# Patient Record
Sex: FEMALE | Race: WHITE | NOT HISPANIC OR LATINO | Employment: OTHER | URBAN - METROPOLITAN AREA
[De-identification: names, ages, dates, MRNs, and addresses within clinical notes are randomized per-mention and may not be internally consistent; named-entity substitution may affect disease eponyms.]

---

## 2017-01-23 ENCOUNTER — HOSPITAL ENCOUNTER (OUTPATIENT)
Dept: RADIOLOGY | Facility: HOSPITAL | Age: 82
Discharge: HOME | End: 2017-01-23
Attending: FAMILY MEDICINE
Payer: MEDICARE

## 2017-01-23 DIAGNOSIS — R05.9 COUGH: ICD-10-CM

## 2017-01-23 PROCEDURE — 71020 XR CHEST PA AND LATERAL: CPT | Mod: TC

## 2017-01-23 PROCEDURE — 71020 XR CHEST PA AND LATERAL: CPT | Mod: 26,,, | Performed by: RADIOLOGY

## 2017-08-09 ENCOUNTER — OFFICE VISIT (OUTPATIENT)
Dept: OBSTETRICS AND GYNECOLOGY | Facility: CLINIC | Age: 82
End: 2017-08-09
Payer: MEDICARE

## 2017-08-09 VITALS
SYSTOLIC BLOOD PRESSURE: 166 MMHG | TEMPERATURE: 99 F | HEIGHT: 60 IN | WEIGHT: 104.75 LBS | DIASTOLIC BLOOD PRESSURE: 72 MMHG | BODY MASS INDEX: 20.56 KG/M2

## 2017-08-09 DIAGNOSIS — Z12.31 VISIT FOR SCREENING MAMMOGRAM: ICD-10-CM

## 2017-08-09 DIAGNOSIS — Z01.419 WELL WOMAN EXAM WITH ROUTINE GYNECOLOGICAL EXAM: Primary | ICD-10-CM

## 2017-08-09 DIAGNOSIS — Z87.39 HISTORY OF OSTEOPOROSIS: ICD-10-CM

## 2017-08-09 DIAGNOSIS — N95.1 MENOPAUSAL SYNDROME: ICD-10-CM

## 2017-08-09 PROCEDURE — G0101 CA SCREEN;PELVIC/BREAST EXAM: HCPCS | Mod: S$PBB,,, | Performed by: OBSTETRICS & GYNECOLOGY

## 2017-08-09 PROCEDURE — 99999 PR PBB SHADOW E&M-EST. PATIENT-LVL III: CPT | Mod: PBBFAC,,, | Performed by: OBSTETRICS & GYNECOLOGY

## 2017-08-09 PROCEDURE — 99213 OFFICE O/P EST LOW 20 MIN: CPT | Mod: PBBFAC | Performed by: OBSTETRICS & GYNECOLOGY

## 2017-08-09 RX ORDER — HYDROCHLOROTHIAZIDE 25 MG/1
25 TABLET ORAL DAILY
Status: ON HOLD | COMMUNITY
End: 2018-02-06 | Stop reason: HOSPADM

## 2017-08-09 NOTE — PROGRESS NOTES
Subjective:       Patient ID: Jeannie Hutchins is a 82 y.o. female.    Chief Complaint:  Gynecologic Exam      History of Present Illness  HPI  Annual Exam-Postmenopausal  Patient presents for annual exam. The patient has no complaints today. The patient is not currently sexually active. GYN screening history: no prior history of gyn screening tests. The patient is not taking hormone replacement therapy. Patient denies post-menopausal vaginal bleeding. The patient wears seatbelts: yes. The patient participates in regular exercise: yes. Has the patient ever been transfused or tattooed?: no. The patient reports that there is not domestic violence in her life.    She is status post hysterectomy  On Fosamax  Daughter with breast cancer at 50  Sister with ovarian cancer at 80      GYN & OB HistoryNo LMP recorded. Patient has had a hysterectomy.   Date of Last Pap: No result found    OB History    Para Term  AB Living   6 5 5   1     SAB TAB Ectopic Multiple Live Births   1              # Outcome Date GA Lbr Laz/2nd Weight Sex Delivery Anes PTL Lv   6 SAB            5 Term            4 Term            3 Term            2 Term            1 Term               Obstetric Comments   SAb at 6 weeks between the last two babies     Past Medical History:   Diagnosis Date    Arthritis     Blood transfusion     Carotid stenosis     Hypercholesterolemia     Hypertension     Thyroid disease        Past Surgical History:   Procedure Laterality Date    CARPAL TUNNEL RELEASE      right hand    HYSTERECTOMY      left carotid endartectomy  2006    TONSILLECTOMY         Family History   Problem Relation Age of Onset    Heart disease Father     Cancer Brother 65     bladder    Cancer Sister 81     Ovarian    Ovarian cancer Sister 81    Breast cancer Daughter 50     Status post mastectomy    Cancer Sister      Lung.  Smoker    Cancer Sister      Lung.  Smoker       Social History     Social History     Marital status:      Spouse name: N/A    Number of children: N/A    Years of education: N/A     Social History Main Topics    Smoking status: Never Smoker    Smokeless tobacco: Never Used    Alcohol use No    Drug use: No    Sexual activity: No     Other Topics Concern    None     Social History Narrative     since 1984    He is retired.  He is 82.  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      alendronate (FOSAMAX) 70 MG tablet Take 70 mg by mouth every 7 days.      amlodipine-atorvastatin (CADUET) 10-10 mg per tablet Take 1 tablet by mouth every evening.      aspirin 325 MG tablet Take 325 mg by mouth nightly.      hydrochlorothiazide (HYDRODIURIL) 25 MG tablet Take 25 mg by mouth once daily.      levothyroxine (SYNTHROID) 75 MCG tablet Take 75 mcg by mouth once daily.      ranitidine (ZANTAC) 150 MG tablet Take 150 mg by mouth with lunch.      VIT A,C & E/LUTEIN/MINERALS (VISION FORMULA, WITH LUTEIN, ORAL) Take 1 tablet by mouth once daily.       No current facility-administered medications for this visit.        Review of patient's allergies indicates:   Allergen Reactions    Strawberry Swelling     Tongue swells up and a generalized rash.       Review of Systems  Review of Systems   Constitutional: Negative for activity change, appetite change, chills, fatigue, fever and unexpected weight change.   HENT: Negative for mouth sores.    Respiratory: Negative for cough, shortness of breath and wheezing.    Cardiovascular: Negative for chest pain and palpitations.   Gastrointestinal: Negative for abdominal pain, bloating, blood in stool, constipation, nausea and vomiting.   Endocrine: Negative for diabetes and hot flashes.   Genitourinary: Negative for dyspareunia, dysuria,  frequency, hematuria, menorrhagia, menstrual problem, pelvic pain, urgency, vaginal bleeding, vaginal discharge, vaginal pain, dysmenorrhea, urinary incontinence, postcoital bleeding and vaginal odor.   Musculoskeletal: Negative for back pain and myalgias.   Skin:  Negative for rash.   Neurological: Negative for seizures and headaches.   Psychiatric/Behavioral: Negative for depression and sleep disturbance. The patient is not nervous/anxious.    Breast: Negative for breast mass, breast pain and nipple discharge          Objective:    Physical Exam:   Constitutional: She appears well-developed and well-nourished. No distress.    HENT:   Head: Normocephalic and atraumatic.    Eyes: EOM are normal.    Neck: Normal range of motion.     Pulmonary/Chest: Effort normal. No respiratory distress.   Breasts: Non-tender, no engorgement, no masses, no retraction, no discharge. Negative for lymphadenopathy.         Abdominal: Soft. She exhibits no distension. There is no tenderness. There is no rebound and no guarding.     Genitourinary: Vagina normal. No vaginal discharge found.   Genitourinary Comments: Severe atrophy.  Vulva without any obvious lesions.  Vaginal vault with good support with grade 2 cystocele.  Minimal discharge noted.  No obvious lesion.  Cervix and Uterus are surgically absent.  Adnexa is without any masses or tenderness.    External hemorrhoids seen.           Musculoskeletal: Normal range of motion.       Neurological: She is alert.    Skin: Skin is warm and dry.    Psychiatric: She has a normal mood and affect.          Assessment:        1. Well woman exam with routine gynecological exam    2. Menopausal syndrome    3. History of osteoporosis    4. Visit for screening mammogram              Plan:          I have discussed with the patient her condition.  Monthly breast examination was instructed, discussed, and encouraged.  Patient was encouraged to consume a low-calorie, low fat diet, and to increase of  physical activity.  Healthy habits encouraged.  A Pap smear was NOT performed.  Mammogram was ordered along with DXA scan for bone mineral densitiy because of the combination of her age and risk factors.  Gonorrhea and Chlamydia testing not performed.  HIV test not ordered.  Patient is to continue her medications as prescribed by her primary care physician.  She will come back to see me in one year for her annual visit.  She can come back to see me sooner as necessary.  All of her questions were answered appropriately to her satisfaction.    She recently had blood work done with her PCP.  Would like for us to see results.  Her family history with signs of breast and ovarian cancer.  Discussed risks  Cystocele and hemorrhoids NOT bothering her.  No pain.  No urinary symptoms.    She does not want any surgery/procedure done at this time.

## 2017-09-06 ENCOUNTER — TELEPHONE (OUTPATIENT)
Dept: OBSTETRICS AND GYNECOLOGY | Facility: CLINIC | Age: 82
End: 2017-09-06

## 2017-10-04 ENCOUNTER — HOSPITAL ENCOUNTER (OUTPATIENT)
Dept: RADIOLOGY | Facility: HOSPITAL | Age: 82
Discharge: HOME | End: 2017-10-04
Attending: OBSTETRICS & GYNECOLOGY
Payer: MEDICARE

## 2017-10-04 ENCOUNTER — HOSPITAL ENCOUNTER (OUTPATIENT)
Dept: RADIOLOGY | Facility: HOSPITAL | Age: 82
Discharge: HOME | End: 2017-10-04
Attending: SURGERY
Payer: MEDICARE

## 2017-10-04 DIAGNOSIS — I77.89 OTHER SPECIFIED DISORDERS OF ARTERIES AND ARTERIOLES: ICD-10-CM

## 2017-10-04 DIAGNOSIS — Z87.39 HISTORY OF OSTEOPOROSIS: ICD-10-CM

## 2017-10-04 DIAGNOSIS — Z12.31 VISIT FOR SCREENING MAMMOGRAM: ICD-10-CM

## 2017-10-04 DIAGNOSIS — M81.0 OSTEOPOROSIS: ICD-10-CM

## 2017-10-04 DIAGNOSIS — I65.29 STENOSIS OF CAROTID ARTERY, UNSPECIFIED LATERALITY: ICD-10-CM

## 2017-10-04 PROCEDURE — 77067 SCR MAMMO BI INCL CAD: CPT | Mod: TC

## 2017-10-04 PROCEDURE — 93880 EXTRACRANIAL BILAT STUDY: CPT | Mod: 26,,, | Performed by: RADIOLOGY

## 2017-10-04 PROCEDURE — 77067 SCR MAMMO BI INCL CAD: CPT | Mod: 26,,, | Performed by: RADIOLOGY

## 2017-10-04 PROCEDURE — 77080 DXA BONE DENSITY AXIAL: CPT | Mod: TC

## 2017-10-04 PROCEDURE — 77080 DXA BONE DENSITY AXIAL: CPT | Mod: 26,,, | Performed by: RADIOLOGY

## 2017-10-04 PROCEDURE — 77063 BREAST TOMOSYNTHESIS BI: CPT | Mod: 26,,, | Performed by: RADIOLOGY

## 2017-10-04 PROCEDURE — 93880 EXTRACRANIAL BILAT STUDY: CPT | Mod: TC

## 2018-01-22 ENCOUNTER — HOSPITAL ENCOUNTER (INPATIENT)
Facility: HOSPITAL | Age: 83
LOS: 15 days | Discharge: NURSING, REHAB OR LONG-TERM CARE | DRG: 643 | End: 2018-02-06
Attending: EMERGENCY MEDICINE | Admitting: EMERGENCY MEDICINE
Payer: MEDICARE

## 2018-01-22 DIAGNOSIS — R05.3 COUGH, PERSISTENT: ICD-10-CM

## 2018-01-22 DIAGNOSIS — E87.1 HYPONATREMIA WITH DECREASED SERUM OSMOLALITY: ICD-10-CM

## 2018-01-22 DIAGNOSIS — E87.1 ACUTE HYPONATREMIA: ICD-10-CM

## 2018-01-22 DIAGNOSIS — E83.39 HYPOPHOSPHATEMIA: ICD-10-CM

## 2018-01-22 DIAGNOSIS — R11.2 NON-INTRACTABLE VOMITING WITH NAUSEA, UNSPECIFIED VOMITING TYPE: Primary | ICD-10-CM

## 2018-01-22 DIAGNOSIS — R05.9 COUGH: ICD-10-CM

## 2018-01-22 DIAGNOSIS — I48.91 A-FIB: ICD-10-CM

## 2018-01-22 DIAGNOSIS — J15.9 BACTERIAL PNEUMONIA: ICD-10-CM

## 2018-01-22 LAB
ALBUMIN SERPL BCP-MCNC: 3.8 G/DL
ALP SERPL-CCNC: 70 U/L
ALT SERPL W/O P-5'-P-CCNC: 32 U/L
ANION GAP SERPL CALC-SCNC: 9 MMOL/L
AST SERPL-CCNC: 40 U/L
BACTERIA #/AREA URNS HPF: ABNORMAL /HPF
BASOPHILS # BLD AUTO: 0.01 K/UL
BASOPHILS NFR BLD: 0.1 %
BILIRUB SERPL-MCNC: 0.9 MG/DL
BILIRUB UR QL STRIP: NEGATIVE
BNP SERPL-MCNC: 485 PG/ML
BUN SERPL-MCNC: 5 MG/DL
BUN SERPL-MCNC: 7 MG/DL
BUN SERPL-MCNC: 8 MG/DL
CALCIUM SERPL-MCNC: 8.3 MG/DL
CALCIUM SERPL-MCNC: 8.5 MG/DL
CALCIUM SERPL-MCNC: 8.8 MG/DL
CHLORIDE SERPL-SCNC: 75 MMOL/L
CHLORIDE SERPL-SCNC: 76 MMOL/L
CHLORIDE SERPL-SCNC: 81 MMOL/L
CLARITY UR: CLEAR
CO2 SERPL-SCNC: 28 MMOL/L
CO2 SERPL-SCNC: 28 MMOL/L
CO2 SERPL-SCNC: 29 MMOL/L
COLOR UR: YELLOW
CREAT SERPL-MCNC: 0.6 MG/DL
DIFFERENTIAL METHOD: ABNORMAL
EOSINOPHIL # BLD AUTO: 0 K/UL
EOSINOPHIL NFR BLD: 0 %
ERYTHROCYTE [DISTWIDTH] IN BLOOD BY AUTOMATED COUNT: 12.2 %
EST. GFR  (AFRICAN AMERICAN): >60 ML/MIN/1.73 M^2
EST. GFR  (NON AFRICAN AMERICAN): >60 ML/MIN/1.73 M^2
GLUCOSE SERPL-MCNC: 101 MG/DL
GLUCOSE SERPL-MCNC: 120 MG/DL
GLUCOSE SERPL-MCNC: 132 MG/DL
GLUCOSE UR QL STRIP: ABNORMAL
HCT VFR BLD AUTO: 34 %
HGB BLD-MCNC: 12.8 G/DL
HGB UR QL STRIP: ABNORMAL
HYALINE CASTS #/AREA URNS LPF: 0 /LPF
KETONES UR QL STRIP: ABNORMAL
LEUKOCYTE ESTERASE UR QL STRIP: ABNORMAL
LYMPHOCYTES # BLD AUTO: 0.6 K/UL
LYMPHOCYTES NFR BLD: 7.7 %
MCH RBC QN AUTO: 30.7 PG
MCHC RBC AUTO-ENTMCNC: 37.6 G/DL
MCV RBC AUTO: 82 FL
MICROSCOPIC COMMENT: ABNORMAL
MONOCYTES # BLD AUTO: 0.7 K/UL
MONOCYTES NFR BLD: 9.1 %
NEUTROPHILS # BLD AUTO: 5.9 K/UL
NEUTROPHILS NFR BLD: 83.1 %
NITRITE UR QL STRIP: NEGATIVE
NON-SQ EPI CELLS #/AREA URNS HPF: 2 /HPF
OSMOLALITY SERPL: 240 MOSM/KG
OTHER ELEMENTS URNS MICRO: ABNORMAL
PH UR STRIP: 7 [PH] (ref 5–8)
PLATELET # BLD AUTO: 264 K/UL
PMV BLD AUTO: 10.1 FL
POTASSIUM SERPL-SCNC: 2.8 MMOL/L
POTASSIUM SERPL-SCNC: 2.8 MMOL/L
POTASSIUM SERPL-SCNC: 3.1 MMOL/L
PROT SERPL-MCNC: 6.7 G/DL
PROT UR QL STRIP: ABNORMAL
RBC # BLD AUTO: 4.17 M/UL
RBC #/AREA URNS HPF: 1 /HPF (ref 0–4)
SODIUM SERPL-SCNC: 113 MMOL/L
SODIUM SERPL-SCNC: 113 MMOL/L
SODIUM SERPL-SCNC: 118 MMOL/L
SP GR UR STRIP: 1.01 (ref 1–1.03)
SQUAMOUS #/AREA URNS HPF: 1 /HPF
TROPONIN I SERPL DL<=0.01 NG/ML-MCNC: 0.01 NG/ML
TSH SERPL DL<=0.005 MIU/L-ACNC: 1.19 UIU/ML
URN SPEC COLLECT METH UR: ABNORMAL
UROBILINOGEN UR STRIP-ACNC: NEGATIVE EU/DL
WBC # BLD AUTO: 7.16 K/UL
WBC #/AREA URNS HPF: 4 /HPF (ref 0–5)

## 2018-01-22 PROCEDURE — 96374 THER/PROPH/DIAG INJ IV PUSH: CPT

## 2018-01-22 PROCEDURE — 81000 URINALYSIS NONAUTO W/SCOPE: CPT

## 2018-01-22 PROCEDURE — 63600175 PHARM REV CODE 636 W HCPCS: Performed by: EMERGENCY MEDICINE

## 2018-01-22 PROCEDURE — 93010 ELECTROCARDIOGRAM REPORT: CPT | Mod: ,,, | Performed by: INTERNAL MEDICINE

## 2018-01-22 PROCEDURE — 83880 ASSAY OF NATRIURETIC PEPTIDE: CPT

## 2018-01-22 PROCEDURE — 87449 NOS EACH ORGANISM AG IA: CPT

## 2018-01-22 PROCEDURE — 82570 ASSAY OF URINE CREATININE: CPT

## 2018-01-22 PROCEDURE — 84300 ASSAY OF URINE SODIUM: CPT

## 2018-01-22 PROCEDURE — 36415 COLL VENOUS BLD VENIPUNCTURE: CPT

## 2018-01-22 PROCEDURE — 99285 EMERGENCY DEPT VISIT HI MDM: CPT | Mod: 25

## 2018-01-22 PROCEDURE — 63600175 PHARM REV CODE 636 W HCPCS: Performed by: INTERNAL MEDICINE

## 2018-01-22 PROCEDURE — 83930 ASSAY OF BLOOD OSMOLALITY: CPT

## 2018-01-22 PROCEDURE — 25000003 PHARM REV CODE 250: Performed by: EMERGENCY MEDICINE

## 2018-01-22 PROCEDURE — 85025 COMPLETE CBC W/AUTO DIFF WBC: CPT

## 2018-01-22 PROCEDURE — 87086 URINE CULTURE/COLONY COUNT: CPT | Mod: 59

## 2018-01-22 PROCEDURE — 83935 ASSAY OF URINE OSMOLALITY: CPT

## 2018-01-22 PROCEDURE — 87086 URINE CULTURE/COLONY COUNT: CPT

## 2018-01-22 PROCEDURE — S0028 INJECTION, FAMOTIDINE, 20 MG: HCPCS | Performed by: EMERGENCY MEDICINE

## 2018-01-22 PROCEDURE — 84443 ASSAY THYROID STIM HORMONE: CPT

## 2018-01-22 PROCEDURE — 80048 BASIC METABOLIC PNL TOTAL CA: CPT | Mod: 91

## 2018-01-22 PROCEDURE — 20000000 HC ICU ROOM

## 2018-01-22 PROCEDURE — 80053 COMPREHEN METABOLIC PANEL: CPT

## 2018-01-22 PROCEDURE — 82043 UR ALBUMIN QUANTITATIVE: CPT

## 2018-01-22 PROCEDURE — 84484 ASSAY OF TROPONIN QUANT: CPT

## 2018-01-22 PROCEDURE — 96376 TX/PRO/DX INJ SAME DRUG ADON: CPT

## 2018-01-22 PROCEDURE — 96361 HYDRATE IV INFUSION ADD-ON: CPT

## 2018-01-22 RX ORDER — AMLODIPINE BESYLATE 5 MG/1
10 TABLET ORAL NIGHTLY
Status: DISCONTINUED | OUTPATIENT
Start: 2018-01-22 | End: 2018-01-29

## 2018-01-22 RX ORDER — FAMOTIDINE 10 MG/ML
20 INJECTION INTRAVENOUS EVERY 12 HOURS
Status: DISCONTINUED | OUTPATIENT
Start: 2018-01-22 | End: 2018-02-06 | Stop reason: HOSPADM

## 2018-01-22 RX ORDER — 3% SODIUM CHLORIDE 3 G/100ML
100 INJECTION, SOLUTION INTRAVENOUS CONTINUOUS
Status: DISPENSED | OUTPATIENT
Start: 2018-01-22 | End: 2018-01-23

## 2018-01-22 RX ORDER — ATORVASTATIN CALCIUM 10 MG/1
10 TABLET, FILM COATED ORAL NIGHTLY
Status: DISCONTINUED | OUTPATIENT
Start: 2018-01-22 | End: 2018-02-06 | Stop reason: HOSPADM

## 2018-01-22 RX ORDER — ONDANSETRON 2 MG/ML
4 INJECTION INTRAMUSCULAR; INTRAVENOUS EVERY 8 HOURS PRN
Status: DISCONTINUED | OUTPATIENT
Start: 2018-01-22 | End: 2018-02-06 | Stop reason: HOSPADM

## 2018-01-22 RX ORDER — ONDANSETRON 2 MG/ML
4 INJECTION INTRAMUSCULAR; INTRAVENOUS
Status: COMPLETED | OUTPATIENT
Start: 2018-01-22 | End: 2018-01-22

## 2018-01-22 RX ORDER — AMLODIPINE BESYLATE AND ATORVASTATIN CALCIUM 10; 10 MG/1; MG/1
1 TABLET, FILM COATED ORAL NIGHTLY
Status: DISCONTINUED | OUTPATIENT
Start: 2018-01-22 | End: 2018-01-22

## 2018-01-22 RX ORDER — 3% SODIUM CHLORIDE 3 G/100ML
100 INJECTION, SOLUTION INTRAVENOUS CONTINUOUS
Status: DISCONTINUED | OUTPATIENT
Start: 2018-01-22 | End: 2018-01-22

## 2018-01-22 RX ORDER — ACETAMINOPHEN 325 MG/1
650 TABLET ORAL EVERY 4 HOURS PRN
Status: DISCONTINUED | OUTPATIENT
Start: 2018-01-22 | End: 2018-02-06 | Stop reason: HOSPADM

## 2018-01-22 RX ORDER — LEVOTHYROXINE SODIUM 75 UG/1
75 TABLET ORAL
Status: DISCONTINUED | OUTPATIENT
Start: 2018-01-23 | End: 2018-02-06 | Stop reason: HOSPADM

## 2018-01-22 RX ORDER — MAGNESIUM SULFATE HEPTAHYDRATE 40 MG/ML
2 INJECTION, SOLUTION INTRAVENOUS
Status: DISCONTINUED | OUTPATIENT
Start: 2018-01-22 | End: 2018-02-06 | Stop reason: HOSPADM

## 2018-01-22 RX ORDER — RAMELTEON 8 MG/1
8 TABLET ORAL NIGHTLY PRN
Status: DISCONTINUED | OUTPATIENT
Start: 2018-01-22 | End: 2018-02-06 | Stop reason: HOSPADM

## 2018-01-22 RX ORDER — OXYCODONE HYDROCHLORIDE 5 MG/1
5 TABLET ORAL EVERY 4 HOURS PRN
Status: DISCONTINUED | OUTPATIENT
Start: 2018-01-22 | End: 2018-02-06 | Stop reason: HOSPADM

## 2018-01-22 RX ORDER — POTASSIUM CHLORIDE 7.45 MG/ML
10 INJECTION INTRAVENOUS
Status: DISCONTINUED | OUTPATIENT
Start: 2018-01-22 | End: 2018-02-05

## 2018-01-22 RX ORDER — POTASSIUM CHLORIDE 20 MEQ/15ML
40 SOLUTION ORAL
Status: DISCONTINUED | OUTPATIENT
Start: 2018-01-22 | End: 2018-02-05

## 2018-01-22 RX ORDER — 3% SODIUM CHLORIDE 3 G/100ML
300 INJECTION, SOLUTION INTRAVENOUS CONTINUOUS
Status: DISCONTINUED | OUTPATIENT
Start: 2018-01-22 | End: 2018-01-22

## 2018-01-22 RX ORDER — 3% SODIUM CHLORIDE 3 G/100ML
100 INJECTION, SOLUTION INTRAVENOUS
Status: COMPLETED | OUTPATIENT
Start: 2018-01-22 | End: 2018-01-22

## 2018-01-22 RX ORDER — SODIUM CHLORIDE 0.9 % (FLUSH) 0.9 %
3 SYRINGE (ML) INJECTION
Status: DISCONTINUED | OUTPATIENT
Start: 2018-01-22 | End: 2018-02-06 | Stop reason: HOSPADM

## 2018-01-22 RX ADMIN — SODIUM CHLORIDE 100 ML: 3 INJECTION, SOLUTION INTRAVENOUS at 07:01

## 2018-01-22 RX ADMIN — SODIUM CHLORIDE 100 ML: 3 INJECTION, SOLUTION INTRAVENOUS at 05:01

## 2018-01-22 RX ADMIN — ONDANSETRON 4 MG: 2 INJECTION INTRAMUSCULAR; INTRAVENOUS at 06:01

## 2018-01-22 RX ADMIN — AMLODIPINE BESYLATE 10 MG: 5 TABLET ORAL at 08:01

## 2018-01-22 RX ADMIN — FAMOTIDINE 20 MG: 10 INJECTION, SOLUTION INTRAVENOUS at 09:01

## 2018-01-22 RX ADMIN — OXYCODONE HYDROCHLORIDE 5 MG: 5 TABLET ORAL at 08:01

## 2018-01-22 RX ADMIN — ONDANSETRON 4 MG: 2 INJECTION INTRAMUSCULAR; INTRAVENOUS at 08:01

## 2018-01-22 RX ADMIN — ONDANSETRON 4 MG: 2 INJECTION INTRAMUSCULAR; INTRAVENOUS at 05:01

## 2018-01-22 RX ADMIN — ATORVASTATIN CALCIUM 10 MG: 10 TABLET, FILM COATED ORAL at 08:01

## 2018-01-22 RX ADMIN — SODIUM CHLORIDE 100 ML: 3 INJECTION, SOLUTION INTRAVENOUS at 10:01

## 2018-01-22 NOTE — ED TRIAGE NOTES
Pt arrived in ed via personal transport with c/o left sided abdominal pain as well as nausea and vomiting and loss of appetite for the past 4 days; pt denies cp and sob at this time

## 2018-01-22 NOTE — ED NOTES
offerred pt assistance with urine; pt stated she does not need to go at this time; pt understands need for urine sample

## 2018-01-22 NOTE — ED PROVIDER NOTES
"Encounter Date: 1/22/2018    SCRIBE #1 NOTE: I, Brianna Cedeno, am scribing for, and in the presence of,  Jr Sabillon MD. I have scribed the following portions of the note - Other sections scribed: HPI, ROS, and Physical Exam.       History     Chief Complaint   Patient presents with    Abdominal Pain     decreased appetite nausea,  weakness and cough x 4 days. O2 sat 89 % on room air placed on 2 L NC with repeat O2 sat 93%, pt alert warm and dry     CC: Abdominal Pain    HPI: The pt is an 82 y.o. F with a PMHx of thyroid disease, HTN, hypercholesterolemia, carotid stenosis, osteoporosis, and arthritis and a past surgical hx of L carotid endartectomy, carpal tunnel release, hysterectomy, and tonsillectomy who presents to the ED c/o abdominal "presure" w/ n/v (2x today), decreased appetite, and dizziness as well as cough for past 4 days. Pt denies any pain and describes sensation in abdomen as a "heavy" and "bloated" feeling. Pt says that she visited her PCP 4 days ago who told her that her potassium was low and that she most likely had a stomach virus. No attempted treatments reported. Pt otherwise denies recent contact w/ sick people; being smoker, drinker, or drug user; and head trauma, headache, visual disturbances, dysuria, and other associated symptoms.       The history is provided by the patient. No  was used.     Review of patient's allergies indicates:   Allergen Reactions    Strawberry Swelling     Tongue swells up and a generalized rash.     Past Medical History:   Diagnosis Date    Arthritis     Blood transfusion     Carotid stenosis     Hypercholesterolemia     Hypertension     Psychiatric problem     Thyroid disease      Past Surgical History:   Procedure Laterality Date    CARPAL TUNNEL RELEASE  2012    right hand    HYSTERECTOMY      left carotid endartectomy  5/2006    TONSILLECTOMY       Family History   Problem Relation Age of Onset    Heart disease Father     " "Cancer Brother 65     bladder    Cancer Sister 81     Ovarian    Ovarian cancer Sister 81    Breast cancer Daughter 50     Status post mastectomy    Cancer Sister      Lung.  Smoker    Cancer Sister      Lung.  Smoker    Schizophrenia Son      Social History   Substance Use Topics    Smoking status: Never Smoker    Smokeless tobacco: Never Used    Alcohol use No     Review of Systems   Constitutional: Positive for appetite change. Negative for chills, diaphoresis and fever.   HENT: Negative for ear pain and sore throat.         (-) head trauma   Eyes: Negative for redness and visual disturbance.   Respiratory: Positive for cough. Negative for shortness of breath.    Cardiovascular: Negative for chest pain.   Gastrointestinal: Positive for nausea and vomiting (2x today). Negative for abdominal pain and diarrhea.        (+) abdominal "heaviness/bloating"   Genitourinary: Negative for dysuria.   Musculoskeletal: Negative for back pain.   Skin: Negative for rash.   Neurological: Positive for dizziness. Negative for headaches.   Psychiatric/Behavioral: Negative for confusion.       Physical Exam     Initial Vitals [01/22/18 1346]   BP Pulse Resp Temp SpO2   (!) 153/70 85 20 98.2 °F (36.8 °C) (!) 89 %      MAP       97.67         Physical Exam    Nursing note and vitals reviewed.  Constitutional: She appears well-developed and well-nourished. No distress.   HENT:   Head: Normocephalic and atraumatic.   Nose: Nose normal.   Eyes: EOM are normal. Pupils are equal, round, and reactive to light.   Neck: Normal range of motion. Neck supple.   Cardiovascular: Normal rate, regular rhythm, normal heart sounds and intact distal pulses. Exam reveals no gallop and no friction rub.    Pulmonary/Chest: Breath sounds normal. No respiratory distress. She has no rhonchi. She has no rales. She exhibits no tenderness.   Abdominal: Soft. Normal appearance and bowel sounds are normal. She exhibits no distension. There is no " tenderness. There is no rebound and no guarding.   Musculoskeletal: Normal range of motion. She exhibits no edema.   Neurological: She is alert and oriented to person, place, and time. No cranial nerve deficit.   Skin: Skin is warm and dry.   Psychiatric: She has a normal mood and affect. Her behavior is normal.         ED Course   Critical Care  Date/Time: 1/29/2018 12:59 PM  Performed by: MARYBETH SIDDIQUI  Authorized by: BRIGHT FOX   Direct patient critical care time: 40 minutes  Total critical care time (exclusive of procedural time) : 40 minutes  Critical care was necessary to treat or prevent imminent or life-threatening deterioration of the following conditions: metabolic crisis.  Critical care was time spent personally by me on the following activities: discussions with consultants, interpretation of cardiac output measurements, evaluation of patient's response to treatment, examination of patient, obtaining history from patient or surrogate, ordering and performing treatments and interventions, ordering and review of laboratory studies, ordering and review of radiographic studies and re-evaluation of patient's condition.        Labs Reviewed   CBC W/ AUTO DIFFERENTIAL - Abnormal; Notable for the following:        Result Value    Hematocrit 34.0 (*)     MCHC 37.6 (*)     Lymph # 0.6 (*)     Gran% 83.1 (*)     Lymph% 7.7 (*)     All other components within normal limits   COMPREHENSIVE METABOLIC PANEL - Abnormal; Notable for the following:     Sodium 113 (*)     Potassium 2.8 (*)     Chloride 75 (*)     Glucose 132 (*)     All other components within normal limits    Narrative:       Sodium critical result(s) called and verbal readback obtained from   Chrissy Vu Rn , 01/22/2018 15:54   URINALYSIS - Abnormal; Notable for the following:     Protein, UA 2+ (*)     Glucose, UA 1+ (*)     Ketones, UA Trace (*)     Occult Blood UA 1+ (*)     Leukocytes, UA 1+ (*)     All other components within normal  limits   B-TYPE NATRIURETIC PEPTIDE - Abnormal; Notable for the following:      (*)     All other components within normal limits   BASIC METABOLIC PANEL - Abnormal; Notable for the following:     Sodium 113 (*)     Potassium 3.1 (*)     Chloride 76 (*)     Glucose 120 (*)     BUN, Bld 7 (*)     Calcium 8.5 (*)     All other components within normal limits    Narrative:       Sodium critical result(s) called and verbal readback obtained from   DALE Bains Rn, 01/22/2018 17:36   URINALYSIS MICROSCOPIC - Abnormal; Notable for the following:     Non-Squam Epith 2 (*)     Other (U/A) Rare (*)     All other components within normal limits   TSH   TROPONIN I             Medical Decision Making:   Initial Assessment:   81 yo presenting with abomdinal bloating, nausea, vomiting. Exam reassuring with no significant abdominal tenderness, rebound, guarding, or peritoneal signs. Labs reveal severe hyponatremia, confirmed with repeat BMP. Unclear cause of hyponatremia. CT head obtained without significant mass or bleed. Symptoms could be related to severe hyponatremia. 200ml of 3% hypertonic saline given severe hyponatremia and symptomatic. Patient thin and small, will hold off on further fast correction to avoid overcorrection. Goal ~6meQ correction in first 24 hours. Neuro exam without significant deficits. Will admit to the ICU for further eval and management. Nephrology consult requested with urine lytes ordered per hospitalist's request. Patient currently well appearing. Low concern for acute infection at the moment, though legionella antigen sent.             Scribe Attestation:   Scribe #1: I performed the above scribed service and the documentation accurately describes the services I performed. I attest to the accuracy of the note.    Attending Attestation:           Physician Attestation for Scribe:  Physician Attestation Statement for Scribe #1: I, Jr Sabillon MD, reviewed documentation, as scribed by Brianna  Le in my presence, and it is both accurate and complete.                 ED Course      Clinical Impression:   The primary encounter diagnosis was Non-intractable vomiting with nausea, unspecified vomiting type. Diagnoses of Cough, Acute hyponatremia, and A-fib were also pertinent to this visit.    Disposition:   Disposition: Admitted  Condition: Critical                        Jr Sabillon MD  01/29/18 1300

## 2018-01-23 PROBLEM — F41.1 GENERALIZED ANXIETY DISORDER: Status: ACTIVE | Noted: 2018-01-23

## 2018-01-23 PROBLEM — F43.23 ADJUSTMENT DISORDER WITH MIXED ANXIETY AND DEPRESSED MOOD: Status: ACTIVE | Noted: 2018-01-23

## 2018-01-23 PROBLEM — E87.1 HYPONATREMIA WITH DECREASED SERUM OSMOLALITY: Status: ACTIVE | Noted: 2018-01-23

## 2018-01-23 PROBLEM — I10 BENIGN HYPERTENSION: Status: ACTIVE | Noted: 2018-01-23

## 2018-01-23 PROBLEM — E87.0 HYPERNATREMIA: Status: ACTIVE | Noted: 2018-01-23

## 2018-01-23 PROBLEM — E87.6 HYPOKALEMIA: Status: ACTIVE | Noted: 2018-01-23

## 2018-01-23 PROBLEM — F32.1 MODERATE MAJOR DEPRESSION, SINGLE EPISODE: Status: ACTIVE | Noted: 2018-01-23

## 2018-01-23 LAB
ANION GAP SERPL CALC-SCNC: 5 MMOL/L
ANION GAP SERPL CALC-SCNC: 7 MMOL/L
ANION GAP SERPL CALC-SCNC: 8 MMOL/L
BASOPHILS # BLD AUTO: 0.01 K/UL
BASOPHILS NFR BLD: 0.1 %
BUN SERPL-MCNC: 5 MG/DL
CALCIUM SERPL-MCNC: 8.1 MG/DL
CALCIUM SERPL-MCNC: 8.1 MG/DL
CALCIUM SERPL-MCNC: 8.5 MG/DL
CHLORIDE SERPL-SCNC: 83 MMOL/L
CHLORIDE SERPL-SCNC: 90 MMOL/L
CHLORIDE SERPL-SCNC: 97 MMOL/L
CO2 SERPL-SCNC: 27 MMOL/L
CO2 SERPL-SCNC: 29 MMOL/L
CO2 SERPL-SCNC: 30 MMOL/L
CREAT SERPL-MCNC: 0.5 MG/DL
CREAT SERPL-MCNC: 0.5 MG/DL
CREAT SERPL-MCNC: 0.6 MG/DL
CREAT UR-MCNC: 11.8 MG/DL
DIFFERENTIAL METHOD: ABNORMAL
EOSINOPHIL # BLD AUTO: 0 K/UL
EOSINOPHIL NFR BLD: 0.3 %
ERYTHROCYTE [DISTWIDTH] IN BLOOD BY AUTOMATED COUNT: 12.8 %
EST. GFR  (AFRICAN AMERICAN): >60 ML/MIN/1.73 M^2
EST. GFR  (NON AFRICAN AMERICAN): >60 ML/MIN/1.73 M^2
GLUCOSE SERPL-MCNC: 100 MG/DL
GLUCOSE SERPL-MCNC: 86 MG/DL
GLUCOSE SERPL-MCNC: 98 MG/DL
HCT VFR BLD AUTO: 33.9 %
HGB BLD-MCNC: 12.5 G/DL
LYMPHOCYTES # BLD AUTO: 1 K/UL
LYMPHOCYTES NFR BLD: 14.4 %
MCH RBC QN AUTO: 31 PG
MCHC RBC AUTO-ENTMCNC: 36.9 G/DL
MCV RBC AUTO: 84 FL
MICROALBUMIN UR DL<=1MG/L-MCNC: 181 UG/ML
MICROALBUMIN/CREATININE RATIO: 1533.9 UG/MG
MONOCYTES # BLD AUTO: 1 K/UL
MONOCYTES NFR BLD: 15.4 %
NEUTROPHILS # BLD AUTO: 4.7 K/UL
NEUTROPHILS NFR BLD: 69.9 %
OSMOLALITY UR: 184 MOSM/KG
PLATELET # BLD AUTO: 217 K/UL
PMV BLD AUTO: 10 FL
POTASSIUM SERPL-SCNC: 2.7 MMOL/L
POTASSIUM SERPL-SCNC: 3.2 MMOL/L
POTASSIUM SERPL-SCNC: 3.8 MMOL/L
PROT UR-MCNC: 26 MG/DL
PROT/CREAT RATIO, UR: 2.2
RBC # BLD AUTO: 4.03 M/UL
SODIUM SERPL-SCNC: 120 MMOL/L
SODIUM SERPL-SCNC: 125 MMOL/L
SODIUM SERPL-SCNC: 131 MMOL/L
SODIUM UR-SCNC: 53 MMOL/L
WBC # BLD AUTO: 6.67 K/UL

## 2018-01-23 PROCEDURE — 90792 PSYCH DIAG EVAL W/MED SRVCS: CPT | Mod: ,,, | Performed by: PSYCHIATRY & NEUROLOGY

## 2018-01-23 PROCEDURE — 20000000 HC ICU ROOM

## 2018-01-23 PROCEDURE — 25000003 PHARM REV CODE 250: Performed by: INTERNAL MEDICINE

## 2018-01-23 PROCEDURE — 25000003 PHARM REV CODE 250: Performed by: EMERGENCY MEDICINE

## 2018-01-23 PROCEDURE — 27000221 HC OXYGEN, UP TO 24 HOURS

## 2018-01-23 PROCEDURE — 85025 COMPLETE CBC W/AUTO DIFF WBC: CPT

## 2018-01-23 PROCEDURE — 80048 BASIC METABOLIC PNL TOTAL CA: CPT | Mod: 91

## 2018-01-23 PROCEDURE — S0028 INJECTION, FAMOTIDINE, 20 MG: HCPCS | Performed by: EMERGENCY MEDICINE

## 2018-01-23 PROCEDURE — 36415 COLL VENOUS BLD VENIPUNCTURE: CPT

## 2018-01-23 PROCEDURE — 80048 BASIC METABOLIC PNL TOTAL CA: CPT

## 2018-01-23 PROCEDURE — 94761 N-INVAS EAR/PLS OXIMETRY MLT: CPT

## 2018-01-23 RX ORDER — GUAIFENESIN 600 MG/1
1200 TABLET, EXTENDED RELEASE ORAL 2 TIMES DAILY
Status: DISCONTINUED | OUTPATIENT
Start: 2018-01-23 | End: 2018-02-06 | Stop reason: HOSPADM

## 2018-01-23 RX ORDER — POTASSIUM CHLORIDE 20 MEQ/15ML
40 SOLUTION ORAL ONCE
Status: COMPLETED | OUTPATIENT
Start: 2018-01-23 | End: 2018-01-23

## 2018-01-23 RX ORDER — SODIUM CHLORIDE 9 MG/ML
INJECTION, SOLUTION INTRAVENOUS CONTINUOUS
Status: ACTIVE | OUTPATIENT
Start: 2018-01-23 | End: 2018-01-24

## 2018-01-23 RX ADMIN — GUAIFENESIN 1200 MG: 600 TABLET, EXTENDED RELEASE ORAL at 12:01

## 2018-01-23 RX ADMIN — RAMELTEON 8 MG: 8 TABLET, FILM COATED ORAL at 12:01

## 2018-01-23 RX ADMIN — FAMOTIDINE 20 MG: 10 INJECTION, SOLUTION INTRAVENOUS at 10:01

## 2018-01-23 RX ADMIN — FAMOTIDINE 20 MG: 10 INJECTION, SOLUTION INTRAVENOUS at 09:01

## 2018-01-23 RX ADMIN — LEVOTHYROXINE SODIUM 75 MCG: 75 TABLET ORAL at 06:01

## 2018-01-23 RX ADMIN — SODIUM CHLORIDE: 0.9 INJECTION, SOLUTION INTRAVENOUS at 03:01

## 2018-01-23 RX ADMIN — SODIUM CHLORIDE 500 ML: 0.9 INJECTION, SOLUTION INTRAVENOUS at 03:01

## 2018-01-23 RX ADMIN — POTASSIUM CHLORIDE 40 MEQ: 20 SOLUTION ORAL at 12:01

## 2018-01-23 RX ADMIN — SODIUM CHLORIDE: 0.9 INJECTION, SOLUTION INTRAVENOUS at 11:01

## 2018-01-23 RX ADMIN — ATORVASTATIN CALCIUM 10 MG: 10 TABLET, FILM COATED ORAL at 09:01

## 2018-01-23 RX ADMIN — GUAIFENESIN 1200 MG: 600 TABLET, EXTENDED RELEASE ORAL at 09:01

## 2018-01-23 NOTE — ASSESSMENT & PLAN NOTE
Patient does not meet criteria for major depressive disorder.  Her symptoms are contingent upon her situations, which is an adjustment disorder.  This is best treated with intense individual therapy.  She is correct that she would do best with a psychologist.  I am not familiar with any here on the [facility] but she should check with her insurance to see if there are any providers working in the community.  Medications will not help an adjustment disorder.

## 2018-01-23 NOTE — PLAN OF CARE
Problem: Patient Care Overview  Goal: Plan of Care Review  Outcome: Ongoing (interventions implemented as appropriate)  Patient AAO x 4. Follows commands. On 5 L NC. SB to NSR on the monitor; occasional PVC's. Afebrile. BP labile. Pt c/o nausea and abdominal pain; relieved with prn medication. Ta CDI; UO WNL. No BM's this shift. Pt on 800 mL fluid restriction for 24 hours. No skin breakdown, falls, or injuries this shift.

## 2018-01-23 NOTE — CONSULTS
Renal ICU Consult    Date of Admission:  1/22/2018  4:10 PM    82 y.o. WF with a PMHx. Significant for:  thyroid disease, HTN, hypercholesterolemia, carotid stenosis, osteoporosis, and arthritis who presented  to the ED c/o abdominal bloating w/ n/v, loose bowels,  decreased appetite, dizziness as well as cough for past 4 days. Pte. visited her PCP 4 days ago who told her that her potassium was low and that she most likely had a stomach virus. No attempted treatments reported.     Past Medical History:   Diagnosis Date    Arthritis     Blood transfusion     Carotid stenosis     Hypercholesterolemia     Hypertension     Thyroid disease      Past Surgical History:   Procedure Laterality Date    CARPAL TUNNEL RELEASE  2012    right hand    HYSTERECTOMY      left carotid endartectomy  5/2006    TONSILLECTOMY       Review of patient's allergies indicates:   Allergen Reactions    Strawberry Swelling     Tongue swells up and a generalized rash.     No current facility-administered medications on file prior to encounter.      Current Outpatient Prescriptions on File Prior to Encounter   Medication Sig Dispense Refill    acetaminophen (TYLENOL) 325 MG tablet Take 325 mg by mouth 2 (two) times daily.      alendronate (FOSAMAX) 70 MG tablet Take 70 mg by mouth every 7 days.      amlodipine-atorvastatin (CADUET) 10-10 mg per tablet Take 1 tablet by mouth every evening.      aspirin 325 MG tablet Take 325 mg by mouth nightly.      hydrochlorothiazide (HYDRODIURIL) 25 MG tablet Take 25 mg by mouth once daily.      levothyroxine (SYNTHROID) 75 MCG tablet Take 75 mcg by mouth once daily.      ranitidine (ZANTAC) 150 MG tablet Take 150 mg by mouth with lunch.      VIT A,C & E/LUTEIN/MINERALS (VISION FORMULA, WITH LUTEIN, ORAL) Take 1 tablet by mouth once daily.       Family History   Problem Relation Age of Onset    Heart disease Father     Cancer Brother 65     bladder     Cancer Sister 81     Ovarian    Ovarian cancer Sister 81    Breast cancer Daughter 50     Status post mastectomy    Cancer Sister      Lung.  Smoker    Cancer Sister      Lung.  Smoker     Social History     Social History    Marital status:      Spouse name: N/A    Number of children: N/A    Years of education: N/A     Occupational History    Not on file.     Social History Main Topics    Smoking status: Never Smoker    Smokeless tobacco: Never Used    Alcohol use No    Drug use: No    Sexual activity: No     Other Topics Concern    Not on file     Social History Narrative     since 1984    He is retired.  He is 82.  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 Negative for chills, diaphoresis and fever.   HENT: Negative   Respiratory: Positive for cough. Negative for shortness of breath.    Cardiovascular: Negative for chest pain.   Gastrointestinal: Positive for nausea and vomiting also abdominal bloating and one episode of loose bowels.   Genitourinary: Negative for dysuria.   Musculoskeletal: Negative for back pain but + for LE cramps  Neurological: Positive for dizzines and weakness     Physical Exam:    Vitals:    01/23/18 1233   BP: (!) 117/54   Pulse:    Resp:    Temp:      I/O last 3 completed shifts:  In: 964.3 [P.O.:280; I.V.:184.3; IV Piggyback:500]  Out: 1850 [Urine:1850]  I/O this shift:  In: 240 [P.O.:240]  Out: 375 [Urine:375]      Constitutional: She appears well-developed and well-nourished. No distress.   HENT: n/a  Neck: supple.   Cardiovascular: Normal rate, regular rhythm, normal heart sounds and intact distal pulses.  Pulmonary: Breath sounds normal.   Abdominal: Soft. Normal bowel sounds no tenderness. Musculoskeletal: no edema.   Neurological: She is oriented to person, place, and time.   Skin: Skin is warm and dry.   Psychiatric: She has a  normal mood and affect.    Laboratories:      Recent Labs  Lab 01/23/18  0658   WBC 6.67   RBC 4.03   HGB 12.5   HCT 33.9*      MCV 84   MCH 31.0   MCHC 36.9*       Recent Labs  Lab 01/22/18  1447  01/23/18  0659   CALCIUM 8.8  < > 8.5*   PROT 6.7  --   --    *  < > 125*   K 2.8*  < > 3.8   CO2 29  < > 30*   CL 75*  < > 90*   BUN 8  < > 5*   CREATININE 0.6  < > 0.6   ALKPHOS 70  --   --    ALT 32  --   --    AST 40  --   --    BILITOT 0.9  --   --    < > = values in this interval not displayed.    Microalbum.,U,Random  181.0   Microalbum.,U,Random     Prot/Creat Ratio, Ur   2.20  Prot/Creat Ratio, Ur    Sodium Urine Random   53  Sodium Urine Random    Protein, Urine Random   26  Protein     Microalb Creat Ratio             1533.9   Microalb      Osmolality, Ur             184   Osmolality     Microbiology Results (last 7 days)     Procedure Component Value Units Date/Time    Urine culture [[id number]] Collected:  01/22/18 2239    Order Status:  Completed Specimen:  Urine from Urine, Catheterized Updated:  01/23/18 1025     Urine Culture, Routine No growth to date    Urine culture **CANNOT BE ORDERED STAT** [[id number]] Collected:  01/22/18 1833    Order Status:  Completed Specimen:  Urine from Urine Updated:  01/23/18 1015     Urine Culture, Routine No significant isolate to date              Diagnostic Tests:  X-Ray chest AP portable [[id number]] Resulted: 01/23/18 0415   Order Status: Completed Updated: 01/23/18 0416   Narrative:     Comparison:1/22/2018    Technique: Single AP portable chest radiograph.    Findings:   Cardiac monitoring leads overlie the chest. The cardiomediastinal silhouette appears stable from prior examination. Coarse interstitial lung markings are noted bilaterally. There is mild bibasilar subsegmental atelectasis. No evidence of new focal air space consolidation or large pleural effusion. There is no pneumothorax.   Impression:       1.  No significant detrimental interval change  compared to 1/22/2018.      Electronically signed by: GIANNA DAWSON  Date: 01/23/18  Time: 04:15        Assessment:    83 y/o female admitted with:    - Hypovolemic hyponatremia due to decrease intake, N/V and Natriuretic effect of chronic HCTZ intake. Uosm reveals a diluted urine indicative of vasopressin suppression.  - Hypokalemia likely due to chronic HCTZ intake and aggravated by current illness  - Viral syndrome? V.S. Symptomatic electrolyte imbalance  - Hx. HTN  - Proteinuria      Plan:    - IVFs NS at 75cc/h  - Daily BMP  - Stop HCTZ  - May remove Ta cath.  - Advance diet as tolerated  - Check serum Mg++  - NO need for water restriction  - o.k. To transfer to floor

## 2018-01-23 NOTE — PLAN OF CARE
Problem: Patient Care Overview  Goal: Plan of Care Review  Outcome: Ongoing (interventions implemented as appropriate)  Patient sodium and potassium levels showed improvement this shift. She was seen by Nephrology and started on 0.9% at 75 ml/ hr. She has been able to sit up and stand on side of the bed without difficulty. Her urine output has been good. She was started on a clear liquid diet and is tolerating well without nausea or vomiting. Her family has been at the bedside throughout the shift and updated on her condition and planning. She sustained no injury, fall or trauma this shift.

## 2018-01-23 NOTE — ASSESSMENT & PLAN NOTE
She does appear to have a JERRI because of the persistent worries that she has.  These worries predominate her day.  She would probably do well with prevention therapy such as SSRI/SNRI to help control the physical symptoms of the anxiety.  Problem at this point in time is her electrolyte abnormalities.  There is a slight risk of hyponatremia with these medications and given her current state, is not recommended to start these.   After a few weeks of stabilized labs, would recommend to start low dose SSRI treatment.  She may do well with paroxetine.  Start at 10mg daily and increase slowly over time to 20-40mg, whatever dose meets efficacy.  Again, therapy will help her with her anxiety.  Agree with patient that she should seek individual counseling with a psychologist.

## 2018-01-23 NOTE — SUBJECTIVE & OBJECTIVE
Patient History           Medical as of 1/23/2018     Past Medical History     Diagnosis Date Comments Source    Arthritis -- -- Provider    Blood transfusion -- -- Provider    Carotid stenosis -- -- Provider    Hypercholesterolemia -- -- Provider    Hypertension -- -- Provider    Psychiatric problem -- -- Provider    Thyroid disease -- -- Provider          Pertinent Negatives     Diagnosis Date Noted Comments Source    History of psychiatric hospitalization 1/23/2018 -- Provider    Hx of psychiatric care 1/23/2018 -- Provider    Suicide attempt 1/23/2018 -- Provider    Therapy 1/23/2018 -- Provider    Transfusion reaction 6/25/2013 -- Provider                  Surgical as of 1/23/2018     Past Surgical History     Procedure Laterality Date Comments Source    left carotid endartectomy [Other] -- 5/2006 -- Provider    CARPAL TUNNEL RELEASE -- 2012 right hand Provider    HYSTERECTOMY -- -- -- Provider    TONSILLECTOMY -- -- -- Provider                  Family as of 1/23/2018     Problem Relation Name Age of Onset Comments Source    Heart disease Father -- -- -- Provider    Cancer Brother -- 65 bladder Provider    Cancer Sister -- 81 Ovarian Provider    Ovarian cancer Sister -- 81 -- Provider    Breast cancer Daughter -- 50 Status post mastectomy Provider    Cancer Sister -- -- Lung.  Smoker Provider    Cancer Sister -- -- Lung.  Smoker Provider    Schizophrenia Son -- -- -- Provider            Tobacco Use as of 1/23/2018     Smoking Status Smoking Start Date Smoking Quit Date Packs/day Years Used    Never Smoker -- -- -- --    Types Comments Smokeless Tobacco Status Smokeless Tobacco Quit Date Source    -- -- Never Used -- Provider            Alcohol Use as of 1/23/2018     Alcohol Use Drinks/Week Alcohol/Week Comments Source    No -- -- -- Provider            Drug Use as of 1/23/2018     Drug Use Types Frequency Comments Source    No -- -- -- Provider            Sexual Activity as of 1/23/2018     Sexually  Active Birth Control Partners Comments Source    No -- Male -- Provider            Activities of Daily Living as of 1/23/2018     Activities of Daily Living Question Response Comments Source    Patient feels they ought to cut down on drinking/drug use Not Asked -- Provider    Patient annoyed by others criticizing their drinking/drug use Not Asked -- Provider    Patient has felt bad or guilty about drinking/drug use Not Asked -- Provider    Patient has had a drink/used drugs as an eye opener in the AM Not Asked -- Provider            Social Documentation as of 1/23/2018     since 1984  He is retired.  He is 82.  Worked for [employer]  She is retired.  Worked for [employer].  Source: Provider           Occupational as of 1/23/2018    **None**           Socioeconomic as of 1/23/2018     Marital Status Spouse Name Number of Children Years Education Preferred Language Ethnicity Race Source     -- -- -- English /White White --         Pertinent History Q A Comments    as of 1/23/2018 Lives with spouse     Place in Birth Order      Lives in home     Number of Siblings      Raised by  yefri  in [place]    Legal Involvement none     Childhood Trauma      Criminal History of none     Financial Status unemployed     Highest Level of Education high school graduation     Does patient have access to a firearm?       Service No     Primary Leisure Activity time with family     Spirituality       Past Medical History:   Diagnosis Date    Arthritis     Blood transfusion     Carotid stenosis     Hypercholesterolemia     Hypertension     Psychiatric problem     Thyroid disease      Past Surgical History:   Procedure Laterality Date    CARPAL TUNNEL RELEASE  2012    right hand    HYSTERECTOMY      left carotid endartectomy  5/2006    TONSILLECTOMY       Family History     Problem Relation (Age of Onset)    Breast cancer Daughter (50)    Cancer Brother (65), Sister  (81), Sister, Sister    Heart disease Father    Ovarian cancer Sister (81)    Schizophrenia Son        Social History Main Topics    Smoking status: Never Smoker    Smokeless tobacco: Never Used    Alcohol use No    Drug use: No    Sexual activity: No     Review of patient's allergies indicates:   Allergen Reactions    Strawberry Swelling     Tongue swells up and a generalized rash.       No current facility-administered medications on file prior to encounter.      Current Outpatient Prescriptions on File Prior to Encounter   Medication Sig    acetaminophen (TYLENOL) 325 MG tablet Take 325 mg by mouth 2 (two) times daily.    alendronate (FOSAMAX) 70 MG tablet Take 70 mg by mouth every 7 days.    amlodipine-atorvastatin (CADUET) 10-10 mg per tablet Take 1 tablet by mouth every evening.    aspirin 325 MG tablet Take 325 mg by mouth nightly.    hydrochlorothiazide (HYDRODIURIL) 25 MG tablet Take 25 mg by mouth once daily.    levothyroxine (SYNTHROID) 75 MCG tablet Take 75 mcg by mouth once daily.    ranitidine (ZANTAC) 150 MG tablet Take 150 mg by mouth with lunch.    VIT A,C & E/LUTEIN/MINERALS (VISION FORMULA, WITH LUTEIN, ORAL) Take 1 tablet by mouth once daily.     Psychotherapeutics     Start     Stop Route Frequency Ordered    01/22/18 1958  ramelteon tablet 8 mg      -- Oral Nightly PRN 01/22/18 1959        Review of Systems   Constitutional: Positive for appetite change and fatigue. Negative for activity change.   Respiratory: Negative for shortness of breath.    Cardiovascular: Negative for chest pain.   Gastrointestinal: Positive for nausea.   Musculoskeletal: Positive for arthralgias and myalgias.   Psychiatric/Behavioral: Positive for dysphoric mood and sleep disturbance. Negative for decreased concentration, hallucinations and suicidal ideas. The patient is nervous/anxious.      Strengths and Liabilities: Liability: Patient is dependent., Liability: Patient lacks coping skills.    Objective:  "    Vital Signs (Most Recent):  Temp: 98.4 °F (36.9 °C) (01/23/18 1200)  Pulse: 76 (01/23/18 1400)  Resp: (!) 60 (01/23/18 1400)  BP: 115/84 (01/23/18 1400)  SpO2: 97 % (01/23/18 1400) Vital Signs (24h Range):  Temp:  [97.8 °F (36.6 °C)-99.3 °F (37.4 °C)] 98.4 °F (36.9 °C)  Pulse:  [] 76  Resp:  [11-71] 60  SpO2:  [90 %-100 %] 97 %  BP: ()/(40-90) 115/84     Height: 4' 8" (142.2 cm)  Weight: 46.6 kg (102 lb 11.8 oz)  Body mass index is 23.03 kg/m².      Intake/Output Summary (Last 24 hours) at 01/23/18 1500  Last data filed at 01/23/18 1000   Gross per 24 hour   Intake          1204.33 ml   Output             2225 ml   Net         -1020.67 ml       Physical Exam   Psychiatric:   EXAMINATION    CONSTITUTIONAL  General Appearance: hospital attire in ICU    MUSCULOSKELETAL  Muscle Strength and Tone: normal  Abnormal Involuntary Movements: none noted  Gait and Station: not observed    PSYCHIATRIC MENTAL STATUS EXAM   Level of Consciousness: awake and alert  Orientation: name, place, date, situation  Grooming: fair  Psychomotor Behavior: normal but somewhat fidgety in bed  Speech: normal rate/tone/volume  Language: no abnormalities  Mood: "worried"  Affect: anxious  Thought Process: linear  Associations: perseverative on stressors in life  Thought Content: denies suicidal/homicidal/psychosis  Memory: intact to recent and remote  Attention: distracted  Fund of Knowledge: intact for conversation  Insight: fair towards stressors  Judgment: limited towards coping skills         Significant Labs:   Last 24 Hours:   Recent Lab Results       01/23/18  0659 01/23/18  0658 01/22/18  2334 01/22/18  2239 01/22/18  2236      Anion Gap 5(L)  8       Appearance, UA          Bacteria, UA          Baso #  0.01        Basophil%  0.1        Bilirubin (UA)          BUN, Bld 5(L)  5(L)       Calcium 8.5(L)  8.1(L)       Chloride 90(L)  83(L)       CO2 30(H)  29       Color, UA          Creatinine 0.6  0.5       Creatinine, " Random Ur     11.8  Comment:  The random urine reference ranges provided were established   for 24 hour urine collections.  No reference ranges exist for  random urine specimens.  Correlate clinically.  (L)          11.8  Comment:  The random urine reference ranges provided were established   for 24 hour urine collections.  No reference ranges exist for  random urine specimens.  Correlate clinically.  (L)          11.8  Comment:  The random urine reference ranges provided were established   for 24 hour urine collections.  No reference ranges exist for  random urine specimens.  Correlate clinically.  (L)     Differential Method  Automated        eGFR if  >60  >60       eGFR if non  >60  Comment:  Calculation used to obtain the estimated glomerular filtration  rate (eGFR) is the CKD-EPI equation.     >60  Comment:  Calculation used to obtain the estimated glomerular filtration  rate (eGFR) is the CKD-EPI equation.          Eos #  0.0        Eosinophil%  0.3        Glucose 100  98       Glucose, UA          Gran #  4.7        Gran%  69.9        Hematocrit  33.9(L)        Hemoglobin  12.5        Hyaline Casts, UA          Ketones, UA          Leukocytes, UA          Lymph #  1.0        Lymph%  14.4(L)        MCH  31.0        MCHC  36.9(H)        MCV  84        Microalb Creat Ratio     1533.9(H)     Microalbum.,U,Random     181.0     Microscopic Comment          Mono #  1.0        Mono%  15.4(H)        MPV  10.0        Nitrite, UA          Non-Squam Epith          Occult Blood UA          Osmolality          Osmolality, Ur     184  Comment:  The random urine reference ranges provided were established   for 24 hour urine collections.  No reference ranges exist for  random urine specimens.  Correlate clinically.       Other (U/A)          pH, UA          Platelets  217        Potassium 3.8  2.7  Comment:  POTASSIUM  critical result(s) called and verbal readback obtained   from KAYLA  SAVANNAH. , 01/23/2018 00:06  (LL)       Prot/Creat Ratio, Ur     2.20(H)     Protein, UA          Protein, Urine Random     26  Comment:  The random urine reference ranges provided were established   for 24 hour urine collections.  No reference ranges exist for  random urine specimens.  Correlate clinically.       RBC  4.03        RBC, UA          RDW  12.8        Sodium 125(L)  120(L)       Sodium Urine Random     53  Comment:  The random urine reference ranges provided were established   for 24 hour urine collections.  No reference ranges exist for  random urine specimens.  Correlate clinically.       Specific [place], UA          Specimen UA          Squam Epithel, UA          Urine Culture, Routine    No growth to date[P]      Urobilinogen, UA          WBC, UA          WBC  6.67                    01/22/18  2048 01/22/18  1833 01/22/18  1642      Anion Gap 9  9     Appearance, UA  Clear      Bacteria, UA  Rare      Baso #        Basophil%        Bilirubin (UA)  Negative      BUN, Bld 5(L)  7(L)     Calcium 8.3(L)  8.5(L)     Chloride 81(L)  76(L)     CO2 28  28     Color, UA  Yellow      Creatinine 0.6  0.6     Creatinine, Random Ur        Differential Method        eGFR if  >60  >60     eGFR if non  >60  Comment:  Calculation used to obtain the estimated glomerular filtration  rate (eGFR) is the CKD-EPI equation.     >60  Comment:  Calculation used to obtain the estimated glomerular filtration  rate (eGFR) is the CKD-EPI equation.        Eos #        Eosinophil%        Glucose 101  120(H)     Glucose, UA  1+(A)      Gran #        Gran%        Hematocrit        Hemoglobin        Hyaline Casts, UA  0      Ketones, UA  Trace(A)      Leukocytes, UA  1+(A)      Lymph #        Lymph%        MCH        MCHC        MCV        Microalb Creat Ratio        Microalbum.,U,Random        Microscopic Comment  SEE COMMENT  Comment:  Other formed elements not mentioned in the report are not    present in the microscopic examination.         Mono #        Mono%        MPV        Nitrite, UA  Negative      Non-Squam Epith  2(A)      Occult Blood UA  1+(A)      Osmolality 240  Comment:  SERUM OSMOLARITY critical result(s) called and verbal readback   obtained from ELIAS NUÑEZ. , 01/22/2018 22:06  (LL)       Osmolality, Ur        Other (U/A)  Rare(A)      pH, UA  7.0      Platelets        Potassium 2.8(L)  3.1  Comment:  Specimen moderately hemolyzed(L)     Prot/Creat Ratio, Ur        Protein, UA  2+  Comment:  Recommend a 24 hour urine protein or a urine   protein/creatinine ratio if globulin induced proteinuria is  clinically suspected.  (A)      Protein, Urine Random        RBC        RBC, UA  1      RDW        Sodium 118  Comment:  SODIUM critical result(s) called and verbal readback obtained from   ZARI POOLE. , 01/22/2018 21:58  (LL)  113  Comment:  Sodium critical result(s) called and verbal readback obtained from   DALE Bains Rn, 01/22/2018 17:36  (LL)     Sodium Urine Random        Specific Gravity, UA  1.010      Specimen UA  Urine, Clean Catch      Squam Epithel, UA  1      Urine Culture, Routine  No significant isolate to date[P]      Urobilinogen, UA  Negative      WBC, UA  4      WBC            All pertinent labs within the past 24 hours have been reviewed.    Significant Imaging: I have reviewed all pertinent imaging results/findings within the past 24 hours.

## 2018-01-23 NOTE — H&P
[facility] Hospital Medicine  History & Physical    Patient Name: Jeannie Hutchins  MRN: [id number]  Admission Date: 1/22/2018  Attending Physician: Paige Mcleod MD   Primary Care Provider: Paige Mcleod MD         Patient information was obtained from patient and ER records.     Subjective:     Principal Problem:Hyponatremia with decreased serum osmolality    Chief Complaint:   Chief Complaint   Patient presents with    Abdominal Pain     decreased appetite nausea,  weakness and cough x 4 days. O2 sat 89 % on room air placed on 2 L NC with repeat O2 sat 93%, pt alert warm and dry        HPI: PT  Is an 83yo female who was seen in my office with complaints of diarrhea on 1/19.  At that time she was felt to have gastroenteritis.  She was sent home but states she could not eat anything for 4 days.  The diarrhea was starting to improve but she did not feel better so she presented to the ED.  She was found to have a sodium of 113 and was admitted with a diagnosis of hypernatremia    Past Medical History:   Diagnosis Date    Arthritis     Blood transfusion     Carotid stenosis     Hypercholesterolemia     Hypertension     Thyroid disease        Past Surgical History:   Procedure Laterality Date    CARPAL TUNNEL RELEASE  2012    right hand    HYSTERECTOMY      left carotid endartectomy  5/2006    TONSILLECTOMY         Review of patient's allergies indicates:   Allergen Reactions    Strawberry Swelling     Tongue swells up and a generalized rash.       No current facility-administered medications on file prior to encounter.      Current Outpatient Prescriptions on File Prior to Encounter   Medication Sig    acetaminophen (TYLENOL) 325 MG tablet Take 325 mg by mouth 2 (two) times daily.    alendronate (FOSAMAX) 70 MG tablet Take 70 mg by mouth every 7 days.    amlodipine-atorvastatin (CADUET) 10-10 mg per tablet Take 1 tablet by mouth every evening.    aspirin 325 MG tablet Take 325  mg by mouth nightly.    hydrochlorothiazide (HYDRODIURIL) 25 MG tablet Take 25 mg by mouth once daily.    levothyroxine (SYNTHROID) 75 MCG tablet Take 75 mcg by mouth once daily.    ranitidine (ZANTAC) 150 MG tablet Take 150 mg by mouth with lunch.    VIT A,C & E/LUTEIN/MINERALS (VISION FORMULA, WITH LUTEIN, ORAL) Take 1 tablet by mouth once daily.     Family History     Problem Relation (Age of Onset)    Breast cancer Daughter (50)    Cancer Brother (65), Sister (81), Sister, Sister    Heart disease Father    Ovarian cancer Sister (81)        Social History Main Topics    Smoking status: Never Smoker    Smokeless tobacco: Never Used    Alcohol use No    Drug use: No    Sexual activity: No     Review of Systems   Psychiatric/Behavioral: Positive for dysphoric mood. The patient is nervous/anxious.    All other systems reviewed and are negative.    Objective:     Vital Signs (Most Recent):  Temp: 98.2 °F (36.8 °C) (01/23/18 0730)  Pulse: 70 (01/23/18 0730)  Resp: 20 (01/23/18 0730)  BP: (!) 112/55 (01/23/18 0730)  SpO2: 100 % (01/23/18 0730) Vital Signs (24h Range):  Temp:  [97.8 °F (36.6 °C)-99.3 °F (37.4 °C)] 98.2 °F (36.8 °C)  Pulse:  [] 70  Resp:  [11-50] 20  SpO2:  [89 %-100 %] 100 %  BP: ()/(40-90) 112/55     Weight: 46.6 kg (102 lb 11.8 oz)  Body mass index is 23.03 kg/m².    Physical Exam   Constitutional: She is oriented to person, place, and time. She appears well-developed and well-nourished.   HENT:   Head: Normocephalic and atraumatic.   Eyes: EOM are normal. Pupils are equal, round, and reactive to light.   Neck: Normal range of motion.   Cardiovascular: Normal rate, regular rhythm and normal heart sounds.  Exam reveals no gallop and no friction rub.    No murmur heard.  Pulmonary/Chest: Effort normal and breath sounds normal.   Abdominal: Soft. Bowel sounds are normal.   Musculoskeletal: Normal range of motion.   Neurological: She is alert and oriented to person, place, and time.    Skin: Skin is warm and dry.   Psychiatric: She exhibits a depressed mood.         CRANIAL NERVES     CN III, IV, VI   Pupils are equal, round, and reactive to light.  Extraocular motions are normal.        Significant Labs:   BMP:   Recent Labs  Lab 01/23/18  0659      *   K 3.8   CL 90*   CO2 30*   BUN 5*   CREATININE 0.6   CALCIUM 8.5*     CBC:   Recent Labs  Lab 01/22/18  1447 01/23/18  0658   WBC 7.16 6.67   HGB 12.8 12.5   HCT 34.0* 33.9*    217     CMP:   Recent Labs  Lab 01/22/18  1447  01/22/18  2048 01/22/18  2334 01/23/18  0659   *  < > 118* 120* 125*   K 2.8*  < > 2.8* 2.7* 3.8   CL 75*  < > 81* 83* 90*   CO2 29  < > 28 29 30*   *  < > 101 98 100   BUN 8  < > 5* 5* 5*   CREATININE 0.6  < > 0.6 0.5 0.6   CALCIUM 8.8  < > 8.3* 8.1* 8.5*   PROT 6.7  --   --   --   --    ALBUMIN 3.8  --   --   --   --    BILITOT 0.9  --   --   --   --    ALKPHOS 70  --   --   --   --    AST 40  --   --   --   --    ALT 32  --   --   --   --    ANIONGAP 9  < > 9 8 5*   EGFRNONAA >60  < > >60 >60 >60   < > = values in this interval not displayed.  Cardiac Markers:   Recent Labs  Lab 01/22/18  1447   *     Troponin:   Recent Labs  Lab 01/22/18  1447   TROPONINI 0.012     TSH:   Recent Labs  Lab 01/22/18  1447   TSH 1.191     Urine Studies:   Recent Labs  Lab 01/22/18  1833   COLORU Yellow   APPEARANCEUA Clear   PHUR 7.0   SPECGRAV 1.010   PROTEINUA 2+*   GLUCUA 1+*   KETONESU Trace*   BILIRUBINUA Negative   OCCULTUA 1+*   NITRITE Negative   UROBILINOGEN Negative   LEUKOCYTESUR 1+*   RBCUA 1   WBCUA 4   BACTERIA Rare   SQUAMEPITHEL 1   HYALINECASTS 0     Urine osm 118  Serum osm 240  Urine sodium 53    Significant Imaging: CT: I have reviewed all pertinent results/findings within the past 24 hours and my personal findings are:  Possible chronis ischemic disease     CXR negative    Assessment/Plan:     * Hyponatremia with decreased serum osmolality      Will ask renal to see to help  correct levels.  COntinue ICU monitoring for now        Hypokalemia    Better after replacement.  Will monitor          Benign hypertension    Continue home meds except HCTZ          Moderate major depression, single episode    Pt is saying that she thinks it is time she see a psychiatrist because she is having issues coping.  Will consult Psych          Non-intractable vomiting with nausea    Will start clear liquids            VTE Risk Mitigation         Ordered     Medium Risk of VTE  Once      01/22/18 1959     Place BRYANT hose  Until discontinued      01/22/18 1959     Place sequential compression device  Until discontinued      01/22/18 1959        Critical care time spent on the evaluation and treatment of severe organ dysfunction, review of pertinent labs and imaging studies, discussions with consulting providers and discussions with patient/family: >30 minutes.     Paige Mcleod MD  Department of Hospital Medicine   [facility]

## 2018-01-23 NOTE — NURSING
Pt received from ED on 2 L NC and 3% NS gtt. Pt c/o nausea and abdominal pain. VSS. Pt oriented to unit and room.

## 2018-01-23 NOTE — HPI
PT  Is an 81yo WF followed by Dr. Bethany Mcleod who was seen in the office with complaints of diarrhea on 1/19.  At that time she was felt to have gastroenteritis.  She was sent home but states she could not eat anything for 4 days.  The diarrhea was starting to improve but she did not feel better so she presented to the ED.  She was found to have a sodium of 113 and was admitted with a diagnosis of hyponatremia

## 2018-01-23 NOTE — SUBJECTIVE & OBJECTIVE
Past Medical History:   Diagnosis Date    Arthritis     Blood transfusion     Carotid stenosis     Hypercholesterolemia     Hypertension     Thyroid disease        Past Surgical History:   Procedure Laterality Date    CARPAL TUNNEL RELEASE  2012    right hand    HYSTERECTOMY      left carotid endartectomy  5/2006    TONSILLECTOMY         Review of patient's allergies indicates:   Allergen Reactions    Strawberry Swelling     Tongue swells up and a generalized rash.       No current facility-administered medications on file prior to encounter.      Current Outpatient Prescriptions on File Prior to Encounter   Medication Sig    acetaminophen (TYLENOL) 325 MG tablet Take 325 mg by mouth 2 (two) times daily.    alendronate (FOSAMAX) 70 MG tablet Take 70 mg by mouth every 7 days.    amlodipine-atorvastatin (CADUET) 10-10 mg per tablet Take 1 tablet by mouth every evening.    aspirin 325 MG tablet Take 325 mg by mouth nightly.    hydrochlorothiazide (HYDRODIURIL) 25 MG tablet Take 25 mg by mouth once daily.    levothyroxine (SYNTHROID) 75 MCG tablet Take 75 mcg by mouth once daily.    ranitidine (ZANTAC) 150 MG tablet Take 150 mg by mouth with lunch.    VIT A,C & E/LUTEIN/MINERALS (VISION FORMULA, WITH LUTEIN, ORAL) Take 1 tablet by mouth once daily.     Family History     Problem Relation (Age of Onset)    Breast cancer Daughter (50)    Cancer Brother (65), Sister (81), Sister, Sister    Heart disease Father    Ovarian cancer Sister (81)        Social History Main Topics    Smoking status: Never Smoker    Smokeless tobacco: Never Used    Alcohol use No    Drug use: No    Sexual activity: No     Review of Systems   Psychiatric/Behavioral: Positive for dysphoric mood. The patient is nervous/anxious.    All other systems reviewed and are negative.    Objective:     Vital Signs (Most Recent):  Temp: 98.2 °F (36.8 °C) (01/23/18 0730)  Pulse: 70 (01/23/18 0730)  Resp: 20 (01/23/18 0730)  BP: (!) 112/55  (01/23/18 0730)  SpO2: 100 % (01/23/18 0730) Vital Signs (24h Range):  Temp:  [97.8 °F (36.6 °C)-99.3 °F (37.4 °C)] 98.2 °F (36.8 °C)  Pulse:  [] 70  Resp:  [11-50] 20  SpO2:  [89 %-100 %] 100 %  BP: ()/(40-90) 112/55     Weight: 46.6 kg (102 lb 11.8 oz)  Body mass index is 23.03 kg/m².    Physical Exam   Constitutional: She is oriented to person, place, and time. She appears well-developed and well-nourished.   HENT:   Head: Normocephalic and atraumatic.   Eyes: EOM are normal. Pupils are equal, round, and reactive to light.   Neck: Normal range of motion.   Cardiovascular: Normal rate, regular rhythm and normal heart sounds.  Exam reveals no gallop and no friction rub.    No murmur heard.  Pulmonary/Chest: Effort normal and breath sounds normal.   Abdominal: Soft. Bowel sounds are normal.   Musculoskeletal: Normal range of motion.   Neurological: She is alert and oriented to person, place, and time.   Skin: Skin is warm and dry.   Psychiatric: She exhibits a depressed mood.         CRANIAL NERVES     CN III, IV, VI   Pupils are equal, round, and reactive to light.  Extraocular motions are normal.        Significant Labs:   BMP:   Recent Labs  Lab 01/23/18  0659      *   K 3.8   CL 90*   CO2 30*   BUN 5*   CREATININE 0.6   CALCIUM 8.5*     CBC:   Recent Labs  Lab 01/22/18  1447 01/23/18  0658   WBC 7.16 6.67   HGB 12.8 12.5   HCT 34.0* 33.9*    217     CMP:   Recent Labs  Lab 01/22/18  1447  01/22/18  2048 01/22/18  2334 01/23/18  0659   *  < > 118* 120* 125*   K 2.8*  < > 2.8* 2.7* 3.8   CL 75*  < > 81* 83* 90*   CO2 29  < > 28 29 30*   *  < > 101 98 100   BUN 8  < > 5* 5* 5*   CREATININE 0.6  < > 0.6 0.5 0.6   CALCIUM 8.8  < > 8.3* 8.1* 8.5*   PROT 6.7  --   --   --   --    ALBUMIN 3.8  --   --   --   --    BILITOT 0.9  --   --   --   --    ALKPHOS 70  --   --   --   --    AST 40  --   --   --   --    ALT 32  --   --   --   --    ANIONGAP 9  < > 9 8 5*   EGFRNONAA >60   < > >60 >60 >60   < > = values in this interval not displayed.  Cardiac Markers:   Recent Labs  Lab 01/22/18  1447   *     Troponin:   Recent Labs  Lab 01/22/18  1447   TROPONINI 0.012     TSH:   Recent Labs  Lab 01/22/18  1447   TSH 1.191     Urine Studies:   Recent Labs  Lab 01/22/18  1833   COLORU Yellow   APPEARANCEUA Clear   PHUR 7.0   SPECGRAV 1.010   PROTEINUA 2+*   GLUCUA 1+*   KETONESU Trace*   BILIRUBINUA Negative   OCCULTUA 1+*   NITRITE Negative   UROBILINOGEN Negative   LEUKOCYTESUR 1+*   RBCUA 1   WBCUA 4   BACTERIA Rare   SQUAMEPITHEL 1   HYALINECASTS 0     Urine osm 118  Serum osm 240  Urine sodium 53    Significant Imaging: CT: I have reviewed all pertinent results/findings within the past 24 hours and my personal findings are:  Possible chronis ischemic disease     CXR negative

## 2018-01-23 NOTE — ASSESSMENT & PLAN NOTE
Pt is saying that she thinks it is time she see a psychiatrist because she is having issues coping.  Will consult Psych

## 2018-01-23 NOTE — NURSING
0230 - Pt BP 69/40. MD Moe aware. 500 mL NS bolus ordered.    0435-Pt BP 84/47 and HR 49. MD Moe aware. No new orders received. Will continue to monitor.

## 2018-01-23 NOTE — HPI
"Patient Jeannie Hutchins was admitted to the hospital with nausea, weakness, dizziness, and found to have electrolyte abnormalities, namely hyponatremia.  She requested a psychiatry consult for depression.  She is easily engaged in conversation and states that she is looking for "someone to talk to".  Says that she is not interested in medications.  Says that she met with a psychologist years ago after her first  left her and liked the interaction.  She states that she has many stressors in life that makes her worry.  Her  of 38 years told her a couple of weeks ago that he wanted a divorce because he feels that she is enabling her children.  She says that she has 2 sons with mental issues, one of which is schizophrenic and homeless.  She says that he doesn't go to psychiatric care or follow up but continues to care for him and give him things.   does not like this.   has told him that he is not allowed back at the house.  She is also stressed with a daughter who is recovering from breast cancer.  Patient admits to being depressed because of things happening in life.  Not able to sleep well because of having to get up for the restroom.  No loss of interest in things.  No guilt.  Energy OK.  Admits to being an anxious person.  Feels that she can easily worry about things.  Denies manic or psychotic history.  Denies thoughts of self harm.  States that she has never been on psychiatric medications and not sure if she would like to be.  "

## 2018-01-23 NOTE — CONSULTS
"[facility]  Psychiatry  Consult Note    Patient Name: Jeannie Hutchins  MRN: [id number]   Code Status: Full Code  Admission Date: 1/22/2018  Hospital Length of Stay: 1 days  Attending Physician: Paige Mcleod MD  Primary Care Provider: Paige Mcleod MD    Current Legal Status: N/A    Patient information was obtained from patient and ER records.   Inpatient consult to Psychiatry  Consult performed by: MARSHALL GOMES  Consult ordered by: PAIGE FOX        Subjective:     Principal Problem:Hyponatremia with decreased serum osmolality    Chief Complaint:  Depression and anxiety     HPI: Patient Jeannie Hutchins was admitted to the hospital with nausea, weakness, dizziness, and found to have electrolyte abnormalities, namely hyponatremia.  She requested a psychiatry consult for depression.  She is easily engaged in conversation and states that she is looking for "someone to talk to".  Says that she is not interested in medications.  Says that she met with a psychologist years ago after her first  left her and liked the interaction.  She states that she has many stressors in life that makes her worry.  Her  of 38 years told her a couple of weeks ago that he wanted a divorce because he feels that she is enabling her children.  She says that she has 2 sons with mental issues, one of which is schizophrenic and homeless.  She says that he doesn't go to psychiatric care or follow up but continues to care for him and give him things.   does not like this.   has told him that he is not allowed back at the house.  She is also stressed with a daughter who is recovering from breast cancer.  Patient admits to being depressed because of things happening in life.  Not able to sleep well because of having to get up for the restroom.  No loss of interest in things.  No guilt.  Energy OK.  Admits to being an anxious person.  Feels that she can easily worry about things.  " Denies manic or psychotic history.  Denies thoughts of self harm.  States that she has never been on psychiatric medications and not sure if she would like to be.    Hospital Course: No notes on file         Patient History           Medical as of 1/23/2018     Past Medical History     Diagnosis Date Comments Source    Arthritis -- -- Provider    Blood transfusion -- -- Provider    Carotid stenosis -- -- Provider    Hypercholesterolemia -- -- Provider    Hypertension -- -- Provider    Psychiatric problem -- -- Provider    Thyroid disease -- -- Provider          Pertinent Negatives     Diagnosis Date Noted Comments Source    History of psychiatric hospitalization 1/23/2018 -- Provider    Hx of psychiatric care 1/23/2018 -- Provider    Suicide attempt 1/23/2018 -- Provider    Therapy 1/23/2018 -- Provider    Transfusion reaction 6/25/2013 -- Provider                  Surgical as of 1/23/2018     Past Surgical History     Procedure Laterality Date Comments Source    left carotid endartectomy [Other] -- 5/2006 -- Provider    CARPAL TUNNEL RELEASE -- 2012 right hand Provider    HYSTERECTOMY -- -- -- Provider    TONSILLECTOMY -- -- -- Provider                  Family as of 1/23/2018     Problem Relation Name Age of Onset Comments Source    Heart disease Father -- -- -- Provider    Cancer Brother -- 65 bladder Provider    Cancer Sister -- 81 Ovarian Provider    Ovarian cancer Sister -- 81 -- Provider    Breast cancer Daughter -- 50 Status post mastectomy Provider    Cancer Sister -- -- Lung.  Smoker Provider    Cancer Sister -- -- Lung.  Smoker Provider    Schizophrenia Son -- -- -- Provider            Tobacco Use as of 1/23/2018     Smoking Status Smoking Start Date Smoking Quit Date Packs/day Years Used    Never Smoker -- -- -- --    Types Comments Smokeless Tobacco Status Smokeless Tobacco Quit Date Source    -- -- Never Used -- Provider            Alcohol Use as of 1/23/2018     Alcohol Use Drinks/Week Alcohol/Week  Comments Source    No -- -- -- Provider            Drug Use as of 1/23/2018     Drug Use Types Frequency Comments Source    No -- -- -- Provider            Sexual Activity as of 1/23/2018     Sexually Active Birth Control Partners Comments Source    No -- Male -- Provider            Activities of Daily Living as of 1/23/2018     Activities of Daily Living Question Response Comments Source    Patient feels they ought to cut down on drinking/drug use Not Asked -- Provider    Patient annoyed by others criticizing their drinking/drug use Not Asked -- Provider    Patient has felt bad or guilty about drinking/drug use Not Asked -- Provider    Patient has had a drink/used drugs as an eye opener in the AM Not Asked -- Provider            Social Documentation as of 1/23/2018     since 1984  He is retired.  He is 82.  Worked for [employer]  She is retired.  Worked for [employer].  Source: Provider           Occupational as of 1/23/2018    **None**           Socioeconomic as of 1/23/2018     Marital Status Spouse Name Number of Children Years Education Preferred Language Ethnicity Race Source     -- -- -- English /White White --         Pertinent History Q A Comments    as of 1/23/2018 Lives with spouse     Place in Birth Order      Lives in home     Number of Siblings      Raised by  [facility] in [place]    Legal Involvement none     Childhood Trauma      Criminal History of none     Financial Status unemployed     Highest Level of Education high school graduation     Does patient have access to a firearm?       Service No     Primary Leisure Activity time with family     Spirituality       Past Medical History:   Diagnosis Date    Arthritis     Blood transfusion     Carotid stenosis     Hypercholesterolemia     Hypertension     Psychiatric problem     Thyroid disease      Past Surgical History:   Procedure Laterality Date    CARPAL TUNNEL RELEASE  2012    right  hand    HYSTERECTOMY      left carotid endartectomy  5/2006    TONSILLECTOMY       Family History     Problem Relation (Age of Onset)    Breast cancer Daughter (50)    Cancer Brother (65), Sister (81), Sister, Sister    Heart disease Father    Ovarian cancer Sister (81)    Schizophrenia Son        Social History Main Topics    Smoking status: Never Smoker    Smokeless tobacco: Never Used    Alcohol use No    Drug use: No    Sexual activity: No     Review of patient's allergies indicates:   Allergen Reactions    Strawberry Swelling     Tongue swells up and a generalized rash.       No current facility-administered medications on file prior to encounter.      Current Outpatient Prescriptions on File Prior to Encounter   Medication Sig    acetaminophen (TYLENOL) 325 MG tablet Take 325 mg by mouth 2 (two) times daily.    alendronate (FOSAMAX) 70 MG tablet Take 70 mg by mouth every 7 days.    amlodipine-atorvastatin (CADUET) 10-10 mg per tablet Take 1 tablet by mouth every evening.    aspirin 325 MG tablet Take 325 mg by mouth nightly.    hydrochlorothiazide (HYDRODIURIL) 25 MG tablet Take 25 mg by mouth once daily.    levothyroxine (SYNTHROID) 75 MCG tablet Take 75 mcg by mouth once daily.    ranitidine (ZANTAC) 150 MG tablet Take 150 mg by mouth with lunch.    VIT A,C & E/LUTEIN/MINERALS (VISION FORMULA, WITH LUTEIN, ORAL) Take 1 tablet by mouth once daily.     Psychotherapeutics     Start     Stop Route Frequency Ordered    01/22/18 1958  ramelteon tablet 8 mg      -- Oral Nightly PRN 01/22/18 1959        Review of Systems   Constitutional: Positive for appetite change and fatigue. Negative for activity change.   Respiratory: Negative for shortness of breath.    Cardiovascular: Negative for chest pain.   Gastrointestinal: Positive for nausea.   Musculoskeletal: Positive for arthralgias and myalgias.   Psychiatric/Behavioral: Positive for dysphoric mood and sleep disturbance. Negative for decreased  "concentration, hallucinations and suicidal ideas. The patient is nervous/anxious.      Strengths and Liabilities: Liability: Patient is dependent., Liability: Patient lacks coping skills.    Objective:     Vital Signs (Most Recent):  Temp: 98.4 °F (36.9 °C) (01/23/18 1200)  Pulse: 76 (01/23/18 1400)  Resp: (!) 60 (01/23/18 1400)  BP: 115/84 (01/23/18 1400)  SpO2: 97 % (01/23/18 1400) Vital Signs (24h Range):  Temp:  [97.8 °F (36.6 °C)-99.3 °F (37.4 °C)] 98.4 °F (36.9 °C)  Pulse:  [] 76  Resp:  [11-71] 60  SpO2:  [90 %-100 %] 97 %  BP: ()/(40-90) 115/84     Height: 4' 8" (142.2 cm)  Weight: 46.6 kg (102 lb 11.8 oz)  Body mass index is 23.03 kg/m².      Intake/Output Summary (Last 24 hours) at 01/23/18 1500  Last data filed at 01/23/18 1000   Gross per 24 hour   Intake          1204.33 ml   Output             2225 ml   Net         -1020.67 ml       Physical Exam   Psychiatric:   EXAMINATION    CONSTITUTIONAL  General Appearance: hospital attire in ICU    MUSCULOSKELETAL  Muscle Strength and Tone: normal  Abnormal Involuntary Movements: none noted  Gait and Station: not observed    PSYCHIATRIC MENTAL STATUS EXAM   Level of Consciousness: awake and alert  Orientation: name, place, date, situation  Grooming: fair  Psychomotor Behavior: normal but somewhat fidgety in bed  Speech: normal rate/tone/volume  Language: no abnormalities  Mood: "worried"  Affect: anxious  Thought Process: linear  Associations: perseverative on stressors in life  Thought Content: denies suicidal/homicidal/psychosis  Memory: intact to recent and remote  Attention: distracted  Fund of Knowledge: intact for conversation  Insight: fair towards stressors  Judgment: limited towards coping skills         Significant Labs:   Last 24 Hours:   Recent Lab Results       01/23/18  0659 01/23/18  0658 01/22/18  2334 01/22/18  2239 01/22/18  2236      Anion Gap 5(L)  8       Appearance, UA          Bacteria, UA          Baso #  0.01        Basophil% "  0.1        Bilirubin (UA)          BUN, Bld 5(L)  5(L)       Calcium 8.5(L)  8.1(L)       Chloride 90(L)  83(L)       CO2 30(H)  29       Color, UA          Creatinine 0.6  0.5       Creatinine, Random Ur     11.8  Comment:  The random urine reference ranges provided were established   for 24 hour urine collections.  No reference ranges exist for  random urine specimens.  Correlate clinically.  (L)          11.8  Comment:  The random urine reference ranges provided were established   for 24 hour urine collections.  No reference ranges exist for  random urine specimens.  Correlate clinically.  (L)          11.8  Comment:  The random urine reference ranges provided were established   for 24 hour urine collections.  No reference ranges exist for  random urine specimens.  Correlate clinically.  (L)     Differential Method  Automated        eGFR if  >60  >60       eGFR if non  >60  Comment:  Calculation used to obtain the estimated glomerular filtration  rate (eGFR) is the CKD-EPI equation.     >60  Comment:  Calculation used to obtain the estimated glomerular filtration  rate (eGFR) is the CKD-EPI equation.          Eos #  0.0        Eosinophil%  0.3        Glucose 100  98       Glucose, UA          Gran #  4.7        Gran%  69.9        Hematocrit  33.9(L)        Hemoglobin  12.5        Hyaline Casts, UA          Ketones, UA          Leukocytes, UA          Lymph #  1.0        Lymph%  14.4(L)        MCH  31.0        MCHC  36.9(H)        MCV  84        Microalb Creat Ratio     1533.9(H)     Microalbum.,U,Random     181.0     Microscopic Comment          Mono #  1.0        Mono%  15.4(H)        MPV  10.0        Nitrite, UA          Non-Squam Epith          Occult Blood UA          Osmolality          Osmolality, Ur     184  Comment:  The random urine reference ranges provided were established   for 24 hour urine collections.  No reference ranges exist for  random urine specimens.   Correlate clinically.       Other (U/A)          pH, UA          Platelets  217        Potassium 3.8  2.7  Comment:  POTASSIUM  critical result(s) called and verbal readback obtained   from KAYLA NUÑEZ. , 01/23/2018 00:06  (LL)       Prot/Creat Ratio, Ur     2.20(H)     Protein, UA          Protein, Urine Random     26  Comment:  The random urine reference ranges provided were established   for 24 hour urine collections.  No reference ranges exist for  random urine specimens.  Correlate clinically.       RBC  4.03        RBC, UA          RDW  12.8        Sodium 125(L)  120(L)       Sodium Urine Random     53  Comment:  The random urine reference ranges provided were established   for 24 hour urine collections.  No reference ranges exist for  random urine specimens.  Correlate clinically.       Specific [place], UA          Specimen UA          Squam Epithel, UA          Urine Culture, Routine    No growth to date[P]      Urobilinogen, UA          WBC, UA          WBC  6.67                    01/22/18  2048 01/22/18  1833 01/22/18  1642      Anion Gap 9  9     Appearance, UA  Clear      Bacteria, UA  Rare      Baso #        Basophil%        Bilirubin (UA)  Negative      BUN, Bld 5(L)  7(L)     Calcium 8.3(L)  8.5(L)     Chloride 81(L)  76(L)     CO2 28  28     Color, UA  Yellow      Creatinine 0.6  0.6     Creatinine, Random Ur        Differential Method        eGFR if  >60  >60     eGFR if non  >60  Comment:  Calculation used to obtain the estimated glomerular filtration  rate (eGFR) is the CKD-EPI equation.     >60  Comment:  Calculation used to obtain the estimated glomerular filtration  rate (eGFR) is the CKD-EPI equation.        Eos #        Eosinophil%        Glucose 101  120(H)     Glucose, UA  1+(A)      Gran #        Gran%        Hematocrit        Hemoglobin        Hyaline Casts, UA  0      Ketones, UA  Trace(A)      Leukocytes, UA  1+(A)      Lymph #        Lymph%         MCH        MCHC        MCV        Microalb Creat Ratio        Microalbum.,U,Random        Microscopic Comment  SEE COMMENT  Comment:  Other formed elements not mentioned in the report are not   present in the microscopic examination.         Mono #        Mono%        MPV        Nitrite, UA  Negative      Non-Squam Epith  2(A)      Occult Blood UA  1+(A)      Osmolality 240  Comment:  SERUM OSMOLARITY critical result(s) called and verbal readback   obtained from ELIAS NUÑEZ. , 01/22/2018 22:06  (LL)       Osmolality, Ur        Other (U/A)  Rare(A)      pH, UA  7.0      Platelets        Potassium 2.8(L)  3.1  Comment:  Specimen moderately hemolyzed(L)     Prot/Creat Ratio, Ur        Protein, UA  2+  Comment:  Recommend a 24 hour urine protein or a urine   protein/creatinine ratio if globulin induced proteinuria is  clinically suspected.  (A)      Protein, Urine Random        RBC        RBC, UA  1      RDW        Sodium 118  Comment:  SODIUM critical result(s) called and verbal readback obtained from   ZARI POOLE. , 01/22/2018 21:58  (LL)  113  Comment:  Sodium critical result(s) called and verbal readback obtained from   DALE Bains Rn, 01/22/2018 17:36  (LL)     Sodium Urine Random        Specific Gravity, UA  1.010      Specimen UA  Urine, Clean Catch      Squam Epithel, UA  1      Urine Culture, Routine  No significant isolate to date[P]      Urobilinogen, UA  Negative      WBC, UA  4      WBC            All pertinent labs within the past 24 hours have been reviewed.    Significant Imaging: I have reviewed all pertinent imaging results/findings within the past 24 hours.    Assessment/Plan:     Generalized anxiety disorder    She does appear to have a JERRI because of the persistent worries that she has.  These worries predominate her day.  She would probably do well with prevention therapy such as SSRI/SNRI to help control the physical symptoms of the anxiety.  Problem at this point in time is her electrolyte  abnormalities.  There is a slight risk of hyponatremia with these medications and given her current state, is not recommended to start these.   After a few weeks of stabilized labs, would recommend to start low dose SSRI treatment.  She may do well with paroxetine.  Start at 10mg daily and increase slowly over time to 20-40mg, whatever dose meets efficacy.  Again, therapy will help her with her anxiety.  Agree with patient that she should seek individual counseling with a psychologist.        Adjustment disorder with mixed anxiety and depressed mood    Patient does not meet criteria for major depressive disorder.  Her symptoms are contingent upon her situations, which is an adjustment disorder.  This is best treated with intense individual therapy.  She is correct that she would do best with a psychologist.  I am not familiar with any here on the [facility] but she should check with her insurance to see if there are any providers working in the community.  Medications will not help an adjustment disorder.               Total Time:  60 minutes      Dallin Mckeon MD   Psychiatry  [facility]

## 2018-01-24 LAB
ANION GAP SERPL CALC-SCNC: 7 MMOL/L
BACTERIA UR CULT: NO GROWTH
BACTERIA UR CULT: NORMAL
BASOPHILS # BLD AUTO: 0.01 K/UL
BASOPHILS NFR BLD: 0.1 %
BUN SERPL-MCNC: 5 MG/DL
CALCIUM SERPL-MCNC: 8.4 MG/DL
CHLORIDE SERPL-SCNC: 100 MMOL/L
CO2 SERPL-SCNC: 25 MMOL/L
CORTIS SERPL-MCNC: 26.6 UG/DL
CREAT SERPL-MCNC: 0.6 MG/DL
DIFFERENTIAL METHOD: ABNORMAL
EOSINOPHIL # BLD AUTO: 0 K/UL
EOSINOPHIL NFR BLD: 0.6 %
ERYTHROCYTE [DISTWIDTH] IN BLOOD BY AUTOMATED COUNT: 12.6 %
EST. GFR  (AFRICAN AMERICAN): >60 ML/MIN/1.73 M^2
EST. GFR  (NON AFRICAN AMERICAN): >60 ML/MIN/1.73 M^2
GLUCOSE SERPL-MCNC: 128 MG/DL
HCT VFR BLD AUTO: 31.2 %
HGB BLD-MCNC: 11 G/DL
L PNEUMO AG UR QL IA: NOT DETECTED
LYMPHOCYTES # BLD AUTO: 0.8 K/UL
LYMPHOCYTES NFR BLD: 11.9 %
MAGNESIUM SERPL-MCNC: 1.6 MG/DL
MCH RBC QN AUTO: 30.7 PG
MCHC RBC AUTO-ENTMCNC: 35.3 G/DL
MCV RBC AUTO: 87 FL
MONOCYTES # BLD AUTO: 1.2 K/UL
MONOCYTES NFR BLD: 17.4 %
NEUTROPHILS # BLD AUTO: 4.8 K/UL
NEUTROPHILS NFR BLD: 69.9 %
PLATELET # BLD AUTO: 259 K/UL
PMV BLD AUTO: 10.3 FL
POTASSIUM SERPL-SCNC: 4 MMOL/L
POTASSIUM SERPL-SCNC: 4 MMOL/L
RBC # BLD AUTO: 3.58 M/UL
SODIUM SERPL-SCNC: 132 MMOL/L
WBC # BLD AUTO: 6.91 K/UL

## 2018-01-24 PROCEDURE — 25000003 PHARM REV CODE 250: Performed by: INTERNAL MEDICINE

## 2018-01-24 PROCEDURE — 83735 ASSAY OF MAGNESIUM: CPT

## 2018-01-24 PROCEDURE — 25000003 PHARM REV CODE 250: Performed by: EMERGENCY MEDICINE

## 2018-01-24 PROCEDURE — 84132 ASSAY OF SERUM POTASSIUM: CPT

## 2018-01-24 PROCEDURE — S0028 INJECTION, FAMOTIDINE, 20 MG: HCPCS | Performed by: EMERGENCY MEDICINE

## 2018-01-24 PROCEDURE — 12000002 HC ACUTE/MED SURGE SEMI-PRIVATE ROOM

## 2018-01-24 PROCEDURE — 80048 BASIC METABOLIC PNL TOTAL CA: CPT

## 2018-01-24 PROCEDURE — 36415 COLL VENOUS BLD VENIPUNCTURE: CPT

## 2018-01-24 PROCEDURE — 85025 COMPLETE CBC W/AUTO DIFF WBC: CPT

## 2018-01-24 PROCEDURE — 63600175 PHARM REV CODE 636 W HCPCS: Performed by: EMERGENCY MEDICINE

## 2018-01-24 PROCEDURE — 82533 TOTAL CORTISOL: CPT

## 2018-01-24 RX ORDER — SODIUM CHLORIDE 9 MG/ML
INJECTION, SOLUTION INTRAVENOUS CONTINUOUS
Status: DISCONTINUED | OUTPATIENT
Start: 2018-01-24 | End: 2018-01-25

## 2018-01-24 RX ORDER — POTASSIUM CHLORIDE 20 MEQ/15ML
20 SOLUTION ORAL EVERY 4 HOURS
Status: COMPLETED | OUTPATIENT
Start: 2018-01-24 | End: 2018-01-24

## 2018-01-24 RX ADMIN — POTASSIUM CHLORIDE 20 MEQ: 20 SOLUTION ORAL at 03:01

## 2018-01-24 RX ADMIN — OXYCODONE HYDROCHLORIDE 5 MG: 5 TABLET ORAL at 08:01

## 2018-01-24 RX ADMIN — POTASSIUM CHLORIDE 40 MEQ: 20 SOLUTION ORAL at 06:01

## 2018-01-24 RX ADMIN — GUAIFENESIN 1200 MG: 600 TABLET, EXTENDED RELEASE ORAL at 09:01

## 2018-01-24 RX ADMIN — FAMOTIDINE 20 MG: 10 INJECTION, SOLUTION INTRAVENOUS at 09:01

## 2018-01-24 RX ADMIN — OXYCODONE HYDROCHLORIDE 5 MG: 5 TABLET ORAL at 05:01

## 2018-01-24 RX ADMIN — SODIUM CHLORIDE: 0.9 INJECTION, SOLUTION INTRAVENOUS at 08:01

## 2018-01-24 RX ADMIN — FAMOTIDINE 20 MG: 10 INJECTION, SOLUTION INTRAVENOUS at 10:01

## 2018-01-24 RX ADMIN — POTASSIUM CHLORIDE 20 MEQ: 20 SOLUTION ORAL at 11:01

## 2018-01-24 RX ADMIN — GUAIFENESIN 1200 MG: 600 TABLET, EXTENDED RELEASE ORAL at 08:01

## 2018-01-24 RX ADMIN — LEVOTHYROXINE SODIUM 75 MCG: 75 TABLET ORAL at 06:01

## 2018-01-24 RX ADMIN — ATORVASTATIN CALCIUM 10 MG: 10 TABLET, FILM COATED ORAL at 09:01

## 2018-01-24 RX ADMIN — ONDANSETRON 4 MG: 2 INJECTION INTRAMUSCULAR; INTRAVENOUS at 03:01

## 2018-01-24 RX ADMIN — POTASSIUM CHLORIDE 20 MEQ: 20 SOLUTION ORAL at 09:01

## 2018-01-24 RX ADMIN — ACETAMINOPHEN 650 MG: 325 TABLET ORAL at 03:01

## 2018-01-24 NOTE — SUBJECTIVE & OBJECTIVE
Interval History: PT without diarrhea of vomiting overnight.  Tolerating clear liquids    Review of Systems   All other systems reviewed and are negative.    Objective:     Vital Signs (Most Recent):  Temp: 97.9 °F (36.6 °C) (01/24/18 0800)  Pulse: (!) 59 (01/24/18 0800)  Resp: (!) 23 (01/24/18 0800)  BP: (!) 116/56 (01/24/18 0730)  SpO2: (!) 94 % (01/24/18 0800) Vital Signs (24h Range):  Temp:  [97.9 °F (36.6 °C)-98.6 °F (37 °C)] 97.9 °F (36.6 °C)  Pulse:  [49-96] 59  Resp:  [13-71] 23  SpO2:  [85 %-100 %] 94 %  BP: ()/(44-98) 116/56     Weight: 46.6 kg (102 lb 11.8 oz)  Body mass index is 23.03 kg/m².    Intake/Output Summary (Last 24 hours) at 01/24/18 0855  Last data filed at 01/24/18 0600   Gross per 24 hour   Intake             1845 ml   Output             2430 ml   Net             -585 ml      Physical Exam   Constitutional: She is oriented to person, place, and time. She appears well-developed and well-nourished.   HENT:   Head: Normocephalic and atraumatic.   Eyes: EOM are normal. Pupils are equal, round, and reactive to light.   Neck: Normal range of motion.   Cardiovascular: Normal rate, regular rhythm and normal heart sounds.  Exam reveals no gallop and no friction rub.    No murmur heard.  Pulmonary/Chest: Effort normal and breath sounds normal.   Abdominal: Soft. Bowel sounds are normal.   Musculoskeletal: Normal range of motion.   Neurological: She is alert and oriented to person, place, and time.   Skin: Skin is warm and dry.   Psychiatric: She exhibits a depressed mood.       Significant Labs:   CBC:   Recent Labs  Lab 01/22/18  1447 01/23/18  0658 01/24/18  0310   WBC 7.16 6.67 6.91   HGB 12.8 12.5 11.0*   HCT 34.0* 33.9* 31.2*    217 259     CMP:   Recent Labs  Lab 01/22/18  1447  01/22/18  2334 01/23/18  0659 01/23/18 2006   *  < > 120* 125* 131*   K 2.8*  < > 2.7* 3.8 3.2*   CL 75*  < > 83* 90* 97   CO2 29  < > 29 30* 27   *  < > 98 100 86   BUN 8  < > 5* 5* 5*    CREATININE 0.6  < > 0.5 0.6 0.5   CALCIUM 8.8  < > 8.1* 8.5* 8.1*   PROT 6.7  --   --   --   --    ALBUMIN 3.8  --   --   --   --    BILITOT 0.9  --   --   --   --    ALKPHOS 70  --   --   --   --    AST 40  --   --   --   --    ALT 32  --   --   --   --    ANIONGAP 9  < > 8 5* 7*   EGFRNONAA >60  < > >60 >60 >60   < > = values in this interval not displayed.    Significant Imaging: no new imaging

## 2018-01-24 NOTE — NURSING TRANSFER
Nursing Transfer Note      1/24/2018     Transfer {TRANSFER TO/FROM:79176}: 441B    Transfer via {TRANSFER VIA:38082}WC    Transfer with {TRANSFER WITH:43753}N/A    Transported by ***transortatiom    Medicines sent: ***No    Chart send with patient: {YES (DEF)/NO:06383}Yes    Notified: {NOTIFIED:28847}YINKA Messina    Patient reassessed at: *** (date, time) 1130    Upon arrival to floor: {IP NSG TRANSFER ARRIVAL OHS:13519}N/A

## 2018-01-24 NOTE — CONSULTS
Renal ICU Progress Note    Date of Admission:  1/22/2018  4:10 PM      ROS: no new problems reported      Physical Exam:    Vitals:    01/24/18 1000   BP:    Pulse: (!) 53   Resp: (!) 33   Temp:      I/O last 3 completed shifts:  In: 2709.3 [P.O.:1000; I.V.:1209.3; IV Piggyback:500]  Out: 4505 [Urine:4505]  I/O this shift:  In: 640 [P.O.:340; I.V.:300]  Out: 275 [Urine:275]      Laboratories:      Recent Labs  Lab 01/24/18  0310   WBC 6.91   RBC 3.58*   HGB 11.0*   HCT 31.2*      MCV 87   MCH 30.7   MCHC 35.3       Recent Labs  Lab 01/24/18  0930   CALCIUM 8.4*   *   K 4.0  4.0   CO2 25      BUN 5*   CREATININE 0.6       Microalbum.,U,Random  181.0   Microalbum.,U,Random     Prot/Creat Ratio, Ur   2.20  Prot/Creat Ratio, Ur    Sodium Urine Random   53  Sodium Urine Random    Protein, Urine Random   26  Protein     Microalb Creat Ratio             1533.9   Microalb      Osmolality, Ur             184   Osmolality     Microbiology Results (last 7 days)     Procedure Component Value Units Date/Time    Urine culture [[id number]] Collected:  01/22/18 2239    Order Status:  Completed Specimen:  Urine from Urine, Catheterized Updated:  01/24/18 0923     Urine Culture, Routine No growth    Urine culture **CANNOT BE ORDERED STAT** [[id number]] Collected:  01/22/18 1833    Order Status:  Completed Specimen:  Urine from Urine Updated:  01/24/18 0912     Urine Culture, Routine No significant growth              Diagnostic Tests:  X-Ray chest AP portable [[id number]] Resulted: 01/23/18 0415   Order Status: Completed Updated: 01/23/18 0416   Narrative:     Comparison:1/22/2018    Technique: Single AP portable chest radiograph.    Findings:   Cardiac monitoring leads overlie the chest. The cardiomediastinal silhouette appears stable from prior examination. Coarse interstitial lung markings are noted bilaterally. There is mild bibasilar subsegmental atelectasis. No evidence of new  focal air space consolidation or large pleural effusion. There is no pneumothorax.   Impression:       1.  No significant detrimental interval change compared to 1/22/2018.      Electronically signed by: GIANNA DAWSON  Date: 01/23/18  Time: 04:15        Assessment:    81 y/o female admitted with:    - Improving Hypovolemic hyponatremia multifactorial.  -  Resolved Hypokalemia   - Viral syndrome?   - Hx. HTN  - Proteinuria      Plan:    - IVFs NS at 75cc/h until tomorrow  - Daily BMP  - May remove Ta cath.  - Advance diet as tolerated  - o.k. To transfer to floor    Will f/u PRN only

## 2018-01-24 NOTE — PROGRESS NOTES
[facility] Hospital Medicine  Progress Note    Patient Name: Jeannie Hutchins  MRN: [id number]  Patient Class: IP- Inpatient   Admission Date: 1/22/2018  Length of Stay: 2 days  Attending Physician: Paige Mcleod MD  Primary Care Provider: Paige Mcleod MD        Subjective:     Principal Problem:Hyponatremia with decreased serum osmolality    HPI:  PT  Is an 81yo female who was seen in my office with complaints of diarrhea on 1/19.  At that time she was felt to have gastroenteritis.  She was sent home but states she could not eat anything for 4 days.  The diarrhea was starting to improve but she did not feel better so she presented to the ED.  She was found to have a sodium of 113 and was admitted with a diagnosis of hypernatremia    Hospital Course:  No notes on file    Interval History: PT without diarrhea of vomiting overnight.  Tolerating clear liquids    Review of Systems   All other systems reviewed and are negative.    Objective:     Vital Signs (Most Recent):  Temp: 97.9 °F (36.6 °C) (01/24/18 0800)  Pulse: (!) 59 (01/24/18 0800)  Resp: (!) 23 (01/24/18 0800)  BP: (!) 116/56 (01/24/18 0730)  SpO2: (!) 94 % (01/24/18 0800) Vital Signs (24h Range):  Temp:  [97.9 °F (36.6 °C)-98.6 °F (37 °C)] 97.9 °F (36.6 °C)  Pulse:  [49-96] 59  Resp:  [13-71] 23  SpO2:  [85 %-100 %] 94 %  BP: ()/(44-98) 116/56     Weight: 46.6 kg (102 lb 11.8 oz)  Body mass index is 23.03 kg/m².    Intake/Output Summary (Last 24 hours) at 01/24/18 0855  Last data filed at 01/24/18 0600   Gross per 24 hour   Intake             1845 ml   Output             2430 ml   Net             -585 ml      Physical Exam   Constitutional: She is oriented to person, place, and time. She appears well-developed and well-nourished.   HENT:   Head: Normocephalic and atraumatic.   Eyes: EOM are normal. Pupils are equal, round, and reactive to light.   Neck: Normal range of motion.   Cardiovascular: Normal rate, regular rhythm and  normal heart sounds.  Exam reveals no gallop and no friction rub.    No murmur heard.  Pulmonary/Chest: Effort normal and breath sounds normal.   Abdominal: Soft. Bowel sounds are normal.   Musculoskeletal: Normal range of motion.   Neurological: She is alert and oriented to person, place, and time.   Skin: Skin is warm and dry.   Psychiatric: She exhibits a depressed mood.       Significant Labs:   CBC:   Recent Labs  Lab 01/22/18  1447 01/23/18  0658 01/24/18  0310   WBC 7.16 6.67 6.91   HGB 12.8 12.5 11.0*   HCT 34.0* 33.9* 31.2*    217 259     CMP:   Recent Labs  Lab 01/22/18  1447  01/22/18  2334 01/23/18  0659 01/23/18 2006   *  < > 120* 125* 131*   K 2.8*  < > 2.7* 3.8 3.2*   CL 75*  < > 83* 90* 97   CO2 29  < > 29 30* 27   *  < > 98 100 86   BUN 8  < > 5* 5* 5*   CREATININE 0.6  < > 0.5 0.6 0.5   CALCIUM 8.8  < > 8.1* 8.5* 8.1*   PROT 6.7  --   --   --   --    ALBUMIN 3.8  --   --   --   --    BILITOT 0.9  --   --   --   --    ALKPHOS 70  --   --   --   --    AST 40  --   --   --   --    ALT 32  --   --   --   --    ANIONGAP 9  < > 8 5* 7*   EGFRNONAA >60  < > >60 >60 >60   < > = values in this interval not displayed.    Significant Imaging: no new imaging    Assessment/Plan:      * Hyponatremia with decreased serum osmolality      Sodium improving.  Will advance diet and transfer to the floor today        Generalized anxiety disorder      See other psych note        Hypokalemia    Low again will replace today        Benign hypertension    Continue home meds except HCTZ          Adjustment disorder with mixed anxiety and depressed mood    Psych recommending outpatient therapy          Non-intractable vomiting with nausea    Advance diet            VTE Risk Mitigation         Ordered     Medium Risk of VTE  Once      01/22/18 1959     Place BRYANT hose  Until discontinued      01/22/18 1959     Place sequential compression device  Until discontinued      01/22/18 1959          Critical care  time spent on the evaluation and treatment of severe organ dysfunction, review of pertinent labs and imaging studies, discussions with consulting providers and discussions with patient/family: >30 minutes.    Pagie Mcleod MD  Department of Hospital Medicine   [facility]

## 2018-01-24 NOTE — PLAN OF CARE
"   01/24/18 1525   Discharge Assessment   Assessment Type Discharge Planning Assessment   Confirmed/corrected address and phone number on facesheet? Yes   Assessment information obtained from? Medical Record;Patient   Expected Length of Stay (days) 2   Communicated expected length of stay with patient/caregiver yes   Prior to hospitilization cognitive status: Alert/Oriented   Prior to hospitalization functional status: Independent   Current cognitive status: Alert/Oriented   Current Functional Status: Needs Assistance   Lives With spouse   Able to Return to Prior Arrangements unable to determine at this time (comments)   Is patient able to care for self after discharge? Yes   Who are your caregiver(s) and their phone number(s)? none--   Patient's perception of discharge disposition home or selfcare   Readmission Within The Last 30 Days no previous admission in last 30 days   Patient currently being followed by outpatient case management? No   Patient currently receives any other outside agency services? No   Equipment Currently Used at Home none   Do you have any problems affording any of your prescribed medications? TBD   Is the patient taking medications as prescribed? yes   Does the patient have transportation home? Yes   Transportation Available family or friend will provide   Discharge Plan A Home with family   Discharge Plan B Home with family;Home Health   Patient/Family In Agreement With Plan yes   SW met with patient following her transfer from ICU to med/surg floor. Explained role of SW/CM with treatment team, provided contact information with the "discharge planning begins on admission" checklist handout.   Confirmed information in demographics: no changes.   SW reviewed the discharge planning folder, explained to leave in room with patient so that team/patient can place all written discharge information throughout hospital stay. Provided education regarding the importance of using written discharge " information to help manage health care at home.   SW provided education regarding the importance of obtaining, taking all medications at discharge. [place]   [facility] [place] [address]  [address] [facility] [address]  Phone: [phone] Fax: [phone]  .  SW provided education regarding importance of going to all follow up appointments to help manage health care at home. Patient prefers medical appointment daylight.   SW will follow in ICU and assist as needed.    5:50 pm follow up: SW inquired regarding help at home. 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 Patient reports has 3 steps into home and has not been walking since admissions. Reports dizzy from medication, was able to sit up 3-4 hours this afternoon.   Patient requests assistance with information regarding assisted living in [place] for patient and her .  She desires help obtaining follow up appointment for counseling, prefers psychologist.

## 2018-01-25 LAB
ANION GAP SERPL CALC-SCNC: 6 MMOL/L
BASOPHILS # BLD AUTO: 0.01 K/UL
BASOPHILS NFR BLD: 0.1 %
BUN SERPL-MCNC: 4 MG/DL
CALCIUM SERPL-MCNC: 8.1 MG/DL
CHLORIDE SERPL-SCNC: 103 MMOL/L
CO2 SERPL-SCNC: 23 MMOL/L
CREAT SERPL-MCNC: 0.5 MG/DL
DIFFERENTIAL METHOD: ABNORMAL
EOSINOPHIL # BLD AUTO: 0.1 K/UL
EOSINOPHIL NFR BLD: 1.6 %
ERYTHROCYTE [DISTWIDTH] IN BLOOD BY AUTOMATED COUNT: 13.1 %
EST. GFR  (AFRICAN AMERICAN): >60 ML/MIN/1.73 M^2
EST. GFR  (NON AFRICAN AMERICAN): >60 ML/MIN/1.73 M^2
GLUCOSE SERPL-MCNC: 85 MG/DL
HCT VFR BLD AUTO: 30.4 %
HGB BLD-MCNC: 10.4 G/DL
LYMPHOCYTES # BLD AUTO: 1.1 K/UL
LYMPHOCYTES NFR BLD: 12.9 %
MCH RBC QN AUTO: 30.5 PG
MCHC RBC AUTO-ENTMCNC: 34.2 G/DL
MCV RBC AUTO: 89 FL
MONOCYTES # BLD AUTO: 1.2 K/UL
MONOCYTES NFR BLD: 14.7 %
NEUTROPHILS # BLD AUTO: 5.8 K/UL
NEUTROPHILS NFR BLD: 70.5 %
PLATELET # BLD AUTO: 253 K/UL
PMV BLD AUTO: 10.3 FL
POTASSIUM SERPL-SCNC: 4.4 MMOL/L
RBC # BLD AUTO: 3.41 M/UL
SODIUM SERPL-SCNC: 132 MMOL/L
WBC # BLD AUTO: 8.17 K/UL

## 2018-01-25 PROCEDURE — 25000003 PHARM REV CODE 250: Performed by: EMERGENCY MEDICINE

## 2018-01-25 PROCEDURE — 80048 BASIC METABOLIC PNL TOTAL CA: CPT

## 2018-01-25 PROCEDURE — 36415 COLL VENOUS BLD VENIPUNCTURE: CPT

## 2018-01-25 PROCEDURE — 85025 COMPLETE CBC W/AUTO DIFF WBC: CPT

## 2018-01-25 PROCEDURE — 63600175 PHARM REV CODE 636 W HCPCS: Performed by: EMERGENCY MEDICINE

## 2018-01-25 PROCEDURE — S0028 INJECTION, FAMOTIDINE, 20 MG: HCPCS | Performed by: EMERGENCY MEDICINE

## 2018-01-25 PROCEDURE — 12000002 HC ACUTE/MED SURGE SEMI-PRIVATE ROOM

## 2018-01-25 PROCEDURE — 25000003 PHARM REV CODE 250: Performed by: INTERNAL MEDICINE

## 2018-01-25 RX ORDER — PAROXETINE 10 MG/5ML
10 SUSPENSION ORAL DAILY
Status: DISCONTINUED | OUTPATIENT
Start: 2018-01-25 | End: 2018-01-28

## 2018-01-25 RX ORDER — PAROXETINE 10 MG/1
10 TABLET, FILM COATED ORAL DAILY
Status: DISCONTINUED | OUTPATIENT
Start: 2018-01-25 | End: 2018-01-25 | Stop reason: SDUPTHER

## 2018-01-25 RX ADMIN — ACETAMINOPHEN 650 MG: 325 TABLET ORAL at 05:01

## 2018-01-25 RX ADMIN — SODIUM CHLORIDE: 0.9 INJECTION, SOLUTION INTRAVENOUS at 05:01

## 2018-01-25 RX ADMIN — PAROXETINE HYDROCHLORIDE 10 MG: 10 SUSPENSION ORAL at 09:01

## 2018-01-25 RX ADMIN — ATORVASTATIN CALCIUM 10 MG: 10 TABLET, FILM COATED ORAL at 08:01

## 2018-01-25 RX ADMIN — ACETAMINOPHEN 650 MG: 325 TABLET ORAL at 01:01

## 2018-01-25 RX ADMIN — GUAIFENESIN 1200 MG: 600 TABLET, EXTENDED RELEASE ORAL at 08:01

## 2018-01-25 RX ADMIN — ONDANSETRON 4 MG: 2 INJECTION INTRAMUSCULAR; INTRAVENOUS at 12:01

## 2018-01-25 RX ADMIN — FAMOTIDINE 20 MG: 10 INJECTION, SOLUTION INTRAVENOUS at 08:01

## 2018-01-25 RX ADMIN — ACETAMINOPHEN 650 MG: 325 TABLET ORAL at 08:01

## 2018-01-25 RX ADMIN — LEVOTHYROXINE SODIUM 75 MCG: 75 TABLET ORAL at 05:01

## 2018-01-25 RX ADMIN — AMLODIPINE BESYLATE 10 MG: 5 TABLET ORAL at 08:01

## 2018-01-25 RX ADMIN — ONDANSETRON 4 MG: 2 INJECTION INTRAMUSCULAR; INTRAVENOUS at 05:01

## 2018-01-25 NOTE — SUBJECTIVE & OBJECTIVE
"Interval History: Pt feeling weak this morning.  Says she wants to start a "nerve pill"    Review of Systems   All other systems reviewed and are negative.    Objective:     Vital Signs (Most Recent):  Temp: 97.5 °F (36.4 °C) (01/25/18 0818)  Pulse: 68 (01/25/18 0818)  Resp: 18 (01/25/18 0818)  BP: (!) 157/67 (01/25/18 0819)  SpO2: (!) 79 % (01/25/18 0818) Vital Signs (24h Range):  Temp:  [96.7 °F (35.9 °C)-98.3 °F (36.8 °C)] 97.5 °F (36.4 °C)  Pulse:  [53-71] 68  Resp:  [18-33] 18  SpO2:  [79 %-93 %] 79 %  BP: (107-184)/(55-72) 157/67     Weight: 47.1 kg (103 lb 14.1 oz)  Body mass index is 23.29 kg/m².    Intake/Output Summary (Last 24 hours) at 01/25/18 0837  Last data filed at 01/25/18 0600   Gross per 24 hour   Intake             1600 ml   Output             1950 ml   Net             -350 ml      Physical Exam   Constitutional: She is oriented to person, place, and time. She appears well-developed and well-nourished.   HENT:   Head: Normocephalic and atraumatic.   Eyes: EOM are normal. Pupils are equal, round, and reactive to light.   Neck: Normal range of motion.   Cardiovascular: Normal rate, regular rhythm and normal heart sounds.  Exam reveals no gallop and no friction rub.    No murmur heard.  Pulmonary/Chest: Effort normal and breath sounds normal.   Abdominal: Soft. Bowel sounds are normal.   Musculoskeletal: Normal range of motion.   Neurological: She is alert and oriented to person, place, and time.   Skin: Skin is warm and dry.   Psychiatric: She exhibits a depressed mood.       Significant Labs:   CBC:     Recent Labs  Lab 01/24/18 0310 01/25/18  0431   WBC 6.91 8.17   HGB 11.0* 10.4*   HCT 31.2* 30.4*    253     CMP:     Recent Labs  Lab 01/23/18 2006 01/24/18  0930 01/25/18  0431   * 132* 132*   K 3.2* 4.0  4.0 4.4   CL 97 100 103   CO2 27 25 23   GLU 86 128* 85   BUN 5* 5* 4*   CREATININE 0.5 0.6 0.5   CALCIUM 8.1* 8.4* 8.1*   ANIONGAP 7* 7* 6*   EGFRNONAA >60 >60 >60 "       Significant Imaging: no new imaging

## 2018-01-25 NOTE — ASSESSMENT & PLAN NOTE
Will d/c IVF today and check labs in the AM to make sure she stays stable.  Consult PT for ashley

## 2018-01-25 NOTE — PLAN OF CARE
01/24/18 1755   Medicare Message   Important Message from Medicare regarding Discharge Appeal Rights Given to patient/caregiver;Explained to patient/caregiver;Signed/date by patient/caregiver   by patient

## 2018-01-25 NOTE — PLAN OF CARE
Problem: Patient Care Overview  Goal: Plan of Care Review  Outcome: Ongoing (interventions implemented as appropriate)  No falls, trauma or injury this shift. Nausea managed with zofran IV once this shift. Pain managed with tylenol. No pressure injuries observed. Continue with plan of care and continue  To monitor patient.

## 2018-01-25 NOTE — PLAN OF CARE
Problem: Patient Care Overview  Goal: Plan of Care Review  Outcome: Ongoing (interventions implemented as appropriate)  AOx4, no distress noted. Shoulder pain controlled with tylenol. Hand hygiene promoted. Up in chair with meals. Up with standby assist. Repositions self in bed

## 2018-01-25 NOTE — PLAN OF CARE
01/25/18 1307   Discharge Reassessment   Assessment Type Discharge Planning Reassessment   Do you have any problems affording any of your prescribed medications? No   Discharge Plan A Home with family;Home Health   Discharge Plan B Home with family  (Pt transferred to Floor)   Can the patient answer the patient profile reliably? Yes, cognitively intact   How does the patient rate their overall health at the present time? Good   Describe the patient's ability to walk at the present time. No restrictions   How often would a person be available to care for the patient? Whenever needed   Number of comorbid conditions (as recorded on the chart) Five or more

## 2018-01-26 LAB
ANION GAP SERPL CALC-SCNC: 6 MMOL/L
BASOPHILS # BLD AUTO: 0.02 K/UL
BASOPHILS NFR BLD: 0.2 %
BUN SERPL-MCNC: 4 MG/DL
CALCIUM SERPL-MCNC: 8.7 MG/DL
CHLORIDE SERPL-SCNC: 95 MMOL/L
CO2 SERPL-SCNC: 28 MMOL/L
CREAT SERPL-MCNC: 0.5 MG/DL
DIFFERENTIAL METHOD: ABNORMAL
EOSINOPHIL # BLD AUTO: 0.1 K/UL
EOSINOPHIL NFR BLD: 1 %
ERYTHROCYTE [DISTWIDTH] IN BLOOD BY AUTOMATED COUNT: 12.7 %
EST. GFR  (AFRICAN AMERICAN): >60 ML/MIN/1.73 M^2
EST. GFR  (NON AFRICAN AMERICAN): >60 ML/MIN/1.73 M^2
GLUCOSE SERPL-MCNC: 99 MG/DL
HCT VFR BLD AUTO: 33.5 %
HGB BLD-MCNC: 11.9 G/DL
LYMPHOCYTES # BLD AUTO: 0.9 K/UL
LYMPHOCYTES NFR BLD: 9.6 %
MCH RBC QN AUTO: 31.1 PG
MCHC RBC AUTO-ENTMCNC: 35.5 G/DL
MCV RBC AUTO: 88 FL
MONOCYTES # BLD AUTO: 1.1 K/UL
MONOCYTES NFR BLD: 11 %
NEUTROPHILS # BLD AUTO: 7.4 K/UL
NEUTROPHILS NFR BLD: 77.3 %
PLATELET # BLD AUTO: 326 K/UL
PMV BLD AUTO: 10 FL
POTASSIUM SERPL-SCNC: 3.7 MMOL/L
RBC # BLD AUTO: 3.83 M/UL
SODIUM SERPL-SCNC: 129 MMOL/L
WBC # BLD AUTO: 9.54 K/UL

## 2018-01-26 PROCEDURE — 80048 BASIC METABOLIC PNL TOTAL CA: CPT

## 2018-01-26 PROCEDURE — 36415 COLL VENOUS BLD VENIPUNCTURE: CPT

## 2018-01-26 PROCEDURE — 25000003 PHARM REV CODE 250: Performed by: INTERNAL MEDICINE

## 2018-01-26 PROCEDURE — 94761 N-INVAS EAR/PLS OXIMETRY MLT: CPT

## 2018-01-26 PROCEDURE — 63600175 PHARM REV CODE 636 W HCPCS: Performed by: EMERGENCY MEDICINE

## 2018-01-26 PROCEDURE — 25000003 PHARM REV CODE 250: Performed by: EMERGENCY MEDICINE

## 2018-01-26 PROCEDURE — 97161 PT EVAL LOW COMPLEX 20 MIN: CPT

## 2018-01-26 PROCEDURE — 12000002 HC ACUTE/MED SURGE SEMI-PRIVATE ROOM

## 2018-01-26 PROCEDURE — 85025 COMPLETE CBC W/AUTO DIFF WBC: CPT

## 2018-01-26 PROCEDURE — S0028 INJECTION, FAMOTIDINE, 20 MG: HCPCS | Performed by: EMERGENCY MEDICINE

## 2018-01-26 RX ADMIN — ONDANSETRON 4 MG: 2 INJECTION INTRAMUSCULAR; INTRAVENOUS at 09:01

## 2018-01-26 RX ADMIN — LEVOTHYROXINE SODIUM 75 MCG: 75 TABLET ORAL at 05:01

## 2018-01-26 RX ADMIN — ACETAMINOPHEN 650 MG: 325 TABLET ORAL at 12:01

## 2018-01-26 RX ADMIN — ONDANSETRON 4 MG: 2 INJECTION INTRAMUSCULAR; INTRAVENOUS at 05:01

## 2018-01-26 RX ADMIN — FAMOTIDINE 20 MG: 10 INJECTION, SOLUTION INTRAVENOUS at 09:01

## 2018-01-26 RX ADMIN — FAMOTIDINE 20 MG: 10 INJECTION, SOLUTION INTRAVENOUS at 08:01

## 2018-01-26 RX ADMIN — GUAIFENESIN 1200 MG: 600 TABLET, EXTENDED RELEASE ORAL at 09:01

## 2018-01-26 RX ADMIN — PAROXETINE HYDROCHLORIDE 10 MG: 10 SUSPENSION ORAL at 08:01

## 2018-01-26 RX ADMIN — AMLODIPINE BESYLATE 10 MG: 5 TABLET ORAL at 09:01

## 2018-01-26 RX ADMIN — ACETAMINOPHEN 650 MG: 325 TABLET ORAL at 01:01

## 2018-01-26 RX ADMIN — GUAIFENESIN 1200 MG: 600 TABLET, EXTENDED RELEASE ORAL at 08:01

## 2018-01-26 RX ADMIN — ATORVASTATIN CALCIUM 10 MG: 10 TABLET, FILM COATED ORAL at 09:01

## 2018-01-26 NOTE — SUBJECTIVE & OBJECTIVE
Interval History: No issues overnight    Review of Systems   All other systems reviewed and are negative.    Objective:     Vital Signs (Most Recent):  Temp: 98 °F (36.7 °C) (01/26/18 0819)  Pulse: 75 (01/26/18 0820)  Resp: 18 (01/26/18 0819)  BP: (!) 180/78 (01/26/18 0820)  SpO2: (!) 93 % (01/26/18 0820) Vital Signs (24h Range):  Temp:  [97.8 °F (36.6 °C)-98 °F (36.7 °C)] 98 °F (36.7 °C)  Pulse:  [68-80] 75  Resp:  [17-18] 18  SpO2:  [90 %-94 %] 93 %  BP: (139-192)/(64-82) 180/78     Weight: 48 kg (105 lb 13.1 oz)  Body mass index is 23.72 kg/m².    Intake/Output Summary (Last 24 hours) at 01/26/18 0829  Last data filed at 01/25/18 1800   Gross per 24 hour   Intake              600 ml   Output             1500 ml   Net             -900 ml      Physical Exam   Constitutional: She is oriented to person, place, and time. She appears well-developed and well-nourished.   HENT:   Head: Normocephalic and atraumatic.   Eyes: EOM are normal. Pupils are equal, round, and reactive to light.   Neck: Normal range of motion.   Cardiovascular: Normal rate, regular rhythm and normal heart sounds.  Exam reveals no gallop and no friction rub.    No murmur heard.  Pulmonary/Chest: Effort normal and breath sounds normal.   Abdominal: Soft. Bowel sounds are normal.   Musculoskeletal: Normal range of motion.   Neurological: She is alert and oriented to person, place, and time.   Skin: Skin is warm and dry.   Psychiatric: She exhibits a depressed mood.       Significant Labs:   CBC:     Recent Labs  Lab 01/25/18  0431 01/26/18  0456   WBC 8.17 9.54   HGB 10.4* 11.9*   HCT 30.4* 33.5*    326     CMP:     Recent Labs  Lab 01/24/18  0930 01/25/18  0431 01/26/18  0456   * 132* 129*   K 4.0  4.0 4.4 3.7    103 95   CO2 25 23 28   * 85 99   BUN 5* 4* 4*   CREATININE 0.6 0.5 0.5   CALCIUM 8.4* 8.1* 8.7   ANIONGAP 7* 6* 6*   EGFRNONAA >60 >60 >60       Significant Imaging: no new imaging

## 2018-01-26 NOTE — PLAN OF CARE
Problem: Patient Care Overview  Goal: Plan of Care Review  Outcome: Ongoing (interventions implemented as appropriate)  Pt progressing. Oriented x 4. No s/s of distress noted. Able to make needs known. Independent with ADLS. Safety measures maintained. Call bell within reach. Safety measures maintained. Frequent rounds made. Will continue to monitor

## 2018-01-26 NOTE — ASSESSMENT & PLAN NOTE
Psych recommending outpatient therapy.  On Paroxitine but it can drop sodium levels.  Will monitor once increasing salt in the diet and hopefully she can continue on this med

## 2018-01-26 NOTE — PROGRESS NOTES
"[facility] Hospital Medicine  Progress Note    Patient Name: Jeannie Hutchins  MRN: [id number]  Patient Class: IP- Inpatient   Admission Date: 1/22/2018  Length of Stay: 4 days  Attending Physician: Paige Mcleod MD  Primary Care Provider: Paige Mcleod MD        Subjective:     Principal Problem:Hyponatremia with decreased serum osmolality    HPI:  PT  Is an 83yo female who was seen in my office with complaints of diarrhea on 1/19.  At that time she was felt to have gastroenteritis.  She was sent home but states she could not eat anything for 4 days.  The diarrhea was starting to improve but she did not feel better so she presented to the ED.  She was found to have a sodium of 113 and was admitted with a diagnosis of hypernatremia    Hospital Course:  No notes on file    Interval History: Pt feeling weak this morning.  Says she wants to start a "nerve pill"    Review of Systems   All other systems reviewed and are negative.    Objective:     Vital Signs (Most Recent):  Temp: 97.5 °F (36.4 °C) (01/25/18 0818)  Pulse: 68 (01/25/18 0818)  Resp: 18 (01/25/18 0818)  BP: (!) 157/67 (01/25/18 0819)  SpO2: (!) 79 % (01/25/18 0818) Vital Signs (24h Range):  Temp:  [96.7 °F (35.9 °C)-98.3 °F (36.8 °C)] 97.5 °F (36.4 °C)  Pulse:  [53-71] 68  Resp:  [18-33] 18  SpO2:  [79 %-93 %] 79 %  BP: (107-184)/(55-72) 157/67     Weight: 47.1 kg (103 lb 14.1 oz)  Body mass index is 23.29 kg/m².    Intake/Output Summary (Last 24 hours) at 01/25/18 0837  Last data filed at 01/25/18 0600   Gross per 24 hour   Intake             1600 ml   Output             1950 ml   Net             -350 ml      Physical Exam   Constitutional: She is oriented to person, place, and time. She appears well-developed and well-nourished.   HENT:   Head: Normocephalic and atraumatic.   Eyes: EOM are normal. Pupils are equal, round, and reactive to light.   Neck: Normal range of motion.   Cardiovascular: Normal rate, regular rhythm and normal " heart sounds.  Exam reveals no gallop and no friction rub.    No murmur heard.  Pulmonary/Chest: Effort normal and breath sounds normal.   Abdominal: Soft. Bowel sounds are normal.   Musculoskeletal: Normal range of motion.   Neurological: She is alert and oriented to person, place, and time.   Skin: Skin is warm and dry.   Psychiatric: She exhibits a depressed mood.       Significant Labs:   CBC:     Recent Labs  Lab 01/24/18  0310 01/25/18  0431   WBC 6.91 8.17   HGB 11.0* 10.4*   HCT 31.2* 30.4*    253     CMP:     Recent Labs  Lab 01/23/18 2006 01/24/18  0930 01/25/18  0431   * 132* 132*   K 3.2* 4.0  4.0 4.4   CL 97 100 103   CO2 27 25 23   GLU 86 128* 85   BUN 5* 5* 4*   CREATININE 0.5 0.6 0.5   CALCIUM 8.1* 8.4* 8.1*   ANIONGAP 7* 7* 6*   EGFRNONAA >60 >60 >60       Significant Imaging: no new imaging    Assessment/Plan:      * Hyponatremia with decreased serum osmolality      Will d/c IVF today and check labs in the AM to make sure she stays stable.  Consult PT for eval        Generalized anxiety disorder      See other psych note        Hypokalemia    Resolved        Benign hypertension    Continue home meds except HCTZ          Adjustment disorder with mixed anxiety and depressed mood    Psych recommending outpatient therapy.  Will start paroxitine 10mg today          Non-intractable vomiting with nausea    Tolerating regular diet            VTE Risk Mitigation         Ordered     Medium Risk of VTE  Once      01/22/18 1959     Place BRYANT hose  Until discontinued      01/22/18 1959     Place sequential compression device  Until discontinued      01/22/18 1959              Paige Mcleod MD  Department of Hospital Medicine   [facility]

## 2018-01-26 NOTE — PT/OT/SLP EVAL
Physical Therapy Evaluation and Discharge Note    Patient Name:  Jeannie Hutchins   MRN:  [id number]    Recommendations:     Discharge Recommendations:  home   Discharge Equipment Recommendations: none   Barriers to discharge: None    Assessment:     Jeannie Hutchins is a 82 y.o. female admitted with a medical diagnosis of Hyponatremia with decreased serum osmolality. At this time, patient is functioning at their prior level of function and does not require further acute PT services.     Recent Surgery: * No surgery found *      Plan:     During this hospitalization, patient does not require further acute PT services.  Please re-consult if situation changes.     Plan of Care Reviewed with: patient    Subjective     Communicated with nurse Monica prior to session.  Patient found supine in bed upon PT entry to room, agreeable to evaluation.      Chief Complaint: Wants to go home because she is more active at home.  Patient comments/goals: To go home.   Pain/Comfort:  · Pain Rating 1: 0/10    Living Environment:  Patient lives at home with her spouse. She has 3 steps to get into the house with bilateral handrails. Prior to admission, patients level of function was independent. Patient has the following equipment: none.  Upon discharge, patient will have assistance from spouse.    Objective:     Patient found with: peripheral IV     General Precautions: Standard, fall   Orthopedic Precautions:N/A   Braces: N/A     Exams:  · Cognitive Exam:  Patient is oriented to Person, Place and Situation and follows 100% of simple commands   · Gross Motor Coordination:  WFL  · Skin integrity: visible skin intact  · RUE ROM: WFL  · RUE Strength: WFL  · LUE ROM: WFL  · LUE Strength: WFL  · RLE ROM: WFL  · RLE Strength: WFL  · LLE ROM: WFL  · LLE Strength: WFL    Functional Mobility:  · Bed Mobility:     · Supine to Sit: supervision  · Sit to Supine: supervision  · Transfers:     · Sit to Stand:  supervision with no AD  · Gait: ~200ft with no  loss of balance. At end of walk patient asked if she could hold on PT's arm due to feeling weak.     AM-PAC 6 CLICK MOBILITY  Total Score:23     Patient left supine with all lines intact, call button in reach and nurse notified.    GOALS:    Physical Therapy Goals     Not on file          Multidisciplinary Problems (Resolved)        Problem: Physical Therapy Goal    Goal Priority Disciplines Outcome Goal Variances Interventions   Physical Therapy Goal   (Resolved)     PT/OT, PT Outcome(s) achieved                     History:     Past Medical History:   Diagnosis Date    Arthritis     Blood transfusion     Carotid stenosis     Hypercholesterolemia     Hypertension     Psychiatric problem     Thyroid disease        Past Surgical History:   Procedure Laterality Date    CARPAL TUNNEL RELEASE  2012    right hand    HYSTERECTOMY      left carotid endartectomy  5/2006    TONSILLECTOMY       Time Tracking:     PT Received On: 01/26/18  PT Start Time: 1536     PT Stop Time: 1544  PT Total Time (min): 8 min     Billable Minutes: Evaluation  8    Ann-Marie Acosta, PT  01/26/2018

## 2018-01-26 NOTE — PLAN OF CARE
Problem: Patient Care Overview  Goal: Plan of Care Review  Outcome: Ongoing (interventions implemented as appropriate)  No falls, trauma or injury this shift. Patient ambulated to and from bathroom to chair gait steady. Skin remains intact. Pain managed with PO Tylenol and nausea with IV Zofran. Continue with plan of care and continue to monitor patient.

## 2018-01-26 NOTE — PROGRESS NOTES
[facility] Hospital Medicine  Progress Note    Patient Name: Jeannie Hutchins  MRN: [id number]  Patient Class: IP- Inpatient   Admission Date: 1/22/2018  Length of Stay: 4 days  Attending Physician: Paige Mcleod MD  Primary Care Provider: Paige Mcleod MD        Subjective:     Principal Problem:Hyponatremia with decreased serum osmolality    HPI:  PT  Is an 81yo female who was seen in my office with complaints of diarrhea on 1/19.  At that time she was felt to have gastroenteritis.  She was sent home but states she could not eat anything for 4 days.  The diarrhea was starting to improve but she did not feel better so she presented to the ED.  She was found to have a sodium of 113 and was admitted with a diagnosis of hypernatremia    Hospital Course:  No notes on file    Interval History: No issues overnight    Review of Systems   All other systems reviewed and are negative.    Objective:     Vital Signs (Most Recent):  Temp: 98 °F (36.7 °C) (01/26/18 0819)  Pulse: 75 (01/26/18 0820)  Resp: 18 (01/26/18 0819)  BP: (!) 180/78 (01/26/18 0820)  SpO2: (!) 93 % (01/26/18 0820) Vital Signs (24h Range):  Temp:  [97.8 °F (36.6 °C)-98 °F (36.7 °C)] 98 °F (36.7 °C)  Pulse:  [68-80] 75  Resp:  [17-18] 18  SpO2:  [90 %-94 %] 93 %  BP: (139-192)/(64-82) 180/78     Weight: 48 kg (105 lb 13.1 oz)  Body mass index is 23.72 kg/m².    Intake/Output Summary (Last 24 hours) at 01/26/18 0829  Last data filed at 01/25/18 1800   Gross per 24 hour   Intake              600 ml   Output             1500 ml   Net             -900 ml      Physical Exam   Constitutional: She is oriented to person, place, and time. She appears well-developed and well-nourished.   HENT:   Head: Normocephalic and atraumatic.   Eyes: EOM are normal. Pupils are equal, round, and reactive to light.   Neck: Normal range of motion.   Cardiovascular: Normal rate, regular rhythm and normal heart sounds.  Exam reveals no gallop and no friction  rub.    No murmur heard.  Pulmonary/Chest: Effort normal and breath sounds normal.   Abdominal: Soft. Bowel sounds are normal.   Musculoskeletal: Normal range of motion.   Neurological: She is alert and oriented to person, place, and time.   Skin: Skin is warm and dry.   Psychiatric: She exhibits a depressed mood.       Significant Labs:   CBC:     Recent Labs  Lab 01/25/18  0431 01/26/18  0456   WBC 8.17 9.54   HGB 10.4* 11.9*   HCT 30.4* 33.5*    326     CMP:     Recent Labs  Lab 01/24/18  0930 01/25/18  0431 01/26/18  0456   * 132* 129*   K 4.0  4.0 4.4 3.7    103 95   CO2 25 23 28   * 85 99   BUN 5* 4* 4*   CREATININE 0.6 0.5 0.5   CALCIUM 8.4* 8.1* 8.7   ANIONGAP 7* 6* 6*   EGFRNONAA >60 >60 >60       Significant Imaging: no new imaging    Assessment/Plan:      * Hyponatremia with decreased serum osmolality      Na dropped off fluids.  Will liberalize salt in the diet and recheck in the am        Generalized anxiety disorder      See other psych note        Hypokalemia    Resolved        Benign hypertension    Continue home meds except HCTZ          Adjustment disorder with mixed anxiety and depressed mood    Psych recommending outpatient therapy.  On Paroxitine but it can drop sodium levels.  Will monitor once increasing salt in the diet and hopefully she can continue on this med          Non-intractable vomiting with nausea    Tolerating regular diet            VTE Risk Mitigation         Ordered     Medium Risk of VTE  Once      01/22/18 1959     Place BRYANT hose  Until discontinued      01/22/18 1959     Place sequential compression device  Until discontinued      01/22/18 1959              Paige Mcleod MD  Department of Hospital Medicine   [facility]

## 2018-01-26 NOTE — PLAN OF CARE
Problem: Physical Therapy Goal  Goal: Physical Therapy Goal  Outcome: Outcome(s) achieved Date Met: 01/26/18    Patient okay to ambulate in hallway with nursing staff or family supervision. She has no PT needs at discharge.

## 2018-01-27 LAB
ANION GAP SERPL CALC-SCNC: 13 MMOL/L
ANION GAP SERPL CALC-SCNC: 9 MMOL/L
BASOPHILS # BLD AUTO: 0.01 K/UL
BASOPHILS NFR BLD: 0.1 %
BUN SERPL-MCNC: 5 MG/DL
BUN SERPL-MCNC: 6 MG/DL
CALCIUM SERPL-MCNC: 8.5 MG/DL
CALCIUM SERPL-MCNC: 8.6 MG/DL
CHLORIDE SERPL-SCNC: 89 MMOL/L
CHLORIDE SERPL-SCNC: 89 MMOL/L
CO2 SERPL-SCNC: 19 MMOL/L
CO2 SERPL-SCNC: 25 MMOL/L
CREAT SERPL-MCNC: 0.5 MG/DL
CREAT SERPL-MCNC: 0.6 MG/DL
DIFFERENTIAL METHOD: ABNORMAL
EOSINOPHIL # BLD AUTO: 0 K/UL
EOSINOPHIL NFR BLD: 0.2 %
ERYTHROCYTE [DISTWIDTH] IN BLOOD BY AUTOMATED COUNT: 12.9 %
EST. GFR  (AFRICAN AMERICAN): >60 ML/MIN/1.73 M^2
EST. GFR  (AFRICAN AMERICAN): >60 ML/MIN/1.73 M^2
EST. GFR  (NON AFRICAN AMERICAN): >60 ML/MIN/1.73 M^2
EST. GFR  (NON AFRICAN AMERICAN): >60 ML/MIN/1.73 M^2
GLUCOSE SERPL-MCNC: 114 MG/DL
GLUCOSE SERPL-MCNC: 118 MG/DL
HCT VFR BLD AUTO: 32.4 %
HGB BLD-MCNC: 11.5 G/DL
LYMPHOCYTES # BLD AUTO: 1 K/UL
LYMPHOCYTES NFR BLD: 6.6 %
MAGNESIUM SERPL-MCNC: 1.5 MG/DL
MCH RBC QN AUTO: 30.4 PG
MCHC RBC AUTO-ENTMCNC: 35.5 G/DL
MCV RBC AUTO: 86 FL
MONOCYTES # BLD AUTO: 1.3 K/UL
MONOCYTES NFR BLD: 8.6 %
NEUTROPHILS # BLD AUTO: 12.8 K/UL
NEUTROPHILS NFR BLD: 85.6 %
PLATELET # BLD AUTO: 350 K/UL
PLATELET BLD QL SMEAR: ABNORMAL
PMV BLD AUTO: 9.1 FL
POTASSIUM SERPL-SCNC: 3.4 MMOL/L
POTASSIUM SERPL-SCNC: 3.9 MMOL/L
RBC # BLD AUTO: 3.78 M/UL
SODIUM SERPL-SCNC: 121 MMOL/L
SODIUM SERPL-SCNC: 123 MMOL/L
WBC # BLD AUTO: 15.08 K/UL

## 2018-01-27 PROCEDURE — 85025 COMPLETE CBC W/AUTO DIFF WBC: CPT

## 2018-01-27 PROCEDURE — 25000003 PHARM REV CODE 250: Performed by: INTERNAL MEDICINE

## 2018-01-27 PROCEDURE — 80048 BASIC METABOLIC PNL TOTAL CA: CPT | Mod: 91

## 2018-01-27 PROCEDURE — 83735 ASSAY OF MAGNESIUM: CPT

## 2018-01-27 PROCEDURE — 80048 BASIC METABOLIC PNL TOTAL CA: CPT

## 2018-01-27 PROCEDURE — 36415 COLL VENOUS BLD VENIPUNCTURE: CPT

## 2018-01-27 PROCEDURE — 25000003 PHARM REV CODE 250

## 2018-01-27 PROCEDURE — 25000003 PHARM REV CODE 250: Performed by: EMERGENCY MEDICINE

## 2018-01-27 PROCEDURE — S0028 INJECTION, FAMOTIDINE, 20 MG: HCPCS | Performed by: EMERGENCY MEDICINE

## 2018-01-27 PROCEDURE — 12000002 HC ACUTE/MED SURGE SEMI-PRIVATE ROOM

## 2018-01-27 PROCEDURE — 63600175 PHARM REV CODE 636 W HCPCS: Performed by: EMERGENCY MEDICINE

## 2018-01-27 RX ORDER — MAGNESIUM SULFATE HEPTAHYDRATE 40 MG/ML
2 INJECTION, SOLUTION INTRAVENOUS ONCE
Status: COMPLETED | OUTPATIENT
Start: 2018-01-28 | End: 2018-01-28

## 2018-01-27 RX ORDER — POTASSIUM CHLORIDE 20 MEQ/1
20 TABLET, EXTENDED RELEASE ORAL DAILY
Status: DISCONTINUED | OUTPATIENT
Start: 2018-01-27 | End: 2018-01-30

## 2018-01-27 RX ADMIN — OXYCODONE HYDROCHLORIDE 5 MG: 5 TABLET ORAL at 06:01

## 2018-01-27 RX ADMIN — GUAIFENESIN 1200 MG: 600 TABLET, EXTENDED RELEASE ORAL at 08:01

## 2018-01-27 RX ADMIN — RAMELTEON 8 MG: 8 TABLET, FILM COATED ORAL at 08:01

## 2018-01-27 RX ADMIN — SODIUM CHLORIDE TAB 1 GM 1 G: 1 TAB at 10:01

## 2018-01-27 RX ADMIN — LEVOTHYROXINE SODIUM 75 MCG: 75 TABLET ORAL at 06:01

## 2018-01-27 RX ADMIN — ATORVASTATIN CALCIUM 10 MG: 10 TABLET, FILM COATED ORAL at 08:01

## 2018-01-27 RX ADMIN — AMLODIPINE BESYLATE 10 MG: 5 TABLET ORAL at 08:01

## 2018-01-27 RX ADMIN — FAMOTIDINE 20 MG: 10 INJECTION, SOLUTION INTRAVENOUS at 08:01

## 2018-01-27 RX ADMIN — POTASSIUM CHLORIDE 20 MEQ: 1500 TABLET, EXTENDED RELEASE ORAL at 09:01

## 2018-01-27 RX ADMIN — ONDANSETRON 4 MG: 2 INJECTION INTRAMUSCULAR; INTRAVENOUS at 06:01

## 2018-01-27 RX ADMIN — SODIUM CHLORIDE TAB 1 GM 1 G: 1 TAB at 08:01

## 2018-01-27 RX ADMIN — PAROXETINE HYDROCHLORIDE 10 MG: 10 SUSPENSION ORAL at 08:01

## 2018-01-27 NOTE — PROGRESS NOTES
[facility] Hospital Medicine  Progress Note    Patient Name: Jeannie Hutchins  MRN: [id number]  Patient Class: IP- Inpatient   Admission Date: 1/22/2018  Length of Stay: 5 days  Attending Physician: Paige Mcleod MD  Primary Care Provider: Paige Mcleod MD        Subjective:     Principal Problem:Hyponatremia with decreased serum osmolality    HPI:  PT  Is an 81yo WF followed by Dr. Bethany Mcleod who was seen in the office with complaints of diarrhea on 1/19.  At that time she was felt to have gastroenteritis.  She was sent home but states she could not eat anything for 4 days.  The diarrhea was starting to improve but she did not feel better so she presented to the ED.  She was found to have a sodium of 113 and was admitted with a diagnosis of hyponatremia    Hospital Course:  Sodium improving with fluids - 123 as of 1/27  She has issues with anxiety.  She was started on paroxetine; psych is recommending OP therapy    Interval History:  Slowly improving Na    Review of Systems   She is anxious about   Doesn't eat much - doesn't care for hospital food  Objective:     Vital Signs (Most Recent):  Temp: 99 °F (37.2 °C) (01/27/18 1211)  Pulse: 96 (01/27/18 1222)  Resp: 17 (01/27/18 1211)  BP: (!) 157/76 (01/27/18 1222)  SpO2: (!) 91 % (01/27/18 1211) Vital Signs (24h Range):  Temp:  [97.6 °F (36.4 °C)-99 °F (37.2 °C)] 99 °F (37.2 °C)  Pulse:  [] 96  Resp:  [17-18] 17  SpO2:  [90 %-93 %] 91 %  BP: (132-190)/(69-88) 157/76     Weight: 48 kg (105 lb 12.8 oz)  Body mass index is 23.72 kg/m².    Intake/Output Summary (Last 24 hours) at 01/27/18 1313  Last data filed at 01/27/18 0602   Gross per 24 hour   Intake              600 ml   Output                0 ml   Net              600 ml      Physical Exam  Anxious appearing; holding covers up to neck  Lungs sound ok  Heart is irregular  (EKG shows sinus with PACs)  abd soft, BS+  She has trace leg edema        Assessment/Plan:      *  Hyponatremia with decreased serum osmolality    Had been getting IVF - no longer  Liberalizing salt in diet  Na today 123  If not improved tomorrow will resume IVF          Generalized anxiety disorder    Has been started on paroxetine   She is more anxious because of recent diagnosis of flu in 83 yo  - he was in ER today.  Now sent home, but he is alone.  (Her daughter would normally help care for him, but she is undergoing cancer treatment and should not be exposed to flu)  .          Hypokalemia    Again slightly low at 3.4  Will give 20meq po daily        Benign hypertension    Continue home meds except HCTZ  158/81 on norvasc 10  May need further adjustment        Adjustment disorder with mixed anxiety and depressed mood    Psych recommending outpatient therapy.  On Paroxitine but it can drop sodium levels.     Increasing salt in the diet and following levels.          Non-intractable vomiting with nausea    Has diminished; Tolerating regular diet            VTE Risk Mitigation         Ordered     Medium Risk of VTE  Once      01/22/18 1959     Place BRYANT hose  Until discontinued      01/22/18 1959     Place sequential compression device  Until discontinued      01/22/18 1959              Jake Trujillo MD  Department of Hospital Medicine   [facility]

## 2018-01-27 NOTE — ASSESSMENT & PLAN NOTE
Psych recommending outpatient therapy.  On Paroxitine but it can drop sodium levels.     Increasing salt in the diet and following levels.

## 2018-01-27 NOTE — SUBJECTIVE & OBJECTIVE
Interval History:  Slowly improving Na    Review of Systems   She is anxious about   Doesn't eat much - doesn't care for hospital food  Objective:     Vital Signs (Most Recent):  Temp: 99 °F (37.2 °C) (01/27/18 1211)  Pulse: 96 (01/27/18 1222)  Resp: 17 (01/27/18 1211)  BP: (!) 157/76 (01/27/18 1222)  SpO2: (!) 91 % (01/27/18 1211) Vital Signs (24h Range):  Temp:  [97.6 °F (36.4 °C)-99 °F (37.2 °C)] 99 °F (37.2 °C)  Pulse:  [] 96  Resp:  [17-18] 17  SpO2:  [90 %-93 %] 91 %  BP: (132-190)/(69-88) 157/76     Weight: 48 kg (105 lb 12.8 oz)  Body mass index is 23.72 kg/m².    Intake/Output Summary (Last 24 hours) at 01/27/18 1313  Last data filed at 01/27/18 0602   Gross per 24 hour   Intake              600 ml   Output                0 ml   Net              600 ml      Physical Exam  Anxious appearing; holding covers up to neck  Lungs sound ok  Heart is irregular  (EKG shows sinus with PACs)  abd soft, BS+  She has trace leg edema

## 2018-01-27 NOTE — ASSESSMENT & PLAN NOTE
Had been getting IVF - no longer  Liberalizing salt in diet  Na today 123  If not improved tomorrow will resume IVF

## 2018-01-27 NOTE — PLAN OF CARE
Problem: Patient Care Overview  Goal: Plan of Care Review  Outcome: Ongoing (interventions implemented as appropriate)  Pt progressing. Oriented x 4 . C/O anxiety. MD aware. Had one episode of diarrhea. Able to ambulate independently. Call bell within reach. Safety measures maintained. Will continue to monitor

## 2018-01-27 NOTE — ASSESSMENT & PLAN NOTE
Has been started on paroxetine   She is more anxious because of recent diagnosis of flu in 83 yo  - he was in ER today.  Now sent home, but he is alone.  (Her daughter would normally help care for him, but she is undergoing cancer treatment and should not be exposed to flu)  .

## 2018-01-27 NOTE — HOSPITAL COURSE
Sodium improving with fluids - 123 as of 1/27  She has issues with anxiety.  She was started on paroxetine; psych is recommending OP therapy  On 1/28, she is asking for something for her anxiety - while waiting for paroxetine to take effect, will give 1 dose of lorazepam 0.5  Sodium dropped again, to 117.  Resumed NS IV  This did not help.  Na continued dropping.  Renal on case - changed to 0.3%NS - moved to ICU for drip.  While there, developed resp difficulty.  She eventually required NRB with 14lpm O2 - sat 95%.  CXR on 1/28 showed worsening aeroation - due either to pneumonia or fluid.  Lasix given - good result with urination; felt a bit better.   Started on empiric rocephin.  Furthermore, she developed tachycardia and was found to be in afib.  Started on empiric Eliquis for clot prevention; cardiology consulted.  Dr. Geovany johnson change from amlodipine to metoprolol.  She is CHADS4 - remain on Eliquis.  Discussed with Dr. Mckeon- ALL SSRI/SNRI will have hyponatremia risk.  For now, will have to use rare lorazepam for severe episodes.  She is amenable to therapy as OP - but needs insurance coverage.  2/2: moved to tele - continues improvement  It was determined that patient would be best going to a SNF facility for further treatment with therapy prior to discharge home.  Pt was accepted to SNF

## 2018-01-27 NOTE — PLAN OF CARE
Problem: Patient Care Overview  Goal: Plan of Care Review  Outcome: Ongoing (interventions implemented as appropriate)  No falls, trauma or injury this shift. Com plained of nausea once. No complaints of pain reported. Sodium 129. No pressure injuries observed. Continue with plan of care and continue to monitor patient.

## 2018-01-28 PROBLEM — Z09 FOLLOW UP: Status: ACTIVE | Noted: 2018-01-28

## 2018-01-28 PROBLEM — E83.39 HYPOPHOSPHATEMIA: Status: ACTIVE | Noted: 2018-01-28

## 2018-01-28 LAB
ANION GAP SERPL CALC-SCNC: 6 MMOL/L
ANION GAP SERPL CALC-SCNC: 6 MMOL/L
ANION GAP SERPL CALC-SCNC: 9 MMOL/L
BASOPHILS # BLD AUTO: 0.01 K/UL
BASOPHILS NFR BLD: 0.1 %
BUN SERPL-MCNC: 5 MG/DL
BUN SERPL-MCNC: 5 MG/DL
BUN SERPL-MCNC: 6 MG/DL
CALCIUM SERPL-MCNC: 8.2 MG/DL
CALCIUM SERPL-MCNC: 8.2 MG/DL
CALCIUM SERPL-MCNC: 8.7 MG/DL
CHLORIDE SERPL-SCNC: 83 MMOL/L
CHLORIDE SERPL-SCNC: 85 MMOL/L
CHLORIDE SERPL-SCNC: 85 MMOL/L
CO2 SERPL-SCNC: 24 MMOL/L
CO2 SERPL-SCNC: 26 MMOL/L
CO2 SERPL-SCNC: 26 MMOL/L
CORTIS SERPL-MCNC: 42.7 UG/DL
CREAT SERPL-MCNC: 0.6 MG/DL
DIFFERENTIAL METHOD: ABNORMAL
EOSINOPHIL # BLD AUTO: 0 K/UL
EOSINOPHIL NFR BLD: 0.1 %
ERYTHROCYTE [DISTWIDTH] IN BLOOD BY AUTOMATED COUNT: 12.2 %
EST. GFR  (AFRICAN AMERICAN): >60 ML/MIN/1.73 M^2
EST. GFR  (NON AFRICAN AMERICAN): >60 ML/MIN/1.73 M^2
GLUCOSE SERPL-MCNC: 128 MG/DL
GLUCOSE SERPL-MCNC: 128 MG/DL
GLUCOSE SERPL-MCNC: 138 MG/DL
HCT VFR BLD AUTO: 30 %
HGB BLD-MCNC: 10.9 G/DL
LYMPHOCYTES # BLD AUTO: 0.9 K/UL
LYMPHOCYTES NFR BLD: 5.7 %
MAGNESIUM SERPL-MCNC: 1.9 MG/DL
MCH RBC QN AUTO: 31.1 PG
MCHC RBC AUTO-ENTMCNC: 36.3 G/DL
MCV RBC AUTO: 86 FL
MONOCYTES # BLD AUTO: 1.5 K/UL
MONOCYTES NFR BLD: 9.3 %
NEUTROPHILS # BLD AUTO: 13.8 K/UL
NEUTROPHILS NFR BLD: 84.8 %
OSMOLALITY SERPL: 241 MOSM/KG
OSMOLALITY UR: 441 MOSM/KG
PHOSPHATE SERPL-MCNC: 1.3 MG/DL
PLATELET # BLD AUTO: 403 K/UL
PMV BLD AUTO: 9.7 FL
POTASSIUM SERPL-SCNC: 3.5 MMOL/L
POTASSIUM SERPL-SCNC: 3.5 MMOL/L
POTASSIUM SERPL-SCNC: 3.9 MMOL/L
POTASSIUM UR-SCNC: 43 MMOL/L
RBC # BLD AUTO: 3.51 M/UL
SODIUM SERPL-SCNC: 116 MMOL/L
SODIUM SERPL-SCNC: 117 MMOL/L
SODIUM SERPL-SCNC: 117 MMOL/L
SODIUM UR-SCNC: 55 MMOL/L
WBC # BLD AUTO: 16.39 K/UL

## 2018-01-28 PROCEDURE — 93010 ELECTROCARDIOGRAM REPORT: CPT | Mod: ,,, | Performed by: INTERNAL MEDICINE

## 2018-01-28 PROCEDURE — 25000003 PHARM REV CODE 250: Performed by: INTERNAL MEDICINE

## 2018-01-28 PROCEDURE — 25000003 PHARM REV CODE 250: Performed by: EMERGENCY MEDICINE

## 2018-01-28 PROCEDURE — 84100 ASSAY OF PHOSPHORUS: CPT

## 2018-01-28 PROCEDURE — 84133 ASSAY OF URINE POTASSIUM: CPT

## 2018-01-28 PROCEDURE — 63600175 PHARM REV CODE 636 W HCPCS: Performed by: EMERGENCY MEDICINE

## 2018-01-28 PROCEDURE — 80048 BASIC METABOLIC PNL TOTAL CA: CPT | Mod: 91

## 2018-01-28 PROCEDURE — S0028 INJECTION, FAMOTIDINE, 20 MG: HCPCS | Performed by: EMERGENCY MEDICINE

## 2018-01-28 PROCEDURE — 63600175 PHARM REV CODE 636 W HCPCS: Performed by: INTERNAL MEDICINE

## 2018-01-28 PROCEDURE — 85025 COMPLETE CBC W/AUTO DIFF WBC: CPT

## 2018-01-28 PROCEDURE — 25000003 PHARM REV CODE 250

## 2018-01-28 PROCEDURE — 83735 ASSAY OF MAGNESIUM: CPT

## 2018-01-28 PROCEDURE — 83930 ASSAY OF BLOOD OSMOLALITY: CPT

## 2018-01-28 PROCEDURE — 80048 BASIC METABOLIC PNL TOTAL CA: CPT

## 2018-01-28 PROCEDURE — 36415 COLL VENOUS BLD VENIPUNCTURE: CPT

## 2018-01-28 PROCEDURE — 20000000 HC ICU ROOM

## 2018-01-28 PROCEDURE — 83935 ASSAY OF URINE OSMOLALITY: CPT

## 2018-01-28 PROCEDURE — 84300 ASSAY OF URINE SODIUM: CPT

## 2018-01-28 PROCEDURE — 82533 TOTAL CORTISOL: CPT

## 2018-01-28 RX ORDER — LORAZEPAM 0.5 MG/1
0.5 TABLET ORAL ONCE
Status: COMPLETED | OUTPATIENT
Start: 2018-01-28 | End: 2018-01-28

## 2018-01-28 RX ORDER — SODIUM CHLORIDE 9 MG/ML
INJECTION, SOLUTION INTRAVENOUS CONTINUOUS
Status: DISCONTINUED | OUTPATIENT
Start: 2018-01-28 | End: 2018-01-28

## 2018-01-28 RX ORDER — ENALAPRIL MALEATE 2.5 MG/1
2.5 TABLET ORAL DAILY
Status: DISCONTINUED | OUTPATIENT
Start: 2018-01-28 | End: 2018-01-30

## 2018-01-28 RX ORDER — 3% SODIUM CHLORIDE 3 G/100ML
20 INJECTION, SOLUTION INTRAVENOUS CONTINUOUS
Status: DISCONTINUED | OUTPATIENT
Start: 2018-01-28 | End: 2018-01-29

## 2018-01-28 RX ADMIN — AMLODIPINE BESYLATE 10 MG: 5 TABLET ORAL at 08:01

## 2018-01-28 RX ADMIN — GUAIFENESIN 1200 MG: 600 TABLET, EXTENDED RELEASE ORAL at 08:01

## 2018-01-28 RX ADMIN — ONDANSETRON 4 MG: 2 INJECTION INTRAMUSCULAR; INTRAVENOUS at 08:01

## 2018-01-28 RX ADMIN — ENALAPRIL MALEATE 2.5 MG: 2.5 TABLET ORAL at 09:01

## 2018-01-28 RX ADMIN — OXYCODONE HYDROCHLORIDE 5 MG: 5 TABLET ORAL at 01:01

## 2018-01-28 RX ADMIN — SODIUM CHLORIDE TAB 1 GM 1 G: 1 TAB at 08:01

## 2018-01-28 RX ADMIN — POTASSIUM PHOSPHATE, MONOBASIC 500 MG: 500 TABLET, SOLUBLE ORAL at 09:01

## 2018-01-28 RX ADMIN — MAGNESIUM SULFATE IN WATER 2 G: 40 INJECTION, SOLUTION INTRAVENOUS at 01:01

## 2018-01-28 RX ADMIN — PAROXETINE HYDROCHLORIDE 10 MG: 10 SUSPENSION ORAL at 08:01

## 2018-01-28 RX ADMIN — POTASSIUM CHLORIDE 20 MEQ: 1500 TABLET, EXTENDED RELEASE ORAL at 08:01

## 2018-01-28 RX ADMIN — SODIUM CHLORIDE 20 ML/HR: 3 INJECTION, SOLUTION INTRAVENOUS at 08:01

## 2018-01-28 RX ADMIN — ATORVASTATIN CALCIUM 10 MG: 10 TABLET, FILM COATED ORAL at 08:01

## 2018-01-28 RX ADMIN — POTASSIUM PHOSPHATE, MONOBASIC AND POTASSIUM PHOSPHATE, DIBASIC 20 MMOL: 224; 236 INJECTION, SOLUTION INTRAVENOUS at 12:01

## 2018-01-28 RX ADMIN — FAMOTIDINE 20 MG: 10 INJECTION, SOLUTION INTRAVENOUS at 08:01

## 2018-01-28 RX ADMIN — LORAZEPAM 0.5 MG: 0.5 TABLET ORAL at 09:01

## 2018-01-28 RX ADMIN — LORAZEPAM 0.5 MG: 0.5 TABLET ORAL at 08:01

## 2018-01-28 RX ADMIN — LEVOTHYROXINE SODIUM 75 MCG: 75 TABLET ORAL at 05:01

## 2018-01-28 RX ADMIN — SODIUM CHLORIDE TAB 1 GM 1 G: 1 TAB at 09:01

## 2018-01-28 RX ADMIN — APIXABAN 2.5 MG: 2.5 TABLET, FILM COATED ORAL at 09:01

## 2018-01-28 RX ADMIN — SODIUM CHLORIDE: 0.9 INJECTION, SOLUTION INTRAVENOUS at 08:01

## 2018-01-28 RX ADMIN — ACETAMINOPHEN 650 MG: 325 TABLET ORAL at 05:01

## 2018-01-28 NOTE — ASSESSMENT & PLAN NOTE
Psych recommending outpatient therapy.  On Paroxitine but it can drop sodium levels.     Increasing salt in the diet and following levels.  Dr. Venegas now feels that we have to STOP the Paroxetine  Will see what Dr. Mckeon recommends as substitute

## 2018-01-28 NOTE — PLAN OF CARE
Problem: Patient Care Overview  Goal: Plan of Care Review  Outcome: Ongoing (interventions implemented as appropriate)  Pt remains free of falls and injuries. Ambulates with assistance, but steady gait. Skin intact. With some anxiety overnight, managed with sleeping pill. O2 sats decreased to 80s during the night, 3L n/c applied. Pt in no resp distress, resp even and unlabored. Magnesium IVPB x1 tonight d/t low Mg. Cont to monitor Na levels. VSSAF. TEDs to bilat legs. No other complaints at this time.

## 2018-01-28 NOTE — PLAN OF CARE
"Problem: Patient Care Overview  Goal: Plan of Care Review  Outcome: Ongoing (interventions implemented as appropriate)  Pt free of accidental injury during shift. C/o being anxious. Ativan 0.5 mg  given once. Sats in the 80's. Pt instructed to keep NC on. Pt stated "I don't like to wear it". It's choking me." IVF in progress. Current sodium level 116. Poor appetite. Able to ambulate with standby assist. Safety measures maintained. Call bell within reach. Will continue to monitor      "

## 2018-01-28 NOTE — NURSING
Critical sodium of 117 reported to Dr. Trujillo.  Pt stated she's very anxious and can't relax. O2 sats 86 % on RA. 2L NC placed on pt. New orders received for NS @100 and one time dose of ativan 0.5 po

## 2018-01-28 NOTE — PROGRESS NOTES
[facility] Hospital Medicine  Progress Note    Patient Name: Jeannie Hutchins  MRN: [id number]  Patient Class: IP- Inpatient   Admission Date: 1/22/2018  Length of Stay: 6 days  Attending Physician: Paige Mcleod MD  Primary Care Provider: Paige Mcleod MD        Subjective:     Principal Problem:Hyponatremia with decreased serum osmolality    HPI:  PT  Is an 81yo WF followed by Dr. Bethany Mcleod who was seen in the office with complaints of diarrhea on 1/19.  At that time she was felt to have gastroenteritis.  She was sent home but states she could not eat anything for 4 days.  The diarrhea was starting to improve but she did not feel better so she presented to the ED.  She was found to have a sodium of 113 and was admitted with a diagnosis of hyponatremia    Hospital Course:  Sodium improving with fluids - 123 as of 1/27  She has issues with anxiety.  She was started on paroxetine; psych is recommending OP therapy  On 1/28, she is asking for something for her anxiety - while waiting for paroxetine to take effect, will give 1 dose of lorazepam 0.5  Sodium dropped again, to 117.  Resume NS IV    Interval History: looks a bit better today    Review of Systems   Happy with effect of lorazepam    Objective:     Vital Signs (Most Recent):  Temp: 96.9 °F (36.1 °C) (01/28/18 1211)  Pulse: 97 (01/28/18 1211)  Resp: 18 (01/28/18 1211)  BP: (!) 169/78 (01/28/18 1211)  SpO2: (!) 88 % (01/28/18 1211) Vital Signs (24h Range):  Temp:  [96.9 °F (36.1 °C)-98.4 °F (36.9 °C)] 96.9 °F (36.1 °C)  Pulse:  [81-97] 97  Resp:  [17-20] 18  SpO2:  [86 %-93 %] 88 %  BP: (137-198)/(60-81) 169/78     Weight: 48 kg (105 lb 12.8 oz)  Body mass index is 23.72 kg/m².    Intake/Output Summary (Last 24 hours) at 01/28/18 1216  Last data filed at 01/28/18 0200   Gross per 24 hour   Intake                0 ml   Output               50 ml   Net              -50 ml      Physical Exam  Sitting up on side of bed attempting to eat  lunch  She appears less anxious  Son in room with her  Lungs clear  Heart tachy, regular  abd soft, BS+    Assessment/Plan:      * Hyponatremia with decreased serum osmolality    Differential:  CHF - bnp is 400+;  cxr shows normal heart size, no pulm fluid - will check echo  Cirrhosis - AST at upper normal, otherwise LFTs in normal range; check liver US  Adrenal insuff - would expect hyperkalemia and hypotension - not present;  Check am cortisol  SIADH - ?  Source?  HCTZ - not taking  Renal failure - no  Polydipsia - no      Had been getting IVF - then stopped  Liberalizing salt in diet - she is on salt tablets  Na on 1/27 123  On paroxetine - might lower levels  Repeat 1/28 is 117  Resuming IV saline  (2 x Na) + (glu/18) + (BUN/2.8) = serum osm = 242  (range 275-295)            Follow up    Spoke with her son today;  He is concerned about her well-being after discharge  Now that her  has pneumonia, caregiving will be more difficult  He is considering placing both of them in assisted living  He is worried that she is not taking her medications appropriately  In interim, he  Is asking about treatment alternatives - discussed SNF vs HH with PT.  Will have  talk with him to discuss options and formulate plan          Hypophosphatemia    Start K phos supplement (1.3 today)          Generalized anxiety disorder    Had been started on paroxetine   She is more anxious because of recent diagnosis of flu in 81 yo  - he was in ER today.  Now sent home, but he is alone.  (Her daughter would normally help care for him, but she is undergoing cancer treatment and should not be exposed to flu)  She became more anxious over night  Gave her ONE dose of lorazepam to tide her over.  However, she cannot rely on long-term benzos.  Now that Paxil has to be stopped, need to see what Dr. Mckeon will recommend as replacement.  Dr. Venegas feels that hyponatremia is a class effect of SSRI  .          Hypokalemia     On 1/27 slightly low at 3.4  Will give 20meq po daily        Benign hypertension    Continue home meds except HCTZ  169/78 on norvasc 10  added vasotec 2.5        Adjustment disorder with mixed anxiety and depressed mood    Psych recommending outpatient therapy.  On Paroxitine but it can drop sodium levels.     Increasing salt in the diet and following levels.  Dr. Venegas now feels that we have to STOP the Paroxetine  Will see what Dr. Mckeon recommends as substitute        Non-intractable vomiting with nausea    Has diminished; Tolerating regular diet            VTE Risk Mitigation         Ordered     Medium Risk of VTE  Once      01/22/18 1959     Place BRYANT hose  Until discontinued      01/22/18 1959     Place sequential compression device  Until discontinued      01/22/18 1959              Jake Trujillo MD  Department of Hospital Medicine   [facility]

## 2018-01-28 NOTE — ASSESSMENT & PLAN NOTE
Had been getting IVF - then stopped  Liberalizing salt in diet - she is on salt tablets  Na on 1/27 123  On paroxetine - might lower levels  Repeat 1/28 is 117  Resuming IV saline  (2 x Na) + (glu/18) + (BUN/2.8) = serum osm = 242  (range 275-295)

## 2018-01-28 NOTE — ASSESSMENT & PLAN NOTE
Spoke with her son today;  He is concerned about her well-being after discharge  Now that her  has pneumonia, caregiving will be more difficult  He is considering placing both of them in assisted living  He is worried that she is not taking her medications appropriately  In interim, he  Is asking about treatment alternatives - discussed SNF vs HH with PT.  Will have  talk with him to discuss options and formulate plan

## 2018-01-28 NOTE — SUBJECTIVE & OBJECTIVE
Interval History: looks a bit better today    Review of Systems   Happy with effect of lorazepam    Objective:     Vital Signs (Most Recent):  Temp: 96.9 °F (36.1 °C) (01/28/18 1211)  Pulse: 97 (01/28/18 1211)  Resp: 18 (01/28/18 1211)  BP: (!) 169/78 (01/28/18 1211)  SpO2: (!) 88 % (01/28/18 1211) Vital Signs (24h Range):  Temp:  [96.9 °F (36.1 °C)-98.4 °F (36.9 °C)] 96.9 °F (36.1 °C)  Pulse:  [81-97] 97  Resp:  [17-20] 18  SpO2:  [86 %-93 %] 88 %  BP: (137-198)/(60-81) 169/78     Weight: 48 kg (105 lb 12.8 oz)  Body mass index is 23.72 kg/m².    Intake/Output Summary (Last 24 hours) at 01/28/18 1216  Last data filed at 01/28/18 0200   Gross per 24 hour   Intake                0 ml   Output               50 ml   Net              -50 ml      Physical Exam  Sitting up on side of bed attempting to eat lunch  She appears less anxious  Son in room with her  Lungs clear  Heart tachy, regular  abd soft, BS+

## 2018-01-28 NOTE — ASSESSMENT & PLAN NOTE
Had been started on paroxetine   She is more anxious because of recent diagnosis of flu in 81 yo  - he was in ER today.  Now sent home, but he is alone.  (Her daughter would normally help care for him, but she is undergoing cancer treatment and should not be exposed to flu)  She became more anxious over night  Gave her ONE dose of lorazepam to tide her over.  However, she cannot rely on long-term benzos.  Now that Paxil has to be stopped, need to see what Dr. Mckeon will recommend as replacement.  Dr. Venegas feels that hyponatremia is a class effect of SSRI  .

## 2018-01-28 NOTE — PROGRESS NOTES
Jeannie Hutchins is a 82 y.o. female patient.    Active Hospital Problems    Diagnosis  POA    *Hyponatremia with decreased serum osmolality [E87.1]  Yes     Differential:  CHF - bnp is 400+;  cxr shows normal heart size, no pulm fluid - will check echo  Cirrhosis - AST at upper normal, otherwise LFTs in normal range; check liver US  Adrenal insuff - would expect hyperkalemia and hypotension - not present;  Check am cortisol  SIADH - ?  Source?  HCTZ - not taking  Renal failure - no  Polydipsia - no      Hypophosphatemia [E83.39]  No    Follow up [Z09]  Not Applicable    Adjustment disorder with mixed anxiety and depressed mood [F43.23]  Yes     Dr. Mckeon's eval 1/23/18:  Patient does not meet criteria for major depressive disorder.  Her symptoms are contingent upon her situations, which is an adjustment disorder.  This is best treated with intense individual therapy.  She is correct that she would do best with a psychologist.  I am not familiar with any here on the [facility] but she should check with her insurance to see if there are any providers working in the community.  Medications will not help an adjustment disorder.        Benign hypertension [I10]  Yes    Hypokalemia [E87.6]  Yes    Generalized anxiety disorder [F41.1]  Yes     Dr. Mckeon's eval 1/23/18:  She does appear to have a JERRI because of the persistent worries that she has.  These worries predominate her day.  She would probably do well with prevention therapy such as SSRI/SNRI to help control the physical symptoms of the anxiety.  Problem at this point in time is her electrolyte abnormalities.  There is a slight risk of hyponatremia with these medications and given her current state, is not recommended to start these.   After a few weeks of stabilized labs, would recommend to start low dose SSRI treatment.  She may do well with paroxetine.  Start at 10mg daily and increase slowly over time to 20-40mg, whatever dose meets efficacy.   "Again, therapy will help her with her anxiety.  Agree with patient that she should seek individual counseling with a psychologist.      Non-intractable vomiting with nausea [R11.2]  Yes      Resolved Hospital Problems    Diagnosis Date Resolved POA   No resolved problems to display.     Temp: 96.9 °F (36.1 °C) (01/28/18 1211)  Pulse: 97 (01/28/18 1211)  Resp: 18 (01/28/18 1211)  BP: (!) 169/78 (01/28/18 1211)  SpO2: (!) 88 % (01/28/18 1211)  Weight: 48 kg (105 lb 12.8 oz) (01/26/18 2044)  Height: 4' 8" (142.2 cm) (01/26/18 2044)    Subjective:  Symptoms:  Stable.  (Eating some of lunch  Son at bedside).      Objective:  General Appearance:  Comfortable, ill-appearing, in no acute distress and not in pain (agitated).    Vital signs: (most recent): Blood pressure (!) 169/78, pulse 97, temperature 96.9 °F (36.1 °C), temperature source Oral, resp. rate 18, height 4' 8" (1.422 m), weight 48 kg (105 lb 12.8 oz), SpO2 (!) 88 %, not currently breastfeeding.    HEENT: Normal HEENT exam.    Lungs:  Breath sounds clear to auscultation.    Heart: S1 normal and S2 normal.    Abdomen: Abdomen is soft.  Bowel sounds are normal.   There is no abdominal tenderness.     Extremities: There is dependent edema.    Skin:  Warm and dry.        Intake/Output - Last 3 Shifts       01/26 0700 - 01/27 0659 01/27 0700 - 01/28 0659 01/28 0700 - 01/29 0659    P.O. 840      I.V. (mL/kg)   343.3 (7.2)    Total Intake(mL/kg) 840 (17.5)  343.3 (7.2)    Urine (mL/kg/hr)  50 (0)     Stool  0 (0)     Total Output   50      Net +840 -50 +343.3           Urine Occurrence 5 x 9 x 1 x    Stool Occurrence 0 x 3 x 0 x        Lab Results   Component Value Date    WBC 16.39 (H) 01/28/2018    HGB 10.9 (L) 01/28/2018    HCT 30.0 (L) 01/28/2018    MCV 86 01/28/2018     (H) 01/28/2018     BMP  Lab Results   Component Value Date     (LL) 01/28/2018     (LL) 01/28/2018    K 3.5 01/28/2018    K 3.5 01/28/2018    CL 85 (L) 01/28/2018    CL 85 (L) " 01/28/2018    CO2 26 01/28/2018    CO2 26 01/28/2018    BUN 5 (L) 01/28/2018    BUN 5 (L) 01/28/2018    CREATININE 0.6 01/28/2018    CREATININE 0.6 01/28/2018    CALCIUM 8.2 (L) 01/28/2018    CALCIUM 8.2 (L) 01/28/2018    ANIONGAP 6 (L) 01/28/2018    ANIONGAP 6 (L) 01/28/2018    ESTGFRAFRICA >60 01/28/2018    ESTGFRAFRICA >60 01/28/2018    EGFRNONAA >60 01/28/2018    EGFRNONAA >60 01/28/2018     Current Facility-Administered Medications   Medication    0.9%  NaCl infusion    acetaminophen tablet 650 mg    amLODIPine tablet 10 mg    And    atorvastatin tablet 10 mg    enalapril tablet 2.5 mg    famotidine (PF) injection 20 mg    guaiFENesin 12 hr tablet 1,200 mg    levothyroxine tablet 75 mcg    magnesium sulfate 2g in water 50mL IVPB (premix)    ondansetron injection 4 mg    ondansetron injection 4 mg    oxyCODONE immediate release tablet 5 mg    pneumoc 13-vanessa conj-dip cr(PF) 0.5 mL    potassium chloride 10 mEq in 100 mL IVPB    potassium chloride 10% solution 40 mEq    potassium chloride SA CR tablet 20 mEq    potassium phosphate (monobasic) tablet 500 mg    potassium phosphate 20 mmol in dextrose 5 % 500 mL infusion    ramelteon tablet 8 mg    sodium chloride 0.9% flush 3 mL    sodium chloride tablet 1 g       Assessment & Plan  1.Hyponatremia. Siadh with poor intake.. Got better with ivfs. Now dropping. Pt not eating much and paroxetine.  Recommend drug change. PCP put on ns.  Magdiel sodium. If not better, add 3% soln. Started tele.  2.Hypothyroidism. Cont tx.  3.HTN. No hctz or lasix. Tx per primary.  4.Tachy. Better. Pt very nervous.  5.hypokalemia. Ck mag.   6.hypophospatemia. Repleting.        Jennifer Venegas MD  1/28/2018

## 2018-01-29 PROBLEM — I27.20 PULMONARY HYPERTENSION: Status: ACTIVE | Noted: 2018-01-29

## 2018-01-29 PROBLEM — J96.02 ACUTE RESPIRATORY FAILURE WITH HYPOXIA AND HYPERCARBIA: Status: ACTIVE | Noted: 2018-01-29

## 2018-01-29 PROBLEM — J96.01 ACUTE RESPIRATORY FAILURE WITH HYPOXIA AND HYPERCARBIA: Status: ACTIVE | Noted: 2018-01-29

## 2018-01-29 PROBLEM — I48.0 PAROXYSMAL ATRIAL FIBRILLATION: Status: ACTIVE | Noted: 2018-01-29

## 2018-01-29 LAB
ANION GAP SERPL CALC-SCNC: 5 MMOL/L
ANION GAP SERPL CALC-SCNC: 7 MMOL/L
ANION GAP SERPL CALC-SCNC: 8 MMOL/L
ANION GAP SERPL CALC-SCNC: 8 MMOL/L
AORTIC VALVE REGURGITATION: ABNORMAL
BASOPHILS # BLD AUTO: 0 K/UL
BASOPHILS NFR BLD: 0 %
BUN SERPL-MCNC: 5 MG/DL
BUN SERPL-MCNC: 6 MG/DL
BUN SERPL-MCNC: 6 MG/DL
BUN SERPL-MCNC: 7 MG/DL
CALCIUM SERPL-MCNC: 8 MG/DL
CALCIUM SERPL-MCNC: 8.2 MG/DL
CHLORIDE SERPL-SCNC: 81 MMOL/L
CHLORIDE SERPL-SCNC: 82 MMOL/L
CHLORIDE SERPL-SCNC: 82 MMOL/L
CHLORIDE SERPL-SCNC: 85 MMOL/L
CO2 SERPL-SCNC: 25 MMOL/L
CO2 SERPL-SCNC: 27 MMOL/L
CO2 SERPL-SCNC: 28 MMOL/L
CO2 SERPL-SCNC: 30 MMOL/L
CREAT SERPL-MCNC: 0.6 MG/DL
DIFFERENTIAL METHOD: ABNORMAL
EOSINOPHIL # BLD AUTO: 0 K/UL
EOSINOPHIL NFR BLD: 0.1 %
ERYTHROCYTE [DISTWIDTH] IN BLOOD BY AUTOMATED COUNT: 12.8 %
EST. GFR  (AFRICAN AMERICAN): >60 ML/MIN/1.73 M^2
EST. GFR  (NON AFRICAN AMERICAN): >60 ML/MIN/1.73 M^2
ESTIMATED PA SYSTOLIC PRESSURE: 54.24
GLOBAL PERICARDIAL EFFUSION: ABNORMAL
GLUCOSE SERPL-MCNC: 112 MG/DL
GLUCOSE SERPL-MCNC: 114 MG/DL
GLUCOSE SERPL-MCNC: 116 MG/DL
GLUCOSE SERPL-MCNC: 128 MG/DL
HCT VFR BLD AUTO: 30.6 %
HGB BLD-MCNC: 11.3 G/DL
LYMPHOCYTES # BLD AUTO: 1.4 K/UL
LYMPHOCYTES NFR BLD: 7.7 %
MCH RBC QN AUTO: 30.8 PG
MCHC RBC AUTO-ENTMCNC: 36.9 G/DL
MCV RBC AUTO: 83 FL
MITRAL VALVE MOBILITY: NORMAL
MONOCYTES # BLD AUTO: 1.8 K/UL
MONOCYTES NFR BLD: 9.4 %
NEUTROPHILS # BLD AUTO: 15.5 K/UL
NEUTROPHILS NFR BLD: 83.7 %
PLATELET # BLD AUTO: 412 K/UL
PLATELET BLD QL SMEAR: ABNORMAL
PMV BLD AUTO: 8.6 FL
POTASSIUM SERPL-SCNC: 3.4 MMOL/L
POTASSIUM SERPL-SCNC: 3.6 MMOL/L
POTASSIUM SERPL-SCNC: 3.7 MMOL/L
POTASSIUM SERPL-SCNC: 4.3 MMOL/L
RBC # BLD AUTO: 3.67 M/UL
RETIRED EF AND QEF - SEE NOTES: 60 (ref 55–65)
SODIUM SERPL-SCNC: 115 MMOL/L
SODIUM SERPL-SCNC: 116 MMOL/L
SODIUM SERPL-SCNC: 118 MMOL/L
SODIUM SERPL-SCNC: 119 MMOL/L
TRICUSPID VALVE REGURGITATION: ABNORMAL
TSH SERPL DL<=0.005 MIU/L-ACNC: 1.56 UIU/ML
WBC # BLD AUTO: 18.65 K/UL

## 2018-01-29 PROCEDURE — 25000003 PHARM REV CODE 250

## 2018-01-29 PROCEDURE — 25000003 PHARM REV CODE 250: Performed by: INTERNAL MEDICINE

## 2018-01-29 PROCEDURE — 80048 BASIC METABOLIC PNL TOTAL CA: CPT | Mod: 91

## 2018-01-29 PROCEDURE — 93306 TTE W/DOPPLER COMPLETE: CPT | Mod: 26,,, | Performed by: INTERNAL MEDICINE

## 2018-01-29 PROCEDURE — 36415 COLL VENOUS BLD VENIPUNCTURE: CPT

## 2018-01-29 PROCEDURE — 63600175 PHARM REV CODE 636 W HCPCS: Performed by: EMERGENCY MEDICINE

## 2018-01-29 PROCEDURE — 20000000 HC ICU ROOM

## 2018-01-29 PROCEDURE — 99223 1ST HOSP IP/OBS HIGH 75: CPT | Mod: ,,, | Performed by: INTERNAL MEDICINE

## 2018-01-29 PROCEDURE — 84165 PROTEIN E-PHORESIS SERUM: CPT

## 2018-01-29 PROCEDURE — 63600175 PHARM REV CODE 636 W HCPCS: Performed by: INTERNAL MEDICINE

## 2018-01-29 PROCEDURE — 84443 ASSAY THYROID STIM HORMONE: CPT

## 2018-01-29 PROCEDURE — 86038 ANTINUCLEAR ANTIBODIES: CPT

## 2018-01-29 PROCEDURE — 63600175 PHARM REV CODE 636 W HCPCS

## 2018-01-29 PROCEDURE — 93306 TTE W/DOPPLER COMPLETE: CPT

## 2018-01-29 PROCEDURE — 85025 COMPLETE CBC W/AUTO DIFF WBC: CPT

## 2018-01-29 PROCEDURE — S0028 INJECTION, FAMOTIDINE, 20 MG: HCPCS | Performed by: EMERGENCY MEDICINE

## 2018-01-29 PROCEDURE — 93005 ELECTROCARDIOGRAM TRACING: CPT

## 2018-01-29 PROCEDURE — 25000003 PHARM REV CODE 250: Performed by: EMERGENCY MEDICINE

## 2018-01-29 PROCEDURE — 84165 PROTEIN E-PHORESIS SERUM: CPT | Mod: 26,,, | Performed by: PATHOLOGY

## 2018-01-29 RX ORDER — CEFTRIAXONE 1 G/1
1 INJECTION, POWDER, FOR SOLUTION INTRAMUSCULAR; INTRAVENOUS
Status: DISCONTINUED | OUTPATIENT
Start: 2018-01-29 | End: 2018-02-02

## 2018-01-29 RX ORDER — FUROSEMIDE 10 MG/ML
10 INJECTION INTRAMUSCULAR; INTRAVENOUS ONCE
Status: COMPLETED | OUTPATIENT
Start: 2018-01-29 | End: 2018-01-29

## 2018-01-29 RX ORDER — METOPROLOL TARTRATE 25 MG/1
25 TABLET, FILM COATED ORAL 2 TIMES DAILY
Status: DISCONTINUED | OUTPATIENT
Start: 2018-01-29 | End: 2018-01-30

## 2018-01-29 RX ORDER — FUROSEMIDE 10 MG/ML
40 INJECTION INTRAMUSCULAR; INTRAVENOUS ONCE
Status: COMPLETED | OUTPATIENT
Start: 2018-01-29 | End: 2018-01-29

## 2018-01-29 RX ADMIN — APIXABAN 2.5 MG: 2.5 TABLET, FILM COATED ORAL at 08:01

## 2018-01-29 RX ADMIN — LEVOTHYROXINE SODIUM 75 MCG: 75 TABLET ORAL at 05:01

## 2018-01-29 RX ADMIN — CEFTRIAXONE SODIUM 1 G: 1 INJECTION, POWDER, FOR SOLUTION INTRAMUSCULAR; INTRAVENOUS at 05:01

## 2018-01-29 RX ADMIN — OXYCODONE HYDROCHLORIDE 5 MG: 5 TABLET ORAL at 06:01

## 2018-01-29 RX ADMIN — OXYCODONE HYDROCHLORIDE 5 MG: 5 TABLET ORAL at 11:01

## 2018-01-29 RX ADMIN — METOPROLOL TARTRATE 25 MG: 25 TABLET ORAL at 08:01

## 2018-01-29 RX ADMIN — SODIUM CHLORIDE TAB 1 GM 1 G: 1 TAB at 09:01

## 2018-01-29 RX ADMIN — FAMOTIDINE 20 MG: 10 INJECTION, SOLUTION INTRAVENOUS at 09:01

## 2018-01-29 RX ADMIN — ACETAMINOPHEN 650 MG: 325 TABLET ORAL at 09:01

## 2018-01-29 RX ADMIN — POTASSIUM CHLORIDE 20 MEQ: 1500 TABLET, EXTENDED RELEASE ORAL at 09:01

## 2018-01-29 RX ADMIN — ONDANSETRON 4 MG: 2 INJECTION INTRAMUSCULAR; INTRAVENOUS at 06:01

## 2018-01-29 RX ADMIN — SODIUM CHLORIDE TAB 1 GM 1 G: 1 TAB at 08:01

## 2018-01-29 RX ADMIN — GUAIFENESIN 1200 MG: 600 TABLET, EXTENDED RELEASE ORAL at 08:01

## 2018-01-29 RX ADMIN — ACETAMINOPHEN 650 MG: 325 TABLET ORAL at 05:01

## 2018-01-29 RX ADMIN — GUAIFENESIN 1200 MG: 600 TABLET, EXTENDED RELEASE ORAL at 09:01

## 2018-01-29 RX ADMIN — POTASSIUM PHOSPHATE, MONOBASIC 500 MG: 500 TABLET, SOLUBLE ORAL at 09:01

## 2018-01-29 RX ADMIN — FAMOTIDINE 20 MG: 10 INJECTION, SOLUTION INTRAVENOUS at 08:01

## 2018-01-29 RX ADMIN — ONDANSETRON 4 MG: 2 INJECTION INTRAMUSCULAR; INTRAVENOUS at 09:01

## 2018-01-29 RX ADMIN — ATORVASTATIN CALCIUM 10 MG: 10 TABLET, FILM COATED ORAL at 08:01

## 2018-01-29 RX ADMIN — OXYCODONE HYDROCHLORIDE 5 MG: 5 TABLET ORAL at 01:01

## 2018-01-29 RX ADMIN — FUROSEMIDE 10 MG: 10 INJECTION, SOLUTION INTRAMUSCULAR; INTRAVENOUS at 05:01

## 2018-01-29 RX ADMIN — ENALAPRIL MALEATE 2.5 MG: 2.5 TABLET ORAL at 09:01

## 2018-01-29 RX ADMIN — METOPROLOL TARTRATE 25 MG: 25 TABLET ORAL at 11:01

## 2018-01-29 RX ADMIN — FUROSEMIDE 40 MG: 10 INJECTION, SOLUTION INTRAMUSCULAR; INTRAVENOUS at 11:01

## 2018-01-29 RX ADMIN — APIXABAN 2.5 MG: 2.5 TABLET, FILM COATED ORAL at 09:01

## 2018-01-29 NOTE — PROGRESS NOTES
Paged Dr. Trujillo to notify of pt increased need for Non-Rebreather, anxiety, frequent urination, c/o nausea without emesis.  -170s/80s-90s, HR 80s-120 with multifocal PVCs frequently.  Denies pain/discomfort. Restless, anxious. RR WNL. Awaiting call back.  Will continue to monitor.

## 2018-01-29 NOTE — CONSULTS
Discharge planning: SW dicussed levels of care with patient son maine [phone]. The most likely plan at discharge is SNF due to patient decline and hospital stay. The long range plan will depend upon patient recovery and needs with her spouse (patient has been his primary care provider). SW met with patient as well. SW provided son and patient SW contact number. SW will follow in ICU and assist as needed.

## 2018-01-29 NOTE — ASSESSMENT & PLAN NOTE
Since moving to ICU, she began having more increased oxygen needs to keep sats adequate.  CXR:  One view of the chest was obtained.  Cardiac wires overlie the upper abdomen and lower chest.  Cardiac silhouette is mildly enlarged but appears stable in size. Atherosclerotic calcifications overlie the aortic arch. There is worsening central vascular congestion and perihilar edema with new bibasilar airspace opacities. Suspect bilateral pleural effusions. Bones appear stable.  She sounds wet - asking renal about best option for diuresis  Started on empiric rocephin

## 2018-01-29 NOTE — PLAN OF CARE
Problem: Anxiety (Adult)  Goal: Identify Related Risk Factors and Signs and Symptoms  Related risk factors and signs and symptoms are identified upon initiation of Human Response Clinical Practice Guideline (CPG)  Outcome: Ongoing (interventions implemented as appropriate)  Requiring high levels of O2 to maintain SPO2 WNL.  Often takes oxygen off. Frequent reminding required. Other VSS. Placed on HiFlow NC on when eating.

## 2018-01-29 NOTE — SUBJECTIVE & OBJECTIVE
Interval History: moved to ICU    Review of Systems   Still with intermittent nausea  Impedes good po intake  Wearing NRB mask to maintain sats  Objective:     Vital Signs (Most Recent):  Temp: 96.9 °F (36.1 °C) (01/29/18 1200)  Pulse: 80 (01/29/18 1600)  Resp: (!) 31 (01/29/18 1600)  BP: (!) 157/64 (01/29/18 1500)  SpO2: (!) 93 % (01/29/18 1600) Vital Signs (24h Range):  Temp:  [96.9 °F (36.1 °C)-97.9 °F (36.6 °C)] 96.9 °F (36.1 °C)  Pulse:  [] 80  Resp:  [0-45] 31  SpO2:  [74 %-100 %] 93 %  BP: (104-177)/(51-88) 157/64     Weight: 48 kg (105 lb 12.8 oz)  Body mass index is 23.72 kg/m².    Intake/Output Summary (Last 24 hours) at 01/29/18 1646  Last data filed at 01/29/18 1600   Gross per 24 hour   Intake             1589 ml   Output             2575 ml   Net             -986 ml      Physical Exam  Seen in ICU bed  Daughter in attendance  Wearing NRB mask at highest O2 setting  Speaking  Appears mildly anxious  Lungs coarse bilaterally  Heart tachy - 80  abd soft, BS+

## 2018-01-29 NOTE — HPI
83yo WF followed by Dr. Bethany Mcleod who was seen in the office with complaints of diarrhea on 1/19.  At that time she was felt to have gastroenteritis.  She was sent home but states she could not eat anything for 4 days.  The diarrhea was starting to improve but she did not feel better so she presented to the ED.  She was found to have a sodium of 113 and was admitted with a diagnosis of hyponatremia  Hospital Course:  Sodium improving with fluids - 123 as of 1/27  She has issues with anxiety.  She was started on paroxetine; psych is recommending OP therapy  On 1/28, she is asking for something for her anxiety - while waiting for paroxetine to take effect, will give 1 dose of lorazepam 0.5  Sodium dropped again, to 117.  Resume NS IV    Cardiology consulted for AF.  She was unaware of the arrhythmia.  Now back in SR.  Here for hyponatremia.  Seems to be somewhat volume overloaded.  Case d/w Dr. Cedeno (Neph) in ICU, he will give lasix.  Pt started on apix 2.5mg bid (dose appropriate).

## 2018-01-29 NOTE — PROGRESS NOTES
[facility]  Progress Note    Patient Name: Jeannie Hutchins  MRN: [id number]  Patient Class: IP- Inpatient   Admission Date: 1/22/2018  Length of Stay: 7 days  Attending Physician: Paige Mcleod MD  Primary Care Provider: Paige Mcleod MD        Subjective:     Principal Problem:Hyponatremia with decreased serum osmolality    HPI:  PT  Is an 83yo WF followed by Dr. Bethany Mcleod who was seen in the office with complaints of diarrhea on 1/19.  At that time she was felt to have gastroenteritis.  She was sent home but states she could not eat anything for 4 days.  The diarrhea was starting to improve but she did not feel better so she presented to the ED.  She was found to have a sodium of 113 and was admitted with a diagnosis of hyponatremia    Hospital Course:  Sodium improving with fluids - 123 as of 1/27  She has issues with anxiety.  She was started on paroxetine; psych is recommending OP therapy  On 1/28, she is asking for something for her anxiety - while waiting for paroxetine to take effect, will give 1 dose of lorazepam 0.5  Sodium dropped again, to 117.  Resumed NS IV  This did not help.  Na continued dropping.  Renal on case - changed to 0.3%NS - moved to ICU for drip.  While there, developed resp difficulty.  She eventually required NRB with 14lpm O2 - sat 95%.  CXR on 1/28 showed worsening aeroation - due either to pneumonia or fluid.  Lasix given - good result with urination; felt a bit better.   Started on empiric rocephin.  Furthermore, she developed tachycardia and was found to be in afib.  Started on empiric Eliquis for clot prevention; cardiology consulted.  Dr. Geovany johnson change from amlodipine to metoprolol.  She is CHADS4 - remain on Eliquis.    Interval History: moved to ICU    Review of Systems   Still with intermittent nausea  Impedes good po intake  Wearing NRB mask to maintain sats  Objective:     Vital Signs (Most Recent):  Temp: 96.9 °F (36.1 °C)  (01/29/18 1200)  Pulse: 80 (01/29/18 1600)  Resp: (!) 31 (01/29/18 1600)  BP: (!) 157/64 (01/29/18 1500)  SpO2: (!) 93 % (01/29/18 1600) Vital Signs (24h Range):  Temp:  [96.9 °F (36.1 °C)-97.9 °F (36.6 °C)] 96.9 °F (36.1 °C)  Pulse:  [] 80  Resp:  [0-45] 31  SpO2:  [74 %-100 %] 93 %  BP: (104-177)/(51-88) 157/64     Weight: 48 kg (105 lb 12.8 oz)  Body mass index is 23.72 kg/m².    Intake/Output Summary (Last 24 hours) at 01/29/18 1646  Last data filed at 01/29/18 1600   Gross per 24 hour   Intake             1589 ml   Output             2575 ml   Net             -986 ml      Physical Exam  Seen in ICU bed  Daughter in attendance  Wearing NRB mask at highest O2 setting  Speaking  Appears mildly anxious  Lungs coarse bilaterally  Heart tachy - 80  abd soft, BS+      Assessment/Plan:      * Hyponatremia with decreased serum osmolality    Na on 1/27 123  She  Had been placed on paroxetine - might lower levels  Repeat 1/28 is 117, then 115  Resumed IV saline  (2 x Na) + (glu/18) + (BUN/2.8) = serum osm = 242  (range 275-295)  When levels continued dropping, pt transferred to ICU for 0.3%NaCl administration  Renal follows  Frequent monitoring:  Most recent level up to 118 - avoid rapid correction            Pulmonary hypertension    PAP 54          Paroxysmal atrial fibrillation    Developed tachycardia and dyspnea on 1/28.  Found to be in afib.  Started on empiric Eliquis 2.5 bid for CVA prophylaxis and cards consulted.  Dr. Armenta saw pt and rec'd change from amlodipine to metoprolol.  Has converted back to sinus.  CHADS4 - will need anticoag long-term.            Acute respiratory failure with hypoxia and hypercarbia    Since moving to ICU, she began having more increased oxygen needs to keep sats adequate.  CXR:  One view of the chest was obtained.  Cardiac wires overlie the upper abdomen and lower chest.  Cardiac silhouette is mildly enlarged but appears stable in size. Atherosclerotic calcifications overlie  the aortic arch. There is worsening central vascular congestion and perihilar edema with new bibasilar airspace opacities. Suspect bilateral pleural effusions. Bones appear stable.  She sounds wet - asking renal about best option for diuresis  Started on empiric rocephin        Follow up    Spoke with her son today;  He is concerned about her well-being after discharge  Now that her  has pneumonia, caregiving will be more difficult  He is considering placing both of them in assisted living  He is worried that she is not taking her medications appropriately  In interim, he  Is asking about treatment alternatives - discussed SNF vs HH with PT.  Will have  talk with him to discuss options and formulate plan          Hypophosphatemia    Start K phos supplement (1.3 on 1/28)          Generalized anxiety disorder    Had been started on paroxetine   She is more anxious because of recent diagnosis of flu in 83 yo  - he was in ER today.  Now sent home, but he is alone.  (Her daughter would normally help care for him, but she is undergoing cancer treatment and should not be exposed to flu)  She became more anxious over night  Gave her ONE dose of lorazepam to tide her over.  However, she cannot rely on long-term benzos.  Now that Paxil has to be stopped, need to see what Dr. Mckeon will recommend as replacement.  Dr. Venegas feels that hyponatremia is a class effect of SSRI  Had to give another dose of lorazepam last night  .          Hypokalemia    On 1/27 slightly low at 3.4  Started on 20meq daily  (also on K phos)  Now 4.3        Benign hypertension    Continue home meds except HCTZ  169/78 on norvasc 10  added vasotec 2.5  Amlodipine stopped due to afib; started metoprolol  Now 157/84        Adjustment disorder with mixed anxiety and depressed mood    Psych recommending outpatient therapy.  On Paroxitine but it can drop sodium levels.     Increasing salt in the diet and following levels.    Rubi now feels that we have to STOP the Paroxetine  Will see what Dr. Mckeon recommends as substitute        Non-intractable vomiting with nausea    Has diminished; Tolerating regular diet for last several days  Now having recurrent bouts          S/P carotid endarterectomy                VTE Risk Mitigation         Ordered     apixaban tablet 2.5 mg  2 times daily     Route:  Oral        01/28/18 2128     Medium Risk of VTE  Once      01/22/18 1959     Place BRYANT hose  Until discontinued      01/22/18 1959     Place sequential compression device  Until discontinued      01/22/18 1959            Jake Trujillo MD  Department of Hospital Medicine   [facility]

## 2018-01-29 NOTE — ASSESSMENT & PLAN NOTE
Continue home meds except HCTZ  169/78 on norvasc 10  added vasotec 2.5  Amlodipine stopped due to afib; started metoprolol  Now 157/84

## 2018-01-29 NOTE — PROGRESS NOTES
Jeannie Hutchins is a 82 y.o. female patient.    Follow for hyponatremia    Patient reportedly feeling SOB  On NRB      Scheduled Meds:   amLODIPine  10 mg Oral QHS    And    atorvastatin  10 mg Oral QHS    apixaban  2.5 mg Oral BID    cefTRIAXone (ROCEPHIN) IVPB  1 g Intravenous Q24H    enalapril  2.5 mg Oral Daily    famotidine (PF)  20 mg Intravenous Q12H    guaiFENesin  1,200 mg Oral BID    levothyroxine  75 mcg Oral Before breakfast    potassium chloride  20 mEq Oral Daily    potassium phosphate (monobasic)  500 mg Oral Daily    sodium chloride  1 g Oral BID       Review of patient's allergies indicates:   Allergen Reactions    Strawberry Swelling     Tongue swells up and a generalized rash.         Vital Signs Range (Last 24H):  Temp:  [96.9 °F (36.1 °C)-97.9 °F (36.6 °C)]   Pulse:  []   Resp:  [0-40]   BP: (104-177)/(51-88)   SpO2:  [74 %-99 %]     I & O (Last 24H):  Intake/Output Summary (Last 24 hours) at 01/29/18 0938  Last data filed at 01/29/18 0800   Gross per 24 hour   Intake          2047.33 ml   Output             1000 ml   Net          1047.33 ml           Physical Exam:  General appearance: well developed, cachectic, mild distress  Lungs:  diminished breath sounds bibasilar  Heart: regular rate and rhythm  Abdomen: soft, non-tender non-distented; bowel sounds normal; no masses,  no organomegaly  Extremities: no cyanosis or edema, or clubbing    Laboratory:  CBC:   Recent Labs  Lab 01/29/18  0233   WBC 18.65*   RBC 3.67*   HGB 11.3*   HCT 30.6*   *   MCV 83   MCH 30.8   MCHC 36.9*     CMP:   Recent Labs  Lab 01/22/18  1447  01/29/18  0747   *  < > 112*   CALCIUM 8.8  < > 8.2*   ALBUMIN 3.8  --   --    PROT 6.7  --   --    *  < > 116*   K 2.8*  < > 3.6   CO2 29  < > 27   CL 75*  < > 81*   BUN 8  < > 6*   CREATININE 0.6  < > 0.6   ALKPHOS 70  --   --    ALT 32  --   --    AST 40  --   --    BILITOT 0.9  --   --    < > = values in this interval not  displayed.    Imp/Plan    Hyponatremia - eitology unclear, SIADH, poor intake, hypothyroidism, CHF  Hypothyroisim  HTN  Pumpmonary edema  Nephrotic range proteinuria    Check serology  Continue fluid restriction, salt tablet  Supportive care      Aliyah Cedeno  1/29/2018

## 2018-01-29 NOTE — SUBJECTIVE & OBJECTIVE
Past Medical History:   Diagnosis Date    Arthritis     Blood transfusion     Carotid stenosis     Hypercholesterolemia     Hypertension     Psychiatric problem     Thyroid disease        Past Surgical History:   Procedure Laterality Date    CARPAL TUNNEL RELEASE  2012    right hand    HYSTERECTOMY      left carotid endartectomy  5/2006    TONSILLECTOMY         Review of patient's allergies indicates:   Allergen Reactions    Strawberry Swelling     Tongue swells up and a generalized rash.       No current facility-administered medications on file prior to encounter.      Current Outpatient Prescriptions on File Prior to Encounter   Medication Sig    acetaminophen (TYLENOL) 325 MG tablet Take 325 mg by mouth 2 (two) times daily.    alendronate (FOSAMAX) 70 MG tablet Take 70 mg by mouth every 7 days.    amlodipine-atorvastatin (CADUET) 10-10 mg per tablet Take 1 tablet by mouth every evening.    aspirin 325 MG tablet Take 325 mg by mouth nightly.    hydrochlorothiazide (HYDRODIURIL) 25 MG tablet Take 25 mg by mouth once daily.    levothyroxine (SYNTHROID) 75 MCG tablet Take 75 mcg by mouth once daily.    ranitidine (ZANTAC) 150 MG tablet Take 150 mg by mouth with lunch.    VIT A,C & E/LUTEIN/MINERALS (VISION FORMULA, WITH LUTEIN, ORAL) Take 1 tablet by mouth once daily.     Family History     Problem Relation (Age of Onset)    Breast cancer Daughter (50)    Cancer Brother (65), Sister (81), Sister, Sister    Heart disease Father    Ovarian cancer Sister (81)    Schizophrenia Son        Social History Main Topics    Smoking status: Never Smoker    Smokeless tobacco: Never Used    Alcohol use No    Drug use: No    Sexual activity: No     Review of Systems   Constitution: Negative for chills, diaphoresis, fever, weakness and malaise/fatigue.   HENT: Negative for nosebleeds.    Eyes: Negative for blurred vision and double vision.   Cardiovascular: Negative for chest pain, claudication, cyanosis,  dyspnea on exertion, leg swelling, orthopnea, palpitations, paroxysmal nocturnal dyspnea and syncope.   Respiratory: Negative for cough, shortness of breath and wheezing.    Skin: Negative for dry skin and poor wound healing.   Musculoskeletal: Negative for back pain, joint swelling and myalgias.   Gastrointestinal: Negative for abdominal pain, nausea and vomiting.   Genitourinary: Negative for hematuria.   Neurological: Negative for dizziness, headaches, numbness and seizures.   Psychiatric/Behavioral: Negative for altered mental status and depression. The patient is nervous/anxious.      Objective:     Vital Signs (Most Recent):  Temp: 97.9 °F (36.6 °C) (01/29/18 0745)  Pulse: 90 (01/29/18 1000)  Resp: 17 (01/29/18 1000)  BP: (!) 159/68 (01/29/18 1000)  SpO2: 97 % (01/29/18 1000) Vital Signs (24h Range):  Temp:  [96.9 °F (36.1 °C)-97.9 °F (36.6 °C)] 97.9 °F (36.6 °C)  Pulse:  [] 90  Resp:  [0-40] 17  SpO2:  [74 %-99 %] 97 %  BP: (104-177)/(51-88) 159/68     Weight: 48 kg (105 lb 12.8 oz)  Body mass index is 23.72 kg/m².    SpO2: 97 %  O2 Device (Oxygen Therapy): Non Rebreather Mask      Intake/Output Summary (Last 24 hours) at 01/29/18 1010  Last data filed at 01/29/18 0900   Gross per 24 hour   Intake          2192.33 ml   Output             1175 ml   Net          1017.33 ml       Lines/Drains/Airways     Peripheral Intravenous Line                 Peripheral IV - Single Lumen 01/27/18 2119 Left Forearm 1 day         Peripheral IV - Single Lumen 01/28/18 2228 Left Forearm less than 1 day                Physical Exam   Constitutional: She is oriented to person, place, and time. She appears well-developed and well-nourished. No distress.   HENT:   Head: Normocephalic and atraumatic.   Mouth/Throat: No oropharyngeal exudate.   Eyes: Conjunctivae and EOM are normal. Pupils are equal, round, and reactive to light. No scleral icterus.   Neck: Normal range of motion. Neck supple. No JVD present. No tracheal  deviation present.   Cardiovascular: Normal rate, regular rhythm, S1 normal and S2 normal.  Exam reveals no gallop and no friction rub.    No murmur heard.  Pulmonary/Chest: Effort normal. No respiratory distress.   Decreased BS at bases   Abdominal: Soft. Bowel sounds are normal. There is no tenderness.   Musculoskeletal: Normal range of motion. She exhibits no edema.   Neurological: She is alert and oriented to person, place, and time. No cranial nerve deficit.   Skin: Skin is warm and dry. She is not diaphoretic.   Psychiatric: She has a normal mood and affect. Her behavior is normal. Judgment normal.       Current Medications:   amLODIPine  10 mg Oral QHS    And    atorvastatin  10 mg Oral QHS    apixaban  2.5 mg Oral BID    cefTRIAXone (ROCEPHIN) IVPB  1 g Intravenous Q24H    enalapril  2.5 mg Oral Daily    famotidine (PF)  20 mg Intravenous Q12H    guaiFENesin  1,200 mg Oral BID    levothyroxine  75 mcg Oral Before breakfast    potassium chloride  20 mEq Oral Daily    potassium phosphate (monobasic)  500 mg Oral Daily    sodium chloride  1 g Oral BID       acetaminophen, magnesium sulfate IVPB, ondansetron, ondansetron, oxyCODONE, pneumoc 13-vanessa conj-dip cr(PF), potassium chloride, potassium chloride 10%, ramelteon, sodium chloride 0.9%    Laboratory:  CBC:    Recent Labs  Lab 01/27/18  0508 01/28/18  0501 01/29/18  0233   WHITE BLOOD CELL COUNT 15.08 H 16.39 H 18.65 H   HEMOGLOBIN 11.5 L 10.9 L 11.3 L   HEMATOCRIT 32.4 L 30.0 L 30.6 L   PLATELETS 350 403 H 412 H       CHEMISTRIES:    Recent Labs  Lab 01/24/18  0310  01/27/18  1113 01/28/18  0501 01/28/18  1151 01/29/18  0233 01/29/18  0747   GLUCOSE  --   < > 118 H 128 H  128 H 138 H 114 H 112 H   SODIUM  --   < > 121 L 117 LL  117    LL   POTASSIUM  --   < > 3.9 3.5  3.5 3.9 4.3 3.6   BUN BLD  --   < > 6 L 5 L  5 L 6 L 6 L 6 L   CREATININE  --   < > 0.6 0.6  0.6 0.6 0.6 0.6   EGFR IF   --   < > >60 >60  >60  >60 >60 >60   EGFR IF NON-  --   < > >60 >60  >60 >60 >60 >60   CALCIUM  --   < > 8.5 L 8.2 L  8.2 L 8.7 8.2 L 8.2 L   MAGNESIUM 1.6  --  1.5 L 1.9  --   --   --    < > = values in this interval not displayed.    CARDIAC BIOMARKERS:    Recent Labs  Lab 01/22/18  1447   TROPONIN I 0.012       COAGS:        LIPIDS/LFTS:    Recent Labs  Lab 01/22/18  1447   AST 40   ALT 32     Lab Results   Component Value Date    TSH 1.191 01/22/2018           Diagnostic Results:  ECG (personally reviewed tracings):   1/22/18 1431 SR 80, PACs, PVC, NSSTTW changes  1/28/18 2055 AF 97  1/29/18 1012 SR 85    Chest X-Ray 1/29/18  Worsening bilateral lung aeration, possibly pulmonary edema or pneumonia.    Echo: ordered    Carotid US 10/4/17  -Elevated right proximal ICA velocities, suggesting 60-79% stenosis. Velocities are similar to the 6/11/13 exam.  -Left proximal ICA velocities within normal limits.

## 2018-01-29 NOTE — ASSESSMENT & PLAN NOTE
Developed tachycardia and dyspnea on 1/28.  Found to be in afib.  Started on empiric Eliquis 2.5 bid for CVA prophylaxis and cards consulted.  Dr. Armenta saw pt and rec'd change from amlodipine to metoprolol.  Has converted back to sinus.  CHADS4 - will need anticoag long-term.

## 2018-01-29 NOTE — ASSESSMENT & PLAN NOTE
Na on 1/27 123  She  Had been placed on paroxetine - might lower levels  Repeat 1/28 is 117, then 115  Resumed IV saline  (2 x Na) + (glu/18) + (BUN/2.8) = serum osm = 242  (range 275-295)  When levels continued dropping, pt transferred to ICU for 0.3%NaCl administration  Renal follows  Frequent monitoring:  Most recent level up to 118 - avoid rapid correction

## 2018-01-29 NOTE — ASSESSMENT & PLAN NOTE
Now back in SR  CHADS VASC 4  Now on apixaban 2.5mg bid (dose appropriate for age/weight)  Stop amlod and start metoprolol  Check echo

## 2018-01-29 NOTE — PROGRESS NOTES
2128: Pt received from floor, pt placed on cont monitoring. Pt noted to be in AF, HR 80-120s. BP stable. No hx of AF per notes and pt. Pt very anxious, wanting medication for anxiety. 02 sats in mid 80s, placed pt on highest setting on Venti mask, 02 sats now mid 90s. Dr. Trujillo notified of AF, anxiety, and oxygen sats. Cardiac consult placed, eliquis and xanax ordered. No further orders, will try to wean oxygen. POC reviewed with pt, no questions at this time.    2227: pt noted to be in SR.    0344: pt 02 sats 87% on 14 L of 02 55% VM. Pt changed to NRB, 02 sats now 97%. Dr. Trujillo notified, CXR ordered.    0500: Dr. Trujillo notified of xray results, indicating worsening aeration and possible PNA. Rocephin ordered and given. Dr. Venegas notified of possible pulmonary edema and bilateral crackles auscultated in the lungs. 10 mg of lasix given and IVF stopped.

## 2018-01-29 NOTE — PROGRESS NOTES
Paged Dr. Bethany Mcleod to notify of Critical Na+ of 116, persistant need for non-rebreather, anxiey, etc.  Spoke with Dr. Trujillo, on call, notified. No orders received.  Will continue to monitor.

## 2018-01-29 NOTE — PLAN OF CARE
Problem: Patient Care Overview  Goal: Plan of Care Review  Outcome: Ongoing (interventions implemented as appropriate)  Pt AAO. Na of 115, on NS 3% drip at 20 cc/hr. BMP done q6h. Pt on NRB, 02 sats high 90s. No SOB or respiratory distress noted. Pt AF at beginning of shift, now SR, cardiology consult placed. Pt able to call for bedpan, had adequate UO throughout night. No injuries, no new skin breakdown, pt turns self.

## 2018-01-29 NOTE — ASSESSMENT & PLAN NOTE
Per IM/neph, ?r/t psych meds  Seems a little vol overloaded on exam, consider additional lasix (case d/w Dr. Cedeno of Neph).

## 2018-01-29 NOTE — NURSING TRANSFER
Spoke with Dr. Mcleod regarding transfer. Pt to transfer to ICU Rm 267 for 3% NaCl solution administration. Pt in NAD. Report given to YINKA Boyd. Another peripheral IV placed to LFA. Zofran given for nausea. O2 down to 80s, placed on 6L n/c to transfer. Chart sent with pt. Transported with YINKA Billy.

## 2018-01-29 NOTE — ASSESSMENT & PLAN NOTE
Had been started on paroxetine   She is more anxious because of recent diagnosis of flu in 83 yo  - he was in ER today.  Now sent home, but he is alone.  (Her daughter would normally help care for him, but she is undergoing cancer treatment and should not be exposed to flu)  She became more anxious over night  Gave her ONE dose of lorazepam to tide her over.  However, she cannot rely on long-term benzos.  Now that Paxil has to be stopped, need to see what Dr. Mckeon will recommend as replacement.  Dr. Venegas feels that hyponatremia is a class effect of SSRI  Had to give another dose of lorazepam last night  .

## 2018-01-29 NOTE — CONSULTS
[facility]  Cardiology  Consult Note    Patient Name: Jeannie Hutchins  MRN: [id number]  Admission Date: 1/22/2018  Hospital Length of Stay: 7 days  Code Status: Full Code   Attending Provider: Paige Mcleod MD   Consulting Provider: Wenceslao Beaver MD  Primary Care Physician: Paige Mcleod MD  Principal Problem:Hyponatremia with decreased serum osmolality    Patient information was obtained from patient and ER records.     Inpatient consult to Cardiology  Consult performed by: WENCESLAO BEAVER  Consult ordered by: PAIGE FOX  Reason for consult: PAF        Subjective:     Chief Complaint:  hyponatremia     HPI:   81yo WF followed by Dr. Bethany Mcleod who was seen in the office with complaints of diarrhea on 1/19.  At that time she was felt to have gastroenteritis.  She was sent home but states she could not eat anything for 4 days.  The diarrhea was starting to improve but she did not feel better so she presented to the ED.  She was found to have a sodium of 113 and was admitted with a diagnosis of hyponatremia  Hospital Course:  Sodium improving with fluids - 123 as of 1/27  She has issues with anxiety.  She was started on paroxetine; psych is recommending OP therapy  On 1/28, she is asking for something for her anxiety - while waiting for paroxetine to take effect, will give 1 dose of lorazepam 0.5  Sodium dropped again, to 117.  Resume NS IV    Cardiology consulted for AF.  She was unaware of the arrhythmia.  Now back in SR.  Here for hyponatremia.  Seems to be somewhat volume overloaded.  Case d/w Dr. Cedeno (Neph) in ICU, he will give lasix.  Pt started on apix 2.5mg bid (dose appropriate).    Past Medical History:   Diagnosis Date    Arthritis     Blood transfusion     Carotid stenosis     Hypercholesterolemia     Hypertension     Psychiatric problem     Thyroid disease        Past Surgical History:   Procedure Laterality Date    CARPAL TUNNEL RELEASE  2012    right hand     HYSTERECTOMY      left carotid endartectomy  5/2006    TONSILLECTOMY         Review of patient's allergies indicates:   Allergen Reactions    Strawberry Swelling     Tongue swells up and a generalized rash.       No current facility-administered medications on file prior to encounter.      Current Outpatient Prescriptions on File Prior to Encounter   Medication Sig    acetaminophen (TYLENOL) 325 MG tablet Take 325 mg by mouth 2 (two) times daily.    alendronate (FOSAMAX) 70 MG tablet Take 70 mg by mouth every 7 days.    amlodipine-atorvastatin (CADUET) 10-10 mg per tablet Take 1 tablet by mouth every evening.    aspirin 325 MG tablet Take 325 mg by mouth nightly.    hydrochlorothiazide (HYDRODIURIL) 25 MG tablet Take 25 mg by mouth once daily.    levothyroxine (SYNTHROID) 75 MCG tablet Take 75 mcg by mouth once daily.    ranitidine (ZANTAC) 150 MG tablet Take 150 mg by mouth with lunch.    VIT A,C & E/LUTEIN/MINERALS (VISION FORMULA, WITH LUTEIN, ORAL) Take 1 tablet by mouth once daily.     Family History     Problem Relation (Age of Onset)    Breast cancer Daughter (50)    Cancer Brother (65), Sister (81), Sister, Sister    Heart disease Father    Ovarian cancer Sister (81)    Schizophrenia Son        Social History Main Topics    Smoking status: Never Smoker    Smokeless tobacco: Never Used    Alcohol use No    Drug use: No    Sexual activity: No     Review of Systems   Constitution: Negative for chills, diaphoresis, fever, weakness and malaise/fatigue.   HENT: Negative for nosebleeds.    Eyes: Negative for blurred vision and double vision.   Cardiovascular: Negative for chest pain, claudication, cyanosis, dyspnea on exertion, leg swelling, orthopnea, palpitations, paroxysmal nocturnal dyspnea and syncope.   Respiratory: Negative for cough, shortness of breath and wheezing.    Skin: Negative for dry skin and poor wound healing.   Musculoskeletal: Negative for back pain, joint swelling and  myalgias.   Gastrointestinal: Negative for abdominal pain, nausea and vomiting.   Genitourinary: Negative for hematuria.   Neurological: Negative for dizziness, headaches, numbness and seizures.   Psychiatric/Behavioral: Negative for altered mental status and depression. The patient is nervous/anxious.      Objective:     Vital Signs (Most Recent):  Temp: 97.9 °F (36.6 °C) (01/29/18 0745)  Pulse: 90 (01/29/18 1000)  Resp: 17 (01/29/18 1000)  BP: (!) 159/68 (01/29/18 1000)  SpO2: 97 % (01/29/18 1000) Vital Signs (24h Range):  Temp:  [96.9 °F (36.1 °C)-97.9 °F (36.6 °C)] 97.9 °F (36.6 °C)  Pulse:  [] 90  Resp:  [0-40] 17  SpO2:  [74 %-99 %] 97 %  BP: (104-177)/(51-88) 159/68     Weight: 48 kg (105 lb 12.8 oz)  Body mass index is 23.72 kg/m².    SpO2: 97 %  O2 Device (Oxygen Therapy): Non Rebreather Mask      Intake/Output Summary (Last 24 hours) at 01/29/18 1010  Last data filed at 01/29/18 0900   Gross per 24 hour   Intake          2192.33 ml   Output             1175 ml   Net          1017.33 ml       Lines/Drains/Airways     Peripheral Intravenous Line                 Peripheral IV - Single Lumen 01/27/18 2119 Left Forearm 1 day         Peripheral IV - Single Lumen 01/28/18 2228 Left Forearm less than 1 day                Physical Exam   Constitutional: She is oriented to person, place, and time. She appears well-developed and well-nourished. No distress.   HENT:   Head: Normocephalic and atraumatic.   Mouth/Throat: No oropharyngeal exudate.   Eyes: Conjunctivae and EOM are normal. Pupils are equal, round, and reactive to light. No scleral icterus.   Neck: Normal range of motion. Neck supple. No JVD present. No tracheal deviation present.   Cardiovascular: Normal rate, regular rhythm, S1 normal and S2 normal.  Exam reveals no gallop and no friction rub.    No murmur heard.  Pulmonary/Chest: Effort normal. No respiratory distress.   Decreased BS at bases   Abdominal: Soft. Bowel sounds are normal. There is no  tenderness.   Musculoskeletal: Normal range of motion. She exhibits no edema.   Neurological: She is alert and oriented to person, place, and time. No cranial nerve deficit.   Skin: Skin is warm and dry. She is not diaphoretic.   Psychiatric: She has a normal mood and affect. Her behavior is normal. Judgment normal.       Current Medications:   amLODIPine  10 mg Oral QHS    And    atorvastatin  10 mg Oral QHS    apixaban  2.5 mg Oral BID    cefTRIAXone (ROCEPHIN) IVPB  1 g Intravenous Q24H    enalapril  2.5 mg Oral Daily    famotidine (PF)  20 mg Intravenous Q12H    guaiFENesin  1,200 mg Oral BID    levothyroxine  75 mcg Oral Before breakfast    potassium chloride  20 mEq Oral Daily    potassium phosphate (monobasic)  500 mg Oral Daily    sodium chloride  1 g Oral BID       acetaminophen, magnesium sulfate IVPB, ondansetron, ondansetron, oxyCODONE, pneumoc 13-vanessa conj-dip cr(PF), potassium chloride, potassium chloride 10%, ramelteon, sodium chloride 0.9%    Laboratory:  CBC:    Recent Labs  Lab 01/27/18  0508 01/28/18  0501 01/29/18  0233   WHITE BLOOD CELL COUNT 15.08 H 16.39 H 18.65 H   HEMOGLOBIN 11.5 L 10.9 L 11.3 L   HEMATOCRIT 32.4 L 30.0 L 30.6 L   PLATELETS 350 403 H 412 H       CHEMISTRIES:    Recent Labs  Lab 01/24/18  0310  01/27/18  1113 01/28/18  0501 01/28/18  1151 01/29/18  0233 01/29/18  0747   GLUCOSE  --   < > 118 H 128 H  128 H 138 H 114 H 112 H   SODIUM  --   < > 121 L 117 LL  117    LL   POTASSIUM  --   < > 3.9 3.5  3.5 3.9 4.3 3.6   BUN BLD  --   < > 6 L 5 L  5 L 6 L 6 L 6 L   CREATININE  --   < > 0.6 0.6  0.6 0.6 0.6 0.6   EGFR IF   --   < > >60 >60  >60 >60 >60 >60   EGFR IF NON-  --   < > >60 >60  >60 >60 >60 >60   CALCIUM  --   < > 8.5 L 8.2 L  8.2 L 8.7 8.2 L 8.2 L   MAGNESIUM 1.6  --  1.5 L 1.9  --   --   --    < > = values in this interval not displayed.    CARDIAC BIOMARKERS:    Recent Labs  Lab 01/22/18  4775    TROPONIN I 0.012       COAGS:        LIPIDS/LFTS:    Recent Labs  Lab 01/22/18  1447   AST 40   ALT 32     Lab Results   Component Value Date    TSH 1.191 01/22/2018           Diagnostic Results:  ECG (personally reviewed tracings):   1/22/18 1431 SR 80, PACs, PVC, NSSTTW changes  1/28/18 2055 AF 97  1/29/18 1012 SR 85    Chest X-Ray 1/29/18  Worsening bilateral lung aeration, possibly pulmonary edema or pneumonia.    Echo: ordered    Carotid US 10/4/17  -Elevated right proximal ICA velocities, suggesting 60-79% stenosis. Velocities are similar to the 6/11/13 exam.  -Left proximal ICA velocities within normal limits.        Assessment and Plan:     * Hyponatremia with decreased serum osmolality    Per IM/neph, ?r/t psych meds  Seems a little vol overloaded on exam, consider additional lasix (case d/w Dr. Cedeno of Neph).        Paroxysmal atrial fibrillation    Now back in SR  CHADS VASC 4  Now on apixaban 2.5mg bid (dose appropriate for age/weight)  Stop amlod and start metoprolol  Check echo        Benign hypertension    As above  Stop amlod and start metoprolol        S/P carotid endarterectomy    Recent CUS with stable findings  Cont ASA/statin            VTE Risk Mitigation         Ordered     apixaban tablet 2.5 mg  2 times daily     Route:  Oral        01/28/18 2128     Medium Risk of VTE  Once      01/22/18 1959     Place BRYANT hose  Until discontinued      01/22/18 1959     Place sequential compression device  Until discontinued      01/22/18 1959          Thank you for your consult. I will follow-up with patient. Please contact us if you have any additional questions.    Wenceslao Armenta MD  Cardiology   [facility]

## 2018-01-30 LAB
ALBUMIN SERPL BCP-MCNC: 3 G/DL
ALP SERPL-CCNC: 79 U/L
ALT SERPL W/O P-5'-P-CCNC: 27 U/L
ANA SER QL IF: NORMAL
ANION GAP SERPL CALC-SCNC: 6 MMOL/L
ANION GAP SERPL CALC-SCNC: 7 MMOL/L
AST SERPL-CCNC: 15 U/L
BASOPHILS # BLD AUTO: 0 K/UL
BASOPHILS NFR BLD: 0 %
BILIRUB SERPL-MCNC: 1.1 MG/DL
BUN SERPL-MCNC: 6 MG/DL
BUN SERPL-MCNC: 7 MG/DL
CALCIUM SERPL-MCNC: 8.3 MG/DL
CALCIUM SERPL-MCNC: 8.7 MG/DL
CHLORIDE SERPL-SCNC: 81 MMOL/L
CHLORIDE SERPL-SCNC: 82 MMOL/L
CO2 SERPL-SCNC: 30 MMOL/L
CO2 SERPL-SCNC: 31 MMOL/L
CREAT SERPL-MCNC: 0.5 MG/DL
CREAT SERPL-MCNC: 0.6 MG/DL
DIFFERENTIAL METHOD: ABNORMAL
EOSINOPHIL # BLD AUTO: 0 K/UL
EOSINOPHIL NFR BLD: 0 %
ERYTHROCYTE [DISTWIDTH] IN BLOOD BY AUTOMATED COUNT: 12.7 %
EST. GFR  (AFRICAN AMERICAN): >60 ML/MIN/1.73 M^2
EST. GFR  (AFRICAN AMERICAN): >60 ML/MIN/1.73 M^2
EST. GFR  (NON AFRICAN AMERICAN): >60 ML/MIN/1.73 M^2
EST. GFR  (NON AFRICAN AMERICAN): >60 ML/MIN/1.73 M^2
GLUCOSE SERPL-MCNC: 108 MG/DL
GLUCOSE SERPL-MCNC: 114 MG/DL
HCT VFR BLD AUTO: 32.8 %
HGB BLD-MCNC: 11.9 G/DL
LYMPHOCYTES # BLD AUTO: 0.6 K/UL
LYMPHOCYTES NFR BLD: 2.9 %
MCH RBC QN AUTO: 30.7 PG
MCHC RBC AUTO-ENTMCNC: 36.3 G/DL
MCV RBC AUTO: 85 FL
MONOCYTES # BLD AUTO: 1.9 K/UL
MONOCYTES NFR BLD: 9.3 %
NEUTROPHILS # BLD AUTO: 17.7 K/UL
NEUTROPHILS NFR BLD: 87.8 %
PHOSPHATE SERPL-MCNC: 1.7 MG/DL
PLATELET # BLD AUTO: 472 K/UL
PMV BLD AUTO: 9.4 FL
POTASSIUM SERPL-SCNC: 3.4 MMOL/L
POTASSIUM SERPL-SCNC: 3.6 MMOL/L
PROT SERPL-MCNC: 5.8 G/DL
RBC # BLD AUTO: 3.87 M/UL
SODIUM SERPL-SCNC: 118 MMOL/L
SODIUM SERPL-SCNC: 119 MMOL/L
T4 SERPL-MCNC: 9.7 UG/DL
WBC # BLD AUTO: 20.15 K/UL

## 2018-01-30 PROCEDURE — 25000003 PHARM REV CODE 250: Performed by: EMERGENCY MEDICINE

## 2018-01-30 PROCEDURE — 25000003 PHARM REV CODE 250: Performed by: INTERNAL MEDICINE

## 2018-01-30 PROCEDURE — 84100 ASSAY OF PHOSPHORUS: CPT

## 2018-01-30 PROCEDURE — 20000000 HC ICU ROOM

## 2018-01-30 PROCEDURE — 25000003 PHARM REV CODE 250

## 2018-01-30 PROCEDURE — 84436 ASSAY OF TOTAL THYROXINE: CPT

## 2018-01-30 PROCEDURE — 63600175 PHARM REV CODE 636 W HCPCS

## 2018-01-30 PROCEDURE — 63600175 PHARM REV CODE 636 W HCPCS: Performed by: INTERNAL MEDICINE

## 2018-01-30 PROCEDURE — 80048 BASIC METABOLIC PNL TOTAL CA: CPT

## 2018-01-30 PROCEDURE — 80053 COMPREHEN METABOLIC PANEL: CPT

## 2018-01-30 PROCEDURE — S0028 INJECTION, FAMOTIDINE, 20 MG: HCPCS | Performed by: EMERGENCY MEDICINE

## 2018-01-30 PROCEDURE — 99233 SBSQ HOSP IP/OBS HIGH 50: CPT | Mod: ,,, | Performed by: INTERNAL MEDICINE

## 2018-01-30 PROCEDURE — 94761 N-INVAS EAR/PLS OXIMETRY MLT: CPT

## 2018-01-30 PROCEDURE — 85025 COMPLETE CBC W/AUTO DIFF WBC: CPT

## 2018-01-30 PROCEDURE — 36415 COLL VENOUS BLD VENIPUNCTURE: CPT

## 2018-01-30 RX ORDER — LORAZEPAM 0.5 MG/1
0.5 TABLET ORAL ONCE
Status: COMPLETED | OUTPATIENT
Start: 2018-01-30 | End: 2018-01-30

## 2018-01-30 RX ORDER — ENALAPRIL MALEATE 2.5 MG/1
5 TABLET ORAL DAILY
Status: DISCONTINUED | OUTPATIENT
Start: 2018-01-30 | End: 2018-02-01

## 2018-01-30 RX ORDER — FUROSEMIDE 10 MG/ML
40 INJECTION INTRAMUSCULAR; INTRAVENOUS DAILY
Status: DISCONTINUED | OUTPATIENT
Start: 2018-01-30 | End: 2018-02-01

## 2018-01-30 RX ORDER — POTASSIUM CHLORIDE 20 MEQ/1
20 TABLET, EXTENDED RELEASE ORAL 2 TIMES DAILY
Status: DISCONTINUED | OUTPATIENT
Start: 2018-01-30 | End: 2018-02-03

## 2018-01-30 RX ORDER — METOPROLOL TARTRATE 50 MG/1
50 TABLET ORAL 2 TIMES DAILY
Status: DISCONTINUED | OUTPATIENT
Start: 2018-01-30 | End: 2018-02-06 | Stop reason: HOSPADM

## 2018-01-30 RX ADMIN — POTASSIUM CHLORIDE 20 MEQ: 1500 TABLET, EXTENDED RELEASE ORAL at 09:01

## 2018-01-30 RX ADMIN — POTASSIUM CHLORIDE 40 MEQ: 20 SOLUTION ORAL at 02:01

## 2018-01-30 RX ADMIN — LORAZEPAM 0.5 MG: 0.5 TABLET ORAL at 02:01

## 2018-01-30 RX ADMIN — FUROSEMIDE 40 MG: 10 INJECTION, SOLUTION INTRAMUSCULAR; INTRAVENOUS at 12:01

## 2018-01-30 RX ADMIN — GUAIFENESIN 1200 MG: 600 TABLET, EXTENDED RELEASE ORAL at 09:01

## 2018-01-30 RX ADMIN — POTASSIUM PHOSPHATE, MONOBASIC 500 MG: 500 TABLET, SOLUBLE ORAL at 09:01

## 2018-01-30 RX ADMIN — OXYCODONE HYDROCHLORIDE 5 MG: 5 TABLET ORAL at 10:01

## 2018-01-30 RX ADMIN — SODIUM CHLORIDE TAB 1 GM 1 G: 1 TAB at 09:01

## 2018-01-30 RX ADMIN — METOPROLOL TARTRATE 50 MG: 50 TABLET ORAL at 09:01

## 2018-01-30 RX ADMIN — FAMOTIDINE 20 MG: 10 INJECTION, SOLUTION INTRAVENOUS at 09:01

## 2018-01-30 RX ADMIN — CEFTRIAXONE SODIUM 1 G: 1 INJECTION, POWDER, FOR SOLUTION INTRAMUSCULAR; INTRAVENOUS at 06:01

## 2018-01-30 RX ADMIN — APIXABAN 2.5 MG: 2.5 TABLET, FILM COATED ORAL at 09:01

## 2018-01-30 RX ADMIN — METOPROLOL TARTRATE 25 MG: 25 TABLET ORAL at 09:01

## 2018-01-30 RX ADMIN — ATORVASTATIN CALCIUM 10 MG: 10 TABLET, FILM COATED ORAL at 09:01

## 2018-01-30 RX ADMIN — OXYCODONE HYDROCHLORIDE 5 MG: 5 TABLET ORAL at 02:01

## 2018-01-30 RX ADMIN — OXYCODONE HYDROCHLORIDE 5 MG: 5 TABLET ORAL at 06:01

## 2018-01-30 RX ADMIN — LEVOTHYROXINE SODIUM 75 MCG: 75 TABLET ORAL at 06:01

## 2018-01-30 RX ADMIN — ENALAPRIL MALEATE 5 MG: 2.5 TABLET ORAL at 09:01

## 2018-01-30 NOTE — PLAN OF CARE
01/30/18 1029   Discharge Reassessment   Assessment Type Discharge Planning Reassessment   Provided patient/caregiver education on the expected discharge date and the discharge plan Yes  (with son 1/29 by phone)   Do you have any problems affording any of your prescribed medications? No   Discharge Plan A Skilled Nursing Facility   Discharge Plan B Home with family;Home Health;Other  (paid care providers?)   Patient choice form signed by patient/caregiver No   Can the patient answer the patient profile reliably? Yes, cognitively intact   How does the patient rate their overall health at the present time? Good   Describe the patient's ability to walk at the present time. Does not walk or unable to take any steps at all   How often would a person be available to care for the patient? Infrequently  (patient reports the she does not believe her  can assist her much)   Number of comorbid conditions (as recorded on the chart) Five or more   During the past month, has the patient often been bothered by feeling down, depressed or hopeless? No  (record)   During the past month, has the patient often been bothered by little interest or pleasure in doing things? No  (record)   patient remains in ICU. SW reviewed record, spoke with patient and son on 1/29. Apparently patient did not provide family with the written information from BOBBY provided on 1/24 regarding assisted living searches. After discussion with patient and son on 1/29 and record review, possible best 1st step post acute will be SNF. SW provided explanation of SNF and HH to son per request of patient. BOBBY today called [facility] ([facility]) and will fax PASRR to acquire 142. BOBBY will provide written list of area SNF for patient in duplicate so that she can provide family to assist with her preferences. BOBBY will follow in ICU and assist as needed.

## 2018-01-30 NOTE — ASSESSMENT & PLAN NOTE
Na on 1/27 123  She  Had been placed on paroxetine - might lower levels  Repeat 1/28 is 117, then 115  Resumed IV saline  (2 x Na) + (glu/18) + (BUN/2.8) = serum osm = 242  (range 275-295)  When levels continued dropping, pt transferred to ICU for 3%NaCl administration - but this has now been held  Renal follows  Frequent monitoring:  Most recent level up to 119 - avoid rapid correction  Multiple etiologies as above - awaiting cortisol; normal systolic fxn (indeterminate diastolic), no cirrhosis

## 2018-01-30 NOTE — ASSESSMENT & PLAN NOTE
Now back in SR  CHADS VASC 4  Now on apixaban 2.5mg bid (dose appropriate for age/weight)  Titrate metoprolol to 50mg bid

## 2018-01-30 NOTE — PLAN OF CARE
Problem: Patient Care Overview  Goal: Plan of Care Review  Outcome: Ongoing (interventions implemented as appropriate)  Pt resting well in bed, states relief in pain. Tolerating meals well. Son at bedside, plan of care explained. Case management at bedside discussing discharge planning. Pt free of injury. Bed in low position with alarm on.

## 2018-01-30 NOTE — ASSESSMENT & PLAN NOTE
On 1/27 slightly low at 3.4  Started on 20meq daily  (also on K phos)  Now  3.4 again  Getting more lasix; bump k to 20 bid

## 2018-01-30 NOTE — ASSESSMENT & PLAN NOTE
Continue home meds except HCTZ  169/78 on norvasc 10  added vasotec 2.5  Amlodipine stopped due to afib; started metoprolol  Now 164/69  Bump vasotec to 5

## 2018-01-30 NOTE — PROGRESS NOTES
[facility]  Progress Note    Patient Name: Jeannie Hutchins  MRN: [id number]  Patient Class: IP- Inpatient   Admission Date: 1/22/2018  Length of Stay: 8 days  Attending Physician: Paige Mcleod MD  Primary Care Provider: Paige Mcleod MD        Subjective:     Principal Problem:Hyponatremia with decreased serum osmolality    HPI:  PT  Is an 81yo WF followed by Dr. Bethany Mcleod who was seen in the office with complaints of diarrhea on 1/19.  At that time she was felt to have gastroenteritis.  She was sent home but states she could not eat anything for 4 days.  The diarrhea was starting to improve but she did not feel better so she presented to the ED.  She was found to have a sodium of 113 and was admitted with a diagnosis of hyponatremia    Hospital Course:  Sodium improving with fluids - 123 as of 1/27  She has issues with anxiety.  She was started on paroxetine; psych is recommending OP therapy  On 1/28, she is asking for something for her anxiety - while waiting for paroxetine to take effect, will give 1 dose of lorazepam 0.5  Sodium dropped again, to 117.  Resumed NS IV  This did not help.  Na continued dropping.  Renal on case - changed to 0.3%NS - moved to ICU for drip.  While there, developed resp difficulty.  She eventually required NRB with 14lpm O2 - sat 95%.  CXR on 1/28 showed worsening aeroation - due either to pneumonia or fluid.  Lasix given - good result with urination; felt a bit better.   Started on empiric rocephin.  Furthermore, she developed tachycardia and was found to be in afib.  Started on empiric Eliquis for clot prevention; cardiology consulted.  Dr. Geovany johnson change from amlodipine to metoprolol.  She is CHADS4 - remain on Eliquis.  Discussed with Dr. Mckeon- ALL SSRI/SNRI will have hyponatremia risk.  For now, will have to use rare lorazepam for severe episodes.  She is amenable to therapy as OP - but needs insurance coverage.    Interval  History: stable    Review of Systems   Still quite anxious  Objective:     Vital Signs (Most Recent):  Temp: 98.6 °F (37 °C) (01/30/18 1100)  Pulse: 77 (01/30/18 1100)  Resp: (!) 29 (01/30/18 1100)  BP: (!) 149/70 (01/30/18 1100)  SpO2: (!) 93 % (01/30/18 1100) Vital Signs (24h Range):  Temp:  [97.2 °F (36.2 °C)-98.6 °F (37 °C)] 98.6 °F (37 °C)  Pulse:  [] 77  Resp:  [10-45] 29  SpO2:  [80 %-100 %] 93 %  BP: ()/() 149/70     Weight: 48 kg (105 lb 12.8 oz)  Body mass index is 23.72 kg/m².    Intake/Output Summary (Last 24 hours) at 01/30/18 1335  Last data filed at 01/30/18 0600   Gross per 24 hour   Intake              360 ml   Output             1400 ml   Net            -1040 ml      Physical Exam  Resting in icu bed  Still wearing oxygen mask  Speaks clearly  Attempting to eat  Lungs coarse  Heart is irregular  abd benign    Assessment/Plan:      * Hyponatremia with decreased serum osmolality    Na on 1/27 123  She  Had been placed on paroxetine - might lower levels  Repeat 1/28 is 117, then 115  Resumed IV saline  (2 x Na) + (glu/18) + (BUN/2.8) = serum osm = 242  (range 275-295)  When levels continued dropping, pt transferred to ICU for 3%NaCl administration - but this has now been held  Renal follows  Frequent monitoring:  Most recent level up to 119 - avoid rapid correction  Multiple etiologies as above - awaiting cortisol; normal systolic fxn (indeterminate diastolic), no cirrhosis          Pulmonary hypertension    PAP 54          Paroxysmal atrial fibrillation    Developed tachycardia and dyspnea on 1/28.  Found to be in afib.  Started on empiric Eliquis 2.5 bid for CVA prophylaxis and cards consulted.  Dr. Armenta saw pt and rec'd change from amlodipine to metoprolol.  Rhythm is paroxysmal.  Her current rhythm is irregular, but she has P waves on monitor  CHADS4 - will need anticoag long-term.            Acute respiratory failure with hypoxia and hypercarbia    Since moving to ICU, she  began having more increased oxygen needs to keep sats adequate.  CXR:  One view of the chest was obtained.  Cardiac wires overlie the upper abdomen and lower chest.  Cardiac silhouette is mildly enlarged but appears stable in size. Atherosclerotic calcifications overlie the aortic arch. There is worsening central vascular congestion and perihilar edema with new bibasilar airspace opacities. Suspect bilateral pleural effusions. Bones appear stable.  She sounds wet - asking renal about best option for diuresis  Started on empiric rocephin        Follow up    Spoke with her son today;  He is concerned about her well-being after discharge  Now that her  has pneumonia, caregiving will be more difficult  He is considering placing both of them in assisted living  He is worried that she is not taking her medications appropriately  In interim, he  Is asking about treatment alternatives - discussed SNF vs HH with PT.  Will have  talk with him to discuss options and formulate plan          Hypophosphatemia    Start K phos supplement (1.3 on 1/28)  Recheck today 1.7  Continue supp        Generalized anxiety disorder    Had been started on paroxetine   She is more anxious because of recent diagnosis of flu in 81 yo  - he was in ER today.  Now sent home, but he is alone.  (Her daughter would normally help care for him, but she is undergoing cancer treatment and should not be exposed to flu)  She became more anxious over night  Gave her ONE dose of lorazepam to tide her over.  However, she cannot rely on long-term benzos.  Now that Paxil has to be stopped, need to see what Dr. Mckeon will recommend as replacement.  Dr. Venegas feels that hyponatremia is a class effect of SSRI  Had to give another dose of lorazepam earlier  .          Hypokalemia    On 1/27 slightly low at 3.4  Started on 20meq daily  (also on K phos)  Now  3.4 again  Getting more lasix; bump k to 20 bid        Benign hypertension     Continue home meds except HCTZ  169/78 on norvasc 10  added vasotec 2.5  Amlodipine stopped due to afib; started metoprolol  Now 164/69  Bump vasotec to 5        Adjustment disorder with mixed anxiety and depressed mood    Psych recommending outpatient therapy.  On Paroxitine but it can drop sodium levels.     Increasing salt in the diet and following levels.  Dr. Venegas now feels that we have to STOP the Paroxetine  Will see what Dr. Mckeon recommends as substitute        Non-intractable vomiting with nausea    Has diminished; Tolerating regular diet for last several days  Now having recurrent bouts          S/P carotid endarterectomy                VTE Risk Mitigation         Ordered     apixaban tablet 2.5 mg  2 times daily     Route:  Oral        01/28/18 2128     Medium Risk of VTE  Once      01/22/18 1959     Place BRYANT hose  Until discontinued      01/22/18 1959     Place sequential compression device  Until discontinued      01/22/18 1959            Jake Trujillo MD  Department of Hospital Medicine   [facility]

## 2018-01-30 NOTE — ASSESSMENT & PLAN NOTE
Had been started on paroxetine   She is more anxious because of recent diagnosis of flu in 83 yo  - he was in ER today.  Now sent home, but he is alone.  (Her daughter would normally help care for him, but she is undergoing cancer treatment and should not be exposed to flu)  She became more anxious over night  Gave her ONE dose of lorazepam to tide her over.  However, she cannot rely on long-term benzos.  Now that Paxil has to be stopped, need to see what Dr. Mckeon will recommend as replacement.  Dr. Venegas feels that hyponatremia is a class effect of SSRI  Had to give another dose of lorazepam earlier  .

## 2018-01-30 NOTE — PLAN OF CARE
Problem: Patient Care Overview  Goal: Plan of Care Review  Outcome: Ongoing (interventions implemented as appropriate)  Patient is AAO x 4. Follows commands. NSR on the monitor. Afebrile. On 15 L Non-rebreather; desat's quickly with activity. C/o back pain; managed with prn medication. Turns self. Voids per bedpan; UO WNL. No BM's this shift. No skin breakdown, falls, or injuries this shift.

## 2018-01-30 NOTE — PROGRESS NOTES
[facility]  Cardiology  Progress Note    Patient Name: Jeannie Hutchins  MRN: [id number]  Admission Date: 1/22/2018  Hospital Length of Stay: 8 days  Code Status: Full Code   Attending Physician: Paige Mcleod MD   Primary Care Physician: Paige Mcleod MD  Expected Discharge Date:   Principal Problem:Hyponatremia with decreased serum osmolality    Subjective:     Interval Hx: pt seen in ICU, no complaints.  No cp/sob.    Tele: SR, PSVT (pers rev)      Past Medical History:   Diagnosis Date    Arthritis     Blood transfusion     Carotid stenosis     Hypercholesterolemia     Hypertension     Psychiatric problem     Thyroid disease        Past Surgical History:   Procedure Laterality Date    CARPAL TUNNEL RELEASE  2012    right hand    HYSTERECTOMY      left carotid endartectomy  5/2006    TONSILLECTOMY         Review of patient's allergies indicates:   Allergen Reactions    Strawberry Swelling     Tongue swells up and a generalized rash.       No current facility-administered medications on file prior to encounter.      Current Outpatient Prescriptions on File Prior to Encounter   Medication Sig    acetaminophen (TYLENOL) 325 MG tablet Take 325 mg by mouth 2 (two) times daily.    alendronate (FOSAMAX) 70 MG tablet Take 70 mg by mouth every 7 days.    amlodipine-atorvastatin (CADUET) 10-10 mg per tablet Take 1 tablet by mouth every evening.    aspirin 325 MG tablet Take 325 mg by mouth nightly.    hydrochlorothiazide (HYDRODIURIL) 25 MG tablet Take 25 mg by mouth once daily.    levothyroxine (SYNTHROID) 75 MCG tablet Take 75 mcg by mouth once daily.    ranitidine (ZANTAC) 150 MG tablet Take 150 mg by mouth with lunch.    VIT A,C & E/LUTEIN/MINERALS (VISION FORMULA, WITH LUTEIN, ORAL) Take 1 tablet by mouth once daily.     Family History     Problem Relation (Age of Onset)    Breast cancer Daughter (50)    Cancer Brother (65), Sister (81), Sister, Sister    Heart disease Father     Ovarian cancer Sister (81)    Schizophrenia Son        Social History Main Topics    Smoking status: Never Smoker    Smokeless tobacco: Never Used    Alcohol use No    Drug use: No    Sexual activity: No     Review of Systems   Gastrointestinal: Negative for melena.   Genitourinary: Negative for hematuria.     Objective:     Vital Signs (Most Recent):  Temp: 98.6 °F (37 °C) (01/30/18 1100)  Pulse: 77 (01/30/18 1100)  Resp: (!) 29 (01/30/18 1100)  BP: (!) 149/70 (01/30/18 1100)  SpO2: (!) 93 % (01/30/18 1100) Vital Signs (24h Range):  Temp:  [96.9 °F (36.1 °C)-98.6 °F (37 °C)] 98.6 °F (37 °C)  Pulse:  [] 77  Resp:  [10-45] 29  SpO2:  [80 %-100 %] 93 %  BP: ()/() 149/70     Weight: 48 kg (105 lb 12.8 oz)  Body mass index is 23.72 kg/m².    SpO2: (!) 93 %  O2 Device (Oxygen Therapy): Non Rebreather Mask      Intake/Output Summary (Last 24 hours) at 01/30/18 1139  Last data filed at 01/30/18 0600   Gross per 24 hour   Intake              360 ml   Output             2325 ml   Net            -1965 ml       Lines/Drains/Airways     Peripheral Intravenous Line                 Peripheral IV - Single Lumen 01/27/18 2119 Left Forearm 2 days         Peripheral IV - Single Lumen 01/28/18 2228 Left Forearm 1 day                Physical Exam   Constitutional: She is oriented to person, place, and time. She appears well-developed and well-nourished. No distress.   HENT:   Head: Normocephalic and atraumatic.   Mouth/Throat: No oropharyngeal exudate.   Eyes: Conjunctivae and EOM are normal. Pupils are equal, round, and reactive to light. No scleral icterus.   Neck: Normal range of motion. Neck supple. No JVD present. No tracheal deviation present.   Cardiovascular: Normal rate, regular rhythm, S1 normal and S2 normal.  Exam reveals no gallop and no friction rub.    No murmur heard.  Pulmonary/Chest: Effort normal. No respiratory distress.   Decreased BS at bases   Abdominal: Soft. Bowel sounds are normal.  There is no tenderness.   Musculoskeletal: Normal range of motion. She exhibits no edema.   Neurological: She is alert and oriented to person, place, and time. No cranial nerve deficit.   Skin: Skin is warm and dry. She is not diaphoretic.   Psychiatric: She has a normal mood and affect. Her behavior is normal. Judgment normal.       Current Medications:   apixaban  2.5 mg Oral BID    atorvastatin  10 mg Oral QHS    cefTRIAXone (ROCEPHIN) IVPB  1 g Intravenous Q24H    enalapril  5 mg Oral Daily    famotidine (PF)  20 mg Intravenous Q12H    furosemide  40 mg Intravenous Daily    guaiFENesin  1,200 mg Oral BID    levothyroxine  75 mcg Oral Before breakfast    metoprolol tartrate  25 mg Oral BID    potassium chloride  20 mEq Oral Daily    potassium phosphate (monobasic)  500 mg Oral Daily    sodium chloride  1 g Oral BID       acetaminophen, magnesium sulfate IVPB, ondansetron, ondansetron, oxyCODONE, pneumoc 13-vanessa conj-dip cr(PF), potassium chloride, potassium chloride 10%, ramelteon, sodium chloride 0.9%    Laboratory:  CBC:    Recent Labs  Lab 01/28/18  0501 01/29/18  0233 01/30/18  0640   WHITE BLOOD CELL COUNT 16.39 H 18.65 H 20.15 H   HEMOGLOBIN 10.9 L 11.3 L 11.9 L   HEMATOCRIT 30.0 L 30.6 L 32.8 L   PLATELETS 403 H 412 H 472 H       CHEMISTRIES:    Recent Labs  Lab 01/24/18  0310  01/27/18  1113 01/28/18  0501  01/29/18  1836 01/30/18  0003 01/30/18  0640   GLUCOSE  --   < > 118 H 128 H  128 H  < > 128 H 108 114 H   SODIUM  --   < > 121 L 117 LL  117 LL  < > 119   LL   POTASSIUM  --   < > 3.9 3.5  3.5  < > 3.4 L 3.6 3.4 L   BUN BLD  --   < > 6 L 5 L  5 L  < > 7 L 7 L 6 L   CREATININE  --   < > 0.6 0.6  0.6  < > 0.6 0.6 0.5   EGFR IF   --   < > >60 >60  >60  < > >60 >60 >60   EGFR IF NON-  --   < > >60 >60  >60  < > >60 >60 >60   CALCIUM  --   < > 8.5 L 8.2 L  8.2 L  < > 8.0 L 8.3 L 8.7   MAGNESIUM 1.6  --  1.5 L 1.9  --   --   --   --    < > =  values in this interval not displayed.    CARDIAC BIOMARKERS:    Recent Labs  Lab 01/22/18  1447   TROPONIN I 0.012       COAGS:        LIPIDS/LFTS:    Recent Labs  Lab 01/22/18  1447 01/30/18  0640   AST 40 15   ALT 32 27     Lab Results   Component Value Date    TSH 1.559 01/29/2018           Diagnostic Results:  ECG (personally reviewed tracings):   1/22/18 1431 SR 80, PACs, PVC, NSSTTW changes  1/28/18 2055 AF 97  1/29/18 1012 SR 85    Chest X-Ray 1/29/18  Worsening bilateral lung aeration, possibly pulmonary edema or pneumonia.    Echo: 1/29/18 (images pers rev)    1 - Normal left ventricular systolic function (EF 60-65%).     2 - No wall motion abnormalities.     3 - Concentric remodeling.     4 - Trivial aortic regurgitation.     5 - Mild tricuspid regurgitation.     6 - Pulmonary hypertension. The estimated PA systolic pressure is 54 mmHg.     7 - Small pericardial anterior effusion without hemodynamic compromise.     Carotid US 10/4/17  -Elevated right proximal ICA velocities, suggesting 60-79% stenosis. Velocities are similar to the 6/11/13 exam.  -Left proximal ICA velocities within normal limits.        Assessment and Plan:     * Hyponatremia with decreased serum osmolality    Per IM/neph, ?r/t psych meds  Diuresis as per IM/neph        Paroxysmal atrial fibrillation    Now back in SR  CHADS VASC 4  Now on apixaban 2.5mg bid (dose appropriate for age/weight)  Titrate metoprolol to 50mg bid        Benign hypertension    As above  Stop amlod and start metoprolol        S/P carotid endarterectomy    Recent CUS with stable findings  Cont ASA/statin            VTE Risk Mitigation         Ordered     apixaban tablet 2.5 mg  2 times daily     Route:  Oral        01/28/18 2128     Medium Risk of VTE  Once      01/22/18 1959     Place BRYANT hose  Until discontinued      01/22/18 1959     Place sequential compression device  Until discontinued      01/22/18 1959        Cardiology will sign off, pls call with  questions.  Pt to follow up with me after discharge.    Wenceslao Armenta MD  Cardiology  [facility]

## 2018-01-30 NOTE — PROGRESS NOTES
Jeannie Hutchins is a 82 y.o. female patient.    Follow for hyponatremia    No new c/o, more comfortable today    Scheduled Meds:   apixaban  2.5 mg Oral BID    atorvastatin  10 mg Oral QHS    cefTRIAXone (ROCEPHIN) IVPB  1 g Intravenous Q24H    enalapril  5 mg Oral Daily    famotidine (PF)  20 mg Intravenous Q12H    furosemide  40 mg Intravenous Daily    guaiFENesin  1,200 mg Oral BID    levothyroxine  75 mcg Oral Before breakfast    metoprolol tartrate  25 mg Oral BID    potassium chloride  20 mEq Oral Daily    potassium phosphate (monobasic)  500 mg Oral Daily    sodium chloride  1 g Oral BID       Review of patient's allergies indicates:   Allergen Reactions    Strawberry Swelling     Tongue swells up and a generalized rash.         Vital Signs Range (Last 24H):  Temp:  [96.9 °F (36.1 °C)-98.4 °F (36.9 °C)]   Pulse:  []   Resp:  [10-45]   BP: ()/()   SpO2:  [80 %-100 %]     I & O (Last 24H):  Intake/Output Summary (Last 24 hours) at 01/30/18 1059  Last data filed at 01/30/18 0600   Gross per 24 hour   Intake              360 ml   Output             2325 ml   Net            -1965 ml           Physical Exam:  General appearance: well developed, cachectic, no distress  Lungs:  diminished breath sounds bilaterally  Heart: regular rate and rhythm  Abdomen: soft, non-tender non-distented; bowel sounds normal; no masses,  no organomegaly  Extremities: edema trace    Laboratory:  CBC:   Recent Labs  Lab 01/30/18  0640   WBC 20.15*   RBC 3.87*   HGB 11.9*   HCT 32.8*   *   MCV 85   MCH 30.7   MCHC 36.3*     CMP:   Recent Labs  Lab 01/30/18  0640   *   CALCIUM 8.7   ALBUMIN 3.0*   PROT 5.8*   *   K 3.4*   CO2 30*   CL 81*   BUN 6*   CREATININE 0.5   ALKPHOS 79   ALT 27   AST 15   BILITOT 1.1*       Imp/Plan    Hyponatremia - stable  Nephrotic range proteinuria  HTN  Pulmonary edema  Hypothyroidism    Continue Lasix  Watch renal function closely  CMP in am      Trac T  Le  1/30/2018

## 2018-01-30 NOTE — SUBJECTIVE & OBJECTIVE
Interval History: stable    Review of Systems   Still quite anxious  Objective:     Vital Signs (Most Recent):  Temp: 98.6 °F (37 °C) (01/30/18 1100)  Pulse: 77 (01/30/18 1100)  Resp: (!) 29 (01/30/18 1100)  BP: (!) 149/70 (01/30/18 1100)  SpO2: (!) 93 % (01/30/18 1100) Vital Signs (24h Range):  Temp:  [97.2 °F (36.2 °C)-98.6 °F (37 °C)] 98.6 °F (37 °C)  Pulse:  [] 77  Resp:  [10-45] 29  SpO2:  [80 %-100 %] 93 %  BP: ()/() 149/70     Weight: 48 kg (105 lb 12.8 oz)  Body mass index is 23.72 kg/m².    Intake/Output Summary (Last 24 hours) at 01/30/18 1335  Last data filed at 01/30/18 0600   Gross per 24 hour   Intake              360 ml   Output             1400 ml   Net            -1040 ml      Physical Exam  Resting in icu bed  Still wearing oxygen mask  Speaks clearly  Attempting to eat  Lungs coarse  Heart is irregular  abd benign

## 2018-01-30 NOTE — SUBJECTIVE & OBJECTIVE
Past Medical History:   Diagnosis Date    Arthritis     Blood transfusion     Carotid stenosis     Hypercholesterolemia     Hypertension     Psychiatric problem     Thyroid disease        Past Surgical History:   Procedure Laterality Date    CARPAL TUNNEL RELEASE  2012    right hand    HYSTERECTOMY      left carotid endartectomy  5/2006    TONSILLECTOMY         Review of patient's allergies indicates:   Allergen Reactions    Strawberry Swelling     Tongue swells up and a generalized rash.       No current facility-administered medications on file prior to encounter.      Current Outpatient Prescriptions on File Prior to Encounter   Medication Sig    acetaminophen (TYLENOL) 325 MG tablet Take 325 mg by mouth 2 (two) times daily.    alendronate (FOSAMAX) 70 MG tablet Take 70 mg by mouth every 7 days.    amlodipine-atorvastatin (CADUET) 10-10 mg per tablet Take 1 tablet by mouth every evening.    aspirin 325 MG tablet Take 325 mg by mouth nightly.    hydrochlorothiazide (HYDRODIURIL) 25 MG tablet Take 25 mg by mouth once daily.    levothyroxine (SYNTHROID) 75 MCG tablet Take 75 mcg by mouth once daily.    ranitidine (ZANTAC) 150 MG tablet Take 150 mg by mouth with lunch.    VIT A,C & E/LUTEIN/MINERALS (VISION FORMULA, WITH LUTEIN, ORAL) Take 1 tablet by mouth once daily.     Family History     Problem Relation (Age of Onset)    Breast cancer Daughter (50)    Cancer Brother (65), Sister (81), Sister, Sister    Heart disease Father    Ovarian cancer Sister (81)    Schizophrenia Son        Social History Main Topics    Smoking status: Never Smoker    Smokeless tobacco: Never Used    Alcohol use No    Drug use: No    Sexual activity: No     Review of Systems   Gastrointestinal: Negative for melena.   Genitourinary: Negative for hematuria.     Objective:     Vital Signs (Most Recent):  Temp: 98.6 °F (37 °C) (01/30/18 1100)  Pulse: 77 (01/30/18 1100)  Resp: (!) 29 (01/30/18 1100)  BP: (!) 149/70  (01/30/18 1100)  SpO2: (!) 93 % (01/30/18 1100) Vital Signs (24h Range):  Temp:  [96.9 °F (36.1 °C)-98.6 °F (37 °C)] 98.6 °F (37 °C)  Pulse:  [] 77  Resp:  [10-45] 29  SpO2:  [80 %-100 %] 93 %  BP: ()/() 149/70     Weight: 48 kg (105 lb 12.8 oz)  Body mass index is 23.72 kg/m².    SpO2: (!) 93 %  O2 Device (Oxygen Therapy): Non Rebreather Mask      Intake/Output Summary (Last 24 hours) at 01/30/18 1139  Last data filed at 01/30/18 0600   Gross per 24 hour   Intake              360 ml   Output             2325 ml   Net            -1965 ml       Lines/Drains/Airways     Peripheral Intravenous Line                 Peripheral IV - Single Lumen 01/27/18 2119 Left Forearm 2 days         Peripheral IV - Single Lumen 01/28/18 2228 Left Forearm 1 day                Physical Exam   Constitutional: She is oriented to person, place, and time. She appears well-developed and well-nourished. No distress.   HENT:   Head: Normocephalic and atraumatic.   Mouth/Throat: No oropharyngeal exudate.   Eyes: Conjunctivae and EOM are normal. Pupils are equal, round, and reactive to light. No scleral icterus.   Neck: Normal range of motion. Neck supple. No JVD present. No tracheal deviation present.   Cardiovascular: Normal rate, regular rhythm, S1 normal and S2 normal.  Exam reveals no gallop and no friction rub.    No murmur heard.  Pulmonary/Chest: Effort normal. No respiratory distress.   Decreased BS at bases   Abdominal: Soft. Bowel sounds are normal. There is no tenderness.   Musculoskeletal: Normal range of motion. She exhibits no edema.   Neurological: She is alert and oriented to person, place, and time. No cranial nerve deficit.   Skin: Skin is warm and dry. She is not diaphoretic.   Psychiatric: She has a normal mood and affect. Her behavior is normal. Judgment normal.       Current Medications:   apixaban  2.5 mg Oral BID    atorvastatin  10 mg Oral QHS    cefTRIAXone (ROCEPHIN) IVPB  1 g Intravenous Q24H     enalapril  5 mg Oral Daily    famotidine (PF)  20 mg Intravenous Q12H    furosemide  40 mg Intravenous Daily    guaiFENesin  1,200 mg Oral BID    levothyroxine  75 mcg Oral Before breakfast    metoprolol tartrate  25 mg Oral BID    potassium chloride  20 mEq Oral Daily    potassium phosphate (monobasic)  500 mg Oral Daily    sodium chloride  1 g Oral BID       acetaminophen, magnesium sulfate IVPB, ondansetron, ondansetron, oxyCODONE, pneumoc 13-vanessa conj-dip cr(PF), potassium chloride, potassium chloride 10%, ramelteon, sodium chloride 0.9%    Laboratory:  CBC:    Recent Labs  Lab 01/28/18  0501 01/29/18  0233 01/30/18  0640   WHITE BLOOD CELL COUNT 16.39 H 18.65 H 20.15 H   HEMOGLOBIN 10.9 L 11.3 L 11.9 L   HEMATOCRIT 30.0 L 30.6 L 32.8 L   PLATELETS 403 H 412 H 472 H       CHEMISTRIES:    Recent Labs  Lab 01/24/18  0310  01/27/18  1113 01/28/18  0501  01/29/18  1836 01/30/18  0003 01/30/18  0640   GLUCOSE  --   < > 118 H 128 H  128 H  < > 128 H 108 114 H   SODIUM  --   < > 121 L 117 LL  117 LL  < > 119   LL   POTASSIUM  --   < > 3.9 3.5  3.5  < > 3.4 L 3.6 3.4 L   BUN BLD  --   < > 6 L 5 L  5 L  < > 7 L 7 L 6 L   CREATININE  --   < > 0.6 0.6  0.6  < > 0.6 0.6 0.5   EGFR IF   --   < > >60 >60  >60  < > >60 >60 >60   EGFR IF NON-  --   < > >60 >60  >60  < > >60 >60 >60   CALCIUM  --   < > 8.5 L 8.2 L  8.2 L  < > 8.0 L 8.3 L 8.7   MAGNESIUM 1.6  --  1.5 L 1.9  --   --   --   --    < > = values in this interval not displayed.    CARDIAC BIOMARKERS:    Recent Labs  Lab 01/22/18  1447   TROPONIN I 0.012       COAGS:        LIPIDS/LFTS:    Recent Labs  Lab 01/22/18  1447 01/30/18  0640   AST 40 15   ALT 32 27     Lab Results   Component Value Date    TSH 1.559 01/29/2018           Diagnostic Results:  ECG (personally reviewed tracings):   1/22/18 1431 SR 80, PACs, PVC, NSSTTW changes  1/28/18 2055 AF 97  1/29/18 1012 SR 85    Chest X-Ray 1/29/18  Worsening  bilateral lung aeration, possibly pulmonary edema or pneumonia.    Echo: 1/29/18 (images pers rev)    1 - Normal left ventricular systolic function (EF 60-65%).     2 - No wall motion abnormalities.     3 - Concentric remodeling.     4 - Trivial aortic regurgitation.     5 - Mild tricuspid regurgitation.     6 - Pulmonary hypertension. The estimated PA systolic pressure is 54 mmHg.     7 - Small pericardial anterior effusion without hemodynamic compromise.     Carotid US 10/4/17  -Elevated right proximal ICA velocities, suggesting 60-79% stenosis. Velocities are similar to the 6/11/13 exam.  -Left proximal ICA velocities within normal limits.

## 2018-01-30 NOTE — ASSESSMENT & PLAN NOTE
Developed tachycardia and dyspnea on 1/28.  Found to be in afib.  Started on empiric Eliquis 2.5 bid for CVA prophylaxis and cards consulted.  Dr. Armenta saw pt and rec'd change from amlodipine to metoprolol.  Rhythm is paroxysmal.  Her current rhythm is irregular, but she has P waves on monitor  CHADS4 - will need anticoag long-term.

## 2018-01-31 LAB
ALBUMIN SERPL BCP-MCNC: 2.9 G/DL
ALBUMIN SERPL ELPH-MCNC: 3.27 G/DL
ALP SERPL-CCNC: 74 U/L
ALPHA1 GLOB SERPL ELPH-MCNC: 0.46 G/DL
ALPHA2 GLOB SERPL ELPH-MCNC: 0.91 G/DL
ALT SERPL W/O P-5'-P-CCNC: 19 U/L
ANION GAP SERPL CALC-SCNC: 8 MMOL/L
AST SERPL-CCNC: 12 U/L
B-GLOBULIN SERPL ELPH-MCNC: 0.63 G/DL
BASOPHILS # BLD AUTO: 0.01 K/UL
BASOPHILS NFR BLD: 0 %
BILIRUB SERPL-MCNC: 1.3 MG/DL
BUN SERPL-MCNC: 7 MG/DL
CALCIUM SERPL-MCNC: 8.8 MG/DL
CHLORIDE SERPL-SCNC: 85 MMOL/L
CO2 SERPL-SCNC: 29 MMOL/L
CREAT SERPL-MCNC: 0.6 MG/DL
DIFFERENTIAL METHOD: ABNORMAL
EOSINOPHIL # BLD AUTO: 0 K/UL
EOSINOPHIL NFR BLD: 0 %
ERYTHROCYTE [DISTWIDTH] IN BLOOD BY AUTOMATED COUNT: 13 %
EST. GFR  (AFRICAN AMERICAN): >60 ML/MIN/1.73 M^2
EST. GFR  (NON AFRICAN AMERICAN): >60 ML/MIN/1.73 M^2
GAMMA GLOB SERPL ELPH-MCNC: 0.63 G/DL
GLUCOSE SERPL-MCNC: 113 MG/DL
HCT VFR BLD AUTO: 32.8 %
HGB BLD-MCNC: 11.9 G/DL
LYMPHOCYTES # BLD AUTO: 0.6 K/UL
LYMPHOCYTES NFR BLD: 2.4 %
MCH RBC QN AUTO: 31.1 PG
MCHC RBC AUTO-ENTMCNC: 36.3 G/DL
MCV RBC AUTO: 86 FL
MONOCYTES # BLD AUTO: 2.5 K/UL
MONOCYTES NFR BLD: 10 %
NEUTROPHILS # BLD AUTO: 21.6 K/UL
NEUTROPHILS NFR BLD: 88 %
PATHOLOGIST INTERPRETATION SPE: NORMAL
PLATELET # BLD AUTO: 447 K/UL
PMV BLD AUTO: 8.9 FL
POTASSIUM SERPL-SCNC: 4.6 MMOL/L
PROT SERPL-MCNC: 5.9 G/DL
PROT SERPL-MCNC: 5.9 G/DL
RBC # BLD AUTO: 3.83 M/UL
SODIUM SERPL-SCNC: 122 MMOL/L
WBC # BLD AUTO: 24.61 K/UL

## 2018-01-31 PROCEDURE — 86580 TB INTRADERMAL TEST: CPT

## 2018-01-31 PROCEDURE — 85025 COMPLETE CBC W/AUTO DIFF WBC: CPT

## 2018-01-31 PROCEDURE — 80053 COMPREHEN METABOLIC PANEL: CPT

## 2018-01-31 PROCEDURE — 20000000 HC ICU ROOM

## 2018-01-31 PROCEDURE — S0028 INJECTION, FAMOTIDINE, 20 MG: HCPCS | Performed by: EMERGENCY MEDICINE

## 2018-01-31 PROCEDURE — 25000003 PHARM REV CODE 250: Performed by: INTERNAL MEDICINE

## 2018-01-31 PROCEDURE — 25000003 PHARM REV CODE 250: Performed by: EMERGENCY MEDICINE

## 2018-01-31 PROCEDURE — 25000003 PHARM REV CODE 250

## 2018-01-31 PROCEDURE — 63600175 PHARM REV CODE 636 W HCPCS

## 2018-01-31 PROCEDURE — 36415 COLL VENOUS BLD VENIPUNCTURE: CPT

## 2018-01-31 PROCEDURE — 63600175 PHARM REV CODE 636 W HCPCS: Performed by: INTERNAL MEDICINE

## 2018-01-31 RX ADMIN — FAMOTIDINE 20 MG: 10 INJECTION, SOLUTION INTRAVENOUS at 09:01

## 2018-01-31 RX ADMIN — SODIUM CHLORIDE TAB 1 GM 1 G: 1 TAB at 09:01

## 2018-01-31 RX ADMIN — LEVOTHYROXINE SODIUM 75 MCG: 75 TABLET ORAL at 06:01

## 2018-01-31 RX ADMIN — OXYCODONE HYDROCHLORIDE 5 MG: 5 TABLET ORAL at 10:01

## 2018-01-31 RX ADMIN — POTASSIUM CHLORIDE 20 MEQ: 1500 TABLET, EXTENDED RELEASE ORAL at 09:01

## 2018-01-31 RX ADMIN — GUAIFENESIN 1200 MG: 600 TABLET, EXTENDED RELEASE ORAL at 09:01

## 2018-01-31 RX ADMIN — ATORVASTATIN CALCIUM 10 MG: 10 TABLET, FILM COATED ORAL at 09:01

## 2018-01-31 RX ADMIN — ACETAMINOPHEN 650 MG: 325 TABLET ORAL at 03:01

## 2018-01-31 RX ADMIN — Medication 5 UNITS: at 06:01

## 2018-01-31 RX ADMIN — CEFTRIAXONE SODIUM 1 G: 1 INJECTION, POWDER, FOR SOLUTION INTRAMUSCULAR; INTRAVENOUS at 06:01

## 2018-01-31 RX ADMIN — ACETAMINOPHEN 650 MG: 325 TABLET ORAL at 01:01

## 2018-01-31 RX ADMIN — OXYCODONE HYDROCHLORIDE 5 MG: 5 TABLET ORAL at 04:01

## 2018-01-31 RX ADMIN — FUROSEMIDE 40 MG: 10 INJECTION, SOLUTION INTRAMUSCULAR; INTRAVENOUS at 09:01

## 2018-01-31 RX ADMIN — METOPROLOL TARTRATE 50 MG: 50 TABLET ORAL at 09:01

## 2018-01-31 RX ADMIN — APIXABAN 2.5 MG: 2.5 TABLET, FILM COATED ORAL at 09:01

## 2018-01-31 RX ADMIN — ENALAPRIL MALEATE 5 MG: 2.5 TABLET ORAL at 09:01

## 2018-01-31 RX ADMIN — POTASSIUM PHOSPHATE, MONOBASIC 500 MG: 500 TABLET, SOLUBLE ORAL at 09:01

## 2018-01-31 NOTE — ASSESSMENT & PLAN NOTE
On 1/27 slightly low at 3.4  Started on 20meq daily  (also on K phos)  Getting more lasix; bump k to 20 bid  Today 4.6

## 2018-01-31 NOTE — ASSESSMENT & PLAN NOTE
Na on 1/27 123  She  Had been placed on paroxetine - might lower levels  Repeat 1/28 is 117, then 115  Resumed IV saline  (2 x Na) + (glu/18) + (BUN/2.8) = serum osm = 242  (range 275-295)  When levels continued dropping, pt transferred to ICU for 3%NaCl administration - but this has now been held  Renal follows  Frequent monitoring:   avoid rapid correction  Multiple etiologies as above - awaiting cortisol; normal systolic fxn (indeterminate diastolic), no cirrhosis  Dr. Cedeno gave more lasix yesterday - now up to 122

## 2018-01-31 NOTE — ASSESSMENT & PLAN NOTE
Has diminished; Tolerating regular diet for last several days  Now having recurrent bouts  zofran as needed

## 2018-01-31 NOTE — ASSESSMENT & PLAN NOTE
Continue home meds except HCTZ  169/78 on norvasc 10  added vasotec 2.5  Amlodipine stopped due to afib; started metoprolol  Now 160/70  Bump vasotec to 5  Metop bumped to 50 bid

## 2018-01-31 NOTE — PROGRESS NOTES
Jeannie Hutchins is a 82 y.o. female patient.    No new c/o  Feeling better    Scheduled Meds:   apixaban  2.5 mg Oral BID    atorvastatin  10 mg Oral QHS    cefTRIAXone (ROCEPHIN) IVPB  1 g Intravenous Q24H    enalapril  5 mg Oral Daily    famotidine (PF)  20 mg Intravenous Q12H    furosemide  40 mg Intravenous Daily    guaiFENesin  1,200 mg Oral BID    levothyroxine  75 mcg Oral Before breakfast    metoprolol tartrate  50 mg Oral BID    potassium chloride  20 mEq Oral BID    potassium phosphate (monobasic)  500 mg Oral Daily    sodium chloride  1 g Oral BID       Review of patient's allergies indicates:   Allergen Reactions    Strawberry Swelling     Tongue swells up and a generalized rash.         Vital Signs Range (Last 24H):  Temp:  [98.2 °F (36.8 °C)-99 °F (37.2 °C)]   Pulse:  []   Resp:  [12-44]   BP: ()/()   SpO2:  [84 %-100 %]     I & O (Last 24H):  Intake/Output Summary (Last 24 hours) at 01/31/18 0959  Last data filed at 01/31/18 0700   Gross per 24 hour   Intake              720 ml   Output             1750 ml   Net            -1030 ml           Physical Exam:  General appearance: well developed, no distress  Lungs:  diminished breath sounds bilaterally  Heart: regular rate and rhythm  Abdomen: soft, non-tender non-distented; bowel sounds normal; no masses,  no organomegaly  Extremities: edema trace    Laboratory:  CBC:   Recent Labs  Lab 01/31/18  0216   WBC 24.61*   RBC 3.83*   HGB 11.9*   HCT 32.8*   *   MCV 86   MCH 31.1*   MCHC 36.3*     CMP:   Recent Labs  Lab 01/31/18 0216   *   CALCIUM 8.8   ALBUMIN 2.9*   PROT 5.9*   *   K 4.6   CO2 29   CL 85*   BUN 7*   CREATININE 0.6   ALKPHOS 74   ALT 19   AST 12   BILITOT 1.3*       Imp/Plan    Hyponatremia - improving  Proteinuria  HTN  Pulmonary edema  Hypothyroidism    Continue present Rx  CMP in am          Trac T Le  1/31/2018

## 2018-01-31 NOTE — PROGRESS NOTES
Discharge plan--patient oldest son called. He, patient and patient daughter have chosen 4 facilities to start: [facility], [facility],  [facility] and [facility] in [place]. BOBBY sent referral via [employer] to each. Also added [facility], post acute provider.  Awaiting responses. BOBBY left sticky note for primary to consider order for PPD and therapy. BOBBY spoke with Terra at Dr Mcleod office to cancell Ms Hutchins 2/2 follow up medical appointment. BOBBY will continue to follow in ICU and assist as needed.

## 2018-01-31 NOTE — SUBJECTIVE & OBJECTIVE
Interval History: slow improvement    Review of Systems   Less nausea  Now on NC O2  Objective:     Vital Signs (Most Recent):  Temp: 98.1 °F (36.7 °C) (01/31/18 1100)  Pulse: 74 (01/31/18 1430)  Resp: (!) 33 (01/31/18 1430)  BP: (!) 196/79 (01/31/18 1400)  SpO2: (!) 92 % (01/31/18 1430) Vital Signs (24h Range):  Temp:  [97.9 °F (36.6 °C)-98.6 °F (37 °C)] 98.1 °F (36.7 °C)  Pulse:  [48-93] 74  Resp:  [12-47] 33  SpO2:  [84 %-100 %] 92 %  BP: ()/() 196/79     Weight: 48 kg (105 lb 12.8 oz)  Body mass index is 23.72 kg/m².    Intake/Output Summary (Last 24 hours) at 01/31/18 1533  Last data filed at 01/31/18 1400   Gross per 24 hour   Intake             1490 ml   Output             2550 ml   Net            -1060 ml      Physical Exam  Better to see her today with NC O2  More awake  Lungs coarse bilaterally  Mild occasional cough  Rhythm with some pvcs on monitor  abd benign  Tr leg edema

## 2018-01-31 NOTE — ASSESSMENT & PLAN NOTE
Since moving to ICU, she began having more increased oxygen needs to keep sats adequate.  CXR:  One view of the chest was obtained.  Cardiac wires overlie the upper abdomen and lower chest.  Cardiac silhouette is mildly enlarged but appears stable in size. Atherosclerotic calcifications overlie the aortic arch. There is worsening central vascular congestion and perihilar edema with new bibasilar airspace opacities. Suspect bilateral pleural effusions. Bones appear stable.  She sounds wet - asking renal about best option for diuresis  Started on empiric rocephin  sats in high 90s

## 2018-01-31 NOTE — PROGRESS NOTES
Discharge planning: BOBBY met with patient and son, provided written SNF information from medicare.gov for 25 mile radius from hospital, copy of post acute decision tree, copy of glossary of terms from medicare.gov and copy of Sr Resource Guide ads for each. Patient anxious over long range plans, unable to focus on short term, post acute plans. BOBBY received her permission to meet with son at  desk in ICU. Patient quite willing to let son assist with decisions as long as family is helping her  obtain the help he needs as well.   BOBBY aware there are several levels of issues with which son Dane Rojas [phone] will be helping patient and her .   He (with his sister in [place]) is helping with choices for patient post acute care.  He (with his sister) is helping the stepfather (Waldemar) with obtaining care while patient remains hospitalized and is in post acute care.   He (with his sister) is helping couple find more appropriate living situation, like [facility] or residential facility.  BOBBY provided information to son to assist patient  (Waldemar) including home care, residential and assisted living information that is local. He expects that the real plan will be in [place] and will have his sister help with the Internet search to obtain information to help quickly. As related to Waldemar, BOBBY provided Dane phone number for Dr Trujillo office so that Waldemar can get a physical needed for any out of home level of care.  BOBBY provided information for patient regarding SNF and answered questions regarding long term care in nursing home and levels noted for  in event patient does not recover for other levels. Dane asked if patient could go out of area. BOBBY explained that this is sometimes possible, that would possibly incur extra costs due to patient transportation is patient not well enough to travel in private vehicle. BOBBY requested voice mail no later than tomorrow morning of at least 3 local and if  desired any out of area SNF so SW can contact with medical information as patient appears to be progressing toward discharge.   Son indicates he will do so.   SW also suggested the patient (and her ) should complete health care POA documents and that family review with the patient and her  financial POA documents so that the couple can get the help needed to continue with their changing needs regarding living arrangements. HCPOA forms from [facility] provided to son in event patient would like this. SW suggested son consult  regarding what patient and spouse will need.   SW will continue to follow and assist as needed.

## 2018-01-31 NOTE — PROGRESS NOTES
[facility]  Progress Note    Patient Name: Jeannie Hutchins  MRN: [id number]  Patient Class: IP- Inpatient   Admission Date: 1/22/2018  Length of Stay: 9 days  Attending Physician: Paige Mcleod MD  Primary Care Provider: Paige Mcleod MD        Subjective:     Principal Problem:Hyponatremia with decreased serum osmolality    HPI:  PT  Is an 83yo WF followed by Dr. Bethany Mcleod who was seen in the office with complaints of diarrhea on 1/19.  At that time she was felt to have gastroenteritis.  She was sent home but states she could not eat anything for 4 days.  The diarrhea was starting to improve but she did not feel better so she presented to the ED.  She was found to have a sodium of 113 and was admitted with a diagnosis of hyponatremia    Hospital Course:  Sodium improving with fluids - 123 as of 1/27  She has issues with anxiety.  She was started on paroxetine; psych is recommending OP therapy  On 1/28, she is asking for something for her anxiety - while waiting for paroxetine to take effect, will give 1 dose of lorazepam 0.5  Sodium dropped again, to 117.  Resumed NS IV  This did not help.  Na continued dropping.  Renal on case - changed to 0.3%NS - moved to ICU for drip.  While there, developed resp difficulty.  She eventually required NRB with 14lpm O2 - sat 95%.  CXR on 1/28 showed worsening aeroation - due either to pneumonia or fluid.  Lasix given - good result with urination; felt a bit better.   Started on empiric rocephin.  Furthermore, she developed tachycardia and was found to be in afib.  Started on empiric Eliquis for clot prevention; cardiology consulted.  Dr. Geovany johnson change from amlodipine to metoprolol.  She is CHADS4 - remain on Eliquis.  Discussed with Dr. Mckeon- ALL SSRI/SNRI will have hyponatremia risk.  For now, will have to use rare lorazepam for severe episodes.  She is amenable to therapy as OP - but needs insurance coverage.    Interval  History: slow improvement    Review of Systems   Less nausea  Now on NC O2  Objective:     Vital Signs (Most Recent):  Temp: 98.1 °F (36.7 °C) (01/31/18 1100)  Pulse: 74 (01/31/18 1430)  Resp: (!) 33 (01/31/18 1430)  BP: (!) 196/79 (01/31/18 1400)  SpO2: (!) 92 % (01/31/18 1430) Vital Signs (24h Range):  Temp:  [97.9 °F (36.6 °C)-98.6 °F (37 °C)] 98.1 °F (36.7 °C)  Pulse:  [48-93] 74  Resp:  [12-47] 33  SpO2:  [84 %-100 %] 92 %  BP: ()/() 196/79     Weight: 48 kg (105 lb 12.8 oz)  Body mass index is 23.72 kg/m².    Intake/Output Summary (Last 24 hours) at 01/31/18 1533  Last data filed at 01/31/18 1400   Gross per 24 hour   Intake             1490 ml   Output             2550 ml   Net            -1060 ml      Physical Exam  Better to see her today with NC O2  More awake  Lungs coarse bilaterally  Mild occasional cough  Rhythm with some pvcs on monitor  abd benign  Tr leg edema    Assessment/Plan:      * Hyponatremia with decreased serum osmolality    Na on 1/27 123  She  Had been placed on paroxetine - might lower levels  Repeat 1/28 is 117, then 115  Resumed IV saline  (2 x Na) + (glu/18) + (BUN/2.8) = serum osm = 242  (range 275-295)  When levels continued dropping, pt transferred to ICU for 3%NaCl administration - but this has now been held  Renal follows  Frequent monitoring:   avoid rapid correction  Multiple etiologies as above - awaiting cortisol; normal systolic fxn (indeterminate diastolic), no cirrhosis  Dr. Cedeno gave more lasix yesterday - now up to 122          Pulmonary hypertension    PAP 54          Paroxysmal atrial fibrillation    Developed tachycardia and dyspnea on 1/28.  Found to be in afib.  Started on empiric Eliquis 2.5 bid for CVA prophylaxis and cards consulted.  Dr. Armenta saw pt and rec'd change from amlodipine to metoprolol.  Rhythm is paroxysmal.  Her current rhythm is irregular, but she has P waves on monitor  CHADS4 - will need anticoag long-term.            Acute  respiratory failure with hypoxia and hypercarbia    Since moving to ICU, she began having more increased oxygen needs to keep sats adequate.  CXR:  One view of the chest was obtained.  Cardiac wires overlie the upper abdomen and lower chest.  Cardiac silhouette is mildly enlarged but appears stable in size. Atherosclerotic calcifications overlie the aortic arch. There is worsening central vascular congestion and perihilar edema with new bibasilar airspace opacities. Suspect bilateral pleural effusions. Bones appear stable.  She sounds wet - asking renal about best option for diuresis  Started on empiric rocephin  sats in high 90s        Follow up    Spoke with her son today;  He is concerned about her well-being after discharge  Now that her  has pneumonia, caregiving will be more difficult  He is considering placing both of them in assisted living  He is worried that she is not taking her medications appropriately  In interim, he  Is asking about treatment alternatives - discussed SNF vs HH with PT.  Will have  talk with him to discuss options and formulate plan          Hypophosphatemia    Start K phos supplement (1.3 on 1/28)  Recheck today 1.7  Continue supp        Generalized anxiety disorder    Had been started on paroxetine   She is more anxious because of recent diagnosis of flu in 83 yo  - he was in ER today.  Now sent home, but he is alone.  (Her daughter would normally help care for him, but she is undergoing cancer treatment and should not be exposed to flu)  She became more anxious over night  Gave her ONE dose of lorazepam to tide her over.  However, she cannot rely on long-term benzos.  Now that Paxil has to be stopped, need to see what Dr. Mckeon will recommend as replacement.  Dr. Venegas feels that hyponatremia is a class effect of SSRI  Had to give another dose of lorazepam earlier  .          Hypokalemia    On 1/27 slightly low at 3.4  Started on 20meq daily  (also on K  phos)  Getting more lasix; bump k to 20 bid  Today 4.6        Benign hypertension    Continue home meds except HCTZ  169/78 on norvasc 10  added vasotec 2.5  Amlodipine stopped due to afib; started metoprolol  Now 160/70  Bump vasotec to 5  Metop bumped to 50 bid        Adjustment disorder with mixed anxiety and depressed mood    Psych recommending outpatient therapy.  On Paroxitine but it can drop sodium levels.     Increasing salt in the diet and following levels.  Dr. Venegas now feels that we have to STOP the Paroxetine  Will see what Dr. Mckeon recommends as substitute        Non-intractable vomiting with nausea    Has diminished; Tolerating regular diet for last several days  Now having recurrent bouts  zofran as needed          S/P carotid endarterectomy                VTE Risk Mitigation         Ordered     apixaban tablet 2.5 mg  2 times daily     Route:  Oral        01/28/18 2128     Medium Risk of VTE  Once      01/22/18 1959     Place BRYANT hose  Until discontinued      01/22/18 1959     Place sequential compression device  Until discontinued      01/22/18 1959          Jake Trujillo MD  Department of Hospital Medicine   [facility]

## 2018-01-31 NOTE — PLAN OF CARE
Problem: Patient Care Overview  Goal: Plan of Care Review  Outcome: Ongoing (interventions implemented as appropriate)  Patient is AAO x 4. Follows commands. NSR on the monitor. Afebrile. On 15 L Non-rebreather. UO WNL. No BM's this shift. Pt c/o back pain; reported relief with prn medication. Pt updated on plan of care. No skin breakdown, falls, or injuries this shift.

## 2018-02-01 LAB
ALBUMIN SERPL BCP-MCNC: 2.5 G/DL
ALP SERPL-CCNC: 72 U/L
ALT SERPL W/O P-5'-P-CCNC: 16 U/L
ANION GAP SERPL CALC-SCNC: 7 MMOL/L
AST SERPL-CCNC: 10 U/L
BASOPHILS # BLD AUTO: 0.01 K/UL
BASOPHILS NFR BLD: 0 %
BILIRUB SERPL-MCNC: 0.9 MG/DL
BUN SERPL-MCNC: 7 MG/DL
CALCIUM SERPL-MCNC: 8.4 MG/DL
CHLORIDE SERPL-SCNC: 86 MMOL/L
CO2 SERPL-SCNC: 30 MMOL/L
CREAT SERPL-MCNC: 0.6 MG/DL
CREAT UR-MCNC: 17 MG/DL
DIFFERENTIAL METHOD: ABNORMAL
EOSINOPHIL # BLD AUTO: 0 K/UL
EOSINOPHIL NFR BLD: 0.1 %
ERYTHROCYTE [DISTWIDTH] IN BLOOD BY AUTOMATED COUNT: 13.1 %
EST. GFR  (AFRICAN AMERICAN): >60 ML/MIN/1.73 M^2
EST. GFR  (NON AFRICAN AMERICAN): >60 ML/MIN/1.73 M^2
GLUCOSE SERPL-MCNC: 114 MG/DL
HCT VFR BLD AUTO: 31 %
HGB BLD-MCNC: 11.3 G/DL
LYMPHOCYTES # BLD AUTO: 0.6 K/UL
LYMPHOCYTES NFR BLD: 2.3 %
MCH RBC QN AUTO: 31.3 PG
MCHC RBC AUTO-ENTMCNC: 36.5 G/DL
MCV RBC AUTO: 86 FL
MONOCYTES # BLD AUTO: 2.2 K/UL
MONOCYTES NFR BLD: 9.1 %
NEUTROPHILS # BLD AUTO: 21.1 K/UL
NEUTROPHILS NFR BLD: 88.9 %
PLATELET # BLD AUTO: 428 K/UL
PMV BLD AUTO: 9 FL
POTASSIUM SERPL-SCNC: 4.1 MMOL/L
PROT SERPL-MCNC: 5.3 G/DL
PROT UR-MCNC: 18 MG/DL
PROT/CREAT RATIO, UR: 1.06
RBC # BLD AUTO: 3.61 M/UL
SODIUM SERPL-SCNC: 123 MMOL/L
WBC # BLD AUTO: 23.89 K/UL

## 2018-02-01 PROCEDURE — 84156 ASSAY OF PROTEIN URINE: CPT

## 2018-02-01 PROCEDURE — 85025 COMPLETE CBC W/AUTO DIFF WBC: CPT

## 2018-02-01 PROCEDURE — 25000003 PHARM REV CODE 250: Performed by: EMERGENCY MEDICINE

## 2018-02-01 PROCEDURE — 94761 N-INVAS EAR/PLS OXIMETRY MLT: CPT

## 2018-02-01 PROCEDURE — 36415 COLL VENOUS BLD VENIPUNCTURE: CPT

## 2018-02-01 PROCEDURE — 80053 COMPREHEN METABOLIC PANEL: CPT

## 2018-02-01 PROCEDURE — 63600175 PHARM REV CODE 636 W HCPCS

## 2018-02-01 PROCEDURE — 27000221 HC OXYGEN, UP TO 24 HOURS

## 2018-02-01 PROCEDURE — 25000003 PHARM REV CODE 250: Performed by: INTERNAL MEDICINE

## 2018-02-01 PROCEDURE — 20000000 HC ICU ROOM

## 2018-02-01 PROCEDURE — 25000003 PHARM REV CODE 250

## 2018-02-01 PROCEDURE — 63600175 PHARM REV CODE 636 W HCPCS: Performed by: INTERNAL MEDICINE

## 2018-02-01 PROCEDURE — S0028 INJECTION, FAMOTIDINE, 20 MG: HCPCS | Performed by: EMERGENCY MEDICINE

## 2018-02-01 RX ORDER — LORAZEPAM 2 MG/ML
0.5 INJECTION INTRAMUSCULAR ONCE
Status: DISCONTINUED | OUTPATIENT
Start: 2018-02-01 | End: 2018-02-02

## 2018-02-01 RX ORDER — FUROSEMIDE 20 MG/1
20 TABLET ORAL DAILY
Status: DISCONTINUED | OUTPATIENT
Start: 2018-02-02 | End: 2018-02-06 | Stop reason: HOSPADM

## 2018-02-01 RX ORDER — ENALAPRIL MALEATE 5 MG/1
10 TABLET ORAL DAILY
Status: DISCONTINUED | OUTPATIENT
Start: 2018-02-01 | End: 2018-02-06 | Stop reason: HOSPADM

## 2018-02-01 RX ADMIN — GUAIFENESIN 1200 MG: 600 TABLET, EXTENDED RELEASE ORAL at 09:02

## 2018-02-01 RX ADMIN — SODIUM CHLORIDE TAB 1 GM 1 G: 1 TAB at 08:02

## 2018-02-01 RX ADMIN — OXYCODONE HYDROCHLORIDE 5 MG: 5 TABLET ORAL at 10:02

## 2018-02-01 RX ADMIN — POTASSIUM CHLORIDE 20 MEQ: 1500 TABLET, EXTENDED RELEASE ORAL at 08:02

## 2018-02-01 RX ADMIN — SODIUM CHLORIDE TAB 1 GM 1 G: 1 TAB at 09:02

## 2018-02-01 RX ADMIN — POTASSIUM PHOSPHATE, MONOBASIC 500 MG: 500 TABLET, SOLUBLE ORAL at 08:02

## 2018-02-01 RX ADMIN — FUROSEMIDE 40 MG: 10 INJECTION, SOLUTION INTRAMUSCULAR; INTRAVENOUS at 08:02

## 2018-02-01 RX ADMIN — FAMOTIDINE 20 MG: 10 INJECTION, SOLUTION INTRAVENOUS at 08:02

## 2018-02-01 RX ADMIN — CEFTRIAXONE SODIUM 1 G: 1 INJECTION, POWDER, FOR SOLUTION INTRAMUSCULAR; INTRAVENOUS at 06:02

## 2018-02-01 RX ADMIN — FAMOTIDINE 20 MG: 10 INJECTION, SOLUTION INTRAVENOUS at 09:02

## 2018-02-01 RX ADMIN — ATORVASTATIN CALCIUM 10 MG: 10 TABLET, FILM COATED ORAL at 09:02

## 2018-02-01 RX ADMIN — APIXABAN 2.5 MG: 2.5 TABLET, FILM COATED ORAL at 08:02

## 2018-02-01 RX ADMIN — GUAIFENESIN 1200 MG: 600 TABLET, EXTENDED RELEASE ORAL at 08:02

## 2018-02-01 RX ADMIN — METOPROLOL TARTRATE 50 MG: 50 TABLET ORAL at 08:02

## 2018-02-01 RX ADMIN — LEVOTHYROXINE SODIUM 75 MCG: 75 TABLET ORAL at 06:02

## 2018-02-01 RX ADMIN — APIXABAN 2.5 MG: 2.5 TABLET, FILM COATED ORAL at 09:02

## 2018-02-01 RX ADMIN — ENALAPRIL MALEATE 10 MG: 5 TABLET ORAL at 08:02

## 2018-02-01 RX ADMIN — POTASSIUM CHLORIDE 20 MEQ: 1500 TABLET, EXTENDED RELEASE ORAL at 09:02

## 2018-02-01 NOTE — PROGRESS NOTES
Discharge plan:  Patient progressing--awaiting therapy orders/assessments; 142 arrived, printed into [employer] prior to sending referrals  Response to referrals:  [facility] are under review per [employer] responses and notes  Call from Maria Guadalupe galan [facility] stating only received cover sheet, needed name,  and SSN to look up in EPIC--left voice mail this am on her direct line [phone].  Received call from Arabella galan [facility] in [place] ([phone]) this am who reports same problem. She also has EPIC access--provided information. Also notes that can take patients who need up to 10 l nc O2.  Will update patient and family.    3:40 pm Attempted to call [facility] reprentative in from number listed in [employer] response--voice mail full. Spoke with son Dane [phone] with update.

## 2018-02-01 NOTE — PHYSICIAN QUERY
"PT Name: Jeannie Hutchins  MR #: [id number]    Physician Query Form - Heart  Condition Clarification     Marcia Swain RN, CCDS  Contact Info: [phone] [email or web address]      This form is a permanent document in the medical record.     Query Date: February 1, 2018    By submitting this query, we are merely seeking further clarification of documentation. Please utilize your independent clinical judgment when addressing the question(s) below.    The medical record contains the following   Indicators     Supporting Clinical Findings Location in Medical Record   x BNP     x EF EF 60-65% 1/30 [place] PN  Echo: 1/29/18     Radiology findings     x Echo Results 1 - Normal left ventricular systolic function (EF 60-65%).     2 - No wall motion abnormalities.     3 - Concentric remodeling.     4 - Trivial aortic regurgitation.     5 - Mild tricuspid regurgitation.     6 - Pulmonary hypertension. The estimated PA systolic pressure is 54 mmHg.     7 - Small pericardial anterior effusion without hemodynamic compromise.  1/30 [place] PN  Echo: 1/29/18     "Ascites" documented      "SOB" or "ACEVES" documented     x "Hypoxia" documented Acute respiratory failure with hypoxia and hypercarbia [place] Pn 1/29   x Heart Failure documented CHF [place] Pn 1/23; 28  Renal 1/28 - 29   x "Edema" documented Pumpmonary edema   Trac Le PN 1/29   x Diuretics/Meds Furosemide IV 40 mg Once on 1/29  Furosemide IV 40 mg Daily start on 1/30  Furosemide IV 10 mg Once on 1/29   MAR   x Treatment: Continue fluid restriction  Continue Lasix  Watch renal function closely  Cardiology consulted    Tra Le PN 1/29   x Other:  Differential:  CHF - bnp is 400+;  cxr shows normal heart size, no pulm fluid - will check echo    HTN  Hypothyroidism  Hypkalemia  Hypophosphatemia  HypoNatremia    Dr. Armenta recs change from amlodipine to metoprolol    moving to ICU, she began having more increased oxygen needs to keep sats adequate.     Yuratic Pn " "1/28                    [facility] Pn 1/29     x  Seems to be somewhat volume overloaded  Paroxysmal Atrial Fib    Pulmonary hypertension    PAP [address] [place] CN 1/29      [facility] Pn 1/29       Provider, please specify diagnosis or diagnoses associated with above clinical findings.           Please clarify "CHF".         [  ] Acute Diastolic Heart Failure ( EF > 40)*    [  ] Acute on Chronic Diastolic Heart Failure( EF > 40)*    [  ] Chronic Diastolic Heart Failure (EF > 40)*    [  ] Other Type of Heart Failure (please specify type): _________________________    [  ] Heart Failure Ruled Out    [  ] Other (please specify): ___________________________________    [ x ] Clinically Undetermined            *American Heart Association                                                                                                          Please document in your progress notes daily for the duration of treatment until resolved and include in your discharge summary.    "

## 2018-02-01 NOTE — PHYSICIAN QUERY
"PT Name: Jeannie Hutchins                          (See Payal 2/4 PN for response.  [facility])  MR #: [id number]     Physician Query Form - Documentation Clarification      Marcia Swain RN, CCDS  Contact Info: [phone] [email or web address]      This form is a permanent document in the medical record.     Query Date: February 1, 2018    By submitting this query, we are merely seeking further clarification of documentation. Please utilize your independent clinical judgment when addressing the question(s) below.    The Medical record reflects the following:    Supporting Clinical Findings Location in Medical Record   Chest X-Ray 1/29/18  Worsening bilateral lung aeration, possibly pulmonary edema or pneumonia.     moved to ICU for drip. While there, developed resp difficulty.      She eventually required NRB with 14lpm O2 - sat 95%.      CXR on 1/28 showed worsening aeroation - due either to pneumonia or fluid.      Lasix given - good result with urination; felt a bit better.       Started on empiric rocephin.   [place] CN 1/29, 30  [place] PN 1/29, 30, 31            [place] Pn 1/29   Guaifenesin PO 2 times daily start 1/23  Ceftriaxone IVPB Every 24 hours start 1/29   MAR                                                                            Doctor, Please specify diagnosis or diagnoses associated with above clinical findings.     Please clarify "pneumonia".     Provider Use Only      (  ) Pneumonia ruled out    (  ) No Pneumonia    (  ) Pneumonia ruled in - specify type & organism:                            Type: (  ) Bacterial;  (  ) Viral;  (  ) Other - specify: ___________                           Organism: _____________________    (  ) Bacterial Pneumonia ruled in    (  ) Other - specify: _____________________________                                                                                                             [  ] Clinically undetermined            "

## 2018-02-01 NOTE — PLAN OF CARE
Recommendations     Recommendation/Intervention:   1. Continue with diet as tolerated.   -add GI soft restriction if pt c/o abd discomfort.  2. RD to monitor     Goals: Meet 85% of EEN  Nutrition Goal Status: new  Discussed: on rounds     Continuum of Care Plan     D/C planning: Cardiac diet when low sodium resolved.

## 2018-02-01 NOTE — PLAN OF CARE
Problem: Patient Care Overview  Goal: Plan of Care Review  Outcome: Ongoing (interventions implemented as appropriate)    VSS; NSR-SB. Diuresing per lasix. Breath sounds improving. Pt AAOx4 and appropriate at this time. Respirations even and unlabored. No acute distress noted. Pt denies pain, N/V.   Bed is locked and in lowest position with side rails up x2. Pt on continuous cardiac monitoring, pulse ox, and BP cuff. Weight shift assistance provided q2. No new injury or falls. Family updated as to POC, will continue to monitor.  Bed is locked and in lowest position with side rails up x2. Pt on continuous cardiac monitoring, pulse ox, and BP cuff. Weight shift assistance provided q2. No new injury or falls. Family updated as to POC, will continue to monitor.

## 2018-02-01 NOTE — CONSULTS
"  [facility]  Adult Nutrition  Consult Note    SUMMARY     Recommendations    Recommendation/Intervention:   1. Continue with diet as tolerated.   -add GI soft restriction if pt c/o abd discomfort.  2. RD to monitor    Goals: Meet 85% of EEN  Nutrition Goal Status: new  Discussed: on rounds    Continuum of Care Plan    D/C planning: Cardiac diet when low sodium resolved.        Reason for Assessment    Reason for Assessment: length of stay  Diagnosis:  (Hyponatremia)  Relevent Medical History: Thyroid dz, HTN, hypercholestremia   Interdisciplinary Rounds: attended     General Information Comments: Pt reports that her appetite is fair. She did not report any problems with N/V/D/C.     Nutrition Prescription Ordered  Current Diet Order: Regular    Evaluation of Received Nutrients/Fluid Intake  Kcal requirements: meeting needs  Protein equipments: meeting needs  Fluid requirements: meeting needs     I/O: 2,2220/2,700     % Intake of Estimated Energy Needs: 100   % Meal Intake:     Nutrition Risk Screen  Nutrition Risk Screen: no indicators present    Nutrition/Diet History  Food Preferences: No cultural/religous food restrictions    Labs/Tests/Procedures/Meds   Pertinent Labs Reviewed: reviewed  Pertinent Labs Comments: Na- 123, Cl- 86, CO2- 30, Glu- 114, BUN- 7, Ca- 8.4, Alb- 2.5  Pertinent Medications Reviewed: reviewed  Pertinent Medications Comments: Atorvastatin, KCl    Physical Findings  Nourished  Skin:Intact John: 16  Teeth missing    Anthropometrics  Temp: 98.2 °F (36.8 °C)     Height: 4' 8" (142.2 cm)  Weight Method: Bed Scale  Weight: 48 kg (105 lb 13.1 oz)     Ideal Body Weight (IBW), Female: 80 lb     % Ideal Body Weight, Female (lb): 132.28 lb  BMI (Calculated): 23.8  BMI Grade: 18.5-24.9 - normal    Estimated/Assessed Needs  Weight Used For Calorie Calculations: 48 kg (105 lb 13.1 oz)      Energy Need Method: Kcal/kg  Indirect Calorimetry: 1200- 1500 kcal (25-30 kcal/kg)    RMR " ([place]-St. Jeor Equation): 803     Weight Used For Protein Calculations: 48 kg (105 lb 13.1 oz)  Protein Requirements: 48-57g (1-1.2g/kg)     Fluid Requirements (mL): 1000-1,200mL (1mL/kg)  Fluid Need Method: RDA Method    Assessment and Plan    Nutrition Problem  Altered nutrition related lab values    Related to (etiology):   Diarrhea    Signs and Symptoms (as evidenced by):   Low serum sodium    Interventions/Recommendations (treatment strategy):  See recs    Nutrition Diagnosis Status:   New    Monitor and Evaluation  Food and Nutrient Intake: energy intake, food and beverage intake  Food and Nutrient Adminstration: diet order  Knowledge/Beliefs/Attitudes: food and nutrition knowledge/skill, beliefs and attitudes  Physical Activity and Function: nutrition-related ADLs and IADLs  Anthropometric Measurements: weight  Biochemical Data, Medical Tests and Procedures: electrolyte and renal panel  Nutrition-Focused Physical Findings: overall appearance    Nutrition Risk  1 x week     Nutrition Follow-Up  RD Follow-up?: Yes    I certify that I directed the dietetic intern in service delivery and guided them using my skilled judgment. As the cosigning dietitian, I have reviewed the dietetic interns documentation and am responsible for the treatment, assessment, and plan.    Mylene Hu, Dietetic Intern

## 2018-02-01 NOTE — PROGRESS NOTES
Jeannie Hutchins is a 82 y.o. female patient.    Follow for hyponatremia    No new c/o, feeling better  Comfortable    Scheduled Meds:   apixaban  2.5 mg Oral BID    atorvastatin  10 mg Oral QHS    cefTRIAXone (ROCEPHIN) IVPB  1 g Intravenous Q24H    enalapril  10 mg Oral Daily    famotidine (PF)  20 mg Intravenous Q12H    furosemide  40 mg Intravenous Daily    guaiFENesin  1,200 mg Oral BID    levothyroxine  75 mcg Oral Before breakfast    lorazepam  0.5 mg Intravenous Once    metoprolol tartrate  50 mg Oral BID    potassium chloride  20 mEq Oral BID    potassium phosphate (monobasic)  500 mg Oral Daily    sodium chloride  1 g Oral BID       Review of patient's allergies indicates:   Allergen Reactions    Strawberry Swelling     Tongue swells up and a generalized rash.         Vital Signs Range (Last 24H):  Temp:  [98.1 °F (36.7 °C)-98.4 °F (36.9 °C)]   Pulse:  [52-90]   Resp:  [14-55]   BP: ()/()   SpO2:  [86 %-100 %]     I & O (Last 24H):  Intake/Output Summary (Last 24 hours) at 02/01/18 1236  Last data filed at 02/01/18 1100   Gross per 24 hour   Intake             1460 ml   Output             2425 ml   Net             -965 ml           Physical Exam:  General appearance: well developed, cachectic, no distress  Lungs:  clear to auscultation bilaterally and normal respiratory effort  Heart: regular rate and rhythm  Abdomen: soft, non-tender non-distented; bowel sounds normal; no masses,  no organomegaly  Extremities: no cyanosis or edema, or clubbing    Laboratory:  CBC:   Recent Labs  Lab 02/01/18  0240   WBC 23.89*   RBC 3.61*   HGB 11.3*   HCT 31.0*   *   MCV 86   MCH 31.3*   MCHC 36.5*     CMP:   Recent Labs  Lab 02/01/18  0240   *   CALCIUM 8.4*   ALBUMIN 2.5*   PROT 5.3*   *   K 4.1   CO2 30*   CL 86*   BUN 7*   CREATININE 0.6   ALKPHOS 72   ALT 16   AST 10   BILITOT 0.9       Imp/Plan    Hyponatremia - stable  Hypothyroidism  HTN  Nephrotic range  proteinuria    Reduced Lasix to 20 mg po daily  CMP in am    Trac T Le  2/1/2018

## 2018-02-01 NOTE — PLAN OF CARE
Problem: Patient Care Overview  Goal: Plan of Care Review  Outcome: Ongoing (interventions implemented as appropriate)  Pt AAOx4.  VSS.  O2 weaned down to 8L High flow nasal cannual without distress.  Denies SOB.  Pt complained of generalized back pain relieved with prn oxycodone.  Voiding per bedpan.  No BM overnight.  Moves independently in bed.  No skin breakdown or injury observed; safety precautions maintained.

## 2018-02-01 NOTE — ASSESSMENT & PLAN NOTE
On 1/27 slightly low at 3.4  Started on 20meq daily  (also on K phos)  Getting more lasix; bump k to 20 bid  Today 4.1

## 2018-02-01 NOTE — PLAN OF CARE
Problem: Patient Care Overview  Goal: Plan of Care Review  Outcome: Ongoing (interventions implemented as appropriate)  Patient remained alert and oriented, NRB changed to O2 10 L high flow set-up, humidified. SR/SB on monitor throughout day. 22g IVSL to left forearm intact x 2. Patient on bedpan numerous times throughout day, 1300 ml output thus far. NA level today 122. Patient remains free from injury today.

## 2018-02-01 NOTE — PHYSICIAN QUERY
PT Name: Jeannie Hutchins  MR #: [id number]     Physician Query Form - Diagnosis Clarification      Marcia Swain RN, CCDS  Contact Info: [phone] [email or web address]      This form is a permanent document in the medical record.     Query Date: February 1, 2018    By submitting this query, we are merely seeking further clarification of documentation.  Please utilize your independent clinical judgment when addressing the question(s) below.     The medical record contains the following:      Findings Supporting Clinical Information Location in Medical Record   SIADH    Per Renal Pn 1/27 1.Hyponatremia. Siadh vs poor po intake. Got better with ivfs. Now dropping. Pt not eating much and paroxetine.  Recommend drug change. Added salt tablets. Magdiel sodium.  2. Hypothyroidism. Cont tx   Renal Pn 1/27    Differential:   SIADH - ? Source ? [place] Pn 1/28    1.Hyponatremia. Siadh with poor intake.. Got better with ivfs. Now dropping. Pt not eating much and paroxetine.  Recommend drug change. PCP put on ns.  Magdiel sodium. If not better, add 3% soln. Started tele.  2.Hypothyroidism. Cont tx.  3.HTN. No hctz or lasix. Tx per primary.   Renal PN 1/28    Hyponatremia - eitology unclear, SIADH, poor intake, hypothyroidism, CHF Renal Pn 1/29    Sodium improving with fluids - 123 as of 1/27    Sodium dropped again, to 117.      Resumed NS IV. This did not help.  Na continued dropping.      Renal on case - changed to 0.3%NS - moved to ICU for drip.    Hyponatremia with decreased serum osmolality    She  Had been placed on paroxetine - might lower levels    Repeat 1/28 is 117, then 115; Resumed IV saline    (2 x Na) + (glu/18) + (BUN/2.8) = serum osm = 242  (range 275-295)    When levels continued dropping, pt transferred to ICU for 0.3%NaCl administration    Renal follows    Frequent monitoring:  Most recent level up to 118 - avoid rapid correction   [facility] Pn 1/29    On 1/22 = 113, 118, 120  On 1/23 = 125, 131  On 1/24 & 25 =  "132  On 1/26 = 129  On 1/27 = 123, 121  On 1/28 = 117, 116  On 1/29 = 115, 116, 118, 119  On 1/30 = 119, 118  On 1/31 = 122  On 2/01 = 123   Sodium Levels   Per Lab    Urine Osmolality = 184 on 1/22 & 441 on 1/28 1/22/2018 18:33   Specific Gravity, UA 1.010   Protein, UA 2+ (A)   Urobilinogen, UA Negative   Leukocytes, UA 1+ (A)   Color, UA Yellow   Nitrite, UA Negative      Lab    IVF:  NS 3% IV continuous @ 20 ml/hr on 1/28 - 1/29  NS 0.9% IV continuous @ 100 ml/hr on start & stop 1/28  NS 0.9% IV continuous @ 75 ml/hr on 1/23 - 1/25  NS 0.9%  ml Bolus on 1/23 x 2 doses  NS 3% IV continuous @ 20 ml/hr on 1/22 - 1/23  NS 3%  ml in ED x1    Sodium Chloride Tablet 1R 2 times daily start 1/27   MAR     Please clarify if the "SIADH" diagnosis has been:    [ x ] Ruled In  [  ] Ruled In, Now Resolved  [  ] Ruled Out  [  ] Clinically insignificant  [  ] Clinically undetermined  [  ] Other/Clarification of findings (please specify)_______________________________    Please document in your progress notes daily for the duration of treatment, until resolved, and include in your discharge summary.                                                                                "

## 2018-02-01 NOTE — ASSESSMENT & PLAN NOTE
Na on 1/27 123  She  Had been placed on paroxetine - might lower levels  Repeat 1/28 is 117, then 115  Resumed IV saline  (2 x Na) + (glu/18) + (BUN/2.8) = serum osm = 242  (range 275-295)  When levels continued dropping, pt transferred to ICU for 3%NaCl administration - but this has now been held  Renal follows  Frequent monitoring:   avoid rapid correction  Multiple etiologies as above - awaiting cortisol; normal systolic fxn (indeterminate diastolic), no cirrhosis  Dr. Cedeno gave more lasix  - now up to 123

## 2018-02-01 NOTE — ASSESSMENT & PLAN NOTE
Since moving to ICU, she began having more increased oxygen needs to keep sats adequate.  She is still on 8lpm  She sounds wet - asking renal about best option for diuresis  Started on empiric rocephin  sats in high 90s; weaned off mask  She has been getting lasix for the congestion and rocephin for the opacities;  Repeat cxr today:  Cardiac wires overlie the upper abdomen and lower chest. Cardiac silhouette is mildly enlarged but stable in size. Atherosclerotic calcifications overlie the aortic arch. There is stable central vascular congestion and patchy bilateral airspace disease. Small bilateral pleural effusions, left side greater than right. No detrimental change in lung aeration.

## 2018-02-01 NOTE — ASSESSMENT & PLAN NOTE
Spoke with her son today;  He is concerned about her well-being after discharge  Now that her  has pneumonia, caregiving will be more difficult  He is considering placing both of them in assisted living  He is worried that she is not taking her medications appropriately  In interim, he  Is asking about treatment alternatives - discussed SNF vs HH with PT.  Will have  talk with him to discuss options and formulate plan;  At this point,  likely to go to assisted;  Pt to go to SNF until she is well enough to join him

## 2018-02-01 NOTE — SUBJECTIVE & OBJECTIVE
Interval History:  stable    Review of Systems   Better   Objective:     Vital Signs (Most Recent):  Temp: 98.2 °F (36.8 °C) (02/01/18 1115)  Pulse: 60 (02/01/18 1115)  Resp: 20 (02/01/18 1115)  BP: (!) 154/88 (02/01/18 1115)  SpO2: 98 % (02/01/18 1115) Vital Signs (24h Range):  Temp:  [98.1 °F (36.7 °C)-98.4 °F (36.9 °C)] 98.2 °F (36.8 °C)  Pulse:  [52-80] 60  Resp:  [14-55] 20  SpO2:  [86 %-100 %] 98 %  BP: ()/() 154/88     Weight: 48 kg (105 lb 13.1 oz)  Body mass index is 23.72 kg/m².    Intake/Output Summary (Last 24 hours) at 02/01/18 1444  Last data filed at 02/01/18 1100   Gross per 24 hour   Intake             1210 ml   Output             2275 ml   Net            -1065 ml      Physical Exam  Resting quietly  Lungs coarse  Heart rrr  abd benign

## 2018-02-01 NOTE — ASSESSMENT & PLAN NOTE
Continue home meds except HCTZ   norvasc 10  added vasotec 2.5  Amlodipine stopped due to afib; started metoprolol  Bumped vasotec to 5  Metop bumped to 50 bid  174/72 - bump vasotec to 10

## 2018-02-01 NOTE — ASSESSMENT & PLAN NOTE
Had been started on paroxetine   She is more anxious because of recent diagnosis of flu in 82 yo  - he was in ER today.  Now sent home, but he is alone.  (Her daughter would normally help care for him, but she is undergoing cancer treatment and should not be exposed to flu)  She became more anxious over night  Gave her ONE dose of lorazepam to tide her over.  However, she cannot rely on long-term benzos.  Now that Paxil has to be stopped, need to see what Dr. Mckeon will recommend as replacement.  Dr. Venegas feels that hyponatremia is a class effect of SSRI  Had to give another dose of lorazepam earlier  .

## 2018-02-02 LAB
ALBUMIN SERPL BCP-MCNC: 2.8 G/DL
ALP SERPL-CCNC: 97 U/L
ALT SERPL W/O P-5'-P-CCNC: 38 U/L
ANION GAP SERPL CALC-SCNC: 10 MMOL/L
AST SERPL-CCNC: 29 U/L
BASOPHILS # BLD AUTO: 0.01 K/UL
BASOPHILS NFR BLD: 0.1 %
BILIRUB SERPL-MCNC: 1 MG/DL
BUN SERPL-MCNC: 10 MG/DL
CALCIUM SERPL-MCNC: 9.5 MG/DL
CHLORIDE SERPL-SCNC: 88 MMOL/L
CO2 SERPL-SCNC: 30 MMOL/L
CREAT SERPL-MCNC: 0.6 MG/DL
DIFFERENTIAL METHOD: ABNORMAL
EOSINOPHIL # BLD AUTO: 0.1 K/UL
EOSINOPHIL NFR BLD: 0.8 %
ERYTHROCYTE [DISTWIDTH] IN BLOOD BY AUTOMATED COUNT: 13.2 %
EST. GFR  (AFRICAN AMERICAN): >60 ML/MIN/1.73 M^2
EST. GFR  (NON AFRICAN AMERICAN): >60 ML/MIN/1.73 M^2
GLUCOSE SERPL-MCNC: 101 MG/DL
HCT VFR BLD AUTO: 36.4 %
HGB BLD-MCNC: 13 G/DL
LYMPHOCYTES # BLD AUTO: 0.9 K/UL
LYMPHOCYTES NFR BLD: 5.1 %
MAGNESIUM SERPL-MCNC: 1.6 MG/DL
MCH RBC QN AUTO: 31.3 PG
MCHC RBC AUTO-ENTMCNC: 35.7 G/DL
MCV RBC AUTO: 88 FL
MONOCYTES # BLD AUTO: 2.3 K/UL
MONOCYTES NFR BLD: 13.5 %
NEUTROPHILS # BLD AUTO: 13.9 K/UL
NEUTROPHILS NFR BLD: 80.8 %
PHOSPHATE SERPL-MCNC: 2.3 MG/DL
PLATELET # BLD AUTO: 456 K/UL
PMV BLD AUTO: 8.7 FL
POTASSIUM SERPL-SCNC: 4.1 MMOL/L
PROT SERPL-MCNC: 6.5 G/DL
RBC # BLD AUTO: 4.16 M/UL
SODIUM SERPL-SCNC: 128 MMOL/L
WBC # BLD AUTO: 17.23 K/UL

## 2018-02-02 PROCEDURE — 25000003 PHARM REV CODE 250: Performed by: INTERNAL MEDICINE

## 2018-02-02 PROCEDURE — 25000003 PHARM REV CODE 250: Performed by: EMERGENCY MEDICINE

## 2018-02-02 PROCEDURE — 27100171 HC OXYGEN HIGH FLOW UP TO 24 HOURS

## 2018-02-02 PROCEDURE — 25000003 PHARM REV CODE 250

## 2018-02-02 PROCEDURE — 83735 ASSAY OF MAGNESIUM: CPT

## 2018-02-02 PROCEDURE — 21400001 HC TELEMETRY ROOM

## 2018-02-02 PROCEDURE — S0028 INJECTION, FAMOTIDINE, 20 MG: HCPCS | Performed by: EMERGENCY MEDICINE

## 2018-02-02 PROCEDURE — 84100 ASSAY OF PHOSPHORUS: CPT

## 2018-02-02 PROCEDURE — 63600175 PHARM REV CODE 636 W HCPCS

## 2018-02-02 PROCEDURE — 36415 COLL VENOUS BLD VENIPUNCTURE: CPT

## 2018-02-02 PROCEDURE — 85025 COMPLETE CBC W/AUTO DIFF WBC: CPT

## 2018-02-02 PROCEDURE — 80053 COMPREHEN METABOLIC PANEL: CPT

## 2018-02-02 RX ORDER — MAG HYDROX/ALUMINUM HYD/SIMETH 200-200-20
30 SUSPENSION, ORAL (FINAL DOSE FORM) ORAL EVERY 4 HOURS PRN
Status: DISCONTINUED | OUTPATIENT
Start: 2018-02-02 | End: 2018-02-06 | Stop reason: HOSPADM

## 2018-02-02 RX ADMIN — OXYCODONE HYDROCHLORIDE 5 MG: 5 TABLET ORAL at 11:02

## 2018-02-02 RX ADMIN — APIXABAN 2.5 MG: 2.5 TABLET, FILM COATED ORAL at 08:02

## 2018-02-02 RX ADMIN — FUROSEMIDE 20 MG: 20 TABLET ORAL at 08:02

## 2018-02-02 RX ADMIN — ENALAPRIL MALEATE 10 MG: 5 TABLET ORAL at 08:02

## 2018-02-02 RX ADMIN — OXYCODONE HYDROCHLORIDE 5 MG: 5 TABLET ORAL at 10:02

## 2018-02-02 RX ADMIN — FAMOTIDINE 20 MG: 10 INJECTION, SOLUTION INTRAVENOUS at 09:02

## 2018-02-02 RX ADMIN — FAMOTIDINE 20 MG: 10 INJECTION, SOLUTION INTRAVENOUS at 08:02

## 2018-02-02 RX ADMIN — SODIUM CHLORIDE TAB 1 GM 1 G: 1 TAB at 08:02

## 2018-02-02 RX ADMIN — POTASSIUM CHLORIDE 20 MEQ: 1500 TABLET, EXTENDED RELEASE ORAL at 08:02

## 2018-02-02 RX ADMIN — LEVOTHYROXINE SODIUM 75 MCG: 75 TABLET ORAL at 05:02

## 2018-02-02 RX ADMIN — ALUMINUM HYDROXIDE, MAGNESIUM HYDROXIDE, AND SIMETHICONE 30 ML: 200; 200; 20 SUSPENSION ORAL at 04:02

## 2018-02-02 RX ADMIN — METOPROLOL TARTRATE 50 MG: 50 TABLET ORAL at 09:02

## 2018-02-02 RX ADMIN — POTASSIUM PHOSPHATE, MONOBASIC 500 MG: 500 TABLET, SOLUBLE ORAL at 08:02

## 2018-02-02 RX ADMIN — CEFTRIAXONE SODIUM 1 G: 1 INJECTION, POWDER, FOR SOLUTION INTRAMUSCULAR; INTRAVENOUS at 05:02

## 2018-02-02 RX ADMIN — GUAIFENESIN 1200 MG: 600 TABLET, EXTENDED RELEASE ORAL at 08:02

## 2018-02-02 RX ADMIN — METOPROLOL TARTRATE 50 MG: 50 TABLET ORAL at 08:02

## 2018-02-02 RX ADMIN — ATORVASTATIN CALCIUM 10 MG: 10 TABLET, FILM COATED ORAL at 08:02

## 2018-02-02 NOTE — PROGRESS NOTES
Discharge plan: SNF   Inocente Vela ([phone]) left voice mail for BOBBY this am to add [facility] to preference list of SNF providers. BOBBY sent records via [facility] and added to EPIC post Acute Provider. Patient now with orders to move out of ICU. RN reports that PPD documentation is continuing (form in chart) and RN reports had patient up this am. SW provided update to son. SW/CM will follow and assist as needed.

## 2018-02-02 NOTE — ASSESSMENT & PLAN NOTE
Spoke with her son today;  He is concerned about her well-being after discharge  Now that her  has pneumonia, caregiving will be more difficult  He is considering placing both of them in assisted living  He is worried that she is not taking her medications appropriately  In interim, he  Is asking about treatment alternatives - discussed SNF vs HH with PT.  Will have  talk with him to discuss options and formulate plan;  At this point,  likely to go to assisted;  Pt to go to SNF until she is well enough to join him  When I explained this to pt today (2/2) she said that while she is ok with temporary SNF, she wishes to otherwise remain at home with  - not go to assisted living

## 2018-02-02 NOTE — PLAN OF CARE
02/02/18 1016   Discharge Reassessment   Assessment Type Discharge Planning Reassessment   Provided patient/caregiver education on the expected discharge date and the discharge plan Yes   Do you have any problems affording any of your prescribed medications? No   Discharge Plan A Skilled Nursing Facility   Discharge Plan B Home with family;Home Health;Other  (paid care providers)   Patient choice form signed by patient/caregiver No  (verbal--patient requested son provide)   Can the patient answer the patient profile reliably? Yes, cognitively intact   How does the patient rate their overall health at the present time? Good   Describe the patient's ability to walk at the present time. Does not walk or unable to take any steps at all  (has been in ICU, SOB )   How often would a person be available to care for the patient? Infrequently   Number of comorbid conditions (as recorded on the chart) Five or more   During the past month, has the patient often been bothered by feeling down, depressed or hopeless? No  (record)   During the past month, has the patient often been bothered by little interest or pleasure in doing things? No  (record)   patient with orders to transfer out of ICU today. Has PASRR/142; PPD in progress--facilities contacted [facility], [facility] ([facility]), [facility] in [place] and [facility] in [facility]. Information updated on 2/2. BOBBY/WILL will continue to follow.      11:05 am --message from Rubi with [facility] requesting more information--provided voice mail for anticipated discharge date (estimated Monday depending upon progress) and fax of 142/PASRR, PPD and chest xray--will provide.

## 2018-02-02 NOTE — ASSESSMENT & PLAN NOTE
Na on 1/27 123  She  Had been placed on paroxetine - might lower levels  Repeat 1/28 is 117, then 115  Resumed IV saline  (2 x Na) + (glu/18) + (BUN/2.8) = serum osm = 242  (range 275-295)  When levels continued dropping, pt transferred to ICU for 3%NaCl administration - but this has now been held  Renal follows  Frequent monitoring:   avoid rapid correction  Multiple etiologies as above - awaiting cortisol; normal systolic fxn (indeterminate diastolic), no cirrhosis  Dr. Cedeno gave more lasix  - now up to 128  She is no longer on drip - moved back to tele today

## 2018-02-02 NOTE — NURSING TRANSFER
Nursing Transfer Note      2/2/2018     Transfer From: ICU to Tele    Transfer via wheelchair    Transfer with  O2, cardiac monitoring    Transported by YINKA Ashley    Medicines sent: none to send    Chart send with patient: Yes    Notified: spouse    Patient reassessed at: 2/2/2018 , 1315      Upon arrival to floor: cardiac monitor applied, patient oriented to room, call bell in reach and bed in lowest position, pt up in recliner, wheels locked.

## 2018-02-02 NOTE — PROGRESS NOTES
Jeannie Hutchins is a 82 y.o. female patient.    Follow for hyponatremia    No new c/o  Comfortable    Scheduled Meds:   apixaban  2.5 mg Oral BID    atorvastatin  10 mg Oral QHS    cefTRIAXone (ROCEPHIN) IVPB  1 g Intravenous Q24H    enalapril  10 mg Oral Daily    famotidine (PF)  20 mg Intravenous Q12H    furosemide  20 mg Oral Daily    guaiFENesin  1,200 mg Oral BID    levothyroxine  75 mcg Oral Before breakfast    lorazepam  0.5 mg Intravenous Once    metoprolol tartrate  50 mg Oral BID    potassium chloride  20 mEq Oral BID    potassium phosphate (monobasic)  500 mg Oral Daily    sodium chloride  1 g Oral BID       Review of patient's allergies indicates:   Allergen Reactions    Strawberry Swelling     Tongue swells up and a generalized rash.         Vital Signs Range (Last 24H):  Temp:  [97.5 °F (36.4 °C)-98.6 °F (37 °C)]   Pulse:  [52-79]   Resp:  [13-52]   BP: ()/()   SpO2:  [94 %-100 %]     I & O (Last 24H):  Intake/Output Summary (Last 24 hours) at 02/02/18 1019  Last data filed at 02/02/18 0520   Gross per 24 hour   Intake              360 ml   Output             1900 ml   Net            -1540 ml           Physical Exam:  General appearance: well developed, cachectic, no distress  Lungs:  diminished breath sounds bibasilar  Heart: regular rate and rhythm  Abdomen: soft, non-tender non-distented; bowel sounds normal; no masses,  no organomegaly  Extremities: no cyanosis or edema, or clubbing    Laboratory:  CBC:   Recent Labs  Lab 02/02/18  0248   WBC 17.23*   RBC 4.16   HGB 13.0   HCT 36.4*   *   MCV 88   MCH 31.3*   MCHC 35.7     CMP:   Recent Labs  Lab 02/02/18  0248      CALCIUM 9.5   ALBUMIN 2.8*   PROT 6.5   *   K 4.1   CO2 30*   CL 88*   BUN 10   CREATININE 0.6   ALKPHOS 97   ALT 38   AST 29   BILITOT 1.0       Imp/Plan    Hyponatremia - improving  Hypothyroidism  HTN  Proteinuria    Continue presnt Rx  BMP in am      Trac RUBA Cedeno  2/2/2018

## 2018-02-02 NOTE — ASSESSMENT & PLAN NOTE
Continue home meds except HCTZ   norvasc 10  added vasotec 2.5  Amlodipine stopped due to afib; started metoprolol  Bumped vasotec to 5  Metop bumped to 50 bid  174/72 - bump vasotec to 10; now 140/63

## 2018-02-02 NOTE — PLAN OF CARE
Problem: Patient Care Overview  Goal: Plan of Care Review  Outcome: Ongoing (interventions implemented as appropriate)  Pt AAOx4.  VSS.  Denies SOB.  O2 weaned down to 5L high flow nasal cannula.  Sodium 128 this AM.  Voiding per bedpan without difficulty.  No BM overnight.  Generalized back pain relieved with prn oxycodone.  Pt able to move independently in bed.  No skin breakdown or injury observed; safety precautions maintained.

## 2018-02-03 LAB
ANION GAP SERPL CALC-SCNC: 6 MMOL/L
BUN SERPL-MCNC: 9 MG/DL
CALCIUM SERPL-MCNC: 9 MG/DL
CHLORIDE SERPL-SCNC: 95 MMOL/L
CO2 SERPL-SCNC: 28 MMOL/L
CREAT SERPL-MCNC: 0.6 MG/DL
EST. GFR  (AFRICAN AMERICAN): >60 ML/MIN/1.73 M^2
EST. GFR  (NON AFRICAN AMERICAN): >60 ML/MIN/1.73 M^2
GLUCOSE SERPL-MCNC: 95 MG/DL
PHOSPHATE SERPL-MCNC: 2.9 MG/DL
POCT GLUCOSE: 116 MG/DL (ref 70–110)
POTASSIUM SERPL-SCNC: 4.7 MMOL/L
SODIUM SERPL-SCNC: 129 MMOL/L

## 2018-02-03 PROCEDURE — 25000003 PHARM REV CODE 250: Performed by: EMERGENCY MEDICINE

## 2018-02-03 PROCEDURE — 80048 BASIC METABOLIC PNL TOTAL CA: CPT

## 2018-02-03 PROCEDURE — 25000003 PHARM REV CODE 250

## 2018-02-03 PROCEDURE — 25000003 PHARM REV CODE 250: Performed by: INTERNAL MEDICINE

## 2018-02-03 PROCEDURE — 84100 ASSAY OF PHOSPHORUS: CPT

## 2018-02-03 PROCEDURE — S0028 INJECTION, FAMOTIDINE, 20 MG: HCPCS | Performed by: EMERGENCY MEDICINE

## 2018-02-03 PROCEDURE — 36415 COLL VENOUS BLD VENIPUNCTURE: CPT

## 2018-02-03 PROCEDURE — 63600175 PHARM REV CODE 636 W HCPCS

## 2018-02-03 PROCEDURE — 21400001 HC TELEMETRY ROOM

## 2018-02-03 RX ORDER — MOXIFLOXACIN HYDROCHLORIDE 400 MG/1
400 TABLET ORAL DAILY
Status: DISCONTINUED | OUTPATIENT
Start: 2018-02-04 | End: 2018-02-06 | Stop reason: HOSPADM

## 2018-02-03 RX ORDER — AMLODIPINE BESYLATE 5 MG/1
10 TABLET ORAL DAILY
Status: DISCONTINUED | OUTPATIENT
Start: 2018-02-03 | End: 2018-02-06 | Stop reason: HOSPADM

## 2018-02-03 RX ORDER — AMLODIPINE BESYLATE 5 MG/1
5 TABLET ORAL DAILY
Status: DISCONTINUED | OUTPATIENT
Start: 2018-02-03 | End: 2018-02-03

## 2018-02-03 RX ADMIN — GUAIFENESIN 1200 MG: 600 TABLET, EXTENDED RELEASE ORAL at 08:02

## 2018-02-03 RX ADMIN — CEFTRIAXONE 1 G: 1 INJECTION, SOLUTION INTRAVENOUS at 05:02

## 2018-02-03 RX ADMIN — FAMOTIDINE 20 MG: 10 INJECTION, SOLUTION INTRAVENOUS at 08:02

## 2018-02-03 RX ADMIN — APIXABAN 2.5 MG: 2.5 TABLET, FILM COATED ORAL at 08:02

## 2018-02-03 RX ADMIN — SODIUM CHLORIDE TAB 1 GM 1 G: 1 TAB at 08:02

## 2018-02-03 RX ADMIN — OXYCODONE HYDROCHLORIDE 5 MG: 5 TABLET ORAL at 10:02

## 2018-02-03 RX ADMIN — METOPROLOL TARTRATE 50 MG: 50 TABLET ORAL at 08:02

## 2018-02-03 RX ADMIN — OXYCODONE HYDROCHLORIDE 5 MG: 5 TABLET ORAL at 11:02

## 2018-02-03 RX ADMIN — FUROSEMIDE 20 MG: 20 TABLET ORAL at 08:02

## 2018-02-03 RX ADMIN — AMLODIPINE BESYLATE 10 MG: 5 TABLET ORAL at 09:02

## 2018-02-03 RX ADMIN — OXYCODONE HYDROCHLORIDE 5 MG: 5 TABLET ORAL at 03:02

## 2018-02-03 RX ADMIN — LEVOTHYROXINE SODIUM 75 MCG: 75 TABLET ORAL at 05:02

## 2018-02-03 RX ADMIN — ATORVASTATIN CALCIUM 10 MG: 10 TABLET, FILM COATED ORAL at 08:02

## 2018-02-03 RX ADMIN — ENALAPRIL MALEATE 10 MG: 5 TABLET ORAL at 08:02

## 2018-02-03 NOTE — PLAN OF CARE
Problem: Pain, Acute (Adult)  Intervention: Monitor/Manage Analgesia   02/03/18 0607   Safety Interventions   Medication Review/Management medications reviewed     Intervention: Mutually Develop/Implement Acute Pain Management Plan   02/03/18 0607   Pain/Comfort/Sleep Interventions   Pain Management Interventions relaxation promoted;medication   Cognitive Interventions   Sensory Stimulation Regulation quiet environment promoted;lighting decreased;care clustered     Intervention: Support/Optimize Psychosocial Response to Acute Pain   02/03/18 0607   Coping/Psychosocial Interventions   Supportive Measures active listening utilized;relaxation techniques promoted;verbalization of feelings encouraged   Psychosocial Support   Trust Relationship/Rapport care explained;choices provided;questions encouraged;questions answered       Goal: Identify Related Risk Factors and Signs and Symptoms  Related risk factors and signs and symptoms are identified upon initiation of Human Response Clinical Practice Guideline (CPG)    02/03/18 0607   Pain, Acute   Related Risk Factors (Acute Pain) disease process;persistent pain   Signs and Symptoms (Acute Pain) verbalization of pain descriptors     Goal: Acceptable Pain Control/Comfort Level  Patient will demonstrate the desired outcomes by discharge/transition of care.    02/03/18 0607   Pain, Acute (Adult)   Acceptable Pain Control/Comfort Level making progress toward outcome       Comments: Pt states that she has issues with chronic back pain; oxycodone given 1 time this shift; patient states that she received full relief after med was given; highest pain level reported 10/10; will continue to assess pain level on patient rounds and PRN

## 2018-02-03 NOTE — PROGRESS NOTES
[facility]  Progress Note    Patient Name: Jeannie Hutchins  MRN: [id number]  Patient Class: IP- Inpatient   Admission Date: 1/22/2018  Length of Stay: 11 days  Attending Physician: Jake Trujillo MD  Primary Care Provider: Paige Mcleod MD        Subjective:     Principal Problem:Hyponatremia with decreased serum osmolality    HPI:  PT  Is an 83yo WF followed by Dr. Bethany Mcleod who was seen in the office with complaints of diarrhea on 1/19.  At that time she was felt to have gastroenteritis.  She was sent home but states she could not eat anything for 4 days.  The diarrhea was starting to improve but she did not feel better so she presented to the ED.  She was found to have a sodium of 113 and was admitted with a diagnosis of hyponatremia    Hospital Course:  Sodium improving with fluids - 123 as of 1/27  She has issues with anxiety.  She was started on paroxetine; psych is recommending OP therapy  On 1/28, she is asking for something for her anxiety - while waiting for paroxetine to take effect, will give 1 dose of lorazepam 0.5  Sodium dropped again, to 117.  Resumed NS IV  This did not help.  Na continued dropping.  Renal on case - changed to 0.3%NS - moved to ICU for drip.  While there, developed resp difficulty.  She eventually required NRB with 14lpm O2 - sat 95%.  CXR on 1/28 showed worsening aeroation - due either to pneumonia or fluid.  Lasix given - good result with urination; felt a bit better.   Started on empiric rocephin.  Furthermore, she developed tachycardia and was found to be in afib.  Started on empiric Eliquis for clot prevention; cardiology consulted.  Dr. Geovany johnson change from amlodipine to metoprolol.  She is CHADS4 - remain on Eliquis.  Discussed with Dr. Mckeon- ALL SSRI/SNRI will have hyponatremia risk.  For now, will have to use rare lorazepam for severe episodes.  She is amenable to therapy as OP - but needs insurance coverage.  2/2: moved  to tele - continues improvement    Interval History moved back to tele    Review of Systems   Feeling better    Objective:     Vital Signs (Most Recent):  Temp: 98.7 °F (37.1 °C) (02/02/18 1505)  Pulse: 62 (02/02/18 1505)  Resp: 20 (02/02/18 1505)  BP: (!) 147/66 (02/02/18 1505)  SpO2: 97 % (02/02/18 1505) Vital Signs (24h Range):  Temp:  [97.5 °F (36.4 °C)-98.7 °F (37.1 °C)] 98.7 °F (37.1 °C)  Pulse:  [52-77] 62  Resp:  [13-50] 20  SpO2:  [94 %-100 %] 97 %  BP: ()/(49-97) 147/66     Weight: 48 kg (105 lb 13.1 oz)  Body mass index is 23.72 kg/m².    Intake/Output Summary (Last 24 hours) at 02/02/18 1815  Last data filed at 02/02/18 1330   Gross per 24 hour   Intake              960 ml   Output             1385 ml   Net             -425 ml      Physical Exam  She is back on tele  Sitting up in bedside chair  Lungs remain a bit coarse, but moving air well  NC O2 now down from 8l yesterday to 3l today  Heart slightly irregular  abd benign    Assessment/Plan:      * Hyponatremia with decreased serum osmolality    Na on 1/27 123  She  Had been placed on paroxetine - might lower levels  Repeat 1/28 is 117, then 115  Resumed IV saline  (2 x Na) + (glu/18) + (BUN/2.8) = serum osm = 242  (range 275-295)  When levels continued dropping, pt transferred to ICU for 3%NaCl administration - but this has now been held  Renal follows  Frequent monitoring:   avoid rapid correction  Multiple etiologies as above - awaiting cortisol; normal systolic fxn (indeterminate diastolic), no cirrhosis  Dr. Cedeno gave more lasix  - now up to 128  She is no longer on drip - moved back to tele today        Pulmonary hypertension    PAP 54          Paroxysmal atrial fibrillation    Developed tachycardia and dyspnea on 1/28.  Found to be in afib.  Started on empiric Eliquis 2.5 bid for CVA prophylaxis and cards consulted.  Dr. Armenta saw pt and rec'd change from amlodipine to metoprolol.  Rhythm is paroxysmal.  Her current rhythm is irregular,  but she has P waves on monitor  CHADS4 - will need anticoag long-term.            Acute respiratory failure with hypoxia and hypercarbia    Since moving to ICU, she began having more increased oxygen needs to keep sats adequate.  She is still on 8lpm  She sounds wet - asking renal about best option for diuresis  Started on empiric rocephin  sats in high 90s; weaned off mask  She has been getting lasix for the congestion and rocephin for the opacities;  Repeat cxr today:  Cardiac wires overlie the upper abdomen and lower chest. Cardiac silhouette is mildly enlarged but stable in size. Atherosclerotic calcifications overlie the aortic arch. There is stable central vascular congestion and patchy bilateral airspace disease. Small bilateral pleural effusions, left side greater than right. No detrimental change in lung aeration.        Follow up    Spoke with her son today;  He is concerned about her well-being after discharge  Now that her  has pneumonia, caregiving will be more difficult  He is considering placing both of them in assisted living  He is worried that she is not taking her medications appropriately  In interim, he  Is asking about treatment alternatives - discussed SNF vs HH with PT.  Will have  talk with him to discuss options and formulate plan;  At this point,  likely to go to assisted;  Pt to go to SNF until she is well enough to join him  When I explained this to pt today (2/2) she said that while she is ok with temporary SNF, she wishes to otherwise remain at home with  - not go to assisted living          Hypophosphatemia    Start K phos supplement (1.3 on 1/28)  Continue supp - recheck today is up from 1.7 to 2.3        Generalized anxiety disorder    Had been started on paroxetine   She is more anxious because of recent diagnosis of flu in 83 yo  - he was in ER today.  Now sent home, but he is alone.  (Her daughter would normally help care for him, but she  is undergoing cancer treatment and should not be exposed to flu)  She became more anxious over night  Gave her ONE dose of lorazepam to tide her over.  However, she cannot rely on long-term benzos.  Now that Paxil has to be stopped, need to see what Dr. Mckeon will recommend as replacement.  Dr. Venegas feels that hyponatremia is a class effect of SSRI  Had to give another dose of lorazepam earlier  .          Hypokalemia    On 1/27 slightly low at 3.4  Started on 20meq daily  (also on K phos)  Getting more lasix; bump k to 20 bid  Today 4.1        Benign hypertension    Continue home meds except HCTZ   norvasc 10  added vasotec 2.5  Amlodipine stopped due to afib; started metoprolol  Bumped vasotec to 5  Metop bumped to 50 bid  174/72 - bump vasotec to 10; now 140/63        Adjustment disorder with mixed anxiety and depressed mood    Psych recommending outpatient therapy.    There is no medication for adjustment disorder        Non-intractable vomiting with nausea    Has diminished; Tolerating regular diet for last several days  Now having recurrent bouts  zofran as needed          S/P carotid endarterectomy                VTE Risk Mitigation         Ordered     apixaban tablet 2.5 mg  2 times daily     Route:  Oral        01/28/18 2128     Medium Risk of VTE  Once      01/22/18 1959     Place BRYANT hose  Until discontinued      01/22/18 1959     Place sequential compression device  Until discontinued      01/22/18 1959              Jake Trujillo MD  Department of Hospital Medicine   [facility]

## 2018-02-03 NOTE — NURSING
Report received; pt awake and alert resp even and unlabored no acute distress noted; oxygen in use; pt denies pain; assisted to bedside commode chair; daughter at bedside; no other needs voiced

## 2018-02-03 NOTE — PROGRESS NOTES
Jeannie Hutchins is a 82 y.o. female patient.    Active Hospital Problems    Diagnosis  POA    *Hyponatremia with decreased serum osmolality [E87.1]  Yes     Differential:  CHF - bnp is 400+;  cxr shows normal heart size, no pulm fluid - will check echo  Cirrhosis - AST at upper normal, otherwise LFTs in normal range; check liver US  Adrenal insuff - would expect hyperkalemia and hypotension - not present;  Check am cortisol  SIADH - ?  Source?  HCTZ - not taking  Renal failure - no  Polydipsia - no      Acute respiratory failure with hypoxia and hypercarbia [J96.01, J96.02]  No    Paroxysmal atrial fibrillation [I48.0]  No    Pulmonary hypertension [I27.20]  No    Hypophosphatemia [E83.39]  No    Follow up [Z09]  Not Applicable    Adjustment disorder with mixed anxiety and depressed mood [F43.23]  Yes     Dr. Mckeon's eval 1/23/18:  Patient does not meet criteria for major depressive disorder.  Her symptoms are contingent upon her situations, which is an adjustment disorder.  This is best treated with intense individual therapy.  She is correct that she would do best with a psychologist.  I am not familiar with any here on the [facility] but she should check with her insurance to see if there are any providers working in the community.  Medications will not help an adjustment disorder.        Benign hypertension [I10]  Yes    Hypokalemia [E87.6]  Yes    Generalized anxiety disorder [F41.1]  Yes     Dr. Mckeon's eval 1/23/18:  She does appear to have a JERRI because of the persistent worries that she has.  These worries predominate her day.  She would probably do well with prevention therapy such as SSRI/SNRI to help control the physical symptoms of the anxiety.  Problem at this point in time is her electrolyte abnormalities.  There is a slight risk of hyponatremia with these medications and given her current state, is not recommended to start these.   After a few weeks of stabilized labs, would recommend  "to start low dose SSRI treatment.  She may do well with paroxetine.  Start at 10mg daily and increase slowly over time to 20-40mg, whatever dose meets efficacy.  Again, therapy will help her with her anxiety.  Agree with patient that she should seek individual counseling with a psychologist.      Non-intractable vomiting with nausea [R11.2]  Yes    S/P carotid endarterectomy [Z98.890]  Not Applicable      Resolved Hospital Problems    Diagnosis Date Resolved POA   No resolved problems to display.     Temp: 97.7 °F (36.5 °C) (02/03/18 0726)  Pulse: 61 (02/03/18 0726)  Resp: 18 (02/03/18 0726)  BP: (!) 152/66 (02/03/18 0726)  SpO2: (!) 93 % (02/03/18 0726)  Weight: 48 kg (105 lb 13.1 oz) (02/01/18 1000)  Height: 4' 8" (142.2 cm) (02/01/18 1000)    Subjective:  Symptoms:  Stable.  (Notes eating better).      Objective:  General Appearance:  Comfortable, ill-appearing, in no acute distress and not in pain (agitated).    Vital signs: (most recent): Blood pressure (!) 152/66, pulse 61, temperature 97.7 °F (36.5 °C), temperature source Oral, resp. rate 18, height 4' 8" (1.422 m), weight 48 kg (105 lb 13.1 oz), SpO2 (!) 93 %, not currently breastfeeding.    HEENT: Normal HEENT exam.    Lungs:  There are wheezes.    Heart: S1 normal and S2 normal.    Abdomen: Abdomen is soft.  Bowel sounds are normal.   There is no abdominal tenderness.     Extremities: There is no dependent edema.    Neurological: Patient is alert.    Skin:  Warm and dry.        Intake/Output - Last 3 Shifts       02/01 0700 - 02/02 0659 02/02 0700 - 02/03 0659 02/03 0700 - 02/04 0659    P.O. 360 1080     IV Piggyback  50     Total Intake(mL/kg) 360 (7.5) 1130 (23.5)     Urine (mL/kg/hr) 2125 (1.8) 685 (0.6)     Stool 0 (0) 0 (0)     Total Output 2125 685      Net -1765 +445             Urine Occurrence  3 x     Stool Occurrence 1 x 3 x         Lab Results   Component Value Date    WBC 17.23 (H) 02/02/2018    HGB 13.0 02/02/2018    HCT 36.4 (L) 02/02/2018 "    MCV 88 02/02/2018     (H) 02/02/2018     BMP  Lab Results   Component Value Date     (L) 02/03/2018    K 4.7 02/03/2018    CL 95 02/03/2018    CO2 28 02/03/2018    BUN 9 02/03/2018    CREATININE 0.6 02/03/2018    CALCIUM 9.0 02/03/2018    ANIONGAP 6 (L) 02/03/2018    ESTGFRAFRICA >60 02/03/2018    EGFRNONAA >60 02/03/2018     Current Facility-Administered Medications   Medication    acetaminophen tablet 650 mg    aluminum-magnesium hydroxide-simethicone 200-200-20 mg/5 mL suspension 30 mL    apixaban tablet 2.5 mg    atorvastatin tablet 10 mg    cefTRIAXone (ROCEPHIN) 1 g in dextrose 5 % 50 mL IVPB    enalapril tablet 10 mg    famotidine (PF) injection 20 mg    furosemide tablet 20 mg    guaiFENesin 12 hr tablet 1,200 mg    levothyroxine tablet 75 mcg    magnesium sulfate 2g in water 50mL IVPB (premix)    metoprolol tartrate (LOPRESSOR) tablet 50 mg    ondansetron injection 4 mg    ondansetron injection 4 mg    oxyCODONE immediate release tablet 5 mg    pneumoc 13-vanessa conj-dip cr(PF) 0.5 mL    potassium chloride 10 mEq in 100 mL IVPB    potassium chloride 10% solution 40 mEq    ramelteon tablet 8 mg    sodium chloride 0.9% flush 3 mL    sodium chloride tablet 1 g       Assessment & Plan  1.Hyponatremia. Siadh with poor intake.. Cont sodium tablets. Eating better. May need demeclocycline.    2.Hypothyroidism. Cont tx.  3.HTN. Add ccb.  4.Tachy. Better with beta blocker.  5.hypokalemia. Better. Dc pills.  6.hypophospatemia. Better.  7.Leukocytosis?  PNA?  WBC still up. Urine cx ngtd.Consider changing to levaquin.  8.Proteinuria. Nonnephrotic. SPEP negative. Cont ace.      Jennifer Venegas MD  2/3/2018

## 2018-02-03 NOTE — PLAN OF CARE
Problem: Fall Risk (Adult)  Goal: Identify Related Risk Factors and Signs and Symptoms  Related risk factors and signs and symptoms are identified upon initiation of Human Response Clinical Practice Guideline (CPG)   Outcome: Ongoing (interventions implemented as appropriate)   01/31/18 0456   Fall Risk   Related Risk Factors (Fall Risk) age-related changes;depression/anxiety;polypharmacy;environment unfamiliar   Signs and Symptoms (Fall Risk) presence of risk factors     Goal: Absence of Falls  Patient will demonstrate the desired outcomes by discharge/transition of care.   Outcome: Ongoing (interventions implemented as appropriate)   02/02/18 1815   Fall Risk (Adult)   Absence of Falls making progress toward outcome       Problem: Pressure Ulcer Risk (John Scale) (Adult,Obstetrics,Pediatric)  Goal: Identify Related Risk Factors and Signs and Symptoms  Related risk factors and signs and symptoms are identified upon initiation of Human Response Clinical Practice Guideline (CPG)   Outcome: Ongoing (interventions implemented as appropriate)   01/31/18 0456   Pressure Ulcer Risk (John Scale)   Related Risk Factors (Pressure Ulcer Risk (John Scale)) age extremes;critical care admission;hospitalization prolonged     Goal: Skin Integrity  Patient will demonstrate the desired outcomes by discharge/transition of care.   Outcome: Ongoing (interventions implemented as appropriate)   02/02/18 1815   Pressure Ulcer Risk (John Scale) (Adult,Obstetrics,Pediatric)   Skin Integrity making progress toward outcome       Problem: Anxiety (Adult)  Goal: Identify Related Risk Factors and Signs and Symptoms  Related risk factors and signs and symptoms are identified upon initiation of Human Response Clinical Practice Guideline (CPG)   Outcome: Ongoing (interventions implemented as appropriate)   02/02/18 1815   Anxiety   Related Risk Factors (Anxiety) coping ineffective;environment change;health status change;loss of control    Signs and Symptoms (Anxiety) apprehension/being worried;confusion/forgetfulness;dependence increased;preoccupation;uncertainty/wariness/doubt     Goal: Reduction/Resolution  Patient will demonstrate the desired outcomes by discharge/transition of care.   Outcome: Ongoing (interventions implemented as appropriate)   02/02/18 9843   Anxiety (Adult)   Reduction/Resolution making progress toward outcome

## 2018-02-03 NOTE — PLAN OF CARE
Problem: Fall Risk (Adult)  Intervention: Reduce Risk/Promote Restraint Free Environment   18 172   Safety Interventions   Environmental Safety Modification assistive device/personal items within reach;clutter free environment maintained;lighting adjusted;mobility aid in reach;room organization consistent;room near unit station   Prevent [place] Drop/Fall   Safety/Security Measures bed alarm set     Intervention: Review Medications/Identify Contributors to Fall Risk   18 172   Safety Interventions   Medication Review/Management medications reviewed     Intervention: Patient Rounds   18 171   Safety Interventions   Patient Rounds bed in low position;bed wheels locked;call light in reach;clutter free environment maintained;ID band on;placement of personal items at bedside;toileting offered;visualized patient     Intervention: Safety Promotion/Fall Prevention   18   Safety Interventions   Safety Promotion/Fall Prevention assistive device/personal item within reach;bed alarm set;commode/urinal/bedpan at bedside;Fall Risk reviewed with patient/family;instructed to call staff for mobility;side rails raised x 2;room near unit station;nonskid shoes/socks when out of bed       Goal: Identify Related Risk Factors and Signs and Symptoms  Related risk factors and signs and symptoms are identified upon initiation of Human Response Clinical Practice Guideline (CPG)   Outcome: Ongoing (interventions implemented as appropriate)   18 172   Fall Risk   Related Risk Factors (Fall Risk) age-related changes;depression/anxiety   Signs and Symptoms (Fall Risk) presence of risk factors     Goal: Absence of Falls  Patient will demonstrate the desired outcomes by discharge/transition of care.   Outcome: Ongoing (interventions implemented as appropriate)   18 172   Fall Risk (Adult)   Absence of Falls making progress toward outcome

## 2018-02-03 NOTE — SUBJECTIVE & OBJECTIVE
Interval History moved back to tele    Review of Systems   Feeling better    Objective:     Vital Signs (Most Recent):  Temp: 98.7 °F (37.1 °C) (02/02/18 1505)  Pulse: 62 (02/02/18 1505)  Resp: 20 (02/02/18 1505)  BP: (!) 147/66 (02/02/18 1505)  SpO2: 97 % (02/02/18 1505) Vital Signs (24h Range):  Temp:  [97.5 °F (36.4 °C)-98.7 °F (37.1 °C)] 98.7 °F (37.1 °C)  Pulse:  [52-77] 62  Resp:  [13-50] 20  SpO2:  [94 %-100 %] 97 %  BP: ()/(49-97) 147/66     Weight: 48 kg (105 lb 13.1 oz)  Body mass index is 23.72 kg/m².    Intake/Output Summary (Last 24 hours) at 02/02/18 1815  Last data filed at 02/02/18 1330   Gross per 24 hour   Intake              960 ml   Output             1385 ml   Net             -425 ml      Physical Exam  She is back on tele  Sitting up in bedside chair  Lungs remain a bit coarse, but moving air well  NC O2 now down from 8l yesterday to 3l today  Heart slightly irregular  abd benign

## 2018-02-03 NOTE — PROGRESS NOTES
Progress Note    Admit Date: 1/22/2018   LOS: 12 days     SUBJECTIVE:     Follow-up For:  Hyponatremia       Scheduled Meds:   amLODIPine  10 mg Oral Daily    apixaban  2.5 mg Oral BID    atorvastatin  10 mg Oral QHS    cefTRIAXone (ROCEPHIN) IVPB  1 g Intravenous Q24H    enalapril  10 mg Oral Daily    famotidine (PF)  20 mg Intravenous Q12H    furosemide  20 mg Oral Daily    guaiFENesin  1,200 mg Oral BID    levothyroxine  75 mcg Oral Before breakfast    metoprolol tartrate  50 mg Oral BID    sodium chloride  1 g Oral BID     Continuous Infusions:  PRN Meds:acetaminophen, aluminum-magnesium hydroxide-simethicone, magnesium sulfate IVPB, ondansetron, ondansetron, oxyCODONE, pneumoc 13-vanessa conj-dip cr(PF), potassium chloride, potassium chloride 10%, ramelteon, sodium chloride 0.9%    Review of patient's allergies indicates:   Allergen Reactions    Strawberry Swelling     Tongue swells up and a generalized rash.       Review of Systems  Denies HA, blurred vision, fever or chill  Moderate generalized joint pain  Diarrhea better  Lip lesions improving     OBJECTIVE:     Vital Signs (Most Recent)  Temp: 97.7 °F (36.5 °C) (02/03/18 0726)  Pulse: 61 (02/03/18 0726)  Resp: 18 (02/03/18 0726)  BP: (!) 152/66 (02/03/18 0726)  SpO2: (!) 93 % (02/03/18 0726)    Vital Signs Range (Last 24H):  Temp:  [97.7 °F (36.5 °C)-99 °F (37.2 °C)]   Pulse:  [53-68]   Resp:  [16-50]   BP: (131-161)/(63-86)   SpO2:  [93 %-100 %]     I & O (Last 24H):  Intake/Output Summary (Last 24 hours) at 02/03/18 0918  Last data filed at 02/03/18 0517   Gross per 24 hour   Intake              890 ml   Output              410 ml   Net              480 ml     Physical Exam:    General: NAD, sitting up in chair. Daughter at the bedside    Head: normocephalic, atraumatic   Eyes: conjunctivae pink, anicteric sclera.    Mouth: upper lip crusted lesions   Throat: No erythema or post nasal discharge   Neck: supple, no LAD   Lungs: crackles bilaterally    Heart: S1, S2, RRR   Abdomen: + BS x 4 quadrants, soft, non tender.    Extremities: no cyanosis or edema.   Skin: turgor normal,  MS: joint tenderness    Psy: pleasant, affect is congruent to mood       Laboratory:  CBC:   Recent Labs  Lab 02/02/18  0248   WBC 17.23*   RBC 4.16   HGB 13.0   HCT 36.4*   *   MCV 88   MCH 31.3*   MCHC 35.7     CMP:   Recent Labs  Lab 02/02/18  0248 02/03/18  0452    95   CALCIUM 9.5 9.0   ALBUMIN 2.8*  --    PROT 6.5  --    * 129*   K 4.1 4.7   CO2 30* 28   CL 88* 95   BUN 10 9   CREATININE 0.6 0.6   ALKPHOS 97  --    ALT 38  --    AST 29  --    BILITOT 1.0  --      Coagulation: No results for input(s): LABPROT, INR, APTT in the last 168 hours.  Cardiac markers: No results for input(s): CKMB, CPKMB, TROPONINT, TROPONINI, MYOGLOBIN in the last 168 hours.  ABGs: No results for input(s): PH, PCO2, PO2, HCO3, POCSATURATED, BE in the last 168 hours.  Microbiology Results (last 7 days)     ** No results found for the last 168 hours. **        Specimen (12h ago through future)    None        No results for input(s): COLORU, CLARITYU, SPECGRAV, PHUR, PROTEINUA, GLUCOSEU, BILIRUBINCON, BLOODU, WBCU, RBCU, BACTERIA, MUCUS, NITRITE, LEUKOCYTESUR, UROBILINOGEN, HYALINECASTS in the last 168 hours.    Diagnostic Results:      ASSESSMENT/PLAN:     * Hyponatremia with decreased serum osmolality     Na on 1/27 123  - currently asymptomatic with level high 120's   - fluid restriction   - in view of abnormal CXR and hx of second hand exposure- pt high risk for neuroendocrine tumors including small cell lung   - CT chest       Pulmonary hypertension     PAP 54          Paroxysmal atrial fibrillation     Rate controlled and anticoagulate              Acute respiratory failure with hypoxia and hypercarbia     Symptoms much better            Hypophosphatemia     Start K phos supplement (1.3 on 1/28)  Continue supp - recheck today is up from 1.7 to 2.3       Generalized anxiety disorder      Had been started on paroxetine - dc due to hyponatremia   - low dose of lorazepam earlier  .           Covering for Dr. Cassandra Moe M.D  Internal Medicine & Geriatric Medicine  Hematology & Oncology  Palliative Medicine    [address], [address]  [place] [id number] [phone] (Office)  [phone] (Fax)

## 2018-02-04 LAB
ALBUMIN SERPL BCP-MCNC: 3.1 G/DL
ALP SERPL-CCNC: 116 U/L
ALT SERPL W/O P-5'-P-CCNC: 80 U/L
ANION GAP SERPL CALC-SCNC: 10 MMOL/L
AST SERPL-CCNC: 38 U/L
BASOPHILS # BLD AUTO: 0.03 K/UL
BASOPHILS NFR BLD: 0.2 %
BILIRUB SERPL-MCNC: 1 MG/DL
BUN SERPL-MCNC: 10 MG/DL
CALCIUM SERPL-MCNC: 9.4 MG/DL
CHLORIDE SERPL-SCNC: 92 MMOL/L
CO2 SERPL-SCNC: 30 MMOL/L
CREAT SERPL-MCNC: 0.7 MG/DL
DIFFERENTIAL METHOD: ABNORMAL
EOSINOPHIL # BLD AUTO: 0.2 K/UL
EOSINOPHIL NFR BLD: 1 %
ERYTHROCYTE [DISTWIDTH] IN BLOOD BY AUTOMATED COUNT: 13 %
EST. GFR  (AFRICAN AMERICAN): >60 ML/MIN/1.73 M^2
EST. GFR  (NON AFRICAN AMERICAN): >60 ML/MIN/1.73 M^2
GLUCOSE SERPL-MCNC: 113 MG/DL
HCT VFR BLD AUTO: 35.3 %
HGB BLD-MCNC: 12.3 G/DL
LYMPHOCYTES # BLD AUTO: 0.7 K/UL
LYMPHOCYTES NFR BLD: 4.2 %
MCH RBC QN AUTO: 31.1 PG
MCHC RBC AUTO-ENTMCNC: 34.8 G/DL
MCV RBC AUTO: 89 FL
MONOCYTES # BLD AUTO: 1.3 K/UL
MONOCYTES NFR BLD: 7.6 %
NEUTROPHILS # BLD AUTO: 14.2 K/UL
NEUTROPHILS NFR BLD: 86.7 %
PLATELET # BLD AUTO: 636 K/UL
PMV BLD AUTO: 8.8 FL
POTASSIUM SERPL-SCNC: 3.4 MMOL/L
PROT SERPL-MCNC: 7 G/DL
RBC # BLD AUTO: 3.96 M/UL
SODIUM SERPL-SCNC: 132 MMOL/L
WBC # BLD AUTO: 16.35 K/UL

## 2018-02-04 PROCEDURE — 25000003 PHARM REV CODE 250: Performed by: INTERNAL MEDICINE

## 2018-02-04 PROCEDURE — 80053 COMPREHEN METABOLIC PANEL: CPT

## 2018-02-04 PROCEDURE — 25000003 PHARM REV CODE 250: Performed by: EMERGENCY MEDICINE

## 2018-02-04 PROCEDURE — S0028 INJECTION, FAMOTIDINE, 20 MG: HCPCS | Performed by: EMERGENCY MEDICINE

## 2018-02-04 PROCEDURE — 36415 COLL VENOUS BLD VENIPUNCTURE: CPT

## 2018-02-04 PROCEDURE — 21400001 HC TELEMETRY ROOM

## 2018-02-04 PROCEDURE — 85025 COMPLETE CBC W/AUTO DIFF WBC: CPT

## 2018-02-04 PROCEDURE — 25000003 PHARM REV CODE 250

## 2018-02-04 PROCEDURE — 25500020 PHARM REV CODE 255

## 2018-02-04 RX ORDER — POTASSIUM CHLORIDE 20 MEQ/15ML
40 SOLUTION ORAL ONCE
Status: COMPLETED | OUTPATIENT
Start: 2018-02-04 | End: 2018-02-04

## 2018-02-04 RX ADMIN — MOXIFLOXACIN HYDROCHLORIDE 400 MG: 400 TABLET, FILM COATED ORAL at 09:02

## 2018-02-04 RX ADMIN — SODIUM CHLORIDE TAB 1 GM 1 G: 1 TAB at 09:02

## 2018-02-04 RX ADMIN — OXYCODONE HYDROCHLORIDE 5 MG: 5 TABLET ORAL at 02:02

## 2018-02-04 RX ADMIN — ENALAPRIL MALEATE 10 MG: 5 TABLET ORAL at 09:02

## 2018-02-04 RX ADMIN — FAMOTIDINE 20 MG: 10 INJECTION, SOLUTION INTRAVENOUS at 09:02

## 2018-02-04 RX ADMIN — METOPROLOL TARTRATE 50 MG: 50 TABLET ORAL at 09:02

## 2018-02-04 RX ADMIN — FUROSEMIDE 20 MG: 20 TABLET ORAL at 09:02

## 2018-02-04 RX ADMIN — POTASSIUM CHLORIDE 40 MEQ: 20 SOLUTION ORAL at 05:02

## 2018-02-04 RX ADMIN — GUAIFENESIN 1200 MG: 600 TABLET, EXTENDED RELEASE ORAL at 09:02

## 2018-02-04 RX ADMIN — AMLODIPINE BESYLATE 10 MG: 5 TABLET ORAL at 09:02

## 2018-02-04 RX ADMIN — FAMOTIDINE 20 MG: 10 INJECTION, SOLUTION INTRAVENOUS at 08:02

## 2018-02-04 RX ADMIN — APIXABAN 2.5 MG: 2.5 TABLET, FILM COATED ORAL at 08:02

## 2018-02-04 RX ADMIN — LEVOTHYROXINE SODIUM 75 MCG: 75 TABLET ORAL at 05:02

## 2018-02-04 RX ADMIN — ATORVASTATIN CALCIUM 10 MG: 10 TABLET, FILM COATED ORAL at 08:02

## 2018-02-04 RX ADMIN — GUAIFENESIN 1200 MG: 600 TABLET, EXTENDED RELEASE ORAL at 08:02

## 2018-02-04 RX ADMIN — APIXABAN 2.5 MG: 2.5 TABLET, FILM COATED ORAL at 09:02

## 2018-02-04 RX ADMIN — IOHEXOL 60 ML: 350 INJECTION, SOLUTION INTRAVENOUS at 10:02

## 2018-02-04 RX ADMIN — METOPROLOL TARTRATE 50 MG: 50 TABLET ORAL at 08:02

## 2018-02-04 RX ADMIN — SODIUM CHLORIDE TAB 1 GM 1 G: 1 TAB at 08:02

## 2018-02-04 NOTE — PLAN OF CARE
Problem: Fall Risk (Adult)  Intervention: Monitor/Assist with Self Care   18 0806 18   Functional Level Current   Ambulation 2 - assistive person --    Transferring 2 - assistive person --    Toileting 1 - assistive equipment --    Bathing 2 - assistive person --    Dressing 2 - assistive person --    Eating 0 - independent --    Communication 0 - understands/communicates without difficulty --    Swallowing 0 - swallows foods/liquids without difficulty --    Daily Care Interventions   Self-Care Promotion independence encouraged;BADL personal objects within reach --    Activity   Activity Assistance Provided --  assistance, 1 person     Intervention: Reduce Risk/Promote Restraint Free Environment   18 172   Safety Interventions   Environmental Safety Modification --  assistive device/personal items within reach;clutter free environment maintained;lighting adjusted;mobility aid in reach;room organization consistent;room near unit station   Prevent Bruner Drop/Fall   Safety/Security Measures --  bed alarm set   Safety Interventions   Safety Precautions emergency equipment at bedside --      Intervention: Review Medications/Identify Contributors to Fall Risk   18   Safety Interventions   Medication Review/Management medications reviewed     Intervention: Patient Rounds   18 133   Safety Interventions   Patient Rounds bed in low position;bed wheels locked;call light in reach;clutter free environment maintained;ID band on;placement of personal items at bedside;toileting offered;visualized patient     Intervention: Safety Promotion/Fall Prevention   18 133   Safety Interventions   Safety Promotion/Fall Prevention bed alarm set;assistive device/personal item within reach;commode/urinal/bedpan at bedside;instructed to call staff for mobility;side rails raised x 3;nonskid shoes/socks when out of bed;medications reviewed;Fall Risk signage in place      Intervention: Safety Precautions   02/02/18 1729   Safety Interventions   Safety Precautions emergency equipment at bedside       Goal: Identify Related Risk Factors and Signs and Symptoms  Related risk factors and signs and symptoms are identified upon initiation of Human Response Clinical Practice Guideline (CPG)   Outcome: Ongoing (interventions implemented as appropriate)   02/03/18 1720   Fall Risk   Related Risk Factors (Fall Risk) age-related changes;depression/anxiety   Signs and Symptoms (Fall Risk) presence of risk factors     Goal: Absence of Falls  Patient will demonstrate the desired outcomes by discharge/transition of care.    02/03/18 1720   Fall Risk (Adult)   Absence of Falls making progress toward outcome

## 2018-02-04 NOTE — PROGRESS NOTES
Jeannie Hutchins is a 82 y.o. female patient.    Active Hospital Problems    Diagnosis  POA    *Hyponatremia with decreased serum osmolality [E87.1]  Yes     Differential:  CHF - bnp is 400+;  cxr shows normal heart size, no pulm fluid - will check echo  Cirrhosis - AST at upper normal, otherwise LFTs in normal range; check liver US  Adrenal insuff - would expect hyperkalemia and hypotension - not present;  Check am cortisol  SIADH - ?  Source?  HCTZ - not taking  Renal failure - no  Polydipsia - no      Acute respiratory failure with hypoxia and hypercarbia [J96.01, J96.02]  No    Paroxysmal atrial fibrillation [I48.0]  No    Pulmonary hypertension [I27.20]  No    Hypophosphatemia [E83.39]  No    Follow up [Z09]  Not Applicable    Adjustment disorder with mixed anxiety and depressed mood [F43.23]  Yes     Dr. Mckeon's eval 1/23/18:  Patient does not meet criteria for major depressive disorder.  Her symptoms are contingent upon her situations, which is an adjustment disorder.  This is best treated with intense individual therapy.  She is correct that she would do best with a psychologist.  I am not familiar with any here on the [facility] but she should check with her insurance to see if there are any providers working in the community.  Medications will not help an adjustment disorder.        Benign hypertension [I10]  Yes    Hypokalemia [E87.6]  Yes    Generalized anxiety disorder [F41.1]  Yes     Dr. Mckeon's eval 1/23/18:  She does appear to have a JERRI because of the persistent worries that she has.  These worries predominate her day.  She would probably do well with prevention therapy such as SSRI/SNRI to help control the physical symptoms of the anxiety.  Problem at this point in time is her electrolyte abnormalities.  There is a slight risk of hyponatremia with these medications and given her current state, is not recommended to start these.   After a few weeks of stabilized labs, would recommend  "to start low dose SSRI treatment.  She may do well with paroxetine.  Start at 10mg daily and increase slowly over time to 20-40mg, whatever dose meets efficacy.  Again, therapy will help her with her anxiety.  Agree with patient that she should seek individual counseling with a psychologist.      Non-intractable vomiting with nausea [R11.2]  Yes    S/P carotid endarterectomy [Z98.890]  Not Applicable      Resolved Hospital Problems    Diagnosis Date Resolved POA   No resolved problems to display.     Temp: 98.2 °F (36.8 °C) (02/04/18 1610)  Pulse: 65 (02/04/18 1610)  Resp: 18 (02/04/18 1610)  BP: (!) 144/68 (02/04/18 1610)  SpO2: 95 % (02/04/18 1610)  Weight: 42.7 kg (94 lb 2.2 oz) (02/04/18 0523)  Height: 4' 8" (142.2 cm) (02/01/18 1000)    Subjective:  Symptoms:  Improved.  (Pt notes feeling better).      Objective:  General Appearance:  Comfortable, ill-appearing, in no acute distress and not in pain (agitated).    Vital signs: (most recent): Blood pressure (!) 144/68, pulse 65, temperature 98.2 °F (36.8 °C), temperature source Oral, resp. rate 18, height 4' 8" (1.422 m), weight 42.7 kg (94 lb 2.2 oz), SpO2 95 %, not currently breastfeeding.    HEENT: Normal HEENT exam.    Lungs:  No rales or wheezes.    Heart: S1 normal and S2 normal.    Abdomen: Abdomen is soft.  Bowel sounds are normal.   There is no abdominal tenderness.     Extremities: There is no dependent edema.    Neurological: Patient is alert.    Skin:  Warm and dry.        Intake/Output - Last 3 Shifts       02/02 0700 - 02/03 0659 02/03 0700 - 02/04 0659 02/04 0700 - 02/05 0659    P.O. 1080 318     IV Piggyback 50      Total Intake(mL/kg) 1130 (23.5) 318 (7.4)     Urine (mL/kg/hr) 685 (0.6) 230 (0.2)     Stool 0 (0) 0 (0)     Total Output 685 230      Net +445 +88             Urine Occurrence 3 x 6 x     Stool Occurrence 3 x 1 x 0 x        Lab Results   Component Value Date    WBC 16.35 (H) 02/04/2018    HGB 12.3 02/04/2018    HCT 35.3 (L) " 02/04/2018    MCV 89 02/04/2018     (H) 02/04/2018     BMP  Lab Results   Component Value Date     (L) 02/04/2018    K 3.4 (L) 02/04/2018    CL 92 (L) 02/04/2018    CO2 30 (H) 02/04/2018    BUN 10 02/04/2018    CREATININE 0.7 02/04/2018    CALCIUM 9.4 02/04/2018    ANIONGAP 10 02/04/2018    ESTGFRAFRICA >60 02/04/2018    EGFRNONAA >60 02/04/2018     Current Facility-Administered Medications   Medication    acetaminophen tablet 650 mg    aluminum-magnesium hydroxide-simethicone 200-200-20 mg/5 mL suspension 30 mL    amLODIPine tablet 10 mg    apixaban tablet 2.5 mg    atorvastatin tablet 10 mg    enalapril tablet 10 mg    famotidine (PF) injection 20 mg    furosemide tablet 20 mg    guaiFENesin 12 hr tablet 1,200 mg    levothyroxine tablet 75 mcg    magnesium sulfate 2g in water 50mL IVPB (premix)    metoprolol tartrate (LOPRESSOR) tablet 50 mg    moxifloxacin tablet 400 mg    ondansetron injection 4 mg    ondansetron injection 4 mg    oxyCODONE immediate release tablet 5 mg    pneumoc 13-vanessa conj-dip cr(PF) 0.5 mL    potassium chloride 10 mEq in 100 mL IVPB    potassium chloride 10% solution 40 mEq    potassium chloride 10% solution 40 mEq    ramelteon tablet 8 mg    sodium chloride 0.9% flush 3 mL    sodium chloride tablet 1 g       Assessment & Plan  1.Hyponatremia. Siadh with poor intake.  Doing better. Pt feeling better.  2.Hypothyroidism. Cont tx.  3.HTN. Add ccb.  4.Tachy. Doing better.  5.hypokalemia. Replete more.  6.hypophospatemia. Better.  7.Leukocytosis?  PNA?  WBC  Better.  8.Proteinuria. Nonnephrotic. SPEP negative. Cont ace.  Pt notes feeling better. Pt is getting more depressed here. She wants to go home. Pt appears stronger. Reconsider sending pt home to her  with home health.    Jennifer Venegas MD  2/4/2018

## 2018-02-04 NOTE — NURSING
Bedside report by YINKA Barone. Patient resting comfortably in bed. 02 wean off. Will assess 02 sat. Only complaint is her gums feel sore. No acute distress noted. Denies any other pain or discomfort. Bed locked in lowest position with call bell in reach. Alarm on.

## 2018-02-04 NOTE — PROGRESS NOTES
Progress Note    Admit Date: 1/22/2018   LOS: 13 days     SUBJECTIVE:     Follow-up For:  Hyponatremia     2/4/18 - pt complains of headache, temporal region pressure and overall not feeling well.       Scheduled Meds:   amLODIPine  10 mg Oral Daily    apixaban  2.5 mg Oral BID    atorvastatin  10 mg Oral QHS    enalapril  10 mg Oral Daily    famotidine (PF)  20 mg Intravenous Q12H    furosemide  20 mg Oral Daily    guaiFENesin  1,200 mg Oral BID    levothyroxine  75 mcg Oral Before breakfast    metoprolol tartrate  50 mg Oral BID    moxifloxacin  400 mg Oral Daily    sodium chloride  1 g Oral BID     Continuous Infusions:  PRN Meds:acetaminophen, aluminum-magnesium hydroxide-simethicone, magnesium sulfate IVPB, ondansetron, ondansetron, oxyCODONE, pneumoc 13-vanessa conj-dip cr(PF), potassium chloride, potassium chloride 10%, ramelteon, sodium chloride 0.9%    Review of patient's allergies indicates:   Allergen Reactions    Strawberry Swelling     Tongue swells up and a generalized rash.       Review of Systems  Denies blurred vision, fever or chill  Complains of headache and cough  Moderate generalized joint pain  Diarrhea better  Lip lesions improving     OBJECTIVE:     Vital Signs (Most Recent)  Temp: 98.7 °F (37.1 °C) (02/04/18 1104)  Pulse: 90 (02/04/18 1104)  Resp: 18 (02/04/18 1104)  BP: 126/81 (02/04/18 1104)  SpO2: 97 % (02/04/18 1104)    Vital Signs Range (Last 24H):  Temp:  [97.7 °F (36.5 °C)-98.7 °F (37.1 °C)]   Pulse:  [57-90]   Resp:  [18-19]   BP: (126-173)/(66-85)   SpO2:  [90 %-97 %]     I & O (Last 24H):    Intake/Output Summary (Last 24 hours) at 02/04/18 1251  Last data filed at 02/03/18 1510   Gross per 24 hour   Intake              118 ml   Output              230 ml   Net             -112 ml     Physical Exam:    General: NAD, resting in bed.   Head: normocephalic, atraumatic   Eyes: conjunctivae pink, anicteric sclera.    Mouth: upper lip crusted lesions   Throat: No erythema or post  nasal discharge   Neck: supple, no LAD   Lungs: crackles bilaterally   Heart: S1, S2, RRR   Abdomen: + BS x 4 quadrants, soft, non tender.    Extremities: no cyanosis or edema.   Skin: turgor normal,  MS: joint tenderness    Psy: pleasant, affect is congruent to mood       Laboratory:  CBC:     Recent Labs  Lab 02/02/18  0248   WBC 17.23*   RBC 4.16   HGB 13.0   HCT 36.4*   *   MCV 88   MCH 31.3*   MCHC 35.7     CMP:     Recent Labs  Lab 02/02/18  0248 02/03/18  0452    95   CALCIUM 9.5 9.0   ALBUMIN 2.8*  --    PROT 6.5  --    * 129*   K 4.1 4.7   CO2 30* 28   CL 88* 95   BUN 10 9   CREATININE 0.6 0.6   ALKPHOS 97  --    ALT 38  --    AST 29  --    BILITOT 1.0  --      Coagulation: No results for input(s): LABPROT, INR, APTT in the last 168 hours.  Cardiac markers: No results for input(s): CKMB, CPKMB, TROPONINT, TROPONINI, MYOGLOBIN in the last 168 hours.  ABGs: No results for input(s): PH, PCO2, PO2, HCO3, POCSATURATED, BE in the last 168 hours.  Microbiology Results (last 7 days)     ** No results found for the last 168 hours. **        Specimen (12h ago through future)    None        No results for input(s): COLORU, CLARITYU, SPECGRAV, PHUR, PROTEINUA, GLUCOSEU, BILIRUBINCON, BLOODU, WBCU, RBCU, BACTERIA, MUCUS, NITRITE, LEUKOCYTESUR, UROBILINOGEN, HYALINECASTS in the last 168 hours.    Diagnostic Results:    CT Chest on 2/4/18   1.  Patchy groundglass opacities throughout the bilateral lungs with minimal to no associated septal thickening.  Findings raise concern for possible focal atypical infectious/inflammatory process.  Interstitial pulmonary edema favor less likely but not entirely excluded.    2.  Trace bilateral pleural effusions.        ASSESSMENT/PLAN:         * Hyponatremia with decreased serum osmolality     Na on 1/27 123  - currently asymptomatic with level high 120's   - fluid restriction   - in view of abnormal CXR and hx of second hand exposure- pt high risk for  neuroendocrine tumors including small cell lung   - CT chest done- see above : consult pulmonary  - Daily CBC, CMP           Pulmonary hypertension     PAP 54          Paroxysmal atrial fibrillation     Rate controlled and anticoagulate              Acute respiratory failure with hypoxia and hypercarbia     Symptoms much better            Hypophosphatemia     Resolved.        Generalized anxiety disorder     Had been started on paroxetine - dc due to hyponatremia   - low dose of lorazepam earlier  .        Headache         - may need MRI    Possible Bacterial pneumonia        - broadened coverage with [employer]val[employer].      Covering for Dr. Cassandra Meo M.D  Internal Medicine & Geriatric Medicine  Hematology & Oncology  Palliative Medicine    [address], [address]  [place], [place] [id number] [phone] (Office)  [phone] (Fax)

## 2018-02-04 NOTE — PLAN OF CARE
02/04/18 1255   Medicare Message   Important Message from Medicare regarding Discharge Appeal Rights Given to patient/caregiver;Explained to patient/caregiver;Signed/date by patient/caregiver;Other (comments)     Patient and son expressed understanding of IMM Notice; initialed; dated

## 2018-02-04 NOTE — NURSING
Bedside report given to YINKA Barone. No acute distress noted. Asleep peacefully. Bed locked in lowest position with call bell in reach. Alarm on. Chart check completed.

## 2018-02-05 LAB
ALBUMIN SERPL BCP-MCNC: 2.8 G/DL
ALP SERPL-CCNC: 84 U/L
ALT SERPL W/O P-5'-P-CCNC: 60 U/L
ANION GAP SERPL CALC-SCNC: 9 MMOL/L
AST SERPL-CCNC: 23 U/L
BASOPHILS # BLD AUTO: 0.02 K/UL
BASOPHILS NFR BLD: 0.2 %
BILIRUB SERPL-MCNC: 0.9 MG/DL
BUN SERPL-MCNC: 12 MG/DL
CALCIUM SERPL-MCNC: 8.9 MG/DL
CHLORIDE SERPL-SCNC: 98 MMOL/L
CO2 SERPL-SCNC: 29 MMOL/L
CREAT SERPL-MCNC: 0.7 MG/DL
DIFFERENTIAL METHOD: ABNORMAL
EOSINOPHIL # BLD AUTO: 0.1 K/UL
EOSINOPHIL NFR BLD: 1.2 %
ERYTHROCYTE [DISTWIDTH] IN BLOOD BY AUTOMATED COUNT: 13.3 %
EST. GFR  (AFRICAN AMERICAN): >60 ML/MIN/1.73 M^2
EST. GFR  (NON AFRICAN AMERICAN): >60 ML/MIN/1.73 M^2
GLUCOSE SERPL-MCNC: 95 MG/DL
HCT VFR BLD AUTO: 31.7 %
HGB BLD-MCNC: 11.1 G/DL
LYMPHOCYTES # BLD AUTO: 1 K/UL
LYMPHOCYTES NFR BLD: 8.6 %
MCH RBC QN AUTO: 31.2 PG
MCHC RBC AUTO-ENTMCNC: 35 G/DL
MCV RBC AUTO: 89 FL
MONOCYTES # BLD AUTO: 1.7 K/UL
MONOCYTES NFR BLD: 15.2 %
NEUTROPHILS # BLD AUTO: 8.5 K/UL
NEUTROPHILS NFR BLD: 74.8 %
PLATELET # BLD AUTO: 540 K/UL
PMV BLD AUTO: 8.9 FL
POTASSIUM SERPL-SCNC: 4 MMOL/L
PROT SERPL-MCNC: 6.1 G/DL
RBC # BLD AUTO: 3.56 M/UL
SODIUM SERPL-SCNC: 136 MMOL/L
WBC # BLD AUTO: 11.41 K/UL

## 2018-02-05 PROCEDURE — 25000003 PHARM REV CODE 250: Performed by: INTERNAL MEDICINE

## 2018-02-05 PROCEDURE — 80053 COMPREHEN METABOLIC PANEL: CPT

## 2018-02-05 PROCEDURE — 25000003 PHARM REV CODE 250: Performed by: EMERGENCY MEDICINE

## 2018-02-05 PROCEDURE — 99223 1ST HOSP IP/OBS HIGH 75: CPT | Mod: ,,, | Performed by: INTERNAL MEDICINE

## 2018-02-05 PROCEDURE — 21400001 HC TELEMETRY ROOM

## 2018-02-05 PROCEDURE — 85025 COMPLETE CBC W/AUTO DIFF WBC: CPT

## 2018-02-05 PROCEDURE — 25000003 PHARM REV CODE 250

## 2018-02-05 PROCEDURE — S0028 INJECTION, FAMOTIDINE, 20 MG: HCPCS | Performed by: EMERGENCY MEDICINE

## 2018-02-05 PROCEDURE — 36415 COLL VENOUS BLD VENIPUNCTURE: CPT

## 2018-02-05 RX ORDER — POTASSIUM CHLORIDE 20 MEQ/15ML
40 SOLUTION ORAL DAILY
Status: DISCONTINUED | OUTPATIENT
Start: 2018-02-05 | End: 2018-02-06 | Stop reason: HOSPADM

## 2018-02-05 RX ADMIN — SODIUM CHLORIDE TAB 1 GM 1 G: 1 TAB at 11:02

## 2018-02-05 RX ADMIN — APIXABAN 2.5 MG: 2.5 TABLET, FILM COATED ORAL at 08:02

## 2018-02-05 RX ADMIN — FUROSEMIDE 20 MG: 20 TABLET ORAL at 09:02

## 2018-02-05 RX ADMIN — METOPROLOL TARTRATE 50 MG: 50 TABLET ORAL at 09:02

## 2018-02-05 RX ADMIN — OXYCODONE HYDROCHLORIDE 5 MG: 5 TABLET ORAL at 07:02

## 2018-02-05 RX ADMIN — ENALAPRIL MALEATE 10 MG: 5 TABLET ORAL at 09:02

## 2018-02-05 RX ADMIN — SODIUM CHLORIDE TAB 1 GM 1 G: 1 TAB at 08:02

## 2018-02-05 RX ADMIN — METOPROLOL TARTRATE 50 MG: 50 TABLET ORAL at 08:02

## 2018-02-05 RX ADMIN — APIXABAN 2.5 MG: 2.5 TABLET, FILM COATED ORAL at 09:02

## 2018-02-05 RX ADMIN — POTASSIUM CHLORIDE 40 MEQ: 20 SOLUTION ORAL at 11:02

## 2018-02-05 RX ADMIN — FAMOTIDINE 20 MG: 10 INJECTION, SOLUTION INTRAVENOUS at 08:02

## 2018-02-05 RX ADMIN — LEVOTHYROXINE SODIUM 75 MCG: 75 TABLET ORAL at 05:02

## 2018-02-05 RX ADMIN — ATORVASTATIN CALCIUM 10 MG: 10 TABLET, FILM COATED ORAL at 08:02

## 2018-02-05 RX ADMIN — GUAIFENESIN 1200 MG: 600 TABLET, EXTENDED RELEASE ORAL at 08:02

## 2018-02-05 RX ADMIN — GUAIFENESIN 1200 MG: 600 TABLET, EXTENDED RELEASE ORAL at 09:02

## 2018-02-05 RX ADMIN — OXYCODONE HYDROCHLORIDE 5 MG: 5 TABLET ORAL at 05:02

## 2018-02-05 RX ADMIN — FAMOTIDINE 20 MG: 10 INJECTION, SOLUTION INTRAVENOUS at 09:02

## 2018-02-05 RX ADMIN — MOXIFLOXACIN HYDROCHLORIDE 400 MG: 400 TABLET, FILM COATED ORAL at 09:02

## 2018-02-05 RX ADMIN — AMLODIPINE BESYLATE 10 MG: 5 TABLET ORAL at 09:02

## 2018-02-05 NOTE — NURSING
Report received; pt awake and alert, up in chair, assisted back to bed x 1 person assist; resp even and unlabored no acute distress noted; on room air at this time; requested pain medication with night meds; no other needs voiced

## 2018-02-05 NOTE — PROGRESS NOTES
Awake alert oriented NAD    Denies CNS ENT CP GI  RHEUM OR DERM SX  Past Medical History:   Diagnosis Date    Arthritis     Blood transfusion     Carotid stenosis     Hypercholesterolemia     Hypertension     Psychiatric problem     Thyroid disease      Review of patient's allergies indicates:   Allergen Reactions    Strawberry Swelling     Tongue swells up and a generalized rash.       Current Facility-Administered Medications   Medication    acetaminophen tablet 650 mg    aluminum-magnesium hydroxide-simethicone 200-200-20 mg/5 mL suspension 30 mL    amLODIPine tablet 10 mg    apixaban tablet 2.5 mg    atorvastatin tablet 10 mg    enalapril tablet 10 mg    famotidine (PF) injection 20 mg    furosemide tablet 20 mg    guaiFENesin 12 hr tablet 1,200 mg    levothyroxine tablet 75 mcg    magnesium sulfate 2g in water 50mL IVPB (premix)    metoprolol tartrate (LOPRESSOR) tablet 50 mg    moxifloxacin tablet 400 mg    ondansetron injection 4 mg    ondansetron injection 4 mg    oxyCODONE immediate release tablet 5 mg    pneumoc 13-vanessa conj-dip cr(PF) 0.5 mL    potassium chloride 10% solution 40 mEq    ramelteon tablet 8 mg    sodium chloride 0.9% flush 3 mL    sodium chloride tablet 1 g       LABS    Recent Results (from the past 24 hour(s))   CBC auto differential    Collection Time: 02/04/18  1:12 PM   Result Value Ref Range    WBC 16.35 (H) 3.90 - 12.70 K/uL    RBC 3.96 (L) 4.00 - 5.40 M/uL    Hemoglobin 12.3 12.0 - 16.0 g/dL    Hematocrit 35.3 (L) 37.0 - 48.5 %    MCV 89 82 - 98 fL    MCH 31.1 (H) 27.0 - 31.0 pg    MCHC 34.8 32.0 - 36.0 g/dL    RDW 13.0 11.5 - 14.5 %    Platelets 636 (H) 150 - 350 K/uL    MPV 8.8 (L) 9.2 - 12.9 fL    Gran # (ANC) 14.2 (H) 1.8 - 7.7 K/uL    Lymph # 0.7 (L) 1.0 - 4.8 K/uL    Mono # 1.3 (H) 0.3 - 1.0 K/uL    Eos # 0.2 0.0 - 0.5 K/uL    Baso # 0.03 0.00 - 0.20 K/uL    Gran% 86.7 (H) 38.0 - 73.0 %    Lymph% 4.2 (L) 18.0 - 48.0 %    Mono% 7.6 4.0 - 15.0 %     Eosinophil% 1.0 0.0 - 8.0 %    Basophil% 0.2 0.0 - 1.9 %    Differential Method Automated    Comprehensive metabolic panel    Collection Time: 02/04/18  1:12 PM   Result Value Ref Range    Sodium 132 (L) 136 - 145 mmol/L    Potassium 3.4 (L) 3.5 - 5.1 mmol/L    Chloride 92 (L) 95 - 110 mmol/L    CO2 30 (H) 23 - 29 mmol/L    Glucose 113 (H) 70 - 110 mg/dL    BUN, Bld 10 8 - 23 mg/dL    Creatinine 0.7 0.5 - 1.4 mg/dL    Calcium 9.4 8.7 - 10.5 mg/dL    Total Protein 7.0 6.0 - 8.4 g/dL    Albumin 3.1 (L) 3.5 - 5.2 g/dL    Total Bilirubin 1.0 0.1 - 1.0 mg/dL    Alkaline Phosphatase 116 55 - 135 U/L    AST 38 10 - 40 U/L    ALT 80 (H) 10 - 44 U/L    Anion Gap 10 8 - 16 mmol/L    eGFR if African American >60 >60 mL/min/1.73 m^2    eGFR if non African American >60 >60 mL/min/1.73 m^2   CBC auto differential    Collection Time: 02/05/18  4:47 AM   Result Value Ref Range    WBC 11.41 3.90 - 12.70 K/uL    RBC 3.56 (L) 4.00 - 5.40 M/uL    Hemoglobin 11.1 (L) 12.0 - 16.0 g/dL    Hematocrit 31.7 (L) 37.0 - 48.5 %    MCV 89 82 - 98 fL    MCH 31.2 (H) 27.0 - 31.0 pg    MCHC 35.0 32.0 - 36.0 g/dL    RDW 13.3 11.5 - 14.5 %    Platelets 540 (H) 150 - 350 K/uL    MPV 8.9 (L) 9.2 - 12.9 fL    Gran # (ANC) 8.5 (H) 1.8 - 7.7 K/uL    Lymph # 1.0 1.0 - 4.8 K/uL    Mono # 1.7 (H) 0.3 - 1.0 K/uL    Eos # 0.1 0.0 - 0.5 K/uL    Baso # 0.02 0.00 - 0.20 K/uL    Gran% 74.8 (H) 38.0 - 73.0 %    Lymph% 8.6 (L) 18.0 - 48.0 %    Mono% 15.2 (H) 4.0 - 15.0 %    Eosinophil% 1.2 0.0 - 8.0 %    Basophil% 0.2 0.0 - 1.9 %    Differential Method Automated    Comprehensive metabolic panel    Collection Time: 02/05/18  4:47 AM   Result Value Ref Range    Sodium 136 136 - 145 mmol/L    Potassium 4.0 3.5 - 5.1 mmol/L    Chloride 98 95 - 110 mmol/L    CO2 29 23 - 29 mmol/L    Glucose 95 70 - 110 mg/dL    BUN, Bld 12 8 - 23 mg/dL    Creatinine 0.7 0.5 - 1.4 mg/dL    Calcium 8.9 8.7 - 10.5 mg/dL    Total Protein 6.1 6.0 - 8.4 g/dL    Albumin 2.8 (L) 3.5 - 5.2 g/dL     Total Bilirubin 0.9 0.1 - 1.0 mg/dL    Alkaline Phosphatase 84 55 - 135 U/L    AST 23 10 - 40 U/L    ALT 60 (H) 10 - 44 U/L    Anion Gap 9 8 - 16 mmol/L    eGFR if African American >60 >60 mL/min/1.73 m^2    eGFR if non African American >60 >60 mL/min/1.73 m^2   ]    I/O last 3 completed shifts:  In: 780 [P.O.:780]  Out: 200 [Urine:200]    Vitals:    02/04/18 2000 02/04/18 2344 02/05/18 0428 02/05/18 0741   BP: (!) 148/63 (!) 147/66 (!) 145/66 131/62   Pulse: 78 70 65 63   Resp: 18 18 18 18   Temp: 98.7 °F (37.1 °C) 99 °F (37.2 °C) 99.4 °F (37.4 °C) 98.5 °F (36.9 °C)   TempSrc: Oral Oral Oral Oral   SpO2: 95% 95% (!) 94% (!) 94%   Weight:   43.3 kg (95 lb 7.4 oz)    Height:           No Jvd, Thyromegaly or Lymphadenopathy  Lungs: Fairly clear anteriorly and laterally  Cor: RRR no G or rubs  Abd: Soft benign good bowel sounds non tender  Ext: No E C C    A)  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 Renal wise I do not have much more to add  HTN  On rx  Hypothyroid on tx  K corrected  Non nephrotic proteinuria on enalapril   Hypopo4 by hx

## 2018-02-05 NOTE — PROGRESS NOTES
[facility]  Adult Nutrition  Consult Note    SUMMARY     Recommendations    Recommendation/Intervention:   1. Boost plus with meals   2. Honor preferences as able   3. Provided tips to aid with home diet   4. RD to monitor    Goals: Meet 85% of EEN  Nutrition Goal Status: new  Communication of RD Recs: reviewed with RN    Continuum of Care Plan    Referral to Outpatient Services:  (D/C planning: Reg diet with supplements as needed)    Reason for Assessment    Reason for Assessment: RD follow-up  Diagnosis:  (Hyponatremia)  Relevent Medical History: Thyroid dz, HTN, hypercholestremia   Interdisciplinary Rounds: did not attend     General Information Comments: Patient denies issues with n/v/chewing or swallowing issues. States she doesn't drink supplements at home because of the expense. Is concerned about her intake and for her  because they are both losing weight. Current intake ~ 50% of meals. States UBW of recent is 95-98# but has lost 20# over past two years. Provided handouts to aid with home diet. Patient would like to have boost while here.    Nutrition Prescription Ordered    Current Diet Order: Regular      Evaluation of Received Nutrients/Fluid Intake     Energy Calories Required: not meeting needs   Protein Required: not meeting needs      I/O: 780/200       Fluid Required: not meeting needs     Tolerance: tolerating  % Intake of Estimated Energy Needs: 50-75%  % Meal Intake: 50%    Nutrition Risk Screen     Nutrition Risk Screen: no indicators present    Nutrition/Diet History     Food Preferences: Denies cultural, Bahai, ethnic food preferences.     Labs/Tests/Procedures/Meds     Pertinent Labs Reviewed: reviewed  Pertinent Labs Comments: alb 2.8  Pertinent Medications Reviewed: reviewed  Pertinent Medications Comments: Atorvastatin, KCl    Physical Findings     Overall: thin but WDL, age appropriate LBM loss   Teeth: T/T missing   Skin: Intact     Anthropometrics    Temp:  "98 °F (36.7 °C)     Height: 4' 8" (142.2 cm)  Weight Method: Bed Scale  Weight: 43.3 kg (95 lb 7.4 oz)     Ideal Body Weight (IBW), Female: 80 lb     % Ideal Body Weight, Female (lb): 132.28 lb  BMI (Calculated): 23.8  BMI Grade: 18.5-24.9 - normal    Weight loss: unintentional  28% over 2 years    Estimated/Assessed Needs    Weight Used For Calorie Calculations: 48 kg (105 lb 13.1 oz)      Energy Calorie Requirements (kcal): 1500 kcal  Energy Need Method: Kcal/kg     RMR ([place]-St. Jeor Equation): 803      Weight Used For Protein Calculations: 48 kg (105 lb 13.1 oz)  Protein Requirements: 48-57g (1-1.2g/kg)     Fluid Requirements (mL): 1.5 L (1mL/kg)  Fluid Need Method: RDA Method      RDA Method (mL): 1500          Assessment and Plan    Nutrition Dx: Inadequate Energy Intake r/t decreased appetite as evidenced by weight loss over past two years  Nutrition Dx Status: New      Monitor and Evaluation    Food and Nutrient Intake: energy intake, food and beverage intake  Food and Nutrient Adminstration: diet order  Knowledge/Beliefs/Attitudes: food and nutrition knowledge/skill, beliefs and attitudes  Physical Activity and Function: nutrition-related ADLs and IADLs  Anthropometric Measurements: weight  Biochemical Data, Medical Tests and Procedures: electrolyte and renal panel  Nutrition-Focused Physical Findings: overall appearance    Nutrition Risk    Level of Risk:  (2 x week)    Nutrition Follow-Up    RD Follow-up?: Yes          "

## 2018-02-05 NOTE — PLAN OF CARE
Problem: Fall Risk (Adult)  Goal: Identify Related Risk Factors and Signs and Symptoms  Related risk factors and signs and symptoms are identified upon initiation of Human Response Clinical Practice Guideline (CPG)    02/05/18 1642   Fall Risk   Related Risk Factors (Fall Risk) depression/anxiety;gait/mobility problems;sensory deficits;polypharmacy     Goal: Absence of Falls  Patient will demonstrate the desired outcomes by discharge/transition of care.   Outcome: Ongoing (interventions implemented as appropriate)   02/05/18 1642   Fall Risk (Adult)   Absence of Falls making progress toward outcome       Problem: Patient Care Overview  Goal: Plan of Care Review   02/05/18 1642   Coping/Psychosocial   Plan Of Care Reviewed With patient     Goal: Interdisciplinary Rounds/Family Conf   02/05/18 1642   Interdisciplinary Rounds/Family Conf   Participants nursing;patient       Problem: Infection, Risk/Actual (Adult)  Goal: Identify Related Risk Factors and Signs and Symptoms  Related risk factors and signs and symptoms are identified upon initiation of Human Response Clinical Practice Guideline (CPG)   Outcome: Ongoing (interventions implemented as appropriate)   02/05/18 1642   Infection, Risk/Actual   Related Risk Factors (Infection, Risk/Actual) age extremes;chronic illness/condition;prolonged hospitalization   Signs and Symptoms (Infection, Risk/Actual) lab value changes;feeding/eating intolerance;weakness     Goal: Infection Prevention/Resolution  Patient will demonstrate the desired outcomes by discharge/transition of care.   Outcome: Ongoing (interventions implemented as appropriate)   02/05/18 1642   Infection, Risk/Actual (Adult)   Infection Prevention/Resolution making progress toward outcome       Problem: Pressure Ulcer Risk (John Scale) (Adult,Obstetrics,Pediatric)  Goal: Identify Related Risk Factors and Signs and Symptoms  Related risk factors and signs and symptoms are identified upon initiation of Human  Response Clinical Practice Guideline (CPG)    02/05/18 1642   Pressure Ulcer Risk (John Scale)   Related Risk Factors (Pressure Ulcer Risk (John Scale)) age extremes;body weight extremes;hospitalization prolonged;mobility impaired;skeletal deformities     Goal: Skin Integrity  Patient will demonstrate the desired outcomes by discharge/transition of care.   Outcome: Ongoing (interventions implemented as appropriate)   02/05/18 1642   Pressure Ulcer Risk (John Scale) (Adult,Obstetrics,Pediatric)   Skin Integrity making progress toward outcome       Problem: Pain, Acute (Adult)  Goal: Identify Related Risk Factors and Signs and Symptoms  Related risk factors and signs and symptoms are identified upon initiation of Human Response Clinical Practice Guideline (CPG)   Outcome: Ongoing (interventions implemented as appropriate)   02/05/18 1642   Pain, Acute   Related Risk Factors (Acute Pain) disease process   Signs and Symptoms (Acute Pain) alteration in muscle tone;BADLs/IADLs reluctance/inability to perform       Problem: Anxiety (Adult)  Goal: Identify Related Risk Factors and Signs and Symptoms  Related risk factors and signs and symptoms are identified upon initiation of Human Response Clinical Practice Guideline (CPG)   Outcome: Ongoing (interventions implemented as appropriate)   02/05/18 1642   Anxiety   Related Risk Factors (Anxiety) coping ineffective;loss of control   Signs and Symptoms (Anxiety) apprehension/being worried     Goal: Reduction/Resolution  Patient will demonstrate the desired outcomes by discharge/transition of care.   Outcome: Ongoing (interventions implemented as appropriate)   02/05/18 1642   Anxiety (Adult)   Reduction/Resolution making progress toward outcome

## 2018-02-05 NOTE — PROGRESS NOTES
[facility] [place] [facility]  Progress Note    Patient Name: Jeannie Hutchins  MRN: [id number]  Patient Class: IP- Inpatient   Admission Date: 1/22/2018  Length of Stay: 14 days  Attending Physician: Jake Trujillo MD  Primary Care Provider: Paige Mcleod MD        Subjective:     Principal Problem:Hyponatremia with decreased serum osmolality    HPI:  PT  Is an 83yo WF followed by Dr. Bethany Mcleod who was seen in the office with complaints of diarrhea on 1/19.  At that time she was felt to have gastroenteritis.  She was sent home but states she could not eat anything for 4 days.  The diarrhea was starting to improve but she did not feel better so she presented to the ED.  She was found to have a sodium of 113 and was admitted with a diagnosis of hyponatremia    Hospital Course:  Sodium improving with fluids - 123 as of 1/27  She has issues with anxiety.  She was started on paroxetine; psych is recommending OP therapy  On 1/28, she is asking for something for her anxiety - while waiting for paroxetine to take effect, will give 1 dose of lorazepam 0.5  Sodium dropped again, to 117.  Resumed NS IV  This did not help.  Na continued dropping.  Renal on case - changed to 0.3%NS - moved to ICU for drip.  While there, developed resp difficulty.  She eventually required NRB with 14lpm O2 - sat 95%.  CXR on 1/28 showed worsening aeroation - due either to pneumonia or fluid.  Lasix given - good result with urination; felt a bit better.   Started on empiric rocephin.  Furthermore, she developed tachycardia and was found to be in afib.  Started on empiric Eliquis for clot prevention; cardiology consulted.  Dr. Geovany johnson change from amlodipine to metoprolol.  She is CHADS4 - remain on Eliquis.  Discussed with Dr. Mckeon- ALL SSRI/SNRI will have hyponatremia risk.  For now, will have to use rare lorazepam for severe episodes.  She is amenable to therapy as OP - but needs insurance coverage.  2/2: moved  to tele - continues improvement    Interval History: Pt without complaints this morning.  Was asking appropriate questions about her fluid restriction.  Apparently Son is working on assisted living but she says that her  does not want to go    Review of Systems   All other systems reviewed and are negative.    Scheduled Meds:   amLODIPine  10 mg Oral Daily    apixaban  2.5 mg Oral BID    atorvastatin  10 mg Oral QHS    enalapril  10 mg Oral Daily    famotidine (PF)  20 mg Intravenous Q12H    furosemide  20 mg Oral Daily    guaiFENesin  1,200 mg Oral BID    levothyroxine  75 mcg Oral Before breakfast    metoprolol tartrate  50 mg Oral BID    moxifloxacin  400 mg Oral Daily    potassium chloride 10%  40 mEq Oral Daily    sodium chloride  1 g Oral BID     Continuous Infusions:  PRN Meds:.acetaminophen, aluminum-magnesium hydroxide-simethicone, magnesium sulfate IVPB, ondansetron, ondansetron, oxyCODONE, pneumoc 13-vanessa conj-dip cr(PF), ramelteon, sodium chloride 0.9%  z  Objective:     Vital Signs (Most Recent):  Temp: 98.5 °F (36.9 °C) (02/05/18 0741)  Pulse: 63 (02/05/18 0741)  Resp: 18 (02/05/18 0741)  BP: 131/62 (02/05/18 0741)  SpO2: (!) 94 % (02/05/18 0741) Vital Signs (24h Range):  Temp:  [98.2 °F (36.8 °C)-99.4 °F (37.4 °C)] 98.5 °F (36.9 °C)  Pulse:  [63-90] 63  Resp:  [18] 18  SpO2:  [94 %-97 %] 94 %  BP: (126-148)/(62-81) 131/62     Weight: 43.3 kg (95 lb 7.4 oz)  Body mass index is 21.4 kg/m².    Intake/Output Summary (Last 24 hours) at 02/05/18 0809  Last data filed at 02/05/18 0549   Gross per 24 hour   Intake              780 ml   Output              200 ml   Net              580 ml      Physical Exam   Constitutional: She is oriented to person, place, and time. She appears well-developed and well-nourished.   Eyes: EOM are normal. Pupils are equal, round, and reactive to light.   Cardiovascular: Normal rate, regular rhythm and normal heart sounds.  Exam reveals no gallop and no  friction rub.    No murmur heard.  Pulmonary/Chest: Effort normal and breath sounds normal.   Abdominal: Soft. Bowel sounds are normal.   Neurological: She is alert and oriented to person, place, and time.       Significant Labs:   CBC:   Recent Labs  Lab 02/04/18  1312 02/05/18  0447   WBC 16.35* 11.41   HGB 12.3 11.1*   HCT 35.3* 31.7*   * 540*     CMP:   Recent Labs  Lab 02/04/18  1312   *   K 3.4*   CL 92*   CO2 30*   *   BUN 10   CREATININE 0.7   CALCIUM 9.4   PROT 7.0   ALBUMIN 3.1*   BILITOT 1.0   ALKPHOS 116   AST 38   ALT 80*   ANIONGAP 10   EGFRNONAA >60       Significant Imaging: CT: I have reviewed all pertinent results/findings within the past 24 hours and my personal findings are:    1.  Patchy groundglass opacities throughout the bilateral lungs with minimal to no associated septal thickening.  Findings raise concern for possible focal atypical infectious/inflammatory process.  Interstitial pulmonary edema favor less likely but not entirely excluded.    2.  Trace bilateral pleural effusions.    Assessment/Plan:      * Hyponatremia with decreased serum osmolality    Sodium steady on fluid restriction.  Pulmonary consulted for abnormal CT as a possible cause          Pulmonary hypertension    PAP 54          Paroxysmal atrial fibrillation    Rate controlled.  COntinue eliquis            Acute respiratory failure with hypoxia and hypercarbia    Pulmonary consulted for evaluation and now on Avelox for symptoms        Follow up    Hopefully case management can assist with d/c planning today.            Hypophosphatemia    Resolved        Generalized anxiety disorder      .  COntinue to monitor off meds.  Will need outpatient therapy        Hypokalemia    Start daily potassium        Benign hypertension    BP will controlled        Adjustment disorder with mixed anxiety and depressed mood    Will refer to outpatient therapy        Non-intractable vomiting with nausea    Continue PRN  Zofran          S/P carotid endarterectomy                VTE Risk Mitigation         Ordered     apixaban tablet 2.5 mg  2 times daily     Route:  Oral        01/28/18 2128     Medium Risk of VTE  Once      01/22/18 1959     Place BRYANT hose  Until discontinued      01/22/18 1959     Place sequential compression device  Until discontinued      01/22/18 1959              Paige Mcleod MD  Department of Hospital Medicine   [facility]

## 2018-02-05 NOTE — SUBJECTIVE & OBJECTIVE
Interval History: Pt without complaints this morning.  Was asking appropriate questions about her fluid restriction.  Apparently Son is working on assisted living but she says that her  does not want to go    Review of Systems   All other systems reviewed and are negative.    Scheduled Meds:   amLODIPine  10 mg Oral Daily    apixaban  2.5 mg Oral BID    atorvastatin  10 mg Oral QHS    enalapril  10 mg Oral Daily    famotidine (PF)  20 mg Intravenous Q12H    furosemide  20 mg Oral Daily    guaiFENesin  1,200 mg Oral BID    levothyroxine  75 mcg Oral Before breakfast    metoprolol tartrate  50 mg Oral BID    moxifloxacin  400 mg Oral Daily    potassium chloride 10%  40 mEq Oral Daily    sodium chloride  1 g Oral BID     Continuous Infusions:  PRN Meds:.acetaminophen, aluminum-magnesium hydroxide-simethicone, magnesium sulfate IVPB, ondansetron, ondansetron, oxyCODONE, pneumoc 13-vanessa conj-dip cr(PF), ramelteon, sodium chloride 0.9%  z  Objective:     Vital Signs (Most Recent):  Temp: 98.5 °F (36.9 °C) (02/05/18 0741)  Pulse: 63 (02/05/18 0741)  Resp: 18 (02/05/18 0741)  BP: 131/62 (02/05/18 0741)  SpO2: (!) 94 % (02/05/18 0741) Vital Signs (24h Range):  Temp:  [98.2 °F (36.8 °C)-99.4 °F (37.4 °C)] 98.5 °F (36.9 °C)  Pulse:  [63-90] 63  Resp:  [18] 18  SpO2:  [94 %-97 %] 94 %  BP: (126-148)/(62-81) 131/62     Weight: 43.3 kg (95 lb 7.4 oz)  Body mass index is 21.4 kg/m².    Intake/Output Summary (Last 24 hours) at 02/05/18 0809  Last data filed at 02/05/18 0549   Gross per 24 hour   Intake              780 ml   Output              200 ml   Net              580 ml      Physical Exam   Constitutional: She is oriented to person, place, and time. She appears well-developed and well-nourished.   Eyes: EOM are normal. Pupils are equal, round, and reactive to light.   Cardiovascular: Normal rate, regular rhythm and normal heart sounds.  Exam reveals no gallop and no friction rub.    No murmur  heard.  Pulmonary/Chest: Effort normal and breath sounds normal.   Abdominal: Soft. Bowel sounds are normal.   Neurological: She is alert and oriented to person, place, and time.       Significant Labs:   CBC:   Recent Labs  Lab 02/04/18  1312 02/05/18  0447   WBC 16.35* 11.41   HGB 12.3 11.1*   HCT 35.3* 31.7*   * 540*     CMP:   Recent Labs  Lab 02/04/18  1312   *   K 3.4*   CL 92*   CO2 30*   *   BUN 10   CREATININE 0.7   CALCIUM 9.4   PROT 7.0   ALBUMIN 3.1*   BILITOT 1.0   ALKPHOS 116   AST 38   ALT 80*   ANIONGAP 10   EGFRNONAA >60       Significant Imaging: CT: I have reviewed all pertinent results/findings within the past 24 hours and my personal findings are:    1.  Patchy groundglass opacities throughout the bilateral lungs with minimal to no associated septal thickening.  Findings raise concern for possible focal atypical infectious/inflammatory process.  Interstitial pulmonary edema favor less likely but not entirely excluded.    2.  Trace bilateral pleural effusions.

## 2018-02-05 NOTE — PLAN OF CARE
02/05/18 1016   Discharge Reassessment   Assessment Type Discharge Planning Reassessment   Do you have any problems affording any of your prescribed medications? No   Discharge Plan A Skilled Nursing Facility   Discharge Plan B (SW spoke witdamon pt's son Dane, [phone]. Son voiced that he would like pt to stay in the [facility] since that is where she resides. Pt is currently on Room Air and stronger. Choices are [facility], [facility].)   Patient choice form signed by patient/caregiver Yes   Can the patient answer the patient profile reliably? Yes, cognitively intact   How does the patient rate their overall health at the present time? Good   Describe the patient's ability to walk at the present time. Major restrictions/daily assistance from another person   How often would a person be available to care for the patient? Infrequently   Number of comorbid conditions (as recorded on the chart) Five or more

## 2018-02-06 VITALS
WEIGHT: 95.88 LBS | DIASTOLIC BLOOD PRESSURE: 62 MMHG | OXYGEN SATURATION: 94 % | BODY MASS INDEX: 21.57 KG/M2 | TEMPERATURE: 98 F | RESPIRATION RATE: 19 BRPM | HEIGHT: 56 IN | SYSTOLIC BLOOD PRESSURE: 121 MMHG | HEART RATE: 64 BPM

## 2018-02-06 LAB
ALBUMIN SERPL BCP-MCNC: 2.6 G/DL
ALP SERPL-CCNC: 81 U/L
ALT SERPL W/O P-5'-P-CCNC: 58 U/L
ANION GAP SERPL CALC-SCNC: 6 MMOL/L
AST SERPL-CCNC: 21 U/L
BASOPHILS # BLD AUTO: 0.02 K/UL
BASOPHILS NFR BLD: 0.2 %
BILIRUB SERPL-MCNC: 0.9 MG/DL
BUN SERPL-MCNC: 15 MG/DL
CALCIUM SERPL-MCNC: 8.8 MG/DL
CHLORIDE SERPL-SCNC: 103 MMOL/L
CO2 SERPL-SCNC: 26 MMOL/L
CREAT SERPL-MCNC: 0.6 MG/DL
DIFFERENTIAL METHOD: ABNORMAL
EOSINOPHIL # BLD AUTO: 0.1 K/UL
EOSINOPHIL NFR BLD: 1.6 %
ERYTHROCYTE [DISTWIDTH] IN BLOOD BY AUTOMATED COUNT: 13.8 %
EST. GFR  (AFRICAN AMERICAN): >60 ML/MIN/1.73 M^2
EST. GFR  (NON AFRICAN AMERICAN): >60 ML/MIN/1.73 M^2
GLUCOSE SERPL-MCNC: 85 MG/DL
HCT VFR BLD AUTO: 30.2 %
HGB BLD-MCNC: 10.2 G/DL
LYMPHOCYTES # BLD AUTO: 1.4 K/UL
LYMPHOCYTES NFR BLD: 17 %
MCH RBC QN AUTO: 30.5 PG
MCHC RBC AUTO-ENTMCNC: 33.8 G/DL
MCV RBC AUTO: 90 FL
MONOCYTES # BLD AUTO: 1.2 K/UL
MONOCYTES NFR BLD: 14.1 %
NEUTROPHILS # BLD AUTO: 5.5 K/UL
NEUTROPHILS NFR BLD: 67.1 %
PLATELET # BLD AUTO: 505 K/UL
PMV BLD AUTO: 8.6 FL
POTASSIUM SERPL-SCNC: 4.4 MMOL/L
PROT SERPL-MCNC: 5.7 G/DL
RBC # BLD AUTO: 3.34 M/UL
SODIUM SERPL-SCNC: 135 MMOL/L
WBC # BLD AUTO: 8.13 K/UL

## 2018-02-06 PROCEDURE — 25000003 PHARM REV CODE 250: Performed by: EMERGENCY MEDICINE

## 2018-02-06 PROCEDURE — 80053 COMPREHEN METABOLIC PANEL: CPT

## 2018-02-06 PROCEDURE — 25000003 PHARM REV CODE 250: Performed by: INTERNAL MEDICINE

## 2018-02-06 PROCEDURE — 63600175 PHARM REV CODE 636 W HCPCS: Performed by: HOSPITALIST

## 2018-02-06 PROCEDURE — 3E0234Z INTRODUCTION OF SERUM, TOXOID AND VACCINE INTO MUSCLE, PERCUTANEOUS APPROACH: ICD-10-PCS | Performed by: INTERNAL MEDICINE

## 2018-02-06 PROCEDURE — 36415 COLL VENOUS BLD VENIPUNCTURE: CPT

## 2018-02-06 PROCEDURE — 90670 PCV13 VACCINE IM: CPT | Performed by: HOSPITALIST

## 2018-02-06 PROCEDURE — 90471 IMMUNIZATION ADMIN: CPT | Performed by: HOSPITALIST

## 2018-02-06 PROCEDURE — S0028 INJECTION, FAMOTIDINE, 20 MG: HCPCS | Performed by: EMERGENCY MEDICINE

## 2018-02-06 PROCEDURE — 25000003 PHARM REV CODE 250

## 2018-02-06 PROCEDURE — G0009 ADMIN PNEUMOCOCCAL VACCINE: HCPCS | Performed by: HOSPITALIST

## 2018-02-06 PROCEDURE — 85025 COMPLETE CBC W/AUTO DIFF WBC: CPT

## 2018-02-06 RX ORDER — MOXIFLOXACIN HYDROCHLORIDE 400 MG/1
400 TABLET ORAL DAILY
Qty: 7 TABLET | Refills: 0 | Status: SHIPPED | OUTPATIENT
Start: 2018-02-07 | End: 2018-02-14

## 2018-02-06 RX ORDER — MAG HYDROX/ALUMINUM HYD/SIMETH 200-200-20
30 SUSPENSION, ORAL (FINAL DOSE FORM) ORAL EVERY 4 HOURS PRN
COMMUNITY
Start: 2018-02-06 | End: 2019-09-17 | Stop reason: ALTCHOICE

## 2018-02-06 RX ORDER — ENALAPRIL MALEATE 10 MG/1
10 TABLET ORAL DAILY
Qty: 90 TABLET | Refills: 3 | Status: SHIPPED | OUTPATIENT
Start: 2018-02-07 | End: 2018-07-02 | Stop reason: SDUPTHER

## 2018-02-06 RX ORDER — FUROSEMIDE 20 MG/1
20 TABLET ORAL DAILY
Qty: 30 TABLET | Refills: 11 | Status: SHIPPED | OUTPATIENT
Start: 2018-02-07 | End: 2018-03-28

## 2018-02-06 RX ORDER — AMLODIPINE BESYLATE 10 MG/1
10 TABLET ORAL DAILY
Qty: 30 TABLET | Refills: 11 | Status: SHIPPED | OUTPATIENT
Start: 2018-02-07 | End: 2018-03-28

## 2018-02-06 RX ORDER — METOPROLOL TARTRATE 50 MG/1
50 TABLET ORAL 2 TIMES DAILY
Qty: 60 TABLET | Refills: 11 | Status: ON HOLD | OUTPATIENT
Start: 2018-02-06 | End: 2021-04-28

## 2018-02-06 RX ORDER — ATORVASTATIN CALCIUM 10 MG/1
10 TABLET, FILM COATED ORAL NIGHTLY
Qty: 90 TABLET | Refills: 3 | Status: SHIPPED | OUTPATIENT
Start: 2018-02-06 | End: 2018-03-28

## 2018-02-06 RX ADMIN — PNEUMOCOCCAL 13-VALENT CONJUGATE VACCINE 0.5 ML: 2.2; 2.2; 2.2; 2.2; 2.2; 4.4; 2.2; 2.2; 2.2; 2.2; 2.2; 2.2; 2.2 INJECTION, SUSPENSION INTRAMUSCULAR at 04:02

## 2018-02-06 RX ADMIN — POTASSIUM CHLORIDE 40 MEQ: 20 SOLUTION ORAL at 08:02

## 2018-02-06 RX ADMIN — SODIUM CHLORIDE TAB 1 GM 1 G: 1 TAB at 08:02

## 2018-02-06 RX ADMIN — OXYCODONE HYDROCHLORIDE 5 MG: 5 TABLET ORAL at 08:02

## 2018-02-06 RX ADMIN — APIXABAN 2.5 MG: 2.5 TABLET, FILM COATED ORAL at 08:02

## 2018-02-06 RX ADMIN — METOPROLOL TARTRATE 50 MG: 50 TABLET ORAL at 08:02

## 2018-02-06 RX ADMIN — FAMOTIDINE 20 MG: 10 INJECTION, SOLUTION INTRAVENOUS at 08:02

## 2018-02-06 RX ADMIN — GUAIFENESIN 1200 MG: 600 TABLET, EXTENDED RELEASE ORAL at 08:02

## 2018-02-06 RX ADMIN — MOXIFLOXACIN HYDROCHLORIDE 400 MG: 400 TABLET, FILM COATED ORAL at 08:02

## 2018-02-06 RX ADMIN — ENALAPRIL MALEATE 10 MG: 5 TABLET ORAL at 08:02

## 2018-02-06 RX ADMIN — AMLODIPINE BESYLATE 10 MG: 5 TABLET ORAL at 08:02

## 2018-02-06 RX ADMIN — FUROSEMIDE 20 MG: 20 TABLET ORAL at 08:02

## 2018-02-06 RX ADMIN — LEVOTHYROXINE SODIUM 75 MCG: 75 TABLET ORAL at 05:02

## 2018-02-06 RX ADMIN — OXYCODONE HYDROCHLORIDE 5 MG: 5 TABLET ORAL at 04:02

## 2018-02-06 NOTE — PLAN OF CARE
Problem: Fall Risk (Adult)  Intervention: Reduce Risk/Promote Restraint Free Environment   18   Safety Interventions   Environmental Safety Modification assistive device/personal items within reach;clutter free environment maintained;lighting adjusted   Prevent Stedman Drop/Fall   Safety/Security Measures bed alarm set     Intervention: Review Medications/Identify Contributors to Fall Risk   18   Safety Interventions   Medication Review/Management medications reviewed     Intervention: Patient Rounds   18   Safety Interventions   Patient Rounds bed in low position;bed wheels locked;call light in reach;clutter free environment maintained;ID band on;placement of personal items at bedside;visualized patient;toileting offered     Intervention: Safety Promotion/Fall Prevention   18   Safety Interventions   Safety Promotion/Fall Prevention bed alarm set;assistive device/personal item within reach;toileting scheduled       Goal: Identify Related Risk Factors and Signs and Symptoms  Related risk factors and signs and symptoms are identified upon initiation of Human Response Clinical Practice Guideline (CPG)    18   Fall Risk   Related Risk Factors (Fall Risk) fatigue/slow reaction;inadequate lighting;objects hard to reach;environment unfamiliar;polypharmacy   Signs and Symptoms (Fall Risk) presence of risk factors     Goal: Absence of Falls  Patient will demonstrate the desired outcomes by discharge/transition of care.    18 110   Fall Risk (Adult)   Absence of Falls making progress toward outcome

## 2018-02-06 NOTE — PROGRESS NOTES
Awake alert oriented NAD    Denies CNS ENT CP GI  RHEUM OR DERM SX  Past Medical History:   Diagnosis Date    Arthritis     Blood transfusion     Carotid stenosis     Hypercholesterolemia     Hypertension     Psychiatric problem     Thyroid disease      Review of patient's allergies indicates:   Allergen Reactions    Strawberry Swelling     Tongue swells up and a generalized rash.       Current Facility-Administered Medications   Medication    acetaminophen tablet 650 mg    aluminum-magnesium hydroxide-simethicone 200-200-20 mg/5 mL suspension 30 mL    amLODIPine tablet 10 mg    apixaban tablet 2.5 mg    atorvastatin tablet 10 mg    enalapril tablet 10 mg    famotidine (PF) injection 20 mg    furosemide tablet 20 mg    guaiFENesin 12 hr tablet 1,200 mg    levothyroxine tablet 75 mcg    magnesium sulfate 2g in water 50mL IVPB (premix)    metoprolol tartrate (LOPRESSOR) tablet 50 mg    moxifloxacin tablet 400 mg    ondansetron injection 4 mg    ondansetron injection 4 mg    oxyCODONE immediate release tablet 5 mg    pneumoc 13-vanessa conj-dip cr(PF) 0.5 mL    potassium chloride 10% solution 40 mEq    ramelteon tablet 8 mg    sodium chloride 0.9% flush 3 mL    sodium chloride tablet 1 g       LABS    Recent Results (from the past 24 hour(s))   CBC auto differential    Collection Time: 02/06/18  4:51 AM   Result Value Ref Range    WBC 8.13 3.90 - 12.70 K/uL    RBC 3.34 (L) 4.00 - 5.40 M/uL    Hemoglobin 10.2 (L) 12.0 - 16.0 g/dL    Hematocrit 30.2 (L) 37.0 - 48.5 %    MCV 90 82 - 98 fL    MCH 30.5 27.0 - 31.0 pg    MCHC 33.8 32.0 - 36.0 g/dL    RDW 13.8 11.5 - 14.5 %    Platelets 505 (H) 150 - 350 K/uL    MPV 8.6 (L) 9.2 - 12.9 fL    Gran # (ANC) 5.5 1.8 - 7.7 K/uL    Lymph # 1.4 1.0 - 4.8 K/uL    Mono # 1.2 (H) 0.3 - 1.0 K/uL    Eos # 0.1 0.0 - 0.5 K/uL    Baso # 0.02 0.00 - 0.20 K/uL    Gran% 67.1 38.0 - 73.0 %    Lymph% 17.0 (L) 18.0 - 48.0 %    Mono% 14.1 4.0 - 15.0 %    Eosinophil% 1.6  0.0 - 8.0 %    Basophil% 0.2 0.0 - 1.9 %    Differential Method Automated    Comprehensive metabolic panel    Collection Time: 02/06/18  4:51 AM   Result Value Ref Range    Sodium 135 (L) 136 - 145 mmol/L    Potassium 4.4 3.5 - 5.1 mmol/L    Chloride 103 95 - 110 mmol/L    CO2 26 23 - 29 mmol/L    Glucose 85 70 - 110 mg/dL    BUN, Bld 15 8 - 23 mg/dL    Creatinine 0.6 0.5 - 1.4 mg/dL    Calcium 8.8 8.7 - 10.5 mg/dL    Total Protein 5.7 (L) 6.0 - 8.4 g/dL    Albumin 2.6 (L) 3.5 - 5.2 g/dL    Total Bilirubin 0.9 0.1 - 1.0 mg/dL    Alkaline Phosphatase 81 55 - 135 U/L    AST 21 10 - 40 U/L    ALT 58 (H) 10 - 44 U/L    Anion Gap 6 (L) 8 - 16 mmol/L    eGFR if African American >60 >60 mL/min/1.73 m^2    eGFR if non African American >60 >60 mL/min/1.73 m^2   ]    I/O last 3 completed shifts:  In: 1110 [P.O.:1110]  Out: 800 [Urine:800]    Vitals:    02/05/18 2016 02/05/18 2358 02/06/18 0436 02/06/18 0730   BP: (!) 128/59 134/61 (!) 117/57 138/65   Pulse: 73 61 (!) 58 60   Resp: 18 18 18 18   Temp: 98 °F (36.7 °C) 98.2 °F (36.8 °C) 98 °F (36.7 °C) 98 °F (36.7 °C)   TempSrc: Oral Oral Oral    SpO2: (!) 94% 95% 97% (!) 91%   Weight:   43.5 kg (95 lb 14.4 oz)    Height:           No Jvd, Thyromegaly or Lymphadenopathy  Lungs: Fairly clear anteriorly and laterally  Cor: RRR no G or rubs  Abd: Soft benign good bowel sounds non tender  Ext: No E C C    A)  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

## 2018-02-06 NOTE — PROGRESS NOTES
BOBBY spoke with POL[facility] Venegas. Pt's daughter signed paperwork and they are ready for her. BOBBY paged MD, if pt is ready for discharge.    BOBBY spoke with MD. Stated that pt can discharge today and she will complete discharge. BOBBY voiced understanding.     BOBBY spoke with pt's daughter Christine stating that pt will discharge today. Christine voiced understanding.     Awaiting call Report.

## 2018-02-06 NOTE — PLAN OF CARE
02/06/18 1634   Final Note   Assessment Type Final Discharge Note   Discharge Disposition Home   What phone number can be called within the next 1-3 days to see how you are doing after discharge? [phone]   Hospital Follow Up  Appt(s) scheduled? Yes   Discharge plans and expectations educations in teach back method with documentation complete? Yes   Right Care Referral Info   Post Acute Recommendation SNF / Sub-Acute Rehab   Referral Type SNF   Facility Name [facility]

## 2018-02-06 NOTE — NURSING
Report received; pt awake and alert resp even and unlabored, verbalized complaint of back pain, no other needs voiced at this time

## 2018-02-06 NOTE — PROGRESS NOTES
TN called [employer] transportation  @ [phone]. Patient to travel via wheelchair with oxygen.  Transportation will arrive at 6:00pm.  Nurse Bridgette notified.

## 2018-02-06 NOTE — ASSESSMENT & PLAN NOTE
Patient with improvement in respiratory status. CT chest shows bilateral ground glass. This likely represents acute infection or pulmonary edema. No findings suggestive of lung cancer or to explain hyponatremia.   -Complete 7 day course of Abx.  -Repeat Imaging(CT chest or CXR)  In 4-6 weeks with pulmonary follow up.

## 2018-02-06 NOTE — CONSULTS
[facility]  Pulmonology  Consult Note    Patient Name: Jeannie Hutchins  MRN: [id number]  Admission Date: 1/22/2018  Hospital Length of Stay: 14 days  Code Status: Full Code  Attending Physician: Jake Trujillo MD  Primary Care Provider: Paige Mcleod MD   Principal Problem: Hyponatremia with decreased serum osmolality    Inpatient consult to Pulmonology  Consult performed by: BEN TRUJILLO  Consult ordered by: ENRIQUE LOYD        Subjective:     HPI:  82 year old female with HTN, HLD, Hypothyroidism initially presented to [facility] with diarrhea and hyponatremia. Patient with respiratory complaints during hospitalization. Moved to the ICU due to increasing oxygen requirements. CT scan ordered with concern for hyponatremia secondary to lung cancer. Pulmonary consulted after CT scan.     Past Medical History:   Diagnosis Date    Arthritis     Blood transfusion     Carotid stenosis     Hypercholesterolemia     Hypertension     Psychiatric problem     Thyroid disease        Past Surgical History:   Procedure Laterality Date    CARPAL TUNNEL RELEASE  2012    right hand    HYSTERECTOMY      left carotid endartectomy  5/2006    TONSILLECTOMY         Review of patient's allergies indicates:   Allergen Reactions    Strawberry Swelling     Tongue swells up and a generalized rash.       Family History     Problem Relation (Age of Onset)    Breast cancer Daughter (50)    Cancer Brother (65), Sister (81), Sister, Sister    Heart disease Father    Ovarian cancer Sister (81)    Schizophrenia Son        Social History Main Topics    Smoking status: Never Smoker    Smokeless tobacco: Never Used    Alcohol use No    Drug use: No    Sexual activity: No         Review of Systems   Constitutional: Negative for activity change and appetite change.   HENT: Negative for congestion and postnasal drip.    Respiratory: Positive for shortness of breath. Negative for chest tightness and  wheezing.    Cardiovascular: Negative for chest pain and leg swelling.   Gastrointestinal: Negative for abdominal distention and abdominal pain.   Genitourinary: Negative for difficulty urinating and dysuria.   Musculoskeletal: Negative for arthralgias.   Neurological: Negative for dizziness and headaches.   Hematological: Negative for adenopathy.   Psychiatric/Behavioral: Negative for agitation and behavioral problems.     Objective:     Vital Signs (Most Recent):  Temp: 98.3 °F (36.8 °C) (02/05/18 1528)  Pulse: 64 (02/05/18 1528)  Resp: 18 (02/05/18 1528)  BP: (!) 128/58 (02/05/18 1528)  SpO2: 95 % (02/05/18 1528) Vital Signs (24h Range):  Temp:  [98 °F (36.7 °C)-99.4 °F (37.4 °C)] 98.3 °F (36.8 °C)  Pulse:  [58-78] 64  Resp:  [18] 18  SpO2:  [94 %-95 %] 95 %  BP: (105-148)/(50-66) 128/58     Weight: 43.3 kg (95 lb 7.4 oz)  Body mass index is 21.4 kg/m².      Intake/Output Summary (Last 24 hours) at 02/05/18 1857  Last data filed at 02/05/18 1149   Gross per 24 hour   Intake              990 ml   Output              800 ml   Net              190 ml       Physical Exam    Vents:  Oxygen Concentration (%): 3 (02/02/18 1505)    Lines/Drains/Airways     Peripheral Intravenous Line                 Peripheral IV - Single Lumen 02/01/18 0600 Right Hand 4 days         Peripheral IV - Single Lumen 02/02/18 0230 Right Antecubital 3 days                Significant Labs:    CBC/Anemia Profile:    Recent Labs  Lab 02/04/18  1312 02/05/18  0447   WBC 16.35* 11.41   HGB 12.3 11.1*   HCT 35.3* 31.7*   * 540*   MCV 89 89   RDW 13.0 13.3        Chemistries:    Recent Labs  Lab 02/04/18  1312 02/05/18  0447   * 136   K 3.4* 4.0   CL 92* 98   CO2 30* 29   BUN 10 12   CREATININE 0.7 0.7   CALCIUM 9.4 8.9   ALBUMIN 3.1* 2.8*   PROT 7.0 6.1   BILITOT 1.0 0.9   ALKPHOS 116 84   ALT 80* 60*   AST 38 23       All pertinent labs within the past 24 hours have been reviewed.    Significant Imaging:   I have reviewed all pertinent  imaging results/findings within the past 24 hours.    Assessment/Plan:     Acute respiratory failure with hypoxia and hypercarbia    Patient with improvement in respiratory status. CT chest shows bilateral ground glass. This likely represents acute infection or pulmonary edema. No findings suggestive of lung cancer or to explain hyponatremia.   -Complete 7 day course of Abx.  -Repeat Imaging(CT chest or CXR)  In 4-6 weeks with pulmonary follow up.               Thank you for your consult. I will sign off. Please contact us if you have any additional questions.     Saritha Chinchilla MD  Pulmonology  [facility]

## 2018-02-06 NOTE — PHYSICIAN QUERY
"PT Name: Jeannie Hutchins  MR #: [id number]    Physician Query Form - Nutrition Clarification     Marcia Swain RN, CCDS  Contact Info: [phone] [email or web address]      This form is a permanent document in the medical record.     Query Date: February 6, 2018    By submitting this query, we are merely seeking further clarification of documentation.. Please utilize your independent clinical judgment when addressing the question(s) below.    The Medical record contains the following:   Indicators  Supporting Clinical Findings Location in Medical Record   x % of Estimated Energy Intake over a time frame from p.o., TF, or TPN Current intake ~ 50% of meals.     Energy Calories Required: not meeting needs   Protein Required: not meeting needs      % Intake of Estimated Energy Needs: 50-75%  % Meal Intake: 50%    RD PN 2/5   x Weight Status over a time frame States UBW of recent is 95-98# but has lost 20# over past two years.     Overall: thin but WDL, age appropriate LBM loss    Weight loss: unintentional  28% over 2 years RD PN 2/5    Subcutaneous Fat and/or Muscle Loss      Fluid Accumulation or Edema      Reduced  Strength     x Wt / BMI / Usual Body Weight Height: 4' 8" (142.2 cm)    Weight Method: Bed Scale  Weight: 43.3 kg (95 lb 7.4 oz)     Ideal Body Weight (IBW), Female: 80 lb     % Ideal Body Weight, Female (lb): 132.28 lb  BMI (Calculated): 23.8  BMI Grade: 18.5-24.9 - normal RD PN 2/5    Delayed Wound Healing / Failure to Thrive     x Acute or Chronic Illness HTN  Hypothyroidism  Hypkalemia  Hypophosphatemia  HypoNatremia    SIADH  Paroxysmal Atrial Fib  Pulmonary hypertension  Poss Bacterial PNA [place] Pn 1/28            Renal PN 2/5  Korbel CN 1/29   Yuratich Pn 1/29  Payal 2/4 PN     Medication     x Treatment Recommendation/Intervention:   1. Boost plus with meals   2. Honor preferences as able   3. Provided tips to aid with home diet   4. RD to monitor    Nutrition Risk   Level of Risk:  (2 x " week)   Nutrition Follow-Up  RD Follow-up?: Yes    Diet: Regular Adult  Boost all meals, Chocolate Supplement   RD PN 2/5                    Orders   x Other States she doesn't drink supplements at home because of the expense. Is concerned about her intake and for her  because they are both losing weight    Pertinent Labs Comments: alb 2.8 RD PN 2/5     AND / ASPEN Clinical Characteristics (October 2011)  A minimum of two characteristics is recommended for diagnosing either moderate or severe malnutrition   Mild Malnutrition Moderate Malnutrition Severe Malnutrition   Energy Intake from p.o., TF or TPN. < 75% intake of estimated energy needs for less than 7 days < 75% intake of estimated energy needs for greater than 7 days < 50% intake of estimated energy needs for > 5 days   Weight Loss 1-2% in 1 month  5% in 3 months  7.5% in 6 months  10% in 1 year 1-2 % in 1 week  5% in 1 month  7.5% in 3 months  10% in 6 months  20% in 1 year > 2% in 1 week  > 5% in 1 month  > 7.5% in 3 months  > 10% in 6 months  > 20% in 1 year   Physical Findings     None *Mild subcutaneous fat and/or muscle loss  *Mild fluid accumulation  *Stage II decubitus  *Surgical wound or non-healing wound *Mod/severe subcutaneous fat and/or muscle loss  *Mod/severe fluid accumulation  *Stage III or IV decubitus  *Non-healing surgical wound     Provider, please specify diagnosis or diagnoses associated with above clinical findings.    [x ] Mild Protein-Calorie Malnutrition  [ ] Moderate Protein-Calorie Malnutrition  [ ] Underweight  [ ] Other Nutritional Diagnosis (please specify): ____________________________________  [ ] Other: ________________________________  [ ] Clinically Undetermined    Please document in your progress notes daily for the duration of treatment until resolved and include in your discharge summary.

## 2018-02-06 NOTE — PLAN OF CARE
[facility]    FACILITY TRANSFER ORDERS      Patient Name: Jeannie Hutchins  YOB: 1935    PCP: Paige Mcleod MD   PCP Address: [address] [place] [id number]  PCP Phone Number: [phone]  PCP Fax: [phone]    Encounter Date: 02/06/2018    Admit to: SNF/ [facility]     Vital Signs:  Per Facility Protocol     Diagnoses:   Active Hospital Problems    Diagnosis  POA    *Hyponatremia with decreased serum osmolality [E87.1]  Yes     Differential:  CHF - bnp is 400+;  cxr shows normal heart size, no pulm fluid - will check echo  Cirrhosis - AST at upper normal, otherwise LFTs in normal range; check liver US  Adrenal insuff - would expect hyperkalemia and hypotension - not present;  Check am cortisol  SIADH - ?  Source?  HCTZ - not taking  Renal failure - no  Polydipsia - no      Acute respiratory failure with hypoxia and hypercarbia [J96.01, J96.02]  No    Paroxysmal atrial fibrillation [I48.0]  No    Pulmonary hypertension [I27.20]  No    Hypophosphatemia [E83.39]  No    Follow up [Z09]  Not Applicable    Adjustment disorder with mixed anxiety and depressed mood [F43.23]  Yes     Dr. Mckeon's eval 1/23/18:  Patient does not meet criteria for major depressive disorder.  Her symptoms are contingent upon her situations, which is an adjustment disorder.  This is best treated with intense individual therapy.  She is correct that she would do best with a psychologist.  I am not familiar with any here on the [facility] but she should check with her insurance to see if there are any providers working in the community.  Medications will not help an adjustment disorder.        Benign hypertension [I10]  Yes    Hypokalemia [E87.6]  Yes    Generalized anxiety disorder [F41.1]  Yes     Dr. Mckeon's eval 1/23/18:  She does appear to have a JERRI because of the persistent worries that she has.  These worries predominate her day.  She would probably do well  with prevention therapy such as SSRI/SNRI to help control the physical symptoms of the anxiety.  Problem at this point in time is her electrolyte abnormalities.  There is a slight risk of hyponatremia with these medications and given her current state, is not recommended to start these.   After a few weeks of stabilized labs, would recommend to start low dose SSRI treatment.  She may do well with paroxetine.  Start at 10mg daily and increase slowly over time to 20-40mg, whatever dose meets efficacy.  Again, therapy will help her with her anxiety.  Agree with patient that she should seek individual counseling with a psychologist.      Non-intractable vomiting with nausea [R11.2]  Yes    S/P carotid endarterectomy [Z98.890]  Not Applicable      Resolved Hospital Problems    Diagnosis Date Resolved POA   No resolved problems to display.       Allergies:  Review of patient's allergies indicates:   Allergen Reactions    Strawberry Swelling     Tongue swells up and a generalized rash.       Diet: Regular with Boost     Activities: Up with assistance    Labs: Per facility Protocol     Medications: Review discharge medications with patient and family and provide education.      Current Discharge Medication List      START taking these medications    Details   aluminum-magnesium hydroxide-simethicone (MAALOX) 200-200-20 mg/5 mL Susp Take 30 mLs by mouth every 4 (four) hours as needed.      amLODIPine (NORVASC) 10 MG tablet Take 1 tablet (10 mg total) by mouth once daily.  Qty: 30 tablet, Refills: 11      apixaban 2.5 mg Tab Take 1 tablet (2.5 mg total) by mouth 2 (two) times daily.      atorvastatin (LIPITOR) 10 MG tablet Take 1 tablet (10 mg total) by mouth every evening.  Qty: 90 tablet, Refills: 3      enalapril (VASOTEC) 10 MG tablet Take 1 tablet (10 mg total) by mouth once daily.  Qty: 90 tablet, Refills: 3      furosemide (LASIX) 20 MG tablet Take 1 tablet (20 mg total) by mouth once daily.  Qty: 30 tablet,  Refills: 11      metoprolol tartrate (LOPRESSOR) 50 MG tablet Take 1 tablet (50 mg total) by mouth 2 (two) times daily.  Qty: 60 tablet, Refills: 11      moxifloxacin (AVELOX) 400 mg tablet Take 1 tablet (400 mg total) by mouth once daily.  Qty: 7 tablet, Refills: 0    Associated Diagnoses: Bacterial pneumonia         CONTINUE these medications which have NOT CHANGED    Details   acetaminophen (TYLENOL) 325 MG tablet Take 325 mg by mouth 2 (two) times daily.      alendronate (FOSAMAX) 70 MG tablet Take 70 mg by mouth every 7 days.      levothyroxine (SYNTHROID) 75 MCG tablet Take 75 mcg by mouth once daily.      ranitidine (ZANTAC) 150 MG tablet Take 150 mg by mouth with lunch.      VIT A,C & E/LUTEIN/MINERALS (VISION FORMULA, WITH LUTEIN, ORAL) Take 1 tablet by mouth once daily.         STOP taking these medications       amlodipine-atorvastatin (CADUET) 10-10 mg per tablet Comments:   Reason for Stopping:         aspirin 325 MG tablet Comments:   Reason for Stopping:         hydrochlorothiazide (HYDRODIURIL) 25 MG tablet Comments:   Reason for Stopping:                    _________________________________  Jake Trujillo MD  02/06/2018

## 2018-02-06 NOTE — SUBJECTIVE & OBJECTIVE
Interval History: Pt sitting up at the bedside.  Looking better this morning    Review of Systems   All other systems reviewed and are negative.    Scheduled Meds:   amLODIPine  10 mg Oral Daily    apixaban  2.5 mg Oral BID    atorvastatin  10 mg Oral QHS    enalapril  10 mg Oral Daily    famotidine (PF)  20 mg Intravenous Q12H    furosemide  20 mg Oral Daily    guaiFENesin  1,200 mg Oral BID    levothyroxine  75 mcg Oral Before breakfast    metoprolol tartrate  50 mg Oral BID    moxifloxacin  400 mg Oral Daily    potassium chloride 10%  40 mEq Oral Daily    sodium chloride  1 g Oral BID     Continuous Infusions:  PRN Meds:.acetaminophen, aluminum-magnesium hydroxide-simethicone, magnesium sulfate IVPB, ondansetron, ondansetron, oxyCODONE, pneumoc 13-vanessa conj-dip cr(PF), ramelteon, sodium chloride 0.9%  z  Objective:     Vital Signs (Most Recent):  Temp: 98 °F (36.7 °C) (02/06/18 0730)  Pulse: 60 (02/06/18 0730)  Resp: 18 (02/06/18 0730)  BP: 138/65 (02/06/18 0730)  SpO2: (!) 91 % (02/06/18 0730) Vital Signs (24h Range):  Temp:  [98 °F (36.7 °C)-98.3 °F (36.8 °C)] 98 °F (36.7 °C)  Pulse:  [58-73] 60  Resp:  [18] 18  SpO2:  [91 %-97 %] 91 %  BP: (105-138)/(50-65) 138/65     Weight: 43.5 kg (95 lb 14.4 oz)  Body mass index is 21.5 kg/m².    Intake/Output Summary (Last 24 hours) at 02/06/18 0854  Last data filed at 02/06/18 0505   Gross per 24 hour   Intake              870 ml   Output              800 ml   Net               70 ml      Physical Exam   Constitutional: She is oriented to person, place, and time. She appears well-developed and well-nourished.   Eyes: EOM are normal. Pupils are equal, round, and reactive to light.   Cardiovascular: Normal rate, regular rhythm and normal heart sounds.  Exam reveals no gallop and no friction rub.    No murmur heard.  Pulmonary/Chest: Effort normal and breath sounds normal.   Abdominal: Soft. Bowel sounds are normal.   Neurological: She is alert and oriented to  person, place, and time.       Significant Labs:   CBC:     Recent Labs  Lab 02/04/18  1312 02/05/18  0447 02/06/18  0451   WBC 16.35* 11.41 8.13   HGB 12.3 11.1* 10.2*   HCT 35.3* 31.7* 30.2*   * 540* 505*     CMP:     Recent Labs  Lab 02/04/18  1312 02/05/18  0447 02/06/18  0451   * 136 135*   K 3.4* 4.0 4.4   CL 92* 98 103   CO2 30* 29 26   * 95 85   BUN 10 12 15   CREATININE 0.7 0.7 0.6   CALCIUM 9.4 8.9 8.8   PROT 7.0 6.1 5.7*   ALBUMIN 3.1* 2.8* 2.6*   BILITOT 1.0 0.9 0.9   ALKPHOS 116 84 81   AST 38 23 21   ALT 80* 60* 58*   ANIONGAP 10 9 6*   EGFRNONAA >60 >60 >60       Significant Imaging: CT: I have reviewed all pertinent results/findings within the past 24 hours and my personal findings are:    1.  Patchy groundglass opacities throughout the bilateral lungs with minimal to no associated septal thickening.  Findings raise concern for possible focal atypical infectious/inflammatory process.  Interstitial pulmonary edema favor less likely but not entirely excluded.    2.  Trace bilateral pleural effusions.

## 2018-02-06 NOTE — SUBJECTIVE & OBJECTIVE
Past Medical History:   Diagnosis Date    Arthritis     Blood transfusion     Carotid stenosis     Hypercholesterolemia     Hypertension     Psychiatric problem     Thyroid disease        Past Surgical History:   Procedure Laterality Date    CARPAL TUNNEL RELEASE  2012    right hand    HYSTERECTOMY      left carotid endartectomy  5/2006    TONSILLECTOMY         Review of patient's allergies indicates:   Allergen Reactions    Strawberry Swelling     Tongue swells up and a generalized rash.       Family History     Problem Relation (Age of Onset)    Breast cancer Daughter (50)    Cancer Brother (65), Sister (81), Sister, Sister    Heart disease Father    Ovarian cancer Sister (81)    Schizophrenia Son        Social History Main Topics    Smoking status: Never Smoker    Smokeless tobacco: Never Used    Alcohol use No    Drug use: No    Sexual activity: No         Review of Systems   Constitutional: Negative for activity change and appetite change.   HENT: Negative for congestion and postnasal drip.    Respiratory: Positive for shortness of breath. Negative for chest tightness and wheezing.    Cardiovascular: Negative for chest pain and leg swelling.   Gastrointestinal: Negative for abdominal distention and abdominal pain.   Genitourinary: Negative for difficulty urinating and dysuria.   Musculoskeletal: Negative for arthralgias.   Neurological: Negative for dizziness and headaches.   Hematological: Negative for adenopathy.   Psychiatric/Behavioral: Negative for agitation and behavioral problems.     Objective:     Vital Signs (Most Recent):  Temp: 98.3 °F (36.8 °C) (02/05/18 1528)  Pulse: 64 (02/05/18 1528)  Resp: 18 (02/05/18 1528)  BP: (!) 128/58 (02/05/18 1528)  SpO2: 95 % (02/05/18 1528) Vital Signs (24h Range):  Temp:  [98 °F (36.7 °C)-99.4 °F (37.4 °C)] 98.3 °F (36.8 °C)  Pulse:  [58-78] 64  Resp:  [18] 18  SpO2:  [94 %-95 %] 95 %  BP: (105-148)/(50-66) 128/58     Weight: 43.3 kg (95 lb 7.4  oz)  Body mass index is 21.4 kg/m².      Intake/Output Summary (Last 24 hours) at 02/05/18 1857  Last data filed at 02/05/18 1149   Gross per 24 hour   Intake              990 ml   Output              800 ml   Net              190 ml       Physical Exam    Vents:  Oxygen Concentration (%): 3 (02/02/18 1505)    Lines/Drains/Airways     Peripheral Intravenous Line                 Peripheral IV - Single Lumen 02/01/18 0600 Right Hand 4 days         Peripheral IV - Single Lumen 02/02/18 0230 Right Antecubital 3 days                Significant Labs:    CBC/Anemia Profile:    Recent Labs  Lab 02/04/18  1312 02/05/18  0447   WBC 16.35* 11.41   HGB 12.3 11.1*   HCT 35.3* 31.7*   * 540*   MCV 89 89   RDW 13.0 13.3        Chemistries:    Recent Labs  Lab 02/04/18  1312 02/05/18  0447   * 136   K 3.4* 4.0   CL 92* 98   CO2 30* 29   BUN 10 12   CREATININE 0.7 0.7   CALCIUM 9.4 8.9   ALBUMIN 3.1* 2.8*   PROT 7.0 6.1   BILITOT 1.0 0.9   ALKPHOS 116 84   ALT 80* 60*   AST 38 23       All pertinent labs within the past 24 hours have been reviewed.    Significant Imaging:   I have reviewed all pertinent imaging results/findings within the past 24 hours.

## 2018-02-06 NOTE — HPI
82 year old female with HTN, HLD, Hypothyroidism initially presented to [facility] with diarrhea and hyponatremia. Patient with respiratory complaints during hospitalization. Moved to the ICU due to increasing oxygen requirements. CT scan ordered with concern for hyponatremia secondary to lung cancer. Pulmonary consulted after CT scan.

## 2018-02-06 NOTE — PROGRESS NOTES
[facility] [place]  Progress Note    Patient Name: Jeannie Hutchins  MRN: [id number]  Patient Class: IP- Inpatient   Admission Date: 1/22/2018  Length of Stay: 15 days  Attending Physician: Jake Trujillo MD  Primary Care Provider: Paige Mcleod MD        Subjective:     Principal Problem:Hyponatremia with decreased serum osmolality    HPI:  PT  Is an 83yo WF followed by Dr. Bethany Mcleod who was seen in the office with complaints of diarrhea on 1/19.  At that time she was felt to have gastroenteritis.  She was sent home but states she could not eat anything for 4 days.  The diarrhea was starting to improve but she did not feel better so she presented to the ED.  She was found to have a sodium of 113 and was admitted with a diagnosis of hyponatremia    Hospital Course:  Sodium improving with fluids - 123 as of 1/27  She has issues with anxiety.  She was started on paroxetine; psych is recommending OP therapy  On 1/28, she is asking for something for her anxiety - while waiting for paroxetine to take effect, will give 1 dose of lorazepam 0.5  Sodium dropped again, to 117.  Resumed NS IV  This did not help.  Na continued dropping.  Renal on case - changed to 0.3%NS - moved to ICU for drip.  While there, developed resp difficulty.  She eventually required NRB with 14lpm O2 - sat 95%.  CXR on 1/28 showed worsening aeroation - due either to pneumonia or fluid.  Lasix given - good result with urination; felt a bit better.   Started on empiric rocephin.  Furthermore, she developed tachycardia and was found to be in afib.  Started on empiric Eliquis for clot prevention; cardiology consulted.  Dr. Geovany johnson change from amlodipine to metoprolol.  She is CHADS4 - remain on Eliquis.  Discussed with Dr. Mckeon- ALL SSRI/SNRI will have hyponatremia risk.  For now, will have to use rare lorazepam for severe episodes.  She is amenable to therapy as OP - but needs insurance coverage.  2/2: moved  to tele - continues improvement    Interval History: Pt sitting up at the bedside.  Looking better this morning    Review of Systems   All other systems reviewed and are negative.    Scheduled Meds:   amLODIPine  10 mg Oral Daily    apixaban  2.5 mg Oral BID    atorvastatin  10 mg Oral QHS    enalapril  10 mg Oral Daily    famotidine (PF)  20 mg Intravenous Q12H    furosemide  20 mg Oral Daily    guaiFENesin  1,200 mg Oral BID    levothyroxine  75 mcg Oral Before breakfast    metoprolol tartrate  50 mg Oral BID    moxifloxacin  400 mg Oral Daily    potassium chloride 10%  40 mEq Oral Daily    sodium chloride  1 g Oral BID     Continuous Infusions:  PRN Meds:.acetaminophen, aluminum-magnesium hydroxide-simethicone, magnesium sulfate IVPB, ondansetron, ondansetron, oxyCODONE, pneumoc 13-vanessa conj-dip cr(PF), ramelteon, sodium chloride 0.9%  z  Objective:     Vital Signs (Most Recent):  Temp: 98 °F (36.7 °C) (02/06/18 0730)  Pulse: 60 (02/06/18 0730)  Resp: 18 (02/06/18 0730)  BP: 138/65 (02/06/18 0730)  SpO2: (!) 91 % (02/06/18 0730) Vital Signs (24h Range):  Temp:  [98 °F (36.7 °C)-98.3 °F (36.8 °C)] 98 °F (36.7 °C)  Pulse:  [58-73] 60  Resp:  [18] 18  SpO2:  [91 %-97 %] 91 %  BP: (105-138)/(50-65) 138/65     Weight: 43.5 kg (95 lb 14.4 oz)  Body mass index is 21.5 kg/m².    Intake/Output Summary (Last 24 hours) at 02/06/18 0854  Last data filed at 02/06/18 0505   Gross per 24 hour   Intake              870 ml   Output              800 ml   Net               70 ml      Physical Exam   Constitutional: She is oriented to person, place, and time. She appears well-developed and well-nourished.   Eyes: EOM are normal. Pupils are equal, round, and reactive to light.   Cardiovascular: Normal rate, regular rhythm and normal heart sounds.  Exam reveals no gallop and no friction rub.    No murmur heard.  Pulmonary/Chest: Effort normal and breath sounds normal.   Abdominal: Soft. Bowel sounds are normal.    Neurological: She is alert and oriented to person, place, and time.       Significant Labs:   CBC:     Recent Labs  Lab 02/04/18  1312 02/05/18  0447 02/06/18  0451   WBC 16.35* 11.41 8.13   HGB 12.3 11.1* 10.2*   HCT 35.3* 31.7* 30.2*   * 540* 505*     CMP:     Recent Labs  Lab 02/04/18  1312 02/05/18  0447 02/06/18  0451   * 136 135*   K 3.4* 4.0 4.4   CL 92* 98 103   CO2 30* 29 26   * 95 85   BUN 10 12 15   CREATININE 0.7 0.7 0.6   CALCIUM 9.4 8.9 8.8   PROT 7.0 6.1 5.7*   ALBUMIN 3.1* 2.8* 2.6*   BILITOT 1.0 0.9 0.9   ALKPHOS 116 84 81   AST 38 23 21   ALT 80* 60* 58*   ANIONGAP 10 9 6*   EGFRNONAA >60 >60 >60       Significant Imaging: CT: I have reviewed all pertinent results/findings within the past 24 hours and my personal findings are:    1.  Patchy groundglass opacities throughout the bilateral lungs with minimal to no associated septal thickening.  Findings raise concern for possible focal atypical infectious/inflammatory process.  Interstitial pulmonary edema favor less likely but not entirely excluded.    2.  Trace bilateral pleural effusions.    Assessment/Plan:      * Hyponatremia with decreased serum osmolality    No pulmonary cause noted.  Sodium holding with proper intake          Pulmonary hypertension    PAP 54          Paroxysmal atrial fibrillation    Rate controlled.  COntinue eliquis            Acute respiratory failure with hypoxia and hypercarbia    Continue Avalox for 10 day total course        Follow up    Awaiting placement          Hypophosphatemia    Resolved        Generalized anxiety disorder      .  COntinue to monitor off meds.  Will need outpatient therapy        Hypokalemia    Potassium much better        Benign hypertension    BP will controlled        Adjustment disorder with mixed anxiety and depressed mood    Will refer to outpatient therapy        Non-intractable vomiting with nausea    Continue PRN Zofran          S/P carotid endarterectomy                 VTE Risk Mitigation         Ordered     apixaban tablet 2.5 mg  2 times daily     Route:  Oral        01/28/18 2128     Medium Risk of VTE  Once      01/22/18 1959     Place BRYANT hose  Until discontinued      01/22/18 1959     Place sequential compression device  Until discontinued      01/22/18 1959              Paige Mcleod MD  Department of Hospital Medicine   [facility]

## 2018-02-07 ENCOUNTER — PATIENT OUTREACH (OUTPATIENT)
Dept: ADMINISTRATIVE | Facility: CLINIC | Age: 83
End: 2018-02-07

## 2018-02-07 NOTE — NURSING
Pt discharged at this time via [facility] transport; transported off floor via wheelchair; pt awake and alert at this time no acute distress; large red bag with personal belongings sent with patient

## 2018-02-07 NOTE — NURSING
Report received; pt awake and alert up in chair, no acute distress noted; discharge pending, spoke with [facility] transportation,  is on the way to  the patient; no needs voiced by patient at this time

## 2018-02-24 NOTE — DISCHARGE SUMMARY
[facility]  Discharge Summary      Patient Name: Jeannie Hutchins  MRN: [id number]  Admission Date: 1/22/2018  Hospital Length of Stay: 15 days  Discharge Date and Time: 2/6/2018  8:31 PM  Attending Physician: No att. providers found   Discharging Provider: Paige Mcleod MD  Primary Care Provider: Paige Mcleod MD      HPI:   PT  Is an 81yo WF followed by Dr. Bethany Mcleod who was seen in the office with complaints of diarrhea on 1/19.  At that time she was felt to have gastroenteritis.  She was sent home but states she could not eat anything for 4 days.  The diarrhea was starting to improve but she did not feel better so she presented to the ED.  She was found to have a sodium of 113 and was admitted with a diagnosis of hyponatremia    * No surgery found *      Hospital Course:   Sodium improving with fluids - 123 as of 1/27  She has issues with anxiety.  She was started on paroxetine; psych is recommending OP therapy  On 1/28, she is asking for something for her anxiety - while waiting for paroxetine to take effect, will give 1 dose of lorazepam 0.5  Sodium dropped again, to 117.  Resumed NS IV  This did not help.  Na continued dropping.  Renal on case - changed to 0.3%NS - moved to ICU for drip.  While there, developed resp difficulty.  She eventually required NRB with 14lpm O2 - sat 95%.  CXR on 1/28 showed worsening aeroation - due either to pneumonia or fluid.  Lasix given - good result with urination; felt a bit better.   Started on empiric rocephin.  Furthermore, she developed tachycardia and was found to be in afib.  Started on empiric Eliquis for clot prevention; cardiology consulted.  Dr. Geovany johnson change from amlodipine to metoprolol.  She is CHADS4 - remain on Eliquis.  Discussed with Dr. Mckeon- ALL SSRI/SNRI will have hyponatremia risk.  For now, will have to use rare lorazepam for severe episodes.  She is amenable to therapy as OP - but needs insurance  coverage.  2/2: moved to [facility] - continues improvement  It was determined that patient would be best going to a SNF facility for further treatment with therapy prior to discharge home.  Pt was accepted to SNF     Consults:   Consults         Status Ordering Provider     Inpatient consult to Cardiology  Once     Provider:  Wenceslao Armenta MD    Completed KAVEH PEDERSEN     Inpatient consult to Psychiatry  Once     Provider:  Dallin Mckeon MD    Completed BRIGHT FOX     Inpatient consult to Pulmonology  Once     Provider:  Jona Cook MD    Completed ENRIQUE LOYD     Inpatient consult to Social Work  Once     Provider:  (Not yet assigned)    Completed KAVEH PEDERSEN          No new Assessment & Plan notes have been filed under this hospital service since the last note was generated.  Service: Hospital Medicine    Final Active Diagnoses:    Diagnosis Date Noted POA    PRINCIPAL PROBLEM:  Hyponatremia with decreased serum osmolality [E87.1] 01/23/2018 Yes    Acute respiratory failure with hypoxia and hypercarbia [J96.01, J96.02] 01/29/2018 No    Paroxysmal atrial fibrillation [I48.0] 01/29/2018 No    Pulmonary hypertension [I27.20] 01/29/2018 No    Hypophosphatemia [E83.39] 01/28/2018 No    Follow up [Z09] 01/28/2018 Not Applicable    Adjustment disorder with mixed anxiety and depressed mood [F43.23] 01/23/2018 Yes    Benign hypertension [I10] 01/23/2018 Yes    Hypokalemia [E87.6] 01/23/2018 Yes    Generalized anxiety disorder [F41.1] 01/23/2018 Yes    Non-intractable vomiting with nausea [R11.2] 01/22/2018 Yes    S/P carotid endarterectomy [Z98.890] 11/12/2013 Not Applicable      Problems Resolved During this Admission:    Diagnosis Date Noted Date Resolved POA       Discharged Condition: fair    Disposition: Skilled Nursing Facility    Follow Up:  Follow-up Information     Bright Mcleod MD.    Specialty:  Internal Medicine  Contact information:  [address]  [address] [address]  [phone]                 Patient Instructions:   No discharge procedures on file.    Significant Diagnostic Studies: Labs: BMP: No results for input(s): GLU, NA, K, CL, CO2, BUN, CREATININE, CALCIUM, MG in the last 48 hours. and CBC No results for input(s): WBC, HGB, HCT, PLT in the last 48 hours.    Pending Diagnostic Studies:     None         Medications:  Reconciled Home Medications:   Discharge Medication List as of 2/6/2018  8:31 PM      START taking these medications    Details   aluminum-magnesium hydroxide-simethicone (MAALOX) 200-200-20 mg/5 mL Susp Take 30 mLs by mouth every 4 (four) hours as needed., Starting Tue 2/6/2018, Until Wed 2/6/2019, OTC      amLODIPine (NORVASC) 10 MG tablet Take 1 tablet (10 mg total) by mouth once daily., Starting Wed 2/7/2018, Until Thu 2/7/2019, Normal      apixaban 2.5 mg Tab Take 1 tablet (2.5 mg total) by mouth 2 (two) times daily., Starting Tue 2/6/2018, No Print      atorvastatin (LIPITOR) 10 MG tablet Take 1 tablet (10 mg total) by mouth every evening., Starting Tue 2/6/2018, Until Wed 2/6/2019, Normal      enalapril (VASOTEC) 10 MG tablet Take 1 tablet (10 mg total) by mouth once daily., Starting Wed 2/7/2018, Until Thu 2/7/2019, Normal      furosemide (LASIX) 20 MG tablet Take 1 tablet (20 mg total) by mouth once daily., Starting Wed 2/7/2018, Until Thu 2/7/2019, Normal      metoprolol tartrate (LOPRESSOR) 50 MG tablet Take 1 tablet (50 mg total) by mouth 2 (two) times daily., Starting Tue 2/6/2018, Until Wed 2/6/2019, Normal      moxifloxacin (AVELOX) 400 mg tablet Take 1 tablet (400 mg total) by mouth once daily., Starting Wed 2/7/2018, Until Wed 2/14/2018, Normal         CONTINUE these medications which have NOT CHANGED    Details   acetaminophen (TYLENOL) 325 MG tablet Take 325 mg by mouth 2 (two) times daily., Historical Med      alendronate (FOSAMAX) 70 MG tablet Take 70 mg by mouth every 7 days., Historical Med       levothyroxine (SYNTHROID) 75 MCG tablet Take 75 mcg by mouth once daily., Historical Med      ranitidine (ZANTAC) 150 MG tablet Take 150 mg by mouth with lunch., Historical Med      VIT A,C & E/LUTEIN/MINERALS (VISION FORMULA, WITH LUTEIN, ORAL) Take 1 tablet by mouth once daily., Historical Med         STOP taking these medications       amlodipine-atorvastatin (CADUET) 10-10 mg per tablet Comments:   Reason for Stopping:         aspirin 325 MG tablet Comments:   Reason for Stopping:         hydrochlorothiazide (HYDRODIURIL) 25 MG tablet Comments:   Reason for Stopping:               Indwelling Lines/Drains at time of discharge:   Lines/Drains/Airways          No matching active lines, drains, or airways          Time spent on the discharge of patient: >30 minutes  Patient was seen and examined on the date of discharge and determined to be suitable for discharge.         Paige Mcleod MD  Department of Hospital Medicine  [facility]

## 2018-03-28 ENCOUNTER — OFFICE VISIT (OUTPATIENT)
Dept: CARDIOLOGY | Facility: CLINIC | Age: 83
End: 2018-03-28
Payer: MEDICARE

## 2018-03-28 VITALS
WEIGHT: 97 LBS | HEART RATE: 60 BPM | OXYGEN SATURATION: 92 % | DIASTOLIC BLOOD PRESSURE: 53 MMHG | HEIGHT: 56 IN | SYSTOLIC BLOOD PRESSURE: 115 MMHG | RESPIRATION RATE: 15 BRPM | BODY MASS INDEX: 21.82 KG/M2

## 2018-03-28 DIAGNOSIS — I48.0 PAROXYSMAL ATRIAL FIBRILLATION: Primary | ICD-10-CM

## 2018-03-28 DIAGNOSIS — E78.5 HYPERLIPIDEMIA, UNSPECIFIED HYPERLIPIDEMIA TYPE: ICD-10-CM

## 2018-03-28 DIAGNOSIS — Z98.890 S/P CAROTID ENDARTERECTOMY: ICD-10-CM

## 2018-03-28 DIAGNOSIS — I63.233 CEREBRAL INFARCTION DUE TO UNSPECIFIED OCCLUSION OR STENOSIS OF BILATERAL CAROTID ARTERIES: ICD-10-CM

## 2018-03-28 DIAGNOSIS — I10 BENIGN HYPERTENSION: ICD-10-CM

## 2018-03-28 PROCEDURE — 99213 OFFICE O/P EST LOW 20 MIN: CPT | Mod: PBBFAC | Performed by: INTERNAL MEDICINE

## 2018-03-28 PROCEDURE — 93010 ELECTROCARDIOGRAM REPORT: CPT | Mod: ,,, | Performed by: INTERNAL MEDICINE

## 2018-03-28 PROCEDURE — 99999 PR PBB SHADOW E&M-EST. PATIENT-LVL III: CPT | Mod: PBBFAC,,, | Performed by: INTERNAL MEDICINE

## 2018-03-28 PROCEDURE — 99214 OFFICE O/P EST MOD 30 MIN: CPT | Mod: S$PBB,,, | Performed by: INTERNAL MEDICINE

## 2018-03-28 RX ORDER — AMLODIPINE BESYLATE AND ATORVASTATIN CALCIUM 10; 40 MG/1; MG/1
1 TABLET, FILM COATED ORAL DAILY
Qty: 30 TABLET | Refills: 11 | Status: SHIPPED | OUTPATIENT
Start: 2018-03-28 | End: 2019-03-14 | Stop reason: SDUPTHER

## 2018-03-28 RX ORDER — HYDROCHLOROTHIAZIDE 25 MG/1
25 TABLET ORAL DAILY
Qty: 90 TABLET | Refills: 3 | Status: ON HOLD
Start: 2018-03-28 | End: 2018-04-16 | Stop reason: HOSPADM

## 2018-03-28 NOTE — PROGRESS NOTES
CARDIOVASCULAR PROGRESS NOTE    REASON FOR CONSULT:   Jeannie Hutchins is a 83 y.o. female who presents for follow up of PAF.    PCP: Yani  HISTORY OF PRESENT ILLNESS:   The patient comes in today accompanied by her , who is also a patient of mine.  I last saw her in January 2018 for paroxysmal atrial fibrillation.  The patient returns today without specific complaints.  She denies angina or dyspnea.  She's had no palpitations, lightheadedness, dizziness, syncope, or symptoms of TIA.  She denies PND, orthopnea, or lower extremity edema.  She denies melena, hematuria, or claudicant symptoms.    The patient's medication list was reviewed and revised.  Lasix was removed from our list and HCTZ 25 mg daily was added.  The patient apparently is only been taking apixaban once daily and I instructed her that she must take it twice a day to be effective.  She is concerned about cost, and I told her that we can release change her back to warfarin if it becomes too costly.  Additionally, the patient is taking CAD with 10/10 mg daily.  I plan to increase the atorvastatin portion of this to 40 mg daily.    CARDIOVASCULAR HISTORY:   PAF, CHADS VASC 4, on apixaban 2.5mg bid (dose appropriate for age/weight)  Hx bilat CEA with R ICA stenosis on CUS 10/2017    PAST MEDICAL HISTORY:     Past Medical History:   Diagnosis Date    Arthritis     Blood transfusion     Carotid stenosis     Hypercholesterolemia     Hypertension     Psychiatric problem     Thyroid disease        PAST SURGICAL HISTORY:     Past Surgical History:   Procedure Laterality Date    CARPAL TUNNEL RELEASE  2012    right hand    HYSTERECTOMY      left carotid endartectomy  5/2006    TONSILLECTOMY         ALLERGIES AND MEDICATION:     Review of patient's allergies indicates:   Allergen Reactions    Strawberry Swelling     Tongue swells up and a generalized rash.     Previous Medications    ACETAMINOPHEN (TYLENOL) 325 MG TABLET    Take 325 mg by  mouth 2 (two) times daily.    ALENDRONATE (FOSAMAX) 70 MG TABLET    Take 70 mg by mouth every 7 days.    ALUMINUM-MAGNESIUM HYDROXIDE-SIMETHICONE (MAALOX) 200-200-20 MG/5 ML SUSP    Take 30 mLs by mouth every 4 (four) hours as needed.    AMLODIPINE (NORVASC) 10 MG TABLET    Take 1 tablet (10 mg total) by mouth once daily.    APIXABAN 2.5 MG TAB    Take 1 tablet (2.5 mg total) by mouth 2 (two) times daily.    ATORVASTATIN (LIPITOR) 10 MG TABLET    Take 1 tablet (10 mg total) by mouth every evening.    ENALAPRIL (VASOTEC) 10 MG TABLET    Take 1 tablet (10 mg total) by mouth once daily.    FUROSEMIDE (LASIX) 20 MG TABLET    Take 1 tablet (20 mg total) by mouth once daily.    LEVOTHYROXINE (SYNTHROID) 75 MCG TABLET    Take 75 mcg by mouth once daily.    METOPROLOL TARTRATE (LOPRESSOR) 50 MG TABLET    Take 1 tablet (50 mg total) by mouth 2 (two) times daily.    RANITIDINE (ZANTAC) 150 MG TABLET    Take 150 mg by mouth with lunch.    VIT A,C & E/LUTEIN/MINERALS (VISION FORMULA, WITH LUTEIN, ORAL)    Take 1 tablet by mouth once daily.       SOCIAL HISTORY:     Social History     Social History    Marital status:      Spouse name: N/A    Number of children: N/A    Years of education: N/A     Occupational History    Not on file.     Social History Main Topics    Smoking status: Never Smoker    Smokeless tobacco: Never Used    Alcohol use No    Drug use: No    Sexual activity: No     Other Topics Concern    Not on file     Social History Narrative     since 1984    He is retired.  He is 82.  Worked for [employer]    She is retired.  Worked for [employer].       FAMILY HISTORY:     Family History   Problem Relation Age of Onset    Heart disease Father     Cancer Brother 65     bladder    Cancer Sister 81     Ovarian    Ovarian cancer Sister 81    Breast cancer Daughter 50     Status post mastectomy    Cancer Sister      Lung.  Smoker    Cancer Sister      Lung.  Smoker     "Schizophrenia Son        REVIEW OF SYSTEMS:   Review of Systems   Constitutional: Negative for chills, diaphoresis and fever.   HENT: Negative for nosebleeds.    Eyes: Negative for blurred vision, double vision and photophobia.   Respiratory: Negative for hemoptysis, shortness of breath and wheezing.    Cardiovascular: Negative for chest pain, palpitations, orthopnea, claudication, leg swelling and PND.   Gastrointestinal: Negative for abdominal pain, blood in stool, heartburn, melena, nausea and vomiting.   Genitourinary: Negative for flank pain and hematuria.   Musculoskeletal: Negative for falls, myalgias and neck pain.   Skin: Negative for rash.   Neurological: Negative for dizziness, seizures, loss of consciousness, weakness and headaches.   Endo/Heme/Allergies: Negative for polydipsia. Does not bruise/bleed easily.   Psychiatric/Behavioral: Negative for depression and memory loss. The patient is not nervous/anxious.        PHYSICAL EXAM:     Vitals:    03/28/18 1315   BP: (!) 115/53   Pulse: 60   Resp: 15    Body mass index is 21.75 kg/m².  Weight: 44 kg (97 lb)   Height: 4' 8" (142.2 cm)     Physical Exam   Constitutional: She is oriented to person, place, and time. She appears well-developed and well-nourished. She is cooperative.  Non-toxic appearance. No distress.   HENT:   Head: Normocephalic and atraumatic.   Eyes: Conjunctivae and EOM are normal. Pupils are equal, round, and reactive to light. No scleral icterus.   Neck: Trachea normal and normal range of motion. Neck supple. Normal carotid pulses and no JVD present. Carotid bruit is not present. No neck rigidity. No edema present. No thyromegaly present.   Cardiovascular: Normal rate, regular rhythm, S1 normal and S2 normal.  PMI is not displaced.  Exam reveals no gallop and no friction rub.    No murmur heard.  Pulses:       Carotid pulses are 2+ on the right side, and 2+ on the left side.  Bilat CEA scars well healed   Pulmonary/Chest: Effort normal " and breath sounds normal. No respiratory distress. She has no wheezes. She has no rales. She exhibits no tenderness.   Abdominal: Soft. Bowel sounds are normal. She exhibits no distension. There is no hepatosplenomegaly.   Musculoskeletal: She exhibits no edema or tenderness.   Feet:   Right Foot:   Skin Integrity: Negative for ulcer.   Left Foot:   Skin Integrity: Negative for ulcer.   Neurological: She is alert and oriented to person, place, and time. No cranial nerve deficit.   Skin: Skin is warm and dry. No rash noted. No erythema.   Psychiatric: She has a normal mood and affect. Her speech is normal and behavior is normal.   Vitals reviewed.      DATA:   EKG: (personally reviewed tracing)  3/28/18 SR 59, PAC      Laboratory:  CBC:    Recent Labs  Lab 02/04/18  1312 02/05/18  0447 02/06/18  0451   WHITE BLOOD CELL COUNT 16.35 H 11.41 8.13   HEMOGLOBIN 12.3 11.1 L 10.2 L   HEMATOCRIT 35.3 L 31.7 L 30.2 L   PLATELETS 636 H 540 H 505 H       CHEMISTRIES:    Recent Labs  Lab 01/27/18  1113 01/28/18  0501  02/02/18  0948  02/04/18  1312 02/05/18  0447 02/06/18  0451   GLUCOSE 118 H 128 H  128 H  < >  --   < > 113 H 95 85   SODIUM 121 L 117 LL  117 LL  < >  --   < > 132 L 136 135 L   POTASSIUM 3.9 3.5  3.5  < >  --   < > 3.4 L 4.0 4.4   BUN BLD 6 L 5 L  5 L  < >  --   < > 10 12 15   CREATININE 0.6 0.6  0.6  < >  --   < > 0.7 0.7 0.6   EGFR IF AFRICAN AMERICAN >60 >60  >60  < >  --   < > >60 >60 >60   EGFR IF NON- >60 >60  >60  < >  --   < > >60 >60 >60   CALCIUM 8.5 L 8.2 L  8.2 L  < >  --   < > 9.4 8.9 8.8   MAGNESIUM 1.5 L 1.9  --  1.6  --   --   --   --    < > = values in this interval not displayed.    CARDIAC BIOMARKERS:    Recent Labs  Lab 01/22/18  1447   TROPONIN I 0.012       COAGS:        LIPIDS/LFTS:    Recent Labs  Lab 02/04/18  1312 02/05/18  0447 02/06/18  0451   AST 38 23 21   ALT 80 H 60 H 58 H     Lab Results   Component Value Date    TSH 1.559 01/29/2018         Cardiovascular  Testing:  Echo: 1/29/18    1 - Normal left ventricular systolic function (EF 60-65%).     2 - No wall motion abnormalities.     3 - Concentric remodeling.     4 - Trivial aortic regurgitation.     5 - Mild tricuspid regurgitation.     6 - Pulmonary hypertension. The estimated PA systolic pressure is 54 mmHg.     7 - Small pericardial anterior effusion without hemodynamic compromise.      Carotid US 10/4/17  -Elevated right proximal ICA velocities, suggesting 60-79% stenosis. Velocities are similar to the 6/11/13 exam.  -Left proximal ICA velocities within normal limits.    ASSESSMENT:   # PAF, CHADS VASC 4, on apixaban 2.5mg bid (dose appropriate for age/weight)  # Hx bilat CEA with R ICA stenosis on CUS 10/2017  # HTN, controlled  # HLP, on atorvastatin 10mg    PLAN:   Cont med rx  Change atorva to 40mg qd (Caduet 10/40mg)  Pt instructed to take eliquis 2.5mg bid (she's only been taking it qd)  Check carotid US  Check lipids/LFT 3 months (late June 2018)  RTC 3 months    Wenceslao Armenta MD, FACC

## 2018-04-12 ENCOUNTER — HOSPITAL ENCOUNTER (INPATIENT)
Facility: HOSPITAL | Age: 83
LOS: 4 days | Discharge: HOME | DRG: 291 | End: 2018-04-16
Attending: EMERGENCY MEDICINE | Admitting: INTERNAL MEDICINE
Payer: MEDICARE

## 2018-04-12 DIAGNOSIS — E87.1 HYPONATREMIA WITH DECREASED SERUM OSMOLALITY: ICD-10-CM

## 2018-04-12 DIAGNOSIS — E87.1 HYPONATREMIA: ICD-10-CM

## 2018-04-12 DIAGNOSIS — R09.02 HYPOXIA: ICD-10-CM

## 2018-04-12 DIAGNOSIS — I95.9 HYPOTENSION, UNSPECIFIED HYPOTENSION TYPE: ICD-10-CM

## 2018-04-12 DIAGNOSIS — R06.03 RESPIRATORY DISTRESS: ICD-10-CM

## 2018-04-12 DIAGNOSIS — J81.0 ACUTE PULMONARY EDEMA: Primary | ICD-10-CM

## 2018-04-12 PROBLEM — I50.33 DIASTOLIC CHF, ACUTE ON CHRONIC: Status: ACTIVE | Noted: 2018-04-12

## 2018-04-12 LAB
ALBUMIN SERPL BCP-MCNC: 3.6 G/DL
ALP SERPL-CCNC: 92 U/L
ALT SERPL W/O P-5'-P-CCNC: 49 U/L
ANION GAP SERPL CALC-SCNC: 10 MMOL/L
APTT BLDCRRT: 31.4 SEC
AST SERPL-CCNC: 22 U/L
BASOPHILS # BLD AUTO: 0.01 K/UL
BASOPHILS NFR BLD: 0.1 %
BILIRUB SERPL-MCNC: 1 MG/DL
BNP SERPL-MCNC: 823 PG/ML
BUN SERPL-MCNC: 16 MG/DL
CALCIUM SERPL-MCNC: 9.3 MG/DL
CHLORIDE SERPL-SCNC: 83 MMOL/L
CO2 SERPL-SCNC: 20 MMOL/L
CREAT SERPL-MCNC: 0.8 MG/DL
DIFFERENTIAL METHOD: ABNORMAL
EOSINOPHIL # BLD AUTO: 0.1 K/UL
EOSINOPHIL NFR BLD: 1.2 %
ERYTHROCYTE [DISTWIDTH] IN BLOOD BY AUTOMATED COUNT: 12.5 %
EST. GFR  (AFRICAN AMERICAN): >60 ML/MIN/1.73 M^2
EST. GFR  (NON AFRICAN AMERICAN): >60 ML/MIN/1.73 M^2
GLUCOSE SERPL-MCNC: 141 MG/DL
HCT VFR BLD AUTO: 30.8 %
HGB BLD-MCNC: 11.1 G/DL
INR PPP: 1.1
LYMPHOCYTES # BLD AUTO: 0.6 K/UL
LYMPHOCYTES NFR BLD: 6.4 %
MAGNESIUM SERPL-MCNC: 1.9 MG/DL
MCH RBC QN AUTO: 30.9 PG
MCHC RBC AUTO-ENTMCNC: 36 G/DL
MCV RBC AUTO: 86 FL
MONOCYTES # BLD AUTO: 0.7 K/UL
MONOCYTES NFR BLD: 7.3 %
NEUTROPHILS # BLD AUTO: 7.9 K/UL
NEUTROPHILS NFR BLD: 84.7 %
OSMOLALITY SERPL: 250 MOSM/KG
PLATELET # BLD AUTO: 414 K/UL
PMV BLD AUTO: 9.8 FL
POTASSIUM SERPL-SCNC: 4 MMOL/L
PROT SERPL-MCNC: 6.2 G/DL
PROTHROMBIN TIME: 11.5 SEC
RBC # BLD AUTO: 3.59 M/UL
SODIUM SERPL-SCNC: 113 MMOL/L
T4 FREE SERPL-MCNC: 1.29 NG/DL
TROPONIN I SERPL DL<=0.01 NG/ML-MCNC: 0.01 NG/ML
TROPONIN I SERPL DL<=0.01 NG/ML-MCNC: 0.01 NG/ML
TSH SERPL DL<=0.005 MIU/L-ACNC: 4.03 UIU/ML
WBC # BLD AUTO: 9.33 K/UL

## 2018-04-12 PROCEDURE — 83930 ASSAY OF BLOOD OSMOLALITY: CPT

## 2018-04-12 PROCEDURE — 85025 COMPLETE CBC W/AUTO DIFF WBC: CPT

## 2018-04-12 PROCEDURE — 83735 ASSAY OF MAGNESIUM: CPT

## 2018-04-12 PROCEDURE — 36415 COLL VENOUS BLD VENIPUNCTURE: CPT

## 2018-04-12 PROCEDURE — 83880 ASSAY OF NATRIURETIC PEPTIDE: CPT

## 2018-04-12 PROCEDURE — 99285 EMERGENCY DEPT VISIT HI MDM: CPT

## 2018-04-12 PROCEDURE — 99223 1ST HOSP IP/OBS HIGH 75: CPT | Mod: ,,, | Performed by: INTERNAL MEDICINE

## 2018-04-12 PROCEDURE — 25000003 PHARM REV CODE 250: Performed by: EMERGENCY MEDICINE

## 2018-04-12 PROCEDURE — 63600175 PHARM REV CODE 636 W HCPCS

## 2018-04-12 PROCEDURE — 93010 ELECTROCARDIOGRAM REPORT: CPT | Mod: ,,, | Performed by: INTERNAL MEDICINE

## 2018-04-12 PROCEDURE — 84484 ASSAY OF TROPONIN QUANT: CPT

## 2018-04-12 PROCEDURE — 85730 THROMBOPLASTIN TIME PARTIAL: CPT

## 2018-04-12 PROCEDURE — 80053 COMPREHEN METABOLIC PANEL: CPT

## 2018-04-12 PROCEDURE — 84484 ASSAY OF TROPONIN QUANT: CPT | Mod: 91

## 2018-04-12 PROCEDURE — 84443 ASSAY THYROID STIM HORMONE: CPT

## 2018-04-12 PROCEDURE — 21400001 HC TELEMETRY ROOM

## 2018-04-12 PROCEDURE — 85610 PROTHROMBIN TIME: CPT

## 2018-04-12 PROCEDURE — 84439 ASSAY OF FREE THYROXINE: CPT

## 2018-04-12 PROCEDURE — 93005 ELECTROCARDIOGRAM TRACING: CPT

## 2018-04-12 RX ORDER — FAMOTIDINE 20 MG/1
20 TABLET, FILM COATED ORAL DAILY
Status: DISCONTINUED | OUTPATIENT
Start: 2018-04-12 | End: 2018-04-16 | Stop reason: HOSPADM

## 2018-04-12 RX ORDER — ACETAMINOPHEN 325 MG/1
325 TABLET ORAL 2 TIMES DAILY
Status: DISCONTINUED | OUTPATIENT
Start: 2018-04-12 | End: 2018-04-16 | Stop reason: HOSPADM

## 2018-04-12 RX ORDER — FUROSEMIDE 10 MG/ML
20 INJECTION INTRAMUSCULAR; INTRAVENOUS ONCE
Status: COMPLETED | OUTPATIENT
Start: 2018-04-12 | End: 2018-04-12

## 2018-04-12 RX ORDER — LEVOTHYROXINE SODIUM 75 UG/1
75 TABLET ORAL DAILY
Status: DISCONTINUED | OUTPATIENT
Start: 2018-04-12 | End: 2018-04-16 | Stop reason: HOSPADM

## 2018-04-12 RX ORDER — MAG HYDROX/ALUMINUM HYD/SIMETH 200-200-20
30 SUSPENSION, ORAL (FINAL DOSE FORM) ORAL EVERY 4 HOURS PRN
Status: DISCONTINUED | OUTPATIENT
Start: 2018-04-12 | End: 2018-04-16 | Stop reason: HOSPADM

## 2018-04-12 RX ADMIN — FUROSEMIDE 20 MG: 10 INJECTION, SOLUTION INTRAMUSCULAR; INTRAVENOUS at 04:04

## 2018-04-12 RX ADMIN — ACETAMINOPHEN 325 MG: 325 TABLET ORAL at 10:04

## 2018-04-12 RX ADMIN — APIXABAN 2.5 MG: 2.5 TABLET, FILM COATED ORAL at 10:04

## 2018-04-12 RX ADMIN — FAMOTIDINE 20 MG: 20 TABLET, FILM COATED ORAL at 04:04

## 2018-04-12 RX ADMIN — LEVOTHYROXINE SODIUM 75 MCG: 75 TABLET ORAL at 04:04

## 2018-04-12 NOTE — ED PROVIDER NOTES
"Encounter Date: 4/12/2018    SCRIBE #1 NOTE: I, Shalini Estevez, am scribing for, and in the presence of,  Jaylan Barraza MD. I have scribed the following portions of the note - Other sections scribed: HPI and ROS.       History     Chief Complaint   Patient presents with    Leg Swelling     bilateral ankle and foot swelling that began on Sunday; saw primary care MD and was started on diuretic    Shortness of Breath     SOB that began over the weekend     CC: Leg Swelling    HPI: This 83 y.o. Female, with a medical history of thyroid disease, hypertension, hypercholesterolemia, carotid stenosis and atrial fibrillation presents to the ED c/o acute, constant bilateral leg swelling since Friday. Pt reports that she is experiencing the symptoms to the top of the bilateral feet as well as to the bilateral ankles, noting that the swelling is radiating up into the bilateral legs. She additionally notes that the swelling in the left leg has improved since onset. Pt denies a previous episode of similar symptoms. She reports that she has also been experiencing persistent shortness of breath as well as a cough, adding that her stool was "soft" today. She states that she has a history of heart failure, noting her cardiologist as Dr. Armenta. Pt adds that she was recently prescribed a new fluid medication which she notes taking this morning. Pt denies fever, chills, headache, ear pain, eye pain, sore throat, chest pain, abdominal pain, emesis and dysuria. No other associated symptoms. No alleviating factors.           The history is provided by the patient. No  was used.     Review of patient's allergies indicates:   Allergen Reactions    Strawberry Swelling     Tongue swells up and a generalized rash.     Past Medical History:   Diagnosis Date    Arthritis     Atrial fibrillation     Blood transfusion     Carotid stenosis     Hypercholesterolemia     Hypertension     Psychiatric problem     " "Thyroid disease      Past Surgical History:   Procedure Laterality Date    CARPAL TUNNEL RELEASE  2012    right hand    HYSTERECTOMY      left carotid endartectomy  5/2006    TONSILLECTOMY       Family History   Problem Relation Age of Onset    Heart disease Father     Cancer Brother 65     bladder    Cancer Sister 81     Ovarian    Ovarian cancer Sister 81    Breast cancer Daughter 50     Status post mastectomy    Cancer Sister      Lung.  Smoker    Cancer Sister      Lung.  Smoker    Schizophrenia Son      Social History   Substance Use Topics    Smoking status: Never Smoker    Smokeless tobacco: Never Used    Alcohol use No     Review of Systems   Constitutional: Negative for chills and fever.   HENT: Negative for congestion, ear pain, rhinorrhea and sore throat.    Eyes: Negative for pain and visual disturbance.   Respiratory: Positive for cough and shortness of breath.    Cardiovascular: Positive for leg swelling (bilateral). Negative for chest pain.   Gastrointestinal: Negative for abdominal pain, nausea and vomiting.        (+) "soft" stool (today)   Genitourinary: Negative for dysuria.   Musculoskeletal: Negative for back pain and neck pain.   Skin: Negative for rash.   Neurological: Negative for headaches.       Physical Exam     Initial Vitals [04/12/18 0906]   BP Pulse Resp Temp SpO2   (!) 91/54 (!) 48 (!) 24 97.7 °F (36.5 °C) (!) 80 %      MAP       66.33         Physical Exam  The patient was examined specifically for the following:   General:No significant distress, Good color, Warm and dry. Head and neck:Scalp atraumatic, Neck supple. Neurological:Appropriate conversation, Gross motor deficits. Eyes:Conjugate gaze, Clear corneas. ENT: No epistaxis. Cardiac: Regular rate and rhythm, Grossly normal heart tones. Pulmonary: Wheezing, Rales. Gastrointestinal: Abdominal tenderness, Abdominal distention. Musculoskeletal: Extremity deformity, Apparent pain with range of motion of the joints. " Skin: Rash.   The findings on examination were normal except for the following: The patient has bilateral pedal edema.  Her blood pressure was 91/54 arrival.  Her heart rate is 48.  Oxygen saturations were 80% on arrival.  The patient is afebrile. The patient has minimal rales in the bases bilaterally.  Heart tones are normal.  The patient has a regular bradycardia.    ED Course   Critical Care  Date/Time: 4/12/2018 12:23 PM  Performed by: PIYUSH ROWE  Authorized by: PIYUSH ROWE   Direct patient critical care time: 22 minutes  Additional history critical care time: 12 minutes  Ordering / reviewing critical care time: 11 minutes  Documentation critical care time: 17 minutes  Consulting other physicians critical care time: 4 minutes  Consult with family critical care time: 3 minutes  Total critical care time (exclusive of procedural time) : 69 minutes  Critical care time was exclusive of separately billable procedures and treating other patients.  Critical care was necessary to treat or prevent imminent or life-threatening deterioration of the following conditions: circulatory failure, cardiac failure, respiratory failure and metabolic crisis.  Critical care was time spent personally by me on the following activities: evaluation of patient's response to treatment, obtaining history from patient or surrogate, ordering and review of laboratory studies, pulse oximetry, review of old charts, development of treatment plan with patient or surrogate, discussions with primary provider, examination of patient, ordering and performing treatments and interventions, ordering and review of radiographic studies and re-evaluation of patient's condition.        Labs Reviewed   COMPREHENSIVE METABOLIC PANEL - Abnormal; Notable for the following:        Result Value    Sodium 113 (*)     Chloride 83 (*)     CO2 20 (*)     Glucose 141 (*)     ALT 49 (*)     All other components within normal limits    Narrative:      Sodium   critical result(s) called and verbal readback obtained from   Cass Muñoz., 04/12/2018 10:43   CBC W/ AUTO DIFFERENTIAL - Abnormal; Notable for the following:     RBC 3.59 (*)     Hemoglobin 11.1 (*)     Hematocrit 30.8 (*)     Platelets 414 (*)     Gran # (ANC) 7.9 (*)     Lymph # 0.6 (*)     Gran% 84.7 (*)     Lymph% 6.4 (*)     All other components within normal limits   TSH - Abnormal; Notable for the following:     TSH 4.032 (*)     All other components within normal limits   B-TYPE NATRIURETIC PEPTIDE - Abnormal; Notable for the following:      (*)     All other components within normal limits   MAGNESIUM   TROPONIN I   PROTIME-INR   APTT   T4, FREE     EKG Readings: (Independently Interpreted)   This patient is in a sinus bradycardia with a heart rate of 45.  The IL interval is normal.  There are nonspecific ST segment and T-wave changes.  There is no definite evidence of acute myocardial infarction or malignant arrhythmia.       X-Rays:   Independently Interpreted Readings:   Other Readings:  Chest x-ray reveals pulmonary edema.    Medical decision making: Given the above this patient presents emergency with a borderline hypotension.  She is on multiple medicines including beta blockers.  She is bradycardic.  Her sodium is low.  She is on diuretics.  I will hold her antihypertensive medications and diuretics.  I will fluid restrict her.  I will consult cardiology.  I will write orders on behalf of internal medicine.              Scribe Attestation:   Scribe #1: I performed the above scribed service and the documentation accurately describes the services I performed. I attest to the accuracy of the note.    Attending Attestation:           Physician Attestation for Scribe:  Physician Attestation Statement for Scribe #1: I, Shalini Estevez, reviewed documentation, as scribed by Jaylan Barraza MD in my presence, and it is both accurate and complete.                    Clinical Impression:   The primary  encounter diagnosis was Acute pulmonary edema. Diagnoses of Respiratory distress, Hyponatremia, Hypotension, unspecified hypotension type, and Hypoxia were also pertinent to this visit.                           Jaylan Barraza MD  04/12/18 2456

## 2018-04-12 NOTE — ASSESSMENT & PLAN NOTE
She has had this in past admits (differential above is mine)  113 today  Fluid restriction  Will give lasix 20 x 1 now

## 2018-04-12 NOTE — CONSULTS
"[facility]  Cardiology  Consult Note    Patient Name: Jeannie Hutchins  MRN: [id number]  Admission Date: 4/12/2018  Hospital Length of Stay: 0 days  Code Status: Full Code   Attending Provider: Jake Trujillo MD;[facility]*   Consulting Provider: Montez Rutherford MD  Primary Care Physician: Paige Mcleod MD  Principal Problem:Acute pulmonary edema    Patient information was obtained from patient, past medical records and ER records.     Inpatient consult to Cardiology  Consult performed by: MONTEZ RUTHERFORD  Consult ordered by: JAKE TRUJILLO  Reason for consult: CHF        Subjective:     Chief Complaint:  Shortness of breath     HPI:   83 y.o. Female, with a medical history of thyroid disease, hypertension, hypercholesterolemia, carotid stenosis and atrial fibrillation presents to the ED c/o acute, constant bilateral leg swelling since Friday. Pt reports that she is experiencing the symptoms to the top of the bilateral feet as well as to the bilateral ankles, noting that the swelling is radiating up into the bilateral legs. She additionally notes that the swelling in the left leg has improved since onset. Pt denies a previous episode of similar symptoms. She reports that she has also been experiencing persistent shortness of breath as well as a cough, adding that her stool was "soft" today.  Pt adds that she was recently prescribed a new fluid medication which she notes taking this morning. Pt denies fever, chills, headache, ear pain, eye pain, sore throat, chest pain, abdominal pain, emesis and dysuria. No other associated symptoms. No alleviating factors.      Follows with Dr. Armenta in clinic.  She was last seen 3-20 8-18.  She somewhat of a poor historian.  She's currently a little confused about some of the medicines she was taking and her recollection of her condition.  She says she was recently at our [facility] [employer] and was discharged home but her  still remains there.  She says " she was doing very well and doing the grocery shopping etc. she says that all of a sudden last week her shortness of breath increased abruptly.  She had no other associated symptoms.  She's expands no PND, orthopnea but had worsening lower extremity edema now resolved according to her.  She denies any dizziness, presyncope or syncope.  She feels better after diuresis.    Past Medical History:   Diagnosis Date    Anticoagulant long-term use     Eliquis    Arthritis     Atrial fibrillation     Blood transfusion     Carotid stenosis     Carotid stenosis     CHF (congestive heart failure)     Edema     [place] (hard of hearing)     Hypercholesterolemia     Hypertension     Psychiatric problem     Thyroid disease        Past Surgical History:   Procedure Laterality Date    CARPAL TUNNEL RELEASE  2012    right hand    HYSTERECTOMY      left carotid endartectomy  5/2006    TONSILLECTOMY         Review of patient's allergies indicates:   Allergen Reactions    Strawberry Swelling     Tongue swells up and a generalized rash.       No current facility-administered medications on file prior to encounter.      Current Outpatient Prescriptions on File Prior to Encounter   Medication Sig    acetaminophen (TYLENOL) 325 MG tablet Take 325 mg by mouth 2 (two) times daily.    alendronate (FOSAMAX) 70 MG tablet Take 70 mg by mouth every 7 days.    aluminum-magnesium hydroxide-simethicone (MAALOX) 200-200-20 mg/5 mL Susp Take 30 mLs by mouth every 4 (four) hours as needed.    amlodipine-atorvastatin (CADUET) 10-40 mg per tablet Take 1 tablet by mouth once daily.    apixaban 2.5 mg Tab Take 1 tablet (2.5 mg total) by mouth 2 (two) times daily.    enalapril (VASOTEC) 10 MG tablet Take 1 tablet (10 mg total) by mouth once daily.    hydroCHLOROthiazide (HYDRODIURIL) 25 MG tablet Take 1 tablet (25 mg total) by mouth once daily.    levothyroxine (SYNTHROID) 75 MCG tablet Take 75 mcg by mouth once daily.    metoprolol  tartrate (LOPRESSOR) 50 MG tablet Take 1 tablet (50 mg total) by mouth 2 (two) times daily.    ranitidine (ZANTAC) 150 MG tablet Take 150 mg by mouth with lunch.    VIT A,C & E/LUTEIN/MINERALS (VISION FORMULA, WITH LUTEIN, ORAL) Take 1 tablet by mouth once daily.     Family History     Problem Relation (Age of Onset)    Breast cancer Daughter (50)    Cancer Brother (65), Sister (81), Sister, Sister    Heart disease Father    Ovarian cancer Sister (81)    Schizophrenia Son        Social History Main Topics    Smoking status: Never Smoker    Smokeless tobacco: Never Used    Alcohol use No    Drug use: No    Sexual activity: No     Review of Systems   Constitution: Negative.   HENT: Negative.    Eyes: Negative.    Cardiovascular: Positive for dyspnea on exertion and leg swelling. Negative for chest pain, irregular heartbeat, near-syncope, orthopnea, palpitations, paroxysmal nocturnal dyspnea and syncope.   Respiratory: Positive for cough and shortness of breath.    Skin: Negative.    Musculoskeletal: Negative.    Gastrointestinal: Negative for abdominal pain, constipation and diarrhea.   Genitourinary: Negative for dysuria.   Neurological: Negative.    Psychiatric/Behavioral: Negative.      Objective:     Vital Signs (Most Recent):  Temp: 97.8 °F (36.6 °C) (04/12/18 1552)  Pulse: 107 (04/12/18 1552)  Resp: 18 (04/12/18 1433)  BP: 99/70 (04/12/18 1552)  SpO2: (!) 85 % (04/12/18 1552) Vital Signs (24h Range):  Temp:  [97.7 °F (36.5 °C)-98 °F (36.7 °C)] 97.8 °F (36.6 °C)  Pulse:  [] 107  Resp:  [16-24] 18  SpO2:  [80 %-100 %] 85 %  BP: ()/(54-90) 99/70     Weight: 46.3 kg (102 lb 1.2 oz)  Body mass index is 22.9 kg/m².    SpO2: (!) 85 %  O2 Device (Oxygen Therapy): nasal cannula    No intake or output data in the 24 hours ending 04/12/18 1626    Lines/Drains/Airways     Peripheral Intravenous Line                 Peripheral IV - Single Lumen 04/12/18 0930 Right Antecubital less than 1 day                 Physical Exam   Constitutional: She is oriented to person, place, and time. She appears well-developed and well-nourished.   HENT:   Head: Normocephalic and atraumatic.   Eyes: Conjunctivae and EOM are normal. Pupils are equal, round, and reactive to light.   Neck: Normal range of motion. Neck supple. No thyromegaly present.   Cardiovascular: Normal rate and regular rhythm.    No murmur heard.  Pulmonary/Chest: Effort normal and breath sounds normal. No respiratory distress.   Abdominal: Soft. Bowel sounds are normal.   Musculoskeletal: She exhibits no edema.   Neurological: She is alert and oriented to person, place, and time.   Skin: Skin is warm and dry.   Psychiatric: She has a normal mood and affect. Her behavior is normal.       Significant Labs:   CMP   Recent Labs  Lab 04/12/18  0930   *   K 4.0   CL 83*   CO2 20*   *   BUN 16   CREATININE 0.8   CALCIUM 9.3   PROT 6.2   ALBUMIN 3.6   BILITOT 1.0   ALKPHOS 92   AST 22   ALT 49*   ANIONGAP 10   ESTGFRAFRICA >60   EGFRNONAA >60   , CBC   Recent Labs  Lab 04/12/18  0930   WBC 9.33   HGB 11.1*   HCT 30.8*   *   , INR   Recent Labs  Lab 04/12/18  0930   INR 1.1   , Lipid Panel No results for input(s): CHOL, HDL, LDLCALC, TRIG, CHOLHDL in the last 48 hours. and Troponin   Recent Labs  Lab 04/12/18  0930   TROPONINI 0.006       Significant Imaging: Echocardiogram:   2D echo with color flow doppler:   Results for orders placed or performed during the hospital encounter of 01/22/18   2D echo with color flow doppler   Result Value Ref Range    EF 60 55 - 65    Aortic Valve Regurgitation TRIVIAL     Est. PA Systolic Pressure 54.24 (A)     Pericardial Effusion SMALL (A)     Mitral Valve Mobility NORMAL     Tricuspid Valve Regurgitation MILD      Assessment and Plan:     Acute respiratory failure with hypoxia and hypercarbia             Diastolic CHF, acute on chronic    Diuresis as tolerated        Paroxysmal atrial fibrillation    Currently  normal sinus rhythm  On Eliquis for OAC        Hyponatremia with decreased serum osmolality    Likely multifactorial, further workup per primary        Benign hypertension    Stable  Continue mainly afterload reduction, recent was switched HCTZ        Hyperlipidemia    On statin            VTE Risk Mitigation         Ordered     apixaban tablet 2.5 mg  2 times daily     Route:  Oral        04/12/18 8874          Thank you for your consult. I will follow-up with patient. Please contact us if you have any additional questions.    Montez Mccray MD  Cardiology   [facility]

## 2018-04-12 NOTE — ASSESSMENT & PLAN NOTE
Cxr:   There is pulmonary vascular congestion present with bilateral parahilar and basilar consolidation.  Findings are most suggestive of pulmonary edema although bilateral pneumonia is also a consideration.  There is no evidence for pneumothorax or large pleural effusions.  Small pleural effusions cannot be excluded.  Cards consulted  Ordering one dose of lasix 20 IV

## 2018-04-12 NOTE — PROGRESS NOTES
Note that patient transferred to room 305;     Adjusting well with transfer;     Will continue to f/u;

## 2018-04-12 NOTE — ED NOTES
Patient placed on telemetry box [id number]. Monitor tech notified and confirmed that patient is on monitor.

## 2018-04-12 NOTE — PROGRESS NOTES
Note that patient awake, alert and fully oriented yet anxious;   Meds given;  Bedside commode at placed since pt given lasix;     SPO2@91% on 4 L non-re breather mask;     Room will be changed to [facility], closer to the Nurses' Station for closer observation and safety;   Will continue to f/u;

## 2018-04-12 NOTE — PLAN OF CARE
Problem: Fall Risk (Adult)  Goal: Identify Related Risk Factors and Signs and Symptoms  Related risk factors and signs and symptoms are identified upon initiation of Human Response Clinical Practice Guideline (CPG)    04/12/18 1556   Fall Risk   Related Risk Factors (Fall Risk) fear of falling;gait/mobility problems;environment unfamiliar     Goal: Absence of Falls  Patient will demonstrate the desired outcomes by discharge/transition of care.    04/12/18 1556   Fall Risk (Adult)   Absence of Falls making progress toward outcome

## 2018-04-12 NOTE — SUBJECTIVE & OBJECTIVE
Past Medical History:   Diagnosis Date    Anticoagulant long-term use     Eliquis    Arthritis     Atrial fibrillation     Blood transfusion     Carotid stenosis     Carotid stenosis     CHF (congestive heart failure)     Edema     [place] (hard of hearing)     Hypercholesterolemia     Hypertension     Psychiatric problem     Thyroid disease        Past Surgical History:   Procedure Laterality Date    CARPAL TUNNEL RELEASE  2012    right hand    HYSTERECTOMY      left carotid endartectomy  5/2006    TONSILLECTOMY         Review of patient's allergies indicates:   Allergen Reactions    Strawberry Swelling     Tongue swells up and a generalized rash.       No current facility-administered medications on file prior to encounter.      Current Outpatient Prescriptions on File Prior to Encounter   Medication Sig    acetaminophen (TYLENOL) 325 MG tablet Take 325 mg by mouth 2 (two) times daily.    alendronate (FOSAMAX) 70 MG tablet Take 70 mg by mouth every 7 days.    aluminum-magnesium hydroxide-simethicone (MAALOX) 200-200-20 mg/5 mL Susp Take 30 mLs by mouth every 4 (four) hours as needed.    amlodipine-atorvastatin (CADUET) 10-40 mg per tablet Take 1 tablet by mouth once daily.    apixaban 2.5 mg Tab Take 1 tablet (2.5 mg total) by mouth 2 (two) times daily.    enalapril (VASOTEC) 10 MG tablet Take 1 tablet (10 mg total) by mouth once daily.    hydroCHLOROthiazide (HYDRODIURIL) 25 MG tablet Take 1 tablet (25 mg total) by mouth once daily.    levothyroxine (SYNTHROID) 75 MCG tablet Take 75 mcg by mouth once daily.    metoprolol tartrate (LOPRESSOR) 50 MG tablet Take 1 tablet (50 mg total) by mouth 2 (two) times daily.    ranitidine (ZANTAC) 150 MG tablet Take 150 mg by mouth with lunch.    VIT A,C & E/LUTEIN/MINERALS (VISION FORMULA, WITH LUTEIN, ORAL) Take 1 tablet by mouth once daily.     Family History     Problem Relation (Age of Onset)    Breast cancer Daughter (50)    Cancer Brother (65),  Sister (81), Sister, Sister    Heart disease Father    Ovarian cancer Sister (81)    Schizophrenia Son        Social History Main Topics    Smoking status: Never Smoker    Smokeless tobacco: Never Used    Alcohol use No    Drug use: No    Sexual activity: No     Review of Systems   Constitution: Negative.   HENT: Negative.    Eyes: Negative.    Cardiovascular: Positive for dyspnea on exertion and leg swelling. Negative for chest pain, irregular heartbeat, near-syncope, orthopnea, palpitations, paroxysmal nocturnal dyspnea and syncope.   Respiratory: Positive for cough and shortness of breath.    Skin: Negative.    Musculoskeletal: Negative.    Gastrointestinal: Negative for abdominal pain, constipation and diarrhea.   Genitourinary: Negative for dysuria.   Neurological: Negative.    Psychiatric/Behavioral: Negative.      Objective:     Vital Signs (Most Recent):  Temp: 97.8 °F (36.6 °C) (04/12/18 1552)  Pulse: 107 (04/12/18 1552)  Resp: 18 (04/12/18 1433)  BP: 99/70 (04/12/18 1552)  SpO2: (!) 85 % (04/12/18 1552) Vital Signs (24h Range):  Temp:  [97.7 °F (36.5 °C)-98 °F (36.7 °C)] 97.8 °F (36.6 °C)  Pulse:  [] 107  Resp:  [16-24] 18  SpO2:  [80 %-100 %] 85 %  BP: ()/(54-90) 99/70     Weight: 46.3 kg (102 lb 1.2 oz)  Body mass index is 22.9 kg/m².    SpO2: (!) 85 %  O2 Device (Oxygen Therapy): nasal cannula    No intake or output data in the 24 hours ending 04/12/18 1626    Lines/Drains/Airways     Peripheral Intravenous Line                 Peripheral IV - Single Lumen 04/12/18 0930 Right Antecubital less than 1 day                Physical Exam   Constitutional: She is oriented to person, place, and time. She appears well-developed and well-nourished.   HENT:   Head: Normocephalic and atraumatic.   Eyes: Conjunctivae and EOM are normal. Pupils are equal, round, and reactive to light.   Neck: Normal range of motion. Neck supple. No thyromegaly present.   Cardiovascular: Normal rate and regular  rhythm.    No murmur heard.  Pulmonary/Chest: Effort normal and breath sounds normal. No respiratory distress.   Abdominal: Soft. Bowel sounds are normal.   Musculoskeletal: She exhibits no edema.   Neurological: She is alert and oriented to person, place, and time.   Skin: Skin is warm and dry.   Psychiatric: She has a normal mood and affect. Her behavior is normal.       Significant Labs:   CMP   Recent Labs  Lab 04/12/18  0930   *   K 4.0   CL 83*   CO2 20*   *   BUN 16   CREATININE 0.8   CALCIUM 9.3   PROT 6.2   ALBUMIN 3.6   BILITOT 1.0   ALKPHOS 92   AST 22   ALT 49*   ANIONGAP 10   ESTGFRAFRICA >60   EGFRNONAA >60   , CBC   Recent Labs  Lab 04/12/18  0930   WBC 9.33   HGB 11.1*   HCT 30.8*   *   , INR   Recent Labs  Lab 04/12/18  0930   INR 1.1   , Lipid Panel No results for input(s): CHOL, HDL, LDLCALC, TRIG, CHOLHDL in the last 48 hours. and Troponin   Recent Labs  Lab 04/12/18  0930   TROPONINI 0.006       Significant Imaging: Echocardiogram:   2D echo with color flow doppler:   Results for orders placed or performed during the hospital encounter of 01/22/18   2D echo with color flow doppler   Result Value Ref Range    EF 60 55 - 65    Aortic Valve Regurgitation TRIVIAL     Est. PA Systolic Pressure 54.24 (A)     Pericardial Effusion SMALL (A)     Mitral Valve Mobility NORMAL     Tricuspid Valve Regurgitation MILD

## 2018-04-12 NOTE — SUBJECTIVE & OBJECTIVE
Past Medical History:   Diagnosis Date    Anticoagulant long-term use     Eliquis    Arthritis     Atrial fibrillation     Blood transfusion     Carotid stenosis     Carotid stenosis     CHF (congestive heart failure)     Edema     [place] (hard of hearing)     Hypercholesterolemia     Hypertension     Psychiatric problem     Thyroid disease        Past Surgical History:   Procedure Laterality Date    CARPAL TUNNEL RELEASE  2012    right hand    HYSTERECTOMY      left carotid endartectomy  5/2006    TONSILLECTOMY         Review of patient's allergies indicates:   Allergen Reactions    Strawberry Swelling     Tongue swells up and a generalized rash.       No current facility-administered medications on file prior to encounter.      Current Outpatient Prescriptions on File Prior to Encounter   Medication Sig    acetaminophen (TYLENOL) 325 MG tablet Take 325 mg by mouth 2 (two) times daily.    alendronate (FOSAMAX) 70 MG tablet Take 70 mg by mouth every 7 days.    aluminum-magnesium hydroxide-simethicone (MAALOX) 200-200-20 mg/5 mL Susp Take 30 mLs by mouth every 4 (four) hours as needed.    amlodipine-atorvastatin (CADUET) 10-40 mg per tablet Take 1 tablet by mouth once daily.    apixaban 2.5 mg Tab Take 1 tablet (2.5 mg total) by mouth 2 (two) times daily.    enalapril (VASOTEC) 10 MG tablet Take 1 tablet (10 mg total) by mouth once daily.    hydroCHLOROthiazide (HYDRODIURIL) 25 MG tablet Take 1 tablet (25 mg total) by mouth once daily.    levothyroxine (SYNTHROID) 75 MCG tablet Take 75 mcg by mouth once daily.    metoprolol tartrate (LOPRESSOR) 50 MG tablet Take 1 tablet (50 mg total) by mouth 2 (two) times daily.    ranitidine (ZANTAC) 150 MG tablet Take 150 mg by mouth with lunch.    VIT A,C & E/LUTEIN/MINERALS (VISION FORMULA, WITH LUTEIN, ORAL) Take 1 tablet by mouth once daily.     Family History     Problem Relation (Age of Onset)    Breast cancer Daughter (50)    Cancer Brother (65),  Sister (81), Sister, Sister    Heart disease Father    Ovarian cancer Sister (81)    Schizophrenia Son        Social History Main Topics    Smoking status: Never Smoker    Smokeless tobacco: Never Used    Alcohol use No    Drug use: No    Sexual activity: No     Review of Systems   Mild dyspnea  Cough  Anxiety    Objective:     Vital Signs (Most Recent):  Temp: 98 °F (36.7 °C) (04/12/18 1433)  Pulse: 61 (04/12/18 1433)  Resp: 18 (04/12/18 1433)  BP: (!) 122/90 (04/12/18 1433)  SpO2: 96 % (04/12/18 1500) Vital Signs (24h Range):  Temp:  [97.7 °F (36.5 °C)-98 °F (36.7 °C)] 98 °F (36.7 °C)  Pulse:  [44-61] 61  Resp:  [16-24] 18  SpO2:  [80 %-100 %] 96 %  BP: ()/(54-90) 122/90     Weight: 46.3 kg (102 lb 1.2 oz)  Body mass index is 22.9 kg/m².    Physical Exam   resting in bed  Slightly anxious; clutching Rosary  Lungs sound ok - moving air well  She was initially appearing comfortable in flat bed,  But asked to raise HOB  Heart is slightly irregular  abd soft, BS+  Legs with 1+ edema     Significant Labs:   WBC 9.3  hct 30  gfr >60  Trop 0.006  bnp 823  tsh 4.03    Cxr:  The heart and mediastinal structures are unchanged.  Atherosclerotic calcification is present within the aortic arch.  There is pulmonary vascular congestion present with bilateral parahilar and basilar consolidation.  Findings are most suggestive of pulmonary edema although bilateral pneumonia is also a consideration.  There is no evidence for pneumothorax or large pleural effusions.  Small pleural effusions cannot be excluded.  The bones are intact.  There are degenerative changes of both shoulders.  There is prominent S shaped scoliosis of the thoracolumbar spine.  Surgical clips are identified within the left side of the neck.

## 2018-04-12 NOTE — HPI
"83 y.o. Female, with a medical history of thyroid disease, hypertension, hypercholesterolemia, carotid stenosis and atrial fibrillation presents to the ED c/o acute, constant bilateral leg swelling since Friday. Pt reports that she is experiencing the symptoms to the top of the bilateral feet as well as to the bilateral ankles, noting that the swelling is radiating up into the bilateral legs. She additionally notes that the swelling in the left leg has improved since onset. Pt denies a previous episode of similar symptoms. She reports that she has also been experiencing persistent shortness of breath as well as a cough, adding that her stool was "soft" today.  Pt adds that she was recently prescribed a new fluid medication which she notes taking this morning. Pt denies fever, chills, headache, ear pain, eye pain, sore throat, chest pain, abdominal pain, emesis and dysuria. No other associated symptoms. No alleviating factors.      Follows with Dr. Armenta in clinic.  She was last seen 3-20 8-18.  She somewhat of a poor historian.  She's currently a little confused about some of the medicines she was taking and her recollection of her condition.  She says she was recently at our [facility] and was discharged home but her  still remains there.  She says she was doing very well and doing the grocery shopping etc. she says that all of a sudden last week her shortness of breath increased abruptly.  She had no other associated symptoms.  She's expands no PND, orthopnea but had worsening lower extremity edema now resolved according to her.  She denies any dizziness, presyncope or syncope.  She feels better after diuresis.  "

## 2018-04-12 NOTE — ED TRIAGE NOTES
Pt. Reports history of CHF and SOB that started on Saturday, Pt. Also reports bilateral leg swelling. Pt. Denies any pain but reports that the increase in leg swelling and SOB worried her so she came in, pt. Denies any chest pain, AAOx3,  at bedside.

## 2018-04-12 NOTE — H&P
"[facility] Hospital Medicine  History & Physical    Patient Name: Jeannie Hutchins  MRN: [id number]  Admission Date: 4/12/2018  Attending Physician: Jake Trujillo MD  Primary Care Provider: Paige Mcleod MD             Subjective:     Principal Problem:Acute pulmonary edema    Chief Complaint:   Chief Complaint   Patient presents with    Leg Swelling     bilateral ankle and foot swelling that began on Sunday; saw primary care MD and was started on diuretic    Shortness of Breath     SOB that began over the weekend        HPI: This 83 y.o. WF, followed by Dr. Bethany Mcleod, with a medical history of thyroid disease, hypertension, hypercholesterolemia, carotid stenosis and atrial fibrillation presents to the ED c/o acute, constant bilateral leg swelling since Friday. Swelling involves dorsal feet bilaterally, ankles, and now extending up to lower legs. She says the swelling in the left leg has improved since onset. Pt denies a previous episode of similar symptoms.  She was here at [facility] 1/22 - 2/23 for nausea/vomiting and hyponatremia, plus anxiety.  Afterward, she went to [facility] for about 3 weeks for further rehab.  She then went home, where she was for 2 weeks - says she was performing ADLs, including driving.    She reports that she has also been experiencing persistent shortness of breath as well as a cough - this developed recently.  She saw Dr. Mcleod 2 days ago in office and was started on HCT for fluid.   She notes that her stool was "soft" today.   She states that she has a history of heart failure, noting her cardiologist as Dr. Armenta. Pt adds that he gave her a new diuretic recently (indapamide?)  Pt denies fever, chills, headache, ear pain, eye pain, sore throat, chest pain, abdominal pain, emesis and dysuria.   BNP is elevated at 823.  TSH normal at 4  GFR is >60  She is mildly anemic with hct 30.  She has marked hyponatremia - 113 - this will be treated with fluid restriction.  ECho in " Jan 2018:    1 - Normal left ventricular systolic function (EF 60-65%).     2 - No wall motion abnormalities.     3 - Concentric remodeling.     4 - Trivial aortic regurgitation.     5 - Mild tricuspid regurgitation.     6 - Pulmonary hypertension. The estimated PA systolic pressure is 54 mmHg.     7 - Small pericardial anterior effusion without hemodynamic compromise.   Diastolic function is indeterminate    Past Medical History:   Diagnosis Date    Anticoagulant long-term use     Eliquis    Arthritis     Atrial fibrillation     Blood transfusion     Carotid stenosis     Carotid stenosis     CHF (congestive heart failure)     Edema     [place] (hard of hearing)     Hypercholesterolemia     Hypertension     Psychiatric problem     Thyroid disease        Past Surgical History:   Procedure Laterality Date    CARPAL TUNNEL RELEASE  2012    right hand    HYSTERECTOMY      left carotid endartectomy  5/2006    TONSILLECTOMY         Review of patient's allergies indicates:   Allergen Reactions    Strawberry Swelling     Tongue swells up and a generalized rash.       No current facility-administered medications on file prior to encounter.      Current Outpatient Prescriptions on File Prior to Encounter   Medication Sig    acetaminophen (TYLENOL) 325 MG tablet Take 325 mg by mouth 2 (two) times daily.    alendronate (FOSAMAX) 70 MG tablet Take 70 mg by mouth every 7 days.    aluminum-magnesium hydroxide-simethicone (MAALOX) 200-200-20 mg/5 mL Susp Take 30 mLs by mouth every 4 (four) hours as needed.    amlodipine-atorvastatin (CADUET) 10-40 mg per tablet Take 1 tablet by mouth once daily.    apixaban 2.5 mg Tab Take 1 tablet (2.5 mg total) by mouth 2 (two) times daily.    enalapril (VASOTEC) 10 MG tablet Take 1 tablet (10 mg total) by mouth once daily.    hydroCHLOROthiazide (HYDRODIURIL) 25 MG tablet Take 1 tablet (25 mg total) by mouth once daily.    levothyroxine (SYNTHROID) 75 MCG tablet Take 75 mcg  by mouth once daily.    metoprolol tartrate (LOPRESSOR) 50 MG tablet Take 1 tablet (50 mg total) by mouth 2 (two) times daily.    ranitidine (ZANTAC) 150 MG tablet Take 150 mg by mouth with lunch.    VIT A,C & E/LUTEIN/MINERALS (VISION FORMULA, WITH LUTEIN, ORAL) Take 1 tablet by mouth once daily.     Family History     Problem Relation (Age of Onset)    Breast cancer Daughter (50)    Cancer Brother (65), Sister (81), Sister, Sister    Heart disease Father    Ovarian cancer Sister (81)    Schizophrenia Son        Social History Main Topics    Smoking status: Never Smoker    Smokeless tobacco: Never Used    Alcohol use No    Drug use: No    Sexual activity: No     Review of Systems   Mild dyspnea  Cough  Anxiety    Objective:     Vital Signs (Most Recent):  Temp: 98 °F (36.7 °C) (04/12/18 1433)  Pulse: 61 (04/12/18 1433)  Resp: 18 (04/12/18 1433)  BP: (!) 122/90 (04/12/18 1433)  SpO2: 96 % (04/12/18 1500) Vital Signs (24h Range):  Temp:  [97.7 °F (36.5 °C)-98 °F (36.7 °C)] 98 °F (36.7 °C)  Pulse:  [44-61] 61  Resp:  [16-24] 18  SpO2:  [80 %-100 %] 96 %  BP: ()/(54-90) 122/90     Weight: 46.3 kg (102 lb 1.2 oz)  Body mass index is 22.9 kg/m².    Physical Exam   resting in bed  Slightly anxious; clutching Rosary  Lungs sound ok - moving air well  She was initially appearing comfortable in flat bed,  But asked to raise HOB  Heart is slightly irregular  abd soft, BS+  Legs with 1+ edema     Significant Labs:   WBC 9.3  hct 30  gfr >60  Trop 0.006  bnp 823  tsh 4.03    Cxr:  The heart and mediastinal structures are unchanged.  Atherosclerotic calcification is present within the aortic arch.  There is pulmonary vascular congestion present with bilateral parahilar and basilar consolidation.  Findings are most suggestive of pulmonary edema although bilateral pneumonia is also a consideration.  There is no evidence for pneumothorax or large pleural effusions.  Small pleural effusions cannot be excluded.  The  bones are intact.  There are degenerative changes of both shoulders.  There is prominent S shaped scoliosis of the thoracolumbar spine.  Surgical clips are identified within the left side of the neck.      Assessment/Plan:     * Acute pulmonary edema    Cxr:   There is pulmonary vascular congestion present with bilateral parahilar and basilar consolidation.  Findings are most suggestive of pulmonary edema although bilateral pneumonia is also a consideration.  There is no evidence for pneumothorax or large pleural effusions.  Small pleural effusions cannot be excluded.  Cards consulted  Ordering one dose of lasix 20 IV        Pulmonary hypertension    PAP 54          Paroxysmal atrial fibrillation    On Eliquis 2.5 bid          Generalized anxiety disorder    Because of prior hyponatremia, she has been off SSRI.  No current meds          Benign hypertension    124/60          Hyponatremia with decreased serum osmolality    She has had this in past admits (differential above is mine)  113 today  Fluid restriction  Will give lasix 20 x 1 now            VTE Risk Mitigation         Ordered     apixaban tablet 2.5 mg  2 times daily     Route:  Oral        04/12/18 2411             Jake Trujillo MD  Department of Hospital Medicine   [facility]

## 2018-04-12 NOTE — HPI
"This 83 y.o. WF, followed by Dr. Bethany Mcleod, with a medical history of thyroid disease, hypertension, hypercholesterolemia, carotid stenosis and atrial fibrillation presents to the ED c/o acute, constant bilateral leg swelling since Friday. Swelling involves dorsal feet bilaterally, ankles, and now extending up to lower legs. She says the swelling in the left leg has improved since onset. Pt denies a previous episode of similar symptoms.  She was here at [facility] 1/22 - 2/23 for nausea/vomiting and hyponatremia, plus anxiety.  Afterward, she went to [facility] for about 3 weeks for further rehab.  She then went home, where she was for 2 weeks - says she was performing ADLs, including driving.    She reports that she has also been experiencing persistent shortness of breath as well as a cough - this developed recently.  She saw Dr. Mcleod 2 days ago in office and was started on HCT for fluid.   She notes that her stool was "soft" today.   She states that she has a history of heart failure, noting her cardiologist as Dr. Armenta. Pt adds that he gave her a new diuretic recently (indapamide?)  Pt denies fever, chills, headache, ear pain, eye pain, sore throat, chest pain, abdominal pain, emesis and dysuria.   BNP is elevated at 823.  TSH normal at 4  GFR is >60  She is mildly anemic with hct 30.  She has marked hyponatremia - 113 - this will be treated with fluid restriction.  ECho in Jan 2018:    1 - Normal left ventricular systolic function (EF 60-65%).     2 - No wall motion abnormalities.     3 - Concentric remodeling.     4 - Trivial aortic regurgitation.     5 - Mild tricuspid regurgitation.     6 - Pulmonary hypertension. The estimated PA systolic pressure is 54 mmHg.     7 - Small pericardial anterior effusion without hemodynamic compromise.   Diastolic function is indeterminate  "

## 2018-04-13 LAB
ANION GAP SERPL CALC-SCNC: 6 MMOL/L
ANION GAP SERPL CALC-SCNC: 7 MMOL/L
BASOPHILS # BLD AUTO: 0.01 K/UL
BASOPHILS NFR BLD: 0.1 %
BUN SERPL-MCNC: 7 MG/DL
BUN SERPL-MCNC: 9 MG/DL
CALCIUM SERPL-MCNC: 9 MG/DL
CALCIUM SERPL-MCNC: 9.1 MG/DL
CHLORIDE SERPL-SCNC: 85 MMOL/L
CHLORIDE SERPL-SCNC: 86 MMOL/L
CO2 SERPL-SCNC: 27 MMOL/L
CO2 SERPL-SCNC: 31 MMOL/L
CREAT SERPL-MCNC: 0.6 MG/DL
CREAT SERPL-MCNC: 0.6 MG/DL
DIFFERENTIAL METHOD: ABNORMAL
EOSINOPHIL # BLD AUTO: 0.2 K/UL
EOSINOPHIL NFR BLD: 1.7 %
ERYTHROCYTE [DISTWIDTH] IN BLOOD BY AUTOMATED COUNT: 12.4 %
EST. GFR  (AFRICAN AMERICAN): >60 ML/MIN/1.73 M^2
EST. GFR  (AFRICAN AMERICAN): >60 ML/MIN/1.73 M^2
EST. GFR  (NON AFRICAN AMERICAN): >60 ML/MIN/1.73 M^2
EST. GFR  (NON AFRICAN AMERICAN): >60 ML/MIN/1.73 M^2
GLUCOSE SERPL-MCNC: 93 MG/DL
GLUCOSE SERPL-MCNC: 96 MG/DL
HCT VFR BLD AUTO: 29.9 %
HGB BLD-MCNC: 10.8 G/DL
LYMPHOCYTES # BLD AUTO: 1.2 K/UL
LYMPHOCYTES NFR BLD: 10.8 %
MCH RBC QN AUTO: 30.9 PG
MCHC RBC AUTO-ENTMCNC: 36.1 G/DL
MCV RBC AUTO: 85 FL
MONOCYTES # BLD AUTO: 1.3 K/UL
MONOCYTES NFR BLD: 11.9 %
NEUTROPHILS # BLD AUTO: 8 K/UL
NEUTROPHILS NFR BLD: 75.3 %
PLATELET # BLD AUTO: 369 K/UL
PMV BLD AUTO: 10 FL
POTASSIUM SERPL-SCNC: 3.2 MMOL/L
POTASSIUM SERPL-SCNC: 4 MMOL/L
RBC # BLD AUTO: 3.5 M/UL
SODIUM SERPL-SCNC: 119 MMOL/L
SODIUM SERPL-SCNC: 123 MMOL/L
WBC # BLD AUTO: 10.6 K/UL

## 2018-04-13 PROCEDURE — 63600175 PHARM REV CODE 636 W HCPCS: Performed by: INTERNAL MEDICINE

## 2018-04-13 PROCEDURE — 99232 SBSQ HOSP IP/OBS MODERATE 35: CPT | Mod: ,,, | Performed by: INTERNAL MEDICINE

## 2018-04-13 PROCEDURE — 80048 BASIC METABOLIC PNL TOTAL CA: CPT

## 2018-04-13 PROCEDURE — 25000003 PHARM REV CODE 250: Performed by: INTERNAL MEDICINE

## 2018-04-13 PROCEDURE — 36415 COLL VENOUS BLD VENIPUNCTURE: CPT

## 2018-04-13 PROCEDURE — 25000003 PHARM REV CODE 250: Performed by: EMERGENCY MEDICINE

## 2018-04-13 PROCEDURE — 27000221 HC OXYGEN, UP TO 24 HOURS

## 2018-04-13 PROCEDURE — 21400001 HC TELEMETRY ROOM

## 2018-04-13 PROCEDURE — 85025 COMPLETE CBC W/AUTO DIFF WBC: CPT

## 2018-04-13 PROCEDURE — 25000003 PHARM REV CODE 250

## 2018-04-13 PROCEDURE — 80048 BASIC METABOLIC PNL TOTAL CA: CPT | Mod: 91

## 2018-04-13 RX ORDER — FUROSEMIDE 10 MG/ML
20 INJECTION INTRAMUSCULAR; INTRAVENOUS DAILY
Status: DISCONTINUED | OUTPATIENT
Start: 2018-04-14 | End: 2018-04-14

## 2018-04-13 RX ORDER — POTASSIUM CHLORIDE 20 MEQ/15ML
40 SOLUTION ORAL ONCE
Status: COMPLETED | OUTPATIENT
Start: 2018-04-13 | End: 2018-04-13

## 2018-04-13 RX ORDER — FUROSEMIDE 10 MG/ML
20 INJECTION INTRAMUSCULAR; INTRAVENOUS 2 TIMES DAILY
Status: DISCONTINUED | OUTPATIENT
Start: 2018-04-13 | End: 2018-04-13

## 2018-04-13 RX ORDER — HYDROCORTISONE 25 MG/G
CREAM TOPICAL 3 TIMES DAILY
Status: DISCONTINUED | OUTPATIENT
Start: 2018-04-13 | End: 2018-04-16 | Stop reason: HOSPADM

## 2018-04-13 RX ORDER — POTASSIUM CHLORIDE 20 MEQ/1
40 TABLET, EXTENDED RELEASE ORAL ONCE
Status: COMPLETED | OUTPATIENT
Start: 2018-04-13 | End: 2018-04-13

## 2018-04-13 RX ADMIN — POTASSIUM CHLORIDE 40 MEQ: 20 SOLUTION ORAL at 08:04

## 2018-04-13 RX ADMIN — FUROSEMIDE 20 MG: 10 INJECTION, SOLUTION INTRAMUSCULAR; INTRAVENOUS at 05:04

## 2018-04-13 RX ADMIN — HYDROCORTISONE: 25 CREAM TOPICAL at 03:04

## 2018-04-13 RX ADMIN — ALUMINUM HYDROXIDE, MAGNESIUM HYDROXIDE, AND SIMETHICONE 30 ML: 200; 200; 20 SUSPENSION ORAL at 06:04

## 2018-04-13 RX ADMIN — HYDROCORTISONE: 25 CREAM TOPICAL at 09:04

## 2018-04-13 RX ADMIN — APIXABAN 2.5 MG: 2.5 TABLET, FILM COATED ORAL at 08:04

## 2018-04-13 RX ADMIN — FUROSEMIDE 20 MG: 10 INJECTION, SOLUTION INTRAMUSCULAR; INTRAVENOUS at 08:04

## 2018-04-13 RX ADMIN — ACETAMINOPHEN 325 MG: 325 TABLET ORAL at 08:04

## 2018-04-13 RX ADMIN — POTASSIUM CHLORIDE 40 MEQ: 1500 TABLET, EXTENDED RELEASE ORAL at 08:04

## 2018-04-13 RX ADMIN — LEVOTHYROXINE SODIUM 75 MCG: 75 TABLET ORAL at 06:04

## 2018-04-13 RX ADMIN — FAMOTIDINE 20 MG: 20 TABLET, FILM COATED ORAL at 08:04

## 2018-04-13 NOTE — PLAN OF CARE
Problem: Fall Risk (Adult)  Goal: Absence of Falls  Patient will demonstrate the desired outcomes by discharge/transition of care.    04/13/18 1100   Fall Risk (Adult)   Absence of Falls making progress toward outcome

## 2018-04-13 NOTE — PROGRESS NOTES
[facility]  Cardiology  Progress Note    Patient Name: Jeannie Hutchins  MRN: [id number]  Admission Date: 4/12/2018  Hospital Length of Stay: 1 days  Code Status: Full Code   Attending Physician: Jake Trujillo MD;Pricila*   Primary Care Physician: Paige Mcleod MD  Expected Discharge Date:   Principal Problem:Acute pulmonary edema    Subjective:     Hospital Course:   Less SOB - still not back to baseline  Denies CP    Interval History:     Review of Systems   Constitution: Negative for decreased appetite.   HENT: Negative for ear discharge.    Eyes: Negative for blurred vision.   Respiratory: Negative for hemoptysis.    Endocrine: Negative for polyphagia.   Hematologic/Lymphatic: Negative for adenopathy.   Skin: Negative for color change.   Musculoskeletal: Negative for joint swelling.   Neurological: Negative for brief paralysis.   Psychiatric/Behavioral: Negative for hallucinations.     Objective:     Vital Signs (Most Recent):  Temp: 97.4 °F (36.3 °C) (04/13/18 0806)  Pulse: 78 (04/13/18 0806)  Resp: 18 (04/13/18 0806)  BP: 135/62 (04/13/18 0806)  SpO2: 100 % (04/13/18 0806) Vital Signs (24h Range):  Temp:  [97.3 °F (36.3 °C)-98.6 °F (37 °C)] 97.4 °F (36.3 °C)  Pulse:  [] 78  Resp:  [16-24] 18  SpO2:  [80 %-100 %] 100 %  BP: ()/(54-90) 135/62     Weight: 46.3 kg (102 lb 1.2 oz)  Body mass index is 22.9 kg/m².     SpO2: 100 %  O2 Device (Oxygen Therapy): nasal cannula      Intake/Output Summary (Last 24 hours) at 04/13/18 0844  Last data filed at 04/13/18 0700   Gross per 24 hour   Intake              270 ml   Output             3250 ml   Net            -2980 ml       Lines/Drains/Airways     Peripheral Intravenous Line                 Peripheral IV - Single Lumen 04/12/18 0930 Right Antecubital less than 1 day                Physical Exam   Constitutional: She is oriented to person, place, and time. She appears well-developed and well-nourished.   HENT:   Head: Normocephalic and  atraumatic.   Eyes: Conjunctivae and EOM are normal. Pupils are equal, round, and reactive to light.   Neck: Normal range of motion. Neck supple. No thyromegaly present.   Cardiovascular: Normal rate and regular rhythm.    No murmur heard.  Pulmonary/Chest: Effort normal and breath sounds normal. No respiratory distress.   Abdominal: Soft. Bowel sounds are normal.   Musculoskeletal: She exhibits no edema.   Neurological: She is alert and oriented to person, place, and time.   Skin: Skin is warm and dry.   Psychiatric: She has a normal mood and affect. Her behavior is normal.       Significant Labs: All pertinent lab results from the last 24 hours have been reviewed.    Significant Imaging: Echocardiogram:   2D echo with color flow doppler:   Results for orders placed or performed during the hospital encounter of 01/22/18   2D echo with color flow doppler   Result Value Ref Range    EF 60 55 - 65    Aortic Valve Regurgitation TRIVIAL     Est. PA Systolic Pressure 54.24 (A)     Pericardial Effusion SMALL (A)     Mitral Valve Mobility NORMAL     Tricuspid Valve Regurgitation MILD      Assessment and Plan:     Brief HPI:     Diastolic CHF, acute on chronic    Diuresis as tolerated. Would likely benefit from 1 more day of diuresis        Hyperlipidemia    On statin        Paroxysmal atrial fibrillation    Currently normal sinus rhythm  On Eliquis for OAC        Acute respiratory failure with hypoxia and hypercarbia             Benign hypertension    Stable  Continue mainly afterload reduction, recent was switched HCTZ        Hyponatremia with decreased serum osmolality    Likely multifactorial, further workup per primary            VTE Risk Mitigation         Ordered     apixaban tablet 2.5 mg  2 times daily     Route:  Oral        04/12/18 1405        Will f/u prn    Jarrett Neal MD  Cardiology  [facility]

## 2018-04-13 NOTE — PROGRESS NOTES
BOBBY contacted Dr. Mcleod office @ [phone] to schedule PCP follow-up, BOBBY spoke to Lara, appointment scheduled for 4/24/18 @ 11:00am.

## 2018-04-13 NOTE — HOSPITAL COURSE
Pt was admitted and evaluated by cardiology.  THey recommended Diuresis with Lasix.  Renal was consulted and recommended no furthur HCTZ as that had dropped her sodium in the past.  Pt's NA recovered with diauresis as well.  Pt is now back to baseline.  She will be discharged home.  She will follow up with me in one week.  HCTZ has been added to her list of allergies.  She will be sent home on Lasix and will continue the rest of her home meds.

## 2018-04-13 NOTE — PLAN OF CARE
Problem: Fall Risk (Adult)  Goal: Identify Related Risk Factors and Signs and Symptoms  Related risk factors and signs and symptoms are identified upon initiation of Human Response Clinical Practice Guideline (CPG)    04/13/18 1100   Fall Risk   Related Risk Factors (Fall Risk) depression/anxiety;fatigue/slow reaction;gait/mobility problems;environment unfamiliar     Goal: Absence of Falls  Patient will demonstrate the desired outcomes by discharge/transition of care.    04/13/18 1100   Fall Risk (Adult)   Absence of Falls making progress toward outcome

## 2018-04-13 NOTE — CONSULTS
DATE OF CONSULTATION:  04/13/2018.    REQUESTING CONSULT:  Dr. Mccormick.    REASON FOR CONSULTATION:  Hyponatremia.    HISTORY OF PRESENT ILLNESS:  This is an 83-year-old female that we have seen in   the past for hyponatremia.  She presented again with pulmonary edema and also   hyponatremia.  She initially presented with concerns of having sodium of 113 and   also a BNP of 823.  She was found on her x-ray to have concerns of vascular   congestion and admitted in the hospital.  She was started on diuretics and   subsequently responded to Lasix with increased sodium to 119 on the day of   consult.  She has a history of SIADH and also reaction to SSRIs and also   hydrochlorothiazide.  When she takes hydrochlorothiazide, she was noted to have   hyponatremia.  When she was last discharged from here, she was noted to have   improvement of the SIADH and hyponatremia with Lasix.  She was also noted to   have concerns of proteinuria.    PAST MEDICAL HISTORY:  Significant for atrial fibrillation, history of blood   transfusions, carotid stenosis, congestive heart failure, edema, history of   proteinuria, psychiatric disorders, thyroid disorder, hyponatremia, history of   SIADH.    PAST SURGICAL HISTORY:  Significant for carpal tunnel surgery, hysterectomy,   tonsillectomy, left carotid endarterectomy.    MEDICATIONS:  Acetaminophen, Apidexin, Lasix, hydrocortisone, Synthroid.    FAMILY HISTORY:  Noncontributory.    REVIEW OF SYSTEMS:  A 10-ponit review of systems is pertinent for lower   extremity swelling, shortness of breath.    PHYSICAL EXAMINATION:  VITAL SIGNS:  Temperature 98.4, heart rate 80, respirations 18, blood pressure   126/59.  GENERAL:  Well-developed female, in no acute distress.  HEENT:  Normocephalic.  Extraocular movements intact.  Moist mucous membranes.  NECK:  Supple.  CARDIOVASCULAR:  S1, S2, regular.  PULMONARY:  Diminished bases.  ABDOMEN:  Positive bowel sounds, soft, nondistended.  EXTREMITIES:   Shows 1+ to 2+ edema.    LABORATORY DATA:  White count 10.6, H and H 10.8 and 29.9, platelet count 316.    INR 1.1.  Sodium 119, potassium 4, BUN and creatinine 9 and 0.6, calcium is 9.    Last BNP was 823.  Chest x-ray shows pulmonary edema.    ASSESSMENT AND PLAN:  1.  Hyponatremia.  The patient most likely has a reaction to her   hydrochlorothiazide.  Please avoid hydrochlorothiazide in this patient.  We will   reassess with serial sodiums.  The patient does much better on Lasix.  2.  Pulmonary edema.  Continue Lasix for now.  3.  Hypertension.  Not following clinically, the patient does poorly on her   hydrochlorothiazide, please do not resume.  4.  Hypothyroidism.  TSH is up some.  Last month it was noted to be relatively   good by her primary care doctor.  I would not address Synthroid at this time as   it would be pretty high dose for her weight.  5.  Proteinuria.  Following clinically.  6.  Anemia.  H and H slightly low.  We will follow, may need transfusion.      VALORIE/IN  dd: 04/13/2018 15:38:52 (CDT)  td: 04/13/2018 17:56:08 (CDT)  Doc ID   #[id number]  Job ID #[id number]    CC:

## 2018-04-13 NOTE — ASSESSMENT & PLAN NOTE
Cxr:   There is pulmonary vascular congestion present with bilateral parahilar and basilar consolidation.  Findings are most suggestive of pulmonary edema although bilateral pneumonia is also a consideration.  There is no evidence for pneumothorax or large pleural effusions.  Small pleural effusions cannot be excluded.  Cards consulted  Ordered one dose of lasix 20 IV

## 2018-04-13 NOTE — SUBJECTIVE & OBJECTIVE
Interval History:     Review of Systems   Constitution: Negative for decreased appetite.   HENT: Negative for ear discharge.    Eyes: Negative for blurred vision.   Respiratory: Negative for hemoptysis.    Endocrine: Negative for polyphagia.   Hematologic/Lymphatic: Negative for adenopathy.   Skin: Negative for color change.   Musculoskeletal: Negative for joint swelling.   Neurological: Negative for brief paralysis.   Psychiatric/Behavioral: Negative for hallucinations.     Objective:     Vital Signs (Most Recent):  Temp: 97.4 °F (36.3 °C) (04/13/18 0806)  Pulse: 78 (04/13/18 0806)  Resp: 18 (04/13/18 0806)  BP: 135/62 (04/13/18 0806)  SpO2: 100 % (04/13/18 0806) Vital Signs (24h Range):  Temp:  [97.3 °F (36.3 °C)-98.6 °F (37 °C)] 97.4 °F (36.3 °C)  Pulse:  [] 78  Resp:  [16-24] 18  SpO2:  [80 %-100 %] 100 %  BP: ()/(54-90) 135/62     Weight: 46.3 kg (102 lb 1.2 oz)  Body mass index is 22.9 kg/m².     SpO2: 100 %  O2 Device (Oxygen Therapy): nasal cannula      Intake/Output Summary (Last 24 hours) at 04/13/18 0844  Last data filed at 04/13/18 0700   Gross per 24 hour   Intake              270 ml   Output             3250 ml   Net            -2980 ml       Lines/Drains/Airways     Peripheral Intravenous Line                 Peripheral IV - Single Lumen 04/12/18 0930 Right Antecubital less than 1 day                Physical Exam   Constitutional: She is oriented to person, place, and time. She appears well-developed and well-nourished.   HENT:   Head: Normocephalic and atraumatic.   Eyes: Conjunctivae and EOM are normal. Pupils are equal, round, and reactive to light.   Neck: Normal range of motion. Neck supple. No thyromegaly present.   Cardiovascular: Normal rate and regular rhythm.    No murmur heard.  Pulmonary/Chest: Effort normal and breath sounds normal. No respiratory distress.   Abdominal: Soft. Bowel sounds are normal.   Musculoskeletal: She exhibits no edema.   Neurological: She is alert and  oriented to person, place, and time.   Skin: Skin is warm and dry.   Psychiatric: She has a normal mood and affect. Her behavior is normal.       Significant Labs: All pertinent lab results from the last 24 hours have been reviewed.    Significant Imaging: Echocardiogram:   2D echo with color flow doppler:   Results for orders placed or performed during the hospital encounter of 01/22/18   2D echo with color flow doppler   Result Value Ref Range    EF 60 55 - 65    Aortic Valve Regurgitation TRIVIAL     Est. PA Systolic Pressure 54.24 (A)     Pericardial Effusion SMALL (A)     Mitral Valve Mobility NORMAL     Tricuspid Valve Regurgitation MILD

## 2018-04-13 NOTE — CONSULTS
Dictation #1  MRN:[id number]  [facility]:[id number]  [id number], htn, pulm edema, hypothyroidism, proteinuria, anemia    PT well known to me- history of siadh with ssri. Pt goes hyponatremic with hctz. Can take bumex or lasix.

## 2018-04-13 NOTE — PROGRESS NOTES
Note that patient up to the bedside commode x 2 this am producing approximately 500 cc of yellow urine;     Patient c/o painful hemorrhoids for which she normally takes, hydrocortisone;    Will continue to f/u;

## 2018-04-13 NOTE — SUBJECTIVE & OBJECTIVE
Interval History: requiring more O2    Review of Systems   Dyspnea while speaking  Less leg edema  Objective:     Vital Signs (Most Recent):  Temp: 98.4 °F (36.9 °C) (04/13/18 1152)  Pulse: 80 (04/13/18 1152)  Resp: 18 (04/13/18 0806)  BP: (!) 126/59 (04/13/18 1152)  SpO2: 98 % (04/13/18 1152) Vital Signs (24h Range):  Temp:  [97.3 °F (36.3 °C)-98.6 °F (37 °C)] 98.4 °F (36.9 °C)  Pulse:  [] 80  Resp:  [18] 18  SpO2:  [85 %-100 %] 98 %  BP: ()/(58-90) 126/59     Weight: 46.3 kg (102 lb 1.2 oz)  Body mass index is 22.9 kg/m².    Intake/Output Summary (Last 24 hours) at 04/13/18 1319  Last data filed at 04/13/18 1132   Gross per 24 hour   Intake              470 ml   Output             4200 ml   Net            -3730 ml      Physical Exam  Resting in room  Daughter visiting  She is now wearing non-rebreather mask at high flow  Speaks, but with some dyspnea  Lungs sound ok  Heart RRR  Less leg edema

## 2018-04-13 NOTE — PLAN OF CARE
04/13/18 1224   Discharge Assessment   Assessment Type Discharge Planning Assessment   Assessment information obtained from? Medical Record   Prior to hospitilization cognitive status: Alert/Oriented   Prior to hospitalization functional status: Assistive Equipment;Needs Assistance   Current cognitive status: Alert/Oriented   Current Functional Status: Assistive Equipment;Needs Assistance   Facility Arrived From: home   Lives With spouse   Able to Return to Prior Arrangements unable to determine at this time (comments)   Is patient able to care for self after discharge? Unable to determine at this time (comments)   Who are your caregiver(s) and their phone number(s)? spouse- Jovon [phone]   Patient's perception of discharge disposition other (comments)  (TBD)   Readmission Within The Last 30 Days no previous admission in last 30 days   Patient currently being followed by outpatient case management? No   Patient currently receives any other outside agency services? No   Equipment Currently Used at Home none   Do you have any problems affording any of your prescribed medications? No   Is the patient taking medications as prescribed? yes   Does the patient have transportation home? Yes   Transportation Available family or friend will provide   Does the patient receive services at the [facility]? No   Discharge Plan A Home with family   Discharge Plan B Home with family;Home Health  (with follow up appointment)   Patient/Family In Agreement With Plan yes   Does the patient have transportation to healthcare appointments? Yes     [facility] [place] [place], [address] [address]  Phone: [phone] Fax: [phone]

## 2018-04-13 NOTE — ASSESSMENT & PLAN NOTE
She has had this in past admits (differential above is mine)  113 on admit, now 119  Fluid restriction  Gave lasix 20 x 1   Osm is 250 (normal low is 275)  Renal consulted for any further recs

## 2018-04-13 NOTE — PROGRESS NOTES
"[facility]  Progress Note    Patient Name: Jeannie Hutchins  MRN: [id number]  Patient Class: IP- Inpatient   Admission Date: 4/12/2018  Length of Stay: 1 days  Attending Physician: Jake Trujillo MD;[facility]*  Primary Care Provider: Paige Mcleod MD        Subjective:     Principal Problem:Acute pulmonary edema    HPI:  This 83 y.o. WF, followed by Dr. Bethany Mcleod, with a medical history of thyroid disease, hypertension, hypercholesterolemia, carotid stenosis and atrial fibrillation presents to the ED c/o acute, constant bilateral leg swelling since Friday. Swelling involves dorsal feet bilaterally, ankles, and now extending up to lower legs. She says the swelling in the left leg has improved since onset. Pt denies a previous episode of similar symptoms.  She was here at [facility] 1/22 - 2/23 for nausea/vomiting and hyponatremia, plus anxiety.  Afterward, she went to [facility] for about 3 weeks for further rehab.  She then went home, where she was for 2 weeks - says she was performing ADLs, including driving.    She reports that she has also been experiencing persistent shortness of breath as well as a cough - this developed recently.  She saw Dr. Mcleod 2 days ago in office and was started on HCT for fluid.   She notes that her stool was "soft" today.   She states that she has a history of heart failure, noting her cardiologist as Dr. Armenta. Pt adds that he gave her a new diuretic recently (indapamide?)  Pt denies fever, chills, headache, ear pain, eye pain, sore throat, chest pain, abdominal pain, emesis and dysuria.   BNP is elevated at 823.  TSH normal at 4  GFR is >60  She is mildly anemic with hct 30.  She has marked hyponatremia - 113 - this will be treated with fluid restriction.  ECho in Jan 2018:    1 - Normal left ventricular systolic function (EF 60-65%).     2 - No wall motion abnormalities.     3 - Concentric remodeling.     4 - Trivial aortic regurgitation.     5 - Mild " tricuspid regurgitation.     6 - Pulmonary hypertension. The estimated PA systolic pressure is 54 mmHg.     7 - Small pericardial anterior effusion without hemodynamic compromise.   Diastolic function is indeterminate    Hospital Course:  She has been seen by cardiology.  No new plans at present.  Renal is consulted for her continued hyponatremia.  Osmolality is 250 (low)  4/13:  She is now wearing non-rebreather mask.  Says she gets dyspnea when talking.  She does NOT have home O2, so she is not at baseline and not ready for dc.    Interval History: requiring more O2    Review of Systems   Dyspnea while speaking  Less leg edema  Objective:     Vital Signs (Most Recent):  Temp: 98.4 °F (36.9 °C) (04/13/18 1152)  Pulse: 80 (04/13/18 1152)  Resp: 18 (04/13/18 0806)  BP: (!) 126/59 (04/13/18 1152)  SpO2: 98 % (04/13/18 1152) Vital Signs (24h Range):  Temp:  [97.3 °F (36.3 °C)-98.6 °F (37 °C)] 98.4 °F (36.9 °C)  Pulse:  [] 80  Resp:  [18] 18  SpO2:  [85 %-100 %] 98 %  BP: ()/(58-90) 126/59     Weight: 46.3 kg (102 lb 1.2 oz)  Body mass index is 22.9 kg/m².    Intake/Output Summary (Last 24 hours) at 04/13/18 1319  Last data filed at 04/13/18 1132   Gross per 24 hour   Intake              470 ml   Output             4200 ml   Net            -3730 ml      Physical Exam  Resting in room  Daughter visiting  She is now wearing non-rebreather mask at high flow  Speaks, but with some dyspnea  Lungs sound ok  Heart RRR  Less leg edema    Assessment/Plan:      * Acute pulmonary edema    Cxr:   There is pulmonary vascular congestion present with bilateral parahilar and basilar consolidation.  Findings are most suggestive of pulmonary edema although bilateral pneumonia is also a consideration.  There is no evidence for pneumothorax or large pleural effusions.  Small pleural effusions cannot be excluded.  Cards consulted  Ordered one dose of lasix 20 IV        Diastolic CHF, acute on chronic    Assumed to be diastolic;  normal systolic fxn  Diastolic function was indeterminate on echo          Hyperlipidemia    Not on statin  No lipid results here          Pulmonary hypertension    PAP 54          Paroxysmal atrial fibrillation    On Eliquis 2.5 bid          Acute respiratory failure with hypoxia and hypercarbia    She was not dyspneic yesterday, but today is requiring high-flow O2          Generalized anxiety disorder    Because of prior hyponatremia, she has been off SSRI.  No current meds          Benign hypertension    135/62          Hyponatremia with decreased serum osmolality    She has had this in past admits (differential above is mine)  113 on admit, now 119  Fluid restriction  Gave lasix 20 x 1   Osm is 250 (normal low is 275)  Renal consulted for any further recs          VTE Risk Mitigation         Ordered     apixaban tablet 2.5 mg  2 times daily     Route:  Oral        04/12/18 1401              Jake Trujillo MD  Department of Hospital Medicine   [facility]

## 2018-04-14 LAB
ANION GAP SERPL CALC-SCNC: 8 MMOL/L
ANION GAP SERPL CALC-SCNC: 9 MMOL/L
BASOPHILS # BLD AUTO: 0.02 K/UL
BASOPHILS NFR BLD: 0.2 %
BUN SERPL-MCNC: 4 MG/DL
BUN SERPL-MCNC: 4 MG/DL
CALCIUM SERPL-MCNC: 9.5 MG/DL
CALCIUM SERPL-MCNC: 9.8 MG/DL
CHLORIDE SERPL-SCNC: 93 MMOL/L
CHLORIDE SERPL-SCNC: 93 MMOL/L
CO2 SERPL-SCNC: 27 MMOL/L
CO2 SERPL-SCNC: 28 MMOL/L
CREAT SERPL-MCNC: 0.6 MG/DL
CREAT SERPL-MCNC: 0.6 MG/DL
DIFFERENTIAL METHOD: ABNORMAL
EOSINOPHIL # BLD AUTO: 0.3 K/UL
EOSINOPHIL NFR BLD: 3 %
ERYTHROCYTE [DISTWIDTH] IN BLOOD BY AUTOMATED COUNT: 13 %
EST. GFR  (AFRICAN AMERICAN): >60 ML/MIN/1.73 M^2
EST. GFR  (AFRICAN AMERICAN): >60 ML/MIN/1.73 M^2
EST. GFR  (NON AFRICAN AMERICAN): >60 ML/MIN/1.73 M^2
EST. GFR  (NON AFRICAN AMERICAN): >60 ML/MIN/1.73 M^2
GLUCOSE SERPL-MCNC: 90 MG/DL
GLUCOSE SERPL-MCNC: 92 MG/DL
HCT VFR BLD AUTO: 35.8 %
HGB BLD-MCNC: 12.5 G/DL
LYMPHOCYTES # BLD AUTO: 0.8 K/UL
LYMPHOCYTES NFR BLD: 7.5 %
MCH RBC QN AUTO: 30.6 PG
MCHC RBC AUTO-ENTMCNC: 34.9 G/DL
MCV RBC AUTO: 88 FL
MONOCYTES # BLD AUTO: 1.2 K/UL
MONOCYTES NFR BLD: 12.1 %
NEUTROPHILS # BLD AUTO: 7.7 K/UL
NEUTROPHILS NFR BLD: 76.9 %
PLATELET # BLD AUTO: 425 K/UL
PMV BLD AUTO: 9.5 FL
POTASSIUM SERPL-SCNC: 4.4 MMOL/L
POTASSIUM SERPL-SCNC: 4.4 MMOL/L
RBC # BLD AUTO: 4.08 M/UL
SODIUM SERPL-SCNC: 129 MMOL/L
SODIUM SERPL-SCNC: 129 MMOL/L
WBC # BLD AUTO: 10.02 K/UL

## 2018-04-14 PROCEDURE — 25000003 PHARM REV CODE 250: Performed by: INTERNAL MEDICINE

## 2018-04-14 PROCEDURE — 25000003 PHARM REV CODE 250: Performed by: EMERGENCY MEDICINE

## 2018-04-14 PROCEDURE — 80048 BASIC METABOLIC PNL TOTAL CA: CPT

## 2018-04-14 PROCEDURE — 21400001 HC TELEMETRY ROOM

## 2018-04-14 PROCEDURE — 80048 BASIC METABOLIC PNL TOTAL CA: CPT | Mod: 91

## 2018-04-14 PROCEDURE — 85025 COMPLETE CBC W/AUTO DIFF WBC: CPT

## 2018-04-14 PROCEDURE — 36415 COLL VENOUS BLD VENIPUNCTURE: CPT

## 2018-04-14 RX ORDER — LISINOPRIL 5 MG/1
10 TABLET ORAL DAILY
Status: DISCONTINUED | OUTPATIENT
Start: 2018-04-14 | End: 2018-04-16 | Stop reason: HOSPADM

## 2018-04-14 RX ORDER — FUROSEMIDE 20 MG/1
20 TABLET ORAL DAILY
Status: DISCONTINUED | OUTPATIENT
Start: 2018-04-14 | End: 2018-04-16 | Stop reason: HOSPADM

## 2018-04-14 RX ADMIN — HYDROCORTISONE: 25 CREAM TOPICAL at 08:04

## 2018-04-14 RX ADMIN — APIXABAN 2.5 MG: 2.5 TABLET, FILM COATED ORAL at 09:04

## 2018-04-14 RX ADMIN — LEVOTHYROXINE SODIUM 75 MCG: 75 TABLET ORAL at 05:04

## 2018-04-14 RX ADMIN — FUROSEMIDE 20 MG: 20 TABLET ORAL at 09:04

## 2018-04-14 RX ADMIN — HYDROCORTISONE: 25 CREAM TOPICAL at 09:04

## 2018-04-14 RX ADMIN — FAMOTIDINE 20 MG: 20 TABLET, FILM COATED ORAL at 09:04

## 2018-04-14 RX ADMIN — HYDROCORTISONE: 25 CREAM TOPICAL at 04:04

## 2018-04-14 RX ADMIN — ACETAMINOPHEN 325 MG: 325 TABLET ORAL at 09:04

## 2018-04-14 RX ADMIN — ACETAMINOPHEN 325 MG: 325 TABLET ORAL at 08:04

## 2018-04-14 RX ADMIN — APIXABAN 2.5 MG: 2.5 TABLET, FILM COATED ORAL at 08:04

## 2018-04-14 RX ADMIN — LISINOPRIL 10 MG: 5 TABLET ORAL at 09:04

## 2018-04-14 NOTE — PROGRESS NOTES
Per order, I called Dr. Venegas with BMP results;   Telephone orders entered;    Report given to Nurse Garcia as shift is changing;

## 2018-04-14 NOTE — PROGRESS NOTES
"Progress Note    Admit Date: 4/12/2018   LOS: 2 days     SUBJECTIVE:      Follow-up For:          HPI:  This 83 y.o. WF, followed by Dr. Bethany Mcleod, with a medical history of thyroid disease, hypertension, hypercholesterolemia, carotid stenosis and atrial fibrillation presents to the ED c/o acute, constant bilateral leg swelling since Friday. Swelling involves dorsal feet bilaterally, ankles, and now extending up to lower legs. She says the swelling in the left leg has improved since onset. Pt denies a previous episode of similar symptoms.  She was here at [facility] 1/22 - 2/23 for nausea/vomiting and hyponatremia, plus anxiety.  Afterward, she went to [facility] for about 3 weeks for further rehab.  She then went home, where she was for 2 weeks - says she was performing ADLs, including driving.    She reports that she has also been experiencing persistent shortness of breath as well as a cough - this developed recently.  She saw Dr. Mcleod 2 days ago in office and was started on HCT for fluid.   She notes that her stool was "soft" today.   She states that she has a history of heart failure, noting her cardiologist as Dr. Armenta. Pt adds that he gave her a new diuretic recently (indapamide?)  Pt denies fever, chills, headache, ear pain, eye pain, sore throat, chest pain, abdominal pain, emesis and dysuria.   BNP is elevated at 823.  TSH normal at 4  GFR is >60  She is mildly anemic with hct 30.  She has marked hyponatremia - 113 - this will be treated with fluid restriction.  ECho in Jan 2018:    1 - Normal left ventricular systolic function (EF 60-65%).     2 - No wall motion abnormalities.     3 - Concentric remodeling.     4 - Trivial aortic regurgitation.     5 - Mild tricuspid regurgitation.     6 - Pulmonary hypertension. The estimated PA systolic pressure is 54 mmHg.     7 - Small pericardial anterior effusion without hemodynamic compromise.   Diastolic function is indeterminate     Hospital Course:    Per Dr." Cassandra  She has been seen by cardiology.  No new plans at present.  Renal is consulted for her continued hyponatremia.  Osmolality is 250 (low)  4/13:  She is now wearing non-rebreather mask.  Says she gets dyspnea when talking.  She does NOT have home O2, so she is not at baseline and not ready for dc.     Interval History:    04/14/2018: states feeling little better. SOB improving. Able to eat without using oxygen.        Scheduled Meds:   acetaminophen  325 mg Oral BID    apixaban  2.5 mg Oral BID    famotidine  20 mg Oral Daily    furosemide  20 mg Oral Daily    hydrocortisone   Rectal TID    levothyroxine  75 mcg Oral Daily    lisinopril  10 mg Oral Daily     Continuous Infusions:  PRN Meds:aluminum-magnesium hydroxide-simethicone    Review of patient's allergies indicates:   Allergen Reactions    Strawberry Swelling     Tongue swells up and a generalized rash.       Review of Systems     Constitutional: + fatigue   Eyes: no visual changes   ENT: no nasal congestion. Denies sore throat with odynophagia   Respiratory: +sob   Cardiovascular: no chest pain or palpitations   Gastrointestinal: no nausea, vomiting or abdominal pain. Normal bowl habits   Hematologic/Lymphatic: No lymphadenopathy, no significant fatigue, fever or night sweat. No mucocutaneous bleeding   Musculoskeletal: trouble with ambulation  Neurological: no seizures or tremors, gait or balance prblems  Skin: No rashes or lesions  Psych: anxiety       OBJECTIVE:     Vital Signs (Most Recent)  Temp: 97.9 °F (36.6 °C) (04/14/18 0731)  Pulse: 60 (04/14/18 0731)  Resp: 18 (04/14/18 0731)  BP: 130/60 (04/14/18 0731)  SpO2: 96 % (04/14/18 0731)    Vital Signs Range (Last 24H):  Temp:  [96.8 °F (36 °C)-98.6 °F (37 °C)]   Pulse:  [60-89]   Resp:  [18-20]   BP: (112-148)/(59-89)   SpO2:  [91 %-100 %]     I & O (Last 24H):  Intake/Output Summary (Last 24 hours) at 04/14/18 0922  Last data filed at 04/14/18 0600   Gross per 24 hour   Intake               600 ml   Output             4200 ml   Net            -3600 ml     Physical Exam:    General: NAD, sitting up in bed and  Having breakfast  Head: normocephalic, atraumatic   Eyes: conjunctivae pink, anicteric sclera. PERRL.    Throat: No erythema or post nasal discharge   Neck: supple, no LAD   Lungs: crackle at the bases  Heart: S1, S2, RRR   Abdomen: + BS x 4 quadrants, soft, non tender. No hepatosplenomegaly.   Extremities: trace pedal edema  Neuro: A&Ox 2  Psych: calm    Laboratory:  Results for LEONELA ESCOBAR (MRN [id number]) as of 4/14/2018 09:32   Ref. Range 4/13/2018 04:33 4/13/2018 16:35 4/14/2018 05:42   WBC Latest Ref Range: 3.90 - 12.70 K/uL 10.60  10.02   RBC Latest Ref Range: 4.00 - 5.40 M/uL 3.50 (L)  4.08   Hemoglobin Latest Ref Range: 12.0 - 16.0 g/dL 10.8 (L)  12.5   Hematocrit Latest Ref Range: 37.0 - 48.5 % 29.9 (L)  35.8 (L)   MCV Latest Ref Range: 82 - 98 fL 85  88   MCH Latest Ref Range: 27.0 - 31.0 pg 30.9  30.6   MCHC Latest Ref Range: 32.0 - 36.0 g/dL 36.1 (H)  34.9   RDW Latest Ref Range: 11.5 - 14.5 % 12.4  13.0   Platelets Latest Ref Range: 150 - 350 K/uL 369 (H)  425 (H)   MPV Latest Ref Range: 9.2 - 12.9 fL 10.0  9.5   Gran% Latest Ref Range: 38.0 - 73.0 % 75.3 (H)  76.9 (H)   Gran # (ANC) Latest Ref Range: 1.8 - 7.7 K/uL 8.0 (H)  7.7   Lymph% Latest Ref Range: 18.0 - 48.0 % 10.8 (L)  7.5 (L)   Lymph # Latest Ref Range: 1.0 - 4.8 K/uL 1.2  0.8 (L)   Mono% Latest Ref Range: 4.0 - 15.0 % 11.9  12.1   Mono # Latest Ref Range: 0.3 - 1.0 K/uL 1.3 (H)  1.2 (H)   Eosinophil% Latest Ref Range: 0.0 - 8.0 % 1.7  3.0   Eos # Latest Ref Range: 0.0 - 0.5 K/uL 0.2  0.3   Basophil% Latest Ref Range: 0.0 - 1.9 % 0.1  0.2   Baso # Latest Ref Range: 0.00 - 0.20 K/uL 0.01  0.02   Sodium Latest Ref Range: 136 - 145 mmol/L 119 (LL) 123 (L) 129 (L)   Potassium Latest Ref Range: 3.5 - 5.1 mmol/L 4.0 3.2 (L) 4.4   Chloride Latest Ref Range: 95 - 110 mmol/L 85 (L) 86 (L) 93 (L)   CO2 Latest Ref Range: 23 - 29  mmol/L 27 31 (H) 27   Anion Gap Latest Ref Range: 8 - 16 mmol/L 7 (L) 6 (L) 9   BUN, Bld Latest Ref Range: 8 - 23 mg/dL 9 7 (L) 4 (L)   Creatinine Latest Ref Range: 0.5 - 1.4 mg/dL 0.6 0.6 0.6   eGFR if non African American Latest Ref Range: >60 mL/min/1.73 m^2 >60 >60 >60   eGFR if  Latest Ref Range: >60 mL/min/1.73 m^2 >60 >60 >60   Glucose Latest Ref Range: 70 - 110 mg/dL 93 96 90   Calcium Latest Ref Range: 8.7 - 10.5 mg/dL 9.0 9.1 9.5   Differential Method Unknown Automated  Automated       Diagnostic Results:  Steady improvement     ASSESSMENT/PLAN:       * Acute pulmonary edema     Cxr:   There is pulmonary vascular congestion present with bilateral parahilar and basilar consolidation.  Findings are most suggestive of pulmonary edema although bilateral pneumonia is also a consideration.  There is no evidence for pneumothorax or large pleural effusions.  Small pleural effusions cannot be excluded.  Cards consulted  Ordered one dose of lasix 20 IV  - symptomatically better            Diastolic CHF, acute on chronic     Assumed to be diastolic; normal systolic fxn  Diastolic function was indeterminate on echo  Symptoms better             Hyperlipidemia     Not on statin  No lipid results here             Pulmonary hypertension     PAP 54             Paroxysmal atrial fibrillation     On Eliquis 2.5 bid             Acute respiratory failure with hypoxia and hypercarbia     Doing ok on O2             Generalized anxiety disorder     Because of prior hyponatremia, she has been off SSRI.  No current meds             Benign hypertension     135/62             Hyponatremia with decreased serum osmolality     She has had this in past admits (differential above is mine)  113 on admit, now 119  Fluid restriction  Gave lasix 20 x 1   Osm is 250 (normal low is 275)  Renal consulted and appreciate [facility]  - Na improving                    VTE Risk Mitigation          Ordered       apixaban tablet 2.5 mg  2  times daily     Route:  Oral        04/12/18 4201          Covering for Dr. Bethany Moe M.D  Internal Medicine & Geriatric Medicine  Hematology & Oncology  Palliative Medicine    [address], [address]  [place] [id number] [phone] (Office)  [phone] (Fax)

## 2018-04-14 NOTE — PROGRESS NOTES
Jeannie Hutchins is a 83 y.o. female patient.    Active Hospital Problems    Diagnosis  POA    *Acute pulmonary edema [J81.0]  Yes    Diastolic CHF, acute on chronic [I50.33]  Unknown    Hyperlipidemia [E78.5]  Yes    Paroxysmal atrial fibrillation [I48.0]  Yes    Pulmonary hypertension [I27.20]  Yes    Acute respiratory failure with hypoxia and hypercarbia [J96.01, J96.02]  Yes    Benign hypertension [I10]  Yes    Generalized anxiety disorder [F41.1]  Yes     Dr. Mckeon's eval 1/23/18:  She does appear to have a JERRI because of the persistent worries that she has.  These worries predominate her day.  She would probably do well with prevention therapy such as SSRI/SNRI to help control the physical symptoms of the anxiety.  Problem at this point in time is her electrolyte abnormalities.  There is a slight risk of hyponatremia with these medications and given her current state, is not recommended to start these.   After a few weeks of stabilized labs, would recommend to start low dose SSRI treatment.  She may do well with paroxetine.  Start at 10mg daily and increase slowly over time to 20-40mg, whatever dose meets efficacy.  Again, therapy will help her with her anxiety.  Agree with patient that she should seek individual counseling with a psychologist.      Hyponatremia with decreased serum osmolality [E87.1]  Yes     Differential:  CHF - bnp is 400+;  cxr shows normal heart size, no pulm fluid - will check echo  Cirrhosis - AST at upper normal, otherwise LFTs in normal range; check liver US  Adrenal insuff - would expect hyperkalemia and hypotension - not present;  Check am cortisol  SIADH - ?  Source?  HCTZ - not taking  Renal failure - no  Polydipsia - no        Resolved Hospital Problems    Diagnosis Date Resolved POA   No resolved problems to display.     Temp: 97.1 °F (36.2 °C) (04/14/18 0018)  Pulse: 82 (04/14/18 0018)  Resp: 18 (04/14/18 0018)  BP: (!) 148/67 (04/14/18 0018)  SpO2: (!) 93 %  "(04/14/18 0018)  Weight: 46.3 kg (102 lb 1.2 oz) (04/13/18 0806)  Height: 4' 7.98" (142.2 cm) (04/13/18 0806)    Subjective:  Symptoms:  Stable.  (Notes breathing better).      Objective:  General Appearance:  Comfortable, ill-appearing, in no acute distress and not in pain.    Vital signs: (most recent): Blood pressure (!) 148/67, pulse 82, temperature 97.1 °F (36.2 °C), temperature source Oral, resp. rate 18, height 4' 7.98" (1.422 m), weight 46.3 kg (102 lb 1.2 oz), SpO2 (!) 93 %, not currently breastfeeding.    HEENT: Normal HEENT exam.    Lungs:  Breath sounds clear to auscultation.    Heart: S1 normal and S2 normal.    Abdomen: Abdomen is soft.  Bowel sounds are normal.   There is no abdominal tenderness.     Extremities: There is no dependent edema.    Neurological: Patient is alert.    Skin:  Warm and dry.      Intake/Output - Last 3 Shifts       04/12 0700 - 04/13 0659 04/13 0700 - 04/14 0659    P.O. 270 680    Total Intake(mL/kg) 270 (5.8) 680 (14.7)    Urine (mL/kg/hr) 2850 3650 (3.3)    Stool 0 0 (0)    Total Output 2850 3650    Net -2580 -2970          Stool Occurrence 0 x 0 x        Lab Results   Component Value Date    WBC 10.60 04/13/2018    HGB 10.8 (L) 04/13/2018    HCT 29.9 (L) 04/13/2018    MCV 85 04/13/2018     (H) 04/13/2018     BMP  Lab Results   Component Value Date     (L) 04/13/2018    K 3.2 (L) 04/13/2018    CL 86 (L) 04/13/2018    CO2 31 (H) 04/13/2018    BUN 7 (L) 04/13/2018    CREATININE 0.6 04/13/2018    CALCIUM 9.1 04/13/2018    ANIONGAP 6 (L) 04/13/2018    ESTGFRAFRICA >60 04/13/2018    EGFRNONAA >60 04/13/2018     Current Facility-Administered Medications   Medication    acetaminophen tablet 325 mg    aluminum-magnesium hydroxide-simethicone 200-200-20 mg/5 mL suspension 30 mL    apixaban tablet 2.5 mg    famotidine tablet 20 mg    furosemide tablet 20 mg    hydrocortisone 2.5 % rectal cream    levothyroxine tablet 75 mcg       Assessment & Plan  1.Hyponatremia. " Better. History of siadh with hctz.  Alfredo level. Better with lasix.  2.Proteinuria. Following.  Add ace.  3.Hypokalemia. Repleting.  4.HTN. No hctz for pt.  5.hypothyroidism. Cont tx.  Jennifer Venegas MD  4/14/2018

## 2018-04-14 NOTE — PLAN OF CARE
Problem: Patient Care Overview  Goal: Plan of Care Review   04/14/18 1729   Coping/Psychosocial   Plan Of Care Reviewed With patient       Problem: Cardiac: Heart Failure (Adult)  Goal: Signs and Symptoms of Listed Potential Problems Will be Absent, Minimized or Managed (Cardiac: Heart Failure)  Signs and symptoms of listed potential problems will be absent, minimized or managed by discharge/transition of care (reference Cardiac: Heart Failure (Adult) CPG).   Outcome: Ongoing (interventions implemented as appropriate)   04/14/18 9506   Cardiac: Heart Failure   Problems Assessed (Heart Failure) fluid/electrolyte imbalance   Problems Present (Heart Failure) cardiac pump dysfunction

## 2018-04-14 NOTE — PLAN OF CARE
Problem: Cardiac: Heart Failure (Adult)  Intervention: Provide Oxygenation/Ventilation/Perfusion Support   04/13/18 2100 04/14/18 0000   Activity   Activity Type --  activity clustered for rest period;bedrest with commode   Positioning   Head of Bed (HOB) HOB at 30-45 degrees --      Intervention: Prevent/Manage DVT/VTE Risk   04/13/18 2100   Minimize Embolism Risk   VTE Prevention/Management dorsiflexion/plantar flexion performed;ROM (active) performed   OTHER   VTE Required Core Measure Pharmacological prophylaxis initiated/maintained       Goal: Signs and Symptoms of Listed Potential Problems Will be Absent, Minimized or Managed (Cardiac: Heart Failure)  Signs and symptoms of listed potential problems will be absent, minimized or managed by discharge/transition of care (reference Cardiac: Heart Failure (Adult) CPG).   04/14/18 0322   Cardiac: Heart Failure   Problems Present (Heart Failure) fluid/electrolyte imbalance       Comments: Sufficient output r/t Lasix. Consumed all ordered Potassium PO medications. Switched from Non-re-breather mask to Venti mask at 40%. Tolerating well. Explained to pt that the plan is to wean down on the oxygen. Also explained lab values and how the labs were improving. Verbalized understand and asked more questions. Skin remains intact. Scheduled Tylenol providing relieve. Resting peacefully during night. Call light at side and socks on. Bed alarm active.

## 2018-04-15 LAB
ANION GAP SERPL CALC-SCNC: 6 MMOL/L
BASOPHILS # BLD AUTO: 0.02 K/UL
BASOPHILS NFR BLD: 0.3 %
BUN SERPL-MCNC: 6 MG/DL
CALCIUM SERPL-MCNC: 9.3 MG/DL
CHLORIDE SERPL-SCNC: 97 MMOL/L
CO2 SERPL-SCNC: 29 MMOL/L
CREAT SERPL-MCNC: 0.6 MG/DL
DIFFERENTIAL METHOD: ABNORMAL
EOSINOPHIL # BLD AUTO: 0.3 K/UL
EOSINOPHIL NFR BLD: 4.3 %
ERYTHROCYTE [DISTWIDTH] IN BLOOD BY AUTOMATED COUNT: 13.4 %
EST. GFR  (AFRICAN AMERICAN): >60 ML/MIN/1.73 M^2
EST. GFR  (NON AFRICAN AMERICAN): >60 ML/MIN/1.73 M^2
GLUCOSE SERPL-MCNC: 89 MG/DL
HCT VFR BLD AUTO: 35.2 %
HGB BLD-MCNC: 12 G/DL
LYMPHOCYTES # BLD AUTO: 0.9 K/UL
LYMPHOCYTES NFR BLD: 12.6 %
MAGNESIUM SERPL-MCNC: 1.9 MG/DL
MCH RBC QN AUTO: 30.8 PG
MCHC RBC AUTO-ENTMCNC: 34.1 G/DL
MCV RBC AUTO: 90 FL
MONOCYTES # BLD AUTO: 1.2 K/UL
MONOCYTES NFR BLD: 16.1 %
NEUTROPHILS # BLD AUTO: 4.8 K/UL
NEUTROPHILS NFR BLD: 66.6 %
PHOSPHATE SERPL-MCNC: 2.9 MG/DL
PLATELET # BLD AUTO: 365 K/UL
PMV BLD AUTO: 9.4 FL
POTASSIUM SERPL-SCNC: 4.6 MMOL/L
RBC # BLD AUTO: 3.9 M/UL
SODIUM SERPL-SCNC: 132 MMOL/L
WBC # BLD AUTO: 7.2 K/UL

## 2018-04-15 PROCEDURE — 80048 BASIC METABOLIC PNL TOTAL CA: CPT

## 2018-04-15 PROCEDURE — 25000003 PHARM REV CODE 250: Performed by: INTERNAL MEDICINE

## 2018-04-15 PROCEDURE — 36415 COLL VENOUS BLD VENIPUNCTURE: CPT

## 2018-04-15 PROCEDURE — 84100 ASSAY OF PHOSPHORUS: CPT

## 2018-04-15 PROCEDURE — 21400001 HC TELEMETRY ROOM

## 2018-04-15 PROCEDURE — 25000003 PHARM REV CODE 250: Performed by: EMERGENCY MEDICINE

## 2018-04-15 PROCEDURE — 85025 COMPLETE CBC W/AUTO DIFF WBC: CPT

## 2018-04-15 PROCEDURE — 83735 ASSAY OF MAGNESIUM: CPT

## 2018-04-15 RX ADMIN — APIXABAN 2.5 MG: 2.5 TABLET, FILM COATED ORAL at 10:04

## 2018-04-15 RX ADMIN — ACETAMINOPHEN 325 MG: 325 TABLET ORAL at 08:04

## 2018-04-15 RX ADMIN — HYDROCORTISONE: 25 CREAM TOPICAL at 08:04

## 2018-04-15 RX ADMIN — ACETAMINOPHEN 325 MG: 325 TABLET ORAL at 10:04

## 2018-04-15 RX ADMIN — HYDROCORTISONE: 25 CREAM TOPICAL at 06:04

## 2018-04-15 RX ADMIN — FUROSEMIDE 20 MG: 20 TABLET ORAL at 10:04

## 2018-04-15 RX ADMIN — FAMOTIDINE 20 MG: 20 TABLET, FILM COATED ORAL at 10:04

## 2018-04-15 RX ADMIN — HYDROCORTISONE: 25 CREAM TOPICAL at 09:04

## 2018-04-15 RX ADMIN — LISINOPRIL 10 MG: 5 TABLET ORAL at 10:04

## 2018-04-15 RX ADMIN — LEVOTHYROXINE SODIUM 75 MCG: 75 TABLET ORAL at 06:04

## 2018-04-15 RX ADMIN — APIXABAN 2.5 MG: 2.5 TABLET, FILM COATED ORAL at 08:04

## 2018-04-15 NOTE — PLAN OF CARE
Problem: Patient Care Overview  Goal: Plan of Care Review   04/15/18 1531   Coping/Psychosocial   Plan Of Care Reviewed With patient

## 2018-04-15 NOTE — PLAN OF CARE
Problem: Cardiac: Heart Failure (Adult)  Goal: Signs and Symptoms of Listed Potential Problems Will be Absent, Minimized or Managed (Cardiac: Heart Failure)  Signs and symptoms of listed potential problems will be absent, minimized or managed by discharge/transition of care (reference Cardiac: Heart Failure (Adult) CPG).   Outcome: Ongoing (interventions implemented as appropriate)   04/15/18 1535   Cardiac: Heart Failure   Problems Assessed (Heart Failure) fluid/electrolyte imbalance;respiratory compromise

## 2018-04-15 NOTE — PLAN OF CARE
Problem: Fall Risk (Adult)  Goal: Identify Related Risk Factors and Signs and Symptoms  Related risk factors and signs and symptoms are identified upon initiation of Human Response Clinical Practice Guideline (CPG)    04/15/18 1534   Fall Risk   Related Risk Factors (Fall Risk) age-related changes;bladder function altered;gait/mobility problems

## 2018-04-15 NOTE — PROGRESS NOTES
Jeannie Hutchins is a 83 y.o. female patient.    Active Hospital Problems    Diagnosis  POA    *Acute pulmonary edema [J81.0]  Yes    Diastolic CHF, acute on chronic [I50.33]  Unknown    Hyperlipidemia [E78.5]  Yes    Paroxysmal atrial fibrillation [I48.0]  Yes    Pulmonary hypertension [I27.20]  Yes    Acute respiratory failure with hypoxia and hypercarbia [J96.01, J96.02]  Yes    Benign hypertension [I10]  Yes    Generalized anxiety disorder [F41.1]  Yes     Dr. Mckeon's eval 1/23/18:  She does appear to have a JERRI because of the persistent worries that she has.  These worries predominate her day.  She would probably do well with prevention therapy such as SSRI/SNRI to help control the physical symptoms of the anxiety.  Problem at this point in time is her electrolyte abnormalities.  There is a slight risk of hyponatremia with these medications and given her current state, is not recommended to start these.   After a few weeks of stabilized labs, would recommend to start low dose SSRI treatment.  She may do well with paroxetine.  Start at 10mg daily and increase slowly over time to 20-40mg, whatever dose meets efficacy.  Again, therapy will help her with her anxiety.  Agree with patient that she should seek individual counseling with a psychologist.      Hyponatremia with decreased serum osmolality [E87.1]  Yes     Differential:  CHF - bnp is 400+;  cxr shows normal heart size, no pulm fluid - will check echo  Cirrhosis - AST at upper normal, otherwise LFTs in normal range; check liver US  Adrenal insuff - would expect hyperkalemia and hypotension - not present;  Check am cortisol  SIADH - ?  Source?  HCTZ - not taking  Renal failure - no  Polydipsia - no        Resolved Hospital Problems    Diagnosis Date Resolved POA   No resolved problems to display.     Temp: 98.5 °F (36.9 °C) (04/15/18 1143)  Pulse: 90 (04/15/18 1143)  Resp: 18 (04/15/18 1143)  BP: (!) 141/63 (04/15/18 1143)  SpO2: (!) 90 %  "(04/15/18 1143)  Weight: 46 kg (101 lb 6.6 oz) (04/15/18 0505)  Height: 4' 7.98" (142.2 cm) (04/13/18 0806)    Subjective:  Symptoms:  Stable.  (Notes breathing better).      Objective:  General Appearance:  Comfortable, ill-appearing, in no acute distress and not in pain.    Vital signs: (most recent): Blood pressure (!) 141/63, pulse 90, temperature 98.5 °F (36.9 °C), temperature source Oral, resp. rate 18, height 4' 7.98" (1.422 m), weight 46 kg (101 lb 6.6 oz), SpO2 (!) 90 %, not currently breastfeeding.    HEENT: Normal HEENT exam.    Lungs:  Breath sounds clear to auscultation.  No rales.    Heart: S1 normal and S2 normal.    Abdomen: Abdomen is soft.  Bowel sounds are normal.   There is no abdominal tenderness.     Extremities: There is no dependent edema.    Neurological: Patient is alert.    Skin:  Warm and dry.      Intake/Output - Last 3 Shifts       04/13 0700 - 04/14 0659 04/14 0700 - 04/15 0659 04/15 0700 - 04/16 0659    P.O. 800 555 120    Total Intake(mL/kg) 800 (17.5) 555 (12.1) 120 (2.6)    Urine (mL/kg/hr) 4900 (4.5) 1150 (1) 400 (1.8)    Stool 0 (0) 0 (0) 0 (0)    Total Output 4900 1150 400    Net -4100 -595 -280           Urine Occurrence  250 x     Stool Occurrence 1 x 2 x 2 x        Lab Results   Component Value Date    WBC 7.20 04/15/2018    HGB 12.0 04/15/2018    HCT 35.2 (L) 04/15/2018    MCV 90 04/15/2018     (H) 04/15/2018     BMP  Lab Results   Component Value Date     (L) 04/15/2018    K 4.6 04/15/2018    CL 97 04/15/2018    CO2 29 04/15/2018    BUN 6 (L) 04/15/2018    CREATININE 0.6 04/15/2018    CALCIUM 9.3 04/15/2018    ANIONGAP 6 (L) 04/15/2018    ESTGFRAFRICA >60 04/15/2018    EGFRNONAA >60 04/15/2018     Current Facility-Administered Medications   Medication    acetaminophen tablet 325 mg    aluminum-magnesium hydroxide-simethicone 200-200-20 mg/5 mL suspension 30 mL    apixaban tablet 2.5 mg    famotidine tablet 20 mg    furosemide tablet 20 mg    " hydrocortisone 2.5 % rectal cream    levothyroxine tablet 75 mcg    lisinopril tablet 10 mg       Assessment & Plan  1.Hyponatremia.Cont lasix po. Better  2.Proteinuria. Cont ace for now.  3.Hypokalemia. better.  4.HTN. No hctz for pt.  5.hypothyroidism. Cont tx.    Renal chavez better. Will sign off. Please reconsult as needed. Thanks.  Jennifer Venegas MD  4/15/2018

## 2018-04-15 NOTE — PLAN OF CARE
Problem: Cardiac: Heart Failure (Adult)  Intervention: Provide Oxygenation/Ventilation/Perfusion Support   04/15/18 0400   Activity   Activity Type activity clustered for rest period   Positioning   Head of Bed (HOB) HOB at 20-30 degrees   Utilized bedside commode.   Intervention: Gradually Correct Positive Fluid Balance   04/15/18 0400   Positioning   Body Position positioned/repositioned independently;side-lying, left         Comments: Transitioned off of venti mask to NC at 4 lpm and tolerating well. Resting well during shift. Understands rational to fluid restrictions. Able to verbalize purpose of meds and why she was hospitalized. Skin remains intact, able to reposition self. SR on tele monitor. Non-skid socks in place. Understands that she should still call before getting OOB, although she is feeling stronger and has no signs of SOB.

## 2018-04-15 NOTE — PROGRESS NOTES
"Progress Note    Admit Date: 4/12/2018   LOS: 3 days     SUBJECTIVE:      Follow-up For: sob, hyponatremia      HPI:  This 83 y.o. WF, followed by Dr. Bethany Mcleod, with a medical history of thyroid disease, hypertension, hypercholesterolemia, carotid stenosis and atrial fibrillation presents to the ED c/o acute, constant bilateral leg swelling since Friday. Swelling involves dorsal feet bilaterally, ankles, and now extending up to lower legs. She says the swelling in the left leg has improved since onset. Pt denies a previous episode of similar symptoms.  She was here at [facility] 1/22 - 2/23 for nausea/vomiting and hyponatremia, plus anxiety.  Afterward, she went to [facility] for about 3 weeks for further rehab.  She then went home, where she was for 2 weeks - says she was performing ADLs, including driving.    She reports that she has also been experiencing persistent shortness of breath as well as a cough - this developed recently.  She saw Dr. Mcleod 2 days ago in office and was started on HCT for fluid.   She notes that her stool was "soft" today.   She states that she has a history of heart failure, noting her cardiologist as Dr. Armenta. Pt adds that he gave her a new diuretic recently (indapamide?)  Pt denies fever, chills, headache, ear pain, eye pain, sore throat, chest pain, abdominal pain, emesis and dysuria.   BNP is elevated at 823.  TSH normal at 4  GFR is >60  She is mildly anemic with hct 30.  She has marked hyponatremia - 113 - this will be treated with fluid restriction.  [facility]ho in Jan 2018:    1 - Normal left ventricular systolic function (EF 60-65%).     2 - No wall motion abnormalities.     3 - Concentric remodeling.     4 - Trivial aortic regurgitation.     5 - Mild tricuspid regurgitation.     6 - Pulmonary hypertension. The estimated PA systolic pressure is 54 mmHg.     7 - Small pericardial anterior effusion without hemodynamic compromise.   Diastolic function is indeterminate     Hospital " Course:    Per Dr. Trujillo  She has been seen by cardiology.  No new plans at present.  Renal is consulted for her continued hyponatremia.  Osmolality is 250 (low)  4/13:  She is now wearing non-rebreather mask.  Says she gets dyspnea when talking.  She does NOT have home O2, so she is not at baseline and not ready for dc.     Interval History:    04/15/2018: No fever or chills. Tolerating NC oxygen. SOB better     04/14/2018: states feeling little better. SOB improving. Able to eat without using oxygen.        Scheduled Meds:   acetaminophen  325 mg Oral BID    apixaban  2.5 mg Oral BID    famotidine  20 mg Oral Daily    furosemide  20 mg Oral Daily    hydrocortisone   Rectal TID    levothyroxine  75 mcg Oral Daily    lisinopril  10 mg Oral Daily     Continuous Infusions:  PRN Meds:aluminum-magnesium hydroxide-simethicone    Review of patient's allergies indicates:   Allergen Reactions    Strawberry Swelling     Tongue swells up and a generalized rash.       Review of Systems     Constitutional: + fatigue   Eyes: no visual changes   ENT: no nasal congestion. Denies sore throat with odynophagia   Respiratory: +sob   Cardiovascular: no chest pain or palpitations   Gastrointestinal: no nausea, vomiting or abdominal pain. Normal bowl habits   Hematologic/Lymphatic: No lymphadenopathy, no significant fatigue, fever or night sweat. No mucocutaneous bleeding   Musculoskeletal: trouble with ambulation  Neurological: no seizures or tremors, gait or balance prblems  Skin: No rashes or lesions  Psych: anxiety       OBJECTIVE:     Vital Signs (Most Recent)  Temp: 98.4 °F (36.9 °C) (04/15/18 1528)  Pulse: 81 (04/15/18 1528)  Resp: 18 (04/15/18 1528)  BP: (!) 149/65 (04/15/18 1528)  SpO2: 97 % (04/15/18 1528)    Vital Signs Range (Last 24H):  Temp:  [97.6 °F (36.4 °C)-98.7 °F (37.1 °C)]   Pulse:  [62-90]   Resp:  [16-18]   BP: (121-149)/(53-65)   SpO2:  [90 %-100 %]     I & O (Last 24H):    Intake/Output Summary (Last  24 hours) at 04/15/18 1610  Last data filed at 04/15/18 0900   Gross per 24 hour   Intake              315 ml   Output             1100 ml   Net             -785 ml     Physical Exam:    General: NAD, sitting up in chair   Head: normocephalic, atraumatic   Eyes: conjunctivae pink, anicteric sclera. PERRL.    Throat: No erythema or post nasal discharge   Neck: supple, no LAD   Lungs: crackle at the bases  Heart: S1, S2, RRR   Abdomen: + BS x 4 quadrants, soft, non tender. No hepatosplenomegaly.   Extremities: trace pedal edema  Neuro: A&Ox 2  Psych: calm    Laboratory:    Results for LEONELA ESCOBAR (MRN [id number]) as of 4/15/2018 16:10   Ref. Range 4/14/2018 05:42 4/14/2018 05:42 4/15/2018 05:29   WBC Latest Ref Range: 3.90 - 12.70 K/uL 10.02  7.20   RBC Latest Ref Range: 4.00 - 5.40 M/uL 4.08  3.90 (L)   Hemoglobin Latest Ref Range: 12.0 - 16.0 g/dL 12.5  12.0   Hematocrit Latest Ref Range: 37.0 - 48.5 % 35.8 (L)  35.2 (L)   MCV Latest Ref Range: 82 - 98 fL 88  90   MCH Latest Ref Range: 27.0 - 31.0 pg 30.6  30.8   MCHC Latest Ref Range: 32.0 - 36.0 g/dL 34.9  34.1   RDW Latest Ref Range: 11.5 - 14.5 % 13.0  13.4   Platelets Latest Ref Range: 150 - 350 K/uL 425 (H)  365 (H)   MPV Latest Ref Range: 9.2 - 12.9 fL 9.5  9.4   Gran% Latest Ref Range: 38.0 - 73.0 % 76.9 (H)  66.6   Gran # (ANC) Latest Ref Range: 1.8 - 7.7 K/uL 7.7  4.8   Lymph% Latest Ref Range: 18.0 - 48.0 % 7.5 (L)  12.6 (L)   Lymph # Latest Ref Range: 1.0 - 4.8 K/uL 0.8 (L)  0.9 (L)   Mono% Latest Ref Range: 4.0 - 15.0 % 12.1  16.1 (H)   Mono # Latest Ref Range: 0.3 - 1.0 K/uL 1.2 (H)  1.2 (H)   Eosinophil% Latest Ref Range: 0.0 - 8.0 % 3.0  4.3   Eos # Latest Ref Range: 0.0 - 0.5 K/uL 0.3  0.3   Basophil% Latest Ref Range: 0.0 - 1.9 % 0.2  0.3   Baso # Latest Ref Range: 0.00 - 0.20 K/uL 0.02  0.02   Sodium Latest Ref Range: 136 - 145 mmol/L 129 (L) 129 (L) 132 (L)   Potassium Latest Ref Range: 3.5 - 5.1 mmol/L 4.4 4.4 4.6   Chloride Latest Ref Range: 95  - 110 mmol/L 93 (L) 93 (L) 97   CO2 Latest Ref Range: 23 - 29 mmol/L 27 28 29   Anion Gap Latest Ref Range: 8 - 16 mmol/L 9 8 6 (L)   BUN, Bld Latest Ref Range: 8 - 23 mg/dL 4 (L) 4 (L) 6 (L)   Creatinine Latest Ref Range: 0.5 - 1.4 mg/dL 0.6 0.6 0.6   eGFR if non African American Latest Ref Range: >60 mL/min/1.73 m^2 >60 >60 >60   eGFR if  Latest Ref Range: >60 mL/min/1.73 m^2 >60 >60 >60   Glucose Latest Ref Range: 70 - 110 mg/dL 90 92 89   Calcium Latest Ref Range: 8.7 - 10.5 mg/dL 9.5 9.8 9.3   Phosphorus Latest Ref Range: 2.7 - 4.5 mg/dL   2.9   Magnesium Latest Ref Range: 1.6 - 2.6 mg/dL   1.9   Differential Method Unknown Automated  Automated       Diagnostic Results:  Steady improvement     Current Facility-Administered Medications   Medication Dose Route Frequency Provider Last Rate Last Dose    acetaminophen tablet 325 mg  325 mg Oral BID Jaylan Barraza MD   325 mg at 04/15/18 1044    aluminum-magnesium hydroxide-simethicone 200-200-20 mg/5 mL suspension 30 mL  30 mL Oral Q4H PRN Jaylan Barraza MD   30 mL at 04/13/18 1848    apixaban tablet 2.5 mg  2.5 mg Oral BID Jaylan Barraza MD   2.5 mg at 04/15/18 1045    famotidine tablet 20 mg  20 mg Oral Daily Jaylan Barraza MD   20 mg at 04/15/18 1044    furosemide tablet 20 mg  20 mg Oral Daily Jennifer Venegas MD   20 mg at 04/15/18 1044    hydrocortisone 2.5 % rectal cream   Rectal TID Jake Trujillo MD        levothyroxine tablet 75 mcg  75 mcg Oral Daily Jaylan Barraza MD   75 mcg at 04/15/18 0623    lisinopril tablet 10 mg  10 mg Oral Daily Jennifer Venegas MD   10 mg at 04/15/18 1044         ASSESSMENT/PLAN:       * Acute pulmonary edema     Cxr:   There is pulmonary vascular congestion present with bilateral parahilar and basilar consolidation.  Findings are most suggestive of pulmonary edema although bilateral pneumonia is also a consideration.  There is no evidence for pneumothorax or large pleural effusions.  Small pleural  effusions cannot be excluded.  Cards consulted  Ordered one dose of lasix 20 IV  - symptomatically better  - doing well on NC oxygen             Diastolic CHF, acute on chronic     Assumed to be diastolic; normal systolic fxn  Diastolic function was indeterminate on echo  Symptoms better             Hyperlipidemia     Not on statin           Pulmonary hypertension     PAP 54             Paroxysmal atrial fibrillation     On Eliquis 2.5 bid   - no on any jojo blocker           Acute respiratory failure with hypoxia and hypercarbia     Doing ok on O2             Generalized anxiety disorder     Because of prior hyponatremia, she has been off SSRI.  No current meds             Benign hypertension     135/62             Hyponatremia with decreased serum osmolality     She has had this in past admits (differential above is mine)  113 on admit, now 119  Fluid restriction  Gave lasix 20 x 1   Osm is 250 (normal low is 275)  Renal consulted and appreciate reccs  - Na 132 today                  VTE Risk Mitigation          Ordered       apixaban tablet 2.5 mg  2 times daily     Route:  Oral        04/12/18 1405          Covering for ADILENE JudgeD  Internal Medicine & Geriatric Medicine  Hematology & Oncology  Palliative Medicine    [address], [address]  [place] [address] [phone] (Office)  [phone] (Fax)

## 2018-04-16 VITALS
HEIGHT: 56 IN | DIASTOLIC BLOOD PRESSURE: 70 MMHG | RESPIRATION RATE: 16 BRPM | OXYGEN SATURATION: 95 % | SYSTOLIC BLOOD PRESSURE: 137 MMHG | WEIGHT: 101.19 LBS | HEART RATE: 74 BPM | TEMPERATURE: 98 F | BODY MASS INDEX: 22.76 KG/M2

## 2018-04-16 LAB
ANION GAP SERPL CALC-SCNC: 9 MMOL/L
BASOPHILS # BLD AUTO: 0.02 K/UL
BASOPHILS NFR BLD: 0.2 %
BUN SERPL-MCNC: 9 MG/DL
CALCIUM SERPL-MCNC: 10.3 MG/DL
CHLORIDE SERPL-SCNC: 96 MMOL/L
CO2 SERPL-SCNC: 29 MMOL/L
CREAT SERPL-MCNC: 0.6 MG/DL
DIFFERENTIAL METHOD: ABNORMAL
EOSINOPHIL # BLD AUTO: 0.4 K/UL
EOSINOPHIL NFR BLD: 4.1 %
ERYTHROCYTE [DISTWIDTH] IN BLOOD BY AUTOMATED COUNT: 13.5 %
EST. GFR  (AFRICAN AMERICAN): >60 ML/MIN/1.73 M^2
EST. GFR  (NON AFRICAN AMERICAN): >60 ML/MIN/1.73 M^2
GLUCOSE SERPL-MCNC: 90 MG/DL
HCT VFR BLD AUTO: 38.5 %
HGB BLD-MCNC: 13 G/DL
LYMPHOCYTES # BLD AUTO: 1.1 K/UL
LYMPHOCYTES NFR BLD: 12.5 %
MCH RBC QN AUTO: 30.4 PG
MCHC RBC AUTO-ENTMCNC: 33.8 G/DL
MCV RBC AUTO: 90 FL
MONOCYTES # BLD AUTO: 1.1 K/UL
MONOCYTES NFR BLD: 12.4 %
NEUTROPHILS # BLD AUTO: 6.3 K/UL
NEUTROPHILS NFR BLD: 70.7 %
PLATELET # BLD AUTO: 383 K/UL
PMV BLD AUTO: 9.2 FL
POTASSIUM SERPL-SCNC: 5.2 MMOL/L
RBC # BLD AUTO: 4.27 M/UL
SODIUM SERPL-SCNC: 134 MMOL/L
WBC # BLD AUTO: 8.85 K/UL

## 2018-04-16 PROCEDURE — 36415 COLL VENOUS BLD VENIPUNCTURE: CPT

## 2018-04-16 PROCEDURE — 25000003 PHARM REV CODE 250: Performed by: INTERNAL MEDICINE

## 2018-04-16 PROCEDURE — 80048 BASIC METABOLIC PNL TOTAL CA: CPT

## 2018-04-16 PROCEDURE — 25000003 PHARM REV CODE 250: Performed by: EMERGENCY MEDICINE

## 2018-04-16 PROCEDURE — 85025 COMPLETE CBC W/AUTO DIFF WBC: CPT

## 2018-04-16 RX ORDER — FUROSEMIDE 20 MG/1
20 TABLET ORAL DAILY
Qty: 30 TABLET | Refills: 11 | Status: SHIPPED | OUTPATIENT
Start: 2018-04-17 | End: 2019-09-17

## 2018-04-16 RX ADMIN — LISINOPRIL 10 MG: 5 TABLET ORAL at 08:04

## 2018-04-16 RX ADMIN — FAMOTIDINE 20 MG: 20 TABLET, FILM COATED ORAL at 08:04

## 2018-04-16 RX ADMIN — FUROSEMIDE 20 MG: 20 TABLET ORAL at 08:04

## 2018-04-16 RX ADMIN — LEVOTHYROXINE SODIUM 75 MCG: 75 TABLET ORAL at 06:04

## 2018-04-16 RX ADMIN — ACETAMINOPHEN 325 MG: 325 TABLET ORAL at 08:04

## 2018-04-16 RX ADMIN — HYDROCORTISONE: 25 CREAM TOPICAL at 08:04

## 2018-04-16 RX ADMIN — APIXABAN 2.5 MG: 2.5 TABLET, FILM COATED ORAL at 08:04

## 2018-04-16 NOTE — NURSING
Pt is being DC. Heart monitor and  IV access were removed. Family at the bed side. Transport was called.

## 2018-04-16 NOTE — PLAN OF CARE
Problem: Fall Risk (Adult)  Goal: Identify Related Risk Factors and Signs and Symptoms  Related risk factors and signs and symptoms are identified upon initiation of Human Response Clinical Practice Guideline (CPG)   Outcome: Ongoing (interventions implemented as appropriate)   04/16/18 1056   Fall Risk   Related Risk Factors (Fall Risk) age-related changes;culprit medication(s);polypharmacy;environment unfamiliar   Signs and Symptoms (Fall Risk) presence of risk factors

## 2018-04-16 NOTE — PLAN OF CARE
"   04/16/18 1100   Final Note   Assessment Type Final Discharge Note   Discharge Disposition Home   Hospital Follow Up  Appt(s) scheduled? Yes   Discharge plans and expectations educations in teach back method with documentation complete? Yes   Right Care Referral Info   Post Acute Recommendation No Care     Follow-up Information     Paige Mcleod MD. Go on 4/24/2018.    Specialty:  Internal Medicine  Why:  Outpatient Services, PCP Follow-up Appointment, Please arrive to clinic for 11:00AM  Contact information:  [address]  [facility] [id number] [phone]             Wenceslao Armenta MD On 4/18/2018.    Specialties:  Cardiology, INTERVENTIONAL CARDIOLOGY  Why:  at 12:30pm  Contact information:  [address]  [address]  [facility] [id number] [phone]                 EDUCATION:  Pt/family provided with educational information on CHF/Pulmonary Edema.  Information reviewed and placed in :My Healthcare Packet" to be brought home for pt/family to use as resource after discharge.  Information included:  signs and symptoms to look for and call the doctor if experiencing, and symptoms that may indicate a medical emergency: CALL 911.      All questions answered.  Teach back method used.  both pt and family member verbalized understanding of all information by stating, "  That she has received this education numerous times by several different nurses and doctors.  All paperwork and instructions provided have been placed in blue folder.         1130-Upon final discussion pt inquired about new medication that she will be on and if had been sent over to [email or web address] already. TN called pharmacy to inquire on status of medication because her spouse has to  his own meds and she does not want to go there twice if she does not have to.  Spoke to Babs at [facility] who informed TN the Rx for Lasix was sent over by Dr Mcleod this morning but that this is not a new med she was on it before and " recently picked up on 4/7/18 and has 11 refills on that medication.  TN met back at bedside with pt, spouse and son to inform them of the information from the pharmacy.  Pt then inquired on another medication that she believes Dr Venegas (renal mD) put her on which is Linsinopril.  TN sent a message to Dr Mcleod to inquire on medication if she wants to start the pt on this at this time.  TN to follow up with pt when additional information available.       Guru- Md responded back stating that the pt is on enalpril and she resumed that and did not change it.TN to advise pt of this information and inform nurse that all CM needs have been addressed and that she may proceed with nursing d/c instructions. Pts nurse Krystle notified.

## 2018-04-16 NOTE — DISCHARGE SUMMARY
"[facility]  Discharge Summary      Patient Name: Jeannie Hutchins  MRN: [id number]  Admission Date: 4/12/2018  Hospital Length of Stay: 4 days  Discharge Date and Time:  04/16/2018 8:23 AM  Attending Physician: Jake Trujillo MD;Pricila*   Discharging Provider: Paige Mcleod MD  Primary Care Provider: Paige Mcleod MD      HPI:   This 83 y.o. WF, followed by Dr. Bethany Mcleod, with a medical history of thyroid disease, hypertension, hypercholesterolemia, carotid stenosis and atrial fibrillation presents to the ED c/o acute, constant bilateral leg swelling since Friday. Swelling involves dorsal feet bilaterally, ankles, and now extending up to lower legs. She says the swelling in the left leg has improved since onset. Pt denies a previous episode of similar symptoms.  She was here at [facility] 1/22 - 2/23 for nausea/vomiting and hyponatremia, plus anxiety.  Afterward, she went to [facility] for about 3 weeks for further rehab.  She then went home, where she was for 2 weeks - says she was performing ADLs, including driving.    She reports that she has also been experiencing persistent shortness of breath as well as a cough - this developed recently.  She saw Dr. Mcleod 2 days ago in office and was started on HCT for fluid.   She notes that her stool was "soft" today.   She states that she has a history of heart failure, noting her cardiologist as Dr. Armenta. Pt adds that he gave her a new diuretic recently (indapamide?)  Pt denies fever, chills, headache, ear pain, eye pain, sore throat, chest pain, abdominal pain, emesis and dysuria.   BNP is elevated at 823.  TSH normal at 4  GFR is >60  She is mildly anemic with hct 30.  She has marked hyponatremia - 113 - this will be treated with fluid restriction.  ECho in Jan 2018:    1 - Normal left ventricular systolic function (EF 60-65%).     2 - No wall motion abnormalities.     3 - Concentric remodeling.     4 - Trivial aortic regurgitation. "     5 - Mild tricuspid regurgitation.     6 - Pulmonary hypertension. The estimated PA systolic pressure is 54 mmHg.     7 - Small pericardial anterior effusion without hemodynamic compromise.   Diastolic function is indeterminate    * No surgery found *      Hospital Course:   Pt was admitted and evaluated by cardiology.  THey recommended Diuresis with Lasix.  Renal was consulted and recommended no furthur HCTZ as that had dropped her sodium in the past.  Pt's NA recovered with diauresis as well.  Pt is now back to baseline.  She will be discharged home.  She will follow up with me in one week.  HCTZ has been added to her list of allergies.  She will be sent home on Lasix and will continue the rest of her home meds.         Consults:   Consults         Status Ordering Provider     Inpatient consult to Cardiology  Once     Provider:  Wenceslao Armenta MD    Completed PIYUSH ROWE     Inpatient consult to Nephrology  Once     Provider:  Jennifer Venegas MD    Completed KAVEH PEDERSEN          No new Assessment & Plan notes have been filed under this hospital service since the last note was generated.  Service: Hospital Medicine    Final Active Diagnoses:    Diagnosis Date Noted POA    PRINCIPAL PROBLEM:  Acute pulmonary edema [J81.0] 04/12/2018 Yes    Diastolic CHF, acute on chronic [I50.33] 04/12/2018 Yes    Hyperlipidemia [E78.5] 03/28/2018 Yes    Paroxysmal atrial fibrillation [I48.0] 01/29/2018 Yes    Pulmonary hypertension [I27.20] 01/29/2018 Yes    Acute respiratory failure with hypoxia and hypercarbia [J96.01, J96.02] 01/29/2018 Yes    Benign hypertension [I10] 01/23/2018 Yes    Generalized anxiety disorder [F41.1] 01/23/2018 Yes    Hyponatremia with decreased serum osmolality [E87.1] 01/23/2018 Yes      Problems Resolved During this Admission:    Diagnosis Date Noted Date Resolved POA       Discharged Condition: good    Disposition: Home or Self Care    Follow Up:  Follow-up Information      Paige Mcleod MD. Go on 4/24/2018.    Specialty:  Internal Medicine  Why:  Outpatient Services, PCP Follow-up Appointment, Please arrive to clinic for 11:00AM  Contact information:  [address]  [facility] [address]  [phone]             Wenceslao Armenta MD On 4/18/2018.    Specialties:  Cardiology, INTERVENTIONAL CARDIOLOGY  Why:  at 12:30pm  Contact information:  [address]  [address]  [facility] [address]  [phone]                 Patient Instructions:     Diet Adult Regular     Activity as tolerated         Significant Diagnostic Studies: Labs:   CMP   Recent Labs  Lab 04/15/18  0529 04/16/18  0442   * 134*   K 4.6 5.2*   CL 97 96   CO2 29 29   GLU 89 90   BUN 6* 9   CREATININE 0.6 0.6   CALCIUM 9.3 10.3   ANIONGAP 6* 9   ESTGFRAFRICA >60 >60   EGFRNONAA >60 >60       Pending Diagnostic Studies:     None         Medications:  Reconciled Home Medications:      Medication List      START taking these medications    furosemide 20 MG tablet  Commonly known as:  LASIX  Take 1 tablet (20 mg total) by mouth once daily.  Start taking on:  4/17/2018        CONTINUE taking these medications    acetaminophen 325 MG tablet  Commonly known as:  TYLENOL  Take 325 mg by mouth 2 (two) times daily.     alendronate 70 MG tablet  Commonly known as:  FOSAMAX  Take 70 mg by mouth every 7 days.     aluminum-magnesium hydroxide-simethicone 200-200-20 mg/5 mL Susp  Commonly known as:  MAALOX  Take 30 mLs by mouth every 4 (four) hours as needed.     amlodipine-atorvastatin 10-40 mg per tablet  Commonly known as:  CADUET  Take 1 tablet by mouth once daily.     apixaban 2.5 mg Tab  Take 1 tablet (2.5 mg total) by mouth 2 (two) times daily.     enalapril 10 MG tablet  Commonly known as:  VASOTEC  Take 1 tablet (10 mg total) by mouth once daily.     levothyroxine 75 MCG tablet  Commonly known as:  SYNTHROID  Take 75 mcg by mouth once daily.     metoprolol tartrate 50 MG tablet  Commonly known as:   LOPRESSOR  Take 1 tablet (50 mg total) by mouth 2 (two) times daily.     ranitidine 150 MG tablet  Commonly known as:  ZANTAC  Take 150 mg by mouth with lunch.     VISION FORMULA (WITH LUTEIN) ORAL  Take 1 tablet by mouth once daily.        STOP taking these medications    hydroCHLOROthiazide 25 MG tablet  Commonly known as:  HYDRODIURIL            Indwelling Lines/Drains at time of discharge:   Lines/Drains/Airways          No matching active lines, drains, or airways          Time spent on the discharge of patient: >30 minutes  Patient was seen and examined on the date of discharge and determined to be suitable for discharge.         Paige Mcleod MD  Department of Hospital Medicine  [facility]

## 2018-04-16 NOTE — PLAN OF CARE
Problem: Patient Care Overview  Goal: Plan of Care Review  Outcome: Ongoing (interventions implemented as appropriate)   04/16/18 6611   Coping/Psychosocial   Plan Of Care Reviewed With patient

## 2018-04-16 NOTE — PROGRESS NOTES
Follow-up Information     Paige Mcleod MD. Go on 4/24/2018.    Specialty:  Internal Medicine  Why:  Outpatient Services, PCP Follow-up Appointment, Please arrive to clinic for 11:00AM  Contact information:  [address]  [facility] [address]  [phone]             Wenceslao Armenta MD On 4/18/2018.    Specialties:  Cardiology, INTERVENTIONAL CARDIOLOGY  Why:  at 12:30pm  Contact information:  [address]  [address]  [facility] [address]  [phone]               WRITTEN HEALTHCARE DISCHARGE INFORMATION      Things that YOU are responsible for to Manage Your Care At Home:  1. Getting your prescriptions filled.  2. Taking you medications as directed. DO NOT MISS ANY DOSES!  3. Going to your follow-up doctor appointments. This is important because it allows the doctor to monitor your progress and to determine if any changes need to be made to your treatment plan.     If you are unable to make your follow up appointments, please call the number listed and reschedule this appointment.      After discharge, if you need assistance, you can call [facility] for 24/7 assistance at [phone]     If you are experience any signs or symptoms, Call your doctor or Call [phone] and come to your nearest Emergency Room.     Thank you for choosing [facility] and allowing us to care for you.        You should receive a call from [facility] within 48-72 hours to help manage your care after discharge. Please try to make sure that you answer your phone for this important phone call.

## 2018-04-16 NOTE — PLAN OF CARE
Problem: Fall Risk (Adult)  Goal: Absence of Falls  Patient will demonstrate the desired outcomes by discharge/transition of care.   Outcome: Ongoing (interventions implemented as appropriate)  Patient remains free of falls, call bell within reach, bed alarm on.

## 2018-04-16 NOTE — PROGRESS NOTES
WRITTEN HEALTHCARE DISCHARGE INFORMATION      Things that YOU are responsible for to Manage Your Care At Home:  1. Getting your prescriptions filled.  2. Taking you medications as directed. DO NOT MISS ANY DOSES!  3. Going to your follow-up doctor appointments. This is important because it allows the doctor to monitor your progress and to determine if any changes need to be made to your treatment plan.     If you are unable to make your follow up appointments, please call the number listed and reschedule this appointment.      After discharge, if you need assistance, you can call [facility] for 24/7 assistance at [phone]     If you are experience any signs or symptoms, Call your doctor or Call 911 and come to your nearest Emergency Room.     Thank you for choosing [facility] and allowing us to care for you.        You should receive a call from [facility] within 48-72 hours to help manage your care after discharge. Please try to make sure that you answer your phone for this important phone call.     Follow-up Information     Paige Mcleod MD. Go on 4/24/2018.    Specialty:  Internal Medicine  Why:  Outpatient Services, PCP Follow-up Appointment, Please arrive to clinic for 11:00AM  Contact information:  [address]  [facility] [address]  [phone]             Wenceslao Armenta MD On 4/18/2018.    Specialties:  Cardiology, INTERVENTIONAL CARDIOLOGY  Why:  at 12:30pm  Contact information:  [address]  [address]  [facility] [address]  [phone]

## 2018-04-18 ENCOUNTER — PATIENT OUTREACH (OUTPATIENT)
Dept: ADMINISTRATIVE | Facility: CLINIC | Age: 83
End: 2018-04-18

## 2018-04-18 ENCOUNTER — HOSPITAL ENCOUNTER (OUTPATIENT)
Dept: CARDIOLOGY | Facility: HOSPITAL | Age: 83
Discharge: HOME | End: 2018-04-18
Attending: INTERNAL MEDICINE
Payer: MEDICARE

## 2018-04-18 DIAGNOSIS — I63.233 CEREBRAL INFARCTION DUE TO UNSPECIFIED OCCLUSION OR STENOSIS OF BILATERAL CAROTID ARTERIES: ICD-10-CM

## 2018-04-18 DIAGNOSIS — Z98.890 S/P CAROTID ENDARTERECTOMY: ICD-10-CM

## 2018-04-18 LAB — INTERNAL CAROTID STENOSIS: ABNORMAL

## 2018-04-18 PROCEDURE — 93880 EXTRACRANIAL BILAT STUDY: CPT

## 2018-04-18 PROCEDURE — 93880 EXTRACRANIAL BILAT STUDY: CPT | Mod: 26,,, | Performed by: INTERNAL MEDICINE

## 2018-04-18 NOTE — PROGRESS NOTES
C3 nurse attempted to contact patient. No answer. The following message was left for the patient to return the call:  Good afternoon.  I am a nurse calling on behalf of [facility] from the Care Coordination Center.  This is a Transitional Care Call for Jeannie Hutchins. When you have a moment please contact us at [phone] or [phone] Monday through Friday, between the hours of 8 am to 4 pm. We look forward to speaking with you. On behalf of [facility] have a nice day.    The patient has a scheduled HOSFU appointment with Dr. Mcleod on 4/24/18 @ 1100. Non-[facility] provider.

## 2018-04-18 NOTE — PATIENT INSTRUCTIONS
Pulmonary Edema  Your healthcare provider has told you that you have pulmonary edema. Read on to learn more about pulmonary edema and how it can be treated.  What is pulmonary edema?     Pulmonary edema is fluid in the air sacs (alveoli) in the lungs.   Pulmonary edema occurs when the air sacs (alveoli) in your lungs fill with fluid. The fluid buildup makes it hard for the lungs to do their job, including getting oxygen from the air you breathe. This can make it hard to breathe. The most common cause of pulmonary edema is heart failure. When the heart doesnt work properly, it can cause pressure to rise in the veins (blood vessels) of the lungs. As pressure builds, fluid leaks out of the congested veins. It fills the alveoli. The extra fluid prevents oxygen from moving through the lungs as it should. But heart failure isnt the only cause of pulmonary edema. Damage to the lungs or kidney failure can also cause fluid to fill the lungs. And in some cases, living or exercising at high altitudes can lead to fluid buildup in the lungs.  How is pulmonary edema diagnosed?  Your healthcare provider does an exam and asks about your health history. You may also have one or more of the following:  · Blood tests to take samples of blood.  · Imaging tests to take detailed pictures of inside the body. These may include a chest X-ray and ultrasound.  · Electrocardiography (ECG)  and echocardiogram to test how well the heart is functioning.  How is pulmonary edema treated?  Treatment usually depends on whats causing the edema. For instance, if its because of heart failure, treating the heart condition will treat the edema. Treatment can also ease symptoms. Therapy often includes the following:  · Oxygen. This may be given through a mask that goes over the nose. It may be given through a small tube that sits under the nose. Sometimes, pressurized air will be needed through a tight fitting mask, using a machine called CPAP or  BiPAP. Or it may be given through a tube placed into the windpipe (trachea). If a tube is required, a ventilator, often called a breathing machine or respirator will be used.  · Medicines. These may include water pills (diuretics) to help your body get rid of extra fluid. The fluid passes out of your body as urine. You may also need medicines to treat the heart. These can help your heart work better. This helps reduce fluid buildup in the lungs.  What are the long-term concerns?  If treated right away, pulmonary edema can be improved. It may even be cured. But in some cases, ongoing treatment is needed to help control the problem. This may require having procedures or taking medicines for months or years. In some cases, you may need to use oxygen or breathing equipment for a long time. This can lead to complications such as damage to lung tissue. Your healthcare provider can tell you more if needed.  Call 911  Call 911 if any of these occur:  · Chest pain  · Severe trouble breathing  · Coughing up blood  · Skin turns blue      When to call the healthcare provider  Call your the healthcare provider right away if you have any of the following:  · Unusual or irregular heartbeat  · Unable to speak full sentences before running out of breath  · Sweating more than usual   Date Last Reviewed: 2/1/2017  © 3096-4445 [employer]. [address] [place] [address], [place] [address]. All rights reserved. This information is not intended as a substitute for professional medical care. Always follow your healthcare professional's instructions.

## 2018-04-30 PROBLEM — Z09 FOLLOW UP: Status: RESOLVED | Noted: 2018-01-28 | Resolved: 2018-04-30

## 2018-06-12 ENCOUNTER — TELEPHONE (OUTPATIENT)
Dept: OBSTETRICS AND GYNECOLOGY | Facility: CLINIC | Age: 83
End: 2018-06-12

## 2018-06-12 NOTE — TELEPHONE ENCOUNTER
----- Message from Angel Valentin sent at 6/12/2018  2:09 PM CDT -----  Contact: LEONELA ESCOBAR [[id number]]    Name of Who is Calling: LEONELA ESCOBAR [[id number]]      What is the request in detail: Patient would like to have her Mammogram orders put in and bone density orders      Can the clinic reply by [facility]: no      What Number to Call Back if not in [facility]: [phone]

## 2018-06-20 ENCOUNTER — LAB VISIT (OUTPATIENT)
Dept: LAB | Facility: HOSPITAL | Age: 83
End: 2018-06-20
Attending: INTERNAL MEDICINE
Payer: MEDICARE

## 2018-06-20 DIAGNOSIS — E78.5 HYPERLIPIDEMIA, UNSPECIFIED HYPERLIPIDEMIA TYPE: ICD-10-CM

## 2018-06-20 LAB
ALBUMIN SERPL BCP-MCNC: 4.1 G/DL
ALP SERPL-CCNC: 82 U/L
ALT SERPL W/O P-5'-P-CCNC: 13 U/L
AST SERPL-CCNC: 15 U/L
BILIRUB DIRECT SERPL-MCNC: 0.4 MG/DL
BILIRUB SERPL-MCNC: 1.1 MG/DL
CHOLEST SERPL-MCNC: 153 MG/DL
CHOLEST/HDLC SERPL: 2.8 {RATIO}
HDLC SERPL-MCNC: 55 MG/DL
HDLC SERPL: 35.9 %
LDLC SERPL CALC-MCNC: 84.6 MG/DL
NONHDLC SERPL-MCNC: 98 MG/DL
PROT SERPL-MCNC: 7.3 G/DL
TRIGL SERPL-MCNC: 67 MG/DL

## 2018-06-20 PROCEDURE — 36415 COLL VENOUS BLD VENIPUNCTURE: CPT

## 2018-06-20 PROCEDURE — 80061 LIPID PANEL: CPT

## 2018-06-20 PROCEDURE — 80076 HEPATIC FUNCTION PANEL: CPT

## 2018-07-02 ENCOUNTER — OFFICE VISIT (OUTPATIENT)
Dept: CARDIOLOGY | Facility: CLINIC | Age: 83
End: 2018-07-02
Payer: MEDICARE

## 2018-07-02 VITALS
SYSTOLIC BLOOD PRESSURE: 154 MMHG | WEIGHT: 96.81 LBS | HEIGHT: 56 IN | HEART RATE: 64 BPM | DIASTOLIC BLOOD PRESSURE: 70 MMHG | BODY MASS INDEX: 21.78 KG/M2 | OXYGEN SATURATION: 94 % | RESPIRATION RATE: 15 BRPM

## 2018-07-02 DIAGNOSIS — I48.0 PAROXYSMAL ATRIAL FIBRILLATION: ICD-10-CM

## 2018-07-02 DIAGNOSIS — Z98.890 S/P CAROTID ENDARTERECTOMY: ICD-10-CM

## 2018-07-02 DIAGNOSIS — I10 ESSENTIAL HYPERTENSION: Primary | ICD-10-CM

## 2018-07-02 DIAGNOSIS — I50.33 DIASTOLIC CHF, ACUTE ON CHRONIC: ICD-10-CM

## 2018-07-02 DIAGNOSIS — E78.5 HYPERLIPIDEMIA, UNSPECIFIED HYPERLIPIDEMIA TYPE: ICD-10-CM

## 2018-07-02 PROCEDURE — 99213 OFFICE O/P EST LOW 20 MIN: CPT | Mod: PBBFAC | Performed by: INTERNAL MEDICINE

## 2018-07-02 PROCEDURE — 99214 OFFICE O/P EST MOD 30 MIN: CPT | Mod: S$PBB,,, | Performed by: INTERNAL MEDICINE

## 2018-07-02 PROCEDURE — 99999 PR PBB SHADOW E&M-EST. PATIENT-LVL III: CPT | Mod: PBBFAC,,, | Performed by: INTERNAL MEDICINE

## 2018-07-02 PROCEDURE — 93010 ELECTROCARDIOGRAM REPORT: CPT | Mod: ,,, | Performed by: INTERNAL MEDICINE

## 2018-07-02 RX ORDER — LISINOPRIL 10 MG/1
TABLET ORAL
COMMUNITY
Start: 2018-06-05 | End: 2018-07-02

## 2018-07-02 RX ORDER — ENALAPRIL MALEATE 20 MG/1
20 TABLET ORAL DAILY
Qty: 90 TABLET | Refills: 3 | Status: SHIPPED | OUTPATIENT
Start: 2018-07-02 | End: 2018-07-09 | Stop reason: SDUPTHER

## 2018-07-02 NOTE — PROGRESS NOTES
CARDIOVASCULAR PROGRESS NOTE    REASON FOR CONSULT:   Jeannie Hutchins is a 83 y.o. female who presents for follow up of PAF, HFpEF.    PCP: Yani  HISTORY OF PRESENT ILLNESS:   The patient comes in for follow-up.  In the interim since her last visit, she was hospitalized for heart failure and hyponatremia.  This has since resolved after discontinuation of hydrochlorothiazide and institution of Lasix.  She denies intercurrent angina or dyspnea.  There has been no palpitations, lightheadedness, dizziness, or syncope.  She denies PND, orthopnea, or lower extremity edema.  There has been no melena, hematuria, or claudicant symptoms.  On review of her record, it appears that she is taking both enalapril and lisinopril.  She also tells me she is now taking her Eliquis twice daily.    CARDIOVASCULAR HISTORY:   PAF, CHADS VASC 4, on apixaban 2.5mg bid (dose appropriate for age/weight)  Hx bilat CEA with R ICA stenosis on CUS 10/2017    PAST MEDICAL HISTORY:     Past Medical History:   Diagnosis Date    Anticoagulant long-term use     Eliquis    Arthritis     Atrial fibrillation     Blood transfusion     Carotid stenosis     Carotid stenosis     CHF (congestive heart failure)     Edema     Koi (hard of hearing)     Hypercholesterolemia     Hypertension     Psychiatric problem     Thyroid disease        PAST SURGICAL HISTORY:     Past Surgical History:   Procedure Laterality Date    CARPAL TUNNEL RELEASE  2012    right hand    HYSTERECTOMY      left carotid endartectomy  5/2006    TONSILLECTOMY         ALLERGIES AND MEDICATION:     Review of patient's allergies indicates:   Allergen Reactions    Strawberry Swelling     Tongue swells up and a generalized rash.     Previous Medications    ACETAMINOPHEN (TYLENOL) 325 MG TABLET    Take 325 mg by mouth 2 (two) times daily.    ALENDRONATE (FOSAMAX) 70 MG TABLET    Take 70 mg by mouth every 7 days.    ALUMINUM-MAGNESIUM HYDROXIDE-SIMETHICONE (MAALOX) 200-200-20  MG/5 ML SUSP    Take 30 mLs by mouth every 4 (four) hours as needed.    AMLODIPINE-ATORVASTATIN (CADUET) 10-40 MG PER TABLET    Take 1 tablet by mouth once daily.    APIXABAN 2.5 MG TAB    Take 1 tablet (2.5 mg total) by mouth 2 (two) times daily.    ENALAPRIL (VASOTEC) 10 MG TABLET    Take 1 tablet (10 mg total) by mouth once daily.    FUROSEMIDE (LASIX) 20 MG TABLET    Take 1 tablet (20 mg total) by mouth once daily.    LEVOTHYROXINE (SYNTHROID) 75 MCG TABLET    Take 75 mcg by mouth once daily.    LISINOPRIL 10 MG TABLET        METOPROLOL TARTRATE (LOPRESSOR) 50 MG TABLET    Take 1 tablet (50 mg total) by mouth 2 (two) times daily.    RANITIDINE (ZANTAC) 150 MG TABLET    Take 150 mg by mouth with lunch.    VIT A,C & E/LUTEIN/MINERALS (VISION FORMULA, WITH LUTEIN, ORAL)    Take 1 tablet by mouth once daily.       SOCIAL HISTORY:     Social History     Social History    Marital status:      Spouse name: N/A    Number of children: N/A    Years of education: N/A     Occupational History    Not on file.     Social History Main Topics    Smoking status: Never Smoker    Smokeless tobacco: Never Used    Alcohol use No    Drug use: No    Sexual activity: No     Other Topics Concern    Not on file     Social History Narrative     since 1984    He is retired.  He is 82.  Worked for [employer]    She is retired.  Worked for [employer].       FAMILY HISTORY:     Family History   Problem Relation Age of Onset    Heart disease Father     Cancer Brother 65        bladder    Cancer Sister 81        Ovarian    Ovarian cancer Sister 81    Breast cancer Daughter 50        Status post mastectomy    Cancer Sister         Lung.  Smoker    Cancer Sister         Lung.  Smoker    Schizophrenia Son        REVIEW OF SYSTEMS:   Review of Systems   Constitutional: Negative for chills, diaphoresis and fever.   HENT: Negative for nosebleeds.    Eyes: Negative for blurred vision, double  "vision and photophobia.   Respiratory: Negative for hemoptysis, shortness of breath and wheezing.    Cardiovascular: Negative for chest pain, palpitations, orthopnea, claudication, leg swelling and PND.   Gastrointestinal: Negative for abdominal pain, blood in stool, heartburn, melena, nausea and vomiting.   Genitourinary: Negative for flank pain and hematuria.   Musculoskeletal: Negative for falls, myalgias and neck pain.   Skin: Negative for rash.   Neurological: Negative for dizziness, seizures, loss of consciousness, weakness and headaches.   Endo/Heme/Allergies: Negative for polydipsia. Does not bruise/bleed easily.   Psychiatric/Behavioral: Negative for depression and memory loss. The patient is not nervous/anxious.        PHYSICAL EXAM:     Vitals:    07/02/18 1400   BP: (!) 154/70   Pulse: 64   Resp: 15    Body mass index is 21.39 kg/m².  Weight: 43.9 kg (96 lb 12.5 oz)   Height: 4' 8.4" (143.3 cm)     Physical Exam   Constitutional: She is oriented to person, place, and time. She appears well-developed and well-nourished. She is cooperative.  Non-toxic appearance. No distress.   HENT:   Head: Normocephalic and atraumatic.   Eyes: Conjunctivae and EOM are normal. Pupils are equal, round, and reactive to light. No scleral icterus.   Neck: Trachea normal and normal range of motion. Neck supple. Normal carotid pulses and no JVD present. Carotid bruit is not present. No neck rigidity. No edema present. No thyromegaly present.   Cardiovascular: Normal rate, regular rhythm, S1 normal and S2 normal.  PMI is not displaced.  Exam reveals no gallop and no friction rub.    No murmur heard.  Pulses:       Carotid pulses are 2+ on the right side, and 2+ on the left side.  Bilat CEA scars well healed   Pulmonary/Chest: Effort normal and breath sounds normal. No respiratory distress. She has no wheezes. She has no rales. She exhibits no tenderness.   Abdominal: Soft. Bowel sounds are normal. She exhibits no distension. " There is no hepatosplenomegaly.   Musculoskeletal: She exhibits no edema or tenderness.   Feet:   Right Foot:   Skin Integrity: Negative for ulcer.   Left Foot:   Skin Integrity: Negative for ulcer.   Neurological: She is alert and oriented to person, place, and time. No cranial nerve deficit.   Skin: Skin is warm and dry. No rash noted. No erythema.   Psychiatric: She has a normal mood and affect. Her speech is normal and behavior is normal.   Vitals reviewed.      DATA:   EKG: (personally reviewed tracing)  7/2/18 SR 58, PVC      Laboratory:  CBC:    Recent Labs  Lab 04/14/18  0542 04/15/18  0529 04/16/18  0441   WHITE BLOOD CELL COUNT 10.02 7.20 8.85   HEMOGLOBIN 12.5 12.0 13.0   HEMATOCRIT 35.8 L 35.2 L 38.5   PLATELETS 425 H 365 H 383 H       CHEMISTRIES:    Recent Labs  Lab 02/02/18  0948  04/12/18  0930  04/14/18  0542 04/15/18  0529 04/16/18  0442   GLUCOSE  --   < > 141 H  < > 92  90 89 90   SODIUM  --   < > 113 LL  < > 129 L  129 L 132 L 134 L   POTASSIUM  --   < > 4.0  < > 4.4  4.4 4.6 5.2 H   BUN BLD  --   < > 16  < > 4 L  4 L 6 L 9   CREATININE  --   < > 0.8  < > 0.6  0.6 0.6 0.6   EGFR IF   --   < > >60  < > >60  >60 >60 >60   EGFR IF NON-  --   < > >60  < > >60  >60 >60 >60   CALCIUM  --   < > 9.3  < > 9.8  9.5 9.3 10.3   MAGNESIUM 1.6  --  1.9  --   --  1.9  --    < > = values in this interval not displayed.    CARDIAC BIOMARKERS:    Recent Labs  Lab 01/22/18  1447 04/12/18  0930 04/12/18  1744   TROPONIN I 0.012 0.006 0.009       COAGS:    Recent Labs  Lab 04/12/18  0930   INR 1.1       LIPIDS/LFTS:    Recent Labs  Lab 02/06/18  0451 04/12/18  0930 06/20/18  0957   CHOLESTEROL  --   --  153   TRIGLYCERIDES  --   --  67   HDL  --   --  55   LDL CHOLESTEROL  --   --  84.6   NON-HDL CHOLESTEROL  --   --  98   AST 21 22 15   ALT 58 H 49 H 13     Lab Results   Component Value Date    TSH 4.032 (H) 04/12/2018     Free T4 1.29 4/12/18    Cardiovascular  Testing:  Carotid US 4/18/18 (R ICA velacity decreased vs 10/2017 exam)  There is 60 - 69% right Internal Carotid stenosis.  There is 20 - 39% left Internal Carotid stenosis.    Echo: 1/29/18    1 - Normal left ventricular systolic function (EF 60-65%).     2 - No wall motion abnormalities.     3 - Concentric remodeling.     4 - Trivial aortic regurgitation.     5 - Mild tricuspid regurgitation.     6 - Pulmonary hypertension. The estimated PA systolic pressure is 54 mmHg.     7 - Small pericardial anterior effusion without hemodynamic compromise.     ASSESSMENT:   # PAF, CHADS VASC 4, on apixaban 2.5mg bid (dose appropriate for age/weight)  # Hx bilat CEA with R ICA stenosis on CUS 10/2017, stable by CUS 4/2018)  # HTN, uncontrolled  # HLP, on atorvastatin 10mg  # hyponatremia resolved off HCTZ    PLAN:   Cont med rx  Stop lisinopril  Inc enalapril to 20mg qd  Check BMP 2 weeks  RTC 1 month    Wenceslao Armenta MD, FACC

## 2018-07-03 NOTE — ASSESSMENT & PLAN NOTE
On Eliquis 2.5 bid     Subjective   Barry Cam is a 67 y.o. male. Patient is here today for   Chief Complaint   Patient presents with   • Follow-up     er visit dvt          Vitals:    07/03/18 0937   BP: 128/68   Pulse: 92   Resp: 16   SpO2: 95%     The following portions of the patient's history were reviewed and updated as appropriate: allergies, current medications, past family history, past medical history, past social history, past surgical history and problem list.    Past Medical History:   Diagnosis Date   • Allergic    • Depression    • Diabetes mellitus (CMS/HCC)    • GERD (gastroesophageal reflux disease)    • History of hiatal hernia    • History of kidney stones    • Hyperglycemia    • Hyperlipidemia    • Sleep apnea       Allergies   Allergen Reactions   • Other Cough     Hay fever      Social History     Social History   • Marital status:      Spouse name: N/A   • Number of children: N/A   • Years of education: N/A     Occupational History   • Not on file.     Social History Main Topics   • Smoking status: Never Smoker   • Smokeless tobacco: Not on file   • Alcohol use No   • Drug use: Unknown   • Sexual activity: Not on file     Other Topics Concern   • Not on file     Social History Narrative   • No narrative on file        Current Outpatient Prescriptions:   •  albuterol (PROVENTIL HFA;VENTOLIN HFA) 108 (90 Base) MCG/ACT inhaler, Inhale 2 puffs Every 4 (Four) Hours As Needed for Wheezing., Disp: 3 inhaler, Rfl: 3  •  apixaban (ELIQUIS) 5 MG tablet tablet, Take 10 mg by mouth., Disp: , Rfl:   •  betamethasone valerate (VALISONE) 0.1 % cream, APPLY CREAM TOPICALLY TO AFFECTED AREA(S) TO SKIN TWICE DAILY, Disp: 15 g, Rfl: 1  •  CLARITIN-D 12 HOUR 5-120 MG per 12 hr tablet, TAKE ONE TABLET BY MOUTH TWICE DAILY, Disp: 60 tablet, Rfl: 11  •  gabapentin (NEURONTIN) 300 MG capsule, Take 300 mg by mouth 2 (Two) Times a Day., Disp: , Rfl:   •  glimepiride (AMARYL) 2 MG tablet, TAKE 1 TABLET BY MOUTH EVERY MORNING  BEFORE BREAKFAST, Disp: 30 tablet, Rfl: 5  •  Glucosamine-Chondroit-Vit C-Mn (GLUCOSAMINE CHONDR 500 COMPLEX) capsule, Take 2 capsules by mouth daily., Disp: , Rfl:   •  glucose blood (ONE TOUCH ULTRA TEST) test strip, USE TO TEST BLOOD SUGAR 3 TIMES DAILY, Disp: 100 each, Rfl: 5  •  Lancets (ONETOUCH ULTRASOFT) lancets, USE TO TEST BLOOD SUGAR 3 TIMES DAILY, Disp: 100 each, Rfl: 5  •  metFORMIN (GLUCOPHAGE) 1000 MG tablet, TAKE ONE TABLET BY MOUTH TWICE DAILY, Disp: 60 tablet, Rfl: 11  •  modafinil (PROVIGIL) 200 MG tablet, Take 1 tablet by mouth 2 (Two) Times a Day., Disp: 180 tablet, Rfl: 0  •  MULTIPLE VITAMINS ESSENTIAL PO, Take  by mouth daily., Disp: , Rfl:   •  naproxen sodium (ALEVE) 220 MG tablet, Take 2 tablets by mouth., Disp: , Rfl:   •  omeprazole (priLOSEC) 20 MG capsule, TAKE ONE CAPSULE BY MOUTH ONCE DAILY, Disp: 30 capsule, Rfl: 11  •  pioglitazone (ACTOS) 30 MG tablet, Take 1 tablet by mouth Daily., Disp: 90 tablet, Rfl: 1  •  pravastatin (PRAVACHOL) 40 MG tablet, TAKE ONE TABLET BY MOUTH ONCE DAILY, Disp: 30 tablet, Rfl: 11  •  SITagliptin (JANUVIA) 100 MG tablet, Take 1 tablet by mouth Daily., Disp: 90 tablet, Rfl: 1     Objective     History of Present Illness   The patient is here today for follow-up on a recent admission to the hospital for  pulmonary emboli and DVT of right lower extremity    Review of Systems   Constitutional:        Mild fatigue   HENT: Negative.    Respiratory: Negative for cough and wheezing.         Mild shortness of air.  No pleuritic pain   Cardiovascular: Negative for chest pain.        Right leg is swollen   Gastrointestinal: Negative.    Genitourinary: Negative.    Musculoskeletal:        Moderate osteoarthritic aches and pains   Neurological: Negative.    Hematological: Negative.    Psychiatric/Behavioral: Negative.        Physical Exam   Constitutional: He is oriented to person, place, and time. He appears well-developed and well-nourished.   Overweight    Cardiovascular: Normal rate, regular rhythm and normal heart sounds.    Pulmonary/Chest: Effort normal and breath sounds normal. No respiratory distress. He has no wheezes. He has no rales.   Abdominal: Soft. Bowel sounds are normal.   Musculoskeletal:   Moderate swelling of the right lower leg.  No specific calf muscle tenderness   Neurological: He is alert and oriented to person, place, and time.   Skin: Skin is warm and dry.   Psychiatric: He has a normal mood and affect.   Nursing note and vitals reviewed.      ASSESSMENT  #1 multiple pulmonary emboli                 #2 acute deep vein thrombosis of right lower leg    DISCUSSION/SUMMARY   Vital signs are normal.  The patient was admitted to [facility] on 6/12/18 with a complaint of increasing shortness of air and swelling of his right lower leg.  Venous Doppler study of his right lower leg showed acute occlusive deep vein thrombosis in the popliteal vein, gastrocnemius, posterior tibial and peroneal veins.  CT angiogram of the chest showed filling defects within the distal main right and left pulmonary arteries with extension into the segmental artery to the bilateral upper lobes as well as additional filling defects within days segmental pulmonary arteries to the right middle lobe and bilateral lower lobes.  The patient was initially treated with a heparin drip and then switched to oral Eliquis 10 mg twice a day.  Echocardiogram was unremarkable.  The patient had cut his grass on a riding lawnmower and had been sitting most of the day on the next day at some type of conference.  The patient has no prior history of blood clots.  There is no family history of blood clots that the patient is aware of.  The patient was discharged from the hospital on 6/13/18 and has been doing well.  He is still slightly short of air and still has swelling in his right lower leg which is improved when he elevates his leg.  He has less discomfort in his right lower  leg than on admission to the hospital.  The patient is currently on Eliquis 5 mg twice a day having completed his one week's worth of 10 mg twice a day upon discharge from the hospital.  The patient was again admonished to avoid nonsteroidal anti-inflammatory drugs including naproxen and aspirin.  I emphasized  the importance of taking his Eliquis on a very regular basis.  The patient has a follow-up appointment with me in 3-4 months at which time I will add a CBC and a hypercoagulable workup to his usual labs.  The patient understands that he will need to be on anticoagulant therapy for 6 months and then we will reevaluate his clinical status.  Also the patient will receive a Prevnar 13 vaccine today and will receive a Pneumovax vaccine in approximately one year.    PLAN  The patient will call us if he has any significant bleeding or if his shortness of air does not gradually improve.  No Follow-up on file.

## 2018-07-09 RX ORDER — ENALAPRIL MALEATE 20 MG/1
20 TABLET ORAL DAILY
Qty: 90 TABLET | Refills: 3 | Status: SHIPPED | OUTPATIENT
Start: 2018-07-09 | End: 2018-08-01

## 2018-07-16 PROBLEM — J81.0 ACUTE PULMONARY EDEMA: Status: RESOLVED | Noted: 2018-04-12 | Resolved: 2018-07-16

## 2018-07-17 ENCOUNTER — LAB VISIT (OUTPATIENT)
Dept: LAB | Facility: HOSPITAL | Age: 83
End: 2018-07-17
Attending: INTERNAL MEDICINE
Payer: MEDICARE

## 2018-07-17 DIAGNOSIS — I10 ESSENTIAL HYPERTENSION: ICD-10-CM

## 2018-07-17 LAB
ANION GAP SERPL CALC-SCNC: 7 MMOL/L
BUN SERPL-MCNC: 19 MG/DL
CALCIUM SERPL-MCNC: 9.5 MG/DL
CHLORIDE SERPL-SCNC: 101 MMOL/L
CO2 SERPL-SCNC: 26 MMOL/L
CREAT SERPL-MCNC: 0.8 MG/DL
EST. GFR  (AFRICAN AMERICAN): >60 ML/MIN/1.73 M^2
EST. GFR  (NON AFRICAN AMERICAN): >60 ML/MIN/1.73 M^2
GLUCOSE SERPL-MCNC: 99 MG/DL
POTASSIUM SERPL-SCNC: 4.2 MMOL/L
SODIUM SERPL-SCNC: 134 MMOL/L

## 2018-07-17 PROCEDURE — 36415 COLL VENOUS BLD VENIPUNCTURE: CPT

## 2018-07-17 PROCEDURE — 80048 BASIC METABOLIC PNL TOTAL CA: CPT

## 2018-08-01 ENCOUNTER — OFFICE VISIT (OUTPATIENT)
Dept: CARDIOLOGY | Facility: CLINIC | Age: 83
End: 2018-08-01
Payer: MEDICARE

## 2018-08-01 VITALS
RESPIRATION RATE: 15 BRPM | WEIGHT: 98.56 LBS | OXYGEN SATURATION: 95 % | HEIGHT: 56 IN | HEART RATE: 62 BPM | BODY MASS INDEX: 22.17 KG/M2 | SYSTOLIC BLOOD PRESSURE: 112 MMHG | DIASTOLIC BLOOD PRESSURE: 60 MMHG

## 2018-08-01 DIAGNOSIS — I10 ESSENTIAL HYPERTENSION: Primary | ICD-10-CM

## 2018-08-01 DIAGNOSIS — Z98.890 S/P CAROTID ENDARTERECTOMY: ICD-10-CM

## 2018-08-01 DIAGNOSIS — I48.0 PAROXYSMAL ATRIAL FIBRILLATION: ICD-10-CM

## 2018-08-01 DIAGNOSIS — E78.5 HYPERLIPIDEMIA, UNSPECIFIED HYPERLIPIDEMIA TYPE: ICD-10-CM

## 2018-08-01 PROCEDURE — 99214 OFFICE O/P EST MOD 30 MIN: CPT | Mod: S$PBB,,, | Performed by: INTERNAL MEDICINE

## 2018-08-01 PROCEDURE — 99999 PR PBB SHADOW E&M-EST. PATIENT-LVL III: CPT | Mod: PBBFAC,,, | Performed by: INTERNAL MEDICINE

## 2018-08-01 PROCEDURE — 99213 OFFICE O/P EST LOW 20 MIN: CPT | Mod: PBBFAC | Performed by: INTERNAL MEDICINE

## 2018-08-01 RX ORDER — LOSARTAN POTASSIUM 50 MG/1
50 TABLET ORAL DAILY
Qty: 90 TABLET | Refills: 3 | Status: SHIPPED | OUTPATIENT
Start: 2018-08-01 | End: 2018-09-20 | Stop reason: SDUPTHER

## 2018-08-01 NOTE — PROGRESS NOTES
CARDIOVASCULAR PROGRESS NOTE    REASON FOR CONSULT:   Jeannie Hutchins is a 83 y.o. female who presents for follow up of PAF, HFpEF.    PCP: Yani  HISTORY OF PRESENT ILLNESS:   The patient comes in for follow-up.  Her blood pressure now appears to be controlled, but she is now complaining of cough and possible he fever symptoms versus ACE-inhibitor side effect.  She has had no chest pain, shortness of breath, palpitations, lightheadedness, dizziness, or syncope.  There has been no PND, orthopnea, or lower extremity edema.  She denies any melena, hematuria, or claudicant symptoms.    CARDIOVASCULAR HISTORY:   PAF, CHADS VASC 4, on apixaban 2.5mg bid (dose appropriate for age/weight)  Hx bilat CEA with R ICA stenosis on CUS 4/2018    PAST MEDICAL HISTORY:     Past Medical History:   Diagnosis Date    Anticoagulant long-term use     Eliquis    Arthritis     Atrial fibrillation     Blood transfusion     Carotid stenosis     Carotid stenosis     CHF (congestive heart failure)     Edema     [place] (hard of hearing)     Hypercholesterolemia     Hypertension     Psychiatric problem     Thyroid disease        PAST SURGICAL HISTORY:     Past Surgical History:   Procedure Laterality Date    CARPAL TUNNEL RELEASE  2012    right hand    HYSTERECTOMY      left carotid endartectomy  5/2006    TONSILLECTOMY         ALLERGIES AND MEDICATION:     Review of patient's allergies indicates:   Allergen Reactions    Strawberry Swelling     Tongue swells up and a generalized rash.     Previous Medications    ACETAMINOPHEN (TYLENOL) 325 MG TABLET    Take 325 mg by mouth 2 (two) times daily.    ALENDRONATE (FOSAMAX) 70 MG TABLET    Take 70 mg by mouth every 7 days.    ALUMINUM-MAGNESIUM HYDROXIDE-SIMETHICONE (MAALOX) 200-200-20 MG/5 ML SUSP    Take 30 mLs by mouth every 4 (four) hours as needed.    AMLODIPINE-ATORVASTATIN (CADUET) 10-40 MG PER TABLET    Take 1 tablet by mouth once daily.    APIXABAN 2.5 MG TAB    Take 1  tablet (2.5 mg total) by mouth 2 (two) times daily.    ENALAPRIL (VASOTEC) 20 MG TABLET    Take 1 tablet (20 mg total) by mouth once daily.    FUROSEMIDE (LASIX) 20 MG TABLET    Take 1 tablet (20 mg total) by mouth once daily.    LEVOTHYROXINE (SYNTHROID) 75 MCG TABLET    Take 75 mcg by mouth once daily.    METOPROLOL TARTRATE (LOPRESSOR) 50 MG TABLET    Take 1 tablet (50 mg total) by mouth 2 (two) times daily.    RANITIDINE (ZANTAC) 150 MG TABLET    Take 150 mg by mouth with lunch.    VIT A,C & E/LUTEIN/MINERALS (VISION FORMULA, WITH LUTEIN, ORAL)    Take 1 tablet by mouth once daily.       SOCIAL HISTORY:     Social History     Social History    Marital status:      Spouse name: N/A    Number of children: N/A    Years of education: N/A     Occupational History    Not on file.     Social History Main Topics    Smoking status: Never Smoker    Smokeless tobacco: Never Used    Alcohol use No    Drug use: No    Sexual activity: No     Other Topics Concern    Not on file     Social History Narrative     since 1984    He is retired.  He is 83.  Worked for [employer]    She is retired.  Worked for [employer].       FAMILY HISTORY:     Family History   Problem Relation Age of Onset    Heart disease Father     Cancer Brother 65        bladder    Cancer Sister 81        Ovarian    Ovarian cancer Sister 81    Breast cancer Daughter 50        Status post mastectomy    Cancer Sister         Lung.  Smoker    Cancer Sister         Lung.  Smoker    Schizophrenia Son        REVIEW OF SYSTEMS:   Review of Systems   Constitutional: Negative for chills, diaphoresis and fever.   HENT: Negative for nosebleeds.    Eyes: Negative for blurred vision, double vision and photophobia.   Respiratory: Positive for cough. Negative for hemoptysis, shortness of breath and wheezing.    Cardiovascular: Negative for chest pain, palpitations, orthopnea, claudication, leg swelling and PND.  "  Gastrointestinal: Negative for abdominal pain, blood in stool, heartburn, melena, nausea and vomiting.   Genitourinary: Negative for flank pain and hematuria.   Musculoskeletal: Negative for falls, myalgias and neck pain.   Skin: Negative for rash.   Neurological: Negative for dizziness, seizures, loss of consciousness, weakness and headaches.   Endo/Heme/Allergies: Negative for polydipsia. Does not bruise/bleed easily.   Psychiatric/Behavioral: Negative for depression and memory loss. The patient is not nervous/anxious.        PHYSICAL EXAM:     Vitals:    08/01/18 1317   BP: 112/60   Pulse: 62   Resp: 15    Body mass index is 21.78 kg/m².  Weight: 44.7 kg (98 lb 8.7 oz)   Height: 4' 8.4" (143.3 cm)     Physical Exam   Constitutional: She is oriented to person, place, and time. She appears well-developed and well-nourished. She is cooperative.  Non-toxic appearance. No distress.   HENT:   Head: Normocephalic and atraumatic.   Eyes: Conjunctivae and EOM are normal. Pupils are equal, round, and reactive to light. No scleral icterus.   Neck: Trachea normal and normal range of motion. Neck supple. Normal carotid pulses and no JVD present. Carotid bruit is not present. No neck rigidity. No edema present. No thyromegaly present.   Cardiovascular: Normal rate, regular rhythm, S1 normal and S2 normal.  PMI is not displaced.  Exam reveals no gallop and no friction rub.    No murmur heard.  Pulses:       Carotid pulses are 2+ on the right side, and 2+ on the left side.  Bilat CEA scars well healed   Pulmonary/Chest: Effort normal and breath sounds normal. No respiratory distress. She has no wheezes. She has no rales. She exhibits no tenderness.   Abdominal: Soft. Bowel sounds are normal. She exhibits no distension. There is no hepatosplenomegaly.   Musculoskeletal: She exhibits no edema or tenderness.   Feet:   Right Foot:   Skin Integrity: Negative for ulcer.   Left Foot:   Skin Integrity: Negative for ulcer. "   Neurological: She is alert and oriented to person, place, and time. No cranial nerve deficit.   Skin: Skin is warm and dry. No rash noted. No erythema.   Psychiatric: She has a normal mood and affect. Her speech is normal and behavior is normal.   Vitals reviewed.      DATA:   EKG: (personally reviewed tracing)  7/2/18 SR 58, PVC      Laboratory:  CBC:    Recent Labs  Lab 04/14/18  0542 04/15/18  0529 04/16/18  0441   WHITE BLOOD CELL COUNT 10.02 7.20 8.85   HEMOGLOBIN 12.5 12.0 13.0   HEMATOCRIT 35.8 L 35.2 L 38.5   PLATELETS 425 H 365 H 383 H       CHEMISTRIES:    Recent Labs  Lab 02/02/18  0948  04/12/18  0930  04/15/18  0529 04/16/18  0442 07/17/18  1421   GLUCOSE  --   < > 141 H  < > 89 90 99   SODIUM  --   < > 113 LL  < > 132 L 134 L 134 L   POTASSIUM  --   < > 4.0  < > 4.6 5.2 H 4.2   BUN BLD  --   < > 16  < > 6 L 9 19   CREATININE  --   < > 0.8  < > 0.6 0.6 0.8   EGFR IF   --   < > >60  < > >60 >60 >60   EGFR IF NON-  --   < > >60  < > >60 >60 >60   CALCIUM  --   < > 9.3  < > 9.3 10.3 9.5   MAGNESIUM 1.6  --  1.9  --  1.9  --   --    < > = values in this interval not displayed.    CARDIAC BIOMARKERS:    Recent Labs  Lab 01/22/18  1447 04/12/18  0930 04/12/18  1744   TROPONIN I 0.012 0.006 0.009       COAGS:    Recent Labs  Lab 04/12/18  0930   INR 1.1       LIPIDS/LFTS:    Recent Labs  Lab 02/06/18  0451 04/12/18  0930 06/20/18  0957   CHOLESTEROL  --   --  153   TRIGLYCERIDES  --   --  67   HDL  --   --  55   LDL CHOLESTEROL  --   --  84.6   NON-HDL CHOLESTEROL  --   --  98   AST 21 22 15   ALT 58 H 49 H 13     Lab Results   Component Value Date    TSH 4.032 (H) 04/12/2018     Free T4 1.29 4/12/18    Cardiovascular Testing:  Carotid US 4/18/18 (R ICA velacity decreased vs 10/2017 exam)  There is 60 - 69% right Internal Carotid stenosis.  There is 20 - 39% left Internal Carotid stenosis.    Echo: 1/29/18    1 - Normal left ventricular systolic function (EF 60-65%).     2 -  No wall motion abnormalities.     3 - Concentric remodeling.     4 - Trivial aortic regurgitation.     5 - Mild tricuspid regurgitation.     6 - Pulmonary hypertension. The estimated PA systolic pressure is 54 mmHg.     7 - Small pericardial anterior effusion without hemodynamic compromise.     ASSESSMENT:   # PAF, CHADS VASC 4, on apixaban 2.5mg bid (dose appropriate for age/weight)  # Hx bilat CEA with R ICA stenosis on CUS 10/2017, stable by CUS 4/2018)  # HTN, controlled, but now discloses cough ?ACEi related vs hayfever  # HLP, on atorvastatin 10mg  # hyponatremia resolved off HCTZ    PLAN:   Cont med rx  Stop ACEi  Start losartan 50mg qd  Check BMP 2 weeks  RTC 1 month  Repeat CUS 4/2019    Wenceslao Armenta MD, FACC

## 2018-08-09 ENCOUNTER — HOSPITAL ENCOUNTER (OUTPATIENT)
Dept: RADIOLOGY | Facility: HOSPITAL | Age: 83
Discharge: HOME | End: 2018-08-09
Attending: OBSTETRICS & GYNECOLOGY
Payer: MEDICARE

## 2018-08-09 ENCOUNTER — OFFICE VISIT (OUTPATIENT)
Dept: OBSTETRICS AND GYNECOLOGY | Facility: CLINIC | Age: 83
End: 2018-08-09
Payer: MEDICARE

## 2018-08-09 VITALS
SYSTOLIC BLOOD PRESSURE: 166 MMHG | BODY MASS INDEX: 22.31 KG/M2 | TEMPERATURE: 99 F | HEIGHT: 56 IN | DIASTOLIC BLOOD PRESSURE: 70 MMHG | WEIGHT: 99.19 LBS

## 2018-08-09 DIAGNOSIS — R10.2 PELVIC PAIN IN FEMALE: ICD-10-CM

## 2018-08-09 DIAGNOSIS — Z87.39 HISTORY OF OSTEOPOROSIS: ICD-10-CM

## 2018-08-09 DIAGNOSIS — Z01.419 WELL WOMAN EXAM WITH ROUTINE GYNECOLOGICAL EXAM: Primary | ICD-10-CM

## 2018-08-09 DIAGNOSIS — Z12.31 VISIT FOR SCREENING MAMMOGRAM: ICD-10-CM

## 2018-08-09 PROCEDURE — 99999 PR PBB SHADOW E&M-EST. PATIENT-LVL III: CPT | Mod: PBBFAC,,, | Performed by: OBSTETRICS & GYNECOLOGY

## 2018-08-09 PROCEDURE — 76856 US EXAM PELVIC COMPLETE: CPT | Mod: 26,,, | Performed by: RADIOLOGY

## 2018-08-09 PROCEDURE — G0101 CA SCREEN;PELVIC/BREAST EXAM: HCPCS | Mod: S$PBB,,, | Performed by: OBSTETRICS & GYNECOLOGY

## 2018-08-09 PROCEDURE — 76856 US EXAM PELVIC COMPLETE: CPT | Mod: TC

## 2018-08-09 PROCEDURE — 99213 OFFICE O/P EST LOW 20 MIN: CPT | Mod: PBBFAC | Performed by: OBSTETRICS & GYNECOLOGY

## 2018-08-09 NOTE — PROGRESS NOTES
Subjective:       Patient ID: Jeannie Hutchins is a 83 y.o. female.    Chief Complaint:  Gynecologic Exam ([facility])      History of Present Illness  HPI  Annual Exam-Postmenopausal  Patient presents for annual exam. The patient has some complaints today. The patient is not currently sexually active. GYN screening history: last mammogram: approximate date 10/4/2017 and was normal. The patient is not taking hormone replacement therapy. Patient denies post-menopausal vaginal bleeding. The patient wears seatbelts: yes. The patient participates in regular exercise: yes. Has the patient ever been transfused or tattooed?: no. The patient reports that there is not domestic violence in her life.    Has had left-sided pelvic pain for the past several weeks.  Stabbing, pulling, sharp on the left side. It comes and goes.  Worsens over the past three days.  Denies fever or chills.  No GI symptoms         GYN & OB History  No LMP recorded. Patient is postmenopausal.   Date of Last Pap: No result found    OB History    Para Term  AB Living   6 5 5   1 5   SAB TAB Ectopic Multiple Live Births   1              # Outcome Date GA Lbr Laz/2nd Weight Sex Delivery Anes PTL Lv   6 SAB            5 Term            4 Term            3 Term            2 Term            1 Term               Obstetric Comments   SAb at 6 weeks between the last two babies     Past Medical History:   Diagnosis Date    Anticoagulant long-term use     Eliquis    Arthritis     Atrial fibrillation     Blood transfusion     Carotid stenosis     Carotid stenosis     CHF (congestive heart failure)     Edema     [place]hard of hearing)     Hypercholesterolemia     Hypertension     Psychiatric problem     Thyroid disease        Past Surgical History:   Procedure Laterality Date    CARPAL TUNNEL RELEASE      right hand    HYSTERECTOMY      left carotid endartectomy  2006    TONSILLECTOMY         Family History   Problem Relation Age of Onset     Heart disease Father     Cancer Brother 65        bladder    Cancer Sister 81        Ovarian    Ovarian cancer Sister 81    Breast cancer Daughter 50        Status post mastectomy    Cancer Sister         Lung.  Smoker    Cancer Sister         Lung.  Smoker    Schizophrenia Son        Social History     Social History    Marital status:      Spouse name: N/A    Number of children: N/A    Years of education: N/A     Social History Main Topics    Smoking status: Never Smoker    Smokeless tobacco: Never Used    Alcohol use No    Drug use: No    Sexual activity: No     Other Topics Concern    None     Social History Narrative     since 1984    He is retired.  He is 83.  Worked for [employer]    She is retired.  Worked for [employer].       Current Outpatient Prescriptions   Medication Sig Dispense Refill    acetaminophen (TYLENOL) 325 MG tablet Take 325 mg by mouth 2 (two) times daily.      alendronate (FOSAMAX) 70 MG tablet Take 70 mg by mouth every 7 days.      aluminum-magnesium hydroxide-simethicone (MAALOX) 200-200-20 mg/5 mL Susp Take 30 mLs by mouth every 4 (four) hours as needed.      amlodipine-atorvastatin (CADUET) 10-40 mg per tablet Take 1 tablet by mouth once daily. 30 tablet 11    apixaban 2.5 mg Tab Take 1 tablet (2.5 mg total) by mouth 2 (two) times daily. 60 tablet 11    furosemide (LASIX) 20 MG tablet Take 1 tablet (20 mg total) by mouth once daily. 30 tablet 11    levothyroxine (SYNTHROID) 75 MCG tablet Take 75 mcg by mouth once daily.      losartan (COZAAR) 50 MG tablet Take 1 tablet (50 mg total) by mouth once daily. 90 tablet 3    metoprolol tartrate (LOPRESSOR) 50 MG tablet Take 1 tablet (50 mg total) by mouth 2 (two) times daily. 60 tablet 11    ranitidine (ZANTAC) 150 MG tablet Take 150 mg by mouth with lunch.      VIT A,C & E/LUTEIN/MINERALS (VISION FORMULA, WITH LUTEIN, ORAL) Take 1 tablet by mouth once daily.       No current  facility-administered medications for this visit.        Review of patient's allergies indicates:   Allergen Reactions    Hydrochlorothiazide Other (See Comments)     hyponatremia    Strawberry Swelling     Tongue swells up and a generalized rash.       Review of Systems  Review of Systems   Constitutional: Negative for activity change, appetite change, chills, fatigue, fever and unexpected weight change.   HENT: Negative for mouth sores.    Respiratory: Negative for cough, shortness of breath and wheezing.    Cardiovascular: Negative for chest pain and palpitations.   Gastrointestinal: Negative for abdominal pain, bloating, blood in stool, constipation, nausea and vomiting.   Endocrine: Negative for diabetes and hot flashes.   Genitourinary: Positive for pelvic pain. Negative for dyspareunia, dysuria, frequency, hematuria, menorrhagia, menstrual problem, urgency, vaginal bleeding, vaginal discharge, vaginal pain, dysmenorrhea, urinary incontinence, postcoital bleeding and vaginal odor.   Musculoskeletal: Negative for back pain and myalgias.   Skin:  Negative for rash.   Neurological: Negative for seizures and headaches.   Psychiatric/Behavioral: Negative for depression and sleep disturbance. The patient is not nervous/anxious.    Breast: Negative for breast mass, breast pain and nipple discharge          Objective:    Physical Exam:   Constitutional: She appears well-developed and well-nourished. No distress.    HENT:   Head: Normocephalic and atraumatic.    Eyes: EOM are normal.    Neck: Normal range of motion.     Pulmonary/Chest: Effort normal. No respiratory distress.   Breasts: Non-tender, no engorgement, no masses, no retraction, no discharge. Negative for lymphadenopathy.         Abdominal: Soft. She exhibits no distension. There is no tenderness. There is no rebound and no guarding.     Genitourinary: Vagina normal and uterus normal. No vaginal discharge found.   Genitourinary Comments: Atrophy.  Vulva  without any obvious lesions.  Vaginal vault with good support.  Minimal white discharge noted.  No obvious lesion.  Cervix and Uterus are surgically absent.     Left sided adnexa tenderness without masses           Musculoskeletal: Normal range of motion.       Neurological: She is alert.    Skin: Skin is warm and dry.    Psychiatric: She has a normal mood and affect.          Assessment:        1. Well woman exam with routine gynecological exam    2. History of osteoporosis    3. Visit for screening mammogram    4. Pelvic pain in female              Plan:          I have discussed with the patient her condition.  Monthly breast examination was instructed, discussed, and encouraged.  Patient was encouraged to consume a low-calorie, low fat diet, and to increase of physical activity.  Healthy habits encouraged.  A Pap smear was NOT performed.  Mammogram was ordered because of the combination of her age and risk factors.  Gonorrhea and Chlamydia testing not performed.  HIV test not ordered.  Patient is to continue her medications as prescribed.   She will come back to see me in one year for her annual visit.  She can come back to see me sooner as necessary.  All of her questions were answered appropriately to her satisfaction.    Pelvic ultrasound.  Back after ultrasound

## 2018-08-16 ENCOUNTER — LAB VISIT (OUTPATIENT)
Dept: LAB | Facility: HOSPITAL | Age: 83
End: 2018-08-16
Attending: INTERNAL MEDICINE
Payer: MEDICARE

## 2018-08-16 DIAGNOSIS — I10 ESSENTIAL HYPERTENSION: ICD-10-CM

## 2018-08-16 LAB
ANION GAP SERPL CALC-SCNC: 9 MMOL/L
BUN SERPL-MCNC: 23 MG/DL
CALCIUM SERPL-MCNC: 9.8 MG/DL
CHLORIDE SERPL-SCNC: 100 MMOL/L
CO2 SERPL-SCNC: 27 MMOL/L
CREAT SERPL-MCNC: 0.8 MG/DL
EST. GFR  (AFRICAN AMERICAN): >60 ML/MIN/1.73 M^2
EST. GFR  (NON AFRICAN AMERICAN): >60 ML/MIN/1.73 M^2
GLUCOSE SERPL-MCNC: 101 MG/DL
POTASSIUM SERPL-SCNC: 4.1 MMOL/L
SODIUM SERPL-SCNC: 136 MMOL/L

## 2018-08-16 PROCEDURE — 36415 COLL VENOUS BLD VENIPUNCTURE: CPT

## 2018-08-16 PROCEDURE — 80048 BASIC METABOLIC PNL TOTAL CA: CPT

## 2018-08-27 ENCOUNTER — HOSPITAL ENCOUNTER (INPATIENT)
Facility: HOSPITAL | Age: 83
LOS: 1 days | Discharge: HOME | DRG: 694 | End: 2018-08-29
Attending: EMERGENCY MEDICINE | Admitting: INTERNAL MEDICINE
Payer: MEDICARE

## 2018-08-27 DIAGNOSIS — D72.829 LEUKOCYTOSIS, UNSPECIFIED TYPE: Primary | ICD-10-CM

## 2018-08-27 DIAGNOSIS — I10 ESSENTIAL HYPERTENSION: ICD-10-CM

## 2018-08-27 DIAGNOSIS — R10.12 LUQ ABDOMINAL PAIN: ICD-10-CM

## 2018-08-27 DIAGNOSIS — R07.9 CHEST PAIN: ICD-10-CM

## 2018-08-27 DIAGNOSIS — R07.89 CHEST WALL PAIN: ICD-10-CM

## 2018-08-27 DIAGNOSIS — R06.02 SHORTNESS OF BREATH: ICD-10-CM

## 2018-08-27 LAB
ALBUMIN SERPL BCP-MCNC: 4.4 G/DL
ALP SERPL-CCNC: 90 U/L
ALT SERPL W/O P-5'-P-CCNC: 16 U/L
ANION GAP SERPL CALC-SCNC: 14 MMOL/L
APTT BLDCRRT: 27.5 SEC
AST SERPL-CCNC: 23 U/L
BASOPHILS # BLD AUTO: 0.03 K/UL
BASOPHILS NFR BLD: 0.2 %
BILIRUB SERPL-MCNC: 1.1 MG/DL
BNP SERPL-MCNC: 157 PG/ML
BUN SERPL-MCNC: 20 MG/DL
CALCIUM SERPL-MCNC: 10 MG/DL
CHLORIDE SERPL-SCNC: 96 MMOL/L
CO2 SERPL-SCNC: 22 MMOL/L
CREAT SERPL-MCNC: 0.8 MG/DL
D DIMER PPP IA.FEU-MCNC: 5.43 MG/L FEU
DIFFERENTIAL METHOD: ABNORMAL
EOSINOPHIL # BLD AUTO: 0 K/UL
EOSINOPHIL NFR BLD: 0.2 %
ERYTHROCYTE [DISTWIDTH] IN BLOOD BY AUTOMATED COUNT: 12.7 %
EST. GFR  (AFRICAN AMERICAN): >60 ML/MIN/1.73 M^2
EST. GFR  (NON AFRICAN AMERICAN): >60 ML/MIN/1.73 M^2
GLUCOSE SERPL-MCNC: 145 MG/DL
HCT VFR BLD AUTO: 39.9 %
HGB BLD-MCNC: 13.4 G/DL
INR PPP: 1
LIPASE SERPL-CCNC: 15 U/L
LYMPHOCYTES # BLD AUTO: 1.2 K/UL
LYMPHOCYTES NFR BLD: 5.9 %
MCH RBC QN AUTO: 30.6 PG
MCHC RBC AUTO-ENTMCNC: 33.6 G/DL
MCV RBC AUTO: 91 FL
MONOCYTES # BLD AUTO: 0.9 K/UL
MONOCYTES NFR BLD: 4.3 %
NEUTROPHILS # BLD AUTO: 17.7 K/UL
NEUTROPHILS NFR BLD: 89 %
PLATELET # BLD AUTO: 363 K/UL
PMV BLD AUTO: 9.3 FL
POTASSIUM SERPL-SCNC: 3.5 MMOL/L
PROT SERPL-MCNC: 7.9 G/DL
PROTHROMBIN TIME: 10.5 SEC
RBC # BLD AUTO: 4.38 M/UL
SODIUM SERPL-SCNC: 132 MMOL/L
TROPONIN I SERPL DL<=0.01 NG/ML-MCNC: <0.006 NG/ML
TROPONIN I SERPL DL<=0.01 NG/ML-MCNC: <0.006 NG/ML
WBC # BLD AUTO: 19.88 K/UL

## 2018-08-27 PROCEDURE — 99285 EMERGENCY DEPT VISIT HI MDM: CPT

## 2018-08-27 PROCEDURE — 96374 THER/PROPH/DIAG INJ IV PUSH: CPT

## 2018-08-27 PROCEDURE — 93005 ELECTROCARDIOGRAM TRACING: CPT

## 2018-08-27 PROCEDURE — 80053 COMPREHEN METABOLIC PANEL: CPT

## 2018-08-27 PROCEDURE — 85730 THROMBOPLASTIN TIME PARTIAL: CPT

## 2018-08-27 PROCEDURE — 85379 FIBRIN DEGRADATION QUANT: CPT

## 2018-08-27 PROCEDURE — 83690 ASSAY OF LIPASE: CPT

## 2018-08-27 PROCEDURE — 63600175 PHARM REV CODE 636 W HCPCS: Performed by: EMERGENCY MEDICINE

## 2018-08-27 PROCEDURE — 25500020 PHARM REV CODE 255: Performed by: EMERGENCY MEDICINE

## 2018-08-27 PROCEDURE — 81000 URINALYSIS NONAUTO W/SCOPE: CPT

## 2018-08-27 PROCEDURE — 93010 ELECTROCARDIOGRAM REPORT: CPT | Mod: 76,,, | Performed by: INTERNAL MEDICINE

## 2018-08-27 PROCEDURE — 83880 ASSAY OF NATRIURETIC PEPTIDE: CPT

## 2018-08-27 PROCEDURE — 84484 ASSAY OF TROPONIN QUANT: CPT

## 2018-08-27 PROCEDURE — 85025 COMPLETE CBC W/AUTO DIFF WBC: CPT

## 2018-08-27 PROCEDURE — 85610 PROTHROMBIN TIME: CPT

## 2018-08-27 PROCEDURE — 96376 TX/PRO/DX INJ SAME DRUG ADON: CPT

## 2018-08-27 RX ORDER — MORPHINE SULFATE 4 MG/ML
2 INJECTION, SOLUTION INTRAMUSCULAR; INTRAVENOUS
Status: COMPLETED | OUTPATIENT
Start: 2018-08-27 | End: 2018-08-27

## 2018-08-27 RX ADMIN — IOHEXOL 75 ML: 350 INJECTION, SOLUTION INTRAVENOUS at 07:08

## 2018-08-27 RX ADMIN — MORPHINE SULFATE 2 MG: 4 INJECTION INTRAVENOUS at 08:08

## 2018-08-27 RX ADMIN — MORPHINE SULFATE 2 MG: 4 INJECTION INTRAVENOUS at 06:08
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
## 2018-08-28 PROBLEM — R91.8 ABNORMAL CT LUNG SCREENING: Status: ACTIVE | Noted: 2018-08-28

## 2018-08-28 PROBLEM — K76.0 FATTY LIVER: Status: ACTIVE | Noted: 2018-08-28

## 2018-08-28 PROBLEM — J84.9 ILD (INTERSTITIAL LUNG DISEASE): Status: ACTIVE | Noted: 2018-08-28

## 2018-08-28 PROBLEM — R10.12 LUQ ABDOMINAL PAIN: Status: ACTIVE | Noted: 2018-08-28

## 2018-08-28 PROBLEM — R07.89 CHEST WALL PAIN: Status: ACTIVE | Noted: 2018-08-28

## 2018-08-28 PROBLEM — D72.829 LEUKOCYTOSIS: Status: ACTIVE | Noted: 2018-08-28

## 2018-08-28 PROBLEM — N13.30 HYDRONEPHROSIS OF RIGHT KIDNEY: Status: ACTIVE | Noted: 2018-08-28

## 2018-08-28 LAB
BASOPHILS # BLD AUTO: 0.01 K/UL
BASOPHILS NFR BLD: 0.1 %
BILIRUB UR QL STRIP: NEGATIVE
CLARITY UR: CLEAR
COLOR UR: ABNORMAL
DIFFERENTIAL METHOD: ABNORMAL
EOSINOPHIL # BLD AUTO: 0 K/UL
EOSINOPHIL NFR BLD: 0.1 %
ERYTHROCYTE [DISTWIDTH] IN BLOOD BY AUTOMATED COUNT: 12.7 %
GLUCOSE UR QL STRIP: ABNORMAL
HCT VFR BLD AUTO: 37.6 %
HGB BLD-MCNC: 12.7 G/DL
HGB UR QL STRIP: ABNORMAL
KETONES UR QL STRIP: ABNORMAL
LEUKOCYTE ESTERASE UR QL STRIP: NEGATIVE
LYMPHOCYTES # BLD AUTO: 0.6 K/UL
LYMPHOCYTES NFR BLD: 5 %
MCH RBC QN AUTO: 30.8 PG
MCHC RBC AUTO-ENTMCNC: 33.8 G/DL
MCV RBC AUTO: 91 FL
MICROSCOPIC COMMENT: ABNORMAL
MONOCYTES # BLD AUTO: 1 K/UL
MONOCYTES NFR BLD: 8.2 %
NEUTROPHILS # BLD AUTO: 11 K/UL
NEUTROPHILS NFR BLD: 86.4 %
NITRITE UR QL STRIP: NEGATIVE
PH UR STRIP: 6 [PH] (ref 5–8)
PLATELET # BLD AUTO: 343 K/UL
PMV BLD AUTO: 9.9 FL
PROT UR QL STRIP: NEGATIVE
RBC # BLD AUTO: 4.13 M/UL
RBC #/AREA URNS HPF: 5 /HPF (ref 0–4)
SP GR UR STRIP: >1.03 (ref 1–1.03)
URN SPEC COLLECT METH UR: ABNORMAL
UROBILINOGEN UR STRIP-ACNC: NEGATIVE EU/DL
WBC # BLD AUTO: 12.74 K/UL
WBC #/AREA URNS HPF: 1 /HPF (ref 0–5)

## 2018-08-28 PROCEDURE — 27000221 HC OXYGEN, UP TO 24 HOURS

## 2018-08-28 PROCEDURE — 94761 N-INVAS EAR/PLS OXIMETRY MLT: CPT

## 2018-08-28 PROCEDURE — 21400001 HC TELEMETRY ROOM

## 2018-08-28 PROCEDURE — 87086 URINE CULTURE/COLONY COUNT: CPT

## 2018-08-28 PROCEDURE — 25000003 PHARM REV CODE 250: Performed by: EMERGENCY MEDICINE

## 2018-08-28 PROCEDURE — 36415 COLL VENOUS BLD VENIPUNCTURE: CPT

## 2018-08-28 PROCEDURE — 99223 1ST HOSP IP/OBS HIGH 75: CPT | Mod: ,,, | Performed by: UROLOGY

## 2018-08-28 PROCEDURE — 99214 OFFICE O/P EST MOD 30 MIN: CPT | Mod: ,,, | Performed by: INTERNAL MEDICINE

## 2018-08-28 PROCEDURE — 85025 COMPLETE CBC W/AUTO DIFF WBC: CPT

## 2018-08-28 RX ORDER — AMLODIPINE BESYLATE 5 MG/1
10 TABLET ORAL DAILY
Status: DISCONTINUED | OUTPATIENT
Start: 2018-08-28 | End: 2018-08-29 | Stop reason: HOSPADM

## 2018-08-28 RX ORDER — SODIUM CHLORIDE 0.9 % (FLUSH) 0.9 %
3 SYRINGE (ML) INJECTION
Status: DISCONTINUED | OUTPATIENT
Start: 2018-08-28 | End: 2018-08-29 | Stop reason: HOSPADM

## 2018-08-28 RX ORDER — METOPROLOL TARTRATE 50 MG/1
50 TABLET ORAL 2 TIMES DAILY
Status: DISCONTINUED | OUTPATIENT
Start: 2018-08-28 | End: 2018-08-29 | Stop reason: HOSPADM

## 2018-08-28 RX ORDER — LOSARTAN POTASSIUM 25 MG/1
50 TABLET ORAL DAILY
Status: DISCONTINUED | OUTPATIENT
Start: 2018-08-28 | End: 2018-08-29 | Stop reason: HOSPADM

## 2018-08-28 RX ORDER — MORPHINE SULFATE 4 MG/ML
2 INJECTION, SOLUTION INTRAMUSCULAR; INTRAVENOUS EVERY 4 HOURS PRN
Status: DISCONTINUED | OUTPATIENT
Start: 2018-08-28 | End: 2018-08-29 | Stop reason: HOSPADM

## 2018-08-28 RX ORDER — ATORVASTATIN CALCIUM 40 MG/1
40 TABLET, FILM COATED ORAL DAILY
Status: DISCONTINUED | OUTPATIENT
Start: 2018-08-28 | End: 2018-08-29 | Stop reason: HOSPADM

## 2018-08-28 RX ORDER — HYDROCODONE BITARTRATE AND ACETAMINOPHEN 5; 325 MG/1; MG/1
1 TABLET ORAL EVERY 4 HOURS PRN
Status: DISCONTINUED | OUTPATIENT
Start: 2018-08-28 | End: 2018-08-29 | Stop reason: HOSPADM

## 2018-08-28 RX ORDER — FAMOTIDINE 20 MG/1
20 TABLET, FILM COATED ORAL 2 TIMES DAILY
Status: DISCONTINUED | OUTPATIENT
Start: 2018-08-28 | End: 2018-08-29 | Stop reason: HOSPADM

## 2018-08-28 RX ORDER — ACETAMINOPHEN 325 MG/1
650 TABLET ORAL EVERY 6 HOURS PRN
Status: DISCONTINUED | OUTPATIENT
Start: 2018-08-28 | End: 2018-08-29 | Stop reason: HOSPADM

## 2018-08-28 RX ORDER — FUROSEMIDE 20 MG/1
20 TABLET ORAL DAILY
Status: DISCONTINUED | OUTPATIENT
Start: 2018-08-28 | End: 2018-08-29 | Stop reason: HOSPADM

## 2018-08-28 RX ORDER — LEVOTHYROXINE SODIUM 75 UG/1
75 TABLET ORAL
Status: DISCONTINUED | OUTPATIENT
Start: 2018-08-28 | End: 2018-08-29 | Stop reason: HOSPADM

## 2018-08-28 RX ADMIN — HYDROCODONE BITARTRATE AND ACETAMINOPHEN 1 TABLET: 5; 325 TABLET ORAL at 05:08

## 2018-08-28 RX ADMIN — HYDROCODONE BITARTRATE AND ACETAMINOPHEN 1 TABLET: 5; 325 TABLET ORAL at 08:08

## 2018-08-28 RX ADMIN — ATORVASTATIN CALCIUM 40 MG: 40 TABLET, FILM COATED ORAL at 08:08

## 2018-08-28 RX ADMIN — LOSARTAN POTASSIUM 50 MG: 25 TABLET, FILM COATED ORAL at 08:08

## 2018-08-28 RX ADMIN — HYDROCODONE BITARTRATE AND ACETAMINOPHEN 1 TABLET: 5; 325 TABLET ORAL at 12:08

## 2018-08-28 RX ADMIN — AMLODIPINE BESYLATE 10 MG: 5 TABLET ORAL at 08:08

## 2018-08-28 RX ADMIN — FAMOTIDINE 20 MG: 20 TABLET ORAL at 08:08

## 2018-08-28 RX ADMIN — FUROSEMIDE 20 MG: 20 TABLET ORAL at 08:08

## 2018-08-28 RX ADMIN — METOPROLOL TARTRATE 50 MG: 50 TABLET ORAL at 08:08

## 2018-08-28 RX ADMIN — APIXABAN 2.5 MG: 2.5 TABLET, FILM COATED ORAL at 08:08

## 2018-08-28 RX ADMIN — LEVOTHYROXINE SODIUM 75 MCG: 75 TABLET ORAL at 05:08

## 2018-08-28 RX ADMIN — HYDROCODONE BITARTRATE AND ACETAMINOPHEN 1 TABLET: 5; 325 TABLET ORAL at 03:08

## 2018-08-28 NOTE — PLAN OF CARE
"TN met with patient at bedside to complete discharge needs assessment. TN explained duties of case management to patient.  TN reviewed  "[facility]", "Discharge Planning Begins on Admission"  and discussed "Help at Home". Patient lives with  Jovon whom will assist patient at home. Patient plans to return home when ready for discharge. TN also discussed her responsibilities to manage her health at home. Patient was informed to leave folder at bedside during hospital stay. Contact information added to white board.    Patient Preferred Pharmacy:   [facility] [address] [address]  Phone: [phone] Fax: [phone]      Appointment Time Preference: mornings       08/28/18 1445   Discharge Assessment   Assessment Type Discharge Planning Assessment   Confirmed/corrected address and phone number on facesheet? Yes   Assessment information obtained from? Patient   Prior to hospitilization cognitive status: Alert/Oriented   Prior to hospitalization functional status: Independent   Current cognitive status: Alert/Oriented   Current Functional Status: Independent   Lives With spouse   Is patient able to care for self after discharge? Yes   Who are your caregiver(s) and their phone number(s)? Jovon- spouse@ [phone]   Patient's perception of discharge disposition home or selfcare   Readmission Within The Last 30 Days no previous admission in last 30 days   Patient currently being followed by outpatient case management? No   Patient currently receives any other outside agency services? No   Equipment Currently Used at Home none   Do you have any problems affording any of your prescribed medications? No   Is the patient taking medications as prescribed? yes   Does the patient have transportation home? Yes   Transportation Available car;family or friend will provide   Does the patient receive services at the [facility]? No  (Eliquis ) "   Discharge Plan A Home;Home with family   Discharge Plan B Home;Home with family   Patient/Family In Agreement With Plan yes   Does the patient have transportation to healthcare appointments? Yes

## 2018-08-28 NOTE — PLAN OF CARE
Problem: Infection, Risk/Actual (Adult)  Intervention: Manage Suspected/Actual Infection   08/28/18 1210   Prevent/Manage Colorectal Surgical Infection   Fever Reduction/Comfort Measures medication administered   Safety Interventions   Isolation Precautions environmental surveillance   Infection Management aseptic technique maintained       Goal: Identify Related Risk Factors and Signs and Symptoms  Related risk factors and signs and symptoms are identified upon initiation of Human Response Clinical Practice Guideline (CPG)  Outcome: Ongoing (interventions implemented as appropriate)   08/28/18 1210   Infection, Risk/Actual   Related Risk Factors (Infection, Risk/Actual) age extremes   Signs and Symptoms (Infection, Risk/Actual) body temperature changes;skin cool/clammy;lab value changes     Goal: Infection Prevention/Resolution  Patient will demonstrate the desired outcomes by discharge/transition of care.  Outcome: Ongoing (interventions implemented as appropriate)   08/28/18 1210   Infection, Risk/Actual (Adult)   Infection Prevention/Resolution making progress toward outcome

## 2018-08-28 NOTE — ASSESSMENT & PLAN NOTE
CT reviewed with radiologist, Dr. Lopez.  Filling defect can only be appreciated on series 2, image 22.  Unable to appreciate filling defect on sagital, coronal cuts or other level on sagital view.   These findings suggest that filling defect is artifactual.  Additionally, patient is on chronic eloquis which is also effective in prevent thromboembolic event.  From pulmonary perspective, patient is doing well on room air.  Would not recommend additional testing.  Continue with eloquis for atrial fibrillation.
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
## 2018-08-28 NOTE — SUBJECTIVE & OBJECTIVE
Past Medical History:   Diagnosis Date    Anticoagulant long-term use     Eliquis    Arthritis     Atrial fibrillation     Blood transfusion     Carotid stenosis     Carotid stenosis     CHF (congestive heart failure)     Edema     [place] (hard of hearing)     Hypercholesterolemia     Hypertension     Psychiatric problem     Thyroid disease        Past Surgical History:   Procedure Laterality Date    CARPAL TUNNEL RELEASE  2012    right hand    HYSTERECTOMY      left carotid endartectomy  5/2006    TONSILLECTOMY         Review of patient's allergies indicates:   Allergen Reactions    Hydrochlorothiazide Other (See Comments)     hyponatremia    Strawberry Swelling     Tongue swells up and a generalized rash.       Family History     Problem Relation (Age of Onset)    Breast cancer Daughter (50)    Cancer Brother (65), Sister (81), Sister, Sister    Heart disease Father    Ovarian cancer Sister (81)    Schizophrenia Son          Tobacco Use    Smoking status: Never Smoker    Smokeless tobacco: Never Used   Substance and Sexual Activity    Alcohol use: No    Drug use: No    Sexual activity: No     Partners: Male       Review of Systems   Constitutional: Negative.  Negative for fever.   HENT: Negative.    Eyes: Negative.    Respiratory: Negative for cough, chest tightness and shortness of breath.    Cardiovascular: Negative for chest pain.   Gastrointestinal: Negative.  Negative for constipation, diarrhea and nausea.   Genitourinary: Positive for frequency and nocturia. Negative for difficulty urinating and flank pain.   Musculoskeletal: Negative.    Neurological: Negative.    Psychiatric/Behavioral: Negative.        Objective:     Temp:  [96.4 °F (35.8 °C)-98.5 °F (36.9 °C)] 96.4 °F (35.8 °C)  Pulse:  [] 94  Resp:  [16-20] 16  SpO2:  [82 %-97 %] 91 %  BP: (133-215)/(60-90) 133/60     Body mass index is 21.95 kg/m².            Drains          None          Physical Exam   Nursing note and  vitals reviewed.  Constitutional: She is oriented to person, place, and time. She appears well-developed.   HENT:   Head: Normocephalic.   Eyes: Conjunctivae are normal.   Neck: Normal range of motion. No tracheal deviation present. No thyromegaly present.   Cardiovascular: Normal rate, normal heart sounds and normal pulses.    Pulmonary/Chest: Effort normal and breath sounds normal. No respiratory distress. She has no wheezes.   Abdominal: Soft. She exhibits no distension and no mass. There is no hepatosplenomegaly. There is no tenderness. There is no rebound, no guarding and no CVA tenderness. No hernia.   Musculoskeletal: Normal range of motion. She exhibits no edema or tenderness.   Lymphadenopathy:     She has no cervical adenopathy.   Neurological: She is alert and oriented to person, place, and time.   Skin: Skin is warm and dry. No rash noted. No erythema.     Psychiatric: She has a normal mood and affect. Her behavior is normal. Judgment and thought content normal.       Significant Labs:    BMP:  Recent Labs   Lab  08/27/18   1816   NA  132*   K  3.5   CL  96   CO2  22*   BUN  20   CREATININE  0.8   CALCIUM  10.0       CBC:  Recent Labs   Lab  08/27/18   1816   WBC  19.88*   HGB  13.4   HCT  39.9   PLT  363*       Blood Culture: No results for input(s): LABBLOO in the last 168 hours.  Urine Culture: No results for input(s): LABURIN in the last 168 hours.    Significant Imaging:  CT: I have reviewed all results within the past 24 hours and my personal findings are:  mild right hydronephrosis and hydroureter down to bladder.  No obvious stones.  Bladder on CT was distended with somewhat thicken bladder wall.
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
## 2018-08-28 NOTE — PLAN OF CARE
Problem: Fall Risk (Adult)  Goal: Absence of Falls  Patient will demonstrate the desired outcomes by discharge/transition of care.  Outcome: Ongoing (interventions implemented as appropriate)   08/28/18 0427   Fall Risk (Adult)   Absence of Falls making progress toward outcome       Problem: Patient Care Overview  Goal: Plan of Care Review  Outcome: Ongoing (interventions implemented as appropriate)   08/28/18 0427   Coping/Psychosocial   Plan Of Care Reviewed With patient       Problem: Pain, Acute (Adult)  Goal: Acceptable Pain Control/Comfort Level  Patient will demonstrate the desired outcomes by discharge/transition of care.  Outcome: Ongoing (interventions implemented as appropriate)   08/28/18 0427   Pain, Acute (Adult)   Acceptable Pain Control/Comfort Level making progress toward outcome

## 2018-08-28 NOTE — ASSESSMENT & PLAN NOTE
Mosaic attenuation bilaterally.  May related to vascular congestion a/w pulmonary htn.  Clinically asymptomatic.  Would recommend pft and 6 min walk as an outpatient.
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
## 2018-08-28 NOTE — ASSESSMENT & PLAN NOTE
Place Ta  Check DONALD in AM to see if hydro persists.    She is relatively asymptomatic, no flank pain or azotemia.  Will hold on any  procedures for now  Urine culture with Ta placement.
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
## 2018-08-28 NOTE — SUBJECTIVE & OBJECTIVE
Past Medical History:   Diagnosis Date    Anticoagulant long-term use     Eliquis    Arthritis     Atrial fibrillation     Blood transfusion     Carotid stenosis     Carotid stenosis     CHF (congestive heart failure)     Edema     [place] (hard of hearing)     Hypercholesterolemia     Hypertension     Psychiatric problem     Thyroid disease        Past Surgical History:   Procedure Laterality Date    CARPAL TUNNEL RELEASE  2012    right hand    HYSTERECTOMY      left carotid endartectomy  5/2006    TONSILLECTOMY         Review of patient's allergies indicates:   Allergen Reactions    Hydrochlorothiazide Other (See Comments)     hyponatremia    Strawberry Swelling     Tongue swells up and a generalized rash.       No current facility-administered medications on file prior to encounter.      Current Outpatient Medications on File Prior to Encounter   Medication Sig    acetaminophen (TYLENOL) 325 MG tablet Take 325 mg by mouth 2 (two) times daily.    alendronate (FOSAMAX) 70 MG tablet Take 70 mg by mouth every 7 days.    amlodipine-atorvastatin (CADUET) 10-40 mg per tablet Take 1 tablet by mouth once daily.    apixaban 2.5 mg Tab Take 1 tablet (2.5 mg total) by mouth 2 (two) times daily.    furosemide (LASIX) 20 MG tablet Take 1 tablet (20 mg total) by mouth once daily.    levothyroxine (SYNTHROID) 75 MCG tablet Take 75 mcg by mouth once daily.    losartan (COZAAR) 50 MG tablet Take 1 tablet (50 mg total) by mouth once daily.    metoprolol tartrate (LOPRESSOR) 50 MG tablet Take 1 tablet (50 mg total) by mouth 2 (two) times daily.    ranitidine (ZANTAC) 150 MG tablet Take 150 mg by mouth with lunch.    aluminum-magnesium hydroxide-simethicone (MAALOX) 200-200-20 mg/5 mL Susp Take 30 mLs by mouth every 4 (four) hours as needed.    VIT A,C & E/LUTEIN/MINERALS (VISION FORMULA, WITH LUTEIN, ORAL) Take 1 tablet by mouth once daily.     Family History     Problem Relation (Age of Onset)    Breast  cancer Daughter (50)    Cancer Brother (65), Sister (81), Sister, Sister    Heart disease Father    Ovarian cancer Sister (81)    Schizophrenia Son        Tobacco Use    Smoking status: Never Smoker    Smokeless tobacco: Never Used   Substance and Sexual Activity    Alcohol use: No    Drug use: No    Sexual activity: No     Partners: Male     Review of Systems   Improved since admit - chest pain much lessened  No acute dyspnea  She is appearing much as her regular self    Objective:     Vital Signs (Most Recent):  Temp: 97 °F (36.1 °C) (08/28/18 1110)  Pulse: 74 (08/28/18 1110)  Resp: 16 (08/28/18 1110)  BP: (!) 152/63 (08/28/18 1110)  SpO2: (!) 91 % (08/28/18 1110) Vital Signs (24h Range):  Temp:  [96.4 °F (35.8 °C)-98.5 °F (36.9 °C)] 97 °F (36.1 °C)  Pulse:  [] 74  Resp:  [16-20] 16  SpO2:  [82 %-97 %] 91 %  BP: (133-215)/(60-90) 152/63     Weight: 44.4 kg (97 lb 14.2 oz)  Body mass index is 21.95 kg/m².    Physical Exam   awake, alert, conversant  No acute distress  No carotid bruits  She does not have the level of chest wall tenderness that she had at presentation  Lungs sound ok, but not taking deep breaths  Heart rrr  abd soft, BS+.  Nontender  Legs without edema     Significant Labs:   WBC 12  hct 37  D dimer 5.4  GFR >60  Trop <0.006      Significant Imaging:   Ct head:  There is generalized cerebral volume loss.  There is hypoattenuation in a periventricular fashion, likely sequela of chronic microvascular ischemic change. There is no evidence of acute major vascular territory infarct, hemorrhage, or mass.  There is no hydrocephalus.  There are no abnormal extra-axial fluid collections.  The paranasal sinuses and mastoid air cells are clear, and there is no evidence of calvarial fracture.  The visualized soft tissues are unremarkable.    Ct chest:  No significant mediastinal lymphadenopathy.  The heart is prominent.  There is calcification in the distribution of the coronary arteries.  No  significant pericardial effusion.  There is a small hiatal hernia.  Limited evaluation of the visualized portions of the left kidney, pancreas, and gallbladder are grossly unremarkable.  The liver is hypoattenuating, could reflect steatosis, correlation with LFTs recommended.    The airways are grossly patent allowing for motion.  The pulmonary parenchyma is suboptimally evaluated secondary to motion artifact.  There is scattered patchy ground-glass attenuation, primarily in a perihilar distribution.  This appears less extensive than on the previous examination and remains nonspecific.  There is interlobular septal thickening.  No large focal consolidation.  There is bilateral basilar dependent atelectasis.  No pneumothorax.  No significant volume of pleural fluid.  There is left basilar atelectasis or scarring.    Although the examination is not optimized for the evaluation of the pulmonary arteries, no large central filling defect within the left or right main pulmonary arteries.  There is questionable filling defect within a lobar artery to the left upper lobe versus artifact, clinical correlation advised.    Degenerative changes are noted of the thoracic spine without focal destructive process.  Degenerative changes are noted of the left shoulder, with superimposed remote posttraumatic change.  No significant axillary lymphadenopathy.    Ct abd:  Images of the lower thorax are of limited diagnostic quality secondary to extensive respiratory motion.  There is patchy ground-glass attenuation within the bilateral lower lobes, may reflect sequela of motion and/or atelectasis, superimposed edema is not excluded.  The heart is prominent without pericardial effusion.    Evaluation of the abdomen and pelvis is limited secondary to patient motion.    The liver, spleen, pancreas and right adrenal gland are grossly unremarkable.  There may be thickening of the left adrenal gland.  There is cholelithiasis.  The pancreatic  duct is not dilated, the common duct is not dilated.  The stomach is decompressed.  No significant abdominal lymphadenopathy.    There is minimal prominence of the left renal collecting system, no left hydronephrosis or left nephrolithiasis.  The left ureter is unable to be followed to the urinary bladder.  The right kidney is enlarged as compared to the left.  There is mild right hydronephrosis with mild prominence of the right ureter.  The right ureter cannot be followed in its entirety to the urinary bladder.  The urinary bladder is mildly distended without wall thickening.  The uterus is absent the adnexa is unremarkable.    There are several scattered colonic diverticula without colonic wall thickening or pericolonic inflammation.  There is moderate stool in the colon.  The terminal ileum is grossly unremarkable.  The appendix is not confidently identified, no pericecal inflammation.  The small bowel is grossly unremarkable noting slow flow through several distal loops without ori bowel dilation.    Degenerative changes are noted of the hips, sacroiliac joints, and lumbar spine.  There is osteopenia.  There is dextroscoliotic curvature of the spine.  No significant inguinal lymphadenopathy.  There is atherosclerotic calcification of the aorta and its branches.    Cxr:  The cardiomediastinal silhouette is prominent, similar to the previous exam, noting calcification of the aorta..  There is slight obscuration of the left costophrenic angle, likely related to overlying soft tissue attenuation rather than fluid..  The trachea is midline.  The lungs are symmetrically expanded bilaterally with coarse interstitial attenuation, may reflect interstitial edema or chronic change.  There is bilateral basilar subsegmental atelectasis..  No large focal consolidation seen.  There is no pneumothorax.  The osseous structures are remarkable for osteopenia and degenerative changes..

## 2018-08-28 NOTE — ASSESSMENT & PLAN NOTE
19K  Possible developing pulm infiltrate  She is afebrile - will hold off on abx until clear indication (or rec by consultants)

## 2018-08-28 NOTE — PROGRESS NOTES
Report received from off going nurse, YINKA Moeller. Patient AAO. No signs of distress noted. Call light in reach. Bed low and locked. Will continue to monitor.
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
## 2018-08-28 NOTE — ASSESSMENT & PLAN NOTE
1.  Abnormal filling of L lobar artery   Already on Eliquis  pulm input appreciated; felt to be artifactual  She has elevated D dimer    2.  Ground glass throughout  Chronic  pulm rec PfT and 6-min-walk test as OP

## 2018-08-28 NOTE — UM SECONDARY REVIEW
Physician Advisor External    Level of Care Issue     Pt will have renal US tomorrow. Will go past 2nd midnight. Sent to EHR     Approved Inpatient     IP determination by Dr. Twyla Hutchins at EHR

## 2018-08-28 NOTE — SUBJECTIVE & OBJECTIVE
Past Medical History:   Diagnosis Date    Anticoagulant long-term use     Eliquis    Arthritis     Atrial fibrillation     Blood transfusion     Carotid stenosis     Carotid stenosis     CHF (congestive heart failure)     Edema     [place] (hard of hearing[place]     Hypercholesterolemia     Hypertension     Psychiatric problem     Thyroid disease        Past Surgical History:   Procedure Laterality Date    CARPAL TUNNEL RELEASE  2012    right hand    HYSTERECTOMY      left carotid endartectomy  5/2006    TONSILLECTOMY         Review of patient's allergies indicates:   Allergen Reactions    Hydrochlorothiazide Other (See Comments)     hyponatremia    Strawberry Swelling     Tongue swells up and a generalized rash.       Family History     Problem Relation (Age of Onset)    Breast cancer Daughter (50)    Cancer Brother (65), Sister (81), Sister, Sister    Heart disease Father    Ovarian cancer Sister (81)    Schizophrenia Son        Tobacco Use    Smoking status: Never Smoker    Smokeless tobacco: Never Used   Substance and Sexual Activity    Alcohol use: No    Drug use: No    Sexual activity: No     Partners: Male         Review of Systems   Constitutional: Negative.    HENT: Positive for hearing loss.    Eyes: Negative.    Respiratory:        Per hpi   Cardiovascular:        Per hpi   Gastrointestinal: Negative.    Endocrine: Negative.    Genitourinary:        Ta in place   Musculoskeletal: Positive for arthralgias, joint swelling and neck stiffness.   Skin: Negative.    Allergic/Immunologic: Negative.    Neurological: Negative.    Psychiatric/Behavioral: Negative.      Objective:     Vital Signs (Most Recent):  Temp: 97 °F (36.1 °C) (08/28/18 1110)  Pulse: 74 (08/28/18 1110)  Resp: 16 (08/28/18 1110)  BP: (!) 152/63 (08/28/18 1110)  SpO2: (!) 91 % (08/28/18 1110) Vital Signs (24h Range):  Temp:  [96.4 °F (35.8 °C)-98.5 °F (36.9 °C)] 97 °F (36.1 °C)  Pulse:  [] 74  Resp:  [16-20] 16  SpO2:   [82 %-97 %] 91 %  BP: (133-215)/(60-90) 152/63     Weight: 44.4 kg (97 lb 14.2 oz)  Body mass index is 21.95 kg/m².      Intake/Output Summary (Last 24 hours) at 8/28/2018 1227  Last data filed at 8/28/2018 0600  Gross per 24 hour   Intake 240 ml   Output --   Net 240 ml       Physical Exam   Constitutional: She is oriented to person, place, and time. She appears well-developed.   HENT:   Head: Normocephalic and atraumatic.   Nose: Nose normal.   Mouth/Throat: Oropharynx is clear and moist.   Eyes: EOM are normal. Pupils are equal, round, and reactive to light.   Neck: Normal range of motion.   Cardiovascular: Normal rate, regular rhythm and normal heart sounds.   Pulmonary/Chest: Effort normal and breath sounds normal. No respiratory distress. She has no wheezes. She has no rales. She exhibits no tenderness.   Abdominal: Soft. Bowel sounds are normal. She exhibits no mass. There is no rebound and no guarding.   Genitourinary:   Genitourinary Comments: Ta in place   Musculoskeletal: She exhibits no edema or tenderness.   Neurological: She is alert and oriented to person, place, and time. No cranial nerve deficit. Coordination normal.   Skin: No rash noted. No erythema. No pallor.   Psychiatric: She has a normal mood and affect. Her behavior is normal.       Vents:       Lines/Drains/Airways     Drain                 Urethral Catheter 08/28/18 0948 less than 1 day          Peripheral Intravenous Line                 Peripheral IV - Single Lumen 08/27/18 1614 Left Wrist less than 1 day                Significant Labs:    CBC/Anemia Profile:  Recent Labs   Lab  08/27/18   1816  08/28/18   0751   WBC  19.88*  12.74*   HGB  13.4  12.7   HCT  39.9  37.6   PLT  363*  343   MCV  91  91   RDW  12.7  12.7        Chemistries:  Recent Labs   Lab  08/27/18   1816   NA  132*   K  3.5   CL  96   CO2  22*   BUN  20   CREATININE  0.8   CALCIUM  10.0   ALBUMIN  4.4   PROT  7.9   BILITOT  1.1*   ALKPHOS  90   ALT  16   AST  23        None    Significant Imaging:   CT: I have reviewed all pertinent results/findings within the past 24 hours and my personal findings are:  filling defect pulmonary vein branching to dashawn.  no effusion.  Patchy ggo bilaterally     Ct abd mild right hydronephrosis.    D-dimer 5.43

## 2018-08-28 NOTE — PLAN OF CARE
08/28/18 1509   Medicare Message   Important Message from Medicare regarding Discharge Appeal Rights Given to patient/caregiver;Explained to patient/caregiver;Signed/date by patient/caregiver   Date IMM was signed 08/28/18  (n)   Time IMM was signed 1507   Copy Provided to patient.

## 2018-08-29 ENCOUNTER — ANESTHESIA EVENT (OUTPATIENT)
Dept: ENDOSCOPY | Facility: HOSPITAL | Age: 83
DRG: 694 | End: 2018-08-29
Payer: MEDICARE

## 2018-08-29 ENCOUNTER — ANESTHESIA (OUTPATIENT)
Dept: ENDOSCOPY | Facility: HOSPITAL | Age: 83
DRG: 694 | End: 2018-08-29
Payer: MEDICARE

## 2018-08-29 VITALS
SYSTOLIC BLOOD PRESSURE: 156 MMHG | TEMPERATURE: 98 F | HEART RATE: 96 BPM | RESPIRATION RATE: 17 BRPM | WEIGHT: 97.88 LBS | DIASTOLIC BLOOD PRESSURE: 69 MMHG | OXYGEN SATURATION: 96 % | BODY MASS INDEX: 22.02 KG/M2 | HEIGHT: 56 IN

## 2018-08-29 LAB
BASOPHILS # BLD AUTO: 0.02 K/UL
BASOPHILS NFR BLD: 0.2 %
DIFFERENTIAL METHOD: ABNORMAL
EOSINOPHIL # BLD AUTO: 0.2 K/UL
EOSINOPHIL NFR BLD: 1.3 %
ERYTHROCYTE [DISTWIDTH] IN BLOOD BY AUTOMATED COUNT: 12.8 %
HCT VFR BLD AUTO: 37.5 %
HGB BLD-MCNC: 13.2 G/DL
INR PPP: 1
LYMPHOCYTES # BLD AUTO: 1.2 K/UL
LYMPHOCYTES NFR BLD: 10.8 %
MCH RBC QN AUTO: 31.4 PG
MCHC RBC AUTO-ENTMCNC: 35.2 G/DL
MCV RBC AUTO: 89 FL
MONOCYTES # BLD AUTO: 1.2 K/UL
MONOCYTES NFR BLD: 11.1 %
NEUTROPHILS # BLD AUTO: 8.5 K/UL
NEUTROPHILS NFR BLD: 76.6 %
PLATELET # BLD AUTO: 308 K/UL
PMV BLD AUTO: 9.7 FL
PROTHROMBIN TIME: 10.4 SEC
RBC # BLD AUTO: 4.2 M/UL
WBC # BLD AUTO: 11.15 K/UL

## 2018-08-29 PROCEDURE — 0DB98ZX EXCISION OF DUODENUM, VIA NATURAL OR ARTIFICIAL OPENING ENDOSCOPIC, DIAGNOSTIC: ICD-10-PCS | Performed by: INTERNAL MEDICINE

## 2018-08-29 PROCEDURE — 25000003 PHARM REV CODE 250: Performed by: NURSE ANESTHETIST, CERTIFIED REGISTERED

## 2018-08-29 PROCEDURE — 63600175 PHARM REV CODE 636 W HCPCS: Performed by: NURSE ANESTHETIST, CERTIFIED REGISTERED

## 2018-08-29 PROCEDURE — 85610 PROTHROMBIN TIME: CPT

## 2018-08-29 PROCEDURE — D9220A PRA ANESTHESIA: Mod: CRNA,,, | Performed by: NURSE ANESTHETIST, CERTIFIED REGISTERED

## 2018-08-29 PROCEDURE — 88305 TISSUE EXAM BY PATHOLOGIST: CPT | Mod: 26,,, | Performed by: PATHOLOGY

## 2018-08-29 PROCEDURE — 36415 COLL VENOUS BLD VENIPUNCTURE: CPT

## 2018-08-29 PROCEDURE — 27201012 HC FORCEPS, HOT/COLD, DISP: Performed by: INTERNAL MEDICINE

## 2018-08-29 PROCEDURE — D9220A PRA ANESTHESIA: Mod: ANES,,, | Performed by: ANESTHESIOLOGY

## 2018-08-29 PROCEDURE — 85025 COMPLETE CBC W/AUTO DIFF WBC: CPT

## 2018-08-29 PROCEDURE — 43239 EGD BIOPSY SINGLE/MULTIPLE: CPT | Performed by: INTERNAL MEDICINE

## 2018-08-29 PROCEDURE — 94761 N-INVAS EAR/PLS OXIMETRY MLT: CPT

## 2018-08-29 PROCEDURE — 37000008 HC ANESTHESIA 1ST 15 MINUTES: Performed by: INTERNAL MEDICINE

## 2018-08-29 PROCEDURE — 25000003 PHARM REV CODE 250: Performed by: EMERGENCY MEDICINE

## 2018-08-29 PROCEDURE — 99232 SBSQ HOSP IP/OBS MODERATE 35: CPT | Mod: ,,, | Performed by: UROLOGY

## 2018-08-29 PROCEDURE — 37000009 HC ANESTHESIA EA ADD 15 MINS: Performed by: INTERNAL MEDICINE

## 2018-08-29 PROCEDURE — 88305 TISSUE EXAM BY PATHOLOGIST: CPT | Performed by: PATHOLOGY

## 2018-08-29 PROCEDURE — 27000221 HC OXYGEN, UP TO 24 HOURS

## 2018-08-29 RX ORDER — ETOMIDATE 2 MG/ML
INJECTION INTRAVENOUS
Status: COMPLETED
Start: 2018-08-29 | End: 2018-08-29

## 2018-08-29 RX ORDER — LIDOCAINE HCL/PF 100 MG/5ML
SYRINGE (ML) INTRAVENOUS
Status: DISCONTINUED | OUTPATIENT
Start: 2018-08-29 | End: 2018-08-29

## 2018-08-29 RX ORDER — PROPOFOL 10 MG/ML
VIAL (ML) INTRAVENOUS
Status: DISCONTINUED
Start: 2018-08-29 | End: 2018-08-29 | Stop reason: HOSPADM

## 2018-08-29 RX ORDER — LIDOCAINE HCL/PF 100 MG/5ML
SYRINGE (ML) INTRAVENOUS
Status: COMPLETED
Start: 2018-08-29 | End: 2018-08-29

## 2018-08-29 RX ORDER — ETOMIDATE 2 MG/ML
INJECTION INTRAVENOUS
Status: DISCONTINUED | OUTPATIENT
Start: 2018-08-29 | End: 2018-08-29

## 2018-08-29 RX ORDER — SODIUM CHLORIDE 9 MG/ML
INJECTION, SOLUTION INTRAVENOUS CONTINUOUS PRN
Status: DISCONTINUED | OUTPATIENT
Start: 2018-08-29 | End: 2018-08-29

## 2018-08-29 RX ORDER — PROPOFOL 10 MG/ML
VIAL (ML) INTRAVENOUS
Status: DISCONTINUED | OUTPATIENT
Start: 2018-08-29 | End: 2018-08-29

## 2018-08-29 RX ADMIN — HYDROCODONE BITARTRATE AND ACETAMINOPHEN 1 TABLET: 5; 325 TABLET ORAL at 12:08

## 2018-08-29 RX ADMIN — PROPOFOL 40 MG: 10 INJECTION, EMULSION INTRAVENOUS at 01:08

## 2018-08-29 RX ADMIN — PROPOFOL 20 MG: 10 INJECTION, EMULSION INTRAVENOUS at 01:08

## 2018-08-29 RX ADMIN — LEVOTHYROXINE SODIUM 75 MCG: 75 TABLET ORAL at 06:08

## 2018-08-29 RX ADMIN — HYDROCODONE BITARTRATE AND ACETAMINOPHEN 1 TABLET: 5; 325 TABLET ORAL at 04:08

## 2018-08-29 RX ADMIN — SODIUM CHLORIDE 1000 ML: 0.9 INJECTION, SOLUTION INTRAVENOUS at 01:08

## 2018-08-29 RX ADMIN — LIDOCAINE HYDROCHLORIDE 60 MG: 20 INJECTION, SOLUTION INTRAVENOUS at 01:08

## 2018-08-29 RX ADMIN — ETOMIDATE 4 MG: 2 INJECTION, SOLUTION INTRAVENOUS at 01:08

## 2018-08-29 RX ADMIN — ETOMIDATE 2 MG: 2 INJECTION, SOLUTION INTRAVENOUS at 01:08

## 2018-08-29 NOTE — [employer] PATIENT INSTRUCTIONS
Discharge Summary/Instructions after an Endoscopic Procedure  Patient Name: Jeannie Hutchins  Patient MRN: [id number]  Patient YOB: 1935  Wednesday, August 29, 2018  Oniel Dorsey MD  RESTRICTIONS:  During your procedure today, you received medications for sedation.  These   medications may affect your judgment, balance and coordination.  Therefore,   for 24 hours, you have the following restrictions:   - DO NOT drive a car, operate machinery, make legal/financial decisions,   sign important papers or drink alcohol.    ACTIVITY:  Today: no heavy lifting, straining or running due to procedural   sedation/anesthesia.  The following day: return to full activity including work.  DIET:  Eat and drink normally unless instructed otherwise.     TREATMENT FOR COMMON SIDE EFFECTS:  - Mild abdominal pain, nausea, belching, bloating or excessive gas:  rest,   eat lightly and use a heating pad.  - Sore Throat: treat with throat lozenges and/or gargle with warm salt   water.  - Because air was used during the procedure, expelling large amounts of air   from your rectum or belching is normal.  - If a bowel prep was taken, you may not have a bowel movement for 1-3 days.    This is normal.  SYMPTOMS TO WATCH FOR AND REPORT TO YOUR PHYSICIAN:  1. Abdominal pain or bloating, other than gas cramps.  2. Chest pain.  3. Back pain.  4. Signs of infection such as: chills or fever occurring within 24 hours   after the procedure.  5. Rectal bleeding, which would show as bright red, maroon, or black stools.   (A tablespoon of blood from the rectum is not serious, especially if   hemorrhoids are present.)  6. Vomiting.  7. Weakness or dizziness.  GO DIRECTLY TO THE NEAREST EMERGENCY ROOM IF YOU HAVE ANY OF THE FOLLOWING:      Difficulty breathing              Chills and/or fever over 101 F   Persistent vomiting and/or vomiting blood   Severe abdominal pain   Severe chest pain   Black, tarry stools   Bleeding- more than one  tablespoon   Any other symptom or condition that you feel may need urgent attention  Your doctor recommends these additional instructions:  If any biopsies were taken, your doctors clinic will contact you in 1 to 2   weeks with any results.  - Await pathology results.   - No ibuprofen, naproxen, or other non-steroidal anti-inflammatory drugs.   - Use Protonix (pantoprazole) 40 mg PO daily for 1 month.   - Her pain may be musculoskeletal.  She is being discharged.    Gastroenterology has nothing additional to add.  - Return patient to hospital ferrer for ongoing care.  For questions, problems or results please call your physician - Oniel Dorsey MD at Work:  [phone].  [facility] can be reached at [phone]     IF A COMPLICATION OR EMERGENCY SITUATION ARISES AND YOU ARE UNABLE TO REACH   YOUR PHYSICIAN - GO DIRECTLY TO THE EMERGENCY ROOM.  Oniel Dorsey MD  8/29/2018 1:54:17 PM  This report has been verified and signed electronically.  [employer]

## 2018-08-29 NOTE — ASSESSMENT & PLAN NOTE
19K  Possible developing pulm infiltrate  She is afebrile - will hold off on abx until clear indication (or rec by consultants)  Resolved - back to 11K

## 2018-08-29 NOTE — ASSESSMENT & PLAN NOTE
Along with bladder distention   consulted - david placed to drain;  Now resolved - remove david for dc home

## 2018-08-29 NOTE — HOSPITAL COURSE
1.   - david placed to ease distention and hopefully decrease hydronephrosis  2.  GI - fatty liver, but normal LFTs.  With LUQ pain and 20 lb weight loss over time, she is having EGD done.  3.  Pulm - not felt to have PE; CT findings felt to be artifact.  She is already on Eliquis.  Other findings not pneumonia (afebrile) but does have evidence of chronic interstitial disease.  WBC has returned to normal - 11K

## 2018-08-29 NOTE — TRANSFER OF CARE
"Anesthesia Transfer of Care Note    Patient: Jeannie Hutchins    Procedure(s) Performed: Procedure(s) (LRB):  EGD (ESOPHAGOGASTRODUODENOSCOPY) (Left)    Patient location: GI    Anesthesia Type: general    Transport from OR: Transported from OR on 100% O2 by closed face mask with adequate spontaneous ventilation    Post pain: adequate analgesia    Post assessment: no apparent anesthetic complications and tolerated procedure well    Post vital signs: stable    Level of consciousness: sedated and responds to stimulation    Nausea/Vomiting: no nausea/vomiting    Complications: none    Transfer of care protocol was followed      Last vitals:   Visit Vitals  BP (!) 148/65 (BP Location: Right arm)   Pulse 83   Temp 36.9 °C (98.4 °F) (Oral)   Resp (!) 22   Ht 4' 8" (1.422 m)   Wt 44.4 kg (97 lb 14.2 oz)   SpO2 100%   Breastfeeding? No   BMI 21.95 kg/m²     "

## 2018-08-29 NOTE — PROGRESS NOTES
Report received from off going nurse, YINKA Blake. Patient AAO. No signs of distress noted. Call light in reach. Bed low and locked. Will continue to monitor.    1. The severity of signs/symptoms.(See ED/admit documents)

## 2018-08-29 NOTE — ASSESSMENT & PLAN NOTE
Happens daily at same time - perhaps due to tylenol in AM  GI consulted;  EGD being done today; see procedure section

## 2018-08-29 NOTE — DISCHARGE SUMMARY
[facility]  Discharge Summary      Admit Date: 8/27/2018    Discharge Date and Time:  08/29/2018 1:46 PM    Attending Physician: Paige Mcleod MD     Reason for Admission: Abdominal Pain    Procedures Performed: Procedure(s) (LRB):  EGD (ESOPHAGOGASTRODUODENOSCOPY) (Left)    Hospital Course (synopsis of major diagnoses, care, treatment, and services provided during the course of the hospital stay): Ongoing     Consults: GI    Significant Diagnostic Studies: EGD    Final Diagnoses:    Principal Problem: Chest wall pain   Secondary Diagnoses: EGD    Discharged Condition: good    Disposition: Admitted as an Inpatient    Follow Up/Patient Instructions: Follow-up with referring physician             Resume previous diet and activity.    Medications:  Transfer Medications (for Discharge Readmit only):   Current Facility-Administered Medications   Medication Dose Route Frequency Provider Last Rate Last Dose    acetaminophen tablet 650 mg  650 mg Oral Q6H PRN Abundio Dixon III, MD        amLODIPine tablet 10 mg  10 mg Oral Daily Abundio Dixon III, MD   10 mg at 08/28/18 0816    apixaban tablet 2.5 mg  2.5 mg Oral BID Abundio Dixon III, MD   2.5 mg at 08/28/18 2034    atorvastatin tablet 40 mg  40 mg Oral Daily Abundio Dixon III, MD   40 mg at 08/28/18 0815    famotidine tablet 20 mg  20 mg Oral BID Abundio Dixon III, MD   20 mg at 08/28/18 2034    furosemide tablet 20 mg  20 mg Oral Daily Abundio Dixon III, MD   20 mg at 08/28/18 0816    HYDROcodone-acetaminophen 5-325 mg per tablet 1 tablet  1 tablet Oral Q4H PRN Abundio Dixon III, MD   1 tablet at 08/29/18 0039    levothyroxine tablet 75 mcg  75 mcg Oral Before breakfast Abundio Dixon III, MD   75 mcg at 08/29/18 0631    losartan tablet 50 mg  50 mg Oral Daily Abundio Dixon III, MD   50 mg at 08/28/18 0816    metoprolol tartrate (LOPRESSOR) tablet 50 mg  50 mg Oral BID Abundio Dixon III, MD   50 mg at 08/28/18 2034    morphine  injection 2 mg  2 mg Intravenous Q4H PRN Abundio Dixon III, MD        sodium chloride 0.9% flush 3 mL  3 mL Intravenous PRN Oniel Dorsey MD         Facility-Administered Medications Ordered in Other Encounters   Medication Dose Route Frequency Provider Last Rate Last Dose    0.9%  NaCl infusion    Continuous PRN Kirby Rueda, CRNA   1,000 mL at 08/29/18 1326    etomidate injection   Intravenous PRN Kirby SAHU Mohit, CRNA   4 mg at 08/29/18 1341    lidocaine (cardiac) injection    PRN Kirby SAHU Mohit, CRNA   60 mg at 08/29/18 1341    propofol (DIPRIVAN) 10 mg/mL infusion    PRN Kirby SAHU Mohit, CRNA   40 mg at 08/29/18 1341     No discharge procedures on file.  Follow-up Information     Paige Mcleod MD On 9/6/2018.    Specialty:  Internal Medicine  Why:  On Thursday @ 11:15am , outpatient services  Contact information:  [address]  [facility] [address]  [phone]             Yolanda Veras NP On 9/19/2018.    Specialty:  Urology  Why:  On Wednesday @ 9:30am , outpatient services  Contact information:  [address]  [address]  [facility] [address]  [phone]
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
## 2018-08-29 NOTE — PROGRESS NOTES
PCP and Urology appointments arranged.    Follow-up Information     Paige Mcleod MD On 9/6/2018.    Specialty:  Internal Medicine  Why:  On Thursday @ 11:15am , outpatient services  Contact information:  [address] [facility] [address]  [facility] [address]  [phone]             Yolanda Veras NP On 9/19/2018.    Specialty:  Urology  Why:  On Wednesday @ 9:30am , outpatient services  Contact information:  [address]  [address]  [facility] [address]  [phone]
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
## 2018-08-30 ENCOUNTER — TELEPHONE (OUTPATIENT)
Dept: UROLOGY | Facility: CLINIC | Age: 83
End: 2018-08-30

## 2018-08-30 LAB — BACTERIA UR CULT: NO GROWTH

## 2018-08-30 NOTE — ANESTHESIA POSTPROCEDURE EVALUATION
"Anesthesia Post Evaluation    Patient: Jeannie Hutchins    Procedure(s) Performed: Procedure(s) (LRB):  EGD (ESOPHAGOGASTRODUODENOSCOPY) (Left)    Final Anesthesia Type: general  Patient location during evaluation: PACU  Patient participation: Yes- Able to Participate  Level of consciousness: awake and alert and oriented  Post-procedure vital signs: reviewed and stable  Pain management: adequate  Airway patency: patent  PONV status at discharge: No PONV  Anesthetic complications: no      Cardiovascular status: blood pressure returned to baseline, hemodynamically stable and hypertensive  Respiratory status: unassisted, spontaneous ventilation and room air  Hydration status: euvolemic  Follow-up not needed.        Visit Vitals  BP (!) 156/69 (BP Location: Left arm, Patient Position: Lying)   Pulse 96   Temp 36.5 °C (97.7 °F) (Oral)   Resp 17   Ht 4' 8" (1.422 m)   Wt 44.4 kg (97 lb 14.2 oz)   SpO2 96%   Breastfeeding? No   BMI 21.95 kg/m²       Pain/Zaire Score: Pain Assessment Performed: Yes (8/29/2018  6:06 PM)  Presence of Pain: denies (8/29/2018  6:06 PM)  Pain Rating Prior to Med Admin: 10 (8/29/2018  4:28 PM)  Zaire Score: 8 (8/29/2018  2:22 PM)        "

## 2018-08-30 NOTE — NURSING
1030 PM : Called pt at home to follow up since they left before I got to talk to them. Pt states she has drank 2 glasses of water and feels like she may urinate soon. Stated I would call back and follow up.

## 2018-08-30 NOTE — NURSING
"1055 PM: Called and spoke with pt and she stated that she had urinated without any problems and it felt like she "urinated both cups of water".   "

## 2018-08-30 NOTE — NURSING
Spoke with family regarding pt had not urinated since catheter had been removed at 1700. Daughter stated that she was going to be at home with her and would monitor her. Explained to daughter she needed to urinate within 6 hrs and or she would need to return to the ER. Spoke with charge nurse who stated I needed to clarify it with urology. Spoke to patient and daughter and explained to them I needed to scan bladder and report it to her urologist.  Pt bladder was scanned and indicated 12 ml in bladder. Called and spoke with Dr Tyler who stated that she only has 12 cc urine in her bladder so she would not need to urinate, if the family feels comfortable taking her home they could monitor her and bring her back to ER if shes not urinating. Returned to room and pt had already left with transport. Transport did not notify nurse they were here to take patient. Pt had all of her discharge instructions had been given and explained to her.

## 2018-08-30 NOTE — ANESTHESIA PREPROCEDURE EVALUATION
08/30/2018  Jeannie Hutchins is a 83 y.o., female   Pre-operative evaluation for Procedure(s) (LRB):  EGD (ESOPHAGOGASTRODUODENOSCOPY) (Left)    Jeannie Hutchins is a 83 y.o. female     Patient Active Problem List   Diagnosis    S/P carotid endarterectomy    Non-intractable vomiting with nausea    Hyponatremia with decreased serum osmolality    Adjustment disorder with mixed anxiety and depressed mood    Essential hypertension    Hypokalemia    Generalized anxiety disorder    Hypophosphatemia    Acute respiratory failure with hypoxia and hypercarbia    Paroxysmal atrial fibrillation    Pulmonary hypertension    Hyperlipidemia    Diastolic CHF, acute on chronic    Leukocytosis    LUQ abdominal pain    Hydronephrosis of right kidney    Abnormal CT lung screening    Chest wall pain    Fatty liver    ILD (interstitial lung disease)       Review of patient's allergies indicates:   Allergen Reactions    Hydrochlorothiazide Other (See Comments)     hyponatremia    Strawberry Swelling     Tongue swells up and a generalized rash.       No current facility-administered medications on file prior to encounter.      Current Outpatient Medications on File Prior to Encounter   Medication Sig Dispense Refill    acetaminophen (TYLENOL) 325 MG tablet Take 325 mg by mouth 2 (two) times daily.      alendronate (FOSAMAX) 70 MG tablet Take 70 mg by mouth every 7 days.      amlodipine-atorvastatin (CADUET) 10-40 mg per tablet Take 1 tablet by mouth once daily. 30 tablet 11    apixaban 2.5 mg Tab Take 1 tablet (2.5 mg total) by mouth 2 (two) times daily. 60 tablet 11    furosemide (LASIX) 20 MG tablet Take 1 tablet (20 mg total) by mouth once daily. 30 tablet 11    levothyroxine (SYNTHROID) 75 MCG tablet Take 75 mcg by mouth once daily.      losartan (COZAAR) 50 MG tablet Take 1 tablet (50 mg total) by mouth  once daily. 90 tablet 3    metoprolol tartrate (LOPRESSOR) 50 MG tablet Take 1 tablet (50 mg total) by mouth 2 (two) times daily. 60 tablet 11    ranitidine (ZANTAC) 150 MG tablet Take 150 mg by mouth with lunch.      aluminum-magnesium hydroxide-simethicone (MAALOX) 200-200-20 mg/5 mL Susp Take 30 mLs by mouth every 4 (four) hours as needed.      VIT A,C & E/LUTEIN/MINERALS (VISION FORMULA, WITH LUTEIN, ORAL) Take 1 tablet by mouth once daily.         Past Surgical History:   Procedure Laterality Date    CARPAL TUNNEL RELEASE  2012    right hand    HYSTERECTOMY      left carotid endartectomy  5/2006    TONSILLECTOMY         Social History     Socioeconomic History    Marital status:      Spouse name: Not on file    Number of children: Not on file    Years of education: Not on file    Highest education level: Not on file   Social Needs    Financial resource strain: Not on file    Food insecurity - worry: Not on file    Food insecurity - inability: Not on file    Transportation needs - medical: Not on file    Transportation needs - non-medical: Not on file   Occupational History    Not on file   Tobacco Use    Smoking status: Never Smoker    Smokeless tobacco: Never Used   Substance and Sexual Activity    Alcohol use: No    Drug use: No    Sexual activity: No     Partners: Male   Other Topics Concern    Patient feels they ought to cut down on drinking/drug use Not Asked    Patient annoyed by others criticizing their drinking/drug use Not Asked    Patient has felt bad or guilty about drinking/drug use Not Asked    Patient has had a drink/used drugs as an eye opener in the AM Not Asked   Social History Narrative     since 1984    He is retired.  He is 83.  Worked for [employer]    She is retired.  Worked for [employer].         CBC:   Recent Labs      08/28/18   0751  08/29/18   0442   WBC  12.74*  11.15   RBC  4.13  4.20   HGB  12.7  13.2   HCT  37.6   37.5   PLT  343  308   MCV  91  89   MCH  30.8  31.4*   MCHC  33.8  35.2       CMP:   Recent Labs      08/27/18   1816   NA  132*   K  3.5   CL  96   CO2  22*   BUN  20   CREATININE  0.8   GLU  145*   CALCIUM  10.0   ALBUMIN  4.4   PROT  7.9   ALKPHOS  90   ALT  16   AST  23   BILITOT  1.1*       INR  Recent Labs      08/27/18   1816  08/29/18   0442   INR  1.0  1.0   APTT  27.5   --      2D Echo:  Results for orders placed or performed during the hospital encounter of 01/22/18   2D echo with color flow doppler   Result Value Ref Range    EF 60 55 - 65    Aortic Valve Regurgitation TRIVIAL     Est. PA Systolic Pressure 54.24 (A)     Pericardial Effusion SMALL (A)     Mitral Valve Mobility NORMAL     Tricuspid Valve Regurgitation MILD          Anesthesia Evaluation    I have reviewed the Patient Summary Reports.     I have reviewed the Medications.     Review of Systems  Anesthesia Hx:  No problems with previous Anesthesia Denies Hx of Anesthetic complications  History of prior surgery of interest to airway management or planning: Denies Family Hx of Anesthesia complications.   Denies Personal Hx of Anesthesia complications.   Social:  No Alcohol Use, Non-Smoker    Hematology/Oncology:  Hematology Normal   Oncology Normal     EENT/Dental:EENT/Dental Normal   Cardiovascular:   Exercise tolerance: good Hypertension CHF    Pulmonary:  Pulmonary Normal    Renal/:   Chronic Renal Disease R hydronephrosis   Hepatic/GI:   Liver Disease,    Neurological:  Neurology Normal    Endocrine:  Endocrine Normal    Psych:   Psychiatric History anxiety depression          Physical Exam  General:  Well nourished    Airway/Jaw/Neck:  Airway Findings: Mouth Opening: Normal Tongue: Normal  General Airway Assessment: Adult, Average  Mallampati: II  TM Distance: Normal, at least 6 cm  Jaw/Neck Findings:  Neck ROM: Normal ROM      Dental:  Dental Findings: In tact   Chest/Lungs:  Chest/Lungs Findings: Normal Respiratory Rate, Clear to  auscultation     Heart/Vascular:  Heart Findings: Rate: Normal  Rhythm: Regular Rhythm        Mental Status:  Mental Status Findings:  Alert and Oriented, Cooperative         Anesthesia Plan  Type of Anesthesia, risks & benefits discussed:  Anesthesia Type:  general  Patient's Preference:   Intra-op Monitoring Plan: standard ASA monitors  Intra-op Monitoring Plan Comments:   Post Op Pain Control Plan: multimodal analgesia and IV/PO Opioids PRN  Post Op Pain Control Plan Comments:   Induction:   IV  Beta Blocker:  Patient is on a Beta-Blocker and has received one dose within the past 24 hours (No further documentation required).       Informed Consent: Patient understands risks and agrees with Anesthesia plan.  Questions answered. Anesthesia consent signed with patient.  ASA Score: 3     Day of Surgery Review of History & Physical:    H&P update referred to the surgeon.         Ready For Surgery From Anesthesia Perspective.

## 2018-08-30 NOTE — PLAN OF CARE
Problem: Patient Care Overview  Goal: Plan of Care Review  Outcome: Ongoing (interventions implemented as appropriate)   08/29/18 2045   Coping/Psychosocial   Plan Of Care Reviewed With patient       Problem: Pain, Acute (Adult)  Goal: Acceptable Pain Control/Comfort Level  Patient will demonstrate the desired outcomes by discharge/transition of care.  Outcome: Ongoing (interventions implemented as appropriate)   08/29/18 2045   Pain, Acute (Adult)   Acceptable Pain Control/Comfort Level making progress toward outcome       Problem: Infection, Risk/Actual (Adult)  Goal: Infection Prevention/Resolution  Patient will demonstrate the desired outcomes by discharge/transition of care.  Outcome: Ongoing (interventions implemented as appropriate)   08/29/18 2045   Infection, Risk/Actual (Adult)   Infection Prevention/Resolution making progress toward outcome

## 2018-08-30 NOTE — TELEPHONE ENCOUNTER
Lt message to contact regarding negative urine culture an no antibiotics is needed at this time.- royce

## 2018-08-31 ENCOUNTER — PATIENT OUTREACH (OUTPATIENT)
Dept: ADMINISTRATIVE | Facility: CLINIC | Age: 83
End: 2018-08-31

## 2018-08-31 NOTE — PROGRESS NOTES
Cata Llanes, RN attempted to contact patient. No answer / line staying busy.  C3 nurse attempted to contact Jeannie Hutchins for a TCC post hospital discharge follow up call. No answer at phone number listed and no voicemail available. The patient has a HOSFU appointment with Dr. Lomeli on 9/6/18 @ [id number]. Unable to route message to PCP staff / PCP is a non [facility] provider.

## 2018-08-31 NOTE — PATIENT INSTRUCTIONS
Fall Prevention   Falls often occur due to slipping, tripping or losing your balance. Here are ways to reduce your risk of falling again.   Was there anything that caused your fall that can be fixed, removed or replaced?   Make your home safe by keeping walkways clear of objects you may trip over.   Use non-slip pads under rugs.   Do not walk in poorly lit areas.   Do not stand on chairs or wobbly ladders.   Use caution when reaching overhead or looking upward. This position can cause a loss of balance.   Be sure your shoes fit properly, have non-slip bottoms and are in good condition.   Be cautious when going up and down stairs, curbs, and when walking on uneven sidewalks.   If your balance is poor, consider using a cane or walker.   If your fall was related to alcohol use, stop or limit alcohol intake.   If your fall was related to use of sleeping medicines, talk to your doctor about this. You may need to reduce your dosage at bedtime if you awaken during the night to go to the bathroom.   To reduce the need for nighttime bathroom trips:   Avoid drinking fluids for several hours before going to bed   Empty your bladder before going to bed   Men can keep a urinal at the bedside  Stay as active as you can. Balance, flexibility, strength, and endurance all come from exercise. They all play a role in preventing falls. Ask your heathcare provider which types of activity are right for you.  © 4852-1722 [employer]. [address] [place] [address], [place] [address]. All rights reserved. This information is not intended as a substitute for professional medical care. Always follow your healthcare professional's instructions.

## 2018-09-19 ENCOUNTER — OFFICE VISIT (OUTPATIENT)
Dept: UROLOGY | Facility: CLINIC | Age: 83
End: 2018-09-19
Payer: MEDICARE

## 2018-09-19 VITALS — HEIGHT: 56 IN | RESPIRATION RATE: 16 BRPM | WEIGHT: 97 LBS | BODY MASS INDEX: 21.82 KG/M2

## 2018-09-19 DIAGNOSIS — N13.30 HYDRONEPHROSIS OF RIGHT KIDNEY: Primary | ICD-10-CM

## 2018-09-19 DIAGNOSIS — R35.1 NOCTURIA: ICD-10-CM

## 2018-09-19 PROCEDURE — 99999 PR PBB SHADOW E&M-EST. PATIENT-LVL III: CPT | Mod: PBBFAC,,, | Performed by: NURSE PRACTITIONER

## 2018-09-19 PROCEDURE — 51798 US URINE CAPACITY MEASURE: CPT | Mod: PBBFAC | Performed by: NURSE PRACTITIONER

## 2018-09-19 PROCEDURE — 99213 OFFICE O/P EST LOW 20 MIN: CPT | Mod: S$PBB,,, | Performed by: NURSE PRACTITIONER

## 2018-09-19 PROCEDURE — 81001 URINALYSIS AUTO W/SCOPE: CPT | Mod: PBBFAC | Performed by: NURSE PRACTITIONER

## 2018-09-19 PROCEDURE — 99213 OFFICE O/P EST LOW 20 MIN: CPT | Mod: PBBFAC | Performed by: NURSE PRACTITIONER
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
## 2018-09-20 ENCOUNTER — OFFICE VISIT (OUTPATIENT)
Dept: CARDIOLOGY | Facility: CLINIC | Age: 83
End: 2018-09-20
Payer: MEDICARE

## 2018-09-20 VITALS
RESPIRATION RATE: 15 BRPM | DIASTOLIC BLOOD PRESSURE: 56 MMHG | HEIGHT: 56 IN | WEIGHT: 97 LBS | OXYGEN SATURATION: 93 % | HEART RATE: 70 BPM | SYSTOLIC BLOOD PRESSURE: 144 MMHG | BODY MASS INDEX: 21.82 KG/M2

## 2018-09-20 DIAGNOSIS — Z98.890 S/P CAROTID ENDARTERECTOMY: ICD-10-CM

## 2018-09-20 DIAGNOSIS — E78.2 MIXED HYPERLIPIDEMIA: ICD-10-CM

## 2018-09-20 DIAGNOSIS — I10 ESSENTIAL HYPERTENSION: Primary | ICD-10-CM

## 2018-09-20 DIAGNOSIS — I48.0 PAROXYSMAL ATRIAL FIBRILLATION: ICD-10-CM

## 2018-09-20 LAB
BILIRUB SERPL-MCNC: NORMAL MG/DL
BLOOD URINE, POC: NORMAL
COLOR, POC UA: NORMAL
GLUCOSE UR QL STRIP: NORMAL
KETONES UR QL STRIP: NORMAL
LEUKOCYTE ESTERASE URINE, POC: NORMAL
NITRITE, POC UA: NORMAL
PH, POC UA: 5
POC RESIDUAL URINE VOLUME: 0 ML (ref 0–100)
PROTEIN, POC: NORMAL
SPECIFIC GRAVITY, POC UA: 1000
UROBILINOGEN, POC UA: NORMAL

## 2018-09-20 PROCEDURE — 99999 PR PBB SHADOW E&M-EST. PATIENT-LVL III: CPT | Mod: PBBFAC,,, | Performed by: INTERNAL MEDICINE

## 2018-09-20 PROCEDURE — 99213 OFFICE O/P EST LOW 20 MIN: CPT | Mod: PBBFAC | Performed by: INTERNAL MEDICINE

## 2018-09-20 PROCEDURE — 99214 OFFICE O/P EST MOD 30 MIN: CPT | Mod: S$PBB,,, | Performed by: INTERNAL MEDICINE

## 2018-09-20 RX ORDER — LOSARTAN POTASSIUM 100 MG/1
100 TABLET ORAL DAILY
Qty: 90 TABLET | Refills: 3 | Status: SHIPPED | OUTPATIENT
Start: 2018-09-20 | End: 2019-08-28 | Stop reason: SDUPTHER
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
## 2018-10-04 ENCOUNTER — LAB VISIT (OUTPATIENT)
Dept: LAB | Facility: HOSPITAL | Age: 83
End: 2018-10-04
Attending: INTERNAL MEDICINE
Payer: MEDICARE

## 2018-10-04 DIAGNOSIS — I10 ESSENTIAL HYPERTENSION: ICD-10-CM

## 2018-10-04 LAB
ANION GAP SERPL CALC-SCNC: 9 MMOL/L
BUN SERPL-MCNC: 15 MG/DL
CALCIUM SERPL-MCNC: 10.1 MG/DL
CHLORIDE SERPL-SCNC: 102 MMOL/L
CO2 SERPL-SCNC: 27 MMOL/L
CREAT SERPL-MCNC: 0.8 MG/DL
EST. GFR  (AFRICAN AMERICAN): >60 ML/MIN/1.73 M^2
EST. GFR  (NON AFRICAN AMERICAN): >60 ML/MIN/1.73 M^2
GLUCOSE SERPL-MCNC: 94 MG/DL
POTASSIUM SERPL-SCNC: 3.8 MMOL/L
SODIUM SERPL-SCNC: 138 MMOL/L

## 2018-10-04 PROCEDURE — 36415 COLL VENOUS BLD VENIPUNCTURE: CPT

## 2018-10-04 PROCEDURE — 80048 BASIC METABOLIC PNL TOTAL CA: CPT

## 2018-10-09 ENCOUNTER — HOSPITAL ENCOUNTER (OUTPATIENT)
Dept: RADIOLOGY | Facility: HOSPITAL | Age: 83
Discharge: HOME | End: 2018-10-09
Attending: OBSTETRICS & GYNECOLOGY
Payer: MEDICARE

## 2018-10-09 DIAGNOSIS — Z12.31 VISIT FOR SCREENING MAMMOGRAM: ICD-10-CM

## 2018-10-09 PROCEDURE — 77067 SCR MAMMO BI INCL CAD: CPT | Mod: 26,,, | Performed by: RADIOLOGY

## 2018-10-09 PROCEDURE — 77063 BREAST TOMOSYNTHESIS BI: CPT | Mod: TC

## 2018-10-09 PROCEDURE — 77063 BREAST TOMOSYNTHESIS BI: CPT | Mod: 26,,, | Performed by: RADIOLOGY

## 2018-10-12 ENCOUNTER — OFFICE VISIT (OUTPATIENT)
Dept: SLEEP MEDICINE | Facility: CLINIC | Age: 83
End: 2018-10-12
Payer: MEDICARE

## 2018-10-12 VITALS
OXYGEN SATURATION: 90 % | BODY MASS INDEX: 22.37 KG/M2 | SYSTOLIC BLOOD PRESSURE: 130 MMHG | HEART RATE: 59 BPM | DIASTOLIC BLOOD PRESSURE: 62 MMHG | HEIGHT: 56 IN | WEIGHT: 99.44 LBS

## 2018-10-12 DIAGNOSIS — J84.9 ILD (INTERSTITIAL LUNG DISEASE): Primary | ICD-10-CM

## 2018-10-12 PROCEDURE — 99213 OFFICE O/P EST LOW 20 MIN: CPT | Mod: PBBFAC,PO | Performed by: INTERNAL MEDICINE

## 2018-10-12 PROCEDURE — 99999 PR PBB SHADOW E&M-EST. PATIENT-LVL III: CPT | Mod: PBBFAC,,, | Performed by: INTERNAL MEDICINE

## 2018-10-12 PROCEDURE — 99214 OFFICE O/P EST MOD 30 MIN: CPT | Mod: S$PBB,,, | Performed by: INTERNAL MEDICINE
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
## 2018-10-17 ENCOUNTER — OFFICE VISIT (OUTPATIENT)
Dept: CARDIOLOGY | Facility: CLINIC | Age: 83
End: 2018-10-17
Payer: MEDICARE

## 2018-10-17 VITALS
DIASTOLIC BLOOD PRESSURE: 54 MMHG | BODY MASS INDEX: 22.27 KG/M2 | RESPIRATION RATE: 15 BRPM | WEIGHT: 99 LBS | HEIGHT: 56 IN | HEART RATE: 64 BPM | SYSTOLIC BLOOD PRESSURE: 138 MMHG | OXYGEN SATURATION: 92 %

## 2018-10-17 DIAGNOSIS — Z98.890 S/P CAROTID ENDARTERECTOMY: ICD-10-CM

## 2018-10-17 DIAGNOSIS — I48.0 PAROXYSMAL ATRIAL FIBRILLATION: ICD-10-CM

## 2018-10-17 DIAGNOSIS — I10 ESSENTIAL HYPERTENSION: Primary | ICD-10-CM

## 2018-10-17 DIAGNOSIS — E78.2 MIXED HYPERLIPIDEMIA: ICD-10-CM

## 2018-10-17 DIAGNOSIS — I50.33 DIASTOLIC CHF, ACUTE ON CHRONIC: ICD-10-CM

## 2018-10-17 DIAGNOSIS — I65.21 CAROTID STENOSIS, ASYMPTOMATIC, RIGHT: ICD-10-CM

## 2018-10-17 PROCEDURE — 99214 OFFICE O/P EST MOD 30 MIN: CPT | Mod: S$PBB,,, | Performed by: INTERNAL MEDICINE

## 2018-10-17 PROCEDURE — 99213 OFFICE O/P EST LOW 20 MIN: CPT | Mod: PBBFAC | Performed by: INTERNAL MEDICINE

## 2018-10-17 PROCEDURE — 99999 PR PBB SHADOW E&M-EST. PATIENT-LVL III: CPT | Mod: PBBFAC,,, | Performed by: INTERNAL MEDICINE

## 2018-10-17 NOTE — PROGRESS NOTES
CARDIOVASCULAR PROGRESS NOTE    REASON FOR CONSULT:   Jeannie Hutchins is a 83 y.o. female who presents for follow up of PAF, HFpEF.    PCP: Yani  HISTORY OF PRESENT ILLNESS:   The patient comes in for follow-up.  She reports generally stable status without angina or dyspnea.  Her main complaint today appears to be related to right knee pain and she is asking me if she can have Tylenol or a nonsteroidal for this.  I suggested Tylenol, but if a nonsteroidal is absolutely necessary, I suggested Naprosyn.  She otherwise denies palpitations, lightheadedness, dizziness, or syncope.  There has been no PND, orthopnea, or lower extremity edema.  She denies melena, hematuria, claudicant symptoms.  She appears to be tolerating her anticoagulation with apixaban without complication.    CARDIOVASCULAR HISTORY:   PAF, CHADS VASC 4, on apixaban 2.5mg bid (dose appropriate for age/weight)  Hx bilat CEA with R ICA stenosis on CUS 4/2018    PAST MEDICAL HISTORY:     Past Medical History:   Diagnosis Date    Anticoagulant long-term use     Eliquis    Arthritis     Atrial fibrillation     Blood transfusion     Carotid stenosis     CHF (congestive heart failure)     Edema     Hearing loss     Jena (hard of hearing)     Hypercholesterolemia     Hypertension     Psychiatric problem     Thyroid disease        PAST SURGICAL HISTORY:     Past Surgical History:   Procedure Laterality Date    ARTERIOGRAM, CAROTID N/A 6/27/2013    Performed by [facility] at [facility] OR    CARPAL TUNNEL RELEASE  2012    right hand    EGD (ESOPHAGOGASTRODUODENOSCOPY) Left 8/29/2018    Performed by Oniel Dorsey MD at [facility] ENDO    ENDARTERECTOMY, CAROTID Right 10/14/2013    [facility]    ESOPHAGOGASTRODUODENOSCOPY Left 8/29/2018    Procedure: EGD (ESOPHAGOGASTRODUODENOSCOPY);  Surgeon: Oniel Dorsey MD;  Location: [facility] ENDO;  Service: Endoscopy;  Laterality: Left;    HYSTERECTOMY       left carotid endartectomy  5/2006    TONSILLECTOMY         ALLERGIES AND MEDICATION:     Review of patient's allergies indicates:   Allergen Reactions    Strawberry Swelling     Tongue swells up and a generalized rash.        Medication List           Accurate as of 10/17/18 10:37 AM. If you have any questions, ask your nurse or doctor.               CONTINUE taking these medications    acetaminophen 325 MG tablet  Commonly known as:  TYLENOL     alendronate 70 MG tablet  Commonly known as:  FOSAMAX     aluminum-magnesium hydroxide-simethicone 200-200-20 mg/5 mL Susp  Commonly known as:  MAALOX  Take 30 mLs by mouth every 4 (four) hours as needed.     amlodipine-atorvastatin 10-40 mg per tablet  Commonly known as:  CADUET  Take 1 tablet by mouth once daily.     apixaban 2.5 mg Tab  Commonly known as:  ELIQUIS  Take 1 tablet (2.5 mg total) by mouth 2 (two) times daily.     furosemide 20 MG tablet  Commonly known as:  LASIX  Take 1 tablet (20 mg total) by mouth once daily.     levothyroxine 75 MCG tablet  Commonly known as:  SYNTHROID     losartan 100 MG tablet  Commonly known as:  COZAAR  Take 1 tablet (100 mg total) by mouth once daily.     metoprolol tartrate 50 MG tablet  Commonly known as:  LOPRESSOR  Take 1 tablet (50 mg total) by mouth 2 (two) times daily.     ranitidine 150 MG tablet  Commonly known as:  ZANTAC     VISION FORMULA (WITH LUTEIN) ORAL              SOCIAL HISTORY:     Social History     Socioeconomic History    Marital status:      Spouse name: Not on file    Number of children: Not on file    Years of education: Not on file    Highest education level: Not on file   Social Needs    Financial resource strain: Not on file    Food insecurity - worry: Not on file    Food insecurity - inability: Not on file    Transportation needs - medical: Not on file    Transportation needs - non-medical: Not on file   Occupational History    Not on file   Tobacco Use    Smoking status: Never  Smoker    Smokeless tobacco: Never Used   Substance and Sexual Activity    Alcohol use: No    Drug use: No    Sexual activity: No     Partners: Male   Other Topics Concern    Patient feels they ought to cut down on drinking/drug use Not Asked    Patient annoyed by others criticizing their drinking/drug use Not Asked    Patient has felt bad or guilty about drinking/drug use Not Asked    Patient has had a drink/used drugs as an eye opener in the AM Not Asked   Social History Narrative     since 1984    He is retired.  He is 83.  Worked for [employer]    She is retired.  Worked for [employer].       FAMILY HISTORY:     Family History   Problem Relation Age of Onset    Heart disease Father     Cancer Brother 65        bladder    Cancer Sister 81        Ovarian    Ovarian cancer Sister 81    Breast cancer Daughter 50        Status post mastectomy    Cancer Sister         Lung.  Smoker    Cancer Sister         Lung.  Smoker    Schizophrenia Son        REVIEW OF SYSTEMS:   Review of Systems   Constitutional: Negative for chills, diaphoresis and fever.   HENT: Negative for nosebleeds.    Eyes: Negative for blurred vision, double vision and photophobia.   Respiratory: Negative for cough, hemoptysis, shortness of breath and wheezing.    Cardiovascular: Negative for chest pain, palpitations, orthopnea, claudication, leg swelling and PND.   Gastrointestinal: Negative for abdominal pain, blood in stool, heartburn, melena, nausea and vomiting.   Genitourinary: Negative for flank pain and hematuria.   Musculoskeletal: Negative for falls, myalgias and neck pain.   Skin: Negative for rash.   Neurological: Negative for dizziness, seizures, loss of consciousness, weakness and headaches.   Endo/Heme/Allergies: Negative for polydipsia. Does not bruise/bleed easily.   Psychiatric/Behavioral: Negative for depression and memory loss. The patient is not nervous/anxious.        PHYSICAL EXAM:  "    Vitals:    10/17/18 1006   BP: (!) 138/54   Pulse: 64   Resp: 15    Body mass index is 22.2 kg/m².  Weight: 44.9 kg (99 lb)   Height: 4' 8" (142.2 cm)     Physical Exam   Constitutional: She is oriented to person, place, and time. She appears well-developed and well-nourished. She is cooperative.  Non-toxic appearance. No distress.   HENT:   Head: Normocephalic and atraumatic.   Eyes: Conjunctivae and EOM are normal. Pupils are equal, round, and reactive to light. No scleral icterus.   Neck: Trachea normal and normal range of motion. Neck supple. Normal carotid pulses and no JVD present. Carotid bruit is not present. No neck rigidity. No tracheal deviation and no edema present. No thyromegaly present.   Cardiovascular: Normal rate, regular rhythm, S1 normal and S2 normal. PMI is not displaced. Exam reveals no gallop and no friction rub.   No murmur heard.  Pulses:       Carotid pulses are 2+ on the right side, and 2+ on the left side.  Bilat CEA scars well healed   Pulmonary/Chest: Effort normal and breath sounds normal. No stridor. No respiratory distress. She has no wheezes. She has no rales. She exhibits no tenderness.   Abdominal: Soft. She exhibits no distension. There is no hepatosplenomegaly.   Musculoskeletal: She exhibits no edema or tenderness.   Feet:   Right Foot:   Skin Integrity: Negative for ulcer.   Left Foot:   Skin Integrity: Negative for ulcer.   Neurological: She is alert and oriented to person, place, and time. No cranial nerve deficit.   Skin: Skin is warm and dry. No rash noted. No erythema.   Psychiatric: She has a normal mood and affect. Her speech is normal and behavior is normal.   Vitals reviewed.      DATA:   EKG: (personally reviewed tracing)  7/2/18 SR 58, PVC      Laboratory:  CBC:  Recent Labs   Lab  08/27/18   1816  08/28/18   0751  08/29/18   0442   WHITE BLOOD CELL COUNT  19.88 H  12.74 H  11.15   HEMOGLOBIN  13.4  12.7  13.2   HEMATOCRIT  39.9  37.6  37.5   PLATELETS  363 H  " 343  308       CHEMISTRIES:  Recent Labs   Lab  02/02/18   0948   04/12/18   0930   04/15/18   0529   08/16/18   1027  08/27/18   1816  10/04/18   0940   GLUCOSE   --    < >  141 H   < >  89   < >  101  145 H  94   SODIUM   --    < >  113 LL   < >  132 L   < >  136  132 L  138   POTASSIUM   --    < >  4.0   < >  4.6   < >  4.1  3.5  3.8   BUN BLD   --    < >  16   < >  6 L   < >  23  20  15   CREATININE   --    < >  0.8   < >  0.6   < >  0.8  0.8  0.8   EGFR IF    --    < >  >60   < >  >60   < >  >60  >60  >60   EGFR IF NON-   --    < >  >60   < >  >60   < >  >60  >60  >60   CALCIUM   --    < >  9.3   < >  9.3   < >  9.8  10.0  10.1   MAGNESIUM  1.6   --   1.9   --   1.9   --    --    --    --     < > = values in this interval not displayed.       CARDIAC BIOMARKERS:  Recent Labs   Lab  04/12/18   1744  08/27/18   1816  08/27/18   2249   TROPONIN I  0.009  <0.006  <0.006       COAGS:  Recent Labs   Lab  04/12/18   0930  08/27/18   1816  08/29/18   0442   INR  1.1  1.0  1.0       LIPIDS/LFTS:  Recent Labs   Lab  04/12/18   0930  06/20/18   0957  08/27/18   1816   CHOLESTEROL   --   153   --    TRIGLYCERIDES   --   67   --    HDL   --   55   --    LDL CHOLESTEROL   --   84.6   --    NON-HDL CHOLESTEROL   --   98   --    AST  22  15  23   ALT  49 H  13  16     Lab Results   Component Value Date    TSH 4.032 (H) 04/12/2018     Free T4 1.29 4/12/18    Cardiovascular Testing:  Carotid US 4/18/18 (R ICA velacity decreased vs 10/2017 exam)  There is 60 - 69% right Internal Carotid stenosis.  There is 20 - 39% left Internal Carotid stenosis.    Echo: 1/29/18    1 - Normal left ventricular systolic function (EF 60-65%).     2 - No wall motion abnormalities.     3 - Concentric remodeling.     4 - Trivial aortic regurgitation.     5 - Mild tricuspid regurgitation.     6 - Pulmonary hypertension. The estimated PA systolic pressure is 54 mmHg.     7 - Small pericardial anterior effusion without  hemodynamic compromise.     ASSESSMENT:   # PAF, CHADS VASC 4, on apixaban 2.5mg bid (dose appropriate for age/weight)  # Hx bilat CEA with R ICA stenosis on CUS 10/2017, stable by CUS 4/2018  # HTN, controlled, (hx of ACEi related cough, now on ARB)  # HLP, on atorvastatin 10mg  # hyponatremia resolved off HCTZ    PLAN:   Cont med rx  RTC 6 months with Carotid US (4/2019)    Wenceslao Armenta MD, FACC

## 2018-12-21 NOTE — PLAN OF CARE
CRAFT NOTE  Group Time:1300  The patient attended all of group. Engagement:   Engages easily in task. Task Organization:     The patient has occasional  trouble with organization of activity that is within skill level. Productivity:    The patient is able to accomplish all task work in standard time frames. Attention Span:  No difficulty concentrating during session. Self-control: Follows all group expectations. Handles tasks without becoming overly frustrated. Delay of Gratification:   Rushes steps on occasion. Interaction:  Interacts occasionally with others. Problem: Patient Care Overview  Goal: Plan of Care Review  Outcome: Ongoing (interventions implemented as appropriate)   01/24/18 0550   Coping/Psychosocial   Plan Of Care Reviewed With patient   Patient is awake alert oriented able to move independently on bed. Vital signs stable and within limits no fall or injury no skin breakdown noted. Sodium level this morning is 131. Remain on NS IV at 75 ml/hr

## 2019-01-21 ENCOUNTER — OFFICE VISIT (OUTPATIENT)
Dept: VASCULAR SURGERY | Facility: CLINIC | Age: 84
End: 2019-01-21
Payer: MEDICARE

## 2019-01-21 VITALS
DIASTOLIC BLOOD PRESSURE: 60 MMHG | WEIGHT: 99.88 LBS | HEIGHT: 56 IN | SYSTOLIC BLOOD PRESSURE: 110 MMHG | BODY MASS INDEX: 22.47 KG/M2

## 2019-01-21 DIAGNOSIS — Z98.890 S/P BILATERAL CAROTID ENDARTERECTOMY: ICD-10-CM

## 2019-01-21 DIAGNOSIS — I65.23 CAROTID STENOSIS, ASYMPTOMATIC, BILATERAL: Primary | ICD-10-CM

## 2019-01-21 PROCEDURE — 99204 OFFICE O/P NEW MOD 45 MIN: CPT | Mod: S$PBB,,, | Performed by: SURGERY

## 2019-01-21 PROCEDURE — 99204 PR OFFICE/OUTPT VISIT, NEW, LEVL IV, 45-59 MIN: ICD-10-PCS | Mod: S$PBB,,, | Performed by: SURGERY

## 2019-01-21 PROCEDURE — 99999 PR PBB SHADOW E&M-EST. PATIENT-LVL III: ICD-10-PCS | Mod: PBBFAC,,, | Performed by: SURGERY

## 2019-01-21 PROCEDURE — 99999 PR PBB SHADOW E&M-EST. PATIENT-LVL III: CPT | Mod: PBBFAC,,, | Performed by: SURGERY

## 2019-01-21 PROCEDURE — 99213 OFFICE O/P EST LOW 20 MIN: CPT | Mod: PBBFAC | Performed by: SURGERY

## 2019-01-21 NOTE — PATIENT INSTRUCTIONS
Carotid Artery Problems: Blockage     A healthy carotid artery lets blood flow easily to the brain.   The blood carries oxygen and nutrients throughout the body. The carotid arteries are large blood vessels that carry blood to the brain. Certain health problems can make the inside of the carotid arteries narrow and rough. Over time, this damage increases the chances of having a stroke. A stroke is a sudden loss of brain function.   Open carotid arteries  In a healthy carotid artery, the inside of the artery is open. The lining of the artery wall is also smooth. This lets blood flow freely from the heart to the brain. The brain gets all the oxygen and nutrients it needs to function well.  Narrowed carotid arteries  Health factors, such as high blood pressure, high cholesterol, smoking, and diabetes, can damage artery walls and make them rough. This allows cholesterol and other particles in the blood to stick to the artery walls and form plaque or fatty deposits. As the plaque builds up, it can narrow the artery. Blood may also collect on the plaque and form blood clots.  How a stroke happens     Emboli can enter the bloodstream and travel to the brain. Brain tissue is damaged when emboli block arteries in the brain.   A stroke can happen when plaque in the carotid artery ruptures. This can allow small pieces of plaque to break off into the bloodstream. At the same time, rupture can make more blood clots. The pieces of plaque and tiny blood clots or emboli can flow in the blood until they get stuck in a small blood vessel in the brain. This blocks blood flow to part of the brain and causes a stroke.  Date Last Reviewed: 6/8/2015  © 1877-1480 [employer]. [address] [place] [address], [place] [address]. All rights reserved. This information is not intended as a substitute for professional medical care. Always follow your healthcare professional's instructions.

## 2019-01-21 NOTE — PROGRESS NOTES
CHIEF COMPLAINT: Right carotid stenosis.     HISTORY OF PRESENT ILLNESS: A 78-year-old female status post left carotid   endarterectomy in 2006, now presents with a 75+% right carotid stenosis   documented by recent angiography at [facility]. She has no history of   stroke, TIA or amaurosis.     Her original surgery date was canceled and she now is back to reschedule.   No CVA/TIA/aumarosis    Interval history: Did not make f/u appt.  Presents with her  today who I performed a L CEA on 2/2018.  No TIA or CVA since last eval.    PAST MEDICAL HISTORY:   1. Hypothyroidism.   2. Hyperlipidemia.   3. Hypertension.   The patient has no history of coronary artery disease, chest pain or congestive   heart failure. She has an excellent exercise tolerance.     PAST SURGICAL HISTORY:   1. Left carotid endarterectomy in 2006.   2. Carpal tunnel release.   3. Right carotid endarterectomy,10/14/2013.     FAMILY HISTORY: Negative for cardiovascular disease. She has multiple family   members, who have lived into their 90s.   SOCIAL HISTORY: Nonsmoker.   MEDICATIONS: Include aspirin and statin. See EPIC for full list.     ALLERGIES: Strawberries.     REVIEW OF SYSTEMS:   CARDIOVASCULAR: Denies claudication, postprandial pain or DVT. All other   systems include eyes, ENT, respiratory, , musculoskeletal, skin, breast,   neurologic, psychiatric, lymph, allergy and immune are negative.     PHYSICAL EXAMINATION:   VITAL SIGNS: See nursing notes.   GENERAL: She is in no acute distress and appears younger than her stated age.   RESPIRATORY: Normal effort. Clear to auscultation.   CARDIOVASCULAR: Regular rate and rhythm, nondisplaced PMI, no murmur.   VASCULAR: 2+ radial, brachial and posterior tibial pulses bilaterally.   EXTREMITIES: No edema, varicosities, ulcerations, gangrene or digital   petechiae.   ABDOMEN: No masses or tenderness. No hepatosplenomegaly. Aorta cannot be   palpated.   EYE: Normal conjunctivae and  lids.   ENT: Fair dentition.   NECK: No JVD or thyromegaly.   MUSCULOSKELETAL: No kyphosis or scoliosis.   EXTREMITIES: Without clubbing or cyanosis.   SKIN: Warm and dry.   NEUROLOGIC: Alert and oriented x3. Normal mood and affect. Midline tongue. Decreased sensation to touch R neck and lateral face.  No speech difficulty or hoarseness, 5/5 motor strength in all extremities.     IMAGIN:  Outside arteriography was reviewed on cut films demonstratin. Type 2 arch.   2. Right 75+% carotid stenosis over 2 cm with most cranial aspect of this at   the bottom of C2.     Duplex shows ~75% R ICA stenosis with , EDV 83    2017: Carotid US  Elevated right proximal ICA velocities, suggesting 60-79% stenosis. Velocities are similar to the 13 exam.  Left proximal ICA velocities within normal limits.    2018 Carotid US:  There is 60 - 69% right Internal Carotid stenosis.  There is 20 - 39% left Internal Carotid stenosis.    ASSESSMENT:   84 yo WF with h/o bilateral carotid occlusive disease s/p R CEA , L CEA     PLAN:  1. Rec continued routine surveillance of carotid occlusive disease s/p bilateral CEA  2. Rec daily ASA  3. BP control  4. Heart healthy lifestyle  5. ENT referral for R ear discomfort and hearing changes  6. RTC with carotid US

## 2019-01-21 NOTE — LETTER
January 21, 2019        Paige Mcleod MD  [address]  [facility] [address]             [facility]  [address]  [facility] [address]  Phone: [phone]  Fax: [phone]   Patient: Jeannie Hutchins   MR Number: [id number]   YOB: 1935   Date of Visit: 1/21/2019       Dear Dr. Bethany Mcleod:    Thank you for referring Jeannie Hutchins to me for evaluation. Below are the relevant portions of my assessment and plan of care.            If you have questions, please do not hesitate to call me. I look forward to following Jeannie DAMIAN along with you.    Sincerely,      Moncho Smith MD           CC  No Recipients

## 2019-03-06 RX ORDER — APIXABAN 2.5 MG/1
TABLET, FILM COATED ORAL
Qty: 180 TABLET | Refills: 3 | Status: SHIPPED | OUTPATIENT
Start: 2019-03-06 | End: 2020-03-23

## 2019-03-14 RX ORDER — AMLODIPINE BESYLATE AND ATORVASTATIN CALCIUM 10; 40 MG/1; MG/1
TABLET, FILM COATED ORAL
Qty: 90 TABLET | Refills: 3 | Status: SHIPPED | OUTPATIENT
Start: 2019-03-14 | End: 2020-03-23

## 2019-03-20 ENCOUNTER — HOSPITAL ENCOUNTER (OUTPATIENT)
Dept: CARDIOLOGY | Facility: HOSPITAL | Age: 84
Discharge: HOME | End: 2019-03-20
Attending: SURGERY
Payer: MEDICARE

## 2019-03-20 DIAGNOSIS — I65.23 CAROTID STENOSIS, ASYMPTOMATIC, BILATERAL: ICD-10-CM

## 2019-03-20 DIAGNOSIS — Z98.890 S/P BILATERAL CAROTID ENDARTERECTOMY: ICD-10-CM

## 2019-03-20 PROCEDURE — 93880 EXTRACRANIAL BILAT STUDY: CPT

## 2019-03-20 PROCEDURE — 93880 EXTRACRANIAL BILAT STUDY: CPT | Mod: 26,,, | Performed by: SURGERY

## 2019-03-20 PROCEDURE — 93880 CV US DOPPLER CAROTID (CUPID ONLY): ICD-10-PCS | Mod: 26,,, | Performed by: SURGERY

## 2019-03-21 ENCOUNTER — OFFICE VISIT (OUTPATIENT)
Dept: VASCULAR SURGERY | Facility: CLINIC | Age: 84
End: 2019-03-21
Payer: MEDICARE

## 2019-03-21 VITALS — HEIGHT: 56 IN | WEIGHT: 101.88 LBS | BODY MASS INDEX: 22.92 KG/M2 | HEART RATE: 88 BPM

## 2019-03-21 DIAGNOSIS — I65.23 CAROTID STENOSIS, ASYMPTOMATIC, BILATERAL: Primary | ICD-10-CM

## 2019-03-21 LAB
LEFT CBA DIAS: 10 CM/S
LEFT CBA SYS: 127 CM/S
LEFT CCA DIST DIAS: 9 CM/S
LEFT CCA DIST SYS: 81 CM/S
LEFT CCA MID DIAS: 10 CM/S
LEFT CCA MID SYS: 81 CM/S
LEFT CCA PROX DIAS: 12 CM/S
LEFT CCA PROX SYS: 97 CM/S
LEFT ECA DIAS: 0 CM/S
LEFT ECA SYS: 78 CM/S
LEFT ICA DIST DIAS: 13 CM/S
LEFT ICA DIST SYS: 68 CM/S
LEFT ICA MID DIAS: 18 CM/S
LEFT ICA MID SYS: 75 CM/S
LEFT ICA PROX DIAS: 12 CM/S
LEFT ICA PROX SYS: 71 CM/S
LEFT VERTEBRAL DIAS: 21 CM/S
LEFT VERTEBRAL SYS: 77 CM/S
OHS CV CAROTID RIGHT ICA EDV HIGHEST: 28
OHS CV CAROTID ULTRASOUND LEFT ICA/CCA RATIO: 0.77
OHS CV CAROTID ULTRASOUND RIGHT ICA/CCA RATIO: 3.1
OHS CV PV CAROTID LEFT HIGHEST CCA: 97
OHS CV PV CAROTID LEFT HIGHEST ICA: 75
OHS CV PV CAROTID RIGHT HIGHEST CCA: 89
OHS CV PV CAROTID RIGHT HIGHEST ICA: 276
OHS CV US CAROTID LEFT HIGHEST EDV: 18
RIGHT ARM DIASTOLIC BLOOD PRESSURE: 60 MMHG
RIGHT ARM SYSTOLIC BLOOD PRESSURE: 110 MMHG
RIGHT CBA DIAS: 9 CM/S
RIGHT CBA SYS: 110 CM/S
RIGHT CCA DIST DIAS: 10 CM/S
RIGHT CCA DIST SYS: 89 CM/S
RIGHT CCA MID DIAS: 8 CM/S
RIGHT CCA MID SYS: 72 CM/S
RIGHT CCA PROX DIAS: 0 CM/S
RIGHT CCA PROX SYS: 63 CM/S
RIGHT ECA DIAS: 0 CM/S
RIGHT ECA SYS: 181 CM/S
RIGHT ICA DIST DIAS: 15 CM/S
RIGHT ICA DIST SYS: 105 CM/S
RIGHT ICA MID DIAS: 11 CM/S
RIGHT ICA MID SYS: 81 CM/S
RIGHT ICA PROX DIAS: 28 CM/S
RIGHT ICA PROX SYS: 276 CM/S
RIGHT VERTEBRAL DIAS: 8 CM/S
RIGHT VERTEBRAL SYS: 29 CM/S

## 2019-03-21 PROCEDURE — 99999 PR PBB SHADOW E&M-EST. PATIENT-LVL III: CPT | Mod: PBBFAC,,, | Performed by: SURGERY

## 2019-03-21 PROCEDURE — 99999 PR PBB SHADOW E&M-EST. PATIENT-LVL III: ICD-10-PCS | Mod: PBBFAC,,, | Performed by: SURGERY

## 2019-03-21 PROCEDURE — 99213 OFFICE O/P EST LOW 20 MIN: CPT | Mod: PBBFAC | Performed by: SURGERY

## 2019-03-21 PROCEDURE — 99214 OFFICE O/P EST MOD 30 MIN: CPT | Mod: S$PBB,,, | Performed by: SURGERY

## 2019-03-21 PROCEDURE — 99214 PR OFFICE/OUTPT VISIT, EST, LEVL IV, 30-39 MIN: ICD-10-PCS | Mod: S$PBB,,, | Performed by: SURGERY

## 2019-03-21 NOTE — PATIENT INSTRUCTIONS
Carotid Artery Problems: Blockage     A healthy carotid artery lets blood flow easily to the brain.   The blood carries oxygen and nutrients throughout the body. The carotid arteries are large blood vessels that carry blood to the brain. Certain health problems can make the inside of the carotid arteries narrow and rough. Over time, this damage increases the chances of having a stroke. A stroke is a sudden loss of brain function.   Open carotid arteries  In a healthy carotid artery, the inside of the artery is open. The lining of the artery wall is also smooth. This lets blood flow freely from the heart to the brain. The brain gets all the oxygen and nutrients it needs to function well.  Narrowed carotid arteries  Health factors, such as high blood pressure, high cholesterol, smoking, and diabetes, can damage artery walls and make them rough. This allows cholesterol and other particles in the blood to stick to the artery walls and form plaque or fatty deposits. As the plaque builds up, it can narrow the artery. Blood may also collect on the plaque and form blood clots.  How a stroke happens     Emboli can enter the bloodstream and travel to the brain. Brain tissue is damaged when emboli block arteries in the brain.   A stroke can happen when plaque in the carotid artery ruptures. This can allow small pieces of plaque to break off into the bloodstream. At the same time, rupture can make more blood clots. The pieces of plaque and tiny blood clots or emboli can flow in the blood until they get stuck in a small blood vessel in the brain. This blocks blood flow to part of the brain and causes a stroke.  Date Last Reviewed: 6/8/2015  © 9883-4345 [employer]. [address] [place] [address], [place] [address]. All rights reserved. This information is not intended as a substitute for professional medical care. Always follow your healthcare professional's instructions.

## 2019-03-21 NOTE — LETTER
March 21, 2019        Paige Mcleod MD  [address]  [facility] [address]             [facility]  [address]  [facility] [address]  Phone: [phone]  Fax: [phone]   Patient: Jeannie Hutchins   MR Number: [id number]   YOB: 1935   Date of Visit: 3/21/2019       Dear Dr. Bethany Mcleod:    Thank you for referring Jeannie Hutchins to me for evaluation. Below are the relevant portions of my assessment and plan of care.            If you have questions, please do not hesitate to call me. I look forward to following Jeannie DAMIAN along with you.    Sincerely,      Moncho Smith MD           CC  No Recipients

## 2019-03-21 NOTE — PROGRESS NOTES
CHIEF COMPLAINT: Right carotid stenosis.     HISTORY OF PRESENT ILLNESS: A 78-year-old female status post left carotid   endarterectomy in 2006, now presents with a 75+% right carotid stenosis   documented by recent angiography at [facility]. She has no history of   stroke, TIA or amaurosis.     Her original surgery date was canceled and she now is back to reschedule.   No CVA/TIA/aumarosis    1/2019: Did not make f/u appt.  Presents with her  today who I performed a L CEA on 2/2018.  No TIA or CVA since last eval.    Interval history:  No new neuro symptoms.  No TIA or CVA.    PAST MEDICAL HISTORY:   1. Hypothyroidism.   2. Hyperlipidemia.   3. Hypertension.   The patient has no history of coronary artery disease, chest pain or congestive   heart failure. She has an excellent exercise tolerance.     PAST SURGICAL HISTORY:   1. Left carotid endarterectomy in 2006.   2. Carpal tunnel release.   3. Right carotid endarterectomy,10/14/2013.     FAMILY HISTORY: Negative for cardiovascular disease. She has multiple family   members, who have lived into their 90s.   SOCIAL HISTORY: Nonsmoker.   MEDICATIONS: Include aspirin and statin. See EPIC for full list.     ALLERGIES: Strawberries.     REVIEW OF SYSTEMS:   CARDIOVASCULAR: Denies claudication, postprandial pain or DVT. All other   systems include eyes, ENT, respiratory, , musculoskeletal, skin, breast,   neurologic, psychiatric, lymph, allergy and immune are negative.     PHYSICAL EXAMINATION:   VITAL SIGNS: See nursing notes.   GENERAL: She is in no acute distress and appears younger than her stated age.   RESPIRATORY: Normal effort. Clear to auscultation.   CARDIOVASCULAR: Regular rate and rhythm, nondisplaced PMI, no murmur.   VASCULAR: 2+ radial, brachial and posterior tibial pulses bilaterally.   EXTREMITIES: No edema, varicosities, ulcerations, gangrene or digital   petechiae.   ABDOMEN: No masses or tenderness. No hepatosplenomegaly. Aorta cannot  be   palpated.   EYE: Normal conjunctivae and lids.   ENT: Fair dentition.   NECK: No JVD or thyromegaly.   MUSCULOSKELETAL: No kyphosis or scoliosis.   EXTREMITIES: Without clubbing or cyanosis.   SKIN: Warm and dry.   NEUROLOGIC: Alert and oriented x3. Normal mood and affect. Midline tongue. Decreased sensation to touch R neck and lateral face.  No speech difficulty or hoarseness, 5/5 motor strength in all extremities.     IMAGIN:  Outside arteriography was reviewed on cut films demonstratin. Type 2 arch.   2. Right 75+% carotid stenosis over 2 cm with most cranial aspect of this at   the bottom of C2.     Duplex shows ~75% R ICA stenosis with , EDV 83    2017: Carotid US  Elevated right proximal ICA velocities, suggesting 60-79% stenosis. Velocities are similar to the 13 exam.  Left proximal ICA velocities within normal limits.    2018 Carotid US:  There is 60 - 69% right Internal Carotid stenosis.  There is 20 - 39% left Internal Carotid stenosis.    3/2019 Carotid US:  There is 60 - 69% right Internal Carotid stenosis.  Patent carotid patch.  There is 0-19% left Internal Carotid stenosis.  Patent carotid patch.    ASSESSMENT:   82 yo WF with h/o bilateral carotid occlusive disease s/p R CEA , L CEA     PLAN:  1. Rec continued routine surveillance of carotid occlusive disease s/p bilateral CEA  2. Rec daily ASA  3. BP control  4. Heart healthy lifestyle  5. RTC with carotid US in 6 mo

## 2019-04-23 NOTE — NURSING
Pt IV removed with cath intact. Pt up to bathroom with daughter and getting dressed. Pt has received discharge instructions   RN called to see how the patient was doing.  Per mom, he has had diarrhea for the past 48 hours.  RN informed mom to decrease from 1 capful to 1/2 capful per day and to let us know how he is doing after 72 hours.  LM for mom to call back with further questions.

## 2019-06-26 ENCOUNTER — OFFICE VISIT (OUTPATIENT)
Dept: CARDIOLOGY | Facility: CLINIC | Age: 84
End: 2019-06-26
Payer: MEDICARE

## 2019-06-26 VITALS
WEIGHT: 101 LBS | SYSTOLIC BLOOD PRESSURE: 116 MMHG | RESPIRATION RATE: 15 BRPM | DIASTOLIC BLOOD PRESSURE: 60 MMHG | HEIGHT: 56 IN | OXYGEN SATURATION: 95 % | HEART RATE: 75 BPM | BODY MASS INDEX: 22.72 KG/M2

## 2019-06-26 DIAGNOSIS — I48.0 PAROXYSMAL ATRIAL FIBRILLATION: ICD-10-CM

## 2019-06-26 DIAGNOSIS — I10 ESSENTIAL HYPERTENSION: ICD-10-CM

## 2019-06-26 DIAGNOSIS — I73.9 CLAUDICATION OF BOTH LOWER EXTREMITIES: ICD-10-CM

## 2019-06-26 DIAGNOSIS — E78.2 MIXED HYPERLIPIDEMIA: ICD-10-CM

## 2019-06-26 DIAGNOSIS — R07.9 CHEST PAIN, UNSPECIFIED TYPE: Primary | ICD-10-CM

## 2019-06-26 DIAGNOSIS — Z98.890 S/P CAROTID ENDARTERECTOMY: ICD-10-CM

## 2019-06-26 PROCEDURE — 99214 PR OFFICE/OUTPT VISIT, EST, LEVL IV, 30-39 MIN: ICD-10-PCS | Mod: S$PBB,,, | Performed by: INTERNAL MEDICINE

## 2019-06-26 PROCEDURE — 99213 OFFICE O/P EST LOW 20 MIN: CPT | Mod: PBBFAC,25 | Performed by: INTERNAL MEDICINE

## 2019-06-26 PROCEDURE — 93005 ELECTROCARDIOGRAM TRACING: CPT | Mod: PBBFAC | Performed by: INTERNAL MEDICINE

## 2019-06-26 PROCEDURE — 93010 ELECTROCARDIOGRAM REPORT: CPT | Mod: S$PBB,,, | Performed by: INTERNAL MEDICINE

## 2019-06-26 PROCEDURE — 93010 EKG 12-LEAD: ICD-10-PCS | Mod: S$PBB,,, | Performed by: INTERNAL MEDICINE

## 2019-06-26 PROCEDURE — 99999 PR PBB SHADOW E&M-EST. PATIENT-LVL III: ICD-10-PCS | Mod: PBBFAC,,, | Performed by: INTERNAL MEDICINE

## 2019-06-26 PROCEDURE — 99214 OFFICE O/P EST MOD 30 MIN: CPT | Mod: S$PBB,,, | Performed by: INTERNAL MEDICINE

## 2019-06-26 PROCEDURE — 99999 PR PBB SHADOW E&M-EST. PATIENT-LVL III: CPT | Mod: PBBFAC,,, | Performed by: INTERNAL MEDICINE

## 2019-06-26 NOTE — PROGRESS NOTES
CARDIOVASCULAR PROGRESS NOTE    REASON FOR CONSULT:   Jeannie Hutchins is a 84 y.o. female who presents for follow up of PAF, HFpEF, CP.    PCP: Yani Martinez: Luis  HISTORY OF PRESENT ILLNESS:   The patient comes in for follow-up.  In the interim since her last office visit, she has been seen by Dr. Smith who now be following her for her carotid disease.  She has been describing episodic very short-lived nonexertional chest pain.  She has had no shortness of breath.  There has been no palpitations, lightheadedness, dizziness, or syncope.  There has been no PND, orthopnea, or lower extremity edema.  She denies any melena, or hematuria appears to be tolerating her anticoagulation with reduced dose apixaban.  She does describe some calf discomfort ambulation suggestive of claudication.    CARDIOVASCULAR HISTORY:   PAF, CHADS VASC 4, on apixaban 2.5mg bid (dose appropriate for age/weight)  Hx bilat CEA with R ICA stenosis on CUS 4/2018    PAST MEDICAL HISTORY:     Past Medical History:   Diagnosis Date    Anticoagulant long-term use     Eliquis    Arthritis     Atrial fibrillation     Blood transfusion     Carotid stenosis     CHF (congestive heart failure)     Edema     Hearing loss     [place] (hard of hearing)     Hypercholesterolemia     Hypertension     Psychiatric problem     Thyroid disease        PAST SURGICAL HISTORY:     Past Surgical History:   Procedure Laterality Date    ARTERIOGRAM, CAROTID N/A 6/27/2013    Performed by [facility] OR    CARPAL TUNNEL RELEASE  2012    right hand    EGD (ESOPHAGOGASTRODUODENOSCOPY) Left 8/29/2018    Performed by Oniel Dorsey MD at [facility] ENDO    ENDARTERECTOMY, CAROTID Right 10/14/2013    Performed by ELA Sarabia III, MD at [facility] OR [address] [place] [address]    HYSTERECTOMY      left carotid endartectomy  5/2006    TONSILLECTOMY         ALLERGIES AND MEDICATION:     Review of patient's allergies indicates:   Allergen Reactions    Strawberry  Swelling     Tongue swells up and a generalized rash.        Medication List           Accurate as of 6/26/19  3:11 PM. If you have any questions, ask your nurse or doctor.               CONTINUE taking these medications    acetaminophen 325 MG tablet  Commonly known as:  TYLENOL     alendronate 70 MG tablet  Commonly known as:  FOSAMAX     aluminum-magnesium hydroxide-simethicone 200-200-20 mg/5 mL Susp  Commonly known as:  MAALOX  Take 30 mLs by mouth every 4 (four) hours as needed.     amlodipine-atorvastatin 10-40 mg per tablet  Commonly known as:  CADUET  TAKE ONE Tablet BY MOUTH DAILY     ELIQUIS 2.5 mg Tab  Generic drug:  apixaban  TAKE ONE Tablet BY MOUTH TWICE DAILY     furosemide 20 MG tablet  Commonly known as:  LASIX  Take 1 tablet (20 mg total) by mouth once daily.     levothyroxine 75 MCG tablet  Commonly known as:  SYNTHROID     losartan 100 MG tablet  Commonly known as:  COZAAR  Take 1 tablet (100 mg total) by mouth once daily.     metoprolol tartrate 50 MG tablet  Commonly known as:  LOPRESSOR  Take 1 tablet (50 mg total) by mouth 2 (two) times daily.     ranitidine 150 MG tablet  Commonly known as:  ZANTAC     VISION FORMULA (WITH LUTEIN) ORAL              SOCIAL HISTORY:     Social History     Socioeconomic History    Marital status:      Spouse name: Not on file    Number of children: Not on file    Years of education: Not on file    Highest education level: Not on file   Occupational History    Not on file   Social Needs    Financial resource strain: Not on file    Food insecurity:     Worry: Not on file     Inability: Not on file    Transportation needs:     Medical: Not on file     Non-medical: Not on file   Tobacco Use    Smoking status: Never Smoker    Smokeless tobacco: Never Used   Substance and Sexual Activity    Alcohol use: No    Drug use: No    Sexual activity: Never     Partners: Male   Lifestyle    Physical activity:     Days per week: Not on file     Minutes per  session: Not on file    Stress: Not on file   Relationships    Social connections:     Talks on phone: Not on file     Gets together: Not on file     Attends Anabaptist service: Not on file     Active member of club or organization: Not on file     Attends meetings of clubs or organizations: Not on file     Relationship status: Not on file   Other Topics Concern    Patient feels they ought to cut down on drinking/drug use Not Asked    Patient annoyed by others criticizing their drinking/drug use Not Asked    Patient has felt bad or guilty about drinking/drug use Not Asked    Patient has had a drink/used drugs as an eye opener in the AM Not Asked   Social History Narrative     since 1984    He is retired.  He is 83.  Worked for [employer]    She is retired.  Worked for [employer].       FAMILY HISTORY:     Family History   Problem Relation Age of Onset    Heart disease Father     Cancer Brother 65        bladder    Cancer Sister 81        Ovarian    Ovarian cancer Sister 81    Breast cancer Daughter 50        Status post mastectomy    Cancer Sister         Lung.  Smoker    Cancer Sister         Lung.  Smoker    Schizophrenia Son        REVIEW OF SYSTEMS:   Review of Systems   Constitutional: Negative for chills, diaphoresis and fever.   HENT: Negative for nosebleeds.    Eyes: Negative for blurred vision, double vision and photophobia.   Respiratory: Negative for cough, hemoptysis, shortness of breath and wheezing.    Cardiovascular: Positive for chest pain and claudication. Negative for palpitations, orthopnea, leg swelling and PND.   Gastrointestinal: Negative for abdominal pain, blood in stool, heartburn, melena, nausea and vomiting.   Genitourinary: Negative for flank pain and hematuria.   Musculoskeletal: Negative for falls, myalgias and neck pain.   Skin: Negative for rash.   Neurological: Negative for dizziness, seizures, loss of consciousness, weakness and headaches.  "  Endo/Heme/Allergies: Negative for polydipsia. Does not bruise/bleed easily.   Psychiatric/Behavioral: Negative for depression and memory loss. The patient is not nervous/anxious.        PHYSICAL EXAM:     Vitals:    06/26/19 1456   BP: 116/60   Pulse: 75   Resp: 15    Body mass index is 22.64 kg/m².  Weight: 45.8 kg (101 lb)   Height: 4' 8" (142.2 cm)     Physical Exam   Constitutional: She is oriented to person, place, and time. She appears well-developed and well-nourished. She is cooperative.  Non-toxic appearance. No distress.   HENT:   Head: Normocephalic and atraumatic.   Eyes: Pupils are equal, round, and reactive to light. Conjunctivae and EOM are normal. No scleral icterus.   Neck: Trachea normal and normal range of motion. Neck supple. Normal carotid pulses and no JVD present. Carotid bruit is not present. No neck rigidity. No tracheal deviation and no edema present. No thyromegaly present.   Cardiovascular: Normal rate, regular rhythm, S1 normal and S2 normal. PMI is not displaced. Exam reveals no gallop and no friction rub.   No murmur heard.  Pulses:       Carotid pulses are 2+ on the right side, and 2+ on the left side.  Bilat CEA scars well healed   Pulmonary/Chest: Effort normal and breath sounds normal. No stridor. No respiratory distress. She has no wheezes. She has no rales. She exhibits no tenderness.   Abdominal: Soft. She exhibits no distension. There is no hepatosplenomegaly.   Musculoskeletal: Normal range of motion. She exhibits no edema or tenderness.   Feet:   Right Foot:   Skin Integrity: Negative for ulcer.   Left Foot:   Skin Integrity: Negative for ulcer.   Neurological: She is alert and oriented to person, place, and time. No cranial nerve deficit.   Skin: Skin is warm and dry. No rash noted. No erythema.   Psychiatric: She has a normal mood and affect. Her speech is normal and behavior is normal.   Vitals reviewed.      DATA:   EKG: (personally reviewed tracing)  6/26/19 SR 75, " NSSTTW changes    Laboratory:  CBC:  Recent Labs   Lab 08/27/18  1816 08/28/18  0751 08/29/18  0442   WBC 19.88 H 12.74 H 11.15   Hemoglobin 13.4 12.7 13.2   Hematocrit 39.9 37.6 37.5   Platelets 363 H 343 308       CHEMISTRIES:  Recent Labs   Lab 02/02/18  0948  04/12/18  0930  04/15/18  0529  08/16/18  1027 08/27/18  1816 10/04/18  0940   Glucose  --    < > 141 H   < > 89   < > 101 145 H 94   Sodium  --    < > 113 LL   < > 132 L   < > 136 132 L 138   Potassium  --    < > 4.0   < > 4.6   < > 4.1 3.5 3.8   BUN, Bld  --    < > 16   < > 6 L   < > 23 20 15   Creatinine  --    < > 0.8   < > 0.6   < > 0.8 0.8 0.8   eGFR if   --    < > >60   < > >60   < > >60 >60 >60   eGFR if non   --    < > >60   < > >60   < > >60 >60 >60   Calcium  --    < > 9.3   < > 9.3   < > 9.8 10.0 10.1   Magnesium 1.6  --  1.9  --  1.9  --   --   --   --     < > = values in this interval not displayed.       CARDIAC BIOMARKERS:  Recent Labs   Lab 04/12/18  1744 08/27/18  1816 08/27/18  2249   Troponin I 0.009 <0.006 <0.006       COAGS:  Recent Labs   Lab 04/12/18  0930 08/27/18  1816 08/29/18  0442   INR 1.1 1.0 1.0       LIPIDS/LFTS:  Recent Labs   Lab 04/12/18  0930 06/20/18  0957 08/27/18  1816   Cholesterol  --  153  --    Triglycerides  --  67  --    HDL  --  55  --    LDL Cholesterol  --  84.6  --    Non-HDL Cholesterol  --  98  --    AST 22 15 23   ALT 49 H 13 16     Lab Results   Component Value Date    TSH 4.032 (H) 04/12/2018     Free T4 1.29 4/12/18    Cardiovascular Testing:  Carotid US 3/20/19 (similar to report 4/2018)  1. Right carotid ultrasound shows 60-69% internal carotid artery stenosis.  Antegrade vertebral artery flow.  2. Left carotid ultrasound shows  0-19% internal carotid artery stenosis.  Antegrade vertebral artery flow.    Echo: 1/29/18    1 - Normal left ventricular systolic function (EF 60-65%).     2 - No wall motion abnormalities.     3 - Concentric remodeling.     4 - Trivial aortic  regurgitation.     5 - Mild tricuspid regurgitation.     6 - Pulmonary hypertension. The estimated PA systolic pressure is 54 mmHg.     7 - Small pericardial anterior effusion without hemodynamic compromise.     ASSESSMENT:   # CP, atypical  # ?claudication  # PAF, CHADS VASC 4, on apixaban 2.5mg bid (dose appropriate for age/weight)  # Hx bilat CEA with R ICA stenosis, stable by CUS 3/2019.  Following with Dr. Smith.  # HTN, controlled, (hx of ACEi related cough, now on ARB)  # HLP, on atorvastatin 40mg  # hx hyponatremia resolved off HCTZ    PLAN:   Cont med rx  Cont apixaban 2.5mg bid  Ex MPI  Echo  LE art US/STANLEY  RTC 1 month    Wenceslao Armenta MD, FACC

## 2019-07-10 ENCOUNTER — HOSPITAL ENCOUNTER (OUTPATIENT)
Dept: RADIOLOGY | Facility: HOSPITAL | Age: 84
Discharge: HOME | End: 2019-07-10
Attending: INTERNAL MEDICINE
Payer: MEDICARE

## 2019-07-10 ENCOUNTER — HOSPITAL ENCOUNTER (OUTPATIENT)
Dept: CARDIOLOGY | Facility: HOSPITAL | Age: 84
Discharge: HOME | End: 2019-07-10
Attending: INTERNAL MEDICINE
Payer: MEDICARE

## 2019-07-10 DIAGNOSIS — R07.9 CHEST PAIN, UNSPECIFIED TYPE: ICD-10-CM

## 2019-07-10 DIAGNOSIS — I73.9 CLAUDICATION OF BOTH LOWER EXTREMITIES: ICD-10-CM

## 2019-07-10 LAB
AORTIC ROOT ANNULUS: 2.02 CM
AORTIC VALVE CUSP SEPERATION: 1.85 CM
ASCENDING AORTA: 2.71 CM
AV INDEX (PROSTH): 0.74
AV MEAN GRADIENT: 5 MMHG
AV PEAK GRADIENT: 10 MMHG
AV VALVE AREA: 1.97 CM2
AV VELOCITY RATIO: 0.73
CV ECHO LV RWT: 0.96 CM
CV STRESS BASE HR: 63 BPM
DIASTOLIC BLOOD PRESSURE: 51 MMHG
DOP CALC AO PEAK VEL: 1.59 M/S
DOP CALC AO VTI: 35.74 CM
DOP CALC LVOT AREA: 2.7 CM2
DOP CALC LVOT DIAMETER: 1.84 CM
DOP CALC LVOT PEAK VEL: 1.16 M/S
DOP CALC LVOT STROKE VOLUME: 70.3 CM3
DOP CALCLVOT PEAK VEL VTI: 26.45 CM
E WAVE DECELERATION TIME: 213.67 MSEC
E/A RATIO: 1.01
E/E' RATIO: 15.47 M/S
ECHO LV POSTERIOR WALL: 1.6 CM (ref 0.6–1.1)
FRACTIONAL SHORTENING: 35 % (ref 28–44)
INTERVENTRICULAR SEPTUM: 1.39 CM (ref 0.6–1.1)
IVRT: 0.09 MSEC
LA MAJOR: 5.14 CM
LA MINOR: 5.27 CM
LA WIDTH: 3.21 CM
LEFT ANT TIBIAL SYS PSV: 70 CM/S
LEFT ATRIUM SIZE: 4.39 CM
LEFT ATRIUM VOLUME: 62.34 CM3
LEFT CFA PSV: 78 CM/S
LEFT EXTERNAL ILIAC PSV: 110 CM/S
LEFT INTERNAL DIMENSION IN SYSTOLE: 2.19 CM (ref 2.1–4)
LEFT PERONEAL SYS PSV: 78 CM/S
LEFT POPLITEAL PSV: 86 CM/S
LEFT POST TIBIAL SYS PSV: 89 CM/S
LEFT PROFUNDA SYS PSV: 78 CM/S
LEFT SUPER FEMORAL DIST SYS PSV: 249 CM/S
LEFT SUPER FEMORAL MID SYS PSV: 161 CM/S
LEFT SUPER FEMORAL OSTIAL SYS PSV: 99 CM/S
LEFT SUPER FEMORAL PROX SYS PSV: 95 CM/S
LEFT TIB/PER TRUNK SYS PSV: 81 CM/S
LEFT VENTRICLE DIASTOLIC VOLUME: 45.8 ML
LEFT VENTRICLE SYSTOLIC VOLUME: 16.06 ML
LEFT VENTRICULAR INTERNAL DIMENSION IN DIASTOLE: 3.35 CM (ref 3.5–6)
LEFT VENTRICULAR MASS: 181.35 G
LV LATERAL E/E' RATIO: 14.5 M/S
LV SEPTAL E/E' RATIO: 16.57 M/S
MV PEAK A VEL: 1.15 M/S
MV PEAK E VEL: 1.16 M/S
NUC REST EJECTION FRACTION: 81
OHS CV CPX 85 PERCENT MAX PREDICTED HEART RATE MALE: 113
OHS CV CPX MAX PREDICTED HEART RATE: 133
OHS CV CPX PATIENT IS FEMALE: 1
OHS CV CPX PATIENT IS MALE: 0
OHS CV CPX PEAK DIASTOLIC BLOOD PRESSURE: 44 MMHG
OHS CV CPX PEAK HEAR RATE: 88 BPM
OHS CV CPX PEAK RATE PRESSURE PRODUCT: NORMAL
OHS CV CPX PEAK SYSTOLIC BLOOD PRESSURE: 141 MMHG
OHS CV CPX PERCENT MAX PREDICTED HEART RATE ACHIEVED: 66
OHS CV CPX RATE PRESSURE PRODUCT PRESENTING: 9702
OHS CV LEFT LOWER EXTREMITY ABI (NO CALC): 0.86
OHS CV RIGHT ABI LOWER EXTREMITY (NO CALC): 0.86
PISA TR MAX VEL: 3.32 M/S
PULM VEIN S/D RATIO: 1.46
PV PEAK D VEL: 0.46 M/S
PV PEAK S VEL: 0.67 M/S
PV PEAK VELOCITY: 1.11 CM/S
RA MAJOR: 4.54 CM
RA PRESSURE: 8 MMHG
RA WIDTH: 3.08 CM
RIGHT ANT TIBIAL SYS PSV: 98 CM/S
RIGHT CFA PSV: 125 CM/S
RIGHT EXTERNAL ILLIAC PSV: 122 CM/S
RIGHT PERONEAL SYS PSV: 77 CM/S
RIGHT POPLITEAL PSV: 76 CM/S
RIGHT POST TIBIAL SYS PSV: 83 CM/S
RIGHT PROFUNDA SYS PSV: 97 CM/S
RIGHT SUPER FEMORAL DIST SYS PSV: 86 CM/S
RIGHT SUPER FEMORAL MID SYS PSV: 154 CM/S
RIGHT SUPER FEMORAL OSTIAL SYS PSV: 129 CM/S
RIGHT SUPER FEMORAL PROX SYS PSV: 111 CM/S
RIGHT TIB/PER TRUNK SYS PSV: 78 CM/S
RIGHT VENTRICULAR END-DIASTOLIC DIMENSION: 2.74 CM
RV TISSUE DOPPLER FREE WALL SYSTOLIC VELOCITY 1 (APICAL 4 CHAMBER VIEW): 17.95 CM/S
SINUS: 2.33 CM
STJ: 1.98 CM
STRESS ECHO TARGET HR: 116.45 BPM
SYSTOLIC BLOOD PRESSURE: 154 MMHG
TDI LATERAL: 0.08 M/S
TDI SEPTAL: 0.07 M/S
TDI: 0.08 M/S
TR MAX PG: 44 MMHG
TRICUSPID ANNULAR PLANE SYSTOLIC EXCURSION: 2.7 CM
TV REST PULMONARY ARTERY PRESSURE: 52 MMHG

## 2019-07-10 PROCEDURE — 93018 CV STRESS TEST I&R ONLY: CPT | Mod: ,,, | Performed by: INTERNAL MEDICINE

## 2019-07-10 PROCEDURE — 93306 TTE W/DOPPLER COMPLETE: CPT | Mod: 26,,, | Performed by: INTERNAL MEDICINE

## 2019-07-10 PROCEDURE — 93018 STRESS TEST WITH MYOCARDIAL PERFUSION (CUPID ONLY): ICD-10-PCS | Mod: ,,, | Performed by: INTERNAL MEDICINE

## 2019-07-10 PROCEDURE — A9502 TC99M TETROFOSMIN: HCPCS

## 2019-07-10 PROCEDURE — 78452 HT MUSCLE IMAGE SPECT MULT: CPT | Mod: 26,,, | Performed by: INTERNAL MEDICINE

## 2019-07-10 PROCEDURE — 63600175 PHARM REV CODE 636 W HCPCS: Performed by: INTERNAL MEDICINE

## 2019-07-10 PROCEDURE — 93016 STRESS TEST WITH MYOCARDIAL PERFUSION (CUPID ONLY): ICD-10-PCS | Mod: ,,, | Performed by: INTERNAL MEDICINE

## 2019-07-10 PROCEDURE — 93925 LOWER EXTREMITY STUDY: CPT | Mod: 26,,, | Performed by: INTERNAL MEDICINE

## 2019-07-10 PROCEDURE — 93925 LOWER EXTREMITY STUDY: CPT

## 2019-07-10 PROCEDURE — 93306 TTE W/DOPPLER COMPLETE: CPT

## 2019-07-10 PROCEDURE — 93306 TRANSTHORACIC ECHO (TTE) COMPLETE (CUPID ONLY): ICD-10-PCS | Mod: 26,,, | Performed by: INTERNAL MEDICINE

## 2019-07-10 PROCEDURE — 78452 STRESS TEST WITH MYOCARDIAL PERFUSION (CUPID ONLY): ICD-10-PCS | Mod: 26,,, | Performed by: INTERNAL MEDICINE

## 2019-07-10 PROCEDURE — 93016 CV STRESS TEST SUPVJ ONLY: CPT | Mod: ,,, | Performed by: INTERNAL MEDICINE

## 2019-07-10 PROCEDURE — 93925 CV US DOPPLER ARTERIAL LEGS BILATERAL (CUPID ONLY): ICD-10-PCS | Mod: 26,,, | Performed by: INTERNAL MEDICINE

## 2019-07-10 PROCEDURE — 93017 CV STRESS TEST TRACING ONLY: CPT

## 2019-07-10 RX ORDER — REGADENOSON 0.08 MG/ML
0.4 INJECTION, SOLUTION INTRAVENOUS ONCE
Status: COMPLETED | OUTPATIENT
Start: 2019-07-10 | End: 2019-07-10

## 2019-07-10 RX ADMIN — REGADENOSON 0.4 MG: 0.08 INJECTION, SOLUTION INTRAVENOUS at 10:07

## 2019-07-31 ENCOUNTER — OFFICE VISIT (OUTPATIENT)
Dept: CARDIOLOGY | Facility: CLINIC | Age: 84
End: 2019-07-31
Payer: MEDICARE

## 2019-07-31 VITALS
WEIGHT: 103 LBS | HEIGHT: 56 IN | RESPIRATION RATE: 15 BRPM | DIASTOLIC BLOOD PRESSURE: 74 MMHG | OXYGEN SATURATION: 97 % | HEART RATE: 67 BPM | BODY MASS INDEX: 23.17 KG/M2 | SYSTOLIC BLOOD PRESSURE: 122 MMHG

## 2019-07-31 DIAGNOSIS — I10 ESSENTIAL HYPERTENSION: Primary | ICD-10-CM

## 2019-07-31 DIAGNOSIS — I48.0 PAROXYSMAL ATRIAL FIBRILLATION: ICD-10-CM

## 2019-07-31 DIAGNOSIS — I27.20 PULMONARY HYPERTENSION: ICD-10-CM

## 2019-07-31 DIAGNOSIS — Z98.890 S/P CAROTID ENDARTERECTOMY: ICD-10-CM

## 2019-07-31 DIAGNOSIS — I65.21 CAROTID STENOSIS, ASYMPTOMATIC, RIGHT: ICD-10-CM

## 2019-07-31 DIAGNOSIS — E78.2 MIXED HYPERLIPIDEMIA: ICD-10-CM

## 2019-07-31 PROCEDURE — 99999 PR PBB SHADOW E&M-EST. PATIENT-LVL III: CPT | Mod: PBBFAC,,, | Performed by: INTERNAL MEDICINE

## 2019-07-31 PROCEDURE — 99213 OFFICE O/P EST LOW 20 MIN: CPT | Mod: PBBFAC | Performed by: INTERNAL MEDICINE

## 2019-07-31 PROCEDURE — 99214 OFFICE O/P EST MOD 30 MIN: CPT | Mod: S$PBB,,, | Performed by: INTERNAL MEDICINE

## 2019-07-31 PROCEDURE — 99214 PR OFFICE/OUTPT VISIT, EST, LEVL IV, 30-39 MIN: ICD-10-PCS | Mod: S$PBB,,, | Performed by: INTERNAL MEDICINE

## 2019-07-31 PROCEDURE — 99999 PR PBB SHADOW E&M-EST. PATIENT-LVL III: ICD-10-PCS | Mod: PBBFAC,,, | Performed by: INTERNAL MEDICINE

## 2019-07-31 NOTE — PROGRESS NOTES
CARDIOVASCULAR PROGRESS NOTE    REASON FOR CONSULT:   Jeannie Hutchins is a 84 y.o. female who presents for follow up of PAF, HFpEF, CP.    PCP: Yani Martniez: Luis  HISTORY OF PRESENT ILLNESS:   The patient comes in for follow-up.  She tells me her previous complaints of chest discomfort her likely related to bronchitis.  She is no longer having any chest pain, nor any shortness of breath.  She is not having any claudicant symptoms, does describe knee pain bilaterally.  She otherwise had no palpitations, lightheadedness, dizziness, syncope.  There has been no PND, orthopnea, or lower extremity edema.  She denies melena, or hematuria.    I reviewed the results of her nuclear stress test and echo which were both normal.  Her lower extremity arterial ultrasound revealed mildly decreased STANLEY bilaterally with plaque in the mid SFA bilaterally without significant obstruction.    CARDIOVASCULAR HISTORY:   PAF, CHADS VASC 4, on apixaban 2.5mg bid (dose appropriate for age/weight)  Hx bilat CEA with R ICA stenosis on CUS 4/2018    PAST MEDICAL HISTORY:     Past Medical History:   Diagnosis Date    Anticoagulant long-term use     Eliquis    Arthritis     Atrial fibrillation     Blood transfusion     Carotid stenosis     CHF (congestive heart failure)     Edema     Hearing loss     [place] (hard of hearing)     Hypercholesterolemia     Hypertension     Psychiatric problem     Thyroid disease        PAST SURGICAL HISTORY:     Past Surgical History:   Procedure Laterality Date    ARTERIOGRAM, CAROTID N/A 6/27/2013    Performed by [facility] at [facility] OR    CARPAL TUNNEL RELEASE  2012    right hand    EGD (ESOPHAGOGASTRODUODENOSCOPY) Left 8/29/2018    Performed by Oniel Dorsey [facility] at [facility] ENDO    ENDARTERECTOMY, CAROTID Right 10/14/2013    [facility]    HYSTERECTOMY      left carotid endartectomy  5/2006    TONSILLECTOMY         ALLERGIES AND  MEDICATION:     Review of patient's allergies indicates:   Allergen Reactions    Strawberry Swelling     Tongue swells up and a generalized rash.        Medication List           Accurate as of 7/31/19  2:00 PM. If you have any questions, ask your nurse or doctor.               CONTINUE taking these medications    acetaminophen 325 MG tablet  Commonly known as:  TYLENOL     alendronate 70 MG tablet  Commonly known as:  FOSAMAX     aluminum-magnesium hydroxide-simethicone 200-200-20 mg/5 mL Susp  Commonly known as:  MAALOX  Take 30 mLs by mouth every 4 (four) hours as needed.     amlodipine-atorvastatin 10-40 mg per tablet  Commonly known as:  CADUET  TAKE ONE Tablet BY MOUTH DAILY     ELIQUIS 2.5 mg Tab  Generic drug:  apixaban  TAKE ONE Tablet BY MOUTH TWICE DAILY     furosemide 20 MG tablet  Commonly known as:  LASIX  Take 1 tablet (20 mg total) by mouth once daily.     levothyroxine 75 MCG tablet  Commonly known as:  SYNTHROID     losartan 100 MG tablet  Commonly known as:  COZAAR  Take 1 tablet (100 mg total) by mouth once daily.     metoprolol tartrate 50 MG tablet  Commonly known as:  LOPRESSOR  Take 1 tablet (50 mg total) by mouth 2 (two) times daily.     ranitidine 150 MG tablet  Commonly known as:  ZANTAC     VISION FORMULA (WITH LUTEIN) ORAL              SOCIAL HISTORY:     Social History     Socioeconomic History    Marital status:      Spouse name: Not on file    Number of children: Not on file    Years of education: Not on file    Highest education level: Not on file   Occupational History    Not on file   Social Needs    Financial resource strain: Not on file    Food insecurity:     Worry: Not on file     Inability: Not on file    Transportation needs:     Medical: Not on file     Non-medical: Not on file   Tobacco Use    Smoking status: Never Smoker    Smokeless tobacco: Never Used   Substance and Sexual Activity    Alcohol use: No    Drug use: No    Sexual activity: Never      Partners: Male   Lifestyle    Physical activity:     Days per week: Not on file     Minutes per session: Not on file    Stress: Not on file   Relationships    Social connections:     Talks on phone: Not on file     Gets together: Not on file     Attends Catholic service: Not on file     Active member of club or organization: Not on file     Attends meetings of clubs or organizations: Not on file     Relationship status: Not on file   Other Topics Concern    Patient feels they ought to cut down on drinking/drug use Not Asked    Patient annoyed by others criticizing their drinking/drug use Not Asked    Patient has felt bad or guilty about drinking/drug use Not Asked    Patient has had a drink/used drugs as an eye opener in the AM Not Asked   Social History Narrative     since 1984    He is retired.  He is 82.  Worked for [employer]    She is retired.  Worked for [employer].       FAMILY HISTORY:     Family History   Problem Relation Age of Onset    Heart disease Father     Cancer Brother 65        bladder    Cancer Sister 81        Ovarian    Ovarian cancer Sister 81    Breast cancer Daughter 50        Status post mastectomy    Cancer Sister         Lung.  Smoker    Cancer Sister         Lung.  Smoker    Schizophrenia Son        REVIEW OF SYSTEMS:   Review of Systems   Constitutional: Negative for chills, diaphoresis and fever.   HENT: Negative for nosebleeds.    Eyes: Negative for blurred vision, double vision and photophobia.   Respiratory: Negative for cough, hemoptysis, shortness of breath and wheezing.    Cardiovascular: Negative for chest pain, palpitations, orthopnea, claudication, leg swelling and PND.   Gastrointestinal: Negative for abdominal pain, blood in stool, heartburn, melena, nausea and vomiting.   Genitourinary: Negative for flank pain and hematuria.   Musculoskeletal: Negative for falls, myalgias and neck pain.   Skin: Negative for rash.   Neurological:  "Negative for dizziness, seizures, loss of consciousness, weakness and headaches.   Endo/Heme/Allergies: Negative for polydipsia. Does not bruise/bleed easily.   Psychiatric/Behavioral: Negative for depression and memory loss. The patient is not nervous/anxious.        PHYSICAL EXAM:     Vitals:    07/31/19 1354   BP: 122/74   Pulse: 67   Resp: 15    Body mass index is 23.09 kg/m².  Weight: 46.7 kg (103 lb)   Height: 4' 8" (142.2 cm)     Physical Exam   Constitutional: She is oriented to person, place, and time. She appears well-developed and well-nourished. She is cooperative.  Non-toxic appearance. No distress.   HENT:   Head: Normocephalic and atraumatic.   Eyes: Pupils are equal, round, and reactive to light. Conjunctivae and EOM are normal. No scleral icterus.   Neck: Trachea normal and normal range of motion. Neck supple. Normal carotid pulses and no JVD present. Carotid bruit is not present. No neck rigidity. No tracheal deviation and no edema present. No thyromegaly present.   Cardiovascular: Normal rate, regular rhythm, S1 normal and S2 normal. PMI is not displaced. Exam reveals no gallop and no friction rub.   Murmur heard.   Systolic murmur is present with a grade of 2/6.  Pulses:       Carotid pulses are 2+ on the right side, and 2+ on the left side.  Bilat CEA scars well healed   Pulmonary/Chest: Effort normal and breath sounds normal. No stridor. No respiratory distress. She has no wheezes. She has no rales. She exhibits no tenderness.   Abdominal: Soft. She exhibits no distension. There is no hepatosplenomegaly.   Musculoskeletal: Normal range of motion. She exhibits no edema or tenderness.   Feet:   Right Foot:   Skin Integrity: Negative for ulcer.   Left Foot:   Skin Integrity: Negative for ulcer.   Neurological: She is alert and oriented to person, place, and time. No cranial nerve deficit.   Skin: Skin is warm and dry. No rash noted. No erythema.   Psychiatric: She has a normal mood and affect. Her " speech is normal and behavior is normal.   Vitals reviewed.      DATA:   EKG: (personally reviewed tracing)  6/26/19 SR 75, NSSTTW changes    Laboratory:  CBC:  Recent Labs   Lab 08/27/18  1816 08/28/18  0751 08/29/18  0442   WBC 19.88 H 12.74 H 11.15   Hemoglobin 13.4 12.7 13.2   Hematocrit 39.9 37.6 37.5   Platelets 363 H 343 308       CHEMISTRIES:  Recent Labs   Lab 02/02/18  0948  04/12/18  0930  04/15/18  0529  08/16/18  1027 08/27/18  1816 10/04/18  0940   Glucose  --    < > 141 H   < > 89   < > 101 145 H 94   Sodium  --    < > 113 LL   < > 132 L   < > 136 132 L 138   Potassium  --    < > 4.0   < > 4.6   < > 4.1 3.5 3.8   BUN, Bld  --    < > 16   < > 6 L   < > 23 20 15   Creatinine  --    < > 0.8   < > 0.6   < > 0.8 0.8 0.8   eGFR if   --    < > >60   < > >60   < > >60 >60 >60   eGFR if non   --    < > >60   < > >60   < > >60 >60 >60   Calcium  --    < > 9.3   < > 9.3   < > 9.8 10.0 10.1   Magnesium 1.6  --  1.9  --  1.9  --   --   --   --     < > = values in this interval not displayed.       CARDIAC BIOMARKERS:  Recent Labs   Lab 04/12/18  1744 08/27/18 1816 08/27/18  2249   Troponin I 0.009 <0.006 <0.006       COAGS:  Recent Labs   Lab 04/12/18  0930 08/27/18  1816 08/29/18  0442   INR 1.1 1.0 1.0       LIPIDS/LFTS:  Recent Labs   Lab 04/12/18  0930 06/20/18  0957 08/27/18  1816   Cholesterol  --  153  --    Triglycerides  --  67  --    HDL  --  55  --    LDL Cholesterol  --  84.6  --    Non-HDL Cholesterol  --  98  --    AST 22 15 23   ALT 49 H 13 16     Lab Results   Component Value Date    TSH 4.032 (H) 04/12/2018     Free T4 1.29 4/12/18    Cardiovascular Testing:  L MPI 7/10/19    The perfusion scan is free of evidence from myocardial ischemia or injury.    Resting wall motion is physiologic.Post stress wall motion is physiologic.    Gated perfusion images showed an ejection fraction of 81.0 %    There is  normal wall motion at rest normal wall motion post  stress.    The EKG portion of this study is negative for ischemia.    There were no arrhythmias during stress.    The patient reported no chest pain during the stress test.    Echo 7/10/19  · Mild concentric left ventricular hypertrophy.  · Normal left ventricular systolic function. The estimated ejection fraction is 65%  · Grade II (moderate) left ventricular diastolic dysfunction consistent with pseudonormalization.  · Elevated left atrial pressure.  · Normal right ventricular systolic function.  · Moderate left atrial enlargement.  · Mild aortic regurgitation.  · Mild to moderate tricuspid regurgitation.  · Intermediate central venous pressure (8 mm Hg).  · The estimated PA systolic pressure is 52 mm Hg  · Pulmonary hypertension present.    LE art UIS/STANLEY 7/10/19  · Right STANLEY 0.86. Doppler shows mild < 50% mid SFA disease  · Left STANLEY 0.86. Doppler shows mild < 50% mid SFA disease    Carotid US 3/20/19 (similar to report 4/2018)  1. Right carotid ultrasound shows 60-69% internal carotid artery stenosis.  Antegrade vertebral artery flow.  2. Left carotid ultrasound shows  0-19% internal carotid artery stenosis.  Antegrade vertebral artery flow.    ASSESSMENT:   # CP, resolved, ?r/t bronchitis.  Echo/MPI 7/2019 normal  # ?claudication, resolved, LE art US/STANLEY 7/2019 without significant obstruction (mild PAD noted)  # PAF, CHADS VASC 4, on apixaban 2.5mg bid (dose appropriate for age/weight)  # Hx bilat CEA with R ICA stenosis, stable by CUS 3/2019.  Following with Dr. Smith.  # HTN, controlled, (hx of ACEi related cough, now on ARB)  # HLP, on atorvastatin 40mg  # hx hyponatremia resolved off HCTZ    PLAN:   Cont med rx  Cont apixaban 2.5mg bid  RTC 6 months    Wenceslao Armenta MD, FACC

## 2019-08-28 RX ORDER — LOSARTAN POTASSIUM 100 MG/1
TABLET ORAL
Qty: 90 TABLET | Refills: 3 | Status: SHIPPED | OUTPATIENT
Start: 2019-08-28 | End: 2020-08-27 | Stop reason: SDUPTHER

## 2019-09-17 ENCOUNTER — OFFICE VISIT (OUTPATIENT)
Dept: OBSTETRICS AND GYNECOLOGY | Facility: CLINIC | Age: 84
End: 2019-09-17
Payer: MEDICARE

## 2019-09-17 VITALS
HEIGHT: 56 IN | DIASTOLIC BLOOD PRESSURE: 68 MMHG | SYSTOLIC BLOOD PRESSURE: 156 MMHG | BODY MASS INDEX: 23.56 KG/M2 | WEIGHT: 104.75 LBS

## 2019-09-17 DIAGNOSIS — Z01.419 WELL WOMAN EXAM WITH ROUTINE GYNECOLOGICAL EXAM: Primary | ICD-10-CM

## 2019-09-17 DIAGNOSIS — Z12.31 VISIT FOR SCREENING MAMMOGRAM: ICD-10-CM

## 2019-09-17 PROCEDURE — G0101 CA SCREEN;PELVIC/BREAST EXAM: HCPCS | Mod: PBBFAC | Performed by: OBSTETRICS & GYNECOLOGY

## 2019-09-17 PROCEDURE — G0101 CA SCREEN;PELVIC/BREAST EXAM: HCPCS | Mod: S$PBB,,, | Performed by: OBSTETRICS & GYNECOLOGY

## 2019-09-17 PROCEDURE — G0101 PR CA SCREEN;PELVIC/BREAST EXAM: ICD-10-PCS | Mod: S$PBB,,, | Performed by: OBSTETRICS & GYNECOLOGY

## 2019-09-17 PROCEDURE — 99999 PR PBB SHADOW E&M-EST. PATIENT-LVL III: CPT | Mod: PBBFAC,,, | Performed by: OBSTETRICS & GYNECOLOGY

## 2019-09-17 PROCEDURE — 99999 PR PBB SHADOW E&M-EST. PATIENT-LVL III: ICD-10-PCS | Mod: PBBFAC,,, | Performed by: OBSTETRICS & GYNECOLOGY

## 2019-09-17 PROCEDURE — 99213 OFFICE O/P EST LOW 20 MIN: CPT | Mod: PBBFAC | Performed by: OBSTETRICS & GYNECOLOGY

## 2019-09-17 RX ORDER — FUROSEMIDE 20 MG/1
TABLET ORAL
Status: ON HOLD | COMMUNITY
Start: 2019-08-28 | End: 2021-01-24 | Stop reason: HOSPADM

## 2019-09-17 NOTE — PROGRESS NOTES
"  Subjective:       Patient ID: Jeannie Hutchins is a 84 y.o. female.    Chief Complaint:  Gynecologic Exam (Pt was last seen on 18.  Last normal mmg was 10/09/18)      History of Present Illness  HPI  Annual Exam-Postmenopausal  Patient presents for annual exam. The patient has no complaints today. The patient is not currently sexually active. GYN screening history: last pap: was normal and last mammogram: approximate date 10/9/2018 and was normal. The patient is not taking hormone replacement therapy. Patient denies post-menopausal vaginal bleeding. The patient wears seatbelts: yes. The patient participates in regular exercise: yes. Has the patient ever been transfused or tattooed?: no. The patient reports that there is not domestic violence in her life.    Status post total abdominal hysterectomy "a long time ago".       GYN & OB History  No LMP recorded. Patient is postmenopausal.   Date of Last Pap: No result found    OB History    Para Term  AB Living   6 5 5   1 5   SAB TAB Ectopic Multiple Live Births   1              # Outcome Date GA Lbr Laz/2nd Weight Sex Delivery Anes PTL Lv   6 SAB            5 Term            4 Term            3 Term            2 Term            1 Term               Obstetric Comments   SAb at 6 weeks between the last two babies     Past Medical History:   Diagnosis Date    Anticoagulant long-term use     Eliquis    Arthritis     Atrial fibrillation     Blood transfusion     Carotid stenosis     CHF (congestive heart failure)     Edema     Hearing loss     [place] (hard of hearing)     Hypercholesterolemia     Hypertension     Psychiatric problem     Thyroid disease        Past Surgical History:   Procedure Laterality Date    ARTERIOGRAM, CAROTID N/A 2013    Performed by [facility]    CARPAL TUNNEL RELEASE      right hand    EGD (ESOPHAGOGASTRODUODENOSCOPY) Left 2018    Performed by Oniel Dorsey MD at [facility] ENDO    " ENDARTERECTOMY, CAROTID Right 10/14/2013    [facility] FLR    HYSTERECTOMY      left carotid endartectomy  5/2006    TONSILLECTOMY         Family History   Problem Relation Age of Onset    Heart disease Father     Cancer Brother 65        bladder    Cancer Sister 81        Ovarian    Ovarian cancer Sister 81    Breast cancer Daughter 50        Status post mastectomy    Cancer Sister         Lung.  Smoker    Cancer Sister         Lung.  Smoker    Schizophrenia Son        Social History     Socioeconomic History    Marital status:      Spouse name: Not on file    Number of children: Not on file    Years of education: Not on file    Highest education level: Not on file   Occupational History    Not on file   Social Needs    Financial resource strain: Not on file    Food insecurity:     Worry: Not on file     Inability: Not on file    Transportation needs:     Medical: Not on file     Non-medical: Not on file   Tobacco Use    Smoking status: Never Smoker    Smokeless tobacco: Never Used   Substance and Sexual Activity    Alcohol use: No    Drug use: No    Sexual activity: Never     Partners: Male   Lifestyle    Physical activity:     Days per week: Not on file     Minutes per session: Not on file    Stress: Not on file   Relationships    Social connections:     Talks on phone: Not on file     Gets together: Not on file     Attends Zoroastrian service: Not on file     Active member of club or organization: Not on file     Attends meetings of clubs or organizations: Not on file     Relationship status: Not on file   Other Topics Concern    Patient feels they ought to cut down on drinking/drug use Not Asked    Patient annoyed by others criticizing their drinking/drug use Not Asked    Patient has felt bad or guilty about drinking/drug use Not Asked    Patient has had a drink/used drugs as an eye opener in the AM Not Asked   Social History Narrative      since 1984    He is retired.  He is one year older.  Worked for [employer]    She is retired.  Worked for [employer].       Current Outpatient Medications   Medication Sig Dispense Refill    acetaminophen (TYLENOL) 325 MG tablet Take 325 mg by mouth 2 (two) times daily.      alendronate (FOSAMAX) 70 MG tablet Take 70 mg by mouth every 7 days.      amlodipine-atorvastatin (CADUET) 10-40 mg per tablet TAKE ONE Tablet BY MOUTH DAILY 90 tablet 3    ELIQUIS 2.5 mg Tab TAKE ONE Tablet BY MOUTH TWICE DAILY 180 tablet 3    furosemide (LASIX) 20 MG tablet       levothyroxine (SYNTHROID) 75 MCG tablet Take 75 mcg by mouth once daily.      losartan (COZAAR) 100 MG tablet TAKE ONE TABLET BY MOUTH EVERY DAY 90 tablet 3    metoprolol tartrate (LOPRESSOR) 50 MG tablet Take 1 tablet (50 mg total) by mouth 2 (two) times daily. 60 tablet 11    ranitidine (ZANTAC) 150 MG tablet Take 150 mg by mouth with lunch.      VIT A,C & E/LUTEIN/MINERALS (VISION FORMULA, WITH LUTEIN, ORAL) Take 1 tablet by mouth once daily.       No current facility-administered medications for this visit.        Review of patient's allergies indicates:   Allergen Reactions    Hydrochlorothiazide Other (See Comments)     hyponatremia    Strawberry Swelling     Tongue swells up and a generalized rash.    Lisinopril Other (See Comments)     Cough       Review of Systems  Review of Systems   Constitutional: Negative for activity change, appetite change, chills, fatigue, fever and unexpected weight change.   HENT: Negative for mouth sores.    Respiratory: Negative for cough, shortness of breath and wheezing.    Cardiovascular: Negative for chest pain and palpitations.   Gastrointestinal: Negative for abdominal pain, bloating, blood in stool, constipation, nausea and vomiting.   Endocrine: Negative for diabetes and hot flashes.   Genitourinary: Negative for dysmenorrhea, dyspareunia, dysuria, frequency, hematuria,  menorrhagia, menstrual problem, pelvic pain, urgency, vaginal bleeding, vaginal discharge, vaginal pain, urinary incontinence, postcoital bleeding and vaginal odor.   Musculoskeletal: Negative for back pain and myalgias.   Integumentary:  Negative for rash, breast mass and nipple discharge.   Neurological: Negative for seizures and headaches.   Psychiatric/Behavioral: Negative for depression and sleep disturbance. The patient is not nervous/anxious.    Breast: Negative for mass, mastodynia and nipple discharge          Objective:    Physical Exam:   Constitutional: She appears well-developed and well-nourished. No distress.    HENT:   Head: Normocephalic and atraumatic.    Eyes: EOM are normal.    Neck: Normal range of motion.     Pulmonary/Chest: Effort normal. No respiratory distress.   Breasts: Non-tender, no engorgement, no masses, no retraction, no discharge. Negative for lymphadenopathy.         Abdominal: Soft. She exhibits no distension. There is no tenderness. There is no rebound and no guarding.   Pfannenstiel scar      Genitourinary: Vagina normal. No vaginal discharge found.   Genitourinary Comments: Significant atrophy.  Vulva without any obvious lesions.  Urethral meatus normal size and location without any lesion.  Urethra is non-tender without stricture or discharge.  Bladder is non-tender.  Vaginal vault with good support.  Minimal white discharge noted.  No obvious lesion.  Normal rugation.  Cervix and Uterus are surgically absent.  Adnexa is without any masses or tenderness.  Perineum without obvious lesion.             Musculoskeletal: Normal range of motion.       Neurological: She is alert.    Skin: Skin is warm and dry.    Psychiatric: She has a normal mood and affect.          Assessment:        1. Well woman exam with routine gynecological exam    2. Visit for screening mammogram    3.  Status post hysterectomy         Plan:          I have discussed with the patient her condition.  Monthly  breast examination was instructed, discussed, and encouraged.  Patient was encouraged to consume a low-calorie, low fat diet, and to increase of physical activity.  Healthy habits encouraged.  A Pap smear was NOT performed according to the USPSTF recommendations; she no longer needs Pap.  Mammogram was ordered because of the combination of her age and risk factors, according to ACOG guidelines.  Gonorrhea and Chlamydia testing not performed;  HIV test not ordered, again according to guidelines.  Colonoscopy discussed according to ACS guideline.  We also discussed her obstetric history and CV risks.   Patient is to continue her medications as prescribed.  She will come back to see me in one year for her annual visit.  She can come back to see me sooner as necessary.  All of her questions were answered appropriately to her satisfaction.

## 2019-09-26 RX ORDER — LOSARTAN POTASSIUM 100 MG/1
TABLET ORAL
Qty: 90 TABLET | Refills: 3 | Status: SHIPPED | OUTPATIENT
Start: 2019-09-26 | End: 2022-06-17

## 2019-10-07 ENCOUNTER — HOSPITAL ENCOUNTER (OUTPATIENT)
Dept: CARDIOLOGY | Facility: HOSPITAL | Age: 84
Discharge: HOME | End: 2019-10-07
Attending: SURGERY
Payer: MEDICARE

## 2019-10-07 DIAGNOSIS — I65.23 CAROTID STENOSIS, ASYMPTOMATIC, BILATERAL: ICD-10-CM

## 2019-10-07 LAB
LEFT CBA DIAS: 12 CM/S
LEFT CBA SYS: 68 CM/S
LEFT CCA DIST DIAS: 9 CM/S
LEFT CCA DIST SYS: 150 CM/S
LEFT CCA MID DIAS: 11 CM/S
LEFT CCA MID SYS: 90 CM/S
LEFT CCA PROX DIAS: 9 CM/S
LEFT CCA PROX SYS: 79 CM/S
LEFT ECA DIAS: 0 CM/S
LEFT ECA SYS: 82 CM/S
LEFT ICA DIST DIAS: 15 CM/S
LEFT ICA DIST SYS: 83 CM/S
LEFT ICA MID DIAS: 14 CM/S
LEFT ICA MID SYS: 64 CM/S
LEFT ICA PROX DIAS: 19 CM/S
LEFT ICA PROX SYS: 89 CM/S
LEFT VERTEBRAL DIAS: 13 CM/S
LEFT VERTEBRAL SYS: 68 CM/S
OHS CV CAROTID RIGHT ICA EDV HIGHEST: 32
OHS CV CAROTID ULTRASOUND LEFT ICA/CCA RATIO: 0.59
OHS CV CAROTID ULTRASOUND RIGHT ICA/CCA RATIO: 2.57
OHS CV PV CAROTID LEFT HIGHEST CCA: 150
OHS CV PV CAROTID LEFT HIGHEST ICA: 89
OHS CV PV CAROTID RIGHT HIGHEST CCA: 91
OHS CV PV CAROTID RIGHT HIGHEST ICA: 234
OHS CV US CAROTID LEFT HIGHEST EDV: 19
RIGHT CBA DIAS: 11 CM/S
RIGHT CBA SYS: 54 CM/S
RIGHT CCA DIST DIAS: 9 CM/S
RIGHT CCA DIST SYS: 88 CM/S
RIGHT CCA MID DIAS: 6 CM/S
RIGHT CCA MID SYS: 74 CM/S
RIGHT CCA PROX DIAS: 8 CM/S
RIGHT CCA PROX SYS: 91 CM/S
RIGHT ECA DIAS: 3 CM/S
RIGHT ECA SYS: 163 CM/S
RIGHT ICA DIST DIAS: 19 CM/S
RIGHT ICA DIST SYS: 106 CM/S
RIGHT ICA MID DIAS: 17 CM/S
RIGHT ICA MID SYS: 77 CM/S
RIGHT ICA PROX DIAS: 32 CM/S
RIGHT ICA PROX SYS: 234 CM/S
RIGHT VERTEBRAL DIAS: 7 CM/S
RIGHT VERTEBRAL SYS: 38 CM/S

## 2019-10-07 PROCEDURE — 93880 EXTRACRANIAL BILAT STUDY: CPT | Mod: 50

## 2019-10-07 PROCEDURE — 93880 EXTRACRANIAL BILAT STUDY: CPT | Mod: 26,,, | Performed by: INTERNAL MEDICINE

## 2019-10-07 PROCEDURE — 93880 CV US DOPPLER CAROTID (CUPID ONLY): ICD-10-PCS | Mod: 26,,, | Performed by: INTERNAL MEDICINE

## 2019-10-14 ENCOUNTER — HOSPITAL ENCOUNTER (OUTPATIENT)
Dept: RADIOLOGY | Facility: HOSPITAL | Age: 84
Discharge: HOME | End: 2019-10-14
Attending: OBSTETRICS & GYNECOLOGY
Payer: MEDICARE

## 2019-10-14 VITALS — BODY MASS INDEX: 23.39 KG/M2 | WEIGHT: 104 LBS | HEIGHT: 56 IN

## 2019-10-14 DIAGNOSIS — Z12.31 VISIT FOR SCREENING MAMMOGRAM: ICD-10-CM

## 2019-10-14 PROCEDURE — 77067 SCR MAMMO BI INCL CAD: CPT | Mod: TC

## 2019-10-14 PROCEDURE — 77063 BREAST TOMOSYNTHESIS BI: CPT | Mod: 26,,, | Performed by: RADIOLOGY

## 2019-10-14 PROCEDURE — 77063 MAMMO DIGITAL SCREENING BILAT WITH TOMOSYNTHESIS_CAD: ICD-10-PCS | Mod: 26,,, | Performed by: RADIOLOGY

## 2019-10-14 PROCEDURE — 77067 SCR MAMMO BI INCL CAD: CPT | Mod: 26,,, | Performed by: RADIOLOGY

## 2019-10-14 PROCEDURE — 77067 MAMMO DIGITAL SCREENING BILAT WITH TOMOSYNTHESIS_CAD: ICD-10-PCS | Mod: 26,,, | Performed by: RADIOLOGY

## 2019-11-11 ENCOUNTER — OFFICE VISIT (OUTPATIENT)
Dept: VASCULAR SURGERY | Facility: CLINIC | Age: 84
End: 2019-11-11
Payer: MEDICARE

## 2019-11-11 VITALS
HEART RATE: 63 BPM | HEIGHT: 56 IN | WEIGHT: 108.13 LBS | DIASTOLIC BLOOD PRESSURE: 50 MMHG | BODY MASS INDEX: 24.33 KG/M2 | SYSTOLIC BLOOD PRESSURE: 118 MMHG

## 2019-11-11 DIAGNOSIS — I65.23 CAROTID STENOSIS, ASYMPTOMATIC, BILATERAL: Primary | ICD-10-CM

## 2019-11-11 PROCEDURE — 99214 PR OFFICE/OUTPT VISIT, EST, LEVL IV, 30-39 MIN: ICD-10-PCS | Mod: S$PBB,,, | Performed by: SURGERY

## 2019-11-11 PROCEDURE — 99999 PR PBB SHADOW E&M-EST. PATIENT-LVL III: CPT | Mod: PBBFAC,,, | Performed by: SURGERY

## 2019-11-11 PROCEDURE — 99214 OFFICE O/P EST MOD 30 MIN: CPT | Mod: S$PBB,,, | Performed by: SURGERY

## 2019-11-11 PROCEDURE — 99999 PR PBB SHADOW E&M-EST. PATIENT-LVL III: ICD-10-PCS | Mod: PBBFAC,,, | Performed by: SURGERY

## 2019-11-11 PROCEDURE — 99213 OFFICE O/P EST LOW 20 MIN: CPT | Mod: PBBFAC | Performed by: SURGERY

## 2019-11-11 NOTE — PATIENT INSTRUCTIONS
Carotid Artery Problems: Blockage     A healthy carotid artery lets blood flow easily to the brain.   The blood carries oxygen and nutrients throughout the body. The carotid arteries are large blood vessels that carry blood to the brain. Certain health problems can make the inside of the carotid arteries narrow and rough. Over time, this damage increases the chances of having a stroke. A stroke is a sudden loss of brain function.   Open carotid arteries  In a healthy carotid artery, the inside of the artery is open. The lining of the artery wall is also smooth. This lets blood flow freely from the heart to the brain. The brain gets all the oxygen and nutrients it needs to function well.  Narrowed carotid arteries  Health factors, such as high blood pressure, high cholesterol, smoking, and diabetes, can damage artery walls and make them rough. This allows cholesterol and other particles in the blood to stick to the artery walls and form plaque or fatty deposits. As the plaque builds up, it can narrow the artery. Blood may also collect on the plaque and form blood clots.  How a stroke happens     Emboli can enter the bloodstream and travel to the brain. Brain tissue is damaged when emboli block arteries in the brain.   A stroke can happen when plaque in the carotid artery ruptures. This can allow small pieces of plaque to break off into the bloodstream. At the same time, rupture can make more blood clots. The pieces of plaque and tiny blood clots or emboli can flow in the blood until they get stuck in a small blood vessel in the brain. This blocks blood flow to part of the brain and causes a stroke.  Date Last Reviewed: 6/8/2015  © 6642-0313 [employer]. [address] [place] [address], [place] [address]. All rights reserved. This information is not intended as a substitute for professional medical care. Always follow your healthcare professional's instructions.

## 2019-11-11 NOTE — LETTER
November 11, 2019        Paige Mcleod MD  [address]  [facility] [address]             [facility]  [address]  [facility] [address]  Phone: [phone]  Fax: [phone]   Patient: Jeannie Hutchins   MR Number: [id number]   YOB: 1935   Date of Visit: 11/11/2019       Dear Dr. Bethany Mcleod:    Thank you for referring Jeannie Hutchins to me for evaluation. Below are the relevant portions of my assessment and plan of care.            If you have questions, please do not hesitate to call me. I look forward to following Jeannie DAMIAN along with you.    Sincerely,      Moncho Smith MD           CC  No Recipients

## 2019-11-11 NOTE — PROGRESS NOTES
CHIEF COMPLAINT: Right carotid stenosis.     HISTORY OF PRESENT ILLNESS: A 78-year-old female status post left carotid   endarterectomy in 2006, now presents with a 75+% right carotid stenosis   documented by recent angiography at [facility]. She has no history of   stroke, TIA or amaurosis.     Her original surgery date was canceled and she now is back to reschedule.   No CVA/TIA/aumarosis    1/2019: Did not make f/u appt.  Presents with her  today who I performed a L CEA on 2/2018.  No TIA or CVA since last eval.    3/2019:  No new neuro symptoms.  No TIA or CVA.    Interval history: No complaints today.  No TIA or CVA.    PAST MEDICAL HISTORY:   1. Hypothyroidism.   2. Hyperlipidemia.   3. Hypertension.   The patient has no history of coronary artery disease, chest pain or congestive   heart failure. She has an excellent exercise tolerance.     PAST SURGICAL HISTORY:   1. Left carotid endarterectomy in 2006.   2. Carpal tunnel release.   3. Right carotid endarterectomy,10/14/2013.     FAMILY HISTORY: Negative for cardiovascular disease. She has multiple family   members, who have lived into their 90s.   SOCIAL HISTORY: Nonsmoker.   MEDICATIONS: Include aspirin and statin. See EPIC for full list.     ALLERGIES: Strawberries.     REVIEW OF SYSTEMS:   CARDIOVASCULAR: Denies claudication, postprandial pain or DVT. All other   systems include eyes, ENT, respiratory, , musculoskeletal, skin, breast,   neurologic, psychiatric, lymph, allergy and immune are negative.     PHYSICAL EXAMINATION:   VITAL SIGNS: See nursing notes.   GENERAL: She is in no acute distress and appears younger than her stated age.   RESPIRATORY: Normal effort. Clear to auscultation.   CARDIOVASCULAR: Regular rate and rhythm, no carotid bruit bilaterally  VASCULAR: 2+ radial, brachial and posterior tibial pulses bilaterally.   EXTREMITIES: No edema, varicosities, ulcerations, gangrene or digital   petechiae.   ABDOMEN: No masses or  tenderness. No hepatosplenomegaly. Aorta cannot be   palpated.   EYE: Normal conjunctivae and lids.   ENT: Fair dentition.   NECK: No JVD or thyromegaly.   MUSCULOSKELETAL: No kyphosis or scoliosis.   EXTREMITIES: Without clubbing or cyanosis.   SKIN: Warm and dry.   NEUROLOGIC: Alert and oriented x3. Normal mood and affect. Midline tongue. No speech difficulty or hoarseness, 5/5 motor strength in all extremities.     IMAGIN:  Outside arteriography was reviewed on cut films demonstratin. Type 2 arch.   2. Right 75+% carotid stenosis over 2 cm with most cranial aspect of this at   the bottom of C2.     Duplex shows ~75% R ICA stenosis with , EDV 83    2017: Carotid US  Elevated right proximal ICA velocities, suggesting 60-79% stenosis. Velocities are similar to the 13 exam.  Left proximal ICA velocities within normal limits.    2018 Carotid US:  There is 60 - 69% right Internal Carotid stenosis.  There is 20 - 39% left Internal Carotid stenosis.    3/2019 Carotid US:  There is 60 - 69% right Internal Carotid stenosis.  Patent carotid patch.  There is 0-19% left Internal Carotid stenosis.  Patent carotid patch.    10/2019: Carotid US  There is 50 - 59% right Internal Carotid stenosis.  Patent carotid patch.  There is 0-19% left Internal Carotid stenosis.  Patent carotid patch.    ASSESSMENT:   83 yo WF with h/o bilateral carotid occlusive disease s/p R CEA , L CEA     PLAN:  1. Rec continued routine surveillance of carotid occlusive disease s/p bilateral CEA  2. Cont daily ASA and statin  3. Cont BP control  4. Heart healthy lifestyle  5. RTC with carotid US in 1 year

## 2020-03-23 RX ORDER — AMLODIPINE BESYLATE AND ATORVASTATIN CALCIUM 10; 40 MG/1; MG/1
TABLET, FILM COATED ORAL
Qty: 30 TABLET | Refills: 11 | Status: SHIPPED | OUTPATIENT
Start: 2020-03-23 | End: 2022-06-17

## 2020-08-28 RX ORDER — LOSARTAN POTASSIUM 100 MG/1
100 TABLET ORAL DAILY
Qty: 90 TABLET | Refills: 0 | Status: ON HOLD | OUTPATIENT
Start: 2020-08-28 | End: 2020-10-31 | Stop reason: HOSPADM

## 2020-10-18 ENCOUNTER — HOSPITAL ENCOUNTER (INPATIENT)
Facility: HOSPITAL | Age: 85
LOS: 13 days | Discharge: HOME HEALTH | DRG: 291 | End: 2020-10-31
Attending: EMERGENCY MEDICINE | Admitting: HOSPITALIST
Payer: MEDICARE

## 2020-10-18 DIAGNOSIS — I48.0 PAROXYSMAL ATRIAL FIBRILLATION: ICD-10-CM

## 2020-10-18 DIAGNOSIS — Z51.5 PALLIATIVE CARE ENCOUNTER: ICD-10-CM

## 2020-10-18 DIAGNOSIS — R06.03 RESPIRATORY DISTRESS: ICD-10-CM

## 2020-10-18 DIAGNOSIS — I48.91 A-FIB: ICD-10-CM

## 2020-10-18 DIAGNOSIS — I50.33 ACUTE ON CHRONIC DIASTOLIC HEART FAILURE: ICD-10-CM

## 2020-10-18 DIAGNOSIS — I50.9 CONGESTIVE HEART FAILURE, UNSPECIFIED HF CHRONICITY, UNSPECIFIED HEART FAILURE TYPE: Primary | ICD-10-CM

## 2020-10-18 DIAGNOSIS — R06.02 SHORTNESS OF BREATH: ICD-10-CM

## 2020-10-18 PROBLEM — D72.829 LEUKOCYTOSIS: Status: RESOLVED | Noted: 2018-08-28 | Resolved: 2020-10-18

## 2020-10-18 PROBLEM — R07.89 CHEST WALL PAIN: Status: RESOLVED | Noted: 2018-08-28 | Resolved: 2020-10-18

## 2020-10-18 PROBLEM — E87.6 HYPOKALEMIA: Status: RESOLVED | Noted: 2018-01-23 | Resolved: 2020-10-18

## 2020-10-18 PROBLEM — R10.12 LUQ ABDOMINAL PAIN: Status: RESOLVED | Noted: 2018-08-28 | Resolved: 2020-10-18

## 2020-10-18 LAB
ALBUMIN SERPL BCP-MCNC: 4 G/DL (ref 3.5–5.2)
ALLENS TEST: ABNORMAL
ALP SERPL-CCNC: 79 U/L (ref 55–135)
ALT SERPL W/O P-5'-P-CCNC: 55 U/L (ref 10–44)
ANION GAP SERPL CALC-SCNC: 10 MMOL/L (ref 8–16)
APTT BLDCRRT: 28.7 SEC (ref 21–32)
AST SERPL-CCNC: 21 U/L (ref 10–40)
BASOPHILS # BLD AUTO: 0.02 K/UL (ref 0–0.2)
BASOPHILS NFR BLD: 0.2 % (ref 0–1.9)
BILIRUB SERPL-MCNC: 1.2 MG/DL (ref 0.1–1)
BNP SERPL-MCNC: 530 PG/ML (ref 0–99)
BUN SERPL-MCNC: 20 MG/DL (ref 8–23)
CALCIUM SERPL-MCNC: 8.7 MG/DL (ref 8.7–10.5)
CHLORIDE SERPL-SCNC: 97 MMOL/L (ref 95–110)
CO2 SERPL-SCNC: 22 MMOL/L (ref 23–29)
CREAT SERPL-MCNC: 0.9 MG/DL (ref 0.5–1.4)
CTP QC/QA: YES
DELSYS: ABNORMAL
DIFFERENTIAL METHOD: ABNORMAL
EOSINOPHIL # BLD AUTO: 0.1 K/UL (ref 0–0.5)
EOSINOPHIL NFR BLD: 1.3 % (ref 0–8)
ERYTHROCYTE [DISTWIDTH] IN BLOOD BY AUTOMATED COUNT: 13.5 % (ref 11.5–14.5)
EST. GFR  (AFRICAN AMERICAN): >60 ML/MIN/1.73 M^2
EST. GFR  (NON AFRICAN AMERICAN): 59 ML/MIN/1.73 M^2
FIO2: 100
GLUCOSE SERPL-MCNC: 115 MG/DL (ref 70–110)
HCO3 UR-SCNC: 19.3 MMOL/L (ref 24–28)
HCT VFR BLD AUTO: 33.3 % (ref 37–48.5)
HGB BLD-MCNC: 11.5 G/DL (ref 12–16)
IMM GRANULOCYTES # BLD AUTO: 0.03 K/UL (ref 0–0.04)
IMM GRANULOCYTES NFR BLD AUTO: 0.3 % (ref 0–0.5)
INR PPP: 1.1 (ref 0.8–1.2)
LYMPHOCYTES # BLD AUTO: 1 K/UL (ref 1–4.8)
LYMPHOCYTES NFR BLD: 10.5 % (ref 18–48)
MAGNESIUM SERPL-MCNC: 1.8 MG/DL (ref 1.6–2.6)
MCH RBC QN AUTO: 31.6 PG (ref 27–31)
MCHC RBC AUTO-ENTMCNC: 34.5 G/DL (ref 32–36)
MCV RBC AUTO: 92 FL (ref 82–98)
MODE: ABNORMAL
MONOCYTES # BLD AUTO: 1 K/UL (ref 0.3–1)
MONOCYTES NFR BLD: 10.6 % (ref 4–15)
NEUTROPHILS # BLD AUTO: 7.4 K/UL (ref 1.8–7.7)
NEUTROPHILS NFR BLD: 77.1 % (ref 38–73)
NRBC BLD-RTO: 0 /100 WBC
PCO2 BLDA: 33.1 MMHG (ref 35–45)
PH SMN: 7.37 [PH] (ref 7.35–7.45)
PLATELET # BLD AUTO: 290 K/UL (ref 150–350)
PMV BLD AUTO: 9.8 FL (ref 9.2–12.9)
PO2 BLDA: 58 MMHG (ref 80–100)
POC BE: -5 MMOL/L
POC SATURATED O2: 89 % (ref 95–100)
POC TCO2: 20 MMOL/L (ref 23–27)
POTASSIUM SERPL-SCNC: 4 MMOL/L (ref 3.5–5.1)
PROT SERPL-MCNC: 6.3 G/DL (ref 6–8.4)
PROTHROMBIN TIME: 12.1 SEC (ref 9–12.5)
RBC # BLD AUTO: 3.64 M/UL (ref 4–5.4)
SAMPLE: ABNORMAL
SARS-COV-2 RDRP RESP QL NAA+PROBE: NEGATIVE
SITE: ABNORMAL
SODIUM SERPL-SCNC: 129 MMOL/L (ref 136–145)
TROPONIN I SERPL DL<=0.01 NG/ML-MCNC: <0.006 NG/ML (ref 0–0.03)
WBC # BLD AUTO: 9.63 K/UL (ref 3.9–12.7)

## 2020-10-18 PROCEDURE — 83880 ASSAY OF NATRIURETIC PEPTIDE: CPT

## 2020-10-18 PROCEDURE — 93010 ELECTROCARDIOGRAM REPORT: CPT | Mod: ,,, | Performed by: INTERNAL MEDICINE

## 2020-10-18 PROCEDURE — 93010 EKG 12-LEAD: ICD-10-PCS | Mod: ,,, | Performed by: INTERNAL MEDICINE

## 2020-10-18 PROCEDURE — 83036 HEMOGLOBIN GLYCOSYLATED A1C: CPT

## 2020-10-18 PROCEDURE — 94640 AIRWAY INHALATION TREATMENT: CPT

## 2020-10-18 PROCEDURE — 36415 COLL VENOUS BLD VENIPUNCTURE: CPT

## 2020-10-18 PROCEDURE — 80053 COMPREHEN METABOLIC PANEL: CPT

## 2020-10-18 PROCEDURE — U0002 COVID-19 LAB TEST NON-CDC: HCPCS | Performed by: EMERGENCY MEDICINE

## 2020-10-18 PROCEDURE — 85610 PROTHROMBIN TIME: CPT

## 2020-10-18 PROCEDURE — 20000000 HC ICU ROOM

## 2020-10-18 PROCEDURE — 85025 COMPLETE CBC W/AUTO DIFF WBC: CPT

## 2020-10-18 PROCEDURE — 99900035 HC TECH TIME PER 15 MIN (STAT)

## 2020-10-18 PROCEDURE — 84484 ASSAY OF TROPONIN QUANT: CPT | Mod: 91

## 2020-10-18 PROCEDURE — 63600175 PHARM REV CODE 636 W HCPCS: Performed by: EMERGENCY MEDICINE

## 2020-10-18 PROCEDURE — 25000003 PHARM REV CODE 250: Performed by: HOSPITALIST

## 2020-10-18 PROCEDURE — 83735 ASSAY OF MAGNESIUM: CPT

## 2020-10-18 PROCEDURE — 84484 ASSAY OF TROPONIN QUANT: CPT

## 2020-10-18 PROCEDURE — 25000242 PHARM REV CODE 250 ALT 637 W/ HCPCS: Performed by: HOSPITALIST

## 2020-10-18 PROCEDURE — 82803 BLOOD GASES ANY COMBINATION: CPT

## 2020-10-18 PROCEDURE — 94660 CPAP INITIATION&MGMT: CPT

## 2020-10-18 PROCEDURE — 27000190 HC CPAP FULL FACE MASK W/VALVE

## 2020-10-18 PROCEDURE — 99291 CRITICAL CARE FIRST HOUR: CPT | Mod: 25

## 2020-10-18 PROCEDURE — 36600 WITHDRAWAL OF ARTERIAL BLOOD: CPT

## 2020-10-18 PROCEDURE — 96374 THER/PROPH/DIAG INJ IV PUSH: CPT

## 2020-10-18 PROCEDURE — 94761 N-INVAS EAR/PLS OXIMETRY MLT: CPT

## 2020-10-18 PROCEDURE — 93005 ELECTROCARDIOGRAM TRACING: CPT

## 2020-10-18 PROCEDURE — 85730 THROMBOPLASTIN TIME PARTIAL: CPT

## 2020-10-18 RX ORDER — FUROSEMIDE 10 MG/ML
80 INJECTION INTRAMUSCULAR; INTRAVENOUS
Status: COMPLETED | OUTPATIENT
Start: 2020-10-18 | End: 2020-10-18

## 2020-10-18 RX ORDER — DICLOFENAC SODIUM 30 MG/G
GEL TOPICAL 2 TIMES DAILY
Status: ON HOLD | COMMUNITY
End: 2021-04-28 | Stop reason: HOSPADM

## 2020-10-18 RX ORDER — ONDANSETRON 2 MG/ML
8 INJECTION INTRAMUSCULAR; INTRAVENOUS EVERY 6 HOURS PRN
Status: DISCONTINUED | OUTPATIENT
Start: 2020-10-18 | End: 2020-10-31 | Stop reason: HOSPADM

## 2020-10-18 RX ORDER — LEVOTHYROXINE SODIUM 75 UG/1
75 TABLET ORAL DAILY
Status: DISCONTINUED | OUTPATIENT
Start: 2020-10-18 | End: 2020-10-31 | Stop reason: HOSPADM

## 2020-10-18 RX ORDER — SODIUM CHLORIDE 0.9 % (FLUSH) 0.9 %
10 SYRINGE (ML) INJECTION
Status: DISCONTINUED | OUTPATIENT
Start: 2020-10-18 | End: 2020-10-31 | Stop reason: HOSPADM

## 2020-10-18 RX ORDER — ATORVASTATIN CALCIUM 40 MG/1
40 TABLET, FILM COATED ORAL NIGHTLY
Status: DISCONTINUED | OUTPATIENT
Start: 2020-10-18 | End: 2020-10-31 | Stop reason: HOSPADM

## 2020-10-18 RX ORDER — FUROSEMIDE 10 MG/ML
40 INJECTION INTRAMUSCULAR; INTRAVENOUS
Status: DISCONTINUED | OUTPATIENT
Start: 2020-10-18 | End: 2020-10-20

## 2020-10-18 RX ORDER — LOSARTAN POTASSIUM 25 MG/1
100 TABLET ORAL DAILY
Status: DISCONTINUED | OUTPATIENT
Start: 2020-10-18 | End: 2020-10-31 | Stop reason: HOSPADM

## 2020-10-18 RX ORDER — ACETAMINOPHEN 325 MG/1
650 TABLET ORAL DAILY
Status: DISCONTINUED | OUTPATIENT
Start: 2020-10-19 | End: 2020-10-19

## 2020-10-18 RX ORDER — IPRATROPIUM BROMIDE 0.5 MG/2.5ML
0.5 SOLUTION RESPIRATORY (INHALATION) EVERY 4 HOURS PRN
Status: DISCONTINUED | OUTPATIENT
Start: 2020-10-18 | End: 2020-10-31 | Stop reason: HOSPADM

## 2020-10-18 RX ORDER — FUROSEMIDE 10 MG/ML
80 INJECTION INTRAMUSCULAR; INTRAVENOUS
Status: DISCONTINUED | OUTPATIENT
Start: 2020-10-18 | End: 2020-10-18

## 2020-10-18 RX ORDER — METOPROLOL TARTRATE 50 MG/1
50 TABLET ORAL 2 TIMES DAILY
Status: DISCONTINUED | OUTPATIENT
Start: 2020-10-18 | End: 2020-10-31 | Stop reason: HOSPADM

## 2020-10-18 RX ORDER — AMLODIPINE BESYLATE 5 MG/1
10 TABLET ORAL DAILY
Status: DISCONTINUED | OUTPATIENT
Start: 2020-10-18 | End: 2020-10-31 | Stop reason: HOSPADM

## 2020-10-18 RX ADMIN — LOSARTAN POTASSIUM 100 MG: 25 TABLET, FILM COATED ORAL at 03:10

## 2020-10-18 RX ADMIN — FUROSEMIDE 80 MG: 10 INJECTION, SOLUTION INTRAVENOUS at 08:10

## 2020-10-18 RX ADMIN — AMIODARONE HYDROCHLORIDE 1 MG/MIN: 1.8 INJECTION, SOLUTION INTRAVENOUS at 11:10

## 2020-10-18 RX ADMIN — ACETAMINOPHEN 650 MG: 325 TABLET ORAL at 05:10

## 2020-10-18 RX ADMIN — METOPROLOL TARTRATE 50 MG: 50 TABLET, FILM COATED ORAL at 09:10

## 2020-10-18 RX ADMIN — AMIODARONE HYDROCHLORIDE 0.5 MG/MIN: 1.8 INJECTION, SOLUTION INTRAVENOUS at 05:10

## 2020-10-18 RX ADMIN — FUROSEMIDE 40 MG: 10 INJECTION, SOLUTION INTRAVENOUS at 09:10

## 2020-10-18 RX ADMIN — IPRATROPIUM BROMIDE 0.5 MG: 0.5 SOLUTION RESPIRATORY (INHALATION) at 08:10

## 2020-10-18 RX ADMIN — AMIODARONE HYDROCHLORIDE 150 MG: 1.5 INJECTION, SOLUTION INTRAVENOUS at 11:10

## 2020-10-18 RX ADMIN — ATORVASTATIN CALCIUM 40 MG: 40 TABLET, FILM COATED ORAL at 09:10

## 2020-10-18 RX ADMIN — AMLODIPINE BESYLATE 10 MG: 5 TABLET ORAL at 03:10

## 2020-10-18 RX ADMIN — LEVOTHYROXINE SODIUM 75 MCG: 75 TABLET ORAL at 03:10

## 2020-10-18 RX ADMIN — APIXABAN 2.5 MG: 2.5 TABLET, FILM COATED ORAL at 09:10

## 2020-10-18 NOTE — PLAN OF CARE
Pt newly admitted to the ICU from home. Pt's plan of care discussed with patient and daughter. Educated daughter on visitation hours.      Pt on NRB and oxygen saturation goes from low 90s to mid 90s. Her oxygen saturation decreases with speaking and transferring. She does report ACEVES.  She states that her bilateral lower extremity edema is much better after the 80 mg of lasix she received in the ER.      Will continue to educate and update patient and family on plan of care.     Deny YOUNGERN, RN

## 2020-10-18 NOTE — ED PROVIDER NOTES
Encounter Date: 10/18/2020    SCRIBE #1 NOTE: I, Reva Jaeger, am scribing for, and in the presence of,  Jaylan Barraza MD. I have scribed the following portions of the note - Other sections scribed: HPI, GIULIANO.       History     Chief Complaint   Patient presents with    Shortness of Breath     The patient presents via NO ems. The patient reports sob x 2 days, and a 10 pounds weight gain in the last 2 weeks. Patient also reports post nasal drip and a cough. Reports hx of CHF. Per ems, patient was given 1 SL nitro tab and nitro paste to right chest wall.    Bradley Hutchins is a 85 y.o. female, with a PMHx of arthritis, A-fib, carotid stenosis, CHF, edema, hearing loss, hypercholesterolemia, hypertension, rheumatoid arthritis, osteoporosis, thyroid disease, who presents to the ED via EMS transport with chronic shortness of breath that worsened this morning. EMS reports patient's SpO2 was 88% on room air, improved to 96% after given 1 SL nitro tab and nitro paste en route. Patient also reports cough, 10 lb weight gain in the last 2 weeks, edema on bilateral lower extremities that started 3 days ago, and postnasal drip. She notes eliquis and furosemide medication compliance, and took 3x 20 mg tablets 1 day ago.  Denies cigarette use or Hx of COPD. Denies chest tightness, chest pain, fever, chills, abdominal pain, nausea, vomiting, diarrhea, dysuria, headaches, congestion, sore throat, arm or leg trouble, eye pain, ear pain, or other associated symptoms.     The history is provided by the patient.     Review of patient's allergies indicates:   Allergen Reactions    Hydrochlorothiazide Other (See Comments)     hyponatremia    Strawberry Swelling     Tongue swells up and a generalized rash.    Lisinopril Other (See Comments)     Cough     Past Medical History:   Diagnosis Date    Anticoagulant long-term use     Eliquis    Arthritis     Atrial fibrillation     Blood transfusion     Carotid stenosis     CHF  (congestive heart failure)     Edema     Hearing loss     [place] (hard of hearing)     Hypercholesterolemia     Hypertension     Psychiatric problem     Thyroid disease      Past Surgical History:   Procedure Laterality Date    CARPAL TUNNEL RELEASE  2012    right hand    ESOPHAGOGASTRODUODENOSCOPY Left 8/29/2018    Procedure: EGD (ESOPHAGOGASTRODUODENOSCOPY);  Surgeon: Oniel Dorsey MD;  [facility];  Service: Endoscopy;  Laterality: Left;    HYSTERECTOMY      left carotid endartectomy  5/2006    TONSILLECTOMY       Family History   Problem Relation Age of Onset    Heart disease Father     Cancer Brother 65        bladder    Cancer Sister 81        Ovarian    Ovarian cancer Sister 81    Breast cancer Daughter 50        Status post mastectomy    Cancer Sister         Lung.  Smoker    Cancer Sister         Lung.  Smoker    Schizophrenia Son      Social History     Tobacco Use    Smoking status: Never Smoker    Smokeless tobacco: Never Used   Substance Use Topics    Alcohol use: No    Drug use: No     Review of Systems   Constitutional: Positive for unexpected weight change. Negative for chills and fever.   HENT: Positive for postnasal drip. Negative for congestion, ear pain and sore throat.    Eyes: Negative for pain and visual disturbance.   Respiratory: Positive for cough and shortness of breath. Negative for chest tightness.    Cardiovascular: Positive for leg swelling. Negative for chest pain.   Gastrointestinal: Negative for abdominal pain, nausea and vomiting.   Genitourinary: Negative for dysuria and vaginal discharge.   Musculoskeletal: Negative for back pain.   Skin: Negative for rash.   Neurological: Negative for headaches.   Psychiatric/Behavioral: Negative for confusion.       Physical Exam     Initial Vitals [10/18/20 0831]   BP Pulse Resp Temp SpO2   127/62 90 (!) 24 97.7 °F (36.5 °C) 96 %      MAP       --         Physical Exam  The patient was examined specifically for  the following:   General:No significant distress, Good color, Warm and dry. Head and neck:Scalp atraumatic, Neck supple. Neurological:Appropriate conversation, Gross motor deficits. Eyes:Conjugate gaze, Clear corneas. ENT: No epistaxis. Cardiac: Regular rate and rhythm, Grossly normal heart tones. Pulmonary: Wheezing, Rales. Gastrointestinal: Abdominal tenderness, Abdominal distention. Musculoskeletal: Extremity deformity, Apparent pain with range of motion of the joints. Skin: Rash.   The findings on examination were normal except for the following:  Patient is tachypneic.  The lungs are clear and free of wheezing rales rubs or rhonchi.  The patient does have mild pedal edema.  The abdomen is nontender.  The heart tones are normal.  The patient has regular rate and rhythm.  ED Course   Critical Care    Date/Time: 10/18/2020 11:27 AM  Performed by: Jaylan Barraza MD  Authorized by: Jaylan Barraza MD   Direct patient critical care time: 22 minutes  Additional history critical care time: 11 minutes  Ordering / reviewing critical care time: 11 minutes  Documentation critical care time: 11 minutes  Consulting other physicians critical care time: 11 minutes  Consult with family critical care time: 4 minutes  Total critical care time (exclusive of procedural time) : 70 minutes  Critical care time was exclusive of separately billable procedures and treating other patients and teaching time.  Critical care was necessary to treat or prevent imminent or life-threatening deterioration of the following conditions: respiratory failure and cardiac failure.  Critical care was time spent personally by me on the following activities: development of treatment plan with patient or surrogate, discussions with consultants, discussions with primary provider, evaluation of patient's response to treatment, examination of patient, obtaining history from patient or surrogate, ordering and performing treatments and interventions, ordering  and review of laboratory studies, ordering and review of radiographic studies, re-evaluation of patient's condition and review of old charts.        Labs Reviewed   COMPREHENSIVE METABOLIC PANEL - Abnormal; Notable for the following components:       Result Value    Sodium 129 (*)     CO2 22 (*)     Glucose 115 (*)     Total Bilirubin 1.2 (*)     ALT 55 (*)     eGFR if non  59 (*)     All other components within normal limits    Narrative:     Recoll. [id number] by KAM at 10/18/2020 09:21, reason: Specimen   hemolyzed 10/18/2020  09:21   CBC W/ AUTO DIFFERENTIAL - Abnormal; Notable for the following components:    RBC 3.64 (*)     Hemoglobin 11.5 (*)     Hematocrit 33.3 (*)     Mean Corpuscular Hemoglobin 31.6 (*)     Gran% 77.1 (*)     Lymph% 10.5 (*)     All other components within normal limits   B-TYPE NATRIURETIC PEPTIDE - Abnormal; Notable for the following components:     (*)     All other components within normal limits   TROPONIN I    Narrative:     Recoll. [id number] by KAM at 10/18/2020 09:21, reason: Specimen   hemolyzed 10/18/2020  09:21   APTT   PROTIME-INR   SARS-COV-2 RDRP GENE     EKG Readings: (Independently Interpreted)   This patient is in atrial fibrillation with a heart rate of 86.  There are nonspecific T-wave changes.  There are frequent PVCs.  There is no evidence of acute myocardial infarction or malignant arrhythmia.       Imaging Results          X-Ray Chest AP Portable (Final result)  Result time 10/18/20 09:33:40    Final result by Jean Garcia Jr., MD (10/18/20 09:33:40)                 Impression:      Findings most consistent with congestive heart failure.  Bilateral pneumonitis not excluded.      Electronically signed by: Jean Garcia MD  Date:    10/18/2020  Time:    09:33             Narrative:    EXAMINATION:  XR CHEST AP PORTABLE    CLINICAL HISTORY:  chest pain;    TECHNIQUE:  Single frontal view of the chest was performed.    COMPARISON:  August  2018.    FINDINGS:  Heart is enlarged.  There is increase in the pulmonary vascular interstitial and alveolar markings.  Some patchy parenchymal opacities.  No pneumothorax.  Advanced degenerative change left shoulder with chronic rotator cuff tear.  Dextroconvex curvature of the thoracic spine.                              Medical decision making:  Given the above, this patient presents to the emergency room with paroxysmal atrial fibrillation, now in atrial fibrillation.  The patient has congestive heart failure.  Her ventricular rate has ranged from the 80s to the low 120s.  The patient was treated with IV Lasix in the emergency room.  Consultation was obtained with Dr. Elias, cardiology who recommends treatment with amiodarone and admission to the ICU.  Dr. Connolly was consulted.  He accepts the patient onto his service.                Scribe Attestation:   Scribe #1: I performed the above scribed service and the documentation accurately describes the services I performed. I attest to the accuracy of the note.                      Clinical Impression:     ICD-10-CM ICD-9-CM   1. Congestive heart failure, unspecified HF chronicity, unspecified heart failure type  I50.9 428.0   2. Shortness of breath  R06.02 786.05   3. Paroxysmal atrial fibrillation  I48.0 427.31   4. Respiratory distress  R06.03 786.09                          ED Disposition Condition    Admit                    I personally performed the services described in this documentation.  All medical record  entries made by the scribe are at my direction and in my presence.  Signed, Dr. Madi Barraza MD  10/18/20 7588       Jaylan Barraza MD  10/18/20 7144

## 2020-10-18 NOTE — H&P
[facility] Hospital Medicine  History & Physical    Patient Name: Jeannie Hutchins  MRN: [id number]  Admission Date: 10/18/2020  Attending Physician: eMle Connolly MD   Primary Care Provider: Paige Mcleod MD         Patient information was obtained from patient and ER records.     Subjective:     Principal Problem:Acute on chronic diastolic heart failure    Chief Complaint:   Chief Complaint   Patient presents with    Shortness of Breath     The patient presents via NO ems. The patient reports sob x 2 days, and a 10 pounds weight gain in the last 2 weeks. Patient also reports post nasal drip and a cough. Reports hx of CHF. Per ems, patient was given 1 SL nitro tab and nitro paste to right chest wall.    Cough        HPI: 86 y/o female presents to the ER via EMS transport with shortness of breath.  Patient states she is chronically short of breath, but symptoms worsened this morning.  EMS reported patient's SpO2 was 88% on room air, improved to 96% after given 1 SL nitro tab and nitro paste en route.  Patient also reports non productive cough and 10 lb weight gain in the last 2 weeks.  Also complains of edema on bilateral lower extremities that started 3 days ago.  Patient also reports post nasal drip.  States compliance with medications. Hx of PAF and noted to be in rapid AFib on presentation.  Placed on Amiodarone gtt and admitted to ICU.  Patient was also given Lasix in ER with improvement of symptoms.  No other complaints.    Past Medical History:   Diagnosis Date    Anticoagulant long-term use     Eliquis    Arthritis     Atrial fibrillation     Blood transfusion     Carotid stenosis     CHF (congestive heart failure)     Edema     Hearing loss     [place] (hard of hearing)     Hypercholesterolemia     Hypertension     Psychiatric problem     Thyroid disease        Past Surgical History:   Procedure Laterality Date    CARPAL TUNNEL RELEASE  2012    right hand     ESOPHAGOGASTRODUODENOSCOPY Left 8/29/2018    Procedure: EGD (ESOPHAGOGASTRODUODENOSCOPY);  Surgeon: Oniel Dorsey MD;  Location: [facility];  Service: Endoscopy;  Laterality: Left;    HYSTERECTOMY      left carotid endartectomy  5/2006    TONSILLECTOMY         Review of patient's allergies indicates:   Allergen Reactions    Hydrochlorothiazide Other (See Comments)     hyponatremia    Strawberry Swelling     Tongue swells up and a generalized rash.    Lisinopril Other (See Comments)     Cough       No current facility-administered medications on file prior to encounter.      Current Outpatient Medications on File Prior to Encounter   Medication Sig    acetaminophen (TYLENOL) 325 MG tablet Take 325 mg by mouth 2 (two) times daily.    alendronate (FOSAMAX) 70 MG tablet Take 70 mg by mouth every 7 days.    amlodipine-atorvastatin (CADUET) 10-40 mg per tablet TAKE ONE TABLET BY MOUTH EVERY DAY    apixaban (ELIQUIS) 2.5 mg Tab Take 1 tablet (2.5 mg total) by mouth 2 (two) times daily.    diclofenac sodium (SOLARAZE) 3 % gel Apply topically 2 (two) times daily. 1%    furosemide (LASIX) 20 MG tablet     levothyroxine (SYNTHROID) 75 MCG tablet Take 75 mcg by mouth once daily.    losartan (COZAAR) 100 MG tablet TAKE ONE TABLET BY MOUTH EVERY DAY    losartan (COZAAR) 100 MG tablet Take 1 tablet (100 mg total) by mouth once daily.    metoprolol tartrate (LOPRESSOR) 50 MG tablet Take 1 tablet (50 mg total) by mouth 2 (two) times daily.    ranitidine (ZANTAC) 150 MG tablet Take 150 mg by mouth with lunch.    VIT A,C & E/LUTEIN/MINERALS (VISION FORMULA, WITH LUTEIN, ORAL) Take 1 tablet by mouth once daily.     Family History     Problem Relation (Age of Onset)    Breast cancer Daughter (50)    Cancer Brother (65), Sister (81), Sister, Sister    Heart disease Father    Ovarian cancer Sister (81)    Schizophrenia Son        Tobacco Use    Smoking status: Never Smoker    Smokeless tobacco: Never Used   Substance  and Sexual Activity    Alcohol use: No    Drug use: No    Sexual activity: Never     Partners: Male     Review of Systems   Constitutional: Negative for chills and fever.   HENT: Negative for ear discharge and ear pain.    Eyes: Negative for discharge and itching.   Respiratory: Positive for cough and shortness of breath.    Cardiovascular: Positive for leg swelling. Negative for chest pain.   Gastrointestinal: Negative for abdominal distention and abdominal pain.   Endocrine: Negative for cold intolerance and heat intolerance.   Genitourinary: Negative for difficulty urinating and dysuria.   Musculoskeletal: Negative for neck pain and neck stiffness.   Skin: Negative for rash and wound.   Neurological: Negative for seizures and syncope.   Psychiatric/Behavioral: Negative for agitation and hallucinations.     Objective:     Vital Signs (Most Recent):  Temp: 97.9 °F (36.6 °C) (10/18/20 1125)  Pulse: 92 (10/18/20 1354)  Resp: (!) 22 (10/18/20 1317)  BP: (!) 110/57 (10/18/20 1354)  SpO2: 97 % (10/18/20 1317) Vital Signs (24h Range):  Temp:  [97.7 °F (36.5 °C)-97.9 °F (36.6 °C)] 97.9 °F (36.6 °C)  Pulse:  [73-96] 92  Resp:  [22-42] 22  SpO2:  [89 %-97 %] 97 %  BP: (107-127)/(55-63) 110/57     Weight: 49 kg (108 lb)  Body mass index is 24.21 kg/m².    Physical Exam  Constitutional:       Appearance: She is ill-appearing. She is not diaphoretic.   HENT:      Head: Normocephalic and atraumatic.      Mouth/Throat:      Pharynx: Oropharynx is clear. No oropharyngeal exudate or posterior oropharyngeal erythema.   Eyes:      General:         Right eye: No discharge.         Left eye: No discharge.      Conjunctiva/sclera: Conjunctivae normal.   Neck:      Musculoskeletal: Neck supple. No neck rigidity.   Cardiovascular:      Rate and Rhythm: Tachycardia present. Rhythm irregular.   Pulmonary:      Comments: Slight tachypnea  Coarse BS bilaterally  Abdominal:      General: Bowel sounds are normal.      Palpations: Abdomen is  soft.   Musculoskeletal:      Right lower leg: Edema present.      Left lower leg: Edema present.   Skin:     General: Skin is warm and dry.   Neurological:      Mental Status: She is oriented to person, place, and time.      Cranial Nerves: No cranial nerve deficit.             Significant Labs:   BMP:   Recent Labs   Lab 10/18/20  0935   *   *   K 4.0   CL 97   CO2 22*   BUN 20   CREATININE 0.9   CALCIUM 8.7     CBC:   Recent Labs   Lab 10/18/20  0855   WBC 9.63   HGB 11.5*   HCT 33.3*          Significant Imaging: I have reviewed all pertinent imaging results/findings within the past 24 hours.    Assessment/Plan:     * Acute on chronic diastolic heart failure  Patient presents with Acute Hypoxemic Respiratory Failure secondary to CHF exacerbation.  Exacerbation probably triggered by poor HR control.  Hx of PAF.  Presents in Afib with RVR.  Cardiology consulted and patient admitted to ICU on Amiodarone drip.  Some improvement of symptoms with diuresis.  Continue IV Lasix.  Rate control.  Fluid restriction.      Mixed hyperlipidemia  Continue Statin.      Paroxysmal atrial fibrillation  Afib with RVR as above.      Acute respiratory failure with hypoxia and hypercarbia  Secondary to CHF as above.      Generalized anxiety disorder        Essential hypertension  Continue home medications.      Hyponatremia with decreased serum osmolality          VTE Risk Mitigation (From admission, onward)         Ordered     apixaban tablet 2.5 mg  2 times daily      10/18/20 1342     IP VTE HIGH RISK PATIENT  Once      10/18/20 1340     Place sequential compression device  Until discontinued      10/18/20 1340              Mele Connolly MD  Department of Hospital Medicine   [facility]

## 2020-10-18 NOTE — ASSESSMENT & PLAN NOTE
Patient presents with Acute Hypoxemic Respiratory Failure secondary to CHF exacerbation.  Exacerbation probably triggered by poor HR control.  Hx of PAF.  Presents in Afib with RVR.  Cardiology consulted and patient admitted to ICU on Amiodarone drip.  Some improvement of symptoms with diuresis.  Continue IV Lasix.  Rate control.  Fluid restriction.

## 2020-10-18 NOTE — SUBJECTIVE & OBJECTIVE
Past Medical History:   Diagnosis Date    Anticoagulant long-term use     Eliquis    Arthritis     Atrial fibrillation     Blood transfusion     Carotid stenosis     CHF (congestive heart failure)     Edema     Hearing loss     [place] (hard of hearing)     Hypercholesterolemia     Hypertension     Psychiatric problem     Thyroid disease        Past Surgical History:   Procedure Laterality Date    CARPAL TUNNEL RELEASE  2012    right hand    ESOPHAGOGASTRODUODENOSCOPY Left 8/29/2018    Procedure: EGD (ESOPHAGOGASTRODUODENOSCOPY);  Surgeon: Oniel Dorsey MD;  Location: [facility];  Service: Endoscopy;  Laterality: Left;    HYSTERECTOMY      left carotid endartectomy  5/2006    TONSILLECTOMY         Review of patient's allergies indicates:   Allergen Reactions    Hydrochlorothiazide Other (See Comments)     hyponatremia    Strawberry Swelling     Tongue swells up and a generalized rash.    Lisinopril Other (See Comments)     Cough       No current facility-administered medications on file prior to encounter.      Current Outpatient Medications on File Prior to Encounter   Medication Sig    acetaminophen (TYLENOL) 325 MG tablet Take 325 mg by mouth 2 (two) times daily.    alendronate (FOSAMAX) 70 MG tablet Take 70 mg by mouth every 7 days.    amlodipine-atorvastatin (CADUET) 10-40 mg per tablet TAKE ONE TABLET BY MOUTH EVERY DAY    apixaban (ELIQUIS) 2.5 mg Tab Take 1 tablet (2.5 mg total) by mouth 2 (two) times daily.    diclofenac sodium (SOLARAZE) 3 % gel Apply topically 2 (two) times daily. 1%    furosemide (LASIX) 20 MG tablet     levothyroxine (SYNTHROID) 75 MCG tablet Take 75 mcg by mouth once daily.    losartan (COZAAR) 100 MG tablet TAKE ONE TABLET BY MOUTH EVERY DAY    losartan (COZAAR) 100 MG tablet Take 1 tablet (100 mg total) by mouth once daily.    metoprolol tartrate (LOPRESSOR) 50 MG tablet Take 1 tablet (50 mg total) by mouth 2 (two) times daily.    ranitidine (ZANTAC) 150  MG tablet Take 150 mg by mouth with lunch.    VIT A,C & E/LUTEIN/MINERALS (VISION FORMULA, WITH LUTEIN, ORAL) Take 1 tablet by mouth once daily.     Family History     Problem Relation (Age of Onset)    Breast cancer Daughter (50)    Cancer Brother (65), Sister (81), Sister, Sister    Heart disease Father    Ovarian cancer Sister (81)    Schizophrenia Son        Tobacco Use    Smoking status: Never Smoker    Smokeless tobacco: Never Used   Substance and Sexual Activity    Alcohol use: No    Drug use: No    Sexual activity: Never     Partners: Male     Review of Systems   Constitutional: Negative for chills and fever.   HENT: Negative for ear discharge and ear pain.    Eyes: Negative for discharge and itching.   Respiratory: Positive for cough and shortness of breath.    Cardiovascular: Positive for leg swelling. Negative for chest pain.   Gastrointestinal: Negative for abdominal distention and abdominal pain.   Endocrine: Negative for cold intolerance and heat intolerance.   Genitourinary: Negative for difficulty urinating and dysuria.   Musculoskeletal: Negative for neck pain and neck stiffness.   Skin: Negative for rash and wound.   Neurological: Negative for seizures and syncope.   Psychiatric/Behavioral: Negative for agitation and hallucinations.     Objective:     Vital Signs (Most Recent):  Temp: 97.9 °F (36.6 °C) (10/18/20 1125)  Pulse: 92 (10/18/20 1354)  Resp: (!) 22 (10/18/20 1317)  BP: (!) 110/57 (10/18/20 1354)  SpO2: 97 % (10/18/20 1317) Vital Signs (24h Range):  Temp:  [97.7 °F (36.5 °C)-97.9 °F (36.6 °C)] 97.9 °F (36.6 °C)  Pulse:  [73-96] 92  Resp:  [22-42] 22  SpO2:  [89 %-97 %] 97 %  BP: (107-127)/(55-63) 110/57     Weight: 49 kg (108 lb)  Body mass index is 24.21 kg/m².    Physical Exam  Constitutional:       Appearance: She is ill-appearing. She is not diaphoretic.   HENT:      Head: Normocephalic and atraumatic.      Mouth/Throat:      Pharynx: Oropharynx is clear. No oropharyngeal exudate  or posterior oropharyngeal erythema.   Eyes:      General:         Right eye: No discharge.         Left eye: No discharge.      Conjunctiva/sclera: Conjunctivae normal.   Neck:      Musculoskeletal: Neck supple. No neck rigidity.   Cardiovascular:      Rate and Rhythm: Tachycardia present. Rhythm irregular.   Pulmonary:      Comments: Slight tachypnea  Coarse BS bilaterally  Abdominal:      General: Bowel sounds are normal.      Palpations: Abdomen is soft.   Musculoskeletal:      Right lower leg: Edema present.      Left lower leg: Edema present.   Skin:     General: Skin is warm and dry.   Neurological:      Mental Status: She is oriented to person, place, and time.      Cranial Nerves: No cranial nerve deficit.             Significant Labs:   BMP:   Recent Labs   Lab 10/18/20  0935   *   *   K 4.0   CL 97   CO2 22*   BUN 20   CREATININE 0.9   CALCIUM 8.7     CBC:   Recent Labs   Lab 10/18/20  0855   WBC 9.63   HGB 11.5*   HCT 33.3*          Significant Imaging: I have reviewed all pertinent imaging results/findings within the past 24 hours.

## 2020-10-18 NOTE — Clinical Note
Chart reviewed by cm, assessment completed, pt was made aware that she is here as an obs, pt was d/c on 7/7/2020  Pt has outpt cm [facility], pt is independent except for driving and shopping  her daughter helps with the driving and shopping, pt has a rx plan at [facility], pt does wear glasses , pt states she is able to read her medication labels with  A magnifying glass, pt has no hx of hhc, DME: cpap, walker, cane, shower chair, pt denies smoking and alcohol on rare occasions,pt lives alone in a 1st floor apartment, no steps outside or inside, pt denies any d/c needs, pt declined the need for hhc, cm will continue to follow and assess for any additional d/c needs, pt's family will transport the pt home when stable for d/c Patient/caregiver received discharge checklist   Content reviewed  Patient/caregiver encouraged to participate in discharge plan of care prior to discharge home  CM reviewed d/c planning process including the following: identifying help at home, patient preference for d/c planning needs, availability of treatment team to discuss questions or concerns patient and/or family may have regarding understanding medications and recognizing signs and symptoms once discharged  CM also encouraged patient to follow up with all recommended appointments after discharge  Patient advised of importance for patient and family to participate in managing patients medical well being  ID band present and verified. Family is not present.

## 2020-10-18 NOTE — HPI
84 y/o female presents to the ER via EMS transport with shortness of breath.  Patient states she is chronically short of breath, but symptoms worsened this morning.  EMS reported patient's SpO2 was 88% on room air, improved to 96% after given 1 SL nitro tab and nitro paste en route.  Patient also reports non productive cough and 10 lb weight gain in the last 2 weeks.  Also complains of edema on bilateral lower extremities that started 3 days ago.  Patient also reports post nasal drip.  States compliance with medications. Hx of PAF and noted to be in rapid AFib on presentation.  Placed on Amiodarone gtt and admitted to ICU.  Patient was also given Lasix in ER with improvement of symptoms.  No other complaints.

## 2020-10-18 NOTE — ED NOTES
Bed assignment receive, Rm [id number]. Attempted to call report to ICU and was informed that I would be called back, when the assigned bed is confirmed.

## 2020-10-19 ENCOUNTER — ANESTHESIA (OUTPATIENT)
Dept: CARDIOLOGY | Facility: HOSPITAL | Age: 85
DRG: 291 | End: 2020-10-19
Payer: MEDICARE

## 2020-10-19 ENCOUNTER — ANESTHESIA EVENT (OUTPATIENT)
Dept: CARDIOLOGY | Facility: HOSPITAL | Age: 85
DRG: 291 | End: 2020-10-19
Payer: MEDICARE

## 2020-10-19 LAB
ANION GAP SERPL CALC-SCNC: 13 MMOL/L (ref 8–16)
ASCENDING AORTA: 2.34 CM
AV INDEX (PROSTH): 0.6
AV MEAN GRADIENT: 9 MMHG
AV PEAK GRADIENT: 17 MMHG
AV VALVE AREA: 1.85 CM2
AV VELOCITY RATIO: 0.61
BSA FOR ECHO PROCEDURE: 1.4 M2
BUN SERPL-MCNC: 11 MG/DL (ref 8–23)
CALCIUM SERPL-MCNC: 8.2 MG/DL (ref 8.7–10.5)
CHLORIDE SERPL-SCNC: 95 MMOL/L (ref 95–110)
CO2 SERPL-SCNC: 23 MMOL/L (ref 23–29)
CREAT SERPL-MCNC: 0.7 MG/DL (ref 0.5–1.4)
CV ECHO LV RWT: 0.52 CM
DOP CALC AO PEAK VEL: 2.09 M/S
DOP CALC AO VTI: 39.82 CM
DOP CALC LVOT AREA: 3.1 CM2
DOP CALC LVOT DIAMETER: 1.98 CM
DOP CALC LVOT PEAK VEL: 1.27 M/S
DOP CALC LVOT STROKE VOLUME: 73.52 CM3
DOP CALCLVOT PEAK VEL VTI: 23.89 CM
ECHO LV POSTERIOR WALL: 0.97 CM (ref 0.6–1.1)
EST. GFR  (AFRICAN AMERICAN): >60 ML/MIN/1.73 M^2
EST. GFR  (NON AFRICAN AMERICAN): >60 ML/MIN/1.73 M^2
ESTIMATED AVG GLUCOSE: 114 MG/DL (ref 68–131)
FRACTIONAL SHORTENING: 56 % (ref 28–44)
GLUCOSE SERPL-MCNC: 118 MG/DL (ref 70–110)
HBA1C MFR BLD HPLC: 5.6 % (ref 4–5.6)
INTERVENTRICULAR SEPTUM: 0.98 CM (ref 0.6–1.1)
IVRT: 87.66 MSEC
LA MAJOR: 5.42 CM
LA MINOR: 5.04 CM
LA WIDTH: 4.09 CM
LEFT ATRIUM SIZE: 4.41 CM
LEFT ATRIUM VOLUME INDEX: 58.1 ML/M2
LEFT ATRIUM VOLUME: 80.08 CM3
LEFT INTERNAL DIMENSION IN SYSTOLE: 1.64 CM (ref 2.1–4)
LEFT VENTRICLE DIASTOLIC VOLUME INDEX: 43.54 ML/M2
LEFT VENTRICLE DIASTOLIC VOLUME: 59.97 ML
LEFT VENTRICLE MASS INDEX: 80 G/M2
LEFT VENTRICLE SYSTOLIC VOLUME INDEX: 5.6 ML/M2
LEFT VENTRICLE SYSTOLIC VOLUME: 7.7 ML
LEFT VENTRICULAR INTERNAL DIMENSION IN DIASTOLE: 3.75 CM (ref 3.5–6)
LEFT VENTRICULAR MASS: 110.81 G
PISA TR MAX VEL: 3.3 M/S
POTASSIUM SERPL-SCNC: 3.5 MMOL/L (ref 3.5–5.1)
PV PEAK VELOCITY: 1 CM/S
RA MAJOR: 5.88 CM
RA PRESSURE: 15 MMHG
RA WIDTH: 3.89 CM
RIGHT VENTRICULAR END-DIASTOLIC DIMENSION: 3.88 CM
RV TISSUE DOPPLER FREE WALL SYSTOLIC VELOCITY 1 (APICAL 4 CHAMBER VIEW): 13.62 CM/S
SINUS: 2.42 CM
SODIUM SERPL-SCNC: 131 MMOL/L (ref 136–145)
STJ: 2.24 CM
TR MAX PG: 44 MMHG
TRICUSPID ANNULAR PLANE SYSTOLIC EXCURSION: 1.82 CM
TV REST PULMONARY ARTERY PRESSURE: 59 MMHG

## 2020-10-19 PROCEDURE — 20000000 HC ICU ROOM

## 2020-10-19 PROCEDURE — 25000003 PHARM REV CODE 250: Performed by: HOSPITALIST

## 2020-10-19 PROCEDURE — 27100171 HC OXYGEN HIGH FLOW UP TO 24 HOURS

## 2020-10-19 PROCEDURE — 37000008 HC ANESTHESIA 1ST 15 MINUTES: Performed by: INTERNAL MEDICINE

## 2020-10-19 PROCEDURE — D9220A PRA ANESTHESIA: ICD-10-PCS | Mod: ANES,,, | Performed by: ANESTHESIOLOGY

## 2020-10-19 PROCEDURE — 36415 COLL VENOUS BLD VENIPUNCTURE: CPT

## 2020-10-19 PROCEDURE — 99291 CRITICAL CARE FIRST HOUR: CPT | Mod: 25,,, | Performed by: INTERNAL MEDICINE

## 2020-10-19 PROCEDURE — 36600 WITHDRAWAL OF ARTERIAL BLOOD: CPT

## 2020-10-19 PROCEDURE — 97802 MEDICAL NUTRITION INDIV IN: CPT

## 2020-10-19 PROCEDURE — 25000003 PHARM REV CODE 250: Performed by: NURSE ANESTHETIST, CERTIFIED REGISTERED

## 2020-10-19 PROCEDURE — 99900035 HC TECH TIME PER 15 MIN (STAT)

## 2020-10-19 PROCEDURE — 25000003 PHARM REV CODE 250: Performed by: INTERNAL MEDICINE

## 2020-10-19 PROCEDURE — 99291 PR CRITICAL CARE, E/M 30-74 MINUTES: ICD-10-PCS | Mod: 25,,, | Performed by: INTERNAL MEDICINE

## 2020-10-19 PROCEDURE — 92960 PR CARDIOVERSION, ELECTIVE;EXTERN: ICD-10-PCS | Mod: ,,, | Performed by: INTERNAL MEDICINE

## 2020-10-19 PROCEDURE — 92960 CARDIOVERSION ELECTRIC EXT: CPT | Mod: ,,, | Performed by: INTERNAL MEDICINE

## 2020-10-19 PROCEDURE — D9220A PRA ANESTHESIA: Mod: CRNA,,, | Performed by: NURSE ANESTHETIST, CERTIFIED REGISTERED

## 2020-10-19 PROCEDURE — 27000221 HC OXYGEN, UP TO 24 HOURS

## 2020-10-19 PROCEDURE — 80048 BASIC METABOLIC PNL TOTAL CA: CPT

## 2020-10-19 PROCEDURE — 94761 N-INVAS EAR/PLS OXIMETRY MLT: CPT

## 2020-10-19 PROCEDURE — D9220A PRA ANESTHESIA: ICD-10-PCS | Mod: CRNA,,, | Performed by: NURSE ANESTHETIST, CERTIFIED REGISTERED

## 2020-10-19 PROCEDURE — 63600175 PHARM REV CODE 636 W HCPCS: Performed by: EMERGENCY MEDICINE

## 2020-10-19 PROCEDURE — 92960 CARDIOVERSION ELECTRIC EXT: CPT | Performed by: INTERNAL MEDICINE

## 2020-10-19 PROCEDURE — 94660 CPAP INITIATION&MGMT: CPT

## 2020-10-19 PROCEDURE — D9220A PRA ANESTHESIA: Mod: ANES,,, | Performed by: ANESTHESIOLOGY

## 2020-10-19 PROCEDURE — 63600175 PHARM REV CODE 636 W HCPCS: Performed by: NURSE ANESTHETIST, CERTIFIED REGISTERED

## 2020-10-19 PROCEDURE — 37000009 HC ANESTHESIA EA ADD 15 MINS: Performed by: INTERNAL MEDICINE

## 2020-10-19 RX ORDER — PROPOFOL 10 MG/ML
VIAL (ML) INTRAVENOUS
Status: DISCONTINUED | OUTPATIENT
Start: 2020-10-19 | End: 2020-10-19

## 2020-10-19 RX ORDER — SODIUM CHLORIDE 9 MG/ML
INJECTION, SOLUTION INTRAVENOUS CONTINUOUS PRN
Status: DISCONTINUED | OUTPATIENT
Start: 2020-10-19 | End: 2020-10-19

## 2020-10-19 RX ORDER — OLANZAPINE 5 MG/1
5 TABLET, ORALLY DISINTEGRATING ORAL ONCE
Status: COMPLETED | OUTPATIENT
Start: 2020-10-19 | End: 2020-10-19

## 2020-10-19 RX ORDER — ACETAMINOPHEN 500 MG
500 TABLET ORAL EVERY 8 HOURS PRN
Status: DISCONTINUED | OUTPATIENT
Start: 2020-10-19 | End: 2020-10-31 | Stop reason: HOSPADM

## 2020-10-19 RX ORDER — LIDOCAINE HYDROCHLORIDE 20 MG/ML
INJECTION INTRAVENOUS
Status: DISCONTINUED | OUTPATIENT
Start: 2020-10-19 | End: 2020-10-19

## 2020-10-19 RX ORDER — ETOMIDATE 2 MG/ML
INJECTION INTRAVENOUS
Status: DISCONTINUED | OUTPATIENT
Start: 2020-10-19 | End: 2020-10-19

## 2020-10-19 RX ADMIN — APIXABAN 2.5 MG: 2.5 TABLET, FILM COATED ORAL at 09:10

## 2020-10-19 RX ADMIN — LOSARTAN POTASSIUM 100 MG: 25 TABLET, FILM COATED ORAL at 09:10

## 2020-10-19 RX ADMIN — ACETAMINOPHEN 500 MG: 500 TABLET ORAL at 05:10

## 2020-10-19 RX ADMIN — ATORVASTATIN CALCIUM 40 MG: 40 TABLET, FILM COATED ORAL at 09:10

## 2020-10-19 RX ADMIN — LEVOTHYROXINE SODIUM 75 MCG: 75 TABLET ORAL at 09:10

## 2020-10-19 RX ADMIN — METOPROLOL TARTRATE 50 MG: 50 TABLET, FILM COATED ORAL at 09:10

## 2020-10-19 RX ADMIN — SODIUM CHLORIDE: 0.9 INJECTION, SOLUTION INTRAVENOUS at 02:10

## 2020-10-19 RX ADMIN — ACETAMINOPHEN 500 MG: 500 TABLET ORAL at 01:10

## 2020-10-19 RX ADMIN — LIDOCAINE HYDROCHLORIDE 50 MG: 20 INJECTION, SOLUTION INTRAVENOUS at 02:10

## 2020-10-19 RX ADMIN — FUROSEMIDE 40 MG: 10 INJECTION, SOLUTION INTRAVENOUS at 09:10

## 2020-10-19 RX ADMIN — ETOMIDATE 4 MG: 2 INJECTION, SOLUTION INTRAVENOUS at 02:10

## 2020-10-19 RX ADMIN — AMLODIPINE BESYLATE 10 MG: 5 TABLET ORAL at 09:10

## 2020-10-19 RX ADMIN — PROPOFOL 30 MG: 10 INJECTION, EMULSION INTRAVENOUS at 02:10

## 2020-10-19 RX ADMIN — OLANZAPINE 5 MG: 5 TABLET, ORALLY DISINTEGRATING ORAL at 10:10

## 2020-10-19 RX ADMIN — AMIODARONE HYDROCHLORIDE 0.5 MG/MIN: 1.8 INJECTION, SOLUTION INTRAVENOUS at 01:10

## 2020-10-19 RX ADMIN — AMIODARONE HYDROCHLORIDE 0.5 MG/MIN: 1.8 INJECTION, SOLUTION INTRAVENOUS at 09:10

## 2020-10-19 NOTE — TRANSFER OF CARE
"Anesthesia Transfer of Care Note    Patient: Jeannie Hutchins    Procedure(s) Performed: Procedure(s) (LRB):  Transesophageal echo (BLADIMIR) intra-procedure log documentation (N/A)  Cardioversion    Patient location: PACU    Anesthesia Type: MAC    Transport from OR: Transported from OR on 6-10 L/min O2 by face mask with adequate spontaneous ventilation    Post pain: adequate analgesia    Post assessment: no apparent anesthetic complications    Post vital signs: stable    Level of consciousness: sedated    Nausea/Vomiting: no nausea/vomiting    Complications: none    Transfer of care protocol was followed      Last vitals:   Visit Vitals  BP (!) 108/52 (BP Location: Right arm, Patient Position: Lying)   Pulse 61   Temp 36.5 °C (97.7 °F) (Skin)   Resp 14   Ht 4' 8" (1.422 m)   Wt 49.9 kg (110 lb 0.2 oz)   SpO2 97%   Breastfeeding No   BMI 24.66 kg/m²     "

## 2020-10-19 NOTE — NURSING
Notified Dr Elias of pt back in afib, mostly rate controlled. Order to continue amio gtt and npo after midnight   Bill For Surgical Tray: no Expected Date Of Service: 03/21/2019 Performing Laboratory: -[phone] Billing Type: Third-Party Bill

## 2020-10-19 NOTE — NURSING
[facility]  ICU Shift Summary  Date: 10/19/2020      COVID Test: (--)  Isolation: No active isolations     Prehospitalization: Home  Admit Date / LOS : 10/18/2020/ 1 days    Diagnosis: Acute on chronic diastolic heart failure    Consults:        Active: Cardio       Needed: N/A     Code Status: Full Code   Advanced Directive: <no information>    LDA: BIPAP, PIV and Purewick       Central Lines/Site/Justification:Patient Does Not Have Central Line       Urinary Cath/Order/Justification:Patient Does Not Have Urinary Catheter    Vasopressors/Infusions:    amiodarone in dextrose 5% 0.5 mg/min (10/19/20 1600)          GOALS: Volume/ Hemodynamic: N/A                     RASS: N/A    Pain Management: PO       Pain Controlled: yes     Rhythm: A-Fib    Respiratory Device: Bipap    Oxygen Concentration (%):  [] 100             Most Recent SBT/ SAT: N/A       MOVE Screen: PASS    VTE Prophylaxis: Pharm and Mechanical  Mobility: Bedrest  Stress Ulcer Prophylaxis: No    Dietary: PO  Tolerance: yes  /  Advancement: @ goal    I & O (24h):    Intake/Output Summary (Last 24 hours) at 10/19/2020 1713  Last data filed at 10/19/2020 1600  Gross per 24 hour   Intake 541.17 ml   Output 2720 ml   Net -2178.83 ml        Restraints: No    Significant Dates:  Post Op Date: N/A  Rescue Date: N/A  Imaging/ Diagnostics: N/A    Noteworthy Labs:  See below    CBC/Anemia Labs: Coags:    Recent Labs   Lab 10/18/20  0855   WBC 9.63   HGB 11.5*   HCT 33.3*      MCV 92   RDW 13.5    Recent Labs   Lab 10/18/20  0855   INR 1.1   APTT 28.7        Chemistries:   Recent Labs   Lab 10/18/20  0935 10/18/20  1454 10/19/20  0410   *  --  131*   K 4.0  --  3.5   CL 97  --  95   CO2 22*  --  23   BUN 20  --  11   CREATININE 0.9  --  0.7   CALCIUM 8.7  --  8.2*   PROT 6.3  --   --    BILITOT 1.2*  --   --    ALKPHOS 79  --   --    ALT 55*  --   --    AST 21  --   --    MG  --  1.8  --         Cardiac Enzymes: Ejection  Fractions:    Recent Labs     10/18/20  1501 10/18/20  2056   TROPONINI <0.006 <0.006    Nuc Rest EF   Date Value Ref Range Status   07/10/2019 81.0  Final        POCT Glucose: HbA1c:    No results for input(s): POCTGLUCOSE in the last 168 hours. Hemoglobin A1C   Date Value Ref Range Status   10/18/2020 5.6 4.0 - 5.6 % Final     Comment:     ADA Screening Guidelines:  5.7-6.4%  Consistent with prediabetes  >or=6.5%  Consistent with diabetes  High levels of fetal hemoglobin interfere with the HbA1C  assay. Heterozygous hemoglobin variants (HbS, HgC, etc)do  not significantly interfere with this assay.   However, presence of multiple variants may affect accuracy.             ICU LOS 1d 2h  Level of Care: Critical Care    Shift Summary/Plan for the shift: Patient Cardioverted to NSR and rhythm converted back to A fib. Amio drip continued per Dr. Elias. No falls or safety events this shift.

## 2020-10-19 NOTE — TRANSFER OF CARE
"Anesthesia Transfer of Care Note    Patient: Jeannie Hutchins    Procedure(s) Performed: Procedure(s) (LRB):  Transesophageal echo (BLADIMIR) intra-procedure log documentation (N/A)  Cardioversion    Transport from OR: Continuous SpO2 monitoring in transport. Continuous ECG monitoring in transport          Last vitals:   Visit Vitals  BP (!) 108/52 (BP Location: Right arm, Patient Position: Lying)   Pulse 61   Temp 36.5 °C (97.7 °F) (Skin)   Resp 14   Ht 4' 8" (1.422 m)   Wt 49.9 kg (110 lb 0.2 oz)   SpO2 97%   Breastfeeding No   BMI 24.66 kg/m²     "

## 2020-10-19 NOTE — ANESTHESIA POSTPROCEDURE EVALUATION
Anesthesia Post Evaluation    Patient: Jeannie Hutchins    Procedure(s) Performed: Procedure(s) (LRB):  Transesophageal echo (BLADIMIR) intra-procedure log documentation (N/A)  Cardioversion    Final Anesthesia Type: general    Patient location during evaluation: ICU  Patient participation: Yes- Able to Participate  Level of consciousness: awake and alert  Post-procedure vital signs: reviewed and stable  Pain management: adequate  Airway patency: patent    PONV status at discharge: No PONV  Anesthetic complications: no      Cardiovascular status: blood pressure returned to baseline  Respiratory status: nasal cannula  Hydration status: euvolemic  Follow-up not needed.    10 L O2 NC      Vitals Value Taken Time   /63 10/19/20 1538   Temp 36.5 °C (97.7 °F) 10/19/20 1443   Pulse 88 10/19/20 1542   Resp 32 10/19/20 1542   SpO2 94 % 10/19/20 1542   Vitals shown include unvalidated device data.      No case tracking events are documented in the log.      Pain/Zaire Score: Pain Rating Prior to Med Admin: 3 (10/19/2020  1:25 AM)  Pain Rating Post Med Admin: 0 (10/19/2020  2:00 AM)  Zaire Score: 8 (10/19/2020  2:24 PM)

## 2020-10-19 NOTE — PROGRESS NOTES
SOB, CHF   COVID19 negative   S/p Lasix & had good UOP  On NRB, sats 87 - 88 %   - get ABG   - try AVAP with , EPAP 8

## 2020-10-19 NOTE — NURSING
Nurse Libra notified that cardioversion will take place after 1300 per anesthesia's availability. Nurse instructed to keep pt NPO till after procedure.

## 2020-10-19 NOTE — SUBJECTIVE & OBJECTIVE
Past Medical History:   Diagnosis Date    Anticoagulant long-term use     Eliquis    Arthritis     Atrial fibrillation     Blood transfusion     Carotid stenosis     CHF (congestive heart failure)     Edema     Hearing loss     [place] (hard of hearing)     Hypercholesterolemia     Hypertension     Psychiatric problem     Thyroid disease        Past Surgical History:   Procedure Laterality Date    CARPAL TUNNEL RELEASE  2012    right hand    ESOPHAGOGASTRODUODENOSCOPY Left 8/29/2018    Procedure: EGD (ESOPHAGOGASTRODUODENOSCOPY);  Surgeon: Oniel Dorsey MD;  [facility];  Service: Endoscopy;  Laterality: Left;    HYSTERECTOMY      left carotid endartectomy  5/2006    TONSILLECTOMY         Review of patient's allergies indicates:   Allergen Reactions    Hydrochlorothiazide Other (See Comments)     hyponatremia    Strawberry Swelling     Tongue swells up and a generalized rash.    Lisinopril Other (See Comments)     Cough       No current facility-administered medications on file prior to encounter.      Current Outpatient Medications on File Prior to Encounter   Medication Sig    acetaminophen (TYLENOL) 325 MG tablet Take 325 mg by mouth 2 (two) times daily.    alendronate (FOSAMAX) 70 MG tablet Take 70 mg by mouth every 7 days.    amlodipine-atorvastatin (CADUET) 10-40 mg per tablet TAKE ONE TABLET BY MOUTH EVERY DAY    apixaban (ELIQUIS) 2.5 mg Tab Take 1 tablet (2.5 mg total) by mouth 2 (two) times daily.    diclofenac sodium (SOLARAZE) 3 % gel Apply topically 2 (two) times daily. 1%    furosemide (LASIX) 20 MG tablet     levothyroxine (SYNTHROID) 75 MCG tablet Take 75 mcg by mouth once daily.    losartan (COZAAR) 100 MG tablet TAKE ONE TABLET BY MOUTH EVERY DAY    losartan (COZAAR) 100 MG tablet Take 1 tablet (100 mg total) by mouth once daily.    metoprolol tartrate (LOPRESSOR) 50 MG tablet Take 1 tablet (50 mg total) by mouth 2 (two) times daily.    ranitidine (ZANTAC) 150  MG tablet Take 150 mg by mouth with lunch.    VIT A,C & E/LUTEIN/MINERALS (VISION FORMULA, WITH LUTEIN, ORAL) Take 1 tablet by mouth once daily.     Family History     Problem Relation (Age of Onset)    Breast cancer Daughter (50)    Cancer Brother (65), Sister (81), Sister, Sister    Heart disease Father    Ovarian cancer Sister (81)    Schizophrenia Son        Tobacco Use    Smoking status: Never Smoker    Smokeless tobacco: Never Used   Substance and Sexual Activity    Alcohol use: No    Drug use: No    Sexual activity: Never     Partners: Male     Review of Systems   Constitution: Negative.   HENT: Negative.    Eyes: Negative.    Cardiovascular: Positive for dyspnea on exertion and leg swelling. Negative for chest pain.   Respiratory: Positive for shortness of breath.    Endocrine: Negative.    Hematologic/Lymphatic: Negative.    Skin: Negative.    Musculoskeletal: Negative.    Gastrointestinal: Negative.    Genitourinary: Negative.    Neurological: Negative.    Psychiatric/Behavioral: Negative.    Allergic/Immunologic: Negative.      Objective:     Vital Signs (Most Recent):  Temp: 97.9 °F (36.6 °C) (10/19/20 0301)  Pulse: 81 (10/19/20 0730)  Resp: (!) 29 (10/19/20 0730)  BP: (!) 151/64 (10/19/20 0730)  SpO2: 100 % (10/19/20 0730) Vital Signs (24h Range):  Temp:  [97.5 °F (36.4 °C)-97.9 °F (36.6 °C)] 97.9 °F (36.6 °C)  Pulse:  [] 81  Resp:  [18-42] 29  SpO2:  [79 %-100 %] 100 %  BP: (104-154)/(50-98) 151/64     Weight: 49.9 kg (110 lb 0.2 oz)  Body mass index is 24.66 kg/m².    SpO2: 100 %  O2 Device (Oxygen Therapy): BiPAP      Intake/Output Summary (Last 24 hours) at 10/19/2020 0803  Last data filed at 10/19/2020 0600  Gross per 24 hour   Intake 511.27 ml   Output 2040 ml   Net -1528.73 ml       Lines/Drains/Airways     Drain            Female External Urinary Catheter 10/18/20 1901 less than 1 day          Peripheral Intravenous Line                 Peripheral IV - Single Lumen 10/18/20 0833 20 G  Left Hand less than 1 day         Peripheral IV - Single Lumen 10/18/20 1533 20 G Anterior;Right Forearm less than 1 day                Physical Exam   Constitutional: She is oriented to person, place, and time. She appears well-developed and well-nourished.   HENT:   Head: Normocephalic.   Eyes: Pupils are equal, round, and reactive to light.   Neck: Normal range of motion. Neck supple.   Cardiovascular: Normal rate. An irregularly irregular rhythm present.   Pulmonary/Chest: Effort normal and breath sounds normal.   Abdominal: Soft. Normal appearance and bowel sounds are normal. There is no abdominal tenderness.   Musculoskeletal: Normal range of motion.   Neurological: She is alert and oriented to person, place, and time.   Skin: Skin is warm.   Psychiatric: She has a normal mood and affect.       Significant Labs:   BMP:   Recent Labs   Lab 10/18/20  0935 10/18/20  1454 10/19/20  0410   *  --  118*   *  --  131*   K 4.0  --  3.5   CL 97  --  95   CO2 22*  --  23   BUN 20  --  11   CREATININE 0.9  --  0.7   CALCIUM 8.7  --  8.2*   MG  --  1.8  --    , CMP   Recent Labs   Lab 10/18/20  0935 10/19/20  0410   * 131*   K 4.0 3.5   CL 97 95   CO2 22* 23   * 118*   BUN 20 11   CREATININE 0.9 0.7   CALCIUM 8.7 8.2*   PROT 6.3  --    ALBUMIN 4.0  --    BILITOT 1.2*  --    ALKPHOS 79  --    AST 21  --    ALT 55*  --    ANIONGAP 10 13   ESTGFRAFRICA >60 >60   EGFRNONAA 59* >60   , CBC   Recent Labs   Lab 10/18/20  0855   WBC 9.63   HGB 11.5*   HCT 33.3*      , INR   Recent Labs   Lab 10/18/20  0855   INR 1.1   , Lipid Panel No results for input(s): CHOL, HDL, LDLCALC, TRIG, CHOLHDL in the last 48 hours., Troponin   Recent Labs   Lab 10/18/20  0935 10/18/20  1501 10/18/20  2056   TROPONINI <0.006 <0.006 <0.006    and All pertinent lab results from the last 24 hours have been reviewed.    Significant Imaging: Echocardiogram:   Transthoracic echo (TTE) complete (Cupid Only):   Results for  orders placed or performed during the hospital encounter of 07/10/19   Transthoracic echo (TTE) 2D with Color Flow   Result Value Ref Range    Ascending aorta 2.71 cm    STJ 1.98 cm    AV mean gradient 5 mmHg    Ao peak singh 1.59 m/s    Ao VTI 35.74 cm    IVRT 0.09 msec    IVS 1.39 (A) 0.6 - 1.1 cm    LA size 4.39 cm    Left Atrium Major Axis 5.14 cm    Left Atrium Minor Axis 5.27 cm    LVIDd 3.35 (A) 3.5 - 6.0 cm    LVIDs 2.19 2.1 - 4.0 cm    LVOT diameter 1.84 cm    LVOT peak VTI 26.45 cm    Posterior Wall 1.60 (A) 0.6 - 1.1 cm    MV Peak A Singh 1.15 m/s    E wave decelartion time 213.67 msec    MV Peak E Singh 1.16 m/s    PV Peak D Singh 0.46 m/s    PV Peak S Singh 0.67 m/s    RA Major Axis 4.54 cm    RA Width 3.08 cm    RVDD 2.74 cm    Sinus 2.33 cm    TAPSE 2.70 cm    TR Max Singh 3.32 m/s    TDI LATERAL 0.08 m/s    TDI SEPTAL 0.07 m/s    LA WIDTH 3.21 cm    Ao root annulus 2.02 cm    AORTIC VALVE CUSP SEPERATION 1.85 cm    PV PEAK VELOCITY 1.11 cm/s    LV Diastolic Volume 45.80 mL    LV Systolic Volume 16.06 mL    RV S' 17.95 cm/s    LVOT peak singh 1.16 m/s    LV LATERAL E/E' RATIO 14.50 m/s    LV SEPTAL E/E' RATIO 16.57 m/s    FS 35 %    LA volume 62.34 cm3    LV mass 181.35 g    Left Ventricle Relative Wall Thickness 0.96 cm    AV valve area 1.97 cm2    AV Velocity Ratio 0.73     AV index (prosthetic) 0.74     E/A ratio 1.01     Mean e' 0.08 m/s    Pulm vein S/D ratio 1.46     LVOT area 2.7 cm2    LVOT stroke volume 70.30 cm3    AV peak gradient 10 mmHg    E/E' ratio 15.47 m/s    Triscuspid Valve Regurgitation Peak Gradient 44 mmHg    Right Atrial Pressure (from IVC) 8 mmHg    TV rest pulmonary artery pressure 52 mmHg    Narrative    · Mild concentric left ventricular hypertrophy.  · Normal left ventricular systolic function. The estimated ejection   fraction is 65%  · Grade II (moderate) left ventricular diastolic dysfunction consistent   with pseudonormalization.  · Elevated left atrial pressure.  · Normal right  ventricular systolic function.  · Moderate left atrial enlargement.  · Mild aortic regurgitation.  · Mild to moderate tricuspid regurgitation.  · Intermediate central venous pressure (8 mm Hg).  · The estimated PA systolic pressure is 52 mm Hg  · Pulmonary hypertension present.

## 2020-10-19 NOTE — CONSULTS
Food & Nutrition  Education    Diet Education: Fluid and Salt restricted diet education  Time Spent: 15 minutes  Learners: Patient          Comments: Pt who presented to ED due to SOB x2 days and a 10 pound weight gain in the last 2 weeks. Pt has a hx of CHF. Pt on a 2g sodium 1500mL fluid restricted diet, Pt awake and alert on a venti mask. Pt lives with two adult sons where each of them take turns cooking. Her Typical food intake goes as follows    Breakfast: 3/4 cups of raisin brand   Brunch: Fried egg with toast  Lunch:skips  Dinner: pork OR beef OR chicken with a start and cooked vegetables from frozen bag   Snack: carmelina crackers or cookies  Drinks: tea    Nutrition Education provided with handouts: Encourage pt work with family to shop for low to no sodium items only. Encourage pt to not use the salt shaker and use fresh or dry herbs to season items.  Encouage more fresh fruits and vegetables as tolerated such as soft chopped up watermelon and strawberries (based on pt preferences)Gave pt a handout on Heart Failure MNT and explained handout. Gave pt recommended/not recommended food list. Pt verbalized understanding.       All questions and concerns answered. Dietitian's contact information provided.       Follow-Up: Yes!    Please Re-consult as needed        Thanks!

## 2020-10-19 NOTE — ASSESSMENT & PLAN NOTE
Patient presents with Acute Hypoxemic Respiratory Failure secondary to CHF exacerbation.  Exacerbation probably triggered by poor HR control.  Hx of PAF.  Presents in Afib with RVR.  Cardiology consulted and patient admitted to ICU on Amiodarone drip.  Some improvement of symptoms with diuresis.  Continue IV Lasix.  Rate control.  Fluid restriction.  Probably cardioversion today.

## 2020-10-19 NOTE — ASSESSMENT & PLAN NOTE
Currently in AFib.  On amiodarone drip.  Continues to be in AFib.  Discussed various options with the patient.  Considering the fact that the acute decompensated heart failure likely precipitated by AFib, will cardiovert her.  She has been on Eliquis without any missed doses.    Risks, benefits and alternatives of the Cardioversion procedure were discussed with the patient including throat irritation, aspiration, anesthetic complications, esophageal irritation/perforation, skin irritation, arrhythmia, etc.  Patient understands and agrees to proceed.  Consent was placed on the chart.

## 2020-10-19 NOTE — PLAN OF CARE
Problem: Adult Inpatient Plan of Care  Goal: Plan of Care Review  Outcome: Ongoing, Progressing  Goal: Patient-Specific Goal (Individualization)  Outcome: Ongoing, Progressing  Goal: Absence of Hospital-Acquired Illness or Injury  Outcome: Ongoing, Progressing  Goal: Optimal Comfort and Wellbeing  Outcome: Ongoing, Progressing  Goal: Readiness for Transition of Care  Outcome: Ongoing, Progressing  Goal: Rounds/Family Conference  Outcome: Ongoing, Progressing     Problem: Fall Injury Risk  Goal: Absence of Fall and Fall-Related Injury  Outcome: Ongoing, Progressing     Problem: Skin Injury Risk Increased  Goal: Skin Health and Integrity  Outcome: Ongoing, Progressing     Problem: Adjustment to Illness (Heart Failure)  Goal: Optimal Coping  Outcome: Ongoing, Progressing     Problem: Arrhythmia/Dysrhythmia (Heart Failure)  Goal: Stable Heart Rate and Rhythm  Outcome: Ongoing, Progressing     Problem: Cardiac Output Decreased (Heart Failure)  Goal: Optimal Cardiac Output  Outcome: Ongoing, Progressing     Problem: Fluid Imbalance (Heart Failure)  Goal: Fluid Balance  Outcome: Ongoing, Progressing     Problem: Functional Ability Impaired (Heart Failure)  Goal: Optimal Functional Ability  Outcome: Ongoing, Progressing     Problem: Oral Intake Inadequate (Heart Failure)  Goal: Optimal Nutrition Intake  Outcome: Ongoing, Progressing     Problem: Respiratory Compromise (Heart Failure)  Goal: Effective Oxygenation and Ventilation  Outcome: Ongoing, Progressing     Problem: Sleep Disordered Breathing (Heart Failure)  Goal: Effective Breathing Pattern During Sleep  Outcome: Ongoing, Progressing     Problem: Arrhythmia/Dysrhythmia  Goal: Normalized Cardiac Rhythm  Outcome: Ongoing, Progressing

## 2020-10-19 NOTE — ANESTHESIA PREPROCEDURE EVALUATION
10/19/2020  Jeannie Hutchins is a 85 y.o., female.  Pre-operative evaluation for Procedure(s) (LRB):  Transesophageal echo (BLADIMIR) intra-procedure log documentation (N/A)    NPO >8h  METS 1-3    Amiodarone gtt at 0.5 mg/min for Afib.  Admitted 10/18 for SOB and CHF exacerbation and Afib w/ rvr. Eliquis continued.    Vitals:    10/19/20 0830 10/19/20 0845 10/19/20 0900 10/19/20 0915   BP: (!) 125/57  (!) 160/68    BP Location:       Patient Position:       Pulse: 84 86 86 86   Resp: (!) 39 (!) 34 (!) 38 20   Temp:   36.5 °C (97.7 °F)    TempSrc:   Oral    SpO2:  100% 100% 100%   Weight:       Height:           Patient Active Problem List   Diagnosis    S/P carotid endarterectomy    Non-intractable vomiting with nausea    Hyponatremia with decreased serum osmolality    Adjustment disorder with mixed anxiety and depressed mood    Essential hypertension    Generalized anxiety disorder    Hypophosphatemia    Acute respiratory failure with hypoxia and hypercarbia    Paroxysmal atrial fibrillation    Pulmonary hypertension    Mixed hyperlipidemia    Acute on chronic diastolic heart failure    Hydronephrosis of right kidney    Abnormal CT lung screening    Fatty liver    ILD (interstitial lung disease)    Carotid stenosis, asymptomatic, right       Review of patient's allergies indicates:   Allergen Reactions    Hydrochlorothiazide Other (See Comments)     hyponatremia    Strawberry Swelling     Tongue swells up and a generalized rash.    Lisinopril Other (See Comments)     Cough       No current facility-administered medications on file prior to encounter.      Current Outpatient Medications on File Prior to Encounter   Medication Sig Dispense Refill    acetaminophen (TYLENOL) 325 MG tablet Take 325 mg by mouth 2 (two) times daily.      alendronate (FOSAMAX) 70 MG tablet Take 70 mg by mouth every 7  days.      amlodipine-atorvastatin (CADUET) 10-40 mg per tablet TAKE ONE TABLET BY MOUTH EVERY DAY 30 tablet 11    apixaban (ELIQUIS) 2.5 mg Tab Take 1 tablet (2.5 mg total) by mouth 2 (two) times daily. 180 tablet 3    diclofenac sodium (SOLARAZE) 3 % gel Apply topically 2 (two) times daily. 1%      furosemide (LASIX) 20 MG tablet       levothyroxine (SYNTHROID) 75 MCG tablet Take 75 mcg by mouth once daily.      losartan (COZAAR) 100 MG tablet TAKE ONE TABLET BY MOUTH EVERY DAY 90 tablet 3    losartan (COZAAR) 100 MG tablet Take 1 tablet (100 mg total) by mouth once daily. 90 tablet 0    metoprolol tartrate (LOPRESSOR) 50 MG tablet Take 1 tablet (50 mg total) by mouth 2 (two) times daily. 60 tablet 11    ranitidine (ZANTAC) 150 MG tablet Take 150 mg by mouth with lunch.      VIT A,C & E/LUTEIN/MINERALS (VISION FORMULA, WITH LUTEIN, ORAL) Take 1 tablet by mouth once daily.         Past Surgical History:   Procedure Laterality Date    CARPAL TUNNEL RELEASE  2012    right hand    ESOPHAGOGASTRODUODENOSCOPY Left 8/29/2018    Procedure: EGD (ESOPHAGOGASTRODUODENOSCOPY);  Surgeon: Oniel Dorsey MD;  [facility];  Service: Endoscopy;  Laterality: Left;    HYSTERECTOMY      left carotid endartectomy  5/2006    TONSILLECTOMY         Social History     Socioeconomic History    Marital status:      Spouse name: Not on file    Number of children: Not on file    Years of education: Not on file    Highest education level: Not on file   Occupational History    Not on file   Social Needs    Financial resource strain: Not on file    Food insecurity     Worry: Not on file     Inability: Not on file    Transportation needs     Medical: Not on file     Non-medical: Not on file   Tobacco Use    Smoking status: Never Smoker    Smokeless tobacco: Never Used   Substance and Sexual Activity    Alcohol use: No    Drug use: No    Sexual activity: Never     Partners: Male   Lifestyle    Physical  activity     Days per week: Not on file     Minutes per session: Not on file    Stress: Not on file   Relationships    Social connections     Talks on phone: Not on file     Gets together: Not on file     Attends Restoration service: Not on file     Active member of club or organization: Not on file     Attends meetings of clubs or organizations: Not on file     Relationship status: Not on file   Other Topics Concern    Patient feels they ought to cut down on drinking/drug use Not Asked    Patient annoyed by others criticizing their drinking/drug use Not Asked    Patient has felt bad or guilty about drinking/drug use Not Asked    Patient has had a drink/used drugs as an eye opener in the AM Not Asked   Social History Narrative     since     He is retired.  He is one year older.  Worked for [employer]    She is retired.  Worked for [employer].         CBC:   Recent Labs     10/18/20  0855   WBC 9.63   RBC 3.64*   HGB 11.5*   HCT 33.3*      MCV 92   MCH 31.6*   MCHC 34.5       CMP:   Recent Labs     10/18/20  0935 10/18/20  1454 10/19/20  0410   *  --  131*   K 4.0  --  3.5   CL 97  --  95   CO2 22*  --  23   BUN 20  --  11   CREATININE 0.9  --  0.7   *  --  118*   MG  --  1.8  --    CALCIUM 8.7  --  8.2*   ALBUMIN 4.0  --   --    PROT 6.3  --   --    ALKPHOS 79  --   --    ALT 55*  --   --    AST 21  --   --    BILITOT 1.2*  --   --        INR  Recent Labs     10/18/20  0855   INR 1.1   APTT 28.7           Diagnostic Studies:      EKD Echo:  Results for orders placed or performed during the hospital encounter of 18   2D echo with color flow doppler   Result Value Ref Range    QEF 60 55 - 65    Aortic Valve Regurgitation TRIVIAL     Est. PA Systolic Pressure 54.24 (A)     Pericardial Effusion SMALL (A)     Mitral Valve Mobility NORMAL     Tricuspid Valve Regurgitation MILD          Anesthesia Evaluation    I have reviewed the Patient Summary  Reports.   I have reviewed the NPO Status.   I have reviewed the Medications.     Review of Systems  Anesthesia Hx:  No problems with previous Anesthesia  History of prior surgery of interest to airway management or planning:  Denies Personal Hx of Anesthesia complications.   Social:  Non-Smoker    Hematology/Oncology:  Hematology Normal   Oncology Normal     EENT/Dental:EENT/Dental Normal   Cardiovascular:   Exercise tolerance: poor Hypertension Dysrhythmias (on ELiquis) atrial fibrillation CHF hyperlipidemia    Pulmonary:  Pulmonary Normal Interstitial lung disease   Renal/:   Chronic Renal Disease    Hepatic/GI:   Liver Disease,    Musculoskeletal:  Musculoskeletal Normal    Neurological:  Neurology Normal    Endocrine:   Hypothyroidism    Dermatological:  Skin Normal    Psych:   Psychiatric History          Physical Exam  General:  Well nourished    Airway/Jaw/Neck:  Airway Findings: Mallampati: II TM Distance: < 4 cm     Eyes/Ears/Nose:  EYES/EARS/NOSE FINDINGS: Normal         Mental Status:  Mental Status Findings: Normal        Anesthesia Plan  Type of Anesthesia, risks & benefits discussed:  Anesthesia Type:  general  Patient's Preference:   Intra-op Monitoring Plan: standard ASA monitors  Intra-op Monitoring Plan Comments:   Post Op Pain Control Plan:   Post Op Pain Control Plan Comments:   Induction:   IV  Beta Blocker:  Patient is on a Beta-Blocker and has received one dose within the past 24 hours (No further documentation required).       Informed Consent: Patient understands risks and agrees with Anesthesia plan.  Questions answered. Anesthesia consent signed with patient.  ASA Score: 3     Day of Surgery Review of History & Physical:  There are no significant changes.          Ready For Surgery From Anesthesia Perspective.

## 2020-10-19 NOTE — PLAN OF CARE
10/19/20 1226   Discharge Assessment   Assessment Type Discharge Planning Assessment   Confirmed/corrected address and phone number on facesheet? Yes   Assessment information obtained from? Caregiver   Prior to hospitilization cognitive status: Alert/Oriented   Prior to hospitalization functional status: Needs Assistance   Current cognitive status: Alert/Oriented   Current Functional Status: Needs Assistance   Facility Arrived From: home   Lives With child(gabriela), adult   Able to Return to Prior Arrangements yes   Is patient able to care for self after discharge? Unable to determine at this time (comments)   Who are your caregiver(s) and their phone number(s)? Dane Bradyjimi [phone]   Patient's perception of discharge disposition home or selfcare   Readmission Within the Last 30 Days no previous admission in last 30 days   Patient currently being followed by outpatient case management? No   Patient currently receives any other outside agency services? No   Equipment Currently Used at Home wheelchair   Do you have any problems affording any of your prescribed medications? No   Is the patient taking medications as prescribed? yes   Does the patient have transportation home? Yes   Transportation Anticipated car, drives self;family or friend will provide   Dialysis Name and Scheduled days N/A   Does the patient receive services at the [facility]? No   Discharge Plan A Home with family;Home Health   Discharge Plan B Home with family   DME Needed Upon Discharge  none   Patient/Family in Agreement with Plan yes       SW spoke with pt son Dane, he stated she live at home with Tj.     [id number] [place] - [address]  [address] [address]  Phone: [phone] Fax: [phone]

## 2020-10-19 NOTE — CONSULTS
[facility]  Cardiology  Consult Note    Patient Name: Jeannie Hutchins  MRN: [id number]  Admission Date: 10/18/2020  Hospital Length of Stay: 1 days  Code Status: Full Code   Attending Provider: Mele Connolly MD   Consulting Provider: Brandon Elias MD  Primary Care Physician: Paige Mcleod MD  Principal Problem:Acute on chronic diastolic heart failure    Patient information was obtained from patient and ER records.     Inpatient consult to Cardiology  Consult performed by: Brandon Elias MD  Consult ordered by: Mele Connolly MD        Subjective:     Chief Complaint:  afib     HPI:   Patient is a pleasant 85-year-old lady.  Yesterday got short of breath and swelling of feet.  Diagnosed with decompensated heart failure.  Precipitated by AFib with RVR.  Put on amiodarone drip.  Has continued to be in AFib.  Tele monitoring and EKG personally reviewed which demonstrated AFib.  AFib on tele monitoring rate controlled around 90 beats per minute.  Patient still feeling short of breath and requiring a face mask.  Desaturates without BiPAP.  Normally follows with Dr. Armenta.  States has been compliant with her Eliquis.  Does not miss even a single dose.    hpi  86 y/o female presents to the ER via EMS transport with shortness of breath.  Patient states she is chronically short of breath, but symptoms worsened this morning.  EMS reported patient's SpO2 was 88% on room air, improved to 96% after given 1 SL nitro tab and nitro paste en route.  Patient also reports non productive cough and 10 lb weight gain in the last 2 weeks.  Also complains of edema on bilateral lower extremities that started 3 days ago.  Patient also reports post nasal drip.  States compliance with medications. Hx of PAF and noted to be in rapid AFib on presentation.  Placed on Amiodarone gtt and admitted to ICU.  Patient was also given Lasix in ER with improvement of symptoms.  No other complaints.    L MPI 7/10/19    The  perfusion scan is free of evidence from myocardial ischemia or injury.    Resting wall motion is physiologic.Post stress wall motion is physiologic.    Gated perfusion images showed an ejection fraction of 81.0 %    There is  normal wall motion at rest normal wall motion post stress.    The EKG portion of this study is negative for ischemia.    There were no arrhythmias during stress.    The patient reported no chest pain during the stress test.     Echo 7/10/19  · Mild concentric left ventricular hypertrophy.  · Normal left ventricular systolic function. The estimated ejection fraction is 65%  · Grade II (moderate) left ventricular diastolic dysfunction consistent with pseudonormalization.  · Elevated left atrial pressure.  · Normal right ventricular systolic function.  · Moderate left atrial enlargement.  · Mild aortic regurgitation.  · Mild to moderate tricuspid regurgitation.  · Intermediate central venous pressure (8 mm Hg).  · The estimated PA systolic pressure is 52 mm Hg  · Pulmonary hypertension present.     LE art UIS/STANLEY 7/10/19  · Right STANLEY 0.86. Doppler shows mild < 50% mid SFA disease  · Left STANLEY 0.86. Doppler shows mild < 50% mid SFA disease     Carotid US 3/20/19 (similar to report 4/2018)  1. Right carotid ultrasound shows 60-69% internal carotid artery stenosis.  Antegrade vertebral artery flow.  2. Left carotid ultrasound shows  0-19% internal carotid artery stenosis.  Antegrade vertebral artery flow.    Past Medical History:   Diagnosis Date    Anticoagulant long-term use     Eliquis    Arthritis     Atrial fibrillation     Blood transfusion     Carotid stenosis     CHF (congestive heart failure)     Edema     Hearing loss     [place] (hard of hearing)     Hypercholesterolemia     Hypertension     Psychiatric problem     Thyroid disease        Past Surgical History:   Procedure Laterality Date    CARPAL TUNNEL RELEASE  2012    right hand    ESOPHAGOGASTRODUODENOSCOPY Left  8/29/2018    Procedure: EGD (ESOPHAGOGASTRODUODENOSCOPY);  Surgeon: Oniel Dorsey MD;  Location: [facility];  Service: Endoscopy;  Laterality: Left;    HYSTERECTOMY      left carotid endartectomy  5/2006    TONSILLECTOMY         Review of patient's allergies indicates:   Allergen Reactions    Hydrochlorothiazide Other (See Comments)     hyponatremia    Strawberry Swelling     Tongue swells up and a generalized rash.    Lisinopril Other (See Comments)     Cough       No current facility-administered medications on file prior to encounter.      Current Outpatient Medications on File Prior to Encounter   Medication Sig    acetaminophen (TYLENOL) 325 MG tablet Take 325 mg by mouth 2 (two) times daily.    alendronate (FOSAMAX) 70 MG tablet Take 70 mg by mouth every 7 days.    amlodipine-atorvastatin (CADUET) 10-40 mg per tablet TAKE ONE TABLET BY MOUTH EVERY DAY    apixaban (ELIQUIS) 2.5 mg Tab Take 1 tablet (2.5 mg total) by mouth 2 (two) times daily.    diclofenac sodium (SOLARAZE) 3 % gel Apply topically 2 (two) times daily. 1%    furosemide (LASIX) 20 MG tablet     levothyroxine (SYNTHROID) 75 MCG tablet Take 75 mcg by mouth once daily.    losartan (COZAAR) 100 MG tablet TAKE ONE TABLET BY MOUTH EVERY DAY    losartan (COZAAR) 100 MG tablet Take 1 tablet (100 mg total) by mouth once daily.    metoprolol tartrate (LOPRESSOR) 50 MG tablet Take 1 tablet (50 mg total) by mouth 2 (two) times daily.    ranitidine (ZANTAC) 150 MG tablet Take 150 mg by mouth with lunch.    VIT A,C & E/LUTEIN/MINERALS (VISION FORMULA, WITH LUTEIN, ORAL) Take 1 tablet by mouth once daily.     Family History     Problem Relation (Age of Onset)    Breast cancer Daughter (50)    Cancer Brother (65), Sister (81), Sister, Sister    Heart disease Father    Ovarian cancer Sister (81)    Schizophrenia Son        Tobacco Use    Smoking status: Never Smoker    Smokeless tobacco: Never Used   Substance and Sexual Activity    Alcohol  use: No    Drug use: No    Sexual activity: Never     Partners: Male     Review of Systems   Constitution: Negative.   HENT: Negative.    Eyes: Negative.    Cardiovascular: Positive for dyspnea on exertion and leg swelling. Negative for chest pain.   Respiratory: Positive for shortness of breath.    Endocrine: Negative.    Hematologic/Lymphatic: Negative.    Skin: Negative.    Musculoskeletal: Negative.    Gastrointestinal: Negative.    Genitourinary: Negative.    Neurological: Negative.    Psychiatric/Behavioral: Negative.    Allergic/Immunologic: Negative.      Objective:     Vital Signs (Most Recent):  Temp: 97.9 °F (36.6 °C) (10/19/20 0301)  Pulse: 81 (10/19/20 0730)  Resp: (!) 29 (10/19/20 0730)  BP: (!) 151/64 (10/19/20 0730)  SpO2: 100 % (10/19/20 0730) Vital Signs (24h Range):  Temp:  [97.5 °F (36.4 °C)-97.9 °F (36.6 °C)] 97.9 °F (36.6 °C)  Pulse:  [] 81  Resp:  [18-42] 29  SpO2:  [79 %-100 %] 100 %  BP: (104-154)/(50-98) 151/64     Weight: 49.9 kg (110 lb 0.2 oz)  Body mass index is 24.66 kg/m².    SpO2: 100 %  O2 Device (Oxygen Therapy): BiPAP      Intake/Output Summary (Last 24 hours) at 10/19/2020 0803  Last data filed at 10/19/2020 0600  Gross per 24 hour   Intake 511.27 ml   Output 2040 ml   Net -1528.73 ml       Lines/Drains/Airways     Drain            Female External Urinary Catheter 10/18/20 1901 less than 1 day          Peripheral Intravenous Line                 Peripheral IV - Single Lumen 10/18/20 0833 20 G Left Hand less than 1 day         Peripheral IV - Single Lumen 10/18/20 1533 20 G Anterior;Right Forearm less than 1 day                Physical Exam   Constitutional: She is oriented to person, place, and time. She appears well-developed and well-nourished.   HENT:   Head: Normocephalic.   Eyes: Pupils are equal, round, and reactive to light.   Neck: Normal range of motion. Neck supple.   Cardiovascular: Normal rate. An irregularly irregular rhythm present.   Pulmonary/Chest: Effort  normal and breath sounds normal.   Abdominal: Soft. Normal appearance and bowel sounds are normal. There is no abdominal tenderness.   Musculoskeletal: Normal range of motion.   Neurological: She is alert and oriented to person, place, and time.   Skin: Skin is warm.   Psychiatric: She has a normal mood and affect.       Significant Labs:   BMP:   Recent Labs   Lab 10/18/20  0935 10/18/20  1454 10/19/20  0410   *  --  118*   *  --  131*   K 4.0  --  3.5   CL 97  --  95   CO2 22*  --  23   BUN 20  --  11   CREATININE 0.9  --  0.7   CALCIUM 8.7  --  8.2*   MG  --  1.8  --    , CMP   Recent Labs   Lab 10/18/20  0935 10/19/20  0410   * 131*   K 4.0 3.5   CL 97 95   CO2 22* 23   * 118*   BUN 20 11   CREATININE 0.9 0.7   CALCIUM 8.7 8.2*   PROT 6.3  --    ALBUMIN 4.0  --    BILITOT 1.2*  --    ALKPHOS 79  --    AST 21  --    ALT 55*  --    ANIONGAP 10 13   ESTGFRAFRICA >60 >60   EGFRNONAA 59* >60   , CBC   Recent Labs   Lab 10/18/20  0855   WBC 9.63   HGB 11.5*   HCT 33.3*      , INR   Recent Labs   Lab 10/18/20  0855   INR 1.1   , Lipid Panel No results for input(s): CHOL, HDL, LDLCALC, TRIG, CHOLHDL in the last 48 hours., Troponin   Recent Labs   Lab 10/18/20  0935 10/18/20  1501 10/18/20  2056   TROPONINI <0.006 <0.006 <0.006    and All pertinent lab results from the last 24 hours have been reviewed.    Significant Imaging: Echocardiogram:   Transthoracic echo (TTE) complete (Cupid Only):   Results for orders placed or performed during the hospital encounter of 07/10/19   Transthoracic echo (TTE) 2D with Color Flow   Result Value Ref Range    Ascending aorta 2.71 cm    STJ 1.98 cm    AV mean gradient 5 mmHg    Ao peak david 1.59 m/s    Ao VTI 35.74 cm    IVRT 0.09 msec    IVS 1.39 (A) 0.6 - 1.1 cm    LA size 4.39 cm    Left Atrium Major Axis 5.14 cm    Left Atrium Minor Axis 5.27 cm    LVIDd 3.35 (A) 3.5 - 6.0 cm    LVIDs 2.19 2.1 - 4.0 cm    LVOT diameter 1.84 cm    LVOT peak VTI 26.45  cm    Posterior Wall 1.60 (A) 0.6 - 1.1 cm    MV Peak A Singh 1.15 m/s    E wave decelartion time 213.67 msec    MV Peak E Singh 1.16 m/s    PV Peak D Singh 0.46 m/s    PV Peak S Singh 0.67 m/s    RA Major Axis 4.54 cm    RA Width 3.08 cm    RVDD 2.74 cm    Sinus 2.33 cm    TAPSE 2.70 cm    TR Max Singh 3.32 m/s    TDI LATERAL 0.08 m/s    TDI SEPTAL 0.07 m/s    LA WIDTH 3.21 cm    Ao root annulus 2.02 cm    AORTIC VALVE CUSP SEPERATION 1.85 cm    PV PEAK VELOCITY 1.11 cm/s    LV Diastolic Volume 45.80 mL    LV Systolic Volume 16.06 mL    RV S' 17.95 cm/s    LVOT peak singh 1.16 m/s    LV LATERAL E/E' RATIO 14.50 m/s    LV SEPTAL E/E' RATIO 16.57 m/s    FS 35 %    LA volume 62.34 cm3    LV mass 181.35 g    Left Ventricle Relative Wall Thickness 0.96 cm    AV valve area 1.97 cm2    AV Velocity Ratio 0.73     AV index (prosthetic) 0.74     E/A ratio 1.01     Mean e' 0.08 m/s    Pulm vein S/D ratio 1.46     LVOT area 2.7 cm2    LVOT stroke volume 70.30 cm3    AV peak gradient 10 mmHg    E/E' ratio 15.47 m/s    Triscuspid Valve Regurgitation Peak Gradient 44 mmHg    Right Atrial Pressure (from IVC) 8 mmHg    TV rest pulmonary artery pressure 52 mmHg    Narrative    · Mild concentric left ventricular hypertrophy.  · Normal left ventricular systolic function. The estimated ejection   fraction is 65%  · Grade II (moderate) left ventricular diastolic dysfunction consistent   with pseudonormalization.  · Elevated left atrial pressure.  · Normal right ventricular systolic function.  · Moderate left atrial enlargement.  · Mild aortic regurgitation.  · Mild to moderate tricuspid regurgitation.  · Intermediate central venous pressure (8 mm Hg).  · The estimated PA systolic pressure is 52 mm Hg  · Pulmonary hypertension present.        Assessment and Plan:     * Acute on chronic diastolic heart failure  IV diuresis.  Likely precipitated by AFib.    Paroxysmal atrial fibrillation  Currently in AFib.  On amiodarone drip.  Continues to be in  AFib.  Discussed various options with the patient.  Considering the fact that the acute decompensated heart failure likely precipitated by AFib, will cardiovert her.  She has been on Eliquis without any missed doses.    Risks, benefits and alternatives of the Cardioversion procedure were discussed with the patient including throat irritation, aspiration, anesthetic complications, esophageal irritation/perforation, skin irritation, arrhythmia, etc.  Patient understands and agrees to proceed.  Consent was placed on the chart.      Acute respiratory failure with hypoxia and hypercarbia  Management per primary    Essential hypertension  Titrate antihypertensives as needed for appropriate blood pressure control        VTE Risk Mitigation (From admission, onward)         Ordered     apixaban tablet 2.5 mg  2 times daily      10/18/20 1342     IP VTE HIGH RISK PATIENT  Once      10/18/20 1340     Place sequential compression device  Until discontinued      10/18/20 1340                Thank you for your consult. I will follow-up with patient. Please contact us if you have any additional questions.    Brandon Elias MD  Cardiology   [facility]    Critical Care Time: 45 minutes     Critical care was time spent personally by me on the following activities: development of treatment plan with patient or surrogate and bedside caregivers, discussions with consultants, evaluation of patient's response to treatment, examination of patient, ordering and performing treatments and interventions, ordering and review of laboratory studies, ordering and review of radiographic studies, pulse oximetry, re-evaluation of patient's condition. This critical care time did not overlap with that of any other provider or involve time for any procedures.
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
## 2020-10-19 NOTE — HPI
Patient is a pleasant 85-year-old lady.  Yesterday got short of breath and swelling of feet.  Diagnosed with decompensated heart failure.  Precipitated by AFib with RVR.  Put on amiodarone drip.  Has continued to be in AFib.  Tele monitoring and EKG personally reviewed which demonstrated AFib.  AFib on tele monitoring rate controlled around 90 beats per minute.  Patient still feeling short of breath and requiring a face mask.  Desaturates without BiPAP.  Normally follows with Dr. Armenta.  States has been compliant with her Eliquis.  Does not miss even a single dose.    hpi  86 y/o female presents to the ER via EMS transport with shortness of breath.  Patient states she is chronically short of breath, but symptoms worsened this morning.  EMS reported patient's SpO2 was 88% on room air, improved to 96% after given 1 SL nitro tab and nitro paste en route.  Patient also reports non productive cough and 10 lb weight gain in the last 2 weeks.  Also complains of edema on bilateral lower extremities that started 3 days ago.  Patient also reports post nasal drip.  States compliance with medications. Hx of PAF and noted to be in rapid AFib on presentation.  Placed on Amiodarone gtt and admitted to ICU.  Patient was also given Lasix in ER with improvement of symptoms.  No other complaints.    L MPI 7/10/19    The perfusion scan is free of evidence from myocardial ischemia or injury.    Resting wall motion is physiologic.Post stress wall motion is physiologic.    Gated perfusion images showed an ejection fraction of 81.0 %    There is  normal wall motion at rest normal wall motion post stress.    The EKG portion of this study is negative for ischemia.    There were no arrhythmias during stress.    The patient reported no chest pain during the stress test.     Echo 7/10/19  · Mild concentric left ventricular hypertrophy.  · Normal left ventricular systolic function. The estimated ejection fraction is 65%  · Grade II  (moderate) left ventricular diastolic dysfunction consistent with pseudonormalization.  · Elevated left atrial pressure.  · Normal right ventricular systolic function.  · Moderate left atrial enlargement.  · Mild aortic regurgitation.  · Mild to moderate tricuspid regurgitation.  · Intermediate central venous pressure (8 mm Hg).  · The estimated PA systolic pressure is 52 mm Hg  · Pulmonary hypertension present.     LE art UIS/STANLEY 7/10/19  · Right STANLEY 0.86. Doppler shows mild < 50% mid SFA disease  · Left STANLEY 0.86. Doppler shows mild < 50% mid SFA disease     Carotid US 3/20/19 (similar to report 4/2018)  1. Right carotid ultrasound shows 60-69% internal carotid artery stenosis.  Antegrade vertebral artery flow.  2. Left carotid ultrasound shows  0-19% internal carotid artery stenosis.  Antegrade vertebral artery flow.

## 2020-10-19 NOTE — SUBJECTIVE & OBJECTIVE
Interval History: Feeling a little better, but still with some shortness of breath.    Review of Systems   HENT: Negative for ear discharge and ear pain.    Eyes: Negative for discharge and itching.   Endocrine: Negative for cold intolerance and heat intolerance.   Neurological: Negative for seizures and syncope.     Objective:     Vital Signs (Most Recent):  Temp: 98.1 °F (36.7 °C) (10/19/20 1200)  Pulse: 74 (10/19/20 1215)  Resp: (!) 23 (10/19/20 1215)  BP: 128/63 (10/19/20 1200)  SpO2: 96 % (10/19/20 1215) Vital Signs (24h Range):  Temp:  [97.5 °F (36.4 °C)-98.1 °F (36.7 °C)] 98.1 °F (36.7 °C)  Pulse:  [] 74  Resp:  [18-49] 23  SpO2:  [79 %-100 %] 96 %  BP: (104-160)/() 128/63     Weight: 49.9 kg (110 lb 0.2 oz)  Body mass index is 24.66 kg/m².    Intake/Output Summary (Last 24 hours) at 10/19/2020 1358  Last data filed at 10/19/2020 1200  Gross per 24 hour   Intake 411.27 ml   Output 2740 ml   Net -2328.73 ml      Physical Exam  Constitutional:       Appearance: She is ill-appearing. She is not diaphoretic.   HENT:      Head: Normocephalic and atraumatic.      Mouth/Throat:      Pharynx: Oropharynx is clear. No oropharyngeal exudate or posterior oropharyngeal erythema.   Eyes:      General:         Right eye: No discharge.         Left eye: No discharge.      Conjunctiva/sclera: Conjunctivae normal.   Neck:      Musculoskeletal: Neck supple. No neck rigidity.   Cardiovascular:      Rate and Rhythm: Tachycardia present. Rhythm irregular.   Pulmonary:      Comments: Slight tachypnea  Coarse BS bilaterally  Abdominal:      General: Bowel sounds are normal.      Palpations: Abdomen is soft.   Musculoskeletal:      Right lower leg: Edema present.      Left lower leg: Edema present.   Skin:     General: Skin is warm and dry.   Neurological:      Mental Status: She is oriented to person, place, and time.      Cranial Nerves: No cranial nerve deficit.         Significant Labs:   BMP:   Recent Labs   Lab  10/18/20  1454 10/19/20  0410   GLU  --  118*   NA  --  131*   K  --  3.5   CL  --  95   CO2  --  23   BUN  --  11   CREATININE  --  0.7   CALCIUM  --  8.2*   MG 1.8  --      CBC:   Recent Labs   Lab 10/18/20  0855   WBC 9.63   HGB 11.5*   HCT 33.3*          Significant Imaging: I have reviewed all pertinent imaging results/findings within the past 24 hours.

## 2020-10-19 NOTE — PROGRESS NOTES
[facility] Hospital Medicine  Progress Note    Patient Name: Jeannie Hutchins  MRN: [id number]  Patient Class: IP- Inpatient   Admission Date: 10/18/2020  Length of Stay: 1 days  Attending Physician: Mele Connolly MD  Primary Care Provider: Paige Mcleod MD        Subjective:     Principal Problem:Acute on chronic diastolic heart failure        HPI:  86 y/o female presents to the ER via EMS transport with shortness of breath.  Patient states she is chronically short of breath, but symptoms worsened this morning.  EMS reported patient's SpO2 was 88% on room air, improved to 96% after given 1 SL nitro tab and nitro paste en route.  Patient also reports non productive cough and 10 lb weight gain in the last 2 weeks.  Also complains of edema on bilateral lower extremities that started 3 days ago.  Patient also reports post nasal drip.  States compliance with medications. Hx of PAF and noted to be in rapid AFib on presentation.  Placed on Amiodarone gtt and admitted to ICU.  Patient was also given Lasix in ER with improvement of symptoms.  No other complaints.    Overview/Hospital Course:  86 y/o female presents with acute on chronic diastolic heart failure probably precipitated by AFib with RVR.  Started on IV Lasix.  Admitted to ICU on Amiodarone gtt.  Cardiology consulted.    Interval History: Feeling a little better, but still with some shortness of breath.    Review of Systems   HENT: Negative for ear discharge and ear pain.    Eyes: Negative for discharge and itching.   Endocrine: Negative for cold intolerance and heat intolerance.   Neurological: Negative for seizures and syncope.     Objective:     Vital Signs (Most Recent):  Temp: 98.1 °F (36.7 °C) (10/19/20 1200)  Pulse: 74 (10/19/20 1215)  Resp: (!) 23 (10/19/20 1215)  BP: 128/63 (10/19/20 1200)  SpO2: 96 % (10/19/20 1215) Vital Signs (24h Range):  Temp:  [97.5 °F (36.4 °C)-98.1 °F (36.7 °C)] 98.1 °F (36.7 °C)  Pulse:  [] 74  Resp:   [18-49] 23  SpO2:  [79 %-100 %] 96 %  BP: (104-160)/() 128/63     Weight: 49.9 kg (110 lb 0.2 oz)  Body mass index is 24.66 kg/m².    Intake/Output Summary (Last 24 hours) at 10/19/2020 1358  Last data filed at 10/19/2020 1200  Gross per 24 hour   Intake 411.27 ml   Output 2740 ml   Net -2328.73 ml      Physical Exam  Constitutional:       Appearance: She is ill-appearing. She is not diaphoretic.   HENT:      Head: Normocephalic and atraumatic.      Mouth/Throat:      Pharynx: Oropharynx is clear. No oropharyngeal exudate or posterior oropharyngeal erythema.   Eyes:      General:         Right eye: No discharge.         Left eye: No discharge.      Conjunctiva/sclera: Conjunctivae normal.   Neck:      Musculoskeletal: Neck supple. No neck rigidity.   Cardiovascular:      Rate and Rhythm: Tachycardia present. Rhythm irregular.   Pulmonary:      Comments: Slight tachypnea  Coarse BS bilaterally  Abdominal:      General: Bowel sounds are normal.      Palpations: Abdomen is soft.   Musculoskeletal:      Right lower leg: Edema present.      Left lower leg: Edema present.   Skin:     General: Skin is warm and dry.   Neurological:      Mental Status: She is oriented to person, place, and time.      Cranial Nerves: No cranial nerve deficit.         Significant Labs:   BMP:   Recent Labs   Lab 10/18/20  1454 10/19/20  0410   GLU  --  118*   NA  --  131*   K  --  3.5   CL  --  95   CO2  --  23   BUN  --  11   CREATININE  --  0.7   CALCIUM  --  8.2*   MG 1.8  --      CBC:   Recent Labs   Lab 10/18/20  0855   WBC 9.63   HGB 11.5*   HCT 33.3*          Significant Imaging: I have reviewed all pertinent imaging results/findings within the past 24 hours.      Assessment/Plan:      * Acute on chronic diastolic heart failure  Patient presents with Acute Hypoxemic Respiratory Failure secondary to CHF exacerbation.  Exacerbation probably triggered by poor HR control.  Hx of PAF.  Presents in Afib with RVR.  Cardiology  consulted and patient admitted to ICU on Amiodarone drip.  Some improvement of symptoms with diuresis.  Continue IV Lasix.  Rate control.  Fluid restriction.  Probably cardioversion today.      Mixed hyperlipidemia  Continue Statin.      Paroxysmal atrial fibrillation  Afib with RVR as above.      Acute respiratory failure with hypoxia and hypercarbia  Secondary to CHF as above.      Generalized anxiety disorder        Essential hypertension  Continue home medications.      Hyponatremia with decreased serum osmolality          VTE Risk Mitigation (From admission, onward)         Ordered     apixaban tablet 2.5 mg  2 times daily      10/18/20 1342     IP VTE HIGH RISK PATIENT  Once      10/18/20 1340     Place sequential compression device  Until discontinued      10/18/20 1340                Discharge Planning   JULIET:      Code Status: Full Code   Is the patient medically ready for discharge?:     Reason for patient still in hospital (select all that apply): Patient trending condition  Discharge Plan A: Home with family, Home Health                Mele Connolly MD  Department of Hospital Medicine   [facility]

## 2020-10-19 NOTE — PLAN OF CARE
Patient remains in ICU overnight. AAOx4, did report Afib on cardiac monitor, rate in 70s-80s. Amio gtt at 0.5 mg/min. On bipap, with avaps setting overnight, FiO2 100%. No SOB. Afebrile. 1.5 L UO, Purewick in use. Tolerating well. No BM. Repositions independently. No falls, injury, or skin breakdown this shift. Plan of care reviewed.

## 2020-10-19 NOTE — CARE UPDATE
[facility]  ICU Shift Summary  Date: 10/19/2020      COVID Test: (--)  Isolation: No active isolations     Prehospitalization: Home  Admit Date / LOS : 10/18/2020/ 1 days    Diagnosis: Acute on chronic diastolic heart failure    Consults:        Active: Cardio       Needed: N/A     Code Status: Full Code   Advanced Directive: <no information>    LDA: BIPAP, PIV and Purewick       Central Lines/Site/Justification:Patient Does Not Have Central Line       Urinary Cath/Order/Justification:Patient Does Not Have Urinary Catheter    Vasopressors/Infusions:    amiodarone in dextrose 5% 0.5 mg/min (10/19/20 0600)          GOALS: Volume/ Hemodynamic: N/A                     RASS: 0  alert and calm    Pain Management: PO       Pain Controlled: yes     Rhythm: A-Fib    Respiratory Device: Bipap    Oxygen Concentration (%):  [100] 100             Most Recent SBT/ SAT: N/A       MOVE Screen: pass    VTE Prophylaxis: Pharm and Mechanical  Mobility: Bedrest, S: Self and A: Assist  Stress Ulcer Prophylaxis: No    Dietary: NPO  Tolerance: not applicable  /  Advancement: no    I & O (24h):    Intake/Output Summary (Last 24 hours) at 10/19/2020 0803  Last data filed at 10/19/2020 0600  Gross per 24 hour   Intake 511.27 ml   Output 2040 ml   Net -1528.73 ml        Restraints: No    Significant Dates:  Post Op Date: N/A  Rescue Date: N/A  Imaging/ Diagnostics: cxr 10/18    Noteworthy Labs:  none    CBC/Anemia Labs: Coags:    Recent Labs   Lab 10/18/20  0855   WBC 9.63   HGB 11.5*   HCT 33.3*      MCV 92   RDW 13.5    Recent Labs   Lab 10/18/20  0855   INR 1.1   APTT 28.7        Chemistries:   Recent Labs   Lab 10/18/20  0935 10/18/20  1454 10/19/20  0410   *  --  131*   K 4.0  --  3.5   CL 97  --  95   CO2 22*  --  23   BUN 20  --  11   CREATININE 0.9  --  0.7   CALCIUM 8.7  --  8.2*   PROT 6.3  --   --    BILITOT 1.2*  --   --    ALKPHOS 79  --   --    ALT 55*  --   --    AST 21  --   --    MG  --  1.8   --         Cardiac Enzymes: Ejection Fractions:    Recent Labs     10/18/20  1501 10/18/20  2056   TROPONINI <0.006 <0.006    Nuc Rest EF   Date Value Ref Range Status   07/10/2019 81.0  Final        POCT Glucose: HbA1c:    No results for input(s): POCTGLUCOSE in the last 168 hours. No results found for: HGBA1C        ICU LOS 17h  Level of Care: Critical Care    Shift Summary/Plan for the shift: see plan of care

## 2020-10-20 LAB
ALLENS TEST: ABNORMAL
ANION GAP SERPL CALC-SCNC: 11 MMOL/L (ref 8–16)
BNP SERPL-MCNC: 853 PG/ML (ref 0–99)
BUN SERPL-MCNC: 6 MG/DL (ref 8–23)
CALCIUM SERPL-MCNC: 8.5 MG/DL (ref 8.7–10.5)
CHLORIDE SERPL-SCNC: 93 MMOL/L (ref 95–110)
CO2 SERPL-SCNC: 30 MMOL/L (ref 23–29)
CREAT SERPL-MCNC: 0.7 MG/DL (ref 0.5–1.4)
DELSYS: ABNORMAL
EST. GFR  (AFRICAN AMERICAN): >60 ML/MIN/1.73 M^2
EST. GFR  (NON AFRICAN AMERICAN): >60 ML/MIN/1.73 M^2
FIO2: 100
FLOW: 40
GLUCOSE SERPL-MCNC: 113 MG/DL (ref 70–110)
HCO3 UR-SCNC: 29 MMOL/L (ref 24–28)
MAGNESIUM SERPL-MCNC: 1.8 MG/DL (ref 1.6–2.6)
MODE: ABNORMAL
PCO2 BLDA: 40.1 MMHG (ref 35–45)
PH SMN: 7.47 [PH] (ref 7.35–7.45)
PO2 BLDA: 77 MMHG (ref 80–100)
POC BE: 5 MMOL/L
POC SATURATED O2: 96 % (ref 95–100)
POC TCO2: 30 MMOL/L (ref 23–27)
POTASSIUM SERPL-SCNC: 2.9 MMOL/L (ref 3.5–5.1)
SAMPLE: ABNORMAL
SITE: ABNORMAL
SODIUM SERPL-SCNC: 134 MMOL/L (ref 136–145)
SP02: 96

## 2020-10-20 PROCEDURE — 99900035 HC TECH TIME PER 15 MIN (STAT)

## 2020-10-20 PROCEDURE — 25000003 PHARM REV CODE 250: Performed by: INTERNAL MEDICINE

## 2020-10-20 PROCEDURE — 99497 PR ADVNCD CARE PLAN 30 MIN: ICD-10-PCS | Mod: 25,,, | Performed by: NURSE PRACTITIONER

## 2020-10-20 PROCEDURE — 63600175 PHARM REV CODE 636 W HCPCS: Performed by: INTERNAL MEDICINE

## 2020-10-20 PROCEDURE — 25000003 PHARM REV CODE 250: Performed by: HOSPITALIST

## 2020-10-20 PROCEDURE — 99497 ADVNCD CARE PLAN 30 MIN: CPT | Mod: 25,,, | Performed by: NURSE PRACTITIONER

## 2020-10-20 PROCEDURE — 83735 ASSAY OF MAGNESIUM: CPT

## 2020-10-20 PROCEDURE — 94761 N-INVAS EAR/PLS OXIMETRY MLT: CPT

## 2020-10-20 PROCEDURE — 80048 BASIC METABOLIC PNL TOTAL CA: CPT

## 2020-10-20 PROCEDURE — 99291 PR CRITICAL CARE, E/M 30-74 MINUTES: ICD-10-PCS | Mod: ,,, | Performed by: INTERNAL MEDICINE

## 2020-10-20 PROCEDURE — 36415 COLL VENOUS BLD VENIPUNCTURE: CPT

## 2020-10-20 PROCEDURE — 27100171 HC OXYGEN HIGH FLOW UP TO 24 HOURS

## 2020-10-20 PROCEDURE — 99223 PR INITIAL HOSPITAL CARE,LEVL III: ICD-10-PCS | Mod: ,,, | Performed by: NURSE PRACTITIONER

## 2020-10-20 PROCEDURE — 63600175 PHARM REV CODE 636 W HCPCS: Performed by: EMERGENCY MEDICINE

## 2020-10-20 PROCEDURE — 20000000 HC ICU ROOM

## 2020-10-20 PROCEDURE — 99291 CRITICAL CARE FIRST HOUR: CPT | Mod: ,,, | Performed by: INTERNAL MEDICINE

## 2020-10-20 PROCEDURE — 83880 ASSAY OF NATRIURETIC PEPTIDE: CPT

## 2020-10-20 PROCEDURE — 99223 1ST HOSP IP/OBS HIGH 75: CPT | Mod: ,,, | Performed by: NURSE PRACTITIONER

## 2020-10-20 RX ORDER — FUROSEMIDE 10 MG/ML
40 INJECTION INTRAMUSCULAR; INTRAVENOUS ONCE
Status: COMPLETED | OUTPATIENT
Start: 2020-10-20 | End: 2020-10-20

## 2020-10-20 RX ORDER — AMIODARONE HYDROCHLORIDE 200 MG/1
200 TABLET ORAL DAILY
Status: DISCONTINUED | OUTPATIENT
Start: 2020-10-26 | End: 2020-10-31 | Stop reason: HOSPADM

## 2020-10-20 RX ORDER — AMIODARONE HYDROCHLORIDE 200 MG/1
400 TABLET ORAL 2 TIMES DAILY
Status: COMPLETED | OUTPATIENT
Start: 2020-10-20 | End: 2020-10-25

## 2020-10-20 RX ORDER — OXYCODONE AND ACETAMINOPHEN 5; 325 MG/1; MG/1
1 TABLET ORAL EVERY 4 HOURS PRN
Status: DISCONTINUED | OUTPATIENT
Start: 2020-10-20 | End: 2020-10-31 | Stop reason: HOSPADM

## 2020-10-20 RX ORDER — QUETIAPINE FUMARATE 25 MG/1
25 TABLET, FILM COATED ORAL NIGHTLY PRN
Status: DISCONTINUED | OUTPATIENT
Start: 2020-10-20 | End: 2020-10-31 | Stop reason: HOSPADM

## 2020-10-20 RX ORDER — POTASSIUM CHLORIDE 750 MG/1
30 TABLET, EXTENDED RELEASE ORAL ONCE
Status: COMPLETED | OUTPATIENT
Start: 2020-10-20 | End: 2020-10-20

## 2020-10-20 RX ORDER — POTASSIUM CHLORIDE 7.45 MG/ML
10 INJECTION INTRAVENOUS ONCE
Status: COMPLETED | OUTPATIENT
Start: 2020-10-20 | End: 2020-10-20

## 2020-10-20 RX ORDER — FUROSEMIDE 10 MG/ML
80 INJECTION INTRAMUSCULAR; INTRAVENOUS EVERY 12 HOURS
Status: DISCONTINUED | OUTPATIENT
Start: 2020-10-20 | End: 2020-10-21

## 2020-10-20 RX ORDER — POTASSIUM CHLORIDE 7.45 MG/ML
10 INJECTION INTRAVENOUS
Status: COMPLETED | OUTPATIENT
Start: 2020-10-20 | End: 2020-10-20

## 2020-10-20 RX ADMIN — METOPROLOL TARTRATE 50 MG: 50 TABLET, FILM COATED ORAL at 09:10

## 2020-10-20 RX ADMIN — LOSARTAN POTASSIUM 100 MG: 25 TABLET, FILM COATED ORAL at 09:10

## 2020-10-20 RX ADMIN — AMIODARONE HYDROCHLORIDE 400 MG: 200 TABLET ORAL at 10:10

## 2020-10-20 RX ADMIN — LEVOTHYROXINE SODIUM 75 MCG: 75 TABLET ORAL at 09:10

## 2020-10-20 RX ADMIN — POTASSIUM CHLORIDE 30 MEQ: 750 TABLET, EXTENDED RELEASE ORAL at 05:10

## 2020-10-20 RX ADMIN — AMIODARONE HYDROCHLORIDE 400 MG: 200 TABLET ORAL at 09:10

## 2020-10-20 RX ADMIN — FUROSEMIDE 40 MG: 10 INJECTION, SOLUTION INTRAVENOUS at 09:10

## 2020-10-20 RX ADMIN — ACETAMINOPHEN 500 MG: 500 TABLET ORAL at 11:10

## 2020-10-20 RX ADMIN — ATORVASTATIN CALCIUM 40 MG: 40 TABLET, FILM COATED ORAL at 09:10

## 2020-10-20 RX ADMIN — FUROSEMIDE 40 MG: 10 INJECTION, SOLUTION INTRAVENOUS at 10:10

## 2020-10-20 RX ADMIN — FUROSEMIDE 80 MG: 10 INJECTION, SOLUTION INTRAVENOUS at 10:10

## 2020-10-20 RX ADMIN — OXYCODONE HYDROCHLORIDE AND ACETAMINOPHEN 1 TABLET: 5; 325 TABLET ORAL at 09:10

## 2020-10-20 RX ADMIN — OXYCODONE HYDROCHLORIDE AND ACETAMINOPHEN 1 TABLET: 5; 325 TABLET ORAL at 05:10

## 2020-10-20 RX ADMIN — POTASSIUM CHLORIDE 10 MEQ: 7.46 INJECTION, SOLUTION INTRAVENOUS at 03:10

## 2020-10-20 RX ADMIN — POTASSIUM CHLORIDE 10 MEQ: 7.46 INJECTION, SOLUTION INTRAVENOUS at 11:10

## 2020-10-20 RX ADMIN — POTASSIUM CHLORIDE 10 MEQ: 7.46 INJECTION, SOLUTION INTRAVENOUS at 05:10

## 2020-10-20 RX ADMIN — AMLODIPINE BESYLATE 10 MG: 5 TABLET ORAL at 09:10

## 2020-10-20 RX ADMIN — APIXABAN 2.5 MG: 2.5 TABLET, FILM COATED ORAL at 09:10

## 2020-10-20 RX ADMIN — POTASSIUM CHLORIDE 10 MEQ: 7.46 INJECTION, SOLUTION INTRAVENOUS at 10:10

## 2020-10-20 RX ADMIN — POTASSIUM CHLORIDE 10 MEQ: 7.46 INJECTION, SOLUTION INTRAVENOUS at 02:10

## 2020-10-20 RX ADMIN — APIXABAN 2.5 MG: 2.5 TABLET, FILM COATED ORAL at 10:10

## 2020-10-20 RX ADMIN — POTASSIUM CHLORIDE 10 MEQ: 7.46 INJECTION, SOLUTION INTRAVENOUS at 12:10

## 2020-10-20 RX ADMIN — AMIODARONE HYDROCHLORIDE 0.5 MG/MIN: 1.8 INJECTION, SOLUTION INTRAVENOUS at 05:10

## 2020-10-20 NOTE — PLAN OF CARE
10/20/20 1135   Medicare Message   Important Message from Medicare regarding Discharge Appeal Rights Given to patient/caregiver;Explained to patient/caregiver;Signed/date by patient/caregiver

## 2020-10-20 NOTE — SUBJECTIVE & OBJECTIVE
Interval History: No new issues. Patient comfortable.       Review of Systems   Constitutional: Positive for activity change. Negative for chills, diaphoresis, fatigue and fever.   HENT: Negative for congestion and dental problem.    Respiratory: Negative for apnea, choking, chest tightness and shortness of breath.    Cardiovascular: Negative for chest pain.   Gastrointestinal: Negative for abdominal distention, abdominal pain, anal bleeding, blood in stool, diarrhea, nausea and vomiting.   Endocrine: Negative for cold intolerance.   Genitourinary: Negative for difficulty urinating.   Musculoskeletal: Negative for arthralgias.   Skin: Negative for color change.   Neurological: Negative for dizziness.   Psychiatric/Behavioral: Negative for agitation and behavioral problems.     Objective:     Vital Signs (Most Recent):  Temp: 99.4 °F (37.4 °C) (10/20/20 0900)  Pulse: 68 (10/20/20 0945)  Resp: (!) 31 (10/20/20 0945)  BP: (!) 154/67 (10/20/20 0930)  SpO2: 97 % (10/20/20 0945) Vital Signs (24h Range):  Temp:  [97.7 °F (36.5 °C)-99.4 °F (37.4 °C)] 99.4 °F (37.4 °C)  Pulse:  [52-99] 68  Resp:  [12-52] 31  SpO2:  [84 %-100 %] 97 %  BP: (100-186)/() 154/67     Weight: 49.9 kg (110 lb 0.2 oz)  Body mass index is 24.66 kg/m².    Intake/Output Summary (Last 24 hours) at 10/20/2020 1035  Last data filed at 10/20/2020 1000  Gross per 24 hour   Intake 525.75 ml   Output 2200 ml   Net -1674.25 ml      Physical Exam  Vitals signs and nursing note reviewed.   Constitutional:       General: She is not in acute distress.     Appearance: Normal appearance. She is normal weight. She is not ill-appearing, toxic-appearing or diaphoretic.   HENT:      Head: Normocephalic and atraumatic.   Cardiovascular:      Rate and Rhythm: Normal rate and regular rhythm.   Pulmonary:      Effort: Pulmonary effort is normal. No respiratory distress.      Breath sounds: No stridor. No wheezing or rales.   Abdominal:      General: Bowel sounds are  normal. There is no distension.      Tenderness: There is no abdominal tenderness.      Hernia: No hernia is present.   Neurological:      Mental Status: She is alert and oriented to person, place, and time.   Psychiatric:         Mood and Affect: Mood normal.         Behavior: Behavior normal.         Significant Labs:   BMP:   Recent Labs   Lab 10/20/20  0419   *   *   K 2.9*   CL 93*   CO2 30*   BUN 6*   CREATININE 0.7   CALCIUM 8.5*   MG 1.8     CBC: No results for input(s): WBC, HGB, HCT, PLT in the last 48 hours.    Significant Imaging:

## 2020-10-20 NOTE — RESPIRATORY THERAPY
Results for LEONELA ESCOBAR (MRN [id number]) as of 10/20/2020 00:08   Ref. Range 10/19/2020 23:54   POC PH Latest Ref Range: 7.35 - 7.45  7.467 (H)   POC PCO2 Latest Ref Range: 35 - 45 mmHg 40.1   POC PO2 Latest Ref Range: 80 - 100 mmHg 77 (L)   POC BE Latest Ref Range: -2 to 2 mmol/L 5   POC HCO3 Latest Ref Range: 24 - 28 mmol/L 29.0 (H)   POC SATURATED O2 Latest Ref Range: 95 - 100 % 96   POC TCO2 Latest Ref Range: 23 - 27 mmol/L 30 (H)   FiO2 Unknown 100   Flow Unknown 40   Sample Unknown ARTERIAL   DelSys Unknown HFDD   Allens Test Unknown Pass   Site Unknown RR   Mode Unknown SPONT

## 2020-10-20 NOTE — ASSESSMENT & PLAN NOTE
Currently in AFib.  On amiodarone drip.  Continues to be in AFib.  Discussed various options with the patient.  Considering the fact that the acute decompensated heart failure likely precipitated by AFib, will cardiovert her.  She has been on Eliquis without any missed doses.    Risks, benefits and alternatives of the Cardioversion procedure were discussed with the patient including throat irritation, aspiration, anesthetic complications, esophageal irritation/perforation, skin irritation, arrhythmia, etc.  Patient understands and agrees to proceed.  Consent was placed on the chart.      10/20:  In normal sinus rhythm now.  Underwent cardioversion yesterday.  Will switch to oral amiodarone.

## 2020-10-20 NOTE — CARE UPDATE
[facility]  ICU Shift Summary  Date: 10/20/2020      COVID Test: (--)  Isolation: No active isolations    Prehospitalization: Home  Admit Date / LOS : 10/18/2020/ 2 days    Diagnosis: Acute on chronic diastolic heart failure    Consults:        Active: Cardio       Needed: N/A     Code Status: Full Code   Advanced Directive: <no information>    LDA: BIPAP, PIV and Purewick, Vapotherm       Central Lines/Site/Justification:Patient Does Not Have Central Line       Urinary Cath/Order/Justification:Patient Does Not Have Urinary Catheter    Vasopressors/Infusions:    amiodarone in dextrose 5% 0.5 mg/min (10/20/20 0600)          GOALS: Volume/ Hemodynamic: N/A                     RASS: 0  alert and calm    Pain Management: PO       Pain Controlled: yes     Rhythm: NSR    Respiratory Device: Vapotherm and Bipap    Oxygen Concentration (%):  [] 100             Most Recent SBT/ SAT: N/A       MOVE Screen: PT Consult    VTE Prophylaxis: Pharm and Mechanical  Mobility: Bedrest, S: Self and A: Assist  Stress Ulcer Prophylaxis: Yes    Dietary: PO  Tolerance: yes  /  Advancement: yes    I & O (24h):    Intake/Output Summary (Last 24 hours) at 10/20/2020 0750  Last data filed at 10/20/2020 0600  Gross per 24 hour   Intake 525.75 ml   Output 2150 ml   Net -1624.25 ml        Restraints: No    Significant Dates:  Post Op Date: cardioversion 10/19  Rescue Date: n/a  Imaging/ Diagnostics: cxr 10/19    Noteworthy Labs:  K 2.9 (replaced)    CBC/Anemia Labs: Coags:    Recent Labs   Lab 10/18/20  0855   WBC 9.63   HGB 11.5*   HCT 33.3*      MCV 92   RDW 13.5    Recent Labs   Lab 10/18/20  0855   INR 1.1   APTT 28.7        Chemistries:   Recent Labs   Lab 10/18/20  0935 10/18/20  1454 10/19/20  0410 10/20/20  0419   *  --  131* 134*   K 4.0  --  3.5 2.9*   CL 97  --  95 93*   CO2 22*  --  23 30*   BUN 20  --  11 6*   CREATININE 0.9  --  0.7 0.7   CALCIUM 8.7  --  8.2* 8.5*   PROT 6.3  --   --   --     BILITOT 1.2*  --   --   --    ALKPHOS 79  --   --   --    ALT 55*  --   --   --    AST 21  --   --   --    MG  --  1.8  --  1.8        Cardiac Enzymes: Ejection Fractions:    Recent Labs     10/18/20  1501 10/18/20  2056   TROPONINI <0.006 <0.006    Nuc Rest EF   Date Value Ref Range Status   07/10/2019 81.0  Final        POCT Glucose: HbA1c:    No results for input(s): POCTGLUCOSE in the last 168 hours. Hemoglobin A1C   Date Value Ref Range Status   10/18/2020 5.6 4.0 - 5.6 % Final     Comment:     ADA Screening Guidelines:  5.7-6.4%  Consistent with prediabetes  >or=6.5%  Consistent with diabetes  High levels of fetal hemoglobin interfere with the HbA1C  assay. Heterozygous hemoglobin variants (HbS, HgC, etc)do  not significantly interfere with this assay.   However, presence of multiple variants may affect accuracy.             ICU LOS 1d 17h  Level of Care: Critical Care    Shift Summary/Plan for the shift: see plan of care

## 2020-10-20 NOTE — SUBJECTIVE & OBJECTIVE
Interval History:  Patient in normal sinus rhythm now.  Had recurrent AFib a few hours post cardioversion, but converted back into normal sinus rhythm.  On amiodarone drip.  Feeling much better.  Denies any chest pains at rest on exertion, orthopnea, PND.  Review of Systems   Constitution: Positive for malaise/fatigue.   HENT: Negative.    Eyes: Negative.    Cardiovascular: Negative.    Respiratory: Negative.    Endocrine: Negative.    Hematologic/Lymphatic: Negative.    Skin: Negative.    Musculoskeletal: Negative.    Gastrointestinal: Negative.    Genitourinary: Negative.    Neurological: Negative.    Psychiatric/Behavioral: Negative.    Allergic/Immunologic: Negative.      Objective:     Vital Signs (Most Recent):  Temp: 99.4 °F (37.4 °C) (10/20/20 0900)  Pulse: 68 (10/20/20 0945)  Resp: (!) 31 (10/20/20 0945)  BP: (!) 154/67 (10/20/20 0930)  SpO2: 97 % (10/20/20 0945) Vital Signs (24h Range):  Temp:  [97.7 °F (36.5 °C)-99.4 °F (37.4 °C)] 99.4 °F (37.4 °C)  Pulse:  [52-99] 68  Resp:  [12-52] 31  SpO2:  [84 %-100 %] 97 %  BP: (100-186)/() 154/67     Weight: 49.9 kg (110 lb 0.2 oz)  Body mass index is 24.66 kg/m².     SpO2: 97 %  O2 Device (Oxygen Therapy): Vapotherm      Intake/Output Summary (Last 24 hours) at 10/20/2020 1022  Last data filed at 10/20/2020 1000  Gross per 24 hour   Intake 525.75 ml   Output 2200 ml   Net -1674.25 ml       Lines/Drains/Airways     Drain            Female External Urinary Catheter 10/18/20 1901 1 day          Peripheral Intravenous Line                 Peripheral IV - Single Lumen 10/18/20 0833 20 G Left Hand 2 days         Peripheral IV - Single Lumen 10/18/20 1533 20 G Anterior;Right Forearm 1 day                Physical Exam   Constitutional: She is oriented to person, place, and time. She appears well-developed and well-nourished.   HENT:   Head: Normocephalic.   Eyes: Pupils are equal, round, and reactive to light.   Neck: Normal range of motion. Neck supple.    Cardiovascular: Normal rate and regular rhythm.   Pulmonary/Chest: Effort normal and breath sounds normal.   Abdominal: Soft. Normal appearance and bowel sounds are normal. There is no abdominal tenderness.   Musculoskeletal: Normal range of motion.   Neurological: She is alert and oriented to person, place, and time.   Skin: Skin is warm.   Psychiatric: She has a normal mood and affect.       Significant Labs:   BMP:   Recent Labs   Lab 10/18/20  1454 10/19/20  0410 10/20/20  0419   GLU  --  118* 113*   NA  --  131* 134*   K  --  3.5 2.9*   CL  --  95 93*   CO2  --  23 30*   BUN  --  11 6*   CREATININE  --  0.7 0.7   CALCIUM  --  8.2* 8.5*   MG 1.8  --  1.8   , CMP   Recent Labs   Lab 10/19/20  0410 10/20/20  0419   * 134*   K 3.5 2.9*   CL 95 93*   CO2 23 30*   * 113*   BUN 11 6*   CREATININE 0.7 0.7   CALCIUM 8.2* 8.5*   ANIONGAP 13 11   ESTGFRAFRICA >60 >60   EGFRNONAA >60 >60   , CBC No results for input(s): WBC, HGB, HCT, PLT in the last 48 hours., INR No results for input(s): INR, PROTIME in the last 48 hours., Lipid Panel No results for input(s): CHOL, HDL, LDLCALC, TRIG, CHOLHDL in the last 48 hours., Troponin   Recent Labs   Lab 10/18/20  1501 10/18/20  2056   TROPONINI <0.006 <0.006    and All pertinent lab results from the last 24 hours have been reviewed.    Significant Imaging: Echocardiogram:   Transthoracic echo (TTE) complete (Cupid Only):   Results for orders placed or performed during the hospital encounter of 10/18/20   Echo Color Flow Doppler? Yes   Result Value Ref Range    Ascending aorta 2.34 cm    STJ 2.24 cm    AV mean gradient 9 mmHg    Ao peak david 2.09 m/s    Ao VTI 39.82 cm    IVRT 87.66 msec    IVS 0.98 0.6 - 1.1 cm    LA size 4.41 cm    Left Atrium Major Axis 5.42 cm    Left Atrium Minor Axis 5.04 cm    LVIDd 3.75 3.5 - 6.0 cm    LVIDs 1.64 (A) 2.1 - 4.0 cm    LVOT diameter 1.98 cm    LVOT peak VTI 23.89 cm    Posterior Wall 0.97 0.6 - 1.1 cm    RA Major Axis 5.88 cm     RA Width 3.89 cm    RVDD 3.88 cm    Sinus 2.42 cm    TAPSE 1.82 cm    TR Max Singh 3.30 m/s    LA WIDTH 4.09 cm    PV PEAK VELOCITY 1.00 cm/s    LV Diastolic Volume 59.97 mL    LV Systolic Volume 7.70 mL    RV S' 13.62 cm/s    LVOT peak singh 1.27 m/s    FS 56 %    LA volume 80.08 cm3    LV mass 110.81 g    Left Ventricle Relative Wall Thickness 0.52 cm    AV valve area 1.85 cm2    AV Velocity Ratio 0.61     AV index (prosthetic) 0.60     LVOT area 3.1 cm2    LVOT stroke volume 73.52 cm3    AV peak gradient 17 mmHg    LV Systolic Volume Index 5.6 mL/m2    LV Diastolic Volume Index 43.54 mL/m2    LA Volume Index 58.1 mL/m2    LV Mass Index 80 g/m2    Triscuspid Valve Regurgitation Peak Gradient 44 mmHg    BSA 1.4 m2    Right Atrial Pressure (from IVC) 15 mmHg    TV rest pulmonary artery pressure 59 mmHg    Narrative    · With normal systolic function. The estimated ejection fraction is 65%.  · Indeterminate diastolic function.  · Moderate tricuspid regurgitation.  · Severe left atrial enlargement.  · Normal right ventricular systolic function.  · Severe right atrial enlargement.  · Mild mitral regurgitation.  · Elevated central venous pressure (15 mmHg).  · The estimated PA systolic pressure is 59 mmHg.  · There is pulmonary hypertension.

## 2020-10-20 NOTE — CONSULTS
[facility]  Palliative Medicine  Consult Note    Patient Name: Jeannie Hutchins  MRN: [id number]  Admission Date: 10/18/2020  Hospital Length of Stay: 2 days  Code Status: Full Code   Attending Provider: Marc Wilson MD  Consulting Provider: Merary Lopez NP  Primary Care Physician: Paige Mcleod MD  Principal Problem:Acute on chronic diastolic heart failure    Patient information was obtained from patient, relative(s), past medical records and ER records.      Inpatient consult to Palliative Care  Consult performed by: Merary Lopez NP  Consult ordered by: Marc Wilson MD  Reason for consult: advance care planning        Assessment/Plan:     Plan:   -Continue current treatment  -Pt designated son, Dane Mera, as HCPOA ([phone]).  No documentation available at this time.   -Goals of care to continue tomorrow per family request.   -Pending discharge disposition, patient would benefit from in-home palliative care to prevent re-admission    Thank you for your consult. I will follow-up with patient. Please contact us if you have any additional questions.    Subjective:     HPI:      HPI:  86 y/o female presents to the ER via EMS transport with shortness of breath.  Patient states she is chronically short of breath, but symptoms worsened this morning.  EMS reported patient's SpO2 was 88% on room air, improved to 96% after given 1 SL nitro tab and nitro paste en route.  Patient also reports non productive cough and 10 lb weight gain in the last 2 weeks.  Also complains of edema on bilateral lower extremities that started 3 days ago.  Patient also reports post nasal drip.  States compliance with medications. Hx of PAF and noted to be in rapid AFib on presentation.  Placed on Amiodarone gtt and admitted to ICU.  Patient was also given Lasix in ER with improvement of symptoms.  No other complaints.     Overview/Hospital Course:  86 y/o female presents with acute on chronic  "diastolic heart failure probably precipitated by AFib with RVR.  Started on IV Lasix.  Admitted to ICU on Amiodarone gtt.  Cardiology consulted.  Patient had cardioversion on 10/19 to NSR. Continued on high dose lasix in ICU.        Hospital Course:  No notes on file    Palliative medicine consult received for advance care planning. Discussed with Dr. Wilson during ICU rounds.  Patient resting comfortably, awakens easily.  Palliative services were introduced and Lyubov was receptive to visit but asked that patient be allowed to rest.     Per Lyubov, patient is  and has 5 children (4 sons and one daughter).  Pt lives with two sons (who have intellectual disabilities per Lyubov).  One son is able to assist with general home maintenance and another requires assistance.  Family has been aware of CHF diagnosis for "years" and pt has been independent in all ADLs.      Per Lyubov, patient has designated son, Dane Mera, as HCPOA - no documentation is available.  Plan to discuss with patient later, when awake.  No living will documented.     Lyubov states that her stepfather (patient's ) passed away last year. He had several CVAs and was chronically ill.  Prior to his death, he clearly stated he wanted DNR status and family experienced much emotional difficulty but honored his wishes. Lyubov states that patient has never discussed what she would want for herself in the event of cardiopulmonary arrest.  Plan to discuss later when patient is fully awake.      Patient has previously been in SNF to gain strength (approx 2 years ago). Lyubov states she is aware that patient wants to go home as soon as possible but also wants to gain strength.  Depending on discharge disposition, due to chronic CHF, patient would benefit from in-home palliative care services to align with goal of preventing hospitalizations.     Past Medical History:   Diagnosis Date    Anticoagulant long-term use     Eliquis    Arthritis     Atrial " fibrillation     Blood transfusion     Carotid stenosis     CHF (congestive heart failure)     Edema     Hearing loss     Circle (hard of hearing)     Hypercholesterolemia     Hypertension     Psychiatric problem     Thyroid disease        Past Surgical History:   Procedure Laterality Date    CARDIOVERSION  10/19/2020    Procedure: Cardioversion;  Surgeon: Brandon Elias MD;  Location: [facility] CATH LAB;  Service: Cardiology;;    CARPAL TUNNEL RELEASE  2012    right hand    ESOPHAGOGASTRODUODENOSCOPY Left 8/29/2018    Procedure: EGD (ESOPHAGOGASTRODUODENOSCOPY);  Surgeon: Oniel Dorsey MD;  Location: [facility] ENDO;  Service: Endoscopy;  Laterality: Left;    HYSTERECTOMY      left carotid endartectomy  5/2006    TONSILLECTOMY      TREATMENT OF CARDIAC ARRHYTHMIA N/A 10/19/2020    Procedure: Transesophageal echo (BLADIMIR) intra-procedure log documentation;  Surgeon: Brandon Elias MD;  Location: [facility] CATH LAB;  Service: Cardiology;  Laterality: N/A;       Review of patient's allergies indicates:   Allergen Reactions    Hydrochlorothiazide Other (See Comments)     hyponatremia    Strawberry Swelling     Tongue swells up and a generalized rash.    Lisinopril Other (See Comments)     Cough       Medications:  Continuous Infusions:  Scheduled Meds:   amiodarone  400 mg Oral BID    Followed by    [START ON 10/26/2020] amiodarone  200 mg Oral Daily    amLODIPine  10 mg Oral Daily    apixaban  2.5 mg Oral BID    atorvastatin  40 mg Oral QHS    furosemide (LASIX) IV  80 mg Intravenous Q12H    levothyroxine  75 mcg Oral Daily    losartan  100 mg Oral Daily    metoprolol tartrate  50 mg Oral BID     PRN Meds:acetaminophen, ipratropium, ondansetron, QUEtiapine, sodium chloride 0.9%    Family History     Problem Relation (Age of Onset)    Breast cancer Daughter (50)    Cancer Brother (65), Sister (81), Sister, Sister    Heart disease Father    Ovarian cancer Sister (81)    Schizophrenia Son        Tobacco Use     Smoking status: Never Smoker    Smokeless tobacco: Never Used   Substance and Sexual Activity    Alcohol use: No    Drug use: No    Sexual activity: Never     Partners: Male       Review of Systems   Constitutional: Positive for activity change, fatigue and unexpected weight change.   HENT: Negative for congestion.    Eyes: Negative for discharge.   Respiratory: Negative for chest tightness.    Cardiovascular: Negative for chest pain.   Gastrointestinal: Negative for abdominal distention and abdominal pain.   Endocrine: Negative for cold intolerance and heat intolerance.   Skin: Negative for rash.   Psychiatric/Behavioral: Negative for agitation.     Objective:     Vital Signs (Most Recent):  Temp: 99.2 °F (37.3 °C) (10/20/20 1245)  Pulse: 68 (10/20/20 1530)  Resp: 16 (10/20/20 1530)  BP: 123/60 (10/20/20 1530)  SpO2: (!) 93 % (10/20/20 1530) Vital Signs (24h Range):  Temp:  [97.9 °F (36.6 °C)-99.4 °F (37.4 °C)] 99.2 °F (37.3 °C)  Pulse:  [52-99] 68  Resp:  [12-67] 16  SpO2:  [77 %-100 %] 93 %  BP: (110-186)/() 123/60     Weight: 49.9 kg (110 lb 0.2 oz)  Body mass index is 24.66 kg/m².    Physical Exam  Vitals signs and nursing note reviewed.   Constitutional:       General: She is not in acute distress.     Appearance: She is not ill-appearing.   HENT:      Head: Normocephalic and atraumatic.      Nose: Nose normal.   Eyes:      General:         Right eye: No discharge.         Left eye: No discharge.   Neck:      Musculoskeletal: Normal range of motion.   Cardiovascular:      Rate and Rhythm: Normal rate.   Pulmonary:      Effort: Pulmonary effort is normal.   Abdominal:      General: Abdomen is flat.   Skin:     General: Skin is warm and dry.   Neurological:      General: No focal deficit present.         Review of Symptoms    Symptom Assessment (ESAS 0-10 Scale)  Pain:  0  Dyspnea:  0  Anxiety:  0  Nausea:  0  Depression:  0  Anorexia:  0  Fatigue:  4  Insomnia:  0  Restlessness:  0  Agitation:  0      CAM / Delirium:  Negative  Constipation:  Negative  Diarrhea:  Negative          Living Arrangements:  Lives with family      Advance Care Planning   Advance Directives:   Living Will: No    LaPOST: No    Medical Power of : Yes    Agent's Name:  Dane Mera    Decision Making:  Family answered questions         Significant Labs: All pertinent labs within the past 24 hours have been reviewed.  CBC:   Recent Labs   Lab 10/18/20  0855   WBC 9.63   HGB 11.5*   HCT 33.3*   MCV 92        BMP:  Recent Labs   Lab 10/20/20  0419   *   *   K 2.9*   CL 93*   CO2 30*   BUN 6*   CREATININE 0.7   CALCIUM 8.5*   MG 1.8     LFT:  Lab Results   Component Value Date    AST 21 10/18/2020    ALKPHOS 79 10/18/2020    BILITOT 1.2 (H) 10/18/2020     Albumin:   Albumin   Date Value Ref Range Status   10/18/2020 4.0 3.5 - 5.2 g/dL Final     Protein:   Total Protein   Date Value Ref Range Status   10/18/2020 6.3 6.0 - 8.4 g/dL Final     Lactic acid:   No results found for: LACTATE    Significant Imaging: I have reviewed all pertinent imaging results/findings within the past 24 hours.    18 min spent in [facility]    Merary Lopez NP  Palliative Medicine  [facility]

## 2020-10-20 NOTE — PROGRESS NOTES
[facility] Hospital Medicine  Progress Note    Patient Name: Jeannie Hutchins  MRN: [id number]  Patient Class: IP- Inpatient   Admission Date: 10/18/2020  Length of Stay: 2 days  Attending Physician: Marc Wilson MD  Primary Care Provider: Paige Mcleod MD        Subjective:     Principal Problem:Acute on chronic diastolic heart failure        HPI:  86 y/o female presents to the ER via EMS transport with shortness of breath.  Patient states she is chronically short of breath, but symptoms worsened this morning.  EMS reported patient's SpO2 was 88% on room air, improved to 96% after given 1 SL nitro tab and nitro paste en route.  Patient also reports non productive cough and 10 lb weight gain in the last 2 weeks.  Also complains of edema on bilateral lower extremities that started 3 days ago.  Patient also reports post nasal drip.  States compliance with medications. Hx of PAF and noted to be in rapid AFib on presentation.  Placed on Amiodarone gtt and admitted to ICU.  Patient was also given Lasix in ER with improvement of symptoms.  No other complaints.    Overview/Hospital Course:  86 y/o female presents with acute on chronic diastolic heart failure probably precipitated by AFib with RVR.  Started on IV Lasix.  Admitted to ICU on Amiodarone gtt.  Cardiology consulted.  Patient had cardioversion on 10/19 to NSR. Continued on high dose lasix in ICU.     Interval History: No new issues. Patient comfortable.       Review of Systems   Constitutional: Positive for activity change. Negative for chills, diaphoresis, fatigue and fever.   HENT: Negative for congestion and dental problem.    Respiratory: Negative for apnea, choking, chest tightness and shortness of breath.    Cardiovascular: Negative for chest pain.   Gastrointestinal: Negative for abdominal distention, abdominal pain, anal bleeding, blood in stool, diarrhea, nausea and vomiting.   Endocrine: Negative for cold intolerance.    Genitourinary: Negative for difficulty urinating.   Musculoskeletal: Negative for arthralgias.   Skin: Negative for color change.   Neurological: Negative for dizziness.   Psychiatric/Behavioral: Negative for agitation and behavioral problems.     Objective:     Vital Signs (Most Recent):  Temp: 99.4 °F (37.4 °C) (10/20/20 0900)  Pulse: 68 (10/20/20 0945)  Resp: (!) 31 (10/20/20 0945)  BP: (!) 154/67 (10/20/20 0930)  SpO2: 97 % (10/20/20 0945) Vital Signs (24h Range):  Temp:  [97.7 °F (36.5 °C)-99.4 °F (37.4 °C)] 99.4 °F (37.4 °C)  Pulse:  [52-99] 68  Resp:  [12-52] 31  SpO2:  [84 %-100 %] 97 %  BP: (100-186)/() 154/67     Weight: 49.9 kg (110 lb 0.2 oz)  Body mass index is 24.66 kg/m².    Intake/Output Summary (Last 24 hours) at 10/20/2020 1035  Last data filed at 10/20/2020 1000  Gross per 24 hour   Intake 525.75 ml   Output 2200 ml   Net -1674.25 ml      Physical Exam  Vitals signs and nursing note reviewed.   Constitutional:       General: She is not in acute distress.     Appearance: Normal appearance. She is normal weight. She is not ill-appearing, toxic-appearing or diaphoretic.   HENT:      Head: Normocephalic and atraumatic.   Cardiovascular:      Rate and Rhythm: Normal rate and regular rhythm.   Pulmonary:      Effort: Pulmonary effort is normal. No respiratory distress.      Breath sounds: No stridor. No wheezing or rales.   Abdominal:      General: Bowel sounds are normal. There is no distension.      Tenderness: There is no abdominal tenderness.      Hernia: No hernia is present.   Neurological:      Mental Status: She is alert and oriented to person, place, and time.   Psychiatric:         Mood and Affect: Mood normal.         Behavior: Behavior normal.         Significant Labs:   BMP:   Recent Labs   Lab 10/20/20  0414   *   *   K 2.9*   CL 93*   CO2 30*   BUN 6*   CREATININE 0.7   CALCIUM 8.5*   MG 1.8     CBC: No results for input(s): WBC, HGB, HCT, PLT in the last 48  hours.    Significant Imaging:      Assessment/Plan:      * Acute on chronic diastolic heart failure  Patient presents with Acute Hypoxemic Respiratory Failure secondary to CHF exacerbation.  Exacerbation probably triggered by poor HR control.  Hx of PAF.  Presents in Afib with RVR.  Cardiology consulted and patient admitted to ICU on Amiodarone drip.  Some improvement of symptoms with diuresis.  Continue IV Lasix.  Rate control.  Fluid restriction.  Probably cardioversion today.    Increasing lasix today to 80 bid. BNP     Mixed hyperlipidemia  Continue Statin.      Paroxysmal atrial fibrillation  Afib with RVR as above.  S/p cardioversion on 10/19. Cards following  Now on Amio       Acute respiratory failure with hypoxia and hypercarbia  Secondary to CHF as above.  Improving. Wean 02. Continue lasix       Generalized anxiety disorder        Essential hypertension  Continue home medications.      Hyponatremia with decreased serum osmolality        Anemia of chronic disease- stable.     Hypokalemia- will replace.  K ok    Debility- PT/OT today. Lives with family     VTE Risk Mitigation (From admission, onward)         Ordered     apixaban tablet 2.5 mg  2 times daily      10/18/20 1342     IP VTE HIGH RISK PATIENT  Once      10/18/20 1340     Place sequential compression device  Until discontinued      10/18/20 1340                Discharge Planning   JULIET:      Code Status: Full Code   Is the patient medically ready for discharge?:     Reason for patient still in hospital (select all that apply): Patient unstable  Discharge Plan A: Home with family, Home Health            Critical care time spent on the evaluation and treatment of severe organ dysfunction, review of pertinent labs and imaging studies, discussions with consulting providers and discussions with patient/family: 40 minutes.    Increase lasix. PT/OT. Possibly to floor later today. Resume diet.         Marc Godfrey MD  Department of Hospital  Medicine   [facility]

## 2020-10-20 NOTE — PLAN OF CARE
Patient remains in ICU overnight. NSR with PVCs on cardiac monitor. AAOx4 but having visual hallucinations when eyes are closed. MD notified of this and pt requesting not to wear Bipap. MD ordered vapo therm and SL Zyprexa. Maintained on Vapotherm 40L and 100% FiO2 throughout shift. No SOB noted. Pt with purewick in place. 1.1L UO. No falls, injury, or skin breakdown this shift. Plan of care reviewed.

## 2020-10-20 NOTE — CONSULTS
TN to patient's room this am to discuss Advanced Directives.  Patient had company and stated that she was not interested at this time.

## 2020-10-20 NOTE — PROGRESS NOTES
[facility]  Cardiology  Progress Note    Patient Name: Jeannie Hutchins  MRN: [id number]  Admission Date: 10/18/2020  Hospital Length of Stay: 2 days  Code Status: Full Code   Attending Physician: Marc Wilson MD   Primary Care Physician: Paige Mcleod MD  Expected Discharge Date:   Principal Problem:Acute on chronic diastolic heart failure    Subjective:       Interval History:  Patient in normal sinus rhythm now.  Had recurrent AFib a few hours post cardioversion, but converted back into normal sinus rhythm.  On amiodarone drip.  Feeling much better.  Denies any chest pains at rest on exertion, orthopnea, PND.  Review of Systems   Constitution: Positive for malaise/fatigue.   HENT: Negative.    Eyes: Negative.    Cardiovascular: Negative.    Respiratory: Negative.    Endocrine: Negative.    Hematologic/Lymphatic: Negative.    Skin: Negative.    Musculoskeletal: Negative.    Gastrointestinal: Negative.    Genitourinary: Negative.    Neurological: Negative.    Psychiatric/Behavioral: Negative.    Allergic/Immunologic: Negative.      Objective:     Vital Signs (Most Recent):  Temp: 99.4 °F (37.4 °C) (10/20/20 0900)  Pulse: 68 (10/20/20 0945)  Resp: (!) 31 (10/20/20 0945)  BP: (!) 154/67 (10/20/20 0930)  SpO2: 97 % (10/20/20 0945) Vital Signs (24h Range):  Temp:  [97.7 °F (36.5 °C)-99.4 °F (37.4 °C)] 99.4 °F (37.4 °C)  Pulse:  [52-99] 68  Resp:  [12-52] 31  SpO2:  [84 %-100 %] 97 %  BP: (100-186)/() 154/67     Weight: 49.9 kg (110 lb 0.2 oz)  Body mass index is 24.66 kg/m².     SpO2: 97 %  O2 Device (Oxygen Therapy): Vapotherm      Intake/Output Summary (Last 24 hours) at 10/20/2020 1022  Last data filed at 10/20/2020 1000  Gross per 24 hour   Intake 525.75 ml   Output 2200 ml   Net -1674.25 ml       Lines/Drains/Airways     Drain            Female External Urinary Catheter 10/18/20 1901 1 day          Peripheral Intravenous Line                 Peripheral IV - Single Lumen 10/18/20  0833 20 G Left Hand 2 days         Peripheral IV - Single Lumen 10/18/20 1533 20 G Anterior;Right Forearm 1 day                Physical Exam   Constitutional: She is oriented to person, place, and time. She appears well-developed and well-nourished.   HENT:   Head: Normocephalic.   Eyes: Pupils are equal, round, and reactive to light.   Neck: Normal range of motion. Neck supple.   Cardiovascular: Normal rate and regular rhythm.   Pulmonary/Chest: Effort normal and breath sounds normal.   Abdominal: Soft. Normal appearance and bowel sounds are normal. There is no abdominal tenderness.   Musculoskeletal: Normal range of motion.   Neurological: She is alert and oriented to person, place, and time.   Skin: Skin is warm.   Psychiatric: She has a normal mood and affect.       Significant Labs:   BMP:   Recent Labs   Lab 10/18/20  1454 10/19/20  0410 10/20/20  0419   GLU  --  118* 113*   NA  --  131* 134*   K  --  3.5 2.9*   CL  --  95 93*   CO2  --  23 30*   BUN  --  11 6*   CREATININE  --  0.7 0.7   CALCIUM  --  8.2* 8.5*   MG 1.8  --  1.8   , CMP   Recent Labs   Lab 10/19/20  0410 10/20/20  0419   * 134*   K 3.5 2.9*   CL 95 93*   CO2 23 30*   * 113*   BUN 11 6*   CREATININE 0.7 0.7   CALCIUM 8.2* 8.5*   ANIONGAP 13 11   ESTGFRAFRICA >60 >60   EGFRNONAA >60 >60   , CBC No results for input(s): WBC, HGB, HCT, PLT in the last 48 hours., INR No results for input(s): INR, PROTIME in the last 48 hours., Lipid Panel No results for input(s): CHOL, HDL, LDLCALC, TRIG, CHOLHDL in the last 48 hours., Troponin   Recent Labs   Lab 10/18/20  1501 10/18/20  2056   TROPONINI <0.006 <0.006    and All pertinent lab results from the last 24 hours have been reviewed.    Significant Imaging: Echocardiogram:   Transthoracic echo (TTE) complete (Cupid Only):   Results for orders placed or performed during the hospital encounter of 10/18/20   Echo Color Flow Doppler? Yes   Result Value Ref Range    Ascending aorta 2.34 cm    STJ  2.24 cm    AV mean gradient 9 mmHg    Ao peak singh 2.09 m/s    Ao VTI 39.82 cm    IVRT 87.66 msec    IVS 0.98 0.6 - 1.1 cm    LA size 4.41 cm    Left Atrium Major Axis 5.42 cm    Left Atrium Minor Axis 5.04 cm    LVIDd 3.75 3.5 - 6.0 cm    LVIDs 1.64 (A) 2.1 - 4.0 cm    LVOT diameter 1.98 cm    LVOT peak VTI 23.89 cm    Posterior Wall 0.97 0.6 - 1.1 cm    RA Major Axis 5.88 cm    RA Width 3.89 cm    RVDD 3.88 cm    Sinus 2.42 cm    TAPSE 1.82 cm    TR Max Singh 3.30 m/s    LA WIDTH 4.09 cm    PV PEAK VELOCITY 1.00 cm/s    LV Diastolic Volume 59.97 mL    LV Systolic Volume 7.70 mL    RV S' 13.62 cm/s    LVOT peak singh 1.27 m/s    FS 56 %    LA volume 80.08 cm3    LV mass 110.81 g    Left Ventricle Relative Wall Thickness 0.52 cm    AV valve area 1.85 cm2    AV Velocity Ratio 0.61     AV index (prosthetic) 0.60     LVOT area 3.1 cm2    LVOT stroke volume 73.52 cm3    AV peak gradient 17 mmHg    LV Systolic Volume Index 5.6 mL/m2    LV Diastolic Volume Index 43.54 mL/m2    LA Volume Index 58.1 mL/m2    LV Mass Index 80 g/m2    Triscuspid Valve Regurgitation Peak Gradient 44 mmHg    BSA 1.4 m2    Right Atrial Pressure (from IVC) 15 mmHg    TV rest pulmonary artery pressure 59 mmHg    Narrative    · With normal systolic function. The estimated ejection fraction is 65%.  · Indeterminate diastolic function.  · Moderate tricuspid regurgitation.  · Severe left atrial enlargement.  · Normal right ventricular systolic function.  · Severe right atrial enlargement.  · Mild mitral regurgitation.  · Elevated central venous pressure (15 mmHg).  · The estimated PA systolic pressure is 59 mmHg.  · There is pulmonary hypertension.        Assessment and Plan:     Brief HPI:     * Acute on chronic diastolic heart failure  IV diuresis.  Likely precipitated by AFib.    10/20:  Now euvolemic.    Paroxysmal atrial fibrillation  Currently in AFib.  On amiodarone drip.  Continues to be in AFib.  Discussed various options with the patient.   Considering the fact that the acute decompensated heart failure likely precipitated by AFib, will cardiovert her.  She has been on Eliquis without any missed doses.    Risks, benefits and alternatives of the Cardioversion procedure were discussed with the patient including throat irritation, aspiration, anesthetic complications, esophageal irritation/perforation, skin irritation, arrhythmia, etc.  Patient understands and agrees to proceed.  Consent was placed on the chart.      10/20:  In normal sinus rhythm now.  Underwent cardioversion yesterday.  Will switch to oral amiodarone.    Acute respiratory failure with hypoxia and hypercarbia  Management per primary    Essential hypertension  Titrate antihypertensives as needed for appropriate blood pressure control        VTE Risk Mitigation (From admission, onward)         Ordered     apixaban tablet 2.5 mg  2 times daily      10/18/20 1342     IP VTE HIGH RISK PATIENT  Once      10/18/20 1340     Place sequential compression device  Until discontinued      10/18/20 1340                Brandon Elias MD  Cardiology  [facility]    Critical Care Time: 35 minutes     Critical care was time spent personally by me on the following activities: development of treatment plan with patient or surrogate and bedside caregivers, discussions with consultants, evaluation of patient's response to treatment, examination of patient, ordering and performing treatments and interventions, ordering and review of laboratory studies, ordering and review of radiographic studies, pulse oximetry, re-evaluation of patient's condition. This critical care time did not overlap with that of any other provider or involve time for any procedures.

## 2020-10-20 NOTE — ASSESSMENT & PLAN NOTE
Patient presents with Acute Hypoxemic Respiratory Failure secondary to CHF exacerbation.  Exacerbation probably triggered by poor HR control.  Hx of PAF.  Presents in Afib with RVR.  Cardiology consulted and patient admitted to ICU on Amiodarone drip.  Some improvement of symptoms with diuresis.  Continue IV Lasix.  Rate control.  Fluid restriction.  Probably cardioversion today.    Increasing lasix today to 80 bid. BNP

## 2020-10-20 NOTE — SUBJECTIVE & OBJECTIVE
"Palliative medicine consult received for advance care planning. Discussed with Dr. Wilson during ICU rounds.  Patient resting comfortably, awakens easily.  Palliative services were introduced and Lyubov was receptive to visit but asked that patient be allowed to rest.     Per Lyubov, patient is  and has 5 children (4 sons and one daughter).  Pt lives with two sons (who have intellectual disabilities per Lyubov).  One son is able to assist with general home maintenance and another requires assistance.  Family has been aware of CHF diagnosis for "years" and pt has been independent in all ADLs.      Per Lyubov, patient has designated son, Dane Mera, as HCPOA - no documentation is available.  Plan to discuss with patient later, when awake.  No living will documented.     Lyubov states that her stepfather (patient's ) passed away last year. He had several CVAs and was chronically ill.  Prior to his death, he clearly stated he wanted DNR status and family experienced much emotional difficulty but honored his wishes. Lyubov states that patient has never discussed what she would want for herself in the event of cardiopulmonary arrest.  Plan to discuss later when patient is fully awake.      Patient has previously been in SNF to gain strength (approx 2 years ago). Lyubov states she is aware that patient wants to go home as soon as possible but also wants to gain strength.  Depending on discharge disposition, due to chronic CHF, patient would benefit from in-home palliative care services to align with goal of preventing hospitalizations.     Past Medical History:   Diagnosis Date    Anticoagulant long-term use     Eliquis    Arthritis     Atrial fibrillation     Blood transfusion     Carotid stenosis     CHF (congestive heart failure)     Edema     Hearing loss     [place] (hard of hearing)     Hypercholesterolemia     Hypertension     Psychiatric problem     Thyroid disease        Past Surgical History:   Procedure " Laterality Date    CARDIOVERSION  10/19/2020    Procedure: Cardioversion;  Surgeon: Brandon Elias MD;  Location: [facility] CATH LAB;  Service: Cardiology;;    CARPAL TUNNEL RELEASE  2012    right hand    ESOPHAGOGASTRODUODENOSCOPY Left 8/29/2018    Procedure: EGD (ESOPHAGOGASTRODUODENOSCOPY);  Surgeon: Oniel Dorsey MD;  Location: [facility] ENDO;  Service: Endoscopy;  Laterality: Left;    HYSTERECTOMY      left carotid endartectomy  5/2006    TONSILLECTOMY      TREATMENT OF CARDIAC ARRHYTHMIA N/A 10/19/2020    Procedure: Transesophageal echo (BLADIMIR) intra-procedure log documentation;  Surgeon: Brandon Elias MD;  Location: [facility] CATH LAB;  Service: Cardiology;  Laterality: N/A;       Review of patient's allergies indicates:   Allergen Reactions    Hydrochlorothiazide Other (See Comments)     hyponatremia    Strawberry Swelling     Tongue swells up and a generalized rash.    Lisinopril Other (See Comments)     Cough       Medications:  Continuous Infusions:  Scheduled Meds:   amiodarone  400 mg Oral BID    Followed by    [START ON 10/26/2020] amiodarone  200 mg Oral Daily    amLODIPine  10 mg Oral Daily    apixaban  2.5 mg Oral BID    atorvastatin  40 mg Oral QHS    furosemide (LASIX) IV  80 mg Intravenous Q12H    levothyroxine  75 mcg Oral Daily    losartan  100 mg Oral Daily    metoprolol tartrate  50 mg Oral BID     PRN Meds:acetaminophen, ipratropium, ondansetron, QUEtiapine, sodium chloride 0.9%    Family History     Problem Relation (Age of Onset)    Breast cancer Daughter (50)    Cancer Brother (65), Sister (81), Sister, Sister    Heart disease Father    Ovarian cancer Sister (81)    Schizophrenia Son        Tobacco Use    Smoking status: Never Smoker    Smokeless tobacco: Never Used   Substance and Sexual Activity    Alcohol use: No    Drug use: No    Sexual activity: Never     Partners: Male       Review of Systems   Constitutional: Positive for activity change, fatigue and unexpected weight  change.   HENT: Negative for congestion.    Eyes: Negative for discharge.   Respiratory: Negative for chest tightness.    Cardiovascular: Negative for chest pain.   Gastrointestinal: Negative for abdominal distention and abdominal pain.   Endocrine: Negative for cold intolerance and heat intolerance.   Skin: Negative for rash.   Psychiatric/Behavioral: Negative for agitation.     Objective:     Vital Signs (Most Recent):  Temp: 99.2 °F (37.3 °C) (10/20/20 1245)  Pulse: 68 (10/20/20 1530)  Resp: 16 (10/20/20 1530)  BP: 123/60 (10/20/20 1530)  SpO2: (!) 93 % (10/20/20 1530) Vital Signs (24h Range):  Temp:  [97.9 °F (36.6 °C)-99.4 °F (37.4 °C)] 99.2 °F (37.3 °C)  Pulse:  [52-99] 68  Resp:  [12-67] 16  SpO2:  [77 %-100 %] 93 %  BP: (110-186)/() 123/60     Weight: 49.9 kg (110 lb 0.2 oz)  Body mass index is 24.66 kg/m².    Physical Exam  Vitals signs and nursing note reviewed.   Constitutional:       General: She is not in acute distress.     Appearance: She is not ill-appearing.   HENT:      Head: Normocephalic and atraumatic.      Nose: Nose normal.   Eyes:      General:         Right eye: No discharge.         Left eye: No discharge.   Neck:      Musculoskeletal: Normal range of motion.   Cardiovascular:      Rate and Rhythm: Normal rate.   Pulmonary:      Effort: Pulmonary effort is normal.   Abdominal:      General: Abdomen is flat.   Skin:     General: Skin is warm and dry.   Neurological:      General: No focal deficit present.         Review of Symptoms    Symptom Assessment (ESAS 0-10 Scale)  Pain:  0  Dyspnea:  0  Anxiety:  0  Nausea:  0  Depression:  0  Anorexia:  0  Fatigue:  4  Insomnia:  0  Restlessness:  0  Agitation:  0     CAM / Delirium:  Negative  Constipation:  Negative  Diarrhea:  Negative          Living Arrangements:  Lives with family      Advance Care Planning   Advance Directives:   Living Will: No    LaPOST: No    Medical Power of : Yes    Agent's Name:  Dane  Ede    Decision Making:  Family answered questions         Significant Labs: All pertinent labs within the past 24 hours have been reviewed.  CBC:   Recent Labs   Lab 10/18/20  0855   WBC 9.63   HGB 11.5*   HCT 33.3*   MCV 92        BMP:  Recent Labs   Lab 10/20/20  0419   *   *   K 2.9*   CL 93*   CO2 30*   BUN 6*   CREATININE 0.7   CALCIUM 8.5*   MG 1.8     LFT:  Lab Results   Component Value Date    AST 21 10/18/2020    ALKPHOS 79 10/18/2020    BILITOT 1.2 (H) 10/18/2020     Albumin:   Albumin   Date Value Ref Range Status   10/18/2020 4.0 3.5 - 5.2 g/dL Final     Protein:   Total Protein   Date Value Ref Range Status   10/18/2020 6.3 6.0 - 8.4 g/dL Final     Lactic acid:   No results found for: LACTATE    Significant Imaging: I have reviewed all pertinent imaging results/findings within the past 24 hours.

## 2020-10-20 NOTE — HPI
HPI:  84 y/o female presents to the ER via EMS transport with shortness of breath.  Patient states she is chronically short of breath, but symptoms worsened this morning.  EMS reported patient's SpO2 was 88% on room air, improved to 96% after given 1 SL nitro tab and nitro paste en route.  Patient also reports non productive cough and 10 lb weight gain in the last 2 weeks.  Also complains of edema on bilateral lower extremities that started 3 days ago.  Patient also reports post nasal drip.  States compliance with medications. Hx of PAF and noted to be in rapid AFib on presentation.  Placed on Amiodarone gtt and admitted to ICU.  Patient was also given Lasix in ER with improvement of symptoms.  No other complaints.     Overview/Hospital Course:  84 y/o female presents with acute on chronic diastolic heart failure probably precipitated by AFib with RVR.  Started on IV Lasix.  Admitted to ICU on Amiodarone gtt.  Cardiology consulted.  Patient had cardioversion on 10/19 to NSR. Continued on high dose lasix in ICU.

## 2020-10-20 NOTE — NURSING
Pt crying in bed, states her neck and knees hurt. Tylenol 500mg given around noon, re positioning, with no relief. Contacted Dr Wilson and new orders received

## 2020-10-20 NOTE — NURSING
[facility]  ICU Shift Summary  Date: 10/20/2020      COVID Test: (--)  Isolation: No active isolations     Prehospitalization: Home  Admit Date / LOS : 10/18/2020/ 2 days    Diagnosis: Acute on chronic diastolic heart failure    Consults:        Active: Cardio       Needed: N/A     Code Status: Full Code   Advanced Directive: <no information>    LDA: PIV       Central Lines/Site/Justification:Patient Does Not Have Central Line       Urinary Cath/Order/Justification:Patient Does Not Have Urinary Catheter    Vasopressors/Infusions:        GOALS: Volume/ Hemodynamic: N/A                     RASS: N/A    Pain Management: PO       Pain Controlled: yes     Rhythm: NSR    Respiratory Device: Vapotherm    Oxygen Concentration (%):  [] 75             Most Recent SBT/ SAT: N/A       MOVE Screen: PASS    VTE Prophylaxis: Pharm and Mechanical  Mobility: Bedrest  Stress Ulcer Prophylaxis: No    Dietary: PO  Tolerance: yes  /  Advancement: @ goal    I & O (24h):    Intake/Output Summary (Last 24 hours) at 10/20/2020 1753  Last data filed at 10/20/2020 1600  Gross per 24 hour   Intake 900.6 ml   Output 2850 ml   Net -1949.4 ml        Restraints: No    Significant Dates:  Post Op Date: N/A  Rescue Date: N/A  Imaging/ Diagnostics: N/A    Noteworthy Labs:  See Below    CBC/Anemia Labs: Coags:    Recent Labs   Lab 10/18/20  0855   WBC 9.63   HGB 11.5*   HCT 33.3*      MCV 92   RDW 13.5    Recent Labs   Lab 10/18/20  0855   INR 1.1   APTT 28.7        Chemistries:   Recent Labs   Lab 10/18/20  0935 10/18/20  1454 10/19/20  0410 10/20/20  0419   *  --  131* 134*   K 4.0  --  3.5 2.9*   CL 97  --  95 93*   CO2 22*  --  23 30*   BUN 20  --  11 6*   CREATININE 0.9  --  0.7 0.7   CALCIUM 8.7  --  8.2* 8.5*   PROT 6.3  --   --   --    BILITOT 1.2*  --   --   --    ALKPHOS 79  --   --   --    ALT 55*  --   --   --    AST 21  --   --   --    MG  --  1.8  --  1.8        Cardiac Enzymes: Ejection Fractions:     Recent Labs     10/18/20  1501 10/18/20  2056   TROPONINI <0.006 <0.006    Nuc Rest EF   Date Value Ref Range Status   07/10/2019 81.0  Final        POCT Glucose: HbA1c:    No results for input(s): POCTGLUCOSE in the last 168 hours. Hemoglobin A1C   Date Value Ref Range Status   10/18/2020 5.6 4.0 - 5.6 % Final     Comment:     ADA Screening Guidelines:  5.7-6.4%  Consistent with prediabetes  >or=6.5%  Consistent with diabetes  High levels of fetal hemoglobin interfere with the HbA1C  assay. Heterozygous hemoglobin variants (HbS, HgC, etc)do  not significantly interfere with this assay.   However, presence of multiple variants may affect accuracy.             ICU LOS 2d 3h  Level of Care: Critical Care    Shift Summary/Plan for the shift: Patients vapotherm was weaned down to 75% O2 and 20L. Patient has added PRN pain medication for pain. No safety events or falls this shift.

## 2020-10-21 LAB
ANION GAP SERPL CALC-SCNC: 13 MMOL/L (ref 8–16)
BNP SERPL-MCNC: 465 PG/ML (ref 0–99)
BUN SERPL-MCNC: 12 MG/DL (ref 8–23)
CALCIUM SERPL-MCNC: 8.6 MG/DL (ref 8.7–10.5)
CHLORIDE SERPL-SCNC: 91 MMOL/L (ref 95–110)
CO2 SERPL-SCNC: 27 MMOL/L (ref 23–29)
CREAT SERPL-MCNC: 0.7 MG/DL (ref 0.5–1.4)
EST. GFR  (AFRICAN AMERICAN): >60 ML/MIN/1.73 M^2
EST. GFR  (NON AFRICAN AMERICAN): >60 ML/MIN/1.73 M^2
GLUCOSE SERPL-MCNC: 111 MG/DL (ref 70–110)
POTASSIUM SERPL-SCNC: 4.5 MMOL/L (ref 3.5–5.1)
SODIUM SERPL-SCNC: 131 MMOL/L (ref 136–145)

## 2020-10-21 PROCEDURE — 63600175 PHARM REV CODE 636 W HCPCS: Performed by: INTERNAL MEDICINE

## 2020-10-21 PROCEDURE — 25000003 PHARM REV CODE 250: Performed by: HOSPITALIST

## 2020-10-21 PROCEDURE — 27100171 HC OXYGEN HIGH FLOW UP TO 24 HOURS

## 2020-10-21 PROCEDURE — 27000221 HC OXYGEN, UP TO 24 HOURS

## 2020-10-21 PROCEDURE — 83880 ASSAY OF NATRIURETIC PEPTIDE: CPT

## 2020-10-21 PROCEDURE — 94761 N-INVAS EAR/PLS OXIMETRY MLT: CPT

## 2020-10-21 PROCEDURE — 80048 BASIC METABOLIC PNL TOTAL CA: CPT

## 2020-10-21 PROCEDURE — 99291 PR CRITICAL CARE, E/M 30-74 MINUTES: ICD-10-PCS | Mod: ,,, | Performed by: INTERNAL MEDICINE

## 2020-10-21 PROCEDURE — 97530 THERAPEUTIC ACTIVITIES: CPT

## 2020-10-21 PROCEDURE — 20000000 HC ICU ROOM

## 2020-10-21 PROCEDURE — 97165 OT EVAL LOW COMPLEX 30 MIN: CPT

## 2020-10-21 PROCEDURE — 99291 CRITICAL CARE FIRST HOUR: CPT | Mod: ,,, | Performed by: INTERNAL MEDICINE

## 2020-10-21 PROCEDURE — 97161 PT EVAL LOW COMPLEX 20 MIN: CPT

## 2020-10-21 PROCEDURE — 25000003 PHARM REV CODE 250: Performed by: INTERNAL MEDICINE

## 2020-10-21 PROCEDURE — 36415 COLL VENOUS BLD VENIPUNCTURE: CPT

## 2020-10-21 RX ORDER — FUROSEMIDE 40 MG/1
40 TABLET ORAL DAILY
Status: DISCONTINUED | OUTPATIENT
Start: 2020-10-22 | End: 2020-10-22

## 2020-10-21 RX ADMIN — APIXABAN 2.5 MG: 2.5 TABLET, FILM COATED ORAL at 08:10

## 2020-10-21 RX ADMIN — LEVOTHYROXINE SODIUM 75 MCG: 75 TABLET ORAL at 08:10

## 2020-10-21 RX ADMIN — OXYCODONE HYDROCHLORIDE AND ACETAMINOPHEN 1 TABLET: 5; 325 TABLET ORAL at 08:10

## 2020-10-21 RX ADMIN — METOPROLOL TARTRATE 50 MG: 50 TABLET, FILM COATED ORAL at 08:10

## 2020-10-21 RX ADMIN — AMIODARONE HYDROCHLORIDE 400 MG: 200 TABLET ORAL at 08:10

## 2020-10-21 RX ADMIN — OXYCODONE HYDROCHLORIDE AND ACETAMINOPHEN 1 TABLET: 5; 325 TABLET ORAL at 06:10

## 2020-10-21 RX ADMIN — ATORVASTATIN CALCIUM 40 MG: 40 TABLET, FILM COATED ORAL at 08:10

## 2020-10-21 RX ADMIN — FUROSEMIDE 80 MG: 10 INJECTION, SOLUTION INTRAVENOUS at 09:10

## 2020-10-21 NOTE — PLAN OF CARE
Problem: Occupational Therapy Goal  Goal: Occupational Therapy Goal  Description: Goals to be met by: 11/4/20    Patient will increase functional independence with ADLs by performing:    UE Dressing with Modified [place] and Set-up Assistance.  LE Dressing with Modified [place] and Supervision.  Grooming while standing at sink with Modified [place] and Supervision.  Toileting from toilet with Modified [place] and Set-up Assistance for hygiene and clothing management.   Rolling to Bilateral with Modified [place].   Supine to sit with Modified [place] and Supervision.  Step transfer with Modified [place] and Supervision  Toilet transfer to toilet with Modified [place] and Supervision.  Upper extremity exercise program x15 reps per handout, with independence.  Educate the patient re: Home Safety     Outcome: Ongoing, Progressing   The patient will benefit from OT to address functional deficits.

## 2020-10-21 NOTE — PROGRESS NOTES
[facility]  Cardiology  Progress Note    Patient Name: Jeannie Hutchins  MRN: [id number]  Admission Date: 10/18/2020  Hospital Length of Stay: 3 days  Code Status: Full Code   Attending Physician: Marc Wilson MD   Primary Care Physician: Paige Mcleod MD  Expected Discharge Date:   Principal Problem:Acute on chronic diastolic heart failure    Subjective:       Interval History:  Is in normal sinus rhythm.  Denies any chest pains at rest on exertion, orthopnea, PND.  Desaturates off her oxygen.  No signs of fluid overload.      Review of Systems   Constitution: Positive for malaise/fatigue.   HENT: Negative.    Eyes: Negative.    Cardiovascular: Negative.    Respiratory: Negative.    Endocrine: Negative.    Hematologic/Lymphatic: Negative.    Skin: Negative.    Musculoskeletal: Negative.    Gastrointestinal: Negative.    Genitourinary: Negative.    Neurological: Negative.    Psychiatric/Behavioral: Negative.    Allergic/Immunologic: Negative.      Objective:     Vital Signs (Most Recent):  Temp: 98.6 °F (37 °C) (10/21/20 1145)  Pulse: 73 (10/21/20 1315)  Resp: 19 (10/21/20 1315)  BP: (!) 121/53 (10/21/20 1300)  SpO2: 95 % (10/21/20 1315) Vital Signs (24h Range):  Temp:  [97.9 °F (36.6 °C)-98.6 °F (37 °C)] 98.6 °F (37 °C)  Pulse:  [55-96] 73  Resp:  [10-32] 19  SpO2:  [89 %-100 %] 95 %  BP: (100-157)/(51-95) 121/53     Weight: 49.9 kg (110 lb 0.2 oz)  Body mass index is 24.66 kg/m².     SpO2: 95 %  O2 Device (Oxygen Therapy): Vapotherm      Intake/Output Summary (Last 24 hours) at 10/21/2020 1431  Last data filed at 10/21/2020 1300  Gross per 24 hour   Intake 600 ml   Output 1050 ml   Net -450 ml       Lines/Drains/Airways     Drain            Female External Urinary Catheter 10/18/20 1901 2 days          Peripheral Intravenous Line                 Peripheral IV - Single Lumen 10/18/20 1533 20 G Anterior;Right Forearm 2 days         Peripheral IV - Single Lumen 10/21/20 0500 20 G  Anterior;Right Upper Arm less than 1 day                Physical Exam   Constitutional: She is oriented to person, place, and time. She appears well-developed and well-nourished.   HENT:   Head: Normocephalic.   Eyes: Pupils are equal, round, and reactive to light.   Neck: Normal range of motion. Neck supple.   Cardiovascular: Normal rate and regular rhythm.   Pulmonary/Chest: Effort normal and breath sounds normal.   Abdominal: Soft. Normal appearance and bowel sounds are normal. There is no abdominal tenderness.   Musculoskeletal: Normal range of motion.   Neurological: She is alert and oriented to person, place, and time.   Skin: Skin is warm.   Psychiatric: She has a normal mood and affect.       Significant Labs:   BMP:   Recent Labs   Lab 10/20/20  0419 10/21/20  0511   * 111*   * 131*   K 2.9* 4.5   CL 93* 91*   CO2 30* 27   BUN 6* 12   CREATININE 0.7 0.7   CALCIUM 8.5* 8.6*   MG 1.8  --    , CMP   Recent Labs   Lab 10/20/20  0419 10/21/20  0511   * 131*   K 2.9* 4.5   CL 93* 91*   CO2 30* 27   * 111*   BUN 6* 12   CREATININE 0.7 0.7   CALCIUM 8.5* 8.6*   ANIONGAP 11 13   ESTGFRAFRICA >60 >60   EGFRNONAA >60 >60   , CBC No results for input(s): WBC, HGB, HCT, PLT in the last 48 hours., INR No results for input(s): INR, PROTIME in the last 48 hours., Lipid Panel No results for input(s): CHOL, HDL, LDLCALC, TRIG, CHOLHDL in the last 48 hours., Troponin   No results for input(s): TROPONINI in the last 48 hours. and All pertinent lab results from the last 24 hours have been reviewed.    Significant Imaging: Echocardiogram:   Transthoracic echo (TTE) complete (Cupid Only):   Results for orders placed or performed during the hospital encounter of 10/18/20   Echo Color Flow Doppler? Yes   Result Value Ref Range    Ascending aorta 2.34 cm    STJ 2.24 cm    AV mean gradient 9 mmHg    Ao peak david 2.09 m/s    Ao VTI 39.82 cm    IVRT 87.66 msec    IVS 0.98 0.6 - 1.1 cm    LA size 4.41 cm    Left  Atrium Major Axis 5.42 cm    Left Atrium Minor Axis 5.04 cm    LVIDd 3.75 3.5 - 6.0 cm    LVIDs 1.64 (A) 2.1 - 4.0 cm    LVOT diameter 1.98 cm    LVOT peak VTI 23.89 cm    Posterior Wall 0.97 0.6 - 1.1 cm    RA Major Axis 5.88 cm    RA Width 3.89 cm    RVDD 3.88 cm    Sinus 2.42 cm    TAPSE 1.82 cm    TR Max Singh 3.30 m/s    LA WIDTH 4.09 cm    PV PEAK VELOCITY 1.00 cm/s    LV Diastolic Volume 59.97 mL    LV Systolic Volume 7.70 mL    RV S' 13.62 cm/s    LVOT peak singh 1.27 m/s    FS 56 %    LA volume 80.08 cm3    LV mass 110.81 g    Left Ventricle Relative Wall Thickness 0.52 cm    AV valve area 1.85 cm2    AV Velocity Ratio 0.61     AV index (prosthetic) 0.60     LVOT area 3.1 cm2    LVOT stroke volume 73.52 cm3    AV peak gradient 17 mmHg    LV Systolic Volume Index 5.6 mL/m2    LV Diastolic Volume Index 43.54 mL/m2    LA Volume Index 58.1 mL/m2    LV Mass Index 80 g/m2    Triscuspid Valve Regurgitation Peak Gradient 44 mmHg    BSA 1.4 m2    Right Atrial Pressure (from IVC) 15 mmHg    TV rest pulmonary artery pressure 59 mmHg    Narrative    · With normal systolic function. The estimated ejection fraction is 65%.  · Indeterminate diastolic function.  · Moderate tricuspid regurgitation.  · Severe left atrial enlargement.  · Normal right ventricular systolic function.  · Severe right atrial enlargement.  · Mild mitral regurgitation.  · Elevated central venous pressure (15 mmHg).  · The estimated PA systolic pressure is 59 mmHg.  · There is pulmonary hypertension.            Assessment and Plan:     Brief HPI:     * Acute on chronic diastolic heart failure  IV diuresis.  Likely precipitated by AFib.    10/20:  Now euvolemic.    Paroxysmal atrial fibrillation  Currently in AFib.  On amiodarone drip.  Continues to be in AFib.  Discussed various options with the patient.  Considering the fact that the acute decompensated heart failure likely precipitated by AFib, will cardiovert her.  She has been on Eliquis without any  missed doses.    Risks, benefits and alternatives of the Cardioversion procedure were discussed with the patient including throat irritation, aspiration, anesthetic complications, esophageal irritation/perforation, skin irritation, arrhythmia, etc.  Patient understands and agrees to proceed.  Consent was placed on the chart.      10/20:  In normal sinus rhythm now.  Underwent cardioversion yesterday.  Will switch to oral amiodarone.    10/21:  Continue oral amiodarone.  Continues to be in normal sinus rhythm.    Acute respiratory failure with hypoxia and hypercarbia  Management per primary    Essential hypertension  Titrate antihypertensives as needed for appropriate blood pressure control        VTE Risk Mitigation (From admission, onward)         Ordered     apixaban tablet 2.5 mg  2 times daily      10/18/20 1342     IP VTE HIGH RISK PATIENT  Once      10/18/20 1340     Place sequential compression device  Until discontinued      10/18/20 1340                Brandon Elias MD  Cardiology  [facility]    Critical Care Time:  35 minutes     Critical care was time spent personally by me on the following activities: development of treatment plan with patient or surrogate and bedside caregivers, discussions with consultants, evaluation of patient's response to treatment, examination of patient, ordering and performing treatments and interventions, ordering and review of laboratory studies, ordering and review of radiographic studies, pulse oximetry, re-evaluation of patient's condition. This critical care time did not overlap with that of any other provider or involve time for any procedures.

## 2020-10-21 NOTE — PROGRESS NOTES
[facility] Hospital Medicine  Progress Note    Patient Name: Jeannie Hutchins  MRN: [id number]  Patient Class: IP- Inpatient   Admission Date: 10/18/2020  Length of Stay: 3 days  Attending Physician: Marc Wilson MD  Primary Care Provider: Paige Mcleod MD        Subjective:     Principal Problem:Acute on chronic diastolic heart failure        HPI:  84 y/o female presents to the ER via EMS transport with shortness of breath.  Patient states she is chronically short of breath, but symptoms worsened this morning.  EMS reported patient's SpO2 was 88% on room air, improved to 96% after given 1 SL nitro tab and nitro paste en route.  Patient also reports non productive cough and 10 lb weight gain in the last 2 weeks.  Also complains of edema on bilateral lower extremities that started 3 days ago.  Patient also reports post nasal drip.  States compliance with medications. Hx of PAF and noted to be in rapid AFib on presentation.  Placed on Amiodarone gtt and admitted to ICU.  Patient was also given Lasix in ER with improvement of symptoms.  No other complaints.    Overview/Hospital Course:  84 y/o female presents with acute on chronic diastolic heart failure probably precipitated by AFib with RVR.  Started on IV Lasix.  Admitted to ICU on Amiodarone gtt.  Cardiology consulted.  Patient had cardioversion on 10/19 to NSR. Continued on high dose lasix in ICU. Pulmonary/critical care was consulted on 10/21 due to high 02 requirements.     Interval History: doing well. She has no complaints     Review of Systems   Constitutional: Positive for activity change. Negative for chills, diaphoresis, fatigue and fever.   HENT: Negative for congestion and dental problem.    Respiratory: Negative for apnea, choking, chest tightness and shortness of breath.    Cardiovascular: Negative for chest pain.   Gastrointestinal: Negative for abdominal distention, abdominal pain, anal bleeding, blood in stool, diarrhea,  nausea and vomiting.   Endocrine: Negative for cold intolerance.   Genitourinary: Negative for difficulty urinating.   Musculoskeletal: Negative for arthralgias.   Skin: Negative for color change.   Neurological: Negative for dizziness.   Psychiatric/Behavioral: Negative for agitation and behavioral problems.     Objective:     Vital Signs (Most Recent):  Temp: 98.5 °F (36.9 °C) (10/21/20 0815)  Pulse: 92 (10/21/20 0900)  Resp: 20 (10/21/20 0900)  BP: (!) 151/69 (10/21/20 0900)  SpO2: (!) 94 % (10/21/20 0900) Vital Signs (24h Range):  Temp:  [97.9 °F (36.6 °C)-99.2 °F (37.3 °C)] 98.5 °F (36.9 °C)  Pulse:  [55-96] 92  Resp:  [10-67] 20  SpO2:  [77 %-100 %] 94 %  BP: (100-159)/(51-95) 151/69     Weight: 49.9 kg (110 lb 0.2 oz)  Body mass index is 24.66 kg/m².    Intake/Output Summary (Last 24 hours) at 10/21/2020 0958  Last data filed at 10/21/2020 0820  Gross per 24 hour   Intake 1016.7 ml   Output 1600 ml   Net -583.3 ml      Physical Exam  Vitals signs and nursing note reviewed.   Constitutional:       General: She is not in acute distress.     Appearance: Normal appearance. She is normal weight. She is not ill-appearing, toxic-appearing or diaphoretic.   HENT:      Head: Normocephalic and atraumatic.   Cardiovascular:      Rate and Rhythm: Normal rate and regular rhythm.   Pulmonary:      Effort: Pulmonary effort is normal. No respiratory distress.      Breath sounds: No stridor. No wheezing or rales.   Abdominal:      General: Bowel sounds are normal. There is no distension.      Tenderness: There is no abdominal tenderness.      Hernia: No hernia is present.   Neurological:      Mental Status: She is alert and oriented to person, place, and time.   Psychiatric:         Mood and Affect: Mood normal.         Behavior: Behavior normal.         Significant Labs:   BMP:   Recent Labs   Lab 10/20/20  0419 10/21/20  0511   * 111*   * 131*   K 2.9* 4.5   CL 93* 91*   CO2 30* 27   BUN 6* 12   CREATININE 0.7  0.7   CALCIUM 8.5* 8.6*   MG 1.8  --      CBC: No results for input(s): WBC, HGB, HCT, PLT in the last 48 hours.    Significant Imaging      Assessment/Plan:      * Acute on chronic diastolic heart failure  Patient presents with Acute Hypoxemic Respiratory Failure secondary to CHF exacerbation.  Exacerbation probably triggered by poor HR control.  Hx of PAF.  Presents in Afib with RVR.  Cardiology consulted and patient admitted to ICU on Amiodarone drip.  Some improvement of symptoms with diuresis.  Continue IV Lasix.  Rate control.  Fluid restriction.  Probably cardioversion today.    Continue high dose lasix. BNP down. Still requiring a good bit of 02.       Increasing lasix today to 80 bid. BNP     ILD (interstitial lung disease)  Wonder how much this is contributing to hypoxia. Pulmonary was consulted.         Mixed hyperlipidemia  Continue Statin.      Paroxysmal atrial fibrillation  Afib with RVR as above.  S/p cardioversion on 10/19. Cards following  Now on Amio       Acute respiratory failure with hypoxia and hypercarbia  Secondary to CHF as above.  Improving. Wean 02. Continue lasix     Resolved       Generalized anxiety disorder        Essential hypertension  Continue home medications.      Hyponatremia with decreased serum osmolality        Debility- PT/OT consulted.     VTE Risk Mitigation (From admission, onward)         Ordered     apixaban tablet 2.5 mg  2 times daily      10/18/20 1342     IP VTE HIGH RISK PATIENT  Once      10/18/20 1340     Place sequential compression device  Until discontinued      10/18/20 1340                Discharge Planning   JULIET:      Code Status: Full Code   Is the patient medically ready for discharge?:     Reason for patient still in hospital (select all that apply): Patient unstable  Discharge Plan A: Home with family, Home Health            Critical care time spent on the evaluation and treatment of severe organ dysfunction, review of pertinent labs and imaging  studies, discussions with consulting providers and discussions with patient/family: 30  minutes.      PT/OT. Continue Lasix. Pulmonary consult for continued Hypoxia.      Marc Godfrey MD  Department of Hospital Medicine   [facility]

## 2020-10-21 NOTE — PT/OT/SLP EVAL
Physical Therapy Evaluation    Patient Name:  Jeannie Hutchins   MRN:  [id number]    Recommendations:     Discharge Recommendations:  home health PT   Discharge Equipment Recommendations: (Ongoing assessment pending pt progress)   Barriers to discharge: Increased O2 demand in ICU    Assessment:     Jeannie Hutchins is a 85 y.o. female admitted with a medical diagnosis of Acute on chronic diastolic heart failure.  She presents with the following impairments/functional limitations:  weakness, gait instability, decreased upper extremity function, decreased ROM, impaired cardiopulmonary response to activity, impaired endurance, impaired balance, impaired coordination, decreased safety awareness, impaired self care skills, pain, impaired functional mobilty, decreased coordination .    Rehab Prognosis: Good; patient would benefit from acute skilled PT services to address these deficits and reach maximum level of function.    Recent Surgery: Procedure(s) (LRB):  Transesophageal echo (BLADIMIR) intra-procedure log documentation (N/A)  Cardioversion 2 Days Post-Op    Plan:     During this hospitalization, patient to be seen (5-6x/wk) to address the identified rehab impairments via gait training, therapeutic activities, therapeutic exercises and progress toward the following goals:    · Plan of Care Expires:  11/04/20    Subjective     Chief Complaint: pain  Patient/Family Comments/goals: to go home  Pain/Comfort:  · Pain Rating 1: 8/10  · Location 1: neck  · Pain Addressed 1: Reposition, Distraction, Cessation of Activity, Nurse notified  · Pain Rating Post-Intervention 1: 0/10    Patients cultural, spiritual, Jainism conflicts given the current situation: no    Living Environment:  Pt lives with two of her sons that both have decreased intellect.  One requires assistance himself and one is able to assist patient.  Prior to admission, patients level of function was Independent.  Equipment used at home: none.  DME owned (not currently  used): wheelchair.  Upon discharge, patient will have assistance from Family.    Objective:     Communicated with nsg prior to session.  Patient found HOB elevated with oxygen(Vapotherm 75%, 20L)  upon PT entry to room.    General Precautions: Standard, fall, respiratory   Orthopedic Precautions:    Braces: N/A     Exams:  · Cognitive Exam:  Patient is oriented to Person, Place, Time and Situation  · Gross Motor Coordination:  mildly impaired 2/2 poor posture, deconditioning, and gen weakness  · Postural Exam:  Patient presented with the following abnormalities:    · -       Rounded shoulders  · -       Forward head  · -       Kyphosis  · Sensation:    · -       Intact  To light/touch B LE's  · Skin Integrity/Edema:      · -       Skin integrity: Visible skin intact  · RLE ROM: WFL  · RLE Strength: WFL  · LLE ROM: WFL  · LLE Strength: WFL    Functional Mobility:  · Bed Mobility:     · Scooting: total assistance and to HOB in supine  · Bridging: contact guard assistance  · Supine to Sit: minimum assistance and with Vc/Tc for hand placement and body mechanics with HOB elevated  · Sit to Supine: moderate assistance and with VC for body mechanics  · Transfers:     · Sit to Stand:  moderate assistance and of 2 persons with no AD and B UE support to step-stool  · Gait: Pt ambulated 2 steps in place on step stool at EOB with mod A x 2 people  · Balance: Fair+ sit, Fair- stand    Therapeutic Activities and Exercises:   Dangle protocol, pt sat at EOB approx 15m with Min<>Supervision.    Pre: HR 71, /56, SPO2 91%, RR 18  Post:  HR 73, /75, SPO2 92%, RR 32  Educated pt on pursed lip breathing throughout treatment session.  Pt able to return demonstration with VC to perform correctly and to maintain on task.  Educated pt to perform B  AP's, TKE's, GS throughout the day while in bed.  Pt verbalized/demonstrated understanding       AM-PAC 6 CLICK MOBILITY  Total Score:13     Patient left with bed in chair position  with all lines intact, call button in reach, nsg notified and son present.    GOALS:   Multidisciplinary Problems     Physical Therapy Goals        Problem: Physical Therapy Goal    Goal Priority Disciplines Outcome Goal Variances Interventions   Physical Therapy Goal     PT, PT/OT Ongoing, Progressing     Description: Goals to be met by: 20     Patient will increase functional independence with mobility by performin. Supine to sit with Stand-by Assistance  2. Sit to stand transfer with Supervision  3. Gait  x 50 feet with Supervision with or without AD   4. Ascend/descend 3 stair with right Handrails Contact Guard Assistance    5. Lower extremity exercise program x10 reps per handout, with independence                     History:     Past Medical History:   Diagnosis Date    Anticoagulant long-term use     Eliquis    Arthritis     Atrial fibrillation     Blood transfusion     Carotid stenosis     CHF (congestive heart failure)     Edema     Hearing loss     [place] (hard of hearing)     Hypercholesterolemia     Hypertension     Psychiatric problem     Thyroid disease        Past Surgical History:   Procedure Laterality Date    CARDIOVERSION  10/19/2020    Procedure: Cardioversion;  Surgeon: Brandon Elias MD;  Location: [facility] [place] CATH LAB;  Service: Cardiology;;    CARPAL TUNNEL RELEASE      right hand    ESOPHAGOGASTRODUODENOSCOPY Left 2018    Procedure: EGD (ESOPHAGOGASTRODUODENOSCOPY);  Surgeon: Oniel Dorsey MD;  Location: [facility] [place] ENDO;  Service: Endoscopy;  Laterality: Left;    HYSTERECTOMY      left carotid endartectomy  2006    TONSILLECTOMY      TREATMENT OF CARDIAC ARRHYTHMIA N/A 10/19/2020    Procedure: Transesophageal echo (BLADIMIR) intra-procedure log documentation;  Surgeon: Brandon Elias MD;  Location: [facility] [place] CATH LAB;  Service: Cardiology;  Laterality: N/A;       Time Tracking:     PT Received On: 10/21/20  PT Start Time: 1209     PT Stop Time: 1238  PT Total Time  (min): 29 min     Billable Minutes: Evaluation 15 and Therapeutic Activity 14      Vale Holt, PT  10/21/2020

## 2020-10-21 NOTE — PROGRESS NOTES
"PALLIATIVE CARE PROGRESS NOTE:    Brief bedside visit.  Patient resting quietly now on 20L/60% Vapotherm, with sats 94% without complaint of shortness of breath and says "I feel much better now."  She is able to state basic reason "fluid all in my body and lungs and heart"  for admission and is oriented. She attritubes this problem to "eating too much salt."   She does c/o intermittent right sided neck pain when turning her head, rated about 8/10 new onset "from the way I was laying"; relieved with Percocet.  It is 0/10 when she keeps her head in good alignment.  She reports "seeing things when I close my eyes - I can see people in the room and they are black and white and have on black and white clothes."  She reports this doesn't happen with her eyes open, but it is interrupting her ability to sleep.  She says this started as soon as she was admitted to her current room. She said she had had similar experience in the past with hospitalization.  Denies this happens at home. She would like something for anxiety and to help her sleep.       Patient reaffirms her eldest son Dane Mera ([phone])is her HCPOA and comments this is in the medical record, but from my view it is not in [facility].  Patient states her daughter Lyubov from [place] has been coming to see her.    Patient also reports that she lives with two of her sons, Jaylan and Jerry and she describes significant mental health challenges for them including schizophrenia and one had recent "nervous breakdown", but they are doing "better" now. She states the rest of her family DOES NOT KNOW of these mental health issues because she has not told them.  They are able to assist her and they take care of themselves.  She is able to cook meals about 3x week, and they also have some frozen foods.     I will see patient again in followup, hopefully when daughter Lyubov is here to visit tomorrow.      Plan/recommendations:   Continue current treatment/supportive care.   She " may benefit from admin of the prn order for Quetiapine 25 mg po nightly prn hallucinations.    Will obtain copy of HCPOA from son.   Will continue goals of care and code status discussion during admission.   Would benefit from Living Will or LaPOST (depending on choice for code status).     Would benefit from home Palliative Medicine visits.     Palliative Care will continue to follow.    Esther Nuno, CARISAN, RN, CCRN, CHPN   Palliative Care Nurse Coordinator   [facility]  [phone]

## 2020-10-21 NOTE — SUBJECTIVE & OBJECTIVE
Interval History: doing well. She has no complaints     Review of Systems   Constitutional: Positive for activity change. Negative for chills, diaphoresis, fatigue and fever.   HENT: Negative for congestion and dental problem.    Respiratory: Negative for apnea, choking, chest tightness and shortness of breath.    Cardiovascular: Negative for chest pain.   Gastrointestinal: Negative for abdominal distention, abdominal pain, anal bleeding, blood in stool, diarrhea, nausea and vomiting.   Endocrine: Negative for cold intolerance.   Genitourinary: Negative for difficulty urinating.   Musculoskeletal: Negative for arthralgias.   Skin: Negative for color change.   Neurological: Negative for dizziness.   Psychiatric/Behavioral: Negative for agitation and behavioral problems.     Objective:     Vital Signs (Most Recent):  Temp: 98.5 °F (36.9 °C) (10/21/20 0815)  Pulse: 92 (10/21/20 0900)  Resp: 20 (10/21/20 0900)  BP: (!) 151/69 (10/21/20 0900)  SpO2: (!) 94 % (10/21/20 0900) Vital Signs (24h Range):  Temp:  [97.9 °F (36.6 °C)-99.2 °F (37.3 °C)] 98.5 °F (36.9 °C)  Pulse:  [55-96] 92  Resp:  [10-67] 20  SpO2:  [77 %-100 %] 94 %  BP: (100-159)/(51-95) 151/69     Weight: 49.9 kg (110 lb 0.2 oz)  Body mass index is 24.66 kg/m².    Intake/Output Summary (Last 24 hours) at 10/21/2020 0958  Last data filed at 10/21/2020 0820  Gross per 24 hour   Intake 1016.7 ml   Output 1600 ml   Net -583.3 ml      Physical Exam  Vitals signs and nursing note reviewed.   Constitutional:       General: She is not in acute distress.     Appearance: Normal appearance. She is normal weight. She is not ill-appearing, toxic-appearing or diaphoretic.   HENT:      Head: Normocephalic and atraumatic.   Cardiovascular:      Rate and Rhythm: Normal rate and regular rhythm.   Pulmonary:      Effort: Pulmonary effort is normal. No respiratory distress.      Breath sounds: No stridor. No wheezing or rales.   Abdominal:      General: Bowel sounds are normal.  There is no distension.      Tenderness: There is no abdominal tenderness.      Hernia: No hernia is present.   Neurological:      Mental Status: She is alert and oriented to person, place, and time.   Psychiatric:         Mood and Affect: Mood normal.         Behavior: Behavior normal.         Significant Labs:   BMP:   Recent Labs   Lab 10/20/20  0419 10/21/20  0511   * 111*   * 131*   K 2.9* 4.5   CL 93* 91*   CO2 30* 27   BUN 6* 12   CREATININE 0.7 0.7   CALCIUM 8.5* 8.6*   MG 1.8  --      CBC: No results for input(s): WBC, HGB, HCT, PLT in the last 48 hours.    Significant Imaging

## 2020-10-21 NOTE — PLAN OF CARE
Patient remains in ICU overnight. AAOx4, denies hallucinations this shift, VSS. NSR on cardiac monitor. Maintained on vapotherm 20L, 75% FiO2. No SOB. UO 400cc. No BM. No falls, injury or skin breakdown. Plan of care reviewed.

## 2020-10-21 NOTE — PLAN OF CARE
Problem: Physical Therapy Goal  Goal: Physical Therapy Goal  Description: Goals to be met by: 20     Patient will increase functional independence with mobility by performin. Supine to sit with Stand-by Assistance  2. Sit to stand transfer with Supervision  3. Gait  x 50 feet with Supervision with or without AD   4. Ascend/descend 3 stair with right Handrails Contact Guard Assistance    5. Lower extremity exercise program x10 reps per handout, with independence    Outcome: Ongoing, Progressing   Initial PT evaluation completed.  Pt could benefit from skilled PT services 5-6x/wk in order to maximize function prior to D/C.  HHPT recommended, ongoing assessment pending pt progress for DME.  May need Home O2.

## 2020-10-21 NOTE — ASSESSMENT & PLAN NOTE
Patient presents with Acute Hypoxemic Respiratory Failure secondary to CHF exacerbation.  Exacerbation probably triggered by poor HR control.  Hx of PAF.  Presents in Afib with RVR.  Cardiology consulted and patient admitted to ICU on Amiodarone drip.  Some improvement of symptoms with diuresis.  Continue IV Lasix.  Rate control.  Fluid restriction.  Probably cardioversion today.    Continue high dose lasix. BNP down. Still requiring a good bit of 02.       Increasing lasix today to 80 bid. BNP

## 2020-10-21 NOTE — ASSESSMENT & PLAN NOTE
Currently in AFib.  On amiodarone drip.  Continues to be in AFib.  Discussed various options with the patient.  Considering the fact that the acute decompensated heart failure likely precipitated by AFib, will cardiovert her.  She has been on Eliquis without any missed doses.    Risks, benefits and alternatives of the Cardioversion procedure were discussed with the patient including throat irritation, aspiration, anesthetic complications, esophageal irritation/perforation, skin irritation, arrhythmia, etc.  Patient understands and agrees to proceed.  Consent was placed on the chart.      10/20:  In normal sinus rhythm now.  Underwent cardioversion yesterday.  Will switch to oral amiodarone.    10/21:  Continue oral amiodarone.  Continues to be in normal sinus rhythm.

## 2020-10-21 NOTE — CONSULTS
Consult received. Full note in am.    Current respiratory status still likely 2/2 volume. I can't find any e/o ILD on previous imaging. Would also increase pulmonary toilet with nebs and IS.    Simone Zapata MD  Pulmonary/Critical Care

## 2020-10-21 NOTE — CARE UPDATE
[facility]  ICU Shift Summary  Date: 10/21/2020      COVID Test: (--)  Isolation: No active isolations     Prehospitalization: Home  Admit Date / LOS : 10/18/2020/ 3 days    Diagnosis: Acute on chronic diastolic heart failure    Consults:        Active: Cardio, OT, Palliative and PT       Needed: N/A     Code Status: Full Code   Advanced Directive: <no information>    LDA: PIV and Purewick       Central Lines/Site/Justification:Patient Does Not Have Central Line       Urinary Cath/Order/Justification:Patient Does Not Have Urinary Catheter    Vasopressors/Infusions:        GOALS: Volume/ Hemodynamic: N/A                     RASS: 0  alert and calm    Pain Management: PO       Pain Controlled: yes     Rhythm: NSR    Respiratory Device: Vapotherm    Oxygen Concentration (%):  [75-95] 75             Most Recent SBT/ SAT: N/A       MOVE Screen: PT Consult    VTE Prophylaxis: Pharm and Mechanical  Mobility: Bedrest, S: Self and A: Assist  Stress Ulcer Prophylaxis: Yes    Dietary: PO  Tolerance: yes  /  Advancement: @ goal    I & O (24h):    Intake/Output Summary (Last 24 hours) at 10/21/2020 0744  Last data filed at 10/21/2020 0600  Gross per 24 hour   Intake 850.1 ml   Output 2100 ml   Net -1249.9 ml        Restraints: No    Significant Dates:  Post Op Date: N/A  Rescue Date: N/A  Imaging/ Diagnostics: N/A    Noteworthy Labs:  BNP trending down   CBC/Anemia Labs: Coags:    Recent Labs   Lab 10/18/20  0855   WBC 9.63   HGB 11.5*   HCT 33.3*      MCV 92   RDW 13.5    Recent Labs   Lab 10/18/20  0855   INR 1.1   APTT 28.7        Chemistries:   Recent Labs   Lab 10/18/20  0935 10/18/20  1454  10/20/20  0419 10/21/20  0511   *  --    < > 134* 131*   K 4.0  --    < > 2.9* 4.5   CL 97  --    < > 93* 91*   CO2 22*  --    < > 30* 27   BUN 20  --    < > 6* 12   CREATININE 0.9  --    < > 0.7 0.7   CALCIUM 8.7  --    < > 8.5* 8.6*   PROT 6.3  --   --   --   --    BILITOT 1.2*  --   --   --   --     ALKPHOS 79  --   --   --   --    ALT 55*  --   --   --   --    AST 21  --   --   --   --    MG  --  1.8  --  1.8  --     < > = values in this interval not displayed.        Cardiac Enzymes: Ejection Fractions:    Recent Labs     10/18/20  1501 10/18/20  2056   TROPONINI <0.006 <0.006    Nuc Rest EF   Date Value Ref Range Status   07/10/2019 81.0  Final        POCT Glucose: HbA1c:    No results for input(s): POCTGLUCOSE in the last 168 hours. Hemoglobin A1C   Date Value Ref Range Status   10/18/2020 5.6 4.0 - 5.6 % Final     Comment:     ADA Screening Guidelines:  5.7-6.4%  Consistent with prediabetes  >or=6.5%  Consistent with diabetes  High levels of fetal hemoglobin interfere with the HbA1C  assay. Heterozygous hemoglobin variants (HbS, HgC, etc)do  not significantly interfere with this assay.   However, presence of multiple variants may affect accuracy.             ICU LOS 2d 17h  Level of Care: Critical Care    Shift Summary/Plan for the shift: see plan of care

## 2020-10-21 NOTE — SUBJECTIVE & OBJECTIVE
Interval History:  Is in normal sinus rhythm.  Denies any chest pains at rest on exertion, orthopnea, PND.  Desaturates off her oxygen.  No signs of fluid overload.      Review of Systems   Constitution: Positive for malaise/fatigue.   HENT: Negative.    Eyes: Negative.    Cardiovascular: Negative.    Respiratory: Negative.    Endocrine: Negative.    Hematologic/Lymphatic: Negative.    Skin: Negative.    Musculoskeletal: Negative.    Gastrointestinal: Negative.    Genitourinary: Negative.    Neurological: Negative.    Psychiatric/Behavioral: Negative.    Allergic/Immunologic: Negative.      Objective:     Vital Signs (Most Recent):  Temp: 98.6 °F (37 °C) (10/21/20 1145)  Pulse: 73 (10/21/20 1315)  Resp: 19 (10/21/20 1315)  BP: (!) 121/53 (10/21/20 1300)  SpO2: 95 % (10/21/20 1315) Vital Signs (24h Range):  Temp:  [97.9 °F (36.6 °C)-98.6 °F (37 °C)] 98.6 °F (37 °C)  Pulse:  [55-96] 73  Resp:  [10-32] 19  SpO2:  [89 %-100 %] 95 %  BP: (100-157)/(51-95) 121/53     Weight: 49.9 kg (110 lb 0.2 oz)  Body mass index is 24.66 kg/m².     SpO2: 95 %  O2 Device (Oxygen Therapy): Vapotherm      Intake/Output Summary (Last 24 hours) at 10/21/2020 1431  Last data filed at 10/21/2020 1300  Gross per 24 hour   Intake 600 ml   Output 1050 ml   Net -450 ml       Lines/Drains/Airways     Drain            Female External Urinary Catheter 10/18/20 1901 2 days          Peripheral Intravenous Line                 Peripheral IV - Single Lumen 10/18/20 1533 20 G Anterior;Right Forearm 2 days         Peripheral IV - Single Lumen 10/21/20 0500 20 G Anterior;Right Upper Arm less than 1 day                Physical Exam   Constitutional: She is oriented to person, place, and time. She appears well-developed and well-nourished.   HENT:   Head: Normocephalic.   Eyes: Pupils are equal, round, and reactive to light.   Neck: Normal range of motion. Neck supple.   Cardiovascular: Normal rate and regular rhythm.   Pulmonary/Chest: Effort normal and  breath sounds normal.   Abdominal: Soft. Normal appearance and bowel sounds are normal. There is no abdominal tenderness.   Musculoskeletal: Normal range of motion.   Neurological: She is alert and oriented to person, place, and time.   Skin: Skin is warm.   Psychiatric: She has a normal mood and affect.       Significant Labs:   BMP:   Recent Labs   Lab 10/20/20  0419 10/21/20  0511   * 111*   * 131*   K 2.9* 4.5   CL 93* 91*   CO2 30* 27   BUN 6* 12   CREATININE 0.7 0.7   CALCIUM 8.5* 8.6*   MG 1.8  --    , CMP   Recent Labs   Lab 10/20/20  0419 10/21/20  0511   * 131*   K 2.9* 4.5   CL 93* 91*   CO2 30* 27   * 111*   BUN 6* 12   CREATININE 0.7 0.7   CALCIUM 8.5* 8.6*   ANIONGAP 11 13   ESTGFRAFRICA >60 >60   EGFRNONAA >60 >60   , CBC No results for input(s): WBC, HGB, HCT, PLT in the last 48 hours., INR No results for input(s): INR, PROTIME in the last 48 hours., Lipid Panel No results for input(s): CHOL, HDL, LDLCALC, TRIG, CHOLHDL in the last 48 hours., Troponin   No results for input(s): TROPONINI in the last 48 hours. and All pertinent lab results from the last 24 hours have been reviewed.    Significant Imaging: Echocardiogram:   Transthoracic echo (TTE) complete (Cupid Only):   Results for orders placed or performed during the hospital encounter of 10/18/20   Echo Color Flow Doppler? Yes   Result Value Ref Range    Ascending aorta 2.34 cm    STJ 2.24 cm    AV mean gradient 9 mmHg    Ao peak singh 2.09 m/s    Ao VTI 39.82 cm    IVRT 87.66 msec    IVS 0.98 0.6 - 1.1 cm    LA size 4.41 cm    Left Atrium Major Axis 5.42 cm    Left Atrium Minor Axis 5.04 cm    LVIDd 3.75 3.5 - 6.0 cm    LVIDs 1.64 (A) 2.1 - 4.0 cm    LVOT diameter 1.98 cm    LVOT peak VTI 23.89 cm    Posterior Wall 0.97 0.6 - 1.1 cm    RA Major Axis 5.88 cm    RA Width 3.89 cm    RVDD 3.88 cm    Sinus 2.42 cm    TAPSE 1.82 cm    TR Max Singh 3.30 m/s    LA WIDTH 4.09 cm    PV PEAK VELOCITY 1.00 cm/s    LV Diastolic Volume  59.97 mL    LV Systolic Volume 7.70 mL    RV S' 13.62 cm/s    LVOT peak david 1.27 m/s    FS 56 %    LA volume 80.08 cm3    LV mass 110.81 g    Left Ventricle Relative Wall Thickness 0.52 cm    AV valve area 1.85 cm2    AV Velocity Ratio 0.61     AV index (prosthetic) 0.60     LVOT area 3.1 cm2    LVOT stroke volume 73.52 cm3    AV peak gradient 17 mmHg    LV Systolic Volume Index 5.6 mL/m2    LV Diastolic Volume Index 43.54 mL/m2    LA Volume Index 58.1 mL/m2    LV Mass Index 80 g/m2    Triscuspid Valve Regurgitation Peak Gradient 44 mmHg    BSA 1.4 m2    Right Atrial Pressure (from IVC) 15 mmHg    TV rest pulmonary artery pressure 59 mmHg    Narrative    · With normal systolic function. The estimated ejection fraction is 65%.  · Indeterminate diastolic function.  · Moderate tricuspid regurgitation.  · Severe left atrial enlargement.  · Normal right ventricular systolic function.  · Severe right atrial enlargement.  · Mild mitral regurgitation.  · Elevated central venous pressure (15 mmHg).  · The estimated PA systolic pressure is 59 mmHg.  · There is pulmonary hypertension.

## 2020-10-22 PROBLEM — I50.9 CONGESTIVE HEART FAILURE: Status: ACTIVE | Noted: 2020-10-22

## 2020-10-22 LAB
ANION GAP SERPL CALC-SCNC: 9 MMOL/L (ref 8–16)
BNP SERPL-MCNC: 402 PG/ML (ref 0–99)
BUN SERPL-MCNC: 13 MG/DL (ref 8–23)
CALCIUM SERPL-MCNC: 9 MG/DL (ref 8.7–10.5)
CHLORIDE SERPL-SCNC: 91 MMOL/L (ref 95–110)
CO2 SERPL-SCNC: 30 MMOL/L (ref 23–29)
CREAT SERPL-MCNC: 0.8 MG/DL (ref 0.5–1.4)
EST. GFR  (AFRICAN AMERICAN): >60 ML/MIN/1.73 M^2
EST. GFR  (NON AFRICAN AMERICAN): >60 ML/MIN/1.73 M^2
GLUCOSE SERPL-MCNC: 106 MG/DL (ref 70–110)
POTASSIUM SERPL-SCNC: 3.8 MMOL/L (ref 3.5–5.1)
SODIUM SERPL-SCNC: 130 MMOL/L (ref 136–145)

## 2020-10-22 PROCEDURE — 97110 THERAPEUTIC EXERCISES: CPT

## 2020-10-22 PROCEDURE — 99291 PR CRITICAL CARE, E/M 30-74 MINUTES: ICD-10-PCS | Mod: ,,, | Performed by: INTERNAL MEDICINE

## 2020-10-22 PROCEDURE — 94761 N-INVAS EAR/PLS OXIMETRY MLT: CPT

## 2020-10-22 PROCEDURE — 97535 SELF CARE MNGMENT TRAINING: CPT

## 2020-10-22 PROCEDURE — 25000003 PHARM REV CODE 250: Performed by: INTERNAL MEDICINE

## 2020-10-22 PROCEDURE — 99223 PR INITIAL HOSPITAL CARE,LEVL III: ICD-10-PCS | Mod: ,,, | Performed by: INTERNAL MEDICINE

## 2020-10-22 PROCEDURE — 21400001 HC TELEMETRY ROOM

## 2020-10-22 PROCEDURE — 27100171 HC OXYGEN HIGH FLOW UP TO 24 HOURS

## 2020-10-22 PROCEDURE — 80048 BASIC METABOLIC PNL TOTAL CA: CPT

## 2020-10-22 PROCEDURE — 36415 COLL VENOUS BLD VENIPUNCTURE: CPT

## 2020-10-22 PROCEDURE — 94640 AIRWAY INHALATION TREATMENT: CPT

## 2020-10-22 PROCEDURE — 25000003 PHARM REV CODE 250: Performed by: HOSPITALIST

## 2020-10-22 PROCEDURE — 63600175 PHARM REV CODE 636 W HCPCS: Performed by: INTERNAL MEDICINE

## 2020-10-22 PROCEDURE — 99900035 HC TECH TIME PER 15 MIN (STAT)

## 2020-10-22 PROCEDURE — 25000242 PHARM REV CODE 250 ALT 637 W/ HCPCS: Performed by: HOSPITALIST

## 2020-10-22 PROCEDURE — 83880 ASSAY OF NATRIURETIC PEPTIDE: CPT

## 2020-10-22 PROCEDURE — 99223 1ST HOSP IP/OBS HIGH 75: CPT | Mod: ,,, | Performed by: INTERNAL MEDICINE

## 2020-10-22 PROCEDURE — 99291 CRITICAL CARE FIRST HOUR: CPT | Mod: ,,, | Performed by: INTERNAL MEDICINE

## 2020-10-22 PROCEDURE — 25000242 PHARM REV CODE 250 ALT 637 W/ HCPCS: Performed by: INTERNAL MEDICINE

## 2020-10-22 RX ORDER — MAG HYDROX/ALUMINUM HYD/SIMETH 200-200-20
30 SUSPENSION, ORAL (FINAL DOSE FORM) ORAL EVERY 6 HOURS PRN
Status: DISCONTINUED | OUTPATIENT
Start: 2020-10-22 | End: 2020-10-31 | Stop reason: HOSPADM

## 2020-10-22 RX ORDER — ALBUTEROL SULFATE 2.5 MG/.5ML
2.5 SOLUTION RESPIRATORY (INHALATION) EVERY 4 HOURS PRN
Status: DISCONTINUED | OUTPATIENT
Start: 2020-10-22 | End: 2020-10-31 | Stop reason: HOSPADM

## 2020-10-22 RX ORDER — FUROSEMIDE 10 MG/ML
60 INJECTION INTRAMUSCULAR; INTRAVENOUS
Status: DISCONTINUED | OUTPATIENT
Start: 2020-10-22 | End: 2020-10-23

## 2020-10-22 RX ADMIN — FUROSEMIDE 60 MG: 10 INJECTION, SOLUTION INTRAMUSCULAR; INTRAVENOUS at 09:10

## 2020-10-22 RX ADMIN — METOPROLOL TARTRATE 50 MG: 50 TABLET, FILM COATED ORAL at 09:10

## 2020-10-22 RX ADMIN — ATORVASTATIN CALCIUM 40 MG: 40 TABLET, FILM COATED ORAL at 09:10

## 2020-10-22 RX ADMIN — ALBUTEROL SULFATE 2.5 MG: 2.5 SOLUTION RESPIRATORY (INHALATION) at 08:10

## 2020-10-22 RX ADMIN — ALUMINUM HYDROXIDE, MAGNESIUM HYDROXIDE, AND SIMETHICONE 30 ML: 200; 200; 20 SUSPENSION ORAL at 04:10

## 2020-10-22 RX ADMIN — APIXABAN 2.5 MG: 2.5 TABLET, FILM COATED ORAL at 09:10

## 2020-10-22 RX ADMIN — LEVOTHYROXINE SODIUM 75 MCG: 75 TABLET ORAL at 09:10

## 2020-10-22 RX ADMIN — AMIODARONE HYDROCHLORIDE 400 MG: 200 TABLET ORAL at 09:10

## 2020-10-22 RX ADMIN — OXYCODONE HYDROCHLORIDE AND ACETAMINOPHEN 1 TABLET: 5; 325 TABLET ORAL at 09:10

## 2020-10-22 RX ADMIN — IPRATROPIUM BROMIDE 0.5 MG: 0.5 SOLUTION RESPIRATORY (INHALATION) at 08:10

## 2020-10-22 RX ADMIN — OXYCODONE HYDROCHLORIDE AND ACETAMINOPHEN 1 TABLET: 5; 325 TABLET ORAL at 04:10

## 2020-10-22 RX ADMIN — FUROSEMIDE 60 MG: 10 INJECTION, SOLUTION INTRAMUSCULAR; INTRAVENOUS at 10:10

## 2020-10-22 NOTE — CONSULTS
[facility]  Pulmonology  Consult Note    Patient Name: Jeannie Hutchins  MRN: [id number]  Admission Date: 10/18/2020  Hospital Length of Stay: 4 days  Code Status: Full Code  Attending Physician: Marc Wilson MD  Primary Care Provider: Paige Mcleod MD   Principal Problem: Acute on chronic diastolic heart failure    Consults  Subjective:     HPI:  84 y/o w/ h/o afib, HFpEF who presented on 10/18 with decompensated CHF. Transferred to the ICU for high oxygen requirements. Started on aggressive diuresis with steady improvement in oxygen but still requiring HFNC. Started on amiodarone on arrival and has converted to NSR.    Currently, patient reports a dry cough. Denies f/c/ns, chest pain, sputum production, hemoptysis. She is a never smoker. Carries a dx of ILD in the chart, but she does not recall hearing that diagnosis. Denies seeing a pulmonologist. Denies inhaler use. She does cont to have mild orthopnea         Past Medical History:   Diagnosis Date    Anticoagulant long-term use     Eliquis    Arthritis     Atrial fibrillation     Blood transfusion     Carotid stenosis     CHF (congestive heart failure)     Edema     Hearing loss     [place] (hard of hearing)     Hypercholesterolemia     Hypertension     Psychiatric problem     Thyroid disease        Past Surgical History:   Procedure Laterality Date    CARDIOVERSION  10/19/2020    Procedure: Cardioversion;  Surgeon: Brandon Elias MD;  Location: [facility] CATH LAB;  Service: Cardiology;;    CARPAL TUNNEL RELEASE  2012    right hand    ESOPHAGOGASTRODUODENOSCOPY Left 8/29/2018    Procedure: EGD (ESOPHAGOGASTRODUODENOSCOPY);  Surgeon: Oniel Dorsey MD;  Location: [facility] ENDO;  Service: Endoscopy;  Laterality: Left;    HYSTERECTOMY      left carotid endartectomy  5/2006    TONSILLECTOMY      TREATMENT OF CARDIAC ARRHYTHMIA N/A 10/19/2020    Procedure: Transesophageal echo (BLADIMIR) intra-procedure log documentation;  Surgeon:  Brandon Elias MD;  Location: [facility] CATH LAB;  Service: Cardiology;  Laterality: N/A;       Review of patient's allergies indicates:   Allergen Reactions    Hydrochlorothiazide Other (See Comments)     hyponatremia    Strawberry Swelling     Tongue swells up and a generalized rash.    Lisinopril Other (See Comments)     Cough       Family History     Problem Relation (Age of Onset)    Breast cancer Daughter (50)    Cancer Brother (65), Sister (81), Sister, Sister    Heart disease Father    Ovarian cancer Sister (81)    Schizophrenia Son        Tobacco Use    Smoking status: Never Smoker    Smokeless tobacco: Never Used   Substance and Sexual Activity    Alcohol use: No    Drug use: No    Sexual activity: Never     Partners: Male         Review of Systems   Constitutional: Positive for activity change. Negative for chills, diaphoresis, fatigue and fever.   HENT: Negative for congestion, postnasal drip, sinus pressure, sinus pain, sneezing, sore throat, trouble swallowing and voice change.    Respiratory: Positive for cough and shortness of breath. Negative for chest tightness, wheezing and stridor.    Cardiovascular: Negative for chest pain, palpitations and leg swelling.   Gastrointestinal: Negative for diarrhea, nausea and vomiting.   Musculoskeletal: Negative for arthralgias and myalgias.   Skin: Negative for color change and rash.   Neurological: Negative for dizziness, light-headedness and headaches.   Hematological: Negative for adenopathy.   Psychiatric/Behavioral: Negative for agitation.     Objective:     Vital Signs (Most Recent):  Temp: 98.9 °F (37.2 °C) (10/22/20 1200)  Pulse: 60 (10/22/20 1254)  Resp: 16 (10/22/20 1254)  BP: 109/62 (10/22/20 1200)  SpO2: (!) 89 % (10/22/20 1254) Vital Signs (24h Range):  Temp:  [97.8 °F (36.6 °C)-99 °F (37.2 °C)] 98.9 °F (37.2 °C)  Pulse:  [60-97] 60  Resp:  [14-51] 16  SpO2:  [89 %-94 %] 89 %  BP: ()/(53-98) 109/62     Weight: 49.9 kg (110 lb 0.2 oz)  Body  mass index is 24.66 kg/m².      Intake/Output Summary (Last 24 hours) at 10/22/2020 1423  Last data filed at 10/22/2020 0800  Gross per 24 hour   Intake 800 ml   Output 1050 ml   Net -250 ml       Physical Exam  Vitals signs and nursing note reviewed.   Constitutional:       Appearance: Normal appearance.   HENT:      Mouth/Throat:      Pharynx: No oropharyngeal exudate or posterior oropharyngeal erythema.   Eyes:      General:         Right eye: No discharge.         Left eye: No discharge.      Extraocular Movements: Extraocular movements intact.      Conjunctiva/sclera: Conjunctivae normal.   Cardiovascular:      Rate and Rhythm: Normal rate and regular rhythm.      Heart sounds: No murmur. No friction rub. No gallop.    Pulmonary:      Effort: Pulmonary effort is normal. No respiratory distress.      Breath sounds: No stridor. Rales present. No wheezing or rhonchi.   Abdominal:      General: Bowel sounds are normal.      Palpations: Abdomen is soft.   Musculoskeletal:      Right lower leg: No edema.      Left lower leg: No edema.   Skin:     General: Skin is warm and dry.      Capillary Refill: Capillary refill takes less than 2 seconds.   Neurological:      General: No focal deficit present.      Mental Status: She is alert and oriented to person, place, and time.   Psychiatric:         Mood and Affect: Mood normal.         Behavior: Behavior normal.         Vents:  Oxygen Concentration (%): 40(weaned as tolerated ) (10/22/20 1254)    Lines/Drains/Airways     Drain            Female External Urinary Catheter 10/18/20 1901 3 days          Peripheral Intravenous Line                 Peripheral IV - Single Lumen 10/18/20 1533 20 G Anterior;Right Forearm 3 days         Peripheral IV - Single Lumen 10/21/20 0500 20 G Anterior;Right Upper Arm 1 day                Significant Labs:    CBC/Anemia Profile:  No results for input(s): WBC, HGB, HCT, PLT, MCV, RDW, IRON, FERRITIN, RETIC, FOLATE, [id number], OCCULTBLOOD in  the last 48 hours.     Chemistries:  Recent Labs   Lab 10/21/20  0511 10/22/20  0437   * 130*   K 4.5 3.8   CL 91* 91*   CO2 27 30*   BUN 12 13   CREATININE 0.7 0.8   CALCIUM 8.6* 9.0       All pertinent labs within the past 24 hours have been reviewed.    Significant Imaging:   I have reviewed and interpreted all pertinent imaging results/findings within the past 24 hours.    Assessment/Plan:     Acute respiratory failure with hypoxia and hypercarbia  Suspect this is 2/2 decompensated heart failure. CXR c/w volume overload and is improving. BNP elevated but downtrending. I see no e/o fibrosis in 2018. There are GGOs that are c/w volume.     - Cont diuresis until BUN elevates  - Follow up in pulmonary with PFTs  - Good airway clearance with nebs, OOB as tolerated and IS  - Cont to wean HFNC          Thank you for your consult. I will sign off. Please contact us if you have any additional questions.     Marcos Zapata MD  Pulmonology  [facility]

## 2020-10-22 NOTE — CARE UPDATE
Transfer Summary:     84 y/o female presents with acute on chronic diastolic heart failure probably precipitated by AFib with RVR.  Started on IV Lasix.  Admitted to ICU on Amiodarone gtt.  Cardiology consulted.  Patient had cardioversion on 10/19 to NSR. Continued on high dose lasix in ICU. Pulmonary/critical care was consulted on 10/21 due to high 02 requirements. PT/OT evaluated the patient and rec: home with H/H    Plan: 02 requirements improving. Patient very sharp for 84.  Pulmonary recommends continuing lasix until dry. Home in 2 days.

## 2020-10-22 NOTE — SUBJECTIVE & OBJECTIVE
Interval History: No new issues. Very comfortable.     Review of Systems   Constitutional: Positive for activity change. Negative for chills, diaphoresis, fatigue and fever.   HENT: Negative for congestion and dental problem.    Respiratory: Negative for apnea, choking, chest tightness and shortness of breath.    Cardiovascular: Negative for chest pain.   Gastrointestinal: Negative for abdominal distention, abdominal pain, anal bleeding, blood in stool, diarrhea, nausea and vomiting.   Endocrine: Negative for cold intolerance.   Genitourinary: Negative for difficulty urinating.   Musculoskeletal: Negative for arthralgias.   Skin: Negative for color change.   Neurological: Negative for dizziness.   Psychiatric/Behavioral: Negative for agitation and behavioral problems.     Objective:     Vital Signs (Most Recent):  Temp: 98 °F (36.7 °C) (10/22/20 0305)  Pulse: 77 (10/22/20 0605)  Resp: 17 (10/22/20 0605)  BP: (!) 161/67 (10/22/20 0605)  SpO2: (!) 92 % (10/22/20 0605) Vital Signs (24h Range):  Temp:  [97.8 °F (36.6 °C)-98.6 °F (37 °C)] 98 °F (36.7 °C)  Pulse:  [59-97] 77  Resp:  [11-51] 17  SpO2:  [89 %-95 %] 92 %  BP: ()/(53-98) 161/67     Weight: 49.9 kg (110 lb 0.2 oz)  Body mass index is 24.66 kg/m².    Intake/Output Summary (Last 24 hours) at 10/22/2020 0823  Last data filed at 10/22/2020 0605  Gross per 24 hour   Intake 750 ml   Output 1550 ml   Net -800 ml      Physical Exam  Vitals signs and nursing note reviewed.   Constitutional:       General: She is not in acute distress.     Appearance: Normal appearance. She is normal weight. She is not ill-appearing, toxic-appearing or diaphoretic.   HENT:      Head: Normocephalic and atraumatic.   Cardiovascular:      Rate and Rhythm: Normal rate and regular rhythm.   Pulmonary:      Effort: Pulmonary effort is normal. No respiratory distress.      Breath sounds: No stridor. No wheezing or rales.   Abdominal:      General: Bowel sounds are normal. There is no  distension.      Tenderness: There is no abdominal tenderness.      Hernia: No hernia is present.   Neurological:      Mental Status: She is alert and oriented to person, place, and time.   Psychiatric:         Mood and Affect: Mood normal.         Behavior: Behavior normal.         Significant Labs:   CBC: No results for input(s): WBC, HGB, HCT, PLT in the last 48 hours.  CMP:   Recent Labs   Lab 10/21/20  0511 10/22/20  0437   * 130*   K 4.5 3.8   CL 91* 91*   CO2 27 30*   * 106   BUN 12 13   CREATININE 0.7 0.8   CALCIUM 8.6* 9.0   ANIONGAP 13 9   EGFRNONAA >60 >60       Significant Imaging

## 2020-10-22 NOTE — ASSESSMENT & PLAN NOTE
Wonder how much this is contributing to hypoxia. Pulmonary was consulted.     No evidence of this per pulmonary

## 2020-10-22 NOTE — ASSESSMENT & PLAN NOTE
Patient presents with Acute Hypoxemic Respiratory Failure secondary to CHF exacerbation.  Exacerbation probably triggered by poor HR control.  Hx of PAF.  Presents in Afib with RVR.  Cardiology consulted and patient admitted to ICU on Amiodarone drip.  Some improvement of symptoms with diuresis.  Continue IV Lasix.  Rate control.  Fluid restriction.  Probably cardioversion today.    Continue high dose lasix. BNP down. Still requiring a good bit of 02.       Increasing lasix today to 80 bid. BNP     Discussed with Pulm. I agree that this is likely still fluid.  Will increase lasix

## 2020-10-22 NOTE — PLAN OF CARE
No falls/injuries this shift. Skin intact. Tolerating po diet. Able to wean on vapotherm. Purewick in place. Monitoring intake and output. Able to participate in therapy today.

## 2020-10-22 NOTE — PT/OT/SLP PROGRESS
Occupational Therapy   Treatment    Name: Jeannie Hutchins  MRN: [id number]  Admitting Diagnosis:  Acute on chronic diastolic heart failure  3 Days Post-Op    Recommendations:     Discharge Recommendations: home health OT(with family assist)  Discharge Equipment Recommendations:  (TBD pending progress)  Barriers to discharge:  None    Assessment:     Jeannie Hutchins is a 85 y.o. female with a medical diagnosis of Acute on chronic diastolic heart failure.   Performance deficits affecting function are weakness, impaired endurance, impaired self care skills, impaired functional mobilty, gait instability, impaired balance, decreased coordination, decreased upper extremity function, decreased lower extremity function, decreased safety awareness, pain, impaired cardiopulmonary response to activity.   The patient tolerated sitting on the EOB ~30 min with SBA/(S). The patient's SpO2 dropped to 84% with activity with slow recovery to 90% with verbal cues to perform pursed lipped breathing. OT will continue and progress as the patient tolerates.    Rehab Prognosis:  Good and Fair; patient would benefit from acute skilled OT services to address these deficits and reach maximum level of function.       Plan:     Patient to be seen 3 x/week, 5 x/week to address the above listed problems via self-care/home management, therapeutic activities, therapeutic exercises  · Plan of Care Expires: 11/04/20  · Plan of Care Reviewed with: patient    Subjective     Pain/Comfort:  Pain Rating 1: (minimal c/o pain)  Location 1: neck  Pain Addressed 1: Reposition, Distraction  Pain Rating Post-Intervention 1: 0/10    Objective:     Communicated with: nurseAra prior to session.  Patient found HOB elevated with telemetry, pulse ox (continuous), oxygen, peripheral IV, PureWick, blood pressure cuff, SCD(HF NC 15L 50%) upon OT entry to room.    General Precautions: Standard, respiratory, fall   Orthopedic Precautions:N/A   Braces: N/A     Occupational  Performance:     Bed Mobility:    · Patient completed Rolling/Turning to Left with  modified independence  · Patient completed Rolling/Turning to Right with modified independence  · Patient completed Scooting/Bridging with minimum assistance  · Patient completed Supine to Sit with contact guard assistance  · Patient completed Sit to Supine with stand by assistance     Functional Mobility/Transfers:  · Functional Mobility: The patient tolerated sitting EOB ~35 min with SBA/(S). The patient required verbal cues to perform pursed lipped breathing while seated EOB to bring P2 sats from 84% to 90.    Activities of Daily Living:  · Grooming: The patient was able to comb the front of her hair but was dependent to comb the back 2* decreased strength and ROM in B shoulders.     · Bathing: The patient stated she needed help to wipe front/back virgil area, neck and back. The patient was offered a wipe but refused to attempt.    · Upper Body Dressing: maximal assistance to don/doff gown while seated on the EOB  · Lower Body Dressing: dependence    · Toileting: dependence The patient has a PureWick for urination. The patient states she is unable to hold her urine long enough to request a bedpan.      [facility] 6 Click ADL: 20    Treatment & Education:  The patient participated in self care and functional mobility as noted above. The patient's vital signs were monitored during the OT treatment session with patient requiring VC to perform pursed lipped breathing to increase SpO2 to 90%. The patient was able to perform AAROM to B shoulder x5 reps,AROM  B shoulder elevation and b elbow flex/ext x10 reps while seated on the EOB. The patient required rest between tasks with VC for pursed lipped breathing.    Patient left HOB elevated with all lines intact, call button in reach, nurse, Ara notified and friend presentEducation:      GOALS:   Multidisciplinary Problems     Occupational Therapy Goals        Problem: Occupational Therapy Goal     Goal Priority Disciplines Outcome Interventions   Occupational Therapy Goal     OT, PT/OT Ongoing, Progressing    Description: Goals to be met by: 11/4/20    Patient will increase functional independence with ADLs by performing:    UE Dressing with Modified [place] and Set-up Assistance.  LE Dressing with Modified [place] and Supervision.  Grooming while standing at sink with Modified [place] and Supervision.  Toileting from toilet with Modified [place] and Set-up Assistance for hygiene and clothing management.   Rolling to Bilateral with Modified [place].   Supine to sit with Modified [place] and Supervision.  Step transfer with Modified [place] and Supervision  Toilet transfer to toilet with Modified [place] and Supervision.  Upper extremity exercise program x15 reps per handout, with independence.  Educate the patient re: Home Safety                      Time Tracking:     OT Date of Treatment: 10/22/20  OT Start Time: 1053  OT Stop Time: 1136  OT Total Time (min): 43 min    Billable Minutes:Self Care/Home Management 33  Therapeutic Exercise 10  Total Time 43    Myra Feliciano OT  10/22/2020

## 2020-10-22 NOTE — NURSING
Patient has arrived on the unit, on 6L N[place], oriented to the room, turned and pulled up in bed, New purewick applied, Call light given, wheels locked bed in lowest position, bed alarm set, will continue to monitor

## 2020-10-22 NOTE — PROGRESS NOTES
[facility]  Cardiology  Progress Note    Patient Name: Jeannie Hutchins  MRN: [id number]  Admission Date: 10/18/2020  Hospital Length of Stay: 4 days  Code Status: Full Code   Attending Physician: Marc Wilson MD   Primary Care Physician: Paige Mcleod MD  Expected Discharge Date:   Principal Problem:Acute on chronic diastolic heart failure    Subjective:       Interval History:  Patient continues to be chest pain free.  Continues to be normal sinus rhythm.  Appears to be euvolemic.          Review of Systems   Constitution: Positive for malaise/fatigue.   HENT: Negative.    Eyes: Negative.    Cardiovascular: Negative.    Respiratory: Negative.    Endocrine: Negative.    Hematologic/Lymphatic: Negative.    Skin: Negative.    Musculoskeletal: Negative.    Gastrointestinal: Negative.    Genitourinary: Negative.    Neurological: Negative.    Psychiatric/Behavioral: Negative.    Allergic/Immunologic: Negative.      Objective:     Vital Signs (Most Recent):  Temp: 98 °F (36.7 °C) (10/22/20 0305)  Pulse: 62 (10/22/20 1017)  Resp: 18 (10/22/20 1017)  BP: (!) 116/56 (10/22/20 1017)  SpO2: (!) 91 % (10/22/20 1017) Vital Signs (24h Range):  Temp:  [97.8 °F (36.6 °C)-98.6 °F (37 °C)] 98 °F (36.7 °C)  Pulse:  [59-97] 62  Resp:  [14-51] 18  SpO2:  [89 %-95 %] 91 %  BP: ()/(53-98) 116/56     Weight: 49.9 kg (110 lb 0.2 oz)  Body mass index is 24.66 kg/m².     SpO2: (!) 91 %  O2 Device (Oxygen Therapy): Vapotherm      Intake/Output Summary (Last 24 hours) at 10/22/2020 1058  Last data filed at 10/22/2020 0605  Gross per 24 hour   Intake 750 ml   Output 1500 ml   Net -750 ml       Lines/Drains/Airways     Drain            Female External Urinary Catheter 10/18/20 1901 3 days          Peripheral Intravenous Line                 Peripheral IV - Single Lumen 10/18/20 1533 20 G Anterior;Right Forearm 3 days         Peripheral IV - Single Lumen 10/21/20 0500 20 G Anterior;Right Upper Arm 1 day                 Physical Exam   Constitutional: She is oriented to person, place, and time. She appears well-developed and well-nourished.   HENT:   Head: Normocephalic.   Eyes: Pupils are equal, round, and reactive to light.   Neck: Normal range of motion. Neck supple.   Cardiovascular: Normal rate and regular rhythm.   Pulmonary/Chest: Effort normal and breath sounds normal.   Abdominal: Soft. Normal appearance and bowel sounds are normal. There is no abdominal tenderness.   Musculoskeletal: Normal range of motion.   Neurological: She is alert and oriented to person, place, and time.   Skin: Skin is warm.   Psychiatric: She has a normal mood and affect.       Significant Labs:   BMP:   Recent Labs   Lab 10/21/20  0511 10/22/20  0437   * 106   * 130*   K 4.5 3.8   CL 91* 91*   CO2 27 30*   BUN 12 13   CREATININE 0.7 0.8   CALCIUM 8.6* 9.0   , CMP   Recent Labs   Lab 10/21/20  0511 10/22/20  0437   * 130*   K 4.5 3.8   CL 91* 91*   CO2 27 30*   * 106   BUN 12 13   CREATININE 0.7 0.8   CALCIUM 8.6* 9.0   ANIONGAP 13 9   ESTGFRAFRICA >60 >60   EGFRNONAA >60 >60   , CBC No results for input(s): WBC, HGB, HCT, PLT in the last 48 hours., INR No results for input(s): INR, PROTIME in the last 48 hours., Lipid Panel No results for input(s): CHOL, HDL, LDLCALC, TRIG, CHOLHDL in the last 48 hours., Troponin   No results for input(s): TROPONINI in the last 48 hours. and All pertinent lab results from the last 24 hours have been reviewed.    Significant Imaging: Echocardiogram:   Transthoracic echo (TTE) complete (Cupid Only):   Results for orders placed or performed during the hospital encounter of 10/18/20   Echo Color Flow Doppler? Yes   Result Value Ref Range    Ascending aorta 2.34 cm    STJ 2.24 cm    AV mean gradient 9 mmHg    Ao peak david 2.09 m/s    Ao VTI 39.82 cm    IVRT 87.66 msec    IVS 0.98 0.6 - 1.1 cm    LA size 4.41 cm    Left Atrium Major Axis 5.42 cm    Left Atrium Minor Axis 5.04 cm    LVIDd  3.75 3.5 - 6.0 cm    LVIDs 1.64 (A) 2.1 - 4.0 cm    LVOT diameter 1.98 cm    LVOT peak VTI 23.89 cm    Posterior Wall 0.97 0.6 - 1.1 cm    RA Major Axis 5.88 cm    RA Width 3.89 cm    RVDD 3.88 cm    Sinus 2.42 cm    TAPSE 1.82 cm    TR Max Singh 3.30 m/s    LA WIDTH 4.09 cm    PV PEAK VELOCITY 1.00 cm/s    LV Diastolic Volume 59.97 mL    LV Systolic Volume 7.70 mL    RV S' 13.62 cm/s    LVOT peak singh 1.27 m/s    FS 56 %    LA volume 80.08 cm3    LV mass 110.81 g    Left Ventricle Relative Wall Thickness 0.52 cm    AV valve area 1.85 cm2    AV Velocity Ratio 0.61     AV index (prosthetic) 0.60     LVOT area 3.1 cm2    LVOT stroke volume 73.52 cm3    AV peak gradient 17 mmHg    LV Systolic Volume Index 5.6 mL/m2    LV Diastolic Volume Index 43.54 mL/m2    LA Volume Index 58.1 mL/m2    LV Mass Index 80 g/m2    Triscuspid Valve Regurgitation Peak Gradient 44 mmHg    BSA 1.4 m2    Right Atrial Pressure (from IVC) 15 mmHg    TV rest pulmonary artery pressure 59 mmHg    Narrative    · With normal systolic function. The estimated ejection fraction is 65%.  · Indeterminate diastolic function.  · Moderate tricuspid regurgitation.  · Severe left atrial enlargement.  · Normal right ventricular systolic function.  · Severe right atrial enlargement.  · Mild mitral regurgitation.  · Elevated central venous pressure (15 mmHg).  · The estimated PA systolic pressure is 59 mmHg.  · There is pulmonary hypertension.            Assessment and Plan:     Brief HPI:     * Acute on chronic diastolic heart failure  IV diuresis.  Likely precipitated by AFib.    10/20:  Now euvolemic.    Paroxysmal atrial fibrillation  Currently in AFib.  On amiodarone drip.  Continues to be in AFib.  Discussed various options with the patient.  Considering the fact that the acute decompensated heart failure likely precipitated by AFib, will cardiovert her.  She has been on Eliquis without any missed doses.    Risks, benefits and alternatives of the  Cardioversion procedure were discussed with the patient including throat irritation, aspiration, anesthetic complications, esophageal irritation/perforation, skin irritation, arrhythmia, etc.  Patient understands and agrees to proceed.  Consent was placed on the chart.      10/20:  In normal sinus rhythm now.  Underwent cardioversion yesterday.  Will switch to oral amiodarone.    10/21:  Continue oral amiodarone.  Continues to be in normal sinus rhythm.    10/22:  Continues to be in normal sinus rhythm.  Will sign off.  Follow-up in Cardiology Clinic     Acute respiratory failure with hypoxia and hypercarbia  Management per primary    Essential hypertension  Titrate antihypertensives as needed for appropriate blood pressure control        VTE Risk Mitigation (From admission, onward)         Ordered     apixaban tablet 2.5 mg  2 times daily      10/18/20 1342     IP VTE HIGH RISK PATIENT  Once      10/18/20 1340     Place sequential compression device  Until discontinued      10/18/20 1340                Brandon Elias MD  Cardiology  [facility]    Critical Care Time:  35 minutes     Critical care was time spent personally by me on the following activities: development of treatment plan with patient or surrogate and bedside caregivers, discussions with consultants, evaluation of patient's response to treatment, examination of patient, ordering and performing treatments and interventions, ordering and review of laboratory studies, ordering and review of radiographic studies, pulse oximetry, re-evaluation of patient's condition. This critical care time did not overlap with that of any other provider or involve time for any procedures.

## 2020-10-22 NOTE — ASSESSMENT & PLAN NOTE
Suspect this is 2/2 decompensated heart failure. CXR c/w volume overload and is improving. BNP elevated but downtrending. I see no e/o fibrosis in 2018. There are GGOs that are c/w volume.     - Cont diuresis until BUN elevates  - Follow up in pulmonary with PFTs  - Good airway clearance with nebs, OOB as tolerated and IS  - Cont to wean HFNC

## 2020-10-22 NOTE — PROGRESS NOTES
"PALLIATIVE CARE PROGRESS NOTE:    Brief bedside visit.  Patient resting comfortably and awakens to noise in room.  Her significant other is sitting with her.  She reports that she "feel much better" and has no SOB and no pain at present.  She also let me know that the hallucinations stopped and she didn't have any last night.      Will continue to followup.  Will reach out to her son Dane Mera regarding HCPOA documents and to discuss advance care planning.    Esther Nuno, CARISAN, RN, CCRN, PN   Palliative Care Nurse Coordinator   [facility]  [phone]    "

## 2020-10-22 NOTE — NURSING
[facility]  ICU Shift Summary  Date: 10/22/2020      COVID Test: (--)  Isolation: No active isolations     Prehospitalization: Home  Admit Date / LOS : 10/18/2020/ 4 days    Diagnosis: Acute on chronic diastolic heart failure    Consults:        Active: Cardio, OT, Palliative, PT and Pulm CC       Needed: N/A     Code Status: Full Code   Advanced Directive: <no information>    LDA: PIV and Purewick       Central Lines/Site/Justification:Patient Does Not Have Central Line       Urinary Cath/Order/Justification:Patient Does Not Have Urinary Catheter    Vasopressors/Infusions:        GOALS: Volume/ Hemodynamic: N/A                     RASS: N/A    Pain Management: PO       Pain Controlled: yes     Rhythm: NSR    Respiratory Device: Nasal Cannula    Oxygen Concentration (%):  [40-60] 40             Most Recent SBT/ SAT: N/A       MOVE Screen: PT Consult    VTE Prophylaxis: Pharm  Mobility: Bedrest  Stress Ulcer Prophylaxis: Yes    Dietary: PO  Tolerance: yes  /  Advancement: yes    I & O (24h):    Intake/Output Summary (Last 24 hours) at 10/22/2020 1645  Last data filed at 10/22/2020 1600  Gross per 24 hour   Intake 1000 ml   Output 1400 ml   Net -400 ml        Restraints: No    Significant Dates:  Post Op Date: N/A  Rescue Date: N/A  Imaging/ Diagnostics: N/A    Noteworthy Labs:  none    CBC/Anemia Labs: Coags:    Recent Labs   Lab 10/18/20  0855   WBC 9.63   HGB 11.5*   HCT 33.3*      MCV 92   RDW 13.5    Recent Labs   Lab 10/18/20  0855   INR 1.1   APTT 28.7        Chemistries:   Recent Labs   Lab 10/18/20  0935 10/18/20  1454  10/20/20  0419 10/21/20  0511 10/22/20  0437   *  --    < > 134* 131* 130*   K 4.0  --    < > 2.9* 4.5 3.8   CL 97  --    < > 93* 91* 91*   CO2 22*  --    < > 30* 27 30*   BUN 20  --    < > 6* 12 13   CREATININE 0.9  --    < > 0.7 0.7 0.8   CALCIUM 8.7  --    < > 8.5* 8.6* 9.0   PROT 6.3  --   --   --   --   --    BILITOT 1.2*  --   --   --   --   --    ALKPHOS  79  --   --   --   --   --    ALT 55*  --   --   --   --   --    AST 21  --   --   --   --   --    MG  --  1.8  --  1.8  --   --     < > = values in this interval not displayed.        Cardiac Enzymes: Ejection Fractions:    No results for input(s): CPK, CPKMB, MB, TROPONINI in the last 72 hours. Nuc Rest EF   Date Value Ref Range Status   07/10/2019 81.0  Final        POCT Glucose: HbA1c:    No results for input(s): POCTGLUCOSE in the last 168 hours. Hemoglobin A1C   Date Value Ref Range Status   10/18/2020 5.6 4.0 - 5.6 % Final     Comment:     ADA Screening Guidelines:  5.7-6.4%  Consistent with prediabetes  >or=6.5%  Consistent with diabetes  High levels of fetal hemoglobin interfere with the HbA1C  assay. Heterozygous hemoglobin variants (HbS, HgC, etc)do  not significantly interfere with this assay.   However, presence of multiple variants may affect accuracy.             ICU LOS 4d 2h  Level of Care: OK to Transfer    Shift Summary/Plan for the shift: Pt being transferred to [facility] [address]. [address], [place]. VSS. Remains in NSR on monitor. Tolerating diet. 60 mg of lasix given today. Oxygen weaned to 6L NC. Appears to be tolerating well. No falls, injuries or new skin breakdown, precautions maintained for all.

## 2020-10-22 NOTE — PLAN OF CARE
Problem: Occupational Therapy Goal  Goal: Occupational Therapy Goal  Description: Goals to be met by: 11/4/20    Patient will increase functional independence with ADLs by performing:    UE Dressing with Modified [place] and Set-up Assistance.  LE Dressing with Modified [place] and Supervision.  Grooming while standing at sink with Modified [place] and Supervision.  Toileting from toilet with Modified [place] and Set-up Assistance for hygiene and clothing management.   Rolling to Bilateral with Modified [place].   Supine to sit with Modified [place] and Supervision.  Step transfer with Modified [place] and Supervision  Toilet transfer to toilet with Modified [place] and Supervision.  Upper extremity exercise program x15 reps per handout, with independence.  Educate the patient re: Home Safety     Outcome: Ongoing, Progressing   The patient is progressing in OT.

## 2020-10-22 NOTE — HPI
84 y/o w/ h/o afib, HFpEF who presented on 10/18 with decompensated CHF. Transferred to the ICU for high oxygen requirements. Started on aggressive diuresis with steady improvement in oxygen but still requiring HFNC. Started on amiodarone on arrival and has converted to NSR.    Currently, patient reports a dry cough. Denies f/c/ns, chest pain, sputum production, hemoptysis. She is a never smoker. Carries a dx of ILD in the chart, but she does not recall hearing that diagnosis. Denies seeing a pulmonologist. Denies inhaler use. She does cont to have mild orthopnea

## 2020-10-22 NOTE — SUBJECTIVE & OBJECTIVE
Interval History:  Patient continues to be chest pain free.  Continues to be normal sinus rhythm.  Appears to be euvolemic.          Review of Systems   Constitution: Positive for malaise/fatigue.   HENT: Negative.    Eyes: Negative.    Cardiovascular: Negative.    Respiratory: Negative.    Endocrine: Negative.    Hematologic/Lymphatic: Negative.    Skin: Negative.    Musculoskeletal: Negative.    Gastrointestinal: Negative.    Genitourinary: Negative.    Neurological: Negative.    Psychiatric/Behavioral: Negative.    Allergic/Immunologic: Negative.      Objective:     Vital Signs (Most Recent):  Temp: 98 °F (36.7 °C) (10/22/20 0305)  Pulse: 62 (10/22/20 1017)  Resp: 18 (10/22/20 1017)  BP: (!) 116/56 (10/22/20 1017)  SpO2: (!) 91 % (10/22/20 1017) Vital Signs (24h Range):  Temp:  [97.8 °F (36.6 °C)-98.6 °F (37 °C)] 98 °F (36.7 °C)  Pulse:  [59-97] 62  Resp:  [14-51] 18  SpO2:  [89 %-95 %] 91 %  BP: ()/(53-98) 116/56     Weight: 49.9 kg (110 lb 0.2 oz)  Body mass index is 24.66 kg/m².     SpO2: (!) 91 %  O2 Device (Oxygen Therapy): Vapotherm      Intake/Output Summary (Last 24 hours) at 10/22/2020 1058  Last data filed at 10/22/2020 0605  Gross per 24 hour   Intake 750 ml   Output 1500 ml   Net -750 ml       Lines/Drains/Airways     Drain            Female External Urinary Catheter 10/18/20 1901 3 days          Peripheral Intravenous Line                 Peripheral IV - Single Lumen 10/18/20 1533 20 G Anterior;Right Forearm 3 days         Peripheral IV - Single Lumen 10/21/20 0500 20 G Anterior;Right Upper Arm 1 day                Physical Exam   Constitutional: She is oriented to person, place, and time. She appears well-developed and well-nourished.   HENT:   Head: Normocephalic.   Eyes: Pupils are equal, round, and reactive to light.   Neck: Normal range of motion. Neck supple.   Cardiovascular: Normal rate and regular rhythm.   Pulmonary/Chest: Effort normal and breath sounds normal.   Abdominal: Soft.  Normal appearance and bowel sounds are normal. There is no abdominal tenderness.   Musculoskeletal: Normal range of motion.   Neurological: She is alert and oriented to person, place, and time.   Skin: Skin is warm.   Psychiatric: She has a normal mood and affect.       Significant Labs:   BMP:   Recent Labs   Lab 10/21/20  0511 10/22/20  0437   * 106   * 130*   K 4.5 3.8   CL 91* 91*   CO2 27 30*   BUN 12 13   CREATININE 0.7 0.8   CALCIUM 8.6* 9.0   , CMP   Recent Labs   Lab 10/21/20  0511 10/22/20  0437   * 130*   K 4.5 3.8   CL 91* 91*   CO2 27 30*   * 106   BUN 12 13   CREATININE 0.7 0.8   CALCIUM 8.6* 9.0   ANIONGAP 13 9   ESTGFRAFRICA >60 >60   EGFRNONAA >60 >60   , CBC No results for input(s): WBC, HGB, HCT, PLT in the last 48 hours., INR No results for input(s): INR, PROTIME in the last 48 hours., Lipid Panel No results for input(s): CHOL, HDL, LDLCALC, TRIG, CHOLHDL in the last 48 hours., Troponin   No results for input(s): TROPONINI in the last 48 hours. and All pertinent lab results from the last 24 hours have been reviewed.    Significant Imaging: Echocardiogram:   Transthoracic echo (TTE) complete (Cupid Only):   Results for orders placed or performed during the hospital encounter of 10/18/20   Echo Color Flow Doppler? Yes   Result Value Ref Range    Ascending aorta 2.34 cm    STJ 2.24 cm    AV mean gradient 9 mmHg    Ao peak singh 2.09 m/s    Ao VTI 39.82 cm    IVRT 87.66 msec    IVS 0.98 0.6 - 1.1 cm    LA size 4.41 cm    Left Atrium Major Axis 5.42 cm    Left Atrium Minor Axis 5.04 cm    LVIDd 3.75 3.5 - 6.0 cm    LVIDs 1.64 (A) 2.1 - 4.0 cm    LVOT diameter 1.98 cm    LVOT peak VTI 23.89 cm    Posterior Wall 0.97 0.6 - 1.1 cm    RA Major Axis 5.88 cm    RA Width 3.89 cm    RVDD 3.88 cm    Sinus 2.42 cm    TAPSE 1.82 cm    TR Max Singh 3.30 m/s    LA WIDTH 4.09 cm    PV PEAK VELOCITY 1.00 cm/s    LV Diastolic Volume 59.97 mL    LV Systolic Volume 7.70 mL    RV S' 13.62 cm/s     LVOT peak david 1.27 m/s    FS 56 %    LA volume 80.08 cm3    LV mass 110.81 g    Left Ventricle Relative Wall Thickness 0.52 cm    AV valve area 1.85 cm2    AV Velocity Ratio 0.61     AV index (prosthetic) 0.60     LVOT area 3.1 cm2    LVOT stroke volume 73.52 cm3    AV peak gradient 17 mmHg    LV Systolic Volume Index 5.6 mL/m2    LV Diastolic Volume Index 43.54 mL/m2    LA Volume Index 58.1 mL/m2    LV Mass Index 80 g/m2    Triscuspid Valve Regurgitation Peak Gradient 44 mmHg    BSA 1.4 m2    Right Atrial Pressure (from IVC) 15 mmHg    TV rest pulmonary artery pressure 59 mmHg    Narrative    · With normal systolic function. The estimated ejection fraction is 65%.  · Indeterminate diastolic function.  · Moderate tricuspid regurgitation.  · Severe left atrial enlargement.  · Normal right ventricular systolic function.  · Severe right atrial enlargement.  · Mild mitral regurgitation.  · Elevated central venous pressure (15 mmHg).  · The estimated PA systolic pressure is 59 mmHg.  · There is pulmonary hypertension.

## 2020-10-22 NOTE — PROGRESS NOTES
[facility] Hospital Medicine  Progress Note    Patient Name: Jeannie Hutchins  MRN: [id number]  Patient Class: IP- Inpatient   Admission Date: 10/18/2020  Length of Stay: 4 days  Attending Physician: Marc Wilson MD  Primary Care Provider: Paige Mcleod MD        Subjective:     Principal Problem:Acute on chronic diastolic heart failure        HPI:  86 y/o female presents to the ER via EMS transport with shortness of breath.  Patient states she is chronically short of breath, but symptoms worsened this morning.  EMS reported patient's SpO2 was 88% on room air, improved to 96% after given 1 SL nitro tab and nitro paste en route.  Patient also reports non productive cough and 10 lb weight gain in the last 2 weeks.  Also complains of edema on bilateral lower extremities that started 3 days ago.  Patient also reports post nasal drip.  States compliance with medications. Hx of PAF and noted to be in rapid AFib on presentation.  Placed on Amiodarone gtt and admitted to ICU.  Patient was also given Lasix in ER with improvement of symptoms.  No other complaints.    Overview/Hospital Course:  86 y/o female presents with acute on chronic diastolic heart failure probably precipitated by AFib with RVR.  Started on IV Lasix.  Admitted to ICU on Amiodarone gtt.  Cardiology consulted.  Patient had cardioversion on 10/19 to NSR. Continued on high dose lasix in ICU. Pulmonary/critical care was consulted on 10/21 due to high 02 requirements. PT/OT evaluated the patient and rec: home with H/H.     Interval History: No new issues. Very comfortable.     Review of Systems   Constitutional: Positive for activity change. Negative for chills, diaphoresis, fatigue and fever.   HENT: Negative for congestion and dental problem.    Respiratory: Negative for apnea, choking, chest tightness and shortness of breath.    Cardiovascular: Negative for chest pain.   Gastrointestinal: Negative for abdominal distention,  abdominal pain, anal bleeding, blood in stool, diarrhea, nausea and vomiting.   Endocrine: Negative for cold intolerance.   Genitourinary: Negative for difficulty urinating.   Musculoskeletal: Negative for arthralgias.   Skin: Negative for color change.   Neurological: Negative for dizziness.   Psychiatric/Behavioral: Negative for agitation and behavioral problems.     Objective:     Vital Signs (Most Recent):  Temp: 98 °F (36.7 °C) (10/22/20 0305)  Pulse: 77 (10/22/20 0605)  Resp: 17 (10/22/20 0605)  BP: (!) 161/67 (10/22/20 0605)  SpO2: (!) 92 % (10/22/20 0605) Vital Signs (24h Range):  Temp:  [97.8 °F (36.6 °C)-98.6 °F (37 °C)] 98 °F (36.7 °C)  Pulse:  [59-97] 77  Resp:  [11-51] 17  SpO2:  [89 %-95 %] 92 %  BP: ()/(53-98) 161/67     Weight: 49.9 kg (110 lb 0.2 oz)  Body mass index is 24.66 kg/m².    Intake/Output Summary (Last 24 hours) at 10/22/2020 0823  Last data filed at 10/22/2020 0605  Gross per 24 hour   Intake 750 ml   Output 1550 ml   Net -800 ml      Physical Exam  Vitals signs and nursing note reviewed.   Constitutional:       General: She is not in acute distress.     Appearance: Normal appearance. She is normal weight. She is not ill-appearing, toxic-appearing or diaphoretic.   HENT:      Head: Normocephalic and atraumatic.   Cardiovascular:      Rate and Rhythm: Normal rate and regular rhythm.   Pulmonary:      Effort: Pulmonary effort is normal. No respiratory distress.      Breath sounds: No stridor. No wheezing or rales.   Abdominal:      General: Bowel sounds are normal. There is no distension.      Tenderness: There is no abdominal tenderness.      Hernia: No hernia is present.   Neurological:      Mental Status: She is alert and oriented to person, place, and time.   Psychiatric:         Mood and Affect: Mood normal.         Behavior: Behavior normal.         Significant Labs:   CBC: No results for input(s): WBC, HGB, HCT, PLT in the last 48 hours.  CMP:   Recent Labs   Lab  10/21/20  0511 10/22/20  0437   * 130*   K 4.5 3.8   CL 91* 91*   CO2 27 30*   * 106   BUN 12 13   CREATININE 0.7 0.8   CALCIUM 8.6* 9.0   ANIONGAP 13 9   EGFRNONAA >60 >60       Significant Imaging      Assessment/Plan:      * Acute on chronic diastolic heart failure  Patient presents with Acute Hypoxemic Respiratory Failure secondary to CHF exacerbation.  Exacerbation probably triggered by poor HR control.  Hx of PAF.  Presents in Afib with RVR.  Cardiology consulted and patient admitted to ICU on Amiodarone drip.  Some improvement of symptoms with diuresis.  Continue IV Lasix.  Rate control.  Fluid restriction.  Probably cardioversion today.    Continue high dose lasix. BNP down. Still requiring a good bit of 02.       Increasing lasix today to 80 bid. BNP     Discussed with Pulm. I agree that this is likely still fluid.  Will increase lasix     ILD (interstitial lung disease)  Wonder how much this is contributing to hypoxia. Pulmonary was consulted.     No evidence of this per pulmonary         Mixed hyperlipidemia  Continue Statin.      Paroxysmal atrial fibrillation  Afib with RVR as above.  S/p cardioversion on 10/19. Cards following  Now on Amio       Acute respiratory failure with hypoxia and hypercarbia  Secondary to CHF as above.  Improving. Wean 02. Continue lasix     Resolved       Generalized anxiety disorder        Essential hypertension  Continue home medications.      Hyponatremia with decreased serum osmolality          VTE Risk Mitigation (From admission, onward)         Ordered     apixaban tablet 2.5 mg  2 times daily      10/18/20 1342     IP VTE HIGH RISK PATIENT  Once      10/18/20 1340     Place sequential compression device  Until discontinued      10/18/20 1340                Discharge Planning   JULIET:      Code Status: Full Code   Is the patient medically ready for discharge?:     Reason for patient still in hospital (select all that apply): Patient unstable  Discharge Plan A:  Home with family, Home Health            Critical care time spent on the evaluation and treatment of severe organ dysfunction, review of pertinent labs and imaging studies, discussions with consulting providers and discussions with patient/family: 40  minutes.      Marc Godfrey MD  Department of Hospital Medicine   [facility]

## 2020-10-22 NOTE — PLAN OF CARE
10/22/20 1150   Discharge Reassessment   Assessment Type Discharge Planning Reassessment   Provided patient/caregiver education on the expected discharge date and the discharge plan No   Do you have any problems affording any of your prescribed medications? TBD   Discharge Plan A Home with family;Home Health   Discharge Plan B Home with family   DME Needed Upon Discharge  none   Patient choice form signed by patient/caregiver N/A   Anticipated Discharge Disposition Home-Health   Can the patient/caregiver answer the patient profile reliably? Yes, cognitively intact   How does the patient rate their overall health at the present time? Fair   Describe the patient's ability to walk at the present time. Major restrictions/daily assistance from another person   How often would a person be available to care for the patient? Whenever needed   Number of comorbid conditions (as recorded on the chart) Five or more   Post-Acute Status   Post-Acute Authorization Home Health   Home Health Status Awaiting Internal Medical Clearance

## 2020-10-22 NOTE — SUBJECTIVE & OBJECTIVE
Past Medical History:   Diagnosis Date    Anticoagulant long-term use     Eliquis    Arthritis     Atrial fibrillation     Blood transfusion     Carotid stenosis     CHF (congestive heart failure)     Edema     Hearing loss     Crow (hard of hearing)     Hypercholesterolemia     Hypertension     Psychiatric problem     Thyroid disease        Past Surgical History:   Procedure Laterality Date    CARDIOVERSION  10/19/2020    Procedure: Cardioversion;  Surgeon: Brandon Elias MD;  Location: [facility] CATH LAB;  Service: Cardiology;;    CARPAL TUNNEL RELEASE  2012    right hand    ESOPHAGOGASTRODUODENOSCOPY Left 8/29/2018    Procedure: EGD (ESOPHAGOGASTRODUODENOSCOPY);  Surgeon: Oniel Dorsey MD;  Location: [facility] ENDO;  Service: Endoscopy;  Laterality: Left;    HYSTERECTOMY      left carotid endartectomy  5/2006    TONSILLECTOMY      TREATMENT OF CARDIAC ARRHYTHMIA N/A 10/19/2020    Procedure: Transesophageal echo (BLADIMIR) intra-procedure log documentation;  Surgeon: Brandon Elias MD;  Location: [facility] CATH LAB;  Service: Cardiology;  Laterality: N/A;       Review of patient's allergies indicates:   Allergen Reactions    Hydrochlorothiazide Other (See Comments)     hyponatremia    Strawberry Swelling     Tongue swells up and a generalized rash.    Lisinopril Other (See Comments)     Cough       Family History     Problem Relation (Age of Onset)    Breast cancer Daughter (50)    Cancer Brother (65), Sister (81), Sister, Sister    Heart disease Father    Ovarian cancer Sister (81)    Schizophrenia Son        Tobacco Use    Smoking status: Never Smoker    Smokeless tobacco: Never Used   Substance and Sexual Activity    Alcohol use: No    Drug use: No    Sexual activity: Never     Partners: Male         Review of Systems   Constitutional: Positive for activity change. Negative for chills, diaphoresis, fatigue and fever.   HENT: Negative for congestion, postnasal drip, sinus pressure, sinus pain,  sneezing, sore throat, trouble swallowing and voice change.    Respiratory: Positive for cough and shortness of breath. Negative for chest tightness, wheezing and stridor.    Cardiovascular: Negative for chest pain, palpitations and leg swelling.   Gastrointestinal: Negative for diarrhea, nausea and vomiting.   Musculoskeletal: Negative for arthralgias and myalgias.   Skin: Negative for color change and rash.   Neurological: Negative for dizziness, light-headedness and headaches.   Hematological: Negative for adenopathy.   Psychiatric/Behavioral: Negative for agitation.     Objective:     Vital Signs (Most Recent):  Temp: 98.9 °F (37.2 °C) (10/22/20 1200)  Pulse: 60 (10/22/20 1254)  Resp: 16 (10/22/20 1254)  BP: 109/62 (10/22/20 1200)  SpO2: (!) 89 % (10/22/20 1254) Vital Signs (24h Range):  Temp:  [97.8 °F (36.6 °C)-99 °F (37.2 °C)] 98.9 °F (37.2 °C)  Pulse:  [60-97] 60  Resp:  [14-51] 16  SpO2:  [89 %-94 %] 89 %  BP: ()/(53-98) 109/62     Weight: 49.9 kg (110 lb 0.2 oz)  Body mass index is 24.66 kg/m².      Intake/Output Summary (Last 24 hours) at 10/22/2020 1423  Last data filed at 10/22/2020 0800  Gross per 24 hour   Intake 800 ml   Output 1050 ml   Net -250 ml       Physical Exam  Vitals signs and nursing note reviewed.   Constitutional:       Appearance: Normal appearance.   HENT:      Mouth/Throat:      Pharynx: No oropharyngeal exudate or posterior oropharyngeal erythema.   Eyes:      General:         Right eye: No discharge.         Left eye: No discharge.      Extraocular Movements: Extraocular movements intact.      Conjunctiva/sclera: Conjunctivae normal.   Cardiovascular:      Rate and Rhythm: Normal rate and regular rhythm.      Heart sounds: No murmur. No friction rub. No gallop.    Pulmonary:      Effort: Pulmonary effort is normal. No respiratory distress.      Breath sounds: No stridor. Rales present. No wheezing or rhonchi.   Abdominal:      General: Bowel sounds are normal.      Palpations:  Abdomen is soft.   Musculoskeletal:      Right lower leg: No edema.      Left lower leg: No edema.   Skin:     General: Skin is warm and dry.      Capillary Refill: Capillary refill takes less than 2 seconds.   Neurological:      General: No focal deficit present.      Mental Status: She is alert and oriented to person, place, and time.   Psychiatric:         Mood and Affect: Mood normal.         Behavior: Behavior normal.         Vents:  Oxygen Concentration (%): 40(weaned as tolerated ) (10/22/20 1254)    Lines/Drains/Airways     Drain            Female External Urinary Catheter 10/18/20 1901 3 days          Peripheral Intravenous Line                 Peripheral IV - Single Lumen 10/18/20 1533 20 G Anterior;Right Forearm 3 days         Peripheral IV - Single Lumen 10/21/20 0500 20 G Anterior;Right Upper Arm 1 day                Significant Labs:    CBC/Anemia Profile:  No results for input(s): WBC, HGB, HCT, PLT, MCV, RDW, IRON, FERRITIN, RETIC, FOLATE, [id number], OCCULTBLOOD in the last 48 hours.     Chemistries:  Recent Labs   Lab 10/21/20  0511 10/22/20  0437   * 130*   K 4.5 3.8   CL 91* 91*   CO2 27 30*   BUN 12 13   CREATININE 0.7 0.8   CALCIUM 8.6* 9.0       All pertinent labs within the past 24 hours have been reviewed.    Significant Imaging:   I have reviewed and interpreted all pertinent imaging results/findings within the past 24 hours.

## 2020-10-22 NOTE — ASSESSMENT & PLAN NOTE
Currently in AFib.  On amiodarone drip.  Continues to be in AFib.  Discussed various options with the patient.  Considering the fact that the acute decompensated heart failure likely precipitated by AFib, will cardiovert her.  She has been on Eliquis without any missed doses.    Risks, benefits and alternatives of the Cardioversion procedure were discussed with the patient including throat irritation, aspiration, anesthetic complications, esophageal irritation/perforation, skin irritation, arrhythmia, etc.  Patient understands and agrees to proceed.  Consent was placed on the chart.      10/20:  In normal sinus rhythm now.  Underwent cardioversion yesterday.  Will switch to oral amiodarone.    10/21:  Continue oral amiodarone.  Continues to be in normal sinus rhythm.    10/22:  Continues to be in normal sinus rhythm.  Will sign off.  Follow-up in Cardiology Clinic

## 2020-10-22 NOTE — NURSING TRANSFER
Nursing Transfer Note      10/22/2020     Transfer To: 305    Transfer via bed    Transfer with  to O2, cardiac monitoring    Transported by RN and transport    Medicines sent: yes    Chart send with patient: Yes    Notified: per pt     Patient reassessed at: 10/22/20 1721     Upon arrival to floor: cardiac monitor applied, patient oriented to room, call bell in reach and bed in lowest position

## 2020-10-22 NOTE — PLAN OF CARE
Pt being transferred to [facility] [address]. [address], [place]. VSS. Remains in NSR on monitor. Tolerating diet. 60 mg of lasix given today. Oxygen weaned to 6L NC. Appears to be tolerating well. No falls, injuries or new skin breakdown, precautions maintained for all.

## 2020-10-23 PROBLEM — J84.9 ILD (INTERSTITIAL LUNG DISEASE): Status: RESOLVED | Noted: 2018-08-28 | Resolved: 2020-10-23

## 2020-10-23 PROBLEM — F41.1 GENERALIZED ANXIETY DISORDER: Status: RESOLVED | Noted: 2018-01-23 | Resolved: 2020-10-23

## 2020-10-23 LAB
ANION GAP SERPL CALC-SCNC: 8 MMOL/L (ref 8–16)
BNP SERPL-MCNC: 216 PG/ML (ref 0–99)
BUN SERPL-MCNC: 12 MG/DL (ref 8–23)
CALCIUM SERPL-MCNC: 8.8 MG/DL (ref 8.7–10.5)
CHLORIDE SERPL-SCNC: 90 MMOL/L (ref 95–110)
CO2 SERPL-SCNC: 33 MMOL/L (ref 23–29)
CREAT SERPL-MCNC: 0.7 MG/DL (ref 0.5–1.4)
EST. GFR  (AFRICAN AMERICAN): >60 ML/MIN/1.73 M^2
EST. GFR  (NON AFRICAN AMERICAN): >60 ML/MIN/1.73 M^2
GLUCOSE SERPL-MCNC: 105 MG/DL (ref 70–110)
POCT GLUCOSE: 131 MG/DL (ref 70–110)
POTASSIUM SERPL-SCNC: 4.1 MMOL/L (ref 3.5–5.1)
SODIUM SERPL-SCNC: 131 MMOL/L (ref 136–145)

## 2020-10-23 PROCEDURE — 21400001 HC TELEMETRY ROOM

## 2020-10-23 PROCEDURE — 25000003 PHARM REV CODE 250: Performed by: INTERNAL MEDICINE

## 2020-10-23 PROCEDURE — 97535 SELF CARE MNGMENT TRAINING: CPT | Mod: CO

## 2020-10-23 PROCEDURE — 63600175 PHARM REV CODE 636 W HCPCS: Performed by: STUDENT IN AN ORGANIZED HEALTH CARE EDUCATION/TRAINING PROGRAM

## 2020-10-23 PROCEDURE — 27000221 HC OXYGEN, UP TO 24 HOURS

## 2020-10-23 PROCEDURE — 94761 N-INVAS EAR/PLS OXIMETRY MLT: CPT

## 2020-10-23 PROCEDURE — 83880 ASSAY OF NATRIURETIC PEPTIDE: CPT

## 2020-10-23 PROCEDURE — 36415 COLL VENOUS BLD VENIPUNCTURE: CPT

## 2020-10-23 PROCEDURE — 97530 THERAPEUTIC ACTIVITIES: CPT | Mod: CO

## 2020-10-23 PROCEDURE — 80048 BASIC METABOLIC PNL TOTAL CA: CPT

## 2020-10-23 PROCEDURE — 63600175 PHARM REV CODE 636 W HCPCS: Performed by: INTERNAL MEDICINE

## 2020-10-23 RX ORDER — FUROSEMIDE 10 MG/ML
80 INJECTION INTRAMUSCULAR; INTRAVENOUS
Status: DISCONTINUED | OUTPATIENT
Start: 2020-10-23 | End: 2020-10-24

## 2020-10-23 RX ADMIN — FUROSEMIDE 80 MG: 10 INJECTION, SOLUTION INTRAVENOUS at 08:10

## 2020-10-23 RX ADMIN — AMIODARONE HYDROCHLORIDE 400 MG: 200 TABLET ORAL at 10:10

## 2020-10-23 RX ADMIN — LEVOTHYROXINE SODIUM 75 MCG: 75 TABLET ORAL at 10:10

## 2020-10-23 RX ADMIN — ATORVASTATIN CALCIUM 40 MG: 40 TABLET, FILM COATED ORAL at 08:10

## 2020-10-23 RX ADMIN — FUROSEMIDE 60 MG: 10 INJECTION, SOLUTION INTRAMUSCULAR; INTRAVENOUS at 09:10

## 2020-10-23 RX ADMIN — AMLODIPINE BESYLATE 10 MG: 5 TABLET ORAL at 10:10

## 2020-10-23 RX ADMIN — LOSARTAN POTASSIUM 100 MG: 25 TABLET, FILM COATED ORAL at 10:10

## 2020-10-23 RX ADMIN — OXYCODONE HYDROCHLORIDE AND ACETAMINOPHEN 1 TABLET: 5; 325 TABLET ORAL at 11:10

## 2020-10-23 RX ADMIN — APIXABAN 2.5 MG: 2.5 TABLET, FILM COATED ORAL at 08:10

## 2020-10-23 RX ADMIN — METOPROLOL TARTRATE 50 MG: 50 TABLET, FILM COATED ORAL at 10:10

## 2020-10-23 RX ADMIN — AMIODARONE HYDROCHLORIDE 400 MG: 200 TABLET ORAL at 08:10

## 2020-10-23 RX ADMIN — APIXABAN 2.5 MG: 2.5 TABLET, FILM COATED ORAL at 10:10

## 2020-10-23 RX ADMIN — METOPROLOL TARTRATE 50 MG: 50 TABLET, FILM COATED ORAL at 08:10

## 2020-10-23 NOTE — PLAN OF CARE
Problem: Arrhythmia/Dysrhythmia (Heart Failure)  Goal: Stable Heart Rate and Rhythm  Outcome: Ongoing, Progressing     Problem: Functional Ability Impaired (Heart Failure)  Goal: Optimal Functional Ability  Outcome: Ongoing, Progressing  Remains SR on telemetry monitor. No skin issues and no injury during shift. Educated on new medication and verbalized understanding. Bed alarm activated and audible. Call light at side.
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
## 2020-10-23 NOTE — SUBJECTIVE & OBJECTIVE
Interval History: Patient doing well today, feels like her breathing is better. Urinating well, no complaints.    Review of Systems   Constitutional: Positive for activity change. Negative for chills, diaphoresis, fatigue and fever.   HENT: Negative for congestion and dental problem.    Respiratory: Negative for apnea, choking, chest tightness and shortness of breath.    Cardiovascular: Negative for chest pain.   Gastrointestinal: Negative for abdominal pain, blood in stool, diarrhea, nausea and vomiting.   Endocrine: Negative for cold intolerance.   Genitourinary: Negative for difficulty urinating and dysuria.   Musculoskeletal: Negative for arthralgias.   Skin: Negative for color change.   Neurological: Negative for dizziness.   Psychiatric/Behavioral: Negative for agitation and behavioral problems.     Objective:     Vital Signs (Most Recent):  Temp: 97.6 °F (36.4 °C) (10/23/20 0725)  Pulse: 78 (10/23/20 0725)  Resp: 17 (10/23/20 0725)  BP: (!) 161/74 (10/23/20 0725)  SpO2: (!) 94 % (10/23/20 0725) Vital Signs (24h Range):  Temp:  [97.6 °F (36.4 °C)-98.9 °F (37.2 °C)] 97.6 °F (36.4 °C)  Pulse:  [60-81] 78  Resp:  [15-31] 17  SpO2:  [88 %-99 %] 94 %  BP: ()/(58-89) 161/74     Weight: 53.7 kg (118 lb 6.2 oz)  Body mass index is 26.54 kg/m².    Intake/Output Summary (Last 24 hours) at 10/23/2020 1026  Last data filed at 10/23/2020 0800  Gross per 24 hour   Intake 540 ml   Output 1150 ml   Net -610 ml      Physical Exam  Vitals signs and nursing note reviewed.   Constitutional:       General: She is not in acute distress.     Appearance: Normal appearance. She is normal weight. She is not ill-appearing, toxic-appearing or diaphoretic.   HENT:      Head: Normocephalic and atraumatic.   Eyes:      General: No scleral icterus.     Conjunctiva/sclera: Conjunctivae normal.   Cardiovascular:      Rate and Rhythm: Normal rate and regular rhythm.   Pulmonary:      Effort: Pulmonary effort is normal. No respiratory  distress.      Breath sounds: No stridor. Rales present. No wheezing.      Comments: Mild basilar rales bilaterally  Abdominal:      General: Bowel sounds are normal. There is no distension.      Tenderness: There is no abdominal tenderness.      Hernia: No hernia is present.   Skin:     General: Skin is warm and dry.   Neurological:      Mental Status: She is alert and oriented to person, place, and time. Mental status is at baseline.   Psychiatric:         Mood and Affect: Mood normal.         Behavior: Behavior normal.         Significant Labs: All pertinent labs within the past 24 hours have been reviewed.    Significant Imaging: I have reviewed all pertinent imaging results/findings within the past 24 hours.

## 2020-10-23 NOTE — ASSESSMENT & PLAN NOTE
· Patient presents with Acute Hypoxemic Respiratory Failure secondary to CHF exacerbation.  · Exacerbation probably triggered by poor HR control.  · Hx of PAF.  Presents in Afib with RVR.  · Cardiology consulted and patient admitted to ICU on Amiodarone drip.  · Continue with aggressive diuresis - lasix 80 IV bid  · Rate control.  · Still requiring significant amount of O2 - continue to diurese and titrate down

## 2020-10-23 NOTE — NURSING
Report given to oncoming nurse Radha HONEYCUTT, Patient is awake and alert. Bed is in lowest position, wheels locked, call light is in reach. 12 hour chart check completed

## 2020-10-23 NOTE — PROGRESS NOTES
[facility] Hospital Medicine  Progress Note    Patient Name: Jeannie Hutchins  MRN: [id number]  Patient Class: IP- Inpatient   Admission Date: 10/18/2020  Length of Stay: 5 days  Attending Physician: Dallin Hutchins MD  Primary Care Provider: Paige Mcleod MD        Subjective:     Principal Problem:Acute on chronic diastolic heart failure        HPI:  86 y/o female presents to the ER via EMS transport with shortness of breath.  Patient states she is chronically short of breath, but symptoms worsened this morning.  EMS reported patient's SpO2 was 88% on room air, improved to 96% after given 1 SL nitro tab and nitro paste en route.  Patient also reports non productive cough and 10 lb weight gain in the last 2 weeks.  Also complains of edema on bilateral lower extremities that started 3 days ago.  Patient also reports post nasal drip.  States compliance with medications. Hx of PAF and noted to be in rapid AFib on presentation.  Placed on Amiodarone gtt and admitted to ICU.  Patient was also given Lasix in ER with improvement of symptoms.  No other complaints.    Overview/Hospital Course:  86 y/o female presents with acute on chronic diastolic heart failure probably precipitated by AFib with RVR.  Started on IV Lasix.  Admitted to ICU on Amiodarone gtt.  Cardiology consulted.  Patient had cardioversion on 10/19 to NSR. Continued on high dose lasix in ICU. Pulmonary/critical care was consulted on 10/21 due to high 02 requirements. PT/OT evaluated the patient and rec: home with H/H. Transferred to floor 10/22. Continuing with aggressive diuresis and weaning O2.    Interval History: Patient doing well today, feels like her breathing is better. Urinating well, no complaints.    Review of Systems   Constitutional: Positive for activity change. Negative for chills, diaphoresis, fatigue and fever.   HENT: Negative for congestion and dental problem.    Respiratory: Negative for apnea, choking, chest  tightness and shortness of breath.    Cardiovascular: Negative for chest pain.   Gastrointestinal: Negative for abdominal pain, blood in stool, diarrhea, nausea and vomiting.   Endocrine: Negative for cold intolerance.   Genitourinary: Negative for difficulty urinating and dysuria.   Musculoskeletal: Negative for arthralgias.   Skin: Negative for color change.   Neurological: Negative for dizziness.   Psychiatric/Behavioral: Negative for agitation and behavioral problems.     Objective:     Vital Signs (Most Recent):  Temp: 97.6 °F (36.4 °C) (10/23/20 0725)  Pulse: 78 (10/23/20 0725)  Resp: 17 (10/23/20 0725)  BP: (!) 161/74 (10/23/20 0725)  SpO2: (!) 94 % (10/23/20 0725) Vital Signs (24h Range):  Temp:  [97.6 °F (36.4 °C)-98.9 °F (37.2 °C)] 97.6 °F (36.4 °C)  Pulse:  [60-81] 78  Resp:  [15-31] 17  SpO2:  [88 %-99 %] 94 %  BP: ()/(58-89) 161/74     Weight: 53.7 kg (118 lb 6.2 oz)  Body mass index is 26.54 kg/m².    Intake/Output Summary (Last 24 hours) at 10/23/2020 1026  Last data filed at 10/23/2020 0800  Gross per 24 hour   Intake 540 ml   Output 1150 ml   Net -610 ml      Physical Exam  Vitals signs and nursing note reviewed.   Constitutional:       General: She is not in acute distress.     Appearance: Normal appearance. She is normal weight. She is not ill-appearing, toxic-appearing or diaphoretic.   HENT:      Head: Normocephalic and atraumatic.   Eyes:      General: No scleral icterus.     Conjunctiva/sclera: Conjunctivae normal.   Cardiovascular:      Rate and Rhythm: Normal rate and regular rhythm.   Pulmonary:      Effort: Pulmonary effort is normal. No respiratory distress.      Breath sounds: No stridor. Rales present. No wheezing.      Comments: Mild basilar rales bilaterally  Abdominal:      General: Bowel sounds are normal. There is no distension.      Tenderness: There is no abdominal tenderness.      Hernia: No hernia is present.   Skin:     General: Skin is warm and dry.   Neurological:       Mental Status: She is alert and oriented to person, place, and time. Mental status is at baseline.   Psychiatric:         Mood and Affect: Mood normal.         Behavior: Behavior normal.         Significant Labs: All pertinent labs within the past 24 hours have been reviewed.    Significant Imaging: I have reviewed all pertinent imaging results/findings within the past 24 hours.      Assessment/Plan:      * Acute on chronic diastolic heart failure  · Patient presents with Acute Hypoxemic Respiratory Failure secondary to CHF exacerbation.  · Exacerbation probably triggered by poor HR control.  · Hx of PAF.  Presents in Afib with RVR.  · Cardiology consulted and patient admitted to ICU on Amiodarone drip.  · Continue with aggressive diuresis - lasix 80 IV bid  · Rate control.  · Still requiring significant amount of O2 - continue to diurese and titrate down    Congestive heart failure  See above      Mixed hyperlipidemia  · Continue Statin.      Paroxysmal atrial fibrillation  · Afib with RVR as above.  · S/p cardioversion on 10/19.   · Cards following   · CHADSVASC 5  · Continue with Amio and low dose Eliquis      Acute respiratory failure with hypoxia and hypercarbia  Secondary to CHF as above.  Improving. Wean 02. Continue lasix     Resolved       Essential hypertension  Continue home medications.      Hyponatremia with decreased serum osmolality  · Likely combination of fluid overload   · Stable, continue to diurese and monitor        VTE Risk Mitigation (From admission, onward)         Ordered     apixaban tablet 2.5 mg  2 times daily      10/18/20 1342     IP VTE HIGH RISK PATIENT  Once      10/18/20 1340     Place sequential compression device  Until discontinued      10/18/20 1340                Discharge Planning   JULIET:      Code Status: Full Code   Is the patient medically ready for discharge?:     Reason for patient still in hospital (select all that apply): Treatment  Discharge Plan A: Home Health    Discharge Delays: None known at this time              Dallin Hutchins MD  Department of Hospital Medicine   [facility]

## 2020-10-23 NOTE — PT/OT/SLP PROGRESS
Occupational Therapy   Treatment    Name: Jeannie Hutchins  MRN: [id number]  Admitting Diagnosis:  Acute on chronic diastolic heart failure  4 Days Post-Op    Recommendations:     Discharge Recommendations: home health OT(with family assist)  Discharge Equipment Recommendations:  (TBD pending progress)  Barriers to discharge: none      Assessment:     Jeannie Hutchins is a 85 y.o. female with a medical diagnosis of Acute on chronic diastolic heart failure.  She presents with the following performance deficits affecting function: weakness, impaired endurance, impaired self care skills, impaired functional mobilty, impaired balance, decreased upper extremity function, decreased lower extremity function, decreased ROM, impaired cardiopulmonary response to activity, decreased safety awareness, gait instability, pain, impaired fine motor, decreased coordination.     Pt tolerated sitting EOB ~ 35-40 mins with SBA to maintain sitting balance and OOB>chair with Min A, HHA. SPO2 fluctuating and remained inconsistent throughout session despite 5L O2 NC. Pt desat as low as 80% with minimal exertion. Focused on deep breathing exercises and techniques throughout session for SPO2 recovery. Pt SPO2 fluctuating 80-94% today, able to increase above 88% with increased time and PLB technique. Pt denied feeling SOB, only minimal complaints with OOB transfer- Appears SOB with talking at times.   Pt will benefit from continue OT services to address functional deficits and return to PLOF. Pt was completely independent PTA- driving and ambulating without AD. Continue OT POC as indicated.    Rehab Prognosis:  Good; patient would benefit from acute skilled OT services to address these deficits and reach maximum level of function.       Plan:     Patient to be seen 3 x/week, 5 x/week to address the above listed problems via self-care/home management, therapeutic activities, therapeutic exercises  · Plan of Care Expires: 11/04/20  · Plan of Care  Reviewed with: patient    Subjective   Pt agreeable to therapy.  Pain/Comfort:  · Pain Rating 1: (did  not rate)  · Location 1: neck  · Pain Addressed 1: Nurse notified    Objective:     Communicated with: Nurse Do prior to session.  Patient found HOB elevated with telemetry, 5L oxygen, PureWick, SCD, bed alarm upon OT entry to room.    General Precautions: Standard, respiratory, fall   Orthopedic Precautions:N/A   Braces: N/A     Occupational Performance:     Bed Mobility:    · Patient completed Rolling/Turning to Left with contact guard assistance  · Patient completed Rolling/Turning to Right with stand by assistance  · Patient completed Scooting to EOB with minimum assistance and Bridging with SBA  · Patient completed Supine to Sit with minimum assistance    Functional Mobility/Transfers:  · Patient completed Sit <> Stand Transfer with minimum assistance  with  hand-held assist from EOB  · Patient completed Bed > Chair Transfer using Step Transfer (~3 steps) technique with minimum assistance with hand-held assist  ·     Activities of Daily Living:  · Feeding:  set-up assistance to open small packages    · Upper Body Dressing: moderate assistance  · Toileting: total assistance bed level. Unfortunately, unable to transfer pt to BSC 2* pts urgency to void and no BSC present in room at this time. Increased time to perform toileting.      [facility] 6 Click ADL: 20    Treatment & Education:   Pt  Re- educated on OT role/POC   Importance of OOB activity with staff assistance to maximize recovery, OOB>chair throughout day as tolerated and for all meals   Importance of upright positioning throughout day for optimal lung function 2* increased O2 needs.PLB technique with all exertion; demo and verbal cues for implementation.    Safety and proper techniques during functional t/f   Pt tolerated sitting EOB ~ 35-40 mins with SBA to maintain sitting balance. SPO2 fluctuating and remained inconsistent throughout session  despite 5L O2 NC. Pt desat as low as 80% with minimal exertion. Focused on deep breathing exercises and techniques throughout session for SPO2 recovery. Pt SPO2 fluctuating 80-94% today, able to increase above 88% with increased time and PLB technique. Pt denied feeling SOB, only minimal complaints with OOB transfer. Appears SOB with talking at times.    White board updated    Multiple self-care tasks/functional mobility completed- assistance level noted above    Pt verbalizes understanding of all education provided this date. All questions/concerns answered within OT scope of practice      Patient left up in chair with all lines intact, call button in reach and nurse Ernestina notifiedEducation:      GOALS:   Multidisciplinary Problems     Occupational Therapy Goals        Problem: Occupational Therapy Goal    Goal Priority Disciplines Outcome Interventions   Occupational Therapy Goal     OT, PT/OT Ongoing, Progressing    Description: Goals to be met by: 11/4/20    Patient will increase functional independence with ADLs by performing:    UE Dressing with Modified [place] and Set-up Assistance.  LE Dressing with Modified [place] and Supervision.  Grooming while standing at sink with Modified [place] and Supervision.  Toileting from toilet with Modified [place] and Set-up Assistance for hygiene and clothing management.   Rolling to Bilateral with Modified [place].   Supine to sit with Modified [place] and Supervision.  Step transfer with Modified [place] and Supervision  Toilet transfer to toilet with Modified [place] and Supervision.  Upper extremity exercise program x15 reps per handout, with independence.  Educate the patient re: Home Safety                      Time Tracking:     OT Date of Treatment: 10/23/20  OT Start Time: 1251  OT Stop Time: 1345  OT Total Time (min): 54 min    Billable Minutes:Self Care/Home Management 23  Therapeutic Activity 31    Sarah Soto,  VICKI  10/23/2020

## 2020-10-23 NOTE — ASSESSMENT & PLAN NOTE
· Afib with RVR as above.  · S/p cardioversion on 10/19.   · Cards following   · CHADSVASC 5  · Continue with Amio and low dose Eliquis

## 2020-10-23 NOTE — PLAN OF CARE
10/23/20 0903   Post-Acute Status   Post-Acute Authorization Home Health   Home Health Status Referrals Sent   Discharge Delays None known at this time   Discharge Plan   Discharge Plan A Home Health

## 2020-10-23 NOTE — PLAN OF CARE
Important Message from Medicare and discharge appeal process reviewed with patient; patient;  was given opportunity to ask questions; after which, verbalized understanding of rights; copy was placed in medical record chart and one copy given to patient for her review and records       10/23/20 1251   Medicare Message   Important Message from Medicare regarding Discharge Appeal Rights Given to patient/caregiver;Explained to patient/caregiver;Signed/date by patient/caregiver   Date IMM was signed 10/23/20   Time IMM was signed 1255

## 2020-10-23 NOTE — PLAN OF CARE
Problem: Occupational Therapy Goal  Goal: Occupational Therapy Goal  Description: Goals to be met by: 11/4/20    Patient will increase functional independence with ADLs by performing:    UE Dressing with Modified [place] and Set-up Assistance.  LE Dressing with Modified [place] and Supervision.  Grooming while standing at sink with Modified [place] and Supervision.  Toileting from toilet with Modified [place] and Set-up Assistance for hygiene and clothing management.   Rolling to Bilateral with Modified [place].   Supine to sit with Modified [place] and Supervision.  Step transfer with Modified [place] and Supervision  Toilet transfer to toilet with Modified [place] and Supervision.  Upper extremity exercise program x15 reps per handout, with independence.  Educate the patient re: Home Safety     Outcome: Ongoing, Progressing  Pt performed bed mobility with Min A and tolerated sitting long duration EOB with SBA. Pt OOB>chair with MIN A, no AD. Pt SPO2 very inconsistent and fluctuating throughout treatment session at rest and with exertion. Pt desat as low as 80% on 5L O2 NC, increased time for breathing exercises. Pt denied SOB. Continue OT POC as indicated.

## 2020-10-24 LAB
ANION GAP SERPL CALC-SCNC: 14 MMOL/L (ref 8–16)
BUN SERPL-MCNC: 23 MG/DL (ref 8–23)
CALCIUM SERPL-MCNC: 9.2 MG/DL (ref 8.7–10.5)
CHLORIDE SERPL-SCNC: 86 MMOL/L (ref 95–110)
CO2 SERPL-SCNC: 30 MMOL/L (ref 23–29)
CREAT SERPL-MCNC: 0.8 MG/DL (ref 0.5–1.4)
EST. GFR  (AFRICAN AMERICAN): >60 ML/MIN/1.73 M^2
EST. GFR  (NON AFRICAN AMERICAN): >60 ML/MIN/1.73 M^2
GLUCOSE SERPL-MCNC: 90 MG/DL (ref 70–110)
POTASSIUM SERPL-SCNC: 3.7 MMOL/L (ref 3.5–5.1)
SODIUM SERPL-SCNC: 130 MMOL/L (ref 136–145)

## 2020-10-24 PROCEDURE — 25000003 PHARM REV CODE 250: Performed by: INTERNAL MEDICINE

## 2020-10-24 PROCEDURE — 27000221 HC OXYGEN, UP TO 24 HOURS

## 2020-10-24 PROCEDURE — 36415 COLL VENOUS BLD VENIPUNCTURE: CPT

## 2020-10-24 PROCEDURE — 94761 N-INVAS EAR/PLS OXIMETRY MLT: CPT

## 2020-10-24 PROCEDURE — 63600175 PHARM REV CODE 636 W HCPCS: Performed by: STUDENT IN AN ORGANIZED HEALTH CARE EDUCATION/TRAINING PROGRAM

## 2020-10-24 PROCEDURE — 21400001 HC TELEMETRY ROOM

## 2020-10-24 PROCEDURE — 80048 BASIC METABOLIC PNL TOTAL CA: CPT

## 2020-10-24 RX ORDER — FUROSEMIDE 10 MG/ML
40 INJECTION INTRAMUSCULAR; INTRAVENOUS
Status: DISCONTINUED | OUTPATIENT
Start: 2020-10-24 | End: 2020-10-24

## 2020-10-24 RX ORDER — FUROSEMIDE 10 MG/ML
40 INJECTION INTRAMUSCULAR; INTRAVENOUS DAILY
Status: DISCONTINUED | OUTPATIENT
Start: 2020-10-25 | End: 2020-10-25

## 2020-10-24 RX ADMIN — AMLODIPINE BESYLATE 10 MG: 5 TABLET ORAL at 09:10

## 2020-10-24 RX ADMIN — METOPROLOL TARTRATE 50 MG: 50 TABLET, FILM COATED ORAL at 09:10

## 2020-10-24 RX ADMIN — FUROSEMIDE 80 MG: 10 INJECTION, SOLUTION INTRAVENOUS at 09:10

## 2020-10-24 RX ADMIN — AMIODARONE HYDROCHLORIDE 400 MG: 200 TABLET ORAL at 09:10

## 2020-10-24 RX ADMIN — ATORVASTATIN CALCIUM 40 MG: 40 TABLET, FILM COATED ORAL at 09:10

## 2020-10-24 RX ADMIN — APIXABAN 2.5 MG: 2.5 TABLET, FILM COATED ORAL at 09:10

## 2020-10-24 RX ADMIN — OXYCODONE HYDROCHLORIDE AND ACETAMINOPHEN 1 TABLET: 5; 325 TABLET ORAL at 10:10

## 2020-10-24 RX ADMIN — ACETAMINOPHEN 500 MG: 500 TABLET ORAL at 04:10

## 2020-10-24 RX ADMIN — LOSARTAN POTASSIUM 100 MG: 25 TABLET, FILM COATED ORAL at 09:10

## 2020-10-24 RX ADMIN — LEVOTHYROXINE SODIUM 75 MCG: 75 TABLET ORAL at 09:10

## 2020-10-24 NOTE — ASSESSMENT & PLAN NOTE
· Hyponatremia and hypochloremia with rising Bicarb - likely from aggressive diuresis with Lasix, will slow down and change to PO today to see if improves  · Continue to monitor

## 2020-10-24 NOTE — ASSESSMENT & PLAN NOTE
· Afib with RVR as above.  · S/p cardioversion on 10/19.   · Cards following   · CHADSVASC 5  · Continue with Amio and low dose Eliquis  · Apparently had episode of aflutter now back in sinus

## 2020-10-24 NOTE — PROGRESS NOTES
[facility] Hospital Medicine  Progress Note    Patient Name: Jeannie Hutchins  MRN: [id number]  Patient Class: IP- Inpatient   Admission Date: 10/18/2020  Length of Stay: 6 days  Attending Physician: Dallin Hutchins MD  Primary Care Provider: Paige Mcleod MD        Subjective:     Principal Problem:Acute on chronic diastolic heart failure        HPI:  86 y/o female presents to the ER via EMS transport with shortness of breath.  Patient states she is chronically short of breath, but symptoms worsened this morning.  EMS reported patient's SpO2 was 88% on room air, improved to 96% after given 1 SL nitro tab and nitro paste en route.  Patient also reports non productive cough and 10 lb weight gain in the last 2 weeks.  Also complains of edema on bilateral lower extremities that started 3 days ago.  Patient also reports post nasal drip.  States compliance with medications. Hx of PAF and noted to be in rapid AFib on presentation.  Placed on Amiodarone gtt and admitted to ICU.  Patient was also given Lasix in ER with improvement of symptoms.  No other complaints.    Overview/Hospital Course:  86 y/o female presents with acute on chronic diastolic heart failure probably precipitated by AFib with RVR.  Started on IV Lasix.  Admitted to ICU on Amiodarone gtt.  Cardiology consulted.  Patient had cardioversion on 10/19 to NSR. Continued on high dose lasix in ICU. Pulmonary/critical care was consulted on 10/21 due to high 02 requirements. PT/OT evaluated the patient and rec: home with H/H. Transferred to floor 10/22. Continuing with aggressive diuresis and weaning O2.    Interval History: No acute events overnight, patient doing well this morning. Feeling like her shortness of breath has improved.    Review of Systems   Constitutional: Positive for activity change. Negative for chills, diaphoresis, fatigue and fever.   HENT: Negative for congestion and dental problem.    Respiratory: Negative for apnea,  choking, chest tightness and shortness of breath.    Cardiovascular: Negative for chest pain.   Gastrointestinal: Negative for abdominal pain, blood in stool, diarrhea, nausea and vomiting.   Endocrine: Negative for cold intolerance.   Genitourinary: Negative for difficulty urinating and dysuria.   Musculoskeletal: Negative for arthralgias.   Skin: Negative for color change.   Neurological: Negative for dizziness.   Psychiatric/Behavioral: Negative for agitation and behavioral problems.     Objective:     Vital Signs (Most Recent):  Temp: 98.6 °F (37 °C) (10/24/20 0754)  Pulse: 69 (10/24/20 0754)  Resp: 18 (10/24/20 0754)  BP: (!) 159/68 (10/24/20 0754)  SpO2: (!) 94 % (10/24/20 0754) Vital Signs (24h Range):  Temp:  [97.7 °F (36.5 °C)-98.9 °F (37.2 °C)] 98.6 °F (37 °C)  Pulse:  [62-82] 69  Resp:  [14-18] 18  SpO2:  [90 %-94 %] 94 %  BP: (125-159)/(58-68) 159/68     Weight: 50 kg (110 lb 3.7 oz)  Body mass index is 24.71 kg/m².    Intake/Output Summary (Last 24 hours) at 10/24/2020 1043  Last data filed at 10/24/2020 0621  Gross per 24 hour   Intake 480 ml   Output 550 ml   Net -70 ml      Physical Exam  Vitals signs and nursing note reviewed.   Constitutional:       General: She is not in acute distress.     Appearance: Normal appearance. She is normal weight. She is not ill-appearing, toxic-appearing or diaphoretic.   HENT:      Head: Normocephalic and atraumatic.      Mouth/Throat:      Pharynx: Oropharynx is clear.   Eyes:      General: No scleral icterus.     Conjunctiva/sclera: Conjunctivae normal.   Neck:      Musculoskeletal: Neck supple.   Cardiovascular:      Rate and Rhythm: Normal rate and regular rhythm.      Pulses: Normal pulses.      Heart sounds: No murmur.   Pulmonary:      Effort: Pulmonary effort is normal. No respiratory distress.      Breath sounds: No stridor. No wheezing or rales.      Comments: Do not appreciate rales this morning on exam  Abdominal:      General: Bowel sounds are normal.  There is no distension.      Tenderness: There is no abdominal tenderness.      Hernia: No hernia is present.   Musculoskeletal: Normal range of motion.         General: No swelling.   Skin:     General: Skin is warm and dry.   Neurological:      Mental Status: She is alert and oriented to person, place, and time. Mental status is at baseline.   Psychiatric:         Mood and Affect: Mood normal.         Behavior: Behavior normal.         Significant Labs: All pertinent labs within the past 24 hours have been reviewed.    Significant Imaging: I have reviewed all pertinent imaging results/findings within the past 24 hours.      Assessment/Plan:      * Acute on chronic diastolic heart failure  · Patient presents with Acute Hypoxemic Respiratory Failure secondary to CHF exacerbation.  · Exacerbation probably triggered by poor HR control.  · Hx of PAF.  Presents in Afib with RVR.  · Cardiology consulted and patient admitted to ICU on Amiodarone drip.  · Wean down on lasix - decreased to lasix 40 daily  · Rate control.  · Still requiring significant amount of O2 - continue to diurese and titrate down    Congestive heart failure  See above      Mixed hyperlipidemia  · Continue Statin.      Paroxysmal atrial fibrillation  · Afib with RVR as above.  · S/p cardioversion on 10/19.   · Cards following   · CHADSVASC 5  · Continue with Amio and low dose Eliquis  · Apparently had episode of aflutter now back in sinus      Acute respiratory failure with hypoxia and hypercarbia  Secondary to CHF as above.  Improving. Wean 02.  Slow down lasix due met alkalosis          Essential hypertension  Continue home medications.      Hyponatremia with decreased serum osmolality  · Hyponatremia and hypochloremia with rising Bicarb - likely from aggressive diuresis with Lasix, will slow down and change to PO today to see if improves  · Continue to monitor        VTE Risk Mitigation (From admission, onward)         Ordered     apixaban tablet  2.5 mg  2 times daily      10/18/20 1342     IP VTE HIGH RISK PATIENT  Once      10/18/20 1340     Place sequential compression device  Until discontinued      10/18/20 1340                Discharge Planning   JULIET:      Code Status: Full Code   Is the patient medically ready for discharge?:     Reason for patient still in hospital (select all that apply): Treatment  Discharge Plan A: Home Health   Discharge Delays: None known at this time              Dallin Hutchins MD  Department of Hospital Medicine   [facility]

## 2020-10-24 NOTE — ASSESSMENT & PLAN NOTE
· Patient presents with Acute Hypoxemic Respiratory Failure secondary to CHF exacerbation.  · Exacerbation probably triggered by poor HR control.  · Hx of PAF.  Presents in Afib with RVR.  · Cardiology consulted and patient admitted to ICU on Amiodarone drip.  · Wean down on lasix - decreased to lasix 40 daily  · Rate control.  · Still requiring significant amount of O2 - continue to diurese and titrate down

## 2020-10-24 NOTE — PLAN OF CARE
Problem: Arrhythmia/Dysrhythmia (Heart Failure)  Goal: Stable Heart Rate and Rhythm  Outcome: Ongoing, Progressing  PRN pain med effective for back pain. Converted from A flutter to SR on telemetry monitor. No skin issues and no injury during shift. External cath changed this morning. Educated on new medication and verbalized understanding. Bed alarm activated and audible. Call light at side.

## 2020-10-24 NOTE — SUBJECTIVE & OBJECTIVE
Interval History: No acute events overnight, patient doing well this morning. Feeling like her shortness of breath has improved.    Review of Systems   Constitutional: Positive for activity change. Negative for chills, diaphoresis, fatigue and fever.   HENT: Negative for congestion and dental problem.    Respiratory: Negative for apnea, choking, chest tightness and shortness of breath.    Cardiovascular: Negative for chest pain.   Gastrointestinal: Negative for abdominal pain, blood in stool, diarrhea, nausea and vomiting.   Endocrine: Negative for cold intolerance.   Genitourinary: Negative for difficulty urinating and dysuria.   Musculoskeletal: Negative for arthralgias.   Skin: Negative for color change.   Neurological: Negative for dizziness.   Psychiatric/Behavioral: Negative for agitation and behavioral problems.     Objective:     Vital Signs (Most Recent):  Temp: 98.6 °F (37 °C) (10/24/20 0754)  Pulse: 69 (10/24/20 0754)  Resp: 18 (10/24/20 0754)  BP: (!) 159/68 (10/24/20 0754)  SpO2: (!) 94 % (10/24/20 0754) Vital Signs (24h Range):  Temp:  [97.7 °F (36.5 °C)-98.9 °F (37.2 °C)] 98.6 °F (37 °C)  Pulse:  [62-82] 69  Resp:  [14-18] 18  SpO2:  [90 %-94 %] 94 %  BP: (125-159)/(58-68) 159/68     Weight: 50 kg (110 lb 3.7 oz)  Body mass index is 24.71 kg/m².    Intake/Output Summary (Last 24 hours) at 10/24/2020 1043  Last data filed at 10/24/2020 0621  Gross per 24 hour   Intake 480 ml   Output 550 ml   Net -70 ml      Physical Exam  Vitals signs and nursing note reviewed.   Constitutional:       General: She is not in acute distress.     Appearance: Normal appearance. She is normal weight. She is not ill-appearing, toxic-appearing or diaphoretic.   HENT:      Head: Normocephalic and atraumatic.      Mouth/Throat:      Pharynx: Oropharynx is clear.   Eyes:      General: No scleral icterus.     Conjunctiva/sclera: Conjunctivae normal.   Neck:      Musculoskeletal: Neck supple.   Cardiovascular:      Rate and  Rhythm: Normal rate and regular rhythm.      Pulses: Normal pulses.      Heart sounds: No murmur.   Pulmonary:      Effort: Pulmonary effort is normal. No respiratory distress.      Breath sounds: No stridor. No wheezing or rales.      Comments: Do not appreciate rales this morning on exam  Abdominal:      General: Bowel sounds are normal. There is no distension.      Tenderness: There is no abdominal tenderness.      Hernia: No hernia is present.   Musculoskeletal: Normal range of motion.         General: No swelling.   Skin:     General: Skin is warm and dry.   Neurological:      Mental Status: She is alert and oriented to person, place, and time. Mental status is at baseline.   Psychiatric:         Mood and Affect: Mood normal.         Behavior: Behavior normal.         Significant Labs: All pertinent labs within the past 24 hours have been reviewed.    Significant Imaging: I have reviewed all pertinent imaging results/findings within the past 24 hours.

## 2020-10-25 LAB
ANION GAP SERPL CALC-SCNC: 13 MMOL/L (ref 8–16)
BUN SERPL-MCNC: 22 MG/DL (ref 8–23)
CALCIUM SERPL-MCNC: 9.4 MG/DL (ref 8.7–10.5)
CHLORIDE SERPL-SCNC: 86 MMOL/L (ref 95–110)
CO2 SERPL-SCNC: 30 MMOL/L (ref 23–29)
CREAT SERPL-MCNC: 0.8 MG/DL (ref 0.5–1.4)
EST. GFR  (AFRICAN AMERICAN): >60 ML/MIN/1.73 M^2
EST. GFR  (NON AFRICAN AMERICAN): >60 ML/MIN/1.73 M^2
GLUCOSE SERPL-MCNC: 99 MG/DL (ref 70–110)
OSMOLALITY SERPL: 271 MOSM/KG (ref 275–295)
POTASSIUM SERPL-SCNC: 4 MMOL/L (ref 3.5–5.1)
SODIUM SERPL-SCNC: 129 MMOL/L (ref 136–145)
SODIUM UR-SCNC: 35 MMOL/L (ref 20–250)

## 2020-10-25 PROCEDURE — 97116 GAIT TRAINING THERAPY: CPT | Mod: CQ

## 2020-10-25 PROCEDURE — 80048 BASIC METABOLIC PNL TOTAL CA: CPT

## 2020-10-25 PROCEDURE — 99900035 HC TECH TIME PER 15 MIN (STAT)

## 2020-10-25 PROCEDURE — 97110 THERAPEUTIC EXERCISES: CPT | Mod: CQ

## 2020-10-25 PROCEDURE — 21400001 HC TELEMETRY ROOM

## 2020-10-25 PROCEDURE — 83935 ASSAY OF URINE OSMOLALITY: CPT

## 2020-10-25 PROCEDURE — 84300 ASSAY OF URINE SODIUM: CPT

## 2020-10-25 PROCEDURE — 97530 THERAPEUTIC ACTIVITIES: CPT | Mod: CQ

## 2020-10-25 PROCEDURE — 25000003 PHARM REV CODE 250: Performed by: INTERNAL MEDICINE

## 2020-10-25 PROCEDURE — 36415 COLL VENOUS BLD VENIPUNCTURE: CPT

## 2020-10-25 PROCEDURE — 27000221 HC OXYGEN, UP TO 24 HOURS

## 2020-10-25 PROCEDURE — 94761 N-INVAS EAR/PLS OXIMETRY MLT: CPT

## 2020-10-25 PROCEDURE — 83930 ASSAY OF BLOOD OSMOLALITY: CPT

## 2020-10-25 PROCEDURE — 25000003 PHARM REV CODE 250: Performed by: HOSPITALIST

## 2020-10-25 RX ORDER — DIPHENHYDRAMINE HCL 25 MG
25 CAPSULE ORAL EVERY 6 HOURS PRN
Status: DISCONTINUED | OUTPATIENT
Start: 2020-10-25 | End: 2020-10-31 | Stop reason: HOSPADM

## 2020-10-25 RX ADMIN — AMIODARONE HYDROCHLORIDE 400 MG: 200 TABLET ORAL at 09:10

## 2020-10-25 RX ADMIN — ATORVASTATIN CALCIUM 40 MG: 40 TABLET, FILM COATED ORAL at 09:10

## 2020-10-25 RX ADMIN — METOPROLOL TARTRATE 50 MG: 50 TABLET, FILM COATED ORAL at 09:10

## 2020-10-25 RX ADMIN — OXYCODONE HYDROCHLORIDE AND ACETAMINOPHEN 1 TABLET: 5; 325 TABLET ORAL at 07:10

## 2020-10-25 RX ADMIN — APIXABAN 2.5 MG: 2.5 TABLET, FILM COATED ORAL at 09:10

## 2020-10-25 RX ADMIN — AMLODIPINE BESYLATE 10 MG: 5 TABLET ORAL at 09:10

## 2020-10-25 RX ADMIN — OXYCODONE HYDROCHLORIDE AND ACETAMINOPHEN 1 TABLET: 5; 325 TABLET ORAL at 11:10

## 2020-10-25 RX ADMIN — LOSARTAN POTASSIUM 100 MG: 25 TABLET, FILM COATED ORAL at 09:10

## 2020-10-25 RX ADMIN — DIPHENHYDRAMINE HYDROCHLORIDE 25 MG: 25 CAPSULE ORAL at 09:10

## 2020-10-25 RX ADMIN — LEVOTHYROXINE SODIUM 75 MCG: 75 TABLET ORAL at 09:10

## 2020-10-25 NOTE — ASSESSMENT & PLAN NOTE
· Secondary to CHF as above.  · Plateau at 5 liters NC  · Has evidence of pulmonary hypertension on echocardiogram  · CT chest today for persistent hypoxia  · Slow down lasix due met alkalosis

## 2020-10-25 NOTE — PLAN OF CARE
Problem: Skin Injury Risk Increased  Goal: Skin Health and Integrity  Outcome: Ongoing, Progressing     Problem: Fall Injury Risk  Goal: Absence of Fall and Fall-Related Injury  Outcome: Ongoing, Progressing   PRN medications for pain effective. Remains SR on telemetry monitor. No skin issues and no injury during shift. Educated on new medication and verbalized understanding. Bed alarm activated and audible. Call light at side.

## 2020-10-25 NOTE — ASSESSMENT & PLAN NOTE
· Hyponatremia and hypochloremia with rising Bicarb - likely from aggressive diuresis with Lasix  · Changed to PO 10/24  · Still hyponatremic 10/25  · Resend serum/urine osm and urine Na  · Stop Lasix for now and monitor

## 2020-10-25 NOTE — PLAN OF CARE
10/25/20 0940   Medicare Message   Important Message from Medicare regarding Discharge Appeal Rights Given to patient/caregiver;Explained to patient/caregiver;Signed/date by patient/caregiver   Date IMM was signed 10/25/20   Time IMM was signed 0940

## 2020-10-25 NOTE — ASSESSMENT & PLAN NOTE
· Patient presents with Acute Hypoxemic Respiratory Failure secondary to CHF exacerbation.  · Exacerbation probably triggered by poor HR control.  · Hx of PAF.  Presents in Afib with RVR.  · Cardiology consulted and patient admitted to ICU on Amiodarone drip.  · Stopping lasix due to hyponatremia and met alkalosis  · Rate control.  · Still requiring significant amount of O2

## 2020-10-25 NOTE — PROGRESS NOTES
[facility] Hospital Medicine  Progress Note    Patient Name: Jeannie Hutchins  MRN: [id number]  Patient Class: IP- Inpatient   Admission Date: 10/18/2020  Length of Stay: 7 days  Attending Physician: Dallin Hutchins MD  Primary Care Provider: Paige Mcleod MD        Subjective:     Principal Problem:Acute on chronic diastolic heart failure        HPI:  86 y/o female presents to the ER via EMS transport with shortness of breath.  Patient states she is chronically short of breath, but symptoms worsened this morning.  EMS reported patient's SpO2 was 88% on room air, improved to 96% after given 1 SL nitro tab and nitro paste en route.  Patient also reports non productive cough and 10 lb weight gain in the last 2 weeks.  Also complains of edema on bilateral lower extremities that started 3 days ago.  Patient also reports post nasal drip.  States compliance with medications. Hx of PAF and noted to be in rapid AFib on presentation.  Placed on Amiodarone gtt and admitted to ICU.  Patient was also given Lasix in ER with improvement of symptoms.  No other complaints.    Overview/Hospital Course:  86 y/o female presents with acute on chronic diastolic heart failure probably precipitated by AFib with RVR.  Started on IV Lasix.  Admitted to ICU on Amiodarone gtt.  Cardiology consulted.  Patient had cardioversion on 10/19 to NSR. Continued on high dose lasix in ICU. Pulmonary/critical care was consulted on 10/21 due to high 02 requirements. PT/OT evaluated the patient and rec: home with H/H. Transferred to floor 10/22. Continuing with aggressive diuresis and weaning O2.    Interval History: Patient states she is feeling much better but still on O2. She did have complaints of left first and second toe pain. Felt better when  - toenails were digging into each other. Felt tired yesterday with sitting up and did not walk with PT.    Review of Systems   Constitutional: Positive for activity change. Negative  for chills, diaphoresis, fatigue and fever.   HENT: Negative for congestion and dental problem.    Respiratory: Negative for apnea, choking, chest tightness and shortness of breath.    Cardiovascular: Negative for chest pain.   Gastrointestinal: Negative for abdominal pain, blood in stool, diarrhea, nausea and vomiting.   Endocrine: Negative for cold intolerance.   Genitourinary: Negative for difficulty urinating and dysuria.   Musculoskeletal: Negative for arthralgias.        Toe pain   Skin: Negative for color change.   Neurological: Negative for dizziness.   Psychiatric/Behavioral: Negative for agitation and behavioral problems.     Objective:     Vital Signs (Most Recent):  Temp: 98.3 °F (36.8 °C) (10/25/20 0737)  Pulse: 70 (10/25/20 0900)  Resp: 17 (10/25/20 0900)  BP: (!) 141/60 (10/25/20 0737)  SpO2: 95 % (10/25/20 0900) Vital Signs (24h Range):  Temp:  [97.7 °F (36.5 °C)-98.6 °F (37 °C)] 98.3 °F (36.8 °C)  Pulse:  [58-70] 70  Resp:  [16-18] 17  SpO2:  [91 %-95 %] 95 %  BP: (105-146)/(51-67) 141/60     Weight: 49.3 kg (108 lb 11 oz)  Body mass index is 24.37 kg/m².    Intake/Output Summary (Last 24 hours) at 10/25/2020 0955  Last data filed at 10/25/2020 0308  Gross per 24 hour   Intake 480 ml   Output 1100 ml   Net -620 ml      Physical Exam  Vitals signs and nursing note reviewed.   Constitutional:       General: She is not in acute distress.     Appearance: Normal appearance. She is normal weight. She is not ill-appearing, toxic-appearing or diaphoretic.   HENT:      Head: Normocephalic and atraumatic.      Mouth/Throat:      Pharynx: Oropharynx is clear.   Eyes:      General: No scleral icterus.     Conjunctiva/sclera: Conjunctivae normal.   Neck:      Musculoskeletal: Neck supple.   Cardiovascular:      Rate and Rhythm: Normal rate and regular rhythm.      Pulses: Normal pulses.      Heart sounds: No murmur.   Pulmonary:      Effort: Pulmonary effort is normal. No respiratory distress.      Breath  sounds: No stridor. No wheezing or rales.      Comments: No rales appreciated but poor air movement in bilateral lower lobes  Abdominal:      General: Bowel sounds are normal. There is no distension.      Tenderness: There is no abdominal tenderness.      Hernia: No hernia is present.   Musculoskeletal: Normal range of motion.         General: No swelling.   Feet:      Comments: Left foot/toes examined. Bunion to left toe, no significant erythema or swelling. 1st/2nd toe with poor nail hygiene, digging into each other and overlapping. Pain improved when .  Skin:     General: Skin is warm and dry.   Neurological:      Mental Status: She is alert and oriented to person, place, and time. Mental status is at baseline.   Psychiatric:         Mood and Affect: Mood normal.         Behavior: Behavior normal.         Significant Labs: All pertinent labs within the past 24 hours have been reviewed.    Significant Imaging: I have reviewed all pertinent imaging results/findings within the past 24 hours.      Assessment/Plan:      * Acute on chronic diastolic heart failure  · Patient presents with Acute Hypoxemic Respiratory Failure secondary to CHF exacerbation.  · Exacerbation probably triggered by poor HR control.  · Hx of PAF.  Presents in Afib with RVR.  · Cardiology consulted and patient admitted to ICU on Amiodarone drip.  · Stopping lasix due to hyponatremia and met alkalosis  · Rate control.  · Still requiring significant amount of O2     Congestive heart failure  See above      Mixed hyperlipidemia  · Continue Statin.      Paroxysmal atrial fibrillation  · Afib with RVR as above.  · S/p cardioversion on 10/19.   · Cards following   · CHADSVASC 5  · Continue with Amio and low dose Eliquis  · Apparently had episode of aflutter now back in sinus      Acute respiratory failure with hypoxia and hypercarbia  · Secondary to CHF as above.  · Plateau at 5 liters NC  · Has evidence of pulmonary hypertension on  echocardiogram  · CT chest today for persistent hypoxia  · Slow down lasix due met alkalosis            Essential hypertension  Continue home medications.      Hyponatremia with decreased serum osmolality  · Hyponatremia and hypochloremia with rising Bicarb - likely from aggressive diuresis with Lasix  · Changed to PO 10/24  · Still hyponatremic 10/25  · Resend serum/urine osm and urine Na  · Stop Lasix for now and monitor        VTE Risk Mitigation (From admission, onward)         Ordered     apixaban tablet 2.5 mg  2 times daily      10/18/20 1342     IP VTE HIGH RISK PATIENT  Once      10/18/20 1340     Place sequential compression device  Until discontinued      10/18/20 1340                Discharge Planning   JULIET:      Code Status: Full Code   Is the patient medically ready for discharge?:     Reason for patient still in hospital (select all that apply): Treatment  Discharge Plan A: Home Health   Discharge Delays: None known at this time              Dallin Hutchins MD  Department of Hospital Medicine   [facility]

## 2020-10-25 NOTE — PLAN OF CARE
Problem: Adult Inpatient Plan of Care  Goal: Plan of Care Review  Outcome: Ongoing, Progressing  Goal: Patient-Specific Goal (Individualization)  Outcome: Ongoing, Progressing  Goal: Absence of Hospital-Acquired Illness or Injury  Outcome: Ongoing, Progressing  Goal: Optimal Comfort and Wellbeing  Outcome: Ongoing, Progressing  Goal: Readiness for Transition of Care  Outcome: Ongoing, Progressing  Goal: Rounds/Family Conference  Outcome: Ongoing, Progressing     Problem: Fall Injury Risk  Goal: Absence of Fall and Fall-Related Injury  Outcome: Ongoing, Progressing     Problem: Skin Injury Risk Increased  Goal: Skin Health and Integrity  Outcome: Ongoing, Progressing     Problem: Adjustment to Illness (Heart Failure)  Goal: Optimal Coping  Outcome: Ongoing, Progressing     Problem: Arrhythmia/Dysrhythmia (Heart Failure)  Goal: Stable Heart Rate and Rhythm  Outcome: Ongoing, Progressing     Problem: Cardiac Output Decreased (Heart Failure)  Goal: Optimal Cardiac Output  Outcome: Ongoing, Progressing     Problem: Fluid Imbalance (Heart Failure)  Goal: Fluid Balance  Outcome: Ongoing, Progressing     Problem: Functional Ability Impaired (Heart Failure)  Goal: Optimal Functional Ability  Outcome: Ongoing, Progressing     Problem: Oral Intake Inadequate (Heart Failure)  Goal: Optimal Nutrition Intake  Outcome: Ongoing, Progressing     Problem: Respiratory Compromise (Heart Failure)  Goal: Effective Oxygenation and Ventilation  Outcome: Ongoing, Progressing     Problem: Sleep Disordered Breathing (Heart Failure)  Goal: Effective Breathing Pattern During Sleep  Outcome: Ongoing, Progressing     Problem: Arrhythmia/Dysrhythmia  Goal: Normalized Cardiac Rhythm  Outcome: Ongoing, Progressing     Problem: Coping Ineffective  Goal: Effective Coping  Outcome: Ongoing, Progressing

## 2020-10-25 NOTE — PT/OT/SLP PROGRESS
"Physical Therapy Treatment    Patient Name:  Jeannie Hutchins   MRN:  [id number]    Recommendations:     Discharge Recommendations:  home health PT   Discharge Equipment Recommendations: (Ongoing assessment pending pt progress)   Barriers to discharge:  None     Assessment:     Jeannie Hutchins is a 85 y.o. female admitted with a medical diagnosis of Acute on chronic diastolic heart failure.  She presents with the following impairments/functional limitations:  weakness, impaired endurance, pain, gait instability, impaired balance, decreased ROM, impaired cardiopulmonary response to activity, decreased lower extremity function, decreased upper extremity function, decreased safety awareness .    Rehab Prognosis: Good; patient would benefit from acute skilled PT services to address these deficits and reach maximum level of function.    Recent Surgery: Procedure(s) (LRB):  Transesophageal echo (BLADIMIR) intra-procedure log documentation (N/A)  Cardioversion 6 Days Post-Op    Plan:     During this hospitalization, patient to be seen (5-6x/wk) to address the identified rehab impairments via gait training, therapeutic activities, therapeutic exercises and progress toward the following goals:    · Plan of Care Expires:  11/04/20    Subjective     Chief Complaint: R knee and L great toe pain . Nurse notified   Patient/Family Comments/goals: " I want to walk "   Pain/Comfort: c/o " a lot of pain " with weight bearing activity , no pain at rest . Pt appears comfortable despite " 10/10 " pain .   · Pain Rating 1: 0/10  · Pain Rating Post-Intervention 1: 0/10      Objective:     Communicated with nurse  prior to session.  Patient found HOB elevated with bed alarm, oxygen, telemetry, PureWick, SCD upon PT entry to room.     General Precautions: Standard, fall, respiratory   Orthopedic Precautions:N/A   Braces: N/A     Functional Mobility:  · Bed Mobility:     · Rolling Left:  supervision  · Scooting: supervision  · Supine to Sit: stand by " assistance, HOB elevated, bedside rail   · Transfers:     · Sit to Stand: 2 trials from bed and 2 trials from chair stand by assistance and contact guard assistance with no AD, hand-held assist and rolling walker .VC'Ss for safety technique and sequence.   · Bed to Chair: stand by assistance and contact guard assistance with  no AD  using  Step Transfer  · Gait:  Patient ambulated ~  4-5 steps from bed to chair and 80 ft with RW, CGA/SBA  (4L O2NC) .  Pt with demonstarting a  3-point gait with decreased mercy, increased time in double stance, decreased velocity of limb motion, decreased step length, decreased swing-to-stance ratio, decreased toe-to-floor clearance and decreased weight-shifting ability.Impairments contributing to gait deviations include impaired balance, decreased flexibility, pain, impaired postural control, decreased ROM and decreased strength. V/T cues for safety technique and walker management. SpO2 : 91%-92% at the end of gait training. VC 's for pursed lip breathing technique.   · Balance:  Good in sitting, fair in dynamic standing.       AM-PAC 6 CLICK MOBILITY  Turning over in bed (including adjusting bedclothes, sheets and blankets)?: 4  Sitting down on and standing up from a chair with arms (e.g., wheelchair, bedside commode, etc.): 3  Moving from lying on back to sitting on the side of the bed?: 4  Moving to and from a bed to a chair (including a wheelchair)?: 3  Need to walk in hospital room?: 3  Climbing 3-5 steps with a railing?: 3  Basic Mobility Total Score: 20       Therapeutic Activities and Exercises:    Lower Extremity Exercises.   Patient educated on the purpose of therapeutic exercise.     Patient verbalized acceptance/understanding of instructions, expectations, and limitations(for safety).   Patient performed: 2 sets of 10 reps (each) of B LE There Ex: AP, LAQ, HS,  Hip abd/add with pillow , Hip flexion while sitting up on EOB.        Patient  requires verbal cues/tactile  cues to ensure correct sequence, to maintain proper form, and to allow for self-correction.   Pt reported no complaints or problems with exercise activity.   BLE HEP given with instructions and demonstrations (pt verbalize understanding). Encouraged pt to perform BLE ex's per handout throughout the day.   Patient tolerated sitting balance EOB for ADLs with S and tolerated standing balance with no AD for purewick removal and diaper placement with no LOB .   SpO2 decreased to 88% (4 L O2NC) upon sitting EOB however quickly recover to 95% with pursed lip breathing technique. O2 maintain from 90% -96% on 4L throughout treatment.   Patient left up in chair on green air cushion with lunch tray table set up  all lines intact, call button in reach and nurse notified..    GOALS:   Multidisciplinary Problems     Physical Therapy Goals        Problem: Physical Therapy Goal    Goal Priority Disciplines Outcome Goal Variances Interventions   Physical Therapy Goal     PT, PT/OT Ongoing, Progressing     Description: Goals to be met by: 20     Patient will increase functional independence with mobility by performin. Supine to sit with Stand-by Assistance  2. Sit to stand transfer with Supervision  3. Gait  x 50 feet with Supervision with or without AD   4. Ascend/descend 3 stair with right Handrails Contact Guard Assistance    5. Lower extremity exercise program x10 reps per handout, with independence                     Time Tracking:     PT Received On: 10/25/20  PT Start Time: 1059     PT Stop Time: 1153  PT Total Time (min): 54 min     Billable Minutes: Gait Training 17, Therapeutic Activity 23 and Therapeutic Exercise 14    Treatment Type: Treatment  PT/PTA: PTA     PTA Visit Number: 1     Sharon Clemens PTA  10/25/2020

## 2020-10-25 NOTE — NURSING
Patient to scheduled procedure as ordered. Tele monitoring in progress. Patient awake alert and oriented. No apparent distress noted. Pt went per w/c

## 2020-10-25 NOTE — PLAN OF CARE
Problem: Fall Injury Risk  Goal: Absence of Fall and Fall-Related Injury  Outcome: Ongoing, Progressing  Intervention: Identify and Manage Contributors to Fall Injury Risk  Flowsheets (Taken 10/24/2020 2001)  Self-Care Promotion:   BADL personal objects within reach   BADL personal routines maintained  Medication Review/Management:   medications reviewed   high risk medications identified  Intervention: Promote Injury-Free Environment  Flowsheets (Taken 10/24/2020 2001)  Safety Promotion/Fall Prevention:   bed alarm set   Fall Risk reviewed with patient/family

## 2020-10-25 NOTE — NURSING
Note that patient awake and alert, listening to loud TV.  Given PRN acetaminophen for left knee pain.   Denies pain at this time.   Had visit with family this afternoon.     Shift-change report given to YINKA Garcia.

## 2020-10-25 NOTE — PLAN OF CARE
Problem: Physical Therapy Goal  Goal: Physical Therapy Goal  Description: Goals to be met by: 20     Patient will increase functional independence with mobility by performin. Supine to sit with Stand-by Assistance  2. Sit to stand transfer with Supervision  3. Gait  x 50 feet with Supervision with or without AD   4. Ascend/descend 3 stair with right Handrails Contact Guard Assistance    5. Lower extremity exercise program x10 reps per handout, with independence    Outcome: Ongoing, Progressing   Pt ambulated 80 ft with RW, CGA/SBA (4L O2NC ). SpO2 : 91%-92% at the end of gait training.

## 2020-10-25 NOTE — SUBJECTIVE & OBJECTIVE
Interval History: Patient states she is feeling much better but still on O2. She did have complaints of left first and second toe pain. Felt better when  - toenails were digging into each other. Felt tired yesterday with sitting up and did not walk with PT.    Review of Systems   Constitutional: Positive for activity change. Negative for chills, diaphoresis, fatigue and fever.   HENT: Negative for congestion and dental problem.    Respiratory: Negative for apnea, choking, chest tightness and shortness of breath.    Cardiovascular: Negative for chest pain.   Gastrointestinal: Negative for abdominal pain, blood in stool, diarrhea, nausea and vomiting.   Endocrine: Negative for cold intolerance.   Genitourinary: Negative for difficulty urinating and dysuria.   Musculoskeletal: Negative for arthralgias.        Toe pain   Skin: Negative for color change.   Neurological: Negative for dizziness.   Psychiatric/Behavioral: Negative for agitation and behavioral problems.     Objective:     Vital Signs (Most Recent):  Temp: 98.3 °F (36.8 °C) (10/25/20 0737)  Pulse: 70 (10/25/20 0900)  Resp: 17 (10/25/20 0900)  BP: (!) 141/60 (10/25/20 0737)  SpO2: 95 % (10/25/20 0900) Vital Signs (24h Range):  Temp:  [97.7 °F (36.5 °C)-98.6 °F (37 °C)] 98.3 °F (36.8 °C)  Pulse:  [58-70] 70  Resp:  [16-18] 17  SpO2:  [91 %-95 %] 95 %  BP: (105-146)/(51-67) 141/60     Weight: 49.3 kg (108 lb 11 oz)  Body mass index is 24.37 kg/m².    Intake/Output Summary (Last 24 hours) at 10/25/2020 0955  Last data filed at 10/25/2020 0308  Gross per 24 hour   Intake 480 ml   Output 1100 ml   Net -620 ml      Physical Exam  Vitals signs and nursing note reviewed.   Constitutional:       General: She is not in acute distress.     Appearance: Normal appearance. She is normal weight. She is not ill-appearing, toxic-appearing or diaphoretic.   HENT:      Head: Normocephalic and atraumatic.      Mouth/Throat:      Pharynx: Oropharynx is clear.   Eyes:       General: No scleral icterus.     Conjunctiva/sclera: Conjunctivae normal.   Neck:      Musculoskeletal: Neck supple.   Cardiovascular:      Rate and Rhythm: Normal rate and regular rhythm.      Pulses: Normal pulses.      Heart sounds: No murmur.   Pulmonary:      Effort: Pulmonary effort is normal. No respiratory distress.      Breath sounds: No stridor. No wheezing or rales.      Comments: No rales appreciated but poor air movement in bilateral lower lobes  Abdominal:      General: Bowel sounds are normal. There is no distension.      Tenderness: There is no abdominal tenderness.      Hernia: No hernia is present.   Musculoskeletal: Normal range of motion.         General: No swelling.   Feet:      Comments: Left foot/toes examined. Bunion to left toe, no significant erythema or swelling. 1st/2nd toe with poor nail hygiene, digging into each other and overlapping. Pain improved when .  Skin:     General: Skin is warm and dry.   Neurological:      Mental Status: She is alert and oriented to person, place, and time. Mental status is at baseline.   Psychiatric:         Mood and Affect: Mood normal.         Behavior: Behavior normal.         Significant Labs: All pertinent labs within the past 24 hours have been reviewed.    Significant Imaging: I have reviewed all pertinent imaging results/findings within the past 24 hours.

## 2020-10-26 PROBLEM — F43.23 ADJUSTMENT DISORDER WITH MIXED ANXIETY AND DEPRESSED MOOD: Status: RESOLVED | Noted: 2018-01-23 | Resolved: 2020-10-26

## 2020-10-26 LAB
ANION GAP SERPL CALC-SCNC: 8 MMOL/L (ref 8–16)
BUN SERPL-MCNC: 21 MG/DL (ref 8–23)
CALCIUM SERPL-MCNC: 9.2 MG/DL (ref 8.7–10.5)
CHLORIDE SERPL-SCNC: 88 MMOL/L (ref 95–110)
CO2 SERPL-SCNC: 31 MMOL/L (ref 23–29)
CREAT SERPL-MCNC: 0.8 MG/DL (ref 0.5–1.4)
EST. GFR  (AFRICAN AMERICAN): >60 ML/MIN/1.73 M^2
EST. GFR  (NON AFRICAN AMERICAN): >60 ML/MIN/1.73 M^2
GLUCOSE SERPL-MCNC: 103 MG/DL (ref 70–110)
OSMOLALITY UR: 313 MOSM/KG (ref 50–1200)
POTASSIUM SERPL-SCNC: 4.1 MMOL/L (ref 3.5–5.1)
SODIUM SERPL-SCNC: 127 MMOL/L (ref 136–145)

## 2020-10-26 PROCEDURE — 94799 UNLISTED PULMONARY SVC/PX: CPT

## 2020-10-26 PROCEDURE — 25000003 PHARM REV CODE 250: Performed by: INTERNAL MEDICINE

## 2020-10-26 PROCEDURE — 36415 COLL VENOUS BLD VENIPUNCTURE: CPT

## 2020-10-26 PROCEDURE — 97530 THERAPEUTIC ACTIVITIES: CPT | Mod: CQ

## 2020-10-26 PROCEDURE — 94761 N-INVAS EAR/PLS OXIMETRY MLT: CPT

## 2020-10-26 PROCEDURE — 99900035 HC TECH TIME PER 15 MIN (STAT)

## 2020-10-26 PROCEDURE — 97803 MED NUTRITION INDIV SUBSEQ: CPT

## 2020-10-26 PROCEDURE — 80048 BASIC METABOLIC PNL TOTAL CA: CPT

## 2020-10-26 PROCEDURE — 97535 SELF CARE MNGMENT TRAINING: CPT | Mod: CO

## 2020-10-26 PROCEDURE — 97530 THERAPEUTIC ACTIVITIES: CPT | Mod: CO

## 2020-10-26 PROCEDURE — 21400001 HC TELEMETRY ROOM

## 2020-10-26 PROCEDURE — 83930 ASSAY OF BLOOD OSMOLALITY: CPT

## 2020-10-26 RX ADMIN — APIXABAN 2.5 MG: 2.5 TABLET, FILM COATED ORAL at 08:10

## 2020-10-26 RX ADMIN — METOPROLOL TARTRATE 50 MG: 50 TABLET, FILM COATED ORAL at 08:10

## 2020-10-26 RX ADMIN — ACETAMINOPHEN 500 MG: 500 TABLET ORAL at 03:10

## 2020-10-26 RX ADMIN — APIXABAN 2.5 MG: 2.5 TABLET, FILM COATED ORAL at 09:10

## 2020-10-26 RX ADMIN — AMIODARONE HYDROCHLORIDE 200 MG: 200 TABLET ORAL at 08:10

## 2020-10-26 RX ADMIN — LOSARTAN POTASSIUM 100 MG: 25 TABLET, FILM COATED ORAL at 08:10

## 2020-10-26 RX ADMIN — OXYCODONE HYDROCHLORIDE AND ACETAMINOPHEN 1 TABLET: 5; 325 TABLET ORAL at 04:10

## 2020-10-26 RX ADMIN — LEVOTHYROXINE SODIUM 75 MCG: 75 TABLET ORAL at 08:10

## 2020-10-26 RX ADMIN — METOPROLOL TARTRATE 50 MG: 50 TABLET, FILM COATED ORAL at 09:10

## 2020-10-26 RX ADMIN — AMLODIPINE BESYLATE 10 MG: 5 TABLET ORAL at 08:10

## 2020-10-26 RX ADMIN — ATORVASTATIN CALCIUM 40 MG: 40 TABLET, FILM COATED ORAL at 09:10

## 2020-10-26 NOTE — PLAN OF CARE
Patient admitted with CHF exacerbation; from home with family; plan to return once discharged; Cards/Renal consulted; evaluated by therapy who recommends Home Health; pt in agreement; clinicals sent to [facility] via ; pending orders for discharge;    Situation: from home with 2 sons    Services: agree to ; clinicals sent to [facility] via     Equipment: has W/C    IMM: current    Appts: will need PCP/Cards    Needs:TN to f/u with [facility] with DC date and  orders    Consults: Cards, Renal    Dispo: Home with Home Health       10/26/20 1101   Discharge Reassessment   Assessment Type Discharge Planning Reassessment   Provided patient/caregiver education on the expected discharge date and the discharge plan Yes   Do you have any problems affording any of your prescribed medications? No   Discharge Plan A Home Health   Discharge Plan B Home with family   DME Needed Upon Discharge    (TBD)   Anticipated Discharge Disposition Home-Health   Can the patient/caregiver answer the patient profile reliably? Yes, cognitively intact   How does the patient rate their overall health at the present time? Good   Describe the patient's ability to walk at the present time. Walks with the help of equipment   How often would a person be available to care for the patient? Whenever needed   During the past month, has the patient often been bothered by feeling down, depressed or hopeless? No   During the past month, has the patient often been bothered by little interest or pleasure in doing things? No   Post-Acute Status   Post-Acute Authorization Home Health   Home Health Status Awaiting Internal Medical Clearance   Discharge Delays None known at this time

## 2020-10-26 NOTE — CONSULTS
Most pleasant 85 y o f admitted with dyspnea and edema of her lower ext. Renal input requested to help with the development of hyponatremia. She was found to be in Afib with RVR and cardioversion was recently done     Pt is unaware of this episode or prior episodes of hyponatremia. ( the med records does showed prior episodes of hyponatremia) Denies hx of met acidosis or hyperK.    Denies using OTC NSAID's. In the hospital she did have some loop diuretics. Noted is the hx of hypothyroid for which she was on synthroid for.     Denies CNS ENT CP GI  RHEUM OR DERM SX  Past Medical History:   Diagnosis Date    Anticoagulant long-term use     Eliquis    Arthritis     Atrial fibrillation     Blood transfusion     Carotid stenosis     CHF (congestive heart failure)     Edema     Generalized anxiety disorder 1/23/2018    Dr. Mckeon's eval 1/23/18: She does appear to have a JERRI because of the persistent worries that she has.  These worries predominate her day.  She would probably do well with prevention therapy such as SSRI/SNRI to help control the physical symptoms of the anxiety.  Problem at this point in time is her electrolyte abnormalities.  There is a slight risk of hyponatremia with these medications and    Hearing loss     [place] (hard of hearing)     Hypercholesterolemia     Hypertension     Psychiatric problem     Thyroid disease      Past Surgical History:   Procedure Laterality Date    CARDIOVERSION  10/19/2020    Procedure: Cardioversion;  Surgeon: Brandon Elias MD;  Location: [facility] CATH LAB;  Service: Cardiology;;    CARPAL TUNNEL RELEASE  2012    right hand    ESOPHAGOGASTRODUODENOSCOPY Left 8/29/2018    Procedure: EGD (ESOPHAGOGASTRODUODENOSCOPY);  Surgeon: Oniel Dorsey MD;  Location: [facility] ENDO;  Service: Endoscopy;  Laterality: Left;    HYSTERECTOMY      left carotid endartectomy  5/2006    TONSILLECTOMY      TREATMENT OF CARDIAC ARRHYTHMIA N/A 10/19/2020    Procedure:  Transesophageal echo (BLADIMIR) intra-procedure log documentation;  Surgeon: Brandon Elias MD;  Location: [facility] CATH LAB;  Service: Cardiology;  Laterality: N/A;     Review of patient's allergies indicates:   Allergen Reactions    Hydrochlorothiazide Other (See Comments)     hyponatremia    Strawberry Swelling     Tongue swells up and a generalized rash.    Lisinopril Other (See Comments)     Cough       Current Facility-Administered Medications   Medication    acetaminophen tablet 500 mg    albuterol sulfate nebulizer solution 2.5 mg    aluminum-magnesium hydroxide-simethicone 200-200-20 mg/5 mL suspension 30 mL    amiodarone tablet 200 mg    amLODIPine tablet 10 mg    apixaban tablet 2.5 mg    atorvastatin tablet 40 mg    diphenhydrAMINE capsule 25 mg    ipratropium 0.02 % nebulizer solution 0.5 mg    levothyroxine tablet 75 mcg    losartan tablet 100 mg    metoprolol tartrate (LOPRESSOR) tablet 50 mg    ondansetron injection 8 mg    oxyCODONE-acetaminophen 5-325 mg per tablet 1 tablet    QUEtiapine tablet 25 mg    sodium chloride 0.9% flush 10 mL     Social History     Socioeconomic History    Marital status:      Spouse name: Not on file    Number of children: Not on file    Years of education: Not on file    Highest education level: Not on file   Occupational History    Not on file   Social Needs    Financial resource strain: Not on file    Food insecurity     Worry: Not on file     Inability: Not on file    Transportation needs     Medical: Not on file     Non-medical: Not on file   Tobacco Use    Smoking status: Never Smoker    Smokeless tobacco: Never Used   Substance and Sexual Activity    Alcohol use: No    Drug use: No    Sexual activity: Never     Partners: Male   Lifestyle    Physical activity     Days per week: Not on file     Minutes per session: Not on file    Stress: Not on file   Relationships    Social connections     Talks on phone: Not on file     Gets  together: Not on file     Attends [employer] service: Not on file     Active member of club or organization: Not on file     Attends meetings of clubs or organizations: Not on file     Relationship status: Not on file   Other Topics Concern    Patient feels they ought to cut down on drinking/drug use Not Asked    Patient annoyed by others criticizing their drinking/drug use Not Asked    Patient has felt bad or guilty about drinking/drug use Not Asked    Patient has had a drink/used drugs as an eye opener in the AM Not Asked   Social History Narrative     since 1984    He is retired.  He is one year older.  Worked for [employer]    She is retired.  Worked for [employer].     Family History   Problem Relation Age of Onset    Heart disease Father     Cancer Brother 65        bladder    Cancer Sister 81        Ovarian    Ovarian cancer Sister 81    Breast cancer Daughter 50        Status post mastectomy    Cancer Sister         Lung.  Smoker    Cancer Sister         Lung.  Smoker    Schizophrenia Son        LABS    Recent Results (from the past 24 hour(s))   Osmolality, urine    Collection Time: 10/25/20  7:00 PM   Result Value Ref Range    Osmolality, Ur 313 50 - 1200 mOsm/kg   Sodium, urine, random    Collection Time: 10/25/20  7:00 PM   Result Value Ref Range    Sodium Urine Random 35 20 - 250 mmol/L   Basic metabolic panel    Collection Time: 10/26/20  6:36 AM   Result Value Ref Range    Sodium 127 (L) 136 - 145 mmol/L    Potassium 4.1 3.5 - 5.1 mmol/L    Chloride 88 (L) 95 - 110 mmol/L    CO2 31 (H) 23 - 29 mmol/L    Glucose 103 70 - 110 mg/dL    BUN, Bld 21 8 - 23 mg/dL    Creatinine 0.8 0.5 - 1.4 mg/dL    Calcium 9.2 8.7 - 10.5 mg/dL    Anion Gap 8 8 - 16 mmol/L    eGFR if African American >60 >60 mL/min/1.73 m^2    eGFR if non African American >60 >60 mL/min/1.73 m^2   ]    I/O last 3 completed shifts:  In: 1020 [P.O.:1020]  Out: 2000 [Urine:2000]    Vitals:    10/26/20  0411 10/26/20 0738 10/26/20 0842 10/26/20 1147   BP: (!) 143/60 (!) 157/68  133/60   Pulse: 65 69  63   Resp: 18 17  18   Temp: 98.2 °F (36.8 °C) 97.3 °F (36.3 °C)  97.7 °F (36.5 °C)   TempSrc: Oral Oral  Oral   SpO2: (!) 93% (!) 91% (!) 93% (!) 93%   Weight: 49.9 kg (110 lb 0.2 oz)      Height:           No Jvd, Thyromegaly or Lymphadenopathy  Lungs: Fairly clear anteriorly and laterally  Cor: RRR no G or rubs  Abd: Soft benign good bowel sounds non tender  Ext: No E C C    A)    Hyponatremia secondary to dietary issues along with CHF/hypothyroidism and loop diyretics  CHF (DD)  Pulm htn  Afib with RVR   Hypothyroid  Pulm htn  Enlarged L atrium  Her  was a heavy smoker and smoked 4 packs a day much of it in the house, consider SE of secondary hand smoke    P)    Mild fluid rest ( ie 1.2 liters a day)  Check tsh adjust synthroid as needed  Use diuretics judiciously

## 2020-10-26 NOTE — SUBJECTIVE & OBJECTIVE
Interval History: No acute complaints. Feels like she is regaining strength. No longer having resting dyspnea.     Review of Systems   Constitutional: Negative for chills and fever.   Respiratory: Negative for cough, chest tightness and shortness of breath.    Cardiovascular: Negative for chest pain, palpitations and leg swelling.   Gastrointestinal: Negative for abdominal pain and nausea.   Neurological: Negative for light-headedness and headaches.     Objective:     Vital Signs (Most Recent):  Temp: 97.7 °F (36.5 °C) (10/26/20 1147)  Pulse: 63 (10/26/20 1147)  Resp: 18 (10/26/20 1147)  BP: 133/60 (10/26/20 1147)  SpO2: (!) 93 % (10/26/20 1147) Vital Signs (24h Range):  Temp:  [97.3 °F (36.3 °C)-98.2 °F (36.8 °C)] 97.7 °F (36.5 °C)  Pulse:  [58-70] 63  Resp:  [17-20] 18  SpO2:  [90 %-94 %] 93 %  BP: (111-157)/(56-68) 133/60     Weight: 49.9 kg (110 lb 0.2 oz)  Body mass index is 24.66 kg/m².    Intake/Output Summary (Last 24 hours) at 10/26/2020 1315  Last data filed at 10/26/2020 0830  Gross per 24 hour   Intake 660 ml   Output 1100 ml   Net -440 ml      Physical Exam  Constitutional:       Appearance: Normal appearance. She is not ill-appearing.   HENT:      Mouth/Throat:      Mouth: Mucous membranes are moist.      Pharynx: Oropharynx is clear.   Eyes:      Extraocular Movements: Extraocular movements intact.      Conjunctiva/sclera: Conjunctivae normal.   Cardiovascular:      Rate and Rhythm: Normal rate.      Pulses: Normal pulses.      Heart sounds: Normal heart sounds.   Pulmonary:      Effort: Pulmonary effort is normal. No respiratory distress.      Comments: +Left sided basillar crackles  Abdominal:      General: Abdomen is flat.      Palpations: Abdomen is soft.   Musculoskeletal:      Right lower leg: No edema.      Left lower leg: No edema.   Skin:     General: Skin is warm.      Findings: No erythema.   Neurological:      Mental Status: She is alert.         Significant Labs: All pertinent labs within  the past 24 hours have been reviewed.    Significant Imaging: I have reviewed all pertinent imaging results/findings within the past 24 hours.

## 2020-10-26 NOTE — ASSESSMENT & PLAN NOTE
Secondary to CHF as above.  Plateau at 5 liters NC  Has evidence of pulmonary hypertension on echocardiogram  CT chest shows decreased chronic GGO, compressive atelectasis  Start incentive spirometry

## 2020-10-26 NOTE — ASSESSMENT & PLAN NOTE
Na 127 downtrending  Unclear etiology - hypovolemia was suspected due to diuresis however worsened despite holding lasix overnight  U osm 300s, U Na 35  Cont to hold lasix. Consult renal

## 2020-10-26 NOTE — ASSESSMENT & PLAN NOTE
Afib with RVR as above.  S/p cardioversion on 10/19.   Cards following   CHADSVASC 5  Continue with Amio and low dose Eliquis  Apparently had episode of aflutter now back in sinus

## 2020-10-26 NOTE — PLAN OF CARE
Problem: Physical Therapy Goal  Goal: Physical Therapy Goal  Description: Goals to be met by: 20     Patient will increase functional independence with mobility by performin. Supine to sit with Stand-by Assistance  2. Sit to stand transfer with Supervision  3. Gait  x 50 feet with Supervision with or without AD   4. Ascend/descend 3 stair with right Handrails Contact Guard Assistance    5. Lower extremity exercise program x10 reps per handout, with independence    Outcome: Ongoing, Progressing   Pt is progressing toward treatment goals.

## 2020-10-26 NOTE — PLAN OF CARE
Problem: Arrhythmia/Dysrhythmia (Heart Failure)  Goal: Stable Heart Rate and Rhythm  10/26/2020 0704 by Radha Boone RN  Outcome: Ongoing, Progressing  10/26/2020 0704 by Radha Boone RN  Outcome: Ongoing, Progressing     Problem: Cardiac Output Decreased (Heart Failure)  Goal: Optimal Cardiac Output  10/26/2020 0704 by Radha Boone RN  Outcome: Ongoing, Progressing  10/26/2020 0704 by Radha Boone RN  Outcome: Ongoing, Progressing     Problem: Sleep Disordered Breathing (Heart Failure)  Goal: Effective Breathing Pattern During Sleep  10/26/2020 0704 by Radha Boone RN  Outcome: Ongoing, Progressing  10/26/2020 0704 by Radha Booen RN  Outcome: Ongoing, Progressing  PRN medications effective. Rested well during night. Remains SR on telemetry monitor. No skin issues and no injury during shift. Educated on new medication and verbalized understanding. Bed alarm activated and audible. Call light at side.

## 2020-10-26 NOTE — PROGRESS NOTES
[facility] Hospital Medicine  Progress Note    Patient Name: Jeannie Hutchins  MRN: [id number]  Patient Class: IP- Inpatient   Admission Date: 10/18/2020  Length of Stay: 8 days  Attending Physician: Luther Klein DO  Primary Care Provider: Paige Mcleod MD        Subjective:     Principal Problem:Acute on chronic diastolic heart failure        HPI:  84 y/o female presents to the ER via EMS transport with shortness of breath.  Patient states she is chronically short of breath, but symptoms worsened this morning.  EMS reported patient's SpO2 was 88% on room air, improved to 96% after given 1 SL nitro tab and nitro paste en route.  Patient also reports non productive cough and 10 lb weight gain in the last 2 weeks.  Also complains of edema on bilateral lower extremities that started 3 days ago.  Patient also reports post nasal drip.  States compliance with medications. Hx of PAF and noted to be in rapid AFib on presentation.  Placed on Amiodarone gtt and admitted to ICU.  Patient was also given Lasix in ER with improvement of symptoms.  No other complaints.    Overview/Hospital Course:  84 y/o female presents with acute on chronic diastolic heart failure probably precipitated by AFib with RVR.  Started on IV Lasix.  Admitted to ICU on Amiodarone gtt.  Cardiology consulted.  Patient had cardioversion on 10/19 to NSR. Continued on high dose lasix in ICU. Pulmonary/critical care was consulted on 10/21 due to high 02 requirements. PT/OT evaluated the patient and rec: home with H/H. Transferred to floor 10/22. Continuing with aggressive diuresis and weaning O2.    Interval History: No acute complaints. Feels like she is regaining strength. No longer having resting dyspnea.     Review of Systems   Constitutional: Negative for chills and fever.   Respiratory: Negative for cough, chest tightness and shortness of breath.    Cardiovascular: Negative for chest pain, palpitations and leg swelling.    Gastrointestinal: Negative for abdominal pain and nausea.   Neurological: Negative for light-headedness and headaches.     Objective:     Vital Signs (Most Recent):  Temp: 97.7 °F (36.5 °C) (10/26/20 1147)  Pulse: 63 (10/26/20 1147)  Resp: 18 (10/26/20 1147)  BP: 133/60 (10/26/20 1147)  SpO2: (!) 93 % (10/26/20 1147) Vital Signs (24h Range):  Temp:  [97.3 °F (36.3 °C)-98.2 °F (36.8 °C)] 97.7 °F (36.5 °C)  Pulse:  [58-70] 63  Resp:  [17-20] 18  SpO2:  [90 %-94 %] 93 %  BP: (111-157)/(56-68) 133/60     Weight: 49.9 kg (110 lb 0.2 oz)  Body mass index is 24.66 kg/m².    Intake/Output Summary (Last 24 hours) at 10/26/2020 1315  Last data filed at 10/26/2020 0830  Gross per 24 hour   Intake 660 ml   Output 1100 ml   Net -440 ml      Physical Exam  Constitutional:       Appearance: Normal appearance. She is not ill-appearing.   HENT:      Mouth/Throat:      Mouth: Mucous membranes are moist.      Pharynx: Oropharynx is clear.   Eyes:      Extraocular Movements: Extraocular movements intact.      Conjunctiva/sclera: Conjunctivae normal.   Cardiovascular:      Rate and Rhythm: Normal rate.      Pulses: Normal pulses.      Heart sounds: Normal heart sounds.   Pulmonary:      Effort: Pulmonary effort is normal. No respiratory distress.      Comments: +Left sided basillar crackles  Abdominal:      General: Abdomen is flat.      Palpations: Abdomen is soft.   Musculoskeletal:      Right lower leg: No edema.      Left lower leg: No edema.   Skin:     General: Skin is warm.      Findings: No erythema.   Neurological:      Mental Status: She is alert.         Significant Labs: All pertinent labs within the past 24 hours have been reviewed.    Significant Imaging: I have reviewed all pertinent imaging results/findings within the past 24 hours.      Assessment/Plan:      * Acute on chronic diastolic heart failure  Patient presents with Acute Hypoxemic Respiratory Failure secondary to CHF exacerbation.  Exacerbation probably  triggered by poor HR control.  Hx of PAF.  Presents in Afib with RVR.  Cardiology consulted and patient admitted to ICU on Amiodarone drip.  Stopped lasix due to hyponatremia and met alkalosis  Rate control.  Reassess O2 requirements    Congestive heart failure  See above      Mixed hyperlipidemia  · Continue Statin.      Paroxysmal atrial fibrillation  Afib with RVR as above.  S/p cardioversion on 10/19.   Cards following   CHADSVASC 5  Continue with Amio and low dose Eliquis  Apparently had episode of aflutter now back in sinus      Acute respiratory failure with hypoxia and hypercarbia  Secondary to CHF as above.  Plateau at 5 liters NC  Has evidence of pulmonary hypertension on echocardiogram  CT chest shows decreased chronic GGO, compressive atelectasis  Start incentive spirometry    Essential hypertension  Continue home medications.      Hyponatremia with decreased serum osmolality  Na 127 downtrending  Unclear etiology - hypovolemia was suspected due to diuresis however worsened despite holding lasix overnight  U osm 300s, U Na 35  Cont to hold lasix. Consult renal      VTE Risk Mitigation (From admission, onward)         Ordered     apixaban tablet 2.5 mg  2 times daily      10/18/20 1342     IP VTE HIGH RISK PATIENT  Once      10/18/20 1340     Place sequential compression device  Until discontinued      10/18/20 1340                Discharge Planning   JULIET:      Code Status: Full Code   Is the patient medically ready for discharge?:     Reason for patient still in hospital (select all that apply): Patient new problem and Consult recommendations  Discharge Plan A: Home Health   Discharge Delays: None known at this time    Luther Klein, DO  Hospital Medicine  Cell: [phone]  Pager: [phone]  Email: [email or web address]

## 2020-10-26 NOTE — PLAN OF CARE
Recommendations    1. Continue low Na diet, add 1200 ml fluid restriction per nephrology.   2. RD to order boost plus once daily for added kcal and protein.   3. Weigh pt weekly.    Goals: 1. Consume >/=50% of meals and supplements by next RD visit.  Nutrition Goal Status: new  Communication of RD Recs: (plan of care)

## 2020-10-26 NOTE — PROGRESS NOTES
"[facility]  Adult Nutrition  Progress Note    SUMMARY       Recommendations    1. Continue low Na diet, add 1200 ml fluid restriction per nephrology.   2. RD to order boost plus once daily for added kcal and protein.   3. Weigh pt weekly.    Goals: 1. Consume >/=50% of meals and supplements by next RD visit.  Nutrition Goal Status: new  Communication of RD Recs: (plan of care)    Reason for Assessment    Reason For Assessment: RD follow-up  Diagnosis: cardiac disease(acute on chronic diastolic heart failure)  Relevant Medical History: Thyroid disease, HTN, hypercholesterolemia, arthritis, A Fib. CHF, anxiety disorder  Interdisciplinary Rounds: did not attend    General Information Comments: 85 y.o. female presented to the ED for SOB and 10 lb wt gain in 2 weeks. Pt received salt and fluid restriction diet education 10/19. Pt on PO diet, meal intake 25-50%. Pt reports once she startes to eat she gets full fast and the food has no seasoning which may be affecting her appetite. Pt agreed to oral supplement. No wt loss noted PTA. Pt denies any trouble chewing/swallowing. Last BM 10/24. NFPE not warrented at this time.    Nutrition Discharge Planning: Adequate intake on low sodium diet, 1200 ml fluid    Nutrition Risk Screen    Nutrition Risk Screen: no indicators present    Nutrition/Diet History    Typical Food/Fluid Intake: Breakfast: 3/4 cups of raisin brand Brunch: Fried egg with toast Lunch:skips Dinner: pork OR beef OR chicken with a start and cooked vegetables from frozen bag Snack: carmelina crackers or cookies Drinks: tea  Spiritual, Cultural Beliefs, [employer]ism Practices, Values that Affect Care: no  Food Allergies: (strawberry)  Factors Affecting Nutritional Intake: early satiety    Anthropometrics    Temp: 97.7 °F (36.5 °C)  Height Method: Stated  Height: 4' 8" (142.2 cm)  Height (inches): 56 in  Weight Method: Bed Scale  Weight: 49.9 kg (110 lb 0.2 oz)  Weight (lb): 110.01 lb  Ideal Body " Weight (IBW), Female: 80 lb  % Ideal Body Weight, Female (lb): 137.51 %  BMI (Calculated): 24.7  BMI Grade: 18.5-24.9 - normal  Weight Loss: (none PTA)    Lab/Procedures/Meds    Pertinent Labs Reviewed: reviewed  Pertinent Labs Comments: Na 127, Cl 88, CO2 31  Pertinent Medications Reviewed: reviewed  Pertinent Medications Comments: none at this time    Estimated/Assessed Needs    Weight Used For Calorie Calculations: 49.9 kg (110 lb 0.2 oz)  Energy Calorie Requirements (kcal): 1497 kcal (30 kcal/kg)  Energy Need Method: Kcal/kg  Protein Requirements: 60g (1.2 g/kg)  Weight Used For Protein Calculations: 49.9 kg (110 lb 0.2 oz)  Fluid Requirements (mL): 1 ml/kcal or per MD  Estimated Fluid Requirement Method: RDA Method  RDA Method (mL): 1497  CHO Requirement: 185 g daily    Nutrition Prescription Ordered    Current Diet Order: Low Na    Evaluation of Received Nutrient/Fluid Intake    I/O: 780/1500  Energy Calories Required: not meeting needs  Protein Required: not meeting needs  Fluid Required: meeting needs  Comments: Last BM 10/24  Tolerance: tolerating  % Intake of Estimated Energy Needs: 25 - 50 %  % Meal Intake: 25 - 50 %    Nutrition Risk    Level of Risk/Frequency of Follow-up: low - moderate(2x/week)     Assessment and Plan  Nutrition Problem  Inadequate oral intake    Related to (etiology):   Food choices and preferences, early satiety    Signs and Symptoms (as evidenced by):   Meal intake 25-50% x 8 days    Interventions (treatment strategy):  Decreased sodium diet  Fluid restricted diet  Commercial beverage- boost plus vanilla  Content related nutrition education  Collaboration with other providers    Nutrition Diagnosis Status:   New    Monitor and Evaluation    Food and Nutrient Intake: energy intake, food and beverage intake  Food and Nutrient Adminstration: diet order  Knowledge/Beliefs/Attitudes: food and nutrition knowledge/skill  Physical Activity and Function: nutrition-related ADLs and  IADLs  Anthropometric Measurements: weight, weight change  Biochemical Data, Medical Tests and Procedures: electrolyte and renal panel  Nutrition-Focused Physical Findings: overall appearance, skin     Malnutrition Assessment  Energy Intake: severe energy intake(10/18-10/26: meal intake 25-50%)  Skin (Micronutrient): (sammie score-17)  Teeth (Micronutrient): (missing some)       Weight Loss (Malnutrition): (10/26/20 49.9 kg)  Energy Intake (Malnutrition): less than 75% for greater than 7 days       Edema (Fluid Accumulation): 1-->trace(legs)        Nutrition Follow-Up    RD Follow-up?: Yes

## 2020-10-26 NOTE — PT/OT/SLP PROGRESS
Occupational Therapy   Treatment    Name: Jeannie Hutchins  MRN: [id number]  Admitting Diagnosis:  Acute on chronic diastolic heart failure  7 Days Post-Op    Recommendations:     Discharge Recommendations: home health OT(with family assist)  Discharge Equipment Recommendations:  (TBD pending progress)  Barriers to discharge:       Assessment:     Jeannie Hutchins is a 85 y.o. female with a medical diagnosis of Acute on chronic diastolic heart failure.  She presents with the following performance deficits affecting function: weakness, impaired endurance, gait instability, impaired functional mobilty, impaired self care skills, impaired balance, decreased upper extremity function, decreased safety awareness, impaired fine motor, impaired cardiopulmonary response to activity, decreased ROM, decreased lower extremity function.      Pt able to ambulate to bathroom with SBA-CGA/pediatric RW today. Pt SPO2 with functional mobility 85-90% on 4L O2 NC. Pt requires increased time to perform all tasks 2* need for seated SPO2 recovery and PLB techniques.  Pt progressing, continue OT POC as indicated.           Rehab Prognosis:  Good; patient would benefit from acute skilled OT services to address these deficits and reach maximum level of function.       Plan:     Patient to be seen 3 x/week, 5 x/week to address the above listed problems via self-care/home management, therapeutic activities, therapeutic exercises  · Plan of Care Expires: 11/04/20  · Plan of Care Reviewed with: patient    Subjective   Pt agreeable to therapy.   Pain/Comfort:  · Pain Rating 1: 0/10    Objective:     Communicated with: Nurse Alicea prior to session.  Patient found HOB elevated with SCD, telemetry, oxygen, PureWick upon OT entry to room.    General Precautions: Standard, respiratory, fall   Orthopedic Precautions:N/A   Braces: N/A     Occupational Performance:     Bed Mobility:    · Patient completed Rolling/Turning to Right with stand by  assistance  · Patient completed Scooting/Bridging with stand by assistance  · Patient completed Supine to Sit with stand by assistance     Functional Mobility/Transfers:  · Patient completed Sit <> Stand Transfer with stand by assistance and contact guard assistance  with  rolling walker   · Patient completed Bed > Chair Transfer using Step Transfer technique with stand by assistance and contact guard assistance with rolling walker  · Patient completed Toilet Transfer Step Transfer technique with stand by assistance and contact guard assistance with  rolling walker  · Functional Mobility: Pt able to ambulate within room household distances to bathroom and chair SBA-CGA, pediatric RW, 4L O2 NC.     Activities of Daily Living:  · Grooming: set-up assistance to perform hand hygiene and comb hair seated EOB    · Upper Body Dressing: minimum assistance with to arash front and back gowns  · Toileting: set-up assistance for pericare seated on toilet      AMPAC 6 Click ADL: 21    Treatment & Education:  · Pt re- educated on OT role/POC  · Importance of OOB activity with staff assistance to maximize recovery, OOB>chair throughout day as tolerated and for all meals  · Importance of upright positioning throughout day for optimal lung function 2* increased O2 needs. PLB technique with all exertion; demo and verbal cues for implementation.   · Safety and proper techniques during functional t/f with use of pediatric rolling walker; safety with toileting and self care. Encouraged pt OOB>BSC or bathroom with nursing staff when able.   ·  Pt SPO2 85-90% throughout session on 4L O2 NC. Focused on deep breathing exercises and rest breaks between tasks throughout session for SPO2 recovery. Pt with minimal c/o SOB post ambulation in room.   · White board updated    · Multiple self-care tasks/functional mobility completed- assistance level noted above   · Pt verbalizes understanding of all education provided this date. All  questions/concerns answered within OT scope of practice       Patient left up in chair with all lines intact, call button in reach and nurse Nixon notifiedEducation:      GOALS:   Multidisciplinary Problems     Occupational Therapy Goals        Problem: Occupational Therapy Goal    Goal Priority Disciplines Outcome Interventions   Occupational Therapy Goal     OT, PT/OT Ongoing, Progressing    Description: Goals to be met by: 11/4/20    Patient will increase functional independence with ADLs by performing:    UE Dressing with Modified [place] and Set-up Assistance.  LE Dressing with Modified [place] and Supervision.  Grooming while standing at sink with Modified [place] and Supervision.  Toileting from toilet with Modified [place] and Set-up Assistance for hygiene and clothing management.   Rolling to Bilateral with Modified [place].   Supine to sit with Modified [place] and Supervision.  Step transfer with Modified [place] and Supervision  Toilet transfer to toilet with Modified [place] and Supervision.  Upper extremity exercise program x15 reps per handout, with independence.  Educate the patient re: Home Safety                      Time Tracking:     OT Date of Treatment: 10/26/20  OT Start Time: 1055  OT Stop Time: 1134  OT Total Time (min): 39 min    Billable Minutes:Self Care/Home Management 29  Therapeutic Activity 10    VICKI Mcguire  10/26/2020

## 2020-10-26 NOTE — PLAN OF CARE
Problem: Adult Inpatient Plan of Care  Goal: Plan of Care Review  Outcome: Ongoing, Progressing     Problem: Adult Inpatient Plan of Care  Goal: Absence of Hospital-Acquired Illness or Injury  Outcome: Ongoing, Progressing     Problem: Adult Inpatient Plan of Care  Goal: Optimal Comfort and Wellbeing  Outcome: Ongoing, Progressing     Problem: Adult Inpatient Plan of Care  Goal: Readiness for Transition of Care  Outcome: Ongoing, Progressing     Problem: Fall Injury Risk  Goal: Absence of Fall and Fall-Related Injury  Outcome: Ongoing, Progressing     Problem: Skin Injury Risk Increased  Goal: Skin Health and Integrity  Outcome: Ongoing, Progressing     Problem: Adjustment to Illness (Heart Failure)  Goal: Optimal Coping  Outcome: Ongoing, Progressing     Problem: Arrhythmia/Dysrhythmia (Heart Failure)  Goal: Stable Heart Rate and Rhythm  Outcome: Ongoing, Progressing     Problem: Cardiac Output Decreased (Heart Failure)  Goal: Optimal Cardiac Output  Outcome: Ongoing, Progressing     Problem: Fluid Imbalance (Heart Failure)  Goal: Fluid Balance  Outcome: Ongoing, Progressing     Problem: Functional Ability Impaired (Heart Failure)  Goal: Optimal Functional Ability  Outcome: Ongoing, Progressing

## 2020-10-26 NOTE — PLAN OF CARE
Problem: Occupational Therapy Goal  Goal: Occupational Therapy Goal  Description: Goals to be met by: 11/4/20    Patient will increase functional independence with ADLs by performing:    UE Dressing with Modified [place] and Set-up Assistance.  LE Dressing with Modified [place] and Supervision.  Grooming while standing at sink with Modified [place] and Supervision.  Toileting from toilet with Modified [place] and Set-up Assistance for hygiene and clothing management.   Rolling to Bilateral with Modified [place].   Supine to sit with Modified [place] and Supervision.  Step transfer with Modified [place] and Supervision  Toilet transfer to toilet with Modified [place] and Supervision.  Upper extremity exercise program x15 reps per handout, with independence.  Educate the patient re: Home Safety     Outcome: Ongoing, Progressing   Pt able to ambulate to bathroom with SBA-CGA/pediatric RW today. Pt SPO2 with functional mobility 85-90% on 4L O2 NC. Pt able to recover with seated rest break and PLB technique. Pt progressing, continue OT POC as indicated.

## 2020-10-26 NOTE — PT/OT/SLP PROGRESS
"Physical Therapy Treatment    Patient Name:  Jeannie Hutchins   MRN:  [id number]    Recommendations:     Discharge Recommendations:  home health PT   Discharge Equipment Recommendations: (Ongoing assessment pending pt progress)   Barriers to discharge: None    Assessment:     Jeannie Hutchins is a 85 y.o. female admitted with a medical diagnosis of Acute on chronic diastolic heart failure.  She presents with the following impairments/functional limitations:  weakness, impaired endurance, impaired balance, gait instability, decreased lower extremity function, decreased safety awareness, impaired cardiopulmonary response to activity, pain .    Rehab Prognosis: Good; patient would benefit from acute skilled PT services to address these deficits and reach maximum level of function.    Recent Surgery: Procedure(s) (LRB):  Transesophageal echo (BLADIMIR) intra-procedure log documentation (N/A)  Cardioversion 7 Days Post-Op    Plan:     During this hospitalization, patient to be seen (5-6x/wk) to address the identified rehab impairments via gait training, therapeutic activities, therapeutic exercises and progress toward the following goals:    · Plan of Care Expires:  11/04/20    Subjective     Chief Complaint: R knee and L great toe pain ,fatigue sitting in bedside chair > 3 hours . Pt requested to return to bed   Patient/Family Comments/goals: " I can't walk today, I am too tired , I want to get back to bed"   Pain/Comfort:  · Pain Rating 1: 0/10  · Pain Rating Post-Intervention 1: 0/10      Objective:     Communicated with nurse  prior to session.  Patient found up in chair with oxygen, telemetry upon PT entry to room.     General Precautions: Standard, fall, respiratory   Orthopedic Precautions:N/A   Braces: N/A     Functional Mobility:  · Bed Mobility:     · Scooting: supervision  · Bridging: supervision  · Sit to Supine: supervision  · Transfers:     · Sit to Stand:  stand by assistance and contact guard assistance with rolling " walker  · Chair to bed : stand by assistance and contact guard assistance with  rolling walker  using  Step Pivot (pt ambulated ~ 4-5 steps from chair to bed)   · Balance: good in sitting , fair in standing.         AM-PAC 6 CLICK MOBILITY  Turning over in bed (including adjusting bedclothes, sheets and blankets)?: 4  Sitting down on and standing up from a chair with arms (e.g., wheelchair, bedside commode, etc.): 3  Moving from lying on back to sitting on the side of the bed?: 4  Moving to and from a bed to a chair (including a wheelchair)?: 3  Need to walk in hospital room?: 3  Climbing 3-5 steps with a railing?: 3  Basic Mobility Total Score: 20       Therapeutic Activities and Exercises:    Pt performed seated BLE 10 reps :   AP, Hip flexion. V/T's cues for technique and sequence.   Educated pt on safety awareness with all OOB mobility , transfer and gait training.     Patient left HOB elevated with all lines intact, call button in reach, bed alarm on, nurse notified and daughter present..    GOALS:   Multidisciplinary Problems     Physical Therapy Goals        Problem: Physical Therapy Goal    Goal Priority Disciplines Outcome Goal Variances Interventions   Physical Therapy Goal     PT, PT/OT Ongoing, Progressing     Description: Goals to be met by: 20     Patient will increase functional independence with mobility by performin. Supine to sit with Stand-by Assistance  2. Sit to stand transfer with Supervision  3. Gait  x 50 feet with Supervision with or without AD   4. Ascend/descend 3 stair with right Handrails Contact Guard Assistance    5. Lower extremity exercise program x10 reps per handout, with independence                     Time Tracking:     PT Received On: 10/26/20  PT Start Time: 1450     PT Stop Time: 1506  PT Total Time (min): 16 min     Billable Minutes: Therapeutic Activity 16    Treatment Type: Treatment  PT/PTA: PTA     PTA Visit Number: 2     Sharon Clemens, PTA  10/26/2020

## 2020-10-26 NOTE — PROGRESS NOTES
"PALLIATIVE CARE PROGRESS NOTE:    Bedside visit earlier this morning, her  Petros was visiting.  I briefly discussed importance of Advance Care Planning and completion of Advance Directives.  Patient has stated she wants her son Dane Mera to be her medical decision maker, but he was unable to locate any HCPOA documents.  She has also stated "I don't want to be hooked up to any machines or tubes".  Provided them with [facility] brochure "How to Start The Conversation" with copies of HCPOA and Living Will in it.  Also provided extra copy for Petros to review for himself at home.  They were very appreciative of this information.  I will return later this afternoon  To complete documents with patient.    (1645)  Bedside followup.  Daughter Lyubov is present now.  However, patient's dinner tray arrived and she wanted to eat.  Provided daughter with full packet on Advance Directives, including [place]POST as she states she and her mother have talked about EOL wishes, and patient now again reaffirms she will not want to be put on machines.  I will return tomorrow to complete documents with patient.  If family not present, we can place them on speaker phone.    Plan/recommendations:   Will complete HCPOA document tomorrow.    Will need [place]POST - patient stating "I don't want to be on machines" - will affirm in the morning.   Patient agreeable to home health with PT/OT.    Esther Nuno, CARISAN, RN, CCRN, CHPN   Palliative Care Nurse Coordinator   [facility]  [phone]    "

## 2020-10-26 NOTE — ASSESSMENT & PLAN NOTE
Patient presents with Acute Hypoxemic Respiratory Failure secondary to CHF exacerbation.  Exacerbation probably triggered by poor HR control.  Hx of PAF.  Presents in Afib with RVR.  Cardiology consulted and patient admitted to ICU on Amiodarone drip.  Stopped lasix due to hyponatremia and met alkalosis  Rate control.  Reassess O2 requirements

## 2020-10-27 PROBLEM — I50.9 CONGESTIVE HEART FAILURE: Status: RESOLVED | Noted: 2020-10-22 | Resolved: 2020-10-27

## 2020-10-27 LAB
ANION GAP SERPL CALC-SCNC: 5 MMOL/L (ref 8–16)
BUN SERPL-MCNC: 16 MG/DL (ref 8–23)
CALCIUM SERPL-MCNC: 8.7 MG/DL (ref 8.7–10.5)
CHLORIDE SERPL-SCNC: 91 MMOL/L (ref 95–110)
CO2 SERPL-SCNC: 33 MMOL/L (ref 23–29)
CREAT SERPL-MCNC: 0.6 MG/DL (ref 0.5–1.4)
EST. GFR  (AFRICAN AMERICAN): >60 ML/MIN/1.73 M^2
EST. GFR  (NON AFRICAN AMERICAN): >60 ML/MIN/1.73 M^2
GLUCOSE SERPL-MCNC: 100 MG/DL (ref 70–110)
OSMOLALITY SERPL: 274 MOSM/KG (ref 275–295)
POTASSIUM SERPL-SCNC: 3.9 MMOL/L (ref 3.5–5.1)
SODIUM SERPL-SCNC: 129 MMOL/L (ref 136–145)

## 2020-10-27 PROCEDURE — 97116 GAIT TRAINING THERAPY: CPT | Mod: CQ

## 2020-10-27 PROCEDURE — 97530 THERAPEUTIC ACTIVITIES: CPT | Mod: CO

## 2020-10-27 PROCEDURE — 36415 COLL VENOUS BLD VENIPUNCTURE: CPT

## 2020-10-27 PROCEDURE — 25000003 PHARM REV CODE 250: Performed by: INTERNAL MEDICINE

## 2020-10-27 PROCEDURE — 97110 THERAPEUTIC EXERCISES: CPT | Mod: CQ

## 2020-10-27 PROCEDURE — 99900035 HC TECH TIME PER 15 MIN (STAT)

## 2020-10-27 PROCEDURE — 21400001 HC TELEMETRY ROOM

## 2020-10-27 PROCEDURE — 27000221 HC OXYGEN, UP TO 24 HOURS

## 2020-10-27 PROCEDURE — 94761 N-INVAS EAR/PLS OXIMETRY MLT: CPT

## 2020-10-27 PROCEDURE — 94799 UNLISTED PULMONARY SVC/PX: CPT

## 2020-10-27 PROCEDURE — 80048 BASIC METABOLIC PNL TOTAL CA: CPT

## 2020-10-27 RX ADMIN — OXYCODONE HYDROCHLORIDE AND ACETAMINOPHEN 1 TABLET: 5; 325 TABLET ORAL at 09:10

## 2020-10-27 RX ADMIN — APIXABAN 2.5 MG: 2.5 TABLET, FILM COATED ORAL at 08:10

## 2020-10-27 RX ADMIN — OXYCODONE HYDROCHLORIDE AND ACETAMINOPHEN 1 TABLET: 5; 325 TABLET ORAL at 11:10

## 2020-10-27 RX ADMIN — AMIODARONE HYDROCHLORIDE 200 MG: 200 TABLET ORAL at 08:10

## 2020-10-27 RX ADMIN — LOSARTAN POTASSIUM 100 MG: 25 TABLET, FILM COATED ORAL at 08:10

## 2020-10-27 RX ADMIN — ATORVASTATIN CALCIUM 40 MG: 40 TABLET, FILM COATED ORAL at 09:10

## 2020-10-27 RX ADMIN — APIXABAN 2.5 MG: 2.5 TABLET, FILM COATED ORAL at 09:10

## 2020-10-27 RX ADMIN — METOPROLOL TARTRATE 50 MG: 50 TABLET, FILM COATED ORAL at 08:10

## 2020-10-27 RX ADMIN — LEVOTHYROXINE SODIUM 75 MCG: 75 TABLET ORAL at 08:10

## 2020-10-27 RX ADMIN — METOPROLOL TARTRATE 50 MG: 50 TABLET, FILM COATED ORAL at 09:10

## 2020-10-27 RX ADMIN — AMLODIPINE BESYLATE 10 MG: 5 TABLET ORAL at 08:10

## 2020-10-27 NOTE — PLAN OF CARE
Per MDT rounds, possible DC tomorrow with HH; TN informed [facility] via [facility] of possible DC date; able to admit Friday; TN in to speak with patient regarding DC plan; still in agreement to having HH ([facility]); TN to f/u with orders       10/27/20 1109   Post-Acute Status   Post-Acute Authorization Home Health   Home Health Status Awaiting Orders for HH   Patient choice form signed by patient/caregiver List from System Post-Acute Care   Discharge Delays None known at this time   Discharge Plan   Discharge Plan A Home Health

## 2020-10-27 NOTE — NURSING
Resting in bed with eyes closed.  Tele box on.  Iv site remains intact. Bed alarm on. NAD.  Sat in chair approximately 2 hours.  Ambulated to bathroom with assistance x 1.  Call light within reach.  Report given to oncoming shift.

## 2020-10-27 NOTE — NURSING
Patient awake, alert, oriented resting comfortably. No signs of distress observed. bed low and locked. Call light in reach. Report given to oncoming nurse, YINKA Mars. 12hour chart check complete.

## 2020-10-27 NOTE — PROGRESS NOTES
PALLIATIVE CARE PROGRESS NOTE:    Bedside visit. Patient awake, resting comfortably watching tv.  Denies SOB or pain at present.      Advance Care Planning     [facility]  I engaged the patient in a conversation about advance care planning and we specifically addressed what the goals of care would be moving forward, in light of the patient's change in clinical status, specifically acute on chronic diastolic heart failure, respiratory failure requiring HFNC (improving), and paroxysmal atrial fibrillation requiring cardioversion.  We did specifically address the patient's likely prognosis, which is pretty good  given her age.  She has been ambulatory and living at home with her 2 sons.  We explored the patient's values and preferences for future care. The patient endorses that what is most important right now is to focus on remaining as independent as possible and symptom/pain control.  She is agreeable to home health with PT to help regain her strength and activity level.      Accordingly, we have decided that the best plan to meet the patient's goals includes continuing with treatment.    I did not explain the role for hospice care at this stage of the patient's illness. We will not be making a hospice referral.    Code Status  In light of the patients advanced and life limiting illness,I engaged the the patient in a conversation about the patient's preferences for care  at the very end of life. The patient wishes to have a natural, peaceful death.  Along those lines, the patient does not wish to have CPR or other invasive treatments performed when her heart and/or breathing stops. I communicated to the patient that a [place] form was completed to reflect other EOL preferences of the patient such as DNR, SELECTIVE TREATMENT, NO PEG (BUT WOULD WANT TO DISCUSS IT IF M.D. RECOMMENDS IT.  SHE IS AGREEABLE TO REPEAT CARDIOVERSION FOR A-FIB IF RECOMMENDED.  Original was placed in blue folder with several copies, it was scanned  into Epic.  Original to go home with patient upon discharge and send multiple copies for her family.      Power of   I initiated the process of advance care planning today and explained the importance of this process to the patient, as we discussed yesterday with her daughter Lyubov louis.   After our discussion, the patient has decided to complete a HCPOA and has appointed her son Dane Mera as agent #1, daughter Lyubov Reed agent #2, and son Petros Mera #3 agent.  Original and several copies were given to patient to take home.  Copy was placed in the blue folder and was scanned into Epic.    Patient verbalized appreciation for care and concern given and is pleased to complete her advance directives.  Emotional support provided.       Subsequent to above, I spoke to her son/HCPOA#1 Dane Mera by phone to inform him of the completed Advance Directives.  He verbalized understanding and appreciation.            Esther Nuno, CARISAN, RN, CCRN, CHPN   Palliative Care Nurse Coordinator   [facility]  [phone]

## 2020-10-27 NOTE — PROGRESS NOTES
[facility] Hospital Medicine  Progress Note    Patient Name: Jeannie Hutchins  MRN: [id number]  Patient Class: IP- Inpatient   Admission Date: 10/18/2020  Length of Stay: 9 days  Attending Physician: Mele Connolly MD  Primary Care Provider: Paige Mcleod MD        Subjective:     Principal Problem:Acute on chronic diastolic heart failure        HPI:  86 y/o female presents to the ER via EMS transport with shortness of breath.  Patient states she is chronically short of breath, but symptoms worsened this morning.  EMS reported patient's SpO2 was 88% on room air, improved to 96% after given 1 SL nitro tab and nitro paste en route.  Patient also reports non productive cough and 10 lb weight gain in the last 2 weeks.  Also complains of edema on bilateral lower extremities that started 3 days ago.  Patient also reports post nasal drip.  States compliance with medications. Hx of PAF and noted to be in rapid AFib on presentation.  Placed on Amiodarone gtt and admitted to ICU.  Patient was also given Lasix in ER with improvement of symptoms.  No other complaints.    Overview/Hospital Course:  86 y/o female presents with acute on chronic diastolic heart failure probably precipitated by AFib with RVR.  Started on IV Lasix.  Admitted to ICU on Amiodarone gtt.  Cardiology consulted.  Patient had cardioversion on 10/19 to NSR. Continued on high dose lasix in ICU. Pulmonary/critical care was consulted on 10/21 due to high 02 requirements. PT/OT evaluated the patient and rec: home with H/H. Transferred to floor 10/22. Continuing with aggressive diuresis and weaning O2.    Interval History: Feeling better.    Review of Systems   HENT: Negative for ear discharge and ear pain.    Eyes: Negative for discharge and itching.   Endocrine: Negative for cold intolerance and heat intolerance.   Neurological: Negative for seizures and syncope.     Objective:     Vital Signs (Most Recent):  Temp: 99.2 °F (37.3 °C)  (10/27/20 0727)  Pulse: 74 (10/27/20 0727)  Resp: 17 (10/27/20 0727)  BP: (!) 152/69 (10/27/20 0727)  SpO2: (!) 90 % (10/27/20 0727) Vital Signs (24h Range):  Temp:  [97.7 °F (36.5 °C)-99.2 °F (37.3 °C)] 99.2 °F (37.3 °C)  Pulse:  [63-74] 74  Resp:  [17-20] 17  SpO2:  [90 %-94 %] 90 %  BP: (117-152)/(54-86) 152/69     Weight: 48.7 kg (107 lb 5.8 oz)  Body mass index is 24.07 kg/m².    Intake/Output Summary (Last 24 hours) at 10/27/2020 1043  Last data filed at 10/27/2020 0347  Gross per 24 hour   Intake 590 ml   Output 400 ml   Net 190 ml      Physical Exam  Constitutional:       Appearance: Normal appearance. She is not ill-appearing.   HENT:      Mouth/Throat:      Mouth: Mucous membranes are moist.      Pharynx: Oropharynx is clear.   Eyes:      Extraocular Movements: Extraocular movements intact.      Conjunctiva/sclera: Conjunctivae normal.   Cardiovascular:      Rate and Rhythm: Normal rate.      Pulses: Normal pulses.      Heart sounds: Normal heart sounds.   Pulmonary:      Effort: Pulmonary effort is normal. No respiratory distress.      Breath sounds: No wheezing.   Abdominal:      General: Abdomen is flat.      Palpations: Abdomen is soft.   Musculoskeletal:      Right lower leg: No edema.      Left lower leg: No edema.   Skin:     General: Skin is warm.      Findings: No erythema.   Neurological:      Mental Status: She is alert.         Significant Labs:   BMP:   Recent Labs   Lab 10/27/20  0558      *   K 3.9   CL 91*   CO2 33*   BUN 16   CREATININE 0.6   CALCIUM 8.7     CBC: No results for input(s): WBC, HGB, HCT, PLT in the last 48 hours.    Significant Imaging: I have reviewed all pertinent imaging results/findings within the past 24 hours.      Assessment/Plan:      * Acute on chronic diastolic heart failure  Patient presents with Acute Hypoxemic Respiratory Failure secondary to CHF exacerbation.  Exacerbation probably triggered by poor HR control.  Hx of PAF.  Presents in Afib with  RVR.  Cardiology consulted and patient admitted to ICU on Amiodarone drip.  Stopped lasix due to hyponatremia and met alkalosis  Rate control.  Doing much better.  Assess home oxygen needs.  Out of bed to chair today.  Hopefully home tomorrow.    Palliative care encounter  Palliative Care met with patient and family.  Now DNR.      Mixed hyperlipidemia  · Continue Statin.      Paroxysmal atrial fibrillation  Afib with RVR as above.  S/p cardioversion on 10/19.   Cards following   CHADSVASC 5  Continue with Amio and low dose Eliquis  Apparently had episode of aflutter now back in sinus      Acute respiratory failure with hypoxia and hypercarbia  Secondary to CHF as above.  Plateau at 5 liters NC  Has evidence of pulmonary hypertension on echocardiogram  CT chest shows decreased chronic GGO, compressive atelectasis  Started incentive spirometry    Essential hypertension  Continue home medications.      Hyponatremia with decreased serum osmolality  Na 129 today.  Appreciate Nephrology input.  Fluid restriction.      VTE Risk Mitigation (From admission, onward)         Ordered     apixaban tablet 2.5 mg  2 times daily      10/18/20 1342     IP VTE HIGH RISK PATIENT  Once      10/18/20 1340     Place sequential compression device  Until discontinued      10/18/20 1340                Discharge Planning   JULIET:      Code Status: DNR   Is the patient medically ready for discharge?:     Reason for patient still in hospital (select all that apply): Patient trending condition  Discharge Plan A: (P) Home Health   Discharge Delays: (P) None known at this time              Mele Connolly MD  Department of Hospital Medicine   [facility]

## 2020-10-27 NOTE — PLAN OF CARE
Problem: Fluid Imbalance (Heart Failure)  Goal: Fluid Balance  Intervention: Monitor and Manage Fluid Balance  Flowsheets (Taken 10/27/2020 3875)  Fluid/Electrolyte Management: fluids restricted

## 2020-10-27 NOTE — SUBJECTIVE & OBJECTIVE
Interval History: Feeling better.    Review of Systems   HENT: Negative for ear discharge and ear pain.    Eyes: Negative for discharge and itching.   Endocrine: Negative for cold intolerance and heat intolerance.   Neurological: Negative for seizures and syncope.     Objective:     Vital Signs (Most Recent):  Temp: 99.2 °F (37.3 °C) (10/27/20 0727)  Pulse: 74 (10/27/20 0727)  Resp: 17 (10/27/20 0727)  BP: (!) 152/69 (10/27/20 0727)  SpO2: (!) 90 % (10/27/20 0727) Vital Signs (24h Range):  Temp:  [97.7 °F (36.5 °C)-99.2 °F (37.3 °C)] 99.2 °F (37.3 °C)  Pulse:  [63-74] 74  Resp:  [17-20] 17  SpO2:  [90 %-94 %] 90 %  BP: (117-152)/(54-86) 152/69     Weight: 48.7 kg (107 lb 5.8 oz)  Body mass index is 24.07 kg/m².    Intake/Output Summary (Last 24 hours) at 10/27/2020 1043  Last data filed at 10/27/2020 0347  Gross per 24 hour   Intake 590 ml   Output 400 ml   Net 190 ml      Physical Exam  Constitutional:       Appearance: Normal appearance. She is not ill-appearing.   HENT:      Mouth/Throat:      Mouth: Mucous membranes are moist.      Pharynx: Oropharynx is clear.   Eyes:      Extraocular Movements: Extraocular movements intact.      Conjunctiva/sclera: Conjunctivae normal.   Cardiovascular:      Rate and Rhythm: Normal rate.      Pulses: Normal pulses.      Heart sounds: Normal heart sounds.   Pulmonary:      Effort: Pulmonary effort is normal. No respiratory distress.      Breath sounds: No wheezing.   Abdominal:      General: Abdomen is flat.      Palpations: Abdomen is soft.   Musculoskeletal:      Right lower leg: No edema.      Left lower leg: No edema.   Skin:     General: Skin is warm.      Findings: No erythema.   Neurological:      Mental Status: She is alert.         Significant Labs:   BMP:   Recent Labs   Lab 10/27/20  0558      *   K 3.9   CL 91*   CO2 33*   BUN 16   CREATININE 0.6   CALCIUM 8.7     CBC: No results for input(s): WBC, HGB, HCT, PLT in the last 48 hours.    Significant  Imaging: I have reviewed all pertinent imaging results/findings within the past 24 hours.

## 2020-10-27 NOTE — PLAN OF CARE
Problem: Physical Therapy Goal  Goal: Physical Therapy Goal  Description: Goals to be met by: 20     Patient will increase functional independence with mobility by performin. Supine to sit with Stand-by Assistance  2. Sit to stand transfer with Supervision  3. Gait  x 50 feet with Supervision with or without AD   4. Ascend/descend 3 stair with right Handrails Contact Guard Assistance    5. Lower extremity exercise program x10 reps per handout, with independence    Outcome: Ongoing, Progressing   Pt is progressing well toward treatment goals. Pt ambulated 80 ft with RW ,SBA (4L O2NC).

## 2020-10-27 NOTE — PLAN OF CARE
Important Message from Medicare and discharge appeal process reviewed with patient; patient was given opportunity to ask questions; after which, verbalized understanding of rights; copy was placed in medical record chart and one copy given to patient for her review and records       10/27/20 105   Medicare Message   Important Message from Medicare regarding Discharge Appeal Rights Given to patient/caregiver;Explained to patient/caregiver;Signed/date by patient/caregiver   Date IMM was signed 10/27/20   Time IMM was signed [id number]

## 2020-10-27 NOTE — PROGRESS NOTES
Renal Progress Note    Date of Admission:  10/18/2020  8:34 AM    Length of Stay: 9  Days    Subjective: no SOB at rest    Objective:    Current Facility-Administered Medications   Medication    acetaminophen tablet 500 mg    albuterol sulfate nebulizer solution 2.5 mg    aluminum-magnesium hydroxide-simethicone 200-200-20 mg/5 mL suspension 30 mL    amiodarone tablet 200 mg    amLODIPine tablet 10 mg    apixaban tablet 2.5 mg    atorvastatin tablet 40 mg    diphenhydrAMINE capsule 25 mg    ipratropium 0.02 % nebulizer solution 0.5 mg    levothyroxine tablet 75 mcg    losartan tablet 100 mg    metoprolol tartrate (LOPRESSOR) tablet 50 mg    ondansetron injection 8 mg    oxyCODONE-acetaminophen 5-325 mg per tablet 1 tablet    QUEtiapine tablet 25 mg    sodium chloride 0.9% flush 10 mL       Vitals:    10/27/20 0002 10/27/20 0050 10/27/20 0347 10/27/20 0727   BP: 129/86  (!) 135/58 (!) 152/69   BP Location: Right arm   Left arm   Patient Position: Lying   Lying   Pulse: 69 69 64 74   Resp: 18 20 20 17   Temp: 98.6 °F (37 °C)  98.6 °F (37 °C) 99.2 °F (37.3 °C)   TempSrc: Oral  Oral Oral   SpO2: (!) 94% (!) 94% (!) 90% (!) 90%   Weight:   48.7 kg (107 lb 5.8 oz)    Height:           I/O last 3 completed shifts:  In: 1070 [P.O.:830; Other:240]  Out: 900 [Urine:900]  No intake/output data recorded.      Physical Exam:     General: NAD  Neck: supple  Heart: RRR  Lungs: unlabored breathing  Abdomen: n/a  Limbs: n/a  Neurologic: AAO x 3      Laboratories:    No results for input(s): WBC, RBC, HGB, HCT, PLT, MCV, MCH, MCHC in the last 24 hours.    Recent Labs   Lab 10/27/20  0558   CALCIUM 8.7   *   K 3.9   CO2 33*   CL 91*   BUN 16   CREATININE 0.6       No results for input(s): COLORU, CLARITYU, SPECGRAV, PHUR, PROTEINUA, GLUCOSEU, BLOODU, WBCU, RBCU, BACTERIA, MUCUS in the last 24 hours.    Invalid input(s):  BILIRUBINCON    Microbiology Results (last 7 days)     ** No results  "found for the last 168 hours. **            Diagnostic Tests: n/a        Assessment:    86 y/o female admitted with:    - Acute on chronic diastolic heart failure  - Stable Hyponatremia with decreased serum osmolality likely multifactorial  - Paroxysmal atrial fibrillation   - RVR on admission  - Pulmonary HTN  - s/p Acute respiratory failure with hypoxia and hypercarbia  - Peripheral edema  - Hx. HTN        Plan:    - "Judicious" Diuresis watch serum CO2- for Met. alkalosis  - s/p Cardioversion and Amiodarone per Cardiology  - Free water restriction  - f/u BMP  - Other problems per admitting                      "

## 2020-10-27 NOTE — PT/OT/SLP PROGRESS
Physical Therapy Treatment    Patient Name:  Jeannie Hutchins   MRN:  [id number]    Recommendations:     Discharge Recommendations:  home health PT   Discharge Equipment Recommendations:  Patient will benefit from a pediatric rolling walker   Barriers to discharge: None    Assessment:     Jeannie Hutchins is a 85 y.o. female admitted with a medical diagnosis of Acute on chronic diastolic heart failure.  She presents with the following impairments/functional limitations:  weakness, impaired endurance, pain, impaired functional mobilty, impaired self care skills, gait instability, decreased safety awareness, decreased lower extremity function, decreased ROM, impaired cardiopulmonary response to activity .    Rehab Prognosis: Good; patient would benefit from acute skilled PT services to address these deficits and reach maximum level of function.    Recent Surgery: Procedure(s) (LRB):  Transesophageal echo (BLADIMIR) intra-procedure log documentation (N/A)  Cardioversion 8 Days Post-Op    Plan:     During this hospitalization, patient to be seen (5-6x/wk) to address the identified rehab impairments via gait training, therapeutic activities, therapeutic exercises and progress toward the following goals:    · Plan of Care Expires:  11/04/20    Subjective     Chief Complaint: L great toe and R knee pain   Patient/Family Comments/goals: pt agreeable to therapy   Pain/Comfort: R knee and L great toe  · Pain Rating 1: 9/10  · Location - Side 1: Left  · Pain Addressed 1: Reposition, Nurse notified      Objective:     Communicated with nurse  prior to session.  Patient found HOB elevated with bed alarm, oxygen, telemetry upon PT entry to room.     General Precautions: Standard, fall, respiratory   Orthopedic Precautions:N/A   Braces: N/A     Functional Mobility:  · Bed Mobility:     · Rolling Left:  supervision  · Scooting: supervision  · Supine to Sit: supervision  · Transfers:     · Sit to Stand:  supervision with rolling walker  · Gait:  ambulated 80 ft with RW, SBA  (4L O2NC) .  Pt with decreased mercy, increased time in double stance, decreased velocity of limb motion, decreased step length, decreased swing-to-stance ratio, decreased toe-to-floor clearance and decreased weight-shifting ability.Impairments contributing to gait deviations include impaired balance, decreased flexibility, pain, decreased ROM and decreased strength. V/T cues for safety technique and walker management. SpO2 decreased to 86% during gait training , required standing rest break and pursed lip breathing technique to recover to 93% , SpO2 : 95% at the end of gait training.   · Balance:  Good in sitting, fair + in standing.       AM-PAC 6 CLICK MOBILITY    Turning over in bed (including adjusting bedclothes, sheets and blankets)?: 4  Sitting down on and standing up from a chair with arms (e.g., wheelchair, bedside commode, etc.): 3  Moving from lying on back to sitting on the side of the bed?: 4  Moving to and from a bed to a chair (including a wheelchair)?: 3  Need to walk in hospital room?: 3  Climbing 3-5 steps with a railing?: 3  Basic Mobility Total Score: 20                   Therapeutic Activities and Exercises:   Pt performed seated BLE 10 reps x 2 trials:   AP, LAQ, HS,  Hip abd/add with pillow , Hip flexion. V/T's cues for technique and sequence. Pt tolerated well.   Educated pt on safety awareness with all OOB mobility , transfer and gait training.     Patient left up in chair on green air cushion with all lines intact, call button in reach, chair alarm on, nurse notified and  present..    GOALS:   Multidisciplinary Problems     Physical Therapy Goals        Problem: Physical Therapy Goal    Goal Priority Disciplines Outcome Goal Variances Interventions   Physical Therapy Goal     PT, PT/OT Ongoing, Progressing     Description: Goals to be met by: 20     Patient will increase functional independence with mobility by performin. Supine to  sit with Stand-by Assistance  2. Sit to stand transfer with Supervision  3. Gait  x 50 feet with Supervision with or without AD   4. Ascend/descend 3 stair with right Handrails Contact Guard Assistance    5. Lower extremity exercise program x10 reps per handout, with independence                     Time Tracking:     PT Received On: 10/27/20  PT Start Time: 1032     PT Stop Time: 1057  PT Total Time (min): 25 min     Billable Minutes: Gait Training 14 and Therapeutic Exercise 11    Treatment Type: Treatment  PT/PTA: PTA     PTA Visit Number: 3     Sharon Clemens, PTA  10/27/2020

## 2020-10-27 NOTE — ASSESSMENT & PLAN NOTE
Patient presents with Acute Hypoxemic Respiratory Failure secondary to CHF exacerbation.  Exacerbation probably triggered by poor HR control.  Hx of PAF.  Presents in Afib with RVR.  Cardiology consulted and patient admitted to ICU on Amiodarone drip.  Stopped lasix due to hyponatremia and met alkalosis  Rate control.  Doing much better.  Assess home oxygen needs.  Out of bed to chair today.  Hopefully home tomorrow.

## 2020-10-27 NOTE — PT/OT/SLP PROGRESS
Occupational Therapy      Patient Name:  Jeannie Hutchins   MRN:  [id number]    Patient not seen today secondary to ( Pt just finished working with PT and requests OT come back this afternoon)  . Will follow-up as able.    VICKI Mcguire  10/27/2020

## 2020-10-27 NOTE — PLAN OF CARE
Problem: Adult Inpatient Plan of Care  Goal: Plan of Care Review  10/27/2020 0626 by Lubna Padron RN  Outcome: Ongoing, Progressing  10/27/2020 0626 by Lubna Padron RN  Outcome: Ongoing, Progressing  10/27/2020 0300 by Lubna Padron RN  Outcome: Ongoing, Progressing  Goal: Patient-Specific Goal (Individualization)  10/27/2020 0626 by Lubna Padron, YINKA  Outcome: Ongoing, Progressing  10/27/2020 0626 by Lubna Padron, YINKA  Outcome: Ongoing, Progressing  10/27/2020 0300 by Lubna Padron RN  Outcome: Ongoing, Progressing  Goal: Absence of Hospital-Acquired Illness or Injury  10/27/2020 0626 by Lubna Padron, YINKA  Outcome: Ongoing, Progressing  10/27/2020 0626 by Lubna Padron, YINKA  Outcome: Ongoing, Progressing  10/27/2020 0300 by Lubna Padron, YINKA  Outcome: Ongoing, Progressing  Goal: Optimal Comfort and Wellbeing  10/27/2020 0626 by Lubna Padron RN  Outcome: Ongoing, Progressing  10/27/2020 0626 by Lubna Padron, RN  Outcome: Ongoing, Progressing  10/27/2020 0300 by Lubna Padron, RN  Outcome: Ongoing, Progressing  Goal: Readiness for Transition of Care  10/27/2020 0626 by Lubna Padron, YINKA  Outcome: Ongoing, Progressing  10/27/2020 0626 by Lubna Padron, YINKA  Outcome: Ongoing, Progressing  10/27/2020 0300 by Lubna Padron, RN  Outcome: Ongoing, Progressing  Goal: Rounds/Family Conference  10/27/2020 0626 by Lubna Padron, YINKA  Outcome: Ongoing, Progressing  10/27/2020 0626 by Lubna Padron, YINKA  Outcome: Ongoing, Progressing  10/27/2020 0300 by Lubna Padron, RN  Outcome: Ongoing, Progressing     Problem: Fall Injury Risk  Goal: Absence of Fall and Fall-Related Injury  10/27/2020 0626 by Lubna Padron, YINKA  Outcome: Ongoing, Progressing  10/27/2020 0626 by Lubna Padron, RN  Outcome: Ongoing, Progressing  10/27/2020 0300 by Lubna Padron, RN  Outcome: Ongoing, Progressing     Problem: Skin Injury Risk Increased  Goal: Skin Health  and Integrity  10/27/2020 0626 by Lubna Padron, YINKA  Outcome: Ongoing, Progressing  10/27/2020 0626 by Lubna Padron, RN  Outcome: Ongoing, Progressing  10/27/2020 0300 by Lubna Padron, YINKA  Outcome: Ongoing, Progressing     Problem: Adjustment to Illness (Heart Failure)  Goal: Optimal Coping  10/27/2020 0626 by Lubna Padron, RN  Outcome: Ongoing, Progressing  10/27/2020 0626 by Lubna Padron, RN  Outcome: Ongoing, Progressing  10/27/2020 0300 by Lubna Padron, YINKA  Outcome: Ongoing, Progressing     Problem: Arrhythmia/Dysrhythmia (Heart Failure)  Goal: Stable Heart Rate and Rhythm  10/27/2020 0626 by Lubna Padron, YINKA  Outcome: Ongoing, Progressing  10/27/2020 0626 by Lubna Padron, RN  Outcome: Ongoing, Progressing  10/27/2020 0300 by Lubna Padron, RN  Outcome: Ongoing, Progressing     Problem: Cardiac Output Decreased (Heart Failure)  Goal: Optimal Cardiac Output  10/27/2020 0626 by Lubna Padron, YINKA  Outcome: Ongoing, Progressing  10/27/2020 0626 by Lubna Padron, RN  Outcome: Ongoing, Progressing  10/27/2020 0300 by uLbna Padron, RN  Outcome: Ongoing, Progressing     Problem: Fluid Imbalance (Heart Failure)  Goal: Fluid Balance  10/27/2020 0626 by Lubna Padron, YINKA  Outcome: Ongoing, Progressing  10/27/2020 0626 by Lubna Padron, RN  Outcome: Ongoing, Progressing  10/27/2020 0300 by Lubna Padron, RN  Outcome: Ongoing, Progressing     Problem: Functional Ability Impaired (Heart Failure)  Goal: Optimal Functional Ability  10/27/2020 0626 by Lubna Padron, YINKA  Outcome: Ongoing, Progressing  10/27/2020 0626 by Lubna Padron, RN  Outcome: Ongoing, Progressing  10/27/2020 0300 by Lubna Padron, YINKA  Outcome: Ongoing, Progressing     Problem: Oral Intake Inadequate (Heart Failure)  Goal: Optimal Nutrition Intake  10/27/2020 0626 by Lubna Padron, YINKA  Outcome: Ongoing, Progressing  10/27/2020 0626 by Lubna Padron, YINKA  Outcome: Ongoing,  Progressing  10/27/2020 0300 by Lubna Padron RN  Outcome: Ongoing, Progressing     Problem: Respiratory Compromise (Heart Failure)  Goal: Effective Oxygenation and Ventilation  10/27/2020 0626 by Lubna Padron RN  Outcome: Ongoing, Progressing  10/27/2020 0626 by Lubna Padron, RN  Outcome: Ongoing, Progressing  10/27/2020 0300 by Lubna Padron, RN  Outcome: Ongoing, Progressing     Problem: Sleep Disordered Breathing (Heart Failure)  Goal: Effective Breathing Pattern During Sleep  10/27/2020 0626 by Lubna Padron, YINKA  Outcome: Ongoing, Progressing  10/27/2020 0626 by Lubna Padron RN  Outcome: Ongoing, Progressing  10/27/2020 0300 by Lubna Padron RN  Outcome: Ongoing, Progressing     Problem: Arrhythmia/Dysrhythmia  Goal: Normalized Cardiac Rhythm  10/27/2020 0626 by Lubna Padron, YINKA  Outcome: Ongoing, Progressing  10/27/2020 0626 by Lubna Padron, RN  Outcome: Ongoing, Progressing  10/27/2020 0300 by Lubna Padron, YINKA  Outcome: Ongoing, Progressing     Problem: Coping Ineffective  Goal: Effective Coping  10/27/2020 0626 by Lubna Padron RN  Outcome: Ongoing, Progressing  10/27/2020 0626 by Lubna Padron RN  Outcome: Ongoing, Progressing  10/27/2020 0300 by Lubna Padron RN  Outcome: Ongoing, Progressing

## 2020-10-27 NOTE — ASSESSMENT & PLAN NOTE
Secondary to CHF as above.  Plateau at 5 liters NC  Has evidence of pulmonary hypertension on echocardiogram  CT chest shows decreased chronic GGO, compressive atelectasis  Started incentive spirometry

## 2020-10-28 LAB
ANION GAP SERPL CALC-SCNC: 7 MMOL/L (ref 8–16)
BUN SERPL-MCNC: 15 MG/DL (ref 8–23)
CALCIUM SERPL-MCNC: 9 MG/DL (ref 8.7–10.5)
CHLORIDE SERPL-SCNC: 91 MMOL/L (ref 95–110)
CO2 SERPL-SCNC: 34 MMOL/L (ref 23–29)
CREAT SERPL-MCNC: 0.7 MG/DL (ref 0.5–1.4)
EST. GFR  (AFRICAN AMERICAN): >60 ML/MIN/1.73 M^2
EST. GFR  (NON AFRICAN AMERICAN): >60 ML/MIN/1.73 M^2
GLUCOSE SERPL-MCNC: 93 MG/DL (ref 70–110)
POTASSIUM SERPL-SCNC: 4.2 MMOL/L (ref 3.5–5.1)
SODIUM SERPL-SCNC: 132 MMOL/L (ref 136–145)

## 2020-10-28 PROCEDURE — 63600175 PHARM REV CODE 636 W HCPCS: Performed by: HOSPITALIST

## 2020-10-28 PROCEDURE — 25000003 PHARM REV CODE 250: Performed by: INTERNAL MEDICINE

## 2020-10-28 PROCEDURE — 99900035 HC TECH TIME PER 15 MIN (STAT)

## 2020-10-28 PROCEDURE — 97535 SELF CARE MNGMENT TRAINING: CPT | Mod: CO

## 2020-10-28 PROCEDURE — 94761 N-INVAS EAR/PLS OXIMETRY MLT: CPT

## 2020-10-28 PROCEDURE — 21400001 HC TELEMETRY ROOM

## 2020-10-28 PROCEDURE — 36415 COLL VENOUS BLD VENIPUNCTURE: CPT

## 2020-10-28 PROCEDURE — 80048 BASIC METABOLIC PNL TOTAL CA: CPT

## 2020-10-28 PROCEDURE — 27000221 HC OXYGEN, UP TO 24 HOURS

## 2020-10-28 PROCEDURE — 97110 THERAPEUTIC EXERCISES: CPT | Mod: CQ

## 2020-10-28 PROCEDURE — 94799 UNLISTED PULMONARY SVC/PX: CPT

## 2020-10-28 RX ORDER — FUROSEMIDE 10 MG/ML
40 INJECTION INTRAMUSCULAR; INTRAVENOUS ONCE
Status: COMPLETED | OUTPATIENT
Start: 2020-10-28 | End: 2020-10-28

## 2020-10-28 RX ADMIN — FUROSEMIDE 40 MG: 10 INJECTION, SOLUTION INTRAVENOUS at 10:10

## 2020-10-28 RX ADMIN — METOPROLOL TARTRATE 50 MG: 50 TABLET, FILM COATED ORAL at 10:10

## 2020-10-28 RX ADMIN — LOSARTAN POTASSIUM 100 MG: 25 TABLET, FILM COATED ORAL at 10:10

## 2020-10-28 RX ADMIN — AMLODIPINE BESYLATE 10 MG: 5 TABLET ORAL at 10:10

## 2020-10-28 RX ADMIN — AMIODARONE HYDROCHLORIDE 200 MG: 200 TABLET ORAL at 10:10

## 2020-10-28 RX ADMIN — OXYCODONE HYDROCHLORIDE AND ACETAMINOPHEN 1 TABLET: 5; 325 TABLET ORAL at 09:10

## 2020-10-28 RX ADMIN — APIXABAN 2.5 MG: 2.5 TABLET, FILM COATED ORAL at 10:10

## 2020-10-28 RX ADMIN — METOPROLOL TARTRATE 50 MG: 50 TABLET, FILM COATED ORAL at 09:10

## 2020-10-28 RX ADMIN — LEVOTHYROXINE SODIUM 75 MCG: 75 TABLET ORAL at 10:10

## 2020-10-28 RX ADMIN — APIXABAN 2.5 MG: 2.5 TABLET, FILM COATED ORAL at 09:10

## 2020-10-28 RX ADMIN — ATORVASTATIN CALCIUM 40 MG: 40 TABLET, FILM COATED ORAL at 09:10

## 2020-10-28 NOTE — PLAN OF CARE
Problem: Occupational Therapy Goal  Goal: Occupational Therapy Goal  Description: Goals to be met by: 11/4/20    Patient will increase functional independence with ADLs by performing:    UE Dressing with Modified [place] and Set-up Assistance.  LE Dressing with Modified [place] and Supervision.  Grooming while standing at sink with Modified [place] and Supervision.  Toileting from toilet with Modified [place] and Set-up Assistance for hygiene and clothing management.   Rolling to Bilateral with Modified [place].   Supine to sit with Modified [place] and Supervision.  Step transfer with Modified [place] and Supervision  Toilet transfer to toilet with Modified [place] and Supervision.  Upper extremity exercise program x15 reps per handout, with independence.  Educate the patient re: Home Safety     10/28/2020 1028 by VICKI Murcia  Outcome: Ongoing, Progressing

## 2020-10-28 NOTE — NURSING
Testing for Home O2    O2 Sats on RA at rest= 90%    O2 sats on RA with exercise= 86%    O2 sats on 3.5L O2 exercise = 92%    O2 sat on 3.5L Recovery=91%

## 2020-10-28 NOTE — PLAN OF CARE
Problem: Physical Therapy Goal  Goal: Physical Therapy Goal  Description: Goals to be met by: 20     Patient will increase functional independence with mobility by performin. Supine to sit with Stand-by Assistance  2. Sit to stand transfer with Supervision  3. Gait  x 50 feet with Supervision with or without AD   4. Ascend/descend 3 stair with right Handrails Contact Guard Assistance    5. Lower extremity exercise program x10 reps per handout, with independence    Outcome: Ongoing, Progressing

## 2020-10-28 NOTE — PLAN OF CARE
Problem: Occupational Therapy Goal  Goal: Occupational Therapy Goal  Description: Goals to be met by: 11/4/20    Patient will increase functional independence with ADLs by performing:    UE Dressing with Modified [place] and Set-up Assistance.  LE Dressing with Modified [place] and Supervision.  Grooming while standing at sink with Modified [place] and Supervision.  Toileting from toilet with Modified [place] and Set-up Assistance for hygiene and clothing management.   Rolling to Bilateral with Modified [place].   Supine to sit with Modified [place] and Supervision.  Step transfer with Modified [place] and Supervision  Toilet transfer to toilet with Modified [place] and Supervision.  Upper extremity exercise program x15 reps per handout, with independence.  Educate the patient re: Home Safety     Outcome: Ongoing, Progressing

## 2020-10-28 NOTE — ASSESSMENT & PLAN NOTE
Secondary to CHF as above.  Has evidence of pulmonary hypertension on echocardiogram  CT chest shows decreased chronic GGO, compressive atelectasis  Started incentive spirometry

## 2020-10-28 NOTE — PLAN OF CARE
Problem: Adult Inpatient Plan of Care  Goal: Plan of Care Review  Outcome: Ongoing, Progressing  Pt remained free of falls during current shift. Reported having pain to the back of the neck and received prn pain medications. PT/OT scheduled to visit pt later in the AM. Plan of care and fall precautions reviewed with pt and verbalized understanding. Bed locked, lowered, SR up x2 and call light placed within reach.

## 2020-10-28 NOTE — PT/OT/SLP PROGRESS
Occupational Therapy   Treatment    Name: Jeannie Hutchins  MRN: [id number]  Admitting Diagnosis:  Acute on chronic diastolic heart failure  9 Days Post-Op    Recommendations:     Discharge Recommendations: home health OT(with family assist)  Discharge Equipment Recommendations:  (TBD pending progress)  Barriers to discharge: pt not at PLOF       Assessment:     Jeannie Hutchins is a 85 y.o. female with a medical diagnosis of Acute on chronic diastolic heart failure.  She presents with the following performance deficits affecting function: weakness, impaired endurance, impaired self care skills, impaired functional mobilty, gait instability, impaired balance, decreased upper extremity function, decreased lower extremity function, decreased ROM, decreased safety awareness, impaired cardiopulmonary response to activity.     Rehab Prognosis:  Good; patient would benefit from acute skilled OT services to address these deficits and reach maximum level of function.       Plan:     Patient to be seen 3 x/week, 5 x/week to address the above listed problems via self-care/home management, therapeutic activities, therapeutic exercises  · Plan of Care Expires: 11/04/20  · Plan of Care Reviewed with: patient    Subjective     Pain/Comfort:  ·  No pain    Objective:     Communicated with: Nurse prior to session.  Patient found HOB elevated with telemetry, oxygen upon OT entry to room. Daughter present    General Precautions: Standard, fall   Orthopedic Precautions:N/A   Braces:   N/A    Occupational Performance:   Pt reports fatigue- she just returned to bed and ambulated to bathroom with assist of PCT. Pt tolerated OOB>chair majority of day. Pt requests OT to come back tomorrow. Daughter at bedside with questions regarding OT and pts progress. Family/Patient education provided this date as listed below.      [facility] 21      Patient/Family Education:  · OT role and POC. Updated daughter on pt progress.  · PLB technique with  exertion and deep breathing exercisesand importance of utilizing incentive spirometer throughout day ( encouraged every hour).  · Educated on importance of OOB>chair throughout day with staff and for all meals to promote optimal positioning for lung function. Encouraged pt to sit up in chair once more this evening for dinner.  · Provided pt with OT educational handout on energy conservation and educated on importance of self-pacing with ADL. Encouraged pt to read over handout this evening to address any questions at next session.  · Encouraged ambulation to bathroom with nursing staff as able to maximize recovery.  · Pt and daughter verbalize understanding of all education provided.       Patient left HOB elevated with all lines intact, call button in reach, bed alarm on, nurse notified and daughter presentEducation:      GOALS:   Multidisciplinary Problems     Occupational Therapy Goals        Problem: Occupational Therapy Goal    Goal Priority Disciplines Outcome Interventions   Occupational Therapy Goal     OT, PT/OT Ongoing, Progressing    Description: Goals to be met by: 11/4/20    Patient will increase functional independence with ADLs by performing:    UE Dressing with Modified [place] and Set-up Assistance.  LE Dressing with Modified [place] and Supervision.  Grooming while standing at sink with Modified [place] and Supervision.  Toileting from toilet with Modified [place] and Set-up Assistance for hygiene and clothing management.   Rolling to Bilateral with Modified [place].   Supine to sit with Modified [place] and Supervision.  Step transfer with Modified [place] and Supervision  Toilet transfer to toilet with Modified [place] and Supervision.  Upper extremity exercise program x15 reps per handout, with independence.  Educate the patient re: Home Safety                      Time Tracking:     OT Date of Treatment: 10/27/20  OT Start Time: 1600  OT Stop Time:  1609  OT Total Time (min): 9 min    Billable Minutes:Therapeutic Activity 9    VICKI Mcguire  10/28/2020

## 2020-10-28 NOTE — PT/OT/SLP PROGRESS
Occupational Therapy   Treatment    Name: Jeannie Hutchins  MRN: [id number]  Admitting Diagnosis:  Acute on chronic diastolic heart failure  9 Days Post-Op    Recommendations:     Discharge Recommendations: home health OT(with family assist)  Discharge Equipment Recommendations:  rolling walker, bedside commode, and bath bench. Pt with new respiratory issues requiring O2; high fall risk with line management and SOB with activity  Barriers to discharge: none      Assessment:     Jeannie Hutchins is a 85 y.o. female with a medical diagnosis of Acute on chronic diastolic heart failure.  She presents with . Performance deficits affecting function are weakness, impaired endurance, impaired self care skills, impaired functional mobilty, gait instability, impaired balance, decreased lower extremity function, decreased upper extremity function, decreased coordination, decreased safety awareness, decreased ROM, impaired cardiopulmonary response to activity.     Pt continues to progress toward OT goals. Pt tolerated standing grooming tasks at sink today. Pt performs functional mobility within room SBA/RW, 4L O2 NC. SPO2 87%-95% throughout session. Continue OT POC as indicated.     Rehab Prognosis:  Good; patient would benefit from acute skilled OT services to address these deficits and reach maximum level of function.       Plan:     Patient to be seen 3 x/week, 5 x/week to address the above listed problems via self-care/home management, therapeutic activities, therapeutic exercises  · Plan of Care Expires: 11/04/20  · Plan of Care Reviewed with: patient    Subjective     Pain/Comfort:  · Pain Rating 1: 0/10    Objective:     Communicated with: Nurse Do prior to session.  Patient found ambulating from bathroom with PCT assistance with telemetry, oxygen upon OT entry to room.    General Precautions: Standard, fall   Orthopedic Precautions:N/A   Braces: N/A     Occupational Performance:     Bed Mobility:    · Not performed, pt found  seated EOB with PCT.    Functional Mobility/Transfers:  · Patient completed Sit <> Stand Transfer with stand by assistance  with  Rolling walker  · Functional Mobility: Pt was able to perform in room functional mobility short distances to sink and chair with SBA/RW,assist to manage O2 line, 4L O2 NC. Prior to session pt found ambulating from bathroom with PCT. No c/o of SOB.     · Pt on 4L O2 NC, 87% once seated after ambulating to bathroom. Pt able to recover to 95% with increased time, seated rest, and PLB technique.   · SPO2 91% after standing at sink for self-care ~8 mins and functional mobility to chair. Pt denied SOB.  · 94% at rest at end of session  · Nursing notified    Activities of Daily Living: Pt with old RUE injury with ROM deficits limiting independence with bathing, UB, and LB dress  · Grooming: supervision standing at the sink to perform oral care and to comb hair  · Bathing: Pt performed sponge bath seated EOB; required assist to sponge bath BLE feet and under R arm  · Upper Body Dressing: minimum assistance to arash front and back gowns  · Lower Body Dressing: moderate assistance to arash socks seated EOB    [facility] 6 Click ADL: 21    Treatment & Education:   Importance of OOB activity with staff assistance to maximize recovery, OOB>chair and to bathroom throughout day as tolerated and for all meals   Importance of performing UE/LE therex throughout day to increase strength and endurance. Pt provided with BUE AROM HEP in all available planes of motion x 10 reps, 1-2 x/day. Pt provided with handout, verbal instruction, and demo of each exercise. Pt instructed to utilize slow pace and PLB technique with exercise.    Safety and proper techniques during functional t/f and mobility with use of RW; proper positioning with RW at sink; safety with management of O2 lines   Safety when performing standing ADL tasks   Education on home safety/fall prevention/ safe home setup  via handout   Energy  conservation techniques, PLB techniques ( handout provided). Encouraged frequent seated rest breaks, self-pacing with daily tasks, and accessibility of frequently used items to save energy and prevent quick fatigue/SOB   Breathing exercises with incentive spirometer   DME: TTB, RW, BSC. Provided with handout and resources to obtain if needed.   All OT education handouts placed in blue discharge folder.   Multiple self-care tasks/functional mobility completed- assistance level noted above    Pt verbalizes understanding of all education provided this date. All questions/concerns answered within OT scope of practice      Patient left up in chair with all lines intact, call button in reach and nurse Ernestina notifiedEducation:      GOALS:   Multidisciplinary Problems     Occupational Therapy Goals        Problem: Occupational Therapy Goal    Goal Priority Disciplines Outcome Interventions   Occupational Therapy Goal     OT, PT/OT Ongoing, Progressing    Description: Goals to be met by: 11/4/20    Patient will increase functional independence with ADLs by performing:    UE Dressing with Modified [place] and Set-up Assistance.  LE Dressing with Modified [place] and Supervision.  Grooming while standing at sink with Modified [place] and Supervision.  Toileting from toilet with Modified [place] and Set-up Assistance for hygiene and clothing management.   Rolling to Bilateral with Modified [place].   Supine to sit with Modified [place] and Supervision.  Step transfer with Modified [place] and Supervision  Toilet transfer to toilet with Modified [place] and Supervision.  Upper extremity exercise program x15 reps per handout, with independence.  Educate the patient re: Home Safety                      Time Tracking:     OT Date of Treatment: 10/28/20  OT Start Time: 0906  OT Stop Time: 0959  OT Total Time (min): 53 min    Billable Minutes:Self Care/Home Management 53    Sarah DAMIAN  VICKI Soto  10/28/2020

## 2020-10-28 NOTE — NURSING
PER handoff received from Homero DAMIAN LPN. Responds spontaneous and is AAO x4, but hard of hearing. Pt reports having pain to the back of her neck, otherwise pt is in no distress. Assessment completed per Doc Flowsheets; reference if needed. Fall and safety precautions maintained. Bed alarm activated and audible. Bed locked in lowest position, with side rails up x2. Call bell and personal items within reach. Will continue to monitor pt for any changes.

## 2020-10-28 NOTE — UM SECONDARY REVIEW
Medical Affairs    IP Extended Stay > 10  Hospital Day # 10    84 y/o female presents with acute on chronic diastolic heart failure probably precipitated by AFib with RVR.  Started on IV Lasix.  Admitted to ICU on Amiodarone gtt.  Cardiology consulted.  Patient had cardioversion on 10/19 to NSR. Continued on high dose lasix in ICU. Pulmonary/critical care was consulted on 10/21 due to high 02 requirements. PT/OT evaluated the patient and rec: home with H/H. Transferred to floor 10/22. Continuing with aggressive diuresis and weaning O2. Still on 4L N/C with Sats 90-93% was not on Home O2. Palliative care met with Patient and Family, Now a DNR, will assess for Home O2 and Hopefully home in the next day or 2     {OHS  Review Outcome:[id number]}   No

## 2020-10-28 NOTE — SUBJECTIVE & OBJECTIVE
Interval History: No new complaints.    Review of Systems   HENT: Negative for ear discharge and ear pain.    Eyes: Negative for discharge and itching.   Endocrine: Negative for cold intolerance and heat intolerance.   Neurological: Negative for seizures and syncope.     Objective:     Vital Signs (Most Recent):  Temp: 98.2 °F (36.8 °C) (10/28/20 0759)  Pulse: 70 (10/28/20 0759)  Resp: 18 (10/28/20 0759)  BP: (!) 162/65 (10/28/20 0759)  SpO2: (!) 93 % (10/28/20 0945) Vital Signs (24h Range):  Temp:  [97.6 °F (36.4 °C)-98.3 °F (36.8 °C)] 98.2 °F (36.8 °C)  Pulse:  [60-70] 70  Resp:  [17-20] 18  SpO2:  [90 %-95 %] 93 %  BP: (112-162)/(54-65) 162/65     Weight: 51.9 kg (114 lb 6.7 oz)  Body mass index is 25.65 kg/m².    Intake/Output Summary (Last 24 hours) at 10/28/2020 1054  Last data filed at 10/28/2020 0600  Gross per 24 hour   Intake 390 ml   Output --   Net 390 ml      Physical Exam  Constitutional:       Appearance: Normal appearance. She is not ill-appearing.   HENT:      Mouth/Throat:      Mouth: Mucous membranes are moist.      Pharynx: Oropharynx is clear.   Eyes:      Extraocular Movements: Extraocular movements intact.      Conjunctiva/sclera: Conjunctivae normal.   Cardiovascular:      Rate and Rhythm: Normal rate.      Pulses: Normal pulses.      Heart sounds: Normal heart sounds.   Pulmonary:      Effort: Pulmonary effort is normal. No respiratory distress.      Breath sounds: No wheezing.   Abdominal:      General: Abdomen is flat.      Palpations: Abdomen is soft.   Musculoskeletal:      Right lower leg: No edema.      Left lower leg: No edema.   Skin:     General: Skin is warm.      Findings: No erythema.   Neurological:      Mental Status: She is alert.         Significant Labs:   BMP:   Recent Labs   Lab 10/28/20  0541   GLU 93   *   K 4.2   CL 91*   CO2 34*   BUN 15   CREATININE 0.7   CALCIUM 9.0     CBC: No results for input(s): WBC, HGB, HCT, PLT in the last 48 hours.    Significant  Imaging: I have reviewed all pertinent imaging results/findings within the past 24 hours.

## 2020-10-28 NOTE — NURSING
Patient c/o bilateral foot pain, sitting in chair but ready to get back to bed doesn't want to walk anymore also states she feels unsteady and not comfortable.

## 2020-10-28 NOTE — PROGRESS NOTES
[facility] Hospital Medicine  Progress Note    Patient Name: Jeannie Hutchins  MRN: [id number]  Patient Class: IP- Inpatient   Admission Date: 10/18/2020  Length of Stay: 10 days  Attending Physician: Mele Connolly MD  Primary Care Provider: Paige Mcleod MD        Subjective:     Principal Problem:Acute on chronic diastolic heart failure        HPI:  86 y/o female presents to the ER via EMS transport with shortness of breath.  Patient states she is chronically short of breath, but symptoms worsened this morning.  EMS reported patient's SpO2 was 88% on room air, improved to 96% after given 1 SL nitro tab and nitro paste en route.  Patient also reports non productive cough and 10 lb weight gain in the last 2 weeks.  Also complains of edema on bilateral lower extremities that started 3 days ago.  Patient also reports post nasal drip.  States compliance with medications. Hx of PAF and noted to be in rapid AFib on presentation.  Placed on Amiodarone gtt and admitted to ICU.  Patient was also given Lasix in ER with improvement of symptoms.  No other complaints.    Overview/Hospital Course:  86 y/o female presents with acute on chronic diastolic heart failure probably precipitated by AFib with RVR.  Started on IV Lasix.  Admitted to ICU on Amiodarone gtt.  Cardiology consulted.  Patient had cardioversion on 10/19 to NSR. Continued on high dose lasix in ICU. Pulmonary/critical care was consulted on 10/21 due to high 02 requirements. PT/OT evaluated the patient and rec: home with H/H. Transferred to floor 10/22. Continuing with aggressive diuresis and weaning O2.  Diuresis stopped secondary to worsening hyponatremia.  Nephrology consulted.  Patient placed on fluid restriction with improvement of Na.      Interval History: No new complaints.    Review of Systems   HENT: Negative for ear discharge and ear pain.    Eyes: Negative for discharge and itching.   Endocrine: Negative for cold intolerance and  heat intolerance.   Neurological: Negative for seizures and syncope.     Objective:     Vital Signs (Most Recent):  Temp: 98.2 °F (36.8 °C) (10/28/20 0759)  Pulse: 70 (10/28/20 0759)  Resp: 18 (10/28/20 0759)  BP: (!) 162/65 (10/28/20 0759)  SpO2: (!) 93 % (10/28/20 0945) Vital Signs (24h Range):  Temp:  [97.6 °F (36.4 °C)-98.3 °F (36.8 °C)] 98.2 °F (36.8 °C)  Pulse:  [60-70] 70  Resp:  [17-20] 18  SpO2:  [90 %-95 %] 93 %  BP: (112-162)/(54-65) 162/65     Weight: 51.9 kg (114 lb 6.7 oz)  Body mass index is 25.65 kg/m².    Intake/Output Summary (Last 24 hours) at 10/28/2020 1054  Last data filed at 10/28/2020 0600  Gross per 24 hour   Intake 390 ml   Output --   Net 390 ml      Physical Exam  Constitutional:       Appearance: Normal appearance. She is not ill-appearing.   HENT:      Mouth/Throat:      Mouth: Mucous membranes are moist.      Pharynx: Oropharynx is clear.   Eyes:      Extraocular Movements: Extraocular movements intact.      Conjunctiva/sclera: Conjunctivae normal.   Cardiovascular:      Rate and Rhythm: Normal rate.      Pulses: Normal pulses.      Heart sounds: Normal heart sounds.   Pulmonary:      Effort: Pulmonary effort is normal. No respiratory distress.      Breath sounds: No wheezing.   Abdominal:      General: Abdomen is flat.      Palpations: Abdomen is soft.   Musculoskeletal:      Right lower leg: No edema.      Left lower leg: No edema.   Skin:     General: Skin is warm.      Findings: No erythema.   Neurological:      Mental Status: She is alert.         Significant Labs:   BMP:   Recent Labs   Lab 10/28/20  0541   GLU 93   *   K 4.2   CL 91*   CO2 34*   BUN 15   CREATININE 0.7   CALCIUM 9.0     CBC: No results for input(s): WBC, HGB, HCT, PLT in the last 48 hours.    Significant Imaging: I have reviewed all pertinent imaging results/findings within the past 24 hours.      Assessment/Plan:      * Acute on chronic diastolic heart failure  Patient presents with Acute Hypoxemic  Respiratory Failure secondary to CHF exacerbation.  Exacerbation probably triggered by poor HR control.  Hx of PAF.  Presents in Afib with RVR.  Cardiology consulted and patient admitted to ICU on Amiodarone drip.  Stopped lasix due to hyponatremia and met alkalosis  Rate control.  Doing much better, but still on 4L oxygen.  Will try to wean down.  Assess home oxygen needs.  Home tomorrow with HH +/- home oxygen.    Palliative care encounter  Palliative Care met with patient and family.  Now DNR.      Mixed hyperlipidemia  · Continue Statin.      Paroxysmal atrial fibrillation  Afib with RVR as above.  S/p cardioversion on 10/19.   Cards following   CHADSVASC 5  Continue with Amio and low dose Eliquis  Apparently had episode of aflutter now back in sinus      Acute respiratory failure with hypoxia and hypercarbia  Secondary to CHF as above.  Has evidence of pulmonary hypertension on echocardiogram  CT chest shows decreased chronic GGO, compressive atelectasis  Started incentive spirometry    Essential hypertension  Continue home medications.      Hyponatremia with decreased serum osmolality  Much improved.  Appreciate Nephrology input.  Fluid restriction.      VTE Risk Mitigation (From admission, onward)         Ordered     apixaban tablet 2.5 mg  2 times daily      10/18/20 1342     IP VTE HIGH RISK PATIENT  Once      10/18/20 1340     Place sequential compression device  Until discontinued      10/18/20 1340                Discharge Planning   JULIET:      Code Status: DNR   Is the patient medically ready for discharge?:     Reason for patient still in hospital (select all that apply): Patient trending condition  Discharge Plan A: Home Health   Discharge Delays: None known at this time              Mele Connolly MD  Department of Hospital Medicine   [facility]

## 2020-10-28 NOTE — PT/OT/SLP PROGRESS
Physical Therapy Treatment    Patient Name:  Jeannie Hutchins   MRN:  [id number]    Recommendations:     Discharge Recommendations:  home health PT   Discharge Equipment Recommendations:   Barriers to discharge: None    Assessment:     Jeannie Hutchins is a 85 y.o. female admitted with a medical diagnosis of Acute on chronic diastolic heart failure.  She presents with the following impairments/functional limitations:  weakness, impaired endurance, impaired self care skills, impaired functional mobilty, pain, impaired balance, gait instability, decreased upper extremity function, decreased lower extremity function, decreased safety awareness, decreased ROM, impaired cardiopulmonary response to activity .    Rehab Prognosis: Good; patient would benefit from acute skilled PT services to address these deficits and reach maximum level of function.    Recent Surgery: Procedure(s) (LRB):  Transesophageal echo (BLADIMIR) intra-procedure log documentation (N/A)  Cardioversion 9 Days Post-Op    Plan:     During this hospitalization, patient to be seen (5-6x/wk) to address the identified rehab impairments via gait training, therapeutic activities, therapeutic exercises and progress toward the following goals:    · Plan of Care Expires:  11/04/20    Subjective     Chief Complaint: fatigue from a long treatment with OT earlier   Patient/Family Comments/goals: pt agreeable to therapy  Pain/Comfort:  · Pain Rating 1: 0/10  · Pain Rating Post-Intervention 1: 0/10      Objective:     Communicated with nurse  prior to session.  Patient found up in chair with oxygen, telemetry upon PT entry to room.     General Precautions: Standard, fall, respiratory   Orthopedic Precautions:N/A   Braces: N/A     Functional Mobility:  · Pt declined to perform 2* c/o fatigue .       AM-PAC 6 CLICK MOBILITY  Turning over in bed (including adjusting bedclothes, sheets and blankets)?: 4  Sitting down on and standing up from a chair with arms (e.g., wheelchair, bedside  commode, etc.): 3  Moving from lying on back to sitting on the side of the bed?: 4  Moving to and from a bed to a chair (including a wheelchair)?: 3  Need to walk in hospital room?: 3  Climbing 3-5 steps with a railing?: 3  Basic Mobility Total Score: 20       Therapeutic Activities and Exercises:   Lower Extremity Exercises.   Patient educated on the purpose of therapeutic exercise.     Patient verbalized acceptance/understanding of instructions, expectations, and limitations(for safety).   Patient performed: 2 sets of 10 reps (each) of B LE There Ex: AP, LAQ, Hip abd/add with pillow,  Hip flexion while sitting up in chair .         Patient requiredverbal cues/tactile cues to ensure correct sequence, to maintain proper form, and to allow for self-correction.   Pt reported no complaints or problems with exercise activity.       Patient left up in chair on green air cushion  with all lines intact, call button in reach, chair alarm on and nurse notified..    GOALS:   Multidisciplinary Problems     Physical Therapy Goals        Problem: Physical Therapy Goal    Goal Priority Disciplines Outcome Goal Variances Interventions   Physical Therapy Goal     PT, PT/OT Ongoing, Progressing     Description: Goals to be met by: 20     Patient will increase functional independence with mobility by performin. Supine to sit with Stand-by Assistance  2. Sit to stand transfer with Supervision  3. Gait  x 50 feet with Supervision with or without AD   4. Ascend/descend 3 stair with right Handrails Contact Guard Assistance    5. Lower extremity exercise program x10 reps per handout, with independence                     Time Tracking:     PT Received On: 10/28/20  PT Start Time: 1022     PT Stop Time: 1032  PT Total Time (min): 10 min     Billable Minutes: Therapeutic Exercise 10    Treatment Type: Treatment  PT/PTA: PTA     PTA Visit Number: 4     Sharon Clemens PTA  10/28/2020

## 2020-10-28 NOTE — PROGRESS NOTES
Renal Progress Note    Date of Admission:  10/18/2020  8:34 AM    Length of Stay: 10  Days    Subjective: no new problem    Objective:    Current Facility-Administered Medications   Medication    acetaminophen tablet 500 mg    albuterol sulfate nebulizer solution 2.5 mg    aluminum-magnesium hydroxide-simethicone 200-200-20 mg/5 mL suspension 30 mL    amiodarone tablet 200 mg    amLODIPine tablet 10 mg    apixaban tablet 2.5 mg    atorvastatin tablet 40 mg    diphenhydrAMINE capsule 25 mg    ipratropium 0.02 % nebulizer solution 0.5 mg    levothyroxine tablet 75 mcg    losartan tablet 100 mg    metoprolol tartrate (LOPRESSOR) tablet 50 mg    ondansetron injection 8 mg    oxyCODONE-acetaminophen 5-325 mg per tablet 1 tablet    QUEtiapine tablet 25 mg    sodium chloride 0.9% flush 10 mL       Vitals:    10/27/20 2345 10/27/20 2353 10/28/20 0432 10/28/20 0759   BP:  (!) 123/56 (!) 112/54 (!) 162/65   BP Location:  Left arm Left arm Left arm   Patient Position:  Lying Lying Sitting   Pulse: 60 61 61 70   Resp: 20 17 18 18   Temp:  98.1 °F (36.7 °C) 97.6 °F (36.4 °C) 98.2 °F (36.8 °C)   TempSrc:  Oral Oral Oral   SpO2: (!) 93% (!) 93% (!) 93% (!) 93%   Weight:   51.9 kg (114 lb 6.7 oz)    Height:           I/O last 3 completed shifts:  In: 660 [P.O.:660]  Out: 400 [Urine:400]  No intake/output data recorded.      Physical Exam:     General: NAD  Neck: supple  Heart: RRR  Lungs: unlabored breathing  Abdomen: n/a  Limbs: n/a  Neurologic: awake      Laboratories:    No results for input(s): WBC, RBC, HGB, HCT, PLT, MCV, MCH, MCHC in the last 24 hours.    Recent Labs   Lab 10/28/20  0541   CALCIUM 9.0   *   K 4.2   CO2 34*   CL 91*   BUN 15   CREATININE 0.7       No results for input(s): COLORU, CLARITYU, SPECGRAV, PHUR, PROTEINUA, GLUCOSEU, BLOODU, WBCU, RBCU, BACTERIA, MUCUS in the last 24 hours.    Invalid input(s):  BILIRUBINCON    Microbiology Results (last 7 days)     ** No  "results found for the last 168 hours. **            Diagnostic Tests: n/a        Assessment:    86 y/o female admitted with:    - Acute on chronic diastolic heart failure  - Improving Hyponatremia   - Paroxysmal atrial fibrillation   - RVR on admission  - Pulmonary HTN  - s/p Acute respiratory failure with hypoxia and hypercarbia  - Peripheral edema  - Hx. HTN        Plan:    - "Judicious" Diuresis watch serum CO2- for Met. alkalosis  - s/p Cardioversion and Amiodarone per Cardiology  - Free water restriction  - f/u BMP  - Other problems per admitting                      "

## 2020-10-28 NOTE — ASSESSMENT & PLAN NOTE
Patient presents with Acute Hypoxemic Respiratory Failure secondary to CHF exacerbation.  Exacerbation probably triggered by poor HR control.  Hx of PAF.  Presents in Afib with RVR.  Cardiology consulted and patient admitted to ICU on Amiodarone drip.  Stopped lasix due to hyponatremia and met alkalosis  Rate control.  Doing much better, but still on 4L oxygen.  Will try to wean down.  Assess home oxygen needs.  Home tomorrow with  +/- home oxygen.

## 2020-10-29 PROCEDURE — 25000003 PHARM REV CODE 250: Performed by: INTERNAL MEDICINE

## 2020-10-29 PROCEDURE — 94761 N-INVAS EAR/PLS OXIMETRY MLT: CPT

## 2020-10-29 PROCEDURE — 25000003 PHARM REV CODE 250: Performed by: HOSPITALIST

## 2020-10-29 PROCEDURE — 27000221 HC OXYGEN, UP TO 24 HOURS

## 2020-10-29 PROCEDURE — 97110 THERAPEUTIC EXERCISES: CPT | Mod: CQ

## 2020-10-29 PROCEDURE — 21400001 HC TELEMETRY ROOM

## 2020-10-29 PROCEDURE — 99900035 HC TECH TIME PER 15 MIN (STAT)

## 2020-10-29 PROCEDURE — 97116 GAIT TRAINING THERAPY: CPT | Mod: CQ

## 2020-10-29 PROCEDURE — 97535 SELF CARE MNGMENT TRAINING: CPT | Mod: CO

## 2020-10-29 RX ORDER — FUROSEMIDE 20 MG/1
20 TABLET ORAL DAILY
Status: DISCONTINUED | OUTPATIENT
Start: 2020-10-29 | End: 2020-10-31 | Stop reason: HOSPADM

## 2020-10-29 RX ADMIN — ATORVASTATIN CALCIUM 40 MG: 40 TABLET, FILM COATED ORAL at 11:10

## 2020-10-29 RX ADMIN — APIXABAN 2.5 MG: 2.5 TABLET, FILM COATED ORAL at 11:10

## 2020-10-29 RX ADMIN — METOPROLOL TARTRATE 50 MG: 50 TABLET, FILM COATED ORAL at 11:10

## 2020-10-29 RX ADMIN — FUROSEMIDE 20 MG: 20 TABLET ORAL at 01:10

## 2020-10-29 RX ADMIN — LOSARTAN POTASSIUM 100 MG: 25 TABLET, FILM COATED ORAL at 11:10

## 2020-10-29 RX ADMIN — ACETAMINOPHEN 500 MG: 500 TABLET ORAL at 11:10

## 2020-10-29 RX ADMIN — OXYCODONE HYDROCHLORIDE AND ACETAMINOPHEN 1 TABLET: 5; 325 TABLET ORAL at 11:10

## 2020-10-29 RX ADMIN — AMIODARONE HYDROCHLORIDE 200 MG: 200 TABLET ORAL at 11:10

## 2020-10-29 RX ADMIN — QUETIAPINE FUMARATE 25 MG: 25 TABLET ORAL at 11:10

## 2020-10-29 RX ADMIN — AMLODIPINE BESYLATE 10 MG: 5 TABLET ORAL at 11:10

## 2020-10-29 RX ADMIN — LEVOTHYROXINE SODIUM 75 MCG: 75 TABLET ORAL at 11:10

## 2020-10-29 NOTE — NURSING
Report given to oncoming nurse Deandre RN, Patient is Resting comfortably on 3L nc, chest rise and fall noted. Bed is in lowest position, wheels locked, call light is in reach. 12 hour chart check completed

## 2020-10-29 NOTE — PROGRESS NOTES
Renal Progress Note    Date of Admission:  10/18/2020  8:34 AM    Length of Stay: 11  Days    Subjective: no new problems    Objective:    Current Facility-Administered Medications   Medication    acetaminophen tablet 500 mg    albuterol sulfate nebulizer solution 2.5 mg    aluminum-magnesium hydroxide-simethicone 200-200-20 mg/5 mL suspension 30 mL    amiodarone tablet 200 mg    amLODIPine tablet 10 mg    apixaban tablet 2.5 mg    atorvastatin tablet 40 mg    diphenhydrAMINE capsule 25 mg    ipratropium 0.02 % nebulizer solution 0.5 mg    levothyroxine tablet 75 mcg    losartan tablet 100 mg    metoprolol tartrate (LOPRESSOR) tablet 50 mg    ondansetron injection 8 mg    oxyCODONE-acetaminophen 5-325 mg per tablet 1 tablet    QUEtiapine tablet 25 mg    sodium chloride 0.9% flush 10 mL       Vitals:    10/28/20 2200 10/29/20 0019 10/29/20 0500 10/29/20 0810   BP:  131/60 126/60 (!) 155/68   BP Location:  Left arm Left arm Left arm   Patient Position:  Lying Lying Sitting   Pulse:  (!) 56 60 70   Resp:  18 18 17   Temp:  98 °F (36.7 °C) 97.7 °F (36.5 °C) 97.8 °F (36.6 °C)   TempSrc:  Oral Oral Oral   SpO2: (!) 91% (!) 91% (!) 91% (!) 93%   Weight:       Height:           I/O last 3 completed shifts:  In: 470 [P.O.:470]  Out: 950 [Urine:950]  No intake/output data recorded.      Physical Exam: n/a      Laboratories:    No results for input(s): WBC, RBC, HGB, HCT, PLT, MCV, MCH, MCHC in the last 24 hours.    No results for input(s): GLUCOSE, CALCIUM, PROT, NA, K, CO2, CL, BUN, CREATININE, ALKPHOS, ALT, AST, BILITOT in the last 24 hours.    Invalid input(s):  ALBUMIN    No results for input(s): COLORU, CLARITYU, SPECGRAV, PHUR, PROTEINUA, GLUCOSEU, BLOODU, WBCU, RBCU, BACTERIA, MUCUS in the last 24 hours.    Invalid input(s):  BILIRUBINCON    Microbiology Results (last 7 days)     ** No results found for the last 168 hours. **            Diagnostic Tests:  n/a        Assessment:    84 y/o female admitted with:    - Acute on chronic diastolic heart failure  - Improving Hyponatremia   - Mild Metabolic alkalosis  - Paroxysmal atrial fibrillation -w/ RVR on admission  - Hx. Pulmonary HTN  - s/p Acute respiratory failure with hypoxia and hypercarbia  - Peripheral edema resolving  - Hx. HTN        Plan:    - Diuretic on HOLD  - s/p Cardioversion and Amiodarone per Cardiology  - Free water restriction  - f/u BMP  - Discharge planning and ther problems per admitting    Will f/u PRN

## 2020-10-29 NOTE — PLAN OF CARE
Problem: Physical Therapy Goal  Goal: Physical Therapy Goal  Description: Goals to be met by: 20     Patient will increase functional independence with mobility by performin. Supine to sit with Stand-by Assistance  2. Sit to stand transfer with Supervision  3. Gait  x 50 feet with Supervision with or without AD   4. Ascend/descend 3 stair with right Handrails Contact Guard Assistance    5. Lower extremity exercise program x10 reps per handout, with independence    Outcome: Ongoing, Progressing   Pt ambulated 80 ft x 2 trials with RW, SBA(3L O2NC)

## 2020-10-29 NOTE — PT/OT/SLP PROGRESS
Physical Therapy Treatment    Patient Name:  Jeannie Hutchins   MRN:  [id number]    Recommendations:     Discharge Recommendations:  home health PT   Discharge Equipment Recommendations: RW   Barriers to discharge: None    Assessment:     Jeannie Hutchins is a 85 y.o. female admitted with a medical diagnosis of Acute on chronic diastolic heart failure.  She presents with the following impairments/functional limitations:  weakness, impaired endurance, gait instability, decreased upper extremity function, decreased lower extremity function, decreased safety awareness, pain, decreased ROM, impaired cardiopulmonary response to activity .    Rehab Prognosis: Good; patient would benefit from acute skilled PT services to address these deficits and reach maximum level of function.    Recent Surgery: Procedure(s) (LRB):  Transesophageal echo (BLADIMIR) intra-procedure log documentation (N/A)  Cardioversion 10 Days Post-Op    Plan:     During this hospitalization, patient to be seen (5-6x/wk) to address the identified rehab impairments via gait training, therapeutic activities, therapeutic exercises and progress toward the following goals:    · Plan of Care Expires:  11/04/20    Subjective     Chief Complaint: didn't sleep well last night   Patient/Family Comments/goals: pt agreeable to therapy  Pain/Comfort:  · Pain Rating 1: 6/10  · Location - Side 1: Right  · Location 1: knee  · Pain Addressed 1: Reposition, Cessation of Activity, Nurse notified  · Pain Rating Post-Intervention 1: 6/10      Objective:     Communicated with nurse  prior to session.  Patient found HOB elevated with bed alarm, peripheral IV, oxygen, telemetry upon PT entry to room.     General Precautions: Standard, fall, respiratory   Orthopedic Precautions:N/A   Braces: N/A     Functional Mobility:  · Bed Mobility:     · Rolling Left:  modified independence  · Scooting: supervision  · Supine to Sit: supervision, HOB elevated, bedside rail   · Transfers:     · Sit to  Stand:  supervision with rolling walker  · Gait: ambulated 80 ft x 2 trials with RW, SBA  (3L O2NC) .  Pt with decreased mercy, increased time in double stance, decreased velocity of limb motion, decreased step length, decreased swing-to-stance ratio, decreased toe-to-floor clearance and decreased weight-shifting ability.Impairments contributing to gait deviations include impaired balance, decreased flexibility, pain, decreased ROM and decreased strength. V/T cues for safety technique and walker management. SpO2 maintain from 91%-96% on 3L O2NC throughout treatment.   ·  Balance:  Good in sitting, fair + in standing.       AM-PAC 6 CLICK MOBILITY  Turning over in bed (including adjusting bedclothes, sheets and blankets)?: 4  Sitting down on and standing up from a chair with arms (e.g., wheelchair, bedside commode, etc.): 3  Moving from lying on back to sitting on the side of the bed?: 4  Moving to and from a bed to a chair (including a wheelchair)?: 4  Need to walk in hospital room?: 3  Climbing 3-5 steps with a railing?: 3  Basic Mobility Total Score: 21       Therapeutic Activities and Exercises:    Pt performed seated BLE 10 reps x 2 trials:   AP, LAQ, HS,  Hip abd/add with pillow , Hip flexion. V/T's cues for technique and sequence. Pt tolerated well.   Educated pt on safety awareness with all OOB mobility , transfer and gait training.     Patient left up in chair on green air cushion with all lines intact, call button in reach, chair alarm on and nurse notified..    GOALS:   Multidisciplinary Problems     Physical Therapy Goals        Problem: Physical Therapy Goal    Goal Priority Disciplines Outcome Goal Variances Interventions   Physical Therapy Goal     PT, PT/OT Ongoing, Progressing     Description: Goals to be met by: 20     Patient will increase functional independence with mobility by performin. Supine to sit with Stand-by Assistance  2. Sit to stand transfer with Supervision  3. Gait  x  50 feet with Supervision with or without AD   4. Ascend/descend 3 stair with right Handrails Contact Guard Assistance    5. Lower extremity exercise program x10 reps per handout, with independence                     Time Tracking:     PT Received On: 10/29/20  PT Start Time: 0945     PT Stop Time: 1011  PT Total Time (min): 26 min     Billable Minutes: Gait Training 15 and Therapeutic Exercise 11    Treatment Type: Treatment  PT/PTA: PTA     PTA Visit Number: 5     Sharon Clemens, PTA  10/29/2020
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
## 2020-10-29 NOTE — PT/OT/SLP PROGRESS
Occupational Therapy   Treatment    Name: Jeannie Hutchins  MRN: [id number]  Admitting Diagnosis:  Acute on chronic diastolic heart failure  10 Days Post-Op    Recommendations:     Discharge Recommendations: home health OT(with family assist)  Discharge Equipment Recommendations:  rolling walker, bedside commode, and bath bench. Pt with new respiratory issues requiring O2; high fall risk with line management and SOB with activity   Barriers to discharge: none      Assessment:     Jeannie Hutchins is a 85 y.o. female with a medical diagnosis of Acute on chronic diastolic heart failure.  She presents with the following performance deficits affecting function: weakness, impaired endurance, impaired self care skills, impaired functional mobilty, gait instability, impaired balance, decreased lower extremity function, decreased upper extremity function, decreased safety awareness, impaired cardiopulmonary response to activity.     Pt continues to progress well towards OT goals- pt performs functional mobility in room for ADL's with supervision and RW, 3L O2 NC. SPO2 89-91% with exertion. Continue OT POC as indicated.     Rehab Prognosis:  Good; patient would benefit from acute skilled OT services to address these deficits and reach maximum level of function.       Plan:     Patient to be seen 3 x/week, 5 x/week to address the above listed problems via self-care/home management, therapeutic activities, therapeutic exercises  · Plan of Care Expires: 11/04/20  · Plan of Care Reviewed with: patient    Subjective   Pt agreeable to therapy.   Pain/Comfort:  · Pain Rating 1: 0/10    Objective:     Communicated with: Nurse prior to session.  Patient found up in chair with oxygen, telemetry upon OT entry to room.    General Precautions: Standard, fall   Orthopedic Precautions:N/A   Braces: N/A     Occupational Performance:     Bed Mobility:    · Patient completed Sit to Supine with contact guard assistance with BLE     Functional  Mobility/Transfers:  · Patient completed Sit <> Stand Transfer with supervision  with  rolling walker   · Patient completed Toilet Transfer Step Transfer technique with supervision with  rolling walker  · Functional Mobility: Pt was able to perform in room functional mobility within room household distances x 2 trials to sink>chair and chair <>bathroom with S/RW. Pt able to manage O2 lines with set-up assist.    Pt on 3L O2 NC, SPO2 at rest 94%, with functional mobility trial one 91%, second trial of functional mobility/standing 89% increasing to 91% quickly. Pt with no c/o SOB.    Activities of Daily Living:  · Grooming: modified independence and supervision standing at sink for oral care  · Bathing: set-up assistance to sponge bath upper body, buttocks, and virgil region    · Upper Body Dressing: minimum assistance to doff/arash gowns  · Lower Body Dressing:   · Toileting: modified independence and supervision for standing pericare (anterior/posterior) and gown management      [facility] 6 Click ADL: 21    Treatment & Education:   Pt re-educated on OT role/POC   Importance of OOB activity with staff assistance to maximize recovery, OOB>chair throughout day as tolerated and for all meals   Importance of performing UE/LE therex throughout day to increase strength and endurance    Safety and proper techniques during functional t/f and mobility with use of RW and O2 line management   Safety when performing standing ADL tasks; self-pacing and standing rest breaks with PLB technique. Pt demos proper implementation throughout session.   Multiple self-care tasks/functional mobility completed- assistance level noted above    Pt verbalizes understanding of all education provided this date. All questions/concerns answered within OT scope of practice      Patient left HOB elevated with all lines intact, call button in reach, bed alarm on and nurse notifiedEducation:      GOALS:   Multidisciplinary Problems     Occupational  Therapy Goals        Problem: Occupational Therapy Goal    Goal Priority Disciplines Outcome Interventions   Occupational Therapy Goal     OT, PT/OT Ongoing, Progressing    Description: Goals to be met by: 11/4/20    Patient will increase functional independence with ADLs by performing:    UE Dressing with Modified [place] and Set-up Assistance.  LE Dressing with Modified [place] and Supervision.  Grooming while standing at sink with Modified [place] and Supervision.  Toileting from toilet with Modified [place] and Set-up Assistance for hygiene and clothing management.   Rolling to Bilateral with Modified [place].   Supine to sit with Modified [place] and Supervision.  Step transfer with Modified [place] and Supervision  Toilet transfer to toilet with Modified [place] and Supervision.  Upper extremity exercise program x15 reps per handout, with independence.  Educate the patient re: Home Safety                      Time Tracking:     OT Date of Treatment: 10/29/20  OT Start Time: 1231  OT Stop Time: 1311  OT Total Time (min): 40 min    Billable Minutes:Self Care/Home Management 40    VICKI Mcguire  10/29/2020

## 2020-10-29 NOTE — PLAN OF CARE
Problem: Occupational Therapy Goal  Goal: Occupational Therapy Goal  Description: Goals to be met by: 11/4/20    Patient will increase functional independence with ADLs by performing:    UE Dressing with Modified [place] and Set-up Assistance.  LE Dressing with Modified [place] and Supervision.  Grooming while standing at sink with Modified [place] and Supervision.  Toileting from toilet with Modified [place] and Set-up Assistance for hygiene and clothing management.   Rolling to Bilateral with Modified [place].   Supine to sit with Modified [place] and Supervision.  Step transfer with Modified [place] and Supervision  Toilet transfer to toilet with Modified [place] and Supervision.  Upper extremity exercise program x15 reps per handout, with independence.  Educate the patient re: Home Safety     Outcome: Ongoing, Progressing      Pt continues to progress well towards OT goals- pt performs functional mobility in room for ADL's with supervision and RW, 3L O2 NC. SPO2 89-91% with exertion. Continue OT POC as indicated.

## 2020-10-29 NOTE — PLAN OF CARE
Patient admitted with CHF exacerbation; from home with 2 sons and plans to return upon discharge; evaluated by therapy and rec Home Health in which patient is in agreement; accepted to services by [facility]; pt will require home oxygen; unable to discharge today d/t no power in home with oxygen    TN in to speak with patient regarding DC plan; still in agreement to having Home Health; agree with staying one more day d/t no power at home and need for oxygen; TN also called and updated una [facility] ([phone])    [facility] was updated via [facility]    Situation: lives at home with 2 sons    Services: accepted for services with [facility]     Equipment: has W/C; will need O2    IMM: 10/29/20    Appts: will need Cards/PCP    Needs TN to f/u with [facility] with orders and DC date    Consults:Cards/Renal    Dispo: home with home health     10/29/20 1335   Discharge Reassessment   Assessment Type Discharge Planning Reassessment   Provided patient/caregiver education on the expected discharge date and the discharge plan Yes   Do you have any problems affording any of your prescribed medications? No   Discharge Plan A Home Health   DME Needed Upon Discharge  oxygen   Anticipated Discharge Disposition Home-Health   Can the patient/caregiver answer the patient profile reliably? Yes, cognitively intact   How does the patient rate their overall health at the present time? Good   Describe the patient's ability to walk at the present time. Walks with the help of equipment   How often would a person be available to care for the patient? Whenever needed   During the past month, has the patient often been bothered by feeling down, depressed or hopeless? No   During the past month, has the patient often been bothered by little interest or pleasure in doing things? No   Post-Acute Status   Post-Acute Authorization Home Health   Home Health Status Awaiting Orders for HH   Discharge Delays None known at this time

## 2020-10-29 NOTE — PLAN OF CARE
Important Message from Medicare and discharge appeal process reviewed with patient; patient was given opportunity to ask questions; after which, verbalized understanding of rights; copy was placed in medical record chart and one copy given to patient for her review and records       10/29/20 1057   Medicare Message   Important Message from Medicare regarding Discharge Appeal Rights Given to patient/caregiver;Explained to patient/caregiver;Signed/date by patient/caregiver   Date IMM was signed 10/29/20   Time IMM was signed [id number]

## 2020-10-30 LAB
ANION GAP SERPL CALC-SCNC: 10 MMOL/L (ref 8–16)
BUN SERPL-MCNC: 17 MG/DL (ref 8–23)
CALCIUM SERPL-MCNC: 8.8 MG/DL (ref 8.7–10.5)
CHLORIDE SERPL-SCNC: 94 MMOL/L (ref 95–110)
CO2 SERPL-SCNC: 30 MMOL/L (ref 23–29)
CREAT SERPL-MCNC: 0.7 MG/DL (ref 0.5–1.4)
EST. GFR  (AFRICAN AMERICAN): >60 ML/MIN/1.73 M^2
EST. GFR  (NON AFRICAN AMERICAN): >60 ML/MIN/1.73 M^2
GLUCOSE SERPL-MCNC: 93 MG/DL (ref 70–110)
POCT GLUCOSE: 195 MG/DL (ref 70–110)
POTASSIUM SERPL-SCNC: 4.4 MMOL/L (ref 3.5–5.1)
SODIUM SERPL-SCNC: 134 MMOL/L (ref 136–145)

## 2020-10-30 PROCEDURE — 25000003 PHARM REV CODE 250: Performed by: INTERNAL MEDICINE

## 2020-10-30 PROCEDURE — 80048 BASIC METABOLIC PNL TOTAL CA: CPT

## 2020-10-30 PROCEDURE — 36415 COLL VENOUS BLD VENIPUNCTURE: CPT

## 2020-10-30 PROCEDURE — 21400001 HC TELEMETRY ROOM

## 2020-10-30 PROCEDURE — 97116 GAIT TRAINING THERAPY: CPT

## 2020-10-30 PROCEDURE — 25000003 PHARM REV CODE 250: Performed by: HOSPITALIST

## 2020-10-30 PROCEDURE — 97110 THERAPEUTIC EXERCISES: CPT

## 2020-10-30 PROCEDURE — 97535 SELF CARE MNGMENT TRAINING: CPT | Mod: CO

## 2020-10-30 PROCEDURE — 94761 N-INVAS EAR/PLS OXIMETRY MLT: CPT

## 2020-10-30 RX ADMIN — QUETIAPINE FUMARATE 25 MG: 25 TABLET ORAL at 10:10

## 2020-10-30 RX ADMIN — APIXABAN 2.5 MG: 2.5 TABLET, FILM COATED ORAL at 09:10

## 2020-10-30 RX ADMIN — ATORVASTATIN CALCIUM 40 MG: 40 TABLET, FILM COATED ORAL at 10:10

## 2020-10-30 RX ADMIN — METOPROLOL TARTRATE 50 MG: 50 TABLET, FILM COATED ORAL at 10:10

## 2020-10-30 RX ADMIN — FUROSEMIDE 20 MG: 20 TABLET ORAL at 09:10

## 2020-10-30 RX ADMIN — LEVOTHYROXINE SODIUM 75 MCG: 75 TABLET ORAL at 09:10

## 2020-10-30 RX ADMIN — AMLODIPINE BESYLATE 10 MG: 5 TABLET ORAL at 09:10

## 2020-10-30 RX ADMIN — AMIODARONE HYDROCHLORIDE 200 MG: 200 TABLET ORAL at 09:10

## 2020-10-30 RX ADMIN — APIXABAN 2.5 MG: 2.5 TABLET, FILM COATED ORAL at 10:10

## 2020-10-30 RX ADMIN — ACETAMINOPHEN 500 MG: 500 TABLET ORAL at 10:10

## 2020-10-30 RX ADMIN — LOSARTAN POTASSIUM 100 MG: 25 TABLET, FILM COATED ORAL at 09:10

## 2020-10-30 NOTE — PROGRESS NOTES
"[facility]  Adult Nutrition  Progress Note    SUMMARY       Recommendations    1. Continue low Na diet, add 1200 ml fluid restriction + boost plus once daily, encourage PO intake  2. Weigh pt weekly    Goals: 1. Consume >/=50% of meals and supplements by next RD visit.  Nutrition Goal Status: progressing towards goal  Communication of RD Recs: (plan of care)    Reason for Assessment    Reason For Assessment: RD follow-up  Diagnosis: cardiac disease(acute on chronic diastolic heart failure)  Relevant Medical History: Thyroid disease, HTN, hypercholesterolemia, arthritis, A Fib. CHF, anxiety disorder  Interdisciplinary Rounds: did not attend    General Information Comments: Pt currently on a Low sodium 2gm diet with a 1200mL fluid restricted diet. Pt is also receiving Boost Vanilla once daily. Pt PO intake from last meals was 25%. Pt asleep at visit on NC. Was not able to obtain information from pt.Pt denied any trouble chewing/swallowing per last RD visit. Last BM 10/27. NFPE continues to not be warrented.    Nutrition Discharge Planning: Adequate intake on low sodium diet, 1200 ml fluid    Nutrition Risk Screen    Nutrition Risk Screen: no indicators present    Nutrition/Diet History    Typical Food/Fluid Intake:     Breakfast: 3/4 cups of raisin brand   Brunch: Fried egg with toast  Lunch:skips  Dinner: pork OR beef OR chicken with a start and cooked vegetables from frozen bag   Snack: carmelina crackers or cookies  Drinks: tea    Food Preferences: .  Spiritual, Cultural Beliefs, Lutheran Practices, Values that Affect Care: no  Food Allergies: (strawberry)  Factors Affecting Nutritional Intake: early satiety    Anthropometrics    Temp: 97.9 °F (36.6 °C)  Height Method: Stated  Height: 4' 8" (142.2 cm)  Height (inches): 56 in  Weight Method: Bed Scale  Weight: 51.9 kg (114 lb 6.7 oz)  Weight (lb): 114.42 lb  Ideal Body Weight (IBW), Female: 80 lb  % Ideal Body Weight, Female (lb): 137.51 %  BMI " (Calculated): 25.7  BMI Grade: 18.5-24.9 - normal  Weight Loss: (none PTA)     Lab/Procedures/Meds    Pertinent Labs Reviewed: reviewed  Pertinent Labs Comments: Na 134, Cl 94, CO2 30  Pertinent Medications Reviewed: reviewed  Pertinent Medications Comments: Amlodipine, atrovastatin, furosemide, levothyroxine, metoprolol      Estimated/Assessed Needs    Weight Used For Calorie Calculations: 49.9 kg (110 lb 0.2 oz)  Energy Calorie Requirements (kcal): 1497 kcal (30 kcal/kg)  Energy Need Method: Kcal/kg  Protein Requirements: 60g (1.2 g/kg)  Weight Used For Protein Calculations: 49.9 kg (110 lb 0.2 oz)  Fluid Requirements (mL): 1 ml/kcal or per MD  Estimated Fluid Requirement Method: RDA Method  RDA Method (mL): 1497  CHO Requirement: 185 g daily      Nutrition Prescription Ordered    Current Diet Order: Low Na 2mg with a 1200mL fluid restricted diet    Boost Plus once a day  360 calories   14 grams protein  Evaluation of Received Nutrient/Fluid Intake    I/O: 780/1500  Energy Calories Required: not meeting needs  Protein Required: not meeting needs  Fluid Required: meeting needs  Comments: LBM 10/29  Tolerance: tolerating  % Intake of Estimated Energy Needs: 25 - 50 %  % Meal Intake: 25 - 50 %    Nutrition Risk    Level of Risk/Frequency of Follow-up: low - moderate(2x/week)     Assessment and Plan    Nutrition Problem  Inadequate oral intake     Related to (etiology):   Food choices and preferences, early satiety     Signs and Symptoms (as evidenced by):   Meal intake 25-50% x 8 days     Interventions (treatment strategy):  Decreased sodium diet  Fluid restricted diet  Commercial beverage- boost plus vanilla  Content related nutrition education  Collaboration with other providers     Nutrition Diagnosis Status:   Continues         Monitor and Evaluation    Food and Nutrient Intake: energy intake, food and beverage intake  Food and Nutrient Adminstration: diet order  Knowledge/Beliefs/Attitudes: food and nutrition  knowledge/skill  Physical Activity and Function: nutrition-related ADLs and IADLs  Anthropometric Measurements: weight, weight change  Biochemical Data, Medical Tests and Procedures: electrolyte and renal panel  Nutrition-Focused Physical Findings: overall appearance, skin     Malnutrition Assessment  Energy Intake: severe energy intake(10/18-10/26: meal intake 25-50%)    Teeth (Micronutrient): (missing some)       Weight Loss (Malnutrition): (10/26/20 49.9 kg)  Energy Intake (Malnutrition): less than 75% for greater than 7 days       Edema (Fluid Accumulation): 1-->trace(legs)     Nutrition Follow-Up    RD Follow-up?: Yes

## 2020-10-30 NOTE — NURSING
Report received from off going nurse, YINKA Galvan. Patient AAO. No signs of distress noted. Call light in reach. Bed low and locked. Will continue to monitor.

## 2020-10-30 NOTE — PLAN OF CARE
Recommendations    1. Continue low Na diet, add 1200 ml fluid restriction + boost plus once daily, encourage PO intake  2. Weigh pt weekly    Goals: 1. Consume >/=50% of meals and supplements by next RD visit.  Nutrition Goal Status: progressing towards goal  Communication of RD Recs: (plan of care)

## 2020-10-31 VITALS
HEART RATE: 63 BPM | BODY MASS INDEX: 26.92 KG/M2 | WEIGHT: 119.69 LBS | DIASTOLIC BLOOD PRESSURE: 67 MMHG | TEMPERATURE: 98 F | SYSTOLIC BLOOD PRESSURE: 149 MMHG | OXYGEN SATURATION: 93 % | HEIGHT: 56 IN | RESPIRATION RATE: 16 BRPM

## 2020-10-31 PROBLEM — J96.01 ACUTE RESPIRATORY FAILURE WITH HYPOXIA AND HYPERCARBIA: Status: RESOLVED | Noted: 2018-01-29 | Resolved: 2020-10-31

## 2020-10-31 PROBLEM — I50.33 ACUTE ON CHRONIC DIASTOLIC HEART FAILURE: Status: RESOLVED | Noted: 2018-04-12 | Resolved: 2020-10-31

## 2020-10-31 PROBLEM — J96.02 ACUTE RESPIRATORY FAILURE WITH HYPOXIA AND HYPERCARBIA: Status: RESOLVED | Noted: 2018-01-29 | Resolved: 2020-10-31

## 2020-10-31 PROBLEM — E87.1 HYPONATREMIA WITH DECREASED SERUM OSMOLALITY: Status: RESOLVED | Noted: 2018-01-23 | Resolved: 2020-10-31

## 2020-10-31 PROCEDURE — 25000003 PHARM REV CODE 250: Performed by: INTERNAL MEDICINE

## 2020-10-31 PROCEDURE — 27000221 HC OXYGEN, UP TO 24 HOURS

## 2020-10-31 PROCEDURE — 25000003 PHARM REV CODE 250: Performed by: HOSPITALIST

## 2020-10-31 PROCEDURE — 94761 N-INVAS EAR/PLS OXIMETRY MLT: CPT

## 2020-10-31 PROCEDURE — 99900035 HC TECH TIME PER 15 MIN (STAT)

## 2020-10-31 RX ORDER — TRAZODONE HYDROCHLORIDE 50 MG/1
50 TABLET ORAL NIGHTLY PRN
Qty: 30 TABLET | Refills: 0 | Status: ON HOLD | OUTPATIENT
Start: 2020-10-31 | End: 2021-04-28 | Stop reason: SDUPTHER

## 2020-10-31 RX ORDER — AMIODARONE HYDROCHLORIDE 200 MG/1
200 TABLET ORAL DAILY
Qty: 30 TABLET | Refills: 11 | Status: ON HOLD | OUTPATIENT
Start: 2020-10-31 | End: 2021-04-28 | Stop reason: HOSPADM

## 2020-10-31 RX ADMIN — LOSARTAN POTASSIUM 100 MG: 25 TABLET, FILM COATED ORAL at 10:10

## 2020-10-31 RX ADMIN — AMIODARONE HYDROCHLORIDE 200 MG: 200 TABLET ORAL at 10:10

## 2020-10-31 RX ADMIN — METOPROLOL TARTRATE 50 MG: 50 TABLET, FILM COATED ORAL at 10:10

## 2020-10-31 RX ADMIN — ACETAMINOPHEN 500 MG: 500 TABLET ORAL at 09:10

## 2020-10-31 RX ADMIN — FUROSEMIDE 20 MG: 20 TABLET ORAL at 10:10

## 2020-10-31 RX ADMIN — APIXABAN 2.5 MG: 2.5 TABLET, FILM COATED ORAL at 09:10

## 2020-10-31 RX ADMIN — AMLODIPINE BESYLATE 10 MG: 5 TABLET ORAL at 10:10

## 2020-10-31 RX ADMIN — LEVOTHYROXINE SODIUM 75 MCG: 75 TABLET ORAL at 10:10

## 2020-10-31 NOTE — PLAN OF CARE
WRITTEN HEALTHCARE DISCHARGE INFORMATION      Things that YOU are responsible for to Manage Your Care At Home:  1. Getting your prescriptions filled.  2. Taking you medications as directed. DO NOT MISS ANY DOSES!  3. Going to your follow-up doctor appointments. This is important because it allows the doctor to monitor your progress and to determine if any changes need to be made to your treatment plan.     If you are unable to make your follow up appointments, please call the number listed and reschedule this appointment.      After discharge, if you need assistance, you can call [facility] On Call Nurse Care Line for 24/7 assistance at [phone]     If you are experience any signs or symptoms, Call your doctor or Call 911 and come to your nearest Emergency Room.     Thank you for choosing [facility] and allowing us to care for you.        You should receive a call from [facility] within 48-72 hours to help manage your care after discharge. Please try to make sure that you answer your phone for this important phone call.     Follow-up Information     Paige Mcleod MD In 1 week.    Specialty: Internal Medicine  Why: Please call office on Monday to arrange hospital discharge follow up visit in 1-2 weeks  Contact information:  [address]  [facility] [address]  [phone]             Brandon Elias MD On 11/13/2020.    Specialties: Cardiology, INTERVENTIONAL CARDIOLOGY  Why: Hospital discharge follow up, appointment scheduled for 2:40  Contact information:  [address]  [address]  [facility] [address]  [phone]             [facility].    Specialties: DME Provider, Home Health Services  Why: Home Health to start   Contact information:  [address]  [address]  [facility] [address]  [phone]

## 2020-10-31 NOTE — PLAN OF CARE
Problem: Cardiac Output Decreased (Heart Failure)  Goal: Optimal Cardiac Output  Intervention: Optimize Cardiac Output  Flowsheets (Taken 10/30/2020 1900)  Environmental Support: calm environment promoted     Problem: Arrhythmia/Dysrhythmia  Goal: Normalized Cardiac Rhythm  Intervention: Monitor and Manage Cardiac Rhythm Effects  Flowsheets (Taken 10/30/2020 1900)  VTE Prevention/Management:   ambulation promoted   ROM (active) performed

## 2020-10-31 NOTE — NURSING
Patient awake, alert, oriented resting comfortably. No signs of distress observed. bed low and locked. Call light in reach. Report given to oncoming nurse, YINKA Galvan. 12hour chart check complete.

## 2020-10-31 NOTE — PLAN OF CARE
[facility]    HOME HEALTH ORDERS  FACE TO FACE ENCOUNTER    Patient Name: Jeannie Hutchins  YOB: 1935    PCP: Paige Mcleod MD   PCP Address: [address] [place] [address]  PCP Phone Number: [phone]  PCP Fax: [phone]       Encounter Date: 10/31/2020    Admit to Home Health    Diagnoses:  Active Hospital Problems    Diagnosis  POA    Palliative care encounter [Z51.5]  Not Applicable    Mixed hyperlipidemia [E78.2]  Yes    Paroxysmal atrial fibrillation [I48.0]  Yes    Essential hypertension [I10]  Yes      Resolved Hospital Problems    Diagnosis Date Resolved POA    *Acute on chronic diastolic heart failure [I50.33] 10/31/2020 Yes     Priority: 1 - High    Congestive heart failure [I50.9] 10/27/2020 Yes    Congestive heart failure [I50.9] 10/18/2020 Yes    ILD (interstitial lung disease) [J84.9] 10/23/2020 Yes    Acute respiratory failure with hypoxia and hypercarbia [J96.01, J96.02] 10/31/2020 Yes    Generalized anxiety disorder [F41.1] 10/23/2020 Yes     Dr. Mckeon's eval 1/23/18:  She does appear to have a JERRI because of the persistent worries that she has.  These worries predominate her day.  She would probably do well with prevention therapy such as SSRI/SNRI to help control the physical symptoms of the anxiety.  Problem at this point in time is her electrolyte abnormalities.  There is a slight risk of hyponatremia with these medications and given her current state, is not recommended to start these.   After a few weeks of stabilized labs, would recommend to start low dose SSRI treatment.  She may do well with paroxetine.  Start at 10mg daily and increase slowly over time to 20-40mg, whatever dose meets efficacy.  Again, therapy will help her with her anxiety.  Agree with patient that she should seek individual counseling with a psychologist.      Hyponatremia with decreased serum osmolality [E87.1] 10/31/2020 Yes      Differential:  CHF - bnp is 400+;  cxr shows normal heart size, no pulm fluid - will check echo  Cirrhosis - AST at upper normal, otherwise LFTs in normal range; check liver US  Adrenal insuff - would expect hyperkalemia and hypotension - not present;  Check am cortisol  SIADH - ?  Source?  HCTZ - not taking  Renal failure - no  Polydipsia - no         No future appointments.  Follow-up Information     Paige Mcleod MD In 1 week.    Specialty: Internal Medicine  Contact information:  [address]  [facility] [address] [phone]             Brandon Elias MD In 2 weeks.    Specialties: Cardiology, INTERVENTIONAL CARDIOLOGY  Contact information:  [address]  [address]  [facility] [id number] [phone]                     I have seen and examined this patient face to face today. My clinical findings that support the need for the home health skilled services and home bound status are the following:  Weakness/numbness causing balance and gait disturbance due to Heart Failure making it taxing to leave home.    Allergies:  Review of patient's allergies indicates:   Allergen Reactions    Hydrochlorothiazide Other (See Comments)     hyponatremia    Strawberry Swelling     Tongue swells up and a generalized rash.    Lisinopril Other (See Comments)     Cough       Diet: cardiac diet    Activities: activity as tolerated    Nursing:   SN to complete comprehensive assessment including routine vital signs. Instruct on disease process and s/s of complications to report to MD. Review/verify medication list sent home with the patient at time of discharge  and instruct patient/caregiver as needed. Frequency may be adjusted depending on start of care date.    Notify MD if SBP > 160 or < 90; DBP > 90 or < 50; HR > 120 or < 50; Temp > 101      CONSULTS:    Physical Therapy to evaluate and treat. Evaluate for home safety and equipment needs; Establish/upgrade home exercise program. Perform / instruct on  therapeutic exercises, gait training, transfer training, and Range of Motion.  Occupational Therapy to evaluate and treat. Evaluate home environment for safety and equipment needs. Perform/Instruct on transfers, ADL training, ROM, and therapeutic exercises.    MISCELLANEOUS CARE:  Home Oxygen:  Oxygen at 3 L/min nasal canula to be used:  on exertion and as needed for SOB.    Medications: Review discharge medications with patient and family and provide education.      Current Discharge Medication List      START taking these medications    Details   amiodarone (PACERONE) 200 MG Tab Take 1 tablet (200 mg total) by mouth once daily.  Qty: 30 tablet, Refills: 11      traZODone (DESYREL) 50 MG tablet Take 1 tablet (50 mg total) by mouth nightly as needed for Insomnia.  Qty: 30 tablet, Refills: 0         CONTINUE these medications which have NOT CHANGED    Details   acetaminophen (TYLENOL) 325 MG tablet Take 325 mg by mouth 2 (two) times daily.      alendronate (FOSAMAX) 70 MG tablet Take 70 mg by mouth every 7 days.      amlodipine-atorvastatin (CADUET) 10-40 mg per tablet TAKE ONE TABLET BY MOUTH EVERY DAY  Qty: 30 tablet, Refills: 11    Comments: PT NEEDS REFILLS ON AMLODIPINE-ATORVASTATIN 10/40MG.  THANKS.      apixaban (ELIQUIS) 2.5 mg Tab Take 1 tablet (2.5 mg total) by mouth 2 (two) times daily.  Qty: 180 tablet, Refills: 3      diclofenac sodium (SOLARAZE) 3 % gel Apply topically 2 (two) times daily. 1%      furosemide (LASIX) 20 MG tablet       levothyroxine (SYNTHROID) 75 MCG tablet Take 75 mcg by mouth once daily.      losartan (COZAAR) 100 MG tablet TAKE ONE TABLET BY MOUTH EVERY DAY  Qty: 90 tablet, Refills: 3      metoprolol tartrate (LOPRESSOR) 50 MG tablet Take 1 tablet (50 mg total) by mouth 2 (two) times daily.  Qty: 60 tablet, Refills: 11      ranitidine (ZANTAC) 150 MG tablet Take 150 mg by mouth with lunch.      VIT A,C & E/LUTEIN/MINERALS (VISION FORMULA, WITH LUTEIN, ORAL) Take 1 tablet by mouth  once daily.             I certify that this patient is confined to her home and needs intermittent skilled nursing care, physical therapy and occupational therapy.

## 2020-10-31 NOTE — PLAN OF CARE
EDUCATION:  Patient discharge instructions updated to include educational information on Hypertension and oxygen use in the home that will be printed on patient's AVS to review upon discharge; Information reviewed via phone with daughterShruthi and include  signs and symptoms to look for and call the doctor if experiencing, and symptoms that may indicate a medical emergency: CALL [phone].    All questions answered.  Teach back method used.        Reviewed with patient/daughter things that they can do to better manage his care at home to include taking all medications as precscribed and make sure patient attend all follow up visits;     F/U appointments with PCP/Cardiology were reviewed;  verbalized understanding     NurseErnestina notified that all discharge planning needs have been addressed and patient can be discharged from  standpoint          10/31/20 1115   Final Note   Assessment Type Final Discharge Note   Anticipated Discharge Disposition Home-Health   What phone number can be called within the next 1-3 days to see how you are doing after discharge? [phone]   Hospital Follow Up  Appt(s) scheduled? Yes   Discharge plans and expectations educations in teach back method with documentation complete? Yes   Right Care Referral Info   Post Acute Recommendation Home-care   Referral Type Home Health   Facility Name [facility]   Post-Acute Status   Post-Acute Authorization Home Health;HME   HME Status Pending Delivery Hospital   Home Health Status Set-up Complete   Discharge Delays None known at this time

## 2020-10-31 NOTE — PLAN OF CARE
Problem: Adult Inpatient Plan of Care  Goal: Plan of Care Review  Flowsheets (Taken 10/31/2020 1321)  Plan of Care Reviewed With: patient     Problem: Fall Injury Risk  Goal: Absence of Fall and Fall-Related Injury  Intervention: Identify and Manage Contributors to Fall Injury Risk  Flowsheets (Taken 10/31/2020 1321)  Self-Care Promotion:   independence encouraged   BADL personal objects within reach   safe use of adaptive equipment encouraged   meal setup provided   BADL personal routines maintained  Medication Review/Management: medications reviewed  Intervention: Promote Injury-Free Environment  Flowsheets (Taken 10/31/2020 1321)  Safety Promotion/Fall Prevention:   assistive device/personal item within reach   side rails raised x 2   instructed to call staff for mobility  Environmental Safety Modification:   assistive device/personal items within reach   clutter free environment maintained   room near unit station   room organization consistent     Problem: Skin Injury Risk Increased  Goal: Skin Health and Integrity  Intervention: Optimize Skin Protection  Flowsheets (Taken 10/31/2020 1321)  Pressure Reduction Techniques:   frequent weight shift encouraged   weight shift assistance provided  Pressure Reduction Devices:   elbow protectors utilized   pressure-redistributing mattress utilized  Skin Protection:   skin sealant/moisture barrier applied   tubing/devices free from skin contact  Head of Bed (HOB): HOB elevated

## 2020-10-31 NOTE — PLAN OF CARE
Pt to be discharged home with oxygen; [place] contacted [facility] @[phone]; was told that testing to qualify for home oxygen has  and patient will need to be retested; [place] contacted nurse Ernestina for updated data       10/31/20 1318   Post-Acute Status   Post-Acute Authorization HME   HME Status Pending Qualifying Diagnosis  (home O2)   Home Health Status Set-up Complete  (orders sent to Alfred BARNEY via ; plan to admit on Monday)   Discharge Delays None known at this time   Discharge Plan   Discharge Plan A Home Health     1114: oxygen was approved by Lyn Carrington with [facility]; [place] contacted RT to deliver to room

## 2020-10-31 NOTE — DISCHARGE INSTRUCTIONS
WRITTEN HEALTHCARE DISCHARGE INFORMATION      Things that YOU are responsible for to Manage Your Care At Home:  1. Getting your prescriptions filled.  2. Taking you medications as directed. DO NOT MISS ANY DOSES!  3. Going to your follow-up doctor appointments. This is important because it allows the doctor to monitor your progress and to determine if any changes need to be made to your treatment plan.     If you are unable to make your follow up appointments, please call the number listed and reschedule this appointment.      After discharge, if you need assistance, you can call [facility] On Call Nurse Care Line for 24/7 assistance at [phone]     If you are experience any signs or symptoms, Call your doctor or Call [phone] and come to your nearest Emergency Room.     Thank you for choosing [facility] and allowing us to care for you.        You should receive a call from [facility] within 48-72 hours to help manage your care after discharge. Please try to make sure that you answer your phone for this important phone call.
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
## 2020-11-01 ENCOUNTER — NURSE TRIAGE (OUTPATIENT)
Dept: ADMINISTRATIVE | Facility: CLINIC | Age: 85
End: 2020-11-01

## 2020-11-04 PROCEDURE — G0180 PR HOME HEALTH MD CERTIFICATION: ICD-10-PCS | Mod: ,,, | Performed by: HOSPITALIST

## 2020-11-04 PROCEDURE — G0180 MD CERTIFICATION HHA PATIENT: HCPCS | Mod: ,,, | Performed by: HOSPITALIST

## 2020-11-12 ENCOUNTER — EXTERNAL HOME HEALTH (OUTPATIENT)
Dept: HOME HEALTH SERVICES | Facility: HOSPITAL | Age: 85
End: 2020-11-12
Payer: MEDICARE

## 2020-11-13 ENCOUNTER — OFFICE VISIT (OUTPATIENT)
Dept: CARDIOLOGY | Facility: CLINIC | Age: 85
End: 2020-11-13
Payer: MEDICARE

## 2020-11-13 VITALS
BODY MASS INDEX: 26.92 KG/M2 | OXYGEN SATURATION: 90 % | DIASTOLIC BLOOD PRESSURE: 60 MMHG | SYSTOLIC BLOOD PRESSURE: 126 MMHG | HEART RATE: 55 BPM | HEIGHT: 56 IN | WEIGHT: 119.69 LBS

## 2020-11-13 DIAGNOSIS — N13.30 HYDRONEPHROSIS OF RIGHT KIDNEY: ICD-10-CM

## 2020-11-13 DIAGNOSIS — I10 ESSENTIAL HYPERTENSION: ICD-10-CM

## 2020-11-13 DIAGNOSIS — I48.0 PAROXYSMAL ATRIAL FIBRILLATION: ICD-10-CM

## 2020-11-13 DIAGNOSIS — Z98.890 S/P CAROTID ENDARTERECTOMY: ICD-10-CM

## 2020-11-13 DIAGNOSIS — E83.39 HYPOPHOSPHATEMIA: ICD-10-CM

## 2020-11-13 DIAGNOSIS — R91.8 ABNORMAL CT LUNG SCREENING: ICD-10-CM

## 2020-11-13 DIAGNOSIS — K76.0 FATTY LIVER: ICD-10-CM

## 2020-11-13 DIAGNOSIS — E78.2 MIXED HYPERLIPIDEMIA: ICD-10-CM

## 2020-11-13 DIAGNOSIS — I65.21 CAROTID STENOSIS, ASYMPTOMATIC, RIGHT: Primary | ICD-10-CM

## 2020-11-13 DIAGNOSIS — I27.20 PULMONARY HYPERTENSION: ICD-10-CM

## 2020-11-13 PROCEDURE — 99215 OFFICE O/P EST HI 40 MIN: CPT | Mod: S$GLB,,, | Performed by: INTERNAL MEDICINE

## 2020-11-13 PROCEDURE — 99999 PR PBB SHADOW E&M-EST. PATIENT-LVL IV: ICD-10-PCS | Mod: PBBFAC,,, | Performed by: INTERNAL MEDICINE

## 2020-11-13 PROCEDURE — 99999 PR PBB SHADOW E&M-EST. PATIENT-LVL IV: CPT | Mod: PBBFAC,,, | Performed by: INTERNAL MEDICINE

## 2020-11-13 PROCEDURE — 99215 PR OFFICE/OUTPT VISIT, EST, LEVL V, 40-54 MIN: ICD-10-PCS | Mod: S$GLB,,, | Performed by: INTERNAL MEDICINE

## 2020-11-13 NOTE — PROGRESS NOTES
CARDIOVASCULAR CONSULTATION    REASON FOR CONSULT:   Jeannie Hutchins is a 85 y.o. female who presents for paroxysmal AFib      HISTORY OF PRESENT ILLNESS:     Patient is a pleasant 85-year-old lady.  Normally follows with Dr. Armenta.  Recently admitted with decompensated heart failure and was found to be in AFib RVR.  Cardioversion performed.  On amiodarone.  Has been feeling great.  Denies any chest pains at rest on exertion, orthopnea, PND, swelling of feet.  Continues to be normal sinus rhythm.      Hospital consultation:  Patient is a pleasant 85-year-old lady.  Yesterday got short of breath and swelling of feet.  Diagnosed with decompensated heart failure.  Precipitated by AFib with RVR.  Put on amiodarone drip.  Has continued to be in AFib.  Tele monitoring and EKG personally reviewed which demonstrated AFib.  AFib on tele monitoring rate controlled around 90 beats per minute.  Patient still feeling short of breath and requiring a face mask.  Desaturates without BiPAP.  Normally follows with Dr. Armenta.  States has been compliant with her Eliquis.  Does not miss even a single dose.    hpi  84 y/o female presents to the ER via EMS transport with shortness of breath.  Patient states she is chronically short of breath, but symptoms worsened this morning.  EMS reported patient's SpO2 was 88% on room air, improved to 96% after given 1 SL nitro tab and nitro paste en route.  Patient also reports non productive cough and 10 lb weight gain in the last 2 weeks.  Also complains of edema on bilateral lower extremities that started 3 days ago.  Patient also reports post nasal drip.  States compliance with medications. Hx of PAF and noted to be in rapid AFib on presentation.  Placed on Amiodarone gtt and admitted to ICU.  Patient was also given Lasix in ER with improvement of symptoms.  No other complaints.    L MPI 7/10/19    The perfusion scan is free of evidence from myocardial ischemia or injury.    Resting wall  motion is physiologic.Post stress wall motion is physiologic.    Gated perfusion images showed an ejection fraction of 81.0 %    There is  normal wall motion at rest normal wall motion post stress.    The EKG portion of this study is negative for ischemia.    There were no arrhythmias during stress.    The patient reported no chest pain during the stress test.     Echo 7/10/19  · Mild concentric left ventricular hypertrophy.  · Normal left ventricular systolic function. The estimated ejection fraction is 65%  · Grade II (moderate) left ventricular diastolic dysfunction consistent with pseudonormalization.  · Elevated left atrial pressure.  · Normal right ventricular systolic function.  · Moderate left atrial enlargement.  · Mild aortic regurgitation.  · Mild to moderate tricuspid regurgitation.  · Intermediate central venous pressure (8 mm Hg).  · The estimated PA systolic pressure is 52 mm Hg  · Pulmonary hypertension present.     LE art UIS/STANLEY 7/10/19  · Right STANLEY 0.86. Doppler shows mild < 50% mid SFA disease  · Left STANLEY 0.86. Doppler shows mild < 50% mid SFA disease     Carotid US 3/20/19 (similar to report 4/2018)  1. Right carotid ultrasound shows 60-69% internal carotid artery stenosis.  Antegrade vertebral artery flow.  2. Left carotid ultrasound shows  0-19% internal carotid artery stenosis.  Antegrade vertebral artery flow.      PAST MEDICAL HISTORY:     Past Medical History:   Diagnosis Date    Anticoagulant long-term use     Eliquis    Arthritis     Atrial fibrillation     Blood transfusion     Carotid stenosis     CHF (congestive heart failure)     Edema     Generalized anxiety disorder 1/23/2018    Dr. Mckeon's eval 1/23/18: She does appear to have a JERRI because of the persistent worries that she has.  These worries predominate her day.  She would probably do well with prevention therapy such as SSRI/SNRI to help control the physical symptoms of the anxiety.  Problem at this point in  time is her electrolyte abnormalities.  There is a slight risk of hyponatremia with these medications and    Hearing loss     Samish (hard of hearing)     Hypercholesterolemia     Hypertension     Psychiatric problem     Thyroid disease        PAST SURGICAL HISTORY:     Past Surgical History:   Procedure Laterality Date    CARDIOVERSION  10/19/2020    Procedure: Cardioversion;  Surgeon: Brandon Elias MD;  Location: [facility] CATH LAB;  Service: Cardiology;;    CARPAL TUNNEL RELEASE  2012    right hand    ESOPHAGOGASTRODUODENOSCOPY Left 8/29/2018    Procedure: EGD (ESOPHAGOGASTRODUODENOSCOPY);  Surgeon: Oniel Dorsey MD;  Location: [facility] ENDO;  Service: Endoscopy;  Laterality: Left;    HYSTERECTOMY      left carotid endartectomy  5/2006    TONSILLECTOMY      TREATMENT OF CARDIAC ARRHYTHMIA N/A 10/19/2020    Procedure: Transesophageal echo (BLADIMIR) intra-procedure log documentation;  Surgeon: Brandon Elias MD;  Location: [facility] CATH LAB;  Service: Cardiology;  Laterality: N/A;       ALLERGIES AND MEDICATION:     Review of patient's allergies indicates:   Allergen Reactions    Hydrochlorothiazide Other (See Comments)     hyponatremia    Strawberry Swelling     Tongue swells up and a generalized rash.    Lisinopril Other (See Comments)     Cough        Medication List          Accurate as of November 13, 2020  3:01 PM. If you have any questions, ask your nurse or doctor.            CONTINUE taking these medications    acetaminophen 325 MG tablet  Commonly known as: TYLENOL     alendronate 70 MG tablet  Commonly known as: FOSAMAX     amiodarone 200 MG Tab  Commonly known as: PACERONE  Take 1 tablet (200 mg total) by mouth once daily.     amlodipine-atorvastatin 10-40 mg per tablet  Commonly known as: CADUET  TAKE ONE TABLET BY MOUTH EVERY DAY     apixaban 2.5 mg Tab  Commonly known as: ELIQUIS  Take 1 tablet (2.5 mg total) by mouth 2 (two) times daily.     diclofenac sodium 3 % gel  Commonly known as: SOLARAZE      furosemide 20 MG tablet  Commonly known as: LASIX     levothyroxine 75 MCG tablet  Commonly known as: SYNTHROID     losartan 100 MG tablet  Commonly known as: COZAAR  TAKE ONE TABLET BY MOUTH EVERY DAY     metoprolol tartrate 50 MG tablet  Commonly known as: LOPRESSOR  Take 1 tablet (50 mg total) by mouth 2 (two) times daily.     ranitidine 150 MG tablet  Commonly known as: ZANTAC     traZODone 50 MG tablet  Commonly known as: DESYREL  Take 1 tablet (50 mg total) by mouth nightly as needed for Insomnia.     VISION FORMULA (WITH LUTEIN) ORAL            SOCIAL HISTORY:     Social History     Socioeconomic History    Marital status:      Spouse name: Not on file    Number of children: Not on file    Years of education: Not on file    Highest education level: Not on file   Occupational History    Not on file   Social Needs    Financial resource strain: Not on file    Food insecurity     Worry: Not on file     Inability: Not on file    Transportation needs     Medical: Not on file     Non-medical: Not on file   Tobacco Use    Smoking status: Never Smoker    Smokeless tobacco: Never Used   Substance and Sexual Activity    Alcohol use: No    Drug use: No    Sexual activity: Never     Partners: Male   Lifestyle    Physical activity     Days per week: Not on file     Minutes per session: Not on file    Stress: Not on file   Relationships    Social connections     Talks on phone: Not on file     Gets together: Not on file     Attends [employer] service: Not on file     Active member of club or organization: Not on file     Attends meetings of clubs or organizations: Not on file     Relationship status: Not on file   Other Topics Concern    Patient feels they ought to cut down on drinking/drug use Not Asked    Patient annoyed by others criticizing their drinking/drug use Not Asked    Patient has felt bad or guilty about drinking/drug use Not Asked    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  He is one year older.  Worked for [employer]    She is retired.  Worked for [employer].       FAMILY HISTORY:     Family History   Problem Relation Age of Onset    Heart disease Father     Cancer Brother 65        bladder    Cancer Sister 81        Ovarian    Ovarian cancer Sister 81    Breast cancer Daughter 50        Status post mastectomy    Cancer Sister         Lung.  Smoker    Cancer Sister         Lung.  Smoker    Schizophrenia Son        REVIEW OF SYSTEMS:   Review of Systems   Constitution: Negative.   HENT: Negative.    Eyes: Negative.    Cardiovascular: Negative.    Respiratory: Negative.    Endocrine: Negative.    Hematologic/Lymphatic: Negative.    Skin: Negative.    Musculoskeletal: Negative.    Gastrointestinal: Negative.    Genitourinary: Negative.    Neurological: Negative.    Psychiatric/Behavioral: Negative.    Allergic/Immunologic: Negative.        A 10 point review of systems was performed and all the pertinent positives have been mentioned. Rest of review of systems was negative.        PHYSICAL EXAM:     Vitals:    11/13/20 1448   BP: 126/60   Pulse: (!) 55    Body mass index is 26.84 kg/m².  Weight: 54.3 kg (119 lb 11.4 oz)   Height: 4' 8" (142.2 cm)     Physical Exam   Constitutional: She is oriented to person, place, and time. She appears well-developed and well-nourished.   HENT:   Head: Normocephalic.   Eyes: Pupils are equal, round, and reactive to light.   Neck: Normal range of motion. Neck supple.   Cardiovascular: Normal rate and regular rhythm.   Pulmonary/Chest: Effort normal and breath sounds normal.   Abdominal: Soft. Normal appearance and bowel sounds are normal. There is no abdominal tenderness.   Musculoskeletal: Normal range of motion.   Neurological: She is alert and oriented to person, place, and time.   Skin: Skin is warm.   Psychiatric: She has a " normal mood and affect.         DATA:     Laboratory:  CBC:  Recent Labs   Lab 08/28/18  0751 08/29/18  0442 10/18/20  0855   WBC 12.74 H 11.15 9.63   Hemoglobin 12.7 13.2 11.5 L   Hematocrit 37.6 37.5 33.3 L   Platelets 343 308 290       CHEMISTRIES:  Recent Labs   Lab 04/15/18  0529  10/18/20  1454  10/20/20  0419  10/27/20  0558 10/28/20  0541 10/30/20  0422   Glucose 89   < >  --    < > 113 H   < > 100 93 93   Sodium 132 L   < >  --    < > 134 L   < > 129 L 132 L 134 L   Potassium 4.6   < >  --    < > 2.9 L   < > 3.9 4.2 4.4   BUN 6 L   < >  --    < > 6 L   < > 16 15 17   Creatinine 0.6   < >  --    < > 0.7   < > 0.6 0.7 0.7   eGFR if  >60   < >  --    < > >60   < > >60 >60 >60   eGFR if non  >60   < >  --    < > >60   < > >60 >60 >60   Calcium 9.3   < >  --    < > 8.5 L   < > 8.7 9.0 8.8   Magnesium 1.9  --  1.8  --  1.8  --   --   --   --     < > = values in this interval not displayed.       CARDIAC BIOMARKERS:  Recent Labs   Lab 10/18/20  0935 10/18/20  1501 10/18/20  2056   Troponin I <0.006 <0.006 <0.006       COAGS:  Recent Labs   Lab 08/27/18  1816 08/29/18  0442 10/18/20  0855   INR 1.0 1.0 1.1       LIPIDS/LFTS:  Recent Labs   Lab 06/20/18  0957 08/27/18  1816 10/18/20  0935   Cholesterol 153  --   --    Triglycerides 67  --   --    HDL 55  --   --    LDL Cholesterol 84.6  --   --    Non-HDL Cholesterol 98  --   --    AST 15 23 21   ALT 13 16 55 H       Hemoglobin A1C   Date Value Ref Range Status   10/18/2020 5.6 4.0 - 5.6 % Final     Comment:     ADA Screening Guidelines:  5.7-6.4%  Consistent with prediabetes  >or=6.5%  Consistent with diabetes  High levels of fetal hemoglobin interfere with the HbA1C  assay. Heterozygous hemoglobin variants (HbS, HgC, etc)do  not significantly interfere with this assay.   However, presence of multiple variants may affect accuracy.         TSH  Recent Labs   Lab 01/22/18  1447 01/29/18  1204 04/12/18  0930   TSH 1.191 1.559 4.032 H        The ASCVD Risk score ([place] DC Jr., et al., 2013) failed to calculate for the following reasons:    The 2013 ASCVD risk score is only valid for ages 40 to 79             ASSESSMENT AND PLAN     Patient Active Problem List   Diagnosis    S/P carotid endarterectomy    Non-intractable vomiting with nausea    Essential hypertension    Hypophosphatemia    Paroxysmal atrial fibrillation    Pulmonary hypertension    Mixed hyperlipidemia    Hydronephrosis of right kidney    Abnormal CT lung screening    Fatty liver    Carotid stenosis, asymptomatic, right    Palliative care encounter       Paroxysmal AFib:  Continue Eliquis.  On amiodarone at the current time.  Risks benefits of long-term amiodarone use were discussed with the patient and her family.  We will make a follow-up appointment with her regular cardiologist Dr. Armenta, and at that time they can determine whether she wants to continue long-term amiodarone discontinue it.  Currently normal sinus rhythm.      Follow-up in 3 months with Dr. Armenta.          Thank you very much for involving me in the care of your patient.  Please do not hesitate to contact me if there are any questions.      Brandon Elias MD, FACC, [facility]  Interventional Cardiologist, [facility].           This note was dictated with the help of speech recognition software.  There might be un-intended errors and/or substitutions.

## 2021-01-16 ENCOUNTER — HOSPITAL ENCOUNTER (INPATIENT)
Facility: HOSPITAL | Age: 86
LOS: 8 days | Discharge: HOME HEALTH | DRG: 291 | End: 2021-01-24
Attending: STUDENT IN AN ORGANIZED HEALTH CARE EDUCATION/TRAINING PROGRAM | Admitting: INTERNAL MEDICINE
Payer: MEDICARE

## 2021-01-16 DIAGNOSIS — R09.02 HYPOXIA: Primary | ICD-10-CM

## 2021-01-16 DIAGNOSIS — J96.21 ACUTE ON CHRONIC RESPIRATORY FAILURE WITH HYPOXIA: ICD-10-CM

## 2021-01-16 DIAGNOSIS — J96.01 ACUTE HYPOXEMIC RESPIRATORY FAILURE: ICD-10-CM

## 2021-01-16 DIAGNOSIS — R07.9 CHEST PAIN: ICD-10-CM

## 2021-01-16 DIAGNOSIS — I24.9 ACS (ACUTE CORONARY SYNDROME): ICD-10-CM

## 2021-01-16 DIAGNOSIS — I50.33 ACUTE ON CHRONIC DIASTOLIC CONGESTIVE HEART FAILURE: ICD-10-CM

## 2021-01-16 DIAGNOSIS — Z51.5 PALLIATIVE CARE ENCOUNTER: ICD-10-CM

## 2021-01-16 DIAGNOSIS — I50.9 ACUTE EXACERBATION OF CHF (CONGESTIVE HEART FAILURE): ICD-10-CM

## 2021-01-16 DIAGNOSIS — Z20.822 SUSPECTED COVID-19 VIRUS INFECTION: ICD-10-CM

## 2021-01-16 DIAGNOSIS — J18.9 PNEUMONIA OF BOTH LUNGS DUE TO INFECTIOUS ORGANISM, UNSPECIFIED PART OF LUNG: ICD-10-CM

## 2021-01-16 DIAGNOSIS — I50.9 ACUTE ON CHRONIC CONGESTIVE HEART FAILURE, UNSPECIFIED HEART FAILURE TYPE: ICD-10-CM

## 2021-01-16 DIAGNOSIS — R06.02 SOB (SHORTNESS OF BREATH): ICD-10-CM

## 2021-01-16 LAB
ALBUMIN SERPL BCP-MCNC: 4 G/DL (ref 3.5–5.2)
ALP SERPL-CCNC: 82 U/L (ref 55–135)
ALT SERPL W/O P-5'-P-CCNC: 32 U/L (ref 10–44)
ANION GAP SERPL CALC-SCNC: 12 MMOL/L (ref 8–16)
AST SERPL-CCNC: 28 U/L (ref 10–40)
BASOPHILS # BLD AUTO: 0.06 K/UL (ref 0–0.2)
BASOPHILS NFR BLD: 0.5 % (ref 0–1.9)
BILIRUB SERPL-MCNC: 1 MG/DL (ref 0.1–1)
BNP SERPL-MCNC: 995 PG/ML (ref 0–99)
BUN SERPL-MCNC: 19 MG/DL (ref 8–23)
CALCIUM SERPL-MCNC: 8.8 MG/DL (ref 8.7–10.5)
CHLORIDE SERPL-SCNC: 97 MMOL/L (ref 95–110)
CK SERPL-CCNC: 143 U/L (ref 20–180)
CO2 SERPL-SCNC: 23 MMOL/L (ref 23–29)
CREAT SERPL-MCNC: 0.8 MG/DL (ref 0.5–1.4)
CRP SERPL-MCNC: 32.7 MG/L (ref 0–8.2)
CTP QC/QA: YES
CTP QC/QA: YES
DIFFERENTIAL METHOD: ABNORMAL
EOSINOPHIL # BLD AUTO: 0.2 K/UL (ref 0–0.5)
EOSINOPHIL NFR BLD: 1.3 % (ref 0–8)
ERYTHROCYTE [DISTWIDTH] IN BLOOD BY AUTOMATED COUNT: 15.5 % (ref 11.5–14.5)
EST. GFR  (AFRICAN AMERICAN): >60 ML/MIN/1.73 M^2
EST. GFR  (NON AFRICAN AMERICAN): >60 ML/MIN/1.73 M^2
FERRITIN SERPL-MCNC: 86 NG/ML (ref 20–300)
GLUCOSE SERPL-MCNC: 114 MG/DL (ref 70–110)
HCT VFR BLD AUTO: 34.8 % (ref 37–48.5)
HGB BLD-MCNC: 11.5 G/DL (ref 12–16)
IMM GRANULOCYTES # BLD AUTO: 0.06 K/UL (ref 0–0.04)
IMM GRANULOCYTES NFR BLD AUTO: 0.5 % (ref 0–0.5)
LACTATE SERPL-SCNC: 1 MMOL/L (ref 0.5–2.2)
LDH SERPL L TO P-CCNC: 317 U/L (ref 110–260)
LYMPHOCYTES # BLD AUTO: 0.9 K/UL (ref 1–4.8)
LYMPHOCYTES NFR BLD: 7.3 % (ref 18–48)
MCH RBC QN AUTO: 31.8 PG (ref 27–31)
MCHC RBC AUTO-ENTMCNC: 33 G/DL (ref 32–36)
MCV RBC AUTO: 96 FL (ref 82–98)
MONOCYTES # BLD AUTO: 1.3 K/UL (ref 0.3–1)
MONOCYTES NFR BLD: 10.5 % (ref 4–15)
NEUTROPHILS # BLD AUTO: 9.6 K/UL (ref 1.8–7.7)
NEUTROPHILS NFR BLD: 79.9 % (ref 38–73)
NRBC BLD-RTO: 0 /100 WBC
PLATELET # BLD AUTO: 259 K/UL (ref 150–350)
PMV BLD AUTO: 9.6 FL (ref 9.2–12.9)
POC MOLECULAR INFLUENZA A AGN: NEGATIVE
POC MOLECULAR INFLUENZA B AGN: NEGATIVE
POTASSIUM SERPL-SCNC: 4.6 MMOL/L (ref 3.5–5.1)
PROCALCITONIN SERPL IA-MCNC: 0.03 NG/ML
PROT SERPL-MCNC: 7.1 G/DL (ref 6–8.4)
RBC # BLD AUTO: 3.62 M/UL (ref 4–5.4)
SARS-COV-2 RDRP RESP QL NAA+PROBE: NEGATIVE
SODIUM SERPL-SCNC: 132 MMOL/L (ref 136–145)
TROPONIN I SERPL DL<=0.01 NG/ML-MCNC: 0.01 NG/ML (ref 0–0.03)
TROPONIN I SERPL DL<=0.01 NG/ML-MCNC: <0.006 NG/ML (ref 0–0.03)
TROPONIN I SERPL DL<=0.01 NG/ML-MCNC: <0.006 NG/ML (ref 0–0.03)
WBC # BLD AUTO: 11.99 K/UL (ref 3.9–12.7)

## 2021-01-16 PROCEDURE — 83615 LACTATE (LD) (LDH) ENZYME: CPT

## 2021-01-16 PROCEDURE — 85025 COMPLETE CBC W/AUTO DIFF WBC: CPT

## 2021-01-16 PROCEDURE — 93010 EKG 12-LEAD: ICD-10-PCS | Mod: ,,, | Performed by: INTERNAL MEDICINE

## 2021-01-16 PROCEDURE — 83880 ASSAY OF NATRIURETIC PEPTIDE: CPT

## 2021-01-16 PROCEDURE — 87040 BLOOD CULTURE FOR BACTERIA: CPT | Mod: 59

## 2021-01-16 PROCEDURE — 99291 CRITICAL CARE FIRST HOUR: CPT | Mod: 25

## 2021-01-16 PROCEDURE — 84484 ASSAY OF TROPONIN QUANT: CPT

## 2021-01-16 PROCEDURE — 12000002 HC ACUTE/MED SURGE SEMI-PRIVATE ROOM

## 2021-01-16 PROCEDURE — 93010 ELECTROCARDIOGRAM REPORT: CPT | Mod: ,,, | Performed by: INTERNAL MEDICINE

## 2021-01-16 PROCEDURE — 84484 ASSAY OF TROPONIN QUANT: CPT | Mod: 91

## 2021-01-16 PROCEDURE — 84145 PROCALCITONIN (PCT): CPT

## 2021-01-16 PROCEDURE — 96365 THER/PROPH/DIAG IV INF INIT: CPT

## 2021-01-16 PROCEDURE — 82550 ASSAY OF CK (CPK): CPT

## 2021-01-16 PROCEDURE — 96375 TX/PRO/DX INJ NEW DRUG ADDON: CPT

## 2021-01-16 PROCEDURE — 87502 INFLUENZA DNA AMP PROBE: CPT

## 2021-01-16 PROCEDURE — 86140 C-REACTIVE PROTEIN: CPT

## 2021-01-16 PROCEDURE — 82728 ASSAY OF FERRITIN: CPT

## 2021-01-16 PROCEDURE — 25000003 PHARM REV CODE 250: Performed by: STUDENT IN AN ORGANIZED HEALTH CARE EDUCATION/TRAINING PROGRAM

## 2021-01-16 PROCEDURE — 83605 ASSAY OF LACTIC ACID: CPT

## 2021-01-16 PROCEDURE — 80053 COMPREHEN METABOLIC PANEL: CPT

## 2021-01-16 PROCEDURE — 63600175 PHARM REV CODE 636 W HCPCS: Performed by: STUDENT IN AN ORGANIZED HEALTH CARE EDUCATION/TRAINING PROGRAM

## 2021-01-16 PROCEDURE — 93005 ELECTROCARDIOGRAM TRACING: CPT

## 2021-01-16 PROCEDURE — U0002 COVID-19 LAB TEST NON-CDC: HCPCS | Performed by: EMERGENCY MEDICINE

## 2021-01-16 RX ORDER — LANOLIN ALCOHOL/MO/W.PET/CERES
800 CREAM (GRAM) TOPICAL
Status: DISCONTINUED | OUTPATIENT
Start: 2021-01-16 | End: 2021-01-20

## 2021-01-16 RX ORDER — BISACODYL 10 MG
10 SUPPOSITORY, RECTAL RECTAL DAILY PRN
Status: DISCONTINUED | OUTPATIENT
Start: 2021-01-16 | End: 2021-01-24 | Stop reason: HOSPADM

## 2021-01-16 RX ORDER — ACETAMINOPHEN 325 MG/1
650 TABLET ORAL EVERY 4 HOURS PRN
Status: DISCONTINUED | OUTPATIENT
Start: 2021-01-16 | End: 2021-01-24 | Stop reason: HOSPADM

## 2021-01-16 RX ORDER — SODIUM CHLORIDE 0.9 % (FLUSH) 0.9 %
10 SYRINGE (ML) INJECTION
Status: DISCONTINUED | OUTPATIENT
Start: 2021-01-16 | End: 2021-01-24 | Stop reason: HOSPADM

## 2021-01-16 RX ORDER — INSULIN ASPART 100 [IU]/ML
0-5 INJECTION, SOLUTION INTRAVENOUS; SUBCUTANEOUS
Status: DISCONTINUED | OUTPATIENT
Start: 2021-01-16 | End: 2021-01-24 | Stop reason: HOSPADM

## 2021-01-16 RX ORDER — GLUCAGON 1 MG
1 KIT INJECTION
Status: DISCONTINUED | OUTPATIENT
Start: 2021-01-16 | End: 2021-01-24 | Stop reason: HOSPADM

## 2021-01-16 RX ORDER — FUROSEMIDE 10 MG/ML
20 INJECTION INTRAMUSCULAR; INTRAVENOUS
Status: DISCONTINUED | OUTPATIENT
Start: 2021-01-17 | End: 2021-01-17

## 2021-01-16 RX ORDER — OXYCODONE HYDROCHLORIDE 5 MG/1
5 TABLET ORAL EVERY 6 HOURS PRN
Status: DISCONTINUED | OUTPATIENT
Start: 2021-01-16 | End: 2021-01-19

## 2021-01-16 RX ORDER — AMIODARONE HYDROCHLORIDE 200 MG/1
200 TABLET ORAL DAILY
Status: DISCONTINUED | OUTPATIENT
Start: 2021-01-17 | End: 2021-01-24 | Stop reason: HOSPADM

## 2021-01-16 RX ORDER — ONDANSETRON 2 MG/ML
4 INJECTION INTRAMUSCULAR; INTRAVENOUS EVERY 8 HOURS PRN
Status: DISCONTINUED | OUTPATIENT
Start: 2021-01-16 | End: 2021-01-24 | Stop reason: HOSPADM

## 2021-01-16 RX ORDER — FUROSEMIDE 10 MG/ML
20 INJECTION INTRAMUSCULAR; INTRAVENOUS
Status: COMPLETED | OUTPATIENT
Start: 2021-01-16 | End: 2021-01-16

## 2021-01-16 RX ORDER — TALC
6 POWDER (GRAM) TOPICAL NIGHTLY PRN
Status: DISCONTINUED | OUTPATIENT
Start: 2021-01-16 | End: 2021-01-24 | Stop reason: HOSPADM

## 2021-01-16 RX ORDER — LEVOTHYROXINE SODIUM 75 UG/1
75 TABLET ORAL DAILY
Status: DISCONTINUED | OUTPATIENT
Start: 2021-01-17 | End: 2021-01-24 | Stop reason: HOSPADM

## 2021-01-16 RX ORDER — ACETAMINOPHEN 325 MG/1
650 TABLET ORAL EVERY 8 HOURS PRN
Status: DISCONTINUED | OUTPATIENT
Start: 2021-01-16 | End: 2021-01-24 | Stop reason: HOSPADM

## 2021-01-16 RX ORDER — IBUPROFEN 200 MG
24 TABLET ORAL
Status: DISCONTINUED | OUTPATIENT
Start: 2021-01-16 | End: 2021-01-24 | Stop reason: HOSPADM

## 2021-01-16 RX ORDER — CEFTRIAXONE 1 G/50ML
2 INJECTION, SOLUTION INTRAVENOUS ONCE
Status: COMPLETED | OUTPATIENT
Start: 2021-01-16 | End: 2021-01-16

## 2021-01-16 RX ORDER — IBUPROFEN 200 MG
16 TABLET ORAL
Status: DISCONTINUED | OUTPATIENT
Start: 2021-01-16 | End: 2021-01-24 | Stop reason: HOSPADM

## 2021-01-16 RX ORDER — TRAZODONE HYDROCHLORIDE 50 MG/1
50 TABLET ORAL NIGHTLY PRN
Status: DISCONTINUED | OUTPATIENT
Start: 2021-01-16 | End: 2021-01-24 | Stop reason: HOSPADM

## 2021-01-16 RX ORDER — MORPHINE SULFATE 4 MG/ML
4 INJECTION, SOLUTION INTRAMUSCULAR; INTRAVENOUS EVERY 4 HOURS PRN
Status: DISCONTINUED | OUTPATIENT
Start: 2021-01-16 | End: 2021-01-19

## 2021-01-16 RX ORDER — SODIUM,POTASSIUM PHOSPHATES 280-250MG
2 POWDER IN PACKET (EA) ORAL
Status: DISCONTINUED | OUTPATIENT
Start: 2021-01-16 | End: 2021-01-19

## 2021-01-16 RX ORDER — ALBUTEROL SULFATE 90 UG/1
2 AEROSOL, METERED RESPIRATORY (INHALATION) EVERY 6 HOURS PRN
Status: DISCONTINUED | OUTPATIENT
Start: 2021-01-16 | End: 2021-01-24 | Stop reason: HOSPADM

## 2021-01-16 RX ORDER — METOPROLOL TARTRATE 50 MG/1
50 TABLET ORAL 2 TIMES DAILY
Status: DISCONTINUED | OUTPATIENT
Start: 2021-01-17 | End: 2021-01-17

## 2021-01-16 RX ORDER — LOSARTAN POTASSIUM 25 MG/1
100 TABLET ORAL DAILY
Status: DISCONTINUED | OUTPATIENT
Start: 2021-01-17 | End: 2021-01-24 | Stop reason: HOSPADM

## 2021-01-16 RX ADMIN — AZITHROMYCIN MONOHYDRATE 250 MG: 500 INJECTION, POWDER, LYOPHILIZED, FOR SOLUTION INTRAVENOUS at 10:01

## 2021-01-16 RX ADMIN — FUROSEMIDE 20 MG: 10 INJECTION, SOLUTION INTRAMUSCULAR; INTRAVENOUS at 10:01

## 2021-01-16 RX ADMIN — CEFTRIAXONE 2 G: 1 INJECTION, SOLUTION INTRAVENOUS at 11:01

## 2021-01-17 PROBLEM — E03.9 HYPOTHYROID: Status: ACTIVE | Noted: 2021-01-17

## 2021-01-17 PROBLEM — I48.91 ATRIAL FIBRILLATION: Status: ACTIVE | Noted: 2021-01-17

## 2021-01-17 PROBLEM — I50.9 ACUTE EXACERBATION OF CHF (CONGESTIVE HEART FAILURE): Status: ACTIVE | Noted: 2021-01-17

## 2021-01-17 PROBLEM — J96.01 ACUTE HYPOXEMIC RESPIRATORY FAILURE: Status: ACTIVE | Noted: 2021-01-17

## 2021-01-17 PROBLEM — J18.9 PNA (PNEUMONIA): Status: ACTIVE | Noted: 2021-01-17

## 2021-01-17 PROBLEM — R53.81 DEBILITY: Status: ACTIVE | Noted: 2021-01-17

## 2021-01-17 LAB
ALBUMIN SERPL BCP-MCNC: 4.1 G/DL (ref 3.5–5.2)
ALLENS TEST: ABNORMAL
ALP SERPL-CCNC: 88 U/L (ref 55–135)
ALT SERPL W/O P-5'-P-CCNC: 33 U/L (ref 10–44)
ANION GAP SERPL CALC-SCNC: 13 MMOL/L (ref 8–16)
AORTIC ROOT ANNULUS: 2.49 CM
AORTIC VALVE CUSP SEPERATION: 1.57 CM
ASCENDING AORTA: 2.31 CM
AST SERPL-CCNC: 26 U/L (ref 10–40)
AV INDEX (PROSTH): 0.67
AV MEAN GRADIENT: 8 MMHG
AV PEAK GRADIENT: 13 MMHG
AV VALVE AREA: 1.51 CM2
AV VELOCITY RATIO: 0.69
BASOPHILS # BLD AUTO: 0.05 K/UL (ref 0–0.2)
BASOPHILS NFR BLD: 0.4 % (ref 0–1.9)
BILIRUB SERPL-MCNC: 1.1 MG/DL (ref 0.1–1)
BSA FOR ECHO PROCEDURE: 1.35 M2
BUN SERPL-MCNC: 15 MG/DL (ref 8–23)
CALCIUM SERPL-MCNC: 9.3 MG/DL (ref 8.7–10.5)
CHLORIDE SERPL-SCNC: 96 MMOL/L (ref 95–110)
CO2 SERPL-SCNC: 24 MMOL/L (ref 23–29)
CREAT SERPL-MCNC: 0.8 MG/DL (ref 0.5–1.4)
CV ECHO LV RWT: 0.49 CM
DELSYS: ABNORMAL
DIFFERENTIAL METHOD: ABNORMAL
DOP CALC AO PEAK VEL: 1.83 M/S
DOP CALC AO VTI: 48.77 CM
DOP CALC LVOT AREA: 2.2 CM2
DOP CALC LVOT DIAMETER: 1.69 CM
DOP CALC LVOT PEAK VEL: 1.27 M/S
DOP CALC LVOT STROKE VOLUME: 73.63 CM3
DOP CALCLVOT PEAK VEL VTI: 32.84 CM
E WAVE DECELERATION TIME: 224.63 MSEC
E/A RATIO: 1.13
E/E' RATIO: 20.83 M/S
ECHO LV POSTERIOR WALL: 1 CM (ref 0.6–1.1)
EOSINOPHIL # BLD AUTO: 0.1 K/UL (ref 0–0.5)
EOSINOPHIL NFR BLD: 1.1 % (ref 0–8)
ERYTHROCYTE [DISTWIDTH] IN BLOOD BY AUTOMATED COUNT: 15.4 % (ref 11.5–14.5)
EST. GFR  (AFRICAN AMERICAN): >60 ML/MIN/1.73 M^2
EST. GFR  (NON AFRICAN AMERICAN): >60 ML/MIN/1.73 M^2
FIO2: 100
FLOW: 15
FRACTIONAL SHORTENING: 35 % (ref 28–44)
GLUCOSE SERPL-MCNC: 106 MG/DL (ref 70–110)
HCO3 UR-SCNC: 29.5 MMOL/L (ref 24–28)
HCT VFR BLD AUTO: 37.3 % (ref 37–48.5)
HGB BLD-MCNC: 12.7 G/DL (ref 12–16)
IMM GRANULOCYTES # BLD AUTO: 0.04 K/UL (ref 0–0.04)
IMM GRANULOCYTES NFR BLD AUTO: 0.3 % (ref 0–0.5)
INFLUENZA A, MOLECULAR: NEGATIVE
INFLUENZA B, MOLECULAR: NEGATIVE
INTERVENTRICULAR SEPTUM: 1.16 CM (ref 0.6–1.1)
IVRT: 129.4 MSEC
LA MAJOR: 6.25 CM
LA MINOR: 5.27 CM
LA WIDTH: 4.72 CM
LEFT ATRIUM SIZE: 3.51 CM
LEFT ATRIUM VOLUME INDEX: 60.4 ML/M2
LEFT ATRIUM VOLUME: 80.53 CM3
LEFT INTERNAL DIMENSION IN SYSTOLE: 2.65 CM (ref 2.1–4)
LEFT VENTRICLE DIASTOLIC VOLUME INDEX: 55.21 ML/M2
LEFT VENTRICLE DIASTOLIC VOLUME: 73.63 ML
LEFT VENTRICLE MASS INDEX: 110 G/M2
LEFT VENTRICLE SYSTOLIC VOLUME INDEX: 19.3 ML/M2
LEFT VENTRICLE SYSTOLIC VOLUME: 25.76 ML
LEFT VENTRICULAR INTERNAL DIMENSION IN DIASTOLE: 4.09 CM (ref 3.5–6)
LEFT VENTRICULAR MASS: 146.8 G
LV LATERAL E/E' RATIO: 17.86 M/S
LV SEPTAL E/E' RATIO: 25 M/S
LYMPHOCYTES # BLD AUTO: 1.1 K/UL (ref 1–4.8)
LYMPHOCYTES NFR BLD: 9 % (ref 18–48)
MAGNESIUM SERPL-MCNC: 1.8 MG/DL (ref 1.6–2.6)
MCH RBC QN AUTO: 31.8 PG (ref 27–31)
MCHC RBC AUTO-ENTMCNC: 34 G/DL (ref 32–36)
MCV RBC AUTO: 94 FL (ref 82–98)
MODE: ABNORMAL
MONOCYTES # BLD AUTO: 1.3 K/UL (ref 0.3–1)
MONOCYTES NFR BLD: 9.9 % (ref 4–15)
MV PEAK A VEL: 1.11 M/S
MV PEAK E VEL: 1.25 M/S
NEUTROPHILS # BLD AUTO: 10 K/UL (ref 1.8–7.7)
NEUTROPHILS NFR BLD: 79.3 % (ref 38–73)
NRBC BLD-RTO: 0 /100 WBC
PCO2 BLDA: 39.2 MMHG (ref 35–45)
PH SMN: 7.49 [PH] (ref 7.35–7.45)
PISA TR MAX VEL: 3.28 M/S
PLATELET # BLD AUTO: 203 K/UL (ref 150–350)
PMV BLD AUTO: 11.2 FL (ref 9.2–12.9)
PO2 BLDA: 138 MMHG (ref 80–100)
POC BE: 6 MMOL/L
POC SATURATED O2: 99 % (ref 95–100)
POC TCO2: 31 MMOL/L (ref 23–27)
POCT GLUCOSE: 134 MG/DL (ref 70–110)
POCT GLUCOSE: 157 MG/DL (ref 70–110)
POCT GLUCOSE: 159 MG/DL (ref 70–110)
POTASSIUM SERPL-SCNC: 3.8 MMOL/L (ref 3.5–5.1)
PROT SERPL-MCNC: 7.3 G/DL (ref 6–8.4)
PULM VEIN S/D RATIO: 1.21
PV PEAK D VEL: 0.33 M/S
PV PEAK S VEL: 0.4 M/S
PV PEAK VELOCITY: 1.13 CM/S
RA MAJOR: 5.12 CM
RA PRESSURE: 8 MMHG
RA WIDTH: 4.05 CM
RBC # BLD AUTO: 3.99 M/UL (ref 4–5.4)
RIGHT VENTRICULAR END-DIASTOLIC DIMENSION: 3.97 CM
RV TISSUE DOPPLER FREE WALL SYSTOLIC VELOCITY 1 (APICAL 4 CHAMBER VIEW): 14.21 CM/S
SAMPLE: ABNORMAL
SARS-COV-2 RNA RESP QL NAA+PROBE: NOT DETECTED
SINUS: 2.4 CM
SITE: ABNORMAL
SODIUM SERPL-SCNC: 133 MMOL/L (ref 136–145)
SP02: 100
SPECIMEN SOURCE: NORMAL
STJ: 2 CM
TDI LATERAL: 0.07 M/S
TDI SEPTAL: 0.05 M/S
TDI: 0.06 M/S
TR MAX PG: 43 MMHG
TRICUSPID ANNULAR PLANE SYSTOLIC EXCURSION: 2.85 CM
TROPONIN I SERPL DL<=0.01 NG/ML-MCNC: 0.01 NG/ML (ref 0–0.03)
TV REST PULMONARY ARTERY PRESSURE: 51 MMHG
WBC # BLD AUTO: 12.61 K/UL (ref 3.9–12.7)

## 2021-01-17 PROCEDURE — 99222 1ST HOSP IP/OBS MODERATE 55: CPT | Mod: 25,,, | Performed by: INTERNAL MEDICINE

## 2021-01-17 PROCEDURE — 63600175 PHARM REV CODE 636 W HCPCS: Performed by: INTERNAL MEDICINE

## 2021-01-17 PROCEDURE — 85025 COMPLETE CBC W/AUTO DIFF WBC: CPT

## 2021-01-17 PROCEDURE — 99499 NO LOS: ICD-10-PCS | Mod: ,,, | Performed by: INTERNAL MEDICINE

## 2021-01-17 PROCEDURE — 36415 COLL VENOUS BLD VENIPUNCTURE: CPT

## 2021-01-17 PROCEDURE — 84484 ASSAY OF TROPONIN QUANT: CPT

## 2021-01-17 PROCEDURE — 25000003 PHARM REV CODE 250: Performed by: INTERNAL MEDICINE

## 2021-01-17 PROCEDURE — 94640 AIRWAY INHALATION TREATMENT: CPT

## 2021-01-17 PROCEDURE — 86738 MYCOPLASMA ANTIBODY: CPT

## 2021-01-17 PROCEDURE — 20000000 HC ICU ROOM

## 2021-01-17 PROCEDURE — 87502 INFLUENZA DNA AMP PROBE: CPT

## 2021-01-17 PROCEDURE — 25000242 PHARM REV CODE 250 ALT 637 W/ HCPCS: Performed by: INTERNAL MEDICINE

## 2021-01-17 PROCEDURE — 36600 WITHDRAWAL OF ARTERIAL BLOOD: CPT

## 2021-01-17 PROCEDURE — 94761 N-INVAS EAR/PLS OXIMETRY MLT: CPT

## 2021-01-17 PROCEDURE — 83735 ASSAY OF MAGNESIUM: CPT

## 2021-01-17 PROCEDURE — 99222 PR INITIAL HOSPITAL CARE,LEVL II: ICD-10-PCS | Mod: 25,,, | Performed by: INTERNAL MEDICINE

## 2021-01-17 PROCEDURE — 82803 BLOOD GASES ANY COMBINATION: CPT

## 2021-01-17 PROCEDURE — 27100171 HC OXYGEN HIGH FLOW UP TO 24 HOURS

## 2021-01-17 PROCEDURE — 99900035 HC TECH TIME PER 15 MIN (STAT)

## 2021-01-17 PROCEDURE — 80053 COMPREHEN METABOLIC PANEL: CPT

## 2021-01-17 PROCEDURE — U0003 INFECTIOUS AGENT DETECTION BY NUCLEIC ACID (DNA OR RNA); SEVERE ACUTE RESPIRATORY SYNDROME CORONAVIRUS 2 (SARS-COV-2) (CORONAVIRUS DISEASE [COVID-19]), AMPLIFIED PROBE TECHNIQUE, MAKING USE OF HIGH THROUGHPUT TECHNOLOGIES AS DESCRIBED BY CMS-2020-01-R: HCPCS

## 2021-01-17 PROCEDURE — 99499 UNLISTED E&M SERVICE: CPT | Mod: ,,, | Performed by: INTERNAL MEDICINE

## 2021-01-17 RX ORDER — IPRATROPIUM BROMIDE AND ALBUTEROL SULFATE 2.5; .5 MG/3ML; MG/3ML
3 SOLUTION RESPIRATORY (INHALATION)
Status: DISCONTINUED | OUTPATIENT
Start: 2021-01-17 | End: 2021-01-20

## 2021-01-17 RX ORDER — HYDRALAZINE HYDROCHLORIDE 25 MG/1
25 TABLET, FILM COATED ORAL ONCE
Status: COMPLETED | OUTPATIENT
Start: 2021-01-17 | End: 2021-01-17

## 2021-01-17 RX ORDER — MUPIROCIN 20 MG/G
OINTMENT TOPICAL 2 TIMES DAILY
Status: COMPLETED | OUTPATIENT
Start: 2021-01-17 | End: 2021-01-21

## 2021-01-17 RX ORDER — FUROSEMIDE 10 MG/ML
20 INJECTION INTRAMUSCULAR; INTRAVENOUS ONCE
Status: DISCONTINUED | OUTPATIENT
Start: 2021-01-17 | End: 2021-01-19

## 2021-01-17 RX ORDER — METOPROLOL TARTRATE 25 MG/1
25 TABLET, FILM COATED ORAL 2 TIMES DAILY
Status: DISCONTINUED | OUTPATIENT
Start: 2021-01-17 | End: 2021-01-24 | Stop reason: HOSPADM

## 2021-01-17 RX ORDER — DEXAMETHASONE SODIUM PHOSPHATE 4 MG/ML
4 INJECTION, SOLUTION INTRA-ARTICULAR; INTRALESIONAL; INTRAMUSCULAR; INTRAVENOUS; SOFT TISSUE EVERY 12 HOURS
Status: DISCONTINUED | OUTPATIENT
Start: 2021-01-17 | End: 2021-01-18

## 2021-01-17 RX ORDER — IPRATROPIUM BROMIDE AND ALBUTEROL SULFATE 2.5; .5 MG/3ML; MG/3ML
3 SOLUTION RESPIRATORY (INHALATION) EVERY 4 HOURS PRN
Status: DISCONTINUED | OUTPATIENT
Start: 2021-01-17 | End: 2021-01-20

## 2021-01-17 RX ORDER — FUROSEMIDE 10 MG/ML
20 INJECTION INTRAMUSCULAR; INTRAVENOUS EVERY 8 HOURS
Status: DISCONTINUED | OUTPATIENT
Start: 2021-01-17 | End: 2021-01-18

## 2021-01-17 RX ORDER — FUROSEMIDE 10 MG/ML
40 INJECTION INTRAMUSCULAR; INTRAVENOUS
Status: DISCONTINUED | OUTPATIENT
Start: 2021-01-17 | End: 2021-01-17

## 2021-01-17 RX ORDER — ATROPINE SULFATE 0.1 MG/ML
1 INJECTION INTRAVENOUS ONCE
Status: COMPLETED | OUTPATIENT
Start: 2021-01-17 | End: 2021-01-18

## 2021-01-17 RX ADMIN — FUROSEMIDE 20 MG: 10 INJECTION, SOLUTION INTRAMUSCULAR; INTRAVENOUS at 01:01

## 2021-01-17 RX ADMIN — LEVOTHYROXINE SODIUM 75 MCG: 75 TABLET ORAL at 05:01

## 2021-01-17 RX ADMIN — ACETAMINOPHEN 650 MG: 325 TABLET ORAL at 02:01

## 2021-01-17 RX ADMIN — APIXABAN 2.5 MG: 2.5 TABLET, FILM COATED ORAL at 08:01

## 2021-01-17 RX ADMIN — AMIODARONE HYDROCHLORIDE 200 MG: 200 TABLET ORAL at 08:01

## 2021-01-17 RX ADMIN — HYDRALAZINE HYDROCHLORIDE 25 MG: 25 TABLET, FILM COATED ORAL at 01:01

## 2021-01-17 RX ADMIN — METOPROLOL TARTRATE 50 MG: 50 TABLET, FILM COATED ORAL at 08:01

## 2021-01-17 RX ADMIN — FUROSEMIDE 20 MG: 10 INJECTION, SOLUTION INTRAMUSCULAR; INTRAVENOUS at 11:01

## 2021-01-17 RX ADMIN — FUROSEMIDE 40 MG: 10 INJECTION, SOLUTION INTRAVENOUS at 11:01

## 2021-01-17 RX ADMIN — DOXYCYCLINE 100 MG: 100 INJECTION, POWDER, LYOPHILIZED, FOR SOLUTION INTRAVENOUS at 02:01

## 2021-01-17 RX ADMIN — IPRATROPIUM BROMIDE AND ALBUTEROL SULFATE 3 ML: .5; 3 SOLUTION RESPIRATORY (INHALATION) at 08:01

## 2021-01-17 RX ADMIN — TRAZODONE HYDROCHLORIDE 50 MG: 50 TABLET ORAL at 02:01

## 2021-01-17 RX ADMIN — IPRATROPIUM BROMIDE AND ALBUTEROL SULFATE 3 ML: .5; 3 SOLUTION RESPIRATORY (INHALATION) at 02:01

## 2021-01-17 RX ADMIN — DEXAMETHASONE SODIUM PHOSPHATE 4 MG: 4 INJECTION INTRA-ARTICULAR; INTRALESIONAL; INTRAMUSCULAR; INTRAVENOUS; SOFT TISSUE at 08:01

## 2021-01-17 RX ADMIN — LOSARTAN POTASSIUM 100 MG: 25 TABLET, FILM COATED ORAL at 08:01

## 2021-01-17 RX ADMIN — MUPIROCIN: 20 OINTMENT TOPICAL at 08:01

## 2021-01-17 RX ADMIN — MUPIROCIN: 20 OINTMENT TOPICAL at 11:01

## 2021-01-17 RX ADMIN — OXYCODONE 5 MG: 5 TABLET ORAL at 04:01

## 2021-01-18 PROBLEM — J18.9 PNA (PNEUMONIA): Status: RESOLVED | Noted: 2021-01-17 | Resolved: 2021-01-18

## 2021-01-18 LAB
ALBUMIN SERPL BCP-MCNC: 3.5 G/DL (ref 3.5–5.2)
ALP SERPL-CCNC: 66 U/L (ref 55–135)
ALT SERPL W/O P-5'-P-CCNC: 25 U/L (ref 10–44)
ANION GAP SERPL CALC-SCNC: 13 MMOL/L (ref 8–16)
AST SERPL-CCNC: 16 U/L (ref 10–40)
BASOPHILS # BLD AUTO: 0.03 K/UL (ref 0–0.2)
BASOPHILS NFR BLD: 0.2 % (ref 0–1.9)
BILIRUB SERPL-MCNC: 0.9 MG/DL (ref 0.1–1)
BUN SERPL-MCNC: 22 MG/DL (ref 8–23)
CALCIUM SERPL-MCNC: 8.4 MG/DL (ref 8.7–10.5)
CHLORIDE SERPL-SCNC: 96 MMOL/L (ref 95–110)
CO2 SERPL-SCNC: 26 MMOL/L (ref 23–29)
CREAT SERPL-MCNC: 0.9 MG/DL (ref 0.5–1.4)
DIFFERENTIAL METHOD: ABNORMAL
EOSINOPHIL # BLD AUTO: 0 K/UL (ref 0–0.5)
EOSINOPHIL NFR BLD: 0 % (ref 0–8)
ERYTHROCYTE [DISTWIDTH] IN BLOOD BY AUTOMATED COUNT: 15.5 % (ref 11.5–14.5)
EST. GFR  (AFRICAN AMERICAN): >60 ML/MIN/1.73 M^2
EST. GFR  (NON AFRICAN AMERICAN): 59 ML/MIN/1.73 M^2
GLUCOSE SERPL-MCNC: 143 MG/DL (ref 70–110)
HCT VFR BLD AUTO: 32.7 % (ref 37–48.5)
HGB BLD-MCNC: 11.1 G/DL (ref 12–16)
IMM GRANULOCYTES # BLD AUTO: 0.12 K/UL (ref 0–0.04)
IMM GRANULOCYTES NFR BLD AUTO: 0.7 % (ref 0–0.5)
LYMPHOCYTES # BLD AUTO: 0.5 K/UL (ref 1–4.8)
LYMPHOCYTES NFR BLD: 2.8 % (ref 18–48)
MAGNESIUM SERPL-MCNC: 1.8 MG/DL (ref 1.6–2.6)
MCH RBC QN AUTO: 31.7 PG (ref 27–31)
MCHC RBC AUTO-ENTMCNC: 33.9 G/DL (ref 32–36)
MCV RBC AUTO: 93 FL (ref 82–98)
MONOCYTES # BLD AUTO: 0.8 K/UL (ref 0.3–1)
MONOCYTES NFR BLD: 5.2 % (ref 4–15)
NEUTROPHILS # BLD AUTO: 14.8 K/UL (ref 1.8–7.7)
NEUTROPHILS NFR BLD: 91.1 % (ref 38–73)
NRBC BLD-RTO: 0 /100 WBC
PLATELET # BLD AUTO: 275 K/UL (ref 150–350)
PMV BLD AUTO: 9.9 FL (ref 9.2–12.9)
POCT GLUCOSE: 127 MG/DL (ref 70–110)
POCT GLUCOSE: 139 MG/DL (ref 70–110)
POCT GLUCOSE: 159 MG/DL (ref 70–110)
POCT GLUCOSE: 227 MG/DL (ref 70–110)
POTASSIUM SERPL-SCNC: 4.5 MMOL/L (ref 3.5–5.1)
PROT SERPL-MCNC: 6.5 G/DL (ref 6–8.4)
RBC # BLD AUTO: 3.5 M/UL (ref 4–5.4)
SODIUM SERPL-SCNC: 135 MMOL/L (ref 136–145)
WBC # BLD AUTO: 16.25 K/UL (ref 3.9–12.7)

## 2021-01-18 PROCEDURE — 80053 COMPREHEN METABOLIC PANEL: CPT

## 2021-01-18 PROCEDURE — 99231 SBSQ HOSP IP/OBS SF/LOW 25: CPT | Mod: ,,, | Performed by: INTERNAL MEDICINE

## 2021-01-18 PROCEDURE — 83735 ASSAY OF MAGNESIUM: CPT

## 2021-01-18 PROCEDURE — 94640 AIRWAY INHALATION TREATMENT: CPT

## 2021-01-18 PROCEDURE — 25000003 PHARM REV CODE 250: Performed by: INTERNAL MEDICINE

## 2021-01-18 PROCEDURE — 27100171 HC OXYGEN HIGH FLOW UP TO 24 HOURS

## 2021-01-18 PROCEDURE — 87449 NOS EACH ORGANISM AG IA: CPT

## 2021-01-18 PROCEDURE — 99499 NO LOS: ICD-10-PCS | Mod: 95,,, | Performed by: INTERNAL MEDICINE

## 2021-01-18 PROCEDURE — 25000242 PHARM REV CODE 250 ALT 637 W/ HCPCS: Performed by: INTERNAL MEDICINE

## 2021-01-18 PROCEDURE — 87899 AGENT NOS ASSAY W/OPTIC: CPT

## 2021-01-18 PROCEDURE — 99499 UNLISTED E&M SERVICE: CPT | Mod: 95,,, | Performed by: INTERNAL MEDICINE

## 2021-01-18 PROCEDURE — 20000000 HC ICU ROOM

## 2021-01-18 PROCEDURE — 85025 COMPLETE CBC W/AUTO DIFF WBC: CPT

## 2021-01-18 PROCEDURE — 99231 PR SUBSEQUENT HOSPITAL CARE,LEVL I: ICD-10-PCS | Mod: ,,, | Performed by: INTERNAL MEDICINE

## 2021-01-18 PROCEDURE — 63600175 PHARM REV CODE 636 W HCPCS: Performed by: INTERNAL MEDICINE

## 2021-01-18 RX ORDER — FUROSEMIDE 10 MG/ML
60 INJECTION INTRAMUSCULAR; INTRAVENOUS
Status: DISCONTINUED | OUTPATIENT
Start: 2021-01-18 | End: 2021-01-20

## 2021-01-18 RX ADMIN — FUROSEMIDE 20 MG: 10 INJECTION, SOLUTION INTRAMUSCULAR; INTRAVENOUS at 05:01

## 2021-01-18 RX ADMIN — DOXYCYCLINE 100 MG: 100 INJECTION, POWDER, LYOPHILIZED, FOR SOLUTION INTRAVENOUS at 01:01

## 2021-01-18 RX ADMIN — FUROSEMIDE 60 MG: 10 INJECTION, SOLUTION INTRAMUSCULAR; INTRAVENOUS at 04:01

## 2021-01-18 RX ADMIN — METOPROLOL TARTRATE 25 MG: 25 TABLET, FILM COATED ORAL at 08:01

## 2021-01-18 RX ADMIN — LOSARTAN POTASSIUM 100 MG: 25 TABLET, FILM COATED ORAL at 08:01

## 2021-01-18 RX ADMIN — APIXABAN 2.5 MG: 2.5 TABLET, FILM COATED ORAL at 08:01

## 2021-01-18 RX ADMIN — ACETAMINOPHEN 650 MG: 325 TABLET ORAL at 08:01

## 2021-01-18 RX ADMIN — DOXYCYCLINE 100 MG: 100 INJECTION, POWDER, LYOPHILIZED, FOR SOLUTION INTRAVENOUS at 02:01

## 2021-01-18 RX ADMIN — MUPIROCIN: 20 OINTMENT TOPICAL at 08:01

## 2021-01-18 RX ADMIN — IPRATROPIUM BROMIDE AND ALBUTEROL SULFATE 3 ML: .5; 3 SOLUTION RESPIRATORY (INHALATION) at 01:01

## 2021-01-18 RX ADMIN — OXYCODONE 5 MG: 5 TABLET ORAL at 02:01

## 2021-01-18 RX ADMIN — ATROPINE SULFATE 1 MG: 0.1 INJECTION, SOLUTION ENDOTRACHEAL; INTRAMUSCULAR; INTRAVENOUS; SUBCUTANEOUS at 12:01

## 2021-01-18 RX ADMIN — INSULIN ASPART 2 UNITS: 100 INJECTION, SOLUTION INTRAVENOUS; SUBCUTANEOUS at 05:01

## 2021-01-18 RX ADMIN — LEVOTHYROXINE SODIUM 75 MCG: 75 TABLET ORAL at 05:01

## 2021-01-18 RX ADMIN — DEXAMETHASONE SODIUM PHOSPHATE 4 MG: 4 INJECTION INTRA-ARTICULAR; INTRALESIONAL; INTRAMUSCULAR; INTRAVENOUS; SOFT TISSUE at 08:01

## 2021-01-18 RX ADMIN — AMIODARONE HYDROCHLORIDE 200 MG: 200 TABLET ORAL at 08:01

## 2021-01-18 RX ADMIN — IPRATROPIUM BROMIDE AND ALBUTEROL SULFATE 3 ML: .5; 3 SOLUTION RESPIRATORY (INHALATION) at 07:01

## 2021-01-19 PROBLEM — I50.33 ACUTE ON CHRONIC DIASTOLIC CONGESTIVE HEART FAILURE: Status: ACTIVE | Noted: 2021-01-17

## 2021-01-19 PROBLEM — E83.39 HYPOPHOSPHATEMIA: Status: RESOLVED | Noted: 2018-01-28 | Resolved: 2021-01-19

## 2021-01-19 PROBLEM — J96.21 ACUTE ON CHRONIC RESPIRATORY FAILURE WITH HYPOXIA: Status: ACTIVE | Noted: 2021-01-16

## 2021-01-19 PROBLEM — J96.01 ACUTE HYPOXEMIC RESPIRATORY FAILURE: Status: RESOLVED | Noted: 2021-01-17 | Resolved: 2021-01-19

## 2021-01-19 LAB
ALBUMIN SERPL BCP-MCNC: 3.2 G/DL (ref 3.5–5.2)
ALBUMIN SERPL BCP-MCNC: 3.3 G/DL (ref 3.5–5.2)
ALP SERPL-CCNC: 62 U/L (ref 55–135)
ALP SERPL-CCNC: 64 U/L (ref 55–135)
ALT SERPL W/O P-5'-P-CCNC: 24 U/L (ref 10–44)
ALT SERPL W/O P-5'-P-CCNC: 24 U/L (ref 10–44)
ANION GAP SERPL CALC-SCNC: 10 MMOL/L (ref 8–16)
ANION GAP SERPL CALC-SCNC: 9 MMOL/L (ref 8–16)
AST SERPL-CCNC: 14 U/L (ref 10–40)
AST SERPL-CCNC: 15 U/L (ref 10–40)
BASOPHILS # BLD AUTO: 0.02 K/UL (ref 0–0.2)
BASOPHILS NFR BLD: 0.1 % (ref 0–1.9)
BILIRUB SERPL-MCNC: 0.7 MG/DL (ref 0.1–1)
BILIRUB SERPL-MCNC: 0.7 MG/DL (ref 0.1–1)
BNP SERPL-MCNC: 636 PG/ML (ref 0–99)
BUN SERPL-MCNC: 24 MG/DL (ref 8–23)
BUN SERPL-MCNC: 24 MG/DL (ref 8–23)
CALCIUM SERPL-MCNC: 8.2 MG/DL (ref 8.7–10.5)
CALCIUM SERPL-MCNC: 8.3 MG/DL (ref 8.7–10.5)
CHLORIDE SERPL-SCNC: 95 MMOL/L (ref 95–110)
CHLORIDE SERPL-SCNC: 95 MMOL/L (ref 95–110)
CO2 SERPL-SCNC: 28 MMOL/L (ref 23–29)
CO2 SERPL-SCNC: 28 MMOL/L (ref 23–29)
CREAT SERPL-MCNC: 0.8 MG/DL (ref 0.5–1.4)
CREAT SERPL-MCNC: 0.8 MG/DL (ref 0.5–1.4)
DIFFERENTIAL METHOD: ABNORMAL
EOSINOPHIL # BLD AUTO: 0 K/UL (ref 0–0.5)
EOSINOPHIL NFR BLD: 0 % (ref 0–8)
ERYTHROCYTE [DISTWIDTH] IN BLOOD BY AUTOMATED COUNT: 15.6 % (ref 11.5–14.5)
EST. GFR  (AFRICAN AMERICAN): >60 ML/MIN/1.73 M^2
EST. GFR  (AFRICAN AMERICAN): >60 ML/MIN/1.73 M^2
EST. GFR  (NON AFRICAN AMERICAN): >60 ML/MIN/1.73 M^2
EST. GFR  (NON AFRICAN AMERICAN): >60 ML/MIN/1.73 M^2
GLUCOSE SERPL-MCNC: 134 MG/DL (ref 70–110)
GLUCOSE SERPL-MCNC: 135 MG/DL (ref 70–110)
HCT VFR BLD AUTO: 32.3 % (ref 37–48.5)
HGB BLD-MCNC: 11.3 G/DL (ref 12–16)
IMM GRANULOCYTES # BLD AUTO: 0.12 K/UL (ref 0–0.04)
IMM GRANULOCYTES NFR BLD AUTO: 0.7 % (ref 0–0.5)
LYMPHOCYTES # BLD AUTO: 0.6 K/UL (ref 1–4.8)
LYMPHOCYTES NFR BLD: 3.3 % (ref 18–48)
MAGNESIUM SERPL-MCNC: 1.8 MG/DL (ref 1.6–2.6)
MCH RBC QN AUTO: 31.9 PG (ref 27–31)
MCHC RBC AUTO-ENTMCNC: 35 G/DL (ref 32–36)
MCV RBC AUTO: 91 FL (ref 82–98)
MONOCYTES # BLD AUTO: 1.4 K/UL (ref 0.3–1)
MONOCYTES NFR BLD: 7.7 % (ref 4–15)
NEUTROPHILS # BLD AUTO: 15.6 K/UL (ref 1.8–7.7)
NEUTROPHILS NFR BLD: 88.2 % (ref 38–73)
NRBC BLD-RTO: 0 /100 WBC
PLATELET # BLD AUTO: 300 K/UL (ref 150–350)
PMV BLD AUTO: 9.9 FL (ref 9.2–12.9)
POCT GLUCOSE: 109 MG/DL (ref 70–110)
POCT GLUCOSE: 135 MG/DL (ref 70–110)
POCT GLUCOSE: 148 MG/DL (ref 70–110)
POCT GLUCOSE: 167 MG/DL (ref 70–110)
POTASSIUM SERPL-SCNC: 3.7 MMOL/L (ref 3.5–5.1)
POTASSIUM SERPL-SCNC: 3.7 MMOL/L (ref 3.5–5.1)
PROT SERPL-MCNC: 6 G/DL (ref 6–8.4)
PROT SERPL-MCNC: 6.1 G/DL (ref 6–8.4)
RBC # BLD AUTO: 3.54 M/UL (ref 4–5.4)
SODIUM SERPL-SCNC: 132 MMOL/L (ref 136–145)
SODIUM SERPL-SCNC: 133 MMOL/L (ref 136–145)
WBC # BLD AUTO: 17.67 K/UL (ref 3.9–12.7)

## 2021-01-19 PROCEDURE — 25000003 PHARM REV CODE 250: Performed by: INTERNAL MEDICINE

## 2021-01-19 PROCEDURE — 63600175 PHARM REV CODE 636 W HCPCS: Performed by: INTERNAL MEDICINE

## 2021-01-19 PROCEDURE — 99223 1ST HOSP IP/OBS HIGH 75: CPT | Mod: ,,, | Performed by: NURSE PRACTITIONER

## 2021-01-19 PROCEDURE — 25000003 PHARM REV CODE 250: Performed by: HOSPITALIST

## 2021-01-19 PROCEDURE — 83880 ASSAY OF NATRIURETIC PEPTIDE: CPT

## 2021-01-19 PROCEDURE — 63600175 PHARM REV CODE 636 W HCPCS: Performed by: HOSPITALIST

## 2021-01-19 PROCEDURE — 97165 OT EVAL LOW COMPLEX 30 MIN: CPT

## 2021-01-19 PROCEDURE — 85025 COMPLETE CBC W/AUTO DIFF WBC: CPT

## 2021-01-19 PROCEDURE — 20000000 HC ICU ROOM

## 2021-01-19 PROCEDURE — 97535 SELF CARE MNGMENT TRAINING: CPT

## 2021-01-19 PROCEDURE — 80053 COMPREHEN METABOLIC PANEL: CPT

## 2021-01-19 PROCEDURE — 99497 PR ADVNCD CARE PLAN 30 MIN: ICD-10-PCS | Mod: 25,,, | Performed by: NURSE PRACTITIONER

## 2021-01-19 PROCEDURE — 99232 SBSQ HOSP IP/OBS MODERATE 35: CPT | Mod: ,,, | Performed by: INTERNAL MEDICINE

## 2021-01-19 PROCEDURE — 99223 PR INITIAL HOSPITAL CARE,LEVL III: ICD-10-PCS | Mod: ,,, | Performed by: NURSE PRACTITIONER

## 2021-01-19 PROCEDURE — 99499 UNLISTED E&M SERVICE: CPT | Mod: 95,,, | Performed by: INTERNAL MEDICINE

## 2021-01-19 PROCEDURE — 99497 ADVNCD CARE PLAN 30 MIN: CPT | Mod: 25,,, | Performed by: NURSE PRACTITIONER

## 2021-01-19 PROCEDURE — 27000221 HC OXYGEN, UP TO 24 HOURS

## 2021-01-19 PROCEDURE — 83735 ASSAY OF MAGNESIUM: CPT

## 2021-01-19 PROCEDURE — 27100171 HC OXYGEN HIGH FLOW UP TO 24 HOURS

## 2021-01-19 PROCEDURE — 94761 N-INVAS EAR/PLS OXIMETRY MLT: CPT

## 2021-01-19 PROCEDURE — 36415 COLL VENOUS BLD VENIPUNCTURE: CPT

## 2021-01-19 PROCEDURE — 94640 AIRWAY INHALATION TREATMENT: CPT

## 2021-01-19 PROCEDURE — 99232 PR SUBSEQUENT HOSPITAL CARE,LEVL II: ICD-10-PCS | Mod: ,,, | Performed by: INTERNAL MEDICINE

## 2021-01-19 PROCEDURE — 99499 NO LOS: ICD-10-PCS | Mod: 95,,, | Performed by: INTERNAL MEDICINE

## 2021-01-19 PROCEDURE — 25000242 PHARM REV CODE 250 ALT 637 W/ HCPCS: Performed by: INTERNAL MEDICINE

## 2021-01-19 RX ORDER — CETIRIZINE HYDROCHLORIDE 5 MG/1
5 TABLET ORAL DAILY
Status: DISCONTINUED | OUTPATIENT
Start: 2021-01-19 | End: 2021-01-24 | Stop reason: HOSPADM

## 2021-01-19 RX ORDER — AMOXICILLIN 250 MG
2 CAPSULE ORAL 2 TIMES DAILY
Status: DISCONTINUED | OUTPATIENT
Start: 2021-01-19 | End: 2021-01-24 | Stop reason: HOSPADM

## 2021-01-19 RX ADMIN — LOSARTAN POTASSIUM 100 MG: 25 TABLET, FILM COATED ORAL at 09:01

## 2021-01-19 RX ADMIN — IPRATROPIUM BROMIDE AND ALBUTEROL SULFATE 3 ML: .5; 3 SOLUTION RESPIRATORY (INHALATION) at 09:01

## 2021-01-19 RX ADMIN — ACETAMINOPHEN 650 MG: 325 TABLET ORAL at 07:01

## 2021-01-19 RX ADMIN — MUPIROCIN: 20 OINTMENT TOPICAL at 09:01

## 2021-01-19 RX ADMIN — FUROSEMIDE 60 MG: 10 INJECTION, SOLUTION INTRAMUSCULAR; INTRAVENOUS at 05:01

## 2021-01-19 RX ADMIN — CEFTRIAXONE 1 G: 1 INJECTION, SOLUTION INTRAVENOUS at 06:01

## 2021-01-19 RX ADMIN — AMIODARONE HYDROCHLORIDE 200 MG: 200 TABLET ORAL at 09:01

## 2021-01-19 RX ADMIN — DOCUSATE SODIUM 50 MG AND SENNOSIDES 8.6 MG 2 TABLET: 8.6; 5 TABLET, FILM COATED ORAL at 09:01

## 2021-01-19 RX ADMIN — IPRATROPIUM BROMIDE AND ALBUTEROL SULFATE 3 ML: .5; 3 SOLUTION RESPIRATORY (INHALATION) at 02:01

## 2021-01-19 RX ADMIN — DOXYCYCLINE 100 MG: 100 INJECTION, POWDER, LYOPHILIZED, FOR SOLUTION INTRAVENOUS at 01:01

## 2021-01-19 RX ADMIN — ACETAMINOPHEN 650 MG: 325 TABLET ORAL at 04:01

## 2021-01-19 RX ADMIN — CETIRIZINE HYDROCHLORIDE 5 MG: 5 TABLET ORAL at 11:01

## 2021-01-19 RX ADMIN — METOPROLOL TARTRATE 25 MG: 25 TABLET, FILM COATED ORAL at 09:01

## 2021-01-19 RX ADMIN — APIXABAN 2.5 MG: 2.5 TABLET, FILM COATED ORAL at 09:01

## 2021-01-19 RX ADMIN — DOCUSATE SODIUM 50 MG AND SENNOSIDES 8.6 MG 2 TABLET: 8.6; 5 TABLET, FILM COATED ORAL at 11:01

## 2021-01-19 RX ADMIN — LEVOTHYROXINE SODIUM 75 MCG: 75 TABLET ORAL at 05:01

## 2021-01-19 RX ADMIN — DOXYCYCLINE 100 MG: 100 INJECTION, POWDER, LYOPHILIZED, FOR SOLUTION INTRAVENOUS at 02:01

## 2021-01-20 PROBLEM — I50.33 ACUTE ON CHRONIC DIASTOLIC CHF (CONGESTIVE HEART FAILURE): Status: ACTIVE | Noted: 2021-01-20

## 2021-01-20 LAB
ALBUMIN SERPL BCP-MCNC: 3.2 G/DL (ref 3.5–5.2)
ALP SERPL-CCNC: 64 U/L (ref 55–135)
ALT SERPL W/O P-5'-P-CCNC: 24 U/L (ref 10–44)
ANION GAP SERPL CALC-SCNC: 11 MMOL/L (ref 8–16)
AST SERPL-CCNC: 15 U/L (ref 10–40)
BASOPHILS # BLD AUTO: 0.02 K/UL (ref 0–0.2)
BASOPHILS NFR BLD: 0.2 % (ref 0–1.9)
BILIRUB SERPL-MCNC: 0.7 MG/DL (ref 0.1–1)
BUN SERPL-MCNC: 25 MG/DL (ref 8–23)
CALCIUM SERPL-MCNC: 8.9 MG/DL (ref 8.7–10.5)
CHLORIDE SERPL-SCNC: 96 MMOL/L (ref 95–110)
CO2 SERPL-SCNC: 30 MMOL/L (ref 23–29)
CREAT SERPL-MCNC: 0.8 MG/DL (ref 0.5–1.4)
DIFFERENTIAL METHOD: ABNORMAL
EOSINOPHIL # BLD AUTO: 0.2 K/UL (ref 0–0.5)
EOSINOPHIL NFR BLD: 1.2 % (ref 0–8)
ERYTHROCYTE [DISTWIDTH] IN BLOOD BY AUTOMATED COUNT: 15.7 % (ref 11.5–14.5)
ERYTHROCYTE [SEDIMENTATION RATE] IN BLOOD BY WESTERGREN METHOD: 21 MM/HR (ref 0–20)
EST. GFR  (AFRICAN AMERICAN): >60 ML/MIN/1.73 M^2
EST. GFR  (NON AFRICAN AMERICAN): >60 ML/MIN/1.73 M^2
GLUCOSE SERPL-MCNC: 82 MG/DL (ref 70–110)
HCT VFR BLD AUTO: 34.9 % (ref 37–48.5)
HGB BLD-MCNC: 11.8 G/DL (ref 12–16)
IMM GRANULOCYTES # BLD AUTO: 0.08 K/UL (ref 0–0.04)
IMM GRANULOCYTES NFR BLD AUTO: 0.6 % (ref 0–0.5)
LYMPHOCYTES # BLD AUTO: 1.3 K/UL (ref 1–4.8)
LYMPHOCYTES NFR BLD: 9.8 % (ref 18–48)
M PNEUMO IGG SER IA-ACNC: NEGATIVE
M PNEUMO IGM SER IA-ACNC: NEGATIVE
MAGNESIUM SERPL-MCNC: 1.7 MG/DL (ref 1.6–2.6)
MCH RBC QN AUTO: 31.2 PG (ref 27–31)
MCHC RBC AUTO-ENTMCNC: 33.8 G/DL (ref 32–36)
MCV RBC AUTO: 92 FL (ref 82–98)
MONOCYTES # BLD AUTO: 1.8 K/UL (ref 0.3–1)
MONOCYTES NFR BLD: 13.4 % (ref 4–15)
MYCOPLASMA PNEUMONIAE AB INTERPRETATION: NORMAL
NEUTROPHILS # BLD AUTO: 9.8 K/UL (ref 1.8–7.7)
NEUTROPHILS NFR BLD: 74.8 % (ref 38–73)
NRBC BLD-RTO: 0 /100 WBC
PLATELET # BLD AUTO: 302 K/UL (ref 150–350)
PMV BLD AUTO: 9.6 FL (ref 9.2–12.9)
POCT GLUCOSE: 109 MG/DL (ref 70–110)
POCT GLUCOSE: 123 MG/DL (ref 70–110)
POCT GLUCOSE: 137 MG/DL (ref 70–110)
POCT GLUCOSE: 97 MG/DL (ref 70–110)
POTASSIUM SERPL-SCNC: 3.9 MMOL/L (ref 3.5–5.1)
PROT SERPL-MCNC: 6.1 G/DL (ref 6–8.4)
RBC # BLD AUTO: 3.78 M/UL (ref 4–5.4)
SODIUM SERPL-SCNC: 137 MMOL/L (ref 136–145)
WBC # BLD AUTO: 13.1 K/UL (ref 3.9–12.7)

## 2021-01-20 PROCEDURE — 25000003 PHARM REV CODE 250: Performed by: INTERNAL MEDICINE

## 2021-01-20 PROCEDURE — 25000003 PHARM REV CODE 250: Performed by: HOSPITALIST

## 2021-01-20 PROCEDURE — 36415 COLL VENOUS BLD VENIPUNCTURE: CPT

## 2021-01-20 PROCEDURE — 63600175 PHARM REV CODE 636 W HCPCS: Performed by: INTERNAL MEDICINE

## 2021-01-20 PROCEDURE — 99232 SBSQ HOSP IP/OBS MODERATE 35: CPT | Mod: ,,, | Performed by: INTERNAL MEDICINE

## 2021-01-20 PROCEDURE — 94761 N-INVAS EAR/PLS OXIMETRY MLT: CPT

## 2021-01-20 PROCEDURE — 97110 THERAPEUTIC EXERCISES: CPT

## 2021-01-20 PROCEDURE — 85025 COMPLETE CBC W/AUTO DIFF WBC: CPT

## 2021-01-20 PROCEDURE — 27100171 HC OXYGEN HIGH FLOW UP TO 24 HOURS

## 2021-01-20 PROCEDURE — 21400001 HC TELEMETRY ROOM

## 2021-01-20 PROCEDURE — 97161 PT EVAL LOW COMPLEX 20 MIN: CPT

## 2021-01-20 PROCEDURE — 85652 RBC SED RATE AUTOMATED: CPT

## 2021-01-20 PROCEDURE — 97530 THERAPEUTIC ACTIVITIES: CPT

## 2021-01-20 PROCEDURE — 99232 PR SUBSEQUENT HOSPITAL CARE,LEVL II: ICD-10-PCS | Mod: ,,, | Performed by: INTERNAL MEDICINE

## 2021-01-20 PROCEDURE — 83735 ASSAY OF MAGNESIUM: CPT

## 2021-01-20 PROCEDURE — 63600175 PHARM REV CODE 636 W HCPCS: Performed by: HOSPITALIST

## 2021-01-20 PROCEDURE — 25000242 PHARM REV CODE 250 ALT 637 W/ HCPCS: Performed by: INTERNAL MEDICINE

## 2021-01-20 PROCEDURE — 80053 COMPREHEN METABOLIC PANEL: CPT

## 2021-01-20 PROCEDURE — 94640 AIRWAY INHALATION TREATMENT: CPT

## 2021-01-20 RX ORDER — IPRATROPIUM BROMIDE AND ALBUTEROL SULFATE 2.5; .5 MG/3ML; MG/3ML
3 SOLUTION RESPIRATORY (INHALATION) EVERY 6 HOURS PRN
Status: DISCONTINUED | OUTPATIENT
Start: 2021-01-20 | End: 2021-01-24 | Stop reason: HOSPADM

## 2021-01-20 RX ORDER — FUROSEMIDE 40 MG/1
40 TABLET ORAL DAILY
Status: DISCONTINUED | OUTPATIENT
Start: 2021-01-20 | End: 2021-01-24 | Stop reason: HOSPADM

## 2021-01-20 RX ORDER — LANOLIN ALCOHOL/MO/W.PET/CERES
400 CREAM (GRAM) TOPICAL DAILY
Status: DISCONTINUED | OUTPATIENT
Start: 2021-01-20 | End: 2021-01-24 | Stop reason: HOSPADM

## 2021-01-20 RX ADMIN — IPRATROPIUM BROMIDE AND ALBUTEROL SULFATE 3 ML: .5; 3 SOLUTION RESPIRATORY (INHALATION) at 08:01

## 2021-01-20 RX ADMIN — MUPIROCIN: 20 OINTMENT TOPICAL at 09:01

## 2021-01-20 RX ADMIN — DOXYCYCLINE 100 MG: 100 INJECTION, POWDER, LYOPHILIZED, FOR SOLUTION INTRAVENOUS at 01:01

## 2021-01-20 RX ADMIN — FUROSEMIDE 40 MG: 40 TABLET ORAL at 09:01

## 2021-01-20 RX ADMIN — MAGNESIUM OXIDE TAB 400 MG (241.3 MG ELEMENTAL MG) 400 MG: 400 (241.3 MG) TAB at 09:01

## 2021-01-20 RX ADMIN — METOPROLOL TARTRATE 25 MG: 25 TABLET, FILM COATED ORAL at 09:01

## 2021-01-20 RX ADMIN — ACETAMINOPHEN 650 MG: 325 TABLET ORAL at 03:01

## 2021-01-20 RX ADMIN — AMIODARONE HYDROCHLORIDE 200 MG: 200 TABLET ORAL at 09:01

## 2021-01-20 RX ADMIN — FUROSEMIDE 60 MG: 10 INJECTION, SOLUTION INTRAMUSCULAR; INTRAVENOUS at 05:01

## 2021-01-20 RX ADMIN — LEVOTHYROXINE SODIUM 75 MCG: 75 TABLET ORAL at 05:01

## 2021-01-20 RX ADMIN — CEFTRIAXONE 1 G: 1 INJECTION, SOLUTION INTRAVENOUS at 05:01

## 2021-01-20 RX ADMIN — APIXABAN 2.5 MG: 2.5 TABLET, FILM COATED ORAL at 09:01

## 2021-01-20 RX ADMIN — DOCUSATE SODIUM 50 MG AND SENNOSIDES 8.6 MG 2 TABLET: 8.6; 5 TABLET, FILM COATED ORAL at 09:01

## 2021-01-20 RX ADMIN — DOXYCYCLINE 100 MG: 100 INJECTION, POWDER, LYOPHILIZED, FOR SOLUTION INTRAVENOUS at 03:01

## 2021-01-20 RX ADMIN — CETIRIZINE HYDROCHLORIDE 5 MG: 5 TABLET ORAL at 09:01

## 2021-01-20 RX ADMIN — LOSARTAN POTASSIUM 100 MG: 25 TABLET, FILM COATED ORAL at 09:01

## 2021-01-21 PROBLEM — I50.33 ACUTE ON CHRONIC DIASTOLIC CHF (CONGESTIVE HEART FAILURE): Status: RESOLVED | Noted: 2021-01-20 | Resolved: 2021-01-21

## 2021-01-21 PROBLEM — R11.2 NON-INTRACTABLE VOMITING WITH NAUSEA: Status: RESOLVED | Noted: 2018-01-22 | Resolved: 2021-01-21

## 2021-01-21 LAB
ANION GAP SERPL CALC-SCNC: 10 MMOL/L (ref 8–16)
BACTERIA BLD CULT: NORMAL
BACTERIA BLD CULT: NORMAL
BASOPHILS # BLD AUTO: 0.01 K/UL (ref 0–0.2)
BASOPHILS NFR BLD: 0.1 % (ref 0–1.9)
BUN SERPL-MCNC: 21 MG/DL (ref 8–23)
CALCIUM SERPL-MCNC: 9.3 MG/DL (ref 8.7–10.5)
CHLORIDE SERPL-SCNC: 95 MMOL/L (ref 95–110)
CO2 SERPL-SCNC: 30 MMOL/L (ref 23–29)
CREAT SERPL-MCNC: 0.8 MG/DL (ref 0.5–1.4)
DIFFERENTIAL METHOD: ABNORMAL
EOSINOPHIL # BLD AUTO: 0.3 K/UL (ref 0–0.5)
EOSINOPHIL NFR BLD: 3.3 % (ref 0–8)
ERYTHROCYTE [DISTWIDTH] IN BLOOD BY AUTOMATED COUNT: 15.4 % (ref 11.5–14.5)
EST. GFR  (AFRICAN AMERICAN): >60 ML/MIN/1.73 M^2
EST. GFR  (NON AFRICAN AMERICAN): >60 ML/MIN/1.73 M^2
GLUCOSE SERPL-MCNC: 100 MG/DL (ref 70–110)
HCT VFR BLD AUTO: 37.3 % (ref 37–48.5)
HGB BLD-MCNC: 12.9 G/DL (ref 12–16)
IMM GRANULOCYTES # BLD AUTO: 0.06 K/UL (ref 0–0.04)
IMM GRANULOCYTES NFR BLD AUTO: 0.7 % (ref 0–0.5)
LYMPHOCYTES # BLD AUTO: 1 K/UL (ref 1–4.8)
LYMPHOCYTES NFR BLD: 11.2 % (ref 18–48)
MAGNESIUM SERPL-MCNC: 1.8 MG/DL (ref 1.6–2.6)
MCH RBC QN AUTO: 31.9 PG (ref 27–31)
MCHC RBC AUTO-ENTMCNC: 34.6 G/DL (ref 32–36)
MCV RBC AUTO: 92 FL (ref 82–98)
MONOCYTES # BLD AUTO: 1.1 K/UL (ref 0.3–1)
MONOCYTES NFR BLD: 12.1 % (ref 4–15)
NEUTROPHILS # BLD AUTO: 6.7 K/UL (ref 1.8–7.7)
NEUTROPHILS NFR BLD: 72.6 % (ref 38–73)
NRBC BLD-RTO: 0 /100 WBC
PLATELET # BLD AUTO: 330 K/UL (ref 150–350)
PMV BLD AUTO: 9.5 FL (ref 9.2–12.9)
POCT GLUCOSE: 100 MG/DL (ref 70–110)
POCT GLUCOSE: 110 MG/DL (ref 70–110)
POCT GLUCOSE: 132 MG/DL (ref 70–110)
POCT GLUCOSE: 95 MG/DL (ref 70–110)
POTASSIUM SERPL-SCNC: 3.7 MMOL/L (ref 3.5–5.1)
RBC # BLD AUTO: 4.05 M/UL (ref 4–5.4)
SODIUM SERPL-SCNC: 135 MMOL/L (ref 136–145)
WBC # BLD AUTO: 9.23 K/UL (ref 3.9–12.7)

## 2021-01-21 PROCEDURE — 25000003 PHARM REV CODE 250: Performed by: HOSPITALIST

## 2021-01-21 PROCEDURE — 83735 ASSAY OF MAGNESIUM: CPT

## 2021-01-21 PROCEDURE — 99232 PR SUBSEQUENT HOSPITAL CARE,LEVL II: ICD-10-PCS | Mod: ,,, | Performed by: INTERNAL MEDICINE

## 2021-01-21 PROCEDURE — 21400001 HC TELEMETRY ROOM

## 2021-01-21 PROCEDURE — 25000003 PHARM REV CODE 250: Performed by: INTERNAL MEDICINE

## 2021-01-21 PROCEDURE — 80048 BASIC METABOLIC PNL TOTAL CA: CPT

## 2021-01-21 PROCEDURE — 36415 COLL VENOUS BLD VENIPUNCTURE: CPT

## 2021-01-21 PROCEDURE — 94761 N-INVAS EAR/PLS OXIMETRY MLT: CPT

## 2021-01-21 PROCEDURE — 97110 THERAPEUTIC EXERCISES: CPT

## 2021-01-21 PROCEDURE — 99232 SBSQ HOSP IP/OBS MODERATE 35: CPT | Mod: ,,, | Performed by: INTERNAL MEDICINE

## 2021-01-21 PROCEDURE — 63600175 PHARM REV CODE 636 W HCPCS: Performed by: HOSPITALIST

## 2021-01-21 PROCEDURE — 27100171 HC OXYGEN HIGH FLOW UP TO 24 HOURS

## 2021-01-21 PROCEDURE — 85025 COMPLETE CBC W/AUTO DIFF WBC: CPT

## 2021-01-21 PROCEDURE — 97535 SELF CARE MNGMENT TRAINING: CPT

## 2021-01-21 RX ADMIN — APIXABAN 2.5 MG: 2.5 TABLET, FILM COATED ORAL at 10:01

## 2021-01-21 RX ADMIN — CEFTRIAXONE 1 G: 1 INJECTION, SOLUTION INTRAVENOUS at 05:01

## 2021-01-21 RX ADMIN — METOPROLOL TARTRATE 25 MG: 25 TABLET, FILM COATED ORAL at 10:01

## 2021-01-21 RX ADMIN — MUPIROCIN: 20 OINTMENT TOPICAL at 09:01

## 2021-01-21 RX ADMIN — LOSARTAN POTASSIUM 100 MG: 25 TABLET, FILM COATED ORAL at 10:01

## 2021-01-21 RX ADMIN — APIXABAN 2.5 MG: 2.5 TABLET, FILM COATED ORAL at 09:01

## 2021-01-21 RX ADMIN — LEVOTHYROXINE SODIUM 75 MCG: 75 TABLET ORAL at 05:01

## 2021-01-21 RX ADMIN — DOXYCYCLINE 100 MG: 100 INJECTION, POWDER, LYOPHILIZED, FOR SOLUTION INTRAVENOUS at 02:01

## 2021-01-21 RX ADMIN — ACETAMINOPHEN 650 MG: 325 TABLET ORAL at 05:01

## 2021-01-21 RX ADMIN — MAGNESIUM OXIDE TAB 400 MG (241.3 MG ELEMENTAL MG) 400 MG: 400 (241.3 MG) TAB at 10:01

## 2021-01-21 RX ADMIN — DOCUSATE SODIUM 50 MG AND SENNOSIDES 8.6 MG 2 TABLET: 8.6; 5 TABLET, FILM COATED ORAL at 09:01

## 2021-01-21 RX ADMIN — AMIODARONE HYDROCHLORIDE 200 MG: 200 TABLET ORAL at 10:01

## 2021-01-21 RX ADMIN — ACETAMINOPHEN 650 MG: 325 TABLET ORAL at 10:01

## 2021-01-21 RX ADMIN — MUPIROCIN: 20 OINTMENT TOPICAL at 10:01

## 2021-01-21 RX ADMIN — FUROSEMIDE 40 MG: 40 TABLET ORAL at 10:01

## 2021-01-21 RX ADMIN — CETIRIZINE HYDROCHLORIDE 5 MG: 5 TABLET ORAL at 10:01

## 2021-01-22 LAB
ANION GAP SERPL CALC-SCNC: 10 MMOL/L (ref 8–16)
BASOPHILS # BLD AUTO: 0.04 K/UL (ref 0–0.2)
BASOPHILS NFR BLD: 0.4 % (ref 0–1.9)
BUN SERPL-MCNC: 19 MG/DL (ref 8–23)
CALCIUM SERPL-MCNC: 9.1 MG/DL (ref 8.7–10.5)
CHLORIDE SERPL-SCNC: 96 MMOL/L (ref 95–110)
CO2 SERPL-SCNC: 28 MMOL/L (ref 23–29)
CREAT SERPL-MCNC: 0.7 MG/DL (ref 0.5–1.4)
DIFFERENTIAL METHOD: ABNORMAL
EOSINOPHIL # BLD AUTO: 0.5 K/UL (ref 0–0.5)
EOSINOPHIL NFR BLD: 5.2 % (ref 0–8)
ERYTHROCYTE [DISTWIDTH] IN BLOOD BY AUTOMATED COUNT: 14.8 % (ref 11.5–14.5)
EST. GFR  (AFRICAN AMERICAN): >60 ML/MIN/1.73 M^2
EST. GFR  (NON AFRICAN AMERICAN): >60 ML/MIN/1.73 M^2
GLUCOSE SERPL-MCNC: 107 MG/DL (ref 70–110)
HCT VFR BLD AUTO: 37.3 % (ref 37–48.5)
HGB BLD-MCNC: 12.5 G/DL (ref 12–16)
IMM GRANULOCYTES # BLD AUTO: 0.09 K/UL (ref 0–0.04)
IMM GRANULOCYTES NFR BLD AUTO: 1 % (ref 0–0.5)
LYMPHOCYTES # BLD AUTO: 1.1 K/UL (ref 1–4.8)
LYMPHOCYTES NFR BLD: 11.7 % (ref 18–48)
MCH RBC QN AUTO: 31.6 PG (ref 27–31)
MCHC RBC AUTO-ENTMCNC: 33.5 G/DL (ref 32–36)
MCV RBC AUTO: 94 FL (ref 82–98)
MONOCYTES # BLD AUTO: 1.1 K/UL (ref 0.3–1)
MONOCYTES NFR BLD: 11.8 % (ref 4–15)
NEUTROPHILS # BLD AUTO: 6.3 K/UL (ref 1.8–7.7)
NEUTROPHILS NFR BLD: 69.9 % (ref 38–73)
NRBC BLD-RTO: 0 /100 WBC
PLATELET # BLD AUTO: 325 K/UL (ref 150–350)
PMV BLD AUTO: 9.7 FL (ref 9.2–12.9)
POCT GLUCOSE: 107 MG/DL (ref 70–110)
POCT GLUCOSE: 109 MG/DL (ref 70–110)
POCT GLUCOSE: 122 MG/DL (ref 70–110)
POCT GLUCOSE: 133 MG/DL (ref 70–110)
POTASSIUM SERPL-SCNC: 3.9 MMOL/L (ref 3.5–5.1)
RBC # BLD AUTO: 3.95 M/UL (ref 4–5.4)
SODIUM SERPL-SCNC: 134 MMOL/L (ref 136–145)
WBC # BLD AUTO: 9.06 K/UL (ref 3.9–12.7)

## 2021-01-22 PROCEDURE — 21400001 HC TELEMETRY ROOM

## 2021-01-22 PROCEDURE — 25000003 PHARM REV CODE 250: Performed by: HOSPITALIST

## 2021-01-22 PROCEDURE — 85025 COMPLETE CBC W/AUTO DIFF WBC: CPT

## 2021-01-22 PROCEDURE — 25000003 PHARM REV CODE 250: Performed by: INTERNAL MEDICINE

## 2021-01-22 PROCEDURE — 80048 BASIC METABOLIC PNL TOTAL CA: CPT

## 2021-01-22 PROCEDURE — 36415 COLL VENOUS BLD VENIPUNCTURE: CPT

## 2021-01-22 PROCEDURE — 94761 N-INVAS EAR/PLS OXIMETRY MLT: CPT

## 2021-01-22 PROCEDURE — 63600175 PHARM REV CODE 636 W HCPCS: Performed by: HOSPITALIST

## 2021-01-22 RX ADMIN — DOXYCYCLINE 100 MG: 100 INJECTION, POWDER, LYOPHILIZED, FOR SOLUTION INTRAVENOUS at 02:01

## 2021-01-22 RX ADMIN — METOPROLOL TARTRATE 25 MG: 25 TABLET, FILM COATED ORAL at 08:01

## 2021-01-22 RX ADMIN — LEVOTHYROXINE SODIUM 75 MCG: 75 TABLET ORAL at 05:01

## 2021-01-22 RX ADMIN — DOCUSATE SODIUM 50 MG AND SENNOSIDES 8.6 MG 2 TABLET: 8.6; 5 TABLET, FILM COATED ORAL at 08:01

## 2021-01-22 RX ADMIN — CETIRIZINE HYDROCHLORIDE 5 MG: 5 TABLET ORAL at 08:01

## 2021-01-22 RX ADMIN — ACETAMINOPHEN 650 MG: 325 TABLET ORAL at 05:01

## 2021-01-22 RX ADMIN — AMIODARONE HYDROCHLORIDE 200 MG: 200 TABLET ORAL at 08:01

## 2021-01-22 RX ADMIN — APIXABAN 2.5 MG: 2.5 TABLET, FILM COATED ORAL at 08:01

## 2021-01-22 RX ADMIN — CEFTRIAXONE 1 G: 1 INJECTION, SOLUTION INTRAVENOUS at 05:01

## 2021-01-22 RX ADMIN — APIXABAN 2.5 MG: 2.5 TABLET, FILM COATED ORAL at 09:01

## 2021-01-22 RX ADMIN — FUROSEMIDE 40 MG: 40 TABLET ORAL at 08:01

## 2021-01-22 RX ADMIN — METOPROLOL TARTRATE 25 MG: 25 TABLET, FILM COATED ORAL at 09:01

## 2021-01-22 RX ADMIN — DOCUSATE SODIUM 50 MG AND SENNOSIDES 8.6 MG 2 TABLET: 8.6; 5 TABLET, FILM COATED ORAL at 09:01

## 2021-01-22 RX ADMIN — ACETAMINOPHEN 650 MG: 325 TABLET ORAL at 08:01

## 2021-01-22 RX ADMIN — LOSARTAN POTASSIUM 100 MG: 25 TABLET, FILM COATED ORAL at 08:01

## 2021-01-22 RX ADMIN — MAGNESIUM OXIDE TAB 400 MG (241.3 MG ELEMENTAL MG) 400 MG: 400 (241.3 MG) TAB at 08:01

## 2021-01-23 LAB
ANION GAP SERPL CALC-SCNC: 10 MMOL/L (ref 8–16)
BASOPHILS # BLD AUTO: 0.05 K/UL (ref 0–0.2)
BASOPHILS NFR BLD: 0.5 % (ref 0–1.9)
BUN SERPL-MCNC: 20 MG/DL (ref 8–23)
CALCIUM SERPL-MCNC: 9.4 MG/DL (ref 8.7–10.5)
CHLORIDE SERPL-SCNC: 97 MMOL/L (ref 95–110)
CO2 SERPL-SCNC: 29 MMOL/L (ref 23–29)
CREAT SERPL-MCNC: 0.7 MG/DL (ref 0.5–1.4)
DIFFERENTIAL METHOD: ABNORMAL
EOSINOPHIL # BLD AUTO: 0.5 K/UL (ref 0–0.5)
EOSINOPHIL NFR BLD: 4.7 % (ref 0–8)
ERYTHROCYTE [DISTWIDTH] IN BLOOD BY AUTOMATED COUNT: 14.8 % (ref 11.5–14.5)
EST. GFR  (AFRICAN AMERICAN): >60 ML/MIN/1.73 M^2
EST. GFR  (NON AFRICAN AMERICAN): >60 ML/MIN/1.73 M^2
GLUCOSE SERPL-MCNC: 81 MG/DL (ref 70–110)
HCT VFR BLD AUTO: 37.7 % (ref 37–48.5)
HGB BLD-MCNC: 12.9 G/DL (ref 12–16)
IMM GRANULOCYTES # BLD AUTO: 0.16 K/UL (ref 0–0.04)
IMM GRANULOCYTES NFR BLD AUTO: 1.4 % (ref 0–0.5)
L PNEUMO AG UR QL IA: NEGATIVE
LYMPHOCYTES # BLD AUTO: 1.1 K/UL (ref 1–4.8)
LYMPHOCYTES NFR BLD: 10.3 % (ref 18–48)
MCH RBC QN AUTO: 31.7 PG (ref 27–31)
MCHC RBC AUTO-ENTMCNC: 34.2 G/DL (ref 32–36)
MCV RBC AUTO: 93 FL (ref 82–98)
MONOCYTES # BLD AUTO: 1.2 K/UL (ref 0.3–1)
MONOCYTES NFR BLD: 10.8 % (ref 4–15)
NEUTROPHILS # BLD AUTO: 8 K/UL (ref 1.8–7.7)
NEUTROPHILS NFR BLD: 72.3 % (ref 38–73)
NRBC BLD-RTO: 0 /100 WBC
PLATELET # BLD AUTO: 371 K/UL (ref 150–350)
PMV BLD AUTO: 9.6 FL (ref 9.2–12.9)
POCT GLUCOSE: 102 MG/DL (ref 70–110)
POCT GLUCOSE: 137 MG/DL (ref 70–110)
POCT GLUCOSE: 97 MG/DL (ref 70–110)
POTASSIUM SERPL-SCNC: 4.3 MMOL/L (ref 3.5–5.1)
RBC # BLD AUTO: 4.07 M/UL (ref 4–5.4)
S PNEUM AG UR QL: NOT DETECTED
SODIUM SERPL-SCNC: 136 MMOL/L (ref 136–145)
WBC # BLD AUTO: 11.11 K/UL (ref 3.9–12.7)

## 2021-01-23 PROCEDURE — 63600175 PHARM REV CODE 636 W HCPCS: Performed by: HOSPITALIST

## 2021-01-23 PROCEDURE — 25000003 PHARM REV CODE 250: Performed by: INTERNAL MEDICINE

## 2021-01-23 PROCEDURE — 80048 BASIC METABOLIC PNL TOTAL CA: CPT

## 2021-01-23 PROCEDURE — 21400001 HC TELEMETRY ROOM

## 2021-01-23 PROCEDURE — 85025 COMPLETE CBC W/AUTO DIFF WBC: CPT

## 2021-01-23 PROCEDURE — 25000003 PHARM REV CODE 250: Performed by: HOSPITALIST

## 2021-01-23 PROCEDURE — 36415 COLL VENOUS BLD VENIPUNCTURE: CPT

## 2021-01-23 RX ADMIN — DOCUSATE SODIUM 50 MG AND SENNOSIDES 8.6 MG 2 TABLET: 8.6; 5 TABLET, FILM COATED ORAL at 08:01

## 2021-01-23 RX ADMIN — FUROSEMIDE 40 MG: 40 TABLET ORAL at 09:01

## 2021-01-23 RX ADMIN — DOCUSATE SODIUM 50 MG AND SENNOSIDES 8.6 MG 2 TABLET: 8.6; 5 TABLET, FILM COATED ORAL at 09:01

## 2021-01-23 RX ADMIN — APIXABAN 2.5 MG: 2.5 TABLET, FILM COATED ORAL at 08:01

## 2021-01-23 RX ADMIN — AMIODARONE HYDROCHLORIDE 200 MG: 200 TABLET ORAL at 09:01

## 2021-01-23 RX ADMIN — CEFTRIAXONE 1 G: 1 INJECTION, SOLUTION INTRAVENOUS at 05:01

## 2021-01-23 RX ADMIN — METOPROLOL TARTRATE 25 MG: 25 TABLET, FILM COATED ORAL at 09:01

## 2021-01-23 RX ADMIN — LEVOTHYROXINE SODIUM 75 MCG: 75 TABLET ORAL at 06:01

## 2021-01-23 RX ADMIN — CETIRIZINE HYDROCHLORIDE 5 MG: 5 TABLET ORAL at 09:01

## 2021-01-23 RX ADMIN — ACETAMINOPHEN 650 MG: 325 TABLET ORAL at 09:01

## 2021-01-23 RX ADMIN — ACETAMINOPHEN 650 MG: 325 TABLET ORAL at 08:01

## 2021-01-23 RX ADMIN — LOSARTAN POTASSIUM 100 MG: 25 TABLET, FILM COATED ORAL at 09:01

## 2021-01-23 RX ADMIN — MAGNESIUM OXIDE TAB 400 MG (241.3 MG ELEMENTAL MG) 400 MG: 400 (241.3 MG) TAB at 09:01

## 2021-01-23 RX ADMIN — METOPROLOL TARTRATE 25 MG: 25 TABLET, FILM COATED ORAL at 08:01

## 2021-01-23 RX ADMIN — APIXABAN 2.5 MG: 2.5 TABLET, FILM COATED ORAL at 09:01

## 2021-01-24 VITALS
RESPIRATION RATE: 17 BRPM | SYSTOLIC BLOOD PRESSURE: 178 MMHG | WEIGHT: 100.31 LBS | BODY MASS INDEX: 22.56 KG/M2 | DIASTOLIC BLOOD PRESSURE: 76 MMHG | HEART RATE: 54 BPM | HEIGHT: 56 IN | TEMPERATURE: 98 F | OXYGEN SATURATION: 97 %

## 2021-01-24 LAB
ANION GAP SERPL CALC-SCNC: 8 MMOL/L (ref 8–16)
BASOPHILS # BLD AUTO: 0.07 K/UL (ref 0–0.2)
BASOPHILS NFR BLD: 0.8 % (ref 0–1.9)
BUN SERPL-MCNC: 22 MG/DL (ref 8–23)
CALCIUM SERPL-MCNC: 8.7 MG/DL (ref 8.7–10.5)
CHLORIDE SERPL-SCNC: 98 MMOL/L (ref 95–110)
CO2 SERPL-SCNC: 30 MMOL/L (ref 23–29)
CREAT SERPL-MCNC: 0.7 MG/DL (ref 0.5–1.4)
DIFFERENTIAL METHOD: ABNORMAL
EOSINOPHIL # BLD AUTO: 0.4 K/UL (ref 0–0.5)
EOSINOPHIL NFR BLD: 4.9 % (ref 0–8)
ERYTHROCYTE [DISTWIDTH] IN BLOOD BY AUTOMATED COUNT: 14.6 % (ref 11.5–14.5)
EST. GFR  (AFRICAN AMERICAN): >60 ML/MIN/1.73 M^2
EST. GFR  (NON AFRICAN AMERICAN): >60 ML/MIN/1.73 M^2
GLUCOSE SERPL-MCNC: 84 MG/DL (ref 70–110)
HCT VFR BLD AUTO: 36 % (ref 37–48.5)
HGB BLD-MCNC: 12 G/DL (ref 12–16)
IMM GRANULOCYTES # BLD AUTO: 0.16 K/UL (ref 0–0.04)
IMM GRANULOCYTES NFR BLD AUTO: 1.8 % (ref 0–0.5)
LYMPHOCYTES # BLD AUTO: 1 K/UL (ref 1–4.8)
LYMPHOCYTES NFR BLD: 11 % (ref 18–48)
MCH RBC QN AUTO: 31.6 PG (ref 27–31)
MCHC RBC AUTO-ENTMCNC: 33.3 G/DL (ref 32–36)
MCV RBC AUTO: 95 FL (ref 82–98)
MONOCYTES # BLD AUTO: 1.1 K/UL (ref 0.3–1)
MONOCYTES NFR BLD: 11.6 % (ref 4–15)
NEUTROPHILS # BLD AUTO: 6.3 K/UL (ref 1.8–7.7)
NEUTROPHILS NFR BLD: 69.9 % (ref 38–73)
NRBC BLD-RTO: 0 /100 WBC
PLATELET # BLD AUTO: 360 K/UL (ref 150–350)
PMV BLD AUTO: 9 FL (ref 9.2–12.9)
POCT GLUCOSE: 130 MG/DL (ref 70–110)
POCT GLUCOSE: 91 MG/DL (ref 70–110)
POTASSIUM SERPL-SCNC: 4.4 MMOL/L (ref 3.5–5.1)
RBC # BLD AUTO: 3.8 M/UL (ref 4–5.4)
SODIUM SERPL-SCNC: 136 MMOL/L (ref 136–145)
WBC # BLD AUTO: 9.05 K/UL (ref 3.9–12.7)

## 2021-01-24 PROCEDURE — 25000003 PHARM REV CODE 250: Performed by: HOSPITALIST

## 2021-01-24 PROCEDURE — 80048 BASIC METABOLIC PNL TOTAL CA: CPT

## 2021-01-24 PROCEDURE — 25000003 PHARM REV CODE 250: Performed by: INTERNAL MEDICINE

## 2021-01-24 PROCEDURE — 85025 COMPLETE CBC W/AUTO DIFF WBC: CPT

## 2021-01-24 PROCEDURE — 36415 COLL VENOUS BLD VENIPUNCTURE: CPT

## 2021-01-24 RX ORDER — CETIRIZINE HYDROCHLORIDE 5 MG/1
5 TABLET ORAL DAILY
Qty: 30 TABLET | Refills: 0 | Status: ON HOLD | OUTPATIENT
Start: 2021-01-25 | End: 2021-04-28 | Stop reason: HOSPADM

## 2021-01-24 RX ORDER — FUROSEMIDE 40 MG/1
40 TABLET ORAL DAILY
Qty: 30 TABLET | Refills: 11 | Status: SHIPPED | OUTPATIENT
Start: 2021-01-25 | End: 2021-01-24

## 2021-01-24 RX ORDER — FUROSEMIDE 40 MG/1
40 TABLET ORAL DAILY
Qty: 30 TABLET | Refills: 11 | Status: ON HOLD | OUTPATIENT
Start: 2021-01-25 | End: 2021-04-28 | Stop reason: SDUPTHER

## 2021-01-24 RX ORDER — CETIRIZINE HYDROCHLORIDE 5 MG/1
5 TABLET ORAL DAILY
Qty: 30 TABLET | Refills: 0 | Status: SHIPPED | OUTPATIENT
Start: 2021-01-25 | End: 2021-01-24

## 2021-01-24 RX ADMIN — LEVOTHYROXINE SODIUM 75 MCG: 75 TABLET ORAL at 08:01

## 2021-01-24 RX ADMIN — FUROSEMIDE 40 MG: 40 TABLET ORAL at 08:01

## 2021-01-24 RX ADMIN — APIXABAN 2.5 MG: 2.5 TABLET, FILM COATED ORAL at 08:01

## 2021-01-24 RX ADMIN — METOPROLOL TARTRATE 25 MG: 25 TABLET, FILM COATED ORAL at 08:01

## 2021-01-24 RX ADMIN — ACETAMINOPHEN 650 MG: 325 TABLET ORAL at 08:01

## 2021-01-24 RX ADMIN — AMIODARONE HYDROCHLORIDE 200 MG: 200 TABLET ORAL at 08:01

## 2021-01-24 RX ADMIN — MAGNESIUM OXIDE TAB 400 MG (241.3 MG ELEMENTAL MG) 400 MG: 400 (241.3 MG) TAB at 08:01

## 2021-01-24 RX ADMIN — LOSARTAN POTASSIUM 100 MG: 25 TABLET, FILM COATED ORAL at 08:01

## 2021-01-24 RX ADMIN — ACETAMINOPHEN 650 MG: 325 TABLET ORAL at 01:01

## 2021-01-24 RX ADMIN — DOCUSATE SODIUM 50 MG AND SENNOSIDES 8.6 MG 2 TABLET: 8.6; 5 TABLET, FILM COATED ORAL at 08:01

## 2021-01-24 RX ADMIN — CETIRIZINE HYDROCHLORIDE 5 MG: 5 TABLET ORAL at 08:01

## 2021-01-27 ENCOUNTER — PATIENT OUTREACH (OUTPATIENT)
Dept: ADMINISTRATIVE | Facility: CLINIC | Age: 86
End: 2021-01-27

## 2021-02-05 ENCOUNTER — EXTERNAL HOME HEALTH (OUTPATIENT)
Dept: HOME HEALTH SERVICES | Facility: HOSPITAL | Age: 86
End: 2021-02-05

## 2021-04-19 ENCOUNTER — HOSPITAL ENCOUNTER (INPATIENT)
Facility: HOSPITAL | Age: 86
LOS: 9 days | Discharge: HOSPICE | DRG: 189 | End: 2021-04-28
Attending: EMERGENCY MEDICINE | Admitting: STUDENT IN AN ORGANIZED HEALTH CARE EDUCATION/TRAINING PROGRAM
Payer: MEDICARE

## 2021-04-19 DIAGNOSIS — R09.02 HYPOXIA: ICD-10-CM

## 2021-04-19 DIAGNOSIS — J96.21 ACUTE ON CHRONIC RESPIRATORY FAILURE WITH HYPOXIA: ICD-10-CM

## 2021-04-19 DIAGNOSIS — I50.9 ACUTE ON CHRONIC CONGESTIVE HEART FAILURE, UNSPECIFIED HEART FAILURE TYPE: Primary | ICD-10-CM

## 2021-04-19 DIAGNOSIS — R07.9 CHEST PAIN: ICD-10-CM

## 2021-04-19 DIAGNOSIS — J44.9 CHRONIC OBSTRUCTIVE PULMONARY DISEASE, UNSPECIFIED COPD TYPE: ICD-10-CM

## 2021-04-19 DIAGNOSIS — I50.33 ACUTE ON CHRONIC DIASTOLIC CONGESTIVE HEART FAILURE: ICD-10-CM

## 2021-04-19 DIAGNOSIS — Z51.5 PALLIATIVE CARE ENCOUNTER: ICD-10-CM

## 2021-04-19 DIAGNOSIS — J96.01 ACUTE RESPIRATORY FAILURE WITH HYPOXIA: ICD-10-CM

## 2021-04-19 LAB
ALBUMIN SERPL BCP-MCNC: 3.4 G/DL (ref 3.5–5.2)
ALLENS TEST: ABNORMAL
ALLENS TEST: ABNORMAL
ALP SERPL-CCNC: 88 U/L (ref 55–135)
ALT SERPL W/O P-5'-P-CCNC: 35 U/L (ref 10–44)
ANION GAP SERPL CALC-SCNC: 12 MMOL/L (ref 8–16)
AST SERPL-CCNC: 26 U/L (ref 10–40)
BASOPHILS # BLD AUTO: 0.02 K/UL (ref 0–0.2)
BASOPHILS NFR BLD: 0.1 % (ref 0–1.9)
BILIRUB SERPL-MCNC: 1.7 MG/DL (ref 0.1–1)
BNP SERPL-MCNC: 1237 PG/ML (ref 0–99)
BUN SERPL-MCNC: 18 MG/DL (ref 8–23)
CALCIUM SERPL-MCNC: 9 MG/DL (ref 8.7–10.5)
CHLORIDE SERPL-SCNC: 101 MMOL/L (ref 95–110)
CO2 SERPL-SCNC: 25 MMOL/L (ref 23–29)
CREAT SERPL-MCNC: 0.9 MG/DL (ref 0.5–1.4)
CTP QC/QA: YES
D DIMER PPP IA.FEU-MCNC: 1.15 MG/L FEU
DELSYS: ABNORMAL
DELSYS: ABNORMAL
DIFFERENTIAL METHOD: ABNORMAL
EOSINOPHIL # BLD AUTO: 0 K/UL (ref 0–0.5)
EOSINOPHIL NFR BLD: 0.1 % (ref 0–8)
ERYTHROCYTE [DISTWIDTH] IN BLOOD BY AUTOMATED COUNT: 13.2 % (ref 11.5–14.5)
EST. GFR  (AFRICAN AMERICAN): >60 ML/MIN/1.73 M^2
EST. GFR  (NON AFRICAN AMERICAN): 58 ML/MIN/1.73 M^2
FIO2: 100
FLOW: 15
FLOW: 5
GLUCOSE SERPL-MCNC: 123 MG/DL (ref 70–110)
HCO3 UR-SCNC: 25.8 MMOL/L (ref 24–28)
HCO3 UR-SCNC: 25.9 MMOL/L (ref 24–28)
HCT VFR BLD AUTO: 33.7 % (ref 37–48.5)
HGB BLD-MCNC: 11.6 G/DL (ref 12–16)
IMM GRANULOCYTES # BLD AUTO: 0.06 K/UL (ref 0–0.04)
IMM GRANULOCYTES NFR BLD AUTO: 0.3 % (ref 0–0.5)
LACTATE SERPL-SCNC: 1.4 MMOL/L (ref 0.5–2.2)
LYMPHOCYTES # BLD AUTO: 0.5 K/UL (ref 1–4.8)
LYMPHOCYTES NFR BLD: 2.8 % (ref 18–48)
MCH RBC QN AUTO: 32.4 PG (ref 27–31)
MCHC RBC AUTO-ENTMCNC: 34.4 G/DL (ref 32–36)
MCV RBC AUTO: 94 FL (ref 82–98)
MODE: ABNORMAL
MODE: ABNORMAL
MONOCYTES # BLD AUTO: 1.5 K/UL (ref 0.3–1)
MONOCYTES NFR BLD: 8.7 % (ref 4–15)
NEUTROPHILS # BLD AUTO: 15.5 K/UL (ref 1.8–7.7)
NEUTROPHILS NFR BLD: 88 % (ref 38–73)
NRBC BLD-RTO: 0 /100 WBC
PCO2 BLDA: 36.1 MMHG (ref 35–45)
PCO2 BLDA: 37.1 MMHG (ref 35–45)
PH SMN: 7.45 [PH] (ref 7.35–7.45)
PH SMN: 7.46 [PH] (ref 7.35–7.45)
PLATELET # BLD AUTO: 295 K/UL (ref 150–450)
PMV BLD AUTO: 9.7 FL (ref 9.2–12.9)
PO2 BLDA: 43 MMHG (ref 80–100)
PO2 BLDA: 69 MMHG (ref 80–100)
POC BE: 2 MMOL/L
POC BE: 2 MMOL/L
POC SATURATED O2: 81 % (ref 95–100)
POC SATURATED O2: 95 % (ref 95–100)
POC TCO2: 27 MMOL/L (ref 23–27)
POC TCO2: 27 MMOL/L (ref 23–27)
POTASSIUM SERPL-SCNC: 3.6 MMOL/L (ref 3.5–5.1)
PROT SERPL-MCNC: 6.4 G/DL (ref 6–8.4)
RBC # BLD AUTO: 3.58 M/UL (ref 4–5.4)
SAMPLE: ABNORMAL
SAMPLE: ABNORMAL
SARS-COV-2 RDRP RESP QL NAA+PROBE: NEGATIVE
SITE: ABNORMAL
SITE: ABNORMAL
SODIUM SERPL-SCNC: 138 MMOL/L (ref 136–145)
TROPONIN I SERPL DL<=0.01 NG/ML-MCNC: 0.02 NG/ML (ref 0–0.03)
WBC # BLD AUTO: 17.56 K/UL (ref 3.9–12.7)

## 2021-04-19 PROCEDURE — 25000242 PHARM REV CODE 250 ALT 637 W/ HCPCS: Performed by: EMERGENCY MEDICINE

## 2021-04-19 PROCEDURE — 94761 N-INVAS EAR/PLS OXIMETRY MLT: CPT

## 2021-04-19 PROCEDURE — 82803 BLOOD GASES ANY COMBINATION: CPT

## 2021-04-19 PROCEDURE — 96375 TX/PRO/DX INJ NEW DRUG ADDON: CPT

## 2021-04-19 PROCEDURE — 94640 AIRWAY INHALATION TREATMENT: CPT

## 2021-04-19 PROCEDURE — 63600175 PHARM REV CODE 636 W HCPCS: Performed by: STUDENT IN AN ORGANIZED HEALTH CARE EDUCATION/TRAINING PROGRAM

## 2021-04-19 PROCEDURE — 84145 PROCALCITONIN (PCT): CPT | Performed by: INTERNAL MEDICINE

## 2021-04-19 PROCEDURE — 85025 COMPLETE CBC W/AUTO DIFF WBC: CPT | Performed by: EMERGENCY MEDICINE

## 2021-04-19 PROCEDURE — 99900035 HC TECH TIME PER 15 MIN (STAT)

## 2021-04-19 PROCEDURE — 12000002 HC ACUTE/MED SURGE SEMI-PRIVATE ROOM

## 2021-04-19 PROCEDURE — 36600 WITHDRAWAL OF ARTERIAL BLOOD: CPT

## 2021-04-19 PROCEDURE — 84484 ASSAY OF TROPONIN QUANT: CPT | Mod: 91 | Performed by: INTERNAL MEDICINE

## 2021-04-19 PROCEDURE — U0002 COVID-19 LAB TEST NON-CDC: HCPCS | Performed by: EMERGENCY MEDICINE

## 2021-04-19 PROCEDURE — 63600175 PHARM REV CODE 636 W HCPCS: Performed by: EMERGENCY MEDICINE

## 2021-04-19 PROCEDURE — 83605 ASSAY OF LACTIC ACID: CPT | Performed by: INTERNAL MEDICINE

## 2021-04-19 PROCEDURE — 25000003 PHARM REV CODE 250: Performed by: STUDENT IN AN ORGANIZED HEALTH CARE EDUCATION/TRAINING PROGRAM

## 2021-04-19 PROCEDURE — 84484 ASSAY OF TROPONIN QUANT: CPT | Performed by: EMERGENCY MEDICINE

## 2021-04-19 PROCEDURE — 83880 ASSAY OF NATRIURETIC PEPTIDE: CPT | Performed by: EMERGENCY MEDICINE

## 2021-04-19 PROCEDURE — 27000190 HC CPAP FULL FACE MASK W/VALVE

## 2021-04-19 PROCEDURE — 85379 FIBRIN DEGRADATION QUANT: CPT | Performed by: EMERGENCY MEDICINE

## 2021-04-19 PROCEDURE — 99291 CRITICAL CARE FIRST HOUR: CPT | Mod: 25

## 2021-04-19 PROCEDURE — 87040 BLOOD CULTURE FOR BACTERIA: CPT | Performed by: EMERGENCY MEDICINE

## 2021-04-19 PROCEDURE — 96374 THER/PROPH/DIAG INJ IV PUSH: CPT

## 2021-04-19 PROCEDURE — 63600175 PHARM REV CODE 636 W HCPCS: Performed by: INTERNAL MEDICINE

## 2021-04-19 PROCEDURE — 94660 CPAP INITIATION&MGMT: CPT

## 2021-04-19 PROCEDURE — 80053 COMPREHEN METABOLIC PANEL: CPT | Performed by: EMERGENCY MEDICINE

## 2021-04-19 PROCEDURE — 25000003 PHARM REV CODE 250: Performed by: EMERGENCY MEDICINE

## 2021-04-19 RX ORDER — METHYLPREDNISOLONE SOD SUCC 125 MG
125 VIAL (EA) INJECTION
Status: COMPLETED | OUTPATIENT
Start: 2021-04-19 | End: 2021-04-19

## 2021-04-19 RX ORDER — IPRATROPIUM BROMIDE AND ALBUTEROL SULFATE 2.5; .5 MG/3ML; MG/3ML
3 SOLUTION RESPIRATORY (INHALATION)
Status: COMPLETED | OUTPATIENT
Start: 2021-04-19 | End: 2021-04-19

## 2021-04-19 RX ORDER — IBUPROFEN 200 MG
16 TABLET ORAL
Status: DISCONTINUED | OUTPATIENT
Start: 2021-04-19 | End: 2021-04-29 | Stop reason: HOSPADM

## 2021-04-19 RX ORDER — METOPROLOL TARTRATE 50 MG/1
50 TABLET ORAL 2 TIMES DAILY
Status: DISCONTINUED | OUTPATIENT
Start: 2021-04-19 | End: 2021-04-23

## 2021-04-19 RX ORDER — ONDANSETRON 4 MG/1
4 TABLET, ORALLY DISINTEGRATING ORAL EVERY 6 HOURS PRN
Status: DISCONTINUED | OUTPATIENT
Start: 2021-04-19 | End: 2021-04-29 | Stop reason: HOSPADM

## 2021-04-19 RX ORDER — ACETAMINOPHEN 500 MG
500 TABLET ORAL 3 TIMES DAILY
Status: DISCONTINUED | OUTPATIENT
Start: 2021-04-19 | End: 2021-04-19

## 2021-04-19 RX ORDER — ATORVASTATIN CALCIUM 40 MG/1
40 TABLET, FILM COATED ORAL NIGHTLY
Status: DISCONTINUED | OUTPATIENT
Start: 2021-04-19 | End: 2021-04-29 | Stop reason: HOSPADM

## 2021-04-19 RX ORDER — LEVOTHYROXINE SODIUM 75 UG/1
75 TABLET ORAL
Status: DISCONTINUED | OUTPATIENT
Start: 2021-04-20 | End: 2021-04-29 | Stop reason: HOSPADM

## 2021-04-19 RX ORDER — FUROSEMIDE 10 MG/ML
40 INJECTION INTRAMUSCULAR; INTRAVENOUS
Status: COMPLETED | OUTPATIENT
Start: 2021-04-19 | End: 2021-04-19

## 2021-04-19 RX ORDER — SENNOSIDES 8.6 MG/1
8.6 TABLET ORAL DAILY PRN
Status: DISCONTINUED | OUTPATIENT
Start: 2021-04-19 | End: 2021-04-29 | Stop reason: HOSPADM

## 2021-04-19 RX ORDER — TRAZODONE HYDROCHLORIDE 50 MG/1
50 TABLET ORAL NIGHTLY PRN
Status: DISCONTINUED | OUTPATIENT
Start: 2021-04-19 | End: 2021-04-29 | Stop reason: HOSPADM

## 2021-04-19 RX ORDER — ACETAMINOPHEN 500 MG
500 TABLET ORAL EVERY 6 HOURS PRN
Status: DISCONTINUED | OUTPATIENT
Start: 2021-04-19 | End: 2021-04-29 | Stop reason: HOSPADM

## 2021-04-19 RX ORDER — IBUPROFEN 200 MG
24 TABLET ORAL
Status: DISCONTINUED | OUTPATIENT
Start: 2021-04-19 | End: 2021-04-29 | Stop reason: HOSPADM

## 2021-04-19 RX ORDER — AMIODARONE HYDROCHLORIDE 200 MG/1
200 TABLET ORAL DAILY
Status: DISCONTINUED | OUTPATIENT
Start: 2021-04-20 | End: 2021-04-21

## 2021-04-19 RX ORDER — SODIUM CHLORIDE 0.9 % (FLUSH) 0.9 %
10 SYRINGE (ML) INJECTION
Status: DISCONTINUED | OUTPATIENT
Start: 2021-04-19 | End: 2021-04-29 | Stop reason: HOSPADM

## 2021-04-19 RX ORDER — CETIRIZINE HYDROCHLORIDE 5 MG/1
5 TABLET ORAL DAILY
Status: DISCONTINUED | OUTPATIENT
Start: 2021-04-20 | End: 2021-04-29 | Stop reason: HOSPADM

## 2021-04-19 RX ORDER — FUROSEMIDE 10 MG/ML
40 INJECTION INTRAMUSCULAR; INTRAVENOUS
Status: DISCONTINUED | OUTPATIENT
Start: 2021-04-19 | End: 2021-04-21

## 2021-04-19 RX ORDER — GLUCAGON 1 MG
1 KIT INJECTION
Status: DISCONTINUED | OUTPATIENT
Start: 2021-04-19 | End: 2021-04-29 | Stop reason: HOSPADM

## 2021-04-19 RX ORDER — LOSARTAN POTASSIUM 25 MG/1
100 TABLET ORAL DAILY
Status: DISCONTINUED | OUTPATIENT
Start: 2021-04-20 | End: 2021-04-21

## 2021-04-19 RX ADMIN — FUROSEMIDE 40 MG: 10 INJECTION, SOLUTION INTRAVENOUS at 04:04

## 2021-04-19 RX ADMIN — IPRATROPIUM BROMIDE AND ALBUTEROL SULFATE 3 ML: .5; 3 SOLUTION RESPIRATORY (INHALATION) at 04:04

## 2021-04-19 RX ADMIN — FUROSEMIDE 40 MG: 10 INJECTION, SOLUTION INTRAVENOUS at 07:04

## 2021-04-19 RX ADMIN — METOPROLOL TARTRATE 50 MG: 50 TABLET, FILM COATED ORAL at 09:04

## 2021-04-19 RX ADMIN — METHYLPREDNISOLONE SODIUM SUCCINATE 125 MG: 125 INJECTION, POWDER, FOR SOLUTION INTRAMUSCULAR; INTRAVENOUS at 05:04

## 2021-04-19 RX ADMIN — ACETAMINOPHEN 500 MG: 500 TABLET, FILM COATED ORAL at 09:04

## 2021-04-19 RX ADMIN — CEFTRIAXONE 1 G: 1 INJECTION, SOLUTION INTRAVENOUS at 11:04

## 2021-04-19 RX ADMIN — ATORVASTATIN CALCIUM 40 MG: 40 TABLET, FILM COATED ORAL at 09:04

## 2021-04-19 RX ADMIN — CEFTRIAXONE 1 G: 1 INJECTION, SOLUTION INTRAVENOUS at 06:04

## 2021-04-19 RX ADMIN — AZITHROMYCIN MONOHYDRATE 500 MG: 500 INJECTION, POWDER, LYOPHILIZED, FOR SOLUTION INTRAVENOUS at 07:04

## 2021-04-19 RX ADMIN — APIXABAN 2.5 MG: 2.5 TABLET, FILM COATED ORAL at 09:04

## 2021-04-20 PROBLEM — J96.01 ACUTE RESPIRATORY FAILURE WITH HYPOXIA: Status: RESOLVED | Noted: 2021-04-19 | Resolved: 2021-04-20

## 2021-04-20 PROBLEM — D64.9 NORMOCYTIC ANEMIA: Status: ACTIVE | Noted: 2021-04-20

## 2021-04-20 PROBLEM — N18.32 STAGE 3B CHRONIC KIDNEY DISEASE: Status: ACTIVE | Noted: 2021-04-20

## 2021-04-20 PROBLEM — J96.01 ACUTE RESPIRATORY FAILURE WITH HYPOXIA: Status: ACTIVE | Noted: 2021-04-19

## 2021-04-20 PROBLEM — R91.8 GROUND GLASS OPACITY PRESENT ON IMAGING OF LUNG: Status: ACTIVE | Noted: 2021-04-20

## 2021-04-20 PROBLEM — I50.9 ACUTE ON CHRONIC CONGESTIVE HEART FAILURE: Status: ACTIVE | Noted: 2021-04-20

## 2021-04-20 LAB
ALLENS TEST: ABNORMAL
ANION GAP SERPL CALC-SCNC: 12 MMOL/L (ref 8–16)
BASOPHILS # BLD AUTO: 0.02 K/UL (ref 0–0.2)
BASOPHILS NFR BLD: 0.1 % (ref 0–1.9)
BUN SERPL-MCNC: 14 MG/DL (ref 8–23)
CALCIUM SERPL-MCNC: 9 MG/DL (ref 8.7–10.5)
CHLORIDE SERPL-SCNC: 99 MMOL/L (ref 95–110)
CO2 SERPL-SCNC: 27 MMOL/L (ref 23–29)
CREAT SERPL-MCNC: 0.8 MG/DL (ref 0.5–1.4)
DELSYS: ABNORMAL
DIFFERENTIAL METHOD: ABNORMAL
EOSINOPHIL # BLD AUTO: 0 K/UL (ref 0–0.5)
EOSINOPHIL NFR BLD: 0 % (ref 0–8)
EP: 5
ERYTHROCYTE [DISTWIDTH] IN BLOOD BY AUTOMATED COUNT: 13.5 % (ref 11.5–14.5)
ERYTHROCYTE [SEDIMENTATION RATE] IN BLOOD BY WESTERGREN METHOD: 10 MM/H
EST. GFR  (AFRICAN AMERICAN): >60 ML/MIN/1.73 M^2
EST. GFR  (NON AFRICAN AMERICAN): >60 ML/MIN/1.73 M^2
FIO2: 80
FLOW: 15
GLUCOSE SERPL-MCNC: 149 MG/DL (ref 70–110)
HCO3 UR-SCNC: 28.4 MMOL/L (ref 24–28)
HCO3 UR-SCNC: 29.6 MMOL/L (ref 24–28)
HCO3 UR-SCNC: 30 MMOL/L (ref 24–28)
HCO3 UR-SCNC: 30.4 MMOL/L (ref 24–28)
HCO3 UR-SCNC: 30.7 MMOL/L (ref 24–28)
HCT VFR BLD AUTO: 33.9 % (ref 37–48.5)
HGB BLD-MCNC: 11.5 G/DL (ref 12–16)
IMM GRANULOCYTES # BLD AUTO: 0.08 K/UL (ref 0–0.04)
IMM GRANULOCYTES NFR BLD AUTO: 0.4 % (ref 0–0.5)
IP: 14
LYMPHOCYTES # BLD AUTO: 0.3 K/UL (ref 1–4.8)
LYMPHOCYTES NFR BLD: 1.8 % (ref 18–48)
MCH RBC QN AUTO: 32.1 PG (ref 27–31)
MCHC RBC AUTO-ENTMCNC: 33.9 G/DL (ref 32–36)
MCV RBC AUTO: 95 FL (ref 82–98)
MODE: ABNORMAL
MONOCYTES # BLD AUTO: 0.7 K/UL (ref 0.3–1)
MONOCYTES NFR BLD: 3.9 % (ref 4–15)
NEUTROPHILS # BLD AUTO: 17.4 K/UL (ref 1.8–7.7)
NEUTROPHILS NFR BLD: 93.8 % (ref 38–73)
NRBC BLD-RTO: 0 /100 WBC
PCO2 BLDA: 40 MMHG (ref 35–45)
PCO2 BLDA: 40 MMHG (ref 35–45)
PCO2 BLDA: 41.4 MMHG (ref 35–45)
PCO2 BLDA: 42.3 MMHG (ref 35–45)
PCO2 BLDA: 46.2 MMHG (ref 35–45)
PH SMN: 7.43 [PH] (ref 7.35–7.45)
PH SMN: 7.46 [PH] (ref 7.35–7.45)
PH SMN: 7.47 [PH] (ref 7.35–7.45)
PH SMN: 7.47 [PH] (ref 7.35–7.45)
PH SMN: 7.48 [PH] (ref 7.35–7.45)
PLATELET # BLD AUTO: 310 K/UL (ref 150–450)
PMV BLD AUTO: 9.7 FL (ref 9.2–12.9)
PO2 BLDA: 29 MMHG (ref 80–100)
PO2 BLDA: 31 MMHG (ref 80–100)
PO2 BLDA: 33 MMHG (ref 40–60)
PO2 BLDA: 64 MMHG (ref 80–100)
PO2 BLDA: 77 MMHG (ref 80–100)
POC BE: 4 MMOL/L
POC BE: 5 MMOL/L
POC BE: 6 MMOL/L
POC SATURATED O2: 59 % (ref 95–100)
POC SATURATED O2: 61 % (ref 95–100)
POC SATURATED O2: 68 % (ref 95–100)
POC SATURATED O2: 93 % (ref 95–100)
POC SATURATED O2: 96 % (ref 95–100)
POC TCO2: 30 MMOL/L (ref 23–27)
POC TCO2: 31 MMOL/L (ref 23–27)
POC TCO2: 31 MMOL/L (ref 23–27)
POC TCO2: 32 MMOL/L (ref 23–27)
POC TCO2: 32 MMOL/L (ref 24–29)
POTASSIUM SERPL-SCNC: 3.4 MMOL/L (ref 3.5–5.1)
PROCALCITONIN SERPL IA-MCNC: 0.18 NG/ML
RBC # BLD AUTO: 3.58 M/UL (ref 4–5.4)
SAMPLE: ABNORMAL
SITE: ABNORMAL
SODIUM SERPL-SCNC: 138 MMOL/L (ref 136–145)
SP02: 96
SPONT RATE: 21
TROPONIN I SERPL DL<=0.01 NG/ML-MCNC: 0.03 NG/ML (ref 0–0.03)
TROPONIN I SERPL DL<=0.01 NG/ML-MCNC: 0.03 NG/ML (ref 0–0.03)
WBC # BLD AUTO: 18.52 K/UL (ref 3.9–12.7)

## 2021-04-20 PROCEDURE — 85025 COMPLETE CBC W/AUTO DIFF WBC: CPT | Performed by: STUDENT IN AN ORGANIZED HEALTH CARE EDUCATION/TRAINING PROGRAM

## 2021-04-20 PROCEDURE — 36600 WITHDRAWAL OF ARTERIAL BLOOD: CPT

## 2021-04-20 PROCEDURE — 99900035 HC TECH TIME PER 15 MIN (STAT)

## 2021-04-20 PROCEDURE — 63600175 PHARM REV CODE 636 W HCPCS: Performed by: STUDENT IN AN ORGANIZED HEALTH CARE EDUCATION/TRAINING PROGRAM

## 2021-04-20 PROCEDURE — 25000003 PHARM REV CODE 250: Performed by: STUDENT IN AN ORGANIZED HEALTH CARE EDUCATION/TRAINING PROGRAM

## 2021-04-20 PROCEDURE — 11000001 HC ACUTE MED/SURG PRIVATE ROOM

## 2021-04-20 PROCEDURE — 80048 BASIC METABOLIC PNL TOTAL CA: CPT | Performed by: STUDENT IN AN ORGANIZED HEALTH CARE EDUCATION/TRAINING PROGRAM

## 2021-04-20 PROCEDURE — 27100171 HC OXYGEN HIGH FLOW UP TO 24 HOURS

## 2021-04-20 PROCEDURE — 82803 BLOOD GASES ANY COMBINATION: CPT

## 2021-04-20 PROCEDURE — 20000000 HC ICU ROOM

## 2021-04-20 PROCEDURE — 94761 N-INVAS EAR/PLS OXIMETRY MLT: CPT

## 2021-04-20 PROCEDURE — 84484 ASSAY OF TROPONIN QUANT: CPT | Performed by: INTERNAL MEDICINE

## 2021-04-20 RX ADMIN — APIXABAN 2.5 MG: 2.5 TABLET, FILM COATED ORAL at 10:04

## 2021-04-20 RX ADMIN — APIXABAN 2.5 MG: 2.5 TABLET, FILM COATED ORAL at 09:04

## 2021-04-20 RX ADMIN — METOPROLOL TARTRATE 50 MG: 50 TABLET, FILM COATED ORAL at 09:04

## 2021-04-20 RX ADMIN — TRAZODONE HYDROCHLORIDE 50 MG: 50 TABLET ORAL at 10:04

## 2021-04-20 RX ADMIN — CETIRIZINE HYDROCHLORIDE 5 MG: 5 TABLET ORAL at 09:04

## 2021-04-20 RX ADMIN — AZITHROMYCIN MONOHYDRATE 500 MG: 500 INJECTION, POWDER, LYOPHILIZED, FOR SOLUTION INTRAVENOUS at 05:04

## 2021-04-20 RX ADMIN — FUROSEMIDE 40 MG: 10 INJECTION, SOLUTION INTRAVENOUS at 07:04

## 2021-04-20 RX ADMIN — CEFTRIAXONE 2 G: 2 INJECTION, SOLUTION INTRAVENOUS at 10:04

## 2021-04-20 RX ADMIN — ACETAMINOPHEN 500 MG: 500 TABLET, FILM COATED ORAL at 10:04

## 2021-04-20 RX ADMIN — AMIODARONE HYDROCHLORIDE 200 MG: 200 TABLET ORAL at 09:04

## 2021-04-20 RX ADMIN — LOSARTAN POTASSIUM 100 MG: 25 TABLET, FILM COATED ORAL at 09:04

## 2021-04-20 RX ADMIN — METOPROLOL TARTRATE 50 MG: 50 TABLET, FILM COATED ORAL at 10:04

## 2021-04-20 RX ADMIN — LEVOTHYROXINE SODIUM 75 MCG: 75 TABLET ORAL at 06:04

## 2021-04-20 RX ADMIN — ATORVASTATIN CALCIUM 40 MG: 40 TABLET, FILM COATED ORAL at 10:04

## 2021-04-20 RX ADMIN — FUROSEMIDE 40 MG: 10 INJECTION, SOLUTION INTRAVENOUS at 06:04

## 2021-04-20 RX ADMIN — ACETAMINOPHEN 500 MG: 500 TABLET, FILM COATED ORAL at 03:04

## 2021-04-21 ENCOUNTER — ANESTHESIA EVENT (OUTPATIENT)
Dept: INTENSIVE CARE | Facility: HOSPITAL | Age: 86
DRG: 189 | End: 2021-04-21
Payer: MEDICARE

## 2021-04-21 ENCOUNTER — ANESTHESIA (OUTPATIENT)
Dept: INTENSIVE CARE | Facility: HOSPITAL | Age: 86
DRG: 189 | End: 2021-04-21
Payer: MEDICARE

## 2021-04-21 LAB
ALLENS TEST: ABNORMAL
ANION GAP SERPL CALC-SCNC: 14 MMOL/L (ref 8–16)
BASOPHILS # BLD AUTO: 0.03 K/UL (ref 0–0.2)
BASOPHILS NFR BLD: 0.1 % (ref 0–1.9)
BNP SERPL-MCNC: 633 PG/ML (ref 0–99)
BUN SERPL-MCNC: 17 MG/DL (ref 8–23)
CALCIUM SERPL-MCNC: 8.5 MG/DL (ref 8.7–10.5)
CHLORIDE SERPL-SCNC: 98 MMOL/L (ref 95–110)
CO2 SERPL-SCNC: 28 MMOL/L (ref 23–29)
CREAT SERPL-MCNC: 0.8 MG/DL (ref 0.5–1.4)
CRP SERPL-MCNC: 196.64 MG/L (ref 0–3.19)
DELSYS: ABNORMAL
DIFFERENTIAL METHOD: ABNORMAL
EOSINOPHIL # BLD AUTO: 0 K/UL (ref 0–0.5)
EOSINOPHIL NFR BLD: 0 % (ref 0–8)
EP: 10
ERYTHROCYTE [DISTWIDTH] IN BLOOD BY AUTOMATED COUNT: 13.5 % (ref 11.5–14.5)
ERYTHROCYTE [SEDIMENTATION RATE] IN BLOOD BY WESTERGREN METHOD: 10 MM/H
ERYTHROCYTE [SEDIMENTATION RATE] IN BLOOD BY WESTERGREN METHOD: 55 MM/HR (ref 0–20)
EST. GFR  (AFRICAN AMERICAN): >60 ML/MIN/1.73 M^2
EST. GFR  (NON AFRICAN AMERICAN): >60 ML/MIN/1.73 M^2
FERRITIN SERPL-MCNC: 183 NG/ML (ref 20–300)
FIO2: 100
FOLATE SERPL-MCNC: 15.1 NG/ML (ref 4–24)
GLUCOSE SERPL-MCNC: 126 MG/DL (ref 70–110)
HCO3 UR-SCNC: 29.7 MMOL/L (ref 24–28)
HCT VFR BLD AUTO: 33.9 % (ref 37–48.5)
HGB BLD-MCNC: 11.7 G/DL (ref 12–16)
IMM GRANULOCYTES # BLD AUTO: 0.12 K/UL (ref 0–0.04)
IMM GRANULOCYTES NFR BLD AUTO: 0.5 % (ref 0–0.5)
IP: 14
IRON SERPL-MCNC: 12 UG/DL (ref 30–160)
LYMPHOCYTES # BLD AUTO: 0.4 K/UL (ref 1–4.8)
LYMPHOCYTES NFR BLD: 1.7 % (ref 18–48)
MCH RBC QN AUTO: 32.4 PG (ref 27–31)
MCHC RBC AUTO-ENTMCNC: 34.5 G/DL (ref 32–36)
MCV RBC AUTO: 94 FL (ref 82–98)
MODE: ABNORMAL
MONOCYTES # BLD AUTO: 2 K/UL (ref 0.3–1)
MONOCYTES NFR BLD: 7.9 % (ref 4–15)
NEUTROPHILS # BLD AUTO: 22.7 K/UL (ref 1.8–7.7)
NEUTROPHILS NFR BLD: 89.8 % (ref 38–73)
NRBC BLD-RTO: 0 /100 WBC
PCO2 BLDA: 40.6 MMHG (ref 35–45)
PH SMN: 7.47 [PH] (ref 7.35–7.45)
PLATELET # BLD AUTO: 348 K/UL (ref 150–450)
PMV BLD AUTO: 9.4 FL (ref 9.2–12.9)
PO2 BLDA: 86 MMHG (ref 80–100)
POC BE: 6 MMOL/L
POC SATURATED O2: 97 % (ref 95–100)
POC TCO2: 31 MMOL/L (ref 23–27)
POTASSIUM SERPL-SCNC: 3.1 MMOL/L (ref 3.5–5.1)
RBC # BLD AUTO: 3.61 M/UL (ref 4–5.4)
SAMPLE: ABNORMAL
SATURATED IRON: 4 % (ref 20–50)
SITE: ABNORMAL
SODIUM SERPL-SCNC: 140 MMOL/L (ref 136–145)
SP02: 100
SPONT RATE: 16
TOTAL IRON BINDING CAPACITY: 292 UG/DL (ref 250–450)
TRANSFERRIN SERPL-MCNC: 197 MG/DL (ref 200–375)
TSH SERPL DL<=0.005 MIU/L-ACNC: 0.78 UIU/ML (ref 0.4–4)
VIT B12 SERPL-MCNC: 1041 PG/ML (ref 210–950)
WBC # BLD AUTO: 25.31 K/UL (ref 3.9–12.7)

## 2021-04-21 PROCEDURE — 99292 PR CRITICAL CARE, ADDL 30 MIN: ICD-10-PCS | Mod: ,,, | Performed by: INTERNAL MEDICINE

## 2021-04-21 PROCEDURE — 83880 ASSAY OF NATRIURETIC PEPTIDE: CPT | Performed by: INTERNAL MEDICINE

## 2021-04-21 PROCEDURE — 85652 RBC SED RATE AUTOMATED: CPT | Performed by: STUDENT IN AN ORGANIZED HEALTH CARE EDUCATION/TRAINING PROGRAM

## 2021-04-21 PROCEDURE — 99497 ADVNCD CARE PLAN 30 MIN: CPT | Mod: 25,,, | Performed by: NURSE PRACTITIONER

## 2021-04-21 PROCEDURE — 25000003 PHARM REV CODE 250: Performed by: STUDENT IN AN ORGANIZED HEALTH CARE EDUCATION/TRAINING PROGRAM

## 2021-04-21 PROCEDURE — 99497 PR ADVNCD CARE PLAN 30 MIN: ICD-10-PCS | Mod: 25,,, | Performed by: NURSE PRACTITIONER

## 2021-04-21 PROCEDURE — 99291 PR CRITICAL CARE, E/M 30-74 MINUTES: ICD-10-PCS | Mod: ,,, | Performed by: NURSE PRACTITIONER

## 2021-04-21 PROCEDURE — 94761 N-INVAS EAR/PLS OXIMETRY MLT: CPT

## 2021-04-21 PROCEDURE — 82728 ASSAY OF FERRITIN: CPT | Performed by: STUDENT IN AN ORGANIZED HEALTH CARE EDUCATION/TRAINING PROGRAM

## 2021-04-21 PROCEDURE — 99900035 HC TECH TIME PER 15 MIN (STAT)

## 2021-04-21 PROCEDURE — 63600175 PHARM REV CODE 636 W HCPCS: Performed by: INTERNAL MEDICINE

## 2021-04-21 PROCEDURE — 86141 C-REACTIVE PROTEIN HS: CPT | Performed by: STUDENT IN AN ORGANIZED HEALTH CARE EDUCATION/TRAINING PROGRAM

## 2021-04-21 PROCEDURE — 63600175 PHARM REV CODE 636 W HCPCS: Performed by: STUDENT IN AN ORGANIZED HEALTH CARE EDUCATION/TRAINING PROGRAM

## 2021-04-21 PROCEDURE — 99291 CRITICAL CARE FIRST HOUR: CPT | Mod: ,,, | Performed by: NURSE PRACTITIONER

## 2021-04-21 PROCEDURE — 85025 COMPLETE CBC W/AUTO DIFF WBC: CPT | Performed by: STUDENT IN AN ORGANIZED HEALTH CARE EDUCATION/TRAINING PROGRAM

## 2021-04-21 PROCEDURE — 82607 VITAMIN B-12: CPT | Performed by: STUDENT IN AN ORGANIZED HEALTH CARE EDUCATION/TRAINING PROGRAM

## 2021-04-21 PROCEDURE — 99223 PR INITIAL HOSPITAL CARE,LEVL III: ICD-10-PCS | Mod: ,,, | Performed by: NURSE PRACTITIONER

## 2021-04-21 PROCEDURE — 63600175 PHARM REV CODE 636 W HCPCS: Performed by: NURSE PRACTITIONER

## 2021-04-21 PROCEDURE — 82803 BLOOD GASES ANY COMBINATION: CPT

## 2021-04-21 PROCEDURE — 99223 1ST HOSP IP/OBS HIGH 75: CPT | Mod: ,,, | Performed by: NURSE PRACTITIONER

## 2021-04-21 PROCEDURE — 94640 AIRWAY INHALATION TREATMENT: CPT

## 2021-04-21 PROCEDURE — 87449 NOS EACH ORGANISM AG IA: CPT | Performed by: NURSE PRACTITIONER

## 2021-04-21 PROCEDURE — 37799 UNLISTED PX VASCULAR SURGERY: CPT

## 2021-04-21 PROCEDURE — 82746 ASSAY OF FOLIC ACID SERUM: CPT | Performed by: STUDENT IN AN ORGANIZED HEALTH CARE EDUCATION/TRAINING PROGRAM

## 2021-04-21 PROCEDURE — 36620 ARTERIAL: ICD-10-PCS | Mod: ,,, | Performed by: ANESTHESIOLOGY

## 2021-04-21 PROCEDURE — 36620 INSERTION CATHETER ARTERY: CPT | Mod: ,,, | Performed by: ANESTHESIOLOGY

## 2021-04-21 PROCEDURE — 25000003 PHARM REV CODE 250: Performed by: INTERNAL MEDICINE

## 2021-04-21 PROCEDURE — 80048 BASIC METABOLIC PNL TOTAL CA: CPT | Performed by: STUDENT IN AN ORGANIZED HEALTH CARE EDUCATION/TRAINING PROGRAM

## 2021-04-21 PROCEDURE — 27100171 HC OXYGEN HIGH FLOW UP TO 24 HOURS

## 2021-04-21 PROCEDURE — 99292 CRITICAL CARE ADDL 30 MIN: CPT | Mod: ,,, | Performed by: INTERNAL MEDICINE

## 2021-04-21 PROCEDURE — 84443 ASSAY THYROID STIM HORMONE: CPT | Performed by: STUDENT IN AN ORGANIZED HEALTH CARE EDUCATION/TRAINING PROGRAM

## 2021-04-21 PROCEDURE — 20000000 HC ICU ROOM

## 2021-04-21 PROCEDURE — 25000242 PHARM REV CODE 250 ALT 637 W/ HCPCS: Performed by: NURSE PRACTITIONER

## 2021-04-21 PROCEDURE — 83540 ASSAY OF IRON: CPT | Performed by: STUDENT IN AN ORGANIZED HEALTH CARE EDUCATION/TRAINING PROGRAM

## 2021-04-21 RX ORDER — FUROSEMIDE 10 MG/ML
40 INJECTION INTRAMUSCULAR; INTRAVENOUS ONCE
Status: COMPLETED | OUTPATIENT
Start: 2021-04-21 | End: 2021-04-21

## 2021-04-21 RX ORDER — IPRATROPIUM BROMIDE AND ALBUTEROL SULFATE 2.5; .5 MG/3ML; MG/3ML
3 SOLUTION RESPIRATORY (INHALATION) EVERY 6 HOURS
Status: DISCONTINUED | OUTPATIENT
Start: 2021-04-21 | End: 2021-04-29 | Stop reason: HOSPADM

## 2021-04-21 RX ORDER — VANCOMYCIN HCL IN 5 % DEXTROSE 1G/250ML
1000 PLASTIC BAG, INJECTION (ML) INTRAVENOUS ONCE
Status: COMPLETED | OUTPATIENT
Start: 2021-04-21 | End: 2021-04-21

## 2021-04-21 RX ORDER — FUROSEMIDE 10 MG/ML
60 INJECTION INTRAMUSCULAR; INTRAVENOUS
Status: DISCONTINUED | OUTPATIENT
Start: 2021-04-21 | End: 2021-04-23

## 2021-04-21 RX ORDER — MUPIROCIN 20 MG/G
OINTMENT TOPICAL 2 TIMES DAILY
Status: COMPLETED | OUTPATIENT
Start: 2021-04-21 | End: 2021-04-25

## 2021-04-21 RX ORDER — AMIODARONE HYDROCHLORIDE 200 MG/1
200 TABLET ORAL DAILY
Status: DISCONTINUED | OUTPATIENT
Start: 2021-04-22 | End: 2021-04-23

## 2021-04-21 RX ORDER — CEFEPIME HYDROCHLORIDE 1 G/50ML
2 INJECTION, SOLUTION INTRAVENOUS
Status: DISCONTINUED | OUTPATIENT
Start: 2021-04-21 | End: 2021-04-24

## 2021-04-21 RX ORDER — POTASSIUM CHLORIDE 20 MEQ/1
40 TABLET, EXTENDED RELEASE ORAL 2 TIMES DAILY
Status: DISCONTINUED | OUTPATIENT
Start: 2021-04-21 | End: 2021-04-22

## 2021-04-21 RX ADMIN — FUROSEMIDE 60 MG: 10 INJECTION, SOLUTION INTRAMUSCULAR; INTRAVENOUS at 09:04

## 2021-04-21 RX ADMIN — APIXABAN 2.5 MG: 2.5 TABLET, FILM COATED ORAL at 09:04

## 2021-04-21 RX ADMIN — CEFEPIME HYDROCHLORIDE 2 G: 2 INJECTION, SOLUTION INTRAVENOUS at 09:04

## 2021-04-21 RX ADMIN — MUPIROCIN: 20 OINTMENT TOPICAL at 09:04

## 2021-04-21 RX ADMIN — CEFEPIME HYDROCHLORIDE 2 G: 2 INJECTION, SOLUTION INTRAVENOUS at 12:04

## 2021-04-21 RX ADMIN — ACETAMINOPHEN 500 MG: 500 TABLET, FILM COATED ORAL at 09:04

## 2021-04-21 RX ADMIN — CETIRIZINE HYDROCHLORIDE 5 MG: 5 TABLET ORAL at 09:04

## 2021-04-21 RX ADMIN — IPRATROPIUM BROMIDE AND ALBUTEROL SULFATE 3 ML: .5; 3 SOLUTION RESPIRATORY (INHALATION) at 01:04

## 2021-04-21 RX ADMIN — METOPROLOL TARTRATE 50 MG: 50 TABLET, FILM COATED ORAL at 09:04

## 2021-04-21 RX ADMIN — AMIODARONE HYDROCHLORIDE 200 MG: 200 TABLET ORAL at 09:04

## 2021-04-21 RX ADMIN — IPRATROPIUM BROMIDE AND ALBUTEROL SULFATE 3 ML: .5; 3 SOLUTION RESPIRATORY (INHALATION) at 07:04

## 2021-04-21 RX ADMIN — METHYLPREDNISOLONE SODIUM SUCCINATE 40 MG: 40 INJECTION, POWDER, FOR SOLUTION INTRAMUSCULAR; INTRAVENOUS at 09:04

## 2021-04-21 RX ADMIN — POTASSIUM CHLORIDE 40 MEQ: 1500 TABLET, EXTENDED RELEASE ORAL at 09:04

## 2021-04-21 RX ADMIN — LOSARTAN POTASSIUM 100 MG: 25 TABLET, FILM COATED ORAL at 09:04

## 2021-04-21 RX ADMIN — FUROSEMIDE 5 MG/HR: 10 INJECTION, SOLUTION INTRAMUSCULAR; INTRAVENOUS at 01:04

## 2021-04-21 RX ADMIN — ATORVASTATIN CALCIUM 40 MG: 40 TABLET, FILM COATED ORAL at 09:04

## 2021-04-21 RX ADMIN — LEVOTHYROXINE SODIUM 75 MCG: 75 TABLET ORAL at 06:04

## 2021-04-21 RX ADMIN — VANCOMYCIN HYDROCHLORIDE 1000 MG: 1 INJECTION, POWDER, LYOPHILIZED, FOR SOLUTION INTRAVENOUS at 01:04

## 2021-04-21 RX ADMIN — METHYLPREDNISOLONE SODIUM SUCCINATE 40 MG: 40 INJECTION, POWDER, FOR SOLUTION INTRAMUSCULAR; INTRAVENOUS at 03:04

## 2021-04-21 RX ADMIN — AZITHROMYCIN MONOHYDRATE 500 MG: 500 INJECTION, POWDER, LYOPHILIZED, FOR SOLUTION INTRAVENOUS at 05:04

## 2021-04-21 RX ADMIN — FUROSEMIDE 40 MG: 10 INJECTION, SOLUTION INTRAVENOUS at 01:04

## 2021-04-22 LAB
ANION GAP SERPL CALC-SCNC: 10 MMOL/L (ref 8–16)
BASOPHILS # BLD AUTO: 0.01 K/UL (ref 0–0.2)
BASOPHILS NFR BLD: 0.1 % (ref 0–1.9)
BUN SERPL-MCNC: 24 MG/DL (ref 8–23)
CALCIUM SERPL-MCNC: 8.3 MG/DL (ref 8.7–10.5)
CHLORIDE SERPL-SCNC: 100 MMOL/L (ref 95–110)
CO2 SERPL-SCNC: 28 MMOL/L (ref 23–29)
CREAT SERPL-MCNC: 0.9 MG/DL (ref 0.5–1.4)
DIFFERENTIAL METHOD: ABNORMAL
EOSINOPHIL # BLD AUTO: 0 K/UL (ref 0–0.5)
EOSINOPHIL NFR BLD: 0 % (ref 0–8)
ERYTHROCYTE [DISTWIDTH] IN BLOOD BY AUTOMATED COUNT: 13.5 % (ref 11.5–14.5)
EST. GFR  (AFRICAN AMERICAN): >60 ML/MIN/1.73 M^2
EST. GFR  (NON AFRICAN AMERICAN): 58 ML/MIN/1.73 M^2
GLUCOSE SERPL-MCNC: 140 MG/DL (ref 70–110)
HCT VFR BLD AUTO: 33.1 % (ref 37–48.5)
HGB BLD-MCNC: 11.2 G/DL (ref 12–16)
IMM GRANULOCYTES # BLD AUTO: 0.11 K/UL (ref 0–0.04)
IMM GRANULOCYTES NFR BLD AUTO: 0.6 % (ref 0–0.5)
LYMPHOCYTES # BLD AUTO: 0.4 K/UL (ref 1–4.8)
LYMPHOCYTES NFR BLD: 1.9 % (ref 18–48)
MCH RBC QN AUTO: 32.2 PG (ref 27–31)
MCHC RBC AUTO-ENTMCNC: 33.8 G/DL (ref 32–36)
MCV RBC AUTO: 95 FL (ref 82–98)
MONOCYTES # BLD AUTO: 0.7 K/UL (ref 0.3–1)
MONOCYTES NFR BLD: 3.5 % (ref 4–15)
NEUTROPHILS # BLD AUTO: 18.1 K/UL (ref 1.8–7.7)
NEUTROPHILS NFR BLD: 93.9 % (ref 38–73)
NRBC BLD-RTO: 0 /100 WBC
PLATELET # BLD AUTO: 327 K/UL (ref 150–450)
PMV BLD AUTO: 9.3 FL (ref 9.2–12.9)
POTASSIUM SERPL-SCNC: 4.4 MMOL/L (ref 3.5–5.1)
RBC # BLD AUTO: 3.48 M/UL (ref 4–5.4)
SODIUM SERPL-SCNC: 138 MMOL/L (ref 136–145)
VANCOMYCIN SERPL-MCNC: 10.4 UG/ML
WBC # BLD AUTO: 19.28 K/UL (ref 3.9–12.7)

## 2021-04-22 PROCEDURE — 99291 CRITICAL CARE FIRST HOUR: CPT | Mod: ,,, | Performed by: NURSE PRACTITIONER

## 2021-04-22 PROCEDURE — 25000003 PHARM REV CODE 250: Performed by: NURSE PRACTITIONER

## 2021-04-22 PROCEDURE — 80202 ASSAY OF VANCOMYCIN: CPT | Performed by: STUDENT IN AN ORGANIZED HEALTH CARE EDUCATION/TRAINING PROGRAM

## 2021-04-22 PROCEDURE — 63600175 PHARM REV CODE 636 W HCPCS: Performed by: STUDENT IN AN ORGANIZED HEALTH CARE EDUCATION/TRAINING PROGRAM

## 2021-04-22 PROCEDURE — 80048 BASIC METABOLIC PNL TOTAL CA: CPT | Performed by: STUDENT IN AN ORGANIZED HEALTH CARE EDUCATION/TRAINING PROGRAM

## 2021-04-22 PROCEDURE — 94640 AIRWAY INHALATION TREATMENT: CPT

## 2021-04-22 PROCEDURE — 94761 N-INVAS EAR/PLS OXIMETRY MLT: CPT

## 2021-04-22 PROCEDURE — 99900035 HC TECH TIME PER 15 MIN (STAT)

## 2021-04-22 PROCEDURE — 63600175 PHARM REV CODE 636 W HCPCS: Performed by: NURSE PRACTITIONER

## 2021-04-22 PROCEDURE — 20000000 HC ICU ROOM

## 2021-04-22 PROCEDURE — 25000003 PHARM REV CODE 250: Performed by: STUDENT IN AN ORGANIZED HEALTH CARE EDUCATION/TRAINING PROGRAM

## 2021-04-22 PROCEDURE — 25000242 PHARM REV CODE 250 ALT 637 W/ HCPCS: Performed by: NURSE PRACTITIONER

## 2021-04-22 PROCEDURE — 85025 COMPLETE CBC W/AUTO DIFF WBC: CPT | Performed by: STUDENT IN AN ORGANIZED HEALTH CARE EDUCATION/TRAINING PROGRAM

## 2021-04-22 PROCEDURE — 99291 PR CRITICAL CARE, E/M 30-74 MINUTES: ICD-10-PCS | Mod: ,,, | Performed by: NURSE PRACTITIONER

## 2021-04-22 PROCEDURE — 94660 CPAP INITIATION&MGMT: CPT

## 2021-04-22 PROCEDURE — 27100171 HC OXYGEN HIGH FLOW UP TO 24 HOURS

## 2021-04-22 RX ADMIN — CETIRIZINE HYDROCHLORIDE 5 MG: 5 TABLET ORAL at 09:04

## 2021-04-22 RX ADMIN — IPRATROPIUM BROMIDE AND ALBUTEROL SULFATE 3 ML: .5; 3 SOLUTION RESPIRATORY (INHALATION) at 08:04

## 2021-04-22 RX ADMIN — MUPIROCIN: 20 OINTMENT TOPICAL at 09:04

## 2021-04-22 RX ADMIN — APIXABAN 2.5 MG: 2.5 TABLET, FILM COATED ORAL at 09:04

## 2021-04-22 RX ADMIN — CEFEPIME HYDROCHLORIDE 2 G: 2 INJECTION, SOLUTION INTRAVENOUS at 04:04

## 2021-04-22 RX ADMIN — IPRATROPIUM BROMIDE AND ALBUTEROL SULFATE 3 ML: .5; 3 SOLUTION RESPIRATORY (INHALATION) at 12:04

## 2021-04-22 RX ADMIN — FUROSEMIDE 60 MG: 10 INJECTION, SOLUTION INTRAMUSCULAR; INTRAVENOUS at 09:04

## 2021-04-22 RX ADMIN — TRAZODONE HYDROCHLORIDE 50 MG: 50 TABLET ORAL at 09:04

## 2021-04-22 RX ADMIN — ACETAMINOPHEN 500 MG: 500 TABLET, FILM COATED ORAL at 07:04

## 2021-04-22 RX ADMIN — VANCOMYCIN HYDROCHLORIDE 750 MG: 750 INJECTION, POWDER, LYOPHILIZED, FOR SOLUTION INTRAVENOUS at 04:04

## 2021-04-22 RX ADMIN — ACETAMINOPHEN 500 MG: 500 TABLET, FILM COATED ORAL at 11:04

## 2021-04-22 RX ADMIN — AZITHROMYCIN MONOHYDRATE 500 MG: 500 INJECTION, POWDER, LYOPHILIZED, FOR SOLUTION INTRAVENOUS at 04:04

## 2021-04-22 RX ADMIN — AMIODARONE HYDROCHLORIDE 200 MG: 200 TABLET ORAL at 09:04

## 2021-04-22 RX ADMIN — METHYLPREDNISOLONE SODIUM SUCCINATE 40 MG: 40 INJECTION, POWDER, FOR SOLUTION INTRAMUSCULAR; INTRAVENOUS at 05:04

## 2021-04-22 RX ADMIN — IPRATROPIUM BROMIDE AND ALBUTEROL SULFATE 3 ML: .5; 3 SOLUTION RESPIRATORY (INHALATION) at 02:04

## 2021-04-22 RX ADMIN — LEVOTHYROXINE SODIUM 75 MCG: 75 TABLET ORAL at 05:04

## 2021-04-22 RX ADMIN — ATORVASTATIN CALCIUM 40 MG: 40 TABLET, FILM COATED ORAL at 09:04

## 2021-04-22 RX ADMIN — METHYLPREDNISOLONE SODIUM SUCCINATE 40 MG: 40 INJECTION, POWDER, FOR SOLUTION INTRAMUSCULAR; INTRAVENOUS at 01:04

## 2021-04-22 RX ADMIN — IPRATROPIUM BROMIDE AND ALBUTEROL SULFATE 3 ML: .5; 3 SOLUTION RESPIRATORY (INHALATION) at 06:04

## 2021-04-22 RX ADMIN — CEFEPIME HYDROCHLORIDE 2 G: 2 INJECTION, SOLUTION INTRAVENOUS at 07:04

## 2021-04-22 RX ADMIN — METHYLPREDNISOLONE SODIUM SUCCINATE 40 MG: 40 INJECTION, POWDER, FOR SOLUTION INTRAMUSCULAR; INTRAVENOUS at 09:04

## 2021-04-22 RX ADMIN — CEFEPIME HYDROCHLORIDE 2 G: 2 INJECTION, SOLUTION INTRAVENOUS at 01:04

## 2021-04-23 LAB
ANION GAP SERPL CALC-SCNC: 9 MMOL/L (ref 8–16)
BASOPHILS # BLD AUTO: 0.03 K/UL (ref 0–0.2)
BASOPHILS NFR BLD: 0.1 % (ref 0–1.9)
BUN SERPL-MCNC: 29 MG/DL (ref 8–23)
CALCIUM SERPL-MCNC: 8.3 MG/DL (ref 8.7–10.5)
CHLORIDE SERPL-SCNC: 98 MMOL/L (ref 95–110)
CO2 SERPL-SCNC: 26 MMOL/L (ref 23–29)
CREAT SERPL-MCNC: 1 MG/DL (ref 0.5–1.4)
DIFFERENTIAL METHOD: ABNORMAL
EOSINOPHIL # BLD AUTO: 0 K/UL (ref 0–0.5)
EOSINOPHIL NFR BLD: 0 % (ref 0–8)
ERYTHROCYTE [DISTWIDTH] IN BLOOD BY AUTOMATED COUNT: 13.2 % (ref 11.5–14.5)
EST. GFR  (AFRICAN AMERICAN): 59 ML/MIN/1.73 M^2
EST. GFR  (NON AFRICAN AMERICAN): 51 ML/MIN/1.73 M^2
GLUCOSE SERPL-MCNC: 128 MG/DL (ref 70–110)
HCT VFR BLD AUTO: 33.4 % (ref 37–48.5)
HGB BLD-MCNC: 11.4 G/DL (ref 12–16)
IMM GRANULOCYTES # BLD AUTO: 0.16 K/UL (ref 0–0.04)
IMM GRANULOCYTES NFR BLD AUTO: 0.7 % (ref 0–0.5)
LYMPHOCYTES # BLD AUTO: 0.3 K/UL (ref 1–4.8)
LYMPHOCYTES NFR BLD: 1.2 % (ref 18–48)
MCH RBC QN AUTO: 31.9 PG (ref 27–31)
MCHC RBC AUTO-ENTMCNC: 34.1 G/DL (ref 32–36)
MCV RBC AUTO: 94 FL (ref 82–98)
MONOCYTES # BLD AUTO: 1.4 K/UL (ref 0.3–1)
MONOCYTES NFR BLD: 5.6 % (ref 4–15)
NEUTROPHILS # BLD AUTO: 22.3 K/UL (ref 1.8–7.7)
NEUTROPHILS NFR BLD: 92.4 % (ref 38–73)
NRBC BLD-RTO: 0 /100 WBC
PLATELET # BLD AUTO: 351 K/UL (ref 150–450)
PMV BLD AUTO: 9.1 FL (ref 9.2–12.9)
POTASSIUM SERPL-SCNC: 4 MMOL/L (ref 3.5–5.1)
RBC # BLD AUTO: 3.57 M/UL (ref 4–5.4)
SODIUM SERPL-SCNC: 133 MMOL/L (ref 136–145)
VANCOMYCIN SERPL-MCNC: 10.6 UG/ML
WBC # BLD AUTO: 24.13 K/UL (ref 3.9–12.7)

## 2021-04-23 PROCEDURE — 99291 CRITICAL CARE FIRST HOUR: CPT | Mod: ,,, | Performed by: INTERNAL MEDICINE

## 2021-04-23 PROCEDURE — 87040 BLOOD CULTURE FOR BACTERIA: CPT | Mod: 59 | Performed by: INTERNAL MEDICINE

## 2021-04-23 PROCEDURE — 99900035 HC TECH TIME PER 15 MIN (STAT)

## 2021-04-23 PROCEDURE — 20000000 HC ICU ROOM

## 2021-04-23 PROCEDURE — 85025 COMPLETE CBC W/AUTO DIFF WBC: CPT | Performed by: STUDENT IN AN ORGANIZED HEALTH CARE EDUCATION/TRAINING PROGRAM

## 2021-04-23 PROCEDURE — 27100171 HC OXYGEN HIGH FLOW UP TO 24 HOURS

## 2021-04-23 PROCEDURE — 80202 ASSAY OF VANCOMYCIN: CPT | Performed by: STUDENT IN AN ORGANIZED HEALTH CARE EDUCATION/TRAINING PROGRAM

## 2021-04-23 PROCEDURE — 36415 COLL VENOUS BLD VENIPUNCTURE: CPT | Performed by: INTERNAL MEDICINE

## 2021-04-23 PROCEDURE — 80048 BASIC METABOLIC PNL TOTAL CA: CPT | Performed by: STUDENT IN AN ORGANIZED HEALTH CARE EDUCATION/TRAINING PROGRAM

## 2021-04-23 PROCEDURE — 25000242 PHARM REV CODE 250 ALT 637 W/ HCPCS: Performed by: NURSE PRACTITIONER

## 2021-04-23 PROCEDURE — 63600175 PHARM REV CODE 636 W HCPCS: Performed by: STUDENT IN AN ORGANIZED HEALTH CARE EDUCATION/TRAINING PROGRAM

## 2021-04-23 PROCEDURE — 63600175 PHARM REV CODE 636 W HCPCS: Performed by: NURSE PRACTITIONER

## 2021-04-23 PROCEDURE — 25000003 PHARM REV CODE 250: Performed by: STUDENT IN AN ORGANIZED HEALTH CARE EDUCATION/TRAINING PROGRAM

## 2021-04-23 PROCEDURE — 99291 PR CRITICAL CARE, E/M 30-74 MINUTES: ICD-10-PCS | Mod: ,,, | Performed by: INTERNAL MEDICINE

## 2021-04-23 PROCEDURE — 94660 CPAP INITIATION&MGMT: CPT

## 2021-04-23 PROCEDURE — 94640 AIRWAY INHALATION TREATMENT: CPT

## 2021-04-23 RX ORDER — METOPROLOL TARTRATE 25 MG/1
25 TABLET, FILM COATED ORAL 2 TIMES DAILY
Status: DISCONTINUED | OUTPATIENT
Start: 2021-04-23 | End: 2021-04-29 | Stop reason: HOSPADM

## 2021-04-23 RX ORDER — VANCOMYCIN HCL IN 5 % DEXTROSE 1G/250ML
1000 PLASTIC BAG, INJECTION (ML) INTRAVENOUS ONCE
Status: COMPLETED | OUTPATIENT
Start: 2021-04-23 | End: 2021-04-23

## 2021-04-23 RX ORDER — FUROSEMIDE 10 MG/ML
20 INJECTION INTRAMUSCULAR; INTRAVENOUS EVERY 6 HOURS
Status: DISCONTINUED | OUTPATIENT
Start: 2021-04-23 | End: 2021-04-24

## 2021-04-23 RX ADMIN — METHYLPREDNISOLONE SODIUM SUCCINATE 40 MG: 40 INJECTION, POWDER, FOR SOLUTION INTRAMUSCULAR; INTRAVENOUS at 09:04

## 2021-04-23 RX ADMIN — FUROSEMIDE 20 MG: 10 INJECTION, SOLUTION INTRAMUSCULAR; INTRAVENOUS at 05:04

## 2021-04-23 RX ADMIN — IPRATROPIUM BROMIDE AND ALBUTEROL SULFATE 3 ML: .5; 3 SOLUTION RESPIRATORY (INHALATION) at 02:04

## 2021-04-23 RX ADMIN — ACETAMINOPHEN 500 MG: 500 TABLET, FILM COATED ORAL at 05:04

## 2021-04-23 RX ADMIN — IPRATROPIUM BROMIDE AND ALBUTEROL SULFATE 3 ML: .5; 3 SOLUTION RESPIRATORY (INHALATION) at 09:04

## 2021-04-23 RX ADMIN — CEFEPIME HYDROCHLORIDE 2 G: 2 INJECTION, SOLUTION INTRAVENOUS at 04:04

## 2021-04-23 RX ADMIN — METOPROLOL TARTRATE 25 MG: 25 TABLET, FILM COATED ORAL at 08:04

## 2021-04-23 RX ADMIN — AZITHROMYCIN MONOHYDRATE 500 MG: 500 INJECTION, POWDER, LYOPHILIZED, FOR SOLUTION INTRAVENOUS at 05:04

## 2021-04-23 RX ADMIN — METHYLPREDNISOLONE SODIUM SUCCINATE 40 MG: 40 INJECTION, POWDER, FOR SOLUTION INTRAMUSCULAR; INTRAVENOUS at 05:04

## 2021-04-23 RX ADMIN — TRAZODONE HYDROCHLORIDE 50 MG: 50 TABLET ORAL at 08:04

## 2021-04-23 RX ADMIN — CEFEPIME HYDROCHLORIDE 2 G: 2 INJECTION, SOLUTION INTRAVENOUS at 12:04

## 2021-04-23 RX ADMIN — METHYLPREDNISOLONE SODIUM SUCCINATE 40 MG: 40 INJECTION, POWDER, FOR SOLUTION INTRAMUSCULAR; INTRAVENOUS at 02:04

## 2021-04-23 RX ADMIN — IPRATROPIUM BROMIDE AND ALBUTEROL SULFATE 3 ML: .5; 3 SOLUTION RESPIRATORY (INHALATION) at 01:04

## 2021-04-23 RX ADMIN — MUPIROCIN: 20 OINTMENT TOPICAL at 09:04

## 2021-04-23 RX ADMIN — FUROSEMIDE 20 MG: 10 INJECTION, SOLUTION INTRAMUSCULAR; INTRAVENOUS at 11:04

## 2021-04-23 RX ADMIN — LEVOTHYROXINE SODIUM 75 MCG: 75 TABLET ORAL at 05:04

## 2021-04-23 RX ADMIN — APIXABAN 2.5 MG: 2.5 TABLET, FILM COATED ORAL at 09:04

## 2021-04-23 RX ADMIN — ATORVASTATIN CALCIUM 40 MG: 40 TABLET, FILM COATED ORAL at 08:04

## 2021-04-23 RX ADMIN — VANCOMYCIN HYDROCHLORIDE 1000 MG: 1 INJECTION, POWDER, LYOPHILIZED, FOR SOLUTION INTRAVENOUS at 08:04

## 2021-04-23 RX ADMIN — FUROSEMIDE 20 MG: 10 INJECTION, SOLUTION INTRAMUSCULAR; INTRAVENOUS at 12:04

## 2021-04-23 RX ADMIN — APIXABAN 2.5 MG: 2.5 TABLET, FILM COATED ORAL at 08:04

## 2021-04-23 RX ADMIN — CEFEPIME HYDROCHLORIDE 2 G: 2 INJECTION, SOLUTION INTRAVENOUS at 08:04

## 2021-04-23 RX ADMIN — CETIRIZINE HYDROCHLORIDE 5 MG: 5 TABLET ORAL at 09:04

## 2021-04-23 RX ADMIN — MUPIROCIN: 20 OINTMENT TOPICAL at 08:04

## 2021-04-23 RX ADMIN — IPRATROPIUM BROMIDE AND ALBUTEROL SULFATE 3 ML: .5; 3 SOLUTION RESPIRATORY (INHALATION) at 07:04

## 2021-04-24 LAB
ANION GAP SERPL CALC-SCNC: 11 MMOL/L (ref 8–16)
BACTERIA BLD CULT: NORMAL
BACTERIA BLD CULT: NORMAL
BASOPHILS # BLD AUTO: 0.02 K/UL (ref 0–0.2)
BASOPHILS NFR BLD: 0.1 % (ref 0–1.9)
BUN SERPL-MCNC: 31 MG/DL (ref 8–23)
CALCIUM SERPL-MCNC: 8.4 MG/DL (ref 8.7–10.5)
CHLORIDE SERPL-SCNC: 98 MMOL/L (ref 95–110)
CO2 SERPL-SCNC: 25 MMOL/L (ref 23–29)
CREAT SERPL-MCNC: 1.1 MG/DL (ref 0.5–1.4)
DIFFERENTIAL METHOD: ABNORMAL
EOSINOPHIL # BLD AUTO: 0 K/UL (ref 0–0.5)
EOSINOPHIL NFR BLD: 0 % (ref 0–8)
ERYTHROCYTE [DISTWIDTH] IN BLOOD BY AUTOMATED COUNT: 13.1 % (ref 11.5–14.5)
EST. GFR  (AFRICAN AMERICAN): 53 ML/MIN/1.73 M^2
EST. GFR  (NON AFRICAN AMERICAN): 46 ML/MIN/1.73 M^2
GLUCOSE SERPL-MCNC: 180 MG/DL (ref 70–110)
HCT VFR BLD AUTO: 31.9 % (ref 37–48.5)
HGB BLD-MCNC: 10.9 G/DL (ref 12–16)
IMM GRANULOCYTES # BLD AUTO: 0.2 K/UL (ref 0–0.04)
IMM GRANULOCYTES NFR BLD AUTO: 0.9 % (ref 0–0.5)
LYMPHOCYTES # BLD AUTO: 0.2 K/UL (ref 1–4.8)
LYMPHOCYTES NFR BLD: 1 % (ref 18–48)
MCH RBC QN AUTO: 31.7 PG (ref 27–31)
MCHC RBC AUTO-ENTMCNC: 34.2 G/DL (ref 32–36)
MCV RBC AUTO: 93 FL (ref 82–98)
MONOCYTES # BLD AUTO: 1.1 K/UL (ref 0.3–1)
MONOCYTES NFR BLD: 5.1 % (ref 4–15)
NEUTROPHILS # BLD AUTO: 20.3 K/UL (ref 1.8–7.7)
NEUTROPHILS NFR BLD: 92.9 % (ref 38–73)
NRBC BLD-RTO: 0 /100 WBC
PLATELET # BLD AUTO: 354 K/UL (ref 150–450)
PMV BLD AUTO: 9.4 FL (ref 9.2–12.9)
POTASSIUM SERPL-SCNC: 4 MMOL/L (ref 3.5–5.1)
RBC # BLD AUTO: 3.44 M/UL (ref 4–5.4)
SODIUM SERPL-SCNC: 134 MMOL/L (ref 136–145)
VANCOMYCIN SERPL-MCNC: 17.4 UG/ML
WBC # BLD AUTO: 21.87 K/UL (ref 3.9–12.7)

## 2021-04-24 PROCEDURE — 94640 AIRWAY INHALATION TREATMENT: CPT

## 2021-04-24 PROCEDURE — 27000221 HC OXYGEN, UP TO 24 HOURS

## 2021-04-24 PROCEDURE — 20000000 HC ICU ROOM

## 2021-04-24 PROCEDURE — 85025 COMPLETE CBC W/AUTO DIFF WBC: CPT | Performed by: STUDENT IN AN ORGANIZED HEALTH CARE EDUCATION/TRAINING PROGRAM

## 2021-04-24 PROCEDURE — 63600175 PHARM REV CODE 636 W HCPCS: Performed by: STUDENT IN AN ORGANIZED HEALTH CARE EDUCATION/TRAINING PROGRAM

## 2021-04-24 PROCEDURE — 99900035 HC TECH TIME PER 15 MIN (STAT)

## 2021-04-24 PROCEDURE — 25000003 PHARM REV CODE 250: Performed by: STUDENT IN AN ORGANIZED HEALTH CARE EDUCATION/TRAINING PROGRAM

## 2021-04-24 PROCEDURE — 63600175 PHARM REV CODE 636 W HCPCS: Performed by: INTERNAL MEDICINE

## 2021-04-24 PROCEDURE — 99291 PR CRITICAL CARE, E/M 30-74 MINUTES: ICD-10-PCS | Mod: ,,, | Performed by: INTERNAL MEDICINE

## 2021-04-24 PROCEDURE — 80048 BASIC METABOLIC PNL TOTAL CA: CPT | Performed by: STUDENT IN AN ORGANIZED HEALTH CARE EDUCATION/TRAINING PROGRAM

## 2021-04-24 PROCEDURE — 99291 CRITICAL CARE FIRST HOUR: CPT | Mod: ,,, | Performed by: INTERNAL MEDICINE

## 2021-04-24 PROCEDURE — 27100171 HC OXYGEN HIGH FLOW UP TO 24 HOURS

## 2021-04-24 PROCEDURE — 63600175 PHARM REV CODE 636 W HCPCS: Performed by: NURSE PRACTITIONER

## 2021-04-24 PROCEDURE — 94761 N-INVAS EAR/PLS OXIMETRY MLT: CPT

## 2021-04-24 PROCEDURE — 94660 CPAP INITIATION&MGMT: CPT

## 2021-04-24 PROCEDURE — 80202 ASSAY OF VANCOMYCIN: CPT | Performed by: STUDENT IN AN ORGANIZED HEALTH CARE EDUCATION/TRAINING PROGRAM

## 2021-04-24 PROCEDURE — 25000242 PHARM REV CODE 250 ALT 637 W/ HCPCS: Performed by: NURSE PRACTITIONER

## 2021-04-24 RX ORDER — HYDROXYZINE HYDROCHLORIDE 25 MG/1
25 TABLET, FILM COATED ORAL 3 TIMES DAILY PRN
Status: DISCONTINUED | OUTPATIENT
Start: 2021-04-24 | End: 2021-04-29 | Stop reason: HOSPADM

## 2021-04-24 RX ORDER — FUROSEMIDE 10 MG/ML
40 INJECTION INTRAMUSCULAR; INTRAVENOUS DAILY
Status: DISCONTINUED | OUTPATIENT
Start: 2021-04-24 | End: 2021-04-25

## 2021-04-24 RX ORDER — PREDNISONE 20 MG/1
40 TABLET ORAL DAILY
Status: DISCONTINUED | OUTPATIENT
Start: 2021-04-24 | End: 2021-04-27

## 2021-04-24 RX ORDER — VANCOMYCIN HCL IN 5 % DEXTROSE 1G/250ML
1000 PLASTIC BAG, INJECTION (ML) INTRAVENOUS ONCE
Status: COMPLETED | OUTPATIENT
Start: 2021-04-24 | End: 2021-04-24

## 2021-04-24 RX ORDER — FUROSEMIDE 10 MG/ML
40 INJECTION INTRAMUSCULAR; INTRAVENOUS EVERY 8 HOURS
Status: DISCONTINUED | OUTPATIENT
Start: 2021-04-24 | End: 2021-04-24

## 2021-04-24 RX ADMIN — PREDNISONE 40 MG: 20 TABLET ORAL at 12:04

## 2021-04-24 RX ADMIN — FUROSEMIDE 20 MG: 10 INJECTION, SOLUTION INTRAMUSCULAR; INTRAVENOUS at 05:04

## 2021-04-24 RX ADMIN — CEFEPIME HYDROCHLORIDE 2 G: 2 INJECTION, SOLUTION INTRAVENOUS at 12:04

## 2021-04-24 RX ADMIN — CEFEPIME HYDROCHLORIDE 2 G: 2 INJECTION, SOLUTION INTRAVENOUS at 04:04

## 2021-04-24 RX ADMIN — LEVOTHYROXINE SODIUM 75 MCG: 75 TABLET ORAL at 05:04

## 2021-04-24 RX ADMIN — METOPROLOL TARTRATE 25 MG: 25 TABLET, FILM COATED ORAL at 08:04

## 2021-04-24 RX ADMIN — CETIRIZINE HYDROCHLORIDE 5 MG: 5 TABLET ORAL at 08:04

## 2021-04-24 RX ADMIN — MUPIROCIN: 20 OINTMENT TOPICAL at 08:04

## 2021-04-24 RX ADMIN — METOPROLOL TARTRATE 25 MG: 25 TABLET, FILM COATED ORAL at 09:04

## 2021-04-24 RX ADMIN — APIXABAN 2.5 MG: 2.5 TABLET, FILM COATED ORAL at 09:04

## 2021-04-24 RX ADMIN — IPRATROPIUM BROMIDE AND ALBUTEROL SULFATE 3 ML: .5; 3 SOLUTION RESPIRATORY (INHALATION) at 07:04

## 2021-04-24 RX ADMIN — ATORVASTATIN CALCIUM 40 MG: 40 TABLET, FILM COATED ORAL at 09:04

## 2021-04-24 RX ADMIN — TRAZODONE HYDROCHLORIDE 50 MG: 50 TABLET ORAL at 09:04

## 2021-04-24 RX ADMIN — HYDROXYZINE HYDROCHLORIDE 25 MG: 25 TABLET, FILM COATED ORAL at 04:04

## 2021-04-24 RX ADMIN — MUPIROCIN: 20 OINTMENT TOPICAL at 09:04

## 2021-04-24 RX ADMIN — FUROSEMIDE 40 MG: 10 INJECTION, SOLUTION INTRAVENOUS at 12:04

## 2021-04-24 RX ADMIN — APIXABAN 2.5 MG: 2.5 TABLET, FILM COATED ORAL at 08:04

## 2021-04-24 RX ADMIN — VANCOMYCIN HYDROCHLORIDE 1000 MG: 1 INJECTION, POWDER, LYOPHILIZED, FOR SOLUTION INTRAVENOUS at 08:04

## 2021-04-24 RX ADMIN — ACETAMINOPHEN 500 MG: 500 TABLET, FILM COATED ORAL at 05:04

## 2021-04-24 RX ADMIN — IPRATROPIUM BROMIDE AND ALBUTEROL SULFATE 3 ML: .5; 3 SOLUTION RESPIRATORY (INHALATION) at 01:04

## 2021-04-24 RX ADMIN — IPRATROPIUM BROMIDE AND ALBUTEROL SULFATE 3 ML: .5; 3 SOLUTION RESPIRATORY (INHALATION) at 12:04

## 2021-04-24 RX ADMIN — METHYLPREDNISOLONE SODIUM SUCCINATE 40 MG: 40 INJECTION, POWDER, FOR SOLUTION INTRAMUSCULAR; INTRAVENOUS at 05:04

## 2021-04-25 LAB
ANION GAP SERPL CALC-SCNC: 11 MMOL/L (ref 8–16)
BASOPHILS # BLD AUTO: 0.03 K/UL (ref 0–0.2)
BASOPHILS NFR BLD: 0.1 % (ref 0–1.9)
BUN SERPL-MCNC: 33 MG/DL (ref 8–23)
CALCIUM SERPL-MCNC: 8.9 MG/DL (ref 8.7–10.5)
CHLORIDE SERPL-SCNC: 98 MMOL/L (ref 95–110)
CO2 SERPL-SCNC: 27 MMOL/L (ref 23–29)
CREAT SERPL-MCNC: 1.3 MG/DL (ref 0.5–1.4)
DIFFERENTIAL METHOD: ABNORMAL
EOSINOPHIL # BLD AUTO: 0 K/UL (ref 0–0.5)
EOSINOPHIL NFR BLD: 0 % (ref 0–8)
ERYTHROCYTE [DISTWIDTH] IN BLOOD BY AUTOMATED COUNT: 13.1 % (ref 11.5–14.5)
EST. GFR  (AFRICAN AMERICAN): 43 ML/MIN/1.73 M^2
EST. GFR  (NON AFRICAN AMERICAN): 37 ML/MIN/1.73 M^2
GLUCOSE SERPL-MCNC: 125 MG/DL (ref 70–110)
HCT VFR BLD AUTO: 33.5 % (ref 37–48.5)
HGB BLD-MCNC: 11.3 G/DL (ref 12–16)
IMM GRANULOCYTES # BLD AUTO: 0.34 K/UL (ref 0–0.04)
IMM GRANULOCYTES NFR BLD AUTO: 1.7 % (ref 0–0.5)
LYMPHOCYTES # BLD AUTO: 0.4 K/UL (ref 1–4.8)
LYMPHOCYTES NFR BLD: 1.7 % (ref 18–48)
MCH RBC QN AUTO: 31.7 PG (ref 27–31)
MCHC RBC AUTO-ENTMCNC: 33.7 G/DL (ref 32–36)
MCV RBC AUTO: 94 FL (ref 82–98)
MONOCYTES # BLD AUTO: 1.4 K/UL (ref 0.3–1)
MONOCYTES NFR BLD: 6.6 % (ref 4–15)
NEUTROPHILS # BLD AUTO: 18.3 K/UL (ref 1.8–7.7)
NEUTROPHILS NFR BLD: 89.9 % (ref 38–73)
NRBC BLD-RTO: 0 /100 WBC
PLATELET # BLD AUTO: 351 K/UL (ref 150–450)
PMV BLD AUTO: 9.3 FL (ref 9.2–12.9)
POTASSIUM SERPL-SCNC: 3.7 MMOL/L (ref 3.5–5.1)
RBC # BLD AUTO: 3.56 M/UL (ref 4–5.4)
SODIUM SERPL-SCNC: 136 MMOL/L (ref 136–145)
VANCOMYCIN SERPL-MCNC: 20.5 UG/ML
WBC # BLD AUTO: 20.39 K/UL (ref 3.9–12.7)

## 2021-04-25 PROCEDURE — 94640 AIRWAY INHALATION TREATMENT: CPT

## 2021-04-25 PROCEDURE — 25000003 PHARM REV CODE 250: Performed by: STUDENT IN AN ORGANIZED HEALTH CARE EDUCATION/TRAINING PROGRAM

## 2021-04-25 PROCEDURE — 94761 N-INVAS EAR/PLS OXIMETRY MLT: CPT

## 2021-04-25 PROCEDURE — 63600175 PHARM REV CODE 636 W HCPCS: Performed by: INTERNAL MEDICINE

## 2021-04-25 PROCEDURE — 27000221 HC OXYGEN, UP TO 24 HOURS

## 2021-04-25 PROCEDURE — 99233 SBSQ HOSP IP/OBS HIGH 50: CPT | Mod: ,,, | Performed by: INTERNAL MEDICINE

## 2021-04-25 PROCEDURE — 99900035 HC TECH TIME PER 15 MIN (STAT)

## 2021-04-25 PROCEDURE — 94660 CPAP INITIATION&MGMT: CPT

## 2021-04-25 PROCEDURE — 80202 ASSAY OF VANCOMYCIN: CPT | Performed by: STUDENT IN AN ORGANIZED HEALTH CARE EDUCATION/TRAINING PROGRAM

## 2021-04-25 PROCEDURE — 80048 BASIC METABOLIC PNL TOTAL CA: CPT | Performed by: STUDENT IN AN ORGANIZED HEALTH CARE EDUCATION/TRAINING PROGRAM

## 2021-04-25 PROCEDURE — 20000000 HC ICU ROOM

## 2021-04-25 PROCEDURE — 36415 COLL VENOUS BLD VENIPUNCTURE: CPT | Performed by: STUDENT IN AN ORGANIZED HEALTH CARE EDUCATION/TRAINING PROGRAM

## 2021-04-25 PROCEDURE — 85025 COMPLETE CBC W/AUTO DIFF WBC: CPT | Performed by: STUDENT IN AN ORGANIZED HEALTH CARE EDUCATION/TRAINING PROGRAM

## 2021-04-25 PROCEDURE — 25000242 PHARM REV CODE 250 ALT 637 W/ HCPCS: Performed by: NURSE PRACTITIONER

## 2021-04-25 PROCEDURE — 99233 PR SUBSEQUENT HOSPITAL CARE,LEVL III: ICD-10-PCS | Mod: ,,, | Performed by: INTERNAL MEDICINE

## 2021-04-25 RX ORDER — LOSARTAN POTASSIUM 25 MG/1
25 TABLET ORAL DAILY
Status: DISCONTINUED | OUTPATIENT
Start: 2021-04-25 | End: 2021-04-26

## 2021-04-25 RX ADMIN — METOPROLOL TARTRATE 25 MG: 25 TABLET, FILM COATED ORAL at 08:04

## 2021-04-25 RX ADMIN — MUPIROCIN: 20 OINTMENT TOPICAL at 08:04

## 2021-04-25 RX ADMIN — IPRATROPIUM BROMIDE AND ALBUTEROL SULFATE 3 ML: .5; 3 SOLUTION RESPIRATORY (INHALATION) at 01:04

## 2021-04-25 RX ADMIN — PREDNISONE 40 MG: 20 TABLET ORAL at 08:04

## 2021-04-25 RX ADMIN — MUPIROCIN: 20 OINTMENT TOPICAL at 10:04

## 2021-04-25 RX ADMIN — IPRATROPIUM BROMIDE AND ALBUTEROL SULFATE 3 ML: .5; 3 SOLUTION RESPIRATORY (INHALATION) at 12:04

## 2021-04-25 RX ADMIN — HYDROXYZINE HYDROCHLORIDE 25 MG: 25 TABLET, FILM COATED ORAL at 08:04

## 2021-04-25 RX ADMIN — CETIRIZINE HYDROCHLORIDE 5 MG: 5 TABLET ORAL at 08:04

## 2021-04-25 RX ADMIN — LEVOTHYROXINE SODIUM 75 MCG: 75 TABLET ORAL at 05:04

## 2021-04-25 RX ADMIN — LOSARTAN POTASSIUM 25 MG: 25 TABLET, FILM COATED ORAL at 08:04

## 2021-04-25 RX ADMIN — APIXABAN 2.5 MG: 2.5 TABLET, FILM COATED ORAL at 08:04

## 2021-04-25 RX ADMIN — IPRATROPIUM BROMIDE AND ALBUTEROL SULFATE 3 ML: .5; 3 SOLUTION RESPIRATORY (INHALATION) at 08:04

## 2021-04-25 RX ADMIN — ATORVASTATIN CALCIUM 40 MG: 40 TABLET, FILM COATED ORAL at 08:04

## 2021-04-25 RX ADMIN — TRAZODONE HYDROCHLORIDE 50 MG: 50 TABLET ORAL at 08:04

## 2021-04-25 RX ADMIN — IPRATROPIUM BROMIDE AND ALBUTEROL SULFATE 3 ML: .5; 3 SOLUTION RESPIRATORY (INHALATION) at 09:04

## 2021-04-26 PROBLEM — Z71.89 ADVANCED CARE PLANNING/COUNSELING DISCUSSION: Status: ACTIVE | Noted: 2021-04-26

## 2021-04-26 LAB
ANION GAP SERPL CALC-SCNC: 9 MMOL/L (ref 8–16)
BASOPHILS # BLD AUTO: 0.13 K/UL (ref 0–0.2)
BASOPHILS NFR BLD: 0.6 % (ref 0–1.9)
BUN SERPL-MCNC: 33 MG/DL (ref 8–23)
CALCIUM SERPL-MCNC: 9.3 MG/DL (ref 8.7–10.5)
CHLORIDE SERPL-SCNC: 101 MMOL/L (ref 95–110)
CO2 SERPL-SCNC: 28 MMOL/L (ref 23–29)
CREAT SERPL-MCNC: 1.2 MG/DL (ref 0.5–1.4)
DIFFERENTIAL METHOD: ABNORMAL
EOSINOPHIL # BLD AUTO: 0 K/UL (ref 0–0.5)
EOSINOPHIL NFR BLD: 0.1 % (ref 0–8)
ERYTHROCYTE [DISTWIDTH] IN BLOOD BY AUTOMATED COUNT: 13.2 % (ref 11.5–14.5)
EST. GFR  (AFRICAN AMERICAN): 48 ML/MIN/1.73 M^2
EST. GFR  (NON AFRICAN AMERICAN): 41 ML/MIN/1.73 M^2
GLUCOSE SERPL-MCNC: 97 MG/DL (ref 70–110)
HCT VFR BLD AUTO: 34.7 % (ref 37–48.5)
HGB BLD-MCNC: 11.9 G/DL (ref 12–16)
IMM GRANULOCYTES # BLD AUTO: 0.91 K/UL (ref 0–0.04)
IMM GRANULOCYTES NFR BLD AUTO: 3.9 % (ref 0–0.5)
L PNEUMO AG UR QL IA: NEGATIVE
LYMPHOCYTES # BLD AUTO: 0.7 K/UL (ref 1–4.8)
LYMPHOCYTES NFR BLD: 2.9 % (ref 18–48)
MCH RBC QN AUTO: 31.8 PG (ref 27–31)
MCHC RBC AUTO-ENTMCNC: 34.3 G/DL (ref 32–36)
MCV RBC AUTO: 93 FL (ref 82–98)
MONOCYTES # BLD AUTO: 1.6 K/UL (ref 0.3–1)
MONOCYTES NFR BLD: 6.7 % (ref 4–15)
NEUTROPHILS # BLD AUTO: 20.1 K/UL (ref 1.8–7.7)
NEUTROPHILS NFR BLD: 85.8 % (ref 38–73)
NRBC BLD-RTO: 0 /100 WBC
PLATELET # BLD AUTO: 328 K/UL (ref 150–450)
PMV BLD AUTO: 10 FL (ref 9.2–12.9)
POTASSIUM SERPL-SCNC: 3.9 MMOL/L (ref 3.5–5.1)
RBC # BLD AUTO: 3.74 M/UL (ref 4–5.4)
SODIUM SERPL-SCNC: 138 MMOL/L (ref 136–145)
WBC # BLD AUTO: 23.38 K/UL (ref 3.9–12.7)

## 2021-04-26 PROCEDURE — 99497 PR ADVNCD CARE PLAN 30 MIN: ICD-10-PCS | Mod: ,,, | Performed by: NURSE PRACTITIONER

## 2021-04-26 PROCEDURE — 99233 SBSQ HOSP IP/OBS HIGH 50: CPT | Mod: ,,, | Performed by: NURSE PRACTITIONER

## 2021-04-26 PROCEDURE — 25000003 PHARM REV CODE 250: Performed by: NURSE PRACTITIONER

## 2021-04-26 PROCEDURE — 99497 ADVNCD CARE PLAN 30 MIN: CPT | Mod: ,,, | Performed by: NURSE PRACTITIONER

## 2021-04-26 PROCEDURE — 80048 BASIC METABOLIC PNL TOTAL CA: CPT | Performed by: STUDENT IN AN ORGANIZED HEALTH CARE EDUCATION/TRAINING PROGRAM

## 2021-04-26 PROCEDURE — 99233 PR SUBSEQUENT HOSPITAL CARE,LEVL III: ICD-10-PCS | Mod: ,,, | Performed by: NURSE PRACTITIONER

## 2021-04-26 PROCEDURE — 94660 CPAP INITIATION&MGMT: CPT

## 2021-04-26 PROCEDURE — 99291 PR CRITICAL CARE, E/M 30-74 MINUTES: ICD-10-PCS | Mod: ,,, | Performed by: NURSE PRACTITIONER

## 2021-04-26 PROCEDURE — 27100171 HC OXYGEN HIGH FLOW UP TO 24 HOURS

## 2021-04-26 PROCEDURE — 94640 AIRWAY INHALATION TREATMENT: CPT

## 2021-04-26 PROCEDURE — 85025 COMPLETE CBC W/AUTO DIFF WBC: CPT | Performed by: STUDENT IN AN ORGANIZED HEALTH CARE EDUCATION/TRAINING PROGRAM

## 2021-04-26 PROCEDURE — 36415 COLL VENOUS BLD VENIPUNCTURE: CPT | Performed by: STUDENT IN AN ORGANIZED HEALTH CARE EDUCATION/TRAINING PROGRAM

## 2021-04-26 PROCEDURE — 25000242 PHARM REV CODE 250 ALT 637 W/ HCPCS: Performed by: NURSE PRACTITIONER

## 2021-04-26 PROCEDURE — 99291 CRITICAL CARE FIRST HOUR: CPT | Mod: ,,, | Performed by: NURSE PRACTITIONER

## 2021-04-26 PROCEDURE — 25000003 PHARM REV CODE 250: Performed by: STUDENT IN AN ORGANIZED HEALTH CARE EDUCATION/TRAINING PROGRAM

## 2021-04-26 PROCEDURE — 63600175 PHARM REV CODE 636 W HCPCS: Performed by: STUDENT IN AN ORGANIZED HEALTH CARE EDUCATION/TRAINING PROGRAM

## 2021-04-26 PROCEDURE — 63600175 PHARM REV CODE 636 W HCPCS: Performed by: INTERNAL MEDICINE

## 2021-04-26 PROCEDURE — 94761 N-INVAS EAR/PLS OXIMETRY MLT: CPT

## 2021-04-26 PROCEDURE — 20000000 HC ICU ROOM

## 2021-04-26 PROCEDURE — 99900035 HC TECH TIME PER 15 MIN (STAT)

## 2021-04-26 RX ORDER — LOSARTAN POTASSIUM 25 MG/1
50 TABLET ORAL DAILY
Status: DISCONTINUED | OUTPATIENT
Start: 2021-04-27 | End: 2021-04-29 | Stop reason: HOSPADM

## 2021-04-26 RX ORDER — FUROSEMIDE 10 MG/ML
40 INJECTION INTRAMUSCULAR; INTRAVENOUS ONCE
Status: COMPLETED | OUTPATIENT
Start: 2021-04-26 | End: 2021-04-26

## 2021-04-26 RX ORDER — NYSTATIN 100000 [USP'U]/ML
500000 SUSPENSION ORAL 4 TIMES DAILY
Status: DISCONTINUED | OUTPATIENT
Start: 2021-04-26 | End: 2021-04-29 | Stop reason: HOSPADM

## 2021-04-26 RX ADMIN — NYSTATIN 500000 UNITS: 100000 SUSPENSION ORAL at 08:04

## 2021-04-26 RX ADMIN — LOSARTAN POTASSIUM 25 MG: 25 TABLET, FILM COATED ORAL at 09:04

## 2021-04-26 RX ADMIN — APIXABAN 2.5 MG: 2.5 TABLET, FILM COATED ORAL at 08:04

## 2021-04-26 RX ADMIN — CETIRIZINE HYDROCHLORIDE 5 MG: 5 TABLET ORAL at 09:04

## 2021-04-26 RX ADMIN — FUROSEMIDE 40 MG: 10 INJECTION, SOLUTION INTRAVENOUS at 05:04

## 2021-04-26 RX ADMIN — IPRATROPIUM BROMIDE AND ALBUTEROL SULFATE 3 ML: .5; 3 SOLUTION RESPIRATORY (INHALATION) at 01:04

## 2021-04-26 RX ADMIN — TRAZODONE HYDROCHLORIDE 50 MG: 50 TABLET ORAL at 08:04

## 2021-04-26 RX ADMIN — IPRATROPIUM BROMIDE AND ALBUTEROL SULFATE 3 ML: .5; 3 SOLUTION RESPIRATORY (INHALATION) at 08:04

## 2021-04-26 RX ADMIN — LEVOTHYROXINE SODIUM 75 MCG: 75 TABLET ORAL at 05:04

## 2021-04-26 RX ADMIN — ACETAMINOPHEN 500 MG: 500 TABLET, FILM COATED ORAL at 08:04

## 2021-04-26 RX ADMIN — ATORVASTATIN CALCIUM 40 MG: 40 TABLET, FILM COATED ORAL at 08:04

## 2021-04-26 RX ADMIN — NYSTATIN 500000 UNITS: 100000 SUSPENSION ORAL at 05:04

## 2021-04-26 RX ADMIN — PREDNISONE 40 MG: 20 TABLET ORAL at 09:04

## 2021-04-26 RX ADMIN — APIXABAN 2.5 MG: 2.5 TABLET, FILM COATED ORAL at 09:04

## 2021-04-26 RX ADMIN — IPRATROPIUM BROMIDE AND ALBUTEROL SULFATE 3 ML: .5; 3 SOLUTION RESPIRATORY (INHALATION) at 12:04

## 2021-04-26 RX ADMIN — METOPROLOL TARTRATE 25 MG: 25 TABLET, FILM COATED ORAL at 09:04

## 2021-04-26 RX ADMIN — METOPROLOL TARTRATE 25 MG: 25 TABLET, FILM COATED ORAL at 08:04

## 2021-04-27 LAB
ANION GAP SERPL CALC-SCNC: 11 MMOL/L (ref 8–16)
BASOPHILS # BLD AUTO: ABNORMAL K/UL (ref 0–0.2)
BASOPHILS NFR BLD: 0 % (ref 0–1.9)
BUN SERPL-MCNC: 31 MG/DL (ref 8–23)
CALCIUM SERPL-MCNC: 9.3 MG/DL (ref 8.7–10.5)
CHLORIDE SERPL-SCNC: 99 MMOL/L (ref 95–110)
CO2 SERPL-SCNC: 30 MMOL/L (ref 23–29)
CREAT SERPL-MCNC: 1.2 MG/DL (ref 0.5–1.4)
DIFFERENTIAL METHOD: ABNORMAL
EOSINOPHIL # BLD AUTO: ABNORMAL K/UL (ref 0–0.5)
EOSINOPHIL NFR BLD: 1 % (ref 0–8)
ERYTHROCYTE [DISTWIDTH] IN BLOOD BY AUTOMATED COUNT: 13.1 % (ref 11.5–14.5)
EST. GFR  (AFRICAN AMERICAN): 48 ML/MIN/1.73 M^2
EST. GFR  (NON AFRICAN AMERICAN): 41 ML/MIN/1.73 M^2
GLUCOSE SERPL-MCNC: 88 MG/DL (ref 70–110)
HCT VFR BLD AUTO: 34.5 % (ref 37–48.5)
HGB BLD-MCNC: 11.5 G/DL (ref 12–16)
IMM GRANULOCYTES # BLD AUTO: ABNORMAL K/UL (ref 0–0.04)
IMM GRANULOCYTES NFR BLD AUTO: ABNORMAL % (ref 0–0.5)
LYMPHOCYTES # BLD AUTO: ABNORMAL K/UL (ref 1–4.8)
LYMPHOCYTES NFR BLD: 3 % (ref 18–48)
MCH RBC QN AUTO: 31.3 PG (ref 27–31)
MCHC RBC AUTO-ENTMCNC: 33.3 G/DL (ref 32–36)
MCV RBC AUTO: 94 FL (ref 82–98)
MONOCYTES # BLD AUTO: ABNORMAL K/UL (ref 0.3–1)
MONOCYTES NFR BLD: 1 % (ref 4–15)
NEUTROPHILS # BLD AUTO: ABNORMAL K/UL (ref 1.8–7.7)
NEUTROPHILS NFR BLD: 95 % (ref 38–73)
NRBC BLD-RTO: 0 /100 WBC
PLATELET # BLD AUTO: 327 K/UL (ref 150–450)
PMV BLD AUTO: 9.6 FL (ref 9.2–12.9)
POTASSIUM SERPL-SCNC: 3.8 MMOL/L (ref 3.5–5.1)
RBC # BLD AUTO: 3.68 M/UL (ref 4–5.4)
SODIUM SERPL-SCNC: 140 MMOL/L (ref 136–145)
WBC # BLD AUTO: 21.57 K/UL (ref 3.9–12.7)

## 2021-04-27 PROCEDURE — 94761 N-INVAS EAR/PLS OXIMETRY MLT: CPT

## 2021-04-27 PROCEDURE — 80048 BASIC METABOLIC PNL TOTAL CA: CPT | Performed by: STUDENT IN AN ORGANIZED HEALTH CARE EDUCATION/TRAINING PROGRAM

## 2021-04-27 PROCEDURE — 85007 BL SMEAR W/DIFF WBC COUNT: CPT | Performed by: STUDENT IN AN ORGANIZED HEALTH CARE EDUCATION/TRAINING PROGRAM

## 2021-04-27 PROCEDURE — 99900035 HC TECH TIME PER 15 MIN (STAT)

## 2021-04-27 PROCEDURE — 99498 PR ADVNCD CARE PLAN ADDL 30 MIN: ICD-10-PCS | Mod: ,,, | Performed by: NURSE PRACTITIONER

## 2021-04-27 PROCEDURE — 99498 ADVNCD CARE PLAN ADDL 30 MIN: CPT | Mod: ,,, | Performed by: NURSE PRACTITIONER

## 2021-04-27 PROCEDURE — 94660 CPAP INITIATION&MGMT: CPT

## 2021-04-27 PROCEDURE — 99233 SBSQ HOSP IP/OBS HIGH 50: CPT | Mod: ,,, | Performed by: NURSE PRACTITIONER

## 2021-04-27 PROCEDURE — 99497 ADVNCD CARE PLAN 30 MIN: CPT | Mod: ,,, | Performed by: NURSE PRACTITIONER

## 2021-04-27 PROCEDURE — 20000000 HC ICU ROOM

## 2021-04-27 PROCEDURE — 27100171 HC OXYGEN HIGH FLOW UP TO 24 HOURS

## 2021-04-27 PROCEDURE — 25000003 PHARM REV CODE 250: Performed by: STUDENT IN AN ORGANIZED HEALTH CARE EDUCATION/TRAINING PROGRAM

## 2021-04-27 PROCEDURE — 25000003 PHARM REV CODE 250: Performed by: NURSE PRACTITIONER

## 2021-04-27 PROCEDURE — 36415 COLL VENOUS BLD VENIPUNCTURE: CPT | Performed by: STUDENT IN AN ORGANIZED HEALTH CARE EDUCATION/TRAINING PROGRAM

## 2021-04-27 PROCEDURE — 63600175 PHARM REV CODE 636 W HCPCS: Performed by: INTERNAL MEDICINE

## 2021-04-27 PROCEDURE — 85027 COMPLETE CBC AUTOMATED: CPT | Performed by: STUDENT IN AN ORGANIZED HEALTH CARE EDUCATION/TRAINING PROGRAM

## 2021-04-27 PROCEDURE — 99233 PR SUBSEQUENT HOSPITAL CARE,LEVL III: ICD-10-PCS | Mod: ,,, | Performed by: NURSE PRACTITIONER

## 2021-04-27 PROCEDURE — 99497 PR ADVNCD CARE PLAN 30 MIN: ICD-10-PCS | Mod: ,,, | Performed by: NURSE PRACTITIONER

## 2021-04-27 PROCEDURE — 25000242 PHARM REV CODE 250 ALT 637 W/ HCPCS: Performed by: NURSE PRACTITIONER

## 2021-04-27 PROCEDURE — 94640 AIRWAY INHALATION TREATMENT: CPT

## 2021-04-27 RX ORDER — FUROSEMIDE 10 MG/ML
40 INJECTION INTRAMUSCULAR; INTRAVENOUS ONCE
Status: COMPLETED | OUTPATIENT
Start: 2021-04-28 | End: 2021-04-28

## 2021-04-27 RX ORDER — PREDNISONE 20 MG/1
20 TABLET ORAL DAILY
Status: DISCONTINUED | OUTPATIENT
Start: 2021-04-28 | End: 2021-04-29 | Stop reason: HOSPADM

## 2021-04-27 RX ADMIN — NYSTATIN 500000 UNITS: 100000 SUSPENSION ORAL at 08:04

## 2021-04-27 RX ADMIN — TRAZODONE HYDROCHLORIDE 50 MG: 50 TABLET ORAL at 08:04

## 2021-04-27 RX ADMIN — IPRATROPIUM BROMIDE AND ALBUTEROL SULFATE 3 ML: .5; 3 SOLUTION RESPIRATORY (INHALATION) at 07:04

## 2021-04-27 RX ADMIN — IPRATROPIUM BROMIDE AND ALBUTEROL SULFATE 3 ML: .5; 3 SOLUTION RESPIRATORY (INHALATION) at 01:04

## 2021-04-27 RX ADMIN — APIXABAN 2.5 MG: 2.5 TABLET, FILM COATED ORAL at 08:04

## 2021-04-27 RX ADMIN — CETIRIZINE HYDROCHLORIDE 5 MG: 5 TABLET ORAL at 08:04

## 2021-04-27 RX ADMIN — METOPROLOL TARTRATE 25 MG: 25 TABLET, FILM COATED ORAL at 08:04

## 2021-04-27 RX ADMIN — LOSARTAN POTASSIUM 50 MG: 25 TABLET, FILM COATED ORAL at 08:04

## 2021-04-27 RX ADMIN — NYSTATIN 500000 UNITS: 100000 SUSPENSION ORAL at 01:04

## 2021-04-27 RX ADMIN — IPRATROPIUM BROMIDE AND ALBUTEROL SULFATE 3 ML: .5; 3 SOLUTION RESPIRATORY (INHALATION) at 09:04

## 2021-04-27 RX ADMIN — ATORVASTATIN CALCIUM 40 MG: 40 TABLET, FILM COATED ORAL at 08:04

## 2021-04-27 RX ADMIN — PREDNISONE 40 MG: 20 TABLET ORAL at 08:04

## 2021-04-27 RX ADMIN — LEVOTHYROXINE SODIUM 75 MCG: 75 TABLET ORAL at 06:04

## 2021-04-27 RX ADMIN — ACETAMINOPHEN 500 MG: 500 TABLET, FILM COATED ORAL at 08:04

## 2021-04-28 VITALS
HEART RATE: 59 BPM | OXYGEN SATURATION: 92 % | RESPIRATION RATE: 18 BRPM | TEMPERATURE: 98 F | WEIGHT: 80.25 LBS | HEIGHT: 56 IN | BODY MASS INDEX: 18.05 KG/M2 | DIASTOLIC BLOOD PRESSURE: 61 MMHG | SYSTOLIC BLOOD PRESSURE: 144 MMHG

## 2021-04-28 PROBLEM — I50.9 ACUTE ON CHRONIC CONGESTIVE HEART FAILURE: Status: RESOLVED | Noted: 2021-04-20 | Resolved: 2021-04-28

## 2021-04-28 LAB
BACTERIA BLD CULT: NORMAL
BACTERIA BLD CULT: NORMAL
SARS-COV-2 RDRP RESP QL NAA+PROBE: NEGATIVE

## 2021-04-28 PROCEDURE — U0002 COVID-19 LAB TEST NON-CDC: HCPCS | Performed by: HOSPITALIST

## 2021-04-28 PROCEDURE — 27100171 HC OXYGEN HIGH FLOW UP TO 24 HOURS

## 2021-04-28 PROCEDURE — 99900035 HC TECH TIME PER 15 MIN (STAT)

## 2021-04-28 PROCEDURE — 94660 CPAP INITIATION&MGMT: CPT

## 2021-04-28 PROCEDURE — 25000242 PHARM REV CODE 250 ALT 637 W/ HCPCS: Performed by: NURSE PRACTITIONER

## 2021-04-28 PROCEDURE — 94640 AIRWAY INHALATION TREATMENT: CPT

## 2021-04-28 PROCEDURE — 25000003 PHARM REV CODE 250: Performed by: STUDENT IN AN ORGANIZED HEALTH CARE EDUCATION/TRAINING PROGRAM

## 2021-04-28 PROCEDURE — 25000003 PHARM REV CODE 250: Performed by: NURSE PRACTITIONER

## 2021-04-28 PROCEDURE — 63600175 PHARM REV CODE 636 W HCPCS: Performed by: INTERNAL MEDICINE

## 2021-04-28 PROCEDURE — 94761 N-INVAS EAR/PLS OXIMETRY MLT: CPT

## 2021-04-28 RX ORDER — PREDNISONE 20 MG/1
20 TABLET ORAL DAILY
Qty: 5 TABLET | Refills: 0
Start: 2021-04-29 | End: 2021-05-04

## 2021-04-28 RX ORDER — NYSTATIN 100000 [USP'U]/ML
500000 SUSPENSION ORAL 4 TIMES DAILY
Qty: 200 ML | Refills: 0
Start: 2021-04-28 | End: 2021-05-08

## 2021-04-28 RX ORDER — TRAZODONE HYDROCHLORIDE 50 MG/1
50 TABLET ORAL NIGHTLY
Qty: 30 TABLET | Refills: 0
Start: 2021-04-28 | End: 2023-05-30

## 2021-04-28 RX ORDER — FUROSEMIDE 40 MG/1
40 TABLET ORAL EVERY OTHER DAY
Qty: 15 TABLET | Refills: 11 | Status: ON HOLD | OUTPATIENT
Start: 2021-04-28 | End: 2022-06-17

## 2021-04-28 RX ORDER — ACETAMINOPHEN 500 MG
500 TABLET ORAL EVERY 6 HOURS PRN
Refills: 0
Start: 2021-04-28

## 2021-04-28 RX ORDER — IPRATROPIUM BROMIDE AND ALBUTEROL SULFATE 2.5; .5 MG/3ML; MG/3ML
3 SOLUTION RESPIRATORY (INHALATION) EVERY 6 HOURS
Qty: 1 BOX | Refills: 0 | Status: ON HOLD
Start: 2021-04-28 | End: 2023-06-12

## 2021-04-28 RX ORDER — METOPROLOL TARTRATE 50 MG/1
25 TABLET ORAL 2 TIMES DAILY
Qty: 30 TABLET | Refills: 11
Start: 2021-04-28 | End: 2022-06-13

## 2021-04-28 RX ADMIN — LOSARTAN POTASSIUM 50 MG: 25 TABLET, FILM COATED ORAL at 09:04

## 2021-04-28 RX ADMIN — CETIRIZINE HYDROCHLORIDE 5 MG: 5 TABLET ORAL at 09:04

## 2021-04-28 RX ADMIN — IPRATROPIUM BROMIDE AND ALBUTEROL SULFATE 3 ML: .5; 3 SOLUTION RESPIRATORY (INHALATION) at 01:04

## 2021-04-28 RX ADMIN — ACETAMINOPHEN 500 MG: 500 TABLET, FILM COATED ORAL at 06:04

## 2021-04-28 RX ADMIN — APIXABAN 2.5 MG: 2.5 TABLET, FILM COATED ORAL at 09:04

## 2021-04-28 RX ADMIN — FUROSEMIDE 40 MG: 10 INJECTION, SOLUTION INTRAVENOUS at 09:04

## 2021-04-28 RX ADMIN — IPRATROPIUM BROMIDE AND ALBUTEROL SULFATE 3 ML: .5; 3 SOLUTION RESPIRATORY (INHALATION) at 08:04

## 2021-04-28 RX ADMIN — NYSTATIN 500000 UNITS: 100000 SUSPENSION ORAL at 04:04

## 2021-04-28 RX ADMIN — METOPROLOL TARTRATE 25 MG: 25 TABLET, FILM COATED ORAL at 08:04

## 2021-04-28 RX ADMIN — PREDNISONE 20 MG: 20 TABLET ORAL at 09:04

## 2021-04-28 RX ADMIN — ATORVASTATIN CALCIUM 40 MG: 40 TABLET, FILM COATED ORAL at 08:04

## 2021-04-28 RX ADMIN — APIXABAN 2.5 MG: 2.5 TABLET, FILM COATED ORAL at 08:04

## 2021-04-28 RX ADMIN — LEVOTHYROXINE SODIUM 75 MCG: 75 TABLET ORAL at 06:04

## 2021-04-28 RX ADMIN — NYSTATIN 500000 UNITS: 100000 SUSPENSION ORAL at 09:04

## 2021-04-28 RX ADMIN — METOPROLOL TARTRATE 25 MG: 25 TABLET, FILM COATED ORAL at 09:04

## 2021-07-20 ENCOUNTER — LAB VISIT (OUTPATIENT)
Dept: LAB | Facility: OTHER | Age: 86
End: 2021-07-20
Payer: MEDICARE

## 2021-07-20 DIAGNOSIS — Z20.822 ENCOUNTER FOR LABORATORY TESTING FOR COVID-19 VIRUS: ICD-10-CM

## 2021-07-21 PROCEDURE — U0003 INFECTIOUS AGENT DETECTION BY NUCLEIC ACID (DNA OR RNA); SEVERE ACUTE RESPIRATORY SYNDROME CORONAVIRUS 2 (SARS-COV-2) (CORONAVIRUS DISEASE [COVID-19]), AMPLIFIED PROBE TECHNIQUE, MAKING USE OF HIGH THROUGHPUT TECHNOLOGIES AS DESCRIBED BY CMS-2020-01-R: HCPCS | Performed by: NURSE PRACTITIONER

## 2021-07-22 LAB
SARS-COV-2 RNA RESP QL NAA+PROBE: NOT DETECTED
SARS-COV-2- CYCLE NUMBER: -1

## 2021-07-27 ENCOUNTER — LAB VISIT (OUTPATIENT)
Dept: LAB | Facility: OTHER | Age: 86
End: 2021-07-27
Payer: MEDICARE

## 2021-07-27 DIAGNOSIS — Z20.822 ENCOUNTER FOR LABORATORY TESTING FOR COVID-19 VIRUS: ICD-10-CM

## 2021-07-27 PROCEDURE — U0003 INFECTIOUS AGENT DETECTION BY NUCLEIC ACID (DNA OR RNA); SEVERE ACUTE RESPIRATORY SYNDROME CORONAVIRUS 2 (SARS-COV-2) (CORONAVIRUS DISEASE [COVID-19]), AMPLIFIED PROBE TECHNIQUE, MAKING USE OF HIGH THROUGHPUT TECHNOLOGIES AS DESCRIBED BY CMS-2020-01-R: HCPCS | Performed by: NURSE PRACTITIONER

## 2021-07-29 LAB
SARS-COV-2 RNA RESP QL NAA+PROBE: NOT DETECTED
SARS-COV-2- CYCLE NUMBER: -1

## 2021-09-07 ENCOUNTER — LAB VISIT (OUTPATIENT)
Dept: LAB | Facility: OTHER | Age: 86
End: 2021-09-07
Payer: MEDICARE

## 2021-09-07 DIAGNOSIS — Z20.822 ENCOUNTER FOR LABORATORY TESTING FOR COVID-19 VIRUS: ICD-10-CM

## 2021-09-07 PROCEDURE — U0003 INFECTIOUS AGENT DETECTION BY NUCLEIC ACID (DNA OR RNA); SEVERE ACUTE RESPIRATORY SYNDROME CORONAVIRUS 2 (SARS-COV-2) (CORONAVIRUS DISEASE [COVID-19]), AMPLIFIED PROBE TECHNIQUE, MAKING USE OF HIGH THROUGHPUT TECHNOLOGIES AS DESCRIBED BY CMS-2020-01-R: HCPCS | Performed by: NURSE PRACTITIONER

## 2021-09-08 LAB
SARS-COV-2 RNA RESP QL NAA+PROBE: NOT DETECTED
SARS-COV-2- CYCLE NUMBER: NORMAL

## 2021-12-28 ENCOUNTER — LAB VISIT (OUTPATIENT)
Dept: LAB | Facility: OTHER | Age: 86
End: 2021-12-28
Payer: MEDICARE

## 2021-12-28 DIAGNOSIS — Z20.822 ENCOUNTER FOR LABORATORY TESTING FOR COVID-19 VIRUS: ICD-10-CM

## 2021-12-28 PROCEDURE — U0003 INFECTIOUS AGENT DETECTION BY NUCLEIC ACID (DNA OR RNA); SEVERE ACUTE RESPIRATORY SYNDROME CORONAVIRUS 2 (SARS-COV-2) (CORONAVIRUS DISEASE [COVID-19]), AMPLIFIED PROBE TECHNIQUE, MAKING USE OF HIGH THROUGHPUT TECHNOLOGIES AS DESCRIBED BY CMS-2020-01-R: HCPCS | Performed by: NURSE PRACTITIONER

## 2021-12-30 LAB
SARS-COV-2 RNA RESP QL NAA+PROBE: NOT DETECTED
SARS-COV-2- CYCLE NUMBER: NORMAL

## 2022-01-04 ENCOUNTER — LAB VISIT (OUTPATIENT)
Dept: LAB | Facility: OTHER | Age: 87
End: 2022-01-04
Payer: MEDICARE

## 2022-01-04 DIAGNOSIS — Z20.822 ENCOUNTER FOR LABORATORY TESTING FOR COVID-19 VIRUS: ICD-10-CM

## 2022-01-05 PROCEDURE — U0003 INFECTIOUS AGENT DETECTION BY NUCLEIC ACID (DNA OR RNA); SEVERE ACUTE RESPIRATORY SYNDROME CORONAVIRUS 2 (SARS-COV-2) (CORONAVIRUS DISEASE [COVID-19]), AMPLIFIED PROBE TECHNIQUE, MAKING USE OF HIGH THROUGHPUT TECHNOLOGIES AS DESCRIBED BY CMS-2020-01-R: HCPCS | Mod: ST72 | Performed by: NURSE PRACTITIONER

## 2022-01-10 LAB — SARS-COV-2 RNA RESP QL NAA+PROBE: NOT DETECTED

## 2022-01-18 ENCOUNTER — LAB VISIT (OUTPATIENT)
Dept: LAB | Facility: OTHER | Age: 87
End: 2022-01-18
Payer: MEDICARE

## 2022-01-18 DIAGNOSIS — Z20.822 ENCOUNTER FOR LABORATORY TESTING FOR COVID-19 VIRUS: ICD-10-CM

## 2022-01-18 PROCEDURE — U0003 INFECTIOUS AGENT DETECTION BY NUCLEIC ACID (DNA OR RNA); SEVERE ACUTE RESPIRATORY SYNDROME CORONAVIRUS 2 (SARS-COV-2) (CORONAVIRUS DISEASE [COVID-19]), AMPLIFIED PROBE TECHNIQUE, MAKING USE OF HIGH THROUGHPUT TECHNOLOGIES AS DESCRIBED BY CMS-2020-01-R: HCPCS | Performed by: NURSE PRACTITIONER

## 2022-01-19 LAB
SARS-COV-2 RNA RESP QL NAA+PROBE: NOT DETECTED
SARS-COV-2- CYCLE NUMBER: NORMAL

## 2022-01-25 ENCOUNTER — LAB VISIT (OUTPATIENT)
Dept: LAB | Facility: OTHER | Age: 87
End: 2022-01-25
Payer: MEDICARE

## 2022-01-25 DIAGNOSIS — Z20.822 ENCOUNTER FOR LABORATORY TESTING FOR COVID-19 VIRUS: ICD-10-CM

## 2022-01-25 PROCEDURE — U0003 INFECTIOUS AGENT DETECTION BY NUCLEIC ACID (DNA OR RNA); SEVERE ACUTE RESPIRATORY SYNDROME CORONAVIRUS 2 (SARS-COV-2) (CORONAVIRUS DISEASE [COVID-19]), AMPLIFIED PROBE TECHNIQUE, MAKING USE OF HIGH THROUGHPUT TECHNOLOGIES AS DESCRIBED BY CMS-2020-01-R: HCPCS | Performed by: EMERGENCY MEDICINE

## 2022-01-26 LAB
SARS-COV-2 RNA RESP QL NAA+PROBE: NOT DETECTED
SARS-COV-2- CYCLE NUMBER: NORMAL

## 2022-02-08 ENCOUNTER — LAB VISIT (OUTPATIENT)
Dept: LAB | Facility: OTHER | Age: 87
End: 2022-02-08
Payer: MEDICARE

## 2022-02-08 DIAGNOSIS — Z20.822 ENCOUNTER FOR LABORATORY TESTING FOR COVID-19 VIRUS: ICD-10-CM

## 2022-02-08 PROCEDURE — U0003 INFECTIOUS AGENT DETECTION BY NUCLEIC ACID (DNA OR RNA); SEVERE ACUTE RESPIRATORY SYNDROME CORONAVIRUS 2 (SARS-COV-2) (CORONAVIRUS DISEASE [COVID-19]), AMPLIFIED PROBE TECHNIQUE, MAKING USE OF HIGH THROUGHPUT TECHNOLOGIES AS DESCRIBED BY CMS-2020-01-R: HCPCS | Performed by: EMERGENCY MEDICINE

## 2022-02-09 LAB
SARS-COV-2 RNA RESP QL NAA+PROBE: NOT DETECTED
SARS-COV-2- CYCLE NUMBER: NORMAL

## 2022-02-15 ENCOUNTER — LAB VISIT (OUTPATIENT)
Dept: LAB | Facility: OTHER | Age: 87
End: 2022-02-15
Payer: MEDICARE

## 2022-02-15 DIAGNOSIS — Z20.822 ENCOUNTER FOR LABORATORY TESTING FOR COVID-19 VIRUS: ICD-10-CM

## 2022-02-15 PROCEDURE — U0003 INFECTIOUS AGENT DETECTION BY NUCLEIC ACID (DNA OR RNA); SEVERE ACUTE RESPIRATORY SYNDROME CORONAVIRUS 2 (SARS-COV-2) (CORONAVIRUS DISEASE [COVID-19]), AMPLIFIED PROBE TECHNIQUE, MAKING USE OF HIGH THROUGHPUT TECHNOLOGIES AS DESCRIBED BY CMS-2020-01-R: HCPCS | Performed by: EMERGENCY MEDICINE

## 2022-02-16 LAB
SARS-COV-2 RNA RESP QL NAA+PROBE: NOT DETECTED
SARS-COV-2- CYCLE NUMBER: NORMAL

## 2022-02-22 ENCOUNTER — LAB VISIT (OUTPATIENT)
Dept: LAB | Facility: OTHER | Age: 87
End: 2022-02-22
Payer: MEDICARE

## 2022-02-22 DIAGNOSIS — Z20.822 ENCOUNTER FOR LABORATORY TESTING FOR COVID-19 VIRUS: ICD-10-CM

## 2022-02-22 PROCEDURE — U0003 INFECTIOUS AGENT DETECTION BY NUCLEIC ACID (DNA OR RNA); SEVERE ACUTE RESPIRATORY SYNDROME CORONAVIRUS 2 (SARS-COV-2) (CORONAVIRUS DISEASE [COVID-19]), AMPLIFIED PROBE TECHNIQUE, MAKING USE OF HIGH THROUGHPUT TECHNOLOGIES AS DESCRIBED BY CMS-2020-01-R: HCPCS | Performed by: EMERGENCY MEDICINE

## 2022-02-23 LAB
SARS-COV-2 RNA RESP QL NAA+PROBE: NOT DETECTED
SARS-COV-2- CYCLE NUMBER: NORMAL

## 2022-03-08 ENCOUNTER — LAB VISIT (OUTPATIENT)
Dept: LAB | Facility: OTHER | Age: 87
End: 2022-03-08
Payer: MEDICARE

## 2022-03-08 DIAGNOSIS — Z20.822 ENCOUNTER FOR LABORATORY TESTING FOR COVID-19 VIRUS: ICD-10-CM

## 2022-03-08 PROCEDURE — U0003 INFECTIOUS AGENT DETECTION BY NUCLEIC ACID (DNA OR RNA); SEVERE ACUTE RESPIRATORY SYNDROME CORONAVIRUS 2 (SARS-COV-2) (CORONAVIRUS DISEASE [COVID-19]), AMPLIFIED PROBE TECHNIQUE, MAKING USE OF HIGH THROUGHPUT TECHNOLOGIES AS DESCRIBED BY CMS-2020-01-R: HCPCS | Performed by: EMERGENCY MEDICINE

## 2022-03-09 LAB
SARS-COV-2 RNA RESP QL NAA+PROBE: NOT DETECTED
SARS-COV-2- CYCLE NUMBER: NORMAL

## 2022-03-15 ENCOUNTER — LAB VISIT (OUTPATIENT)
Dept: LAB | Facility: OTHER | Age: 87
End: 2022-03-15
Payer: MEDICARE

## 2022-03-15 DIAGNOSIS — Z20.822 ENCOUNTER FOR LABORATORY TESTING FOR COVID-19 VIRUS: ICD-10-CM

## 2022-03-15 PROCEDURE — U0003 INFECTIOUS AGENT DETECTION BY NUCLEIC ACID (DNA OR RNA); SEVERE ACUTE RESPIRATORY SYNDROME CORONAVIRUS 2 (SARS-COV-2) (CORONAVIRUS DISEASE [COVID-19]), AMPLIFIED PROBE TECHNIQUE, MAKING USE OF HIGH THROUGHPUT TECHNOLOGIES AS DESCRIBED BY CMS-2020-01-R: HCPCS | Performed by: EMERGENCY MEDICINE

## 2022-03-16 LAB
SARS-COV-2 RNA RESP QL NAA+PROBE: NOT DETECTED
SARS-COV-2- CYCLE NUMBER: NORMAL

## 2022-03-22 ENCOUNTER — LAB VISIT (OUTPATIENT)
Dept: LAB | Facility: OTHER | Age: 87
End: 2022-03-22
Payer: MEDICARE

## 2022-03-22 DIAGNOSIS — Z20.822 ENCOUNTER FOR LABORATORY TESTING FOR COVID-19 VIRUS: ICD-10-CM

## 2022-03-22 PROCEDURE — U0003 INFECTIOUS AGENT DETECTION BY NUCLEIC ACID (DNA OR RNA); SEVERE ACUTE RESPIRATORY SYNDROME CORONAVIRUS 2 (SARS-COV-2) (CORONAVIRUS DISEASE [COVID-19]), AMPLIFIED PROBE TECHNIQUE, MAKING USE OF HIGH THROUGHPUT TECHNOLOGIES AS DESCRIBED BY CMS-2020-01-R: HCPCS | Performed by: EMERGENCY MEDICINE

## 2022-03-23 LAB
SARS-COV-2 RNA RESP QL NAA+PROBE: NOT DETECTED
SARS-COV-2- CYCLE NUMBER: NORMAL

## 2022-06-13 ENCOUNTER — HOSPITAL ENCOUNTER (INPATIENT)
Facility: HOSPITAL | Age: 87
LOS: 3 days | DRG: 291 | End: 2022-06-17
Attending: EMERGENCY MEDICINE | Admitting: FAMILY MEDICINE
Payer: MEDICARE

## 2022-06-13 DIAGNOSIS — J96.21 ACUTE ON CHRONIC RESPIRATORY FAILURE WITH HYPOXIA: Primary | ICD-10-CM

## 2022-06-13 DIAGNOSIS — R06.02 SHORTNESS OF BREATH: ICD-10-CM

## 2022-06-13 DIAGNOSIS — I50.9 CHF EXACERBATION: ICD-10-CM

## 2022-06-13 DIAGNOSIS — E87.1 HYPONATREMIA: ICD-10-CM

## 2022-06-13 DIAGNOSIS — R06.03 RESPIRATORY DISTRESS: ICD-10-CM

## 2022-06-13 DIAGNOSIS — J96.91 RESPIRATORY FAILURE WITH HYPOXIA: ICD-10-CM

## 2022-06-13 LAB
ALBUMIN SERPL BCP-MCNC: 4.1 G/DL (ref 3.5–5.2)
ALP SERPL-CCNC: 60 U/L (ref 55–135)
ALT SERPL W/O P-5'-P-CCNC: 25 U/L (ref 10–44)
ANION GAP SERPL CALC-SCNC: 8 MMOL/L (ref 8–16)
AST SERPL-CCNC: 21 U/L (ref 10–40)
BACTERIA #/AREA URNS HPF: NEGATIVE /HPF
BASOPHILS # BLD AUTO: 0.02 K/UL (ref 0–0.2)
BASOPHILS NFR BLD: 0.1 % (ref 0–1.9)
BILIRUB SERPL-MCNC: 0.8 MG/DL (ref 0.1–1)
BILIRUB UR QL STRIP: NEGATIVE
BNP SERPL-MCNC: 909 PG/ML (ref 0–99)
BUN SERPL-MCNC: 21 MG/DL (ref 8–23)
CALCIUM SERPL-MCNC: 8.9 MG/DL (ref 8.7–10.5)
CHLORIDE SERPL-SCNC: 93 MMOL/L (ref 95–110)
CLARITY UR: CLEAR
CO2 SERPL-SCNC: 23 MMOL/L (ref 23–29)
COLOR UR: YELLOW
CREAT SERPL-MCNC: 1.1 MG/DL (ref 0.5–1.4)
DIFFERENTIAL METHOD: ABNORMAL
EOSINOPHIL # BLD AUTO: 0 K/UL (ref 0–0.5)
EOSINOPHIL NFR BLD: 0.1 % (ref 0–8)
ERYTHROCYTE [DISTWIDTH] IN BLOOD BY AUTOMATED COUNT: 13.8 % (ref 11.5–14.5)
EST. GFR  (AFRICAN AMERICAN): 52.5 ML/MIN/1.73 M^2
EST. GFR  (NON AFRICAN AMERICAN): 45.6 ML/MIN/1.73 M^2
GLUCOSE SERPL-MCNC: 123 MG/DL (ref 70–110)
GLUCOSE UR QL STRIP: NEGATIVE
HCT VFR BLD AUTO: 29.7 % (ref 37–48.5)
HGB BLD-MCNC: 10 G/DL (ref 12–16)
HGB UR QL STRIP: NEGATIVE
HYALINE CASTS #/AREA URNS LPF: 5 /LPF
IMM GRANULOCYTES # BLD AUTO: 0.06 K/UL (ref 0–0.04)
IMM GRANULOCYTES NFR BLD AUTO: 0.4 % (ref 0–0.5)
INFLUENZA A, MOLECULAR: NEGATIVE
INFLUENZA B, MOLECULAR: NEGATIVE
INR PPP: 1.5
KETONES UR QL STRIP: NEGATIVE
LEUKOCYTE ESTERASE UR QL STRIP: NEGATIVE
LYMPHOCYTES # BLD AUTO: 0.4 K/UL (ref 1–4.8)
LYMPHOCYTES NFR BLD: 2.5 % (ref 18–48)
MAGNESIUM SERPL-MCNC: 1.8 MG/DL (ref 1.6–2.6)
MCH RBC QN AUTO: 30.3 PG (ref 27–31)
MCHC RBC AUTO-ENTMCNC: 33.7 G/DL (ref 32–36)
MCV RBC AUTO: 90 FL (ref 82–98)
MICROSCOPIC COMMENT: ABNORMAL
MONOCYTES # BLD AUTO: 1 K/UL (ref 0.3–1)
MONOCYTES NFR BLD: 6.4 % (ref 4–15)
NEUTROPHILS # BLD AUTO: 14.7 K/UL (ref 1.8–7.7)
NEUTROPHILS NFR BLD: 90.5 % (ref 38–73)
NITRITE UR QL STRIP: NEGATIVE
NRBC BLD-RTO: 0 /100 WBC
PH UR STRIP: 6 [PH] (ref 5–8)
PLATELET # BLD AUTO: 475 K/UL (ref 150–450)
PMV BLD AUTO: 9 FL (ref 9.2–12.9)
POTASSIUM SERPL-SCNC: 4.1 MMOL/L (ref 3.5–5.1)
PROT SERPL-MCNC: 6.5 G/DL (ref 6–8.4)
PROT UR QL STRIP: ABNORMAL
PROTHROMBIN TIME: 17 SEC (ref 11.4–13.7)
RBC # BLD AUTO: 3.3 M/UL (ref 4–5.4)
RBC #/AREA URNS HPF: 0 /HPF (ref 0–4)
SARS-COV-2 RDRP RESP QL NAA+PROBE: NEGATIVE
SODIUM SERPL-SCNC: 124 MMOL/L (ref 136–145)
SP GR UR STRIP: 1.01 (ref 1–1.03)
SPECIMEN SOURCE: NORMAL
SQUAMOUS #/AREA URNS HPF: 2 /HPF
TROPONIN I SERPL DL<=0.01 NG/ML-MCNC: <0.03 NG/ML
URN SPEC COLLECT METH UR: ABNORMAL
UROBILINOGEN UR STRIP-ACNC: NEGATIVE EU/DL
WBC # BLD AUTO: 16.18 K/UL (ref 3.9–12.7)
WBC #/AREA URNS HPF: 2 /HPF (ref 0–5)

## 2022-06-13 PROCEDURE — 96375 TX/PRO/DX INJ NEW DRUG ADDON: CPT

## 2022-06-13 PROCEDURE — 94761 N-INVAS EAR/PLS OXIMETRY MLT: CPT

## 2022-06-13 PROCEDURE — 83735 ASSAY OF MAGNESIUM: CPT | Performed by: STUDENT IN AN ORGANIZED HEALTH CARE EDUCATION/TRAINING PROGRAM

## 2022-06-13 PROCEDURE — 80053 COMPREHEN METABOLIC PANEL: CPT | Performed by: STUDENT IN AN ORGANIZED HEALTH CARE EDUCATION/TRAINING PROGRAM

## 2022-06-13 PROCEDURE — 87040 BLOOD CULTURE FOR BACTERIA: CPT | Mod: 59 | Performed by: STUDENT IN AN ORGANIZED HEALTH CARE EDUCATION/TRAINING PROGRAM

## 2022-06-13 PROCEDURE — 25000003 PHARM REV CODE 250: Performed by: EMERGENCY MEDICINE

## 2022-06-13 PROCEDURE — 96365 THER/PROPH/DIAG IV INF INIT: CPT

## 2022-06-13 PROCEDURE — 99900035 HC TECH TIME PER 15 MIN (STAT)

## 2022-06-13 PROCEDURE — 27000221 HC OXYGEN, UP TO 24 HOURS

## 2022-06-13 PROCEDURE — U0002 COVID-19 LAB TEST NON-CDC: HCPCS | Performed by: STUDENT IN AN ORGANIZED HEALTH CARE EDUCATION/TRAINING PROGRAM

## 2022-06-13 PROCEDURE — 99291 CRITICAL CARE FIRST HOUR: CPT

## 2022-06-13 PROCEDURE — 87502 INFLUENZA DNA AMP PROBE: CPT | Performed by: EMERGENCY MEDICINE

## 2022-06-13 PROCEDURE — 63600175 PHARM REV CODE 636 W HCPCS: Performed by: STUDENT IN AN ORGANIZED HEALTH CARE EDUCATION/TRAINING PROGRAM

## 2022-06-13 PROCEDURE — 83880 ASSAY OF NATRIURETIC PEPTIDE: CPT | Performed by: STUDENT IN AN ORGANIZED HEALTH CARE EDUCATION/TRAINING PROGRAM

## 2022-06-13 PROCEDURE — 85610 PROTHROMBIN TIME: CPT | Performed by: STUDENT IN AN ORGANIZED HEALTH CARE EDUCATION/TRAINING PROGRAM

## 2022-06-13 PROCEDURE — 93010 ELECTROCARDIOGRAM REPORT: CPT | Mod: ,,, | Performed by: INTERNAL MEDICINE

## 2022-06-13 PROCEDURE — 99900031 HC PATIENT EDUCATION (STAT)

## 2022-06-13 PROCEDURE — 85025 COMPLETE CBC W/AUTO DIFF WBC: CPT | Performed by: STUDENT IN AN ORGANIZED HEALTH CARE EDUCATION/TRAINING PROGRAM

## 2022-06-13 PROCEDURE — 93010 EKG 12-LEAD: ICD-10-PCS | Mod: ,,, | Performed by: INTERNAL MEDICINE

## 2022-06-13 PROCEDURE — 51702 INSERT TEMP BLADDER CATH: CPT

## 2022-06-13 PROCEDURE — 93005 ELECTROCARDIOGRAM TRACING: CPT | Performed by: INTERNAL MEDICINE

## 2022-06-13 PROCEDURE — 81001 URINALYSIS AUTO W/SCOPE: CPT | Performed by: STUDENT IN AN ORGANIZED HEALTH CARE EDUCATION/TRAINING PROGRAM

## 2022-06-13 PROCEDURE — 94660 CPAP INITIATION&MGMT: CPT

## 2022-06-13 PROCEDURE — 96367 TX/PROPH/DG ADDL SEQ IV INF: CPT

## 2022-06-13 PROCEDURE — 84484 ASSAY OF TROPONIN QUANT: CPT | Performed by: STUDENT IN AN ORGANIZED HEALTH CARE EDUCATION/TRAINING PROGRAM

## 2022-06-13 RX ORDER — METOPROLOL TARTRATE 25 MG/1
25 TABLET, FILM COATED ORAL DAILY
COMMUNITY
Start: 2022-05-20 | End: 2022-06-14

## 2022-06-13 RX ORDER — FUROSEMIDE 10 MG/ML
60 INJECTION INTRAMUSCULAR; INTRAVENOUS
Status: COMPLETED | OUTPATIENT
Start: 2022-06-13 | End: 2022-06-13

## 2022-06-13 RX ORDER — LEVOFLOXACIN 500 MG/1
500 TABLET, FILM COATED ORAL DAILY
COMMUNITY
Start: 2022-05-18 | End: 2022-06-14

## 2022-06-13 RX ORDER — ACETAMINOPHEN 325 MG/1
650 TABLET ORAL
Status: COMPLETED | OUTPATIENT
Start: 2022-06-13 | End: 2022-06-13

## 2022-06-13 RX ORDER — ALENDRONATE SODIUM 70 MG/1
70 TABLET ORAL
Status: ON HOLD | COMMUNITY
Start: 2022-05-16 | End: 2023-06-12 | Stop reason: HOSPADM

## 2022-06-13 RX ORDER — DICLOFENAC SODIUM 10 MG/G
2 GEL TOPICAL 2 TIMES DAILY
Status: ON HOLD | COMMUNITY
Start: 2022-05-18 | End: 2022-10-18 | Stop reason: HOSPADM

## 2022-06-13 RX ORDER — FAMOTIDINE 20 MG/1
20 TABLET, FILM COATED ORAL DAILY
COMMUNITY
Start: 2022-05-17

## 2022-06-13 RX ORDER — ATORVASTATIN CALCIUM 20 MG/1
20 TABLET, FILM COATED ORAL DAILY
Status: ON HOLD | COMMUNITY
Start: 2022-05-11 | End: 2023-06-12 | Stop reason: HOSPADM

## 2022-06-13 RX ORDER — POTASSIUM CHLORIDE 1500 MG/1
20 TABLET, EXTENDED RELEASE ORAL DAILY
Status: ON HOLD | COMMUNITY
Start: 2022-06-09 | End: 2023-06-12 | Stop reason: HOSPADM

## 2022-06-13 RX ORDER — FLUTICASONE PROPIONATE 50 MCG
1 SPRAY, SUSPENSION (ML) NASAL DAILY
COMMUNITY
Start: 2022-04-26

## 2022-06-13 RX ORDER — DIPHENHYDRAMINE HCL 12.5MG/5ML
25 ELIXIR ORAL
Status: DISCONTINUED | OUTPATIENT
Start: 2022-06-13 | End: 2022-06-13

## 2022-06-13 RX ORDER — AMLODIPINE BESYLATE 10 MG/1
10 TABLET ORAL DAILY
Status: ON HOLD | COMMUNITY
Start: 2022-05-17 | End: 2022-06-17 | Stop reason: HOSPADM

## 2022-06-13 RX ADMIN — CEFTRIAXONE 1 G: 1 INJECTION, SOLUTION INTRAVENOUS at 11:06

## 2022-06-13 RX ADMIN — FUROSEMIDE 60 MG: 10 INJECTION, SOLUTION INTRAVENOUS at 09:06

## 2022-06-13 RX ADMIN — ACETAMINOPHEN 650 MG: 325 TABLET ORAL at 11:06

## 2022-06-13 RX ADMIN — AZITHROMYCIN 500 MG: 500 INJECTION, POWDER, LYOPHILIZED, FOR SOLUTION INTRAVENOUS at 11:06

## 2022-06-14 ENCOUNTER — CLINICAL SUPPORT (OUTPATIENT)
Dept: CARDIOLOGY | Facility: HOSPITAL | Age: 87
DRG: 291 | End: 2022-06-14
Payer: MEDICARE

## 2022-06-14 VITALS — WEIGHT: 106 LBS | HEIGHT: 56 IN | BODY MASS INDEX: 23.84 KG/M2

## 2022-06-14 LAB
ALBUMIN SERPL BCP-MCNC: 3.7 G/DL (ref 3.5–5.2)
ALLENS TEST: ABNORMAL
ALP SERPL-CCNC: 58 U/L (ref 55–135)
ALT SERPL W/O P-5'-P-CCNC: 24 U/L (ref 10–44)
ANION GAP SERPL CALC-SCNC: 7 MMOL/L (ref 8–16)
ANION GAP SERPL CALC-SCNC: 8 MMOL/L (ref 8–16)
AORTIC ROOT ANNULUS: 2.11 CM
AST SERPL-CCNC: 19 U/L (ref 10–40)
AV INDEX (PROSTH): 0.66
AV MEAN GRADIENT: 5 MMHG
AV VALVE AREA: 2.06 CM2
BASOPHILS # BLD AUTO: 0.02 K/UL (ref 0–0.2)
BASOPHILS NFR BLD: 0.2 % (ref 0–1.9)
BILIRUB SERPL-MCNC: 0.9 MG/DL (ref 0.1–1)
BSA FOR ECHO PROCEDURE: 1.38 M2
BUN SERPL-MCNC: 16 MG/DL (ref 8–23)
BUN SERPL-MCNC: 18 MG/DL (ref 8–23)
CALCIUM SERPL-MCNC: 8.8 MG/DL (ref 8.7–10.5)
CALCIUM SERPL-MCNC: 8.9 MG/DL (ref 8.7–10.5)
CHLORIDE SERPL-SCNC: 89 MMOL/L (ref 95–110)
CHLORIDE SERPL-SCNC: 93 MMOL/L (ref 95–110)
CHOLEST SERPL-MCNC: 103 MG/DL (ref 120–199)
CHOLEST/HDLC SERPL: 2 {RATIO} (ref 2–5)
CO2 SERPL-SCNC: 27 MMOL/L (ref 23–29)
CO2 SERPL-SCNC: 28 MMOL/L (ref 23–29)
CREAT SERPL-MCNC: 0.9 MG/DL (ref 0.5–1.4)
CREAT SERPL-MCNC: 1 MG/DL (ref 0.5–1.4)
DELSYS: ABNORMAL
DIFFERENTIAL METHOD: ABNORMAL
DOP CALC AO VTI: 25.74 CM
DOP CALC LVOT AREA: 3.1 CM2
DOP CALC LVOT DIAMETER: 2 CM
DOP CALC LVOT PEAK VEL: 88.61 M/S
DOP CALC LVOT STROKE VOLUME: 53.13 CM3
DOP CALCLVOT PEAK VEL VTI: 16.92 CM
E WAVE DECELERATION TIME: 142.45 MSEC
E/E' RATIO: 11.26 M/S
EJECTION FRACTION: 65 %
EOSINOPHIL # BLD AUTO: 0.1 K/UL (ref 0–0.5)
EOSINOPHIL NFR BLD: 0.6 % (ref 0–8)
EP: 6
ERYTHROCYTE [DISTWIDTH] IN BLOOD BY AUTOMATED COUNT: 13.5 % (ref 11.5–14.5)
ERYTHROCYTE [SEDIMENTATION RATE] IN BLOOD BY WESTERGREN METHOD: 20 MM/H
EST. GFR  (AFRICAN AMERICAN): 58.9 ML/MIN/1.73 M^2
EST. GFR  (AFRICAN AMERICAN): >60 ML/MIN/1.73 M^2
EST. GFR  (NON AFRICAN AMERICAN): 51.1 ML/MIN/1.73 M^2
EST. GFR  (NON AFRICAN AMERICAN): 58.1 ML/MIN/1.73 M^2
FIO2: 60
FRACTIONAL SHORTENING: 32 % (ref 28–44)
GLUCOSE SERPL-MCNC: 100 MG/DL (ref 70–110)
GLUCOSE SERPL-MCNC: 101 MG/DL (ref 70–110)
GLUCOSE SERPL-MCNC: 103 MG/DL (ref 70–110)
HCO3 UR-SCNC: 26.9 MMOL/L (ref 24–28)
HCT VFR BLD AUTO: 29.1 % (ref 37–48.5)
HCT VFR BLD CALC: 32 %PCV (ref 36–54)
HDLC SERPL-MCNC: 51 MG/DL (ref 40–75)
HDLC SERPL: 49.5 % (ref 20–50)
HGB BLD-MCNC: 9.9 G/DL (ref 12–16)
IMM GRANULOCYTES # BLD AUTO: 0.03 K/UL (ref 0–0.04)
IMM GRANULOCYTES NFR BLD AUTO: 0.2 % (ref 0–0.5)
IP: 12
IVRT: 93.93 MSEC
LDLC SERPL CALC-MCNC: 42.2 MG/DL (ref 63–159)
LEFT INTERNAL DIMENSION IN SYSTOLE: 2.6 CM (ref 2.1–4)
LEFT VENTRICLE DIASTOLIC VOLUME INDEX: 41.43 ML/M2
LEFT VENTRICLE DIASTOLIC VOLUME: 56.34 ML
LEFT VENTRICLE SYSTOLIC VOLUME INDEX: 12.9 ML/M2
LEFT VENTRICLE SYSTOLIC VOLUME: 17.49 ML
LEFT VENTRICULAR INTERNAL DIMENSION IN DIASTOLE: 3.83 CM (ref 3.5–6)
LV LATERAL E/E' RATIO: 10.7 M/S
LV SEPTAL E/E' RATIO: 11.89 M/S
LYMPHOCYTES # BLD AUTO: 1.1 K/UL (ref 1–4.8)
LYMPHOCYTES NFR BLD: 9.4 % (ref 18–48)
MAGNESIUM SERPL-MCNC: 1.7 MG/DL (ref 1.6–2.6)
MAGNESIUM SERPL-MCNC: 2.4 MG/DL (ref 1.6–2.6)
MCH RBC QN AUTO: 30.2 PG (ref 27–31)
MCHC RBC AUTO-ENTMCNC: 34 G/DL (ref 32–36)
MCV RBC AUTO: 89 FL (ref 82–98)
MIN VOL: 7.7
MODE: ABNORMAL
MONOCYTES # BLD AUTO: 1.3 K/UL (ref 0.3–1)
MONOCYTES NFR BLD: 10.7 % (ref 4–15)
MV PEAK E VEL: 1.07 M/S
NEUTROPHILS # BLD AUTO: 9.5 K/UL (ref 1.8–7.7)
NEUTROPHILS NFR BLD: 78.9 % (ref 38–73)
NONHDLC SERPL-MCNC: 52 MG/DL
NRBC BLD-RTO: 0 /100 WBC
PCO2 BLDA: 38.4 MMHG (ref 35–45)
PH SMN: 7.45 [PH] (ref 7.35–7.45)
PHOSPHATE SERPL-MCNC: 2.9 MG/DL (ref 2.7–4.5)
PISA TR MAX VEL: 2.47 M/S
PLATELET # BLD AUTO: 415 K/UL (ref 150–450)
PMV BLD AUTO: 8.9 FL (ref 9.2–12.9)
PO2 BLDA: 73 MMHG (ref 80–100)
POC BE: 3 MMOL/L
POC IONIZED CALCIUM: 1.18 MMOL/L (ref 1.06–1.42)
POC SATURATED O2: 95 % (ref 95–100)
POC TCO2: 28 MMOL/L (ref 23–27)
POTASSIUM BLD-SCNC: 3.8 MMOL/L (ref 3.5–5.1)
POTASSIUM SERPL-SCNC: 3.6 MMOL/L (ref 3.5–5.1)
POTASSIUM SERPL-SCNC: 4.8 MMOL/L (ref 3.5–5.1)
PROCALCITONIN SERPL IA-MCNC: 0.07 NG/ML (ref 0–0.5)
PROCALCITONIN SERPL IA-MCNC: <0.05 NG/ML (ref 0–0.5)
PROT SERPL-MCNC: 6.1 G/DL (ref 6–8.4)
RBC # BLD AUTO: 3.28 M/UL (ref 4–5.4)
RIGHT VENTRICULAR END-DIASTOLIC DIMENSION: 366 CM
SAMPLE: ABNORMAL
SITE: ABNORMAL
SODIUM BLD-SCNC: 127 MMOL/L (ref 136–145)
SODIUM SERPL-SCNC: 125 MMOL/L (ref 136–145)
SODIUM SERPL-SCNC: 127 MMOL/L (ref 136–145)
SP02: 95
SPONT RATE: 24
T4 FREE SERPL-MCNC: 0.79 NG/DL (ref 0.71–1.51)
TDI LATERAL: 0.1 M/S
TDI SEPTAL: 0.09 M/S
TDI: 0.1 M/S
TR MAX PG: 24 MMHG
TRICUSPID ANNULAR PLANE SYSTOLIC EXCURSION: 190 CM
TRIGL SERPL-MCNC: 49 MG/DL (ref 30–150)
TROPONIN I SERPL DL<=0.01 NG/ML-MCNC: <0.03 NG/ML
TROPONIN I SERPL DL<=0.01 NG/ML-MCNC: <0.03 NG/ML
TSH SERPL DL<=0.005 MIU/L-ACNC: 7.74 UIU/ML (ref 0.34–5.6)
WBC # BLD AUTO: 12.05 K/UL (ref 3.9–12.7)

## 2022-06-14 PROCEDURE — 99223 PR INITIAL HOSPITAL CARE,LEVL III: ICD-10-PCS | Mod: ,,, | Performed by: INTERNAL MEDICINE

## 2022-06-14 PROCEDURE — 93306 TTE W/DOPPLER COMPLETE: CPT | Mod: 26,,, | Performed by: GENERAL PRACTICE

## 2022-06-14 PROCEDURE — 97166 OT EVAL MOD COMPLEX 45 MIN: CPT

## 2022-06-14 PROCEDURE — 80048 BASIC METABOLIC PNL TOTAL CA: CPT | Performed by: HOSPITALIST

## 2022-06-14 PROCEDURE — 36600 WITHDRAWAL OF ARTERIAL BLOOD: CPT

## 2022-06-14 PROCEDURE — 36415 COLL VENOUS BLD VENIPUNCTURE: CPT | Performed by: HOSPITALIST

## 2022-06-14 PROCEDURE — 99900035 HC TECH TIME PER 15 MIN (STAT)

## 2022-06-14 PROCEDURE — 36415 COLL VENOUS BLD VENIPUNCTURE: CPT | Performed by: NURSE PRACTITIONER

## 2022-06-14 PROCEDURE — 84145 PROCALCITONIN (PCT): CPT | Performed by: NURSE PRACTITIONER

## 2022-06-14 PROCEDURE — 63600175 PHARM REV CODE 636 W HCPCS: Performed by: INTERNAL MEDICINE

## 2022-06-14 PROCEDURE — 99900031 HC PATIENT EDUCATION (STAT)

## 2022-06-14 PROCEDURE — 63600175 PHARM REV CODE 636 W HCPCS: Performed by: EMERGENCY MEDICINE

## 2022-06-14 PROCEDURE — 25000003 PHARM REV CODE 250: Performed by: EMERGENCY MEDICINE

## 2022-06-14 PROCEDURE — 84443 ASSAY THYROID STIM HORMONE: CPT | Performed by: NURSE PRACTITIONER

## 2022-06-14 PROCEDURE — 20000000 HC ICU ROOM

## 2022-06-14 PROCEDURE — 94799 UNLISTED PULMONARY SVC/PX: CPT

## 2022-06-14 PROCEDURE — 97530 THERAPEUTIC ACTIVITIES: CPT

## 2022-06-14 PROCEDURE — 80061 LIPID PANEL: CPT | Performed by: NURSE PRACTITIONER

## 2022-06-14 PROCEDURE — 94660 CPAP INITIATION&MGMT: CPT

## 2022-06-14 PROCEDURE — 63600175 PHARM REV CODE 636 W HCPCS: Performed by: NURSE PRACTITIONER

## 2022-06-14 PROCEDURE — 85025 COMPLETE CBC W/AUTO DIFF WBC: CPT | Performed by: NURSE PRACTITIONER

## 2022-06-14 PROCEDURE — 94640 AIRWAY INHALATION TREATMENT: CPT

## 2022-06-14 PROCEDURE — 83735 ASSAY OF MAGNESIUM: CPT | Mod: 91 | Performed by: NURSE PRACTITIONER

## 2022-06-14 PROCEDURE — 25000003 PHARM REV CODE 250: Performed by: HOSPITALIST

## 2022-06-14 PROCEDURE — 99223 1ST HOSP IP/OBS HIGH 75: CPT | Mod: ,,, | Performed by: INTERNAL MEDICINE

## 2022-06-14 PROCEDURE — 93306 ECHO (CUPID ONLY): ICD-10-PCS | Mod: 26,,, | Performed by: GENERAL PRACTICE

## 2022-06-14 PROCEDURE — 83735 ASSAY OF MAGNESIUM: CPT | Performed by: HOSPITALIST

## 2022-06-14 PROCEDURE — 83036 HEMOGLOBIN GLYCOSYLATED A1C: CPT | Performed by: NURSE PRACTITIONER

## 2022-06-14 PROCEDURE — 84145 PROCALCITONIN (PCT): CPT | Mod: 91 | Performed by: INTERNAL MEDICINE

## 2022-06-14 PROCEDURE — 93306 TTE W/DOPPLER COMPLETE: CPT

## 2022-06-14 PROCEDURE — 84100 ASSAY OF PHOSPHORUS: CPT | Performed by: NURSE PRACTITIONER

## 2022-06-14 PROCEDURE — 25000242 PHARM REV CODE 250 ALT 637 W/ HCPCS: Performed by: HOSPITALIST

## 2022-06-14 PROCEDURE — 84132 ASSAY OF SERUM POTASSIUM: CPT

## 2022-06-14 PROCEDURE — 97162 PT EVAL MOD COMPLEX 30 MIN: CPT

## 2022-06-14 PROCEDURE — 27000221 HC OXYGEN, UP TO 24 HOURS

## 2022-06-14 PROCEDURE — 25000003 PHARM REV CODE 250: Performed by: INTERNAL MEDICINE

## 2022-06-14 PROCEDURE — 85014 HEMATOCRIT: CPT

## 2022-06-14 PROCEDURE — 82330 ASSAY OF CALCIUM: CPT

## 2022-06-14 PROCEDURE — 25000003 PHARM REV CODE 250: Performed by: NURSE PRACTITIONER

## 2022-06-14 PROCEDURE — 84436 ASSAY OF TOTAL THYROXINE: CPT | Performed by: INTERNAL MEDICINE

## 2022-06-14 PROCEDURE — 25000003 PHARM REV CODE 250

## 2022-06-14 PROCEDURE — 96375 TX/PRO/DX INJ NEW DRUG ADDON: CPT

## 2022-06-14 PROCEDURE — 94761 N-INVAS EAR/PLS OXIMETRY MLT: CPT

## 2022-06-14 PROCEDURE — 84295 ASSAY OF SERUM SODIUM: CPT

## 2022-06-14 PROCEDURE — 97116 GAIT TRAINING THERAPY: CPT

## 2022-06-14 PROCEDURE — 84439 ASSAY OF FREE THYROXINE: CPT | Performed by: NURSE PRACTITIONER

## 2022-06-14 PROCEDURE — 84484 ASSAY OF TROPONIN QUANT: CPT | Performed by: NURSE PRACTITIONER

## 2022-06-14 PROCEDURE — 80053 COMPREHEN METABOLIC PANEL: CPT | Performed by: NURSE PRACTITIONER

## 2022-06-14 PROCEDURE — 82803 BLOOD GASES ANY COMBINATION: CPT

## 2022-06-14 PROCEDURE — 36415 COLL VENOUS BLD VENIPUNCTURE: CPT | Performed by: INTERNAL MEDICINE

## 2022-06-14 RX ORDER — LEVOFLOXACIN 250 MG/1
250 TABLET ORAL DAILY
Status: DISCONTINUED | OUTPATIENT
Start: 2022-06-15 | End: 2022-06-17 | Stop reason: HOSPADM

## 2022-06-14 RX ORDER — ALBUTEROL SULFATE 0.83 MG/ML
2.5 SOLUTION RESPIRATORY (INHALATION) EVERY 4 HOURS PRN
Status: DISCONTINUED | OUTPATIENT
Start: 2022-06-14 | End: 2022-06-15

## 2022-06-14 RX ORDER — AMOXICILLIN 250 MG
1 CAPSULE ORAL 2 TIMES DAILY PRN
Status: DISCONTINUED | OUTPATIENT
Start: 2022-06-14 | End: 2022-06-17 | Stop reason: HOSPADM

## 2022-06-14 RX ORDER — FUROSEMIDE 10 MG/ML
40 INJECTION INTRAMUSCULAR; INTRAVENOUS
Status: COMPLETED | OUTPATIENT
Start: 2022-06-14 | End: 2022-06-15

## 2022-06-14 RX ORDER — METOPROLOL TARTRATE 25 MG/1
12.5 TABLET ORAL 2 TIMES DAILY
Status: DISCONTINUED | OUTPATIENT
Start: 2022-06-14 | End: 2022-06-14

## 2022-06-14 RX ORDER — FAMOTIDINE 20 MG/1
20 TABLET, FILM COATED ORAL DAILY
Status: DISCONTINUED | OUTPATIENT
Start: 2022-06-14 | End: 2022-06-17 | Stop reason: HOSPADM

## 2022-06-14 RX ORDER — MAGNESIUM SULFATE HEPTAHYDRATE 40 MG/ML
4 INJECTION, SOLUTION INTRAVENOUS
Status: DISCONTINUED | OUTPATIENT
Start: 2022-06-14 | End: 2022-06-17 | Stop reason: HOSPADM

## 2022-06-14 RX ORDER — ATORVASTATIN CALCIUM 20 MG/1
20 TABLET, FILM COATED ORAL DAILY
Status: DISCONTINUED | OUTPATIENT
Start: 2022-06-14 | End: 2022-06-17 | Stop reason: HOSPADM

## 2022-06-14 RX ORDER — ACETAMINOPHEN 500 MG
1000 TABLET ORAL EVERY 6 HOURS PRN
Status: DISCONTINUED | OUTPATIENT
Start: 2022-06-14 | End: 2022-06-17 | Stop reason: HOSPADM

## 2022-06-14 RX ORDER — TALC
6 POWDER (GRAM) TOPICAL NIGHTLY PRN
Status: DISCONTINUED | OUTPATIENT
Start: 2022-06-14 | End: 2022-06-17 | Stop reason: HOSPADM

## 2022-06-14 RX ORDER — IPRATROPIUM BROMIDE AND ALBUTEROL SULFATE 2.5; .5 MG/3ML; MG/3ML
3 SOLUTION RESPIRATORY (INHALATION) EVERY 6 HOURS
Status: DISCONTINUED | OUTPATIENT
Start: 2022-06-14 | End: 2022-06-14

## 2022-06-14 RX ORDER — ONDANSETRON 2 MG/ML
4 INJECTION INTRAMUSCULAR; INTRAVENOUS EVERY 6 HOURS PRN
Status: DISCONTINUED | OUTPATIENT
Start: 2022-06-14 | End: 2022-06-17 | Stop reason: HOSPADM

## 2022-06-14 RX ORDER — SODIUM CHLORIDE 0.9 % (FLUSH) 0.9 %
10 SYRINGE (ML) INJECTION
Status: DISCONTINUED | OUTPATIENT
Start: 2022-06-14 | End: 2022-06-17 | Stop reason: HOSPADM

## 2022-06-14 RX ORDER — IPRATROPIUM BROMIDE AND ALBUTEROL SULFATE 2.5; .5 MG/3ML; MG/3ML
3 SOLUTION RESPIRATORY (INHALATION) EVERY 6 HOURS
Status: DISCONTINUED | OUTPATIENT
Start: 2022-06-14 | End: 2022-06-15

## 2022-06-14 RX ORDER — TRAZODONE HYDROCHLORIDE 50 MG/1
50 TABLET ORAL NIGHTLY
Status: DISCONTINUED | OUTPATIENT
Start: 2022-06-14 | End: 2022-06-17 | Stop reason: HOSPADM

## 2022-06-14 RX ORDER — LEVOTHYROXINE SODIUM 25 UG/1
75 TABLET ORAL
Status: DISCONTINUED | OUTPATIENT
Start: 2022-06-14 | End: 2022-06-17 | Stop reason: HOSPADM

## 2022-06-14 RX ORDER — CHLORHEXIDINE GLUCONATE ORAL RINSE 1.2 MG/ML
15 SOLUTION DENTAL 2 TIMES DAILY
Status: DISCONTINUED | OUTPATIENT
Start: 2022-06-14 | End: 2022-06-17 | Stop reason: HOSPADM

## 2022-06-14 RX ORDER — LEVOFLOXACIN 5 MG/ML
250 INJECTION, SOLUTION INTRAVENOUS
Status: DISCONTINUED | OUTPATIENT
Start: 2022-06-15 | End: 2022-06-14

## 2022-06-14 RX ORDER — ACETAMINOPHEN 500 MG
500 TABLET ORAL EVERY 6 HOURS PRN
Status: DISCONTINUED | OUTPATIENT
Start: 2022-06-14 | End: 2022-06-14

## 2022-06-14 RX ORDER — METOPROLOL TARTRATE 25 MG/1
25 TABLET, FILM COATED ORAL 2 TIMES DAILY
Status: DISCONTINUED | OUTPATIENT
Start: 2022-06-14 | End: 2022-06-17 | Stop reason: HOSPADM

## 2022-06-14 RX ORDER — LEVOFLOXACIN 5 MG/ML
500 INJECTION, SOLUTION INTRAVENOUS ONCE
Status: COMPLETED | OUTPATIENT
Start: 2022-06-14 | End: 2022-06-14

## 2022-06-14 RX ORDER — LOPERAMIDE HYDROCHLORIDE 2 MG/1
2 CAPSULE ORAL
Status: DISCONTINUED | OUTPATIENT
Start: 2022-06-14 | End: 2022-06-17 | Stop reason: HOSPADM

## 2022-06-14 RX ORDER — AMLODIPINE BESYLATE 5 MG/1
10 TABLET ORAL DAILY
Status: DISCONTINUED | OUTPATIENT
Start: 2022-06-14 | End: 2022-06-14

## 2022-06-14 RX ORDER — MUPIROCIN 20 MG/G
OINTMENT TOPICAL 2 TIMES DAILY
Status: DISCONTINUED | OUTPATIENT
Start: 2022-06-14 | End: 2022-06-17 | Stop reason: HOSPADM

## 2022-06-14 RX ORDER — MAGNESIUM SULFATE HEPTAHYDRATE 40 MG/ML
2 INJECTION, SOLUTION INTRAVENOUS
Status: DISCONTINUED | OUTPATIENT
Start: 2022-06-14 | End: 2022-06-17 | Stop reason: HOSPADM

## 2022-06-14 RX ORDER — FLUTICASONE PROPIONATE 50 MCG
1 SPRAY, SUSPENSION (ML) NASAL DAILY
Status: DISCONTINUED | OUTPATIENT
Start: 2022-06-14 | End: 2022-06-17 | Stop reason: HOSPADM

## 2022-06-14 RX ORDER — ALPRAZOLAM 0.25 MG/1
0.25 TABLET ORAL ONCE
Status: COMPLETED | OUTPATIENT
Start: 2022-06-14 | End: 2022-06-14

## 2022-06-14 RX ORDER — LOSARTAN POTASSIUM 50 MG/1
100 TABLET ORAL DAILY
Status: DISCONTINUED | OUTPATIENT
Start: 2022-06-14 | End: 2022-06-14

## 2022-06-14 RX ORDER — ACETAMINOPHEN 500 MG
500 TABLET ORAL ONCE
Status: COMPLETED | OUTPATIENT
Start: 2022-06-14 | End: 2022-06-14

## 2022-06-14 RX ORDER — CEFEPIME HYDROCHLORIDE 1 G/50ML
2 INJECTION, SOLUTION INTRAVENOUS
Status: DISCONTINUED | OUTPATIENT
Start: 2022-06-14 | End: 2022-06-14

## 2022-06-14 RX ORDER — LEVOFLOXACIN 5 MG/ML
500 INJECTION, SOLUTION INTRAVENOUS
Status: DISCONTINUED | OUTPATIENT
Start: 2022-06-14 | End: 2022-06-14

## 2022-06-14 RX ADMIN — ACETAMINOPHEN 1000 MG: 500 TABLET, FILM COATED ORAL at 05:06

## 2022-06-14 RX ADMIN — TRAZODONE HYDROCHLORIDE 50 MG: 50 TABLET ORAL at 08:06

## 2022-06-14 RX ADMIN — CHLORHEXIDINE GLUCONATE 15 ML: 1.2 RINSE ORAL at 08:06

## 2022-06-14 RX ADMIN — ACETAMINOPHEN 500 MG: 500 TABLET ORAL at 08:06

## 2022-06-14 RX ADMIN — FUROSEMIDE 40 MG: 10 INJECTION, SOLUTION INTRAMUSCULAR; INTRAVENOUS at 08:06

## 2022-06-14 RX ADMIN — ALPRAZOLAM 0.25 MG: 0.25 TABLET ORAL at 09:06

## 2022-06-14 RX ADMIN — IPRATROPIUM BROMIDE AND ALBUTEROL SULFATE 3 ML: .5; 3 SOLUTION RESPIRATORY (INHALATION) at 07:06

## 2022-06-14 RX ADMIN — LEVOFLOXACIN 500 MG: 500 INJECTION, SOLUTION INTRAVENOUS at 06:06

## 2022-06-14 RX ADMIN — METOPROLOL TARTRATE 12.5 MG: 25 TABLET, FILM COATED ORAL at 09:06

## 2022-06-14 RX ADMIN — ATORVASTATIN CALCIUM 20 MG: 20 TABLET, FILM COATED ORAL at 09:06

## 2022-06-14 RX ADMIN — APIXABAN 2.5 MG: 2.5 TABLET, FILM COATED ORAL at 09:06

## 2022-06-14 RX ADMIN — CHLORHEXIDINE GLUCONATE 15 ML: 1.2 RINSE ORAL at 09:06

## 2022-06-14 RX ADMIN — METOPROLOL TARTRATE 25 MG: 25 TABLET, FILM COATED ORAL at 08:06

## 2022-06-14 RX ADMIN — APIXABAN 2.5 MG: 2.5 TABLET, FILM COATED ORAL at 08:06

## 2022-06-14 RX ADMIN — MUPIROCIN: 20 OINTMENT TOPICAL at 08:06

## 2022-06-14 RX ADMIN — LOSARTAN POTASSIUM 100 MG: 50 TABLET, FILM COATED ORAL at 09:06

## 2022-06-14 RX ADMIN — FAMOTIDINE 20 MG: 20 TABLET ORAL at 09:06

## 2022-06-14 RX ADMIN — ACETAMINOPHEN 500 MG: 500 TABLET ORAL at 09:06

## 2022-06-14 RX ADMIN — IPRATROPIUM BROMIDE AND ALBUTEROL SULFATE 3 ML: .5; 3 SOLUTION RESPIRATORY (INHALATION) at 08:06

## 2022-06-14 RX ADMIN — FUROSEMIDE 40 MG: 10 INJECTION, SOLUTION INTRAMUSCULAR; INTRAVENOUS at 09:06

## 2022-06-14 RX ADMIN — POTASSIUM BICARBONATE 60 MEQ: 977.5 TABLET, EFFERVESCENT ORAL at 09:06

## 2022-06-14 RX ADMIN — AMLODIPINE BESYLATE 10 MG: 5 TABLET ORAL at 09:06

## 2022-06-14 RX ADMIN — LEVOTHYROXINE SODIUM 75 MCG: 0.03 TABLET ORAL at 06:06

## 2022-06-14 RX ADMIN — METHOCARBAMOL 500 MG: 100 INJECTION INTRAMUSCULAR; INTRAVENOUS at 12:06

## 2022-06-14 RX ADMIN — MAGNESIUM SULFATE IN WATER 2 G: 40 INJECTION, SOLUTION INTRAVENOUS at 09:06

## 2022-06-14 RX ADMIN — MUPIROCIN: 20 OINTMENT TOPICAL at 09:06

## 2022-06-14 NOTE — PROGRESS NOTES
Pharmacist Renal Dose Adjustment Note    Jeannie Hutchins is a 87 y.o. female being treated with the medication Levofloxacin    Patient Data:    Vital Signs (Most Recent):  Temp: 97.9 °F (36.6 °C) (06/13/22 2030)  Pulse: 87 (06/13/22 2301)  Resp: (!) 23 (06/13/22 2200)  BP: (!) 146/64 (06/13/22 2301)  SpO2: (!) 94 % (06/13/22 2301)   Vital Signs (72h Range):  Temp:  [97.9 °F (36.6 °C)]   Pulse:  [84-95]   Resp:  [23-32]   BP: (135-161)/(61-83)   SpO2:  [93 %-96 %]      Recent Labs   Lab 06/13/22 2319   CREATININE 1.1     Creatinine clearance cannot be calculated (Unknown ideal weight.)    Medication:Levofloxacin dose: 500 mg frequency Q24H will be changed to Levofloxacin 500 mg once followed by Levofloxacin 250 mg Q24H    Pharmacist's Name: Kemar Cedeno  Pharmacist's Extension: [id number]

## 2022-06-14 NOTE — CONSULTS
Formerly [facility], [facility]  Department of Cardiology  Consult Note      PATIENT NAME: Jeannie Hutchins  MRN: [id number]  TODAY'S DATE: 06/14/2022  ADMIT DATE: 6/13/2022                          CONSULT REQUESTED BY: Thaddeus Klein MD    SUBJECTIVE     PRINCIPAL PROBLEM: Acute on chronic respiratory failure with hypoxia      REASON FOR CONSULT:  CHF      HPI:  87-year-old female with a past medical history of PAF, diastolic heart failure, COPD who came from [place] for acute respiratory short distress.  She recently got over a bout of pneumonia but states the past several weeks she has been experiencing peripheral edema as well as shortness of breath.  On chart review her BNP is elevated at 909 EKG shows rate controlled AFib.  She is currently maintaining on Vapotherm in normally wears 2 L of nasal cannula [place].  She reports that she normally sees a cardiologist on the [facility] that ever since she was moved into [place] she has been lost to follow up with her Cardiology Clinic.  She cannot really tell us whether or not she has chronic AFib or if she goes in and out of atrial fibrillation but seems to be compliant with all of her medications.    Per H and P:  History was obtained from the patient and daughter at the bedside and ER physician Sign-out. Patient is an 87 year old female with a history of  afib on eliquis, HFpEF, HLD, HTN, COPD who is brought in via EMS from [place] for acute hypoxic respiratory distress.  She was recently treated for a bacterial pneumonia approximately 1 month ago per the daughter.  Two weeks ago, patient began experiencing worsening peripheral edema, shortness of breath and was started on PO Lasix.  The patient reports she did have some improvement in the swelling of her legs but SOB was persistent.      Yesterday, while visiting, patient's daughter noticed that she had worsening shortness of breath.  Today, patient appeared to be in respiratory distress with subsequent EMS  activation.  On EMS arrival, patient O2 sat low 80s on room air and was subsequently placed on 15 L non-rebreather with improvements in O2 sats to low 90s.  She normally wears 2L NC.        Patient denies fever, chills, chest pain, nausea, vomiting, abdominal pain, dysuria, diarrhea, numbness, tingling or LOC.      In the ED patient is afebrile. She is in respiratory distress and placed on bipap. CBC significant for leukocytosis. Chronic anemia with h/h stable 10/30 but decreased from previous.   CMP with hyponatremia, glucose 123, and CKD with creat 1.1  BNP is elevated 909 and troponin is wnl.   EKG shows Afib at 91bpm. Nonspecific T wave abnormality.    CXR with evidence of pulmonary edema and right pleural effusion. Official read pending. Patient is treated with IV lasix with UOP 1.5L in the ED. She is also treated with antibiotics to cover for pneumonia. Covid and influenza rapid tests negative.     Review of patient's allergies indicates:   Allergen Reactions    Hydrochlorothiazide Other (See Comments)     hyponatremia    Strawberry Swelling     Tongue swells up and a generalized rash.    Lisinopril Other (See Comments)     Cough       Past Medical History:   Diagnosis Date    Anticoagulant long-term use     Eliquis    Arthritis     Atrial fibrillation     Blood transfusion     Carotid stenosis     CHF (congestive heart failure)     Edema     Generalized anxiety disorder 1/23/2018    Dr. Mckeon's eval 1/23/18: She does appear to have a JERRI because of the persistent worries that she has.  These worries predominate her day.  She would probably do well with prevention therapy such as SSRI/SNRI to help control the physical symptoms of the anxiety.  Problem at this point in time is her electrolyte abnormalities.  There is a slight risk of hyponatremia with these medications and    Hearing loss     [place] (hard of hearing)     Hypercholesterolemia     Hypertension     On home oxygen therapy      Psychiatric problem     Thyroid disease      Past Surgical History:   Procedure Laterality Date    CARDIOVERSION  10/19/2020    Procedure: Cardioversion;  Surgeon: Brandon Elias MD;  Location: [facility] CATH LAB;  Service: Cardiology;;    CARPAL TUNNEL RELEASE  2012    right hand    ESOPHAGOGASTRODUODENOSCOPY Left 8/29/2018    Procedure: EGD (ESOPHAGOGASTRODUODENOSCOPY);  Surgeon: Oniel Dorsey MD;  Location: [facility] ENDO;  Service: Endoscopy;  Laterality: Left;    HYSTERECTOMY      left carotid endartectomy  5/2006    TONSILLECTOMY      TREATMENT OF CARDIAC ARRHYTHMIA N/A 10/19/2020    Procedure: Transesophageal echo (BLADIMIR) intra-procedure log documentation;  Surgeon: Brandon Elias MD;  Location: [facility] CATH LAB;  Service: Cardiology;  Laterality: N/A;     Social History     Tobacco Use    Smoking status: Never Smoker    Smokeless tobacco: Never Used   Substance Use Topics    Alcohol use: No    Drug use: No        REVIEW OF SYSTEMS  CONSTITUTIONAL: Negative for chills, fatigue and fever.   EYES: No double vision, No blurred vision  NEURO: No headaches, No dizziness  RESPIRATORY:  Positive shortness of breath Negative for cough and wheezing.    CARDIOVASCULAR: Negative for chest pain. Negative for palpitations and leg swelling.   GI: Negative for abdominal pain, No melena, diarrhea, nausea and vomiting.   : Negative for dysuria and frequency, Negative for hematuria  SKIN: Negative for bruising, Negative for edema or discoloration noted.   ENDOCRINE: Negative for polyphagia, Negative for heat intolerance, Negative for cold intolerance  PSYCHIATRIC: Negative for depression, Negative for anxiety, Negative for memory loss  MUSCULOSKELETAL:  Positive lower extremity edema Negative for neck pain, Negative for muscle weakness, Negative for back pain     OBJECTIVE     VITAL SIGNS (Most Recent)  Temp: 98.5 °F (36.9 °C) (06/14/22 0715)  Pulse: 69 (06/14/22 1112)  Resp: (!) 36 (06/14/22 1112)  BP: (!) 96/50 (06/14/22  1100)  SpO2: (!) 88 % (06/14/22 1112)    VENTILATION STATUS  Resp: (!) 36 (06/14/22 1112)  SpO2: (!) 88 % (06/14/22 1112)  Oxygen Concentration (%):  [30-60] 60    I & O (Last 24H):    Intake/Output Summary (Last 24 hours) at 6/14/2022 1136  Last data filed at 6/14/2022 0621  Gross per 24 hour   Intake 300 ml   Output 3300 ml   Net -3000 ml       WEIGHTS  Wt Readings from Last 3 Encounters:   06/14/22 0715 48.2 kg (106 lb 4.2 oz)   06/14/22 0250 48.2 kg (106 lb 4.2 oz)   06/13/22 2030 36.4 kg (80 lb 4 oz)   04/27/21 1428 36.4 kg (80 lb 4 oz)   04/27/21 0500 36.4 kg (80 lb 4 oz)   04/25/21 0500 45.6 kg (100 lb 8.5 oz)   04/24/21 0600 47.3 kg (104 lb 4.4 oz)   04/22/21 0701 46.8 kg (103 lb 2.8 oz)   04/20/21 1602 44 kg (97 lb)   04/19/21 1446 44 kg (97 lb)   01/24/21 0405 45.5 kg (100 lb 5 oz)   01/23/21 0515 44.3 kg (97 lb 10.6 oz)   01/22/21 0600 44 kg (97 lb)   01/17/21 1145 46.3 kg (102 lb)   01/17/21 0304 46.7 kg (102 lb 15.3 oz)   01/16/21 1957 54 kg (119 lb)       PHYSICAL EXAM  GENERAL: well built, well nourished, well-developed in no apparent distress alert and oriented.   HEENT: Normocephalic. Pupils normal and conjunctivae normal.  Mucous membranes normal, no cyanosis or icterus, trachea central,no pallor or icterus is noted..   NECK: No JVD. No bruit..   THYROID: Thyroid not enlarged. No nodules present..   CARDIAC:  IRR  CHEST ANATOMY: normal.   LUNGS:  Diminished  ABDOMEN: Soft no masses or organomegaly.  No abdomen pulsations or bruits.  Normal bowel sounds. No pulsations and no masses felt, No guarding or rebound.   URINARY: No david catheter   EXTREMITIES:  Very trace pitting edema left worse than right  PERIPHERAL VASCULAR SYSTEM: Good palpable distal pulses.   CENTRAL NERVOUS SYSTEM: No focal motor or sensory deficits noted.   SKIN: Skin without lesions, moist, well perfused.   MUSCLE STRENGTH & TONE: No noteable weakness, atrophy or abnormal movement.     HOME MEDICATIONS:  No current  facility-administered medications on file prior to encounter.     Current Outpatient Medications on File Prior to Encounter   Medication Sig Dispense Refill    acetaminophen (TYLENOL) 500 MG tablet Take 1 tablet (500 mg total) by mouth every 6 (six) hours as needed for Pain or Temperature greater than (100).  0    amLODIPine (NORVASC) 10 MG tablet Take 10 mg by mouth once daily.      atorvastatin (LIPITOR) 20 MG tablet Take 20 mg by mouth once daily.      diclofenac sodium (VOLTAREN) 1 % Gel Apply topically 2 (two) times daily.      ELIQUIS 2.5 mg Tab TAKE 1 TABLET BY MOUTH TWICE A  tablet 3    famotidine (PEPCID) 20 MG tablet Take 20 mg by mouth once daily.      fluticasone propionate (FLONASE) 50 mcg/actuation nasal spray 1 spray by Each Nostril route once daily.      furosemide (LASIX) 40 MG tablet Take 1 tablet (40 mg total) by mouth every other day. 15 tablet 11    levothyroxine (SYNTHROID) 75 MCG tablet Take 75 mcg by mouth once daily.      losartan (COZAAR) 100 MG tablet TAKE ONE TABLET BY MOUTH EVERY DAY 90 tablet 3    metoprolol tartrate (LOPRESSOR) 50 MG tablet Take 0.5 tablets (25 mg total) by mouth 2 (two) times daily. (Patient taking differently: Take 12.5 mg by mouth 2 (two) times daily.) 30 tablet 11    potassium chloride SA (K-DUR,KLOR-CON) 10 MEQ tablet Take 20 mEq by mouth once daily.      traZODone (DESYREL) 50 MG tablet Take 1 tablet (50 mg total) by mouth every evening. 30 tablet 0    albuterol-ipratropium (DUO-NEB) 2.5 mg-0.5 mg/3 mL nebulizer solution Take 3 mLs by nebulization every 6 (six) hours. Rescue 1 Box 0    alendronate (FOSAMAX) 70 MG tablet Take 70 mg by mouth every 7 days.      amlodipine-atorvastatin (CADUET) 10-40 mg per tablet TAKE ONE TABLET BY MOUTH EVERY DAY (Patient taking differently: Take by mouth nightly.) 30 tablet 11    [DISCONTINUED] levoFLOXacin (LEVAQUIN) 500 MG tablet Take 500 mg by mouth once daily.      [DISCONTINUED] metoprolol tartrate  (LOPRESSOR) 25 MG tablet Take 25 mg by mouth once daily.         SCHEDULED MEDS:   albuterol-ipratropium  3 mL Nebulization Q6H    amLODIPine  10 mg Oral Daily    apixaban  2.5 mg Oral BID    atorvastatin  20 mg Oral Daily    chlorhexidine  15 mL Mouth/Throat BID    famotidine  20 mg Oral Daily    fluticasone propionate  1 spray Each Nostril Daily    furosemide (LASIX) injection  40 mg Intravenous Q12H    [START ON 6/15/2022] levoFLOXacin  250 mg Oral Daily    levothyroxine  75 mcg Oral Before breakfast    losartan  100 mg Oral Daily    metoprolol tartrate  12.5 mg Oral BID    mupirocin   Nasal BID    traZODone  50 mg Oral QHS       CONTINUOUS INFUSIONS:    PRN MEDS:acetaminophen, albuterol sulfate, loperamide, magnesium sulfate IVPB, magnesium sulfate IVPB, melatonin, ondansetron, potassium bicarbonate, potassium bicarbonate, potassium bicarbonate, senna-docusate 8.6-50 mg, sodium chloride 0.9%    LABS AND DIAGNOSTICS     CBC LAST 3 DAYS  Recent Labs   Lab 06/13/22 2105 06/14/22 0455 06/14/22  0704   WBC 16.18*  --  12.05   RBC 3.30*  --  3.28*   HGB 10.0*  --  9.9*   HCT 29.7* 32* 29.1*   MCV 90  --  89   MCH 30.3  --  30.2   MCHC 33.7  --  34.0   RDW 13.8  --  13.5   *  --  415   MPV 9.0*  --  8.9*   GRAN 90.5*  14.7*  --  78.9*  9.5*   LYMPH 2.5*  0.4*  --  9.4*  1.1   MONO 6.4  1.0  --  10.7  1.3*   BASO 0.02  --  0.02   NRBC 0  --  0       COAGULATION LAST 3 DAYS  Recent Labs   Lab 06/13/22 2105   LABPT 17.0*   INR 1.5       CHEMISTRY LAST 3 DAYS  Recent Labs   Lab 06/13/22 2319 06/14/22 0455 06/14/22  0704   *  --  127*   K 4.1  --  3.6   CL 93*  --  93*   CO2 23  --  27   ANIONGAP 8  --  7*   BUN 21  --  16   CREATININE 1.1  --  0.9   *  --  103   CALCIUM 8.9  --  8.8   PH  --  7.452*  --    MG 1.8  --  1.7   ALBUMIN 4.1  --  3.7   PROT 6.5  --  6.1   ALKPHOS 60  --  58   ALT 25  --  24   AST 21  --  19   BILITOT 0.8  --  0.9       CARDIAC PROFILE LAST 3  DAYS  Recent Labs   Lab 06/13/22  2105 06/13/22  2319 06/14/22  0703   *  --   --    TROPONINI  --  <0.030 <0.030       ENDOCRINE LAST 3 DAYS  Recent Labs   Lab 06/14/22  0703 06/14/22  0946   TSH 7.740*  --    PROCAL 0.07 <0.05       LAST ARTERIAL BLOOD GAS  ABG  Recent Labs   Lab 06/14/22  0455   PH 7.452*   PO2 73*   PCO2 38.4   HCO3 26.9   BE 3       LAST 7 DAYS MICROBIOLOGY   Microbiology Results (last 7 days)     Procedure Component Value Units Date/Time    Blood culture #2 **CANNOT BE ORDERED STAT** [[id number]] Collected: 06/13/22 2150    Order Status: Completed Specimen: Blood from Peripheral, Antecubital, Left Updated: 06/14/22 0517     Blood Culture, Routine No Growth to date    Blood culture #1 **CANNOT BE ORDERED STAT** [[id number]] Collected: 06/13/22 2105    Order Status: Completed Specimen: Blood from Peripheral, Upper Arm, Left Updated: 06/14/22 0358     Blood Culture, Routine No Growth to date          MOST RECENT IMAGING  X-Ray Chest Lateral Decubitus Left  Chest decubitus    CLINICAL DATA: Pleural effusion    FINDINGS: Left lateral decubitus view of the chest demonstrates a very small layering left-sided pleural effusion. There is persistent opacification of the lower right hemithorax consistent with a combination of airspace disease and pleural fluid accumulation.    IMPRESSION:  1. Small layering left-sided pleural effusion, with persistent patchy opacification of the lower right hemithorax, consistent with a combination of airspace disease and probable small effusion.    Electronically signed by:  Erik Hines MD  6/14/2022 10:21 AM CDT Workstation: [id number]  X-Ray Chest 1 View  CLINICAL HISTORY:  87 years (1935) Female pulm edema Pulmonary edema    TECHNIQUE:  Portable AP radiograph the chest.    COMPARISON:  Radiograph from June 13, 2022    FINDINGS:  There is vascular congestion with mixed interstitial and patchy alveolar opacities, findings indicating moderate pulmonary  edema.  There is blunting of both costophrenic angles consistent with small right and trace left pleural effusions and adjacent atelectasis. No pneumothorax is identified. The cardiac silhouette is moderately enlarged. Atheromatous calcifications are seen at the aortic arch. Osseous structures appear unchanged. The visualized upper abdomen is unremarkable.    IMPRESSION:  Slight interval worsening mid-lower lung zone predominant mixed interstitial and alveolar opacities compatible with moderate edema, with a small right and trace left pleural effusion.    .    Electronically signed by:  Jerry Ngo MD  6/14/2022 9:13 AM CDT Workstation: [id number]  X-Ray Chest 1 View  Narrative: EXAMINATION:  XR CHEST 1 VIEW    CLINICAL HISTORY:  Shortness of breath    FINDINGS:  Portable chest at 2100 compared with 04/22/2021 shows unchanged slightly enlarged cardiac silhouette size with normal mediastinal contours.  Patient is slightly rotated.    Central pulmonary vascular prominence persists.  Perihilar interstitial opacities and patchy mid and lower lung zone alveolar opacities are evident.  Blunting of costophrenic angles bilaterally suggest tiny pleural effusions or pleuroparenchymal scarring.  Lung volumes remain low.  No pneumothorax.    Advanced degenerative changes affect the shoulders bilaterally, unchanged.  Impression: 1. Findings suggesting interstitial edema either related to heart failure or hypervolemia.  2. Mid and lower lung zone pleuroparenchymal opacities either reflecting confluent alveolar edema, atelectasis, other infectious or inflammatory consolidation, tiny bilateral pleural effusions, or some combination thereof.    Electronically signed by: Tj Baxter MD  Date:    06/14/2022  Time:    06:37      ECHOCARDIOGRAM RESULTS (last 5)  Results for orders placed during the hospital encounter of 01/16/21    Echo Color Flow Doppler? Yes    Interpretation Summary  · The left ventricle is normal in size with  concentric hypertrophy and normal systolic function. The estimated ejection fraction is 60%  · Grade II left ventricular diastolic dysfunction.  · Severe left atrial enlargement.  · Normal right ventricular size with normal right ventricular systolic function.  · Severe right atrial enlargement.  · Moderate tricuspid regurgitation.  · There is moderate pulmonary hypertension.  · Intermediate central venous pressure (8 mmHg).  · The estimated PA systolic pressure is 51 mmHg.      Results for orders placed during the hospital encounter of 10/18/20    Echo Color Flow Doppler? Yes    Interpretation Summary  · With normal systolic function. The estimated ejection fraction is 65%.  · Indeterminate diastolic function.  · Moderate tricuspid regurgitation.  · Severe left atrial enlargement.  · Normal right ventricular systolic function.  · Severe right atrial enlargement.  · Mild mitral regurgitation.  · Elevated central venous pressure (15 mmHg).  · The estimated PA systolic pressure is 59 mmHg.  · There is pulmonary hypertension.      Results for orders placed during the hospital encounter of 07/10/19    Transthoracic echo (TTE) 2D with Color Flow    Interpretation Summary  · Mild concentric left ventricular hypertrophy.  · Normal left ventricular systolic function. The estimated ejection fraction is 65%  · Grade II (moderate) left ventricular diastolic dysfunction consistent with pseudonormalization.  · Elevated left atrial pressure.  · Normal right ventricular systolic function.  · Moderate left atrial enlargement.  · Mild aortic regurgitation.  · Mild to moderate tricuspid regurgitation.  · Intermediate central venous pressure (8 mm Hg).  · The estimated PA systolic pressure is 52 mm Hg  · Pulmonary hypertension present.      Results for orders placed during the hospital encounter of 01/22/18    2D echo with color flow doppler    Narrative  Date of Procedure: 01/29/2018        TEST DESCRIPTION  Technical Quality: This  is a technically adequate study.    Aorta: The aortic root is normal in size, measuring 2.0 cm at sinotubular junction and 2.2 cm at Sinuses of Valsalva.    Left Atrium: The left atrial volume index is mildly enlarged, measuring 38.85 cc/m2.    Left Ventricle: The left ventricle is normal in size, with an end-diastolic diameter of 4.1 cm, and an end-systolic diameter of 3.0 cm. LV wall thickness is normal, with the septum measuring 0.6 cm and the posterior wall measuring 0.9 cm across. Relative  wall thickness was increased at 0.44, and the LV mass index was 69.5 g/m2 consistent with concentric remodeling. There are no regional wall motion abnormalities. Left ventricular systolic function appears normal. Visually estimated ejection fraction is  60-65%. The LV Doppler derived stroke volume equals 56.0 ccs.    Diastolic indices: E wave velocity 1.4 m/s, E/A ratio 1.2,  msec., Diastolic function is indeterminate.    Right Atrium: The right atrium is normal in size, measuring 4.2 cm in length and 3.1 cm in width in the apical view.    Right Ventricle: The right ventricle is normal in size measuring 4.0 cm at the base in the apical right ventricle-focused view. Global right ventricular systolic function appears normal. Tricuspid annular plane systolic excursion (TAPSE) is 1.7 cm.  Tissue Doppler-derived tricuspid annular peak systolic velocity (S prime) is 16.6 cm/s. The estimated PA systolic pressure is 54 mmHg.    Aortic Valve:  The aortic valve is mildly sclerotic with normal leaflet mobility. The aortic valve is tri-leaflet in structure. Additionally, there is trivial aortic regurgitation.    Mitral Valve:  The mitral valve is normal in structure with normal leaflet mobility. There is mitral annular calcification.    Tricuspid Valve:  The tricuspid valve is normal in structure with normal leaflet mobility. There is mild tricuspid regurgitation.    Pulmonary Valve:  The pulmonic valve is not well  seen.    Pericardium: There is evidence of a small anterior pericardial effusion.    IVC: IVC is enlarged but collapses > 50% with a sniff, suggesting intermediate right atrial pressure of 8 mmHg.    Intracavitary: There is no evidence of intracavity mass, thrombi, or vegetation.    Other: There is a left pleural effusion present.        CONCLUSIONS  1 - Normal left ventricular systolic function (EF 60-65%).  2 - No wall motion abnormalities.  3 - Concentric remodeling.  4 - Trivial aortic regurgitation.  5 - Mild tricuspid regurgitation.  6 - Pulmonary hypertension. The estimated PA systolic pressure is 54 mmHg.  7 - Small pericardial anterior effusion without hemodynamic compromise.            This document has been electronically  SIGNED BY: Wenceslao Armenta MD On: 01/29/2018 15:29      CURRENT/PREVIOUS VISIT EKG  Results for orders placed or performed during the hospital encounter of 06/13/22   EKG 12-lead    Collection Time: 06/13/22  8:59 PM    Narrative    Test Reason : R06.03,    Vent. Rate : 091 BPM     Atrial Rate : 000 BPM     P-R Int : 000 ms          QRS Dur : 088 ms      QT Int : 358 ms       P-R-T Axes : 000 044 084 degrees     QTc Int : 440 ms    Atrial fibrillation  Nonspecific T wave abnormality  Abnormal ECG  When compared with ECG of 13-JUN-2022 20:58,  No significant change was found    Referred By: [email or web address]   SELF           Confirmed By:            ASSESSMENT/PLAN:     Active Hospital Problems    Diagnosis    *Acute on chronic respiratory failure with hypoxia    Acute on chronic diastolic congestive heart failure    Multifocal pneumonia    Hypothyroid    Paroxysmal atrial fibrillation    Essential hypertension       ASSESSMENT & PLAN:   # acute on chronic respiratory failure  # acute on chronic diastolic CHF   # PAF now RVR   # Essential HTN   # Hypothyroid   # RLL pneumonia       RECOMMENDATIONS:  Increase metoprolol to 25 mg b.i.d.  Case discussed with Dr. Dalton-agree with Holger  b.i.d. & 1200 cc fluid restriction  Continue Eliquis for oral anticoagulation   Will follow with you       Jess Garcia PA-C  [facility]  Department of Cardiology  Date of Service: 06/14/2022

## 2022-06-14 NOTE — PT/OT/SLP EVAL
"Physical Therapy Evaluation    Patient Name:  Jeannie Hutchins   MRN:  [id number]    Recommendations:     Discharge Recommendations:  home health PT   Discharge Equipment Recommendations: none   Barriers to discharge: Decreased caregiver support    Assessment:     Jeannie Hutchins is a 87 y.o. female admitted with a medical diagnosis of Acute on chronic respiratory failure with hypoxia.  She presents with the following impairments/functional limitations:  weakness, impaired endurance, impaired self care skills, impaired functional mobilty, gait instability, decreased safety awareness, decreased lower extremity function, decreased upper extremity function, decreased ROM, impaired cardiopulmonary response to activity. Pt found upright in chair and agreeable to working with PT. Pt A & O x  4 and has the following co-morbidities: HTN, afib, hypothyroid, CHF.  Pt tolerated session fairly and required MIN A for safe mobilization during session today. Pt would benefit from acute PT during hospitalization to increase strength, endurance and safety with mobility and would benefit from HHPT upon discharge home.    Rehab Prognosis: Fair; patient would benefit from acute skilled PT services to address these deficits and reach maximum level of function.    Recent Surgery: * No surgery found *      Plan:     During this hospitalization, patient to be seen 5 x/week to address the identified rehab impairments via gait training, therapeutic activities, therapeutic exercises and progress toward the following goals:    · Plan of Care Expires:  07/14/22    Subjective     Chief Complaint: pain in LE and fatigue  Patient/Family Comments/goals: "I might need help with showering when I go back home"  Pain/Comfort:  Pain Rating 1: other (see comments) (Unrated)  Location - Side 1: Bilateral  Location - Orientation 1: lower  Location 1: leg  Pain Addressed 1: Reposition, Distraction    Patients cultural, spiritual, Confucianist conflicts given the " current situation:      Living Environment:  Pt lives in an assisted living home at [place].  Prior to admission, patients level of function was modified independent using a w/c in place of a rollator.  Equipment used at home: wheelchair, oxygen, grab bar, shower chair.  DME owned (not currently used): none.  Upon discharge, patient will have assistance from [place].    Objective:     Communicated with YINKA Garcia prior to session.  Patient found up in chair with david catheter, oxygen, peripheral IV, pulse ox (continuous), telemetry, blood pressure cuff  upon PT entry to room.    General Precautions: Standard, fall   Orthopedic Precautions:N/A   Braces: N/A  Respiratory Status: High flow, flow 10 L/min with humidification    Exams:  · Cognitive Exam:  Patient is oriented to Person, Place, Time and Situation  · RUE ROM: Deficits: unmeasured  · LUE ROM: WFL  · RLE ROM: WFL  · RLE Strength: WFL  · LLE ROM: WFL  · LLE Strength: WFL    Functional Mobility:  · Transfers:     · Sit to Stand:  minimum assistance with rolling walker  · Gait: Pt tolerated gt x30' x2 with one standing rest using RW CGA and VCs for safe RW use and proper breathing while ambulating; on 10L O2 for 1st trial, SPO2 dropped to 77%, 15L O2 for 2nd trial. SPO2 returned to >90% once seated in chair in room    Therapeutic Activities and Exercises:   Pt was educated on the following: call light use, importance of OOB activity and functional mobility to negate the negative effects of prolonged bed rest during this hospitalization, safe transfers/ambulation and discharge planning recommendations/options.    AM-PAC 6 CLICK MOBILITY  Total Score:18     Patient left up in chair with all lines intact, call button in reach and RN notified.    GOALS:   Multidisciplinary Problems     Physical Therapy Goals        Problem: Physical Therapy    Goal Priority Disciplines Outcome Goal Variances Interventions   Physical Therapy Goal     PT, PT/OT       Description: Goals to be met by: 2022     Patient will increase functional independence with mobility by performin. Supine to sit with Supervision  2. Sit to stand transfer with Supervision  3. Bed to chair transfer with Supervision using Rolling Walker  4. Gait  x 150 feet with Supervision using Rolling Walker.                          History:     Past Medical History:   Diagnosis Date    Anticoagulant long-term use     Eliquis    Arthritis     Atrial fibrillation     Blood transfusion     Carotid stenosis     CHF (congestive heart failure)     Edema     Generalized anxiety disorder 2018    Dr. Mckeon's eval 18: She does appear to have a JERRI because of the persistent worries that she has.  These worries predominate her day.  She would probably do well with prevention therapy such as SSRI/SNRI to help control the physical symptoms of the anxiety.  Problem at this point in time is her electrolyte abnormalities.  There is a slight risk of hyponatremia with these medications and    Hearing loss     Ruby (hard of hearing)     Hypercholesterolemia     Hypertension     On home oxygen therapy     Psychiatric problem     Thyroid disease        Past Surgical History:   Procedure Laterality Date    CARDIOVERSION  10/19/2020    Procedure: Cardioversion;  Surgeon: Brandon Elias MD;  Location: [facility] CATH LAB;  Service: Cardiology;;    CARPAL TUNNEL RELEASE  2012    right hand    ESOPHAGOGASTRODUODENOSCOPY Left 2018    Procedure: EGD (ESOPHAGOGASTRODUODENOSCOPY);  Surgeon: Oniel Dorsey MD;  Location: [facility] ENDO;  Service: Endoscopy;  Laterality: Left;    HYSTERECTOMY      left carotid endartectomy  2006    TONSILLECTOMY      TREATMENT OF CARDIAC ARRHYTHMIA N/A 10/19/2020    Procedure: Transesophageal echo (BLADIMIR) intra-procedure log documentation;  Surgeon: Brandon Elias MD;  Location: [facility] CATH LAB;  Service: Cardiology;  Laterality: N/A;       Time Tracking:     PT  Received On: 06/14/22  PT Start Time: 1416     PT Stop Time: 1444  PT Total Time (min): 28 min     Billable Minutes: Evaluation 10 and Gait Training 18      06/14/2022

## 2022-06-14 NOTE — H&P
[facility] Medicine History & Physical Examination   Patient Name: Jeannie Hutchins  MRN: [id number]  Patient Class: IP- Inpatient   Admission Date: 6/13/2022  8:16 PM  Length of Stay: 0  Attending Physician:Erik Arriola MD   Primary Care Provider: Paige Mcleod MD  Face-to-Face encounter date: 06/14/2022  Code Status: full code  Chief Complaint: Shortness of Breath (From [place] with hx of pneumonia x 2 weeks and COPD. )          Patient information was obtained from patient, past medical records and ER records.   HISTORY OF PRESENT ILLNESS:   History was obtained from the patient and daughter at the bedside and ER physician Sign-out. Patient is an 87 year old female with a history of  afib on eliquis, HFpEF, HLD, HTN, COPD who is brought in via EMS from [place] for acute hypoxic respiratory distress.  She was recently treated for a bacterial pneumonia approximately 1 month ago per the daughter.  Two weeks ago, patient began experiencing worsening peripheral edema, shortness of breath and was started on PO Lasix.  The patient reports she did have some improvement in the swelling of her legs but SOB was persistent.     Yesterday, while visiting, patient's daughter noticed that she had worsening shortness of breath.  Today, patient appeared to be in respiratory distress with subsequent EMS activation.  On EMS arrival, patient O2 sat low 80s on room air and was subsequently placed on 15 L non-rebreather with improvements in O2 sats to low 90s.  She normally wears 2L NC.      Patient denies fever, chills, chest pain, nausea, vomiting, abdominal pain, dysuria, diarrhea, numbness, tingling or LOC.     In the ED patient is afebrile. She is in respiratory distress and placed on bipap. CBC significant for leukocytosis. Chronic anemia with h/h stable 10/30 but decreased from previous.   CMP with hyponatremia, glucose 123, and CKD with creat 1.1  BNP is elevated 909 and troponin is wnl.   EKG  shows Afib at 91bpm. Nonspecific T wave abnormality.    CXR with evidence of pulmonary edema and right pleural effusion. Official read pending. Patient is treated with IV lasix with UOP 1.5L in the ED. She is also treated with antibiotics to cover for pneumonia. Covid and influenza rapid tests negative.     REVIEW OF SYSTEMS:   10 Point Review of System was performed and was found to be negative except for that mentioned already in the HPI above.     PAST MEDICAL HISTORY:     Past Medical History:   Diagnosis Date    Anticoagulant long-term use     Eliquis    Arthritis     Atrial fibrillation     Blood transfusion     Carotid stenosis     CHF (congestive heart failure)     Edema     Generalized anxiety disorder 1/23/2018    Dr. Mckeon's eval 1/23/18: She does appear to have a JERRI because of the persistent worries that she has.  These worries predominate her day.  She would probably do well with prevention therapy such as SSRI/SNRI to help control the physical symptoms of the anxiety.  Problem at this point in time is her electrolyte abnormalities.  There is a slight risk of hyponatremia with these medications and    Hearing loss     [place]upiat (hard of hearing)     Hypercholesterolemia     Hypertension     On home oxygen therapy     Psychiatric problem     Thyroid disease        PAST SURGICAL HISTORY:     Past Surgical History:   Procedure Laterality Date    CARDIOVERSION  10/19/2020    Procedure: Cardioversion;  Surgeon: Brandon Elias MD;  Location: [facility] CATH LAB;  Service: Cardiology;;    CARPAL TUNNEL RELEASE  2012    right hand    ESOPHAGOGASTRODUODENOSCOPY Left 8/29/2018    Procedure: EGD (ESOPHAGOGASTRODUODENOSCOPY);  Surgeon: Oniel Dorsey MD;  Location: [facility] ENDO;  Service: Endoscopy;  Laterality: Left;    HYSTERECTOMY      left carotid endartectomy  5/2006    TONSILLECTOMY      TREATMENT OF CARDIAC ARRHYTHMIA N/A 10/19/2020    Procedure: Transesophageal echo (BLADMIIR) intra-procedure log  documentation;  Surgeon: Brandon Elias MD;  Location: [facility] CATH LAB;  Service: Cardiology;  Laterality: N/A;       ALLERGIES:   Hydrochlorothiazide, Strawberry, and Lisinopril    FAMILY HISTORY:     Family History   Problem Relation Age of Onset    Heart disease Father     Cancer Brother 65        bladder    Cancer Sister 81        Ovarian    Ovarian cancer Sister 81    Breast cancer Daughter 50        Status post mastectomy    Cancer Sister         Lung.  Smoker    Cancer Sister         Lung.  Smoker    Schizophrenia Son        SOCIAL HISTORY:     Social History     Tobacco Use    Smoking status: Never Smoker    Smokeless tobacco: Never Used   Substance Use Topics    Alcohol use: No        Social History     Substance and Sexual Activity   Sexual Activity Not Currently    Partners: Male        HOME MEDICATIONS:     Prior to Admission medications    Medication Sig Start Date End Date Taking? Authorizing Provider   acetaminophen (TYLENOL) 500 MG tablet Take 1 tablet (500 mg total) by mouth every 6 (six) hours as needed for Pain or Temperature greater than (100). 4/28/21  Yes Mele Connolly MD   amLODIPine (NORVASC) 10 MG tablet Take 10 mg by mouth once daily. 5/17/22  Yes Historical Provider   atorvastatin (LIPITOR) 20 MG tablet Take 20 mg by mouth once daily. 5/11/22  Yes Historical Provider   diclofenac sodium (VOLTAREN) 1 % Gel Apply topically 2 (two) times daily. 5/18/22  Yes Historical Provider   ELIQUIS 2.5 mg Tab TAKE 1 TABLET BY MOUTH TWICE A DAY 2/23/21  Yes Wenceslao Armenta MD   famotidine (PEPCID) 20 MG tablet Take 20 mg by mouth once daily. 5/17/22  Yes Historical Provider   fluticasone propionate (FLONASE) 50 mcg/actuation nasal spray 1 spray by Each Nostril route once daily. 4/26/22  Yes Historical Provider   furosemide (LASIX) 40 MG tablet Take 1 tablet (40 mg total) by mouth every other day. 4/28/21 6/13/22 Yes Mele Connolly MD   levothyroxine (SYNTHROID) 75 MCG tablet Take 75  mcg by mouth once daily.   Yes Historical Provider   losartan (COZAAR) 100 MG tablet TAKE ONE TABLET BY MOUTH EVERY DAY 9/26/19  Yes Wenceslao Armenta MD   metoprolol tartrate (LOPRESSOR) 50 MG tablet Take 0.5 tablets (25 mg total) by mouth 2 (two) times daily.  Patient taking differently: Take 12.5 mg by mouth 2 (two) times daily. 4/28/21 6/13/22 Yes Mele Connolly MD   potassium chloride SA (K-DUR,KLOR-CON) 10 MEQ tablet Take 20 mEq by mouth once daily. 6/9/22  Yes Historical Provider   traZODone (DESYREL) 50 MG tablet Take 1 tablet (50 mg total) by mouth every evening. 4/28/21 6/13/22 Yes Meel Connolly MD   albuterol-ipratropium (DUO-NEB) 2.5 mg-0.5 mg/3 mL nebulizer solution Take 3 mLs by nebulization every 6 (six) hours. Rescue 4/28/21 4/28/22  Mele Connolly MD   alendronate (FOSAMAX) 70 MG tablet Take 70 mg by mouth every 7 days. 5/16/22   Historical Provider   amlodipine-atorvastatin (CADUET) 10-40 mg per tablet TAKE ONE TABLET BY MOUTH EVERY DAY  Patient taking differently: Take by mouth nightly. 3/23/20   Wenceslao Armenta MD   levoFLOXacin (LEVAQUIN) 500 MG tablet Take 500 mg by mouth once daily. 5/18/22 6/14/22  Historical Provider   metoprolol tartrate (LOPRESSOR) 25 MG tablet Take 25 mg by mouth once daily. 5/20/22 6/14/22  Historical Provider         PHYSICAL EXAM:   BP (!) 146/64   Pulse 87   Temp 97.9 °F (36.6 °C)   Resp (!) 23   Wt 36.4 kg (80 lb 4 oz)   SpO2 (!) 94%   BMI 17.99 kg/m²   Vitals Reviewed  General appearance: Well-developed, thin, elderly, chronically ill appearing White female in respiratory distress.   Skin: warm, dry.   Neuro: AAOx2. Follows commands. Motor and sensory exams grossly intact. Good tone. Generalized weakness.   HENT: Atraumatic head. Moist mucous membranes of oral cavity.  Eyes: Normal extraocular movements. PERRLA  Neck: Supple. No evidence of lymphadenopathy. No thyroidomegaly.  Lungs: Respiratory distress on bipap. Coarse breath sounds. RLL  diminished.   Heart: Irregularly regular rhythm. S1 and S2 present with no murmurs/gallop/rub. +2 pedal edema. No JVD present.   Abdomen: Soft, non-distended, non-tender. No rebound tenderness/guarding. No masses or organomegaly. Bowel sounds are normal. Bladder is not palpable. Ta catheter draining clear yellow urine.  Extremities: No cyanosis, clubbing. Capillary refill less than 2 seconds.   Psych/mental status: Alert and oriented. Cooperative. Responds appropriately to questions.   EMERGENCY DEPARTMENT LABS AND IMAGING:     Labs Reviewed   CBC W/ AUTO DIFFERENTIAL - Abnormal; Notable for the following components:       Result Value    WBC 16.18 (*)     RBC 3.30 (*)     Hemoglobin 10.0 (*)     Hematocrit 29.7 (*)     Platelets 475 (*)     MPV 9.0 (*)     Gran # (ANC) 14.7 (*)     Immature Grans (Abs) 0.06 (*)     Lymph # 0.4 (*)     Gran % 90.5 (*)     Lymph % 2.5 (*)     All other components within normal limits   COMPREHENSIVE METABOLIC PANEL - Abnormal; Notable for the following components:    Sodium 124 (*)     Chloride 93 (*)     Glucose 123 (*)     eGFR if  52.5 (*)     eGFR if non  45.6 (*)     All other components within normal limits    Narrative:     Recoll. [id number] by [facility] at 06/13/2022 21:40, reason: Specimen   hemolyzed   PROTIME-INR - Abnormal; Notable for the following components:    PT 17.0 (*)     All other components within normal limits   URINALYSIS, REFLEX TO URINE CULTURE - Abnormal; Notable for the following components:    Protein, UA 2+ (*)     All other components within normal limits    Narrative:     Specimen Source->Urine   B-TYPE NATRIURETIC PEPTIDE - Abnormal; Notable for the following components:     (*)     All other components within normal limits   URINALYSIS MICROSCOPIC - Abnormal; Notable for the following components:    Hyaline Casts, UA 5 (*)     All other components within normal limits    Narrative:     Specimen Source->Urine    CULTURE, BLOOD   CULTURE, BLOOD   MAGNESIUM    Narrative:     Recoll. [id number] by [facility] at 06/13/2022 21:40, reason: Specimen   hemolyzed   TROPONIN I    Narrative:     Recoll. [id number] by TC1 at 06/13/2022 21:40, reason: Specimen   hemolyzed   SARS-COV-2 RNA AMPLIFICATION, QUAL   INFLUENZA A AND B ANTIGEN    Narrative:     Specimen Source->Nasopharyngeal Swab       X-Ray Chest 1 View    (Results Pending)       ASSESSMENT & PLAN:   Jeannie Hutchins is a 87 y.o. female admitted for    Active Hospital Problems    Diagnosis  POA    *Acute on chronic respiratory failure with hypoxia [J96.21]  Yes    Acute on chronic diastolic congestive heart failure [I50.33]  Yes    Multifocal pneumonia [J18.9]  Yes    Hypothyroid [E03.9]  Yes    Paroxysmal atrial fibrillation [I48.0]  Yes    Essential hypertension [I10]  Yes                 Plan  Admit to ICU  IV lasix; monitor renal response  Strict I&O  Fluid restriction  Daily weights  Keep NPO for now until respiratory status improves  Nebs scheduled and prn  IV Levaquin  Check procalcitonin  Pulmonary consult  Cardiology consult  Continue home medications as ordered for chronic conditions                                                                                                                                Diet: NPO/Regular Diet/Cardiac/Diabetic    DVT Prophylaxis: Continue Eliquis for DVT prophylaxis. Encourage ambulation and OOB as tolerated.     Discharge Planning and Disposition:  Patient will be discharged in 2-3 days  ________________________________________________________________________________    This patient is high risk for life-threatening deterioration and death secondary to above comorbidities and need for IV treatment. This patient meets inpatient criteria.   Face-to-Face encounter date: 06/14/2022  Encounter included review of the medical records, interviewing and examining the patient face-to-face, discussion with family and other health care  providers including emergency medicine physician, admission orders, interpreting lab/test results and formulating a plan of care.   Medical Decision Making during this encounter was  [_] Low Complexity  [_] Moderate Complexity  [x] High Complexity    Critical care time: 64 minutes  _________________________________________________________________________________    INPATIENT LIST OF MEDICATIONS   No current facility-administered medications for this encounter.    Current Outpatient Medications:     acetaminophen (TYLENOL) 500 MG tablet, Take 1 tablet (500 mg total) by mouth every 6 (six) hours as needed for Pain or Temperature greater than (100)., Disp: , Rfl: 0    amLODIPine (NORVASC) 10 MG tablet, Take 10 mg by mouth once daily., Disp: , Rfl:     atorvastatin (LIPITOR) 20 MG tablet, Take 20 mg by mouth once daily., Disp: , Rfl:     diclofenac sodium (VOLTAREN) 1 % Gel, Apply topically 2 (two) times daily., Disp: , Rfl:     ELIQUIS 2.5 mg Tab, TAKE 1 TABLET BY MOUTH TWICE A DAY, Disp: 180 tablet, Rfl: 3    famotidine (PEPCID) 20 MG tablet, Take 20 mg by mouth once daily., Disp: , Rfl:     fluticasone propionate (FLONASE) 50 mcg/actuation nasal spray, 1 spray by Each Nostril route once daily., Disp: , Rfl:     furosemide (LASIX) 40 MG tablet, Take 1 tablet (40 mg total) by mouth every other day., Disp: 15 tablet, Rfl: 11    levothyroxine (SYNTHROID) 75 MCG tablet, Take 75 mcg by mouth once daily., Disp: , Rfl:     losartan (COZAAR) 100 MG tablet, TAKE ONE TABLET BY MOUTH EVERY DAY, Disp: 90 tablet, Rfl: 3    potassium chloride SA (K-DUR,KLOR-CON) 10 MEQ tablet, Take 20 mEq by mouth once daily., Disp: , Rfl:     traZODone (DESYREL) 50 MG tablet, Take 1 tablet (50 mg total) by mouth every evening., Disp: 30 tablet, Rfl: 0    albuterol-ipratropium (DUO-NEB) 2.5 mg-0.5 mg/3 mL nebulizer solution, Take 3 mLs by nebulization every 6 (six) hours. Rescue, Disp: 1 Box, Rfl: 0    alendronate (FOSAMAX) 70 MG  tablet, Take 70 mg by mouth every 7 days., Disp: , Rfl:     amlodipine-atorvastatin (CADUET) 10-40 mg per tablet, TAKE ONE TABLET BY MOUTH EVERY DAY (Patient taking differently: Take by mouth nightly.), Disp: 30 tablet, Rfl: 11      Scheduled Meds:    Continuous Infusions:  PRN Meds:.      Kenia Barnard  [facility] Hospitalist  06/14/2022

## 2022-06-14 NOTE — PROGRESS NOTES
[facility] Medicine  Progress Note    Patient name: Jeannie uHtchins  MRN: [id number]  Admit Date: 6/13/2022   LOS: 0 days        Patient was seen and examined bedside, currently on BiPAP.  Reviewed admitting provider's note and plan.  Patient reports significant improvement.  Excellent diuresis so far.  Patient tells me that lately her diuretic was changed from once a day to once a week?    Plan:  Serial EKGs, cardiac enzymes, 2D echo  Pulmonary consulted-thank you  Procalcitonin negative x2.  Deescalate antibiotics to p.o. Levaquin  Continue aggressive IV diuresis, daily weight, accurate charting of urine output, aggressive electrolyte replacement  Continue Eliquis

## 2022-06-14 NOTE — PLAN OF CARE
[facility]  Initial Discharge Assessment       Primary Care Provider: Paige Mcleod MD    Admission Diagnosis: Respiratory failure with hypoxia [J96.91]    Admission Date: 6/13/2022  Expected Discharge Date:     Discharge Barriers Identified: (P) None  Assessment completed at bedside.  Advanced directives in system.  Patient intends to discharge back to [facility] when clear    Payor: [employer] MANAGED MEDICARE / Plan: [employer] MEDICARE HMO / Product Type: Capitation /     Extended Emergency Contact Information  Primary Emergency Contact: Jose AntoniojimiDane   [facility]  Home Phone: [phone]  Mobile Phone: [phone]  Relation: Son  Secondary Emergency Contact: Lyubov Reed  Mobile Phone: [phone]  Relation: Daughter    Discharge Plan A: (P) Return to nursing home  Discharge Plan B: (P) Return to Nursing Home      [facility] [address] [facility]  [address]   [place] [id number]  Phone: [phone] Fax: [phone]    [facility]/pharmacy #[id number] - [place] - [address] [facility]  [address] [address]  Phone: [phone] Fax: [phone]      Initial Assessment (most recent)       Adult Discharge Assessment - 06/14/22 1635          Discharge Assessment    Assessment Type Discharge Planning Assessment (P)      Confirmed/corrected address, phone number and insurance Yes (P)      Confirmed Demographics Correct on Facesheet (P)      Source of Information patient (P)      When was your last doctors appointment? -- (P)    not sure    Communicated JULIET with patient/caregiver Date not available/Unable to determine (P)      Reason For Admission ARF (P)      Lives With facility resident (P)      Facility Arrived From: [facility] (P)      Do you expect to return to your current living situation? Yes (P)      Prior to hospitilization cognitive status: Alert/Oriented (P)      Current cognitive status: Alert/Oriented (P)      Walking or Climbing Stairs Difficulty ambulation  difficulty, requires equipment (P)      Mobility Management wheelchair (P)      Dressing/Bathing Difficulty bathing difficulty, assistance 1 person (P)      Dressing/Bathing Management staff help prn (P)      Equipment Currently Used at Home oxygen;wheelchair (P)      Readmission within 30 days? No (P)      Patient currently being followed by outpatient case management? No (P)      Do you currently have service(s) that help you manage your care at home? No (P)      Do you take prescription medications? Yes (P)      Do you have prescription coverage? Yes (P)      Coverage [employer] (P)      Do you have any problems affording any of your prescribed medications? No (P)      Is the patient taking medications as prescribed? yes (P)      Who is going to help you get home at discharge? facility transport (P)      How do you get to doctors appointments? other (see comments) (P)    facility transport    Are you on dialysis? No (P)      Do you take coumadin? No (P)      Discharge Plan A Return to nursing home (P)      Discharge Plan B Return to Nursing Home (P)      DME Needed Upon Discharge  none (P)      Discharge Plan discussed with: Patient (P)      Discharge Barriers Identified None (P)

## 2022-06-14 NOTE — PT/OT/SLP EVAL
Occupational Therapy   Evaluation    Name: Jeannie Hutchins  MRN: [id number]  Admitting Diagnosis:  Acute on chronic respiratory failure with hypoxia  Recent Surgery: * No surgery found *      Recommendations:     Discharge Recommendations: home health OT and Return to [place]  Discharge Equipment Recommendations:  none  Barriers to discharge:  None    Assessment:     Jeannie Hutchins is a 87 y.o. female with a medical diagnosis of Acute on chronic respiratory failure with hypoxia.  She presents agreeable to OT evaluation. Performance deficits affecting function: impaired self care skills, impaired functional mobilty, impaired endurance, gait instability, decreased safety awareness, impaired cardiopulmonary response to activity.      Rehab Prognosis: Good; patient would benefit from acute skilled OT services to address these deficits and reach maximum level of function.       Plan:     Patient to be seen 5 x/week to address the above listed problems via self-care/home management, therapeutic activities, therapeutic exercises  · Plan of Care Expires: 07/14/22  · Plan of Care Reviewed with: patient    Subjective     Chief Complaint: back pain  Patient/Family Comments/goals: Return to [place]    Occupational Profile:  Living Environment: Pt is a resident of [place], uses a walk in shower.   Previous level of function: Mod I with ADLs and mobility  Roles and Routines: Resident [facility]  Equipment Used at Home:  grab bar, shower chair, wheelchair, walker, rolling  Assistance upon Discharge: facility    Pain/Comfort:  · Pain Rating 1: 0/10    Patients cultural, spiritual, Oriental orthodox conflicts given the current situation: no    Objective:     Communicated with: nurse prior to session.  Patient found up in chair with peripheral IV, telemetry, oxygen, david catheter, pulse ox (continuous), blood pressure cuff upon OT entry to room.    General Precautions: Standard, fall   Orthopedic Precautions:N/A   Braces:  N/A  Respiratory Status: Nasal cannula, flow 10 L/min    Occupational Performance:    Functional Mobility/Transfers:  · Patient completed Sit <> Stand Transfer with contact guard assistance  with  rolling walker   · Functional Mobility: Ambulated 10 feet with Rolling walker and CGA    Activities of Daily Living:  · Upper Body Dressing: stand by assistance    · Lower Body Dressing: minimum assistance      Cognitive/Visual Perceptual:  Cognitive/Psychosocial Skills:     -       Oriented to: Person, Place, Time and Situation   -       Follows Commands/attention:Follows multistep  commands  -       Communication: clear/fluent  -       Memory: No Deficits noted  -       Safety awareness/insight to disability: impaired   -       Mood/Affect/Coping skills/emotional control: Appropriate to situation and Pleasant    Physical Exam:  Balance:    -       good standing balance with RW  Dominant hand:    -       right  Upper Extremity Range of Motion:     -       Right Upper Extremity: WFL  -       Left Upper Extremity: WFL  Upper Extremity Strength:    -       Right Upper Extremity: WFL  -       Left Upper Extremity: WFL   Strength:    -       Right Upper Extremity: WFL  -       Left Upper Extremity: WFL    AMPAC 6 Click ADL:  AMPAC Total Score: 22    Treatment & Education:  Role of OT, safety awareness, call bell use, importance of OOB activity.  Education:    Patient left up in chair with all lines intact, call button in reach and nurse notified    GOALS:   Multidisciplinary Problems     Occupational Therapy Goals        Problem: Occupational Therapy    Goal Priority Disciplines Outcome Interventions   Occupational Therapy Goal     OT, PT/OT     Description: Goals to be met by: discharge     Patient will increase functional independence with ADLs by performing:    UE Dressing with Stand-by Assistance.  LE Dressing with Stand-by Assistance.  Grooming while standing at sink with Stand-by Assistance.  Toileting from toilet  with Stand-by Assistance for hygiene and clothing management.   Toilet transfer to toilet with Stand-by Assistance.                     History:     Past Medical History:   Diagnosis Date    Anticoagulant long-term use     Eliquis    Arthritis     Atrial fibrillation     Blood transfusion     Carotid stenosis     CHF (congestive heart failure)     Edema     Generalized anxiety disorder 1/23/2018    Dr. Mckeon's eval 1/23/18: She does appear to have a JERRI because of the persistent worries that she has.  These worries predominate her day.  She would probably do well with prevention therapy such as SSRI/SNRI to help control the physical symptoms of the anxiety.  Problem at this point in time is her electrolyte abnormalities.  There is a slight risk of hyponatremia with these medications and    Hearing loss     [place] (hard of hearing)     Hypercholesterolemia     Hypertension     On home oxygen therapy     Psychiatric problem     Thyroid disease        Past Surgical History:   Procedure Laterality Date    CARDIOVERSION  10/19/2020    Procedure: Cardioversion;  Surgeon: Brandon Elias MD;  Location: [facility] CATH LAB;  Service: Cardiology;;    CARPAL TUNNEL RELEASE  2012    right hand    ESOPHAGOGASTRODUODENOSCOPY Left 8/29/2018    Procedure: EGD (ESOPHAGOGASTRODUODENOSCOPY);  Surgeon: Oniel Dorsey MD;  Location: [facility] ENDO;  Service: Endoscopy;  Laterality: Left;    HYSTERECTOMY      left carotid endartectomy  5/2006    TONSILLECTOMY      TREATMENT OF CARDIAC ARRHYTHMIA N/A 10/19/2020    Procedure: Transesophageal echo (BLADIMIR) intra-procedure log documentation;  Surgeon: Brandon Elias MD;  Location: [facility] CATH LAB;  Service: Cardiology;  Laterality: N/A;       Time Tracking:     OT Date of Treatment: 06/14/22  OT Start Time: 1338  OT Stop Time: 1358  OT Total Time (min): 20 min    Billable Minutes:Evaluation 8  Therapeutic Activity 12    6/14/2022

## 2022-06-14 NOTE — CARE UPDATE
06/14/22 0740   Patient Assessment/Suction   Level of Consciousness (AVPU) alert   Respiratory Effort Normal   PRE-TX-O2   O2 Device (Oxygen Therapy) High Flow nasal Cannula  (taken off BIPAP)   SpO2 (!) 90 %   Pulse 110   Resp (!) 37

## 2022-06-14 NOTE — CONSULTS
Pulmonary/Critical Care Consult      PATIENT NAME: Jeannie Hutchins  MRN: [id number]  TODAY'S DATE: 2022  8:18 AM  ADMIT DATE: 2022  AGE: 87 y.o. : 1935    CONSULT REQUESTED BY: Thaddeus Klein MD    REASON FOR CONSULT:   Acute on chronic hypoxemic respiratory failure    HPI:  The patient is here with acute on chronic hypoxic respiratory failure secondary to pulmonary edema.  The patient has known diastolic dysfunction and moderate pulmonary hypertension.    REVIEW OF SYSTEMS  GENERAL: Feeling a little better.  EYES: Vision is good.  ENT: No sinusitis or pharyngitis.   HEART: No chest pain or palpitations.  LUNGS:  Short of breath with any exertion.  GI: No Nausea, vomiting, constipation, diarrhea, or reflux.  : No dysuria, hesitancy, or nocturia.  SKIN: No lesions or rashes.  MUSCULOSKELETAL: No joint pain or myalgias.  NEURO: No headaches or neuropathy.  LYMPH:  Lots of pedal edema.  PSYCH: No anxiety or depression.  ENDO: No weight change.    ALLERGIES  Review of patient's allergies indicates:   Allergen Reactions    Hydrochlorothiazide Other (See Comments)     hyponatremia    Strawberry Swelling     Tongue swells up and a generalized rash.    Lisinopril Other (See Comments)     Cough       INPATIENT SCHEDULED MEDICATIONS   albuterol-ipratropium  3 mL Nebulization Q6H    amLODIPine  10 mg Oral Daily    apixaban  2.5 mg Oral BID    atorvastatin  20 mg Oral Daily    chlorhexidine  15 mL Mouth/Throat BID    famotidine  20 mg Oral Daily    fluticasone propionate  1 spray Each Nostril Daily    furosemide (LASIX) injection  40 mg Intravenous Q12H    [START ON 6/15/2022] levoFLOXacin  250 mg Intravenous Q24H    levothyroxine  75 mcg Oral Before breakfast    losartan  100 mg Oral Daily    metoprolol tartrate  12.5 mg Oral BID    mupirocin   Nasal BID    traZODone  50 mg Oral QHS         MEDICAL AND SURGICAL HISTORY  Past Medical History:   Diagnosis Date    Anticoagulant long-term use      Eliquis    Arthritis     Atrial fibrillation     Blood transfusion     Carotid stenosis     CHF (congestive heart failure)     Edema     Generalized anxiety disorder 1/23/2018    Dr. Mckeon's eval 1/23/18: She does appear to have a JERRI because of the persistent worries that she has.  These worries predominate her day.  She would probably do well with prevention therapy such as SSRI/SNRI to help control the physical symptoms of the anxiety.  Problem at this point in time is her electrolyte abnormalities.  There is a slight risk of hyponatremia with these medications and    Hearing loss     [place] (hard of hearing)     Hypercholesterolemia     Hypertension     On home oxygen therapy     Psychiatric problem     Thyroid disease      Past Surgical History:   Procedure Laterality Date    CARDIOVERSION  10/19/2020    Procedure: Cardioversion;  Surgeon: Brandon Elias MD;  Location: [facility] CATH LAB;  Service: Cardiology;;    CARPAL TUNNEL RELEASE  2012    right hand    ESOPHAGOGASTRODUODENOSCOPY Left 8/29/2018    Procedure: EGD (ESOPHAGOGASTRODUODENOSCOPY);  Surgeon: Oniel Dorsey MD;  Location: [facility] ENDO;  Service: Endoscopy;  Laterality: Left;    HYSTERECTOMY      left carotid endartectomy  5/2006    TONSILLECTOMY      TREATMENT OF CARDIAC ARRHYTHMIA N/A 10/19/2020    Procedure: Transesophageal echo (BLADIMIR) intra-procedure log documentation;  Surgeon: Brandon Elias MD;  Location: [facility] CATH LAB;  Service: Cardiology;  Laterality: N/A;       ALCOHOL, TOBACCO AND DRUG USE  Social History     Tobacco Use   Smoking Status Never Smoker   Smokeless Tobacco Never Used     Social History     Substance and Sexual Activity   Alcohol Use No     Social History     Substance and Sexual Activity   Drug Use No       FAMILY HISTORY  Family History   Problem Relation Age of Onset    Heart disease Father     Cancer Brother 65        bladder    Cancer Sister 81        Ovarian    Ovarian cancer Sister 81     Breast cancer Daughter 50        Status post mastectomy    Cancer Sister         Lung.  Smoker    Cancer Sister         Lung.  Smoker    Schizophrenia Son        VITAL SIGNS (MOST RECENT)  Temp: 98.5 °F (36.9 °C) (06/14/22 0715)  Pulse: (!) 116 (06/14/22 0810)  Resp: (!) 24 (06/14/22 0810)  BP: (!) 157/76 (06/14/22 0800)  SpO2: (!) 94 % (06/14/22 0810)    INTAKE AND OUTPUT (LAST 24 HOURS):    Intake/Output Summary (Last 24 hours) at 6/14/2022 0818  Last data filed at 6/14/2022 0621  Gross per 24 hour   Intake 300 ml   Output 3300 ml   Net -3000 ml       WEIGHT  Wt Readings from Last 1 Encounters:   06/14/22 48.2 kg (106 lb 4.2 oz)       PHYSICAL EXAM  GENERAL: Older patient in no distress, sitting in the chair.  HEENT: Pupils equal and reactive. Extraocular movements intact. Nose intact. Pharynx moist.  NECK: Supple.   HEART:  Irregularly irregular rate and rhythm. No murmur or gallop auscultated.  LUNGS:  Very decreased breath sounds in the right base.  There crackles above this and in the left base.  There is consolidation in the right base as well.  Lung excursion symmetrical. No change in fremitus.   ABDOMEN: Bowel sounds present. Non-tender, no masses palpated.  : Normal anatomy.  EXTREMITIES: Normal muscle tone and joint movement, no cyanosis or clubbing.   LYMPHATICS: No adenopathy palpated, edema to the right leg worse than the left  SKIN: Dry, intact, no lesions.   NEURO: Cranial nerves II-XII intact. Motor strength 5/5 bilaterally, upper and lower extremities.  PSYCH: Appropriate affect    CBC LAST (LAST 24 HOURS)  Recent Labs   Lab 06/14/22  0704   WBC 12.05   RBC 3.28*   HGB 9.9*   HCT 29.1*   MCV 89   MCH 30.2   MCHC 34.0   RDW 13.5      MPV 8.9*   GRAN 78.9*  9.5*   LYMPH 9.4*  1.1   MONO 10.7  1.3*   BASO 0.02   NRBC 0       CHEMISTRY LAST (LAST 24 HOURS)  Recent Labs   Lab 06/14/22  0455 06/14/22  0704   NA  --  127*   K  --  3.6   CL  --  93*   CO2  --  27   ANIONGAP  --  7*   BUN  --   16   CREATININE  --  0.9   GLU  --  103   CALCIUM  --  8.8   PH 7.452*  --    MG  --  1.7   ALBUMIN  --  3.7   PROT  --  6.1   ALKPHOS  --  58   ALT  --  24   AST  --  19   BILITOT  --  0.9       COAGULATION LAST (LAST 24 HOURS)  Recent Labs   Lab 06/13/22 2105   LABPT 17.0*   INR 1.5       CARDIAC PROFILE (LAST 24 HOURS)  Recent Labs   Lab 06/13/22 2105 06/13/22  2319 06/14/22  0703   *  --   --    TROPONINI  --    < > <0.030    < > = values in this interval not displayed.       LAST 7 DAYS MICROBIOLOGY   Microbiology Results (last 7 days)     Procedure Component Value Units Date/Time    Blood culture #2 **CANNOT BE ORDERED STAT** [[id number]] Collected: 06/13/22 2150    Order Status: Completed Specimen: Blood from Peripheral, Antecubital, Left Updated: 06/14/22 0517     Blood Culture, Routine No Growth to date    Blood culture #1 **CANNOT BE ORDERED STAT** [[id number]] Collected: 06/13/22 2105    Order Status: Completed Specimen: Blood from Peripheral, Upper Arm, Left Updated: 06/14/22 0358     Blood Culture, Routine No Growth to date          MOST RECENT IMAGING  X-Ray Chest 1 View  Narrative: EXAMINATION:  XR CHEST 1 VIEW    CLINICAL HISTORY:  Shortness of breath    FINDINGS:  Portable chest at 2100 compared with 04/22/2021 shows unchanged slightly enlarged cardiac silhouette size with normal mediastinal contours.  Patient is slightly rotated.    Central pulmonary vascular prominence persists.  Perihilar interstitial opacities and patchy mid and lower lung zone alveolar opacities are evident.  Blunting of costophrenic angles bilaterally suggest tiny pleural effusions or pleuroparenchymal scarring.  Lung volumes remain low.  No pneumothorax.    Advanced degenerative changes affect the shoulders bilaterally, unchanged.  Impression: 1. Findings suggesting interstitial edema either related to heart failure or hypervolemia.  2. Mid and lower lung zone pleuroparenchymal opacities either reflecting confluent  alveolar edema, atelectasis, other infectious or inflammatory consolidation, tiny bilateral pleural effusions, or some combination thereof.    Electronically signed by: Tj Baxter MD  Date:    06/14/2022  Time:    06:37      CURRENT VISIT EKG  Results for orders placed or performed during the hospital encounter of 06/13/22   EKG 12-lead    Narrative    Test Reason : R06.03,    Vent. Rate : 091 BPM     Atrial Rate : 000 BPM     P-R Int : 000 ms          QRS Dur : 088 ms      QT Int : 358 ms       P-R-T Axes : 000 044 084 degrees     QTc Int : 440 ms    Atrial fibrillation  Nonspecific T wave abnormality  Abnormal ECG  When compared with ECG of 13-JUN-2022 20:58,  No significant change was found    Referred By: [email or web address]   SELF           Confirmed By:        ECHOCARDIOGRAM RESULTS  Results for orders placed during the hospital encounter of 01/16/21    Echo Color Flow Doppler? Yes    Interpretation Summary  · The left ventricle is normal in size with concentric hypertrophy and normal systolic function. The estimated ejection fraction is 60%  · Grade II left ventricular diastolic dysfunction.  · Severe left atrial enlargement.  · Normal right ventricular size with normal right ventricular systolic function.  · Severe right atrial enlargement.  · Moderate tricuspid regurgitation.  · There is moderate pulmonary hypertension.  · Intermediate central venous pressure (8 mmHg).  · The estimated PA systolic pressure is 51 mmHg.        BiPAP/oxygen INFORMATION  Oxygen Concentration (%):  [30-60] 30    the patient wore BiPAP all night.  She is currently on 15 L high-flow nasal cannula.    LAST ARTERIAL BLOOD GAS  ABG  Recent Labs   Lab 06/14/22  0455   PH 7.452*   PO2 73*   PCO2 38.4   HCO3 26.9   BE 3       IMPRESSION AND PLAN  Acute on chronic hypoxemic respiratory failure, requiring noninvasive ventilation and high-flow oxygen  Pulmonary edema with pleural effusions  Diastolic heart failure  Pulmonary hypertension  Right  lower lobe infiltrate and leukocytosis raising the question of pneumonia versus all pulmonary edema  Anemia, chronic disease  Hyponatremia, improving  Elevated TSH    Advanced age  Continue diuresis  Check procalcitonin  To continue antibiotics pending procalcitonin  Left lateral decubitus film to assess underlying right lower lobe  Check T4  Need to address code status  Fluid restrict with hyponatremia  Continue apixaban for DVT and CVA prophylaxis  Continue Pepcid for GI prophylaxis  Change levofloxacin to oral    Critical care time spent reviewing the chart, examining the patient, reviewing the labs, reviewing the radiological findings, discussing care with nursing, physicians, and respiratory and creating the note and  has been than 35 minutes    Kristi Dalton MD  [facility]  Department of Pulmonology  Date of Service: 06/14/2022  8:18 AM

## 2022-06-14 NOTE — PLAN OF CARE
Goals to be met by: 2022     Patient will increase functional independence with mobility by performin. Supine to sit with Supervision  2. Sit to stand transfer with Supervision  3. Bed to chair transfer with Supervision using Rolling Walker  4. Gait  x 150 feet with Supervision using Rolling Walker.

## 2022-06-14 NOTE — ED PROVIDER NOTES
Encounter Date: 6/13/2022       History     Chief Complaint   Patient presents with    Shortness of Breath     From [place] with hx of pneumonia x 2 weeks and COPD.      HPI   87 yof with a h/o afib on eliquis, HFpEF, HLD, HTN BIB EMS 2/2 acute hypoxic respiratory distress.  Patient lives at local nursing facility.  She was recently treated for a bacterial pneumonia approximately 1 month ago.  Two weeks ago, patient began experiencing worsening peripheral edema, shortness of breath and was started on daily Lasix.  Yesterday, while visiting, patient's daughter noticed that she had worsening edema and shortness of breath.  Today, patient appeared to be in respiratory distress with subsequent EMS activation.  On EMS arrival, patient satting low 80s on room air and was subsequently placed on 15 L non-rebreather with improvements in O2 sats to low 90s.  Patient unable to provide significant history given labored breathing on arrival.    Review of patient's allergies indicates:   Allergen Reactions    Hydrochlorothiazide Other (See Comments)     hyponatremia    Strawberry Swelling     Tongue swells up and a generalized rash.    Lisinopril Other (See Comments)     Cough     Past Medical History:   Diagnosis Date    Anticoagulant long-term use     Eliquis    Arthritis     Atrial fibrillation     Blood transfusion     Carotid stenosis     CHF (congestive heart failure)     Edema     Generalized anxiety disorder 1/23/2018    Dr. Mckeon's eval 1/23/18: She does appear to have a JERRI because of the persistent worries that she has.  These worries predominate her day.  She would probably do well with prevention therapy such as SSRI/SNRI to help control the physical symptoms of the anxiety.  Problem at this point in time is her electrolyte abnormalities.  There is a slight risk of hyponatremia with these medications and    Hearing loss     [place] (hard of hearing)     Hypercholesterolemia     Hypertension      On home oxygen therapy     Psychiatric problem     Thyroid disease      Past Surgical History:   Procedure Laterality Date    CARDIOVERSION  10/19/2020    Procedure: Cardioversion;  Surgeon: Brandon Elias MD;  Location: [facility] CATH LAB;  Service: Cardiology;;    CARPAL TUNNEL RELEASE  2012    right hand    ESOPHAGOGASTRODUODENOSCOPY Left 8/29/2018    Procedure: EGD (ESOPHAGOGASTRODUODENOSCOPY);  Surgeon: Oniel Dorsey MD;  Location: [facility] ENDO;  Service: Endoscopy;  Laterality: Left;    HYSTERECTOMY      left carotid endartectomy  5/2006    TONSILLECTOMY      TREATMENT OF CARDIAC ARRHYTHMIA N/A 10/19/2020    Procedure: Transesophageal echo (BLADIMIR) intra-procedure log documentation;  Surgeon: Brandon Elias MD;  Location: [facility] CATH LAB;  Service: Cardiology;  Laterality: N/A;     Family History   Problem Relation Age of Onset    Heart disease Father     Cancer Brother 65        bladder    Cancer Sister 81        Ovarian    Ovarian cancer Sister 81    Breast cancer Daughter 50        Status post mastectomy    Cancer Sister         Lung.  Smoker    Cancer Sister         Lung.  Smoker    Schizophrenia Son      Social History     Tobacco Use    Smoking status: Never Smoker    Smokeless tobacco: Never Used   Substance Use Topics    Alcohol use: No    Drug use: No     Review of Systems   Unable to perform ROS: Acuity of condition       Physical Exam     Initial Vitals [06/13/22 2030]   BP Pulse Resp Temp SpO2   (!) 161/83 95 (!) 32 97.9 °F (36.6 °C) (!) 93 %      MAP       --         Physical Exam    Nursing note and vitals reviewed.  Constitutional:   Small frail elderly female sitting in bed with non-rebreather to face, in obvious respiratory distress   HENT:   Head: Normocephalic and atraumatic.   Right Ear: External ear normal.   Left Ear: External ear normal.   Eyes: Conjunctivae and EOM are normal. Pupils are equal, round, and reactive to light.   Neck:   Normal range of motion.  Cardiovascular:  Normal rate, regular rhythm, normal heart sounds and intact distal pulses.   3+ pitting edema to bilateral knees   Pulmonary/Chest:   Obvious increased work of breathing, tachypneic to mid 30s.  Coarse breath sounds to bilateral upper and lower extremities, more appreciable in the right lower base.  No chest wall tenderness on palpation   Abdominal: Abdomen is soft. She exhibits no distension. There is no abdominal tenderness. There is no rebound and no guarding.   Musculoskeletal:      Cervical back: Normal range of motion.     Neurological: She is alert and oriented to person, place, and time. GCS score is 15. GCS eye subscore is 4. GCS verbal subscore is 5. GCS motor subscore is 6.   Skin: Skin is warm. Capillary refill takes less than 2 seconds. No rash noted.         ED Course   Procedures  Labs Reviewed   CBC W/ AUTO DIFFERENTIAL - Abnormal; Notable for the following components:       Result Value    WBC 16.18 (*)     RBC 3.30 (*)     Hemoglobin 10.0 (*)     Hematocrit 29.7 (*)     Platelets 475 (*)     MPV 9.0 (*)     Gran # (ANC) 14.7 (*)     Immature Grans (Abs) 0.06 (*)     Lymph # 0.4 (*)     Gran % 90.5 (*)     Lymph % 2.5 (*)     All other components within normal limits   COMPREHENSIVE METABOLIC PANEL - Abnormal; Notable for the following components:    Sodium 124 (*)     Chloride 93 (*)     Glucose 123 (*)     eGFR if  52.5 (*)     eGFR if non  45.6 (*)     All other components within normal limits    Narrative:     Recoll. [id number] by TC at 06/13/2022 21:40, reason: Specimen   hemolyzed   PROTIME-INR - Abnormal; Notable for the following components:    PT 17.0 (*)     All other components within normal limits   URINALYSIS, REFLEX TO URINE CULTURE - Abnormal; Notable for the following components:    Protein, UA 2+ (*)     All other components within normal limits    Narrative:     Specimen Source->Urine   B-TYPE NATRIURETIC PEPTIDE - Abnormal; Notable for the  following components:     (*)     All other components within normal limits   URINALYSIS MICROSCOPIC - Abnormal; Notable for the following components:    Hyaline Casts, UA 5 (*)     All other components within normal limits    Narrative:     Specimen Source->Urine   CULTURE, BLOOD   CULTURE, BLOOD   MAGNESIUM    Narrative:     Recoll. [id number] by TC1 at 06/13/2022 21:40, reason: Specimen   hemolyzed   TROPONIN I    Narrative:     Recoll. [id number] by TC1 at 06/13/2022 21:40, reason: Specimen   hemolyzed   SARS-COV-2 RNA AMPLIFICATION, QUAL   INFLUENZA A AND B ANTIGEN    Narrative:     Specimen Source->Nasopharyngeal Swab          Imaging Results          X-Ray Chest 1 View (In process)                  Medications   cefepime in dextrose 5 % IVPB 2 g (has no administration in time range)   furosemide injection 60 mg (60 mg Intravenous Given 6/13/22 2158)   cefTRIAXone (ROCEPHIN) 1 g/50 mL D5W IVPB (0 g Intravenous Stopped 6/13/22 2329)   azithromycin 500 mg in dextrose 5 % 250 mL IVPB (ready to mix system) (0 mg Intravenous Stopped 6/14/22 0045)   acetaminophen tablet 650 mg (650 mg Oral Given 6/13/22 2305)   methocarbamoL (ROBAXIN) 500 mg in dextrose 5 % 100 mL IVPB (500 mg Intravenous New Bag 6/14/22 0041)     Medical Decision Making:   ED Management:  A 7-year-old female presenting with acute hypoxia respiratory distress.  On arrival, patient satting upper 80s on non-rebreather with coarse bilateral breath sounds, and 3+ pitting edema to bilateral knees.  Suspect likely acute on chronic heart failure exacerbation resulting of fluid overload.  Patient subsequently transitioned to BiPAP, given 60 mg IV Lasix for urgent resuscitation with improvement in O2 sats to mid 90%.  EKG normal sinus rhythm with no concerning ischemic changes, dysrhythmias, interval derangements.  Lab significant for leukocytosis, hyponatremia, mildly elevated BNP (likely falsely low given patient's chronic cardiopulmonary status  and age).  Chest x-ray with evidence of bilateral pleural effusions, R>L, patchy infiltrates; findings significantly different compared to previous film.  Blood cultures obtained, started empirically on IV Rocephin and azithromycin.  Patient stable on BiPAP, subsequently consult and admitted to the Hospital Medicine for ongoing inpatient care and evaluation 2/2 heart failure exacerbation likely with concurrent pneumonia.    Deion Emery MD PGY-3  [facility]  1:35 AM                 Attending Attestation:         Attending Critical Care:   Critical Care Times:   Direct Patient Care (initial evaluation, reassessments, and time considering the case)................................................................15 minutes.   Additional History from reviewing old medical records or taking additional history from the family, EMS, PCP, etc.......................5 minutes.   Ordering, Reviewing, and Interpreting Diagnostic Studies...............................................................................................................5 minutes.   Documentation..................................................................................................................................................................................5 minutes.   Consultation with other Physicians. .................................................................................................................................................5 minutes.   ==============================================================  · Total Critical Care Time - exclusive of procedural time: 35 minutes.  ==============================================================  Critical care was necessary to treat or prevent imminent or life-threatening deterioration of the following conditions: congestive heart failure and respiratory failure.   The following critical care procedures were done by me (see procedure notes): blood draw for specimens  and pulse oximetry.   Critical care was time spent personally by me on the following activities: obtaining history from patient or relative, examination of patient, review of x-rays / CT sent with the patient, ordering and performing treatments and interventions, development of treatment plan with patient or relative, ordering lab, x-rays, and/or EKG, evaluation of patient's response to treatment, interpretation of cardiac measurements and re-evaluation of patient's conition.   Critical Care Condition: potentially life-threatening       Attending ED Notes:   I have seen and evaluated the patient at bedside and I mostly agree with the resident's history, physical examination, clinical impression, and disposition.  Please see my addendum below for further details and explanation.  I was present for the key portions of the separately billed procedures, including bedside ultrasounds.    An 87-year-old female presents emergency department difficulty breathing.  Differential is broad but overall impression is that the patient likely has pneumonia and possible congestive heart failure exacerbation.  Patient has been treated with IV antibiotics Rocephin azithromycin, blood cultures been obtained.  Chest x-ray concerning for bilateral pleural effusions right greater than left and possible patchy infiltrate right lower lobe and patient was hypoxic initially placed on BiPAP.  Patient given IV Lasix as well.  She will be admitted for further care and evaluation of her multifactorial dyspnea.               Clinical Impression:   Final diagnoses:  [R06.03] Respiratory distress  [R06.02] Shortness of breath  [E87.1] Hyponatremia (Primary)  [J96.91] Respiratory failure with hypoxia          ED Disposition Condition    Admit               Deion Emery MD  Resident  06/14/22 0135       Hernán Noonan,   06/14/22 0222

## 2022-06-14 NOTE — CONSULTS
"[facility]  Adult Nutrition   Consult Note (Nutrition Education)     SUMMARY     Recommendations/Interventions:    Recommendation/Intervention: 1. Continue current diet as tolerated, encourage intake. 2. RD to provide low sodium diet education and discuss the importance of compliance at follow-up.  Goals: 1. Patient to meet at least 75% of estimated needs through meal intake. 2. Patient to show understanding of diet education.  Nutrition Goal Status: new    Dietitian Rounds Brief:  · Consult. Admitted with acute hypoxic respiratory distress. Diet just advanced. . Patient receiving scan at first visit. Now on bipap-not appropriate for diet education at this time. Will provide diet education at follow-up.  Reason for Assessment  Reason For Assessment: consult  Diagnosis:  (Acute on chronic respiratory failure with hypoxia)  Relevant Medical History: HTN, Paroxysmal A-Fib, CHF, s/p carotid endarterectomy, HLD, CKD 3, normocytic anemia  Interdisciplinary Rounds: attended    Nutrition Risk Screen  Nutrition Risk Screen: no indicators present    Nutrition/Diet History  Food Allergies:  (stawberry)  Factors Affecting Nutritional Intake: decreased appetite    Anthropometrics  Temp: 98.5 °F (36.9 °C)  Height Method: Stated  Height: 4' 8" (142.2 cm)  Height (inches): 56 in  Weight Method: Bed Scale  Weight: 48.2 kg (106 lb 4.2 oz)  Weight (lb): 106.26 lb  Ideal Body Weight (IBW), Female: 80 lb  % Ideal Body Weight, Female (lb): 132.83 %  BMI (Calculated): 23.8  BMI Grade: 18.5-24.9 - normal     Weight History:  Wt Readings from Last 10 Encounters:   06/14/22 48.2 kg (106 lb 4.2 oz)   04/27/21 36.4 kg (80 lb 4 oz)   01/24/21 45.5 kg (100 lb 5 oz)   11/13/20 54.3 kg (119 lb 11.4 oz)   10/31/20 54.3 kg (119 lb 11.4 oz)   11/11/19 49 kg (108 lb 2.2 oz)   10/14/19 47.2 kg (104 lb)   09/17/19 47.5 kg (104 lb 11.5 oz)   07/31/19 46.7 kg (103 lb)   06/26/19 45.8 kg (101 lb)     Lab/Procedures/Meds: Pertinent " "Labs Reviewed  Clinical Chemistry:  Recent Labs   Lab 06/14/22  0704   *   K 3.6   CL 93*   CO2 27      BUN 16   CREATININE 0.9   CALCIUM 8.8   PROT 6.1   ALBUMIN 3.7   BILITOT 0.9   ALKPHOS 58   AST 19   ALT 24   ANIONGAP 7*   ESTGFRAFRICA >60.0   EGFRNONAA 58.1*   MG 1.7   PHOS 2.9     CBC:   Recent Labs   Lab 06/14/22  0704   WBC 12.05   RBC 3.28*   HGB 9.9*   HCT 29.1*      MCV 89   MCH 30.2   MCHC 34.0     Lipid Panel:  Recent Labs   Lab 06/14/22  0704   CHOL 103*   HDL 51   LDLCALC 42.2*   TRIG 49   CHOLHDL 49.5     Cardiac Profile:  Recent Labs   Lab 06/13/22  2105 06/13/22  2319 06/14/22  0703   *  --   --    TROPONINI  --  <0.030 <0.030     Thyroid & Parathyroid:  Recent Labs   Lab 06/14/22  0703   TSH 7.740*     Medications: Pertinent Medications reviewed  Scheduled Meds:   albuterol-ipratropium  3 mL Nebulization Q6H    amLODIPine  10 mg Oral Daily    apixaban  2.5 mg Oral BID    atorvastatin  20 mg Oral Daily    chlorhexidine  15 mL Mouth/Throat BID    famotidine  20 mg Oral Daily    fluticasone propionate  1 spray Each Nostril Daily    furosemide (LASIX) injection  40 mg Intravenous Q12H    [START ON 6/15/2022] levoFLOXacin  250 mg Intravenous Q24H    levothyroxine  75 mcg Oral Before breakfast    losartan  100 mg Oral Daily    metoprolol tartrate  12.5 mg Oral BID    mupirocin   Nasal BID    traZODone  50 mg Oral QHS     Continuous Infusions:  PRN Meds:.acetaminophen, albuterol sulfate, loperamide, magnesium sulfate IVPB, magnesium sulfate IVPB, melatonin, ondansetron, potassium bicarbonate, potassium bicarbonate, potassium bicarbonate, senna-docusate 8.6-50 mg, sodium chloride 0.9%    Antibiotics (From admission, onward)            Start     Stop Route Frequency Ordered    06/15/22 0400  levoFLOXacin 250 mg/50 mL IVPB 250 mg        Note to Pharmacy: Ht:  4'8"  Wt: 36.4 kg (80 lb 4 oz)  CrCl: 21.1 mL/min  Body mass index is 17.99 kg/m².    -- IV Every 24 hours " (non-standard times) 06/14/22 0256    06/14/22 0915  mupirocin 2 % ointment  (MRSA Decolonization Orders [facility])         06/19 0859 Nasl 2 times daily 06/14/22 0809        Estimated/Assessed Needs  Weight Used For Calorie Calculations: 48.2 kg (106 lb 4.2 oz)  Energy Calorie Requirements (kcal): 8599-8007 kcals/day (25-30 kcals/kg)  Energy Need Method: Kcal/kg  Protein Requirements: 58-72 g/day (1.2-1.5 g/kg)  Weight Used For Protein Calculations: 48.2 kg (106 lb 4.2 oz)  Fluid Requirements (mL): 1 mL/kcal or per MD  RDA Method (mL): 1205    Nutrition Prescription Ordered    Current Diet Order: Cardiac Diet    Evaluation of Received Nutrient/Fluid Intake    Energy Calories Required: not meeting needs  Protein Required: not meeting needs  Fluid Required: meeting needs  Tolerance: tolerating  % Intake of Estimated Energy Needs: Other Could not assess  % Meal Intake: Other: could not assess    Intake/Output Summary (Last 24 hours) at 6/14/2022 0825  Last data filed at 6/14/2022 0621  Gross per 24 hour   Intake 300 ml   Output 3300 ml   Net -3000 ml      Nutrition Risk    Level of Risk/Frequency of Follow-up: high   Monitor and Evaluation    Food and Nutrient Intake: energy intake, food and beverage intake  Food and Nutrient Adminstration: diet order  Knowledge/Beliefs/Attitudes: food and nutrition knowledge/skill, beliefs and attitudes  Physical Activity and Function: nutrition-related ADLs and IADLs, factors affecting access to physical activity  Anthropometric Measurements: weight, weight change, body mass index  Biochemical Data, Medical Tests and Procedures: electrolyte and renal panel, gastrointestinal profile, glucose/endocrine profile, inflammatory profile, lipid profile  Nutrition-Focused Physical Findings: overall appearance     Nutrition Follow-Up    RD Follow-up?: Yes  Sharona Ramesh RD 06/14/2022 10:40 AM

## 2022-06-14 NOTE — CARE UPDATE
06/14/22 1400   Patient Assessment/Suction   Level of Consciousness (AVPU) alert   Respiratory Effort Normal;Unlabored   Rhythm/Pattern, Respiratory pattern regular   PRE-TX-O2   O2 Device (Oxygen Therapy) High Flow nasal Cannula   Flow (L/min) 10   SpO2 (!) 93 %   Pulse 82   Resp (!) 31   /62   Aerosol Therapy   $ Aerosol Therapy Charges Patient unavailable  (PT with patient)

## 2022-06-15 LAB
ALBUMIN SERPL BCP-MCNC: 3.4 G/DL (ref 3.5–5.2)
ANION GAP SERPL CALC-SCNC: 9 MMOL/L (ref 8–16)
BASOPHILS # BLD AUTO: 0.02 K/UL (ref 0–0.2)
BASOPHILS NFR BLD: 0.2 % (ref 0–1.9)
BNP SERPL-MCNC: 666 PG/ML (ref 0–99)
BUN SERPL-MCNC: 17 MG/DL (ref 8–23)
CALCIUM SERPL-MCNC: 8.3 MG/DL (ref 8.7–10.5)
CHLORIDE SERPL-SCNC: 90 MMOL/L (ref 95–110)
CO2 SERPL-SCNC: 30 MMOL/L (ref 23–29)
CREAT SERPL-MCNC: 1 MG/DL (ref 0.5–1.4)
DIFFERENTIAL METHOD: ABNORMAL
EOSINOPHIL # BLD AUTO: 0.2 K/UL (ref 0–0.5)
EOSINOPHIL NFR BLD: 1.9 % (ref 0–8)
ERYTHROCYTE [DISTWIDTH] IN BLOOD BY AUTOMATED COUNT: 13.7 % (ref 11.5–14.5)
EST. GFR  (AFRICAN AMERICAN): 58.9 ML/MIN/1.73 M^2
EST. GFR  (NON AFRICAN AMERICAN): 51.1 ML/MIN/1.73 M^2
ESTIMATED AVG GLUCOSE: 120 MG/DL (ref 68–131)
GLUCOSE SERPL-MCNC: 86 MG/DL (ref 70–110)
HBA1C MFR BLD: 5.8 % (ref 4.5–6.2)
HCT VFR BLD AUTO: 30.5 % (ref 37–48.5)
HGB BLD-MCNC: 10.2 G/DL (ref 12–16)
IMM GRANULOCYTES # BLD AUTO: 0.02 K/UL (ref 0–0.04)
IMM GRANULOCYTES NFR BLD AUTO: 0.2 % (ref 0–0.5)
LYMPHOCYTES # BLD AUTO: 0.9 K/UL (ref 1–4.8)
LYMPHOCYTES NFR BLD: 11.4 % (ref 18–48)
MAGNESIUM SERPL-MCNC: 2.3 MG/DL (ref 1.6–2.6)
MCH RBC QN AUTO: 30.4 PG (ref 27–31)
MCHC RBC AUTO-ENTMCNC: 33.4 G/DL (ref 32–36)
MCV RBC AUTO: 91 FL (ref 82–98)
MONOCYTES # BLD AUTO: 1.2 K/UL (ref 0.3–1)
MONOCYTES NFR BLD: 15.4 % (ref 4–15)
NEUTROPHILS # BLD AUTO: 5.7 K/UL (ref 1.8–7.7)
NEUTROPHILS NFR BLD: 70.9 % (ref 38–73)
NRBC BLD-RTO: 0 /100 WBC
PHOSPHATE SERPL-MCNC: 3.3 MG/DL (ref 2.7–4.5)
PLATELET # BLD AUTO: 402 K/UL (ref 150–450)
PMV BLD AUTO: 8.8 FL (ref 9.2–12.9)
POTASSIUM SERPL-SCNC: 4.3 MMOL/L (ref 3.5–5.1)
RBC # BLD AUTO: 3.36 M/UL (ref 4–5.4)
SODIUM SERPL-SCNC: 129 MMOL/L (ref 136–145)
WBC # BLD AUTO: 8.01 K/UL (ref 3.9–12.7)

## 2022-06-15 PROCEDURE — 99900035 HC TECH TIME PER 15 MIN (STAT)

## 2022-06-15 PROCEDURE — 25000242 PHARM REV CODE 250 ALT 637 W/ HCPCS: Performed by: HOSPITALIST

## 2022-06-15 PROCEDURE — 99233 PR SUBSEQUENT HOSPITAL CARE,LEVL III: ICD-10-PCS | Mod: ,,, | Performed by: INTERNAL MEDICINE

## 2022-06-15 PROCEDURE — 63600175 PHARM REV CODE 636 W HCPCS: Performed by: NURSE PRACTITIONER

## 2022-06-15 PROCEDURE — 25000242 PHARM REV CODE 250 ALT 637 W/ HCPCS: Performed by: NURSE PRACTITIONER

## 2022-06-15 PROCEDURE — 99900031 HC PATIENT EDUCATION (STAT)

## 2022-06-15 PROCEDURE — 99233 PR SUBSEQUENT HOSPITAL CARE,LEVL III: ICD-10-PCS | Mod: ,,,

## 2022-06-15 PROCEDURE — 85025 COMPLETE CBC W/AUTO DIFF WBC: CPT | Performed by: HOSPITALIST

## 2022-06-15 PROCEDURE — 83880 ASSAY OF NATRIURETIC PEPTIDE: CPT | Performed by: HOSPITALIST

## 2022-06-15 PROCEDURE — 80069 RENAL FUNCTION PANEL: CPT | Performed by: HOSPITALIST

## 2022-06-15 PROCEDURE — 99232 PR SUBSEQUENT HOSPITAL CARE,LEVL II: ICD-10-PCS | Mod: ,,, | Performed by: INTERNAL MEDICINE

## 2022-06-15 PROCEDURE — 25000003 PHARM REV CODE 250

## 2022-06-15 PROCEDURE — 99233 SBSQ HOSP IP/OBS HIGH 50: CPT | Mod: ,,,

## 2022-06-15 PROCEDURE — 20000000 HC ICU ROOM

## 2022-06-15 PROCEDURE — 97530 THERAPEUTIC ACTIVITIES: CPT

## 2022-06-15 PROCEDURE — 27000221 HC OXYGEN, UP TO 24 HOURS

## 2022-06-15 PROCEDURE — 25000003 PHARM REV CODE 250: Performed by: HOSPITALIST

## 2022-06-15 PROCEDURE — 99232 SBSQ HOSP IP/OBS MODERATE 35: CPT | Mod: ,,, | Performed by: INTERNAL MEDICINE

## 2022-06-15 PROCEDURE — 99233 SBSQ HOSP IP/OBS HIGH 50: CPT | Mod: ,,, | Performed by: INTERNAL MEDICINE

## 2022-06-15 PROCEDURE — 25000003 PHARM REV CODE 250: Performed by: NURSE PRACTITIONER

## 2022-06-15 PROCEDURE — 94799 UNLISTED PULMONARY SVC/PX: CPT

## 2022-06-15 PROCEDURE — 94660 CPAP INITIATION&MGMT: CPT

## 2022-06-15 PROCEDURE — 94640 AIRWAY INHALATION TREATMENT: CPT

## 2022-06-15 PROCEDURE — 97535 SELF CARE MNGMENT TRAINING: CPT

## 2022-06-15 PROCEDURE — 94761 N-INVAS EAR/PLS OXIMETRY MLT: CPT

## 2022-06-15 PROCEDURE — 25000003 PHARM REV CODE 250: Performed by: INTERNAL MEDICINE

## 2022-06-15 PROCEDURE — 36415 COLL VENOUS BLD VENIPUNCTURE: CPT | Performed by: HOSPITALIST

## 2022-06-15 PROCEDURE — 83735 ASSAY OF MAGNESIUM: CPT | Performed by: HOSPITALIST

## 2022-06-15 PROCEDURE — 63600175 PHARM REV CODE 636 W HCPCS: Performed by: HOSPITALIST

## 2022-06-15 RX ORDER — FUROSEMIDE 10 MG/ML
40 INJECTION INTRAMUSCULAR; INTRAVENOUS
Status: DISCONTINUED | OUTPATIENT
Start: 2022-06-15 | End: 2022-06-17 | Stop reason: HOSPADM

## 2022-06-15 RX ORDER — IPRATROPIUM BROMIDE AND ALBUTEROL SULFATE 2.5; .5 MG/3ML; MG/3ML
3 SOLUTION RESPIRATORY (INHALATION) EVERY 6 HOURS PRN
Status: DISCONTINUED | OUTPATIENT
Start: 2022-06-15 | End: 2022-06-17 | Stop reason: HOSPADM

## 2022-06-15 RX ADMIN — IPRATROPIUM BROMIDE AND ALBUTEROL SULFATE 3 ML: .5; 3 SOLUTION RESPIRATORY (INHALATION) at 12:06

## 2022-06-15 RX ADMIN — IPRATROPIUM BROMIDE AND ALBUTEROL SULFATE 3 ML: .5; 3 SOLUTION RESPIRATORY (INHALATION) at 06:06

## 2022-06-15 RX ADMIN — METOPROLOL TARTRATE 25 MG: 25 TABLET, FILM COATED ORAL at 09:06

## 2022-06-15 RX ADMIN — CHLORHEXIDINE GLUCONATE 15 ML: 1.2 RINSE ORAL at 08:06

## 2022-06-15 RX ADMIN — CHLORHEXIDINE GLUCONATE 15 ML: 1.2 RINSE ORAL at 09:06

## 2022-06-15 RX ADMIN — TRAZODONE HYDROCHLORIDE 50 MG: 50 TABLET ORAL at 09:06

## 2022-06-15 RX ADMIN — ATORVASTATIN CALCIUM 20 MG: 20 TABLET, FILM COATED ORAL at 09:06

## 2022-06-15 RX ADMIN — APIXABAN 2.5 MG: 2.5 TABLET, FILM COATED ORAL at 09:06

## 2022-06-15 RX ADMIN — ACETAMINOPHEN 1000 MG: 500 TABLET, FILM COATED ORAL at 08:06

## 2022-06-15 RX ADMIN — LEVOFLOXACIN 250 MG: 250 TABLET, FILM COATED ORAL at 05:06

## 2022-06-15 RX ADMIN — FUROSEMIDE 40 MG: 10 INJECTION, SOLUTION INTRAMUSCULAR; INTRAVENOUS at 04:06

## 2022-06-15 RX ADMIN — APIXABAN 2.5 MG: 2.5 TABLET, FILM COATED ORAL at 08:06

## 2022-06-15 RX ADMIN — FAMOTIDINE 20 MG: 20 TABLET ORAL at 09:06

## 2022-06-15 RX ADMIN — FLUTICASONE PROPIONATE 50 MCG: 50 SPRAY, METERED NASAL at 10:06

## 2022-06-15 RX ADMIN — FUROSEMIDE 40 MG: 10 INJECTION, SOLUTION INTRAMUSCULAR; INTRAVENOUS at 09:06

## 2022-06-15 RX ADMIN — MUPIROCIN: 20 OINTMENT TOPICAL at 09:06

## 2022-06-15 RX ADMIN — METOPROLOL TARTRATE 25 MG: 25 TABLET, FILM COATED ORAL at 08:06

## 2022-06-15 RX ADMIN — LEVOTHYROXINE SODIUM 75 MCG: 0.03 TABLET ORAL at 05:06

## 2022-06-15 RX ADMIN — MUPIROCIN: 20 OINTMENT TOPICAL at 08:06

## 2022-06-15 NOTE — PROGRESS NOTES
[facility]  Department of Cardiology  Progress Note    PATIENT NAME: Jeannie Hutchins  MRN: [id number]  TODAY'S DATE: 06/15/2022  ADMIT DATE: 6/13/2022    SUBJECTIVE     PRINCIPLE PROBLEM: Acute on chronic respiratory failure with hypoxia    INTERVAL HISTORY:    6/15/2022  Patient feeling much better she seems to be more concerned with her issues at [place] at this point.  She is diuresing very well remains AFib rate control BP stable on exam.    Review of patient's allergies indicates:   Allergen Reactions    Hydrochlorothiazide Other (See Comments)     hyponatremia    Strawberry Swelling     Tongue swells up and a generalized rash.    Lisinopril Other (See Comments)     Cough       REVIEW OF SYSTEMS  CARDIOVASCULAR: No recent chest pain, palpitations, arm, neck, or jaw pain  RESPIRATORY: No recent fever, cough chills, SOB or congestion  : No blood in the urine  GI: No Nausea, vomiting, constipation, diarrhea, blood, or reflux.  MUSCULOSKELETAL: No myalgias  NEURO: No lightheadedness or dizziness  EYES: No Double vision, blurry, vision or headache     OBJECTIVE     VITAL SIGNS (Most Recent)  Temp: 98.4 °F (36.9 °C) (06/15/22 1101)  Pulse: 93 (06/15/22 1345)  Resp: (!) 55 (06/15/22 1345)  BP: (!) 111/59 (06/15/22 1101)  SpO2: (!) 92 % (06/15/22 1345)    VENTILATION STATUS  Resp: (!) 55 (06/15/22 1345)  SpO2: (!) 92 % (06/15/22 1345)       I & O (Last 24H):    Intake/Output Summary (Last 24 hours) at 6/15/2022 1457  Last data filed at 6/15/2022 1317  Gross per 24 hour   Intake 820 ml   Output 6000 ml   Net -5180 ml       WEIGHTS  Wt Readings from Last 3 Encounters:   06/14/22 0715 48.2 kg (106 lb 4.2 oz)   06/14/22 0250 48.2 kg (106 lb 4.2 oz)   06/13/22 2030 36.4 kg (80 lb 4 oz)   06/14/22 1201 48.1 kg (106 lb)   04/27/21 1428 36.4 kg (80 lb 4 oz)   04/27/21 0500 36.4 kg (80 lb 4 oz)   04/25/21 0500 45.6 kg (100 lb 8.5 oz)   04/24/21 0600 47.3 kg (104 lb 4.4 oz)   04/22/21 0701 46.8 kg (103 lb 2.8  oz)   04/20/21 1602 44 kg (97 lb)   04/19/21 1446 44 kg (97 lb)       PHYSICAL EXAM  CONSTITUTIONAL: Well built, well nourished in no apparent distress  NECK: no carotid bruit, no JVD  LUNGS: CTA  CHEST WALL: no tenderness  HEART: irregular rhythm regualr rate  S1, S2 normal, no murmur, click, rub or gallop   ABDOMEN: soft, non-tender; bowel sounds normal; no masses,  no organomegaly  EXTREMITIES: Extremities normal, no edema  NEURO: AAO X 3    SCHEDULED MEDS:   albuterol-ipratropium  3 mL Nebulization Q6H    apixaban  2.5 mg Oral BID    atorvastatin  20 mg Oral Daily    chlorhexidine  15 mL Mouth/Throat BID    famotidine  20 mg Oral Daily    fluticasone propionate  1 spray Each Nostril Daily    furosemide (LASIX) injection  40 mg Intravenous BID AC    levoFLOXacin  250 mg Oral Daily    levothyroxine  75 mcg Oral Before breakfast    metoprolol tartrate  25 mg Oral BID    mupirocin   Nasal BID    traZODone  50 mg Oral QHS       CONTINUOUS INFUSIONS:    PRN MEDS:acetaminophen, albuterol sulfate, loperamide, magnesium sulfate IVPB, magnesium sulfate IVPB, melatonin, ondansetron, potassium bicarbonate, potassium bicarbonate, potassium bicarbonate, senna-docusate 8.6-50 mg, sodium chloride 0.9%    LABS AND DIAGNOSTICS     CBC LAST 3 DAYS  Recent Labs   Lab 06/13/22  2105 06/14/22  0455 06/14/22  0704 06/15/22  0332   WBC 16.18*  --  12.05 8.01   RBC 3.30*  --  3.28* 3.36*   HGB 10.0*  --  9.9* 10.2*   HCT 29.7* 32* 29.1* 30.5*   MCV 90  --  89 91   MCH 30.3  --  30.2 30.4   MCHC 33.7  --  34.0 33.4   RDW 13.8  --  13.5 13.7   *  --  415 402   MPV 9.0*  --  8.9* 8.8*   GRAN 90.5*  14.7*  --  78.9*  9.5* 70.9  5.7   LYMPH 2.5*  0.4*  --  9.4*  1.1 11.4*  0.9*   MONO 6.4  1.0  --  10.7  1.3* 15.4*  1.2*   BASO 0.02  --  0.02 0.02   NRBC 0  --  0 0       COAGULATION LAST 3 DAYS  Recent Labs   Lab 06/13/22  2105   LABPT 17.0*   INR 1.5       CHEMISTRY LAST 3 DAYS  Recent Labs   Lab 06/13/22  5063  06/14/22  0455 06/14/22  0704 06/14/22  1548 06/15/22  0332   *  --  127* 125* 129*   K 4.1  --  3.6 4.8 4.3   CL 93*  --  93* 89* 90*   CO2 23  --  27 28 30*   ANIONGAP 8  --  7* 8 9   BUN 21  --  16 18 17   CREATININE 1.1  --  0.9 1.0 1.0   *  --  103 101 86   CALCIUM 8.9  --  8.8 8.9 8.3*   PH  --  7.452*  --   --   --    MG 1.8  --  1.7 2.4 2.3   ALBUMIN 4.1  --  3.7  --  3.4*   PROT 6.5  --  6.1  --   --    ALKPHOS 60  --  58  --   --    ALT 25  --  24  --   --    AST 21  --  19  --   --    BILITOT 0.8  --  0.9  --   --        CARDIAC PROFILE LAST 3 DAYS  Recent Labs   Lab 06/13/22 2105 06/13/22  2319 06/14/22  0703 06/14/22  1339 06/15/22  0332   *  --   --   --  666*   TROPONINI  --  <0.030 <0.030 <0.030  --        ENDOCRINE LAST 3 DAYS  Recent Labs   Lab 06/14/22  0703 06/14/22  0946   TSH 7.740*  --    PROCAL 0.07 <0.05       LAST ARTERIAL BLOOD GAS  ABG  Recent Labs   Lab 06/14/22 0455   PH 7.452*   PO2 73*   PCO2 38.4   HCO3 26.9   BE 3       LAST 7 DAYS MICROBIOLOGY   Microbiology Results (last 7 days)     Procedure Component Value Units Date/Time    Blood culture #1 **CANNOT BE ORDERED STAT** [[id number]] Collected: 06/13/22 2105    Order Status: Completed Specimen: Blood from Peripheral, Upper Arm, Left Updated: 06/14/22 2232     Blood Culture, Routine No Growth to date      No Growth to date    Blood culture #2 **CANNOT BE ORDERED STAT** [[id number]] Collected: 06/13/22 2150    Order Status: Completed Specimen: Blood from Peripheral, Antecubital, Left Updated: 06/14/22 2232     Blood Culture, Routine No Growth to date      No Growth to date          MOST RECENT IMAGING  X-Ray Chest 1 View  HISTORY: Pulmonary edema,  dyspnea.    FINDINGS: Portable chest radiograph at 0443 hours compared to prior exams shows stable moderately enlarged cardiac silhouette and prominent central pulmonary vascularity, with aortic vascular calcifications.    The lungs are normally expanded, with scattered  mid and lower lung linear and bandlike opacities, and hazy densities blunting the costophrenic angles. There is no dense consolidation or pneumothorax. No new osseous abnormality.    IMPRESSION:  1. Scattered lower lung opacities suggesting atelectasis, with small bilateral pleural effusions suspected, right greater than left.  2. Unchanged cardiomegaly.    Electronically signed by:  Mazin Laguerre MD  6/15/2022 6:41 AM CDT Workstation: [id number]      [facility]  Results for orders placed during the hospital encounter of 06/13/22    Echo    Interpretation Summary  · Mild to moderate tricuspid regurgitation.  · Severe right atrial enlargement.  · Moderate left atrial enlargement.  · Mild right ventricular enlargement.  · A diastolic pattern consistent with atrial fibrillation observed.  · Mild aortic regurgitation.  · The estimated ejection fraction is 65%.  · Normal systolic function.      CURRENT/PREVIOUS VISIT EKG  Results for orders placed or performed during the hospital encounter of 06/13/22   EKG 12-lead    Collection Time: 06/13/22  8:59 PM    Narrative    Test Reason : R06.03,    Vent. Rate : 091 BPM     Atrial Rate : 000 BPM     P-R Int : 000 ms          QRS Dur : 088 ms      QT Int : 358 ms       P-R-T Axes : 000 044 084 degrees     QTc Int : 440 ms    Atrial fibrillation  Nonspecific T wave abnormality  Abnormal ECG  When compared with ECG of 13-JUN-2022 20:58,  No significant change was found    Referred By: [email or web address]   SELF           Confirmed By:        ASSESSMENT/PLAN:     Active Hospital Problems    Diagnosis    *Acute on chronic respiratory failure with hypoxia    Acute on chronic diastolic congestive heart failure    Multifocal pneumonia    Hypothyroid    Paroxysmal atrial fibrillation    Essential hypertension       ASSESSMENT & PLAN:   # acute on chronic respiratory failure  # acute on chronic diastolic CHF   # PAF now RVR   # Essential HTN   # Hypothyroid   # RLL pneumonia          RECOMMENDATIONS:  Continue metoprolol 25 mg b.i.d. with goal of rate control   Continue diuresis as long as kidneys tolerate    Continue Eliquis for oral anticoagulation  We will sign off- re consult if needed  Please arrange for follow-up in our clinic on discharge    Jess Garcia PA-C  [facility]  Department of Cardiology  Date of Service: 06/15/2022

## 2022-06-15 NOTE — CARE UPDATE
Curaplex to be worn if placed on bipap   06/15/22 0656   Patient Assessment/Suction   Level of Consciousness (AVPU) alert   Respiratory Effort Normal;Unlabored   Expansion/Accessory Muscles/Retractions no use of accessory muscles;expansion symmetric   PRE-TX-O2   O2 Device (Oxygen Therapy) High Flow nasal Cannula   Flow (L/min) 10   SpO2 (!) 94 %   Pulse Oximetry Type Continuous   $ Pulse Oximetry - Multiple Charge Pulse Oximetry - Multiple   Pulse 84   Resp (!) 22   Aerosol Therapy   $ Aerosol Therapy Charges Aerosol Treatment   Daily Review of Necessity (SVN) completed   Respiratory Treatment Status (SVN) given   Treatment Route (SVN) oxygen   Patient Position (SVN) HOB elevated   Post Treatment Assessment (SVN) increased aeration   Signs of Intolerance (SVN) none   Breath Sounds Post-Respiratory Treatment   Throughout All Fields Post-Treatment All Fields   Throughout All Fields Post-Treatment aeration increased   Post-treatment Heart Rate (beats/min) 84   Post-treatment Resp Rate (breaths/min) 25   Skin Integrity   $ Wound Care Tech Time 15 min   Area Observed Bridge of nose   Skin Appearance without discoloration   Preset CPAP/BiPAP Settings   Mode Of Delivery BiPAP S/T;Standby   $ CPAP/BiPAP Daily Charge BiPAP/CPAP Daily      06/15/22 0656   Patient Assessment/Suction   Level of Consciousness (AVPU) alert   Respiratory Effort Normal;Unlabored   Expansion/Accessory Muscles/Retractions no use of accessory muscles;expansion symmetric   PRE-TX-O2   O2 Device (Oxygen Therapy) High Flow nasal Cannula   Flow (L/min) 10   SpO2 (!) 94 %   Pulse Oximetry Type Continuous   $ Pulse Oximetry - Multiple Charge Pulse Oximetry - Multiple   Pulse 84   Resp (!) 22   Aerosol Therapy   $ Aerosol Therapy Charges Aerosol Treatment   Daily Review of Necessity (SVN) completed   Respiratory Treatment Status (SVN) given   Treatment Route (SVN) oxygen   Patient Position (SVN) HOB elevated   Post Treatment Assessment (SVN) increased  aeration   Signs of Intolerance (SVN) none   Breath Sounds Post-Respiratory Treatment   Throughout All Fields Post-Treatment All Fields   Throughout All Fields Post-Treatment aeration increased   Post-treatment Heart Rate (beats/min) 84   Post-treatment Resp Rate (breaths/min) 25   Skin Integrity   $ Wound Care Tech Time 15 min   Area Observed Bridge of nose   Skin Appearance without discoloration   Preset CPAP/BiPAP Settings   Mode Of Delivery BiPAP S/T;Standby   $ CPAP/BiPAP Daily Charge BiPAP/CPAP Daily

## 2022-06-15 NOTE — CARE UPDATE
06/14/22 1937   Patient Assessment/Suction   Level of Consciousness (AVPU) alert   Respiratory Effort Normal;Unlabored   Expansion/Accessory Muscles/Retractions no use of accessory muscles   All Lung Fields Breath Sounds coarse   Rhythm/Pattern, Respiratory unlabored;pattern regular   Cough Frequency infrequent   Cough Type dry   PRE-TX-O2   O2 Device (Oxygen Therapy) High Flow nasal Cannula   $ Is the patient on Low Flow Oxygen? Yes   Flow (L/min) 10   SpO2 95 %   Pulse Oximetry Type Continuous   $ Pulse Oximetry - Multiple Charge Pulse Oximetry - Multiple   Pulse 85   Resp (!) 31   Aerosol Therapy   $ Aerosol Therapy Charges Aerosol Treatment   Daily Review of Necessity (SVN) completed   Respiratory Treatment Status (SVN) given   Treatment Route (SVN) mask;oxygen   Patient Position (SVN) HOB elevated   Post Treatment Assessment (SVN) breath sounds unchanged   Signs of Intolerance (SVN) none   Breath Sounds Post-Respiratory Treatment   Post-treatment Heart Rate (beats/min) 97   Post-treatment Resp Rate (breaths/min) 29   Education   $ Education Bronchodilator;15 min   Respiratory Evaluation   $ Care Plan Tech Time 15 min   $ Eval/Re-eval Charges Evaluation

## 2022-06-15 NOTE — PROGRESS NOTES
[facility] Medicine  Progress Note    Patient name: Jeannie Hutchins  MRN: [id number]  Admit Date: 6/13/2022   LOS: 1 day     SUBJECTIVE:     Principal problem: Acute on chronic respiratory failure with hypoxia    Interval History:  Patient was seen and examined bedside, she made great recovery yesterday however today morning she is requiring more oxygen, diuresing well.  Denies any new symptoms, subjectively feels better.  Afebrile.     Scheduled Meds:   albuterol-ipratropium  3 mL Nebulization Q6H    apixaban  2.5 mg Oral BID    atorvastatin  20 mg Oral Daily    chlorhexidine  15 mL Mouth/Throat BID    famotidine  20 mg Oral Daily    fluticasone propionate  1 spray Each Nostril Daily    levoFLOXacin  250 mg Oral Daily    levothyroxine  75 mcg Oral Before breakfast    metoprolol tartrate  25 mg Oral BID    mupirocin   Nasal BID    traZODone  50 mg Oral QHS     Continuous Infusions:  PRN Meds:acetaminophen, albuterol sulfate, loperamide, magnesium sulfate IVPB, magnesium sulfate IVPB, melatonin, ondansetron, potassium bicarbonate, potassium bicarbonate, potassium bicarbonate, senna-docusate 8.6-50 mg, sodium chloride 0.9%    Review of patient's allergies indicates:   Allergen Reactions    Hydrochlorothiazide Other (See Comments)     hyponatremia    Strawberry Swelling     Tongue swells up and a generalized rash.    Lisinopril Other (See Comments)     Cough       Review of Systems: As per interval history    OBJECTIVE:     Vital Signs (Most Recent)  Temp: 97.8 °F (36.6 °C) (06/15/22 0301)  Pulse: 78 (06/15/22 0700)  Resp: (!) 40 (06/15/22 0700)  BP: 129/67 (06/15/22 0700)  SpO2: (!) 94 % (06/15/22 0700)    Vital Signs Range (Last 24H):  Temp:  [97.7 °F (36.5 °C)-98.8 °F (37.1 °C)]   Pulse:  []   Resp:  [20-49]   BP: ()/(50-74)   SpO2:  [76 %-99 %]     I & O (Last 24H):    Intake/Output Summary (Last 24 hours) at 6/15/2022 0952  Last data filed at 6/15/2022 0600  Gross per  24 hour   Intake 870 ml   Output 4550 ml   Net -3680 ml       Physical Exam:  General: Patient resting comfortably in no acute distress. Appears as stated age. Calm  Eyes: No conjunctival injection. No scleral icterus.  ENT: Hearing grossly intact. No discharge from ears. No nasal discharge.   Neck: Supple, trachea midline. No JVD  CVS: RRR. No LE edema BL  Lungs:  Tachypnea noted, On high-flow nasal cannula, bilateral basal crackles noted   Abdomen:  Soft, nontender and nondistended.  No organomegaly  Neuro: AOx3. Moves all extremities. Follows commands. Responds appropriately       Laboratory:  I have reviewed all pertinent lab results within the past 24 hours.    Diagnostic Results:  Reviewed all imaging    ASSESSMENT/PLAN:     87-year-old lady with prior history of AFib on Eliquis, diastolic CHF, recent pneumonia came from [facility] due to respiratory failure secondary to decompensated diastolic CHF most likely due to recent D escalation of diuretic regimen.    Active Hospital Problems    Diagnosis  POA    *Acute on chronic respiratory failure with hypoxia [J96.21]  Yes    Acute on chronic diastolic congestive heart failure [I50.33]  Yes    Multifocal pneumonia [J18.9]  Yes    Hypothyroid [E03.9]  Yes    Paroxysmal atrial fibrillation [I48.0]  Yes    Essential hypertension [I10]  Yes      Resolved Hospital Problems   No resolved problems to display.         Plan:   Continue aggressive IV diuresis, daily weight, aggressive electrolyte replacement  Clinically improving  Dr. Sha santoscalate antibiotics to p.o. Levaquin  Continue Eliquis  Wean oxygen as tolerated  Help from Cardiology and Pulmonary appreciated  Continue monitoring in ICU  Further management as per clinical course    VTE Risk Mitigation (From admission, onward)         Ordered     apixaban tablet 2.5 mg  2 times daily         06/14/22 0246     IP VTE HIGH RISK PATIENT  Once         06/14/22 0246     Place sequential compression  device  Until discontinued         06/14/22 0246                    Department Hospital Medicine  [facility]  Thaddeus Klein MD  Date of service: 06/15/2022

## 2022-06-15 NOTE — PLAN OF CARE
Plan of Care and education reviewed with both patient and her daughter, with which, both verbalize understanding. Bed locked in low position, call light and belongings within reach.   Problem: Adult Inpatient Plan of Care  Goal: Plan of Care Review  Outcome: Ongoing, Progressing  Goal: Patient-Specific Goal (Individualized)  Outcome: Ongoing, Progressing  Goal: Absence of Hospital-Acquired Illness or Injury  Outcome: Ongoing, Progressing  Goal: Optimal Comfort and Wellbeing  Outcome: Ongoing, Progressing  Goal: Readiness for Transition of Care  Outcome: Ongoing, Progressing     Problem: Infection  Goal: Absence of Infection Signs and Symptoms  Outcome: Ongoing, Progressing     Problem: Fluid Imbalance (Pneumonia)  Goal: Fluid Balance  Outcome: Ongoing, Progressing     Problem: Infection (Pneumonia)  Goal: Resolution of Infection Signs and Symptoms  Outcome: Ongoing, Progressing     Problem: Respiratory Compromise (Pneumonia)  Goal: Effective Oxygenation and Ventilation  Outcome: Ongoing, Progressing     Problem: Fall Injury Risk  Goal: Absence of Fall and Fall-Related Injury  Outcome: Ongoing, Progressing

## 2022-06-15 NOTE — PT/OT/SLP PROGRESS
"Physical Therapy Treatment    Patient Name:  Jeannie Hutchins   MRN:  [id number]    Recommendations:     Discharge Recommendations:  nursing facility, skilled   Discharge Equipment Recommendations: none   Barriers to discharge: None    Assessment:     Jeannie Hutchins is a 87 y.o. female admitted with a medical diagnosis of Acute on chronic respiratory failure with hypoxia.  She presents with the following impairments/functional limitations:  weakness, impaired endurance, impaired self care skills, impaired functional mobilty, gait instability, impaired balance, decreased lower extremity function, decreased upper extremity function, impaired cardiopulmonary response to activity.    Pt presents supine in bed, agreeable to PT. Pt on 14 L O2 via Oxymask with saturations at 96% upon entry. Pt transferred to sitting edge of bed with mod A and sat with SBA to perform LE exercises. Pt performed transfer training to bed side chair with RW and min A stand pivot. Pt left up in chair with all needs in reach, on 14 L O2 with saturations above 92%.     Rehab Prognosis: Fair; patient would benefit from acute skilled PT services to address these deficits and reach maximum level of function.    Recent Surgery: * No surgery found *      Plan:     During this hospitalization, patient to be seen 5 x/week to address the identified rehab impairments via gait training, therapeutic activities, therapeutic exercises and progress toward the following goals:    · Plan of Care Expires:  07/14/22    Subjective     Chief Complaint: "Can I get a gown for my back side?"  Patient/Family Comments/goals: to get out of bed  Pain/Comfort:  Pain Rating 1: 0/10      Objective:     Communicated with Nurse Echevarria prior to session.  Patient found HOB elevated with david catheter, oxygen, peripheral IV, pulse ox (continuous), telemetry, blood pressure cuff upon PT entry to room.     General Precautions: Standard, fall   Orthopedic Precautions:N/A   Braces: " N/A  Respiratory Status: Oxymask 14 L     Functional Mobility:  · Bed Mobility:     · Supine to Sit: moderate assistance  · Transfers:     · Sit to Stand:  Min A initially to get feet closer to floor due to short stature, but stood with CGA HHA  · Bed to chair: stand pivot with RW and min A for sequence using RW  · Balance: SBA seated, CGA standing    Therapeutic Activities and Exercises:   Education, poc, dc planning, fall prevention.    Pt performed seated LE exercises to improve strength and endurance x 10 each: LAQ, marching, ankle pumps    Patient left up in chair with all lines intact, call button in reach and nurse notified.    GOALS:   Multidisciplinary Problems     Physical Therapy Goals        Problem: Physical Therapy    Goal Priority Disciplines Outcome Goal Variances Interventions   Physical Therapy Goal     PT, PT/OT      Description: Goals to be met by: 2022     Patient will increase functional independence with mobility by performin. Supine to sit with Supervision  2. Sit to stand transfer with Supervision  3. Bed to chair transfer with Supervision using Rolling Walker  4. Gait  x 150 feet with Supervision using Rolling Walker.                          Time Tracking:     PT Received On: 06/15/22  PT Start Time: 1329     PT Stop Time: 1352  PT Total Time (min): 23 min     Billable Minutes: Therapeutic Activity 23    Treatment Type: Treatment  PT/PTA: PT     PTA Visit Number: 0     06/15/2022

## 2022-06-15 NOTE — PT/OT/SLP PROGRESS
Occupational Therapy   Treatment    Name: Jeannie Hutchins  MRN: [id number]  Admitting Diagnosis:  Acute on chronic respiratory failure with hypoxia       Recommendations:     Discharge Recommendations: nursing facility, skilled  Discharge Equipment Recommendations:  none  Barriers to discharge:  None    Assessment:     Jeannie Hutchins is a 87 y.o. female with a medical diagnosis of Acute on chronic respiratory failure with hypoxia.   Performance deficits affecting function are weakness, impaired endurance, impaired self care skills, impaired functional mobilty, gait instability, impaired balance, decreased safety awareness, impaired cognition, impaired cardiopulmonary response to activity.     Pt found on 14L of oxygen with oxymask in place; she tolerated EOB grooming tasks and transfer to bedside chair with Sp02 in the 90s.      Rehab Prognosis:  Good; patient would benefit from acute skilled OT services to address these deficits and reach maximum level of function.       Plan:     Patient to be seen 5 x/week to address the above listed problems via self-care/home management, therapeutic activities, therapeutic exercises  · Plan of Care Expires: 07/14/22  · Plan of Care Reviewed with: patient    Subjective     Pain/Comfort:  · Pain Rating 1: 0/10  · Pain Rating Post-Intervention 1: 0/10    Objective:     Communicated with: nurse prior to session.  Patient found up in chair with telemetry, peripheral IV, blood pressure cuff, david catheter, pulse ox (continuous), oxygen upon OT entry to room.    General Precautions: Standard, fall, respiratory   Orthopedic Precautions:N/A      Occupational Performance:     Bed Mobility:    · Patient completed Supine to Sit with moderate assistance     Functional Mobility/Transfers:  · Patient completed Sit <> Stand Transfer with minimum assistance  with  rolling walker   · Patient completed Bed <> Chair Transfer using Stand Pivot technique with minimum assistance with rolling  walker    Activities of Daily Living:  · Grooming: minimum assistance for combing hair at back of head; SBA sitting balance at EOB during task; PT engaging pt in leg exercises during this time     Patient left up in chair with all lines intact and call button in reachEducation:      GOALS:   Multidisciplinary Problems     Occupational Therapy Goals        Problem: Occupational Therapy    Goal Priority Disciplines Outcome Interventions   Occupational Therapy Goal     OT, PT/OT     Description: Goals to be met by: discharge     Patient will increase functional independence with ADLs by performing:    UE Dressing with Stand-by Assistance.  LE Dressing with Stand-by Assistance.  Grooming while standing at sink with Stand-by Assistance.  Toileting from toilet with Stand-by Assistance for hygiene and clothing management.   Toilet transfer to toilet with Stand-by Assistance.                     Time Tracking:     OT Date of Treatment: 06/15/22  OT Start Time: 1330  OT Stop Time: 1353  OT Total Time (min): 23 min   Co-tx with PT due to pt's limited tolerance for multiple sessions at this time (increased 02 needs, decreased endurance)    Billable Minutes:Self Care/Home Management 08  Therapeutic Activity 15    OT/VICKI: OT          6/15/2022

## 2022-06-16 LAB
ALBUMIN SERPL BCP-MCNC: 3.7 G/DL (ref 3.5–5.2)
ANION GAP SERPL CALC-SCNC: 10 MMOL/L (ref 8–16)
BASOPHILS # BLD AUTO: 0.03 K/UL (ref 0–0.2)
BASOPHILS NFR BLD: 0.4 % (ref 0–1.9)
BUN SERPL-MCNC: 17 MG/DL (ref 8–23)
CALCIUM SERPL-MCNC: 8.7 MG/DL (ref 8.7–10.5)
CHLORIDE SERPL-SCNC: 92 MMOL/L (ref 95–110)
CO2 SERPL-SCNC: 30 MMOL/L (ref 23–29)
CREAT SERPL-MCNC: 1 MG/DL (ref 0.5–1.4)
DIFFERENTIAL METHOD: ABNORMAL
EOSINOPHIL # BLD AUTO: 0.3 K/UL (ref 0–0.5)
EOSINOPHIL NFR BLD: 3.7 % (ref 0–8)
ERYTHROCYTE [DISTWIDTH] IN BLOOD BY AUTOMATED COUNT: 13.7 % (ref 11.5–14.5)
EST. GFR  (AFRICAN AMERICAN): 58.9 ML/MIN/1.73 M^2
EST. GFR  (NON AFRICAN AMERICAN): 51.1 ML/MIN/1.73 M^2
GLUCOSE SERPL-MCNC: 86 MG/DL (ref 70–110)
HCT VFR BLD AUTO: 35.5 % (ref 37–48.5)
HGB BLD-MCNC: 11.7 G/DL (ref 12–16)
IMM GRANULOCYTES # BLD AUTO: 0.01 K/UL (ref 0–0.04)
IMM GRANULOCYTES NFR BLD AUTO: 0.1 % (ref 0–0.5)
LYMPHOCYTES # BLD AUTO: 0.8 K/UL (ref 1–4.8)
LYMPHOCYTES NFR BLD: 10.4 % (ref 18–48)
MAGNESIUM SERPL-MCNC: 2.2 MG/DL (ref 1.6–2.6)
MCH RBC QN AUTO: 30.2 PG (ref 27–31)
MCHC RBC AUTO-ENTMCNC: 33 G/DL (ref 32–36)
MCV RBC AUTO: 92 FL (ref 82–98)
MONOCYTES # BLD AUTO: 1 K/UL (ref 0.3–1)
MONOCYTES NFR BLD: 13 % (ref 4–15)
NEUTROPHILS # BLD AUTO: 5.7 K/UL (ref 1.8–7.7)
NEUTROPHILS NFR BLD: 72.4 % (ref 38–73)
NRBC BLD-RTO: 0 /100 WBC
PHOSPHATE SERPL-MCNC: 3.7 MG/DL (ref 2.7–4.5)
PLATELET # BLD AUTO: 423 K/UL (ref 150–450)
PMV BLD AUTO: 8.8 FL (ref 9.2–12.9)
POTASSIUM SERPL-SCNC: 3.9 MMOL/L (ref 3.5–5.1)
PROCALCITONIN SERPL IA-MCNC: <0.05 NG/ML (ref 0–0.5)
RBC # BLD AUTO: 3.88 M/UL (ref 4–5.4)
SODIUM SERPL-SCNC: 132 MMOL/L (ref 136–145)
WBC # BLD AUTO: 7.86 K/UL (ref 3.9–12.7)

## 2022-06-16 PROCEDURE — 99232 SBSQ HOSP IP/OBS MODERATE 35: CPT | Mod: ,,, | Performed by: INTERNAL MEDICINE

## 2022-06-16 PROCEDURE — 84145 PROCALCITONIN (PCT): CPT | Performed by: INTERNAL MEDICINE

## 2022-06-16 PROCEDURE — 99900031 HC PATIENT EDUCATION (STAT)

## 2022-06-16 PROCEDURE — 21400001 HC TELEMETRY ROOM

## 2022-06-16 PROCEDURE — 20000000 HC ICU ROOM

## 2022-06-16 PROCEDURE — 25000003 PHARM REV CODE 250: Performed by: NURSE PRACTITIONER

## 2022-06-16 PROCEDURE — 27000221 HC OXYGEN, UP TO 24 HOURS

## 2022-06-16 PROCEDURE — 25000003 PHARM REV CODE 250: Performed by: INTERNAL MEDICINE

## 2022-06-16 PROCEDURE — 63600175 PHARM REV CODE 636 W HCPCS: Performed by: HOSPITALIST

## 2022-06-16 PROCEDURE — 97116 GAIT TRAINING THERAPY: CPT

## 2022-06-16 PROCEDURE — 25000003 PHARM REV CODE 250: Performed by: HOSPITALIST

## 2022-06-16 PROCEDURE — 25000003 PHARM REV CODE 250

## 2022-06-16 PROCEDURE — 94799 UNLISTED PULMONARY SVC/PX: CPT

## 2022-06-16 PROCEDURE — 94660 CPAP INITIATION&MGMT: CPT

## 2022-06-16 PROCEDURE — 99232 PR SUBSEQUENT HOSPITAL CARE,LEVL II: ICD-10-PCS | Mod: ,,, | Performed by: INTERNAL MEDICINE

## 2022-06-16 PROCEDURE — 99900035 HC TECH TIME PER 15 MIN (STAT)

## 2022-06-16 PROCEDURE — 94761 N-INVAS EAR/PLS OXIMETRY MLT: CPT

## 2022-06-16 PROCEDURE — 83735 ASSAY OF MAGNESIUM: CPT | Performed by: HOSPITALIST

## 2022-06-16 PROCEDURE — 85025 COMPLETE CBC W/AUTO DIFF WBC: CPT | Performed by: HOSPITALIST

## 2022-06-16 PROCEDURE — 80069 RENAL FUNCTION PANEL: CPT | Performed by: HOSPITALIST

## 2022-06-16 PROCEDURE — 97535 SELF CARE MNGMENT TRAINING: CPT

## 2022-06-16 PROCEDURE — 97110 THERAPEUTIC EXERCISES: CPT

## 2022-06-16 RX ORDER — POTASSIUM CHLORIDE 20 MEQ/1
20 TABLET, EXTENDED RELEASE ORAL
Status: DISCONTINUED | OUTPATIENT
Start: 2022-06-16 | End: 2022-06-17 | Stop reason: HOSPADM

## 2022-06-16 RX ADMIN — ACETAMINOPHEN 1000 MG: 500 TABLET, FILM COATED ORAL at 10:06

## 2022-06-16 RX ADMIN — METOPROLOL TARTRATE 25 MG: 25 TABLET, FILM COATED ORAL at 08:06

## 2022-06-16 RX ADMIN — FUROSEMIDE 40 MG: 10 INJECTION, SOLUTION INTRAMUSCULAR; INTRAVENOUS at 05:06

## 2022-06-16 RX ADMIN — LEVOTHYROXINE SODIUM 75 MCG: 0.03 TABLET ORAL at 05:06

## 2022-06-16 RX ADMIN — POTASSIUM CHLORIDE 20 MEQ: 1500 TABLET, EXTENDED RELEASE ORAL at 04:06

## 2022-06-16 RX ADMIN — CHLORHEXIDINE GLUCONATE 15 ML: 1.2 RINSE ORAL at 09:06

## 2022-06-16 RX ADMIN — APIXABAN 2.5 MG: 2.5 TABLET, FILM COATED ORAL at 08:06

## 2022-06-16 RX ADMIN — MUPIROCIN: 20 OINTMENT TOPICAL at 09:06

## 2022-06-16 RX ADMIN — APIXABAN 2.5 MG: 2.5 TABLET, FILM COATED ORAL at 09:06

## 2022-06-16 RX ADMIN — FAMOTIDINE 20 MG: 20 TABLET ORAL at 09:06

## 2022-06-16 RX ADMIN — LEVOFLOXACIN 250 MG: 250 TABLET, FILM COATED ORAL at 05:06

## 2022-06-16 RX ADMIN — FUROSEMIDE 40 MG: 10 INJECTION, SOLUTION INTRAMUSCULAR; INTRAVENOUS at 03:06

## 2022-06-16 RX ADMIN — CHLORHEXIDINE GLUCONATE 15 ML: 1.2 RINSE ORAL at 08:06

## 2022-06-16 RX ADMIN — METOPROLOL TARTRATE 25 MG: 25 TABLET, FILM COATED ORAL at 09:06

## 2022-06-16 RX ADMIN — MUPIROCIN: 20 OINTMENT TOPICAL at 08:06

## 2022-06-16 RX ADMIN — TRAZODONE HYDROCHLORIDE 50 MG: 50 TABLET ORAL at 08:06

## 2022-06-16 RX ADMIN — ATORVASTATIN CALCIUM 20 MG: 20 TABLET, FILM COATED ORAL at 09:06

## 2022-06-16 RX ADMIN — FLUTICASONE PROPIONATE 50 MCG: 50 SPRAY, METERED NASAL at 09:06

## 2022-06-16 NOTE — PROGRESS NOTES
"[facility]  Adult Nutrition   Progress Note (Follow-Up)    SUMMARY     Recommendations/Interventions:    Recommendation/Intervention: 1. Continue current diet as tolerated, encourage intake. 2. Provided low sodium diet education and discussed the importance of compliance  Goals: 1. Patient to meet at least 75% of estimated needs through meal intake. 2. Patient to show understanding of diet education.  Nutrition Goal Status: new    Dietitian Rounds Brief:  · Follow-up. Appetite and intake are good-consuming 50% of meals. Provided low sodium diet education. Informed patient to show this to cooking staff where she resides. No complaints of SOB. Feels better. Will continue to monitor intake, labs, and plan of care.  Reason for Assessment  Reason For Assessment: RD follow-up  Diagnosis:  (Acute on chronic respiratory failure with hypoxia)  Relevant Medical History: HTN, Paroxysmal A-Fib, CHF, s/p carotid endarterectomy, HLD, CKD 3, normocytic anemia  Interdisciplinary Rounds: attended    Nutrition Risk Screen  Nutrition Risk Screen: no indicators present    Nutrition/Diet History  Spiritual, Cultural Beliefs, [employer] Practices, Values that Affect Care: no  Food Allergies:  (stawberry)  Factors Affecting Nutritional Intake: decreased appetite    Anthropometrics  Temp: 98.1 °F (36.7 °C)  Height Method: Stated  Height: 4' 8" (142.2 cm)  Height (inches): 56 in  Weight Method: Bed Scale  Weight: 46.3 kg (102 lb 1.2 oz)  Weight (lb): 102.07 lb  Ideal Body Weight (IBW), Female: 80 lb  % Ideal Body Weight, Female (lb): 132.83 %  BMI (Calculated): 22.9  BMI Grade: 18.5-24.9 - normal     Weight History:  Wt Readings from Last 10 Encounters:   06/16/22 46.3 kg (102 lb 1.2 oz)   06/14/22 48.1 kg (106 lb)   04/27/21 36.4 kg (80 lb 4 oz)   01/24/21 45.5 kg (100 lb 5 oz)   11/13/20 54.3 kg (119 lb 11.4 oz)   10/31/20 54.3 kg (119 lb 11.4 oz)   11/11/19 49 kg (108 lb 2.2 oz)   10/14/19 47.2 kg (104 lb)   09/17/19 47.5 " kg (104 lb 11.5 oz)   07/31/19 46.7 kg (103 lb)     Lab/Procedures/Meds: Pertinent Labs Reviewed  Clinical Chemistry:  Recent Labs   Lab 06/14/22  0704 06/15/22  0332 06/16/22  0633   * 129* 132*   K 3.6 4.3 3.9   CL 93* 90* 92*   CO2 27 30* 30*    86 86   BUN 16 17 17   CREATININE 0.9 1.0 1.0   CALCIUM 8.8 8.3* 8.7   PROT 6.1  --   --    ALBUMIN 3.7 3.4* 3.7   BILITOT 0.9  --   --    ALKPHOS 58  --   --    AST 19  --   --    ALT 24  --   --    ANIONGAP 7* 9 10   ESTGFRAFRICA >60.0 58.9* 58.9*   EGFRNONAA 58.1* 51.1* 51.1*   MG 1.7 2.3 2.2   PHOS 2.9 3.3 3.7    < > = values in this interval not displayed.     CBC:   Recent Labs   Lab 06/16/22  0633   WBC 7.86   RBC 3.88*   HGB 11.7*   HCT 35.5*      MCV 92   MCH 30.2   MCHC 33.0     Lipid Panel:  Recent Labs   Lab 06/14/22  0704   CHOL 103*   HDL 51   LDLCALC 42.2*   TRIG 49   CHOLHDL 49.5     Cardiac Profile:  Recent Labs   Lab 06/13/22  2105 06/13/22  2319 06/14/22  0703 06/14/22  1339 06/15/22  0332   *  --   --   --  666*   TROPONINI  --  <0.030 <0.030 <0.030  --      Diabetes:  Recent Labs   Lab 06/14/22  0703   HGBA1C 5.8     Thyroid & Parathyroid:  Recent Labs   Lab 06/14/22  0703   TSH 7.740*   FREET4 0.79       Medications: Pertinent Medications reviewed  Scheduled Meds:   apixaban  2.5 mg Oral BID    atorvastatin  20 mg Oral Daily    chlorhexidine  15 mL Mouth/Throat BID    famotidine  20 mg Oral Daily    fluticasone propionate  1 spray Each Nostril Daily    furosemide (LASIX) injection  40 mg Intravenous BID AC    levoFLOXacin  250 mg Oral Daily    levothyroxine  75 mcg Oral Before breakfast    metoprolol tartrate  25 mg Oral BID    mupirocin   Nasal BID    traZODone  50 mg Oral QHS     Continuous Infusions:  PRN Meds:.acetaminophen, albuterol-ipratropium, loperamide, magnesium sulfate IVPB, magnesium sulfate IVPB, melatonin, ondansetron, potassium bicarbonate, potassium bicarbonate, potassium bicarbonate,  senna-docusate 8.6-50 mg, sodium chloride 0.9%    Antibiotics (From admission, onward)            Start     Stop Route Frequency Ordered    06/15/22 0600  levoFLOXacin tablet 250 mg         -- Oral Daily 06/14/22 0838    06/14/22 0915  mupirocin 2 % ointment  (MRSA Decolonization Orders [facility])         06/19 0859 Nasl 2 times daily 06/14/22 0809        Estimated/Assessed Needs  Weight Used For Calorie Calculations: 48.2 kg (106 lb 4.2 oz)  Energy Calorie Requirements (kcal): 2590-8520 kcals/day (25-30 kcals/kg)  Energy Need Method: Kcal/kg  Protein Requirements: 58-72 g/day (1.2-1.5 g/kg)  Weight Used For Protein Calculations: 48.2 kg (106 lb 4.2 oz)  Fluid Requirements (mL): 1200 mL Fluid Restriction per MD     RDA Method (mL): 1205     Nutrition Prescription Ordered    Current Diet Order: Cardiac Diet-1200 mL Fluid Restriction    Evaluation of Received Nutrient/Fluid Intake    Energy Calories Required: not meeting needs  Protein Required: not meeting needs  Fluid Required: meeting needs  Tolerance: tolerating  % Intake of Estimated Energy Needs: 50 - 75 %  % Meal Intake: 50 - 75 %    Intake/Output Summary (Last 24 hours) at 6/16/2022 0852  Last data filed at 6/16/2022 0530  Gross per 24 hour   Intake 1060 ml   Output 4400 ml   Net -3340 ml      Nutrition Risk    Level of Risk/Frequency of Follow-up: high   Monitor and Evaluation    Food and Nutrient Intake: energy intake, food and beverage intake  Food and Nutrient Adminstration: diet order  Knowledge/Beliefs/Attitudes: food and nutrition knowledge/skill, beliefs and attitudes  Physical Activity and Function: nutrition-related ADLs and IADLs, factors affecting access to physical activity  Anthropometric Measurements: weight, weight change, body mass index  Biochemical Data, Medical Tests and Procedures: electrolyte and renal panel, gastrointestinal profile, glucose/endocrine profile, inflammatory profile, lipid profile  Nutrition-Focused Physical Findings: overall  appearance     Nutrition Follow-Up    RD Follow-up?: Yes  Sharona Ramesh RD 06/16/2022 10:19 AM

## 2022-06-16 NOTE — PROGRESS NOTES
Pulmonary/Critical Care Progress Note      PATIENT NAME: Jeannie Hutchins  MRN: [id number]  TODAY'S DATE: 2022  8:18 AM  ADMIT DATE: 2022  AGE: 87 y.o. : 1935    CONSULT REQUESTED BY: Thaddeus Klein MD    REASON FOR CONSULT:   Acute on chronic hypoxemic respiratory failure    HPI:  The patient is here with acute on chronic hypoxic respiratory failure secondary to pulmonary edema.  The patient has known diastolic dysfunction and moderate pulmonary hypertension.    6/15 the patient is feeling much better.  She is diuresing well.  Her oxygen requirements have increased to 10 L today which does not make much sense as she is looking so much better.  Apparently she was only getting Lasix once a week at [facility] instead of daily.     the patient is continuing to improve.  Her sats are currently 80% but she has her oxygen off.    REVIEW OF SYSTEMS  GENERAL: Feeling  better.  EYES: Vision is good.  ENT: No sinusitis or pharyngitis.   HEART: No chest pain or palpitations.  LUNGS:  Shortness of breath is better  GI: No Nausea, vomiting, constipation, diarrhea, or reflux.  : No dysuria, hesitancy, or nocturia.  SKIN: No lesions or rashes.  MUSCULOSKELETAL: No joint pain or myalgias.  NEURO: No headaches or neuropathy.  LYMPH:  Edema has resolved  PSYCH: No anxiety or depression.  ENDO: No weight change.    No change in the patient's Past Medical History, Past Surgical History, Social History or Family History since admission.      VITAL SIGNS (MOST RECENT)  Temp: 98.4 °F (36.9 °C) (22 1501)  Pulse: 97 (22 1715)  Resp: (!) 32 (22 1715)  BP: 112/64 (22 1700)  SpO2: 96 % (22 1715)    INTAKE AND OUTPUT (LAST 24 HOURS):    Intake/Output Summary (Last 24 hours) at 2022 1804  Last data filed at 2022 1400  Gross per 24 hour   Intake 700 ml   Output 2600 ml   Net -1900 ml       WEIGHT  Wt Readings from Last 1 Encounters:   22 46.3 kg (102 lb 1.2 oz)       PHYSICAL  EXAM  GENERAL: Older patient in no distress, sitting in the chair.  HEENT: Pupils equal and reactive. Extraocular movements intact. Nose intact. Pharynx moist.  NECK: Supple.   HEART:  Irregularly irregular rate and rhythm. No murmur or gallop auscultated.  LUNGS:  The patient has bibasilar crackles.  Lung excursion symmetrical. No change in fremitus.   ABDOMEN: Bowel sounds present. Non-tender, no masses palpated.  : Normal anatomy.  Ta with very dilute urine.  EXTREMITIES: Normal muscle tone and joint movement, no cyanosis or clubbing.   LYMPHATICS: No adenopathy palpated, no edema  SKIN: Dry, intact, no lesions.   NEURO: Cranial nerves II-XII intact. Motor strength 5/5 bilaterally, upper and lower extremities.  PSYCH: Appropriate affect    CBC LAST (LAST 24 HOURS)  Recent Labs   Lab 06/16/22  0633   WBC 7.86   RBC 3.88*   HGB 11.7*   HCT 35.5*   MCV 92   MCH 30.2   MCHC 33.0   RDW 13.7      MPV 8.8*   GRAN 72.4  5.7   LYMPH 10.4*  0.8*   MONO 13.0  1.0   BASO 0.03   NRBC 0       CHEMISTRY LAST (LAST 24 HOURS)  Recent Labs   Lab 06/16/22  0633   *   K 3.9   CL 92*   CO2 30*   ANIONGAP 10   BUN 17   CREATININE 1.0   GLU 86   CALCIUM 8.7   MG 2.2   ALBUMIN 3.7         CARDIAC PROFILE (LAST 24 HOURS)  Recent Labs   Lab 06/14/22  1339 06/15/22  0332   BNP  --  666*   TROPONINI <0.030  --        LAST 7 DAYS MICROBIOLOGY   Microbiology Results (last 7 days)     Procedure Component Value Units Date/Time    Blood culture #1 **CANNOT BE ORDERED STAT** [[id number]] Collected: 06/13/22 2105    Order Status: Completed Specimen: Blood from Peripheral, Upper Arm, Left Updated: 06/15/22 2232     Blood Culture, Routine No Growth to date      No Growth to date      No Growth to date    Blood culture #2 **CANNOT BE ORDERED STAT** [[id number]] Collected: 06/13/22 2150    Order Status: Completed Specimen: Blood from Peripheral, Antecubital, Left Updated: 06/15/22 2232     Blood Culture, Routine No Growth to date       No Growth to date      No Growth to date          MOST RECENT IMAGING  X-Ray Chest 1 View  CLINICAL HISTORY:  87 years (1935) Female pulm edema Acute on chronic respiratory failure with hypoxia    TECHNIQUE:  Portable AP radiograph the chest.    COMPARISON:  Most recent radiograph from [facility], 2022    FINDINGS:  There is vascular congestion with increased interstitial markings findings indicating mild pulmonary edema.  There is blunting of the right costophrenic angle consistent with small pleural effusion. No pneumothorax is identified. The cardiac silhouette is moderately enlarged. Atheromatous calcifications are seen at the aortic arch. Osseous structures show degenerative disc disease and degenerative changes in the shoulders. The visualized upper abdomen is unchanged.    IMPRESSION:  Unchanged, mild interstitial pulmonary edema with a small right pleural effusion.    .    Electronically signed by:  Jerry Ngo MD  6/16/2022 5:47 AM CDT Workstation: [id number]      CURRENT VISIT EKG  Results for orders placed or performed during the hospital encounter of 06/13/22   EKG 12-lead    Narrative    Test Reason : R06.03,    Vent. Rate : 091 BPM     Atrial Rate : 000 BPM     P-R Int : 000 ms          QRS Dur : 088 ms      QT Int : 358 ms       P-R-T Axes : 000 044 084 degrees     QTc Int : 440 ms    Atrial fibrillation  Nonspecific T wave abnormality  Abnormal ECG  When compared with ECG of 13-JUN-2022 20:58,  No significant change was found    Referred By: [email or web address]   SELF           Confirmed By:        ECHOCARDIOGRAM RESULTS  Results for orders placed during the hospital encounter of 01/16/21    Echo Color Flow Doppler? Yes    Interpretation Summary  · The left ventricle is normal in size with concentric hypertrophy and normal systolic function. The estimated ejection fraction is 60%  · Grade II left ventricular diastolic dysfunction.  · Severe left atrial enlargement.  · Normal right ventricular size  with normal right ventricular systolic function.  · Severe right atrial enlargement.  · Moderate tricuspid regurgitation.  · There is moderate pulmonary hypertension.  · Intermediate central venous pressure (8 mmHg).  · The estimated PA systolic pressure is 51 mmHg.    Oxygen 3 L    LAST ARTERIAL BLOOD GAS  ABG  Recent Labs   Lab 06/14/22  0455   PH 7.452*   PO2 73*   PCO2 38.4   HCO3 26.9   BE 3       IMPRESSION AND PLAN  Acute on chronic hypoxemic respiratory failure, improving  Pulmonary edema with pleural effusions, improving  Diastolic heart failure  Pulmonary hypertension  Right lower lobe infiltrate and leukocytosis raising the question of pneumonia versus all pulmonary edema  Anemia, chronic disease  Hyponatremia, improving  Advanced age    Patient is much improved.  She is back to her baseline oxygen requirements.  No objection to discharge in the morning.  Please call if I can be of assistance.  The patient would like put in her discharge orders that she needs to remain on her oxygen while she is showering and will need help from the nursing assistants.        Kristi Dalton MD  [facility]  Department of Pulmonology  Date of Service: 06/16/2022  8:18 AM

## 2022-06-16 NOTE — PT/OT/SLP PROGRESS
Physical Therapy Treatment    Patient Name:  Jeannie Hutchins   MRN:  [id number]    Recommendations:     Discharge Recommendations:  nursing facility, skilled   Discharge Equipment Recommendations: none   Barriers to discharge: None    Assessment:     Jeannie Hutchins is a 87 y.o. female admitted with a medical diagnosis of Acute on chronic respiratory failure with hypoxia.  She presents with the following impairments/functional limitations:  weakness, impaired endurance, impaired self care skills, impaired functional mobilty, gait instability, impaired balance, impaired cardiopulmonary response to activity.    Pt presents supine in bed but agreeable to PT. Pt performed bed mobility with min A today and side steps EOB with RW and CGA. Pt remained on 4 L O2 during session with sats remaining stable. Pt returned to supine with all needs and call light in reach.    Rehab Prognosis: Fair; patient would benefit from acute skilled PT services to address these deficits and reach maximum level of function.    Recent Surgery: * No surgery found *      Plan:     During this hospitalization, patient to be seen 5 x/week to address the identified rehab impairments via gait training, therapeutic activities, therapeutic exercises and progress toward the following goals:    · Plan of Care Expires:  07/14/22    Subjective     Chief Complaint: wants to get up  Patient/Family Comments/goals: get back to NH  Pain/Comfort:  · Pain Rating 1: 0/10      Objective:     Communicated with RN prior to session.  Patient found supine with telemetry, oxygen, pulse ox (continuous), peripheral IV, blood pressure cuff, david catheter upon PT entry to room.     General Precautions: Standard, fall, respiratory   Orthopedic Precautions:N/A   Braces: N/A  Respiratory Status: Nasal cannula, flow 4 L/min     Functional Mobility:  · Bed Mobility:     · Supine to Sit: minimum assistance  · Sit to Supine: minimum assistance  · Transfers:     · Sit to Stand:  minimum  assistance with rolling walker  · Gait: 4 sides steps EOB x 2 laps R/L with RW and CGA.  · Balance: SBA seated, CGA standing    Therapeutic Activities and Exercises:   Education, poc, dc planning, fall prevention.    Pt performed seated therex x 10 each: LAQ, ankle pumps, hip abduction/adduction.    Patient left supine with all lines intact, call button in reach, bed alarm on and nurse notified..    GOALS:   Multidisciplinary Problems     Physical Therapy Goals        Problem: Physical Therapy    Goal Priority Disciplines Outcome Goal Variances Interventions   Physical Therapy Goal     PT, PT/OT Ongoing, Progressing     Description: Goals to be met by: 2022     Patient will increase functional independence with mobility by performin. Supine to sit with Supervision  2. Sit to stand transfer with Supervision  3. Bed to chair transfer with Supervision using Rolling Walker  4. Gait  x 150 feet with Supervision using Rolling Walker.                          Time Tracking:     PT Received On: 22  PT Start Time: 1338     PT Stop Time: 1402  PT Total Time (min): 24 min     Billable Minutes: Gait Training 10 and Therapeutic Exercise 14    Treatment Type: Treatment  PT/PTA: PT     PTA Visit Number: 0     2022

## 2022-06-16 NOTE — PLAN OF CARE
Problem: Physical Therapy  Goal: Physical Therapy Goal  Description: Goals to be met by: 2022     Patient will increase functional independence with mobility by performin. Supine to sit with Supervision  2. Sit to stand transfer with Supervision  3. Bed to chair transfer with Supervision using Rolling Walker  4. Gait  x 150 feet with Supervision using Rolling Walker.         Outcome: Ongoing, Progressing

## 2022-06-16 NOTE — PT/OT/SLP PROGRESS
Occupational Therapy   Treatment    Name: Jeannie Hutchins  MRN: [id number]  Admitting Diagnosis:  Acute on chronic respiratory failure with hypoxia       Recommendations:     Discharge Recommendations: nursing facility, skilled  Discharge Equipment Recommendations:  crutches, forearm  Barriers to discharge:  None    Assessment:     Jeannie Hutchins is a 87 y.o. female with a medical diagnosis of Acute on chronic respiratory failure with hypoxia.   Performance deficits affecting function are weakness, impaired endurance, impaired self care skills, gait instability, impaired functional mobilty, impaired balance, impaired cognition, decreased safety awareness, impaired cardiopulmonary response to activity.     Pt presented on 4L02 with Sp02 94-96%; she walked a short distance to the sink where she stood for grooming/hygiene tasks; unable to get accurate Sp02 reading with activity; pt was subjectively not SOB with standing grooming tasks.      Rehab Prognosis:  Good; patient would benefit from acute skilled OT services to address these deficits and reach maximum level of function.       Plan:     Patient to be seen 5 x/week to address the above listed problems via self-care/home management, therapeutic activities, therapeutic exercises  · Plan of Care Expires: 07/14/22  · Plan of Care Reviewed with: patient    Subjective     Pain/Comfort:  · Pain Rating 1: 0/10  · Pain Rating Post-Intervention 1: 0/10    Objective:     Communicated with: nurse prior to session.  Patient found up in chair with telemetry, oxygen, pulse ox (continuous), peripheral IV, blood pressure cuff upon OT entry to room.    General Precautions: Standard, fall, respiratory   Orthopedic Precautions:N/A   Respiratory Status: Nasal cannula, flow 5 L/min     Occupational Performance:     Functional Mobility/Transfers:  · Patient completed Sit <> Stand Transfer with contact guard assistance  with  rolling walker   · Functional Mobility: ~8ft x 2 to/from sink CGA  with RW    Activities of Daily Living:  · Grooming: contact guard assistance standing at sink for oral care/face washing; pt took a seated rest break after completing grooming prior to walking back to the bedside chair; unable to get accurate Sp02 reading    Patient left up in chair with all lines intact, call button in reach and nurse notifiedEducation:      GOALS:   Multidisciplinary Problems     Occupational Therapy Goals        Problem: Occupational Therapy    Goal Priority Disciplines Outcome Interventions   Occupational Therapy Goal     OT, PT/OT Ongoing, Progressing    Description: Goals to be met by: discharge     Patient will increase functional independence with ADLs by performing:    UE Dressing with Stand-by Assistance.  LE Dressing with Stand-by Assistance.  Grooming while standing at sink with Stand-by Assistance.  Toileting from toilet with Stand-by Assistance for hygiene and clothing management.   Toilet transfer to toilet with Stand-by Assistance.                     Time Tracking:     OT Date of Treatment: 06/16/22  OT Start Time: 1022  OT Stop Time: 1055  OT Total Time (min): 33 min    Billable Minutes:Self Care/Home Management 33    OT/VICKI: OT          6/16/2022

## 2022-06-16 NOTE — PLAN OF CARE
Problem: Occupational Therapy  Goal: Occupational Therapy Goal  Description: Goals to be met by: discharge     Patient will increase functional independence with ADLs by performing:    UE Dressing with Stand-by Assistance.  LE Dressing with Stand-by Assistance.  Grooming while standing at sink with Stand-by Assistance.  Toileting from toilet with Stand-by Assistance for hygiene and clothing management.   Toilet transfer to toilet with Stand-by Assistance.    Outcome: Ongoing, Progressing

## 2022-06-16 NOTE — PLAN OF CARE
Plan of care and education reviewed with patient and her son Dane. Both patient and her son verbalized understanding. Pt's oxygenation status much improved today and is awaiting bed in cardiology. Bed locked in low position and bed alarm activated.   Problem: Adult Inpatient Plan of Care  Goal: Plan of Care Review  Outcome: Ongoing, Progressing  Goal: Patient-Specific Goal (Individualized)  Outcome: Ongoing, Progressing  Goal: Absence of Hospital-Acquired Illness or Injury  Outcome: Ongoing, Progressing  Goal: Optimal Comfort and Wellbeing  Outcome: Ongoing, Progressing  Goal: Readiness for Transition of Care  Outcome: Ongoing, Progressing     Problem: Infection  Goal: Absence of Infection Signs and Symptoms  Outcome: Ongoing, Progressing     Problem: Fluid Imbalance (Pneumonia)  Goal: Fluid Balance  Outcome: Ongoing, Progressing     Problem: Infection (Pneumonia)  Goal: Resolution of Infection Signs and Symptoms  Outcome: Ongoing, Progressing     Problem: Respiratory Compromise (Pneumonia)  Goal: Effective Oxygenation and Ventilation  Outcome: Ongoing, Progressing     Problem: Fall Injury Risk  Goal: Absence of Fall and Fall-Related Injury  Outcome: Ongoing, Progressing

## 2022-06-16 NOTE — PROGRESS NOTES
[facility] Medicine  Progress Note    Patient name: Jeannie Hutchins  MRN: [id number]  Admit Date: 6/13/2022   LOS: 2 days     SUBJECTIVE:     Principal problem: Acute on chronic respiratory failure with hypoxia    Interval History:  Patient was seen and examined bedside, remarkable improvement in last 24 hours, currently sitting in the chair breathing comfortably on 2-3 L oxygen via nasal cannula.  Excellent diuresis. Denies any new symptoms, subjectively feels better.  Afebrile.     Scheduled Meds:   apixaban  2.5 mg Oral BID    atorvastatin  20 mg Oral Daily    chlorhexidine  15 mL Mouth/Throat BID    famotidine  20 mg Oral Daily    fluticasone propionate  1 spray Each Nostril Daily    furosemide (LASIX) injection  40 mg Intravenous BID AC    levoFLOXacin  250 mg Oral Daily    levothyroxine  75 mcg Oral Before breakfast    metoprolol tartrate  25 mg Oral BID    mupirocin   Nasal BID    traZODone  50 mg Oral QHS     Continuous Infusions:  PRN Meds:acetaminophen, albuterol-ipratropium, loperamide, magnesium sulfate IVPB, magnesium sulfate IVPB, melatonin, ondansetron, potassium bicarbonate, potassium bicarbonate, potassium bicarbonate, senna-docusate 8.6-50 mg, sodium chloride 0.9%    Review of patient's allergies indicates:   Allergen Reactions    Hydrochlorothiazide Other (See Comments)     hyponatremia    Strawberry Swelling     Tongue swells up and a generalized rash.    Lisinopril Other (See Comments)     Cough       Review of Systems: As per interval history    OBJECTIVE:     Vital Signs (Most Recent)  Temp: 97.7 °F (36.5 °C) (06/16/22 0701)  Pulse: 91 (06/16/22 1000)  Resp: (!) 37 (06/16/22 1000)  BP: (!) 127/54 (06/16/22 1000)  SpO2: (!) 84 % (06/16/22 1000)    Vital Signs Range (Last 24H):  Temp:  [97.7 °F (36.5 °C)-98.1 °F (36.7 °C)]   Pulse:  []   Resp:  [10-55]   BP: ()/(51-83)   SpO2:  [78 %-100 %]     I & O (Last 24H):    Intake/Output Summary (Last 24  hours) at 6/16/2022 1109  Last data filed at 6/16/2022 1000  Gross per 24 hour   Intake 1060 ml   Output 5350 ml   Net -4290 ml       Physical Exam:  General: Patient resting comfortably in no acute distress. Appears as stated age. Calm  Eyes: No conjunctival injection. No scleral icterus.  ENT: Hearing grossly intact. No discharge from ears. No nasal discharge.   Neck: Supple, trachea midline. No JVD  CVS: RRR. No LE edema BL  Lungs:  Tachypnea noted, On high-flow nasal cannula, bilateral basal crackles noted   Abdomen:  Soft, nontender and nondistended.  No organomegaly  Neuro: AOx3. Moves all extremities. Follows commands. Responds appropriately       Laboratory:  I have reviewed all pertinent lab results within the past 24 hours.    Diagnostic Results:  Reviewed all imaging    ASSESSMENT/PLAN:     87-year-old lady with prior history of AFib on Eliquis, diastolic CHF, recent pneumonia came from [facility] due to respiratory failure secondary to decompensated diastolic CHF most likely due to recent deescalation of diuretic regimen at NH.    Active Hospital Problems    Diagnosis  POA    *Acute on chronic respiratory failure with hypoxia [J96.21]  Yes    Acute on chronic diastolic congestive heart failure [I50.33]  Yes    Multifocal pneumonia [J18.9]  Yes    Hypothyroid [E03.9]  Yes    Paroxysmal atrial fibrillation [I48.0]  Yes    Essential hypertension [I10]  Yes      Resolved Hospital Problems   No resolved problems to display.         Plan:   Remarkable improvement in last 24 hours, diuresing very well  Continue aggressive IV diuresis, daily weight, aggressive electrolyte replacement  Clinically improving  Dr. Dalton deescalate antibiotics to p.o. Levaquin  Continue Eliquis  Wean oxygen as tolerated  Help from Cardiology and Pulmonary appreciated  Transfer to cardiology floor  Further management as per clinical course  Likely discharge back to nursing home in next 24-48hrs    VTE Risk  Mitigation (From admission, onward)         Ordered     apixaban tablet 2.5 mg  2 times daily         06/14/22 0246     IP VTE HIGH RISK PATIENT  Once         06/14/22 0246     Place sequential compression device  Until discontinued         06/14/22 0246                    Department Hospital Medicine  Formerly [facility], [facility]  Thaddeus Klein MD  Date of service: 06/16/2022

## 2022-06-16 NOTE — CARE UPDATE
Patient has prn tx available and bipap   06/16/22 0740   Patient Assessment/Suction   Level of Consciousness (AVPU) alert   Respiratory Effort Normal;Unlabored   Expansion/Accessory Muscles/Retractions no use of accessory muscles;expansion symmetric   All Lung Fields Breath Sounds diminished   Rhythm/Pattern, Respiratory unlabored;depth regular   PRE-TX-O2   O2 Device (Oxygen Therapy) Oxymask   $ Is the patient on Low Flow Oxygen? Yes   Flow (L/min) 4   Pulse Oximetry Type Continuous   $ Pulse Oximetry - Multiple Charge Pulse Oximetry - Multiple   Aerosol Therapy   $ Aerosol Therapy Charges PRN treatment not required   Skin Integrity   $ Wound Care Tech Time 15 min   Area Observed Bridge of nose   Skin Appearance without discoloration   Barrier used?   (curaplex to be used if placed on bipap)   Preset CPAP/BiPAP Settings   Mode Of Delivery BiPAP S/T;Standby   $ CPAP/BiPAP Daily Charge BiPAP/CPAP Daily   Education   $ Education DME Oxygen;15 min   Respiratory Evaluation   $ Care Plan Tech Time 15 min   $ Eval/Re-eval Charges Re-evaluation

## 2022-06-17 VITALS
OXYGEN SATURATION: 91 % | HEART RATE: 88 BPM | SYSTOLIC BLOOD PRESSURE: 121 MMHG | DIASTOLIC BLOOD PRESSURE: 74 MMHG | WEIGHT: 105.38 LBS | BODY MASS INDEX: 23.71 KG/M2 | HEIGHT: 56 IN | RESPIRATION RATE: 31 BRPM | TEMPERATURE: 98 F

## 2022-06-17 PROBLEM — J96.21 ACUTE ON CHRONIC RESPIRATORY FAILURE WITH HYPOXIA: Status: RESOLVED | Noted: 2021-01-16 | Resolved: 2022-06-17

## 2022-06-17 PROBLEM — I50.33 ACUTE ON CHRONIC DIASTOLIC CONGESTIVE HEART FAILURE: Status: RESOLVED | Noted: 2021-01-17 | Resolved: 2022-06-17

## 2022-06-17 PROBLEM — J18.9 MULTIFOCAL PNEUMONIA: Status: RESOLVED | Noted: 2021-01-17 | Resolved: 2022-06-17

## 2022-06-17 LAB
ALBUMIN SERPL BCP-MCNC: 3.6 G/DL (ref 3.5–5.2)
ANION GAP SERPL CALC-SCNC: 9 MMOL/L (ref 8–16)
BASOPHILS # BLD AUTO: 0.04 K/UL (ref 0–0.2)
BASOPHILS NFR BLD: 0.4 % (ref 0–1.9)
BUN SERPL-MCNC: 24 MG/DL (ref 8–23)
CALCIUM SERPL-MCNC: 8.7 MG/DL (ref 8.7–10.5)
CHLORIDE SERPL-SCNC: 92 MMOL/L (ref 95–110)
CO2 SERPL-SCNC: 31 MMOL/L (ref 23–29)
CREAT SERPL-MCNC: 1.1 MG/DL (ref 0.5–1.4)
DIFFERENTIAL METHOD: ABNORMAL
EOSINOPHIL # BLD AUTO: 0.3 K/UL (ref 0–0.5)
EOSINOPHIL NFR BLD: 3.7 % (ref 0–8)
ERYTHROCYTE [DISTWIDTH] IN BLOOD BY AUTOMATED COUNT: 13.6 % (ref 11.5–14.5)
EST. GFR  (AFRICAN AMERICAN): 52.5 ML/MIN/1.73 M^2
EST. GFR  (NON AFRICAN AMERICAN): 45.6 ML/MIN/1.73 M^2
GLUCOSE SERPL-MCNC: 92 MG/DL (ref 70–110)
HCT VFR BLD AUTO: 35.2 % (ref 37–48.5)
HGB BLD-MCNC: 11.7 G/DL (ref 12–16)
IMM GRANULOCYTES # BLD AUTO: 0.03 K/UL (ref 0–0.04)
IMM GRANULOCYTES NFR BLD AUTO: 0.3 % (ref 0–0.5)
LYMPHOCYTES # BLD AUTO: 1 K/UL (ref 1–4.8)
LYMPHOCYTES NFR BLD: 10.8 % (ref 18–48)
MAGNESIUM SERPL-MCNC: 2.2 MG/DL (ref 1.6–2.6)
MCH RBC QN AUTO: 30.4 PG (ref 27–31)
MCHC RBC AUTO-ENTMCNC: 33.2 G/DL (ref 32–36)
MCV RBC AUTO: 91 FL (ref 82–98)
MONOCYTES # BLD AUTO: 1.1 K/UL (ref 0.3–1)
MONOCYTES NFR BLD: 12.5 % (ref 4–15)
NEUTROPHILS # BLD AUTO: 6.4 K/UL (ref 1.8–7.7)
NEUTROPHILS NFR BLD: 72.3 % (ref 38–73)
NRBC BLD-RTO: 0 /100 WBC
PHOSPHATE SERPL-MCNC: 3.7 MG/DL (ref 2.7–4.5)
PLATELET # BLD AUTO: 452 K/UL (ref 150–450)
PMV BLD AUTO: 8.6 FL (ref 9.2–12.9)
POTASSIUM SERPL-SCNC: 4 MMOL/L (ref 3.5–5.1)
RBC # BLD AUTO: 3.85 M/UL (ref 4–5.4)
SODIUM SERPL-SCNC: 132 MMOL/L (ref 136–145)
WBC # BLD AUTO: 8.91 K/UL (ref 3.9–12.7)

## 2022-06-17 PROCEDURE — 25000003 PHARM REV CODE 250

## 2022-06-17 PROCEDURE — 85025 COMPLETE CBC W/AUTO DIFF WBC: CPT | Performed by: HOSPITALIST

## 2022-06-17 PROCEDURE — 25000003 PHARM REV CODE 250: Performed by: INTERNAL MEDICINE

## 2022-06-17 PROCEDURE — 36415 COLL VENOUS BLD VENIPUNCTURE: CPT | Performed by: HOSPITALIST

## 2022-06-17 PROCEDURE — 99900035 HC TECH TIME PER 15 MIN (STAT)

## 2022-06-17 PROCEDURE — 25000003 PHARM REV CODE 250: Performed by: NURSE PRACTITIONER

## 2022-06-17 PROCEDURE — 25000003 PHARM REV CODE 250: Performed by: HOSPITALIST

## 2022-06-17 PROCEDURE — 94761 N-INVAS EAR/PLS OXIMETRY MLT: CPT

## 2022-06-17 PROCEDURE — 83735 ASSAY OF MAGNESIUM: CPT | Performed by: HOSPITALIST

## 2022-06-17 PROCEDURE — 80069 RENAL FUNCTION PANEL: CPT | Performed by: HOSPITALIST

## 2022-06-17 PROCEDURE — 97116 GAIT TRAINING THERAPY: CPT

## 2022-06-17 PROCEDURE — 63600175 PHARM REV CODE 636 W HCPCS: Performed by: HOSPITALIST

## 2022-06-17 PROCEDURE — 27000221 HC OXYGEN, UP TO 24 HOURS

## 2022-06-17 RX ORDER — LEVOFLOXACIN 250 MG/1
250 TABLET ORAL DAILY
Qty: 4 TABLET | Refills: 0 | Status: SHIPPED | OUTPATIENT
Start: 2022-06-18 | End: 2022-06-22

## 2022-06-17 RX ORDER — FUROSEMIDE 40 MG/1
40 TABLET ORAL DAILY
Qty: 30 TABLET | Refills: 0 | Status: ON HOLD | OUTPATIENT
Start: 2022-06-17 | End: 2023-04-27 | Stop reason: HOSPADM

## 2022-06-17 RX ADMIN — APIXABAN 2.5 MG: 2.5 TABLET, FILM COATED ORAL at 10:06

## 2022-06-17 RX ADMIN — FUROSEMIDE 40 MG: 10 INJECTION, SOLUTION INTRAMUSCULAR; INTRAVENOUS at 06:06

## 2022-06-17 RX ADMIN — ACETAMINOPHEN 1000 MG: 500 TABLET, FILM COATED ORAL at 02:06

## 2022-06-17 RX ADMIN — MUPIROCIN: 20 OINTMENT TOPICAL at 10:06

## 2022-06-17 RX ADMIN — FLUTICASONE PROPIONATE 50 MCG: 50 SPRAY, METERED NASAL at 10:06

## 2022-06-17 RX ADMIN — LEVOFLOXACIN 250 MG: 250 TABLET, FILM COATED ORAL at 05:06

## 2022-06-17 RX ADMIN — FAMOTIDINE 20 MG: 20 TABLET ORAL at 10:06

## 2022-06-17 RX ADMIN — METOPROLOL TARTRATE 25 MG: 25 TABLET, FILM COATED ORAL at 10:06

## 2022-06-17 RX ADMIN — ATORVASTATIN CALCIUM 20 MG: 20 TABLET, FILM COATED ORAL at 10:06

## 2022-06-17 RX ADMIN — CHLORHEXIDINE GLUCONATE 15 ML: 1.2 RINSE ORAL at 10:06

## 2022-06-17 RX ADMIN — LEVOTHYROXINE SODIUM 75 MCG: 0.03 TABLET ORAL at 05:06

## 2022-06-17 RX ADMIN — POTASSIUM CHLORIDE 20 MEQ: 1500 TABLET, EXTENDED RELEASE ORAL at 06:06

## 2022-06-17 NOTE — PLAN OF CARE
Patient cleared for discharge from case management standpoint.       06/17/22 1430   Final Note   Assessment Type Final Discharge Note   Anticipated Discharge Disposition detention [facility] Resources/Appts/Education Provided Post-Acute resouces added to AVS;Provided education on problems/symptoms using teachback;Appointments scheduled and added to AVS;Provided patient/caregiver with written discharge plan information

## 2022-06-17 NOTE — HOSPITAL COURSE
87-year-old lady with prior history of afib on eliquis, HFpEF, HLD, HTN, COPD, recent pneumonia who was brought in via EMS from [place] for acute hypoxic respiratory distress found to have decompensated diastolic CHF and related hypervolemia.  She diuresed extremely well and improved remarkably in last 48 hours.  She is requiring 2-3 L oxygen via nasal cannula which is her baseline.  It appears that her diuretic regimen was de-escalated nursing home that might have precipitated hypervolemia.  She was discharged back to nursing home in hemodynamically stable condition with instructions to take Lasix 40 mg daily.  She was also advised to wear her oxygen around the clock.     Instructions provided to follow up with primary care physician as outpatient. Patient verbalized understanding and is aware to contact primary care physician or return to ED if new or worsening symptoms.    Physical exam on the day of discharge:  General: Patient resting comfortably in no acute distress.  Lungs:  On supplemental oxygen, CTA. Good air entry.  CVS: Regular rate and rhythm. No murmurs. No pedal edema.  Abd: Soft. Nontender. Non-distended.  Neuro: A&O x3. Moving all 4 extremities equally    Vital signs reviewed.  Nursing notes reviewed.

## 2022-06-17 NOTE — CARE UPDATE
06/16/22 2011   Patient Assessment/Suction   Level of Consciousness (AVPU)   (resting quietly with nadn)   Respiratory Effort Normal;Unlabored   Expansion/Accessory Muscles/Retractions no use of accessory muscles   PRE-TX-O2   O2 Device (Oxygen Therapy) room air   Flow (L/min) 3.5   SpO2 (!) 91 %   Pulse Oximetry Type Continuous   $ Pulse Oximetry - Multiple Charge Pulse Oximetry - Multiple   Pulse 95   Resp (!) 24   Aerosol Therapy   $ Aerosol Therapy Charges PRN treatment not required   Daily Review of Necessity (SVN) completed   Respiratory Treatment Status (SVN) PRN treatment not required   Preset CPAP/BiPAP Settings   Mode Of Delivery BiPAP S/T;Standby   Continue tx. As ordered

## 2022-06-17 NOTE — PLAN OF CARE
Safety precautions remain in effect. Patient able to correctly demonstrate use of call light.  Vital signs stable, no fever.  Urinary output, patient com[pliant with fluid restriction.  Pain controlled with Tylenol;.  O2 in use at 3.5 liters per nasal cannula, O2 sats >95%.

## 2022-06-17 NOTE — PT/OT/SLP PROGRESS
Occupational Therapy      Patient Name:  Jeannie Hutchins   MRN:  [id number]    Patient not seen today secondary to Patient unwilling to participate (states she is leaving today).      6/17/2022

## 2022-06-17 NOTE — PLAN OF CARE
Per Anastasia with [facility], patient accepted.  Patient's nurse can call report [phone].  Patient going to [facility].  [facility] will provide transportation for patient.       06/17/22 1429   Post-Acute Status   Post-Acute Authorization Placement   Post-Acute Placement Status Set-up Complete/Auth obtained

## 2022-06-17 NOTE — PLAN OF CARE
BOBBY left voice message for return call from [facility].       06/17/22 8527   Post-Acute Status   Post-Acute Authorization Placement   Post-Acute Placement Status Pending post-acute provider review/more information requested

## 2022-06-17 NOTE — PT/OT/SLP PROGRESS
Physical Therapy Treatment    Patient Name:  Jeannie Hutchins   MRN:  [id number]    Recommendations:     Discharge Recommendations:  nursing facility, skilled   Discharge Equipment Recommendations: none   Barriers to discharge: None    Assessment:     Jeannie Hutchins is a 87 y.o. female admitted with a medical diagnosis of Acute on chronic respiratory failure with hypoxia.  She presents with the following impairments/functional limitations:  weakness, impaired endurance, impaired functional mobilty, gait instability, impaired balance, impaired cardiopulmonary response to activity.    Pt presents up in chair ,agreeable to gait training with PT. Pt performed ambulation in room with RW and SBA for 25  Ft. Pt with good standing balance.    Rehab Prognosis: Fair; patient would benefit from acute skilled PT services to address these deficits and reach maximum level of function.    Recent Surgery: * No surgery found *      Plan:     During this hospitalization, patient to be seen 5 x/week to address the identified rehab impairments via gait training, therapeutic activities, therapeutic exercises and progress toward the following goals:    · Plan of Care Expires:  07/14/22    Subjective     Chief Complaint: no complaints  Patient/Family Comments/goals: to gohome  Pain/Comfort:  · Pain Rating 1: 0/10      Objective:     Communicated with RN prior to session.  Patient found up in chair with telemetry, oxygen, pulse ox (continuous), blood pressure cuff, peripheral IV upon PT entry to room.     General Precautions: Standard, fall, hearing impaired, respiratory   Orthopedic Precautions:N/A   Braces: N/A  Respiratory Status: Nasal cannula, flow 2 L/min     Functional Mobility:  · Transfers:     · Sit to Stand:  minimum assistance with rolling walker  · Gait: 25 ft RW SBA, steady steps 2L O2 >90%  · Balance: SBA    Therapeutic Activities and Exercises:   Education, poc, dc planning, fall prevention.    Patient left up in chair with all  lines intact, call button in reach and nurse notified..    GOALS:   Multidisciplinary Problems     Physical Therapy Goals        Problem: Physical Therapy    Goal Priority Disciplines Outcome Goal Variances Interventions   Physical Therapy Goal     PT, PT/OT Ongoing, Progressing     Description: Goals to be met by: 2022     Patient will increase functional independence with mobility by performin. Supine to sit with Supervision  2. Sit to stand transfer with Supervision  3. Bed to chair transfer with Supervision using Rolling Walker  4. Gait  x 150 feet with Supervision using Rolling Walker.                          Time Tracking:     PT Received On: 22  PT Start Time: 906     PT Stop Time: 924  PT Total Time (min): 18 min     Billable Minutes: Therapeutic Activity 18    Treatment Type: Treatment  PT/PTA: PT     PTA Visit Number: 0     2022

## 2022-06-17 NOTE — PLAN OF CARE
Met with Patient/Caregiver to provide IMM.  IMM signed by Patient/Caregiver, copy of IMM provided to Patient/Caregiver, and IMM will be scanned to chart.

## 2022-06-17 NOTE — PLAN OF CARE
BOBBY sent patient information to [facility] via [employer].       06/17/22 9551   Post-Acute Status   Post-Acute Authorization Placement   Post-Acute Placement Status Referrals Sent   Patient choice form signed by patient/caregiver List with quality metrics by geographic area provided

## 2022-06-17 NOTE — PLAN OF CARE
Pt discharged to [facility]. Report called to [facility]. Pt transported via wheelchair on 2L NC. Awake, Alert, Oriented. Vitals Stable.

## 2022-06-17 NOTE — DISCHARGE SUMMARY
[facility] Medicine  Discharge Summary      Patient Name: Jeannie Hutchins  MRN: [id number]  Patient Class: IP- Inpatient  Admission Date: 6/13/2022  Hospital Length of Stay: 3 days  Discharge Date and Time:  06/17/2022 4:43 PM  Attending Physician: No att. providers found   Discharging Provider: Thaddeus Klein MD  Primary Care Provider: Paige Mcleod MD          Hospital Course:   87-year-old lady with prior history of afib on eliquis, HFpEF, HLD, HTN, COPD, recent pneumonia who was brought in via EMS from [place] for acute hypoxic respiratory distress found to have decompensated diastolic CHF and related hypervolemia.  She diuresed extremely well and improved remarkably in last 48 hours.  She is requiring 2-3 L oxygen via nasal cannula which is her baseline.  It appears that her diuretic regimen was de-escalated nursing home that might have precipitated hypervolemia.  She was discharged back to nursing home in hemodynamically stable condition with instructions to take Lasix 40 mg daily.  She was also advised to wear her oxygen around the clock.     Instructions provided to follow up with primary care physician as outpatient. Patient verbalized understanding and is aware to contact primary care physician or return to ED if new or worsening symptoms.    Physical exam on the day of discharge:  General: Patient resting comfortably in no acute distress.  Lungs:  On supplemental oxygen, CTA. Good air entry.  CVS: Regular rate and rhythm. No murmurs. No pedal edema.  Abd: Soft. Nontender. Non-distended.  Neuro: A&O x3. Moving all 4 extremities equally    Vital signs reviewed.  Nursing notes reviewed.         Goals of Care Treatment Preferences:  Code Status: Full Code    Health care agent: Dane Mera  [facility] care agent number: [phone]    Living Will: Yes  [place]: Yes           Consults:   Consults (From admission, onward)        Status Ordering Provider     Inpatient consult to  Registered Dietitian/Nutritionist  Once        Provider:  (Not yet assigned)    Completed REYMUNDO BARNARD     Inpatient consult to Cardiology  Once        Provider:  Hadley Briones MD    Completed REYMUNDO BARNARD     Inpatient consult to Pulmonology  Once        Provider:  Kristi Dalton MD    Completed REYMUNDO BARNARD     Inpatient consult to Hospitalist  Once        Provider:  Reymundo Barnard NP    Acknowledged YOAV BARNARD          No new Assessment & Plan notes have been filed under this hospital service since the last note was generated.  Service: Hospital Medicine    Final Active Diagnoses:    Diagnosis Date Noted POA    Hypothyroid [E03.9] 01/17/2021 Yes    Paroxysmal atrial fibrillation [I48.0] 01/29/2018 Yes    Essential hypertension [I10] 01/23/2018 Yes      Problems Resolved During this Admission:    Diagnosis Date Noted Date Resolved POA    PRINCIPAL PROBLEM:  Acute on chronic respiratory failure with hypoxia [J96.21] 01/16/2021 06/17/2022 Yes    Acute on chronic diastolic congestive heart failure [I50.33] 01/17/2021 06/17/2022 Yes    Multifocal pneumonia [J18.9] 01/17/2021 06/17/2022 Yes       Discharged Condition: good    Disposition: long-term Nursing Home    Follow Up:   Follow-up Information     Paige Mcleod MD Follow up in 1 week(s).    Specialty: Internal Medicine  Contact information:  [address] [id number] [phone]             [facility] Follow up.    Specialty: Skilled Nursing Facility  Why: Nursing Home  [facility] information:  [address] [facility] [id number] [phone]                     Patient Instructions:      Diet Cardiac     Notify your health care provider if you experience any of the following:  difficulty breathing or increased cough     Activity as tolerated       Significant Diagnostic Studies: Labs: All labs within the past 24 hours have been reviewed    Pending Diagnostic Studies:      Procedure Component Value Units Date/Time    T4 [[id number]] Collected: 06/14/22 0946    Order Status: Sent Lab Status: In process Updated: 06/14/22 1014    Specimen: Blood     Narrative:      Collection has been rescheduled by BANDAR at 06/14/2022 09:19 Reason:   Pt. Getting X- RAYS         Medications:  Reconciled Home Medications:      Medication List      CHANGE how you take these medications    furosemide 40 MG tablet  Commonly known as: LASIX  Take 1 tablet (40 mg total) by mouth once daily.  What changed: when to take this     levoFLOXacin 250 MG tablet  Commonly known as: LEVAQUIN  Take 1 tablet (250 mg total) by mouth once daily. for 4 days  Start taking on: June 18, 2022  What changed:   · medication strength  · how much to take     metoprolol tartrate 50 MG tablet  Commonly known as: LOPRESSOR  Take 0.5 tablets (25 mg total) by mouth 2 (two) times daily.  What changed:   · how much to take  · Another medication with the same name was removed. Continue taking this medication, and follow the directions you see here.        CONTINUE taking these medications    acetaminophen 500 MG tablet  Commonly known as: TYLENOL  Take 1 tablet (500 mg total) by mouth every 6 (six) hours as needed for Pain or Temperature greater than (100).     albuterol-ipratropium 2.5 mg-0.5 mg/3 mL nebulizer solution  Commonly known as: DUO-NEB  Take 3 mLs by nebulization every 6 (six) hours. Rescue     alendronate 70 MG tablet  Commonly known as: FOSAMAX  Take 70 mg by mouth every 7 days.     atorvastatin 20 MG tablet  Commonly known as: LIPITOR  Take 20 mg by mouth once daily.     diclofenac sodium 1 % Gel  Commonly known as: VOLTAREN  Apply topically 2 (two) times daily.     ELIQUIS 2.5 mg Tab  Generic drug: apixaban  TAKE 1 TABLET BY MOUTH TWICE A DAY     famotidine 20 MG tablet  Commonly known as: PEPCID  Take 20 mg by mouth once daily.     fluticasone propionate 50 mcg/actuation nasal spray  Commonly known as: FLONASE  1 spray by  Each Nostril route once daily.     levothyroxine 75 MCG tablet  Commonly known as: SYNTHROID  Take 75 mcg by mouth once daily.     potassium chloride SA 10 MEQ tablet  Commonly known as: K-DUR,KLOR-CON  Take 20 mEq by mouth once daily.     traZODone 50 MG tablet  Commonly known as: DESYREL  Take 1 tablet (50 mg total) by mouth every evening.        STOP taking these medications    amLODIPine 10 MG tablet  Commonly known as: NORVASC     amlodipine-atorvastatin 10-40 mg per tablet  Commonly known as: CADUET     losartan 100 MG tablet  Commonly known as: COZAAR            Indwelling Lines/Drains at time of discharge:   Lines/Drains/Airways     None                 Time spent on the discharge of patient: 35 minutes         Thaddeus Klein MD  Department of Hospital Medicine  [facility]

## 2022-06-18 LAB
BACTERIA BLD CULT: NORMAL
BACTERIA BLD CULT: NORMAL

## 2022-06-21 NOTE — PLAN OF CARE
06/21/22 1037   Final Note   Assessment Type Final Discharge Note   Anticipated Discharge Disposition Int Care Fac   Post-Acute Status   Discharge Delays None known at this time

## 2022-06-22 NOTE — PT/OT/SLP DISCHARGE
Occupational Therapy Discharge Summary    Jeannie Hutchins  MRN: [id number]   Principal Problem: Acute on chronic respiratory failure with hypoxia      Patient Discharged from acute Occupational Therapy on 6/17/2022.  Please refer to prior OT note dated 6/16/2022 for functional status.    Assessment:      Patient is no longer making progress. Patient has not met goals.    Objective:     GOALS:   Multidisciplinary Problems     Occupational Therapy Goals     Not on file          Multidisciplinary Problems (Resolved)        Problem: Occupational Therapy    Goal Priority Disciplines Outcome Interventions   Occupational Therapy Goal   (Resolved)     OT, PT/OT Met    Description: Goals to be met by: discharge     Patient will increase functional independence with ADLs by performing:    UE Dressing with Stand-by Assistance.  LE Dressing with Stand-by Assistance.  Grooming while standing at sink with Stand-by Assistance.  Toileting from toilet with Stand-by Assistance for hygiene and clothing management.   Toilet transfer to toilet with Stand-by Assistance.                     Reasons for Discontinuation of Therapy Services  Transfer to alternate level of care.      Plan:     [facility] Dis[facility] to[facility]    6/22/2022

## 2022-07-14 NOTE — SUBJECTIVE & OBJECTIVE
Interval History: Feeling better.    Review of Systems   HENT: Negative for ear discharge and ear pain.    Eyes: Negative for discharge and itching.   Endocrine: Negative for cold intolerance and heat intolerance.   Neurological: Negative for seizures and syncope.     Objective:     Vital Signs (Most Recent):  Temp: 97.8 °F (36.6 °C) (10/29/20 1213)  Pulse: (!) 59 (10/29/20 1213)  Resp: 17 (10/29/20 1213)  BP: (!) 149/60 (10/29/20 1213)  SpO2: (!) 93 % (10/29/20 1213) Vital Signs (24h Range):  Temp:  [97.7 °F (36.5 °C)-98.5 °F (36.9 °C)] 97.8 °F (36.6 °C)  Pulse:  [56-70] 59  Resp:  [14-18] 17  SpO2:  [91 %-93 %] 93 %  BP: (115-155)/(58-68) 149/60     Weight: 51.9 kg (114 lb 6.7 oz)  Body mass index is 25.65 kg/m².    Intake/Output Summary (Last 24 hours) at 10/29/2020 1218  Last data filed at 10/29/2020 1058  Gross per 24 hour   Intake 100 ml   Output 850 ml   Net -750 ml      Physical Exam  Constitutional:       Appearance: Normal appearance. She is not ill-appearing.   HENT:      Mouth/Throat:      Mouth: Mucous membranes are moist.      Pharynx: Oropharynx is clear.   Eyes:      Extraocular Movements: Extraocular movements intact.      Conjunctiva/sclera: Conjunctivae normal.   Cardiovascular:      Rate and Rhythm: Normal rate.      Pulses: Normal pulses.      Heart sounds: Normal heart sounds.   Pulmonary:      Effort: Pulmonary effort is normal. No respiratory distress.      Breath sounds: No wheezing.   Abdominal:      General: Abdomen is flat.      Palpations: Abdomen is soft.   Musculoskeletal:      Right lower leg: No edema.      Left lower leg: No edema.   Skin:     General: Skin is warm.      Findings: No erythema.   Neurological:      Mental Status: She is alert.         Significant Labs:   BMP:   Recent Labs   Lab 10/28/20  0541   GLU 93   *   K 4.2   CL 91*   CO2 34*   BUN 15   CREATININE 0.7   CALCIUM 9.0     CBC: No results for input(s): WBC, HGB, HCT, PLT in the last 48 hours.    Significant  Imaging: I have reviewed all pertinent imaging results/findings within the past 24 hours.   [place] Counseling: I discussed with the patient the risks of tildrakizumab including but not limited to immunosuppression, malignancy, posterior leukoencephalopathy syndrome, and serious infections.  The patient understands that monitoring is required including a PPD at baseline and must alert us or the primary physician if symptoms of infection or other concerning signs are noted.

## 2022-09-26 NOTE — PROGRESS NOTES
[facility]  Progress Note    Patient Name: Jeannie Hutchins  MRN: [id number]  Patient Class: IP- Inpatient   Admission Date: 1/22/2018  Length of Stay: 10 days  Attending Physician: Paige Mcleod MD  Primary Care Provider: Paige Mcleod MD        Subjective:     Principal Problem:Hyponatremia with decreased serum osmolality    HPI:  PT  Is an 81yo WF followed by Dr. Bethany Mcleod who was seen in the office with complaints of diarrhea on 1/19.  At that time she was felt to have gastroenteritis.  She was sent home but states she could not eat anything for 4 days.  The diarrhea was starting to improve but she did not feel better so she presented to the ED.  She was found to have a sodium of 113 and was admitted with a diagnosis of hyponatremia    Hospital Course:  Sodium improving with fluids - 123 as of 1/27  She has issues with anxiety.  She was started on paroxetine; psych is recommending OP therapy  On 1/28, she is asking for something for her anxiety - while waiting for paroxetine to take effect, will give 1 dose of lorazepam 0.5  Sodium dropped again, to 117.  Resumed NS IV  This did not help.  Na continued dropping.  Renal on case - changed to 0.3%NS - moved to ICU for drip.  While there, developed resp difficulty.  She eventually required NRB with 14lpm O2 - sat 95%.  CXR on 1/28 showed worsening aeroation - due either to pneumonia or fluid.  Lasix given - good result with urination; felt a bit better.   Started on empiric rocephin.  Furthermore, she developed tachycardia and was found to be in afib.  Started on empiric Eliquis for clot prevention; cardiology consulted.  Dr. Geovany johnson change from amlodipine to metoprolol.  She is CHADS4 - remain on Eliquis.  Discussed with Dr. Mckeon- ALL SSRI/SNRI will have hyponatremia risk.  For now, will have to use rare lorazepam for severe episodes.  She is amenable to therapy as OP - but needs insurance coverage.    Interval  History:  stable    Review of Systems   Better   Objective:     Vital Signs (Most Recent):  Temp: 98.2 °F (36.8 °C) (02/01/18 1115)  Pulse: 60 (02/01/18 1115)  Resp: 20 (02/01/18 1115)  BP: (!) 154/88 (02/01/18 1115)  SpO2: 98 % (02/01/18 1115) Vital Signs (24h Range):  Temp:  [98.1 °F (36.7 °C)-98.4 °F (36.9 °C)] 98.2 °F (36.8 °C)  Pulse:  [52-80] 60  Resp:  [14-55] 20  SpO2:  [86 %-100 %] 98 %  BP: ()/() 154/88     Weight: 48 kg (105 lb 13.1 oz)  Body mass index is 23.72 kg/m².    Intake/Output Summary (Last 24 hours) at 02/01/18 1444  Last data filed at 02/01/18 1100   Gross per 24 hour   Intake             1210 ml   Output             2275 ml   Net            -1065 ml      Physical Exam  Resting quietly  Lungs coarse  Heart rrr  abd benign      Assessment/Plan:      * Hyponatremia with decreased serum osmolality    Na on 1/27 123  She  Had been placed on paroxetine - might lower levels  Repeat 1/28 is 117, then 115  Resumed IV saline  (2 x Na) + (glu/18) + (BUN/2.8) = serum osm = 242  (range 275-295)  When levels continued dropping, pt transferred to ICU for 3%NaCl administration - but this has now been held  Renal follows  Frequent monitoring:   avoid rapid correction  Multiple etiologies as above - awaiting cortisol; normal systolic fxn (indeterminate diastolic), no cirrhosis  Dr. Cedeno gave more lasix  - now up to 123          Pulmonary hypertension    PAP 54          Paroxysmal atrial fibrillation    Developed tachycardia and dyspnea on 1/28.  Found to be in afib.  Started on empiric Eliquis 2.5 bid for CVA prophylaxis and cards consulted.  Dr. Armenta saw pt and rec'd change from amlodipine to metoprolol.  Rhythm is paroxysmal.  Her current rhythm is irregular, but she has P waves on monitor  CHADS4 - will need anticoag long-term.            Acute respiratory failure with hypoxia and hypercarbia    Since moving to ICU, she began having more increased oxygen needs to keep sats adequate.  She is  still on 8lpm  She sounds wet - asking renal about best option for diuresis  Started on empiric rocephin  sats in high 90s; weaned off mask  She has been getting lasix for the congestion and rocephin for the opacities;  Repeat cxr today:  Cardiac wires overlie the upper abdomen and lower chest. Cardiac silhouette is mildly enlarged but stable in size. Atherosclerotic calcifications overlie the aortic arch. There is stable central vascular congestion and patchy bilateral airspace disease. Small bilateral pleural effusions, left side greater than right. No detrimental change in lung aeration.        Follow up    Spoke with her son today;  He is concerned about her well-being after discharge  Now that her  has pneumonia, caregiving will be more difficult  He is considering placing both of them in assisted living  He is worried that she is not taking her medications appropriately  In interim, he  Is asking about treatment alternatives - discussed SNF vs HH with PT.  Will have  talk with him to discuss options and formulate plan;  At this point,  likely to go to assisted;  Pt to go to SNF until she is well enough to join him          Hypophosphatemia    Start K phos supplement (1.3 on 1/28)  Recheck 1.7  Continue supp        Generalized anxiety disorder    Had been started on paroxetine   She is more anxious because of recent diagnosis of flu in 83 yo  - he was in ER today.  Now sent home, but he is alone.  (Her daughter would normally help care for him, but she is undergoing cancer treatment and should not be exposed to flu)  She became more anxious over night  Gave her ONE dose of lorazepam to tide her over.  However, she cannot rely on long-term benzos.  Now that Paxil has to be stopped, need to see what Dr. Mckeon will recommend as replacement.  Dr. Venegas feels that hyponatremia is a class effect of SSRI  Had to give another dose of lorazepam earlier  .          Hypokalemia    On  1/27 slightly low at 3.4  Started on 20meq daily  (also on K phos)  Getting more lasix; bump k to 20 bid  Today 4.1        Benign hypertension    Continue home meds except HCTZ   norvasc 10  added vasotec 2.5  Amlodipine stopped due to afib; started metoprolol  Bumped vasotec to 5  Metop bumped to 50 bid  174/72 - bump vasotec to 10        Adjustment disorder with mixed anxiety and depressed mood    Psych recommending outpatient therapy.  On Paroxitine but it can drop sodium levels.     Increasing salt in the diet and following levels.  Dr. Venegas now feels that we have to STOP the Paroxetine  Will see what Dr. Mckeon recommends as substitute        Non-intractable vomiting with nausea    Has diminished; Tolerating regular diet for last several days  Now having recurrent bouts  zofran as needed          S/P carotid endarterectomy                VTE Risk Mitigation         Ordered     apixaban tablet 2.5 mg  2 times daily     Route:  Oral        01/28/18 2128     Medium Risk of VTE  Once      01/22/18 1959     Place BRYANT hose  Until discontinued      01/22/18 1959     Place sequential compression device  Until discontinued      01/22/18 1959            Jake Trujillo MD  Department of Hospital Medicine   [facility]   Ear Star Wedge Flap Text: The defect edges were debeveled with a #15 blade scalpel.  Given the location of the defect and the proximity to free margins (helical rim) an ear star wedge flap was deemed most appropriate.  Using a sterile surgical marker, the appropriate flap was drawn incorporating the defect and placing the expected incisions between the helical rim and antihelix where possible.  The area thus outlined was incised through and through with a #15 scalpel blade.

## 2022-10-17 ENCOUNTER — HOSPITAL ENCOUNTER (OUTPATIENT)
Facility: HOSPITAL | Age: 87
End: 2022-10-18
Attending: EMERGENCY MEDICINE | Admitting: INTERNAL MEDICINE
Payer: MEDICARE

## 2022-10-17 DIAGNOSIS — R07.9 CHEST PAIN: ICD-10-CM

## 2022-10-17 DIAGNOSIS — R04.0 EPISTAXIS: Primary | ICD-10-CM

## 2022-10-17 PROBLEM — I10 UNCONTROLLED HYPERTENSION: Status: ACTIVE | Noted: 2022-10-17

## 2022-10-17 LAB
ALBUMIN SERPL BCP-MCNC: 4 G/DL (ref 3.5–5.2)
ALP SERPL-CCNC: 82 U/L (ref 55–135)
ALT SERPL W/O P-5'-P-CCNC: 12 U/L (ref 10–44)
ANION GAP SERPL CALC-SCNC: 8 MMOL/L (ref 8–16)
APTT PPP: 34.6 SEC (ref 23.3–35.1)
AST SERPL-CCNC: 16 U/L (ref 10–40)
BACTERIA #/AREA URNS HPF: NEGATIVE /HPF
BASOPHILS # BLD AUTO: 0.05 K/UL (ref 0–0.2)
BASOPHILS NFR BLD: 0.4 % (ref 0–1.9)
BILIRUB SERPL-MCNC: 1.6 MG/DL (ref 0.1–1)
BILIRUB UR QL STRIP: NEGATIVE
BUN SERPL-MCNC: 21 MG/DL (ref 8–23)
CALCIUM SERPL-MCNC: 9.2 MG/DL (ref 8.7–10.5)
CHLORIDE SERPL-SCNC: 94 MMOL/L (ref 95–110)
CLARITY UR: CLEAR
CO2 SERPL-SCNC: 26 MMOL/L (ref 23–29)
COLOR UR: COLORLESS
CREAT SERPL-MCNC: 0.7 MG/DL (ref 0.5–1.4)
DIFFERENTIAL METHOD: ABNORMAL
EOSINOPHIL # BLD AUTO: 0.1 K/UL (ref 0–0.5)
EOSINOPHIL NFR BLD: 0.8 % (ref 0–8)
ERYTHROCYTE [DISTWIDTH] IN BLOOD BY AUTOMATED COUNT: 13.7 % (ref 11.5–14.5)
EST. GFR  (NO RACE VARIABLE): >60 ML/MIN/1.73 M^2
GLUCOSE SERPL-MCNC: 115 MG/DL (ref 70–110)
GLUCOSE UR QL STRIP: NEGATIVE
HCT VFR BLD AUTO: 35.9 % (ref 37–48.5)
HGB BLD-MCNC: 12 G/DL (ref 12–16)
HGB UR QL STRIP: NEGATIVE
HYALINE CASTS #/AREA URNS LPF: 0 /LPF
IMM GRANULOCYTES # BLD AUTO: 0.03 K/UL (ref 0–0.04)
IMM GRANULOCYTES NFR BLD AUTO: 0.3 % (ref 0–0.5)
INR PPP: 1.4
KETONES UR QL STRIP: NEGATIVE
LEUKOCYTE ESTERASE UR QL STRIP: NEGATIVE
LYMPHOCYTES # BLD AUTO: 0.9 K/UL (ref 1–4.8)
LYMPHOCYTES NFR BLD: 7.5 % (ref 18–48)
MCH RBC QN AUTO: 29.4 PG (ref 27–31)
MCHC RBC AUTO-ENTMCNC: 33.4 G/DL (ref 32–36)
MCV RBC AUTO: 88 FL (ref 82–98)
MICROSCOPIC COMMENT: ABNORMAL
MONOCYTES # BLD AUTO: 0.8 K/UL (ref 0.3–1)
MONOCYTES NFR BLD: 6.9 % (ref 4–15)
NEUTROPHILS # BLD AUTO: 9.9 K/UL (ref 1.8–7.7)
NEUTROPHILS NFR BLD: 84.1 % (ref 38–73)
NITRITE UR QL STRIP: NEGATIVE
NRBC BLD-RTO: 0 /100 WBC
OSMOLALITY UR: 372 MOSM/KG (ref 50–1200)
PH UR STRIP: 8 [PH] (ref 5–8)
PLATELET # BLD AUTO: 328 K/UL (ref 150–450)
PMV BLD AUTO: 9.3 FL (ref 9.2–12.9)
POTASSIUM SERPL-SCNC: 4.2 MMOL/L (ref 3.5–5.1)
PROT SERPL-MCNC: 6.6 G/DL (ref 6–8.4)
PROT UR QL STRIP: ABNORMAL
PROTHROMBIN TIME: 15.8 SEC (ref 11.4–13.7)
RBC # BLD AUTO: 4.08 M/UL (ref 4–5.4)
RBC #/AREA URNS HPF: 2 /HPF (ref 0–4)
SODIUM SERPL-SCNC: 128 MMOL/L (ref 136–145)
SODIUM SERPL-SCNC: 132 MMOL/L (ref 136–145)
SODIUM UR-SCNC: 92 MMOL/L (ref 20–250)
SP GR UR STRIP: 1.01 (ref 1–1.03)
SQUAMOUS #/AREA URNS HPF: 0 /HPF
TSH SERPL DL<=0.005 MIU/L-ACNC: 1.12 UIU/ML (ref 0.34–5.6)
URN SPEC COLLECT METH UR: ABNORMAL
UROBILINOGEN UR STRIP-ACNC: NEGATIVE EU/DL
WBC # BLD AUTO: 11.74 K/UL (ref 3.9–12.7)
WBC #/AREA URNS HPF: 0 /HPF (ref 0–5)

## 2022-10-17 PROCEDURE — 84300 ASSAY OF URINE SODIUM: CPT | Performed by: EMERGENCY MEDICINE

## 2022-10-17 PROCEDURE — 84295 ASSAY OF SERUM SODIUM: CPT | Mod: 91 | Performed by: INTERNAL MEDICINE

## 2022-10-17 PROCEDURE — 85025 COMPLETE CBC W/AUTO DIFF WBC: CPT | Performed by: EMERGENCY MEDICINE

## 2022-10-17 PROCEDURE — 25000003 PHARM REV CODE 250: Performed by: INTERNAL MEDICINE

## 2022-10-17 PROCEDURE — 85610 PROTHROMBIN TIME: CPT | Performed by: EMERGENCY MEDICINE

## 2022-10-17 PROCEDURE — C9046 COCAINE HCL NASAL SOLUTION: HCPCS | Mod: TB | Performed by: EMERGENCY MEDICINE

## 2022-10-17 PROCEDURE — 99285 EMERGENCY DEPT VISIT HI MDM: CPT | Mod: 25

## 2022-10-17 PROCEDURE — 83935 ASSAY OF URINE OSMOLALITY: CPT | Performed by: EMERGENCY MEDICINE

## 2022-10-17 PROCEDURE — 63600175 PHARM REV CODE 636 W HCPCS: Performed by: INTERNAL MEDICINE

## 2022-10-17 PROCEDURE — 25000003 PHARM REV CODE 250: Performed by: EMERGENCY MEDICINE

## 2022-10-17 PROCEDURE — 36415 COLL VENOUS BLD VENIPUNCTURE: CPT | Performed by: EMERGENCY MEDICINE

## 2022-10-17 PROCEDURE — G0378 HOSPITAL OBSERVATION PER HR: HCPCS

## 2022-10-17 PROCEDURE — 80053 COMPREHEN METABOLIC PANEL: CPT | Performed by: EMERGENCY MEDICINE

## 2022-10-17 PROCEDURE — 36415 COLL VENOUS BLD VENIPUNCTURE: CPT | Performed by: INTERNAL MEDICINE

## 2022-10-17 PROCEDURE — 63600175 PHARM REV CODE 636 W HCPCS: Performed by: EMERGENCY MEDICINE

## 2022-10-17 PROCEDURE — 96374 THER/PROPH/DIAG INJ IV PUSH: CPT

## 2022-10-17 PROCEDURE — 96375 TX/PRO/DX INJ NEW DRUG ADDON: CPT

## 2022-10-17 PROCEDURE — 25000003 PHARM REV CODE 250

## 2022-10-17 PROCEDURE — 81001 URINALYSIS AUTO W/SCOPE: CPT | Performed by: EMERGENCY MEDICINE

## 2022-10-17 PROCEDURE — 85730 THROMBOPLASTIN TIME PARTIAL: CPT | Performed by: EMERGENCY MEDICINE

## 2022-10-17 PROCEDURE — 96376 TX/PRO/DX INJ SAME DRUG ADON: CPT

## 2022-10-17 PROCEDURE — 84443 ASSAY THYROID STIM HORMONE: CPT | Performed by: EMERGENCY MEDICINE

## 2022-10-17 RX ORDER — LABETALOL HYDROCHLORIDE 5 MG/ML
5 INJECTION, SOLUTION INTRAVENOUS EVERY 4 HOURS PRN
Status: DISCONTINUED | OUTPATIENT
Start: 2022-10-17 | End: 2022-10-18 | Stop reason: HOSPADM

## 2022-10-17 RX ORDER — SILVER NITRATE 38.21; 12.74 MG/1; MG/1
1 STICK TOPICAL ONCE
Status: COMPLETED | OUTPATIENT
Start: 2022-10-17 | End: 2022-10-17

## 2022-10-17 RX ORDER — SILVER NITRATE 38.21; 12.74 MG/1; MG/1
STICK TOPICAL
Status: COMPLETED
Start: 2022-10-17 | End: 2022-10-17

## 2022-10-17 RX ORDER — LANOLIN ALCOHOL/MO/W.PET/CERES
800 CREAM (GRAM) TOPICAL
Status: DISCONTINUED | OUTPATIENT
Start: 2022-10-17 | End: 2022-10-18 | Stop reason: HOSPADM

## 2022-10-17 RX ORDER — ACETAMINOPHEN 325 MG/1
650 TABLET ORAL
Status: COMPLETED | OUTPATIENT
Start: 2022-10-17 | End: 2022-10-17

## 2022-10-17 RX ORDER — MORPHINE SULFATE 2 MG/ML
2 INJECTION, SOLUTION INTRAMUSCULAR; INTRAVENOUS
Status: COMPLETED | OUTPATIENT
Start: 2022-10-17 | End: 2022-10-17

## 2022-10-17 RX ORDER — COCAINE HYDROCHLORIDE 40 MG/ML
SOLUTION NASAL ONCE
Status: COMPLETED | OUTPATIENT
Start: 2022-10-17 | End: 2022-10-17

## 2022-10-17 RX ORDER — GLUCAGON 1 MG
1 KIT INJECTION
Status: DISCONTINUED | OUTPATIENT
Start: 2022-10-17 | End: 2022-10-18 | Stop reason: HOSPADM

## 2022-10-17 RX ORDER — NALOXONE HCL 0.4 MG/ML
0.02 VIAL (ML) INJECTION
Status: DISCONTINUED | OUTPATIENT
Start: 2022-10-17 | End: 2022-10-18 | Stop reason: HOSPADM

## 2022-10-17 RX ORDER — MORPHINE SULFATE 2 MG/ML
1 INJECTION, SOLUTION INTRAMUSCULAR; INTRAVENOUS EVERY 4 HOURS PRN
Status: DISCONTINUED | OUTPATIENT
Start: 2022-10-17 | End: 2022-10-18 | Stop reason: HOSPADM

## 2022-10-17 RX ORDER — OXYMETAZOLINE HCL 0.05 %
1 SPRAY, NON-AEROSOL (ML) NASAL
Status: COMPLETED | OUTPATIENT
Start: 2022-10-17 | End: 2022-10-17

## 2022-10-17 RX ORDER — IBUPROFEN 200 MG
24 TABLET ORAL
Status: DISCONTINUED | OUTPATIENT
Start: 2022-10-17 | End: 2022-10-18 | Stop reason: HOSPADM

## 2022-10-17 RX ORDER — HYDRALAZINE HYDROCHLORIDE 10 MG/1
10 TABLET, FILM COATED ORAL EVERY 4 HOURS PRN
Status: DISCONTINUED | OUTPATIENT
Start: 2022-10-17 | End: 2022-10-17

## 2022-10-17 RX ORDER — SODIUM CHLORIDE 0.9 % (FLUSH) 0.9 %
10 SYRINGE (ML) INJECTION EVERY 12 HOURS PRN
Status: DISCONTINUED | OUTPATIENT
Start: 2022-10-17 | End: 2022-10-18 | Stop reason: HOSPADM

## 2022-10-17 RX ORDER — IBUPROFEN 200 MG
16 TABLET ORAL
Status: DISCONTINUED | OUTPATIENT
Start: 2022-10-17 | End: 2022-10-18 | Stop reason: HOSPADM

## 2022-10-17 RX ADMIN — LABETALOL HYDROCHLORIDE 5 MG: 5 INJECTION, SOLUTION INTRAVENOUS at 06:10

## 2022-10-17 RX ADMIN — SILVER NITRATE APPLICATORS 1 APPLICATOR: 25; 75 STICK TOPICAL at 01:10

## 2022-10-17 RX ADMIN — MORPHINE SULFATE 2 MG: 2 INJECTION, SOLUTION INTRAMUSCULAR; INTRAVENOUS at 11:10

## 2022-10-17 RX ADMIN — Medication 1 SPRAY: at 01:10

## 2022-10-17 RX ADMIN — COCAINE HYDROCHLORIDE NASAL: 40 SOLUTION TOPICAL at 02:10

## 2022-10-17 RX ADMIN — SILVER NITRATE 1 APPLICATOR: 38.21; 12.74 STICK TOPICAL at 01:10

## 2022-10-17 RX ADMIN — MORPHINE SULFATE 1 MG: 2 INJECTION, SOLUTION INTRAMUSCULAR; INTRAVENOUS at 06:10

## 2022-10-17 RX ADMIN — ACETAMINOPHEN 650 MG: 325 TABLET ORAL at 10:10

## 2022-10-17 NOTE — PLAN OF CARE
[facility]  - Emergency Dept  Initial Discharge Assessment       Primary Care Provider: Paige Mcleod MD    Admission Diagnosis: Epistaxis [R04.0]    Admission Date: 10/17/2022  Expected Discharge Date:     Patient has Power Of  & Living Will.    Discharge Barriers Identified: None    Payor: HUMANA MANAGED MEDICARE / Plan: HUMANA SNP (SPECIAL NEEDS PLAN) / Product Type: Medicare Advantage /     Extended Emergency Contact Information  Primary Emergency Contact: EdeVandanaDane   [facility]  Home Phone: [phone]  Mobile Phone: [phone]  Relation: Son  Secondary Emergency Contact: Lyubov Reed  Mobile Phone: [phone]  Relation: Daughter    [address]charge [address]:  [address] [place] [phone] / [phone])  Discharge Plan B:  ([place] [address] [address] [facility] [place] [phone] / [phone])      [facility] [address]  [address] [address]  Phone: [phone] Fax: [phone]    [id number] - [place], [place] - [address]  [address] [address]  Phone: [phone] Fax: [phone]      Initial Assessment (most recent)       Adult Discharge Assessment - 10/17/22 1618          Discharge Assessment    Assessment Type Discharge Planning Assessment     Confirmed/corrected address, phone number and insurance Yes   [place] [address] [address] [phone] / [phone]    Source of Information patient     When was your last doctors appointment? --   1 Or 2 Weeks Ago    Does patient/caregiver understand observation status Yes     Communicated JULIET with patient/caregiver Date not available/Unable to determine     Lives With --   [place] [address] [address] [phone] / [phone]    Facility Arrived From: --   [place] [address] [address]  [phone] / [phone]    Do you expect to return to your current living situation? Yes     Do you have help at home or someone to help you manage your care at home? --   [facility] [address] [facility] [place] [phone] / [phone]    Prior to hospitilization cognitive status: Alert/Oriented     Current cognitive status: Alert/Oriented     Walking or Climbing Stairs Difficulty ambulation difficulty, requires equipment     Dressing/Bathing Difficulty none     Do you have any problems with: --   [facility] [address] [facility] [place] [phone] / [phone]    Equipment Currently Used at Home wheelchair;oxygen     Readmission within 30 days? No     Patient currently being followed by outpatient case management? No     Do you currently have service(s) that help you manage your care at home? --   [address] [place] [phone] / [phone]    Do you take prescription medications? Yes     Do you have prescription coverage? Yes     Do you have any problems affording any of your prescribed medications? No     Is the patient taking medications as prescribed? yes     Who is going to help you get home at discharge? --   [address] [place] [phone] / [phone]    How do you get to doctors appointments? --   [address] [place] [phone] / [phone]    Are you on dialysis? No     Do you take coumadin? No     Discharge Plan A --   [address] [phone] / [phone]    Discharge Plan B --   [address] [phone] / [phone]    DME Needed Upon Discharge  none     Discharge Plan discussed with: Patient     Discharge Barriers Identified None        Physical Activity    On average, how many days per week do you engage in  moderate to strenuous exercise (like a brisk walk)? 0 days     On average, how many minutes do you engage in exercise at this level? 0 min        Financial Resource Strain    How hard is it for you to pay for the very basics like food, housing, medical care, and heating? Not very hard        Housing Stability    In the last 12 months, was there a time when you were not able to pay the mortgage or rent on time? No     In the last 12 months, was there a time when you did not have a steady place to sleep or slept in a shelter (including now)? No        Transportation Needs    In the past 12 months, has lack of transportation kept you from medical appointments or from getting medications? No     In the past 12 months, has lack of transportation kept you from meetings, work, or from getting things needed for daily living? No        Food Insecurity    Within the past 12 months, you worried that your food would run out before you got the money to buy more. Never true     Within the past 12 months, the food you bought just didn't last and you didn't have money to get more. Never true        Stress    Do you feel stress - tense, restless, nervous, or anxious, or unable to sleep at night because your mind is troubled all the time - these days? Only a little        Social Connections    In a typical week, how many times do you talk on the phone with family, friends, or neighbors? Once a week     How often do you get together with friends or relatives? Once a week     How often do you attend Yazidism or Islam services? More than 4 times per year     Do you belong to any clubs or organizations such as Yazidism groups, unions, fraternal or athletic groups, or school groups? No     How often do you attend meetings of the clubs or organizations you belong to? Never     Are you , , , , never , or living with a partner?         Alcohol Use    Q1: How often do you have a drink containing  alcohol? Never     Q2: How many drinks containing alcohol do you have on a typical day when you are drinking? Patient does not drink     Q3: How often do you have six or more drinks on one occasion? Never

## 2022-10-17 NOTE — ED NOTES
Called report to [place] nurse [phone] Sukhjinder.     She stated that she will arrange transport for patient to go back to [place]. Unknown ETA at this time.     Epistaxis has resolved. Patient VSS, NAD noted at this time.

## 2022-10-17 NOTE — PLAN OF CARE
10/17/22 3497   JULES Message   Medicare Outpatient and Observation Notification regarding financial responsibility Given to patient/caregiver;Explained to patient/caregiver;Signed/date by patient/caregiver   Date JULES was signed 10/17/22   Time JULES was signed 0474

## 2022-10-17 NOTE — DISCHARGE INSTRUCTIONS
If you can spray  the nostril with saline to keep it moist that would be great. Return for any bleeding.

## 2022-10-17 NOTE — ED PROVIDER NOTES
Encounter Date: 10/17/2022       History     Chief Complaint   Patient presents with    Epistaxis     Chief complaint is nose bleed and hypertension.  The patient thinks she may have scratched her nose with her fingernail.  She is on blood thinners however.  The patient does have anticoagulant long-term use.  She is on Eliquis.  She states she just cut her fingernails in May of scratch turned the nares on the left causing the bleeding.  She is on oxygen chronically.  She is awake alert cooperative no distress      Review of patient's allergies indicates:   Allergen Reactions    Hydrochlorothiazide Other (See Comments)     hyponatremia    Strawberry Swelling     Tongue swells up and a generalized rash.    Lisinopril Other (See Comments)     Cough     Past Medical History:   Diagnosis Date    Anticoagulant long-term use     Eliquis    Arthritis     Atrial fibrillation     Blood transfusion     Carotid stenosis     CHF (congestive heart failure)     Edema     Generalized anxiety disorder 1/23/2018    Dr. Mckeon's eval 1/23/18: She does appear to have a JERRI because of the persistent worries that she has.  These worries predominate her day.  She would probably do well with prevention therapy such as SSRI/SNRI to help control the physical symptoms of the anxiety.  Problem at this point in time is her electrolyte abnormalities.  There is a slight risk of hyponatremia with these medications and    Hearing loss     [place] (hard of hearing)     Hypercholesterolemia     Hypertension     On home oxygen therapy     Psychiatric problem     Thyroid disease      Past Surgical History:   Procedure Laterality Date    CARDIOVERSION  10/19/2020    Procedure: Cardioversion;  Surgeon: Brandon Elias MD;  Location: [facility] CATH LAB;  Service: Cardiology;;    CARPAL TUNNEL RELEASE  2012    right hand    ESOPHAGOGASTRODUODENOSCOPY Left 8/29/2018    Procedure: EGD (ESOPHAGOGASTRODUODENOSCOPY);  Surgeon: Oniel Dorsey MD;  Location: [facility]  ENDO;  Service: Endoscopy;  Laterality: Left;    HYSTERECTOMY      left carotid endartectomy  5/2006    TONSILLECTOMY      TREATMENT OF CARDIAC ARRHYTHMIA N/A 10/19/2020    Procedure: Transesophageal echo (BLADIMIR) intra-procedure log documentation;  Surgeon: Brandon Elias MD;  Location: [facility] CATH LAB;  Service: Cardiology;  Laterality: N/A;     Family History   Problem Relation Age of Onset    Heart disease Father     Cancer Brother 65        bladder    Cancer Sister 81        Ovarian    Ovarian cancer Sister 81    Breast cancer Daughter 50        Status post mastectomy    Cancer Sister         Lung.  Smoker    Cancer Sister         Lung.  Smoker    Schizophrenia Son      Social History     Tobacco Use    Smoking status: Never    Smokeless tobacco: Never   Substance Use Topics    Alcohol use: No    Drug use: No     Review of Systems   Constitutional:  Negative for chills and fever.   HENT:  Positive for nosebleeds. Negative for ear pain, rhinorrhea and sore throat.    Eyes:  Negative for pain and visual disturbance.   Respiratory:  Negative for cough and shortness of breath.    Cardiovascular:  Negative for chest pain and palpitations.   Gastrointestinal:  Negative for abdominal pain, constipation, diarrhea, nausea and vomiting.   Genitourinary:  Negative for dysuria, frequency, hematuria and urgency.   Musculoskeletal:  Negative for back pain, joint swelling and myalgias.   Skin:  Negative for rash.   Neurological:  Negative for dizziness, seizures, weakness and headaches.   Psychiatric/Behavioral:  Negative for dysphoric mood. The patient is not nervous/anxious.      Physical Exam     Initial Vitals [10/17/22 0120]   BP Pulse Resp Temp SpO2   (!) 147/98 75 20 97.9 °F (36.6 °C) (!) 88 %      MAP       --         Physical Exam    Nursing note and vitals reviewed.  Constitutional: She appears well-developed and well-nourished.   HENT:   Head: Normocephalic and atraumatic.   Mouth/Throat: Oropharynx is clear and moist.    Patient has site of bleeding seen on the inner aspect of the left naris.  No active bleeding at the present time.  No bleeding noted the posterior pharynx when she opens her mouth.   Eyes: Conjunctivae, EOM and lids are normal. Pupils are equal, round, and reactive to light.   Neck: Trachea normal. Neck supple. No thyroid mass and no thyromegaly present.   Normal range of motion.  Cardiovascular:  Normal rate, regular rhythm and normal heart sounds.           Pulmonary/Chest: Effort normal.   Course breath sounds noted   Abdominal: Abdomen is soft. There is no abdominal tenderness.   Musculoskeletal:         General: Normal range of motion.      Cervical back: Normal range of motion and neck supple.     Neurological: She is alert and oriented to person, place, and time. She has normal strength and normal reflexes. No cranial nerve deficit or sensory deficit.   Skin: Skin is warm and dry.   Psychiatric: She has a normal mood and affect. Her speech is normal and behavior is normal. Judgment and thought content normal.       ED Course   Procedures  Labs Reviewed   CBC W/ AUTO DIFFERENTIAL - Abnormal; Notable for the following components:       Result Value    Hematocrit 35.9 (*)     Gran # (ANC) 9.9 (*)     Lymph # 0.9 (*)     Gran % 84.1 (*)     Lymph % 7.5 (*)     All other components within normal limits   COMPREHENSIVE METABOLIC PANEL - Abnormal; Notable for the following components:    Sodium 128 (*)     Chloride 94 (*)     Glucose 115 (*)     Total Bilirubin 1.6 (*)     All other components within normal limits   PROTIME-INR - Abnormal; Notable for the following components:    PT 15.8 (*)     All other components within normal limits   URINALYSIS, REFLEX TO URINE CULTURE - Abnormal; Notable for the following components:    Color, UA Colorless (*)     Protein, UA 1+ (*)     All other components within normal limits    Narrative:     Specimen Source->Urine   URINALYSIS MICROSCOPIC - Abnormal; Notable for the  following components:    Hyaline Casts, UA 0.00 (*)     All other components within normal limits    Narrative:     Specimen Source->Urine   APTT   TSH   SODIUM, URINE, RANDOM   OSMOLALITY, URINE RANDOM   URINALYSIS, REFLEX TO URINE CULTURE          Imaging Results    None          Medications   sodium chloride 0.9% flush 10 mL (has no administration in time range)   naloxone 0.4 mg/mL injection 0.02 mg (has no administration in time range)   glucose chewable tablet 16 g (has no administration in time range)   glucose chewable tablet 24 g (has no administration in time range)   glucagon (human recombinant) injection 1 mg (has no administration in time range)   dextrose 10% bolus 125 mL (has no administration in time range)   dextrose 10% bolus 250 mL (has no administration in time range)   potassium bicarbonate disintegrating tablet 35 mEq (has no administration in time range)   potassium bicarbonate disintegrating tablet 60 mEq (has no administration in time range)   potassium bicarbonate disintegrating tablet 50 mEq (has no administration in time range)   magnesium oxide tablet 800 mg (has no administration in time range)   magnesium oxide tablet 800 mg (has no administration in time range)   oxymetazoline 0.05 % nasal spray 1 spray (1 spray Each Nostril Given 10/17/22 0131)   cocaine 4 % nasal solution ( Topical (Top) Given 10/17/22 0200)   silver nitrate applicators applicator 1 applicator (1 applicator Topical (Top) Given 10/17/22 0159)   acetaminophen tablet 650 mg (650 mg Oral Given 10/17/22 1034)   morphine injection 2 mg (2 mg Intravenous Given 10/17/22 1107)     Medical Decision Making:   ED Management:  The patient initially was given cocaine as a pledget to the left naris.  It did not work that well because after removal is a small amount of bleeding was noted.  but after applying direct pressure the bleeding stopped.  The patient will be given instructions to return if any bleeding and to use nasal  spray to keep the nares moist and to not put her fingers in her nose.  Prior to discharge patient began bleeding again repeat pressure failed to control the bleeding anterior packing was placed however despite anterior plaquing patient began bleeding again anterior and posterior packing has been placed with better bleeding controlled patient continues to manipulate the area causing bleeding   I have discussed patient's findings with Dr. Flor who felt patient could be seen in the office however given the repeat bleeding will admit for observation further treatment as indicated                        Clinical Impression:   Final diagnoses:  [R04.0] Epistaxis (Primary)      ED Disposition Condition    Observation                 Han Sam MD  10/17/22 4530

## 2022-10-17 NOTE — ED NOTES
Pt nose bleeding at this time. Pressure applied to bridge of nose and petroleum jelly inserted into L nare. Provider aware, discharge removed at this time and transportation cancelled.

## 2022-10-18 VITALS
DIASTOLIC BLOOD PRESSURE: 97 MMHG | HEART RATE: 100 BPM | SYSTOLIC BLOOD PRESSURE: 168 MMHG | TEMPERATURE: 99 F | HEIGHT: 56 IN | BODY MASS INDEX: 22.41 KG/M2 | RESPIRATION RATE: 18 BRPM | OXYGEN SATURATION: 95 % | WEIGHT: 99.63 LBS

## 2022-10-18 LAB
ALBUMIN SERPL BCP-MCNC: 3.9 G/DL (ref 3.5–5.2)
ALP SERPL-CCNC: 85 U/L (ref 55–135)
ALT SERPL W/O P-5'-P-CCNC: 13 U/L (ref 10–44)
ANION GAP SERPL CALC-SCNC: 10 MMOL/L (ref 8–16)
AST SERPL-CCNC: 18 U/L (ref 10–40)
BASOPHILS # BLD AUTO: 0.04 K/UL (ref 0–0.2)
BASOPHILS NFR BLD: 0.3 % (ref 0–1.9)
BILIRUB SERPL-MCNC: 2.3 MG/DL (ref 0.1–1)
BUN SERPL-MCNC: 18 MG/DL (ref 8–23)
CALCIUM SERPL-MCNC: 9.1 MG/DL (ref 8.7–10.5)
CHLORIDE SERPL-SCNC: 96 MMOL/L (ref 95–110)
CO2 SERPL-SCNC: 25 MMOL/L (ref 23–29)
CREAT SERPL-MCNC: 0.7 MG/DL (ref 0.5–1.4)
DIFFERENTIAL METHOD: ABNORMAL
EOSINOPHIL # BLD AUTO: 0.1 K/UL (ref 0–0.5)
EOSINOPHIL NFR BLD: 0.5 % (ref 0–8)
ERYTHROCYTE [DISTWIDTH] IN BLOOD BY AUTOMATED COUNT: 13.8 % (ref 11.5–14.5)
EST. GFR  (NO RACE VARIABLE): >60 ML/MIN/1.73 M^2
GLUCOSE SERPL-MCNC: 113 MG/DL (ref 70–110)
HCT VFR BLD AUTO: 34.6 % (ref 37–48.5)
HGB BLD-MCNC: 11.5 G/DL (ref 12–16)
IMM GRANULOCYTES # BLD AUTO: 0.04 K/UL (ref 0–0.04)
IMM GRANULOCYTES NFR BLD AUTO: 0.3 % (ref 0–0.5)
LYMPHOCYTES # BLD AUTO: 0.6 K/UL (ref 1–4.8)
LYMPHOCYTES NFR BLD: 4.3 % (ref 18–48)
MCH RBC QN AUTO: 29.1 PG (ref 27–31)
MCHC RBC AUTO-ENTMCNC: 33.2 G/DL (ref 32–36)
MCV RBC AUTO: 88 FL (ref 82–98)
MONOCYTES # BLD AUTO: 1 K/UL (ref 0.3–1)
MONOCYTES NFR BLD: 6.5 % (ref 4–15)
NEUTROPHILS # BLD AUTO: 12.9 K/UL (ref 1.8–7.7)
NEUTROPHILS NFR BLD: 88.1 % (ref 38–73)
NRBC BLD-RTO: 0 /100 WBC
PLATELET # BLD AUTO: 296 K/UL (ref 150–450)
PMV BLD AUTO: 9.1 FL (ref 9.2–12.9)
POTASSIUM SERPL-SCNC: 4 MMOL/L (ref 3.5–5.1)
PROT SERPL-MCNC: 6.7 G/DL (ref 6–8.4)
RBC # BLD AUTO: 3.95 M/UL (ref 4–5.4)
SODIUM SERPL-SCNC: 131 MMOL/L (ref 136–145)
WBC # BLD AUTO: 14.67 K/UL (ref 3.9–12.7)

## 2022-10-18 PROCEDURE — 63600175 PHARM REV CODE 636 W HCPCS: Performed by: INTERNAL MEDICINE

## 2022-10-18 PROCEDURE — 84295 ASSAY OF SERUM SODIUM: CPT | Mod: 91 | Performed by: INTERNAL MEDICINE

## 2022-10-18 PROCEDURE — 85025 COMPLETE CBC W/AUTO DIFF WBC: CPT | Performed by: INTERNAL MEDICINE

## 2022-10-18 PROCEDURE — 80053 COMPREHEN METABOLIC PANEL: CPT | Performed by: INTERNAL MEDICINE

## 2022-10-18 PROCEDURE — 36415 COLL VENOUS BLD VENIPUNCTURE: CPT | Performed by: INTERNAL MEDICINE

## 2022-10-18 PROCEDURE — 25000003 PHARM REV CODE 250: Performed by: INTERNAL MEDICINE

## 2022-10-18 PROCEDURE — 96376 TX/PRO/DX INJ SAME DRUG ADON: CPT

## 2022-10-18 PROCEDURE — G0378 HOSPITAL OBSERVATION PER HR: HCPCS

## 2022-10-18 RX ORDER — AMLODIPINE BESYLATE 5 MG/1
5 TABLET ORAL DAILY
Qty: 60 TABLET | Refills: 0 | Status: SHIPPED | OUTPATIENT
Start: 2022-10-18 | End: 2023-10-18

## 2022-10-18 RX ORDER — CLONIDINE HYDROCHLORIDE 0.1 MG/1
0.1 TABLET ORAL ONCE
Status: COMPLETED | OUTPATIENT
Start: 2022-10-18 | End: 2022-10-18

## 2022-10-18 RX ADMIN — CLONIDINE HYDROCHLORIDE 0.1 MG: 0.1 TABLET ORAL at 04:10

## 2022-10-18 RX ADMIN — LABETALOL HYDROCHLORIDE 5 MG: 5 INJECTION, SOLUTION INTRAVENOUS at 12:10

## 2022-10-18 RX ADMIN — MORPHINE SULFATE 1 MG: 2 INJECTION, SOLUTION INTRAMUSCULAR; INTRAVENOUS at 02:10

## 2022-10-18 RX ADMIN — LABETALOL HYDROCHLORIDE 5 MG: 5 INJECTION, SOLUTION INTRAVENOUS at 01:10

## 2022-10-18 RX ADMIN — MORPHINE SULFATE 1 MG: 2 INJECTION, SOLUTION INTRAMUSCULAR; INTRAVENOUS at 08:10

## 2022-10-18 NOTE — PLAN OF CARE
Met with Patient/Caregiver to provide moon.  moon signed by Patient/Caregiver, copy of de leon provided to Patient/Caregiver, and de leon will be scanned to chart.

## 2022-10-18 NOTE — NURSING
Attempted to deflate balloons of rhino rocket per MD order. Trickle of bright red blood noted. Dr. Ching called and updated. Order given to re-inflate balloons and keep nasal packing in place.

## 2022-10-18 NOTE — PLAN OF CARE
Dc orders and AVS sent to [place]. Discharge orders under review, awaiting number for report at this time.        10/18/22 8992   Post-Acute Status   Post-Acute Authorization Placement   Post-Acute Placement Status Pending post-acute provider review/more information requested

## 2022-10-18 NOTE — PLAN OF CARE
Problem: Adult Inpatient Plan of Care  Goal: Plan of Care Review  10/18/2022 0721 by Maria C Donato RN  Outcome: Ongoing, Progressing  10/17/2022 1755 by Maria C Donato RN  Outcome: Ongoing, Progressing  Goal: Patient-Specific Goal (Individualized)  10/18/2022 0721 by Maria C Donato RN  Outcome: Ongoing, Progressing  10/17/2022 1755 by Maria C Donato RN  Outcome: Ongoing, Progressing  Goal: Absence of Hospital-Acquired Illness or Injury  10/18/2022 0721 by Maria C Donato RN  Outcome: Ongoing, Progressing  10/17/2022 1755 by Maria C Donato RN  Outcome: Ongoing, Progressing  Goal: Optimal Comfort and Wellbeing  10/18/2022 0721 by Maria C Donato RN  Outcome: Ongoing, Progressing  10/17/2022 1755 by Maria C Donato RN  Outcome: Ongoing, Progressing  Goal: Readiness for Transition of Care  10/18/2022 0721 by Maria C Donato RN  Outcome: Ongoing, Progressing  10/17/2022 1755 by Maria C Donato RN  Outcome: Ongoing, Progressing     Problem: Fall Injury Risk  Goal: Absence of Fall and Fall-Related Injury  10/18/2022 0721 by Maria C Donato RN  Outcome: Ongoing, Progressing  10/17/2022 1755 by Maria C Donato RN  Outcome: Ongoing, Progressing     Problem: Skin Injury Risk Increased  Goal: Skin Health and Integrity  10/18/2022 0721 by Maria C Donato RN  Outcome: Ongoing, Progressing  10/17/2022 1755 by Maria C Donato RN  Outcome: Ongoing, Progressing

## 2022-10-18 NOTE — CONSULTS
[facility]  Otorhinolaryngology-Head & Neck Surgery  Consult Note  Date: 10/18/2022      Patient Name: Jeannie Hutchins  MRN: [id number]  Code Status: Full Code  Admission Date: 10/17/2022  Consult Date: 10/18/2022  Hospital Length of Stay: 0 days  Attending Physician: Christian Ching MD  Primary Care Provider: Paige Mcleod MD    Inpatient consult to ENT  Consult performed by: Robert Awad MD  Consult ordered by: Han Sam MD        10/18/2022      Subjective:     Chief Complaint/Reason for Admission: Epistaxis    History of Present Illness: Jeannie Hutchins is a 87 y.o. female here for evaluation of Epistaxis. Pt reported to ER yesterday for worsening left nasal bleeding for the past week. She has been on blood thinenrs for her afib and reports she scratched the inside of her left nostril with sharp fingernails. Hemostasis couldn't be controlled with conservative measures so a rhinorocket was placed and pt was admited. No significant bleeding since admission, although nurse reports bleeding when they tried to deflate balloon. Pt denies prior nasal surgeries or trauma    Review of patient's allergies indicates:   Allergen Reactions    Hydrochlorothiazide Other (See Comments)     hyponatremia    Strawberry Swelling     Tongue swells up and a generalized rash.    Lisinopril Other (See Comments)     Cough       Past Medical History:   Diagnosis Date    Anticoagulant long-term use     Eliquis    Arthritis     Atrial fibrillation     Blood transfusion     Carotid stenosis     CHF (congestive heart failure)     Edema     Generalized anxiety disorder 1/23/2018    Dr. Mckeon's eval 1/23/18: She does appear to have a JERRI because of the persistent worries that she has.  These worries predominate her day.  She would probably do well with prevention therapy such as SSRI/SNRI to help control the physical symptoms of the anxiety.  Problem at this point in time is her electrolyte abnormalities.  There  is a slight risk of hyponatremia with these medications and    Hearing loss     [place] (hard of hearing)     Hypercholesterolemia     Hypertension     On home oxygen therapy     Psychiatric problem     Thyroid disease        Past Surgical History:   Procedure Laterality Date    CARDIOVERSION  10/19/2020    Procedure: Cardioversion;  Surgeon: Brandon Elias MD;  Location: [facility] CATH LAB;  Service: Cardiology;;    CARPAL TUNNEL RELEASE  2012    right hand    ESOPHAGOGASTRODUODENOSCOPY Left 8/29/2018    Procedure: EGD (ESOPHAGOGASTRODUODENOSCOPY);  Surgeon: Oniel Dorsey MD;  Location: [facility] ENDO;  Service: Endoscopy;  Laterality: Left;    HYSTERECTOMY      left carotid endartectomy  5/2006    TONSILLECTOMY      TREATMENT OF CARDIAC ARRHYTHMIA N/A 10/19/2020    Procedure: Transesophageal echo (BLADIMIR) intra-procedure log documentation;  Surgeon: Brandon Elias MD;  Location: [facility] CATH LAB;  Service: Cardiology;  Laterality: N/A;       Family History       Problem Relation (Age of Onset)    Breast cancer Daughter (50)    Cancer Brother (65), Sister (81), Sister, Sister    Heart disease Father    Ovarian cancer Sister (81)    Schizophrenia Son            Tobacco Use    Smoking status: Never    Smokeless tobacco: Never   Substance and Sexual Activity    Alcohol use: No    Drug use: No    Sexual activity: Not Currently     Partners: Male       Review of Systems    Objective:     Vital Signs (Most Recent):  Temp: 98.7 °F (37.1 °C) (10/18/22 1327)  Pulse: 102 (10/18/22 1327)  Resp: 16 (10/18/22 1327)  BP: (!) 153/73 (10/18/22 1405)  SpO2: (!) 94 % (10/18/22 1327)   Vital Signs (24h Range):  Temp:  [97.2 °F (36.2 °C)-98.8 °F (37.1 °C)] 98.7 °F (37.1 °C)  Pulse:  [] 102  Resp:  [16-19] 16  SpO2:  [91 %-99 %] 94 %  BP: (142-178)/() 153/73     Weight: 45.2 kg (99 lb 10.4 oz)  Body mass index is 22.34 kg/m².    Physical Exam:    General: Awake, alert, no apparent distress.  Breathing comfortably without stridor. Good  voice  Head and Face: no craniofacial deformities  External Ears: normal pinnae shape and position  Ext. Aud. Canal:  Right: EAC clear        Left:   EAC clear  Tympanic Mem:  Right:  Normal, no retraction or effusion                     Left:    Normal, no retraction or effusion   Nose:  Septum deviate to the right, rhinorocket in left nare, removed, see procedure note  Oral Cavity/Oropharynx:  Mucous membranes moist, no masses or lesions noted.    Neck:  supple without significant adenopathy  Cardiac: No peripheral swelling by examination. 2+ pulses  Respiratory: Breathing comfortably without stridor.  No increased work of breathing  Neuro: Alert, Oriented. Normal mood.        Procedures:     The risks, benefits, and alternatives to the procedure were discussed and appropriate verbal consent was obtained.      Procedure Note    Procedure:  Nasal Endoscopy with control of nasal hemorrhage (CPT 85991)    Pre-operative Diagnosis: epistaxis    Post-operative Diagnosis: same    Anesthesia: Topical afrin and lidocaine    Procedure Details:      All details, risks, benefits, and alternatives were discussed and all questions were answered. Patient/legal guardian gives verbal consent for the procedure. The patient was placed in the procedure chair and positioned in a beach chair position. The nasal cavity was decongested and anesthetized with oxymetazoline and 4% topical lidocaine.  Nasal endoscopy was then performed in the left and then right nostril by passing the scope along the nasal floor to the nasopharynx visualizing the inferior turbinate along the way.  It was then passed into the region of the middle meatus, middle turbinate, and the sphenoethmoid region visualizing the superior turbinate.  Using the nasal endoscope, The source of bleeding (left anterior septum) was isolated and cauterized per protocol with silver nitrate.  Surgicell fibrillar packing was then placed.   The patient tolerated the procedure well.  There were no complications.    Findings: excoriated mucosa    Condition:  Stable.  Patient tolerated procedure well.    Complications:  None      Data Reviewed:     Significant Labs:  Recent Labs   Lab 10/18/22  0304   WBC 14.67*   RBC 3.95*   HGB 11.5*   HCT 34.6*      MCV 88   MCH 29.1   MCHC 33.2         Significant Diagnostics:  No orders to display           Assessment:     1. Epistaxis    2. Chest pain         Plan:     Pt with left epistaxis complicated by a fib on blood thinners. Rhinorocke removed and cautery performed was well as some dissolvable packing placed. Rec starting saline sprays QID Tomorrow. I will fu with her in clinic in one week    Thank you for your consult.     Robert Awad MD  Otorhinolaryngology-Head & Neck Surgery  [facility] ([facility])  [phone]

## 2022-10-18 NOTE — H&P
[facility]    History & Physical      Patient Name: Jeannie Hutchins  MRN: [id number]  Admission Date: 10/17/2022  Attending Physician: Mac Deluca MD   Primary Care Provider: Paige Mcleod MD         Patient information was obtained from patient, past medical records, and ER records.     Subjective:     Principal Problem:Epistaxis    Chief Complaint:   Chief Complaint   Patient presents with    Epistaxis        HPI: 87-year-old lady with prior history of afib on eliquis, HFpEF, HLD, HTN, COPD, pneumonia, CKD.    Pt presents to ED for  nose bleed and hypertension.  The patient thinks she may have scratched her nose with her fingernail.    She is on Eliquis.  She states she just cut her fingernails in May of scratch turned the nares on the left causing the bleeding.  She is on oxygen chronically.  She is awake alert cooperative no distress.    10 pt ROS o/w negative    Past Medical History:   Diagnosis Date    Anticoagulant long-term use     Eliquis    Arthritis     Atrial fibrillation     Blood transfusion     Carotid stenosis     CHF (congestive heart failure)     Edema     Generalized anxiety disorder 1/23/2018    Dr. Mckeon's eval 1/23/18: She does appear to have a JERRI because of the persistent worries that she has.  These worries predominate her day.  She would probably do well with prevention therapy such as SSRI/SNRI to help control the physical symptoms of the anxiety.  Problem at this point in time is her electrolyte abnormalities.  There is a slight risk of hyponatremia with these medications and    Hearing loss     [place] (hard of hearing)     Hypercholesterolemia     Hypertension     On home oxygen therapy     Psychiatric problem     Thyroid disease        Past Surgical History:   Procedure Laterality Date    CARDIOVERSION  10/19/2020    Procedure: Cardioversion;  Surgeon: Brandon Elias MD;  Location: [facility] CATH LAB;  Service: Cardiology;;    CARPAL TUNNEL RELEASE  2012    right  hand    ESOPHAGOGASTRODUODENOSCOPY Left 8/29/2018    Procedure: EGD (ESOPHAGOGASTRODUODENOSCOPY);  Surgeon: Oniel Dorsey MD;  Location: [facility] ENDO;  Service: Endoscopy;  Laterality: Left;    HYSTERECTOMY      left carotid endartectomy  5/2006    TONSILLECTOMY      TREATMENT OF CARDIAC ARRHYTHMIA N/A 10/19/2020    Procedure: Transesophageal echo (BLADIMIR) intra-procedure log documentation;  Surgeon: Brandon Elias MD;  Location: [facility] CATH LAB;  Service: Cardiology;  Laterality: N/A;       Review of patient's allergies indicates:   Allergen Reactions    Hydrochlorothiazide Other (See Comments)     hyponatremia    Strawberry Swelling     Tongue swells up and a generalized rash.    Lisinopril Other (See Comments)     Cough       No current facility-administered medications on file prior to encounter.     Current Outpatient Medications on File Prior to Encounter   Medication Sig    acetaminophen (TYLENOL) 500 MG tablet Take 1 tablet (500 mg total) by mouth every 6 (six) hours as needed for Pain or Temperature greater than (100).    alendronate (FOSAMAX) 70 MG tablet Take 70 mg by mouth every 7 days.    atorvastatin (LIPITOR) 20 MG tablet Take 20 mg by mouth once daily.    diclofenac sodium (VOLTAREN) 1 % Gel Apply 2 g topically 2 (two) times daily.    ELIQUIS 2.5 mg Tab TAKE 1 TABLET BY MOUTH TWICE A DAY (Patient taking differently: Take 2.5 mg by mouth 2 (two) times daily.)    famotidine (PEPCID) 20 MG tablet Take 20 mg by mouth once daily.    fluticasone propionate (FLONASE) 50 mcg/actuation nasal spray 1 spray by Each Nostril route once daily.    furosemide (LASIX) 40 MG tablet Take 1 tablet (40 mg total) by mouth once daily.    levothyroxine (SYNTHROID) 75 MCG tablet Take 75 mcg by mouth once daily.    potassium chloride (K-TAB) 20 mEq Take 20 mEq by mouth once daily.    traZODone (DESYREL) 50 MG tablet Take 1 tablet (50 mg total) by mouth every evening.    albuterol-ipratropium (DUO-NEB) 2.5 mg-0.5 mg/3 mL nebulizer  solution Take 3 mLs by nebulization every 6 (six) hours. Rescue    [DISCONTINUED] amlodipine-atorvastatin (CADUET) 10-40 mg per tablet TAKE ONE TABLET BY MOUTH EVERY DAY (Patient taking differently: Take by mouth nightly.)    [DISCONTINUED] losartan (COZAAR) 100 MG tablet TAKE ONE TABLET BY MOUTH EVERY DAY    [DISCONTINUED] metoprolol tartrate (LOPRESSOR) 25 MG tablet Take 25 mg by mouth once daily.     Family History       Problem Relation (Age of Onset)    Breast cancer Daughter (50)    Cancer Brother (65), Sister (81), Sister, Sister    Heart disease Father    Ovarian cancer Sister (81)    Schizophrenia Son          Tobacco Use    Smoking status: Never    Smokeless tobacco: Never   Substance and Sexual Activity    Alcohol use: No    Drug use: No    Sexual activity: Not Currently     Partners: Male     Review of Systems  Objective:     Vital Signs (Most Recent):  Temp: 97.7 °F (36.5 °C) (10/17/22 1920)  Pulse: 84 (10/17/22 1920)  Resp: 18 (10/17/22 1920)  BP: (!) 142/89 (10/17/22 2001)  SpO2: (!) 93 % (10/17/22 1920)   Vital Signs (24h Range):  Temp:  [97.7 °F (36.5 °C)-97.9 °F (36.6 °C)] 97.7 °F (36.5 °C)  Pulse:  [] 84  Resp:  [18-20] 18  SpO2:  [88 %-97 %] 93 %  BP: (142-169)/(67-99) 142/89     Weight: 45.2 kg (99 lb 10.4 oz)  Body mass index is 22.34 kg/m².    Physical Exam   Physical Exam     Nursing note and vitals reviewed.  Constitutional: She appears well-developed and well-nourished.   HENT:   Head: Normocephalic and atraumatic.   Mouth/Throat: Oropharynx is clear and moist.   Patient has site of bleeding seen on the inner aspect of the left naris.  No active bleeding at the present time.  No bleeding noted the posterior pharynx when she opens her mouth.   Eyes: Conjunctivae, EOM and lids are normal. Pupils are equal, round, and reactive to light.   Neck: Trachea normal. Neck supple. No thyroid mass and no thyromegaly present.   Normal range of motion.  Cardiovascular:  Normal rate, regular rhythm  and normal heart sounds.           Pulmonary/Chest: Effort normal.   Course breath sounds noted   Abdominal: Abdomen is soft. There is no abdominal tenderness.   Musculoskeletal:         General: Normal range of motion.      Cervical back: Normal range of motion and neck supple.      Neurological: She is alert and oriented to person, place, and time. She has normal strength and normal reflexes. No cranial nerve deficit or sensory deficit.   Skin: Skin is warm and dry.   Psychiatric: She has a normal mood and affect. Her speech is normal and behavior is normal. Judgment and thought content normal.     Significant Labs: All pertinent labs within the past 24 hours have been reviewed.    Significant Imaging: I have reviewed all pertinent imaging results/findings within the past 24 hours.    Assessment/Plan:     Active Diagnoses:    Diagnosis Date Noted POA    PRINCIPAL PROBLEM:  Epistaxis [R04.0] 10/17/2022 Unknown    Uncontrolled hypertension [I10] 10/17/2022 Unknown    Hyponatremia [E87.1] 01/23/2018 Unknown      Problems Resolved During this Admission:     VTE Risk Mitigation (From admission, onward)           Ordered     IP VTE HIGH RISK PATIENT  Once         10/17/22 1505     Place sequential compression device  Until discontinued         10/17/22 1505                  Nose bleed  bleeding seen on the inner aspect of the left naris  Likely related to trying of than nasal mucosa and Eliquis use  -multiple therapies tried, pressure, cocaine to the left naris.  The initially had stop bleeding, then rebled and anterior and posterior packing was applied.  Patient have pulling at the working despite multiple attempts to redirect her.  Case discussed with Dr. Awad.  -Eliquis will be held   -control BP, ice packs to nares Q2h, humidify the oxygen, HOB elevated, aspiration precautions, O2/tele monitoring    Hyponatremia, euvolemic  -will be NPO until nose be controlled  -urine sodium is elevated, however she is on  Lasix  -will be NPO so fluid restricted at this time however will need to trend sodium q.6 hours.    Uncontrolled HTN  -prn labetalol    Chronic/prior  afib on eliquis, HFpEF, HLD, HTN, COPD, pneumonia, CKD.    SCDs    Mac Deluca MD  Department of Hospital Medicine   [facility]

## 2022-10-18 NOTE — NURSING
1618 - Report called to Kiran at [facility].    1630 - Informed by nurse tech that BP is 175/105. MD called and updated on BP. Order given for 1 time dose of PO Clonidine. Patient will also be DC'd with PO Norvasc. MD stated to administer Clonidine and notify [facility] to recheck BP on arrival.    1653 - /97. PO Clonidine given.    1655 - Transportation from [facility] at bedside. AVS provided. Verbal handoff given to transporter regarding the above.    1730 - Mindi at [facility] called and updated to recheck BP on pt's arrival

## 2022-10-18 NOTE — PLAN OF CARE
Discharge orders and chart reviewed. No other discharge needs noted at this time. Pt is clear for discharge from case management. Pt is discharging to [facility] where patient is a half-way resident.    Report can be called to [phone]. Patient being admitted to room B9.    Aminta to arrange transport to facility.     Primary nurse notified via secure chat of above.       10/18/22 1513   Final Note   Assessment Type Final Discharge Note   Anticipated Discharge Disposition snf Nu   What phone number can be called within the next 1-3 days to see how you are doing after discharge? [phone]   Hospital Resources/Appts/Education Provided Post-Acute resouces added to AVS   Post-Acute Status   Post-Acute Authorization Placement   Post-Acute Placement Status Set-up Complete/Auth obtained

## 2022-10-19 NOTE — DISCHARGE SUMMARY
[facility] Medicine  Discharge Summary      Patient Name: Jeannie Hutchins  MRN: [id number]  Patient Class: OP- Observation  Admission Date: 10/17/2022  Hospital Length of Stay: 0 days  Discharge Date and Time:  10/19/2022 2:58 PM  Attending Physician: No att. providers found   Discharging Provider: Christian Ching MD  Primary Care Provider: Paige Mcleod MD      HPI:   No notes on file    * No surgery found *      Hospital Course:   87-year-old lady with prior history of afib on eliquis, HFpEF, HLD, HTN, COPD, pneumonia, CKD.  Was admitted with significant epistaxis and uncontrolled hypertension.  Epistaxis started appeared to be from nose picking from the fingernails.  She was given IV antihypertensive and posterior nasal packing was done and Eliquis was held .  Later patient was reviewed by ENT doctor and nasal packing was removed and cautery performed was well as some dissolvable packing placed and no more bleeding and DC in stable condition . Eliquis was held 5 days at discharge. ENT will arrange follow up visit in clinic in one week .          Goals of Care Treatment Preferences:  Code Status: Full Code    Health care agent: [facility] agent number: [phone]    Living Will: Yes  [place]: Yes           Consults:   Consults (From admission, onward)        Status Ordering Provider     IP consult to case management  Once        Provider:  (Not yet assigned)    NASREEN Bettencourt     Inpatient consult to Social Work/Case Management  Once        Provider:  (Not yet assigned)    Completed NASREEN MAGUIRE     Inpatient consult to ENT  Once        Provider:  Erik Marley MD    Completed DENG OSUNA          No new Assessment & Plan notes have been filed under this hospital service since the last note was generated.  Service: Hospital Medicine    Final Active Diagnoses:    Diagnosis Date Noted POA    PRINCIPAL PROBLEM:  Epistaxis [R04.0] 10/17/2022  Unknown    Uncontrolled hypertension [I10] 10/17/2022 Unknown    Hyponatremia [E87.1] 01/23/2018 Unknown      Problems Resolved During this Admission:       Discharged Condition: good    Disposition: Intermediate Care Facili*    Follow Up:   Contact information for follow-up providers     Paige Mcleod MD. Schedule an appointment as soon as possible for a visit in 2 days.    Specialty: Internal Medicine  Contact information:  [address] [facility] [address]  [facility] [address]  [phone]             Robert Awad MD. Call today.    Specialty: Otolaryngology  Why: To schedule for evaluation for packing removal tomorrow  Contact information:  [address] [facility]  [facility] [id number] [phone]                   Contact information for after-discharge care     Destination     [facility] .    Service: Nursing Home  Contact information:  [address]  [facility] [address]  [phone]                           Patient Instructions:      Diet Cardiac     Activity as tolerated       Significant Diagnostic Studies: Labs:   BMP:   Recent Labs   Lab 10/18/22  0304 10/18/22  0929 10/18/22  1459   *  --   --    * 131* 131*   K 4.0  --   --    CL 96  --   --    CO2 25  --   --    BUN 18  --   --    CREATININE 0.7  --   --    CALCIUM 9.1  --   --     and CMP   Recent Labs   Lab 10/18/22  0304 10/18/22  0929 10/18/22  1459   * 131* 131*   K 4.0  --   --    CL 96  --   --    CO2 25  --   --    *  --   --    BUN 18  --   --    CREATININE 0.7  --   --    CALCIUM 9.1  --   --    PROT 6.7  --   --    ALBUMIN 3.9  --   --    BILITOT 2.3*  --   --    ALKPHOS 85  --   --    AST 18  --   --    ALT 13  --   --    ANIONGAP 10  --   --        Pending Diagnostic Studies:     None         Medications:  Reconciled Home Medications:      Medication List      START taking these medications    amLODIPine 5 MG tablet  Commonly known as:  NORVASC  Take 1 tablet (5 mg total) by mouth once daily.        CONTINUE taking these medications    acetaminophen 500 MG tablet  Commonly known as: TYLENOL  Take 1 tablet (500 mg total) by mouth every 6 (six) hours as needed for Pain or Temperature greater than (100).     albuterol-ipratropium 2.5 mg-0.5 mg/3 mL nebulizer solution  Commonly known as: DUO-NEB  Take 3 mLs by nebulization every 6 (six) hours. Rescue     alendronate 70 MG tablet  Commonly known as: FOSAMAX  Take 70 mg by mouth every 7 days.     apixaban 2.5 mg Tab  Commonly known as: ELIQUIS  Take 1 tablet (2.5 mg total) by mouth 2 (two) times daily.     atorvastatin 20 MG tablet  Commonly known as: LIPITOR  Take 20 mg by mouth once daily.     famotidine 20 MG tablet  Commonly known as: PEPCID  Take 20 mg by mouth once daily.     fluticasone propionate 50 mcg/actuation nasal spray  Commonly known as: FLONASE  1 spray by Each Nostril route once daily.     furosemide 40 MG tablet  Commonly known as: LASIX  Take 1 tablet (40 mg total) by mouth once daily.     levothyroxine 75 MCG tablet  Commonly known as: SYNTHROID  Take 75 mcg by mouth once daily.     potassium chloride 20 mEq  Commonly known as: K-TAB  Take 20 mEq by mouth once daily.     traZODone 50 MG tablet  Commonly known as: DESYREL  Take 1 tablet (50 mg total) by mouth every evening.        STOP taking these medications    diclofenac sodium 1 % Gel  Commonly known as: VOLTAREN            Indwelling Lines/Drains at time of discharge:   Lines/Drains/Airways     None               General: Patient resting comfortably in no acute distress. Appears as stated age. Calm  Eyes: EOM intact. No conjunctivae injection. No scleral icterus.  ENT: Hearing grossly intact. No discharge from ears. No nasal discharge.   CVS: RRR. No LE edema BL.  Lungs:  No accessory muscle use. No acute respiratory distress  Neuro: demented  Time spent on the discharge of patient: 33 minutes         Christian Ching MD  Department  of [facility] Medicine  [facility]

## 2022-10-19 NOTE — PLAN OF CARE
Discharge orders and chart reviewed. No other discharge needs noted at this time. Pt is clear for discharge from case management. Pt is discharging to [facility] where patient is a half-way resident.     Report can be called to [phone]. Patient being admitted to room B9.     Aminta to arrange transport to facility.      Primary nurse notified via secure chat of above.         10/19/22 1001   Final Note   Assessment Type Final Discharge Note   Anticipated Discharge Disposition Int Care Fac   What phone number can be called within the next 1-3 days to see how you are doing after discharge? [phone]   Hospital Resources/Appts/Education Provided Post-Acute resouces added to AVS   Post-Acute Status   Post-Acute Authorization Placement   Post-Acute Placement Status Set-up Complete/Auth obtained

## 2022-10-19 NOTE — HOSPITAL COURSE
87-year-old lady with prior history of afib on eliquis, HFpEF, HLD, HTN, COPD, pneumonia, CKD.  Was admitted with significant epistaxis and uncontrolled hypertension.  Epistaxis started appeared to be from nose picking from the fingernails.  She was given IV antihypertensive and posterior nasal packing was done and Eliquis was held .  Later patient was reviewed by ENT doctor and nasal packing was removed and cautery performed was well as some dissolvable packing placed and no more bleeding and DC in stable condition . Eliquis was held 5 days at discharge. ENT will arrange follow up visit in clinic in one week .

## 2023-01-11 ENCOUNTER — HOSPITAL ENCOUNTER (EMERGENCY)
Facility: HOSPITAL | Age: 88
Discharge: HOME | End: 2023-01-11
Attending: EMERGENCY MEDICINE
Payer: MEDICARE

## 2023-01-11 VITALS
WEIGHT: 100 LBS | RESPIRATION RATE: 19 BRPM | HEIGHT: 56 IN | TEMPERATURE: 98 F | BODY MASS INDEX: 22.5 KG/M2 | DIASTOLIC BLOOD PRESSURE: 81 MMHG | OXYGEN SATURATION: 95 % | SYSTOLIC BLOOD PRESSURE: 152 MMHG | HEART RATE: 92 BPM

## 2023-01-11 DIAGNOSIS — R04.0 EPISTAXIS: Primary | ICD-10-CM

## 2023-01-11 PROCEDURE — 99283 EMERGENCY DEPT VISIT LOW MDM: CPT

## 2023-01-11 NOTE — DISCHARGE INSTRUCTIONS
You were seen for nose bleeding in the ED. Bleeding was controlled with pressure. Please follow up with ENT because it sounds like prior to arrival you needed to more than just pressure. Please return to the ED if the bleeding returns and is unable to be stopped with pressure or if you have Headache, nausea,vomiting, or any other concerns you feel should be evaluated by an emergency room physician.

## 2023-01-11 NOTE — ED PROVIDER NOTES
"Encounter Date: 1/11/2023       History     Chief Complaint   Patient presents with    Epistaxis     Jeannie Hutchins 87-year-old female with past medical history of atrial fib currently on Eliquis, carotid stenosis, heart failure, edema, generalized anxiety disorder, HLD, home oxygen therapy who presents to the emergency department for emergent evaluation nosebleed.  Patient relates she was scratching her nose and she started bleeding from the left naris.  Patient states they had to call in a specialist to the facility in which she stays and they gave her something that" dissolves in my nose and some ice" and that seemed to control the bleeding for some time.  Patient relates when the bleeding started again they called EMS for transport to the ED. patient this time appears to be in no acute distress, alert and oriented times 3 with GCS of 15.  Denies headache, blurred or double vision, chest pain, shortness breast, nausea or vomiting or focal neurologic deficits.  Patient states she takes all her medications as prescribed.      Review of patient's allergies indicates:   Allergen Reactions    Hydrochlorothiazide Other (See Comments)     hyponatremia    Strawberry Swelling     Tongue swells up and a generalized rash.    Lisinopril Other (See Comments)     Cough     Past Medical History:   Diagnosis Date    Anticoagulant long-term use     Eliquis    Arthritis     Atrial fibrillation     Blood transfusion     Carotid stenosis     CHF (congestive heart failure)     Edema     Generalized anxiety disorder 1/23/2018    Dr. Mckeon's eval 1/23/18: She does appear to have a JERRI because of the persistent worries that she has.  These worries predominate her day.  She would probably do well with prevention therapy such as SSRI/SNRI to help control the physical symptoms of the anxiety.  Problem at this point in time is her electrolyte abnormalities.  There is a slight risk of hyponatremia with these medications and    Hearing loss "     [place] (hard of hearing)     Hypercholesterolemia     Hypertension     On home oxygen therapy     Psychiatric problem     Thyroid disease      Past Surgical History:   Procedure Laterality Date    CARDIOVERSION  10/19/2020    Procedure: Cardioversion;  Surgeon: Brandon Elias MD;  Location: [facility] CATH LAB;  Service: Cardiology;;    CARPAL TUNNEL RELEASE  2012    right hand    ESOPHAGOGASTRODUODENOSCOPY Left 8/29/2018    Procedure: EGD (ESOPHAGOGASTRODUODENOSCOPY);  Surgeon: Oniel Dorsey MD;  Location: [facility] ENDO;  Service: Endoscopy;  Laterality: Left;    HYSTERECTOMY      left carotid endartectomy  5/2006    TONSILLECTOMY      TREATMENT OF CARDIAC ARRHYTHMIA N/A 10/19/2020    Procedure: Transesophageal echo (BLADIMIR) intra-procedure log documentation;  Surgeon: Brandon Elias MD;  Location: [facility] CATH LAB;  Service: Cardiology;  Laterality: N/A;     Family History   Problem Relation Age of Onset    Heart disease Father     Cancer Brother 65        bladder    Cancer Sister 81        Ovarian    Ovarian cancer Sister 81    Breast cancer Daughter 50        Status post mastectomy    Cancer Sister         Lung.  Smoker    Cancer Sister         Lung.  Smoker    Schizophrenia Son      Social History     Tobacco Use    Smoking status: Never    Smokeless tobacco: Never   Substance Use Topics    Alcohol use: No    Drug use: No     Review of Systems   Constitutional:  Negative for activity change, fatigue and fever.   HENT:  Positive for congestion and nosebleeds (Started at 11:30 p.m. yesterday). Negative for sore throat, tinnitus and trouble swallowing.    Eyes:  Negative for discharge and redness.   Respiratory:  Negative for shortness of breath.    Cardiovascular:  Negative for chest pain.   Gastrointestinal:  Negative for abdominal distention, abdominal pain, nausea and vomiting.   Genitourinary:  Negative for dysuria.   Musculoskeletal:  Negative for back pain.   Skin:  Negative for color change.   Neurological:   Negative for dizziness, weakness and light-headedness.     Physical Exam     Initial Vitals [01/11/23 0306]   BP Pulse Resp Temp SpO2   (!) 161/97 99 (!) 24 97.9 °F (36.6 °C) (!) 93 %      MAP       --         Physical Exam    Nursing note and vitals reviewed.  Constitutional: She appears well-developed.   HENT:   Head: Normocephalic.   Right Ear: External ear normal.   Left Ear: External ear normal.   Nose: No nose lacerations, nasal deformity, septal deviation or nasal septal hematoma. Epistaxis is observed.  No foreign bodies.   Mouth/Throat: Oropharynx is clear and moist.   Eyes: EOM are normal. Pupils are equal, round, and reactive to light. Right eye exhibits no discharge. Left eye exhibits no discharge.   Neck: No JVD present.   Cardiovascular:  Normal rate and regular rhythm.           No murmur heard.  Pulmonary/Chest: Breath sounds normal. No respiratory distress. She has no wheezes. She has no rhonchi. She has no rales.   Abdominal: Abdomen is soft. She exhibits no distension. There is no abdominal tenderness. There is no rebound.   Musculoskeletal:         General: Normal range of motion.     Neurological: She is alert.   Skin: Skin is warm. Capillary refill takes less than 2 seconds.       ED Course   Procedures  Labs Reviewed - No data to display       Imaging Results    None          Medications - No data to display  Medical Decision Making:   Initial Assessment:   Patient presents to the emergency department for emergent evaluation of left nostril nosebleed.  At this time bleeding was controlled with pressure to the anterior urinary.  Patient denies recent fever, night sweats, chills, nasal congestion, states she was just scratching her nose.  Patient's long-time nasal oxygen where unlikely was dried from this. Patient has been in the ED without bleeding for the last 2 hours and at this time she seems to be stable for discharge.  Patient states she is ready for discharge. Patient will be given strict  return precautions as well as a referral to ENT for further evaluation of bleeding. Case was discussed with Dr. Sam who was present for all aspects of patient care.     Cristian Sweeney MD   [employer] IM/EM PGY 4  [facility]          Attending Attestation:   Physician Attestation Statement for Resident:  As the supervising MD   I agree with the above history.  -:   As the supervising MD I agree with the above PE.     As the supervising MD I agree with the above treatment, course, plan, and disposition.                  ED Course as of 01/11/23 0629 Wed Jan 11, 2023   0359 Bleeding controlled. Patient states she is feeling much better and has no complaints at this time [CH]      ED Course User Index  [CH] Cristian Sweeney MD                 Clinical Impression:   Final diagnoses:  [R04.0] Epistaxis (Primary)        ED Disposition Condition    Discharge Stable          ED Prescriptions    None       Follow-up Information       Follow up With Specialties Details Why Contact Info    Robert Awad MD Otolaryngology Call in 1 day for re-examination of your symptoms [address] [facility] [address] NOSE [address] THROAT  [facility] [address]  [phone]               Cristian Sweeney MD  Resident  01/11/23 0614       Han Sam MD  01/11/23 0629

## 2023-02-17 ENCOUNTER — LAB VISIT (OUTPATIENT)
Dept: LAB | Facility: HOSPITAL | Age: 88
End: 2023-02-17
Attending: FAMILY MEDICINE
Payer: COMMERCIAL

## 2023-02-17 DIAGNOSIS — R79.89 HYPOURICEMIA: ICD-10-CM

## 2023-02-17 DIAGNOSIS — R68.89 MECHANICAL AND MOTOR PROBLEMS WITH INTERNAL ORGANS: Primary | ICD-10-CM

## 2023-02-17 LAB
ALBUMIN SERPL BCP-MCNC: 3.8 G/DL (ref 3.5–5.2)
ALP SERPL-CCNC: 103 U/L (ref 55–135)
ALT SERPL W/O P-5'-P-CCNC: 26 U/L (ref 10–44)
ANION GAP SERPL CALC-SCNC: 11 MMOL/L (ref 8–16)
AST SERPL-CCNC: 22 U/L (ref 10–40)
BASOPHILS # BLD AUTO: 0.06 K/UL (ref 0–0.2)
BASOPHILS NFR BLD: 0.5 % (ref 0–1.9)
BILIRUB SERPL-MCNC: 1.3 MG/DL (ref 0.1–1)
BUN SERPL-MCNC: 17 MG/DL (ref 8–23)
CALCIUM SERPL-MCNC: 9.3 MG/DL (ref 8.7–10.5)
CHLORIDE SERPL-SCNC: 95 MMOL/L (ref 95–110)
CO2 SERPL-SCNC: 25 MMOL/L (ref 23–29)
CREAT SERPL-MCNC: 1 MG/DL (ref 0.5–1.4)
DIFFERENTIAL METHOD: ABNORMAL
EOSINOPHIL # BLD AUTO: 0.1 K/UL (ref 0–0.5)
EOSINOPHIL NFR BLD: 0.8 % (ref 0–8)
ERYTHROCYTE [DISTWIDTH] IN BLOOD BY AUTOMATED COUNT: 15.9 % (ref 11.5–14.5)
EST. GFR  (NO RACE VARIABLE): 55 ML/MIN/1.73 M^2
GLUCOSE SERPL-MCNC: 94 MG/DL (ref 70–110)
HCT VFR BLD AUTO: 30.5 % (ref 37–48.5)
HGB BLD-MCNC: 9.8 G/DL (ref 12–16)
IMM GRANULOCYTES # BLD AUTO: 0.05 K/UL (ref 0–0.04)
IMM GRANULOCYTES NFR BLD AUTO: 0.4 % (ref 0–0.5)
LYMPHOCYTES # BLD AUTO: 0.5 K/UL (ref 1–4.8)
LYMPHOCYTES NFR BLD: 3.5 % (ref 18–48)
MCH RBC QN AUTO: 26.5 PG (ref 27–31)
MCHC RBC AUTO-ENTMCNC: 32.1 G/DL (ref 32–36)
MCV RBC AUTO: 82 FL (ref 82–98)
MONOCYTES # BLD AUTO: 1.2 K/UL (ref 0.3–1)
MONOCYTES NFR BLD: 8.8 % (ref 4–15)
NEUTROPHILS # BLD AUTO: 11.4 K/UL (ref 1.8–7.7)
NEUTROPHILS NFR BLD: 86 % (ref 38–73)
NRBC BLD-RTO: 0 /100 WBC
PLATELET # BLD AUTO: 471 K/UL (ref 150–450)
PMV BLD AUTO: 8.7 FL (ref 9.2–12.9)
POTASSIUM SERPL-SCNC: 4.9 MMOL/L (ref 3.5–5.1)
PROT SERPL-MCNC: 6.4 G/DL (ref 6–8.4)
RBC # BLD AUTO: 3.7 M/UL (ref 4–5.4)
SODIUM SERPL-SCNC: 131 MMOL/L (ref 136–145)
WBC # BLD AUTO: 13.27 K/UL (ref 3.9–12.7)

## 2023-02-17 PROCEDURE — 36415 COLL VENOUS BLD VENIPUNCTURE: CPT | Performed by: FAMILY MEDICINE

## 2023-02-17 PROCEDURE — 80053 COMPREHEN METABOLIC PANEL: CPT | Performed by: FAMILY MEDICINE

## 2023-02-17 PROCEDURE — 85025 COMPLETE CBC W/AUTO DIFF WBC: CPT | Performed by: FAMILY MEDICINE

## 2023-04-20 ENCOUNTER — HOSPITAL ENCOUNTER (INPATIENT)
Facility: HOSPITAL | Age: 88
LOS: 6 days | DRG: 291 | End: 2023-04-27
Attending: EMERGENCY MEDICINE | Admitting: INTERNAL MEDICINE
Payer: MEDICARE

## 2023-04-20 DIAGNOSIS — R09.02 HYPOXIA: ICD-10-CM

## 2023-04-20 DIAGNOSIS — R06.00 DYSPNEA: ICD-10-CM

## 2023-04-20 DIAGNOSIS — J81.0 ACUTE PULMONARY EDEMA: ICD-10-CM

## 2023-04-20 DIAGNOSIS — I50.9 ACUTE CONGESTIVE HEART FAILURE: ICD-10-CM

## 2023-04-20 DIAGNOSIS — I50.9 ACUTE CONGESTIVE HEART FAILURE, UNSPECIFIED HEART FAILURE TYPE: Primary | ICD-10-CM

## 2023-04-20 DIAGNOSIS — I50.30 (HFPEF) HEART FAILURE WITH PRESERVED EJECTION FRACTION: ICD-10-CM

## 2023-04-20 DIAGNOSIS — J96.21 ACUTE ON CHRONIC RESPIRATORY FAILURE WITH HYPOXIA: ICD-10-CM

## 2023-04-20 DIAGNOSIS — R07.9 CHEST PAIN: ICD-10-CM

## 2023-04-20 PROBLEM — I48.91 ATRIAL FIBRILLATION: Status: ACTIVE | Noted: 2018-01-29

## 2023-04-20 PROBLEM — L03.119 CELLULITIS OF LEG: Status: ACTIVE | Noted: 2023-04-20

## 2023-04-20 PROBLEM — N18.30 CKD (CHRONIC KIDNEY DISEASE) STAGE 3, GFR 30-59 ML/MIN: Status: ACTIVE | Noted: 2021-04-20

## 2023-04-20 LAB
ALBUMIN SERPL BCP-MCNC: 3.5 G/DL (ref 3.5–5.2)
ALLENS TEST: ABNORMAL
ALP SERPL-CCNC: 82 U/L (ref 55–135)
ALT SERPL W/O P-5'-P-CCNC: 30 U/L (ref 10–44)
ANION GAP SERPL CALC-SCNC: 9 MMOL/L (ref 8–16)
AST SERPL-CCNC: 22 U/L (ref 10–40)
BASOPHILS # BLD AUTO: 0.01 K/UL (ref 0–0.2)
BASOPHILS NFR BLD: 0.1 % (ref 0–1.9)
BILIRUB SERPL-MCNC: 1 MG/DL (ref 0.1–1)
BILIRUB UR QL STRIP: NEGATIVE
BNP SERPL-MCNC: 1103 PG/ML (ref 0–99)
BUN SERPL-MCNC: 23 MG/DL (ref 8–23)
CALCIUM SERPL-MCNC: 8.6 MG/DL (ref 8.7–10.5)
CHLORIDE SERPL-SCNC: 94 MMOL/L (ref 95–110)
CLARITY UR: CLEAR
CO2 SERPL-SCNC: 24 MMOL/L (ref 23–29)
COLOR UR: COLORLESS
CREAT SERPL-MCNC: 1 MG/DL (ref 0.5–1.4)
DELSYS: ABNORMAL
DIFFERENTIAL METHOD: ABNORMAL
EOSINOPHIL # BLD AUTO: 0 K/UL (ref 0–0.5)
EOSINOPHIL NFR BLD: 0.3 % (ref 0–8)
ERYTHROCYTE [DISTWIDTH] IN BLOOD BY AUTOMATED COUNT: 19.9 % (ref 11.5–14.5)
EST. GFR  (NO RACE VARIABLE): 54.5 ML/MIN/1.73 M^2
FLOW: 10
GLUCOSE SERPL-MCNC: 107 MG/DL (ref 70–110)
GLUCOSE SERPL-MCNC: 123 MG/DL (ref 70–110)
GLUCOSE UR QL STRIP: NEGATIVE
HCO3 UR-SCNC: 27.4 MMOL/L (ref 24–28)
HCT VFR BLD AUTO: 30.6 % (ref 37–48.5)
HCT VFR BLD CALC: 35 %PCV (ref 36–54)
HGB BLD-MCNC: 9.5 G/DL (ref 12–16)
HGB UR QL STRIP: NEGATIVE
IMM GRANULOCYTES # BLD AUTO: 0.05 K/UL (ref 0–0.04)
IMM GRANULOCYTES NFR BLD AUTO: 0.4 % (ref 0–0.5)
KETONES UR QL STRIP: NEGATIVE
LACTATE SERPL-SCNC: 1.3 MMOL/L (ref 0.5–1.9)
LEUKOCYTE ESTERASE UR QL STRIP: NEGATIVE
LYMPHOCYTES # BLD AUTO: 0.7 K/UL (ref 1–4.8)
LYMPHOCYTES NFR BLD: 5.7 % (ref 18–48)
MAGNESIUM SERPL-MCNC: 1.8 MG/DL (ref 1.6–2.6)
MCH RBC QN AUTO: 24.5 PG (ref 27–31)
MCHC RBC AUTO-ENTMCNC: 31 G/DL (ref 32–36)
MCV RBC AUTO: 79 FL (ref 82–98)
MODE: ABNORMAL
MONOCYTES # BLD AUTO: 1 K/UL (ref 0.3–1)
MONOCYTES NFR BLD: 8.6 % (ref 4–15)
NEUTROPHILS # BLD AUTO: 9.8 K/UL (ref 1.8–7.7)
NEUTROPHILS NFR BLD: 84.9 % (ref 38–73)
NITRITE UR QL STRIP: NEGATIVE
NRBC BLD-RTO: 0 /100 WBC
PCO2 BLDA: 46 MMHG (ref 35–45)
PH SMN: 7.38 [PH] (ref 7.35–7.45)
PH UR STRIP: 6 [PH] (ref 5–8)
PLATELET # BLD AUTO: 412 K/UL (ref 150–450)
PMV BLD AUTO: 8.7 FL (ref 9.2–12.9)
PO2 BLDA: 27 MMHG (ref 40–60)
POC BE: 2 MMOL/L
POC IONIZED CALCIUM: 1.2 MMOL/L (ref 1.06–1.42)
POC SATURATED O2: 48 % (ref 95–100)
POC TCO2: 29 MMOL/L (ref 24–29)
POTASSIUM BLD-SCNC: 4.9 MMOL/L (ref 3.5–5.1)
POTASSIUM SERPL-SCNC: 4.7 MMOL/L (ref 3.5–5.1)
PROT SERPL-MCNC: 6 G/DL (ref 6–8.4)
PROT UR QL STRIP: NEGATIVE
RBC # BLD AUTO: 3.87 M/UL (ref 4–5.4)
SAMPLE: ABNORMAL
SARS-COV-2 RDRP RESP QL NAA+PROBE: NEGATIVE
SITE: ABNORMAL
SODIUM BLD-SCNC: 127 MMOL/L (ref 136–145)
SODIUM SERPL-SCNC: 127 MMOL/L (ref 136–145)
SP GR UR STRIP: 1 (ref 1–1.03)
TROPONIN I SERPL HS-MCNC: 7.8 PG/ML (ref 0–14.9)
TSH SERPL DL<=0.005 MIU/L-ACNC: 2.41 UIU/ML (ref 0.34–5.6)
URN SPEC COLLECT METH UR: ABNORMAL
UROBILINOGEN UR STRIP-ACNC: NEGATIVE EU/DL
WBC # BLD AUTO: 11.5 K/UL (ref 3.9–12.7)

## 2023-04-20 PROCEDURE — 87040 BLOOD CULTURE FOR BACTERIA: CPT | Performed by: EMERGENCY MEDICINE

## 2023-04-20 PROCEDURE — 85025 COMPLETE CBC W/AUTO DIFF WBC: CPT | Performed by: EMERGENCY MEDICINE

## 2023-04-20 PROCEDURE — 84132 ASSAY OF SERUM POTASSIUM: CPT

## 2023-04-20 PROCEDURE — 96367 TX/PROPH/DG ADDL SEQ IV INF: CPT

## 2023-04-20 PROCEDURE — U0002 COVID-19 LAB TEST NON-CDC: HCPCS | Performed by: EMERGENCY MEDICINE

## 2023-04-20 PROCEDURE — 83735 ASSAY OF MAGNESIUM: CPT | Performed by: EMERGENCY MEDICINE

## 2023-04-20 PROCEDURE — G0378 HOSPITAL OBSERVATION PER HR: HCPCS

## 2023-04-20 PROCEDURE — 84443 ASSAY THYROID STIM HORMONE: CPT | Performed by: EMERGENCY MEDICINE

## 2023-04-20 PROCEDURE — 93010 EKG 12-LEAD: ICD-10-PCS | Mod: ,,, | Performed by: SPECIALIST

## 2023-04-20 PROCEDURE — 25000003 PHARM REV CODE 250: Performed by: EMERGENCY MEDICINE

## 2023-04-20 PROCEDURE — 94761 N-INVAS EAR/PLS OXIMETRY MLT: CPT

## 2023-04-20 PROCEDURE — 84295 ASSAY OF SERUM SODIUM: CPT

## 2023-04-20 PROCEDURE — 93010 ELECTROCARDIOGRAM REPORT: CPT | Mod: ,,, | Performed by: SPECIALIST

## 2023-04-20 PROCEDURE — 80053 COMPREHEN METABOLIC PANEL: CPT | Performed by: EMERGENCY MEDICINE

## 2023-04-20 PROCEDURE — 96365 THER/PROPH/DIAG IV INF INIT: CPT

## 2023-04-20 PROCEDURE — 82803 BLOOD GASES ANY COMBINATION: CPT

## 2023-04-20 PROCEDURE — 36415 COLL VENOUS BLD VENIPUNCTURE: CPT | Performed by: EMERGENCY MEDICINE

## 2023-04-20 PROCEDURE — 99900035 HC TECH TIME PER 15 MIN (STAT)

## 2023-04-20 PROCEDURE — 83880 ASSAY OF NATRIURETIC PEPTIDE: CPT | Performed by: EMERGENCY MEDICINE

## 2023-04-20 PROCEDURE — 83605 ASSAY OF LACTIC ACID: CPT | Performed by: EMERGENCY MEDICINE

## 2023-04-20 PROCEDURE — 96375 TX/PRO/DX INJ NEW DRUG ADDON: CPT

## 2023-04-20 PROCEDURE — 27000221 HC OXYGEN, UP TO 24 HOURS

## 2023-04-20 PROCEDURE — 81003 URINALYSIS AUTO W/O SCOPE: CPT | Performed by: NURSE PRACTITIONER

## 2023-04-20 PROCEDURE — 84484 ASSAY OF TROPONIN QUANT: CPT | Performed by: EMERGENCY MEDICINE

## 2023-04-20 PROCEDURE — 63600175 PHARM REV CODE 636 W HCPCS: Performed by: NURSE PRACTITIONER

## 2023-04-20 PROCEDURE — 93005 ELECTROCARDIOGRAM TRACING: CPT | Performed by: SPECIALIST

## 2023-04-20 PROCEDURE — 85014 HEMATOCRIT: CPT

## 2023-04-20 PROCEDURE — 82330 ASSAY OF CALCIUM: CPT

## 2023-04-20 PROCEDURE — 99291 CRITICAL CARE FIRST HOUR: CPT

## 2023-04-20 PROCEDURE — 63600175 PHARM REV CODE 636 W HCPCS: Performed by: EMERGENCY MEDICINE

## 2023-04-20 RX ORDER — FUROSEMIDE 10 MG/ML
20 INJECTION INTRAMUSCULAR; INTRAVENOUS
Status: DISCONTINUED | OUTPATIENT
Start: 2023-04-21 | End: 2023-04-22

## 2023-04-20 RX ORDER — METOPROLOL TARTRATE 25 MG/1
25 TABLET, FILM COATED ORAL 2 TIMES DAILY
COMMUNITY

## 2023-04-20 RX ORDER — FAMOTIDINE 20 MG/1
20 TABLET, FILM COATED ORAL DAILY
Status: DISCONTINUED | OUTPATIENT
Start: 2023-04-21 | End: 2023-04-20

## 2023-04-20 RX ORDER — ESCITALOPRAM OXALATE 10 MG/1
10 TABLET ORAL DAILY
Status: DISCONTINUED | OUTPATIENT
Start: 2023-04-21 | End: 2023-04-27 | Stop reason: HOSPADM

## 2023-04-20 RX ORDER — ONDANSETRON 2 MG/ML
4 INJECTION INTRAMUSCULAR; INTRAVENOUS EVERY 6 HOURS PRN
Status: DISCONTINUED | OUTPATIENT
Start: 2023-04-20 | End: 2023-04-27 | Stop reason: HOSPADM

## 2023-04-20 RX ORDER — ESCITALOPRAM OXALATE 10 MG/1
10 TABLET ORAL DAILY
COMMUNITY
Start: 2023-03-30

## 2023-04-20 RX ORDER — VIT C/E/ZN/COPPR/LUTEIN/ZEAXAN 250MG-90MG
1 CAPSULE ORAL DAILY
Status: ON HOLD | COMMUNITY
End: 2023-06-12 | Stop reason: HOSPADM

## 2023-04-20 RX ORDER — POTASSIUM CHLORIDE 20 MEQ/1
20 TABLET, EXTENDED RELEASE ORAL DAILY
Status: DISCONTINUED | OUTPATIENT
Start: 2023-04-21 | End: 2023-04-27 | Stop reason: HOSPADM

## 2023-04-20 RX ORDER — TALC
6 POWDER (GRAM) TOPICAL NIGHTLY PRN
Status: DISCONTINUED | OUTPATIENT
Start: 2023-04-20 | End: 2023-04-27 | Stop reason: HOSPADM

## 2023-04-20 RX ORDER — DOXYCYCLINE 100 MG/1
100 CAPSULE ORAL EVERY 12 HOURS
Status: DISCONTINUED | OUTPATIENT
Start: 2023-04-20 | End: 2023-04-20

## 2023-04-20 RX ORDER — TRIAMCINOLONE ACETONIDE 1 MG/G
CREAM TOPICAL 2 TIMES DAILY
Status: DISCONTINUED | OUTPATIENT
Start: 2023-04-20 | End: 2023-04-27 | Stop reason: HOSPADM

## 2023-04-20 RX ORDER — FUROSEMIDE 10 MG/ML
20 INJECTION INTRAMUSCULAR; INTRAVENOUS
Status: COMPLETED | OUTPATIENT
Start: 2023-04-20 | End: 2023-04-20

## 2023-04-20 RX ORDER — CARBOXYMETHYLCELLULOSE SODIUM 2.5 MG/ML
1 SOLUTION/ DROPS OPHTHALMIC
Status: ON HOLD | COMMUNITY
End: 2023-04-27 | Stop reason: HOSPADM

## 2023-04-20 RX ORDER — ATORVASTATIN CALCIUM 20 MG/1
20 TABLET, FILM COATED ORAL DAILY
Status: DISCONTINUED | OUTPATIENT
Start: 2023-04-21 | End: 2023-04-27 | Stop reason: HOSPADM

## 2023-04-20 RX ORDER — POLYETHYLENE GLYCOL 3350 17 G/17G
17 POWDER, FOR SOLUTION ORAL 2 TIMES DAILY PRN
Status: DISCONTINUED | OUTPATIENT
Start: 2023-04-20 | End: 2023-04-27 | Stop reason: HOSPADM

## 2023-04-20 RX ORDER — LORATADINE 10 MG/1
10 TABLET ORAL DAILY
COMMUNITY

## 2023-04-20 RX ORDER — NALOXONE HCL 0.4 MG/ML
0.02 VIAL (ML) INJECTION
Status: DISCONTINUED | OUTPATIENT
Start: 2023-04-20 | End: 2023-04-27 | Stop reason: HOSPADM

## 2023-04-20 RX ORDER — AMLODIPINE BESYLATE 5 MG/1
5 TABLET ORAL DAILY
Status: DISCONTINUED | OUTPATIENT
Start: 2023-04-21 | End: 2023-04-27 | Stop reason: HOSPADM

## 2023-04-20 RX ORDER — FLUTICASONE PROPIONATE 50 MCG
1 SPRAY, SUSPENSION (ML) NASAL DAILY
Status: DISCONTINUED | OUTPATIENT
Start: 2023-04-21 | End: 2023-04-27 | Stop reason: HOSPADM

## 2023-04-20 RX ORDER — OXYCODONE HYDROCHLORIDE 5 MG/1
5 TABLET ORAL EVERY 6 HOURS PRN
Status: DISCONTINUED | OUTPATIENT
Start: 2023-04-20 | End: 2023-04-27 | Stop reason: HOSPADM

## 2023-04-20 RX ORDER — SODIUM CHLORIDE 0.9 % (FLUSH) 0.9 %
10 SYRINGE (ML) INJECTION
Status: DISCONTINUED | OUTPATIENT
Start: 2023-04-20 | End: 2023-04-27 | Stop reason: HOSPADM

## 2023-04-20 RX ORDER — ACETAMINOPHEN 325 MG/1
650 TABLET ORAL EVERY 4 HOURS PRN
Status: DISCONTINUED | OUTPATIENT
Start: 2023-04-20 | End: 2023-04-27 | Stop reason: HOSPADM

## 2023-04-20 RX ORDER — FAMOTIDINE 20 MG/1
20 TABLET, FILM COATED ORAL
Status: DISCONTINUED | OUTPATIENT
Start: 2023-04-21 | End: 2023-04-23

## 2023-04-20 RX ORDER — SIMETHICONE 80 MG
1 TABLET,CHEWABLE ORAL 4 TIMES DAILY PRN
Status: DISCONTINUED | OUTPATIENT
Start: 2023-04-20 | End: 2023-04-27 | Stop reason: HOSPADM

## 2023-04-20 RX ORDER — OLOPATADINE HYDROCHLORIDE 1 MG/ML
1 SOLUTION/ DROPS OPHTHALMIC 2 TIMES DAILY
Status: ON HOLD | COMMUNITY
End: 2023-04-27 | Stop reason: HOSPADM

## 2023-04-20 RX ORDER — LEVOTHYROXINE SODIUM 25 UG/1
75 TABLET ORAL DAILY
Status: DISCONTINUED | OUTPATIENT
Start: 2023-04-21 | End: 2023-04-27 | Stop reason: HOSPADM

## 2023-04-20 RX ORDER — OXYCODONE HYDROCHLORIDE 5 MG/1
10 TABLET ORAL EVERY 6 HOURS PRN
Status: DISCONTINUED | OUTPATIENT
Start: 2023-04-20 | End: 2023-04-20

## 2023-04-20 RX ADMIN — CEFTRIAXONE SODIUM 1 G: 1 INJECTION, POWDER, FOR SOLUTION INTRAMUSCULAR; INTRAVENOUS at 09:04

## 2023-04-20 RX ADMIN — FUROSEMIDE 20 MG: 10 INJECTION, SOLUTION INTRAMUSCULAR; INTRAVENOUS at 08:04

## 2023-04-20 RX ADMIN — AZITHROMYCIN 500 MG: 500 INJECTION, POWDER, LYOPHILIZED, FOR SOLUTION INTRAVENOUS at 11:04

## 2023-04-21 ENCOUNTER — CLINICAL SUPPORT (OUTPATIENT)
Dept: CARDIOLOGY | Facility: HOSPITAL | Age: 88
DRG: 291 | End: 2023-04-21
Payer: MEDICARE

## 2023-04-21 VITALS — HEIGHT: 60 IN | WEIGHT: 92 LBS | BODY MASS INDEX: 18.06 KG/M2

## 2023-04-21 LAB
ANION GAP SERPL CALC-SCNC: 12 MMOL/L (ref 8–16)
AORTIC ROOT ANNULUS: 2.2 CM
AORTIC VALVE CUSP SEPERATION: 1.3 CM
AV INDEX (PROSTH): 0.84
AV MEAN GRADIENT: 2 MMHG
AV PEAK GRADIENT: 3 MMHG
AV REGURGITATION PRESSURE HALF TIME: 340 MS
AV VALVE AREA: 2.65 CM2
AV VELOCITY RATIO: 0.86
BASOPHILS # BLD AUTO: 0.02 K/UL (ref 0–0.2)
BASOPHILS NFR BLD: 0.2 % (ref 0–1.9)
BSA FOR ECHO PROCEDURE: 1.33 M2
BUN SERPL-MCNC: 18 MG/DL (ref 8–23)
CALCIUM SERPL-MCNC: 8.9 MG/DL (ref 8.7–10.5)
CHLORIDE SERPL-SCNC: 90 MMOL/L (ref 95–110)
CO2 SERPL-SCNC: 27 MMOL/L (ref 23–29)
CREAT SERPL-MCNC: 0.9 MG/DL (ref 0.5–1.4)
CV ECHO LV RWT: 0.51 CM
DIFFERENTIAL METHOD: ABNORMAL
DOP CALC AO PEAK VEL: 0.88 M/S
DOP CALC AO VTI: 16 CM
DOP CALC LVOT AREA: 3.1 CM2
DOP CALC LVOT DIAMETER: 2 CM
DOP CALC LVOT PEAK VEL: 0.76 M/S
DOP CALC LVOT STROKE VOLUME: 42.39 CM3
DOP CALC MV VTI: 28.3 CM
DOP CALCLVOT PEAK VEL VTI: 13.5 CM
E WAVE DECELERATION TIME: 176 MSEC
E/A RATIO: 4.77
E/E' RATIO: 9.92 M/S
ECHO LV POSTERIOR WALL: 0.89 CM (ref 0.6–1.1)
EJECTION FRACTION: 65 %
EOSINOPHIL # BLD AUTO: 0 K/UL (ref 0–0.5)
EOSINOPHIL NFR BLD: 0.3 % (ref 0–8)
ERYTHROCYTE [DISTWIDTH] IN BLOOD BY AUTOMATED COUNT: 19.6 % (ref 11.5–14.5)
EST. GFR  (NO RACE VARIABLE): >60 ML/MIN/1.73 M^2
ESTIMATED AVG GLUCOSE: 143 MG/DL (ref 68–131)
FRACTIONAL SHORTENING: 36 % (ref 28–44)
GLUCOSE SERPL-MCNC: 92 MG/DL (ref 70–110)
HBA1C MFR BLD: 6.6 % (ref 4.5–6.2)
HCT VFR BLD AUTO: 31.4 % (ref 37–48.5)
HGB BLD-MCNC: 9.9 G/DL (ref 12–16)
IMM GRANULOCYTES # BLD AUTO: 0.06 K/UL (ref 0–0.04)
IMM GRANULOCYTES NFR BLD AUTO: 0.5 % (ref 0–0.5)
INTERVENTRICULAR SEPTUM: 1.01 CM (ref 0.6–1.1)
LEFT INTERNAL DIMENSION IN SYSTOLE: 2.23 CM (ref 2.1–4)
LEFT VENTRICLE DIASTOLIC VOLUME INDEX: 38.21 ML/M2
LEFT VENTRICLE DIASTOLIC VOLUME: 51.2 ML
LEFT VENTRICLE MASS INDEX: 72 G/M2
LEFT VENTRICLE SYSTOLIC VOLUME INDEX: 12.5 ML/M2
LEFT VENTRICLE SYSTOLIC VOLUME: 16.8 ML
LEFT VENTRICULAR INTERNAL DIMENSION IN DIASTOLE: 3.51 CM (ref 3.5–6)
LEFT VENTRICULAR MASS: 96.36 G
LV LATERAL E/E' RATIO: 7.75 M/S
LV SEPTAL E/E' RATIO: 13.78 M/S
LVOT MG: 1 MMHG
LVOT MV: 0.49 CM/S
LYMPHOCYTES # BLD AUTO: 1 K/UL (ref 1–4.8)
LYMPHOCYTES NFR BLD: 8.1 % (ref 18–48)
MAGNESIUM SERPL-MCNC: 1.9 MG/DL (ref 1.6–2.6)
MCH RBC QN AUTO: 24.7 PG (ref 27–31)
MCHC RBC AUTO-ENTMCNC: 31.5 G/DL (ref 32–36)
MCV RBC AUTO: 78 FL (ref 82–98)
MONOCYTES # BLD AUTO: 1.1 K/UL (ref 0.3–1)
MONOCYTES NFR BLD: 9.4 % (ref 4–15)
MV MEAN GRADIENT: 3 MMHG
MV PEAK A VEL: 0.26 M/S
MV PEAK E VEL: 1.24 M/S
MV PEAK GRADIENT: 6 MMHG
MV VALVE AREA BY CONTINUITY EQUATION: 1.5 CM2
NEUTROPHILS # BLD AUTO: 9.6 K/UL (ref 1.8–7.7)
NEUTROPHILS NFR BLD: 81.5 % (ref 38–73)
NRBC BLD-RTO: 0 /100 WBC
PISA AR MAX VEL: 1.59 M/S
PISA TR MAX VEL: 3.14 M/S
PLATELET # BLD AUTO: 409 K/UL (ref 150–450)
PMV BLD AUTO: 8.8 FL (ref 9.2–12.9)
POTASSIUM SERPL-SCNC: 3.8 MMOL/L (ref 3.5–5.1)
PROCALCITONIN SERPL IA-MCNC: 0.3 NG/ML (ref 0–0.5)
PV MV: 0.54 M/S
PV PEAK VELOCITY: 0.83 CM/S
RA PRESSURE: 15 MMHG
RBC # BLD AUTO: 4.01 M/UL (ref 4–5.4)
RV TISSUE DOPPLER FREE WALL SYSTOLIC VELOCITY 1 (APICAL 4 CHAMBER VIEW): 0.02 CM/S
SINUS: 2.43 CM
SODIUM SERPL-SCNC: 129 MMOL/L (ref 136–145)
TDI LATERAL: 0.16 M/S
TDI SEPTAL: 0.09 M/S
TDI: 0.13 M/S
TR MAX PG: 39 MMHG
TRICUSPID ANNULAR PLANE SYSTOLIC EXCURSION: 2.01 CM
TV REST PULMONARY ARTERY PRESSURE: 54 MMHG
WBC # BLD AUTO: 11.77 K/UL (ref 3.9–12.7)

## 2023-04-21 PROCEDURE — 93306 ECHO (CUPID ONLY): ICD-10-PCS | Mod: 26,,, | Performed by: SPECIALIST

## 2023-04-21 PROCEDURE — 25000003 PHARM REV CODE 250: Performed by: NURSE PRACTITIONER

## 2023-04-21 PROCEDURE — 99900035 HC TECH TIME PER 15 MIN (STAT)

## 2023-04-21 PROCEDURE — 94761 N-INVAS EAR/PLS OXIMETRY MLT: CPT

## 2023-04-21 PROCEDURE — 83036 HEMOGLOBIN GLYCOSYLATED A1C: CPT | Performed by: NURSE PRACTITIONER

## 2023-04-21 PROCEDURE — 96375 TX/PRO/DX INJ NEW DRUG ADDON: CPT

## 2023-04-21 PROCEDURE — 27000221 HC OXYGEN, UP TO 24 HOURS

## 2023-04-21 PROCEDURE — 93306 TTE W/DOPPLER COMPLETE: CPT

## 2023-04-21 PROCEDURE — 63600175 PHARM REV CODE 636 W HCPCS: Performed by: NURSE PRACTITIONER

## 2023-04-21 PROCEDURE — 80048 BASIC METABOLIC PNL TOTAL CA: CPT | Performed by: NURSE PRACTITIONER

## 2023-04-21 PROCEDURE — 12000002 HC ACUTE/MED SURGE SEMI-PRIVATE ROOM

## 2023-04-21 PROCEDURE — 25000003 PHARM REV CODE 250: Performed by: INTERNAL MEDICINE

## 2023-04-21 PROCEDURE — 84145 PROCALCITONIN (PCT): CPT | Performed by: INTERNAL MEDICINE

## 2023-04-21 PROCEDURE — 36415 COLL VENOUS BLD VENIPUNCTURE: CPT | Performed by: INTERNAL MEDICINE

## 2023-04-21 PROCEDURE — 93306 TTE W/DOPPLER COMPLETE: CPT | Mod: 26,,, | Performed by: SPECIALIST

## 2023-04-21 PROCEDURE — 85025 COMPLETE CBC W/AUTO DIFF WBC: CPT | Performed by: NURSE PRACTITIONER

## 2023-04-21 PROCEDURE — 83735 ASSAY OF MAGNESIUM: CPT | Performed by: NURSE PRACTITIONER

## 2023-04-21 PROCEDURE — 25000242 PHARM REV CODE 250 ALT 637 W/ HCPCS: Performed by: NURSE PRACTITIONER

## 2023-04-21 RX ORDER — IPRATROPIUM BROMIDE AND ALBUTEROL SULFATE 2.5; .5 MG/3ML; MG/3ML
3 SOLUTION RESPIRATORY (INHALATION)
Status: DISCONTINUED | OUTPATIENT
Start: 2023-04-22 | End: 2023-04-22

## 2023-04-21 RX ADMIN — ACETAMINOPHEN 650 MG: 325 TABLET ORAL at 10:04

## 2023-04-21 RX ADMIN — TRIAMCINOLONE ACETONIDE: 1 CREAM TOPICAL at 10:04

## 2023-04-21 RX ADMIN — POTASSIUM CHLORIDE 20 MEQ: 1500 TABLET, EXTENDED RELEASE ORAL at 08:04

## 2023-04-21 RX ADMIN — ATORVASTATIN CALCIUM 20 MG: 20 TABLET, FILM COATED ORAL at 10:04

## 2023-04-21 RX ADMIN — TRIAMCINOLONE ACETONIDE: 1 CREAM TOPICAL at 08:04

## 2023-04-21 RX ADMIN — FUROSEMIDE 20 MG: 10 INJECTION, SOLUTION INTRAMUSCULAR; INTRAVENOUS at 04:04

## 2023-04-21 RX ADMIN — ACETAMINOPHEN 650 MG: 325 TABLET ORAL at 04:04

## 2023-04-21 RX ADMIN — MELATONIN 6 MG: at 01:04

## 2023-04-21 RX ADMIN — ACETAMINOPHEN 650 MG: 325 TABLET ORAL at 08:04

## 2023-04-21 RX ADMIN — ACETAMINOPHEN 650 MG: 325 TABLET ORAL at 01:04

## 2023-04-21 RX ADMIN — APIXABAN 2.5 MG: 2.5 TABLET, FILM COATED ORAL at 10:04

## 2023-04-21 RX ADMIN — TRIAMCINOLONE ACETONIDE: 1 CREAM TOPICAL at 04:04

## 2023-04-21 RX ADMIN — ESCITALOPRAM OXALATE 10 MG: 10 TABLET ORAL at 08:04

## 2023-04-21 RX ADMIN — OXYCODONE HYDROCHLORIDE 5 MG: 5 TABLET ORAL at 10:04

## 2023-04-21 RX ADMIN — FUROSEMIDE 20 MG: 10 INJECTION, SOLUTION INTRAMUSCULAR; INTRAVENOUS at 05:04

## 2023-04-21 RX ADMIN — DOXYCYCLINE 100 MG: 100 INJECTION, POWDER, LYOPHILIZED, FOR SOLUTION INTRAVENOUS at 10:04

## 2023-04-21 RX ADMIN — FAMOTIDINE 20 MG: 20 TABLET ORAL at 08:04

## 2023-04-21 RX ADMIN — FLUTICASONE PROPIONATE 50 MCG: 50 SPRAY, METERED NASAL at 08:04

## 2023-04-21 RX ADMIN — DOXYCYCLINE 100 MG: 100 INJECTION, POWDER, LYOPHILIZED, FOR SOLUTION INTRAVENOUS at 08:04

## 2023-04-21 RX ADMIN — AMLODIPINE BESYLATE 5 MG: 5 TABLET ORAL at 08:04

## 2023-04-21 RX ADMIN — APIXABAN 2.5 MG: 2.5 TABLET, FILM COATED ORAL at 08:04

## 2023-04-21 RX ADMIN — LEVOTHYROXINE SODIUM 75 MCG: 0.03 TABLET ORAL at 05:04

## 2023-04-21 NOTE — ASSESSMENT & PLAN NOTE
Sodium 127, she is been chronically hyponatremic at baseline sodium around 131, she is on an SSRI and has been since 2018, sodium has fluctuated over the years but she is had many episodes of hyponatremia, fluid restriction and furosemide b.i.d., TSH is 2.410, UA is pending, daily BMP

## 2023-04-21 NOTE — SUBJECTIVE & OBJECTIVE
Past Medical History:   Diagnosis Date    Anticoagulant long-term use     Eliquis    Arthritis     Atrial fibrillation     Blood transfusion     Carotid stenosis     CHF (congestive heart failure)     Edema     Generalized anxiety disorder 1/23/2018    Dr. Mckeon's eval 1/23/18: She does appear to have a JERRI because of the persistent worries that she has.  These worries predominate her day.  She would probably do well with prevention therapy such as SSRI/SNRI to help control the physical symptoms of the anxiety.  Problem at this point in time is her electrolyte abnormalities.  There is a slight risk of hyponatremia with these medications and    Hearing loss     [place] (hard of hearing)     Hypercholesterolemia     Hypertension     On home oxygen therapy     Psychiatric problem     Thyroid disease        Past Surgical History:   Procedure Laterality Date    CARDIOVERSION  10/19/2020    Procedure: Cardioversion;  Surgeon: Brandon Elias MD;  Location: [facility] CATH LAB;  Service: Cardiology;;    CARPAL TUNNEL RELEASE  2012    right hand    ESOPHAGOGASTRODUODENOSCOPY Left 8/29/2018    Procedure: EGD (ESOPHAGOGASTRODUODENOSCOPY);  Surgeon: Oniel Dorsey MD;  Location: [facility] ENDO;  Service: Endoscopy;  Laterality: Left;    HYSTERECTOMY      left carotid endartectomy  5/2006    TONSILLECTOMY      TREATMENT OF CARDIAC ARRHYTHMIA N/A 10/19/2020    Procedure: Transesophageal echo (BLADIMIR) intra-procedure log documentation;  Surgeon: Brandon Elias MD;  Location: [facility] CATH LAB;  Service: Cardiology;  Laterality: N/A;       Review of patient's allergies indicates:   Allergen Reactions    Hydrochlorothiazide Other (See Comments)     hyponatremia    Strawberry Swelling     Tongue swells up and a generalized rash.    Lisinopril Other (See Comments)     Cough       No current facility-administered medications on file prior to encounter.     Current Outpatient Medications on File Prior to Encounter   Medication Sig    acetaminophen  (TYLENOL) 500 MG tablet Take 1 tablet (500 mg total) by mouth every 6 (six) hours as needed for Pain or Temperature greater than (100).    albuterol-ipratropium (DUO-NEB) 2.5 mg-0.5 mg/3 mL nebulizer solution Take 3 mLs by nebulization every 6 (six) hours. Rescue    alendronate (FOSAMAX) 70 MG tablet Take 70 mg by mouth every 7 days.    amLODIPine (NORVASC) 5 MG tablet Take 1 tablet (5 mg total) by mouth once daily.    apixaban (ELIQUIS) 2.5 mg Tab Take 1 tablet (2.5 mg total) by mouth 2 (two) times daily.    atorvastatin (LIPITOR) 20 MG tablet Take 20 mg by mouth once daily.    EScitalopram oxalate (LEXAPRO) 10 MG tablet Take 10 mg by mouth once daily.    famotidine (PEPCID) 20 MG tablet Take 20 mg by mouth once daily.    fluticasone propionate (FLONASE) 50 mcg/actuation nasal spray 1 spray by Each Nostril route once daily.    furosemide (LASIX) 40 MG tablet Take 1 tablet (40 mg total) by mouth once daily. (Patient taking differently: Take 20 mg by mouth once daily.)    levothyroxine (SYNTHROID) 75 MCG tablet Take 75 mcg by mouth once daily.    potassium chloride (K-TAB) 20 mEq Take 20 mEq by mouth once daily.    traZODone (DESYREL) 50 MG tablet Take 1 tablet (50 mg total) by mouth every evening.    [DISCONTINUED] amlodipine-atorvastatin (CADUET) 10-40 mg per tablet TAKE ONE TABLET BY MOUTH EVERY DAY (Patient taking differently: Take by mouth nightly.)    [DISCONTINUED] losartan (COZAAR) 100 MG tablet TAKE ONE TABLET BY MOUTH EVERY DAY    [DISCONTINUED] metoprolol tartrate (LOPRESSOR) 25 MG tablet Take 25 mg by mouth once daily.     Family History       Problem Relation (Age of Onset)    Breast cancer Daughter (50)    Cancer Brother (65), Sister (81), Sister, Sister    Heart disease Father    Ovarian cancer Sister (81)    Schizophrenia Son          Tobacco Use    Smoking status: Never    Smokeless tobacco: Never   Substance and Sexual Activity    Alcohol use: No    Drug use: No    Sexual activity: Not Currently      Partners: Male     Review of Systems   All other systems reviewed and are negative.  Twelve point review of systems obtained and negative except as stated above in HPI     Objective:     Vital Signs (Most Recent):  Temp: 98 °F (36.7 °C) (04/20/23 1928)  Pulse: 83 (04/20/23 2138)  Resp: (!) 34 (04/20/23 2138)  BP: (!) 151/68 (04/20/23 2138)  SpO2: (!) 92 % (04/20/23 2138)   Vital Signs (24h Range):  Temp:  [98 °F (36.7 °C)] 98 °F (36.7 °C)  Pulse:  [] 83  Resp:  [24-36] 34  SpO2:  [87 %-96 %] 92 %  BP: (129-151)/(64-68) 151/68     Weight: 40.8 kg (90 lb)  Body mass index is 20.18 kg/m².    Physical Exam  Vitals and nursing note reviewed.   Constitutional:       General: She is awake.      Appearance: She is not ill-appearing or toxic-appearing.      Interventions: Face mask in place.      Comments: Frail-appearing elderly female, sitting up in bed awake and alert, she does not appear in any distress, her son is at the bedside.   HENT:      Head: Normocephalic.      Right Ear: Decreased hearing noted.      Left Ear: Decreased hearing noted.      Nose: Nose normal.      Mouth/Throat:      Lips: Pink.      Mouth: Mucous membranes are moist.      Pharynx: Oropharynx is clear.   Eyes:      General: Lids are normal. Vision grossly intact. Gaze aligned appropriately.      Pupils: Pupils are equal, round, and reactive to light.   Neck:        Comments: There is a very small ulceration to the back of her neck that is pruritic.  Cardiovascular:      Rate and Rhythm: Normal rate. Rhythm irregular.      Pulses: Normal pulses.      Heart sounds: Normal heart sounds.      Comments: Very mild right lower extremity edema around ulcerated area on lateral ankle.  Pulmonary:      Effort: Pulmonary effort is normal.      Breath sounds: Decreased breath sounds present. No wheezing, rhonchi or rales.      Comments: Bilateral bases posteriorly  Abdominal:      General: Abdomen is protuberant. Bowel sounds are normal.      Palpations:  Abdomen is soft.      Tenderness: There is no abdominal tenderness.   Genitourinary:     Comments: PureWick catheter in place  Musculoskeletal:         General: Normal range of motion.      Cervical back: Normal range of motion.      Right lower leg: Edema present.      Comments: Thin extremities with poor bulk, moves all extremities good strength.  Generally weak.   Skin:     General: Skin is warm and dry.      Capillary Refill: Capillary refill takes less than 2 seconds.      Findings: Lesion present.      Comments: Small ulcerated area about nickel size to right lateral ankle just above the malleolus, appears clean but has surrounding erythema, warmth and mild edema.  Minimally tender to palpation.   Neurological:      Mental Status: She is alert and oriented to person, place, and time.      GCS: GCS eye subscore is 4. GCS verbal subscore is 5. GCS motor subscore is 6.      Sensory: Sensation is intact.      Motor: Motor function is intact.   Psychiatric:         Attention and Perception: Attention and perception normal.         Mood and Affect: Mood normal.         Speech: Speech normal.         Behavior: Behavior normal. Behavior is cooperative.         Thought Content: Thought content normal.         Cognition and Memory: Cognition and memory normal.         Judgment: Judgment normal.         CRANIAL NERVES     CN III, IV, VI   Pupils are equal, round, and reactive to light.     Significant Labs: All pertinent labs within the past 24 hours have been reviewed.    ABGs:   Recent Labs   Lab 04/20/23 2103   PH 7.383   PCO2 46.0*   HCO3 27.4   POCSATURATED 48*   BE 2   PO2 27*     Bilirubin:   Recent Labs   Lab 04/20/23 2004   BILITOT 1.0       BMP:   Recent Labs   Lab 04/20/23 2004   *   *   K 4.7   CL 94*   CO2 24   BUN 23   CREATININE 1.0   CALCIUM 8.6*   MG 1.8     CBC:   Recent Labs   Lab 04/20/23 2004 04/20/23 2103   WBC 11.50  --    HGB 9.5*  --    HCT 30.6* 35*     --      CMP:    Recent Labs   Lab 04/20/23 2004   *   K 4.7   CL 94*   CO2 24   *   BUN 23   CREATININE 1.0   CALCIUM 8.6*   PROT 6.0   ALBUMIN 3.5   BILITOT 1.0   ALKPHOS 82   AST 22   ALT 30   ANIONGAP 9     Cardiac Markers:   Recent Labs   Lab 04/20/23 2004   BNP 1,103*     Lactic Acid:   Recent Labs   Lab 04/20/23 2034   LACTATE 1.3     Magnesium:   Recent Labs   Lab 04/20/23 2004   MG 1.8       Troponin:   Recent Labs   Lab 04/20/23 2004   TROPONINIHS 7.8     TSH:   Recent Labs   Lab 04/20/23 2004   TSH 2.410         Significant Imaging: I have reviewed all pertinent imaging results/findings within the past 24 hours.    Chest XR pending

## 2023-04-21 NOTE — PHARMACY MED REC
"  Admission Medication History     The home medication history was taken by Desiree Grace.    You may go to "Admission" then "Reconcile Home Medications" tabs to review and/or act upon these items.     The home medication list has been updated by the Pharmacy department.   Please read ALL comments highlighted in yellow.   Please address this information as you see fit.    Feel free to contact us if you have any questions or require assistance.        Medications listed below were obtained from: Patient/family  Prior to Admission medications    Medication Sig Start Date End Date Taking? Authorizing Provider   acetaminophen (TYLENOL) 500 MG tablet Take 1 tablet (500 mg total) by mouth every 6 (six) hours as needed for Pain or Temperature greater than (100). 4/28/21  Yes Mele Connolly MD   albuterol-ipratropium (DUO-NEB) 2.5 mg-0.5 mg/3 mL nebulizer solution Take 3 mLs by nebulization every 6 (six) hours. Rescue 4/28/21 4/20/23 Yes Mele Connolly MD   amLODIPine (NORVASC) 5 MG tablet Take 1 tablet (5 mg total) by mouth once daily. 10/18/22 10/18/23 Yes Christian Ching MD   apixaban (ELIQUIS) 2.5 mg Tab Take 1 tablet (2.5 mg total) by mouth 2 (two) times daily. 10/18/22  Yes Christian Ching MD   atorvastatin (LIPITOR) 20 MG tablet Take 20 mg by mouth once daily. 5/11/22  Yes Historical Provider   carboxymethylcellulose sodium (THERATEARS) 0.25 % Dpet Place 1 drop into both eyes as needed. 0600  1200  1800   Yes Historical Provider   EScitalopram oxalate (LEXAPRO) 10 MG tablet Take 10 mg by mouth once daily. 3/30/23  Yes Historical Provider   famotidine (PEPCID) 20 MG tablet Take 20 mg by mouth once daily. 5/17/22  Yes Historical Provider   fluticasone propionate (FLONASE) 50 mcg/actuation nasal spray 1 spray by Each Nostril route once daily. 4/26/22  Yes Historical Provider   furosemide (LASIX) 40 MG tablet Take 1 tablet (40 mg total) by mouth once daily.  Patient taking differently: Take 20 mg by mouth every " other day. 6/17/22 4/20/23 Yes Thaddeus Klein MD   levothyroxine (SYNTHROID) 75 MCG tablet Take 75 mcg by mouth once daily.   Yes Historical Provider   loratadine (CLARITIN) 10 mg tablet Take 10 mg by mouth once daily.   Yes Historical Provider   metoprolol tartrate (LOPRESSOR) 25 MG tablet Take 25 mg by mouth 2 (two) times daily. 0600  2000   Yes Historical Provider   olopatadine (PATANOL) 0.1 % ophthalmic solution Place 1 drop into both eyes 2 (two) times daily.   Yes Historical Provider   potassium chloride (K-TAB) 20 mEq Take 20 mEq by mouth once daily. 6/9/22  Yes Historical Provider   traZODone (DESYREL) 50 MG tablet Take 1 tablet (50 mg total) by mouth every evening. 4/28/21 4/20/23 Yes Mele Connolly MD   vit C,M-Qz-bjlsx-lutein-zeaxan (PRESERVISION AREDS-2) 250-90-40-1 mg Cap Take 1 tablet by mouth once daily.   Yes Historical Provider   alendronate (FOSAMAX) 70 MG tablet Take 70 mg by mouth every 7 days. SATURDAYS 5/16/22   Historical Provider           losartan (COZAAR) 100 MG tablet TAKE ONE TABLET BY MOUTH EVERY DAY 9/26/19 6/17/22  MD Desiree Hampton  EXT 1924               .

## 2023-04-21 NOTE — ASSESSMENT & PLAN NOTE
Atrial fibrillation with rate controlled at 77, she is not on rate control medication, continue Eliquis, monitor on telemetry,

## 2023-04-21 NOTE — CARE UPDATE
04/21/23 0943   Patient Assessment/Suction   Level of Consciousness (AVPU) alert   Respiratory Effort Normal;Unlabored   Expansion/Accessory Muscles/Retractions no use of accessory muscles   All Lung Fields Breath Sounds diminished   PRE-TX-O2   Device (Oxygen Therapy) Oxymask   $ Is the patient on Low Flow Oxygen? Yes   Flow (L/min) 8   SpO2 (!) 94 %   Pulse Oximetry Type Continuous   $ Pulse Oximetry - Multiple Charge Pulse Oximetry - Multiple   Respiratory Evaluation   $ Care Plan Tech Time 15 min

## 2023-04-21 NOTE — CONSULTS
[facility]  Adult Nutrition   Consult Note (Nutrition Education)     SUMMARY     Recommendations  Recommendation/Intervention:   1. Continue current diet as tolerated.   2. Add vanilla high protein ensure pudding with meals.   3. Updated allergy to Strawberry; added All Berries per patient report.   4. Provided handout for Heart Failure nutrition. 5.  to obtain daily menu choices.    Goals: Intake to be > /= 75% EEN and EPN.  Nutrition Goal Status: new  Communication of RD Recs: reviewed with RN    Dietitian Rounds Brief  Consult for education--CHF. Provided patient with handout and contact information. Pt reports she gets 1 packet of salt on her tray at the nursing home. Pt reports she is allergic to All Berries, not just Strawberries; RD updated allergy list.  Patient  menu item preferences shared with diet office. Patient said she has good appetite and ate half of her lunch. Pt. appears stated age with age-related muscle and fat loss of mild degree per visual. Patietn had < 10% weight loss over past 6 months per Epic. Pt at risk for poor nutrition should intake decline. RD recommends Ensure HP vanilla pudding BID. Last BM 4/20/23. RD to monitor for intake, labs, and weight PRN.    Diet order:   Current Diet Order: Cardiac diet      Evaluation of Received Nutrient/Fluid Intake  Energy Calories Required: not meeting needs  Protein Required: not meeting needs  Fluid Required: not meeting needs  Tolerance: tolerating     % Intake of Estimated Energy Needs: 50 - 75 %  % Meal Intake: 50 - 75 %    Intake/Output Summary (Last 24 hours) at 4/21/2023 1315  Last data filed at 4/21/2023 0530  Gross per 24 hour   Intake 225 ml   Output 551 ml   Net -326 ml      Anthropometrics  Temp: 98.3 °F (36.8 °C)  Height Method: Stated  Height: 5' (152.4 cm)  Height (inches): 60 in  Weight Method: Bed Scale  Weight: 41.7 kg (92 lb)  Weight (lb): 92 lb  Ideal Body Weight (IBW), Female: 100 lb  % Ideal Body Weight,  Female (lb): 92 %  BMI (Calculated): 18     Estimated/Assessed Needs  Weight Used For Calorie Calculations: 42 kg (92 lb 9.5 oz)  Energy Calorie Requirements (kcal): 2887-2502 (30-35 kcal/kg)  Energy Need Method: Kcal/kg  Protein Requirements: 50-63 (1.2-1.5 gm/kg)  Weight Used For Protein Calculations: 42 kg (92 lb 9.5 oz)     Estimated Fluid Requirement Method: RDA Method  RDA Method (mL): 1260     Reason for Assessment  Reason For Assessment: consult  Diagnosis: cardiac disease  Relevant Medical History: AFib on Eliquis,HFpEF, hyperlipidemia, hypertension, COPD,  and CKD 3  Interdisciplinary Rounds: did not attend    Nutrition/Diet History  Food Allergies:  (Strawberry; patient reports all berries)  Factors Affecting Nutritional Intake: None identified at this time    Nutrition Risk Screen  Nutrition Risk Screen: no indicators present       Altered Skin Integrity 04/20/23 2300 Right distal;lower;medial Leg Partial thickness tissue loss. Shallow open ulcer with a red or pink wound bed, without slough. Intact or Open/Ruptured Serum-filled blister.-Wound Image: Images linked  MST Score: 0  Have you recently lost weight without trying?: No  Weight loss score: 0  Have you been eating poorly because of a decreased appetite?: No  Appetite score: 0     Weight History:  Wt Readings from Last 5 Encounters:   04/21/23 41.7 kg (92 lb)   04/21/23 41.7 kg (92 lb)   01/11/23 45.4 kg (100 lb)   10/17/22 45.2 kg (99 lb 10.4 oz)   06/17/22 47.8 kg (105 lb 6.1 oz)      Lab/Procedures/Meds: Pertinent Labs/Meds Reviewed    Medications:Pertinent Medications Reviewed  Scheduled Meds:   amLODIPine  5 mg Oral Daily    apixaban  2.5 mg Oral BID    atorvastatin  20 mg Oral Daily    doxycycline (VIBRAMYCIN) IVPB  100 mg Intravenous Q12H    EScitalopram oxalate  10 mg Oral Daily    famotidine  20 mg Oral Q48H    fluticasone propionate  1 spray Each Nostril Daily    furosemide (LASIX) injection  20 mg Intravenous BID AC    levothyroxine  75  mcg Oral Daily    potassium chloride  20 mEq Oral Daily    triamcinolone acetonide 0.1%   Topical (Top) BID     Continuous Infusions:  PRN Meds:.acetaminophen, melatonin, naloxone, ondansetron, oxyCODONE, polyethylene glycol, potassium bicarbonate, potassium bicarbonate, potassium bicarbonate, simethicone, sodium chloride 0.9%    Labs: Pertinent Labs Reviewed  Clinical Chemistry:  Recent Labs   Lab 04/20/23 2004 04/21/23 0451   * 129*   K 4.7 3.8   CL 94* 90*   CO2 24 27   * 92   BUN 23 18   CREATININE 1.0 0.9   CALCIUM 8.6* 8.9   PROT 6.0  --    ALBUMIN 3.5  --    BILITOT 1.0  --    ALKPHOS 82  --    AST 22  --    ALT 30  --    ANIONGAP 9 12   MG 1.8 1.9     CBC:   Recent Labs   Lab 04/21/23 0451   WBC 11.77   RBC 4.01   HGB 9.9*   HCT 31.4*      MCV 78*   MCH 24.7*   MCHC 31.5*     Cardiac Profile:  Recent Labs   Lab 04/20/23 2004   BNP 1,103*     Diabetes:  Recent Labs   Lab 04/21/23 0451   HGBA1C 6.6*     Thyroid & Parathyroid:  Recent Labs   Lab 04/20/23 2004   TSH 2.410     Monitor and Evaluation  Food and Nutrient Intake: energy intake  Food and Nutrient Adminstration: diet order  Knowledge/Beliefs/Attitudes: food and nutrition knowledge/skill  Physical Activity and Function: nutrition-related ADLs and IADLs  Anthropometric Measurements: weight, weight change, body mass index  Biochemical Data, Medical Tests and Procedures: electrolyte and renal panel, gastrointestinal profile, glucose/endocrine profile, inflammatory profile, lipid profile  Nutrition-Focused Physical Findings: overall appearance     Nutrition Risk  Level of Risk/Frequency of Follow-up: moderate - high     Nutrition Follow-Up  RD Follow-up?: Yes      Sommer Candelaria RD, LDN 04/21/2023 1:14 PM

## 2023-04-21 NOTE — PLAN OF CARE
Marie completed with Pt's son Dane Landry via telephone. Pt's son was explained MARIE. Pt's son verbalized understanding of MARIE and gave verbal acknowledgement. MARIE scanned to .       04/21/23 1228   MARIE Message   Medicare Outpatient and Observation Notification regarding financial responsibility Explained to patient/caregiver;Signed/date by patient/caregiver   Date MARIE was signed 04/21/23   Time MARIE was signed [id number]

## 2023-04-21 NOTE — PLAN OF CARE
[facility]  Initial Discharge Assessment       Primary Care Provider: Primary Doctor No    Admission Diagnosis: Acute congestive heart failure, unspecified heart failure type [I50.9]    Admission Date: 4/20/2023  Expected Discharge Date: TBD     met with Pt at bedside to complete discharge assessment. Pt AAOx4s. Pt hard of hearing. Discharge assessment completed with Pt's son Dane Mera via telephone. Demographics and insurance verified. No home health. No dialysis. Pt reports ability to complete ADLs with assistance of 2L of home o2, CPAP, and wheelchair. Pt's son verbalized plan to discharge back to [place] via facility transport. Pt has no other needs to be addressed at this time.    Discharge Barriers Identified: None    Payor: [employer] MEDICARE / Plan: [employer] H[employer] PPO SPECIAL NEEDS / Product Type: Medicare Advantage /     Extended Emergency Contact Information  Primary Emergency Contact: Dnae Mera   [facility]  Home Phone: [phone]  Mobile Phone: [phone]  Relation: Son  Secondary Emergency Contact: Lyubov Rede  Mobile Phone: [phone]  Relation: Daughter    Discharge Plan A: Return to nursing home  Discharge Plan B: Return to Nursing Home      [facility] [address]  [address]  [facility] [address]  Phone: [phone] Fax: [phone]    [id number] - [place] - [address]  [address]  [facility] [address]  Phone: [phone] Fax: [phone]      Initial Assessment (most recent)       Adult Discharge Assessment - 04/21/23 1220          Discharge Assessment    Assessment Type Discharge Planning Assessment     Confirmed/corrected address, phone number and insurance Yes     Confirmed Demographics Correct on Facesheet     Source of Information family     If unable to respond/provide information was family/caregiver contacted? Yes     Reason For Admission Acute exacerbation of CHF  (congestive heart failure)     People in Home facility resident     Facility Arrived From: [facility] resident     Do you expect to return to your current living situation? Yes     Prior to hospitilization cognitive status: Alert/Oriented     Current cognitive status: Alert/Oriented     Walking or Climbing Stairs ambulation difficulty, requires equipment     Mobility Management Pt uses wheelchair for ambulation     Dressing/Bathing bathing difficulty, requires equipment     Dressing/Bathing Management Pt uses shower chair and grab bars for bathing     Home Accessibility wheelchair accessible     Home Layout Able to live on 1st floor     Equipment Currently Used at Home wheelchair;oxygen;CPAP     Readmission within 30 days? No     Patient currently being followed by outpatient case management? No     Do you currently have service(s) that help you manage your care at home? No     Do you take prescription medications? Yes     Do you have prescription coverage? Yes     Coverage Payor:  [employer] MEDICARE - [facility] [facility]     Do you have any problems affording any of your prescribed medications? No     Is the patient taking medications as prescribed? yes     Who is going to help you get home at discharge? facilitytransport     How do you get to doctors appointments? other (see comments)   GreendyloniarMD    Are you on dialysis? No     Do you take coumadin? No     Discharge Plan A Return to nursing home     Discharge Plan B Return to Nursing Home     DME Needed Upon Discharge  none     Discharge Plan discussed with: Adult children   FergarethjimiDane (Son)   [phone]    Discharge Barriers Identified None

## 2023-04-21 NOTE — ED PROVIDER NOTES
"Encounter Date: 4/20/2023       History     Chief Complaint   Patient presents with    Shortness of Breath     EMS reports "patient was found short of breath" reports "oxygen line seemed to be kinked and not working properly" pt O2 dep / conversing approp with staff upon arrival to ED . No distress noted / pt conversing with staff     88-year-old female past medical history of CHF, anxiety, atrial fibrillation, presents emergency department with shortness of breath.  Patient says that she is very hard-of-hearing.  EMS reports that when they went to check on her the call was for shortness of breath and when they got there her oxygen tubing was kinked in her oxygen saturation was in the 60s on room air.  Said that they could not get her oxygen saturation up and place her on a non-rebreather now she is up to 97% on the non-rebreather.  Patient says that she is feeling better.  Patient denies feeling ill recently she denies any chest pain or any new difficulty breathing.  She denies any fever chills.  She denies any vomiting/diarrhea she denies any urinary symptoms such as frequency urgency or burning.    Review of patient's allergies indicates:   Allergen Reactions    Hydrochlorothiazide Other (See Comments)     hyponatremia    Strawberry Swelling     Tongue swells up and a generalized rash.    Lisinopril Other (See Comments)     Cough     Past Medical History:   Diagnosis Date    Anticoagulant long-term use     Eliquis    Arthritis     Atrial fibrillation     Blood transfusion     Carotid stenosis     CHF (congestive heart failure)     Edema     Generalized anxiety disorder 1/23/2018    Dr. Mckeon's eval 1/23/18: She does appear to have a JERRI because of the persistent worries that she has.  These worries predominate her day.  She would probably do well with prevention therapy such as SSRI/SNRI to help control the physical symptoms of the anxiety.  Problem at this point in time is her electrolyte abnormalities.  " There is a slight risk of hyponatremia with these medications and    Hearing loss     [place] (hard of hearing)     Hypercholesterolemia     Hypertension     On home oxygen therapy     Psychiatric problem     Thyroid disease      Past Surgical History:   Procedure Laterality Date    CARDIOVERSION  10/19/2020    Procedure: Cardioversion;  Surgeon: Brandon Elias MD;  Location: [facility] CATH LAB;  Service: Cardiology;;    CARPAL TUNNEL RELEASE  2012    right hand    ESOPHAGOGASTRODUODENOSCOPY Left 8/29/2018    Procedure: EGD (ESOPHAGOGASTRODUODENOSCOPY);  Surgeon: Oniel Dorsey MD;  Location: [facility] ENDO;  Service: Endoscopy;  Laterality: Left;    HYSTERECTOMY      left carotid endartectomy  5/2006    TONSILLECTOMY      TREATMENT OF CARDIAC ARRHYTHMIA N/A 10/19/2020    Procedure: Transesophageal echo (BLADIMIR) intra-procedure log documentation;  Surgeon: Brandon Elias MD;  Location: [facility] CATH LAB;  Service: Cardiology;  Laterality: N/A;     Family History   Problem Relation Age of Onset    Heart disease Father     Cancer Brother 65        bladder    Cancer Sister 81        Ovarian    Ovarian cancer Sister 81    Breast cancer Daughter 50        Status post mastectomy    Cancer Sister         Lung.  Smoker    Cancer Sister         Lung.  Smoker    Schizophrenia Son      Social History     Tobacco Use    Smoking status: Never    Smokeless tobacco: Never   Substance Use Topics    Alcohol use: No    Drug use: No     Review of Systems   Constitutional:  Negative for chills and fever.   HENT:  Negative for sore throat.    Respiratory:  Positive for shortness of breath. Negative for cough.    Cardiovascular:  Negative for chest pain.   Gastrointestinal:  Negative for abdominal pain, diarrhea, nausea and vomiting.   Genitourinary:  Negative for dysuria.   Musculoskeletal:  Negative for back pain.   Skin:  Negative for rash.   Neurological:  Negative for weakness and headaches.   Hematological:  Does not bruise/bleed easily.   All  other systems reviewed and are negative.    Physical Exam     Initial Vitals [04/20/23 1928]   BP Pulse Resp Temp SpO2   129/64 100 (!) 24 98 °F (36.7 °C) 96 %      MAP       --         Physical Exam    Nursing note and vitals reviewed.  Constitutional: She appears well-developed and well-nourished.   Frail, elderly   HENT:   Head: Normocephalic and atraumatic.   Mouth/Throat: No oropharyngeal exudate.   Eyes: Conjunctivae and EOM are normal. Pupils are equal, round, and reactive to light.   Neck: Neck supple. No tracheal deviation present.   Normal range of motion.  Cardiovascular:  Normal rate, regular rhythm, normal heart sounds and intact distal pulses.           No murmur heard.  Pulmonary/Chest: No stridor. No respiratory distress. She has no wheezes. She has no rhonchi. She has rales (In the left lung base.).   Abdominal: Abdomen is soft. She exhibits no distension. There is no abdominal tenderness. There is no rebound.   Musculoskeletal:         General: No tenderness or edema. Normal range of motion.      Cervical back: Normal range of motion and neck supple.     Neurological: She is alert and oriented to person, place, and time. She has normal strength. No cranial nerve deficit or sensory deficit.   Skin: Skin is warm and dry. Capillary refill takes less than 2 seconds. No rash noted. No erythema. No pallor.   Psychiatric: She has a normal mood and affect. Her behavior is normal. Judgment and thought content normal.       ED Course   Procedures  Labs Reviewed   CBC W/ AUTO DIFFERENTIAL - Abnormal; Notable for the following components:       Result Value    RBC 3.87 (*)     Hemoglobin 9.5 (*)     Hematocrit 30.6 (*)     MCV 79 (*)     MCH 24.5 (*)     MCHC 31.0 (*)     RDW 19.9 (*)     MPV 8.7 (*)     Gran # (ANC) 9.8 (*)     Immature Grans (Abs) 0.05 (*)     Lymph # 0.7 (*)     Gran % 84.9 (*)     Lymph % 5.7 (*)     All other components within normal limits   COMPREHENSIVE METABOLIC PANEL - Abnormal;  Notable for the following components:    Sodium 127 (*)     Chloride 94 (*)     Glucose 123 (*)     Calcium 8.6 (*)     eGFR 54.5 (*)     All other components within normal limits   B-TYPE NATRIURETIC PEPTIDE - Abnormal; Notable for the following components:    BNP 1,103 (*)     All other components within normal limits   ISTAT PROCEDURE - Abnormal; Notable for the following components:    POC PCO2 46.0 (*)     POC PO2 27 (*)     POC SATURATED O2 48 (*)     POC Sodium 127 (*)     POC Hematocrit 35 (*)     All other components within normal limits   CULTURE, BLOOD   CULTURE, BLOOD   MAGNESIUM   TROPONIN I HIGH SENSITIVITY   LACTIC ACID, PLASMA   PROCALCITONIN   INFLUENZA A AND B ANTIGEN   SARS-COV-2 RNA AMPLIFICATION, QUAL          Results for orders placed or performed during the hospital encounter of 04/20/23   CBC auto differential   Result Value Ref Range    WBC 11.50 3.90 - 12.70 K/uL    RBC 3.87 (L) 4.00 - 5.40 M/uL    Hemoglobin 9.5 (L) 12.0 - 16.0 g/dL    Hematocrit 30.6 (L) 37.0 - 48.5 %    MCV 79 (L) 82 - 98 fL    MCH 24.5 (L) 27.0 - 31.0 pg    MCHC 31.0 (L) 32.0 - 36.0 g/dL    RDW 19.9 (H) 11.5 - 14.5 %    Platelets 412 150 - 450 K/uL    MPV 8.7 (L) 9.2 - 12.9 fL    Immature Granulocytes 0.4 0.0 - 0.5 %    Gran # (ANC) 9.8 (H) 1.8 - 7.7 K/uL    Immature Grans (Abs) 0.05 (H) 0.00 - 0.04 K/uL    Lymph # 0.7 (L) 1.0 - 4.8 K/uL    Mono # 1.0 0.3 - 1.0 K/uL    Eos # 0.0 0.0 - 0.5 K/uL    Baso # 0.01 0.00 - 0.20 K/uL    nRBC 0 0 /100 WBC    Gran % 84.9 (H) 38.0 - 73.0 %    Lymph % 5.7 (L) 18.0 - 48.0 %    Mono % 8.6 4.0 - 15.0 %    Eosinophil % 0.3 0.0 - 8.0 %    Basophil % 0.1 0.0 - 1.9 %    Differential Method Automated    Comprehensive metabolic panel   Result Value Ref Range    Sodium 127 (L) 136 - 145 mmol/L    Potassium 4.7 3.5 - 5.1 mmol/L    Chloride 94 (L) 95 - 110 mmol/L    CO2 24 23 - 29 mmol/L    Glucose 123 (H) 70 - 110 mg/dL    BUN 23 8 - 23 mg/dL    Creatinine 1.0 0.5 - 1.4 mg/dL    Calcium 8.6  (L) 8.7 - 10.5 mg/dL    Total Protein 6.0 6.0 - 8.4 g/dL    Albumin 3.5 3.5 - 5.2 g/dL    Total Bilirubin 1.0 0.1 - 1.0 mg/dL    Alkaline Phosphatase 82 55 - 135 U/L    AST 22 10 - 40 U/L    ALT 30 10 - 44 U/L    Anion Gap 9 8 - 16 mmol/L    eGFR 54.5 (A) >60 mL/min/1.73 m^2   Magnesium   Result Value Ref Range    Magnesium 1.8 1.6 - 2.6 mg/dL   Troponin I High Sensitivity   Result Value Ref Range    Troponin I High Sensitivity 7.8 0.0 - 14.9 pg/mL   Brain natriuretic peptide   Result Value Ref Range    BNP 1,103 (H) 0 - 99 pg/mL   Lactic acid, plasma   Result Value Ref Range    Lactate (Lactic Acid) 1.3 0.5 - 1.9 mmol/L   ISTAT PROCEDURE   Result Value Ref Range    POC PH 7.383 7.35 - 7.45    POC PCO2 46.0 (H) 35 - 45 mmHg    POC PO2 27 (L) 40 - 60 mmHg    POC HCO3 27.4 24 - 28 mmol/L    POC BE 2 -2 to 2 mmol/L    POC SATURATED O2 48 (L) 95 - 100 %    POC Glucose 107 70 - 110 mg/dL    POC Sodium 127 (L) 136 - 145 mmol/L    POC Potassium 4.9 3.5 - 5.1 mmol/L    POC TCO2 29 24 - 29 mmol/L    POC Ionized Calcium 1.20 1.06 - 1.42 mmol/L    POC Hematocrit 35 (L) 36 - 54 %PCV    Sample VENOUS     Site Dawood/UAC     Allens Test N/A     DelSys Aero Mask     Mode SPONT     Flow 10        Imaging Results               X-Ray Chest 1 View (Preliminary result)  Result time 04/20/23 20:40:16      Wet Read by Hernán Noonan DO (04/20/23 20:40:16, [facility], Emergency Medicine) Flagged as Abnormal    Cardiomegaly, pulmonary edema versus multilobar infiltrates.                      Wet Read by Hernán Noonan DO (04/20/23 20:40:04, [facility], Emergency Medicine)    Cardiomegaly, pulmonary edema versus multilobar infiltrates.                                     Medications   cefTRIAXone (ROCEPHIN) 1 g in dextrose 5 % 100 mL IVPB (ready to mix) (1 g Intravenous New Bag 4/20/23 3907)   azithromycin 500 mg in dextrose 5 % 250 mL IVPB (ready to mix system) (has no  administration in time range)   furosemide injection 20 mg (20 mg Intravenous Given 4/20/23 2056)     Medical Decision Making:   Differential Diagnosis:   Occlusive not limited to hypoxia, pneumonia, CHF, pericardial effusion, ACS, PE,  Clinical Tests:   Lab Tests: Ordered and Reviewed  Radiological Study: Ordered and Reviewed  Medical Tests: Ordered and Reviewed  ED Management:  Emergent evaluation 88-year-old female who presents emergency department with shortness of breath as mentioned above.  Overall impression is pulmonary edema and CHF versus multilobar pneumonia.  Patient has been treated for both with IV Lasix and with Rocephin azithromycin.  Patient BNP is markedly elevated, troponin is negative, EKG does not show STEMI.  Patient will be admitted to the hospital for further care and evaluation of symptoms    A dictation software program was used for this note.  Please expect some simple typographical  errors in this note.               Attending Attestation:             Attending ED Notes:   Attending Critical Care:   Critical Care Times:   Direct Patient Care (initial evaluation, reassessments, and time considering the case)................................................................10 minutes.   Additional History from reviewing old medical records or taking additional history from the family, EMS, PCP, etc.......................10 minutes.   Ordering, Reviewing, and Interpreting Diagnostic Studies...............................................................................................................10 minutes.   Documentation..................................................................................................................................................................................5 minutes.   ==============================================================  · Total Critical Care Time - exclusive of procedural time: 35  minutes.  ==============================================================  Critical care was necessary to treat or prevent imminent or life-threatening deterioration of the following conditions:  Hypoxia.   Critical care was time spent personally by me on the following activities: obtaining history from patient or relative, examination of patient, review of x-rays / CT sent with the patient, ordering lab, x-rays, and/or EKG, development of treatment plan with patient or relative, ordering and performing treatments and interventions, interpretation of cardiac measurements and re-evaluation of patient's conition.   Critical Care Condition: potentially life-threatening       ED Course as of 04/20/23 2144   Thu Apr 20, 2023 2038 EKG 8:10 p.m..  Atrial fibrillation, rate of 77, low voltage QRS.  No ST elevation or depression.  No STEMI.  EKG interpreted by me. [JR]   [address] from [facility] will admit the patient [JR]      ED Course User Index  [JR] Hernán Noonan DO                 Clinical Impression:   Final diagnoses:  [R06.00] Dyspnea  [I50.9] Acute congestive heart failure, unspecified heart failure type (Primary)  [J81.0] Acute pulmonary edema  [R09.02] Hypoxia        ED Disposition Condition    Admit Stable                Hernán Noonan DO  04/20/23 2146

## 2023-04-21 NOTE — ASSESSMENT & PLAN NOTE
Elevated BNP 1103, given 20 mg IV Lasix in ED, we will continue with 20 mg IV twice daily, fluid restriction, daily weights, strict I&O, obtain echocardiogram, monitor on telemetry, replace electrolytes as needed, currently magnesium 0.8 and potassium 4.7

## 2023-04-21 NOTE — PROGRESS NOTES
Pharmacist Renal Adjustment Note    Jeannie Hutchins is a 88 y.o. female being treated with famotidine.     Patient Data:    Vital Signs (Most Recent):  Temp: 98 °F (36.7 °C) (04/20/23 1928)  Pulse: 83 (04/20/23 2138)  Resp: (!) 34 (04/20/23 2138)  BP: (!) 151/68 (04/20/23 2138)  SpO2: (!) 92 % (04/20/23 2138)   Vital Signs (72h Range):  Temp:  [98 °F (36.7 °C)]   Pulse:  []   Resp:  [24-36]   BP: (129-151)/(64-68)   SpO2:  [87 %-96 %]      Recent Labs   Lab 04/20/23 2004   CREATININE 1.0     Serum creatinine: 1 mg/dL 04/20/23 2004  Estimated creatinine clearance: 25.5 mL/min    Famotidine 20 mg once daily will be changed to Famotidine 20 mg every 48 hours due to CrCl < 30 per [facility] renal dosing adjustment protocol.    Pharmacist's Name: Cinda Kelly  Pharmacist's Extension: [phone]

## 2023-04-21 NOTE — NURSING
88 yr old female  awake and alert   Right lower leg     Scratches to the right hip   Buttock slight red blanching   Air mattress in place   Recommendation:   Right lower leg  Clean with chlorhexidine/ns.  Pat dry. Apply Medihoney and cover with mepilex.     Pressure Prevention Protocol  Turn reposition q2 as pt's condition will allow.  Calmoseptine skin barrier to the buttocks as needed.  Float and elevate heels of bed with pillows.  Border dressing to the sacral and buttock.  Bilateral heel pillows as needed   air mattress as needed

## 2023-04-21 NOTE — PLAN OF CARE
Problem: Oral Intake Inadequate  Goal: Improved Oral Intake  Intervention: Promote and Optimize Oral Intake  Flowsheets (Taken 4/21/2023 1327)  Oral Nutrition Promotion: calorie-dense foods provided--Ensure HP vanilla pudding BID (240 kcal and 12 gm protein each).     Problem: Skin Injury Risk Increased  Goal: Skin Health and Integrity  Intervention: Promote and Optimize Oral Intake  Flowsheets (Taken 4/21/2023 1327)  Oral Nutrition Promotion: calorie-dense foods provided

## 2023-04-21 NOTE — H&P
"[facility] - Emergency Dept  Hospital Medicine  History & Physical    Patient Name: Jeannie Hutchins  MRN: [id number]  Patient Class: OP- Observation  Admission Date: 4/20/2023  Attending Physician: Aleyda Oliver MD   Primary Care Provider: Primary Doctor No         Patient information was obtained from patient, relative(s), past medical records and ER records.     Subjective:     Principal Problem:Acute exacerbation of CHF (congestive heart failure)    Chief Complaint:   Chief Complaint   Patient presents with    Shortness of Breath     EMS reports "patient was found short of breath" reports "oxygen line seemed to be kinked and not working properly" pt O2 dep / conversing approp with staff upon arrival to ED . No distress noted / pt conversing with staff        HPI: Jeannie Hutchins is an 87-year-old female with chronic medical problems including AFib on Eliquis,HFpEF, hyperlipidemia, hypertension, COPD,  and CKD 3 who presents to the emergency room complaining shortness of breath.  Patient states that she usually uses 2 L nasal cannula O2 but for the past 2 weeks she is had to turn it up to 3 L. she said she can usually get around with minimal shortness of breath but she is had to stop and rest on her way to the dining room.  Yesterday she was so weak she had to get someone to come help her get dressed.  She also complains of a dry cough for 1 month and some sinus congestion at nighttime.  She also has had some occasional chest pain on the left side of her chest that kind of sharp and quickly resolves on its own.  She has not noticed that it is with activity or anything.  When EMS was called her O2 sat was 60% on room air and her O2 tubing was kinked.  Her O2 sat would not go up on her nasal cannula O2 and it improved on a non-rebreather.  She denies any fevers or chills, wheezing, productive cough, diarrhea or constipation.  She does have a wound to her right lower extremity and she said she hit right " lower leg on a wheelchair and slowly got worse.  She said the wound care nurse at the nursing home has been taking care of it.    In the ED, BNP elevated at 1103, troponin 7.8, sodium 127, potassium 4.7, magnesium 1.8, BUN/CR 23/1.0, glucose 123, lactic acid 1 3, WBC 11.50, platelets 412, BUN/CR 9.5/30.6, temperature was 98°, rate , blood pressure 129/64, respiratory rate in the 20s, O2 sat 87-96% on 8 L. twelve lead EKG with atrial fibrillation with ventricular rate 77 and .  Chest x-ray read is pending but appears to some cardiomegaly and heart failure along with chronic changes on previous x-rays.  She was given 500 mg IV azithromycin, 1 g ceftriaxone and 20 mg IV Lasix.  She will be admitted to the hospitalist service for further evaluation and treatment.      Past Medical History:   Diagnosis Date    Anticoagulant long-term use     Eliquis    Arthritis     Atrial fibrillation     Blood transfusion     Carotid stenosis     CHF (congestive heart failure)     Edema     Generalized anxiety disorder 1/23/2018    Dr. Mckeon's eval 1/23/18: She does appear to have a JERRI because of the persistent worries that she has.  These worries predominate her day.  She would probably do well with prevention therapy such as SSRI/SNRI to help control the physical symptoms of the anxiety.  Problem at this point in time is her electrolyte abnormalities.  There is a slight risk of hyponatremia with these medications and    Hearing loss     [place] (hard of hearing)     Hypercholesterolemia     Hypertension     On home oxygen therapy     Psychiatric problem     Thyroid disease        Past Surgical History:   Procedure Laterality Date    CARDIOVERSION  10/19/2020    Procedure: Cardioversion;  Surgeon: Brandon Elias MD;  Location: [facility] CATH LAB;  Service: Cardiology;;    CARPAL TUNNEL RELEASE  2012    right hand    ESOPHAGOGASTRODUODENOSCOPY Left 8/29/2018    Procedure: EGD (ESOPHAGOGASTRODUODENOSCOPY);  Surgeon: Oniel  MD Willian;  Location: [facility] ENDO;  Service: Endoscopy;  Laterality: Left;    HYSTERECTOMY      left carotid endartectomy  5/2006    TONSILLECTOMY      TREATMENT OF CARDIAC ARRHYTHMIA N/A 10/19/2020    Procedure: Transesophageal echo (BLADIMIR) intra-procedure log documentation;  Surgeon: Brandon Elias MD;  Location: [facility] CATH LAB;  Service: Cardiology;  Laterality: N/A;       Review of patient's allergies indicates:   Allergen Reactions    Hydrochlorothiazide Other (See Comments)     hyponatremia    [place] Swelling     Tongue swells up and a generalized rash.    Lisinopril Other (See Comments)     Cough     Scheduled Meds:   azithromycin  500 mg Intravenous Once    [START ON 4/21/2023] doxycycline (VIBRAMYCIN) IVPB  100 mg Intravenous Q12H    [START ON 4/21/2023] furosemide (LASIX) injection  20 mg Intravenous BID AC    triamcinolone acetonide 0.1%   Topical (Top) BID     Continuous Infusions:  PRN Meds:.acetaminophen, melatonin, naloxone, ondansetron, oxyCODONE, polyethylene glycol, potassium bicarbonate, potassium bicarbonate, potassium bicarbonate, simethicone, sodium chloride 0.9%      Current Outpatient Medications on File Prior to Encounter   Medication Sig    acetaminophen (TYLENOL) 500 MG tablet Take 1 tablet (500 mg total) by mouth every 6 (six) hours as needed for Pain or Temperature greater than (100).    albuterol-ipratropium (DUO-NEB) 2.5 mg-0.5 mg/3 mL nebulizer solution Take 3 mLs by nebulization every 6 (six) hours. Rescue    alendronate (FOSAMAX) 70 MG tablet Take 70 mg by mouth every 7 days.    amLODIPine (NORVASC) 5 MG tablet Take 1 tablet (5 mg total) by mouth once daily.    apixaban (ELIQUIS) 2.5 mg Tab Take 1 tablet (2.5 mg total) by mouth 2 (two) times daily.    atorvastatin (LIPITOR) 20 MG tablet Take 20 mg by mouth once daily.    EScitalopram oxalate (LEXAPRO) 10 MG tablet Take 10 mg by mouth once daily.    famotidine (PEPCID) 20 MG tablet Take 20 mg by mouth once daily.    fluticasone  propionate (FLONASE) 50 mcg/actuation nasal spray 1 spray by Each Nostril route once daily.    furosemide (LASIX) 40 MG tablet Take 1 tablet (40 mg total) by mouth once daily. (Patient taking differently: Take 20 mg by mouth once daily.)    levothyroxine (SYNTHROID) 75 MCG tablet Take 75 mcg by mouth once daily.    potassium chloride (K-TAB) 20 mEq Take 20 mEq by mouth once daily.    traZODone (DESYREL) 50 MG tablet Take 1 tablet (50 mg total) by mouth every evening.    [DISCONTINUED] amlodipine-atorvastatin (CADUET) 10-40 mg per tablet TAKE ONE TABLET BY MOUTH EVERY DAY (Patient taking differently: Take by mouth nightly.)    [DISCONTINUED] losartan (COZAAR) 100 MG tablet TAKE ONE TABLET BY MOUTH EVERY DAY    [DISCONTINUED] metoprolol tartrate (LOPRESSOR) 25 MG tablet Take 25 mg by mouth once daily.     Family History       Problem Relation (Age of Onset)    Breast cancer Daughter (50)    Cancer Brother (65), Sister (81), Sister, Sister    Heart disease Father    Ovarian cancer Sister (81)    Schizophrenia Son          Tobacco Use    Smoking status: Never    Smokeless tobacco: Never   Substance and Sexual Activity    Alcohol use: No    Drug use: No    Sexual activity: Not Currently     Partners: Male     Review of Systems   All other systems reviewed and are negative.  Twelve point review of systems obtained and negative except as stated above in HPI     Objective:     Vital Signs (Most Recent):  Temp: 98 °F (36.7 °C) (04/20/23 1928)  Pulse: 83 (04/20/23 2138)  Resp: (!) 34 (04/20/23 2138)  BP: (!) 151/68 (04/20/23 2138)  SpO2: (!) 92 % (04/20/23 2138)   Vital Signs (24h Range):  Temp:  [98 °F (36.7 °C)] 98 °F (36.7 °C)  Pulse:  [] 83  Resp:  [24-36] 34  SpO2:  [87 %-96 %] 92 %  BP: (129-151)/(64-68) 151/68     Weight: 40.8 kg (90 lb)  Body mass index is 20.18 kg/m².    Physical Exam  Vitals and nursing note reviewed.   Constitutional:       General: She is awake.      Appearance: She is not ill-appearing or  toxic-appearing.      Interventions: Face mask in place.      Comments: Frail-appearing elderly female, sitting up in bed awake and alert, she does not appear in any distress, her son is at the bedside.   HENT:      Head: Normocephalic.      Right Ear: Decreased hearing noted.      Left Ear: Decreased hearing noted.      Nose: Nose normal.      Mouth/Throat:      Lips: Pink.      Mouth: Mucous membranes are moist.      Pharynx: Oropharynx is clear.   Eyes:      General: Lids are normal. Vision grossly intact. Gaze aligned appropriately.      Pupils: Pupils are equal, round, and reactive to light.   Neck:        Comments: There is a very small ulceration to the back of her neck that is pruritic.  Cardiovascular:      Rate and Rhythm: Normal rate. Rhythm irregular.      Pulses: Normal pulses.      Heart sounds: Normal heart sounds.      Comments: Very mild right lower extremity edema around ulcerated area on lateral ankle.  Pulmonary:      Effort: Pulmonary effort is normal.      Breath sounds: Decreased breath sounds present. No wheezing, rhonchi or rales.      Comments: Bilateral bases posteriorly  Abdominal:      General: Abdomen is protuberant. Bowel sounds are normal.      Palpations: Abdomen is soft.      Tenderness: There is no abdominal tenderness.   Genitourinary:     Comments: PureWick catheter in place  Musculoskeletal:         General: Normal range of motion.      Cervical back: Normal range of motion.      Right lower leg: Edema present.      Comments: Thin extremities with poor bulk, moves all extremities good strength.  Generally weak.   Skin:     General: Skin is warm and dry.      Capillary Refill: Capillary refill takes less than 2 seconds.      Findings: Lesion present.      Comments: Small ulcerated area about nickel size to right lateral ankle just above the malleolus, appears clean but has surrounding erythema, warmth and mild edema.  Minimally tender to palpation.   Neurological:      Mental  Status: She is alert and oriented to person, place, and time.      GCS: GCS eye subscore is 4. GCS verbal subscore is 5. GCS motor subscore is 6.      Sensory: Sensation is intact.      Motor: Motor function is intact.   Psychiatric:         Attention and Perception: Attention and perception normal.         Mood and Affect: Mood normal.         Speech: Speech normal.         Behavior: Behavior normal. Behavior is cooperative.         Thought Content: Thought content normal.         Cognition and Memory: Cognition and memory normal.         Judgment: Judgment normal.         CRANIAL NERVES     CN III, IV, VI   Pupils are equal, round, and reactive to light.     Significant Labs: All pertinent labs within the past 24 hours have been reviewed.    ABGs:   Recent Labs   Lab 04/20/23 2103   PH 7.383   PCO2 46.0*   HCO3 27.4   POCSATURATED 48*   BE 2   PO2 27*     Bilirubin:   Recent Labs   Lab 04/20/23 2004   BILITOT 1.0       BMP:   Recent Labs   Lab 04/20/23 2004   *   *   K 4.7   CL 94*   CO2 24   BUN 23   CREATININE 1.0   CALCIUM 8.6*   MG 1.8     CBC:   Recent Labs   Lab 04/20/23 2004 04/20/23 2103   WBC 11.50  --    HGB 9.5*  --    HCT 30.6* 35*     --      CMP:   Recent Labs   Lab 04/20/23 2004   *   K 4.7   CL 94*   CO2 24   *   BUN 23   CREATININE 1.0   CALCIUM 8.6*   PROT 6.0   ALBUMIN 3.5   BILITOT 1.0   ALKPHOS 82   AST 22   ALT 30   ANIONGAP 9     Cardiac Markers:   Recent Labs   Lab 04/20/23 2004   BNP 1,103*     Lactic Acid:   Recent Labs   Lab 04/20/23 2034   LACTATE 1.3     Magnesium:   Recent Labs   Lab 04/20/23 2004   MG 1.8       Troponin:   Recent Labs   Lab 04/20/23 2004   TROPONINIHS 7.8     TSH:   Recent Labs   Lab 04/20/23 2004   TSH 2.410         Significant Imaging: I have reviewed all pertinent imaging results/findings within the past 24 hours.    Chest XR pending    Assessment/Plan:     * Acute exacerbation of CHF (congestive heart failure)  Elevated  BNP 1103, given 20 mg IV Lasix in ED, we will continue with 20 mg IV twice daily, fluid restriction, daily weights, strict I&O, obtain echocardiogram, monitor on telemetry, replace electrolytes as needed, currently magnesium 0.8 and potassium 4.7    Cellulitis of leg  Consult wound care, start doxycycline 100 mg oral twice daily      CKD (chronic kidney disease) stage 3, GFR 30-59 ml/min  BUN/CR 23/1.0, estimated GFR 54.5, monitor BMP daily      Anemia  H&H 9.5/30.6, stable, on Eliquis, monitor for bleeding      Debility  Increase activity as tolerated, out of bed t.i.d.    Hypothyroid  TSH 2.410, resume levothyroxine      Acute on chronic respiratory failure with hypoxemia  Patient with chronic hypoxic respiratory failure, Venous blood gas with pH 7.383, pCO2 46 and bicarb 27.4, oxygen protocol to keep sats greater than 90%, duo nebs q.4 hours as needed for shortness of breath or wheezing, monitor pulse ox continuously, diurese    Atrial fibrillation  Atrial fibrillation with rate controlled at 77, she is not on rate control medication, continue Eliquis, monitor on telemetry,        Essential hypertension  Blood pressure stable, 129/64 in ED, not on any htn meds, monitor for hypotension      Hyponatremia  Sodium 127, she is been chronically hyponatremic at baseline sodium around 131, she is on an SSRI and has been since 2018, sodium has fluctuated over the years but she is had many episodes of hyponatremia, fluid restriction and furosemide b.i.d., TSH is 2.410, UA is pending, daily BMP        VTE Risk Mitigation (From admission, onward)           Ordered     Reason for No Pharmacological VTE Prophylaxis  Once        Question:  Reasons:  Answer:  Already adequately anticoagulated on oral Anticoagulants    04/20/23 2206     IP VTE HIGH RISK PATIENT  Once         04/20/23 2206     Place sequential compression device  Until discontinued         04/20/23 2206                   Patient was examined at 2126    Code  discussion with patient and/or caregiver regarding intubation, CPR, medications and emergency life support.  Patient elected to be: FULL CODE        On 04/20/2023, patient should be placed in hospital observation services under my care in collaboration with Aleyda Oliver MD.      Laney Mckinley NP  Department of Hospital Medicine  [facility] - Emergency Dept

## 2023-04-21 NOTE — ASSESSMENT & PLAN NOTE
Patient with chronic hypoxic respiratory failure, Venous blood gas with pH 7.383, pCO2 46 and bicarb 27.4, oxygen protocol to keep sats greater than 90%, duo nebs q.4 hours as needed for shortness of breath or wheezing, monitor pulse ox continuously, diurese

## 2023-04-21 NOTE — HPI
Jeannie Hutchins is an 87-year-old female with chronic medical problems including AFib on Eliquis,HFpEF, hyperlipidemia, hypertension, COPD,  and CKD 3 who presents to the emergency room complaining shortness of breath.  Patient states that she usually uses 2 L nasal cannula O2 but for the past 2 weeks she is had to turn it up to 3 L. she said she can usually get around with minimal shortness of breath but she is had to stop and rest on her way to the dining room.  Yesterday she was so weak she had to get someone to come help her get dressed.  She also complains of a dry cough for 1 month and some sinus congestion at nighttime.  She also has had some occasional chest pain on the left side of her chest that kind of sharp and quickly resolves on its own.  She has not noticed that it is with activity or anything.  When EMS was called her O2 sat was 60% on room air and her O2 tubing was kinked.  Her O2 sat would not go up on her nasal cannula O2 and it improved on a non-rebreather.  She denies any fevers or chills, wheezing, productive cough, diarrhea or constipation.  She does have a wound to her right lower extremity and she said she hit right lower leg on a wheelchair and slowly got worse.  She said the wound care nurse at the nursing home has been taking care of it.    In the ED, BNP elevated at 1103, troponin 7.8, sodium 127, potassium 4.7, magnesium 1.8, BUN/CR 23/1.0, glucose 123, lactic acid 1 3, WBC 11.50, platelets 412, BUN/CR 9.5/30.6, temperature was 98°, rate , blood pressure 129/64, respiratory rate in the 20s, O2 sat 87-96% on 8 L. twelve lead EKG with atrial fibrillation with ventricular rate 77 and .  Chest x-ray read is pending but appears to some cardiomegaly and heart failure along with chronic changes on previous x-rays.  She was given 500 mg IV azithromycin, 1 g ceftriaxone and 20 mg IV Lasix.  She will be admitted to the hospitalist service for further evaluation and treatment.

## 2023-04-22 LAB
ALLENS TEST: ABNORMAL
ANION GAP SERPL CALC-SCNC: 10 MMOL/L (ref 8–16)
BASOPHILS # BLD AUTO: 0.03 K/UL (ref 0–0.2)
BASOPHILS NFR BLD: 0.2 % (ref 0–1.9)
BNP SERPL-MCNC: 982 PG/ML (ref 0–99)
BUN SERPL-MCNC: 12 MG/DL (ref 8–23)
CALCIUM SERPL-MCNC: 8.7 MG/DL (ref 8.7–10.5)
CHLORIDE SERPL-SCNC: 86 MMOL/L (ref 95–110)
CO2 SERPL-SCNC: 35 MMOL/L (ref 23–29)
CREAT SERPL-MCNC: 0.7 MG/DL (ref 0.5–1.4)
DELSYS: ABNORMAL
DIFFERENTIAL METHOD: ABNORMAL
EOSINOPHIL # BLD AUTO: 0 K/UL (ref 0–0.5)
EOSINOPHIL NFR BLD: 0.2 % (ref 0–8)
ERYTHROCYTE [DISTWIDTH] IN BLOOD BY AUTOMATED COUNT: 19.6 % (ref 11.5–14.5)
EST. GFR  (NO RACE VARIABLE): >60 ML/MIN/1.73 M^2
FIO2: 100
FLOW: 15
GLUCOSE SERPL-MCNC: 78 MG/DL (ref 70–110)
HCO3 UR-SCNC: 35.9 MMOL/L (ref 24–28)
HCT VFR BLD AUTO: 33.8 % (ref 37–48.5)
HGB BLD-MCNC: 10.6 G/DL (ref 12–16)
IMM GRANULOCYTES # BLD AUTO: 0.1 K/UL (ref 0–0.04)
IMM GRANULOCYTES NFR BLD AUTO: 0.6 % (ref 0–0.5)
LYMPHOCYTES # BLD AUTO: 1 K/UL (ref 1–4.8)
LYMPHOCYTES NFR BLD: 5.8 % (ref 18–48)
MAGNESIUM SERPL-MCNC: 1.7 MG/DL (ref 1.6–2.6)
MCH RBC QN AUTO: 24.4 PG (ref 27–31)
MCHC RBC AUTO-ENTMCNC: 31.4 G/DL (ref 32–36)
MCV RBC AUTO: 78 FL (ref 82–98)
MODE: ABNORMAL
MONOCYTES # BLD AUTO: 1.7 K/UL (ref 0.3–1)
MONOCYTES NFR BLD: 9.5 % (ref 4–15)
NEUTROPHILS # BLD AUTO: 14.9 K/UL (ref 1.8–7.7)
NEUTROPHILS NFR BLD: 83.7 % (ref 38–73)
NRBC BLD-RTO: 0 /100 WBC
PCO2 BLDA: 53.4 MMHG (ref 35–45)
PH SMN: 7.44 [PH] (ref 7.35–7.45)
PLATELET # BLD AUTO: 453 K/UL (ref 150–450)
PMV BLD AUTO: 8.5 FL (ref 9.2–12.9)
PO2 BLDA: 75 MMHG (ref 80–100)
POC BE: 12 MMOL/L
POC SATURATED O2: 95 % (ref 95–100)
POC TCO2: 38 MMOL/L (ref 23–27)
POTASSIUM SERPL-SCNC: 4 MMOL/L (ref 3.5–5.1)
PROCALCITONIN SERPL IA-MCNC: <0.05 NG/ML (ref 0–0.5)
RBC # BLD AUTO: 4.34 M/UL (ref 4–5.4)
SAMPLE: ABNORMAL
SITE: ABNORMAL
SODIUM SERPL-SCNC: 131 MMOL/L (ref 136–145)
WBC # BLD AUTO: 17.82 K/UL (ref 3.9–12.7)

## 2023-04-22 PROCEDURE — 85347 COAGULATION TIME ACTIVATED: CPT

## 2023-04-22 PROCEDURE — 25000003 PHARM REV CODE 250: Performed by: NURSE PRACTITIONER

## 2023-04-22 PROCEDURE — 94640 AIRWAY INHALATION TREATMENT: CPT

## 2023-04-22 PROCEDURE — 94799 UNLISTED PULMONARY SVC/PX: CPT

## 2023-04-22 PROCEDURE — 63600175 PHARM REV CODE 636 W HCPCS: Performed by: INTERNAL MEDICINE

## 2023-04-22 PROCEDURE — 99223 PR INITIAL HOSPITAL CARE,LEVL III: ICD-10-PCS | Mod: ,,, | Performed by: INTERNAL MEDICINE

## 2023-04-22 PROCEDURE — 82803 BLOOD GASES ANY COMBINATION: CPT

## 2023-04-22 PROCEDURE — 85025 COMPLETE CBC W/AUTO DIFF WBC: CPT | Performed by: NURSE PRACTITIONER

## 2023-04-22 PROCEDURE — 83735 ASSAY OF MAGNESIUM: CPT | Performed by: NURSE PRACTITIONER

## 2023-04-22 PROCEDURE — 99900035 HC TECH TIME PER 15 MIN (STAT)

## 2023-04-22 PROCEDURE — 25000003 PHARM REV CODE 250: Performed by: INTERNAL MEDICINE

## 2023-04-22 PROCEDURE — 20000000 HC ICU ROOM

## 2023-04-22 PROCEDURE — 36415 COLL VENOUS BLD VENIPUNCTURE: CPT | Performed by: INTERNAL MEDICINE

## 2023-04-22 PROCEDURE — 80048 BASIC METABOLIC PNL TOTAL CA: CPT | Performed by: NURSE PRACTITIONER

## 2023-04-22 PROCEDURE — 36415 COLL VENOUS BLD VENIPUNCTURE: CPT | Performed by: NURSE PRACTITIONER

## 2023-04-22 PROCEDURE — 36600 WITHDRAWAL OF ARTERIAL BLOOD: CPT

## 2023-04-22 PROCEDURE — 25000242 PHARM REV CODE 250 ALT 637 W/ HCPCS: Performed by: STUDENT IN AN ORGANIZED HEALTH CARE EDUCATION/TRAINING PROGRAM

## 2023-04-22 PROCEDURE — 63600175 PHARM REV CODE 636 W HCPCS: Performed by: STUDENT IN AN ORGANIZED HEALTH CARE EDUCATION/TRAINING PROGRAM

## 2023-04-22 PROCEDURE — 99223 1ST HOSP IP/OBS HIGH 75: CPT | Mod: ,,, | Performed by: INTERNAL MEDICINE

## 2023-04-22 PROCEDURE — 94660 CPAP INITIATION&MGMT: CPT

## 2023-04-22 PROCEDURE — 83880 ASSAY OF NATRIURETIC PEPTIDE: CPT | Performed by: INTERNAL MEDICINE

## 2023-04-22 PROCEDURE — 27000221 HC OXYGEN, UP TO 24 HOURS

## 2023-04-22 PROCEDURE — 25000003 PHARM REV CODE 250: Performed by: STUDENT IN AN ORGANIZED HEALTH CARE EDUCATION/TRAINING PROGRAM

## 2023-04-22 PROCEDURE — 84145 PROCALCITONIN (PCT): CPT | Performed by: INTERNAL MEDICINE

## 2023-04-22 PROCEDURE — 51702 INSERT TEMP BLADDER CATH: CPT

## 2023-04-22 PROCEDURE — 94761 N-INVAS EAR/PLS OXIMETRY MLT: CPT

## 2023-04-22 PROCEDURE — 99900031 HC PATIENT EDUCATION (STAT)

## 2023-04-22 PROCEDURE — 63600175 PHARM REV CODE 636 W HCPCS: Performed by: NURSE PRACTITIONER

## 2023-04-22 RX ORDER — METOPROLOL TARTRATE 25 MG/1
25 TABLET, FILM COATED ORAL 2 TIMES DAILY
Status: DISCONTINUED | OUTPATIENT
Start: 2023-04-22 | End: 2023-04-27 | Stop reason: HOSPADM

## 2023-04-22 RX ORDER — MAGNESIUM SULFATE HEPTAHYDRATE 40 MG/ML
2 INJECTION, SOLUTION INTRAVENOUS ONCE
Status: COMPLETED | OUTPATIENT
Start: 2023-04-22 | End: 2023-04-22

## 2023-04-22 RX ORDER — METOPROLOL TARTRATE 1 MG/ML
5 INJECTION, SOLUTION INTRAVENOUS EVERY 5 MIN PRN
Status: DISCONTINUED | OUTPATIENT
Start: 2023-04-22 | End: 2023-04-27 | Stop reason: HOSPADM

## 2023-04-22 RX ORDER — FUROSEMIDE 10 MG/ML
40 INJECTION INTRAMUSCULAR; INTRAVENOUS 2 TIMES DAILY
Status: DISCONTINUED | OUTPATIENT
Start: 2023-04-22 | End: 2023-04-22

## 2023-04-22 RX ORDER — METOPROLOL TARTRATE 1 MG/ML
INJECTION, SOLUTION INTRAVENOUS
Status: DISPENSED
Start: 2023-04-22 | End: 2023-04-23

## 2023-04-22 RX ORDER — FUROSEMIDE 10 MG/ML
40 INJECTION INTRAMUSCULAR; INTRAVENOUS ONCE
Status: COMPLETED | OUTPATIENT
Start: 2023-04-22 | End: 2023-04-22

## 2023-04-22 RX ORDER — IPRATROPIUM BROMIDE AND ALBUTEROL SULFATE 2.5; .5 MG/3ML; MG/3ML
3 SOLUTION RESPIRATORY (INHALATION)
Status: DISCONTINUED | OUTPATIENT
Start: 2023-04-22 | End: 2023-04-24

## 2023-04-22 RX ORDER — IPRATROPIUM BROMIDE AND ALBUTEROL SULFATE 2.5; .5 MG/3ML; MG/3ML
3 SOLUTION RESPIRATORY (INHALATION) EVERY 6 HOURS
Status: DISCONTINUED | OUTPATIENT
Start: 2023-04-22 | End: 2023-04-22

## 2023-04-22 RX ADMIN — IPRATROPIUM BROMIDE AND ALBUTEROL SULFATE 3 ML: .5; 3 SOLUTION RESPIRATORY (INHALATION) at 08:04

## 2023-04-22 RX ADMIN — DOXYCYCLINE 100 MG: 100 INJECTION, POWDER, LYOPHILIZED, FOR SOLUTION INTRAVENOUS at 08:04

## 2023-04-22 RX ADMIN — METOPROLOL TARTRATE 25 MG: 25 TABLET, FILM COATED ORAL at 08:04

## 2023-04-22 RX ADMIN — OXYCODONE HYDROCHLORIDE 5 MG: 5 TABLET ORAL at 07:04

## 2023-04-22 RX ADMIN — IPRATROPIUM BROMIDE AND ALBUTEROL SULFATE 3 ML: .5; 3 SOLUTION RESPIRATORY (INHALATION) at 04:04

## 2023-04-22 RX ADMIN — APIXABAN 2.5 MG: 2.5 TABLET, FILM COATED ORAL at 09:04

## 2023-04-22 RX ADMIN — CEFTRIAXONE 1 G: 1 INJECTION, POWDER, FOR SOLUTION INTRAMUSCULAR; INTRAVENOUS at 04:04

## 2023-04-22 RX ADMIN — METOPROLOL TARTRATE 5 MG: 1 INJECTION, SOLUTION INTRAVENOUS at 12:04

## 2023-04-22 RX ADMIN — DOXYCYCLINE 100 MG: 100 INJECTION, POWDER, LYOPHILIZED, FOR SOLUTION INTRAVENOUS at 09:04

## 2023-04-22 RX ADMIN — MAGNESIUM SULFATE HEPTAHYDRATE 2 G: 40 INJECTION, SOLUTION INTRAVENOUS at 04:04

## 2023-04-22 RX ADMIN — ATORVASTATIN CALCIUM 20 MG: 20 TABLET, FILM COATED ORAL at 08:04

## 2023-04-22 RX ADMIN — ESCITALOPRAM OXALATE 10 MG: 10 TABLET ORAL at 09:04

## 2023-04-22 RX ADMIN — METHYLPREDNISOLONE SODIUM SUCCINATE 40 MG: 40 INJECTION, POWDER, FOR SOLUTION INTRAMUSCULAR; INTRAVENOUS at 04:04

## 2023-04-22 RX ADMIN — IPRATROPIUM BROMIDE AND ALBUTEROL SULFATE 3 ML: .5; 3 SOLUTION RESPIRATORY (INHALATION) at 01:04

## 2023-04-22 RX ADMIN — TRIAMCINOLONE ACETONIDE: 1 CREAM TOPICAL at 08:04

## 2023-04-22 RX ADMIN — LEVOTHYROXINE SODIUM 75 MCG: 0.03 TABLET ORAL at 05:04

## 2023-04-22 RX ADMIN — DEXTROSE 250 MG: 50 INJECTION, SOLUTION INTRAVENOUS at 04:04

## 2023-04-22 RX ADMIN — OXYCODONE HYDROCHLORIDE 5 MG: 5 TABLET ORAL at 09:04

## 2023-04-22 RX ADMIN — IPRATROPIUM BROMIDE AND ALBUTEROL SULFATE 3 ML: .5; 3 SOLUTION RESPIRATORY (INHALATION) at 07:04

## 2023-04-22 RX ADMIN — METOPROLOL TARTRATE 25 MG: 25 TABLET, FILM COATED ORAL at 04:04

## 2023-04-22 RX ADMIN — FUROSEMIDE 20 MG: 10 INJECTION, SOLUTION INTRAMUSCULAR; INTRAVENOUS at 05:04

## 2023-04-22 RX ADMIN — APIXABAN 2.5 MG: 2.5 TABLET, FILM COATED ORAL at 08:04

## 2023-04-22 RX ADMIN — AMLODIPINE BESYLATE 5 MG: 5 TABLET ORAL at 09:04

## 2023-04-22 RX ADMIN — FUROSEMIDE 40 MG: 10 INJECTION, SOLUTION INTRAMUSCULAR; INTRAVENOUS at 12:04

## 2023-04-22 RX ADMIN — POTASSIUM CHLORIDE 20 MEQ: 1500 TABLET, EXTENDED RELEASE ORAL at 09:04

## 2023-04-22 NOTE — NURSING
Pt presents with worsening oxygen saturation,  receiving 8L per oxy mask. Respiratory therapist  on floor asked to assess patient status and Dr. De La Fuente notified of worsening desaturation into the low 80's. Patient Repositioned and turned with no positive result. BiPAP ordered per Dr. De La Fuente along with ABG's and CXR followed by 40 of lasix. Revaluation to commence in 1-2 hrs per Dr green.

## 2023-04-22 NOTE — CARE UPDATE
04/22/23 0722   Patient Assessment/Suction   Level of Consciousness (AVPU) alert   Respiratory Effort Normal;Unlabored   Expansion/Accessory Muscles/Retractions no use of accessory muscles;no retractions;expansion symmetric   All Lung Fields Breath Sounds diminished   Rhythm/Pattern, Respiratory unlabored;pattern regular;depth regular;chest wiggle adequate;no shortness of breath reported   Cough Frequency no cough   Skin Integrity   $ Wound Care Tech Time 15 min   Area Observed Bridge of nose   Skin Appearance without discoloration   Barrier used? Gel Cushion   PRE-TX-O2   Device (Oxygen Therapy) BIPAP   $ Is the patient on Low Flow Oxygen? Yes   Oxygen Concentration (%) 60   SpO2 97 %   Pulse Oximetry Type Intermittent   $ Pulse Oximetry - Multiple Charge Pulse Oximetry - Multiple   Pulse 102   Resp 18   Aerosol Therapy   $ Aerosol Therapy Charges Aerosol Treatment   Daily Review of Necessity (SVN) completed   Respiratory Treatment Status (SVN) given   Treatment Route (SVN) oxygen;in-line   Patient Position (SVN) HOB elevated   Post Treatment Assessment (SVN) increased aeration   Signs of Intolerance (SVN) none   Breath Sounds Post-Respiratory Treatment   Throughout All Fields Post-Treatment All Fields   Throughout All Fields Post-Treatment aeration increased   Post-treatment Heart Rate (beats/min) 102   Post-treatment Resp Rate (breaths/min) 18   Preset CPAP/BiPAP Settings   Mode Of Delivery BiPAP S/T   $ CPAP/BiPAP Daily Charge BiPAP/CPAP Daily   $ Initial CPAP/BiPAP Setup? No   $ Is patient using? Yes   Size of Mask Small   Sized Appropriately? Yes   Equipment Type V60   Airway Device Type small full face mask   Humidifier not applicable   Ipap 12   EPAP (cm H2O) 6   Pressure Support (cm H2O) 6   Set Rate (Breaths/Min) 18   ITime (sec) 1   Rise Time (sec) 3   Education   $ Education Bronchodilator;15 min   Respiratory Evaluation   $ Care Plan Tech Time 15 min

## 2023-04-22 NOTE — PROGRESS NOTES
"  [facility] Medicine  Progress Note    Patient Name: Jeannie Hutchins  MRN: [id number]  Patient Class: IP- Inpatient   Admission Date: 4/20/2023  Length of Stay: 1 days  Attending Physician: Talia Charles MD  Primary Care Provider: Primary Doctor No        Subjective:     Principal Problem:Acute congestive heart failure    Interval History: BiPAP overnight. Floor states could not be transitioned off. Upgraded to Stepdown. No beds physically available in stepdown but bed available in the ICU. Patient utilizing ICU bed for stepdown. Called that patient's HR in the 140s-150s. Improved with IV lopressor push. Came to bedside due to critical condition. Patient had been able to be transitioned off Bipap to ventimask. Took her PO meds. Daughter at bedside. HR and oxygen improved. States shortness of breath improved. Though again does desaturate if taken off. No chest pain/palpitations. Complaining of cramping in legs. No nausea/vomiting, fevers/chills, dizziness/headache.     Returned to bedside. Daughter had stepped away. Confirmed code status with patient - states she recalls having to make this decision with her  and states she recalls it "giving him a year" and she would like to remain full code as of now.      Review of Systems  ROS reviewed and documented as above.  Objective:     Vital Signs (Most Recent):  Temp: 98.4 °F (36.9 °C) (04/22/23 1501)  Pulse: 105 (04/22/23 1659)  Resp: 20 (04/22/23 1659)  BP: 135/63 (04/22/23 1659)  SpO2: (!) 93 % (04/22/23 1659)   Vital Signs (24h Range):  Temp:  [97.7 °F (36.5 °C)-99.5 °F (37.5 °C)] 98.4 °F (36.9 °C)  Pulse:  [] 105  Resp:  [18-43] 20  SpO2:  [78 %-98 %] 93 %  BP: (108-153)/(57-97) 135/63     Weight: 41.7 kg (92 lb)  Body mass index is 17.97 kg/m².    Intake/Output Summary (Last 24 hours) at 4/22/2023 3360  Last data filed at 4/22/2023 1140  Gross per 24 hour   Intake 25 ml   Output 2900 ml   Net -2875 ml        Physical " Exam  Vitals reviewed.   Constitutional:       Appearance: She is ill-appearing.   HENT:      Head: Normocephalic and atraumatic.      Comments: Hard of hearing  Eyes:      Extraocular Movements: Extraocular movements intact.      Conjunctiva/sclera: Conjunctivae normal.   Cardiovascular:      Rate and Rhythm: Normal rate.      Pulses: Normal pulses.   Pulmonary:      Breath sounds: Wheezing and rales present.      Comments: Increasingly diminished breath sounds with poor air entry  Abdominal:      General: There is no distension.      Palpations: Abdomen is soft.      Tenderness: There is no abdominal tenderness.   Musculoskeletal:         General: Tenderness (RLE) present.      Cervical back: Neck supple. No tenderness.      Right lower leg: No edema.      Left lower leg: No edema.   Skin:     General: Skin is warm and dry.      Comments: Small wound on RLE, if erythematous previously, appears to have improved   Neurological:      Mental Status: She is alert.   Psychiatric:         Mood and Affect: Mood normal.         Judgment: Judgment normal.      Comments: hard of hearing limits communication at times       Significant Labs: All pertinent labs within the past 24 hours have been reviewed.    Significant Imaging: I have reviewed all pertinent imaging results/findings within the past 24 hours.    Assessment/Plan:      Active Diagnoses:    Diagnosis Date Noted POA    PRINCIPAL PROBLEM:  Acute congestive heart failure [I50.9]  Unknown    Cellulitis of leg [L03.119] 04/20/2023 Unknown    CKD (chronic kidney disease) stage 3, GFR 30-59 ml/min [N18.30] 04/20/2021 Unknown    Anemia [D64.9] 04/20/2021 Unknown    Hypothyroid [E03.9] 01/17/2021 Yes    Debility [R53.81] 01/17/2021 Yes    Acute on chronic respiratory failure with hypoxia [J96.21] 01/16/2021 Unknown    Atrial fibrillation [I48.91] 01/29/2018 Unknown    Essential hypertension [I10] 01/23/2018 Yes    Hyponatremia [E87.1] 01/23/2018 Yes      Problems Resolved  During this Admission:   As above,  Acute on chronic hypoxic respiratory failure probably multifactorial  HFpEF in exacerbation  Possible developing pneumonia  Possible COPD exacerbation due to above conditions   Atrial fibrillation with RVR, now rate controlled  Hyponatremia   Contraction alkalosis    -Increasing oxygen requirement   -Improving sodium  -Worsening leukocytosis  -CXR reviewed  -Initially increased lasix diuresis, appreciate change to diamox per pulmonary in the setting of patient's contraction alkalosis  -Added ceftriaxone to patient's doxycycline which had been covering her wound and admission cellulitis - this would also cover a pneumococcal pneumonia and atypicals  -Will repeat procal  -Scheduled nebulizers  -Due to the wheezing and diminished and poor air entry, added steroids, possible element of bronchospasm, COPD exacerbation   -Continue diuresis   -Echo reviewed - HFpEF, RV pressure overload  -Patient was on beta blockers at home, restarted as patient was in RVR   -Titrate oxygen as able  -Treatment of cellulitis, wound care following  -Benadryl cream for itching, attempting to stay away from overly sedating medication for patient     FULL CODE  VTE Risk Mitigation (From admission, onward)           Ordered     apixaban tablet 2.5 mg  2 times daily         04/20/23 2304     Reason for No Pharmacological VTE Prophylaxis  Once        Question:  Reasons:  Answer:  Already adequately anticoagulated on oral Anticoagulants    04/20/23 2206     IP VTE HIGH RISK PATIENT  Once         04/20/23 2206     Place sequential compression device  Until discontinued         04/20/23 2206                       Talia Charles MD  Department of Hospital Medicine   [facility]ly [facility]

## 2023-04-22 NOTE — PLAN OF CARE
Plan of care and education reviewed with patient and family. Verbalization of understanding expressed.   Problem: Adult Inpatient Plan of Care  Goal: Plan of Care Review  Outcome: Ongoing, Progressing  Goal: Patient-Specific Goal (Individualized)  Outcome: Ongoing, Progressing  Goal: Absence of Hospital-Acquired Illness or Injury  Outcome: Ongoing, Progressing  Goal: Optimal Comfort and Wellbeing  Outcome: Ongoing, Progressing  Goal: Readiness for Transition of Care  Outcome: Ongoing, Progressing     Problem: Fall Injury Risk  Goal: Absence of Fall and Fall-Related Injury  Outcome: Ongoing, Progressing     Problem: Skin Injury Risk Increased  Goal: Skin Health and Integrity  Outcome: Ongoing, Progressing     Problem: Impaired Wound Healing  Goal: Optimal Wound Healing  Outcome: Ongoing, Progressing     Problem: Oral Intake Inadequate  Goal: Improved Oral Intake  Outcome: Ongoing, Progressing     Problem: Infection  Goal: Absence of Infection Signs and Symptoms  Outcome: Ongoing, Progressing

## 2023-04-22 NOTE — NURSING
Patient tolerating BIPAP well. Resting with eyes closed O2 saturation at 100%. Bed locked with HOB at 35 degrees. Will continue to monitor.

## 2023-04-22 NOTE — NURSING TRANSFER
Nursing Transfer Note      4/22/2023     Reason patient is being transferred: Increased O2 Needs and Higher Level of Care    Transfer To: MICU 3 Room 3027    Transfer via bed    Transfer with O2, cardiac monitoring    Transported by Birgit Salinas RN    Medicines sent: None    Any special needs or follow-up needed: Ta Placement    Chart send with patient: Yes    Notified: daughter    Patient reassessed at: 04/22/2023 (date, time)    Upon arrival to floor: cardiac monitor applied, patient oriented to room, call bell in reach, and bed in lowest position    Nurses Note -- 4 Eyes      4/22/2023   4:31 PM      Skin assessed during: Transfer      [] No Altered Skin Integrity Present    []Prevention Measures Documented      [] Yes- Altered Skin Integrity Present or Discovered   [x] LDA Added if Not in Epic (Describe Wound)   [] New Altered Skin Integrity was Present on Admit and Documented in LDA   [] Wound Image Taken    Wound Care Consulted? Yes    Attending Nurse:  Rowan Jimenez RN     Second RN/Staff Member:  Birgit Salinas RN, Sommer Bridges RN

## 2023-04-22 NOTE — CONSULTS
Pulmonary/Critical Care Consult      Patient name: Jeannie Hutchins  MRN: [id number]  Date: 04/22/2023    Admit Date: 4/20/2023  Consult Requested By: Talia Charles MD    Reason for Consult: Respiratory failure    HPI:    4/22/2023 - 87 yo ho AF, HFpEF, HTN, HLP, COPD, CKD3 admitted from ER with worsened SOb and decreased sats.  Evaluated and felt to be in HF and has been being treated but O2 needs have increased and moved to ICU and on BiPAP.  She is very anxious and a bit rambling, she is also [place] and getting a history is a bit difficult.  ROS as below.  When she remains still we have saturations of % on NC, she is in no respiratory distress.  WBC has increased, Na is low but better, CO2 has been increasing.  ABG now c/w a metabolic alkalosis and respiratory compensation and increased aA gradient.  CXR looks like pulmonary edema on top of chronic changes.  ECHO shows pulmonary hypertension, nl EF    Review of Systems    Review of Systems   Constitutional:  Negative for chills, diaphoresis, fever, malaise/fatigue and weight loss.   HENT:  Positive for congestion.    Eyes:  Negative for pain.   Respiratory:  Positive for shortness of breath. Negative for cough, hemoptysis, sputum production, wheezing and stridor.    Cardiovascular:  Positive for palpitations and leg swelling (in past). Negative for chest pain, orthopnea, claudication and PND.   Gastrointestinal:  Negative for abdominal pain, constipation, diarrhea, heartburn, nausea and vomiting.   Genitourinary:  Negative for dysuria, frequency and urgency.   Musculoskeletal:  Negative for falls and myalgias.        Injury to ankle  Muscle cramps   Neurological:  Negative for sensory change, focal weakness and weakness.   Psychiatric/Behavioral:  Negative for depression, substance abuse and suicidal ideas. The patient is nervous/anxious.      Past Medical History    Past Medical History:   Diagnosis Date    Anticoagulant long-term use     Eliquis     Arthritis     Atrial fibrillation     Blood transfusion     Carotid stenosis     CHF (congestive heart failure)     Edema     Generalized anxiety disorder 1/23/2018    Dr. Mckeon's eval 1/23/18: She does appear to have a JERRI because of the persistent worries that she has.  These worries predominate her day.  She would probably do well with prevention therapy such as SSRI/SNRI to help control the physical symptoms of the anxiety.  Problem at this point in time is her electrolyte abnormalities.  There is a slight risk of hyponatremia with these medications and    Hearing loss     [place] (hard of hearing)     Hypercholesterolemia     Hypertension     On home oxygen therapy     Psychiatric problem     Thyroid disease        Past Surgical History    Past Surgical History:   Procedure Laterality Date    CARDIOVERSION  10/19/2020    Procedure: Cardioversion;  Surgeon: Brandon Elias MD;  Location: [facility] CATH LAB;  Service: Cardiology;;    CARPAL TUNNEL RELEASE  2012    right hand    ESOPHAGOGASTRODUODENOSCOPY Left 8/29/2018    Procedure: EGD (ESOPHAGOGASTRODUODENOSCOPY);  Surgeon: Oniel Dorsey MD;  Location: [facility] ENDO;  Service: Endoscopy;  Laterality: Left;    HYSTERECTOMY      left carotid endartectomy  5/2006    TONSILLECTOMY      TREATMENT OF CARDIAC ARRHYTHMIA N/A 10/19/2020    Procedure: Transesophageal echo (BLADIMIR) intra-procedure log documentation;  Surgeon: Brandon Elias MD;  Location: [facility] CATH LAB;  Service: Cardiology;  Laterality: N/A;       Medications (scheduled):      albuterol-ipratropium  3 mL Nebulization Q4H WAKE    amLODIPine  5 mg Oral Daily    apixaban  2.5 mg Oral BID    atorvastatin  20 mg Oral Daily    cefTRIAXone (ROCEPHIN) IVPB  1 g Intravenous Daily    doxycycline (VIBRAMYCIN) IVPB  100 mg Intravenous Q12H    EScitalopram oxalate  10 mg Oral Daily    famotidine  20 mg Oral Q48H    fluticasone propionate  1 spray Each Nostril Daily    furosemide (LASIX) injection  40 mg Intravenous BID     levothyroxine  75 mcg Oral Daily    magnesium sulfate IVPB  2 g Intravenous Once    methylPREDNISolone sodium succinate injection  40 mg Intravenous Daily    metoprolol        metoprolol tartrate  25 mg Oral BID    potassium chloride  20 mEq Oral Daily    triamcinolone acetonide 0.1%   Topical (Top) BID       Medications (infusions):         Medications (prn):     acetaminophen, melatonin, metoprolol, naloxone, ondansetron, oxyCODONE, polyethylene glycol, potassium bicarbonate, potassium bicarbonate, potassium bicarbonate, simethicone, sodium chloride 0.9%    Family History:   Family History   Problem Relation Age of Onset    Heart disease Father     Cancer Brother 65        bladder    Cancer Sister 81        Ovarian    Ovarian cancer Sister 81    Breast cancer Daughter 50        Status post mastectomy    Cancer Sister         Lung.  Smoker    Cancer Sister         Lung.  Smoker    Schizophrenia Son        Social History: Tobacco:   Social History     Tobacco Use   Smoking Status Never   Smokeless Tobacco Never                                EtOH:   Social History     Substance and Sexual Activity   Alcohol Use No                                Drugs:   Social History     Substance and Sexual Activity   Drug Use No     Physical Exam    Vital signs:  Temp:  [97.5 °F (36.4 °C)-99.5 °F (37.5 °C)]   Pulse:  []   Resp:  [18-25]   BP: (133-153)/(78-97)   SpO2:  [79 %-98 %]     Intake/Output:   Intake/Output Summary (Last 24 hours) at 4/22/2023 1418  Last data filed at 4/22/2023 1140  Gross per 24 hour   Intake 25 ml   Output 3500 ml   Net -3475 ml        BMI: Estimated body mass index is 17.97 kg/m² as calculated from the following:    Height as of this encounter: 5' (1.524 m).    Weight as of this encounter: 41.7 kg (92 lb).    Physical Exam  Vitals and nursing note reviewed.   Constitutional:       General: She is not in acute distress.     Appearance: She is ill-appearing. She is not toxic-appearing or  diaphoretic.      Comments: Elderly, frail  Anxious   HENT:      Head: Normocephalic and atraumatic.      Right Ear: External ear normal.      Left Ear: External ear normal.      Nose: Nose normal. No congestion or rhinorrhea.      Mouth/Throat:      Mouth: Mucous membranes are moist.      Pharynx: Oropharynx is clear. No oropharyngeal exudate or posterior oropharyngeal erythema.   Eyes:      General: No scleral icterus.        Right eye: No discharge.         Left eye: No discharge.      Extraocular Movements: Extraocular movements intact.      Conjunctiva/sclera: Conjunctivae normal.      Pupils: Pupils are equal, round, and reactive to light.   Neck:      Vascular: No carotid bruit.   Cardiovascular:      Rate and Rhythm: Tachycardia present. Rhythm irregular.      Pulses: Normal pulses.      Heart sounds: Murmur heard.     No friction rub. No gallop.   Pulmonary:      Effort: Pulmonary effort is normal. No respiratory distress.      Breath sounds: No stridor. Rales present. No wheezing or rhonchi.      Comments: No acc m use  Chest:      Chest wall: No tenderness.   Abdominal:      General: Bowel sounds are normal. There is no distension.      Tenderness: There is no abdominal tenderness. There is no guarding.   Musculoskeletal:         General: No swelling. Normal range of motion.      Cervical back: Normal range of motion and neck supple. No rigidity or tenderness.      Right lower leg: No edema.      Left lower leg: No edema.      Comments: Right ankle wound dressed   Lymphadenopathy:      Cervical: No cervical adenopathy.   Skin:     General: Skin is warm and dry.      Capillary Refill: Capillary refill takes less than 2 seconds.      Coloration: Skin is not jaundiced.      Findings: No bruising.   Neurological:      General: No focal deficit present.      Mental Status: She is alert and oriented to person, place, and time. Mental status is at baseline.      Cranial Nerves: No cranial nerve deficit.       Sensory: No sensory deficit.      Motor: No weakness.   Psychiatric:         Behavior: Behavior normal.         Thought Content: Thought content normal.         Judgment: Judgment normal.      Comments: Anxious       Laboratory    Recent Labs   Lab 04/22/23 0349   WBC 17.82*   RBC 4.34   HGB 10.6*   HCT 33.8*   *   MCV 78*   MCH 24.4*   MCHC 31.4*       Recent Labs   Lab 04/22/23 0349   CALCIUM 8.7   *   K 4.0   CO2 35*   CL 86*   BUN 12   CREATININE 0.7       No results for input(s): PT, INR, APTT in the last 24 hours.    No results for input(s): CPK, CPKMB, TROPONINI, MB in the last 24 hours.    Additional labs: reviewed    Microbiology:       Microbiology Results (last 7 days)       Procedure Component Value Units Date/Time    Blood culture #2 **CANNOT BE ORDERED STAT** [[id number]] Collected: 04/20/23 2034    Order Status: Completed Specimen: Blood from Peripheral, Antecubital, Left Updated: 04/21/23 2232     Blood Culture, Routine No Growth to date      No Growth to date    Blood culture #1 **CANNOT BE ORDERED STAT** [[id number]] Collected: 04/20/23 2034    Order Status: Completed Specimen: Blood from Peripheral, Antecubital, Right Updated: 04/21/23 2232     Blood Culture, Routine No Growth to date      No Growth to date            Radiology    X-Ray Chest AP Portable  Frontal view of the chest    CLINICAL HISTORY: Shortness of breath    COMPARISON: 4/20/2023    FINDINGS:    Diffuse bilateral pulmonary infiltrates.  Small pleural fluid.  The cardiomediastinal silhouette remains enlarged with atherosclerotic calcifications of the aorta, dystrophic calcifications of the mitral valve.  The osseous structures are stable.    IMPRESSION: Diffuse pulmonary infiltrates, pleural fluid, cardiomegaly. The findings are stable when compared to the prior exam.    Electronically signed by:  Rogerio Rodriguez MD  4/22/2023 7:29 AM CDT Workstation: [id number]        Additional Studies    reviewed    Ventilator  Information    Oxygen Concentration (%):  [60-70] 60             Recent Labs     04/22/23  0036   PH 7.436   PCO2 53.4*   PO2 75*   HCO3 35.9*   POCSATURATED 95   BE 12         Impression    Active Hospital Problems    Diagnosis  POA    *Acute exacerbation of CHF (congestive heart failure) [I50.9]  Unknown    Cellulitis of leg [L03.119]  Unknown    CKD (chronic kidney disease) stage 3, GFR 30-59 ml/min [N18.30]  Unknown    Anemia [D64.9]  Unknown    Hypothyroid [E03.9]  Yes    Debility [R53.81]  Yes    Acute on chronic respiratory failure with hypoxemia [J96.21]  Unknown    Atrial fibrillation [I48.91]  Unknown    Essential hypertension [I10]  Yes    Hyponatremia [E87.1]  Yes     Differential:  CHF - bnp is 400+;  cxr shows normal heart size, no pulm fluid - will check echo  Cirrhosis - AST at upper normal, otherwise LFTs in normal range; check liver US  Adrenal insuff - would expect hyperkalemia and hypotension - not present;  Check am cortisol  SIADH - ?  Source?  HCTZ - not taking  Renal failure - no  Polydipsia - no          Resolved Hospital Problems   No resolved problems to display.       Plan    BiPAP as needed, O2 as able  Hold lasix (likely has a contraction alkalosis) - dose with diamox  Recheck BNP  Continue antibiotics, recheck procalcitonin  Continue steroids  Control atrial fibrillation  She is at risk for decompensation  Follow NA  Watch cellulitis    Thank you for this consult.  I will follow with you while the patient is hospitalized.        Gregory London MD  [facility] Pulmonary/Critical Care  04/22/2023

## 2023-04-22 NOTE — PROGRESS NOTES
[facility] Medicine  Progress Note    Patient Name: Jeannie Hutchins  MRN: [id number]  Patient Class: IP- Inpatient   Admission Date: 4/20/2023  Length of Stay: 1 days  Attending Physician: Talia Charles MD  Primary Care Provider: Primary Doctor No        Subjective:     Principal Problem:Acute congestive heart failure    Interval History: States shortness of breath feels improved though has been desaturating with talking, mask removal even while denying shortness of breath. Does not seem to complain of much cough. States had chest pain in past but that it is improved. No palpitations. Concerned about itching, but states benadryl's helped in the past. She is hard of hearing. No nausea/vomiting, fevers/chills, no dizziness/headache    Review of Systems  ROS reviewed and documented as above.  Objective:     Vital Signs (Most Recent):  Temp: 98.4 °F (36.9 °C) (04/22/23 1501)  Pulse: 98 (04/22/23 1658)  Resp: 20 (04/22/23 1659)  BP: 131/61 (04/22/23 1658)  SpO2: (!) 93 % (04/22/23 1659)   Vital Signs (24h Range):  Temp:  [97.7 °F (36.5 °C)-99.5 °F (37.5 °C)] 98.4 °F (36.9 °C)  Pulse:  [] 98  Resp:  [18-43] 20  SpO2:  [78 %-98 %] 93 %  BP: (108-153)/(57-97) 131/61     Weight: 41.7 kg (92 lb)  Body mass index is 17.97 kg/m².    Intake/Output Summary (Last 24 hours) at 4/22/2023 1715  Last data filed at 4/22/2023 1140  Gross per 24 hour   Intake 25 ml   Output 2900 ml   Net -2875 ml      Physical Exam  Vitals reviewed.   Constitutional:       General: She is not in acute distress.  HENT:      Head: Normocephalic and atraumatic.      Comments: Hard of hearing  Eyes:      Extraocular Movements: Extraocular movements intact.      Conjunctiva/sclera: Conjunctivae normal.   Cardiovascular:      Rate and Rhythm: Normal rate.      Pulses: Normal pulses.   Pulmonary:      Breath sounds: Rales present. No wheezing.      Comments: diminished  Abdominal:      General: There is no distension.       Palpations: Abdomen is soft.      Tenderness: There is no abdominal tenderness.   Musculoskeletal:         General: Tenderness (RLE) present.      Cervical back: Neck supple. No tenderness.      Right lower leg: No edema.      Left lower leg: No edema.   Skin:     General: Skin is warm and dry.      Comments: Small wound on RLE, if erythematous previously, appears to have improved   Neurological:      Mental Status: She is alert.   Psychiatric:         Mood and Affect: Mood normal.         Judgment: Judgment normal.      Comments: hard of hearing limits communication at times       Significant Labs: All pertinent labs within the past 24 hours have been reviewed.    Significant Imaging: I have reviewed all pertinent imaging results/findings within the past 24 hours.    Assessment/Plan:      Active Diagnoses:    Diagnosis Date Noted POA    PRINCIPAL PROBLEM:  Acute congestive heart failure [I50.9]  Unknown    Cellulitis of leg [L03.119] 04/20/2023 Unknown    CKD (chronic kidney disease) stage 3, GFR 30-59 ml/min [N18.30] 04/20/2021 Unknown    Anemia [D64.9] 04/20/2021 Unknown    Hypothyroid [E03.9] 01/17/2021 Yes    Debility [R53.81] 01/17/2021 Yes    Acute on chronic respiratory failure with hypoxia [J96.21] 01/16/2021 Unknown    Atrial fibrillation [I48.91] 01/29/2018 Unknown    Essential hypertension [I10] 01/23/2018 Yes    Hyponatremia [E87.1] 01/23/2018 Yes      Problems Resolved During this Admission:     Increasing oxygen requirement  Improving sodium  Clinical improvement in patient's symptoms though worsening of oxygen  Will change the PRN nebulizers to scheduled  Continue diuresis   Echo reviewed - HFpEF, RV pressure overload  Titrate oxygen as able  Treatment of cellulitis    FULL CODE  VTE Risk Mitigation (From admission, onward)           Ordered     apixaban tablet 2.5 mg  2 times daily         04/20/23 0067     Reason for No Pharmacological VTE Prophylaxis  Once        Question:  Reasons:  Answer:   Already adequately anticoagulated on oral Anticoagulants    04/20/23 2206     IP VTE HIGH RISK PATIENT  Once         04/20/23 2206     Place sequential compression device  Until discontinued         04/20/23 2206                       Talia Charles MD  Department of Hospital Medicine   [facility]

## 2023-04-22 NOTE — PLAN OF CARE
04/22/23 1327   Patient Assessment/Suction   Level of Consciousness (AVPU) alert   Respiratory Effort Unlabored;Normal   Expansion/Accessory Muscles/Retractions no use of accessory muscles   All Lung Fields Breath Sounds clear;diminished   Rhythm/Pattern, Respiratory unlabored;pattern regular;depth regular   Cough Frequency no cough;infrequent   Cough Type loose;nonproductive   PRE-TX-O2   Device (Oxygen Therapy) high flow nasal cannula   $ Is the patient on Low Flow Oxygen? Yes   Flow (L/min) 12   SpO2 (!) 92 %   Pulse Oximetry Type Intermittent   $ Pulse Oximetry - Multiple Charge Pulse Oximetry - Multiple   Oximetry Probe Site Assessed;Applied   Pulse 97   Resp (!) 24   /72   Aerosol Therapy   $ Aerosol Therapy Charges Aerosol Treatment   Daily Review of Necessity (SVN) completed   Respiratory Treatment Status (SVN) given   Treatment Route (SVN) oxygen;in-line   Patient Position (SVN) HOB elevated   Post Treatment Assessment (SVN) increased aeration   Signs of Intolerance (SVN) none   Breath Sounds Post-Respiratory Treatment   Throughout All Fields Post-Treatment All Fields   Throughout All Fields Post-Treatment aeration increased   Post-treatment Heart Rate (beats/min) 104   Post-treatment Resp Rate (breaths/min) 20   Preset CPAP/BiPAP Settings   Mode Of Delivery BiPAP S/T   $ CPAP/BiPAP Daily Charge BiPAP/CPAP Daily   $ Is patient using? Yes   Size of Mask Small   Sized Appropriately? Yes   Equipment Type V60   Airway Device Type small full face mask   Humidifier not applicable   Ipap 12   EPAP (cm H2O) 6   Pressure Support (cm H2O) 6   Set Rate (Breaths/Min) 18   ITime (sec) 1   Rise Time (sec) 3   Education   $ Education Bronchodilator;15 min;BiPAP   Respiratory Evaluation   $ Care Plan Tech Time 15 min   $ Eval/Re-eval Charges Re-evaluation

## 2023-04-22 NOTE — PROGRESS NOTES
I was notified by RN of worsening oxygen saturations.  Patient had previously been on 10-15L oxy mask but desaturated to the low 80s and thus put on a NRB.  Per chart review, admitted with HF and volume overloaded and is undergoing diuresis with IV lasix.  STAT CXR, ABG ordered.  STAT IV lasix ordered.  Placing on bipap.  Will re evaluate off bipap after 1-2 hours of current treatment and further decisions pending outcome.

## 2023-04-23 PROBLEM — D63.1 ANEMIA DUE TO STAGE 3 CHRONIC KIDNEY DISEASE: Status: ACTIVE | Noted: 2021-04-20

## 2023-04-23 PROBLEM — N18.30 ANEMIA DUE TO STAGE 3 CHRONIC KIDNEY DISEASE: Status: ACTIVE | Noted: 2021-04-20

## 2023-04-23 LAB
ANION GAP SERPL CALC-SCNC: 10 MMOL/L (ref 8–16)
BASOPHILS # BLD AUTO: 0.01 K/UL (ref 0–0.2)
BASOPHILS NFR BLD: 0.1 % (ref 0–1.9)
BUN SERPL-MCNC: 14 MG/DL (ref 8–23)
CALCIUM SERPL-MCNC: 8.4 MG/DL (ref 8.7–10.5)
CHLORIDE SERPL-SCNC: 87 MMOL/L (ref 95–110)
CO2 SERPL-SCNC: 31 MMOL/L (ref 23–29)
CREAT SERPL-MCNC: 0.7 MG/DL (ref 0.5–1.4)
DIFFERENTIAL METHOD: ABNORMAL
EOSINOPHIL # BLD AUTO: 0 K/UL (ref 0–0.5)
EOSINOPHIL NFR BLD: 0 % (ref 0–8)
ERYTHROCYTE [DISTWIDTH] IN BLOOD BY AUTOMATED COUNT: 19.5 % (ref 11.5–14.5)
EST. GFR  (NO RACE VARIABLE): >60 ML/MIN/1.73 M^2
GLUCOSE SERPL-MCNC: 133 MG/DL (ref 70–110)
HCT VFR BLD AUTO: 33.7 % (ref 37–48.5)
HGB BLD-MCNC: 10.7 G/DL (ref 12–16)
IMM GRANULOCYTES # BLD AUTO: 0.07 K/UL (ref 0–0.04)
IMM GRANULOCYTES NFR BLD AUTO: 0.6 % (ref 0–0.5)
LYMPHOCYTES # BLD AUTO: 0.3 K/UL (ref 1–4.8)
LYMPHOCYTES NFR BLD: 2.9 % (ref 18–48)
MAGNESIUM SERPL-MCNC: 2.6 MG/DL (ref 1.6–2.6)
MCH RBC QN AUTO: 24.5 PG (ref 27–31)
MCHC RBC AUTO-ENTMCNC: 31.8 G/DL (ref 32–36)
MCV RBC AUTO: 77 FL (ref 82–98)
MONOCYTES # BLD AUTO: 0.3 K/UL (ref 0.3–1)
MONOCYTES NFR BLD: 2.8 % (ref 4–15)
NEUTROPHILS # BLD AUTO: 11.1 K/UL (ref 1.8–7.7)
NEUTROPHILS NFR BLD: 93.6 % (ref 38–73)
NRBC BLD-RTO: 0 /100 WBC
PLATELET # BLD AUTO: 363 K/UL (ref 150–450)
PMV BLD AUTO: 8.3 FL (ref 9.2–12.9)
POTASSIUM SERPL-SCNC: 3.3 MMOL/L (ref 3.5–5.1)
RBC # BLD AUTO: 4.36 M/UL (ref 4–5.4)
SODIUM SERPL-SCNC: 128 MMOL/L (ref 136–145)
WBC # BLD AUTO: 11.8 K/UL (ref 3.9–12.7)

## 2023-04-23 PROCEDURE — 25000003 PHARM REV CODE 250: Performed by: STUDENT IN AN ORGANIZED HEALTH CARE EDUCATION/TRAINING PROGRAM

## 2023-04-23 PROCEDURE — 25000003 PHARM REV CODE 250: Performed by: INTERNAL MEDICINE

## 2023-04-23 PROCEDURE — 20000000 HC ICU ROOM

## 2023-04-23 PROCEDURE — 80048 BASIC METABOLIC PNL TOTAL CA: CPT | Performed by: NURSE PRACTITIONER

## 2023-04-23 PROCEDURE — 25000242 PHARM REV CODE 250 ALT 637 W/ HCPCS: Performed by: STUDENT IN AN ORGANIZED HEALTH CARE EDUCATION/TRAINING PROGRAM

## 2023-04-23 PROCEDURE — 94761 N-INVAS EAR/PLS OXIMETRY MLT: CPT

## 2023-04-23 PROCEDURE — 63600175 PHARM REV CODE 636 W HCPCS: Performed by: INTERNAL MEDICINE

## 2023-04-23 PROCEDURE — 83735 ASSAY OF MAGNESIUM: CPT | Performed by: NURSE PRACTITIONER

## 2023-04-23 PROCEDURE — 85025 COMPLETE CBC W/AUTO DIFF WBC: CPT | Performed by: NURSE PRACTITIONER

## 2023-04-23 PROCEDURE — 99233 SBSQ HOSP IP/OBS HIGH 50: CPT | Mod: ,,, | Performed by: INTERNAL MEDICINE

## 2023-04-23 PROCEDURE — 36415 COLL VENOUS BLD VENIPUNCTURE: CPT | Performed by: NURSE PRACTITIONER

## 2023-04-23 PROCEDURE — 25000003 PHARM REV CODE 250: Performed by: NURSE PRACTITIONER

## 2023-04-23 PROCEDURE — 99900035 HC TECH TIME PER 15 MIN (STAT)

## 2023-04-23 PROCEDURE — 63600175 PHARM REV CODE 636 W HCPCS: Performed by: STUDENT IN AN ORGANIZED HEALTH CARE EDUCATION/TRAINING PROGRAM

## 2023-04-23 PROCEDURE — 94640 AIRWAY INHALATION TREATMENT: CPT

## 2023-04-23 PROCEDURE — 99233 PR SUBSEQUENT HOSPITAL CARE,LEVL III: ICD-10-PCS | Mod: ,,, | Performed by: INTERNAL MEDICINE

## 2023-04-23 PROCEDURE — 27000221 HC OXYGEN, UP TO 24 HOURS

## 2023-04-23 RX ORDER — CHLORHEXIDINE GLUCONATE ORAL RINSE 1.2 MG/ML
15 SOLUTION DENTAL 2 TIMES DAILY
Status: DISCONTINUED | OUTPATIENT
Start: 2023-04-23 | End: 2023-04-27 | Stop reason: HOSPADM

## 2023-04-23 RX ORDER — MUPIROCIN 20 MG/G
OINTMENT TOPICAL 2 TIMES DAILY
Status: DISCONTINUED | OUTPATIENT
Start: 2023-04-23 | End: 2023-04-27 | Stop reason: HOSPADM

## 2023-04-23 RX ORDER — FAMOTIDINE 20 MG/1
20 TABLET, FILM COATED ORAL DAILY
Status: DISCONTINUED | OUTPATIENT
Start: 2023-04-23 | End: 2023-04-27 | Stop reason: HOSPADM

## 2023-04-23 RX ADMIN — OXYCODONE HYDROCHLORIDE 5 MG: 5 TABLET ORAL at 09:04

## 2023-04-23 RX ADMIN — FAMOTIDINE 20 MG: 20 TABLET ORAL at 08:04

## 2023-04-23 RX ADMIN — FLUTICASONE PROPIONATE 50 MCG: 50 SPRAY, METERED NASAL at 08:04

## 2023-04-23 RX ADMIN — ATORVASTATIN CALCIUM 20 MG: 20 TABLET, FILM COATED ORAL at 09:04

## 2023-04-23 RX ADMIN — POTASSIUM CHLORIDE 20 MEQ: 1500 TABLET, EXTENDED RELEASE ORAL at 08:04

## 2023-04-23 RX ADMIN — METOPROLOL TARTRATE 25 MG: 25 TABLET, FILM COATED ORAL at 08:04

## 2023-04-23 RX ADMIN — APIXABAN 2.5 MG: 2.5 TABLET, FILM COATED ORAL at 09:04

## 2023-04-23 RX ADMIN — METOPROLOL TARTRATE 25 MG: 25 TABLET, FILM COATED ORAL at 09:04

## 2023-04-23 RX ADMIN — AMLODIPINE BESYLATE 5 MG: 5 TABLET ORAL at 08:04

## 2023-04-23 RX ADMIN — MUPIROCIN 1 G: 20 OINTMENT TOPICAL at 09:04

## 2023-04-23 RX ADMIN — IPRATROPIUM BROMIDE AND ALBUTEROL SULFATE 3 ML: .5; 3 SOLUTION RESPIRATORY (INHALATION) at 07:04

## 2023-04-23 RX ADMIN — APIXABAN 2.5 MG: 2.5 TABLET, FILM COATED ORAL at 08:04

## 2023-04-23 RX ADMIN — IPRATROPIUM BROMIDE AND ALBUTEROL SULFATE 3 ML: .5; 3 SOLUTION RESPIRATORY (INHALATION) at 08:04

## 2023-04-23 RX ADMIN — POTASSIUM BICARBONATE 35 MEQ: 391 TABLET, EFFERVESCENT ORAL at 10:04

## 2023-04-23 RX ADMIN — ACETAZOLAMIDE 250 MG: 500 INJECTION, POWDER, LYOPHILIZED, FOR SOLUTION INTRAVENOUS at 11:04

## 2023-04-23 RX ADMIN — LEVOTHYROXINE SODIUM 75 MCG: 0.03 TABLET ORAL at 06:04

## 2023-04-23 RX ADMIN — IPRATROPIUM BROMIDE AND ALBUTEROL SULFATE 3 ML: .5; 3 SOLUTION RESPIRATORY (INHALATION) at 11:04

## 2023-04-23 RX ADMIN — TRIAMCINOLONE ACETONIDE: 1 CREAM TOPICAL at 09:04

## 2023-04-23 RX ADMIN — CHLORHEXIDINE GLUCONATE 15 ML: 1.2 RINSE ORAL at 08:04

## 2023-04-23 RX ADMIN — TRIAMCINOLONE ACETONIDE: 1 CREAM TOPICAL at 08:04

## 2023-04-23 RX ADMIN — CHLORHEXIDINE GLUCONATE 15 ML: 1.2 RINSE ORAL at 09:04

## 2023-04-23 RX ADMIN — DOXYCYCLINE 100 MG: 100 INJECTION, POWDER, LYOPHILIZED, FOR SOLUTION INTRAVENOUS at 08:04

## 2023-04-23 RX ADMIN — ACETAMINOPHEN 650 MG: 325 TABLET ORAL at 06:04

## 2023-04-23 RX ADMIN — ACETAMINOPHEN 650 MG: 325 TABLET ORAL at 12:04

## 2023-04-23 RX ADMIN — MUPIROCIN 1 G: 20 OINTMENT TOPICAL at 08:04

## 2023-04-23 RX ADMIN — METHYLPREDNISOLONE SODIUM SUCCINATE 40 MG: 40 INJECTION, POWDER, FOR SOLUTION INTRAMUSCULAR; INTRAVENOUS at 08:04

## 2023-04-23 RX ADMIN — CEFTRIAXONE 1 G: 1 INJECTION, POWDER, FOR SOLUTION INTRAMUSCULAR; INTRAVENOUS at 02:04

## 2023-04-23 RX ADMIN — MELATONIN 6 MG: at 09:04

## 2023-04-23 RX ADMIN — ESCITALOPRAM OXALATE 10 MG: 10 TABLET ORAL at 08:04

## 2023-04-23 RX ADMIN — DOXYCYCLINE 100 MG: 100 INJECTION, POWDER, LYOPHILIZED, FOR SOLUTION INTRAVENOUS at 09:04

## 2023-04-23 NOTE — CARE UPDATE
04/23/23 0827   Patient Assessment/Suction   Level of Consciousness (AVPU) alert   Respiratory Effort Normal;Unlabored   Expansion/Accessory Muscles/Retractions no use of accessory muscles   All Lung Fields Breath Sounds Anterior:;Posterior:   ANA Breath Sounds clear;diminished   Rhythm/Pattern, Respiratory unlabored   Cough Frequency no cough   PRE-TX-O2   Device (Oxygen Therapy) high flow nasal cannula   $ Is the patient on Low Flow Oxygen? Yes   Flow (L/min) 15   SpO2 97 %   Pulse Oximetry Type Continuous   $ Pulse Oximetry - Multiple Charge Pulse Oximetry - Multiple   Pulse 94   Resp (!) 25   Aerosol Therapy   $ Aerosol Therapy Charges Aerosol Treatment   Daily Review of Necessity (SVN) completed   Respiratory Treatment Status (SVN) given   Treatment Route (SVN) mask   Patient Position (SVN) sitting in chair   Post Treatment Assessment (SVN) breath sounds unchanged   Signs of Intolerance (SVN) none   Breath Sounds Post-Respiratory Treatment   Throughout All Fields Post-Treatment All Fields   Throughout All Fields Post-Treatment aeration increased   Post-treatment Heart Rate (beats/min) 92   Post-treatment Resp Rate (breaths/min) 20   Preset CPAP/BiPAP Settings   Mode Of Delivery Standby

## 2023-04-23 NOTE — PROGRESS NOTES
Pulmonary/Critical Care  Progress Note      Patient name: Jeannie Hutchins  MRN: [id number]  Date: 04/23/2023    Admit Date: 4/20/2023  Consult Requested By: Pipe Cooper MD    Reason for Consult: Respiratory failure    HPI:    4/22/2023 - 87 yo ho AF, HFpEF, HTN, HLP, COPD, CKD3 admitted from ER with worsened SOb and decreased sats.  Evaluated and felt to be in HF and has been being treated but O2 needs have increased and moved to ICU and on BiPAP.  She is very anxious and a bit rambling, she is also [place] and getting a history is a bit difficult.  ROS as below.  When she remains still we have saturations of % on NC, she is in no respiratory distress.  WBC has increased, Na is low but better, CO2 has been increasing.  ABG now c/w a metabolic alkalosis and respiratory compensation and increased aA gradient.  CXR looks like pulmonary edema on top of chronic changes.  ECHO shows pulmonary hypertension, nl EF    4/23/2023 - Stable overnight and feels better, this AM she is sitting up in chair on NC O2 with good sats.  She remains pressured and anxious.  WBC better.  BNP remained elevated yesterday.  Procalcitonin was very low    Review of Systems    Review of Systems   Constitutional:  Negative for chills, diaphoresis, fever, malaise/fatigue and weight loss.   HENT:  Positive for congestion.    Eyes:  Negative for pain.   Respiratory:  Positive for shortness of breath. Negative for cough, hemoptysis, sputum production, wheezing and stridor.    Cardiovascular:  Positive for palpitations and leg swelling (in past). Negative for chest pain, orthopnea, claudication and PND.   Gastrointestinal:  Negative for abdominal pain, constipation, diarrhea, heartburn, nausea and vomiting.   Genitourinary:  Negative for dysuria, frequency and urgency.   Musculoskeletal:  Negative for falls and myalgias.        Injury to ankle  Muscle cramps   Neurological:  Negative for sensory change, focal weakness and weakness.    Psychiatric/Behavioral:  Negative for depression, substance abuse and suicidal ideas. The patient is nervous/anxious.      Past Medical History    Past Medical History:   Diagnosis Date    Anticoagulant long-term use     Eliquis    Arthritis     Atrial fibrillation     Blood transfusion     Carotid stenosis     CHF (congestive heart failure)     Edema     Generalized anxiety disorder 1/23/2018    Dr. Mckeon's eval 1/23/18: She does appear to have a JERRI because of the persistent worries that she has.  These worries predominate her day.  She would probably do well with prevention therapy such as SSRI/SNRI to help control the physical symptoms of the anxiety.  Problem at this point in time is her electrolyte abnormalities.  There is a slight risk of hyponatremia with these medications and    Hearing loss     [place] (hard of hearing)     Hypercholesterolemia     Hypertension     On home oxygen therapy     Psychiatric problem     Thyroid disease        Past Surgical History    Past Surgical History:   Procedure Laterality Date    CARDIOVERSION  10/19/2020    Procedure: Cardioversion;  Surgeon: Brandon Elias MD;  Location: [facility] CATH LAB;  Service: Cardiology;;    CARPAL TUNNEL RELEASE  2012    right hand    ESOPHAGOGASTRODUODENOSCOPY Left 8/29/2018    Procedure: EGD (ESOPHAGOGASTRODUODENOSCOPY);  Surgeon: Oniel Dorsey MD;  Location: [facility] ENDO;  Service: Endoscopy;  Laterality: Left;    HYSTERECTOMY      left carotid endartectomy  5/2006    TONSILLECTOMY      TREATMENT OF CARDIAC ARRHYTHMIA N/A 10/19/2020    Procedure: Transesophageal echo (BLADIMIR) intra-procedure log documentation;  Surgeon: Brandon Elias MD;  Location: [facility] CATH LAB;  Service: Cardiology;  Laterality: N/A;       Medications (scheduled):      albuterol-ipratropium  3 mL Nebulization Q4H WAKE    amLODIPine  5 mg Oral Daily    apixaban  2.5 mg Oral BID    atorvastatin  20 mg Oral Daily    cefTRIAXone (ROCEPHIN) IVPB  1 g Intravenous Daily     chlorhexidine  15 mL Mouth/Throat BID    doxycycline (VIBRAMYCIN) IVPB  100 mg Intravenous Q12H    EScitalopram oxalate  10 mg Oral Daily    famotidine  20 mg Oral Daily    fluticasone propionate  1 spray Each Nostril Daily    levothyroxine  75 mcg Oral Daily    methylPREDNISolone sodium succinate injection  40 mg Intravenous Daily    metoprolol tartrate  25 mg Oral BID    mupirocin   Nasal BID    potassium chloride  20 mEq Oral Daily    triamcinolone acetonide 0.1%   Topical (Top) BID       Medications (infusions):         Medications (prn):     acetaminophen, diphenhydrAMINE-zinc acetate 1-0.1%, melatonin, metoprolol, naloxone, ondansetron, oxyCODONE, polyethylene glycol, potassium bicarbonate, potassium bicarbonate, potassium bicarbonate, simethicone, sodium chloride 0.9%    Family History:   Family History   Problem Relation Age of Onset    Heart disease Father     Cancer Brother 65        bladder    Cancer Sister 81        Ovarian    Ovarian cancer Sister 81    Breast cancer Daughter 50        Status post mastectomy    Cancer Sister         Lung.  Smoker    Cancer Sister         Lung.  Smoker    Schizophrenia Son        Social History: Tobacco:   Social History     Tobacco Use   Smoking Status Never   Smokeless Tobacco Never                                EtOH:   Social History     Substance and Sexual Activity   Alcohol Use No                                Drugs:   Social History     Substance and Sexual Activity   Drug Use No     Physical Exam    Vital signs:  Temp:  [98.2 °F (36.8 °C)-99.5 °F (37.5 °C)]   Pulse:  []   Resp:  [8-51]   BP: (106-150)/(55-97)   SpO2:  [75 %-100 %]     Intake/Output:   Intake/Output Summary (Last 24 hours) at 4/23/2023 1044  Last data filed at 4/23/2023 0601  Gross per 24 hour   Intake 497.5 ml   Output 2100 ml   Net -1602.5 ml          BMI: Estimated body mass index is 17.97 kg/m² as calculated from the following:    Height as of this encounter: 5' (1.524 m).    Weight as  of this encounter: 41.7 kg (92 lb).    Physical Exam  Vitals and nursing note reviewed.   Constitutional:       General: She is not in acute distress.     Appearance: She is ill-appearing. She is not toxic-appearing or diaphoretic.      Comments: Elderly, frail  Anxious   HENT:      Head: Normocephalic and atraumatic.      Right Ear: External ear normal.      Left Ear: External ear normal.      Nose: Nose normal. No congestion or rhinorrhea.      Mouth/Throat:      Mouth: Mucous membranes are moist.      Pharynx: Oropharynx is clear. No oropharyngeal exudate or posterior oropharyngeal erythema.   Eyes:      General: No scleral icterus.        Right eye: No discharge.         Left eye: No discharge.      Extraocular Movements: Extraocular movements intact.      Conjunctiva/sclera: Conjunctivae normal.      Pupils: Pupils are equal, round, and reactive to light.   Neck:      Vascular: No carotid bruit.   Cardiovascular:      Rate and Rhythm: Tachycardia present. Rhythm irregular.      Pulses: Normal pulses.      Heart sounds: Murmur heard.     No friction rub. No gallop.   Pulmonary:      Effort: Pulmonary effort is normal. No respiratory distress.      Breath sounds: No stridor. Rales present. No wheezing or rhonchi.      Comments: No acc m use  Chest:      Chest wall: No tenderness.   Abdominal:      General: Bowel sounds are normal. There is no distension.      Tenderness: There is no abdominal tenderness. There is no guarding.   Musculoskeletal:         General: No swelling. Normal range of motion.      Cervical back: Normal range of motion and neck supple. No rigidity or tenderness.      Right lower leg: No edema.      Left lower leg: No edema.      Comments: Right ankle wound dressed   Lymphadenopathy:      Cervical: No cervical adenopathy.   Skin:     General: Skin is warm and dry.      Capillary Refill: Capillary refill takes less than 2 seconds.      Coloration: Skin is not jaundiced.      Findings: No  bruising.   Neurological:      General: No focal deficit present.      Mental Status: She is alert and oriented to person, place, and time. Mental status is at baseline.      Cranial Nerves: No cranial nerve deficit.      Sensory: No sensory deficit.      Motor: No weakness.   Psychiatric:         Behavior: Behavior normal.         Thought Content: Thought content normal.         Judgment: Judgment normal.      Comments: Anxious       Laboratory    Recent Labs   Lab 04/23/23 0329   WBC 11.80   RBC 4.36   HGB 10.7*   HCT 33.7*      MCV 77*   MCH 24.5*   MCHC 31.8*         Recent Labs   Lab 04/23/23 0329   CALCIUM 8.4*   *   K 3.3*   CO2 31*   CL 87*   BUN 14   CREATININE 0.7         No results for input(s): PT, INR, APTT in the last 24 hours.    No results for input(s): CPK, CPKMB, TROPONINI, MB in the last 24 hours.    Additional labs: reviewed    Microbiology:       Microbiology Results (last 7 days)       Procedure Component Value Units Date/Time    Blood culture #2 **CANNOT BE ORDERED STAT** [[id number]] Collected: 04/20/23 2034    Order Status: Completed Specimen: Blood from Peripheral, Antecubital, Left Updated: 04/22/23 2232     Blood Culture, Routine No Growth to date      No Growth to date      No Growth to date    Blood culture #1 **CANNOT BE ORDERED STAT** [[id number]] Collected: 04/20/23 2034    Order Status: Completed Specimen: Blood from Peripheral, Antecubital, Right Updated: 04/22/23 2232     Blood Culture, Routine No Growth to date      No Growth to date      No Growth to date            Radiology    X-Ray Chest AP Portable  Frontal view of the chest    CLINICAL HISTORY: Shortness of breath    COMPARISON: 4/20/2023    FINDINGS:    Diffuse bilateral pulmonary infiltrates.  Small pleural fluid.  The cardiomediastinal silhouette remains enlarged with atherosclerotic calcifications of the aorta, dystrophic calcifications of the mitral valve.  The osseous structures are stable.    IMPRESSION:  Diffuse pulmonary infiltrates, pleural fluid, cardiomegaly. The findings are stable when compared to the prior exam.    Electronically signed by:  Rogerio Rodriguez MD  4/22/2023 7:29 AM CDT Workstation: [id number]        Additional Studies    reviewed    Ventilator Information    Oxygen Concentration (%):  [60] 60             Recent Labs     04/22/23  0036   PH 7.436   PCO2 53.4*   PO2 75*   HCO3 35.9*   POCSATURATED 95   BE 12           Impression    Active Hospital Problems    Diagnosis  POA    *Acute congestive heart failure [I50.9]  Yes    Cellulitis of leg [L03.119]  Unknown    CKD (chronic kidney disease) stage 3, GFR 30-59 ml/min [N18.30]  Unknown    Anemia [D64.9]  Unknown    Hypothyroid [E03.9]  Yes    Debility [R53.81]  Yes    Acute on chronic respiratory failure with hypoxia [J96.21]  Yes    Atrial fibrillation [I48.91]  Unknown    Essential hypertension [I10]  Yes    Hyponatremia [E87.1]  Yes     Differential:  CHF - bnp is 400+;  cxr shows normal heart size, no pulm fluid - will check echo  Cirrhosis - AST at upper normal, otherwise LFTs in normal range; check liver US  Adrenal insuff - would expect hyperkalemia and hypotension - not present;  Check am cortisol  SIADH - ?  Source?  HCTZ - not taking  Renal failure - no  Polydipsia - no        Resolved Hospital Problems   No resolved problems to display.       Plan    BiPAP as needed, O2 as able  Hold lasix (likely has a contraction alkalosis) - redose with diamox  Continue antibiotics, but note procalcitonin is very low  Continue steroids  Control atrial fibrillation  She is at risk for decompensation but looks better today  Follow NA  Watch cellulitis  Has pulmonary hypertension which is likely group 2 and 3    Thank you for this consult.  I will follow with you while the patient is hospitalized.        Gregory London MD  [facility] Pulmonary/Critical Care  04/23/2023

## 2023-04-23 NOTE — PLAN OF CARE
"Plan of care and education reviewed with patient and family. Verbalization of understanding expressed. Pt up to bedside chair for half of day. Tolerated well. Pt reports "easier breathing" today compared to previous 24h. Pt returned to bed at 14:00 per request after administration of Tylenol for afternoon nap. Bed locked in low position. Call light and belongings within reach. Bed alarm activated. Will continue to monitor.   Problem: Adult Inpatient Plan of Care  Goal: Plan of Care Review  Outcome: Ongoing, Progressing  Goal: Patient-Specific Goal (Individualized)  Outcome: Ongoing, Progressing  Goal: Absence of Hospital-Acquired Illness or Injury  Outcome: Ongoing, Progressing  Goal: Optimal Comfort and Wellbeing  Outcome: Ongoing, Progressing  Goal: Readiness for Transition of Care  Outcome: Ongoing, Progressing     Problem: Fall Injury Risk  Goal: Absence of Fall and Fall-Related Injury  Outcome: Ongoing, Progressing     Problem: Skin Injury Risk Increased  Goal: Skin Health and Integrity  Outcome: Ongoing, Progressing     Problem: Impaired Wound Healing  Goal: Optimal Wound Healing  Outcome: Ongoing, Progressing     Problem: Oral Intake Inadequate  Goal: Improved Oral Intake  Outcome: Ongoing, Progressing     Problem: Infection  Goal: Absence of Infection Signs and Symptoms  Outcome: Ongoing, Progressing     "

## 2023-04-23 NOTE — PROGRESS NOTES
Pharmacist Renal Dose Adjustment Note    Jeannie Hutchins is a 88 y.o. female being treated with the medication Famotidine    Patient Data:    Vital Signs (Most Recent):  Temp: 98.5 °F (36.9 °C) (04/22/23 2301)  Pulse: 74 (04/23/23 0306)  Resp: (!) 23 (04/23/23 0306)  BP: 133/72 (04/23/23 0201)  SpO2: 100 % (04/23/23 0306)   Vital Signs (72h Range):  Temp:  [97.5 °F (36.4 °C)-99.5 °F (37.5 °C)]   Pulse:  []   Resp:  [8-47]   BP: (107-153)/(55-97)   SpO2:  [75 %-100 %]      Recent Labs   Lab 04/21/23  0451 04/22/23  0349 04/23/23  0329   CREATININE 0.9 0.7 0.7     Serum creatinine: 0.7 mg/dL 04/23/23 0329  Estimated creatinine clearance: 37.3 mL/min    Famotidine 20mg Q48H will be changed to Famotidine 20mg Q24H due to CrCl 30-60    Pharmacist's Name: Anaya Dias  Pharmacist's Extension: [phone]

## 2023-04-24 PROBLEM — I48.11 LONGSTANDING PERSISTENT ATRIAL FIBRILLATION: Status: ACTIVE | Noted: 2018-01-29

## 2023-04-24 LAB
ANION GAP SERPL CALC-SCNC: 13 MMOL/L (ref 8–16)
BASOPHILS # BLD AUTO: 0.01 K/UL (ref 0–0.2)
BASOPHILS NFR BLD: 0.1 % (ref 0–1.9)
BUN SERPL-MCNC: 25 MG/DL (ref 8–23)
CALCIUM SERPL-MCNC: 9.1 MG/DL (ref 8.7–10.5)
CHLORIDE SERPL-SCNC: 88 MMOL/L (ref 95–110)
CO2 SERPL-SCNC: 29 MMOL/L (ref 23–29)
CREAT SERPL-MCNC: 0.8 MG/DL (ref 0.5–1.4)
DIFFERENTIAL METHOD: ABNORMAL
EOSINOPHIL # BLD AUTO: 0 K/UL (ref 0–0.5)
EOSINOPHIL NFR BLD: 0 % (ref 0–8)
ERYTHROCYTE [DISTWIDTH] IN BLOOD BY AUTOMATED COUNT: 19.6 % (ref 11.5–14.5)
EST. GFR  (NO RACE VARIABLE): >60 ML/MIN/1.73 M^2
GLUCOSE SERPL-MCNC: 118 MG/DL (ref 70–110)
HCT VFR BLD AUTO: 30.4 % (ref 37–48.5)
HGB BLD-MCNC: 9.5 G/DL (ref 12–16)
IMM GRANULOCYTES # BLD AUTO: 0.07 K/UL (ref 0–0.04)
IMM GRANULOCYTES NFR BLD AUTO: 0.5 % (ref 0–0.5)
LYMPHOCYTES # BLD AUTO: 0.4 K/UL (ref 1–4.8)
LYMPHOCYTES NFR BLD: 2.7 % (ref 18–48)
MAGNESIUM SERPL-MCNC: 2.4 MG/DL (ref 1.6–2.6)
MCH RBC QN AUTO: 24.1 PG (ref 27–31)
MCHC RBC AUTO-ENTMCNC: 31.3 G/DL (ref 32–36)
MCV RBC AUTO: 77 FL (ref 82–98)
MONOCYTES # BLD AUTO: 1.2 K/UL (ref 0.3–1)
MONOCYTES NFR BLD: 9.5 % (ref 4–15)
NEUTROPHILS # BLD AUTO: 11.2 K/UL (ref 1.8–7.7)
NEUTROPHILS NFR BLD: 87.2 % (ref 38–73)
NRBC BLD-RTO: 0 /100 WBC
PLATELET # BLD AUTO: 340 K/UL (ref 150–450)
PMV BLD AUTO: 8.6 FL (ref 9.2–12.9)
POTASSIUM SERPL-SCNC: 3.8 MMOL/L (ref 3.5–5.1)
RBC # BLD AUTO: 3.94 M/UL (ref 4–5.4)
SODIUM SERPL-SCNC: 130 MMOL/L (ref 136–145)
WBC # BLD AUTO: 12.87 K/UL (ref 3.9–12.7)

## 2023-04-24 PROCEDURE — 99900031 HC PATIENT EDUCATION (STAT)

## 2023-04-24 PROCEDURE — 25000003 PHARM REV CODE 250: Performed by: HOSPITALIST

## 2023-04-24 PROCEDURE — 80048 BASIC METABOLIC PNL TOTAL CA: CPT | Performed by: NURSE PRACTITIONER

## 2023-04-24 PROCEDURE — 25000003 PHARM REV CODE 250: Performed by: INTERNAL MEDICINE

## 2023-04-24 PROCEDURE — 99232 SBSQ HOSP IP/OBS MODERATE 35: CPT | Mod: ,,, | Performed by: INTERNAL MEDICINE

## 2023-04-24 PROCEDURE — 94799 UNLISTED PULMONARY SVC/PX: CPT

## 2023-04-24 PROCEDURE — 27000221 HC OXYGEN, UP TO 24 HOURS

## 2023-04-24 PROCEDURE — 94640 AIRWAY INHALATION TREATMENT: CPT

## 2023-04-24 PROCEDURE — 85025 COMPLETE CBC W/AUTO DIFF WBC: CPT | Performed by: NURSE PRACTITIONER

## 2023-04-24 PROCEDURE — 99232 PR SUBSEQUENT HOSPITAL CARE,LEVL II: ICD-10-PCS | Mod: ,,, | Performed by: INTERNAL MEDICINE

## 2023-04-24 PROCEDURE — 36415 COLL VENOUS BLD VENIPUNCTURE: CPT | Performed by: NURSE PRACTITIONER

## 2023-04-24 PROCEDURE — 25000242 PHARM REV CODE 250 ALT 637 W/ HCPCS: Performed by: STUDENT IN AN ORGANIZED HEALTH CARE EDUCATION/TRAINING PROGRAM

## 2023-04-24 PROCEDURE — 99900035 HC TECH TIME PER 15 MIN (STAT)

## 2023-04-24 PROCEDURE — 25000242 PHARM REV CODE 250 ALT 637 W/ HCPCS: Performed by: INTERNAL MEDICINE

## 2023-04-24 PROCEDURE — 51702 INSERT TEMP BLADDER CATH: CPT

## 2023-04-24 PROCEDURE — 63600175 PHARM REV CODE 636 W HCPCS: Performed by: STUDENT IN AN ORGANIZED HEALTH CARE EDUCATION/TRAINING PROGRAM

## 2023-04-24 PROCEDURE — 94660 CPAP INITIATION&MGMT: CPT

## 2023-04-24 PROCEDURE — 25000003 PHARM REV CODE 250: Performed by: STUDENT IN AN ORGANIZED HEALTH CARE EDUCATION/TRAINING PROGRAM

## 2023-04-24 PROCEDURE — 12000002 HC ACUTE/MED SURGE SEMI-PRIVATE ROOM

## 2023-04-24 PROCEDURE — 94761 N-INVAS EAR/PLS OXIMETRY MLT: CPT

## 2023-04-24 PROCEDURE — 83735 ASSAY OF MAGNESIUM: CPT | Performed by: NURSE PRACTITIONER

## 2023-04-24 PROCEDURE — 25000003 PHARM REV CODE 250: Performed by: NURSE PRACTITIONER

## 2023-04-24 RX ORDER — IPRATROPIUM BROMIDE AND ALBUTEROL SULFATE 2.5; .5 MG/3ML; MG/3ML
3 SOLUTION RESPIRATORY (INHALATION)
Status: DISCONTINUED | OUTPATIENT
Start: 2023-04-24 | End: 2023-04-27 | Stop reason: HOSPADM

## 2023-04-24 RX ADMIN — METHYLPREDNISOLONE SODIUM SUCCINATE 40 MG: 40 INJECTION, POWDER, FOR SOLUTION INTRAMUSCULAR; INTRAVENOUS at 09:04

## 2023-04-24 RX ADMIN — ACETAMINOPHEN 650 MG: 325 TABLET ORAL at 09:04

## 2023-04-24 RX ADMIN — IPRATROPIUM BROMIDE AND ALBUTEROL SULFATE 3 ML: .5; 3 SOLUTION RESPIRATORY (INHALATION) at 07:04

## 2023-04-24 RX ADMIN — IPRATROPIUM BROMIDE AND ALBUTEROL SULFATE 3 ML: .5; 3 SOLUTION RESPIRATORY (INHALATION) at 12:04

## 2023-04-24 RX ADMIN — POLYETHYLENE GLYCOL 3350 17 G: 17 POWDER, FOR SOLUTION ORAL at 08:04

## 2023-04-24 RX ADMIN — TRIAMCINOLONE ACETONIDE: 1 CREAM TOPICAL at 09:04

## 2023-04-24 RX ADMIN — METOPROLOL TARTRATE 25 MG: 25 TABLET, FILM COATED ORAL at 08:04

## 2023-04-24 RX ADMIN — MELATONIN 6 MG: at 08:04

## 2023-04-24 RX ADMIN — FLUTICASONE PROPIONATE 50 MCG: 50 SPRAY, METERED NASAL at 09:04

## 2023-04-24 RX ADMIN — ESCITALOPRAM OXALATE 10 MG: 10 TABLET ORAL at 09:04

## 2023-04-24 RX ADMIN — CHLORHEXIDINE GLUCONATE 15 ML: 1.2 RINSE ORAL at 09:04

## 2023-04-24 RX ADMIN — CHLORHEXIDINE GLUCONATE 15 ML: 1.2 RINSE ORAL at 08:04

## 2023-04-24 RX ADMIN — METOPROLOL TARTRATE 25 MG: 25 TABLET, FILM COATED ORAL at 09:04

## 2023-04-24 RX ADMIN — ATORVASTATIN CALCIUM 20 MG: 20 TABLET, FILM COATED ORAL at 08:04

## 2023-04-24 RX ADMIN — POTASSIUM CHLORIDE 20 MEQ: 1500 TABLET, EXTENDED RELEASE ORAL at 09:04

## 2023-04-24 RX ADMIN — IPRATROPIUM BROMIDE AND ALBUTEROL SULFATE 3 ML: .5; 3 SOLUTION RESPIRATORY (INHALATION) at 08:04

## 2023-04-24 RX ADMIN — MUPIROCIN 1 G: 20 OINTMENT TOPICAL at 08:04

## 2023-04-24 RX ADMIN — DOXYCYCLINE 100 MG: 100 INJECTION, POWDER, LYOPHILIZED, FOR SOLUTION INTRAVENOUS at 09:04

## 2023-04-24 RX ADMIN — APIXABAN 2.5 MG: 2.5 TABLET, FILM COATED ORAL at 08:04

## 2023-04-24 RX ADMIN — AMLODIPINE BESYLATE 5 MG: 5 TABLET ORAL at 09:04

## 2023-04-24 RX ADMIN — MUPIROCIN 1 G: 20 OINTMENT TOPICAL at 09:04

## 2023-04-24 RX ADMIN — OXYCODONE HYDROCHLORIDE 5 MG: 5 TABLET ORAL at 08:04

## 2023-04-24 RX ADMIN — APIXABAN 2.5 MG: 2.5 TABLET, FILM COATED ORAL at 09:04

## 2023-04-24 RX ADMIN — TRIAMCINOLONE ACETONIDE: 1 CREAM TOPICAL at 08:04

## 2023-04-24 RX ADMIN — FAMOTIDINE 20 MG: 20 TABLET ORAL at 09:04

## 2023-04-24 NOTE — CARE UPDATE
04/24/23 0750   Patient Assessment/Suction   Level of Consciousness (AVPU) alert   Respiratory Effort Normal;Unlabored   Expansion/Accessory Muscles/Retractions no use of accessory muscles;no retractions;expansion symmetric   All Lung Fields Breath Sounds Anterior:;Lateral:;diminished   Rhythm/Pattern, Respiratory unlabored;pattern regular;depth regular   Skin Integrity   $ Wound Care Tech Time 15 min   Area Observed Left;Bridge of nose;Cheek;Behind ear;Right   Skin Appearance without discoloration   PRE-TX-O2   Device (Oxygen Therapy) high flow nasal cannula   $ Is the patient on Low Flow Oxygen? Yes   Flow (L/min) 6   SpO2 99 %   Pulse Oximetry Type Continuous   $ Pulse Oximetry - Multiple Charge Pulse Oximetry - Multiple   Pulse 85   Resp 16   Aerosol Therapy   $ Aerosol Therapy Charges Aerosol Treatment   Daily Review of Necessity (SVN) completed   Respiratory Treatment Status (SVN) given   Treatment Route (SVN) oxygen;mask   Patient Position (SVN) HOB elevated   Post Treatment Assessment (SVN) breath sounds unchanged   Signs of Intolerance (SVN) none   Preset CPAP/BiPAP Settings   Mode Of Delivery BiPAP;Standby   $ CPAP/BiPAP Daily Charge BiPAP/CPAP Daily   Education   $ Education Bronchodilator;15 min   Respiratory Evaluation   $ Care Plan Tech Time 15 min   $ Eval/Re-eval Charges Re-evaluation

## 2023-04-24 NOTE — ASSESSMENT & PLAN NOTE
Multifactorial.  Monitor sodium level.  Stable.    Sodium   Date Value Ref Range Status   04/23/2023 128 (L) 136 - 145 mmol/L Final   04/22/2023 131 (L) 136 - 145 mmol/L Final   04/21/2023 129 (L) 136 - 145 mmol/L Final

## 2023-04-24 NOTE — ASSESSMENT & PLAN NOTE
Stable.  Monitor hgb.    Hemoglobin   Date Value Ref Range Status   04/23/2023 10.7 (L) 12.0 - 16.0 g/dL Final   04/22/2023 10.6 (L) 12.0 - 16.0 g/dL Final   04/21/2023 9.9 (L) 12.0 - 16.0 g/dL Final

## 2023-04-24 NOTE — ASSESSMENT & PLAN NOTE
Stable.  Monitor bun and creatinine.    Creatinine   Date Value Ref Range Status   04/23/2023 0.7 0.5 - 1.4 mg/dL Final   04/22/2023 0.7 0.5 - 1.4 mg/dL Final   04/21/2023 0.9 0.5 - 1.4 mg/dL Final

## 2023-04-24 NOTE — PROGRESS NOTES
Progress Note  Pulmonary/Critical Care      PATIENT NAME: Jeannie Hutchins  MRN: [id number]  TODAY'S DATE: 2023  12:11 PM  ADMIT DATE: 2023  AGE: 88 y.o. : 1935    HPI / INTERVAL HISTORY  2023 - 87 yo ho AF, HFpEF, HTN, HLP, COPD, CKD3 admitted from ER with worsened SOb and decreased sats.  Evaluated and felt to be in HF and has been being treated but O2 needs have increased and moved to ICU and on BiPAP.  She is very anxious and a bit rambling, she is also [place] and getting a history is a bit difficult.  ROS as below.  When she remains still we have saturations of % on NC, she is in no respiratory distress.  WBC has increased, Na is low but better, CO2 has been increasing.  ABG now c/w a metabolic alkalosis and respiratory compensation and increased aA gradient.  CXR looks like pulmonary edema on top of chronic changes.  ECHO shows pulmonary hypertension, nl EF     2023 - Stable overnight and feels better, this AM she is sitting up in chair on NC O2 with good sats.  She remains pressured and anxious.  WBC better.  BNP remained elevated yesterday.  Procalcitonin was very low    23: Complains of sore throat and right ear pain this morning. Also some pain at the site of her recent cellulitis.    REVIEW OF SYSTEMS  GENERAL: Feeling well.  EYES: Vision is good.  ENT: No sinusitis or pharyngitis.   HEART: No chest pain or palpitations.  LUNGS: No SOB, cough, sputum, or wheezing.  GI: No abdominal pain, nausea, vomiting, or diarrhea.  : No urinary urgency, pain, or change in frequency.  SKIN: recent cellulitis with pain on her leg  MUSCULOSKELETAL: No joint pain or myalgias.  NEURO: No headaches or neuropathy.  LYMPH: No edema or adenopathy.  PSYCH: No anxiety or depression.  ENDO: No weight change.    (See H&P for complete medical, surgical, family, and social history.)    VITAL SIGNS (MOST RECENT)  Temp: 98.2 °F (36.8 °C) (23 0845)  Pulse: 81 (23 1100)  Resp: (!) 23  (04/24/23 1100)  BP: (!) 116/55 (04/24/23 1100)  SpO2: 95 % (04/24/23 1100)    INTAKE AND OUTPUT (LAST 24 HOURS):  Intake/Output Summary (Last 24 hours) at 4/24/2023 1211  Last data filed at 4/24/2023 0731  Gross per 24 hour   Intake 950 ml   Output 1400 ml   Net -450 ml       WEIGHT  Wt Readings from Last 1 Encounters:   04/21/23 41.7 kg (92 lb)       PHYSICAL EXAM  GENERAL:  NAD.  HEENT: EOMI. Unable to see back of throat due to obstruction by tongue  NECK: Supple. No JVD or HJR.  HEART: Regular rate and rhythm. No murmur or gallop auscultated.  LUNGS: Clear to auscultation. Lung excursion symmetrical.   ABDOMEN: Bowel sounds present. Non-tender, no masses palpated.  EXTREMITIES: Normal muscle tone and joint movement, no cyanosis or clubbing.   LYMPHATICS: No adenopathy palpated, no edema.  SKIN: Dry, intact, no lesions.   NEURO: No gross abnormalities.  PSYCH: Appropriate affect.    RESPIRATORY SUPPORT  Oxygen Concentration (%):  [60] 60           LAST ARTERIAL BLOOD GAS  ABG  Recent Labs   Lab 04/22/23  0036   PH 7.436   PO2 75*   PCO2 53.4*   HCO3 35.9*   BE 12       ACUTE PHASE REACTANT (LAST 24 HOURS)  No results for input(s): FERRITIN, CRP, LDH, DDIMER in the last 24 hours.    CBC LAST (LAST 24 HOURS)  Recent Labs   Lab 04/24/23  0322   WBC 12.87*   RBC 3.94*   HGB 9.5*   HCT 30.4*   MCV 77*   MCH 24.1*   MCHC 31.3*   RDW 19.6*      MPV 8.6*   GRAN 87.2*  11.2*   LYMPH 2.7*  0.4*   MONO 9.5  1.2*   BASO 0.01   NRBC 0       CHEMISTRY LAST (LAST 24 HOURS)  Recent Labs   Lab 04/24/23  0322   *   K 3.8   CL 88*   CO2 29   ANIONGAP 13   BUN 25*   CREATININE 0.8   *   CALCIUM 9.1   MG 2.4       COAGULATION LAST (LAST 24 HOURS)  No results for input(s): LABPT, INR, APTT in the last 24 hours.    CARDIAC PROFILE (LAST 24 HOURS)  Recent Labs   Lab 04/22/23  1443   *       LAST 7 DAYS MICROBIOLOGY   Microbiology Results (last 7 days)       Procedure Component Value Units Date/Time    Blood  culture #1 **CANNOT BE ORDERED STAT** [[id number]] Collected: 04/20/23 2034    Order Status: Completed Specimen: Blood from Peripheral, Antecubital, Right Updated: 04/23/23 2232     Blood Culture, Routine No Growth to date      No Growth to date      No Growth to date      No Growth to date    Blood culture #2 **CANNOT BE ORDERED STAT** [[id number]] Collected: 04/20/23 2034    Order Status: Completed Specimen: Blood from Peripheral, Antecubital, Left Updated: 04/23/23 2232     Blood Culture, Routine No Growth to date      No Growth to date      No Growth to date      No Growth to date            MOST RECENT IMAGING  X-Ray Chest AP Portable  Frontal view of the chest    CLINICAL HISTORY: Shortness of breath    COMPARISON: 4/20/2023    FINDINGS:    Diffuse bilateral pulmonary infiltrates.  Small pleural fluid.  The cardiomediastinal silhouette remains enlarged with atherosclerotic calcifications of the aorta, dystrophic calcifications of the mitral valve.  The osseous structures are stable.    IMPRESSION: Diffuse pulmonary infiltrates, pleural fluid, cardiomegaly. The findings are stable when compared to the prior exam.    Electronically signed by:  Rogerio Rodriguez MD  4/22/2023 7:29 AM CDT Workstation: [id number]      CURRENT VISIT EKG  Results for orders placed or performed during the hospital encounter of 04/20/23   EKG 12-lead    Narrative    Test Reason : R06.00,    Vent. Rate : 077 BPM     Atrial Rate : 072 BPM     P-R Int : 000 ms          QRS Dur : 076 ms      QT Int : 382 ms       P-R-T Axes : 000 072 087 degrees     QTc Int : 432 ms    Atrial fibrillation  Low voltage QRS  Abnormal ECG  When compared with ECG of 13-JUN-2022 20:59,  Nonspecific T wave abnormality, improved in Lateral leads  Confirmed by Wenceslao Cadet MD ([id number]) on 4/23/2023 5:25:08 PM    Referred By: [email or web address]   SELF           Confirmed By:Wenceslao Cadet MD       ECHOCARDIOGRAM RESULTS  Results for orders placed during the hospital encounter of  04/20/23    Echo    Interpretation Summary  · The left ventricle is normal in size with concentric remodeling and normal systolic function.  · The estimated PA systolic pressure is 54 mmHg.  · There is mild to moderate pulmonary hypertension.  · The estimated ejection fraction is 65%.  · There is abnormal septal wall motion. There is systolic flattening of the interventricular septum consistent with right ventricle pressure overload.  · A diastolic pattern consistent with atrial fibrillation observed.  · With normal left atrial pressure.  · Mild right ventricular enlargement with normal right ventricular systolic function.  · Moderate left atrial enlargement.  · Mild right atrial enlargement.  · Mild mitral regurgitation.  · Mild to moderate tricuspid regurgitation.  · Elevated central venous pressure (15 mmHg).        ASSESSMENT/PLAN:   Jeannie Hutchins is a 88 y.o. female with a PMH significant for AF, HFpEF, HTN, HLP, COPD, and CKD3  currently hospitalized with acutely decompensated HFpEF on whom we have been consulted for hypoxemia that initially required BiPAP.    #Acutely decompensated HFpEF  #Pulmonary edema  #Hypoxemia  #Likely group 2 pulmonary hypertension, possibly component of group 3  #CAP  #Chronic, mild hyponatremia  - weaned off of BiPAP  - wean O2 as tolerated  - diurese  - complete 5 days of empiric antibiotic therapy    Dispo:  agree with stepdown to floor    Pulmonary & Critical Care Medicine will sign off at this time.   Please call with any further questions or concerns.    Discussed with hospitalist. My interpretation of her CXR today is that of patchy bilateral opacities with lower lung field predominance similar to prior CXR in June 2022 that may represent pulmonary edema or chronic changes.    Abram Amezquita MD  Pulmonary and Critical Care Medicine  [facility]  Date of Service: 04/24/2023  12:11 PM

## 2023-04-24 NOTE — PROGRESS NOTES
[facility] Medicine  Progress Note    Patient Name: Jeannie Hutcihns  MRN: [id number]  Patient Class: IP- Inpatient   Admission Date: 4/20/2023  Length of Stay: 2 days  Attending Physician: Pipe Cooper MD  Primary Care Provider: Primary Doctor No        Subjective:     Principal Problem:Acute on chronic diastolic congestive heart failure        HPI:  Jeannie Hutchins is an 87-year-old female with chronic medical problems including AFib on Eliquis,HFpEF, hyperlipidemia, hypertension, COPD,  and CKD 3 who presents to the emergency room complaining shortness of breath.  Patient states that she usually uses 2 L nasal cannula O2 but for the past 2 weeks she is had to turn it up to 3 L. she said she can usually get around with minimal shortness of breath but she is had to stop and rest on her way to the dining room.  Yesterday she was so weak she had to get someone to come help her get dressed.  She also complains of a dry cough for 1 month and some sinus congestion at nighttime.  She also has had some occasional chest pain on the left side of her chest that kind of sharp and quickly resolves on its own.  She has not noticed that it is with activity or anything.  When EMS was called her O2 sat was 60% on room air and her O2 tubing was kinked.  Her O2 sat would not go up on her nasal cannula O2 and it improved on a non-rebreather.  She denies any fevers or chills, wheezing, productive cough, diarrhea or constipation.  She does have a wound to her right lower extremity and she said she hit right lower leg on a wheelchair and slowly got worse.  She said the wound care nurse at the nursing home has been taking care of it.    In the ED, BNP elevated at 1103, troponin 7.8, sodium 127, potassium 4.7, magnesium 1.8, BUN/CR 23/1.0, glucose 123, lactic acid 1 3, WBC 11.50, platelets 412, BUN/CR 9.5/30.6, temperature was 98°, rate , blood pressure 129/64, respiratory rate in the 20s, O2 sat 87-96% on 8 L.  twelve lead EKG with atrial fibrillation with ventricular rate 77 and .  Chest x-ray read is pending but appears to some cardiomegaly and heart failure along with chronic changes on previous x-rays.  She was given 500 mg IV azithromycin, 1 g ceftriaxone and 20 mg IV Lasix.  She will be admitted to the hospitalist service for further evaluation and treatment.      Overview/Hospital Course:  No notes on file    Interval History:  less sob.  Diuresing well with Diamox.  Afebrile.    Review of Systems   Constitutional:  Negative for chills and fever.   Respiratory:  Positive for shortness of breath. Negative for cough.    Cardiovascular:  Negative for chest pain and leg swelling.   Gastrointestinal:  Negative for abdominal pain, nausea and vomiting.   Objective:     Vital Signs (Most Recent):  Temp: 98.6 °F (37 °C) (04/23/23 1501)  Pulse: 110 (04/23/23 1942)  Resp: 20 (04/23/23 1942)  BP: 133/78 (04/23/23 1700)  SpO2: 95 % (04/23/23 1942) Vital Signs (24h Range):  Temp:  [98.2 °F (36.8 °C)-98.6 °F (37 °C)] 98.6 °F (37 °C)  Pulse:  [] 110  Resp:  [8-61] 20  SpO2:  [85 %-100 %] 95 %  BP: (101-143)/(49-79) 133/78     Weight: 41.7 kg (92 lb)  Body mass index is 17.97 kg/m².    Intake/Output Summary (Last 24 hours) at 4/23/2023 2115  Last data filed at 4/23/2023 1832  Gross per 24 hour   Intake 950 ml   Output 1350 ml   Net -400 ml      Physical Exam  Constitutional:       General: She is not in acute distress.     Appearance: She is not ill-appearing.   Eyes:      General:         Right eye: No discharge.         Left eye: No discharge.   Neck:      Vascular: No JVD.   Cardiovascular:      Rate and Rhythm: Tachycardia present. Rhythm irregular.   Pulmonary:      Effort: Pulmonary effort is normal.      Breath sounds: Rales present.   Abdominal:      General: Abdomen is flat. Bowel sounds are normal. There is no distension.      Palpations: Abdomen is soft.      Tenderness: There is no abdominal tenderness.    Musculoskeletal:      Right lower leg: No edema.      Left lower leg: No edema.   Skin:     General: Skin is warm and moist.      Findings: No rash.   Neurological:      Mental Status: She is alert and oriented to person, place, and time.   Psychiatric:         Attention and Perception: Attention normal.         Mood and Affect: Mood and affect normal.         Speech: Speech is rapid and pressured.       Significant Labs: All pertinent labs within the past 24 hours have been reviewed.    Significant Imaging:  no new imaging      Assessment/Plan:      * Acute on chronic diastolic congestive heart failure  Continue diuretic.  Continue beta blocker.  Goal is resolution of decompensation.  Echo reviewed.  Preserved EF.    Cellulitis of leg  Consult wound care, continue doxycycline 100 mg IV twice daily.  The infection is improving.      CKD (chronic kidney disease) stage 3, GFR 30-59 ml/min  Stable.  Monitor bun and creatinine.    Creatinine   Date Value Ref Range Status   04/23/2023 0.7 0.5 - 1.4 mg/dL Final   04/22/2023 0.7 0.5 - 1.4 mg/dL Final   04/21/2023 0.9 0.5 - 1.4 mg/dL Final         Anemia due to stage 3 chronic kidney disease  Stable.  Monitor hgb.    Hemoglobin   Date Value Ref Range Status   04/23/2023 10.7 (L) 12.0 - 16.0 g/dL Final   04/22/2023 10.6 (L) 12.0 - 16.0 g/dL Final   04/21/2023 9.9 (L) 12.0 - 16.0 g/dL Final           Debility  Increase activity as tolerated, out of bed t.i.d.    Hypothyroid  TSH 2.410, resume levothyroxine      Acute on chronic respiratory failure with hypoxia  Supplement oxygen.  Monitor sats.  I reviewed rec's from pulmonologist.    Atrial fibrillation  Atrial fibrillation with rate slightly uncontrolled.  Continue metoprolol at current dose.  Continue Eliquis for stroke prevention.        Essential hypertension  Controlled.  On Lopressor and Norvasc.  Will adjust doses if need be.    Hyponatremia  Multifactorial.  Monitor sodium level.  Stable.    Sodium   Date Value Ref  Range Status   04/23/2023 128 (L) 136 - 145 mmol/L Final   04/22/2023 131 (L) 136 - 145 mmol/L Final   04/21/2023 129 (L) 136 - 145 mmol/L Final             VTE Risk Mitigation (From admission, onward)         Ordered     apixaban tablet 2.5 mg  2 times daily         04/20/23 2304     Reason for No Pharmacological VTE Prophylaxis  Once        Question:  Reasons:  Answer:  Already adequately anticoagulated on oral Anticoagulants    04/20/23 2206     IP VTE HIGH RISK PATIENT  Once         04/20/23 2206     Place sequential compression device  Until discontinued         04/20/23 2206                Discharge Planning   JULIET:      Code Status: Full Code   Is the patient medically ready for discharge?:     Reason for patient still in hospital (select all that apply): Patient trending condition, Laboratory test and Treatment  Discharge Plan A: Return to nursing home                  Pipe Cooper MD  Department of Hospital Medicine   [facility]

## 2023-04-24 NOTE — CARE UPDATE
04/23/23 1942   Patient Assessment/Suction   Level of Consciousness (AVPU) alert   Respiratory Effort Unlabored   Expansion/Accessory Muscles/Retractions no use of accessory muscles;no retractions;expansion symmetric   All Lung Fields Breath Sounds diminished   PRE-TX-O2   Device (Oxygen Therapy) BIPAP   Oxygen Concentration (%) 60   SpO2 95 %   Pulse Oximetry Type Continuous   Pulse 110   Resp 20   Aerosol Therapy   $ Aerosol Therapy Charges Aerosol Treatment   Daily Review of Necessity (SVN) completed   Respiratory Treatment Status (SVN) given   Treatment Route (SVN) in-line;oxygen   Patient Position (SVN) semi-Evans's   Post Treatment Assessment (SVN) breath sounds unchanged   Signs of Intolerance (SVN) none   Breath Sounds Post-Respiratory Treatment   Post-treatment Heart Rate (beats/min) 105   Post-treatment Resp Rate (breaths/min) 25   Ready to Wean/Extubation Screen   FIO2<=50 (chart decimal) (!) 0.6   Preset CPAP/BiPAP Settings   Mode Of Delivery BiPAP S/T   $ Is patient using? Yes   Size of Mask Medium/Large   Sized Appropriately? Yes   Equipment Type V60   Airway Device Type medium full face mask   Humidifier not applicable   Ipap 12   EPAP (cm H2O) 6   Pressure Support (cm H2O) 6   Set Rate (Breaths/Min) 18   ITime (sec) 1   Rise Time (sec) 3   Patient CPAP/BiPAP Settings   Timed Inspiration (Sec) 1   RR Total (Breaths/Min) 25   Tidal Volume (mL) 669   VE Minute Ventilation (L/min) 16.9 L/min   Peak Inspiratory Pressure (cm H2O) 12   TiTOT (%) 30   Total Leak (L/Min) 61   Patient Trigger - ST Mode Only (%) 86

## 2023-04-24 NOTE — PHYSICIAN QUERY
PT Name: Jeannie Hutchins  MR #: [id number]     DOCUMENTATION CLARIFICATION      CDS/: Elaine Wiley Pe               Contact information: [phone]    This form is a permanent document in the medical record.     Query Date: April 24, 2023    Dear Provider,  By submitting this query, we are merely seeking further clarification of documentation.  Please utilize your independent clinical judgment when addressing the question(s) below.     The Medical Record contains the following:    Supporting Clinical Findings Location in Medical Record   4/22 PN Possible developing PNA   4/24 Pulmonary CAP 4/22 Progress Note  4/24 Pulmonary consult note   WBC 11.5>17.82 > 11.80  Procal 0.31 >0.05  4/22 CXR  Diffuse pulm infiltrates, pleural fluid, cardiomegaly- findings are stable compared to the prior exam) Per Labs    Per 4/22 CXR    Tmt: Rocephin and Doxycycline - Per Pulmonary complete 5 days of empiric antibiotic therapy  Per MAR and Pulmonary progress note     Please clarify if the Possible developing Pneumonia/CAP diagnosis has been:    [  ] Ruled In   [  x] Ruled Out   [  ] Other/Clarification of findings (please specify): _______________     Present on Admission (POA) status:  [  ] Yes (Y)   [  ] No (N)       Please document in your progress notes daily for the duration of treatment, until resolved, and include in your discharge summary.    Form No. [id number]

## 2023-04-24 NOTE — ASSESSMENT & PLAN NOTE
Atrial fibrillation with rate slightly uncontrolled.  Continue metoprolol at current dose.  Continue Eliquis for stroke prevention.

## 2023-04-24 NOTE — PHYSICIAN QUERY
PT Name: Jeannie Hutchins  MR #: [id number]    DOCUMENTATION CLARIFICATION      CDS/: Elaine Wiley Pe               Contact information:  [phone]     This form is a permanent document in the medical record.  Query Date: April 24, 2023    By submitting this query, we are merely seeking further clarification of documentation. Please utilize your independent clinical judgment when addressing the question(s) below.    The Medical Record contains the following:   Indicators   Supporting Clinical Findings Location in Medical Record    ACEVES, SOB, Productive Cough, Wheezing,   Use of Accessory Muscles, etc. Patient with SOB, Acute /Chronic hypoxic respiratory failure  Per Chart review    Documentation Possible COPD exacerbation   (wheezing & diminished/poor air entry -poss element of bronchospasm, COPD exacerbation) Per 4/22 Progress note    Medication IV Steroids  Per MAR    Treatment Nebs Per MAR       Provider, please clarify the diagnosis or diagnoses associated with above clinical findings.    [   ] COPD with Acute Exacerbation   [   ] COPD with Acute Bronchitis   [   ] COPD with Chronic Bronchitis   [  x ] COPD without Acute Exacerbation   [   ] Other Respiratory Diagnosis (please specify):___________     Present on admission (POA) status:   [   ] Yes (Y)                        [   ] No (N)                             Please document in your progress notes daily for the duration of treatment, until resolved, and include in your discharge summary.

## 2023-04-24 NOTE — ASSESSMENT & PLAN NOTE
Continue diuretic.  Continue beta blocker.  Goal is resolution of decompensation.  Echo reviewed.  Preserved EF.

## 2023-04-24 NOTE — SUBJECTIVE & OBJECTIVE
Interval History:  less sob.  Diuresing well with Diamox.  Afebrile.    Review of Systems   Constitutional:  Negative for chills and fever.   Respiratory:  Positive for shortness of breath. Negative for cough.    Cardiovascular:  Negative for chest pain and leg swelling.   Gastrointestinal:  Negative for abdominal pain, nausea and vomiting.   Objective:     Vital Signs (Most Recent):  Temp: 98.6 °F (37 °C) (04/23/23 1501)  Pulse: 110 (04/23/23 1942)  Resp: 20 (04/23/23 1942)  BP: 133/78 (04/23/23 1700)  SpO2: 95 % (04/23/23 1942) Vital Signs (24h Range):  Temp:  [98.2 °F (36.8 °C)-98.6 °F (37 °C)] 98.6 °F (37 °C)  Pulse:  [] 110  Resp:  [8-61] 20  SpO2:  [85 %-100 %] 95 %  BP: (101-143)/(49-79) 133/78     Weight: 41.7 kg (92 lb)  Body mass index is 17.97 kg/m².    Intake/Output Summary (Last 24 hours) at 4/23/2023 2115  Last data filed at 4/23/2023 1832  Gross per 24 hour   Intake 950 ml   Output 1350 ml   Net -400 ml      Physical Exam  Constitutional:       General: She is not in acute distress.     Appearance: She is not ill-appearing.   Eyes:      General:         Right eye: No discharge.         Left eye: No discharge.   Neck:      Vascular: No JVD.   Cardiovascular:      Rate and Rhythm: Tachycardia present. Rhythm irregular.   Pulmonary:      Effort: Pulmonary effort is normal.      Breath sounds: Rales present.   Abdominal:      General: Abdomen is flat. Bowel sounds are normal. There is no distension.      Palpations: Abdomen is soft.      Tenderness: There is no abdominal tenderness.   Musculoskeletal:      Right lower leg: No edema.      Left lower leg: No edema.   Skin:     General: Skin is warm and moist.      Findings: No rash.   Neurological:      Mental Status: She is alert and oriented to person, place, and time.   Psychiatric:         Attention and Perception: Attention normal.         Mood and Affect: Mood and affect normal.         Speech: Speech is rapid and pressured.       Significant  Labs: All pertinent labs within the past 24 hours have been reviewed.    Significant Imaging:  no new imaging

## 2023-04-25 LAB
ANION GAP SERPL CALC-SCNC: 7 MMOL/L (ref 8–16)
BACTERIA BLD CULT: NORMAL
BACTERIA BLD CULT: NORMAL
BASOPHILS # BLD AUTO: 0 K/UL (ref 0–0.2)
BASOPHILS NFR BLD: 0 % (ref 0–1.9)
BUN SERPL-MCNC: 26 MG/DL (ref 8–23)
CALCIUM SERPL-MCNC: 9 MG/DL (ref 8.7–10.5)
CHLORIDE SERPL-SCNC: 96 MMOL/L (ref 95–110)
CO2 SERPL-SCNC: 28 MMOL/L (ref 23–29)
CREAT SERPL-MCNC: 0.6 MG/DL (ref 0.5–1.4)
DIFFERENTIAL METHOD: ABNORMAL
EOSINOPHIL # BLD AUTO: 0 K/UL (ref 0–0.5)
EOSINOPHIL NFR BLD: 0 % (ref 0–8)
ERYTHROCYTE [DISTWIDTH] IN BLOOD BY AUTOMATED COUNT: 19.7 % (ref 11.5–14.5)
EST. GFR  (NO RACE VARIABLE): >60 ML/MIN/1.73 M^2
GLUCOSE SERPL-MCNC: 101 MG/DL (ref 70–110)
HCT VFR BLD AUTO: 31.1 % (ref 37–48.5)
HGB BLD-MCNC: 9.6 G/DL (ref 12–16)
IMM GRANULOCYTES # BLD AUTO: 0.07 K/UL (ref 0–0.04)
IMM GRANULOCYTES NFR BLD AUTO: 0.7 % (ref 0–0.5)
LYMPHOCYTES # BLD AUTO: 0.4 K/UL (ref 1–4.8)
LYMPHOCYTES NFR BLD: 3.8 % (ref 18–48)
MAGNESIUM SERPL-MCNC: 2.2 MG/DL (ref 1.6–2.6)
MCH RBC QN AUTO: 24.1 PG (ref 27–31)
MCHC RBC AUTO-ENTMCNC: 30.9 G/DL (ref 32–36)
MCV RBC AUTO: 78 FL (ref 82–98)
MONOCYTES # BLD AUTO: 1 K/UL (ref 0.3–1)
MONOCYTES NFR BLD: 9.1 % (ref 4–15)
NEUTROPHILS # BLD AUTO: 9.3 K/UL (ref 1.8–7.7)
NEUTROPHILS NFR BLD: 86.4 % (ref 38–73)
NRBC BLD-RTO: 0 /100 WBC
PLATELET # BLD AUTO: 342 K/UL (ref 150–450)
PMV BLD AUTO: 8.4 FL (ref 9.2–12.9)
POTASSIUM SERPL-SCNC: 3.9 MMOL/L (ref 3.5–5.1)
RBC # BLD AUTO: 3.99 M/UL (ref 4–5.4)
SODIUM SERPL-SCNC: 131 MMOL/L (ref 136–145)
WBC # BLD AUTO: 10.7 K/UL (ref 3.9–12.7)

## 2023-04-25 PROCEDURE — 25000242 PHARM REV CODE 250 ALT 637 W/ HCPCS: Performed by: INTERNAL MEDICINE

## 2023-04-25 PROCEDURE — 83735 ASSAY OF MAGNESIUM: CPT | Performed by: HOSPITALIST

## 2023-04-25 PROCEDURE — 94761 N-INVAS EAR/PLS OXIMETRY MLT: CPT

## 2023-04-25 PROCEDURE — 25000003 PHARM REV CODE 250: Performed by: HOSPITALIST

## 2023-04-25 PROCEDURE — 80048 BASIC METABOLIC PNL TOTAL CA: CPT | Performed by: HOSPITALIST

## 2023-04-25 PROCEDURE — 99900031 HC PATIENT EDUCATION (STAT)

## 2023-04-25 PROCEDURE — 99900035 HC TECH TIME PER 15 MIN (STAT)

## 2023-04-25 PROCEDURE — 94660 CPAP INITIATION&MGMT: CPT

## 2023-04-25 PROCEDURE — 85025 COMPLETE CBC W/AUTO DIFF WBC: CPT | Performed by: HOSPITALIST

## 2023-04-25 PROCEDURE — 27000221 HC OXYGEN, UP TO 24 HOURS

## 2023-04-25 PROCEDURE — 36415 COLL VENOUS BLD VENIPUNCTURE: CPT | Performed by: HOSPITALIST

## 2023-04-25 PROCEDURE — 12000002 HC ACUTE/MED SURGE SEMI-PRIVATE ROOM

## 2023-04-25 PROCEDURE — 94640 AIRWAY INHALATION TREATMENT: CPT

## 2023-04-25 PROCEDURE — 94799 UNLISTED PULMONARY SVC/PX: CPT

## 2023-04-25 PROCEDURE — 63600175 PHARM REV CODE 636 W HCPCS: Performed by: HOSPITALIST

## 2023-04-25 RX ORDER — DOXYCYCLINE 100 MG/1
100 CAPSULE ORAL EVERY 12 HOURS
Status: DISCONTINUED | OUTPATIENT
Start: 2023-04-25 | End: 2023-04-27 | Stop reason: HOSPADM

## 2023-04-25 RX ORDER — FUROSEMIDE 20 MG/1
20 TABLET ORAL DAILY
Status: DISCONTINUED | OUTPATIENT
Start: 2023-04-25 | End: 2023-04-27 | Stop reason: HOSPADM

## 2023-04-25 RX ADMIN — TRIAMCINOLONE ACETONIDE: 1 CREAM TOPICAL at 09:04

## 2023-04-25 RX ADMIN — MUPIROCIN 1 G: 20 OINTMENT TOPICAL at 09:04

## 2023-04-25 RX ADMIN — APIXABAN 2.5 MG: 2.5 TABLET, FILM COATED ORAL at 09:04

## 2023-04-25 RX ADMIN — IPRATROPIUM BROMIDE AND ALBUTEROL SULFATE 3 ML: .5; 3 SOLUTION RESPIRATORY (INHALATION) at 07:04

## 2023-04-25 RX ADMIN — POTASSIUM CHLORIDE 20 MEQ: 1500 TABLET, EXTENDED RELEASE ORAL at 09:04

## 2023-04-25 RX ADMIN — CEFTRIAXONE 1 G: 1 INJECTION, POWDER, FOR SOLUTION INTRAMUSCULAR; INTRAVENOUS at 01:04

## 2023-04-25 RX ADMIN — FLUTICASONE PROPIONATE 50 MCG: 50 SPRAY, METERED NASAL at 09:04

## 2023-04-25 RX ADMIN — CHLORHEXIDINE GLUCONATE 15 ML: 1.2 RINSE ORAL at 09:04

## 2023-04-25 RX ADMIN — FAMOTIDINE 20 MG: 20 TABLET ORAL at 09:04

## 2023-04-25 RX ADMIN — METOPROLOL TARTRATE 25 MG: 25 TABLET, FILM COATED ORAL at 09:04

## 2023-04-25 RX ADMIN — AMLODIPINE BESYLATE 5 MG: 5 TABLET ORAL at 09:04

## 2023-04-25 RX ADMIN — IPRATROPIUM BROMIDE AND ALBUTEROL SULFATE 3 ML: .5; 3 SOLUTION RESPIRATORY (INHALATION) at 08:04

## 2023-04-25 RX ADMIN — DOXYCYCLINE HYCLATE 100 MG: 100 CAPSULE ORAL at 09:04

## 2023-04-25 RX ADMIN — FUROSEMIDE 20 MG: 20 TABLET ORAL at 04:04

## 2023-04-25 RX ADMIN — ESCITALOPRAM OXALATE 10 MG: 10 TABLET ORAL at 09:04

## 2023-04-25 RX ADMIN — METHYLPREDNISOLONE SODIUM SUCCINATE 40 MG: 40 INJECTION, POWDER, FOR SOLUTION INTRAMUSCULAR; INTRAVENOUS at 09:04

## 2023-04-25 RX ADMIN — DOXYCYCLINE 100 MG: 100 INJECTION, POWDER, LYOPHILIZED, FOR SOLUTION INTRAVENOUS at 09:04

## 2023-04-25 RX ADMIN — ATORVASTATIN CALCIUM 20 MG: 20 TABLET, FILM COATED ORAL at 09:04

## 2023-04-25 RX ADMIN — ACETAMINOPHEN 650 MG: 325 TABLET ORAL at 04:04

## 2023-04-25 RX ADMIN — LEVOTHYROXINE SODIUM 75 MCG: 0.03 TABLET ORAL at 05:04

## 2023-04-25 RX ADMIN — IPRATROPIUM BROMIDE AND ALBUTEROL SULFATE 3 ML: .5; 3 SOLUTION RESPIRATORY (INHALATION) at 01:04

## 2023-04-25 NOTE — NURSING
0700: report received, in bed with no distress or no complaints    1930: uneventful shift, wound care done, ,sat up for all meals today, pleasant and cooperative, medicated with tylenol x 1

## 2023-04-25 NOTE — ASSESSMENT & PLAN NOTE
Continue diuretic.  Continue beta blocker.  Goal is resolution of the decompensated state.  Echo reviewed.  Preserved EF.

## 2023-04-25 NOTE — PLAN OF CARE
04/25/23 0825   Discharge Reassessment   Assessment Type Discharge Planning Reassessment   Did the patient's condition or plan change since previous assessment? No   Discharge Plan discussed with: Adult children   Communicated JULIET with patient/caregiver Yes   Discharge Plan A Return to nursing home   Discharge Plan B Return to Nursing Home   Discharge Barriers Identified None   Why the patient remains in the hospital Requires continued medical care   Post-Acute Status   Post-Acute Authorization Placement   Post-Acute Placement Status Pending medical clearance/testing   Discharge Delays None known at this time     Patient stepped down to med-surg. Patient [facility]. Discharge plan is return to [facility].  to follow for additional needs.

## 2023-04-25 NOTE — SUBJECTIVE & OBJECTIVE
Interval History:  overall, feels better.  Has a bit of a sore throat today.    Review of Systems   Constitutional:  Negative for chills and fever.   Respiratory:  Positive for shortness of breath. Negative for cough.    Cardiovascular:  Negative for chest pain and leg swelling.   Gastrointestinal:  Negative for abdominal pain, nausea and vomiting.   Objective:     Vital Signs (Most Recent):  Temp: 97.8 °F (36.6 °C) (04/24/23 2011)  Pulse: 85 (04/24/23 2039)  Resp: 18 (04/24/23 2039)  BP: 114/70 (04/24/23 2039)  SpO2: 100 % (04/24/23 2011) Vital Signs (24h Range):  Temp:  [97.8 °F (36.6 °C)-98.7 °F (37.1 °C)] 97.8 °F (36.6 °C)  Pulse:  [] 85  Resp:  [11-47] 18  SpO2:  [92 %-100 %] 100 %  BP: ()/(55-81) 114/70     Weight: 41.7 kg (92 lb)  Body mass index is 17.97 kg/m².    Intake/Output Summary (Last 24 hours) at 4/24/2023 2136  Last data filed at 4/24/2023 1853  Gross per 24 hour   Intake 770 ml   Output 1500 ml   Net -730 ml        Physical Exam  Constitutional:       General: She is not in acute distress.     Appearance: She is not ill-appearing.   Eyes:      General:         Right eye: No discharge.         Left eye: No discharge.   Neck:      Vascular: No JVD.   Cardiovascular:      Rate and Rhythm: Normal rate. Rhythm irregular.   Pulmonary:      Effort: Pulmonary effort is normal.      Breath sounds: Rales present.   Abdominal:      General: Abdomen is flat. Bowel sounds are normal. There is no distension.      Palpations: Abdomen is soft.      Tenderness: There is no abdominal tenderness.   Musculoskeletal:      Right lower leg: No edema.      Left lower leg: No edema.   Skin:     General: Skin is warm and moist.      Findings: No rash.   Neurological:      Mental Status: She is alert and oriented to person, place, and time.   Psychiatric:         Attention and Perception: Attention normal.         Mood and Affect: Mood and affect normal.         Speech: Speech is rapid and pressured.        Significant Labs: All pertinent labs within the past 24 hours have been reviewed.    Significant Imaging:  no new imaging

## 2023-04-25 NOTE — ASSESSMENT & PLAN NOTE
Atrial fibrillation with rate controlled more today.  Continue metoprolol at current dose.  Continue Eliquis for stroke prevention.

## 2023-04-25 NOTE — ASSESSMENT & PLAN NOTE
Stable.  Monitor bun and creatinine.    Creatinine   Date Value Ref Range Status   04/24/2023 0.8 0.5 - 1.4 mg/dL Final   04/23/2023 0.7 0.5 - 1.4 mg/dL Final   04/22/2023 0.7 0.5 - 1.4 mg/dL Final

## 2023-04-25 NOTE — CARE UPDATE
04/25/23 1000   Patient Assessment/Suction   Level of Consciousness (AVPU) responds to voice   PRE-TX-O2   Device (Oxygen Therapy) high flow nasal cannula   $ Is the patient on Low Flow Oxygen? Yes   Flow (L/min) 6   SpO2 100 %   Pulse Oximetry Type Continuous   Oximetry Probe Site Applied   Pulse 68     Placed Portable continuous pulse ox monitor at bedside.  Probe placed on left hand ring finger.

## 2023-04-25 NOTE — ASSESSMENT & PLAN NOTE
Multifactorial.  Monitor sodium level.  Stable.    Sodium   Date Value Ref Range Status   04/24/2023 130 (L) 136 - 145 mmol/L Final   04/23/2023 128 (L) 136 - 145 mmol/L Final   04/22/2023 131 (L) 136 - 145 mmol/L Final

## 2023-04-25 NOTE — ASSESSMENT & PLAN NOTE
Improved.  Supplement oxygen.  Respiratory is titrating flow downward.  Monitor sats.  I reviewed rec's from pulmonologist.

## 2023-04-25 NOTE — PROGRESS NOTES
[facility] Medicine  Progress Note    Patient name: Jeannie Hutchins  MRN: [id number]  Admit Date: 4/20/2023   LOS: 4 days     SUBJECTIVE:     Principal problem: Acute on chronic diastolic congestive heart failure    Interval History:  Patient was seen and examined bedside, clinically improving, upon my entering the room patient was not wearing oxygen and her oxygen saturation was in 70s, I had to put her back on oxygen up to 7 LPM high-flow nasal cannula to bring her to SpO2 of 88%.  Otherwise denies any new symptoms, no acute events overnight as per nursing staff    Scheduled Meds:   albuterol-ipratropium  3 mL Nebulization Q6H WAKE    amLODIPine  5 mg Oral Daily    apixaban  2.5 mg Oral BID    atorvastatin  20 mg Oral Daily    cefTRIAXone (ROCEPHIN) IVPB  1 g Intravenous Daily    chlorhexidine  15 mL Mouth/Throat BID    doxycycline (VIBRAMYCIN) IVPB  100 mg Intravenous Q12H    EScitalopram oxalate  10 mg Oral Daily    famotidine  20 mg Oral Daily    fluticasone propionate  1 spray Each Nostril Daily    levothyroxine  75 mcg Oral Daily    methylPREDNISolone sodium succinate injection  40 mg Intravenous Daily    metoprolol tartrate  25 mg Oral BID    mupirocin   Nasal BID    potassium chloride  20 mEq Oral Daily    triamcinolone acetonide 0.1%   Topical (Top) BID     Continuous Infusions:  PRN Meds:acetaminophen, diphenhydrAMINE-zinc acetate 1-0.1%, melatonin, metoprolol, naloxone, ondansetron, oxyCODONE, polyethylene glycol, potassium bicarbonate, potassium bicarbonate, potassium bicarbonate, simethicone, sodium chloride 0.9%    Review of patient's allergies indicates:   Allergen Reactions    Purvis Swelling     Tongue swells up and a generalized rash.  Patient reports allergy to all Berries    Hydrochlorothiazide Other (See Comments)     hyponatremia    Lisinopril Other (See Comments)     Cough       Review of Systems: As per interval history    OBJECTIVE:     Vital Signs (Most  Recent)  Temp: 98.3 °F (36.8 °C) (04/25/23 1134)  Pulse: 79 (04/25/23 1358)  Resp: 16 (04/25/23 1358)  BP: 113/65 (04/25/23 1134)  SpO2: 95 % (04/25/23 1358)    Vital Signs Range (Last 24H):  Temp:  [97.8 °F (36.6 °C)-98.7 °F (37.1 °C)]   Pulse:  [68-88]   Resp:  [11-47]   BP: (110-130)/(58-80)   SpO2:  [93 %-100 %]     I & O (Last 24H):  Intake/Output Summary (Last 24 hours) at 4/25/2023 1451  Last data filed at 4/25/2023 0434  Gross per 24 hour   Intake 650 ml   Output 1200 ml   Net -550 ml       Physical Exam:  General:  Chronically ill-appearing lady in no acute distress   Eyes: No conjunctival injection. No scleral icterus.  ENT:  Severely deaf. No discharge from ears. No nasal discharge.   Neck: Supple, trachea midline. No JVD  CVS: RRR. No LE edema BL  Lungs:  On supplemental oxygen, bilateral crackles noted   Abdomen:  Soft, nontender and nondistended.  No organomegaly  Neuro: AOx3. Moves all extremities. Follows commands. Responds appropriately       Laboratory:  I have reviewed all pertinent lab results within the past 24 hours.    Diagnostic Results:  Reviewed all imaging    ASSESSMENT/PLAN:     88-year-old nursing home resident lady with history of AFib on Eliquis, HFpEF, hypertension, CKD 3, COPD being treated for acute on chronic diastolic heart failure and cellulitis of leg    Active Hospital Problems    Diagnosis  POA    *Acute on chronic diastolic congestive heart failure [I50.33]  Yes    Cellulitis of leg [L03.119]  Yes    Hypothyroid [E03.9]  Yes    Debility [R53.81]  Yes    Acute on chronic respiratory failure with hypoxia [J96.21]  Yes    Longstanding persistent atrial fibrillation [I48.11]  Yes    Essential hypertension [I10]  Yes    Hyponatremia [E87.1]  Yes     Differential:  CHF - bnp is 400+;  cxr shows normal heart size, no pulm fluid - will check echo  Cirrhosis - AST at upper normal, otherwise LFTs in normal range; check liver US  Adrenal insuff - would expect hyperkalemia and  hypotension - not present;  Check am cortisol  SIADH - ?  Source?  HCTZ - not taking  Renal failure - no  Polydipsia - no          Resolved Hospital Problems   No resolved problems to display.         Plan:   Clinically improving, transferred out of ICU on 04/24.  Still has significant oxygen requirement  Continue diuresis upon daily assessment, wean oxygen as tolerated  Continue doxycycline for lower extremity cellulitis.  Do not see need for Rocephin  Diuresis upon daily assessment  Continue nebs, steroids, doxycycline  Continue metoprolol, Eliquis for Afib  Help from Pulmonary appreciated  PT, OT  Further management as per clinical course    VTE Risk Mitigation (From admission, onward)           Ordered     apixaban tablet 2.5 mg  2 times daily         04/20/23 2304     Reason for No Pharmacological VTE Prophylaxis  Once        Question:  Reasons:  Answer:  Already adequately anticoagulated on oral Anticoagulants    04/20/23 2206     IP VTE HIGH RISK PATIENT  Once         04/20/23 2206     Place sequential compression device  Until discontinued         04/20/23 2206                        Department Hospital Medicine  [facility]  Thaddeus Klein MD  Date of service: 04/25/2023

## 2023-04-25 NOTE — ASSESSMENT & PLAN NOTE
Stable.  Monitor hgb.    Hemoglobin   Date Value Ref Range Status   04/24/2023 9.5 (L) 12.0 - 16.0 g/dL Final   04/23/2023 10.7 (L) 12.0 - 16.0 g/dL Final   04/22/2023 10.6 (L) 12.0 - 16.0 g/dL Final

## 2023-04-25 NOTE — PROGRESS NOTES
[facility] Medicine  Progress Note    Patient Name: Jeannie Hutchins  MRN: [id number]  Patient Class: IP- Inpatient   Admission Date: 4/20/2023  Length of Stay: 3 days  Attending Physician: Pipe Cooper MD  Primary Care Provider: Primary Doctor No        Subjective:     Principal Problem:Acute on chronic diastolic congestive heart failure        HPI:  Jeannie Hutchins is an 87-year-old female with chronic medical problems including AFib on Eliquis,HFpEF, hyperlipidemia, hypertension, COPD,  and CKD 3 who presents to the emergency room complaining shortness of breath.  Patient states that she usually uses 2 L nasal cannula O2 but for the past 2 weeks she is had to turn it up to 3 L. she said she can usually get around with minimal shortness of breath but she is had to stop and rest on her way to the dining room.  Yesterday she was so weak she had to get someone to come help her get dressed.  She also complains of a dry cough for 1 month and some sinus congestion at nighttime.  She also has had some occasional chest pain on the left side of her chest that kind of sharp and quickly resolves on its own.  She has not noticed that it is with activity or anything.  When EMS was called her O2 sat was 60% on room air and her O2 tubing was kinked.  Her O2 sat would not go up on her nasal cannula O2 and it improved on a non-rebreather.  She denies any fevers or chills, wheezing, productive cough, diarrhea or constipation.  She does have a wound to her right lower extremity and she said she hit right lower leg on a wheelchair and slowly got worse.  She said the wound care nurse at the nursing home has been taking care of it.    In the ED, BNP elevated at 1103, troponin 7.8, sodium 127, potassium 4.7, magnesium 1.8, BUN/CR 23/1.0, glucose 123, lactic acid 1 3, WBC 11.50, platelets 412, BUN/CR 9.5/30.6, temperature was 98°, rate , blood pressure 129/64, respiratory rate in the 20s, O2 sat 87-96% on 8 L.  twelve lead EKG with atrial fibrillation with ventricular rate 77 and .  Chest x-ray read is pending but appears to some cardiomegaly and heart failure along with chronic changes on previous x-rays.  She was given 500 mg IV azithromycin, 1 g ceftriaxone and 20 mg IV Lasix.  She will be admitted to the hospitalist service for further evaluation and treatment.      Overview/Hospital Course:  No notes on file    Interval History:  overall, feels better.  Has a bit of a sore throat today.    Review of Systems   Constitutional:  Negative for chills and fever.   Respiratory:  Positive for shortness of breath. Negative for cough.    Cardiovascular:  Negative for chest pain and leg swelling.   Gastrointestinal:  Negative for abdominal pain, nausea and vomiting.   Objective:     Vital Signs (Most Recent):  Temp: 97.8 °F (36.6 °C) (04/24/23 2011)  Pulse: 85 (04/24/23 2039)  Resp: 18 (04/24/23 2039)  BP: 114/70 (04/24/23 2039)  SpO2: 100 % (04/24/23 2011) Vital Signs (24h Range):  Temp:  [97.8 °F (36.6 °C)-98.7 °F (37.1 °C)] 97.8 °F (36.6 °C)  Pulse:  [] 85  Resp:  [11-47] 18  SpO2:  [92 %-100 %] 100 %  BP: ()/(55-81) 114/70     Weight: 41.7 kg (92 lb)  Body mass index is 17.97 kg/m².    Intake/Output Summary (Last 24 hours) at 4/24/2023 2136  Last data filed at 4/24/2023 1853  Gross per 24 hour   Intake 770 ml   Output 1500 ml   Net -730 ml        Physical Exam  Constitutional:       General: She is not in acute distress.     Appearance: She is not ill-appearing.   Eyes:      General:         Right eye: No discharge.         Left eye: No discharge.   Neck:      Vascular: No JVD.   Cardiovascular:      Rate and Rhythm: Normal rate. Rhythm irregular.   Pulmonary:      Effort: Pulmonary effort is normal.      Breath sounds: Rales present.   Abdominal:      General: Abdomen is flat. Bowel sounds are normal. There is no distension.      Palpations: Abdomen is soft.      Tenderness: There is no abdominal  tenderness.   Musculoskeletal:      Right lower leg: No edema.      Left lower leg: No edema.   Skin:     General: Skin is warm and moist.      Findings: No rash.   Neurological:      Mental Status: She is alert and oriented to person, place, and time.   Psychiatric:         Attention and Perception: Attention normal.         Mood and Affect: Mood and affect normal.         Speech: Speech is rapid and pressured.       Significant Labs: All pertinent labs within the past 24 hours have been reviewed.    Significant Imaging:  no new imaging      Assessment/Plan:      * Acute on chronic diastolic congestive heart failure  Continue diuretic.  Continue beta blocker.  Goal is resolution of the decompensated state.  Echo reviewed.  Preserved EF.    Cellulitis of leg  Consulting wound care, continue doxycycline 100 mg IV twice daily.  The infection is improving.      CKD (chronic kidney disease) stage 3, GFR 30-59 ml/min  Stable.  Monitor bun and creatinine.    Creatinine   Date Value Ref Range Status   04/24/2023 0.8 0.5 - 1.4 mg/dL Final   04/23/2023 0.7 0.5 - 1.4 mg/dL Final   04/22/2023 0.7 0.5 - 1.4 mg/dL Final         Anemia due to stage 3 chronic kidney disease  Stable.  Monitor hgb.    Hemoglobin   Date Value Ref Range Status   04/24/2023 9.5 (L) 12.0 - 16.0 g/dL Final   04/23/2023 10.7 (L) 12.0 - 16.0 g/dL Final   04/22/2023 10.6 (L) 12.0 - 16.0 g/dL Final           Debility  Increase activity as tolerated, out of bed t.i.d.    Hypothyroid  TSH 2.410, resumed levothyroxine      Acute on chronic respiratory failure with hypoxia  Improved.  Supplement oxygen.  Respiratory is titrating flow downward.  Monitor sats.  I reviewed rec's from pulmonologist.    Longstanding persistent atrial fibrillation  Atrial fibrillation with rate controlled more today.  Continue metoprolol at current dose.  Continue Eliquis for stroke prevention.        Essential hypertension  Controlled.  On Lopressor and Norvasc.  Will adjust doses if  need be.    Hyponatremia  Multifactorial.  Monitor sodium level.  Stable.    Sodium   Date Value Ref Range Status   04/24/2023 130 (L) 136 - 145 mmol/L Final   04/23/2023 128 (L) 136 - 145 mmol/L Final   04/22/2023 131 (L) 136 - 145 mmol/L Final             VTE Risk Mitigation (From admission, onward)         Ordered     apixaban tablet 2.5 mg  2 times daily         04/20/23 2304     Reason for No Pharmacological VTE Prophylaxis  Once        Question:  Reasons:  Answer:  Already adequately anticoagulated on oral Anticoagulants    04/20/23 2206     IP VTE HIGH RISK PATIENT  Once         04/20/23 2206     Place sequential compression device  Until discontinued         04/20/23 2206                Discharge Planning   JULIET: 4/26/2023     Code Status: Full Code   Is the patient medically ready for discharge?:     Reason for patient still in hospital (select all that apply): Patient trending condition, Treatment and Consult recommendations  Discharge Plan A: Return to nursing home                  Pipe Cooper MD  Department of Hospital Medicine   [facility]

## 2023-04-26 LAB
ANION GAP SERPL CALC-SCNC: 11 MMOL/L (ref 8–16)
ANION GAP SERPL CALC-SCNC: 11 MMOL/L (ref 8–16)
BASOPHILS # BLD AUTO: 0.01 K/UL (ref 0–0.2)
BASOPHILS NFR BLD: 0.1 % (ref 0–1.9)
BUN SERPL-MCNC: 29 MG/DL (ref 8–23)
BUN SERPL-MCNC: 30 MG/DL (ref 8–23)
CALCIUM SERPL-MCNC: 9.3 MG/DL (ref 8.7–10.5)
CALCIUM SERPL-MCNC: 9.6 MG/DL (ref 8.7–10.5)
CHLORIDE SERPL-SCNC: 92 MMOL/L (ref 95–110)
CHLORIDE SERPL-SCNC: 93 MMOL/L (ref 95–110)
CO2 SERPL-SCNC: 25 MMOL/L (ref 23–29)
CO2 SERPL-SCNC: 26 MMOL/L (ref 23–29)
CREAT SERPL-MCNC: 0.8 MG/DL (ref 0.5–1.4)
CREAT SERPL-MCNC: 1 MG/DL (ref 0.5–1.4)
DIFFERENTIAL METHOD: ABNORMAL
EOSINOPHIL # BLD AUTO: 0 K/UL (ref 0–0.5)
EOSINOPHIL NFR BLD: 0.1 % (ref 0–8)
ERYTHROCYTE [DISTWIDTH] IN BLOOD BY AUTOMATED COUNT: 19.9 % (ref 11.5–14.5)
EST. GFR  (NO RACE VARIABLE): 54.5 ML/MIN/1.73 M^2
EST. GFR  (NO RACE VARIABLE): >60 ML/MIN/1.73 M^2
GLUCOSE SERPL-MCNC: 138 MG/DL (ref 70–110)
GLUCOSE SERPL-MCNC: 82 MG/DL (ref 70–110)
HCT VFR BLD AUTO: 31.6 % (ref 37–48.5)
HGB BLD-MCNC: 10.1 G/DL (ref 12–16)
IMM GRANULOCYTES # BLD AUTO: 0.07 K/UL (ref 0–0.04)
IMM GRANULOCYTES NFR BLD AUTO: 0.5 % (ref 0–0.5)
LYMPHOCYTES # BLD AUTO: 0.7 K/UL (ref 1–4.8)
LYMPHOCYTES NFR BLD: 5.1 % (ref 18–48)
MAGNESIUM SERPL-MCNC: 2 MG/DL (ref 1.6–2.6)
MCH RBC QN AUTO: 24.6 PG (ref 27–31)
MCHC RBC AUTO-ENTMCNC: 32 G/DL (ref 32–36)
MCV RBC AUTO: 77 FL (ref 82–98)
MONOCYTES # BLD AUTO: 1.3 K/UL (ref 0.3–1)
MONOCYTES NFR BLD: 9.5 % (ref 4–15)
NEUTROPHILS # BLD AUTO: 11.3 K/UL (ref 1.8–7.7)
NEUTROPHILS NFR BLD: 84.7 % (ref 38–73)
NRBC BLD-RTO: 0 /100 WBC
PLATELET # BLD AUTO: 360 K/UL (ref 150–450)
PMV BLD AUTO: 8.6 FL (ref 9.2–12.9)
POTASSIUM SERPL-SCNC: 4 MMOL/L (ref 3.5–5.1)
POTASSIUM SERPL-SCNC: 4.2 MMOL/L (ref 3.5–5.1)
RBC # BLD AUTO: 4.1 M/UL (ref 4–5.4)
SODIUM SERPL-SCNC: 128 MMOL/L (ref 136–145)
SODIUM SERPL-SCNC: 130 MMOL/L (ref 136–145)
WBC # BLD AUTO: 13.31 K/UL (ref 3.9–12.7)

## 2023-04-26 PROCEDURE — 25000003 PHARM REV CODE 250: Performed by: HOSPITALIST

## 2023-04-26 PROCEDURE — 94660 CPAP INITIATION&MGMT: CPT

## 2023-04-26 PROCEDURE — 83735 ASSAY OF MAGNESIUM: CPT | Performed by: HOSPITALIST

## 2023-04-26 PROCEDURE — 36415 COLL VENOUS BLD VENIPUNCTURE: CPT | Performed by: HOSPITALIST

## 2023-04-26 PROCEDURE — 85025 COMPLETE CBC W/AUTO DIFF WBC: CPT | Performed by: HOSPITALIST

## 2023-04-26 PROCEDURE — 63600175 PHARM REV CODE 636 W HCPCS: Performed by: HOSPITALIST

## 2023-04-26 PROCEDURE — 99900031 HC PATIENT EDUCATION (STAT)

## 2023-04-26 PROCEDURE — 99900035 HC TECH TIME PER 15 MIN (STAT)

## 2023-04-26 PROCEDURE — 36415 COLL VENOUS BLD VENIPUNCTURE: CPT | Performed by: STUDENT IN AN ORGANIZED HEALTH CARE EDUCATION/TRAINING PROGRAM

## 2023-04-26 PROCEDURE — 80048 BASIC METABOLIC PNL TOTAL CA: CPT | Mod: 91 | Performed by: STUDENT IN AN ORGANIZED HEALTH CARE EDUCATION/TRAINING PROGRAM

## 2023-04-26 PROCEDURE — 94640 AIRWAY INHALATION TREATMENT: CPT

## 2023-04-26 PROCEDURE — 27000221 HC OXYGEN, UP TO 24 HOURS

## 2023-04-26 PROCEDURE — 12000002 HC ACUTE/MED SURGE SEMI-PRIVATE ROOM

## 2023-04-26 PROCEDURE — 94761 N-INVAS EAR/PLS OXIMETRY MLT: CPT

## 2023-04-26 PROCEDURE — 80048 BASIC METABOLIC PNL TOTAL CA: CPT | Performed by: HOSPITALIST

## 2023-04-26 PROCEDURE — 25000242 PHARM REV CODE 250 ALT 637 W/ HCPCS: Performed by: INTERNAL MEDICINE

## 2023-04-26 RX ADMIN — FLUTICASONE PROPIONATE 50 MCG: 50 SPRAY, METERED NASAL at 08:04

## 2023-04-26 RX ADMIN — MUPIROCIN 1 G: 20 OINTMENT TOPICAL at 08:04

## 2023-04-26 RX ADMIN — APIXABAN 2.5 MG: 2.5 TABLET, FILM COATED ORAL at 08:04

## 2023-04-26 RX ADMIN — CHLORHEXIDINE GLUCONATE 15 ML: 1.2 RINSE ORAL at 10:04

## 2023-04-26 RX ADMIN — TRIAMCINOLONE ACETONIDE: 1 CREAM TOPICAL at 10:04

## 2023-04-26 RX ADMIN — ACETAMINOPHEN 650 MG: 325 TABLET ORAL at 10:04

## 2023-04-26 RX ADMIN — TRIAMCINOLONE ACETONIDE: 1 CREAM TOPICAL at 08:04

## 2023-04-26 RX ADMIN — ACETAMINOPHEN 650 MG: 325 TABLET ORAL at 02:04

## 2023-04-26 RX ADMIN — MUPIROCIN 1 G: 20 OINTMENT TOPICAL at 10:04

## 2023-04-26 RX ADMIN — FUROSEMIDE 20 MG: 20 TABLET ORAL at 08:04

## 2023-04-26 RX ADMIN — IPRATROPIUM BROMIDE AND ALBUTEROL SULFATE 3 ML: .5; 3 SOLUTION RESPIRATORY (INHALATION) at 08:04

## 2023-04-26 RX ADMIN — POTASSIUM CHLORIDE 20 MEQ: 1500 TABLET, EXTENDED RELEASE ORAL at 08:04

## 2023-04-26 RX ADMIN — IPRATROPIUM BROMIDE AND ALBUTEROL SULFATE 3 ML: .5; 3 SOLUTION RESPIRATORY (INHALATION) at 01:04

## 2023-04-26 RX ADMIN — METOPROLOL TARTRATE 25 MG: 25 TABLET, FILM COATED ORAL at 08:04

## 2023-04-26 RX ADMIN — LEVOTHYROXINE SODIUM 75 MCG: 0.03 TABLET ORAL at 05:04

## 2023-04-26 RX ADMIN — METOPROLOL TARTRATE 25 MG: 25 TABLET, FILM COATED ORAL at 10:04

## 2023-04-26 RX ADMIN — ESCITALOPRAM OXALATE 10 MG: 10 TABLET ORAL at 08:04

## 2023-04-26 RX ADMIN — SIMETHICONE 80 MG: 80 TABLET, CHEWABLE ORAL at 11:04

## 2023-04-26 RX ADMIN — AMLODIPINE BESYLATE 5 MG: 5 TABLET ORAL at 08:04

## 2023-04-26 RX ADMIN — FAMOTIDINE 20 MG: 20 TABLET ORAL at 08:04

## 2023-04-26 RX ADMIN — CHLORHEXIDINE GLUCONATE 15 ML: 1.2 RINSE ORAL at 08:04

## 2023-04-26 RX ADMIN — IPRATROPIUM BROMIDE AND ALBUTEROL SULFATE 3 ML: .5; 3 SOLUTION RESPIRATORY (INHALATION) at 07:04

## 2023-04-26 RX ADMIN — DOXYCYCLINE HYCLATE 100 MG: 100 CAPSULE ORAL at 10:04

## 2023-04-26 RX ADMIN — APIXABAN 2.5 MG: 2.5 TABLET, FILM COATED ORAL at 10:04

## 2023-04-26 RX ADMIN — DOXYCYCLINE HYCLATE 100 MG: 100 CAPSULE ORAL at 08:04

## 2023-04-26 RX ADMIN — ATORVASTATIN CALCIUM 20 MG: 20 TABLET, FILM COATED ORAL at 10:04

## 2023-04-26 RX ADMIN — METHYLPREDNISOLONE SODIUM SUCCINATE 40 MG: 40 INJECTION, POWDER, FOR SOLUTION INTRAMUSCULAR; INTRAVENOUS at 08:04

## 2023-04-26 NOTE — CARE UPDATE
04/26/23 0719   Patient Assessment/Suction   Level of Consciousness (AVPU) alert   Respiratory Effort Normal;Unlabored   Expansion/Accessory Muscles/Retractions no use of accessory muscles;no retractions;expansion symmetric   All Lung Fields Breath Sounds diminished   Rhythm/Pattern, Respiratory unlabored;pattern regular;depth regular;chest wiggle adequate;no shortness of breath reported   Cough Frequency infrequent   Cough Type good   Skin Integrity   $ Wound Care Tech Time 15 min   Area Observed Bridge of nose   Skin Appearance without discoloration   Barrier used? Gel Cushion   PRE-TX-O2   Device (Oxygen Therapy) high flow nasal cannula   $ Is the patient on Low Flow Oxygen? Yes   Flow (L/min) 5   SpO2 97 %   Pulse Oximetry Type Continuous   $ Pulse Oximetry - Multiple Charge Pulse Oximetry - Multiple   Pulse 85   Resp 18   Aerosol Therapy   $ Aerosol Therapy Charges Aerosol Treatment   Daily Review of Necessity (SVN) completed   Respiratory Treatment Status (SVN) given   Treatment Route (SVN) oxygen;mask   Patient Position (SVN) HOB elevated   Post Treatment Assessment (SVN) increased aeration   Signs of Intolerance (SVN) none   Breath Sounds Post-Respiratory Treatment   Throughout All Fields Post-Treatment All Fields   Throughout All Fields Post-Treatment aeration increased   Post-treatment Heart Rate (beats/min) 85   Post-treatment Resp Rate (breaths/min) 19   Preset CPAP/BiPAP Settings   Mode Of Delivery BiPAP S/T;Standby   $ CPAP/BiPAP Daily Charge BiPAP/CPAP Daily   Equipment Type V60   Education   $ Education Bronchodilator;15 min   Respiratory Evaluation   $ Care Plan Tech Time 15 min

## 2023-04-26 NOTE — PROGRESS NOTES
[facility]  Adult Nutrition   Progress Note (Follow-Up)    SUMMARY      Recommendations:   1. Continue current diet as tolerated  2. Continue Ensure HP pudding with meals.  3. RD to follow for intake, labs, and weight PRN.     Goals:   Goals: Intake to be > /= 75% EEN and EPN. Progressing    Dietitian Rounds Brief  Patient with continued fair intake--loves ensure pudding. Also wants mashed potatoes and hot tea on all trays. Pt labs improving. Last BM 4/22/23. Plan is for pt to dc back to [facility] long[facility]. RD to continue to monitor and encourage po intake.    Diet order: Cardiac diet    Oral Supplement: Ensure HP pudding with meals.    % Intake of Estimated Energy Needs: 50 - 75 %  % Meal Intake: 50 - 75 %    Estimated/Assessed Needs  Weight Used For Calorie Calculations: 42 kg (92 lb 9.5 oz)  Energy Calorie Requirements (kcal): 8825-5961 (30-35 kcal/kg)  Energy Need Method: Kcal/kg  Protein Requirements: 50-63 (1.2-1.5 gm/kg)  Weight Used For Protein Calculations: 42 kg (92 lb 9.5 oz)     Estimated Fluid Requirement Method: RDA Method  RDA Method (mL): 1260     Weight History:  Wt Readings from Last 5 Encounters:   04/26/23 41.7 kg (92 lb)   04/21/23 41.7 kg (92 lb)   01/11/23 45.4 kg (100 lb)   10/17/22 45.2 kg (99 lb 10.4 oz)   06/17/22 47.8 kg (105 lb 6.1 oz)        Reason for Assessment  Reason For Assessment: consult  Diagnosis: cardiac disease  Relevant Medical History: AFib on Eliquis,HFpEF, hyperlipidemia, hypertension, COPD,  and CKD 3  Interdisciplinary Rounds: did not attend    Medications:Pertinent Medications Reviewed  Scheduled Meds:   albuterol-ipratropium  3 mL Nebulization Q6H WAKE    amLODIPine  5 mg Oral Daily    apixaban  2.5 mg Oral BID    atorvastatin  20 mg Oral Daily    chlorhexidine  15 mL Mouth/Throat BID    doxycycline  100 mg Oral Q12H    EScitalopram oxalate  10 mg Oral Daily    famotidine  20 mg Oral Daily    fluticasone propionate  1 spray Each Nostril Daily     furosemide  20 mg Oral Daily    levothyroxine  75 mcg Oral Daily    metoprolol tartrate  25 mg Oral BID    mupirocin   Nasal BID    potassium chloride  20 mEq Oral Daily    triamcinolone acetonide 0.1%   Topical (Top) BID     Continuous Infusions:  PRN Meds:.acetaminophen, diphenhydrAMINE-zinc acetate 1-0.1%, melatonin, metoprolol, naloxone, ondansetron, oxyCODONE, polyethylene glycol, potassium bicarbonate, potassium bicarbonate, potassium bicarbonate, simethicone, sodium chloride 0.9%    Labs: Pertinent Labs Reviewed  Clinical Chemistry:  Recent Labs   Lab 04/20/23 2004 04/26/23  0423   * 128*   K 4.7 4.0   CL 94* 92*   CO2 24 25   * 82   BUN 23 30*   CREATININE 1.0 0.8   CALCIUM 8.6* 9.6   PROT 6.0  --    ALBUMIN 3.5  --    BILITOT 1.0  --    ALKPHOS 82  --    AST 22  --    ALT 30  --    ANIONGAP 9 11   MG 1.8 2.0    < > = values in this interval not displayed.     CBC:   Recent Labs   Lab 04/26/23 0424   WBC 13.31*   RBC 4.10   HGB 10.1*   HCT 31.6*      MCV 77*   MCH 24.6*   MCHC 32.0     Lipid Panel:  No results for input(s): CHOL, HDL, LDLCALC, TRIG, CHOLHDL in the last 168 hours.  Cardiac Profile:  Recent Labs   Lab 04/20/23 2004 04/22/23  1443   BNP 1,103* 982*     Inflammatory Labs:  No results for input(s): CRP in the last 168 hours.  Diabetes:  Recent Labs   Lab 04/21/23 0451   HGBA1C 6.6*     Thyroid & Parathyroid:  Recent Labs   Lab 04/20/23 2004   TSH 2.410     Monitor and Evaluation  Food and Nutrient Intake: energy intake  Food and Nutrient Adminstration: diet order  Knowledge/Beliefs/Attitudes: food and nutrition knowledge/skill  Physical Activity and Function: nutrition-related ADLs and IADLs  Anthropometric Measurements: weight, weight change, body mass index  Biochemical Data, Medical Tests and Procedures: electrolyte and renal panel, gastrointestinal profile, glucose/endocrine profile, inflammatory profile, lipid profile  Nutrition-Focused Physical Findings: overall  appearance     Nutrition Risk  Level of Risk/Frequency of Follow-up: moderate     Nutrition Follow-Up  RD Follow-up?: Yes    Sommer Candelaria RD 04/26/2023 9:08 AM

## 2023-04-26 NOTE — CARE UPDATE
04/25/23 2013   Patient Assessment/Suction   Level of Consciousness (AVPU) alert   Respiratory Effort Unlabored   Expansion/Accessory Muscles/Retractions no use of accessory muscles   All Lung Fields Breath Sounds diminished   Rhythm/Pattern, Respiratory unlabored   Cough Frequency infrequent   Cough Type nonproductive   PRE-TX-O2   Device (Oxygen Therapy) high flow nasal cannula   Flow (L/min) 5   SpO2 (!) 94 %   Pulse Oximetry Type Continuous   $ Pulse Oximetry - Multiple Charge Pulse Oximetry - Multiple   Pulse 87   Resp 18   Aerosol Therapy   $ Aerosol Therapy Charges Aerosol Treatment   Daily Review of Necessity (SVN) completed   Respiratory Treatment Status (SVN) given   Treatment Route (SVN) mask;oxygen   Patient Position (SVN) HOB elevated   Post Treatment Assessment (SVN) breath sounds improved   Signs of Intolerance (SVN) none   Breath Sounds Post-Respiratory Treatment   Throughout All Fields Post-Treatment All Fields   Throughout All Fields Post-Treatment aeration increased   Post-treatment Heart Rate (beats/min) 82   Post-treatment Resp Rate (breaths/min) 18

## 2023-04-27 VITALS
OXYGEN SATURATION: 99 % | SYSTOLIC BLOOD PRESSURE: 136 MMHG | TEMPERATURE: 99 F | BODY MASS INDEX: 20.66 KG/M2 | DIASTOLIC BLOOD PRESSURE: 70 MMHG | WEIGHT: 105.25 LBS | HEART RATE: 83 BPM | HEIGHT: 60 IN | RESPIRATION RATE: 18 BRPM

## 2023-04-27 LAB
ANION GAP SERPL CALC-SCNC: 10 MMOL/L (ref 8–16)
BASOPHILS # BLD AUTO: 0.01 K/UL (ref 0–0.2)
BASOPHILS NFR BLD: 0.1 % (ref 0–1.9)
BUN SERPL-MCNC: 23 MG/DL (ref 8–23)
CALCIUM SERPL-MCNC: 9.3 MG/DL (ref 8.7–10.5)
CHLORIDE SERPL-SCNC: 91 MMOL/L (ref 95–110)
CO2 SERPL-SCNC: 27 MMOL/L (ref 23–29)
CREAT SERPL-MCNC: 0.7 MG/DL (ref 0.5–1.4)
DIFFERENTIAL METHOD: ABNORMAL
EOSINOPHIL # BLD AUTO: 0 K/UL (ref 0–0.5)
EOSINOPHIL NFR BLD: 0.3 % (ref 0–8)
ERYTHROCYTE [DISTWIDTH] IN BLOOD BY AUTOMATED COUNT: 19.9 % (ref 11.5–14.5)
EST. GFR  (NO RACE VARIABLE): >60 ML/MIN/1.73 M^2
GLUCOSE SERPL-MCNC: 83 MG/DL (ref 70–110)
HCT VFR BLD AUTO: 33.1 % (ref 37–48.5)
HGB BLD-MCNC: 10.5 G/DL (ref 12–16)
IMM GRANULOCYTES # BLD AUTO: 0.08 K/UL (ref 0–0.04)
IMM GRANULOCYTES NFR BLD AUTO: 0.6 % (ref 0–0.5)
LYMPHOCYTES # BLD AUTO: 0.9 K/UL (ref 1–4.8)
LYMPHOCYTES NFR BLD: 7.2 % (ref 18–48)
MAGNESIUM SERPL-MCNC: 2 MG/DL (ref 1.6–2.6)
MCH RBC QN AUTO: 24.4 PG (ref 27–31)
MCHC RBC AUTO-ENTMCNC: 31.7 G/DL (ref 32–36)
MCV RBC AUTO: 77 FL (ref 82–98)
MONOCYTES # BLD AUTO: 1.4 K/UL (ref 0.3–1)
MONOCYTES NFR BLD: 11.2 % (ref 4–15)
NEUTROPHILS # BLD AUTO: 10.4 K/UL (ref 1.8–7.7)
NEUTROPHILS NFR BLD: 80.6 % (ref 38–73)
NRBC BLD-RTO: 0 /100 WBC
PLATELET # BLD AUTO: 362 K/UL (ref 150–450)
PMV BLD AUTO: 8.5 FL (ref 9.2–12.9)
POTASSIUM SERPL-SCNC: 3.7 MMOL/L (ref 3.5–5.1)
RBC # BLD AUTO: 4.31 M/UL (ref 4–5.4)
SODIUM SERPL-SCNC: 128 MMOL/L (ref 136–145)
WBC # BLD AUTO: 12.87 K/UL (ref 3.9–12.7)

## 2023-04-27 PROCEDURE — 94660 CPAP INITIATION&MGMT: CPT

## 2023-04-27 PROCEDURE — 63600175 PHARM REV CODE 636 W HCPCS: Performed by: HOSPITALIST

## 2023-04-27 PROCEDURE — 25000003 PHARM REV CODE 250: Performed by: HOSPITALIST

## 2023-04-27 PROCEDURE — 36415 COLL VENOUS BLD VENIPUNCTURE: CPT | Performed by: HOSPITALIST

## 2023-04-27 PROCEDURE — 85025 COMPLETE CBC W/AUTO DIFF WBC: CPT | Performed by: HOSPITALIST

## 2023-04-27 PROCEDURE — 27000221 HC OXYGEN, UP TO 24 HOURS

## 2023-04-27 PROCEDURE — 80048 BASIC METABOLIC PNL TOTAL CA: CPT | Performed by: HOSPITALIST

## 2023-04-27 PROCEDURE — 94761 N-INVAS EAR/PLS OXIMETRY MLT: CPT

## 2023-04-27 PROCEDURE — 83735 ASSAY OF MAGNESIUM: CPT | Performed by: HOSPITALIST

## 2023-04-27 PROCEDURE — 25000242 PHARM REV CODE 250 ALT 637 W/ HCPCS: Performed by: INTERNAL MEDICINE

## 2023-04-27 PROCEDURE — 99900031 HC PATIENT EDUCATION (STAT)

## 2023-04-27 PROCEDURE — 99900035 HC TECH TIME PER 15 MIN (STAT)

## 2023-04-27 PROCEDURE — 94640 AIRWAY INHALATION TREATMENT: CPT

## 2023-04-27 RX ORDER — DOXYCYCLINE 100 MG/1
100 CAPSULE ORAL EVERY 12 HOURS
Qty: 10 CAPSULE | Refills: 0 | Status: SHIPPED | OUTPATIENT
Start: 2023-04-27 | End: 2023-05-02

## 2023-04-27 RX ORDER — MINERAL OIL AND PETROLATUM 150; 830 MG/G; MG/G
OINTMENT OPHTHALMIC NIGHTLY
Qty: 1 EACH | Refills: 0 | Status: SHIPPED | OUTPATIENT
Start: 2023-04-27 | End: 2023-07-26

## 2023-04-27 RX ORDER — FUROSEMIDE 20 MG/1
20 TABLET ORAL DAILY
Qty: 30 TABLET | Refills: 11 | Status: SHIPPED | OUTPATIENT
Start: 2023-04-28 | End: 2024-04-27

## 2023-04-27 RX ADMIN — DOXYCYCLINE HYCLATE 100 MG: 100 CAPSULE ORAL at 08:04

## 2023-04-27 RX ADMIN — MUPIROCIN 2 G: 20 OINTMENT TOPICAL at 08:04

## 2023-04-27 RX ADMIN — IPRATROPIUM BROMIDE AND ALBUTEROL SULFATE 3 ML: .5; 3 SOLUTION RESPIRATORY (INHALATION) at 01:04

## 2023-04-27 RX ADMIN — POTASSIUM CHLORIDE 20 MEQ: 1500 TABLET, EXTENDED RELEASE ORAL at 08:04

## 2023-04-27 RX ADMIN — ONDANSETRON 4 MG: 2 INJECTION INTRAMUSCULAR; INTRAVENOUS at 05:04

## 2023-04-27 RX ADMIN — FAMOTIDINE 20 MG: 20 TABLET ORAL at 08:04

## 2023-04-27 RX ADMIN — FUROSEMIDE 20 MG: 20 TABLET ORAL at 08:04

## 2023-04-27 RX ADMIN — AMLODIPINE BESYLATE 5 MG: 5 TABLET ORAL at 08:04

## 2023-04-27 RX ADMIN — METOPROLOL TARTRATE 25 MG: 25 TABLET, FILM COATED ORAL at 08:04

## 2023-04-27 RX ADMIN — CHLORHEXIDINE GLUCONATE 15 ML: 1.2 RINSE ORAL at 08:04

## 2023-04-27 RX ADMIN — ESCITALOPRAM OXALATE 10 MG: 10 TABLET ORAL at 08:04

## 2023-04-27 RX ADMIN — IPRATROPIUM BROMIDE AND ALBUTEROL SULFATE 3 ML: .5; 3 SOLUTION RESPIRATORY (INHALATION) at 07:04

## 2023-04-27 RX ADMIN — APIXABAN 2.5 MG: 2.5 TABLET, FILM COATED ORAL at 08:04

## 2023-04-27 RX ADMIN — TRIAMCINOLONE ACETONIDE: 1 CREAM TOPICAL at 08:04

## 2023-04-27 RX ADMIN — OXYCODONE HYDROCHLORIDE 5 MG: 5 TABLET ORAL at 11:04

## 2023-04-27 NOTE — RESPIRATORY THERAPY
04/26/23 2027   Patient Assessment/Suction   Level of Consciousness (AVPU) alert   Respiratory Effort Normal;Unlabored   Expansion/Accessory Muscles/Retractions no retractions;no use of accessory muscles   All Lung Fields Breath Sounds Anterior:;Posterior:;Lateral:;clear;equal bilaterally   Rhythm/Pattern, Respiratory no shortness of breath reported;depth regular;pattern regular;unlabored   Cough Frequency no cough   Skin Integrity   $ Wound Care Tech Time 15 min   Area Observed Cheek;Bridge of nose;Upper lip;Nares   Skin Appearance without discoloration   PRE-TX-O2   Device (Oxygen Therapy) nasal cannula   $ Is the patient on Low Flow Oxygen? Yes   Flow (L/min) 4   SpO2 96 %   Pulse Oximetry Type Continuous   $ Pulse Oximetry - Multiple Charge Pulse Oximetry - Multiple   Pulse 96   Resp 16   Aerosol Therapy   $ Aerosol Therapy Charges Aerosol Treatment   Daily Review of Necessity (SVN) completed   Respiratory Treatment Status (SVN) given   Treatment Route (SVN) mask;oxygen   Patient Position (SVN) HOB elevated   Post Treatment Assessment (SVN) increased aeration   Signs of Intolerance (SVN) none   Breath Sounds Post-Respiratory Treatment   Throughout All Fields Post-Treatment All Fields   Throughout All Fields Post-Treatment aeration increased   Post-treatment Heart Rate (beats/min) 88   Post-treatment Resp Rate (breaths/min) 18

## 2023-04-27 NOTE — PLAN OF CARE
Per liaison Ada with [facility], patient is set to return under [facility] care.    RN notified via secure chat to call report to [phone].    Facility is set to provide transportation for patient       04/27/23 3451   Final Note   Assessment Type Final Discharge Note   Anticipated Discharge Disposition [facility]   What phone number can be called within the next 1-3 days to see how you are doing after discharge? [phone]   Post-Acute Status   Post-Acute Authorization Placement   Post-Acute Placement Status Set-up Complete/Auth obtained   Discharge Delays (!) Ambulance Transport/Facility Transport

## 2023-04-27 NOTE — PLAN OF CARE
Problem: Adult Inpatient Plan of Care  Goal: Plan of Care Review  Outcome: Met  Goal: Patient-Specific Goal (Individualized)  Outcome: Met  Goal: Absence of Hospital-Acquired Illness or Injury  Outcome: Met  Goal: Optimal Comfort and Wellbeing  Outcome: Met  Goal: Readiness for Transition of Care  Outcome: Met     Problem: Fall Injury Risk  Goal: Absence of Fall and Fall-Related Injury  Outcome: Met     Problem: Skin Injury Risk Increased  Goal: Skin Health and Integrity  Outcome: Met     Problem: Impaired Wound Healing  Goal: Optimal Wound Healing  Outcome: Met     Problem: Oral Intake Inadequate  Goal: Improved Oral Intake  Outcome: Met     Problem: Infection  Goal: Absence of Infection Signs and Symptoms  Outcome: Met

## 2023-04-27 NOTE — CONSULTS
2x8x2.5x0.1cm healing right lower leg ulcer small amount of dry drainage on dressing.  Cleaned and dried and applied Medihoney, covered with foam and tegaderm.  Wound bed is red, periwound and foot is red.  Patient sitting up in chair, no breaks in heel or bilateral buttock.

## 2023-04-27 NOTE — PLAN OF CARE
called Ada (Karla, [phone]). Per Ada, Pt able to be accepted back today 4/27. DC orders and AVS sent via [employer].         04/27/23 1128   Post-Acute Status   Post-Acute Authorization Placement   Post-Acute Placement Status Set-up Complete/Auth obtained   Coverage Payor:  [employer] MANAGED MEDICARE - [facility] PPO SPECIAL NEEDS   Discharge Delays None known at this time   Discharge Plan   Discharge Plan A Return to nursing home   Discharge Plan B Return to Nursing Home

## 2023-04-27 NOTE — DISCHARGE INSTRUCTIONS
Start taking lasix 20 mg every day (previously took every other day)  Complete doxycycline course, 5 more days  Low salt diet  Fluid restrict to 1500 ml

## 2023-04-27 NOTE — CARE UPDATE
04/27/23 0711   Patient Assessment/Suction   Level of Consciousness (AVPU) alert   Respiratory Effort Normal;Unlabored   Expansion/Accessory Muscles/Retractions no use of accessory muscles;no retractions;expansion symmetric   All Lung Fields Breath Sounds diminished   Rhythm/Pattern, Respiratory unlabored;pattern regular;depth regular;chest wiggle adequate;no shortness of breath reported   Cough Frequency no cough   Skin Integrity   $ Wound Care Tech Time 15 min   Area Observed Bridge of nose   Skin Appearance without discoloration   Barrier used? Gel Cushion   PRE-TX-O2   Device (Oxygen Therapy) high flow nasal cannula   $ Is the patient on Low Flow Oxygen? Yes   Flow (L/min) 5   SpO2 97 %   Pulse Oximetry Type Continuous   $ Pulse Oximetry - Multiple Charge Pulse Oximetry - Multiple   Pulse 78   Resp 18   Aerosol Therapy   $ Aerosol Therapy Charges Aerosol Treatment   Daily Review of Necessity (SVN) completed   Respiratory Treatment Status (SVN) given   Treatment Route (SVN) oxygen;mask   Patient Position (SVN) HOB elevated   Post Treatment Assessment (SVN) increased aeration   Signs of Intolerance (SVN) none   Breath Sounds Post-Respiratory Treatment   Throughout All Fields Post-Treatment All Fields   Throughout All Fields Post-Treatment aeration increased   Post-treatment Heart Rate (beats/min) 85   Post-treatment Resp Rate (breaths/min) 18   Preset CPAP/BiPAP Settings   Mode Of Delivery Standby;BiPAP   $ CPAP/BiPAP Daily Charge BiPAP/CPAP Daily   Equipment Type V60   Education   $ Education Bronchodilator;15 min   Respiratory Evaluation   $ Care Plan Tech Time 15 min        Anesthesia Volume In Cc: 0.8

## 2023-04-27 NOTE — PROGRESS NOTES
[facility] Medicine  Progress Note    Patient name: Jeannie Hutchins  MRN: [id number]  Admit Date: 4/20/2023   LOS: 5 days     SUBJECTIVE:     Principal problem: Acute on chronic diastolic congestive heart failure    Interval History:      4/26- patient is up to bedside chair eating breakfast, attempting to wean oxygen.  Currently on 5LHFNC.  Plan is to discharge back to NH when medically ready.  Na is down to 128.  Will recheck and fluid restrict.      4/25- Patient was seen and examined bedside, clinically improving, upon my entering the room patient was not wearing oxygen and her oxygen saturation was in 70s, I had to put her back on oxygen up to 7 LPM high-flow nasal cannula to bring her to SpO2 of 88%.  Otherwise denies any new symptoms, no acute events overnight as per nursing staff    Scheduled Meds:   albuterol-ipratropium  3 mL Nebulization Q6H WAKE    amLODIPine  5 mg Oral Daily    apixaban  2.5 mg Oral BID    atorvastatin  20 mg Oral Daily    chlorhexidine  15 mL Mouth/Throat BID    doxycycline  100 mg Oral Q12H    EScitalopram oxalate  10 mg Oral Daily    famotidine  20 mg Oral Daily    fluticasone propionate  1 spray Each Nostril Daily    furosemide  20 mg Oral Daily    levothyroxine  75 mcg Oral Daily    metoprolol tartrate  25 mg Oral BID    mupirocin   Nasal BID    potassium chloride  20 mEq Oral Daily    triamcinolone acetonide 0.1%   Topical (Top) BID     Continuous Infusions:  PRN Meds:acetaminophen, diphenhydrAMINE-zinc acetate 1-0.1%, melatonin, metoprolol, naloxone, ondansetron, oxyCODONE, polyethylene glycol, potassium bicarbonate, potassium bicarbonate, potassium bicarbonate, simethicone, sodium chloride 0.9%    Review of patient's allergies indicates:   Allergen Reactions    [place] Swelling     Tongue swells up and a generalized rash.  Patient reports allergy to all Berries    Hydrochlorothiazide Other (See Comments)     hyponatremia    Lisinopril Other (See Comments)      Cough       Review of Systems: As per interval history    OBJECTIVE:     Vital Signs (Most Recent)  Temp: 97.2 °F (36.2 °C) (04/26/23 1724)  Pulse: 99 (04/26/23 1724)  Resp: 18 (04/26/23 1724)  BP: 139/66 (04/26/23 1724)  SpO2: 99 % (04/26/23 1724)    Vital Signs Range (Last 24H):  Temp:  [97.2 °F (36.2 °C)-98.5 °F (36.9 °C)]   Pulse:  [83-99]   Resp:  [17-20]   BP: (107-144)/(63-94)   SpO2:  [94 %-99 %]     I & O (Last 24H):  Intake/Output Summary (Last 24 hours) at 4/26/2023 1905  Last data filed at 4/26/2023 1800  Gross per 24 hour   Intake 100 ml   Output 300 ml   Net -200 ml         Physical Exam:  General:  no acute distress   Eyes: No conjunctival injection. No scleral icterus.  ENT:  hard of hearing. No discharge from ears. No nasal discharge.   Neck: Supple, trachea midline. No JVD  CVS: RRR. No LE edema BL  Lungs:  On supplemental oxygen, bilateral crackles noted   Abdomen:  Soft, nontender and nondistended.  No organomegaly  Neuro: AOx3. Moves all extremities. Follows commands. Responds appropriately       Laboratory:  I have reviewed all pertinent lab results within the past 24 hours.    Diagnostic Results:  Reviewed all imaging    ASSESSMENT/PLAN:     88-year-old nursing home resident with history of AFib on Eliquis, HFpEF, hypertension, CKD 3, COPD being treated for acute on chronic diastolic heart failure and cellulitis of leg    Active Hospital Problems    Diagnosis  POA    *Acute on chronic diastolic congestive heart failure [I50.33]  Yes    Cellulitis of leg [L03.119]  Yes    Hypothyroid [E03.9]  Yes    Debility [R53.81]  Yes    Acute on chronic respiratory failure with hypoxia [J96.21]  Yes    Longstanding persistent atrial fibrillation [I48.11]  Yes    Essential hypertension [I10]  Yes    Hyponatremia [E87.1]  Yes     Differential:  CHF - bnp is 400+;  cxr shows normal heart size, no pulm fluid - will check echo  Cirrhosis - AST at upper normal, otherwise LFTs in normal range; check liver  US  Adrenal insuff - would expect hyperkalemia and hypotension - not present;  Check am cortisol  SIADH - ?  Source?  HCTZ - not taking  Renal failure - no  Polydipsia - no        Resolved Hospital Problems   No resolved problems to display.         Plan:     Clinically improving, transferred out of ICU on 04/24.    Still req 5L HFNC  Continue diuresis, wean oxygen as tolerated  Continue doxycycline for lower extremity cellulitis.    Stop ceftrx  Continue lasix 20 mg po daily  Continue nebs, steroids, doxycycline  Continue metoprolol, Eliquis for Afib  Help from Pulmonary appreciated  PT, OT      VTE Risk Mitigation (From admission, onward)           Ordered     apixaban tablet 2.5 mg  2 times daily         04/20/23 2304     Reason for No Pharmacological VTE Prophylaxis  Once        Question:  Reasons:  Answer:  Already adequately anticoagulated on oral Anticoagulants    04/20/23 2206     IP VTE HIGH RISK PATIENT  Once         04/20/23 2206     Place sequential compression device  Until discontinued         04/20/23 2206                        Department Hospital Medicine  [facility]  Cristian Baker MD  Date of service: 04/26/2023

## 2023-05-04 NOTE — DISCHARGE SUMMARY
"[facility]  Discharge Summary  Patient Name: Jeannie Hutchins MRN: [id number]   Patient Class: IP- Inpatient  Length of Stay: 6   Admission Date: 4/20/2023  7:32 PM Attending Physician: Cristian Baker MD   Primary Care Provider: Primary Doctor No Face-to-Face encounter date: 4/27/23   Chief Complaint: Shortness of Breath (EMS reports "patient was found short of breath" reports "oxygen line seemed to be kinked and not working properly" pt O2 dep / conversing approp with staff upon arrival to ED . No distress noted / pt conversing with staff)    Date of Discharge: 4/27/23  Discharge Disposition:Intermediate Care Facili*   Condition: Stable       Reason for Hospitalization     Active Hospital Problems    Diagnosis    *Acute on chronic diastolic congestive heart failure    Cellulitis of leg    Hypothyroid    Debility    Acute on chronic respiratory failure with hypoxia    Longstanding persistent atrial fibrillation    Essential hypertension    Hyponatremia     Differential:  CHF - bnp is 400+;  cxr shows normal heart size, no pulm fluid - will check echo  Cirrhosis - AST at upper normal, otherwise LFTs in normal range; check liver US  Adrenal insuff - would expect hyperkalemia and hypotension - not present;  Check am cortisol  SIADH - ?  Source?  HCTZ - not taking  Renal failure - no  Polydipsia - no             Brief History of Present Illness   87-year-old female with chronic medical problems including AFib on Eliquis,HFpEF, hyperlipidemia, hypertension, COPD,  and CKD 3 who presents to the emergency room complaining shortness of breath.  Patient states that she usually uses 2 L nasal cannula O2 but for the past 2 weeks she is had to turn it up to 3 L. she said she can usually get around with minimal shortness of breath but she is had to stop and rest on her way to the dining room.  Yesterday she was so weak she had to get someone to come help her get dressed.  She also complains of a dry cough for 1 " month and some sinus congestion at nighttime.  She also has had some occasional chest pain on the left side of her chest that kind of sharp and quickly resolves on its own.  She has not noticed that it is with activity or anything.  When EMS was called her O2 sat was 60% on room air and her O2 tubing was kinked.  Her O2 sat would not go up on her nasal cannula O2 and it improved on a non-rebreather.  She denies any fevers or chills, wheezing, productive cough, diarrhea or constipation.  She does have a wound to her right lower extremity and she said she hit right lower leg on a wheelchair and slowly got worse.  She said the wound care nurse at the nursing home has been taking care of it.       For the full HPI please refer to the History & Physical from this admission.    Hospital Course By Problem with Pertinent Findings     In the ED, BNP elevated at 1103, troponin 7.8, sodium 127, potassium 4.7, magnesium 1.8, BUN/CR 23/1.0, glucose 123, lactic acid 1 3, WBC 11.50, platelets 412, BUN/CR 9.5/30.6, temperature was 98°, rate , blood pressure 129/64, respiratory rate in the 20s, O2 sat 87-96% on 8 L. twelve lead EKG with atrial fibrillation with ventricular rate 77 and .  Chest x-ray read is pending but appears to some cardiomegaly and heart failure along with chronic changes on previous x-rays.  She was given 500 mg IV azithromycin, 1 g ceftriaxone and 20 mg IV Lasix.  She will be admitted to the hospitalist service for further evaluation and treatment.    Clinically improving, transferred out of ICU on 04/24.    Able to wean HFNC  Continue diuresis, wean oxygen as tolerated  Continue doxycycline for lower extremity cellulitis.    Stop ceftrx  Continue lasix 20 mg po daily  Continue nebs, steroids, doxycycline  Continue metoprolol, Eliquis for Afib  Help from Pulmonary appreciated  PT, OT  discharge      Patient was seen and examined on the date of discharge and determined to be suitable for  discharge.    Physical Exam  /70   Pulse 83   Temp 98.5 °F (36.9 °C) (Oral)   Resp 18   Ht 5' (1.524 m)   Wt 47.7 kg (105 lb 3.6 oz)   SpO2 99%   BMI 20.55 kg/m²   Vitals reviewed.    Constitutional: No distress.   HENT: Atraumatic.   Cardiovascular: Normal rate, regular rhythm.  S1 S2.    Pulmonary/Chest: Effort normal. Clear to auscultation bilaterally. No wheezes.   Abdominal: Soft. Bowel sounds are normal. Exhibits no distension and no mass. No tenderness  Neurological: Alert.   Skin: Skin is warm and dry.     Following labs were Reviewed   No results for input(s): WBC, HGB, HCT, PLT, GLUCOSE, CALCIUM, ALBUMIN, PROT, NA, K, CO2, CL, BUN, CREATININE, ALKPHOS, ALT, AST, BILITOT in the last 24 hours.  No results found for: POCTGLUCOSE     All labs within the past 24 hours have been reviewed    Microbiology Results (last 7 days)       Procedure Component Value Units Date/Time    Blood culture #2 **CANNOT BE ORDERED STAT** [[id number]] Collected: 04/20/23 2034    Order Status: Completed Specimen: Blood from Peripheral, Antecubital, Left Updated: 04/25/23 2232     Blood Culture, Routine No growth after 5 days.    Blood culture #1 **CANNOT BE ORDERED STAT** [[id number]] Collected: 04/20/23 2034    Order Status: Completed Specimen: Blood from Peripheral, Antecubital, Right Updated: 04/25/23 2232     Blood Culture, Routine No growth after 5 days.          X-Ray Chest AP Portable   Final Result      X-Ray Chest 1 View   ED Interpretation   Abnormal   Cardiomegaly, pulmonary edema versus multilobar infiltrates.      Final Result          No results found for this or any previous visit.      Consultants and Procedures   Consultants:  Consults (From admission, onward)          Status Ordering Provider     Inpatient consult to Registered Dietitian/Nutritionist  Once        Provider:  (Not yet assigned)    Completed GUILLERMINA POST     Inpatient consult to Pulmonology  Once        Provider:  Gregory HUNTER  MD Surya    Completed GUILLERMINA POST     Inpatient consult to Registered Dietitian/Nutritionist  Once        Provider:  (Not yet assigned)    Completed LAUREL JORDAN            Procedures:   * No surgery found *     Discharge Information:   Diet:  Resume Cardiac diet/Diabetic Diet    Physical Activity:  Activity as tolerated    Instructions:  1. Take all medications as prescribed  2. Keep all follow-up appointments  3. Return to the hospital or call your primary care physicians if any worsening symptoms such as chest pain, shortness of breath, bleeding,  intractable pain, fever >101 occur.      Follow-Up Appointments:  Please call your primary care physician to schedule an appointment in 1 week time.     Follow-up Information       Primary Doctor No Follow up in 1 week(s).                               Pending laboratory work/Tests to be performed/followed by the Primary care Physician:    The patient was discharged with discharge instructions reviewed in written and verbal form. All questions were answered and prescriptions were provided. The importance of making follow up appointments and compliance of medications has been emphasized. The patient will follow up in 1 week or sooner as needed with the PCP. Tthe patient understands and agrees with the plan. Upon discharge, patient needs to be on following medications.    Discharge Medications:     Medication List        START taking these medications      ARTIFICIAL TEARS (NELSON/MIN) 83-15 % Oint  Generic drug: white petrolatum-mineral oiL  Place into both eyes every evening.            CHANGE how you take these medications      furosemide 20 MG tablet  Commonly known as: LASIX  Take 1 tablet (20 mg total) by mouth once daily.  What changed:   medication strength  how much to take            CONTINUE taking these medications      acetaminophen 500 MG tablet  Commonly known as: TYLENOL  Take 1 tablet (500 mg total) by mouth every 6 (six) hours as needed  for Pain or Temperature greater than (100).     albuterol-ipratropium 2.5 mg-0.5 mg/3 mL nebulizer solution  Commonly known as: DUO-NEB  Take 3 mLs by nebulization every 6 (six) hours. Rescue     alendronate 70 MG tablet  Commonly known as: FOSAMAX     amLODIPine 5 MG tablet  Commonly known as: NORVASC  Take 1 tablet (5 mg total) by mouth once daily.     apixaban 2.5 mg Tab  Commonly known as: ELIQUIS  Take 1 tablet (2.5 mg total) by mouth 2 (two) times daily.     atorvastatin 20 MG tablet  Commonly known as: LIPITOR     EScitalopram oxalate 10 MG tablet  Commonly known as: LEXAPRO     famotidine 20 MG tablet  Commonly known as: PEPCID     fluticasone propionate 50 mcg/actuation nasal spray  Commonly known as: FLONASE     levothyroxine 75 MCG tablet  Commonly known as: SYNTHROID     loratadine 10 mg tablet  Commonly known as: CLARITIN     metoprolol tartrate 25 MG tablet  Commonly known as: LOPRESSOR     potassium chloride 20 mEq  Commonly known as: K-TAB     PRESERVISION AREDS-2 250-90-40-1 mg Cap  Generic drug: vit C,B-Hc-czmim-lutein-zeaxan     traZODone 50 MG tablet  Commonly known as: DESYREL  Take 1 tablet (50 mg total) by mouth every evening.            STOP taking these medications      olopatadine 0.1 % ophthalmic solution  Commonly known as: PATANOL     THERATEARS 0.25 % Dpet  Generic drug: carboxymethylcellulose sodium            ASK your doctor about these medications      doxycycline 100 MG Cap  Commonly known as: VIBRAMYCIN  Take 1 capsule (100 mg total) by mouth every 12 (twelve) hours. for 5 days  Ask about: Should I take this medication?               Where to Get Your Medications        These medications were sent to [facility] [id number] [address]      Hours: Mon-Fri, 8a-5:30p Phone: [phone]   ARTIFICIAL TEARS (NELSON/MIN) 83-15 % Oint  doxycycline 100 MG Cap  furosemide 20 MG tablet           I spent 31   minutes preparing the discharge for this  patient including reviewing records from previous encounters, preparation of discharge summary, assessing and final examination of the patient, discharge medicine reconciliation, discussing plan of care, follow up and education and prescriptions.       Cristian Baker  [place] [facility]ist

## 2023-05-30 ENCOUNTER — HOSPITAL ENCOUNTER (INPATIENT)
Facility: HOSPITAL | Age: 88
LOS: 13 days | Discharge: HOSPICE | DRG: 871 | End: 2023-06-12
Attending: EMERGENCY MEDICINE | Admitting: INTERNAL MEDICINE
Payer: MEDICARE

## 2023-05-30 DIAGNOSIS — A41.9 SEPSIS: ICD-10-CM

## 2023-05-30 DIAGNOSIS — S72.001S CLOSED FRACTURE OF RIGHT HIP, SEQUELA: ICD-10-CM

## 2023-05-30 DIAGNOSIS — I50.9 CONGESTIVE HEART FAILURE, UNSPECIFIED HF CHRONICITY, UNSPECIFIED HEART FAILURE TYPE: ICD-10-CM

## 2023-05-30 DIAGNOSIS — B95.62 MRSA BACTEREMIA: ICD-10-CM

## 2023-05-30 DIAGNOSIS — I48.91 ATRIAL FIBRILLATION: ICD-10-CM

## 2023-05-30 DIAGNOSIS — R78.81 BACTEREMIA: ICD-10-CM

## 2023-05-30 DIAGNOSIS — B37.49 CANDIDURIA: ICD-10-CM

## 2023-05-30 DIAGNOSIS — R78.81 MRSA BACTEREMIA: ICD-10-CM

## 2023-05-30 DIAGNOSIS — N39.0 URINARY TRACT INFECTION WITHOUT HEMATURIA, SITE UNSPECIFIED: ICD-10-CM

## 2023-05-30 DIAGNOSIS — R07.9 CHEST PAIN: ICD-10-CM

## 2023-05-30 DIAGNOSIS — R00.0 TACHYCARDIA: ICD-10-CM

## 2023-05-30 DIAGNOSIS — I48.0 PAROXYSMAL ATRIAL FIBRILLATION: Primary | ICD-10-CM

## 2023-05-30 DIAGNOSIS — I48.91 ATRIAL FIBRILLATION WITH RVR: Chronic | ICD-10-CM

## 2023-05-30 DIAGNOSIS — A41.02 SEPSIS DUE TO METHICILLIN RESISTANT STAPHYLOCOCCUS AUREUS (MRSA), UNSPECIFIED WHETHER ACUTE ORGAN DYSFUNCTION PRESENT: ICD-10-CM

## 2023-05-30 PROBLEM — R79.89 TROPONIN I ABOVE REFERENCE RANGE: Status: ACTIVE | Noted: 2023-05-30

## 2023-05-30 PROBLEM — E03.9 HYPOTHYROIDISM: Chronic | Status: ACTIVE | Noted: 2023-05-30

## 2023-05-30 PROBLEM — L97.919 ULCER OF RIGHT LEG: Chronic | Status: ACTIVE | Noted: 2023-05-30

## 2023-05-30 PROBLEM — D64.9 ANEMIA: Chronic | Status: ACTIVE | Noted: 2023-05-30

## 2023-05-30 PROBLEM — Z66 DNR (DO NOT RESUSCITATE): Chronic | Status: ACTIVE | Noted: 2023-05-30

## 2023-05-30 PROBLEM — J96.20 ACUTE ON CHRONIC RESPIRATORY FAILURE: Status: ACTIVE | Noted: 2023-05-30

## 2023-05-30 LAB
ACANTHOCYTES BLD QL SMEAR: PRESENT
ALBUMIN SERPL BCP-MCNC: 3.4 G/DL (ref 3.5–5.2)
ALP SERPL-CCNC: 96 U/L (ref 55–135)
ALT SERPL W/O P-5'-P-CCNC: 39 U/L (ref 10–44)
ANION GAP SERPL CALC-SCNC: 12 MMOL/L (ref 8–16)
ANISOCYTOSIS BLD QL SMEAR: SLIGHT
AST SERPL-CCNC: 24 U/L (ref 10–40)
BACTERIA #/AREA URNS HPF: NEGATIVE /HPF
BASOPHILS # BLD AUTO: 0.04 K/UL (ref 0–0.2)
BASOPHILS NFR BLD: 0.1 % (ref 0–1.9)
BILIRUB SERPL-MCNC: 1.8 MG/DL (ref 0.1–1)
BILIRUB UR QL STRIP: NEGATIVE
BNP SERPL-MCNC: 1905 PG/ML (ref 0–99)
BUN SERPL-MCNC: 18 MG/DL (ref 8–23)
CALCIUM SERPL-MCNC: 8.5 MG/DL (ref 8.7–10.5)
CHLORIDE SERPL-SCNC: 98 MMOL/L (ref 95–110)
CLARITY UR: ABNORMAL
CO2 SERPL-SCNC: 24 MMOL/L (ref 23–29)
COLOR UR: YELLOW
CREAT SERPL-MCNC: 0.9 MG/DL (ref 0.5–1.4)
DIFFERENTIAL METHOD: ABNORMAL
EOSINOPHIL # BLD AUTO: 0 K/UL (ref 0–0.5)
EOSINOPHIL NFR BLD: 0 % (ref 0–8)
ERYTHROCYTE [DISTWIDTH] IN BLOOD BY AUTOMATED COUNT: 25.7 % (ref 11.5–14.5)
EST. GFR  (NO RACE VARIABLE): >60 ML/MIN/1.73 M^2
FOLATE SERPL-MCNC: 11.5 NG/ML (ref 4–24)
GLUCOSE SERPL-MCNC: 174 MG/DL (ref 70–110)
GLUCOSE UR QL STRIP: NEGATIVE
HCT VFR BLD AUTO: 32.8 % (ref 37–48.5)
HGB BLD-MCNC: 10.3 G/DL (ref 12–16)
HGB UR QL STRIP: ABNORMAL
HYALINE CASTS #/AREA URNS LPF: 18 /LPF
IMM GRANULOCYTES # BLD AUTO: 0.28 K/UL (ref 0–0.04)
IMM GRANULOCYTES NFR BLD AUTO: 1 % (ref 0–0.5)
INFLUENZA A, MOLECULAR: NEGATIVE
INFLUENZA B, MOLECULAR: NEGATIVE
KETONES UR QL STRIP: NEGATIVE
LACTATE SERPL-SCNC: 1.6 MMOL/L (ref 0.5–1.9)
LEUKOCYTE ESTERASE UR QL STRIP: ABNORMAL
LYMPHOCYTES # BLD AUTO: 0.6 K/UL (ref 1–4.8)
LYMPHOCYTES NFR BLD: 2.3 % (ref 18–48)
MAGNESIUM SERPL-MCNC: 1.8 MG/DL (ref 1.6–2.6)
MCH RBC QN AUTO: 26.2 PG (ref 27–31)
MCHC RBC AUTO-ENTMCNC: 31.4 G/DL (ref 32–36)
MCV RBC AUTO: 84 FL (ref 82–98)
MICROSCOPIC COMMENT: ABNORMAL
MONOCYTES # BLD AUTO: 1.4 K/UL (ref 0.3–1)
MONOCYTES NFR BLD: 5 % (ref 4–15)
MRSA SCREEN BY PCR: NEGATIVE
NEUTROPHILS # BLD AUTO: 25 K/UL (ref 1.8–7.7)
NEUTROPHILS NFR BLD: 91.6 % (ref 38–73)
NITRITE UR QL STRIP: NEGATIVE
NRBC BLD-RTO: 0 /100 WBC
PH UR STRIP: 6 [PH] (ref 5–8)
PLATELET # BLD AUTO: 292 K/UL (ref 150–450)
PLATELET BLD QL SMEAR: ABNORMAL
PMV BLD AUTO: 9.6 FL (ref 9.2–12.9)
POIKILOCYTOSIS BLD QL SMEAR: SLIGHT
POLYCHROMASIA BLD QL SMEAR: ABNORMAL
POTASSIUM SERPL-SCNC: 4.1 MMOL/L (ref 3.5–5.1)
PROT SERPL-MCNC: 6.6 G/DL (ref 6–8.4)
PROT UR QL STRIP: ABNORMAL
RBC # BLD AUTO: 3.93 M/UL (ref 4–5.4)
RBC #/AREA URNS HPF: 9 /HPF (ref 0–4)
SARS-COV-2 RDRP RESP QL NAA+PROBE: NEGATIVE
SODIUM SERPL-SCNC: 134 MMOL/L (ref 136–145)
SP GR UR STRIP: 1.01 (ref 1–1.03)
SPECIMEN SOURCE: NORMAL
SPHEROCYTES BLD QL SMEAR: ABNORMAL
SQUAMOUS #/AREA URNS HPF: 8 /HPF
TROPONIN I SERPL HS-MCNC: 20.2 PG/ML (ref 0–14.9)
TROPONIN I SERPL HS-MCNC: 26.3 PG/ML (ref 0–14.9)
TROPONIN I SERPL HS-MCNC: 34.7 PG/ML (ref 0–14.9)
TSH SERPL DL<=0.005 MIU/L-ACNC: 1.7 UIU/ML (ref 0.34–5.6)
URN SPEC COLLECT METH UR: ABNORMAL
UROBILINOGEN UR STRIP-ACNC: NEGATIVE EU/DL
VIT B12 SERPL-MCNC: 485 PG/ML (ref 210–950)
WBC # BLD AUTO: 27.32 K/UL (ref 3.9–12.7)
WBC #/AREA URNS HPF: 63 /HPF (ref 0–5)
YEAST URNS QL MICRO: ABNORMAL

## 2023-05-30 PROCEDURE — 85025 COMPLETE CBC W/AUTO DIFF WBC: CPT | Performed by: EMERGENCY MEDICINE

## 2023-05-30 PROCEDURE — 83880 ASSAY OF NATRIURETIC PEPTIDE: CPT | Performed by: EMERGENCY MEDICINE

## 2023-05-30 PROCEDURE — 80053 COMPREHEN METABOLIC PANEL: CPT | Performed by: EMERGENCY MEDICINE

## 2023-05-30 PROCEDURE — U0002 COVID-19 LAB TEST NON-CDC: HCPCS | Performed by: INTERNAL MEDICINE

## 2023-05-30 PROCEDURE — 36415 COLL VENOUS BLD VENIPUNCTURE: CPT | Performed by: EMERGENCY MEDICINE

## 2023-05-30 PROCEDURE — 25000003 PHARM REV CODE 250: Performed by: INTERNAL MEDICINE

## 2023-05-30 PROCEDURE — 93005 ELECTROCARDIOGRAM TRACING: CPT | Performed by: INTERNAL MEDICINE

## 2023-05-30 PROCEDURE — 21000000 HC CCU ICU ROOM CHARGE

## 2023-05-30 PROCEDURE — 82746 ASSAY OF FOLIC ACID SERUM: CPT | Performed by: EMERGENCY MEDICINE

## 2023-05-30 PROCEDURE — 84484 ASSAY OF TROPONIN QUANT: CPT | Performed by: EMERGENCY MEDICINE

## 2023-05-30 PROCEDURE — 99900035 HC TECH TIME PER 15 MIN (STAT)

## 2023-05-30 PROCEDURE — 87086 URINE CULTURE/COLONY COUNT: CPT | Performed by: EMERGENCY MEDICINE

## 2023-05-30 PROCEDURE — 96374 THER/PROPH/DIAG INJ IV PUSH: CPT

## 2023-05-30 PROCEDURE — 63600175 PHARM REV CODE 636 W HCPCS: Performed by: EMERGENCY MEDICINE

## 2023-05-30 PROCEDURE — 36415 COLL VENOUS BLD VENIPUNCTURE: CPT | Performed by: INTERNAL MEDICINE

## 2023-05-30 PROCEDURE — 81001 URINALYSIS AUTO W/SCOPE: CPT | Performed by: EMERGENCY MEDICINE

## 2023-05-30 PROCEDURE — 27000221 HC OXYGEN, UP TO 24 HOURS

## 2023-05-30 PROCEDURE — 93010 ELECTROCARDIOGRAM REPORT: CPT | Mod: ,,, | Performed by: INTERNAL MEDICINE

## 2023-05-30 PROCEDURE — 25000003 PHARM REV CODE 250: Performed by: EMERGENCY MEDICINE

## 2023-05-30 PROCEDURE — 87147 CULTURE TYPE IMMUNOLOGIC: CPT | Mod: 59 | Performed by: EMERGENCY MEDICINE

## 2023-05-30 PROCEDURE — 99285 EMERGENCY DEPT VISIT HI MDM: CPT | Mod: 25

## 2023-05-30 PROCEDURE — 87040 BLOOD CULTURE FOR BACTERIA: CPT | Mod: 59 | Performed by: EMERGENCY MEDICINE

## 2023-05-30 PROCEDURE — 82607 VITAMIN B-12: CPT | Performed by: EMERGENCY MEDICINE

## 2023-05-30 PROCEDURE — 87106 FUNGI IDENTIFICATION YEAST: CPT | Performed by: EMERGENCY MEDICINE

## 2023-05-30 PROCEDURE — 87077 CULTURE AEROBIC IDENTIFY: CPT | Mod: 59 | Performed by: EMERGENCY MEDICINE

## 2023-05-30 PROCEDURE — 87641 MR-STAPH DNA AMP PROBE: CPT | Performed by: INTERNAL MEDICINE

## 2023-05-30 PROCEDURE — 93010 EKG 12-LEAD: ICD-10-PCS | Mod: ,,, | Performed by: INTERNAL MEDICINE

## 2023-05-30 PROCEDURE — 84443 ASSAY THYROID STIM HORMONE: CPT | Performed by: EMERGENCY MEDICINE

## 2023-05-30 PROCEDURE — 99900031 HC PATIENT EDUCATION (STAT)

## 2023-05-30 PROCEDURE — 63600175 PHARM REV CODE 636 W HCPCS: Performed by: INTERNAL MEDICINE

## 2023-05-30 PROCEDURE — 94761 N-INVAS EAR/PLS OXIMETRY MLT: CPT

## 2023-05-30 PROCEDURE — 87186 SC STD MICRODIL/AGAR DIL: CPT | Mod: 59 | Performed by: EMERGENCY MEDICINE

## 2023-05-30 PROCEDURE — 96375 TX/PRO/DX INJ NEW DRUG ADDON: CPT

## 2023-05-30 PROCEDURE — 87502 INFLUENZA DNA AMP PROBE: CPT | Performed by: INTERNAL MEDICINE

## 2023-05-30 PROCEDURE — 87154 CUL TYP ID BLD PTHGN 6+ TRGT: CPT | Performed by: EMERGENCY MEDICINE

## 2023-05-30 PROCEDURE — 83605 ASSAY OF LACTIC ACID: CPT | Performed by: EMERGENCY MEDICINE

## 2023-05-30 PROCEDURE — 84484 ASSAY OF TROPONIN QUANT: CPT | Mod: 91 | Performed by: INTERNAL MEDICINE

## 2023-05-30 PROCEDURE — 83735 ASSAY OF MAGNESIUM: CPT | Performed by: EMERGENCY MEDICINE

## 2023-05-30 PROCEDURE — 94799 UNLISTED PULMONARY SVC/PX: CPT

## 2023-05-30 RX ORDER — FAMOTIDINE 20 MG/1
20 TABLET, FILM COATED ORAL DAILY
Status: DISCONTINUED | OUTPATIENT
Start: 2023-05-30 | End: 2023-06-04

## 2023-05-30 RX ORDER — ACETAMINOPHEN 325 MG/1
650 TABLET ORAL EVERY 6 HOURS PRN
Status: DISCONTINUED | OUTPATIENT
Start: 2023-05-30 | End: 2023-06-12 | Stop reason: HOSPADM

## 2023-05-30 RX ORDER — ACETAMINOPHEN 10 MG/ML
1000 INJECTION, SOLUTION INTRAVENOUS ONCE
Status: DISCONTINUED | OUTPATIENT
Start: 2023-05-30 | End: 2023-05-30

## 2023-05-30 RX ORDER — SODIUM CHLORIDE 0.9 % (FLUSH) 0.9 %
10 SYRINGE (ML) INJECTION EVERY 6 HOURS PRN
Status: DISCONTINUED | OUTPATIENT
Start: 2023-05-30 | End: 2023-06-12 | Stop reason: HOSPADM

## 2023-05-30 RX ORDER — LEVOTHYROXINE SODIUM 25 UG/1
75 TABLET ORAL
Status: DISCONTINUED | OUTPATIENT
Start: 2023-05-31 | End: 2023-06-12

## 2023-05-30 RX ORDER — FLUCONAZOLE 2 MG/ML
100 INJECTION, SOLUTION INTRAVENOUS
Status: DISCONTINUED | OUTPATIENT
Start: 2023-05-30 | End: 2023-05-30 | Stop reason: SDUPTHER

## 2023-05-30 RX ORDER — SODIUM,POTASSIUM PHOSPHATES 280-250MG
2 POWDER IN PACKET (EA) ORAL
Status: DISCONTINUED | OUTPATIENT
Start: 2023-05-30 | End: 2023-06-11

## 2023-05-30 RX ORDER — LANOLIN ALCOHOL/MO/W.PET/CERES
800 CREAM (GRAM) TOPICAL
Status: DISCONTINUED | OUTPATIENT
Start: 2023-05-30 | End: 2023-06-11

## 2023-05-30 RX ORDER — ACETAMINOPHEN 325 MG/1
650 TABLET ORAL EVERY 4 HOURS PRN
Status: DISCONTINUED | OUTPATIENT
Start: 2023-05-30 | End: 2023-06-12 | Stop reason: HOSPADM

## 2023-05-30 RX ORDER — ACETAMINOPHEN 10 MG/ML
1000 INJECTION, SOLUTION INTRAVENOUS EVERY 8 HOURS
Status: DISCONTINUED | OUTPATIENT
Start: 2023-05-30 | End: 2023-05-30

## 2023-05-30 RX ORDER — FUROSEMIDE 10 MG/ML
20 INJECTION INTRAMUSCULAR; INTRAVENOUS
Status: COMPLETED | OUTPATIENT
Start: 2023-05-30 | End: 2023-05-30

## 2023-05-30 RX ORDER — CARBOXYMETHYLCELLULOSE SODIUM 2.5 MG/ML
1 SOLUTION/ DROPS OPHTHALMIC 3 TIMES DAILY
COMMUNITY

## 2023-05-30 RX ORDER — FLUCONAZOLE 2 MG/ML
100 INJECTION, SOLUTION INTRAVENOUS
Status: COMPLETED | OUTPATIENT
Start: 2023-05-31 | End: 2023-06-02

## 2023-05-30 RX ORDER — ONDANSETRON 2 MG/ML
4 INJECTION INTRAMUSCULAR; INTRAVENOUS
Status: COMPLETED | OUTPATIENT
Start: 2023-05-30 | End: 2023-05-30

## 2023-05-30 RX ORDER — OLOPATADINE HYDROCHLORIDE 1 MG/ML
1 SOLUTION/ DROPS OPHTHALMIC 2 TIMES DAILY
COMMUNITY
Start: 2023-05-24

## 2023-05-30 RX ORDER — ACETAMINOPHEN 10 MG/ML
1000 INJECTION, SOLUTION INTRAVENOUS ONCE
Status: COMPLETED | OUTPATIENT
Start: 2023-05-30 | End: 2023-05-30

## 2023-05-30 RX ORDER — FUROSEMIDE 10 MG/ML
20 INJECTION INTRAMUSCULAR; INTRAVENOUS
Status: DISCONTINUED | OUTPATIENT
Start: 2023-05-30 | End: 2023-05-31

## 2023-05-30 RX ORDER — NITROGLYCERIN 0.4 MG/1
0.4 TABLET SUBLINGUAL EVERY 5 MIN PRN
Status: DISCONTINUED | OUTPATIENT
Start: 2023-05-30 | End: 2023-06-12 | Stop reason: HOSPADM

## 2023-05-30 RX ORDER — AMOXICILLIN 250 MG
2 CAPSULE ORAL 2 TIMES DAILY PRN
Status: DISCONTINUED | OUTPATIENT
Start: 2023-05-30 | End: 2023-06-12 | Stop reason: HOSPADM

## 2023-05-30 RX ORDER — ESCITALOPRAM OXALATE 10 MG/1
10 TABLET ORAL DAILY
Status: DISCONTINUED | OUTPATIENT
Start: 2023-05-30 | End: 2023-06-06

## 2023-05-30 RX ORDER — ALUMINUM HYDROXIDE, MAGNESIUM HYDROXIDE, AND SIMETHICONE 2400; 240; 2400 MG/30ML; MG/30ML; MG/30ML
5 SUSPENSION ORAL EVERY 6 HOURS PRN
COMMUNITY

## 2023-05-30 RX ORDER — GUAIFENESIN 400 MG/1
400 TABLET ORAL EVERY 12 HOURS PRN
COMMUNITY

## 2023-05-30 RX ORDER — METOPROLOL TARTRATE 25 MG/1
25 TABLET, FILM COATED ORAL 2 TIMES DAILY
Status: DISCONTINUED | OUTPATIENT
Start: 2023-05-30 | End: 2023-06-01

## 2023-05-30 RX ORDER — ATORVASTATIN CALCIUM 20 MG/1
20 TABLET, FILM COATED ORAL NIGHTLY
Status: DISCONTINUED | OUTPATIENT
Start: 2023-05-30 | End: 2023-06-12

## 2023-05-30 RX ORDER — TRAZODONE HYDROCHLORIDE 50 MG/1
50 TABLET ORAL NIGHTLY
Status: DISCONTINUED | OUTPATIENT
Start: 2023-05-30 | End: 2023-06-03

## 2023-05-30 RX ADMIN — CEFTRIAXONE SODIUM 1 G: 1 INJECTION, POWDER, FOR SOLUTION INTRAMUSCULAR; INTRAVENOUS at 10:05

## 2023-05-30 RX ADMIN — FAMOTIDINE 20 MG: 20 TABLET ORAL at 12:05

## 2023-05-30 RX ADMIN — FUROSEMIDE 20 MG: 20 INJECTION, SOLUTION INTRAMUSCULAR; INTRAVENOUS at 10:05

## 2023-05-30 RX ADMIN — ACETAMINOPHEN 650 MG: 325 TABLET ORAL at 05:05

## 2023-05-30 RX ADMIN — APIXABAN 2.5 MG: 2.5 TABLET, FILM COATED ORAL at 08:05

## 2023-05-30 RX ADMIN — FUROSEMIDE 20 MG: 10 INJECTION, SOLUTION INTRAMUSCULAR; INTRAVENOUS at 05:05

## 2023-05-30 RX ADMIN — TRAZODONE HYDROCHLORIDE 50 MG: 50 TABLET ORAL at 08:05

## 2023-05-30 RX ADMIN — ESCITALOPRAM OXALATE 10 MG: 10 TABLET ORAL at 12:05

## 2023-05-30 RX ADMIN — ONDANSETRON 4 MG: 2 INJECTION INTRAMUSCULAR; INTRAVENOUS at 09:05

## 2023-05-30 RX ADMIN — METOPROLOL TARTRATE 25 MG: 25 TABLET, FILM COATED ORAL at 12:05

## 2023-05-30 RX ADMIN — FLUCONAZOLE IN SODIUM CHLORIDE 100 MG: 2 INJECTION, SOLUTION INTRAVENOUS at 11:05

## 2023-05-30 RX ADMIN — ATORVASTATIN CALCIUM 20 MG: 20 TABLET, FILM COATED ORAL at 08:05

## 2023-05-30 RX ADMIN — METOPROLOL TARTRATE 25 MG: 25 TABLET, FILM COATED ORAL at 08:05

## 2023-05-30 RX ADMIN — ACETAMINOPHEN 1000 MG: 10 INJECTION INTRAVENOUS at 09:05

## 2023-05-30 RX ADMIN — APIXABAN 2.5 MG: 2.5 TABLET, FILM COATED ORAL at 12:05

## 2023-05-30 RX ADMIN — ACETAMINOPHEN 650 MG: 325 TABLET ORAL at 10:05

## 2023-05-30 NOTE — CONSULTS
"[facility]  Adult Nutrition   Consult Note (Initial Assessment)     SUMMARY     Recommendations  1.) Will add diabetic diet restrictions to cardiac diet. Fluid per MD.   2.) Will add Glucerna once daily to encourage intake.   3.) Will add Lon BID x14 days to aid in wound healing.   4.) Suggest MVI for general health.   5.) Menu  to aid in food preferences.    Goals:   1.) Pt to consume/tolerate >75% of meals and ONS.   2.) Progression of wound healing.   3.) BG levels to normalize.  Nutrition Goal Status: new    Dietitian Rounds Brief  Pt was brought to the ED via EMS from [facility] due to tachycardia. Pt resting at time of visit. PTA, fair appetite/intake. No chew/swallowing issues. No GI distress. LBM . Skin per H&P: Does have ulcer to the right lateral leg, daughter states she sustained few weeks ago as injury from her wheelchair. Wt reviewed. UBW 42kg (2023); admit wt 44.5kg reflecting slight wt gain. NFPE not completed at this time d/t pt refusal. No malnutrition identified.    Diet order:   Current Diet Order: cardiac, 1500mL fluid restriction      Evaluation of Received Nutrient/Fluid Intake  Energy Calories Required: not meeting needs  Protein Required: not meeting needs  Fluid Required: meeting needs  Tolerance: tolerating     % Intake of Estimated Energy Needs: 0 - 25 %  % Meal Intake: 0 - 25 %      Intake/Output Summary (Last 24 hours) at 2023 1654  Last data filed at 2023 1235  Gross per 24 hour   Intake 120 ml   Output 200 ml   Net -80 ml        Anthropometrics  Temp: 99.1 °F (37.3 °C)  Height: 4' 8" (142.2 cm)  Height (inches): 56 in  Weight Method: Bed Scale  Weight: 44.5 kg (98 lb)  Weight (lb): 98 lb  Ideal Body Weight (IBW), Female: 80 lb  % Ideal Body Weight, Female (lb): 122.5 %  BMI (Calculated): 22  BMI Grade: 18.5-24.9 - normal  Usual Body Weight (UBW), k kg (2023)  % Usual Body Weight: 106.06       Estimated/Assessed " Needs  Weight Used For Calorie Calculations: 44.5 kg (98 lb 1.7 oz)  Energy Calorie Requirements (kcal): 3951-1308  Energy Need Method: Kcal/kg (25-30)  Protein Requirements: 54-67 (1.2-1.5)  Weight Used For Protein Calculations: 44.5 kg (98 lb 1.7 oz)  Fluid Requirements (mL): 6387-8250     RDA Method (mL): 1113  CHO Requirement: 45-60gm CHO per meal    Reason for Assessment  Reason For Assessment: consult (wound)  Diagnosis: infection/sepsis    Nutrition/Diet History  Food Allergies: other (see comments) (strawberries)  Factors Affecting Nutritional Intake: None identified at this time    Nutrition Risk Screen  Nutrition Risk Screen: no indicators present     MST Score: 0  Have you recently lost weight without trying?: No  Weight loss score: 0  Have you been eating poorly because of a decreased appetite?: No  Appetite score: 0       Weight History:  Wt Readings from Last 5 Encounters:   05/30/23 44.5 kg (98 lb)   04/27/23 47.7 kg (105 lb 3.6 oz)   04/21/23 41.7 kg (92 lb)   01/11/23 45.4 kg (100 lb)   10/17/22 45.2 kg (99 lb 10.4 oz)        Lab/Procedures/Meds: Pertinent Labs/Meds Reviewed    Medications:Pertinent Medications Reviewed  Scheduled Meds:   apixaban  2.5 mg Oral BID    atorvastatin  20 mg Oral QHS    [START ON 5/31/2023] cefTRIAXone (ROCEPHIN) IVPB  1 g Intravenous Q24H    EScitalopram oxalate  10 mg Oral Daily    famotidine  20 mg Oral Daily    [START ON 5/31/2023] fluconazole (DIFLUCAN) IV (PEDS and ADULTS)  100 mg Intravenous Q24H    furosemide (LASIX) injection  20 mg Intravenous Q12H    [START ON 5/31/2023] levothyroxine  75 mcg Oral Before breakfast    metoprolol tartrate  25 mg Oral BID    traZODone  50 mg Oral QHS     Continuous Infusions:  PRN Meds:.acetaminophen, acetaminophen, magnesium oxide, magnesium oxide, nitroGLYCERIN, potassium bicarbonate, potassium bicarbonate, potassium bicarbonate, potassium, sodium phosphates, potassium, sodium phosphates, potassium, sodium phosphates,  senna-docusate 8.6-50 mg, sodium chloride 0.9%    Labs: Pertinent Labs Reviewed  Clinical Chemistry:  Recent Labs   Lab 05/30/23  0832   *   K 4.1   CL 98   CO2 24   *   BUN 18   CREATININE 0.9   CALCIUM 8.5*   PROT 6.6   ALBUMIN 3.4*   BILITOT 1.8*   ALKPHOS 96   AST 24   ALT 39   ANIONGAP 12   MG 1.8     CBC:   Recent Labs   Lab 05/30/23  0832   WBC 27.32*   RBC 3.93*   HGB 10.3*   HCT 32.8*      MCV 84   MCH 26.2*   MCHC 31.4*     Cardiac Profile:  Recent Labs   Lab 05/30/23  0832   BNP 1,905*     Thyroid & Parathyroid:  Recent Labs   Lab 05/30/23  0832   TSH 1.700       Monitor and Evaluation  Food and Nutrient Intake: energy intake, food and beverage intake  Food and Nutrient Adminstration: diet order  Knowledge/Beliefs/Attitudes: food and nutrition knowledge/skill  Physical Activity and Function: nutrition-related ADLs and IADLs  Anthropometric Measurements: weight, weight change  Biochemical Data, Medical Tests and Procedures: electrolyte and renal panel, gastrointestinal profile, glucose/endocrine profile  Nutrition-Focused Physical Findings: overall appearance     Nutrition Risk  Level of Risk/Frequency of Follow-up: moderate     Nutrition Follow-Up  RD Follow-up?: Yes      Reva Tejada RD 05/30/2023 4:54 PM

## 2023-05-30 NOTE — ASSESSMENT & PLAN NOTE
Chronically on 2 L supplemental oxygen   In the ED up to 10 L, likely due to pulmonary edema/decompensated heart failure, can not out rule underlying pneumonia   Continue supplemental oxygen, adjust as needed  Treating heart failure with diuresis, will treat empirically for pneumonia with antibiotics

## 2023-05-30 NOTE — H&P
[facility] - Emergency Dept  Hospital Medicine  History & Physical    Patient Name: Jeannie Hutchins  MRN: [id number]  Patient Class: IP- Inpatient  Admission Date: 5/30/2023  Attending Physician: Aleyda Oliver MD   Primary Care Provider: Primary Doctor No         Patient information was obtained from patient, relative(s), EMS personnel, past medical records and ER records.     Subjective:     Principal Problem:Sepsis    Chief Complaint:   Chief Complaint   Patient presents with    Tachycardia        HPI: Ms. Hutchins is an 88-year-old female who was brought to the ED via EMS from [facility] due to tachycardia.  Collateral is obtained from daughter present at bedside.  Patient was initially discharged to nursing facility about 2 years ago, under hospice, but has subsequently improved and is off same.  This morning reportedly heart rate greater than 200, staff was preparing to administer metoprolol, prior to this noted to be staring off.  As per daughter oxygen level was also low, she is chronically on 2 L supplemental oxygen.  States her mother was incoherent during this time.  Patient states she was feeling unwell during this episode, lightheaded, short of breath, producing phlegm.  Denies any sore throat, fever at the facility, nausea, vomiting, diarrhea, cystitis symptoms.  She had a fall several days ago as per report, was found on floor.  Does have ulcer to the right lateral leg, daughter states she sustained few weeks ago as injury from her wheelchair.  Daughter states with previous UTI she had similar symptoms.  In the ED febrile to 101.2, heart rates in the 120s, AFib, blood pressure stable, on 10 L supplemental oxygen.  Labs with WBC 27, hemoglobin 10.3, BUN/creatinine 18/0.9, BNP 1 905, high sensitivity troponin 20, UA with 63 WBC with 3+ leukocyte with many seen.  EKG confirming atrial fibrillation with RVR.  Chest x-ray with cardiomegaly with bilateral interstitial opacities  concerning for pulmonary edema.  She received 1 g acetaminophen, 1 g Rocephin, 100 mg fluconazole, 20 mg IV Lasix, 4 mg IV Zofran.  Case discussed with ED provider who requested admission.  Plan of care discussed with patient as well as daughter present at bedside.  Patient has advanced directive, confirmed DNR.      Past Medical History:   Diagnosis Date    Anticoagulant long-term use     Eliquis    Arthritis     Atrial fibrillation     Blood transfusion     Carotid stenosis     CHF (congestive heart failure)     Edema     Generalized anxiety disorder 1/23/2018    Dr. Mckeon's eval 1/23/18: She does appear to have a JERRI because of the persistent worries that she has.  These worries predominate her day.  She would probably do well with prevention therapy such as SSRI/SNRI to help control the physical symptoms of the anxiety.  Problem at this point in time is her electrolyte abnormalities.  There is a slight risk of hyponatremia with these medications and    Hearing loss     Jena (hard of hearing)     Hypercholesterolemia     Hypertension     On home oxygen therapy     Psychiatric problem     Thyroid disease        Past Surgical History:   Procedure Laterality Date    CARDIOVERSION  10/19/2020    Procedure: Cardioversion;  Surgeon: Brandon Elias MD;  Location: [facility] CATH LAB;  Service: Cardiology;;    CARPAL TUNNEL RELEASE  2012    right hand    ESOPHAGOGASTRODUODENOSCOPY Left 8/29/2018    Procedure: EGD (ESOPHAGOGASTRODUODENOSCOPY);  Surgeon: Oniel Dorsey MD;  Location: [facility] ENDO;  Service: Endoscopy;  Laterality: Left;    HYSTERECTOMY      left carotid endartectomy  5/2006    TONSILLECTOMY      TREATMENT OF CARDIAC ARRHYTHMIA N/A 10/19/2020    Procedure: Transesophageal echo (BLADIMIR) intra-procedure log documentation;  Surgeon: Brandon Elias MD;  Location: [facility] CATH LAB;  Service: Cardiology;  Laterality: N/A;       Review of patient's allergies indicates:   Allergen Reactions     Strawberry Swelling     Tongue swells up and a generalized rash.  Patient reports allergy to all Berries    Hydrochlorothiazide Other (See Comments)     hyponatremia    Lisinopril Other (See Comments)     Cough       No current facility-administered medications on file prior to encounter.     Current Outpatient Medications on File Prior to Encounter   Medication Sig    acetaminophen (TYLENOL) 500 MG tablet Take 1 tablet (500 mg total) by mouth every 6 (six) hours as needed for Pain or Temperature greater than (100). (Patient taking differently: Take 325 mg by mouth every 6 (six) hours as needed for Pain or Temperature greater than (100).)    albuterol-ipratropium (DUO-NEB) 2.5 mg-0.5 mg/3 mL nebulizer solution Take 3 mLs by nebulization every 6 (six) hours. Rescue    aluminum & magnesium hydroxide-simethicone (MYLANTA MAX STRENGTH) 400-400-40 mg/5 mL suspension Take 5 mLs by mouth every 6 (six) hours as needed for Indigestion.    amLODIPine (NORVASC) 5 MG tablet Take 1 tablet (5 mg total) by mouth once daily.    apixaban (ELIQUIS) 2.5 mg Tab Take 1 tablet (2.5 mg total) by mouth 2 (two) times daily.    atorvastatin (LIPITOR) 20 MG tablet Take 20 mg by mouth once daily.    carboxymethylcellulose sodium (THERATEARS) 0.25 % Drop Place 1 drop into both eyes 3 (three) times daily.    EScitalopram oxalate (LEXAPRO) 10 MG tablet Take 10 mg by mouth once daily.    famotidine (PEPCID) 20 MG tablet Take 20 mg by mouth once daily.    fluticasone propionate (FLONASE) 50 mcg/actuation nasal spray 1 spray by Each Nostril route once daily.    furosemide (LASIX) 20 MG tablet Take 1 tablet (20 mg total) by mouth once daily.    guaiFENesin (HUMIBID E) 400 mg Tab Take 400 mg by mouth every 12 (twelve) hours as needed.    levothyroxine (SYNTHROID) 75 MCG tablet Take 75 mcg by mouth once daily.    loratadine (CLARITIN) 10 mg tablet Take 10 mg by mouth once daily.    metoprolol tartrate (LOPRESSOR) 25 MG tablet Take 25  mg by mouth 2 (two) times daily. 0600  2000    olopatadine (PATANOL) 0.1 % ophthalmic solution Place 1 drop into both eyes 2 (two) times daily.    potassium chloride (K-TAB) 20 mEq Take 20 mEq by mouth once daily.    traZODone (DESYREL) 50 MG tablet Take 1 tablet (50 mg total) by mouth every evening.    vit C,M-Jj-dxqpz-lutein-zeaxan (PRESERVISION AREDS-2) 250-90-40-1 mg Cap Take 1 tablet by mouth once daily.    alendronate (FOSAMAX) 70 MG tablet Take 70 mg by mouth every 7 days. SATURDAYS    white petrolatum-mineral oiL (ARTIFICIAL TEARS, NELSON/MIN,) 83-15 % Oint Place into both eyes every evening. (Patient not taking: Reported on 5/30/2023)    [DISCONTINUED] amlodipine-atorvastatin (CADUET) 10-40 mg per tablet TAKE ONE TABLET BY MOUTH EVERY DAY (Patient taking differently: Take by mouth nightly.)    [DISCONTINUED] losartan (COZAAR) 100 MG tablet TAKE ONE TABLET BY MOUTH EVERY DAY     Family History       Problem Relation (Age of Onset)    Breast cancer Daughter (50)    Cancer Brother (65), Sister (81), Sister, Sister    Heart disease Father    Ovarian cancer Sister (81)    Schizophrenia Son          Tobacco Use    Smoking status: Never    Smokeless tobacco: Never   Substance and Sexual Activity    Alcohol use: No    Drug use: No    Sexual activity: Not Currently     Partners: Male     Review of Systems   Constitutional:  Positive for appetite change (reduced) and fever.   HENT:  Positive for postnasal drip (believes from oxygen). Negative for sore throat.    Respiratory:  Positive for cough and shortness of breath.    Gastrointestinal:  Negative for abdominal pain, diarrhea, nausea and vomiting.   Genitourinary:  Negative for dysuria and frequency.   Musculoskeletal:         Had fall few days ago   Skin:  Positive for wound (right leg POA).   Psychiatric/Behavioral:  Positive for confusion (inhorenent earlier this morning).    Objective:     Vital Signs (Most Recent):  Temp: (!) 101.7 °F (38.7 °C)  (05/30/23 0911)  Pulse: 108 (05/30/23 1035)  Resp: 20 (05/30/23 1000)  BP: 127/73 (05/30/23 1030)  SpO2: 98 % (05/30/23 1030) Vital Signs (24h Range):  Temp:  [101.7 °F (38.7 °C)] 101.7 °F (38.7 °C)  Pulse:  [108-123] 108  Resp:  [15-24] 20  SpO2:  [89 %-99 %] 98 %  BP: (114-139)/(65-93) 127/73     Weight: 47.6 kg (105 lb)  Body mass index is 20.51 kg/m².     Physical Exam  Vitals and nursing note reviewed.   Constitutional:       Comments: Elderly female chronically ill appearing, lying in stretcher, cooperative   HENT:      Head: Normocephalic and atraumatic.      Mouth/Throat:      Mouth: Mucous membranes are moist.      Comments: No oral thrush  Eyes:      General:         Right eye: No discharge.         Left eye: No discharge.      Conjunctiva/sclera: Conjunctivae normal.      Comments: Senile arcus   Cardiovascular:      Rate and Rhythm: Tachycardia present. Rhythm irregular.      Comments: No significant LE edema  Pulmonary:      Comments: On 10 L supplemental oxygen, decreased air entry, bilateral inspiratory crackles, no wheeze or accessory muscle use  Abdominal:      General: Bowel sounds are normal. There is no distension.      Palpations: Abdomen is soft.      Tenderness: There is no abdominal tenderness. There is no guarding.   Genitourinary:     Comments: Wearing diaper, pure wick catheter in place  Musculoskeletal:      Comments: Diffuse OA changes   Skin:     Comments: Ulcer/wound lateral aspect of right mid leg, no obvious signs of infection, dressing overlying   Neurological:      Mental Status: She is alert and oriented to person, place, and time.      Comments: alert, interactive, oriented, no aphasia/dysarthria, moving all 4 extremities but generalized weakness   Psychiatric:         Mood and Affect: Mood normal.              Significant Labs: Blood Culture: No results for input(s): LABBLOO in the last 48 hours.  BMP:   Recent Labs   Lab 05/30/23  0832   *   *   K 4.1   CL 98   CO2  24   BUN 18   CREATININE 0.9   CALCIUM 8.5*   MG 1.8     CBC:   Recent Labs   Lab 05/30/23  0832   WBC 27.32*   HGB 10.3*   HCT 32.8*        CMP:   Recent Labs   Lab 05/30/23  0832   *   K 4.1   CL 98   CO2 24   *   BUN 18   CREATININE 0.9   CALCIUM 8.5*   PROT 6.6   ALBUMIN 3.4*   BILITOT 1.8*   ALKPHOS 96   AST 24   ALT 39   ANIONGAP 12     Cardiac Markers:   Recent Labs   Lab 05/30/23  0832   BNP 1,905*     Lactic Acid: No results for input(s): LACTATE in the last 48 hours.  Magnesium:   Recent Labs   Lab 05/30/23  0832   MG 1.8     POCT Glucose: No results for input(s): POCTGLUCOSE in the last 48 hours.  Respiratory Culture: No results for input(s): GSRESP, RESPIRATORYC in the last 48 hours.  Troponin:   Recent Labs   Lab 05/30/23  0832   TROPONINIHS 20.2*     TSH:   Recent Labs   Lab 04/20/23 2004   TSH 2.410     Urine Culture: No results for input(s): LABURIN in the last 48 hours.  Urine Studies:   Recent Labs   Lab 05/30/23  0909   COLORU Yellow   APPEARANCEUA Hazy*   PHUR 6.0   SPECGRAV 1.010   PROTEINUA 1+*   GLUCUA Negative   KETONESU Negative   BILIRUBINUA Negative   OCCULTUA Trace*   NITRITE Negative   UROBILINOGEN Negative   LEUKOCYTESUR 3+*   RBCUA 9*   WBCUA 63*   BACTERIA Negative   SQUAMEPITHEL 8   HYALINECASTS 18*       Significant Imaging: I have reviewed all pertinent imaging results/findings within the past 24 hours.    X-Ray Chest AP Portable    Result Date: 5/30/2023  Reason: Chest Pain FINDINGS: Portable chest with comparison chest x-ray April 22, 2023 show enlarged cardiomediastinal silhouette. Bilateral perihilar interstitial and hazy lung opacities are noted. Pulmonary vasculature is normal. No acute osseous abnormality. IMPRESSION: Enlarged cardiomediastinal silhouette with bilateral perihilar interstitial and hazy lung opacities likely reflects CHF with pulmonary edema. Electronically signed by:  Nikolai Sparks DO  5/30/2023 8:49 AM CDT Workstation: [id number]        Assessment/Plan:     * Sepsis  Febrile to 101.2 with WBC count 27 with positive UA, can not out rule superimposed pneumonia   Blood pressure is stable, await lactic acid level  Added on urine culture, ordered blood culture by 2, respiratory sample for Gram stain and culture, MRSA nasal screen, COVID and influenza ordered  Hold off IV fluid as per sepsis protocol due to decompensated heart failure with pulmonary edema and volume overload   Empiric antimicrobials with Rocephin and fluconazole, follow culture results and deescalate as able    Acute on chronic respiratory failure with hypoxia  Chronically on 2 L supplemental oxygen   In the ED up to 10 L, likely due to pulmonary edema/decompensated heart failure, can not out rule underlying pneumonia   Continue supplemental oxygen, adjust as needed  Treating heart failure with diuresis, will treat empirically for pneumonia with antibiotics    Acute on chronic diastolic heart failure  Admits to shortness of breath with productive cough   Chest x-ray with pulmonary edema with elevated BNP at 1905  Previous echo with EF of 65%, diastolic dysfunction consistent with AFib  In the setting of infection will aim for gentle diuresis, Lasix 20 mg q.12h  Strict I/O daily weights, oral fluid and sodium restriction      UTI (urinary tract infection)  UA positive, many yeast, history of UTIs in the past   Added on urine culture, will continue empiric Rocephin and fluconazole, follow-up culture results      Atrial fibrillation with RVR with known history of a fib  Patient with known history of persistent atrial fibrillation.   RVR likely driven by acute medical condition including infection/sepsis and CHF.  Telemetry monitoring, treat acute medical condition, continue metoprolol and eliquis      Hypothyroidism  Chronic medical condition, add on TSH, continue levothyroxine      Anemia  Chronic anemia, no evidence of bleeding, monitoring      Ulcer of right leg  Chronic ulcer to  right lateral leg, sustained few weeks ago from wheelchair   On examination no obvious signs of superimposed infection   Wound Care consulted      DNR (do not resuscitate)  Has advanced directive and confirmed with daughter, DNR      Nursing home resident  [facility]ident at [facility]      Troponin I above reference range  Likely in the setting of AFib RVR, CHF and infection, trend for completeness      Mixed hyperlipidemia  Chronic medical condition      Pulmonary hypertension  Last echo with PASP 54      Essential hypertension  Hold home amlodipine in the setting of infection and RVR to allow for titration of medication        VTE Risk Mitigation (From admission, onward)    None                     Aleyda Oliver MD  Department of Hospital Medicine  [facility] - Emergency Dept

## 2023-05-30 NOTE — ASSESSMENT & PLAN NOTE
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, sustained few weeks ago from wheelchair   On examination no obvious signs of superimposed infection   Wound Care consulted

## 2023-05-30 NOTE — PHARMACY MED REC
"              .Admission Medication History     The home medication history was taken by Desiree Grace.    You may go to "Admission" then "Reconcile Home Medications" tabs to review and/or act upon these items.     The home medication list has been updated by the Pharmacy department.   Please read ALL comments highlighted in yellow.   Please address this information as you see fit.    Feel free to contact us if you have any questions or require assistance.        Medications listed below were obtained from: Patient/family  No current facility-administered medications on file prior to encounter.     Current Outpatient Medications on File Prior to Encounter   Medication Sig Dispense Refill    acetaminophen (TYLENOL) 500 MG tablet Take 1 tablet (500 mg total) by mouth every 6 (six) hours as needed for Pain or Temperature greater than (100). (Patient taking differently: Take 325 mg by mouth every 6 (six) hours as needed for Pain or Temperature greater than (100).)  0    albuterol-ipratropium (DUO-NEB) 2.5 mg-0.5 mg/3 mL nebulizer solution Take 3 mLs by nebulization every 6 (six) hours. Rescue 1 Box 0    aluminum & magnesium hydroxide-simethicone (MYLANTA MAX STRENGTH) 400-400-40 mg/5 mL suspension Take 5 mLs by mouth every 6 (six) hours as needed for Indigestion.      amLODIPine (NORVASC) 5 MG tablet Take 1 tablet (5 mg total) by mouth once daily. 60 tablet 0    apixaban (ELIQUIS) 2.5 mg Tab Take 1 tablet (2.5 mg total) by mouth 2 (two) times daily.      atorvastatin (LIPITOR) 20 MG tablet Take 20 mg by mouth once daily.      carboxymethylcellulose sodium (THERATEARS) 0.25 % Drop Place 1 drop into both eyes 3 (three) times daily.      EScitalopram oxalate (LEXAPRO) 10 MG tablet Take 10 mg by mouth once daily.      famotidine (PEPCID) 20 MG tablet Take 20 mg by mouth once daily.      fluticasone propionate (FLONASE) 50 mcg/actuation nasal spray 1 spray by Each Nostril route once daily.      furosemide (LASIX) 20 MG " tablet Take 1 tablet (20 mg total) by mouth once daily. 30 tablet 11    guaiFENesin (HUMIBID E) 400 mg Tab Take 400 mg by mouth every 12 (twelve) hours as needed.      levothyroxine (SYNTHROID) 75 MCG tablet Take 75 mcg by mouth once daily.      loratadine (CLARITIN) 10 mg tablet Take 10 mg by mouth once daily.      metoprolol tartrate (LOPRESSOR) 25 MG tablet Take 25 mg by mouth 2 (two) times daily. 0600  2000      olopatadine (PATANOL) 0.1 % ophthalmic solution Place 1 drop into both eyes 2 (two) times daily.      potassium chloride (K-TAB) 20 mEq Take 20 mEq by mouth once daily.      traZODone (DESYREL) 50 MG tablet Take 1 tablet (50 mg total) by mouth every evening. 30 tablet 0    vit C,Z-Dg-qokfb-lutein-zeaxan (PRESERVISION AREDS-2) 250-90-40-1 mg Cap Take 1 tablet by mouth once daily.      alendronate (FOSAMAX) 70 MG tablet Take 70 mg by mouth every 7 days. SATURDAYS      white petrolatum-mineral oiL (ARTIFICIAL TEARS, NELSON/MIN,) 83-15 % Oint Place into both eyes every evening. (Patient not taking: Reported on 5/30/2023) 1 each 0    [DISCONTINUED] amlodipine-atorvastatin (CADUET) 10-40 mg per tablet TAKE ONE TABLET BY MOUTH EVERY DAY (Patient taking differently: Take by mouth nightly.) 30 tablet 11    [DISCONTINUED] losartan (COZAAR) 100 MG tablet TAKE ONE TABLET BY MOUTH EVERY DAY 90 tablet 3       Potential issues to be addressed PRIOR TO DISCHARGE  Patient reported not taking the following medications: (Artificial Tears). These medications remain on the home medication list. Please address accordingly.     Desiree Grace  EXT [id number]

## 2023-05-30 NOTE — ASSESSMENT & PLAN NOTE
Febrile to 101.2 with WBC count 27 with positive UA, can not out rule superimposed pneumonia   Blood pressure is stable, await lactic acid level  Added on urine culture, ordered blood culture by 2, respiratory sample for Gram stain and culture, MRSA nasal screen, COVID and influenza ordered  Hold off IV fluid as per sepsis protocol due to decompensated heart failure with pulmonary edema and volume overload   Empiric antimicrobials with Rocephin and fluconazole, follow culture results and deescalate as able

## 2023-05-30 NOTE — ASSESSMENT & PLAN NOTE
Patient with known history of persistent atrial fibrillation.   RVR likely driven by acute medical condition including infection/sepsis and CHF.  Telemetry monitoring, treat acute medical condition, continue metoprolol and eliquis

## 2023-05-30 NOTE — PLAN OF CARE
POC reviewed with pt and daughter; verbalized understanding. Safety maintained throughout the shift.  Problem: Adult Inpatient Plan of Care  Goal: Plan of Care Review  Outcome: Ongoing, Progressing  Goal: Patient-Specific Goal (Individualized)  Outcome: Ongoing, Progressing  Goal: Absence of Hospital-Acquired Illness or Injury  Outcome: Ongoing, Progressing  Goal: Optimal Comfort and Wellbeing  Outcome: Ongoing, Progressing  Goal: Readiness for Transition of Care  Outcome: Ongoing, Progressing     Problem: Adjustment to Illness (Sepsis/Septic Shock)  Goal: Optimal Coping  Outcome: Ongoing, Progressing     Problem: Bleeding (Sepsis/Septic Shock)  Goal: Absence of Bleeding  Outcome: Ongoing, Progressing     Problem: Glycemic Control Impaired (Sepsis/Septic Shock)  Goal: Blood Glucose Level Within Desired Range  Outcome: Ongoing, Progressing     Problem: Infection Progression (Sepsis/Septic Shock)  Goal: Absence of Infection Signs and Symptoms  Outcome: Ongoing, Progressing     Problem: Nutrition Impaired (Sepsis/Septic Shock)  Goal: Optimal Nutrition Intake  Outcome: Ongoing, Progressing     Problem: Infection  Goal: Absence of Infection Signs and Symptoms  Outcome: Ongoing, Progressing     Problem: Fall Injury Risk  Goal: Absence of Fall and Fall-Related Injury  Outcome: Ongoing, Progressing     Problem: Skin Injury Risk Increased  Goal: Skin Health and Integrity  Outcome: Ongoing, Progressing     Problem: Impaired Wound Healing  Goal: Optimal Wound Healing  Outcome: Ongoing, Progressing     Problem: Oral Intake Inadequate  Goal: Improved Oral Intake  Outcome: Ongoing, Progressing

## 2023-05-30 NOTE — ASSESSMENT & PLAN NOTE
Admits to shortness of breath with productive cough   Chest x-ray with pulmonary edema with elevated BNP at 1905  Previous echo with EF of 65%, diastolic dysfunction consistent with AFib  In the setting of infection will aim for gentle diuresis, Lasix 20 mg q.12h  Strict I/O daily weights, oral fluid and sodium restriction

## 2023-05-30 NOTE — ASSESSMENT & PLAN NOTE
UA positive, many yeast, history of UTIs in the past   Added on urine culture, will continue empiric Rocephin and fluconazole, follow-up culture results

## 2023-05-30 NOTE — PLAN OF CARE
Problem: Skin Injury Risk Increased  Goal: Skin Health and Integrity  Intervention: Promote and Optimize Oral Intake  Flowsheets (Taken 5/30/2023 1655)  Oral Nutrition Promotion:   calorie-dense foods provided   calorie-dense liquids provided   other (see comments)     Problem: Impaired Wound Healing  Goal: Optimal Wound Healing  Outcome: Ongoing, Progressing     Problem: Oral Intake Inadequate  Goal: Improved Oral Intake  Outcome: Ongoing, Progressing  Intervention: Promote and Optimize Oral Intake  Flowsheets (Taken 5/30/2023 1655)  Oral Nutrition Promotion:   calorie-dense foods provided   calorie-dense liquids provided   other (see comments)     Recommendations  1.) Will add diabetic diet restrictions to cardiac diet. Fluid per MD.   2.) Will add Glucerna once daily to encourage intake.   3.) Will add Lon BID x14 days to aid in wound healing.   4.) Suggest MVI for general health.   5.) Menu  to aid in food preferences.     Goals:   1.) Pt to consume/tolerate >75% of meals and ONS.   2.) Progression of wound healing.   3.) BG levels to normalize.  Nutrition Goal Status: new

## 2023-05-30 NOTE — SUBJECTIVE & OBJECTIVE
Past Medical History:   Diagnosis Date    Anticoagulant long-term use     Eliquis    Arthritis     Atrial fibrillation     Blood transfusion     Carotid stenosis     CHF (congestive heart failure)     Edema     Generalized anxiety disorder 1/23/2018    Dr. Mckeon's eval 1/23/18: She does appear to have a JERRI because of the persistent worries that she has.  These worries predominate her day.  She would probably do well with prevention therapy such as SSRI/SNRI to help control the physical symptoms of the anxiety.  Problem at this point in time is her electrolyte abnormalities.  There is a slight risk of hyponatremia with these medications and    Hearing loss     [place] (hard of hearing)     Hypercholesterolemia     Hypertension     On home oxygen therapy     Psychiatric problem     Thyroid disease        Past Surgical History:   Procedure Laterality Date    CARDIOVERSION  10/19/2020    Procedure: Cardioversion;  Surgeon: Brandon Elias MD;  Location: [facility] CATH LAB;  Service: Cardiology;;    CARPAL TUNNEL RELEASE  2012    right hand    ESOPHAGOGASTRODUODENOSCOPY Left 8/29/2018    Procedure: EGD (ESOPHAGOGASTRODUODENOSCOPY);  Surgeon: Oniel Dorsey MD;  Location: [facility] ENDO;  Service: Endoscopy;  Laterality: Left;    HYSTERECTOMY      left carotid endartectomy  5/2006    TONSILLECTOMY      TREATMENT OF CARDIAC ARRHYTHMIA N/A 10/19/2020    Procedure: Transesophageal echo (BLADIMIR) intra-procedure log documentation;  Surgeon: Brandon Elias MD;  Location: [facility] CATH LAB;  Service: Cardiology;  Laterality: N/A;       Review of patient's allergies indicates:   Allergen Reactions    [place] Swelling     Tongue swells up and a generalized rash.  Patient reports allergy to all Berries    Hydrochlorothiazide Other (See Comments)     hyponatremia    Lisinopril Other (See Comments)     Cough       No current facility-administered medications on file prior to encounter.     Current Outpatient Medications on File Prior to  Encounter   Medication Sig    acetaminophen (TYLENOL) 500 MG tablet Take 1 tablet (500 mg total) by mouth every 6 (six) hours as needed for Pain or Temperature greater than (100). (Patient taking differently: Take 325 mg by mouth every 6 (six) hours as needed for Pain or Temperature greater than (100).)    albuterol-ipratropium (DUO-NEB) 2.5 mg-0.5 mg/3 mL nebulizer solution Take 3 mLs by nebulization every 6 (six) hours. Rescue    aluminum & magnesium hydroxide-simethicone (MYLANTA MAX STRENGTH) 400-400-40 mg/5 mL suspension Take 5 mLs by mouth every 6 (six) hours as needed for Indigestion.    amLODIPine (NORVASC) 5 MG tablet Take 1 tablet (5 mg total) by mouth once daily.    apixaban (ELIQUIS) 2.5 mg Tab Take 1 tablet (2.5 mg total) by mouth 2 (two) times daily.    atorvastatin (LIPITOR) 20 MG tablet Take 20 mg by mouth once daily.    carboxymethylcellulose sodium (THERATEARS) 0.25 % Drop Place 1 drop into both eyes 3 (three) times daily.    EScitalopram oxalate (LEXAPRO) 10 MG tablet Take 10 mg by mouth once daily.    famotidine (PEPCID) 20 MG tablet Take 20 mg by mouth once daily.    fluticasone propionate (FLONASE) 50 mcg/actuation nasal spray 1 spray by Each Nostril route once daily.    furosemide (LASIX) 20 MG tablet Take 1 tablet (20 mg total) by mouth once daily.    guaiFENesin (HUMIBID E) 400 mg Tab Take 400 mg by mouth every 12 (twelve) hours as needed.    levothyroxine (SYNTHROID) 75 MCG tablet Take 75 mcg by mouth once daily.    loratadine (CLARITIN) 10 mg tablet Take 10 mg by mouth once daily.    metoprolol tartrate (LOPRESSOR) 25 MG tablet Take 25 mg by mouth 2 (two) times daily. 0600  2000    olopatadine (PATANOL) 0.1 % ophthalmic solution Place 1 drop into both eyes 2 (two) times daily.    potassium chloride (K-TAB) 20 mEq Take 20 mEq by mouth once daily.    traZODone (DESYREL) 50 MG tablet Take 1 tablet (50 mg total) by mouth every evening.    vit C,A-Sz-xpjbg-lutein-zeaxan (PRESERVISION AREDS-2)  250-90-40-1 mg Cap Take 1 tablet by mouth once daily.    alendronate (FOSAMAX) 70 MG tablet Take 70 mg by mouth every 7 days. SATURDAYS    white petrolatum-mineral oiL (ARTIFICIAL TEARS, NELSON/MIN,) 83-15 % Oint Place into both eyes every evening. (Patient not taking: Reported on 5/30/2023)    [DISCONTINUED] amlodipine-atorvastatin (CADUET) 10-40 mg per tablet TAKE ONE TABLET BY MOUTH EVERY DAY (Patient taking differently: Take by mouth nightly.)    [DISCONTINUED] losartan (COZAAR) 100 MG tablet TAKE ONE TABLET BY MOUTH EVERY DAY     Family History       Problem Relation (Age of Onset)    Breast cancer Daughter (50)    Cancer Brother (65), Sister (81), Sister, Sister    Heart disease Father    Ovarian cancer Sister (81)    Schizophrenia Son          Tobacco Use    Smoking status: Never    Smokeless tobacco: Never   Substance and Sexual Activity    Alcohol use: No    Drug use: No    Sexual activity: Not Currently     Partners: Male     Review of Systems   Constitutional:  Positive for appetite change (reduced) and fever.   HENT:  Positive for postnasal drip (believes from oxygen). Negative for sore throat.    Respiratory:  Positive for cough and shortness of breath.    Gastrointestinal:  Negative for abdominal pain, diarrhea, nausea and vomiting.   Genitourinary:  Negative for dysuria and frequency.   Musculoskeletal:         Had fall few days ago   Skin:  Positive for wound (right leg POA).   Psychiatric/Behavioral:  Positive for confusion (inhorenent earlier this morning).    Objective:     Vital Signs (Most Recent):  Temp: (!) 101.7 °F (38.7 °C) (05/30/23 0911)  Pulse: 108 (05/30/23 1035)  Resp: 20 (05/30/23 1000)  BP: 127/73 (05/30/23 1030)  SpO2: 98 % (05/30/23 1030) Vital Signs (24h Range):  Temp:  [101.7 °F (38.7 °C)] 101.7 °F (38.7 °C)  Pulse:  [108-123] 108  Resp:  [15-24] 20  SpO2:  [89 %-99 %] 98 %  BP: (114-139)/(65-93) 127/73     Weight: 47.6 kg (105 lb)  Body mass index is 20.51 kg/m².     Physical  Exam  Vitals and nursing note reviewed.   Constitutional:       Comments: Elderly female chronically ill appearing, lying in stretcher, cooperative   HENT:      Head: Normocephalic and atraumatic.      Mouth/Throat:      Mouth: Mucous membranes are moist.      Comments: No oral thrush  Eyes:      General:         Right eye: No discharge.         Left eye: No discharge.      Conjunctiva/sclera: Conjunctivae normal.      Comments: Senile arcus   Cardiovascular:      Rate and Rhythm: Tachycardia present. Rhythm irregular.      Comments: No significant LE edema  Pulmonary:      Comments: On 10 L supplemental oxygen, decreased air entry, bilateral inspiratory crackles, no wheeze or accessory muscle use  Abdominal:      General: Bowel sounds are normal. There is no distension.      Palpations: Abdomen is soft.      Tenderness: There is no abdominal tenderness. There is no guarding.   Genitourinary:     Comments: Wearing diaper, pure wick catheter in place  Musculoskeletal:      Comments: Diffuse OA changes   Skin:     Comments: Ulcer/wound lateral aspect of right mid leg, no obvious signs of infection, dressing overlying   Neurological:      Mental Status: She is alert and oriented to person, place, and time.      Comments: alert, interactive, oriented, no aphasia/dysarthria, moving all 4 extremities but generalized weakness   Psychiatric:         Mood and Affect: Mood normal.              Significant Labs: Blood Culture: No results for input(s): LABBLOO in the last 48 hours.  BMP:   Recent Labs   Lab 05/30/23  0832   *   *   K 4.1   CL 98   CO2 24   BUN 18   CREATININE 0.9   CALCIUM 8.5*   MG 1.8     CBC:   Recent Labs   Lab 05/30/23  0832   WBC 27.32*   HGB 10.3*   HCT 32.8*        CMP:   Recent Labs   Lab 05/30/23  0832   *   K 4.1   CL 98   CO2 24   *   BUN 18   CREATININE 0.9   CALCIUM 8.5*   PROT 6.6   ALBUMIN 3.4*   BILITOT 1.8*   ALKPHOS 96   AST 24   ALT 39   ANIONGAP 12      Cardiac Markers:   Recent Labs   Lab 05/30/23  0832   BNP 1,905*     Lactic Acid: No results for input(s): LACTATE in the last 48 hours.  Magnesium:   Recent Labs   Lab 05/30/23  0832   MG 1.8     POCT Glucose: No results for input(s): POCTGLUCOSE in the last 48 hours.  Respiratory Culture: No results for input(s): GSRESP, RESPIRATORYC in the last 48 hours.  Troponin:   Recent Labs   Lab 05/30/23  0832   TROPONINIHS 20.2*     TSH:   Recent Labs   Lab 04/20/23  2004   TSH 2.410     Urine Culture: No results for input(s): LABURIN in the last 48 hours.  Urine Studies:   Recent Labs   Lab 05/30/23  0909   COLORU Yellow   APPEARANCEUA Hazy*   PHUR 6.0   SPECGRAV 1.010   PROTEINUA 1+*   GLUCUA Negative   KETONESU Negative   BILIRUBINUA Negative   OCCULTUA Trace*   NITRITE Negative   UROBILINOGEN Negative   LEUKOCYTESUR 3+*   RBCUA 9*   WBCUA 63*   BACTERIA Negative   SQUAMEPITHEL 8   HYALINECASTS 18*       Significant Imaging: I have reviewed all pertinent imaging results/findings within the past 24 hours.    X-Ray Chest AP Portable    Result Date: 5/30/2023  Reason: Chest Pain FINDINGS: Portable chest with comparison chest x-ray April 22, 2023 show enlarged cardiomediastinal silhouette. Bilateral perihilar interstitial and hazy lung opacities are noted. Pulmonary vasculature is normal. No acute osseous abnormality. IMPRESSION: Enlarged cardiomediastinal silhouette with bilateral perihilar interstitial and hazy lung opacities likely reflects CHF with pulmonary edema. Electronically signed by:  Nikolai Sparks DO  5/30/2023 8:49 AM CDT Workstation: [id number]

## 2023-05-30 NOTE — HPI
Ms. Hutchins is an 88-year-old female who was brought to the ED via EMS from [facility] due to tachycardia.  Collateral is obtained from daughter present at bedside.  Patient was initially discharged to nursing facility about 2 years ago, under hospice, but has subsequently improved and is off same.  This morning reportedly heart rate greater than 200, staff was preparing to administer metoprolol, prior to this noted to be staring off.  As per daughter oxygen level was also low, she is chronically on 2 L supplemental oxygen.  States her mother was incoherent during this time.  Patient states she was feeling unwell during this episode, lightheaded, short of breath, producing phlegm.  Denies any sore throat, fever at the facility, nausea, vomiting, diarrhea, cystitis symptoms.  She had a fall several days ago as per report, was found on floor.  Does have ulcer to the right lateral leg, daughter states she sustained few weeks ago as injury from her wheelchair.  Daughter states with previous UTI she had similar symptoms.  In the ED febrile to 101.2, heart rates in the 120s, AFib, blood pressure stable, on 10 L supplemental oxygen.  Labs with WBC 27, hemoglobin 10.3, BUN/creatinine 18/0.9, BNP 1 905, high sensitivity troponin 20, UA with 63 WBC with 3+ leukocyte with many seen.  EKG confirming atrial fibrillation with RVR.  Chest x-ray with cardiomegaly with bilateral interstitial opacities concerning for pulmonary edema.  She received 1 g acetaminophen, 1 g Rocephin, 100 mg fluconazole, 20 mg IV Lasix, 4 mg IV Zofran.  Case discussed with ED provider who requested admission.  Plan of care discussed with patient as well as daughter present at bedside.  Patient has advanced directive, confirmed DNR.

## 2023-05-30 NOTE — ED PROVIDER NOTES
Encounter Date: 5/30/2023       History     Chief Complaint   Patient presents with    Tachycardia     88-year-old female with history of atrial fibrillation on Eliquis, congestive heart failure, hypertension, hyperlipidemia, hard of hearing, resident of [facility].  Patient is DNR.  Patient presents emergency department with complaint of tachycardia, generalized weakness and syncopal episode.  According to EMS patient was found with elevated heart rate of 220.  Appeared to be in AFib with RVR.  Patient was given metoprolol 5 mg prior to arrival to the emergency department.  Per pre-hospital report patient had a episode in which she was staring into space.  Episode lasted approximately 1 minute.  No gross focal seizure activity was noted.  Patient here with complaint of generalized weakness as well as generalized pain.  Denied headache, no chest pain, no shortness of breath, no abdominal pain, no other constitutional symptoms.      Review of patient's allergies indicates:   Allergen Reactions    Froid Swelling     Tongue swells up and a generalized rash.  Patient reports allergy to all Berries    Hydrochlorothiazide Other (See Comments)     hyponatremia    Lisinopril Other (See Comments)     Cough     Past Medical History:   Diagnosis Date    Anticoagulant long-term use     Eliquis    Arthritis     Atrial fibrillation     Blood transfusion     Carotid stenosis     CHF (congestive heart failure)     Edema     Generalized anxiety disorder 1/23/2018    Dr. Mckeon's eval 1/23/18: She does appear to have a JERRI because of the persistent worries that she has.  These worries predominate her day.  She would probably do well with prevention therapy such as SSRI/SNRI to help control the physical symptoms of the anxiety.  Problem at this point in time is her electrolyte abnormalities.  There is a slight risk of hyponatremia with these medications and    Hearing loss     [place] (hard of hearing)      Hypercholesterolemia     Hypertension     On home oxygen therapy     Psychiatric problem     Thyroid disease      Past Surgical History:   Procedure Laterality Date    CARDIOVERSION  10/19/2020    Procedure: Cardioversion;  Surgeon: Brandon Elias MD;  Location: [facility] CATH LAB;  Service: Cardiology;;    CARPAL TUNNEL RELEASE  2012    right hand    ESOPHAGOGASTRODUODENOSCOPY Left 8/29/2018    Procedure: EGD (ESOPHAGOGASTRODUODENOSCOPY);  Surgeon: Oniel Dorsey MD;  Location: [facility] ENDO;  Service: Endoscopy;  Laterality: Left;    HYSTERECTOMY      left carotid endartectomy  5/2006    TONSILLECTOMY      TREATMENT OF CARDIAC ARRHYTHMIA N/A 10/19/2020    Procedure: Transesophageal echo (BLADIMIR) intra-procedure log documentation;  Surgeon: Brandon Elias MD;  Location: [facility] CATH LAB;  Service: Cardiology;  Laterality: N/A;     Family History   Problem Relation Age of Onset    Heart disease Father     Cancer Brother 65        bladder    Cancer Sister 81        Ovarian    Ovarian cancer Sister 81    Breast cancer Daughter 50        Status post mastectomy    Cancer Sister         Lung.  Smoker    Cancer Sister         Lung.  Smoker    Schizophrenia Son      Social History     Tobacco Use    Smoking status: Never    Smokeless tobacco: Never   Substance Use Topics    Alcohol use: No    Drug use: No     Review of Systems   Constitutional:  Positive for fever.   HENT:  Negative for sore throat.    Respiratory:  Negative for shortness of breath.    Cardiovascular:  Negative for chest pain.   Gastrointestinal:  Negative for abdominal pain, nausea and vomiting.   Genitourinary:  Negative for dysuria.   Musculoskeletal:  Negative for arthralgias, back pain and myalgias.   Skin:  Negative for rash.   Neurological:  Positive for weakness. Negative for speech difficulty and light-headedness.   Hematological:  Does not bruise/bleed easily.     Physical Exam     Initial Vitals   BP Pulse Resp Temp SpO2   05/30/23 0820 05/30/23 0820  05/30/23 0823 05/30/23 0911 05/30/23 0819   114/70 (!) 111 (!) 24 (!) 101.7 °F (38.7 °C) 96 %      MAP       --                Physical Exam    Nursing note and vitals reviewed.  Constitutional: She appears well-developed and well-nourished. She is not diaphoretic. No distress.   Frail elderly appearing female   HENT:   Head: Normocephalic and atraumatic.   Nose: Nose normal.   Mouth/Throat: Oropharynx is clear and moist.   Eyes: Conjunctivae and EOM are normal. Pupils are equal, round, and reactive to light.   Neck: Neck supple. No thyromegaly present. No tracheal deviation present.   Normal range of motion.  Cardiovascular:  Normal rate, regular rhythm, normal heart sounds and intact distal pulses.     Exam reveals no gallop and no friction rub.       No murmur heard.  Pulmonary/Chest: No stridor. No respiratory distress.   Course bilateral breath sounds no adventitious sounds   Abdominal: Abdomen is soft. Bowel sounds are normal. She exhibits no distension and no mass. There is no abdominal tenderness. There is no rebound and no guarding.   Musculoskeletal:         General: No edema. Normal range of motion.      Cervical back: Normal range of motion and neck supple.     Lymphadenopathy:     She has no cervical adenopathy.   Neurological: She is alert and oriented to person, place, and time. She has normal strength and normal reflexes. She displays normal reflexes. No cranial nerve deficit. GCS score is 15. GCS eye subscore is 4. GCS verbal subscore is 5. GCS motor subscore is 6.   Skin: Skin is warm and dry. Capillary refill takes less than 2 seconds.   Psychiatric: She has a normal mood and affect.       ED Course   Procedures  Labs Reviewed   CBC W/ AUTO DIFFERENTIAL - Abnormal; Notable for the following components:       Result Value    WBC 27.32 (*)     RBC 3.93 (*)     Hemoglobin 10.3 (*)     Hematocrit 32.8 (*)     MCH 26.2 (*)     MCHC 31.4 (*)     RDW 25.7 (*)     Immature Granulocytes 1.0 (*)     Gran #  (ANC) 25.0 (*)     Immature Grans (Abs) 0.28 (*)     Lymph # 0.6 (*)     Mono # 1.4 (*)     Gran % 91.6 (*)     Lymph % 2.3 (*)     All other components within normal limits   COMPREHENSIVE METABOLIC PANEL - Abnormal; Notable for the following components:    Sodium 134 (*)     Glucose 174 (*)     Calcium 8.5 (*)     Albumin 3.4 (*)     Total Bilirubin 1.8 (*)     All other components within normal limits   TROPONIN I HIGH SENSITIVITY - Abnormal; Notable for the following components:    Troponin I High Sensitivity 20.2 (*)     All other components within normal limits   B-TYPE NATRIURETIC PEPTIDE - Abnormal; Notable for the following components:    BNP 1,905 (*)     All other components within normal limits   URINALYSIS - Abnormal; Notable for the following components:    Appearance, UA Hazy (*)     Protein, UA 1+ (*)     Occult Blood UA Trace (*)     Leukocytes, UA 3+ (*)     All other components within normal limits    Narrative:     Collection Type->Urine, Clean Catch   URINALYSIS MICROSCOPIC - Abnormal; Notable for the following components:    RBC, UA 9 (*)     WBC, UA 63 (*)     Yeast, UA Many (*)     Hyaline Casts, UA 18 (*)     All other components within normal limits    Narrative:     Collection Type->Urine, Clean Catch   TROPONIN I HIGH SENSITIVITY - Abnormal; Notable for the following components:    Troponin I High Sensitivity 34.7 (*)     All other components within normal limits   CULTURE, URINE   CULTURE, URINE   CULTURE, RESPIRATORY   MAGNESIUM   SARS-COV-2 RNA AMPLIFICATION, QUAL   INFLUENZA A AND B ANTIGEN    Narrative:     Specimen Source->Nasopharyngeal Swab   LACTIC ACID, PLASMA   PROCALCITONIN   TSH   VITAMIN B12   FOLATE   FOLATE   VITAMIN B12   TSH        ECG Results              EKG 12-lead (In process)  Result time 05/30/23 08:29:58      In process by Interface, Lab [facility] (05/30/23 08:29:58)                   Narrative:    Test Reason : R07.9,    Vent. Rate : 111 BPM     Atrial Rate :  120 BPM     P-R Int : 000 ms          QRS Dur : 076 ms      QT Int : 308 ms       P-R-T Axes : 000 090 180 degrees     QTc Int : 418 ms    Atrial fibrillation with rapid ventricular response  Rightward axis  Nonspecific T wave abnormality  Abnormal ECG  When compared with ECG of 20-APR-2023 20:10,  Nonspecific T wave abnormality now evident in Inferior leads  Nonspecific T wave abnormality, worse in Lateral leads    Referred By: [email or web address]   SELF           Confirmed By:                                   Imaging Results              X-Ray Chest AP Portable (Final result)  Result time 05/30/23 08:49:44      Final result by Nikolai Sparks MD (05/30/23 08:49:44)                   Narrative:    Reason: Chest Pain    FINDINGS:    Portable chest with comparison chest x-ray April 22, 2023 show enlarged cardiomediastinal silhouette.  Bilateral perihilar interstitial and hazy lung opacities are noted. Pulmonary vasculature is normal. No acute osseous abnormality.    IMPRESSION:    Enlarged cardiomediastinal silhouette with bilateral perihilar interstitial and hazy lung opacities likely reflects CHF with pulmonary edema.    Electronically signed by:  Nikolai Sparks DO  5/30/2023 8:49 AM CDT Workstation: [id number]                                     Medications   traZODone tablet 50 mg (has no administration in time range)   metoprolol tartrate (LOPRESSOR) tablet 25 mg (25 mg Oral Given 5/30/23 1238)   levothyroxine tablet 75 mcg (has no administration in time range)   famotidine tablet 20 mg (20 mg Oral Given 5/30/23 1238)   EScitalopram oxalate tablet 10 mg (10 mg Oral Given 5/30/23 1242)   atorvastatin tablet 20 mg (has no administration in time range)   apixaban tablet 2.5 mg (2.5 mg Oral Given 5/30/23 1238)   sodium chloride 0.9% flush 10 mL (has no administration in time range)   nitroGLYCERIN SL tablet 0.4 mg (has no administration in time range)   acetaminophen tablet 650 mg (has no administration in time range)    senna-docusate 8.6-50 mg per tablet 2 tablet (has no administration in time range)   furosemide injection 20 mg (has no administration in time range)   cefTRIAXone (ROCEPHIN) 1 g in dextrose 5 % 100 mL IVPB (ready to mix) (has no administration in time range)   fluconazole (DIFLUCAN) IVPB 100 mg (has no administration in time range)   potassium bicarbonate disintegrating tablet 50 mEq (has no administration in time range)   potassium bicarbonate disintegrating tablet 35 mEq (has no administration in time range)   potassium bicarbonate disintegrating tablet 60 mEq (has no administration in time range)   magnesium oxide tablet 800 mg (has no administration in time range)   magnesium oxide tablet 800 mg (has no administration in time range)   potassium, sodium phosphates 280-160-250 mg packet 2 packet (has no administration in time range)   potassium, sodium phosphates 280-160-250 mg packet 2 packet (has no administration in time range)   potassium, sodium phosphates 280-160-250 mg packet 2 packet (has no administration in time range)   acetaminophen tablet 650 mg (has no administration in time range)   ondansetron injection 4 mg (4 mg Intravenous Given 5/30/23 0936)   acetaminophen 1,000 mg/100 mL (10 mg/mL) injection 1,000 mg (0 mg Intravenous Stopped 5/30/23 0951)   cefTRIAXone (ROCEPHIN) 1 g in dextrose 5 % 100 mL IVPB (ready to mix) (0 g Intravenous Stopped 5/30/23 1129)   furosemide injection 20 mg (20 mg Intravenous Given 5/30/23 1029)     Medical Decision Making:   Initial Assessment:   88-year-old female with history of atrial fibrillation on Eliquis, congestive heart failure, hypertension, hyperlipidemia, hard of hearing, resident of [facility].  Patient is DNR.  Patient presents emergency department with complaint of tachycardia, generalized weakness and syncopal episode.  According to EMS patient was found with elevated heart rate of 220.  Appeared to be in AFib with RVR.  Patient was given  "metoprolol 5 mg prior to arrival to the emergency department.  Per pre-hospital report patient had a episode in which she was staring into space.  Episode lasted approximately 1 minute.  No gross focal seizure activity was noted.  Patient here with complaint of generalized weakness as well as generalized pain.  Denied headache, no chest pain, no shortness of breath, no abdominal pain, no other constitutional symptoms.      Differential Diagnosis:   Cardiac arrhythmia, SVT, AFib with RVR, VT, dehydration, pneumonia, pyelonephritis,  Clinical Tests:   Lab Tests: Ordered and Reviewed  Radiological Study: Ordered and Reviewed  Medical Tests: Ordered and Reviewed  Sepsis Perfusion Assessment: "I attest a sepsis perfusion exam was performed within 6 hours of sepsis, severe sepsis, or septic shock presentation, following fluid resuscitation."           ED Course as of 05/30/23 1456   Tue May 30, 2023   1022 Patient seen evaluated emergency department.  Currently at this time patient found with temperature 101.7° within emergency department.  Patient with cath urine consistent with urinary tract infection with WBCs 63, many yeast, +3 leukocyte esterase.  Patient with white count 82537.  H&H 10 and 32.  Platelets 292.  Patient did have elevated BNP of 1905 which is up to 1 month ago.  Patient remained hemodynamically adequate emergency department with blood pressure 130/80.  Patient chest x-ray consistent with CHF with cephalization noted.  Patient was given IV Rocephin emergency department as well as IV Diflucan.  Patient also began on diuretics Lasix 20 mg given IV x1.  In differential included possible bacteremia, sepsis however IV fluid resuscitation was not initiated secondary to patient's volume status.  Patient will be admitted for IV antibiotics and diuresis. [RM]      ED Course User Index  [RM] Jovon Flores MD                 Clinical Impression:   Final diagnoses:  [I48.0] Paroxysmal atrial fibrillation " (Primary)  [N39.0] Urinary tract infection without hematuria, site unspecified  [I50.9] Congestive heart failure, unspecified HF chronicity, unspecified heart failure type  [A41.9] Sepsis        ED Disposition Condition    Admit Stable                Jovon Flores MD  05/30/23 [id number]
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
## 2023-05-31 LAB
ACINETOBACTER CALCOACETICUS/BAUMANNII COMPLEX: NOT DETECTED
ALBUMIN SERPL BCP-MCNC: 3.1 G/DL (ref 3.5–5.2)
ALP SERPL-CCNC: 86 U/L (ref 55–135)
ALT SERPL W/O P-5'-P-CCNC: 30 U/L (ref 10–44)
ANION GAP SERPL CALC-SCNC: 12 MMOL/L (ref 8–16)
AST SERPL-CCNC: 17 U/L (ref 10–40)
BACTEROIDES FRAGILIS: NOT DETECTED
BASOPHILS # BLD AUTO: 0.01 K/UL (ref 0–0.2)
BASOPHILS NFR BLD: 0 % (ref 0–1.9)
BILIRUB SERPL-MCNC: 1.7 MG/DL (ref 0.1–1)
BUN SERPL-MCNC: 17 MG/DL (ref 8–23)
CALCIUM SERPL-MCNC: 8.7 MG/DL (ref 8.7–10.5)
CANDIDA ALBICANS: NOT DETECTED
CANDIDA AURIS: NOT DETECTED
CANDIDA GLABRATA: NOT DETECTED
CANDIDA KRUSEI: NOT DETECTED
CANDIDA PARAPSILOSIS: NOT DETECTED
CANDIDA TROPICALIS: NOT DETECTED
CHLORIDE SERPL-SCNC: 99 MMOL/L (ref 95–110)
CO2 SERPL-SCNC: 29 MMOL/L (ref 23–29)
CREAT SERPL-MCNC: 0.7 MG/DL (ref 0.5–1.4)
CRYPTOCOCCUS NEOFORMANS/GATTII: NOT DETECTED
CTX-M GENE: ABNORMAL
DIFFERENTIAL METHOD: ABNORMAL
ENTEROBACTER CLOACAE COMPLEX: NOT DETECTED
ENTEROBACTERALES: NOT DETECTED
ENTEROCOCCUS FAECALIS: NOT DETECTED
ENTEROCOCCUS FAECIUM: NOT DETECTED
EOSINOPHIL # BLD AUTO: 0 K/UL (ref 0–0.5)
EOSINOPHIL NFR BLD: 0 % (ref 0–8)
ERYTHROCYTE [DISTWIDTH] IN BLOOD BY AUTOMATED COUNT: 25.4 % (ref 11.5–14.5)
ESCHERICHIA COLI: NOT DETECTED
EST. GFR  (NO RACE VARIABLE): >60 ML/MIN/1.73 M^2
GLUCOSE SERPL-MCNC: 113 MG/DL (ref 70–110)
HAEMOPHILUS INFLUENZAE: NOT DETECTED
HCT VFR BLD AUTO: 30.6 % (ref 37–48.5)
HGB BLD-MCNC: 9.8 G/DL (ref 12–16)
IMM GRANULOCYTES # BLD AUTO: 0.12 K/UL (ref 0–0.04)
IMM GRANULOCYTES NFR BLD AUTO: 0.6 % (ref 0–0.5)
IMP GENE: ABNORMAL
KLEBSIELLA AEROGENES: NOT DETECTED
KLEBSIELLA OXYTOCA: NOT DETECTED
KLEBSIELLA PNEUMONIAE GROUP: NOT DETECTED
KPC: ABNORMAL
LISTERIA MONOCYTOGENES: NOT DETECTED
LYMPHOCYTES # BLD AUTO: 0.5 K/UL (ref 1–4.8)
LYMPHOCYTES NFR BLD: 2.4 % (ref 18–48)
MAGNESIUM SERPL-MCNC: 2 MG/DL (ref 1.6–2.6)
MCH RBC QN AUTO: 26.2 PG (ref 27–31)
MCHC RBC AUTO-ENTMCNC: 32 G/DL (ref 32–36)
MCR-1: ABNORMAL
MCV RBC AUTO: 82 FL (ref 82–98)
MEC A/C AND MREJ (MRSA): DETECTED
MEC A/C: ABNORMAL
MONOCYTES # BLD AUTO: 1.9 K/UL (ref 0.3–1)
MONOCYTES NFR BLD: 9.3 % (ref 4–15)
NDM GENE: ABNORMAL
NEISSERIA MENINGITIDIS: NOT DETECTED
NEUTROPHILS # BLD AUTO: 18.3 K/UL (ref 1.8–7.7)
NEUTROPHILS NFR BLD: 87.7 % (ref 38–73)
NRBC BLD-RTO: 0 /100 WBC
OXA-48-LIKE: ABNORMAL
PLATELET # BLD AUTO: 253 K/UL (ref 150–450)
PMV BLD AUTO: 9.5 FL (ref 9.2–12.9)
POTASSIUM SERPL-SCNC: 3.2 MMOL/L (ref 3.5–5.1)
POTASSIUM SERPL-SCNC: 4.6 MMOL/L (ref 3.5–5.1)
PROT SERPL-MCNC: 6 G/DL (ref 6–8.4)
PROTEUS SPECIES: NOT DETECTED
PSEUDOMONAS AERUGINOSA: NOT DETECTED
RBC # BLD AUTO: 3.74 M/UL (ref 4–5.4)
SALMONELLA SP: NOT DETECTED
SERRATIA MARCESCENS: NOT DETECTED
SODIUM SERPL-SCNC: 140 MMOL/L (ref 136–145)
STAPHYLOCOCCUS AUREUS: DETECTED
STAPHYLOCOCCUS EPIDERMIDIS: NOT DETECTED
STAPHYLOCOCCUS LUGDUNESIS: NOT DETECTED
STAPHYLOCOCCUS SPECIES: ABNORMAL
STENOTROPHOMONAS MALTOPHILIA: NOT DETECTED
STREPTOCOCCUS AGALACTIAE: NOT DETECTED
STREPTOCOCCUS PNEUMONIAE: NOT DETECTED
STREPTOCOCCUS PYOGENES: NOT DETECTED
STREPTOCOCCUS SPECIES: NOT DETECTED
TROPONIN I SERPL HS-MCNC: 27.2 PG/ML (ref 0–14.9)
VAN A/B: ABNORMAL
VIM GENE: ABNORMAL
WBC # BLD AUTO: 20.84 K/UL (ref 3.9–12.7)

## 2023-05-31 PROCEDURE — 99900031 HC PATIENT EDUCATION (STAT)

## 2023-05-31 PROCEDURE — 94799 UNLISTED PULMONARY SVC/PX: CPT

## 2023-05-31 PROCEDURE — 36415 COLL VENOUS BLD VENIPUNCTURE: CPT | Performed by: INTERNAL MEDICINE

## 2023-05-31 PROCEDURE — 84132 ASSAY OF SERUM POTASSIUM: CPT | Performed by: INTERNAL MEDICINE

## 2023-05-31 PROCEDURE — 21000000 HC CCU ICU ROOM CHARGE

## 2023-05-31 PROCEDURE — 63600175 PHARM REV CODE 636 W HCPCS: Performed by: STUDENT IN AN ORGANIZED HEALTH CARE EDUCATION/TRAINING PROGRAM

## 2023-05-31 PROCEDURE — 25000003 PHARM REV CODE 250: Performed by: STUDENT IN AN ORGANIZED HEALTH CARE EDUCATION/TRAINING PROGRAM

## 2023-05-31 PROCEDURE — 63600175 PHARM REV CODE 636 W HCPCS: Performed by: INTERNAL MEDICINE

## 2023-05-31 PROCEDURE — 80053 COMPREHEN METABOLIC PANEL: CPT | Performed by: INTERNAL MEDICINE

## 2023-05-31 PROCEDURE — 84484 ASSAY OF TROPONIN QUANT: CPT | Performed by: INTERNAL MEDICINE

## 2023-05-31 PROCEDURE — 99900035 HC TECH TIME PER 15 MIN (STAT)

## 2023-05-31 PROCEDURE — 25000003 PHARM REV CODE 250: Performed by: INTERNAL MEDICINE

## 2023-05-31 PROCEDURE — 83735 ASSAY OF MAGNESIUM: CPT | Performed by: INTERNAL MEDICINE

## 2023-05-31 PROCEDURE — 27100171 HC OXYGEN HIGH FLOW UP TO 24 HOURS

## 2023-05-31 PROCEDURE — 97535 SELF CARE MNGMENT TRAINING: CPT

## 2023-05-31 PROCEDURE — 85025 COMPLETE CBC W/AUTO DIFF WBC: CPT | Performed by: INTERNAL MEDICINE

## 2023-05-31 PROCEDURE — 97530 THERAPEUTIC ACTIVITIES: CPT

## 2023-05-31 PROCEDURE — 97162 PT EVAL MOD COMPLEX 30 MIN: CPT

## 2023-05-31 PROCEDURE — 25000003 PHARM REV CODE 250

## 2023-05-31 PROCEDURE — 97166 OT EVAL MOD COMPLEX 45 MIN: CPT

## 2023-05-31 PROCEDURE — 94761 N-INVAS EAR/PLS OXIMETRY MLT: CPT

## 2023-05-31 RX ORDER — CHLORHEXIDINE GLUCONATE ORAL RINSE 1.2 MG/ML
15 SOLUTION DENTAL 2 TIMES DAILY
Status: DISPENSED | OUTPATIENT
Start: 2023-05-31 | End: 2023-06-05

## 2023-05-31 RX ORDER — HYDROCODONE BITARTRATE AND ACETAMINOPHEN 5; 325 MG/1; MG/1
1 TABLET ORAL EVERY 4 HOURS PRN
Status: DISCONTINUED | OUTPATIENT
Start: 2023-05-31 | End: 2023-06-01

## 2023-05-31 RX ORDER — FUROSEMIDE 10 MG/ML
40 INJECTION INTRAMUSCULAR; INTRAVENOUS
Status: DISCONTINUED | OUTPATIENT
Start: 2023-05-31 | End: 2023-06-01

## 2023-05-31 RX ORDER — MORPHINE SULFATE 2 MG/ML
2 INJECTION, SOLUTION INTRAMUSCULAR; INTRAVENOUS EVERY 4 HOURS PRN
Status: DISCONTINUED | OUTPATIENT
Start: 2023-05-31 | End: 2023-06-01

## 2023-05-31 RX ORDER — MUPIROCIN 20 MG/G
OINTMENT TOPICAL 2 TIMES DAILY
Status: COMPLETED | OUTPATIENT
Start: 2023-05-31 | End: 2023-06-04

## 2023-05-31 RX ADMIN — CHLORHEXIDINE GLUCONATE 15 ML: 1.2 RINSE ORAL at 08:05

## 2023-05-31 RX ADMIN — ATORVASTATIN CALCIUM 20 MG: 20 TABLET, FILM COATED ORAL at 08:05

## 2023-05-31 RX ADMIN — FAMOTIDINE 20 MG: 20 TABLET ORAL at 08:05

## 2023-05-31 RX ADMIN — ACETAMINOPHEN 650 MG: 325 TABLET ORAL at 05:05

## 2023-05-31 RX ADMIN — LEVOTHYROXINE SODIUM 75 MCG: 0.03 TABLET ORAL at 05:05

## 2023-05-31 RX ADMIN — CHLORHEXIDINE GLUCONATE 15 ML: 1.2 RINSE ORAL at 09:05

## 2023-05-31 RX ADMIN — MORPHINE SULFATE 2 MG: 2 INJECTION, SOLUTION INTRAMUSCULAR; INTRAVENOUS at 04:05

## 2023-05-31 RX ADMIN — APIXABAN 2.5 MG: 2.5 TABLET, FILM COATED ORAL at 08:05

## 2023-05-31 RX ADMIN — HYDROCODONE BITARTRATE AND ACETAMINOPHEN 1 TABLET: 5; 325 TABLET ORAL at 08:05

## 2023-05-31 RX ADMIN — MUPIROCIN 1 G: 20 OINTMENT TOPICAL at 08:05

## 2023-05-31 RX ADMIN — MUPIROCIN 1 G: 20 OINTMENT TOPICAL at 09:05

## 2023-05-31 RX ADMIN — FUROSEMIDE 40 MG: 10 INJECTION, SOLUTION INTRAVENOUS at 05:05

## 2023-05-31 RX ADMIN — METOPROLOL TARTRATE 25 MG: 25 TABLET, FILM COATED ORAL at 08:05

## 2023-05-31 RX ADMIN — FUROSEMIDE 20 MG: 10 INJECTION, SOLUTION INTRAMUSCULAR; INTRAVENOUS at 05:05

## 2023-05-31 RX ADMIN — DEXTROSE MONOHYDRATE 2 MG/HR: 50 INJECTION, SOLUTION INTRAVENOUS at 05:05

## 2023-05-31 RX ADMIN — FLUCONAZOLE IN SODIUM CHLORIDE 100 MG: 2 INJECTION, SOLUTION INTRAVENOUS at 11:05

## 2023-05-31 RX ADMIN — TRAZODONE HYDROCHLORIDE 50 MG: 50 TABLET ORAL at 08:05

## 2023-05-31 RX ADMIN — POTASSIUM BICARBONATE 35 MEQ: 391 TABLET, EFFERVESCENT ORAL at 06:05

## 2023-05-31 RX ADMIN — POTASSIUM BICARBONATE 35 MEQ: 391 TABLET, EFFERVESCENT ORAL at 08:05

## 2023-05-31 RX ADMIN — CEFTRIAXONE SODIUM 1 G: 1 INJECTION, POWDER, FOR SOLUTION INTRAMUSCULAR; INTRAVENOUS at 09:05

## 2023-05-31 RX ADMIN — ESCITALOPRAM OXALATE 10 MG: 10 TABLET ORAL at 08:05

## 2023-05-31 NOTE — NURSING
Nurses Note -- 4 Eyes      5/31/2023   11:18 AM      Skin assessed during: Admit      [] No Altered Skin Integrity Present    []Prevention Measures Documented      [x] Yes- Altered Skin Integrity Present or Discovered   [x] LDA Added if Not in Epic (Describe Wound)   [x] New Altered Skin Integrity was Present on Admit and Documented in LDA   [x] Wound Image Taken    Wound Care Consulted? Yes    Attending Nurse:  Laura Rodgers RN     Second RN/Staff Member:  Syd Almanza, Charge RN

## 2023-05-31 NOTE — NURSING
[facility]  Wound Care    Patient Name:  Jeannie Hutchins   MRN:  [id number]  Date: 5/31/2023  Diagnosis: Sepsis    History:     Past Medical History:   Diagnosis Date    Anticoagulant long-term use     Eliquis    Arthritis     Atrial fibrillation     Blood transfusion     Carotid stenosis     CHF (congestive heart failure)     Edema     Generalized anxiety disorder 1/23/2018    Dr. Mckeon's eval 1/23/18: She does appear to have a JERRI because of the persistent worries that she has.  These worries predominate her day.  She would probably do well with prevention therapy such as SSRI/SNRI to help control the physical symptoms of the anxiety.  Problem at this point in time is her electrolyte abnormalities.  There is a slight risk of hyponatremia with these medications and    Hearing loss     [place] (hard of hearing)     Hypercholesterolemia     Hypertension     On home oxygen therapy     Psychiatric problem     Thyroid disease        Social History     Socioeconomic History    Marital status:    Tobacco Use    Smoking status: Never    Smokeless tobacco: Never   Substance and Sexual Activity    Alcohol use: No    Drug use: No    Sexual activity: Not Currently     Partners: Male   Social History Narrative     since 1984    He is retired.  He is one year older.  Worked for [employer]    She is retired.  Worked for [employer].     Social Determinants of Health     Financial Resource Strain: Low Risk     Difficulty of Paying Living Expenses: Not very hard   Food Insecurity: No Food Insecurity    Worried About Running Out of Food in the Last Year: Never true    Ran Out of Food in the Last Year: Never true   Transportation Needs: No Transportation Needs    Lack of Transportation (Medical): No    Lack of Transportation (Non-Medical): No   Physical Activity: Inactive    Days of Exercise per Week: 0 days    Minutes of Exercise per Session: 0 min   Stress: No Stress Concern Present     Feeling of Stress : Only a little   Social Connections: Socially Isolated    Frequency of Communication with Friends and Family: Once a week    Frequency of Social Gatherings with Friends and Family: Once a week    Attends [employer] Services: More than 4 times per year    Active Member of Clubs or Organizations: No    Attends Club or Organization Meetings: Never    Marital Status:    Housing Stability: Unknown    Unable to Pay for Housing in the Last Year: No    Unstable Housing in the Last Year: No       Precautions:     Allergies as of 05/30/2023 - Reviewed 05/30/2023   Allergen Reaction Noted    Strawberry Swelling 06/25/2013    Hydrochlorothiazide Other (See Comments) 04/16/2018    Lisinopril Other (See Comments) 09/20/2018       [facility] Assessment Details/Treatment   05/31/2023  88 yr old female    Multi areas of bruising and scabs           Open blister to the right lower leg         Recommendation:   Right lower leg  Clean with chlorhexidine/ns.  Pat dry. Apply Medihoney and Cover with large band aid.    Turn reposition q2 as pt's condition will allow.  Bilat heel pillows   Float and elevate heels of bed with pillows.  air mattress

## 2023-05-31 NOTE — PROGRESS NOTES
[facility] Medicine  Progress Note    Patient Name: Jeannie Hutchins  MRN: [id number]  Patient Class: IP- Inpatient   Admission Date: 5/30/2023  Length of Stay: 1 days  Attending Physician: Yosvany Vu MD  Primary Care Provider: Primary Doctor No        Subjective:     Principal Problem:Sepsis        HPI:  Ms. Hutchins is an 88-year-old female who was brought to the ED via EMS from [facility] due to tachycardia.  Collateral is obtained from daughter present at bedside.  Patient was initially discharged to nursing facility about 2 years ago, under hospice, but has subsequently improved and is off same.  This morning reportedly heart rate greater than 200, staff was preparing to administer metoprolol, prior to this noted to be staring off.  As per daughter oxygen level was also low, she is chronically on 2 L supplemental oxygen.  States her mother was incoherent during this time.  Patient states she was feeling unwell during this episode, lightheaded, short of breath, producing phlegm.  Denies any sore throat, fever at the facility, nausea, vomiting, diarrhea, cystitis symptoms.  She had a fall several days ago as per report, was found on floor.  Does have ulcer to the right lateral leg, daughter states she sustained few weeks ago as injury from her wheelchair.  Daughter states with previous UTI she had similar symptoms.  In the ED febrile to 101.2, heart rates in the 120s, AFib, blood pressure stable, on 10 L supplemental oxygen.  Labs with WBC 27, hemoglobin 10.3, BUN/creatinine 18/0.9, BNP 1 905, high sensitivity troponin 20, UA with 63 WBC with 3+ leukocyte with many seen.  EKG confirming atrial fibrillation with RVR.  Chest x-ray with cardiomegaly with bilateral interstitial opacities concerning for pulmonary edema.  She received 1 g acetaminophen, 1 g Rocephin, 100 mg fluconazole, 20 mg IV Lasix, 4 mg IV Zofran.  Case discussed with ED provider who requested admission.   Plan of care discussed with patient as well as daughter present at bedside.  Patient has advanced directive, confirmed DNR.      Overview/Hospital Course:  No notes on file    Interval History:  No acute events overnight.  She is in excruciating pain today in her right hip and left shoulder.  Obtain x-rays of these.  AFib with persistent RVR.  Will adjust medication regimen for better rate control.  Continue antibiotics at this time and follow up culture results.  She has recurrent pneumonias and will obtain speech evaluation to rule out aspiration.  Fever curve is improving.    Review of Systems   All other systems reviewed and are negative.  Objective:     Vital Signs (Most Recent):  Temp: 98.1 °F (36.7 °C) (05/31/23 1100)  Pulse: (!) 111 (05/31/23 1600)  Resp: (!) 23 (05/31/23 1653)  BP: 122/70 (05/31/23 1600)  SpO2: 96 % (05/31/23 1600) Vital Signs (24h Range):  Temp:  [98 °F (36.7 °C)-98.1 °F (36.7 °C)] 98.1 °F (36.7 °C)  Pulse:  [] 111  Resp:  [15-37] 23  SpO2:  [19 %-100 %] 96 %  BP: (102-144)/(57-89) 122/70     Weight: 44.5 kg (98 lb)  Body mass index is 21.97 kg/m².    Intake/Output Summary (Last 24 hours) at 5/31/2023 1706  Last data filed at 5/31/2023 1650  Gross per 24 hour   Intake 960 ml   Output --   Net 960 ml         Physical Exam  Constitutional:       Appearance: Normal appearance. She is normal weight.   HENT:      Head: Normocephalic and atraumatic.      Nose: Nose normal.      Mouth/Throat:      Mouth: Mucous membranes are moist.   Eyes:      Conjunctiva/sclera: Conjunctivae normal.      Pupils: Pupils are equal, round, and reactive to light.   Cardiovascular:      Rate and Rhythm: Tachycardia present. Rhythm irregular.      Pulses: Normal pulses.      Heart sounds: Normal heart sounds. No murmur heard.    No friction rub. No gallop.   Pulmonary:      Effort: Pulmonary effort is normal.      Breath sounds: Normal breath sounds. No wheezing or rales.      Comments: 2 L nasal cannula.   Coarse breath sounds.  Abdominal:      General: Abdomen is flat. Bowel sounds are normal. There is no distension.      Palpations: Abdomen is soft.      Tenderness: There is no abdominal tenderness. There is no guarding.   Musculoskeletal:         General: No swelling. Normal range of motion.      Cervical back: Normal range of motion and neck supple.      Right lower leg: No edema.      Left lower leg: No edema.      Comments: She has pain in her right hip, limited mobility of her left shoulder.   Skin:     General: Skin is warm and dry.   Neurological:      General: No focal deficit present.      Mental Status: She is alert.   Psychiatric:         Mood and Affect: Mood normal.         Thought Content: Thought content normal.         Judgment: Judgment normal.           Significant Labs: All pertinent labs within the past 24 hours have been reviewed.    Significant Imaging: I have reviewed all pertinent imaging results/findings within the past 24 hours.      Assessment/Plan:      * Sepsis  Febrile to 101.2 with WBC count 27 with positive UA, can not out rule superimposed pneumonia   White blood cell count improving  Blood pressure is stable, await lactic acid level  Added on urine culture, ordered blood culture by 2, respiratory sample for Gram stain and culture, MRSA nasal screen,  Follow up culture results   COVID and influenza negative  Hold off IV fluid as per sepsis protocol due to decompensated heart failure with pulmonary edema and volume overload   Empiric antimicrobials with Rocephin and fluconazole, follow culture results and deescalate as able    UTI (urinary tract infection)  UA positive, many yeast, history of UTIs in the past   Added on urine culture, will continue empiric Rocephin and fluconazole, follow-up culture results      Atrial fibrillation with RVR with known history of a fib  Patient with known history of persistent atrial fibrillation.   RVR likely driven by acute medical condition  including infection/sepsis and CHF.  Telemetry monitoring, treat acute medical condition, continue metoprolol and eliquis  Add titratable diltiazem  Can consult Cardiology if not improving with treatment of her sepsis and rate control      Acute on chronic respiratory failure with hypoxia  Chronically on 2 L supplemental oxygen   In the ED up to 10 L, likely due to pulmonary edema/decompensated heart failure, can not out rule underlying pneumonia   Continue supplemental oxygen, adjust as needed  Treating heart failure with diuresis, will treat empirically for pneumonia with antibiotics    Acute on chronic diastolic heart failure  Admits to shortness of breath with productive cough   Chest x-ray with pulmonary edema with elevated BNP at 1905  Previous echo with EF of 65%, diastolic dysfunction consistent with AFib  In the setting of infection will aim for gentle diuresis, Lasix 20 mg q.12h will continue this  Strict I/O daily weights, oral fluid and sodium restriction      Fall  Patient with a recent fall in [facility]  She is having increasing pain in her shoulder and hip  Obtain x-rays to rule out fracture      Anemia  Chronic anemia, no evidence of bleeding, monitoring      Ulcer of right leg  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, sustained few weeks ago from wheelchair   On examination no obvious signs of superimposed infection   Wound Care consulted      Hypothyroidism  Chronic medical condition continue levothyroxine      Troponin I above reference range  Likely in the setting of AFib RVR, CHF and infection, trend for completeness      Essential hypertension  Hold home amlodipine in the setting of infection and RVR to allow for titration of medication      DNR (do not resuscitate)  Has advanced directive and confirmed with daughter, DNR      Mixed hyperlipidemia  Chronic medical condition      Nursing home resident  Resident at [facility]      Pulmonary hypertension  Last echo with MARIAM  54        VTE Risk Mitigation (From admission, onward)         Ordered     apixaban tablet 2.5 mg  2 times daily         05/30/23 1220     IP VTE HIGH RISK PATIENT  Once         05/30/23 1220     Place sequential compression device  Until discontinued         05/30/23 1220                Discharge Planning   JULIET: 6/2/2023     Code Status: DNR   Is the patient medically ready for discharge?:     Reason for patient still in hospital (select all that apply): Treatment  Discharge Plan A: Return to nursing home                  Yosvany Vu MD  Department of Hospital Medicine   [facility]

## 2023-05-31 NOTE — PT/OT/SLP EVAL
Physical Therapy Evaluation    Patient Name:  Jeannie Hutchins   MRN:  [id number]    Recommendations:     Discharge Recommendations:   SNF   Discharge Equipment Recommendations: none   Barriers to discharge:  Significant decline with functional mobility due to R hip  pain.    Assessment:     Jeannie Hutchins is a 88 y.o. female admitted with a medical diagnosis of Sepsis.  She presents with the following impairments/functional limitations: weakness, impaired endurance, impaired self care skills, impaired functional mobility, gait instability, decreased lower extremity function, decreased safety awareness, pain, decreased ROM, impaired cardiopulmonary response to activity .    Pt presented in supine  as wound care nurse attempted rolling pt to assess skin integrity of her bottom. Pt hollered out  with rolling to the R and L with Max A with report of R hip pain. With attempt at assessing strength of L LE pt complained of pain also.  Did not actively move R LE. PT eval was limited due to pain.     Rehab Prognosis: Fair; patient would benefit from acute skilled PT services to address these deficits and reach maximum level of function.    Recent Surgery: * No surgery found *      Plan:     During this hospitalization, patient to be seen 5 x/week to address the identified rehab impairments via gait training, therapeutic activities, therapeutic exercises and progress toward the following goals:    Plan of Care Expires:       Subjective     Chief Complaint: R hip pain  Patient/Family Comments/goals: PLOF  Pain/Comfort:  Pain Rating 1:  (Did not rate but screamed with movement of  R hip)  Location - Side 1: Right  Location 1: hip  Pain Addressed 1: Reposition, Cessation of Activity    Patients cultural, spiritual, Adventism conflicts given the current situation:      Living Environment:  Pt has been a resident of NH  for 2 years  Prior to admission, patients level of function was ambulatory with RW with W/C in tow.  Equipment used  at home: wheelchair, oxygen, CPAP.  DME owned (not currently used): none.  Upon discharge, patient will have assistance from facility.    Objective:     Communicated with nurse prior to session.  Patient found supine with PureWick, bed alarm, telemetry  upon PT entry to room.    General Precautions: Standard,    Orthopedic Precautions:    Braces:    Respiratory Status: Room air    Exams:  RLE ROM: Deficits: did not allow movement  RLE Strength: did not allow assessment  LLE ROM: WFL  LLE Strength: at least 3/5    Functional Mobility:  Bed Mobility:     Rolling Left:  maximal assistance  Rolling Right: maximal assistance      AM-PAC 6 CLICK MOBILITY  Total Score:10       Treatment & Education:  Pt was educated on the role of PT  for assessment, treatment with emphasis on safety and increased mobility and D/C  recommendations.     Patient left supine with all lines intact, call button in reach, and bed alarm on.    GOALS:   Multidisciplinary Problems       Physical Therapy Goals          Problem: Physical Therapy    Goal Priority Disciplines Outcome Goal Variances Interventions   Physical Therapy Goal     PT, PT/OT      Description: Goals to be met by: 2023     Patient will increase functional independence with mobility by performin. Supine to sit with MInimal Assistance  2. Sit to stand transfer with Minimal Assistance  3. Gait  x 10  feet with Minimal Assistance using Rolling Walker.                          History:     Past Medical History:   Diagnosis Date    Anticoagulant long-term use     Eliquis    Arthritis     Atrial fibrillation     Blood transfusion     Carotid stenosis     CHF (congestive heart failure)     Edema     Generalized anxiety disorder 2018    Dr. Mckeon's eval 18: She does appear to have a JERRI because of the persistent worries that she has.  These worries predominate her day.  She would probably do well with prevention therapy such as SSRI/SNRI to help control the  physical symptoms of the anxiety.  Problem at this point in time is her electrolyte abnormalities.  There is a slight risk of hyponatremia with these medications and    Hearing loss     [place] (hard of hearing)     Hypercholesterolemia     Hypertension     On home oxygen therapy     Psychiatric problem     Thyroid disease        Past Surgical History:   Procedure Laterality Date    CARDIOVERSION  10/19/2020    Procedure: Cardioversion;  Surgeon: Brandon Elias MD;  Location: [facility] CATH LAB;  Service: Cardiology;;    CARPAL TUNNEL RELEASE  2012    right hand    ESOPHAGOGASTRODUODENOSCOPY Left 8/29/2018    Procedure: EGD (ESOPHAGOGASTRODUODENOSCOPY);  Surgeon: Oniel Dorsey MD;  Location: [facility] ENDO;  Service: Endoscopy;  Laterality: Left;    HYSTERECTOMY      left carotid endartectomy  5/2006    TONSILLECTOMY      TREATMENT OF CARDIAC ARRHYTHMIA N/A 10/19/2020    Procedure: Transesophageal echo (BLADIMIR) intra-procedure log documentation;  Surgeon: Brandon Elias MD;  Location: [facility] CATH LAB;  Service: Cardiology;  Laterality: N/A;       Time Tracking:     PT Received On: 05/31/23  PT Start Time: 1046     PT Stop Time: 1059  PT Total Time (min): 13 min     Billable Minutes: Evaluation 5 minutes  and Therapeutic Activity 8 minutes      05/31/2023

## 2023-05-31 NOTE — ASSESSMENT & PLAN NOTE
Admits to shortness of breath with productive cough   Chest x-ray with pulmonary edema with elevated BNP at 1905  Previous echo with EF of 65%, diastolic dysfunction consistent with AFib  In the setting of infection will aim for gentle diuresis, Lasix 20 mg q.12h will continue this  Strict I/O daily weights, oral fluid and sodium restriction

## 2023-05-31 NOTE — PLAN OF CARE
05/30/23 2258   Patient Assessment/Suction   Level of Consciousness (AVPU) alert   Respiratory Effort Unlabored   Skin Integrity   $ Wound Care Tech Time 15 min   Area Observed Bridge of nose;Cheek;Left;Right   Skin Appearance without discoloration   Barrier used? Gel Cushion   PRE-TX-O2   Device (Oxygen Therapy) BIPAP   Oxygen Concentration (%) 40   SpO2 95 %   Pulse Oximetry Type Continuous   Pulse 100   Resp (!) 25   Ready to Wean/Extubation Screen   FIO2<=50 (chart decimal) 0.4   Preset CPAP/BiPAP Settings   Mode Of Delivery BiPAP S/T   $ Initial CPAP/BiPAP Setup? Yes   Size of Mask Small   Equipment Type V60   Ipap 14   EPAP (cm H2O) 7   Pressure Support (cm H2O) 7   ITime (sec) 1   Rise Time (sec) 3   Patient CPAP/BiPAP Settings   RR Total (Breaths/Min) 25   Tidal Volume (mL) 418   VE Minute Ventilation (L/min) 9.9 L/min   Peak Inspiratory Pressure (cm H2O) 14   Total Leak (L/Min) 11   CPAP/BiPAP Backup Settings   Backup Rate 10 breaths per minute (bpm)   CPAP/BiPAP Alarms   High Pressure (cm H2O) 40   Low Pressure (cm H2O) 10   Minute Ventilation (L/Min) 3   High RR (breaths/min) 40   Low RR (breaths/min) 10   Apnea (Sec) 20

## 2023-05-31 NOTE — PLAN OF CARE
Problem: Occupational Therapy  Goal: Occupational Therapy Goal  Description: Goals to be met by: 6/28/2023     Patient will increase functional independence with ADLs by performing:    LE Dressing with Stand-by Assistance and Assistive Devices as needed.  Grooming while seated at sink with Stand-by Assistance.  Toileting from bedside commode with Stand-by Assistance for hygiene and clothing management.   Supine to sit with Stand-by Assistance.  Toilet transfer to bedside commode with Stand-by Assistance.    Outcome: Ongoing, Progressing

## 2023-05-31 NOTE — ASSESSMENT & PLAN NOTE
Febrile to 101.2 with WBC count 27 with positive UA, can not out rule superimposed pneumonia   White blood cell count improving  Blood pressure is stable, await lactic acid level  Added on urine culture, ordered blood culture by 2, respiratory sample for Gram stain and culture, MRSA nasal screen,  Follow up culture results   COVID and influenza negative  Hold off IV fluid as per sepsis protocol due to decompensated heart failure with pulmonary edema and volume overload   Empiric antimicrobials with Rocephin and fluconazole, follow culture results and deescalate as able

## 2023-05-31 NOTE — PLAN OF CARE
[facility]  Initial Discharge Assessment       Primary Care Provider: Primary Doctor No    Admission Diagnosis: Sepsis [A41.9]    Admission Date: 5/30/2023    Discharge assessment completed with patient's son and MILEY Dane, via telephone per patient's request.  Patient is a intermediate resident at [facility] and plans to return there when discharged.  Verified insurance and DME.  Patient uses wheelchair as a walker and to sit when needed.  Patient also uses oxygen.  Facility to arrange transport at discharge.          Payor: [employer] MANAGED MEDICARE / Plan: [employer] HMO PPO SPECIAL NEEDS / Product Type: Medicare Advantage /     Extended Emergency Contact Information  Primary Emergency Contact: Dane Mera   [facility]  Home Phone: [phone]  Mobile Phone: [phone]  Relation: Son  Secondary Emergency Contact: Lyubov Reed  Mobile Phone: [phone]  Relation: Daughter    Discharge Plan A: (P) Return to nursing home  Discharge Plan B: (P) Return to Nursing Home      [facility] [address]  [address] [place] [id number]  Phone: [phone] Fax: [phone]    [facility]/pharmacy #[id number] - [place] - [address]  [address] [address]  Phone: [phone] Fax: [phone]      Initial Assessment (most recent)       Adult Discharge Assessment - 05/31/23 1321          Discharge Assessment    Assessment Type Discharge Planning Assessment (P)      Confirmed/corrected address, phone number and insurance Yes (P)      Confirmed Demographics Correct on Facesheet (P)      Source of Information family;patient (P)      Reason For Admission sepsis (P)      People in Home facility resident (P)      Facility Arrived From: [facility] intermediate (P)      Do you expect to return to your current living situation? Yes (P)      Walking or Climbing Stairs ambulation difficulty, requires equipment (P)      Equipment Currently Used at Home wheelchair;oxygen  (P)      Readmission within 30 days? No (P)      Patient currently being followed by outpatient case management? No (P)      Do you take prescription medications? Yes (P)      Do you have prescription coverage? Yes (P)      Do you have any problems affording any of your prescribed medications? No (P)      Is the patient taking medications as prescribed? yes (P)      Who is going to help you get home at discharge? Facility transport (P)      Are you on dialysis? No (P)      Do you take coumadin? No (P)      Discharge Plan A Return to nursing home (P)      Discharge Plan B Return to Nursing Home (P)      DME Needed Upon Discharge  none (P)      Discharge Plan discussed with: Adult children (P)

## 2023-05-31 NOTE — PT/OT/SLP EVAL
"Occupational Therapy   Evaluation    Name: Jeannie Hutchins  MRN: [id number]  Admitting Diagnosis: Sepsis  Recent Surgery: * No surgery found *      Recommendations:     Discharge Recommendations: nursing facility, skilled  Discharge Equipment Recommendations:   (TBD)  Barriers to discharge:  None    Assessment:     Jeannie Hutchins is a 88 y.o. female with a medical diagnosis of Sepsis.  She presents with c/o R hip pain especially with bed mobility. Patient also with frequent yelling with supine<>sit due to R hip pain. Once seated EOB, patient was more calm and not yelling. Patient was pleasantly confused and tangential with speech requiring verbal cues to remain on task. Patient was apologetic for yelling and agreeable to further participation despite R hip pain. Patient was able to perform grooming tasks at EOB requiring Min A with managing hygiene items in R hand. Patient was oriented only to person and place. Performance deficits affecting function: weakness, impaired endurance, impaired self care skills, impaired functional mobility, gait instability, impaired balance, decreased lower extremity function, decreased upper extremity function, decreased coordination, pain, decreased ROM, impaired cardiopulmonary response to activity.      Rehab Prognosis: Fair; patient would benefit from acute skilled OT services to address these deficits and reach maximum level of function.       Plan:     Patient to be seen 3 x/week to address the above listed problems via self-care/home management, therapeutic activities, therapeutic exercises  Plan of Care Expires: 06/28/23  Plan of Care Reviewed with: patient    Subjective     Chief Complaint: R hip pain  Patient/Family Comments/goals: "I scream a lot...because of the pain in my R leg."    Occupational Profile:  Living Environment: Patient is a resident of [facility] (alf care).   Previous level of function: Patient is a poor historian. Per medical chart, patient was " ambulatory with RW and w/c. Uncertain as to patient's PLOF with ADL independence.   Equipment Used at Home: wheelchair, oxygen  Assistance upon Discharge: Patient will receive assistance from NH staff.    Pain/Comfort:  Pain Rating 1:  (did not rate)  Location - Side 1: Right  Location - Orientation 1: generalized  Location 1: hip  Pain Addressed 1: Reposition, Distraction  Pain Rating Post-Intervention 1:  (did not rate)    Patients cultural, spiritual, Christian conflicts given the current situation: no    Objective:     Communicated with: nurse Sanchez prior to session.  Patient found HOB elevated with bed alarm, blood pressure cuff, pulse ox (continuous), oxygen upon OT entry to room.    General Precautions: Standard, fall  Orthopedic Precautions: N/A  Braces: N/A  Respiratory Status: Nasal cannula, flow 6 L/min    Occupational Performance:    Bed Mobility:    Patient completed Scooting/Bridging with moderate assistance  Patient completed Supine to Sit with moderate assistance  Patient completed Sit to Supine with moderate assistance    Functional Mobility/Transfers:  Not performed due to patient reporting persistent R hip pain  Functional Mobility: Fair (-) with static sitting balance at EOB requiring intermittent Min A.     Activities of Daily Living:  Grooming: minimum assistance with holding cup and basin to mouth due to decreased  strength in R hand  Lower Body Dressing: maximal assistance to don/doff socks while supine in bed  Toileting: dependence with adult brief in place    Cognitive/Visual Perceptual:  Cognitive/Psychosocial Skills:     -       Oriented to: person and place   -       Follows Commands/attention:Follows two-step commands  -       Communication: clear/fluent  -       Safety awareness/insight to disability: impaired   -       Mood/Affect/Coping skills/emotional control: Appropriate to situation and Cooperative  Visual/Perceptual:      -Intact     Physical Exam:  Postural  examination/scapula alignment:    -       Rounded shoulders  -       Forward head  Upper Extremity Range of Motion:     -       Right Upper Extremity: WFL except at shldr  -       Left Upper Extremity: WFL except at shldr  Upper Extremity Strength:    -       Right Upper Extremity: WFL except at shldr  -       Left Upper Extremity: WFL except at shldr   Strength:    -       Right Upper Extremity: 3/5  -       Left Upper Extremity: 4-/5  Fine Motor Coordination:    -       Impaired  due to decreased strength in R hand  Gross motor coordination:   Limited in UE's due to decreased AROM in shoulders at baseline     AMPA 6 Click ADL:  [facility] Total Score: 15    Treatment & Education:  OT ed pt on OT role & POC as well as discharge recommendations.      Patient left HOB elevated with all lines intact, call button in reach, bed alarm on, and nurse notified    GOALS:   Multidisciplinary Problems       Occupational Therapy Goals          Problem: Occupational Therapy    Goal Priority Disciplines Outcome Interventions   Occupational Therapy Goal     OT, PT/OT Ongoing, Progressing    Description: Goals to be met by: 6/28/2023     Patient will increase functional independence with ADLs by performing:    LE Dressing with Stand-by Assistance and Assistive Devices as needed.  Grooming while seated at sink with Stand-by Assistance.  Toileting from bedside commode with Stand-by Assistance for hygiene and clothing management.   Supine to sit with Stand-by Assistance.  Toilet transfer to bedside commode with Stand-by Assistance.                         History:     Past Medical History:   Diagnosis Date    Anticoagulant long-term use     Eliquis    Arthritis     Atrial fibrillation     Blood transfusion     Carotid stenosis     CHF (congestive heart failure)     Edema     Generalized anxiety disorder 1/23/2018    Dr. Mckeon's eval 1/23/18: She does appear to have a JERRI because of the persistent worries that she has.  These  worries predominate her day.  She would probably do well with prevention therapy such as SSRI/SNRI to help control the physical symptoms of the anxiety.  Problem at this point in time is her electrolyte abnormalities.  There is a slight risk of hyponatremia with these medications and    Hearing loss     [place] (hard of hearing)     Hypercholesterolemia     Hypertension     On home oxygen therapy     Psychiatric problem     Thyroid disease          Past Surgical History:   Procedure Laterality Date    CARDIOVERSION  10/19/2020    Procedure: Cardioversion;  Surgeon: Brandon Elias MD;  Location: [facility] CATH LAB;  Service: Cardiology;;    CARPAL TUNNEL RELEASE  2012    right hand    ESOPHAGOGASTRODUODENOSCOPY Left 8/29/2018    Procedure: EGD (ESOPHAGOGASTRODUODENOSCOPY);  Surgeon: Oniel Dorsey MD;  Location: [facility] ENDO;  Service: Endoscopy;  Laterality: Left;    HYSTERECTOMY      left carotid endartectomy  5/2006    TONSILLECTOMY      TREATMENT OF CARDIAC ARRHYTHMIA N/A 10/19/2020    Procedure: Transesophageal echo (BLADIMIR) intra-procedure log documentation;  Surgeon: Brandon Elias MD;  Location: [facility] CATH LAB;  Service: Cardiology;  Laterality: N/A;       Time Tracking:     OT Date of Treatment: 05/31/23  OT Start Time: 0917  OT Stop Time: 0950  OT Total Time (min): 33 min    Billable Minutes:Evaluation 10  Self Care/Home Management 23 5/31/2023

## 2023-05-31 NOTE — PLAN OF CARE
05/30/23 2038   Patient Assessment/Suction   Level of Consciousness (AVPU) responds to voice   Respiratory Effort Unlabored   PRE-TX-O2   Device (Oxygen Therapy) high flow nasal cannula   Flow (L/min) 8   SpO2 100 %   Pulse Oximetry Type Continuous   $ Pulse Oximetry - Multiple Charge Pulse Oximetry - Multiple   Pulse 107   Resp (!) 21   Respiratory Evaluation   $ Care Plan Tech Time 15 min

## 2023-05-31 NOTE — ASSESSMENT & PLAN NOTE
Patient with a recent fall in [email or web address]  She is having increasing pain in her shoulder and hip  Obtain x-rays to rule out fracture

## 2023-05-31 NOTE — PLAN OF CARE
Problem: Adult Inpatient Plan of Care  Goal: Plan of Care Review  Outcome: Ongoing, Progressing  Goal: Patient-Specific Goal (Individualized)  Outcome: Ongoing, Progressing  Goal: Absence of Hospital-Acquired Illness or Injury  Outcome: Ongoing, Progressing  Goal: Optimal Comfort and Wellbeing  Outcome: Ongoing, Progressing  Goal: Readiness for Transition of Care  Outcome: Ongoing, Progressing     Problem: Adjustment to Illness (Sepsis/Septic Shock)  Goal: Optimal Coping  Outcome: Ongoing, Progressing     Problem: Bleeding (Sepsis/Septic Shock)  Goal: Absence of Bleeding  Outcome: Ongoing, Progressing     Problem: Glycemic Control Impaired (Sepsis/Septic Shock)  Goal: Blood Glucose Level Within Desired Range  Outcome: Ongoing, Progressing     Problem: Infection Progression (Sepsis/Septic Shock)  Goal: Absence of Infection Signs and Symptoms  Outcome: Ongoing, Progressing     Problem: Nutrition Impaired (Sepsis/Septic Shock)  Goal: Optimal Nutrition Intake  Outcome: Ongoing, Progressing     Problem: Infection  Goal: Absence of Infection Signs and Symptoms  Outcome: Ongoing, Progressing     Problem: Fall Injury Risk  Goal: Absence of Fall and Fall-Related Injury  Outcome: Ongoing, Progressing     Problem: Skin Injury Risk Increased  Goal: Skin Health and Integrity  Outcome: Ongoing, Progressing     Problem: Impaired Wound Healing  Goal: Optimal Wound Healing  Outcome: Ongoing, Progressing     Problem: Oral Intake Inadequate  Goal: Improved Oral Intake  Outcome: Ongoing, Progressing

## 2023-05-31 NOTE — ASSESSMENT & PLAN NOTE
Patient with known history of persistent atrial fibrillation.   RVR likely driven by acute medical condition including infection/sepsis and CHF.  Telemetry monitoring, treat acute medical condition, continue metoprolol and eliquis  Add titratable diltiazem  Can consult Cardiology if not improving with treatment of her sepsis and rate control

## 2023-05-31 NOTE — SUBJECTIVE & OBJECTIVE
Interval History:  No acute events overnight.  She is in excruciating pain today in her right hip and left shoulder.  Obtain x-rays of these.  AFib with persistent RVR.  Will adjust medication regimen for better rate control.  Continue antibiotics at this time and follow up culture results.  She has recurrent pneumonias and will obtain speech evaluation to rule out aspiration.  Fever curve is improving.    Review of Systems   All other systems reviewed and are negative.  Objective:     Vital Signs (Most Recent):  Temp: 98.1 °F (36.7 °C) (05/31/23 1100)  Pulse: (!) 111 (05/31/23 1600)  Resp: (!) 23 (05/31/23 1653)  BP: 122/70 (05/31/23 1600)  SpO2: 96 % (05/31/23 1600) Vital Signs (24h Range):  Temp:  [98 °F (36.7 °C)-98.1 °F (36.7 °C)] 98.1 °F (36.7 °C)  Pulse:  [] 111  Resp:  [15-37] 23  SpO2:  [19 %-100 %] 96 %  BP: (102-144)/(57-89) 122/70     Weight: 44.5 kg (98 lb)  Body mass index is 21.97 kg/m².    Intake/Output Summary (Last 24 hours) at 5/31/2023 1706  Last data filed at 5/31/2023 1650  Gross per 24 hour   Intake 960 ml   Output --   Net 960 ml         Physical Exam  Constitutional:       Appearance: Normal appearance. She is normal weight.   HENT:      Head: Normocephalic and atraumatic.      Nose: Nose normal.      Mouth/Throat:      Mouth: Mucous membranes are moist.   Eyes:      Conjunctiva/sclera: Conjunctivae normal.      Pupils: Pupils are equal, round, and reactive to light.   Cardiovascular:      Rate and Rhythm: Tachycardia present. Rhythm irregular.      Pulses: Normal pulses.      Heart sounds: Normal heart sounds. No murmur heard.    No friction rub. No gallop.   Pulmonary:      Effort: Pulmonary effort is normal.      Breath sounds: Normal breath sounds. No wheezing or rales.      Comments: 2 L nasal cannula.  Coarse breath sounds.  Abdominal:      General: Abdomen is flat. Bowel sounds are normal. There is no distension.      Palpations: Abdomen is soft.      Tenderness: There is no  abdominal tenderness. There is no guarding.   Musculoskeletal:         General: No swelling. Normal range of motion.      Cervical back: Normal range of motion and neck supple.      Right lower leg: No edema.      Left lower leg: No edema.      Comments: She has pain in her right hip, limited mobility of her left shoulder.   Skin:     General: Skin is warm and dry.   Neurological:      General: No focal deficit present.      Mental Status: She is alert.   Psychiatric:         Mood and Affect: Mood normal.         Thought Content: Thought content normal.         Judgment: Judgment normal.           Significant Labs: All pertinent labs within the past 24 hours have been reviewed.    Significant Imaging: I have reviewed all pertinent imaging results/findings within the past 24 hours.

## 2023-05-31 NOTE — PLAN OF CARE
POC reviewed with pt and daughter; verbalized understanding. Cardizem started at 2mg/hr. Goal is hr <110. Safety maintained throughout the shift.  Problem: Adult Inpatient Plan of Care  Goal: Plan of Care Review  Outcome: Ongoing, Progressing  Goal: Patient-Specific Goal (Individualized)  Outcome: Ongoing, Progressing  Goal: Absence of Hospital-Acquired Illness or Injury  Outcome: Ongoing, Progressing  Goal: Optimal Comfort and Wellbeing  Outcome: Ongoing, Progressing  Goal: Readiness for Transition of Care  Outcome: Ongoing, Progressing     Problem: Adjustment to Illness (Sepsis/Septic Shock)  Goal: Optimal Coping  Outcome: Ongoing, Progressing     Problem: Bleeding (Sepsis/Septic Shock)  Goal: Absence of Bleeding  Outcome: Ongoing, Progressing     Problem: Glycemic Control Impaired (Sepsis/Septic Shock)  Goal: Blood Glucose Level Within Desired Range  Outcome: Ongoing, Progressing     Problem: Infection Progression (Sepsis/Septic Shock)  Goal: Absence of Infection Signs and Symptoms  Outcome: Ongoing, Progressing     Problem: Nutrition Impaired (Sepsis/Septic Shock)  Goal: Optimal Nutrition Intake  Outcome: Ongoing, Progressing     Problem: Infection  Goal: Absence of Infection Signs and Symptoms  Outcome: Ongoing, Progressing     Problem: Fall Injury Risk  Goal: Absence of Fall and Fall-Related Injury  Outcome: Ongoing, Progressing     Problem: Skin Injury Risk Increased  Goal: Skin Health and Integrity  Outcome: Ongoing, Progressing     Problem: Impaired Wound Healing  Goal: Optimal Wound Healing  Outcome: Ongoing, Progressing     Problem: Oral Intake Inadequate  Goal: Improved Oral Intake  Outcome: Ongoing, Progressing

## 2023-06-01 ENCOUNTER — ANESTHESIA (OUTPATIENT)
Dept: CARDIOLOGY | Facility: HOSPITAL | Age: 88
End: 2023-06-01

## 2023-06-01 ENCOUNTER — CLINICAL SUPPORT (OUTPATIENT)
Dept: CARDIOLOGY | Facility: HOSPITAL | Age: 88
DRG: 871 | End: 2023-06-01
Attending: STUDENT IN AN ORGANIZED HEALTH CARE EDUCATION/TRAINING PROGRAM
Payer: MEDICARE

## 2023-06-01 VITALS — BODY MASS INDEX: 22.04 KG/M2 | HEIGHT: 56 IN | WEIGHT: 98 LBS

## 2023-06-01 PROBLEM — S72.001A CLOSED FRACTURE OF RIGHT HIP: Status: ACTIVE | Noted: 2023-06-01

## 2023-06-01 PROBLEM — B37.49 CANDIDURIA: Status: ACTIVE | Noted: 2023-06-01

## 2023-06-01 PROBLEM — S72.001A CLOSED RIGHT HIP FRACTURE: Status: ACTIVE | Noted: 2023-05-31

## 2023-06-01 LAB
ANION GAP SERPL CALC-SCNC: 10 MMOL/L (ref 8–16)
BSA FOR ECHO PROCEDURE: 1.33 M2
BUN SERPL-MCNC: 27 MG/DL (ref 8–23)
CALCIUM SERPL-MCNC: 8.1 MG/DL (ref 8.7–10.5)
CHLORIDE SERPL-SCNC: 94 MMOL/L (ref 95–110)
CO2 SERPL-SCNC: 30 MMOL/L (ref 23–29)
CREAT SERPL-MCNC: 0.8 MG/DL (ref 0.5–1.4)
CRP SERPL-MCNC: 30.98 MG/DL
CV ECHO LV RWT: 0.49 CM
ECHO LV POSTERIOR WALL: 0.84 CM (ref 0.6–1.1)
EJECTION FRACTION: 55 %
ERYTHROCYTE [DISTWIDTH] IN BLOOD BY AUTOMATED COUNT: 25.9 % (ref 11.5–14.5)
EST. GFR  (NO RACE VARIABLE): >60 ML/MIN/1.73 M^2
FRACTIONAL SHORTENING: 34 % (ref 28–44)
GLUCOSE SERPL-MCNC: 122 MG/DL (ref 70–110)
HCT VFR BLD AUTO: 31.1 % (ref 37–48.5)
HGB BLD-MCNC: 9.7 G/DL (ref 12–16)
INTERVENTRICULAR SEPTUM: 0.89 CM (ref 0.6–1.1)
LEFT INTERNAL DIMENSION IN SYSTOLE: 2.28 CM (ref 2.1–4)
LEFT VENTRICLE DIASTOLIC VOLUME INDEX: 37.48 ML/M2
LEFT VENTRICLE DIASTOLIC VOLUME: 49.1 ML
LEFT VENTRICLE MASS INDEX: 63 G/M2
LEFT VENTRICLE SYSTOLIC VOLUME INDEX: 13.5 ML/M2
LEFT VENTRICLE SYSTOLIC VOLUME: 17.7 ML
LEFT VENTRICULAR INTERNAL DIMENSION IN DIASTOLE: 3.45 CM (ref 3.5–6)
LEFT VENTRICULAR MASS: 82.08 G
MAGNESIUM SERPL-MCNC: 1.9 MG/DL (ref 1.6–2.6)
MCH RBC QN AUTO: 25.8 PG (ref 27–31)
MCHC RBC AUTO-ENTMCNC: 31.2 G/DL (ref 32–36)
MCV RBC AUTO: 83 FL (ref 82–98)
PISA TR MAX VEL: 3.69 M/S
PLATELET # BLD AUTO: 236 K/UL (ref 150–450)
PMV BLD AUTO: 8.8 FL (ref 9.2–12.9)
POTASSIUM SERPL-SCNC: 4 MMOL/L (ref 3.5–5.1)
PROCALCITONIN SERPL IA-MCNC: 1.38 NG/ML (ref 0–0.5)
RBC # BLD AUTO: 3.76 M/UL (ref 4–5.4)
SODIUM SERPL-SCNC: 134 MMOL/L (ref 136–145)
TR MAX PG: 54 MMHG
WBC # BLD AUTO: 20.39 K/UL (ref 3.9–12.7)

## 2023-06-01 PROCEDURE — 63600175 PHARM REV CODE 636 W HCPCS: Performed by: STUDENT IN AN ORGANIZED HEALTH CARE EDUCATION/TRAINING PROGRAM

## 2023-06-01 PROCEDURE — 25500020 PHARM REV CODE 255: Performed by: STUDENT IN AN ORGANIZED HEALTH CARE EDUCATION/TRAINING PROGRAM

## 2023-06-01 PROCEDURE — 84145 PROCALCITONIN (PCT): CPT | Performed by: STUDENT IN AN ORGANIZED HEALTH CARE EDUCATION/TRAINING PROGRAM

## 2023-06-01 PROCEDURE — 99900035 HC TECH TIME PER 15 MIN (STAT)

## 2023-06-01 PROCEDURE — 63600175 PHARM REV CODE 636 W HCPCS: Performed by: INTERNAL MEDICINE

## 2023-06-01 PROCEDURE — 97535 SELF CARE MNGMENT TRAINING: CPT

## 2023-06-01 PROCEDURE — 92610 EVALUATE SWALLOWING FUNCTION: CPT

## 2023-06-01 PROCEDURE — 83735 ASSAY OF MAGNESIUM: CPT | Performed by: STUDENT IN AN ORGANIZED HEALTH CARE EDUCATION/TRAINING PROGRAM

## 2023-06-01 PROCEDURE — 99223 1ST HOSP IP/OBS HIGH 75: CPT | Mod: ,,, | Performed by: INTERNAL MEDICINE

## 2023-06-01 PROCEDURE — 92526 ORAL FUNCTION THERAPY: CPT

## 2023-06-01 PROCEDURE — 86140 C-REACTIVE PROTEIN: CPT | Performed by: STUDENT IN AN ORGANIZED HEALTH CARE EDUCATION/TRAINING PROGRAM

## 2023-06-01 PROCEDURE — 94660 CPAP INITIATION&MGMT: CPT

## 2023-06-01 PROCEDURE — 25000003 PHARM REV CODE 250: Performed by: INTERNAL MEDICINE

## 2023-06-01 PROCEDURE — 80048 BASIC METABOLIC PNL TOTAL CA: CPT | Performed by: STUDENT IN AN ORGANIZED HEALTH CARE EDUCATION/TRAINING PROGRAM

## 2023-06-01 PROCEDURE — 25000003 PHARM REV CODE 250

## 2023-06-01 PROCEDURE — 94760 N-INVAS EAR/PLS OXIMETRY 1: CPT

## 2023-06-01 PROCEDURE — 93308 TTE F-UP OR LMTD: CPT | Mod: 26,,, | Performed by: INTERNAL MEDICINE

## 2023-06-01 PROCEDURE — 93308 TTE F-UP OR LMTD: CPT

## 2023-06-01 PROCEDURE — 21000000 HC CCU ICU ROOM CHARGE

## 2023-06-01 PROCEDURE — 85027 COMPLETE CBC AUTOMATED: CPT | Performed by: STUDENT IN AN ORGANIZED HEALTH CARE EDUCATION/TRAINING PROGRAM

## 2023-06-01 PROCEDURE — 99223 PR INITIAL HOSPITAL CARE,LEVL III: ICD-10-PCS | Mod: ,,, | Performed by: INTERNAL MEDICINE

## 2023-06-01 PROCEDURE — 27100171 HC OXYGEN HIGH FLOW UP TO 24 HOURS

## 2023-06-01 PROCEDURE — 93308 ECHO (CUPID ONLY): ICD-10-PCS | Mod: 26,,, | Performed by: INTERNAL MEDICINE

## 2023-06-01 PROCEDURE — 36415 COLL VENOUS BLD VENIPUNCTURE: CPT | Performed by: STUDENT IN AN ORGANIZED HEALTH CARE EDUCATION/TRAINING PROGRAM

## 2023-06-01 PROCEDURE — 94761 N-INVAS EAR/PLS OXIMETRY MLT: CPT

## 2023-06-01 PROCEDURE — 87040 BLOOD CULTURE FOR BACTERIA: CPT | Mod: 59 | Performed by: STUDENT IN AN ORGANIZED HEALTH CARE EDUCATION/TRAINING PROGRAM

## 2023-06-01 PROCEDURE — 25000003 PHARM REV CODE 250: Performed by: STUDENT IN AN ORGANIZED HEALTH CARE EDUCATION/TRAINING PROGRAM

## 2023-06-01 RX ORDER — MORPHINE SULFATE 4 MG/ML
4 INJECTION, SOLUTION INTRAMUSCULAR; INTRAVENOUS
Status: DISCONTINUED | OUTPATIENT
Start: 2023-06-01 | End: 2023-06-07

## 2023-06-01 RX ORDER — HYDROCODONE BITARTRATE AND ACETAMINOPHEN 7.5; 325 MG/1; MG/1
1 TABLET ORAL EVERY 4 HOURS PRN
Status: DISCONTINUED | OUTPATIENT
Start: 2023-06-01 | End: 2023-06-03

## 2023-06-01 RX ORDER — METOPROLOL TARTRATE 50 MG/1
50 TABLET ORAL 2 TIMES DAILY
Status: DISCONTINUED | OUTPATIENT
Start: 2023-06-01 | End: 2023-06-03

## 2023-06-01 RX ORDER — DILTIAZEM HYDROCHLORIDE 60 MG/1
60 TABLET, FILM COATED ORAL EVERY 12 HOURS
Status: DISCONTINUED | OUTPATIENT
Start: 2023-06-01 | End: 2023-06-02

## 2023-06-01 RX ORDER — FUROSEMIDE 10 MG/ML
20 INJECTION INTRAMUSCULAR; INTRAVENOUS DAILY
Status: DISCONTINUED | OUTPATIENT
Start: 2023-06-02 | End: 2023-06-02

## 2023-06-01 RX ORDER — MORPHINE SULFATE 4 MG/ML
4 INJECTION, SOLUTION INTRAMUSCULAR; INTRAVENOUS ONCE
Status: COMPLETED | OUTPATIENT
Start: 2023-06-01 | End: 2023-06-01

## 2023-06-01 RX ADMIN — HYDROCODONE BITARTRATE AND ACETAMINOPHEN 1 TABLET: 7.5; 325 TABLET ORAL at 09:06

## 2023-06-01 RX ADMIN — DILTIAZEM HYDROCHLORIDE 60 MG: 60 TABLET, FILM COATED ORAL at 08:06

## 2023-06-01 RX ADMIN — FAMOTIDINE 20 MG: 20 TABLET ORAL at 08:06

## 2023-06-01 RX ADMIN — ESCITALOPRAM OXALATE 10 MG: 10 TABLET ORAL at 08:06

## 2023-06-01 RX ADMIN — CHLORHEXIDINE GLUCONATE 15 ML: 1.2 RINSE ORAL at 08:06

## 2023-06-01 RX ADMIN — FLUCONAZOLE IN SODIUM CHLORIDE 100 MG: 2 INJECTION, SOLUTION INTRAVENOUS at 02:06

## 2023-06-01 RX ADMIN — LEVOTHYROXINE SODIUM 75 MCG: 0.03 TABLET ORAL at 05:06

## 2023-06-01 RX ADMIN — VANCOMYCIN HYDROCHLORIDE 750 MG: 750 INJECTION, POWDER, LYOPHILIZED, FOR SOLUTION INTRAVENOUS at 10:06

## 2023-06-01 RX ADMIN — MORPHINE SULFATE 4 MG: 4 INJECTION, SOLUTION INTRAMUSCULAR; INTRAVENOUS at 03:06

## 2023-06-01 RX ADMIN — METOPROLOL TARTRATE 50 MG: 50 TABLET, FILM COATED ORAL at 09:06

## 2023-06-01 RX ADMIN — MUPIROCIN 1 G: 20 OINTMENT TOPICAL at 08:06

## 2023-06-01 RX ADMIN — APIXABAN 2.5 MG: 2.5 TABLET, FILM COATED ORAL at 08:06

## 2023-06-01 RX ADMIN — IOHEXOL 100 ML: 350 INJECTION, SOLUTION INTRAVENOUS at 10:06

## 2023-06-01 RX ADMIN — MORPHINE SULFATE 2 MG: 2 INJECTION, SOLUTION INTRAMUSCULAR; INTRAVENOUS at 12:06

## 2023-06-01 RX ADMIN — FUROSEMIDE 40 MG: 10 INJECTION, SOLUTION INTRAVENOUS at 05:06

## 2023-06-01 RX ADMIN — METOPROLOL TARTRATE 50 MG: 50 TABLET, FILM COATED ORAL at 08:06

## 2023-06-01 RX ADMIN — ATORVASTATIN CALCIUM 20 MG: 20 TABLET, FILM COATED ORAL at 08:06

## 2023-06-01 RX ADMIN — TRAZODONE HYDROCHLORIDE 50 MG: 50 TABLET ORAL at 08:06

## 2023-06-01 NOTE — ASSESSMENT & PLAN NOTE
Febrile to 101.2 with WBC count 27 with positive UA, can not out rule superimposed pneumonia   Check CTA chest  White blood cell count remains high  Repeat blood cultures  Blood culture x 4 with MRSA  Urine culture with candiduria   COVID and influenza negative  Continue vancomycin  Echo ordered  Appreciate Dr Shahid's recs

## 2023-06-01 NOTE — ASSESSMENT & PLAN NOTE
Patient with a recent fall in [employer]  She is having increasing pain in her shoulder and hip, xray shows right hip fracture  CT with subcapital fracture of her right hip  Pain control  Ortho consult  Question whether she would benefit from traction.

## 2023-06-01 NOTE — CARE UPDATE
05/31/23 2103   Patient Assessment/Suction   Respiratory Effort Unlabored   Rhythm/Pattern, Respiratory tachypneic   Skin Integrity   $ Wound Care Tech Time 15 min   Area Observed Bridge of nose   Skin Appearance without discoloration   PRE-TX-O2   Device (Oxygen Therapy) BIPAP   Oxygen Concentration (%) 35   SpO2 (!) 94 %   Pulse Oximetry Type Continuous   $ Pulse Oximetry - Multiple Charge Pulse Oximetry - Multiple   Pulse 100   Resp (!) 25   Ready to Wean/Extubation Screen   FIO2<=50 (chart decimal) 0.35   Preset CPAP/BiPAP Settings   Mode Of Delivery BiPAP   Ipap 14   EPAP (cm H2O) 7   Pressure Support (cm H2O) 7   Set Rate (Breaths/Min) 10   ITime (sec) 1   Rise Time (sec) 3   Patient CPAP/BiPAP Settings   RR Total (Breaths/Min) 25   Tidal Volume (mL) 357   VE Minute Ventilation (L/min) 7.8 L/min   Peak Inspiratory Pressure (cm H2O) 14   TiTOT (%) 34   Total Leak (L/Min) 13   Patient Trigger - ST Mode Only (%) 99   CPAP/BiPAP Backup Settings   IPAP Backup 14 cmH2O   EPAP Backup 7 cmH2O   Backup Rate 10 breaths per minute (bpm)   FIO2 Backup 0.35 %   ITIME Backup 1 seconds   Rise Time Backup 3 seconds   CPAP/BiPAP Alarms   High Pressure (cm H2O) 40   Low Pressure (cm H2O) 10   Minute Ventilation (L/Min) 3   High RR (breaths/min) 40   Low RR (breaths/min) 10   Apnea (Sec) 20   Education   $ Education 15 min;BiPAP   Respiratory Evaluation   $ Care Plan Tech Time 15 min   $ Eval/Re-eval Charges Re-evaluation   Evaluation For   (care plan)

## 2023-06-01 NOTE — SUBJECTIVE & OBJECTIVE
Interval History:  She is still in a lot of pain today. Will work on getting better control. MRSA x 4 blood cultures. No source as of yet. Her right hip is fractured. She is on 6L of oxygen despite diuresis. Will need to pursue CT chest.     Review of Systems   All other systems reviewed and are negative.  Objective:     Vital Signs (Most Recent):  Temp: 98.8 °F (37.1 °C) (06/01/23 1101)  Pulse: 99 (06/01/23 1600)  Resp: (!) 22 (06/01/23 1600)  BP: 116/60 (06/01/23 1600)  SpO2: (!) 92 % (06/01/23 1600) Vital Signs (24h Range):  Temp:  [97.2 °F (36.2 °C)-98.9 °F (37.2 °C)] 98.8 °F (37.1 °C)  Pulse:  [] 99  Resp:  [11-34] 22  SpO2:  [91 %-98 %] 92 %  BP: (102-159)/(55-90) 116/60     Weight: 44.5 kg (98 lb)  Body mass index is 21.97 kg/m².    Intake/Output Summary (Last 24 hours) at 6/1/2023 1706  Last data filed at 6/1/2023 1500  Gross per 24 hour   Intake 1310.16 ml   Output --   Net 1310.16 ml           Physical Exam  Constitutional:       Appearance: Normal appearance. She is normal weight.   HENT:      Head: Normocephalic and atraumatic.      Nose: Nose normal.      Mouth/Throat:      Mouth: Mucous membranes are moist.   Eyes:      Conjunctiva/sclera: Conjunctivae normal.      Pupils: Pupils are equal, round, and reactive to light.   Cardiovascular:      Rate and Rhythm: Tachycardia present. Rhythm irregular.      Pulses: Normal pulses.      Heart sounds: Normal heart sounds. No murmur heard.    No friction rub. No gallop.   Pulmonary:      Effort: Pulmonary effort is normal.      Breath sounds: Normal breath sounds. No wheezing or rales.      Comments: 6 L nasal cannula.  Coarse breath sounds.  Abdominal:      General: Abdomen is flat. Bowel sounds are normal. There is no distension.      Palpations: Abdomen is soft.      Tenderness: There is no abdominal tenderness. There is no guarding.   Musculoskeletal:         General: No swelling. Normal range of motion.      Cervical back: Normal range of motion and  neck supple.      Right lower leg: No edema.      Left lower leg: No edema.      Comments: She has pain in her right hip, limited mobility of her left shoulder. Pulses in tact distally   Skin:     General: Skin is warm and dry.   Neurological:      General: No focal deficit present.      Mental Status: She is alert.   Psychiatric:         Mood and Affect: Mood normal.         Thought Content: Thought content normal.         Judgment: Judgment normal.           Significant Labs: All pertinent labs within the past 24 hours have been reviewed.    Significant Imaging: I have reviewed all pertinent imaging results/findings within the past 24 hours.

## 2023-06-01 NOTE — PLAN OF CARE
Problem: SLP  Goal: SLP Goal  Description: 1. Pt will tolerate LRD w/ adequate oral clearance and w/o overt s/s aspiration during >95% of PO intake  2. Pt will utilize swallowing precautions during >95% of PO intake given min cues  Outcome: Ongoing, Progressing     CSE completed. Pt w/ functional swallow; no overt s/s aspiration or pharyngeal dysphagia noted. Pt w/ c/o difficulty masticating tough textures; rec IDDSI 6- soft and bite sized diet w/ thin liquids. Rec direct supervision/assistance w/ meals, as pt w/ cognitive impairments impacting safety/use of swallowing precautions.

## 2023-06-01 NOTE — PT/OT/SLP EVAL
Speech Language Pathology Evaluation  Bedside Swallow    Patient Name:  Jeannie Hutchins   MRN:  [id number]  Admitting Diagnosis: Sepsis    Recommendations:                 General Recommendations:  Dysphagia therapy  Diet recommendations:  Soft & Bite Sized Diet - IDDSI Level 6, Thin liquids - IDDSI Level 0   Aspiration Precautions: Alternating bites/sips, Assistance with meals, Avoid talking while eating, Frequent oral care, HOB to 90 degrees, Small bites/sips, and Wear oxygen during intake   General Precautions: Standard, aspiration  Communication strategies:  go to room if call light pushed    Assessment:     Jeannie Hutchins is a 88 y.o. female with an SLP diagnosis of mild oral Dysphagia.  She presents with decreased lingual ROM and buccal tone; impaired mastication w/ complex textures also noted. Pt's cognitive status impacting safety during meals. Rec IDDSI 6- soft and bite sized diet w/ thin liquids and 1:1 assistance during meals for adequate use of precautions. ST will f/u re diet tolerance and use of swallowing precautions.    History:     Past Medical History:   Diagnosis Date    Anticoagulant long-term use     Eliquis    Arthritis     Atrial fibrillation     Blood transfusion     Carotid stenosis     CHF (congestive heart failure)     Edema     Generalized anxiety disorder 1/23/2018    Dr. Mckeon's eval 1/23/18: She does appear to have a JERRI because of the persistent worries that she has.  These worries predominate her day.  She would probably do well with prevention therapy such as SSRI/SNRI to help control the physical symptoms of the anxiety.  Problem at this point in time is her electrolyte abnormalities.  There is a slight risk of hyponatremia with these medications and    Hearing loss     [place] (hard of hearing)     Hypercholesterolemia     Hypertension     On home oxygen therapy     Psychiatric problem     Thyroid disease        Past Surgical History:   Procedure Laterality Date    CARDIOVERSION   "10/19/2020    Procedure: Cardioversion;  Surgeon: Brandon Elias MD;  Location: [facility] CATH LAB;  Service: Cardiology;;    CARPAL TUNNEL RELEASE  2012    right hand    ESOPHAGOGASTRODUODENOSCOPY Left 8/29/2018    Procedure: EGD (ESOPHAGOGASTRODUODENOSCOPY);  Surgeon: Oniel Dorsey MD;  Location: [facility] ENDO;  Service: Endoscopy;  Laterality: Left;    HYSTERECTOMY      left carotid endartectomy  5/2006    TONSILLECTOMY      TREATMENT OF CARDIAC ARRHYTHMIA N/A 10/19/2020    Procedure: Transesophageal echo (BLADIMIR) intra-procedure log documentation;  Surgeon: Brandon Elias MD;  Location: [facility] CATH LAB;  Service: Cardiology;  Laterality: N/A;       Social History: Patient lives at [place].    Prior Intubation HX:  none this admit    Modified Barium Swallow: none found in epic or reported by pt      Chest X-Rays:   Imaging Results              X-Ray Chest AP Portable (Final result)  Result time 05/30/23 08:49:44      Final result by Nikolai Sparks MD (05/30/23 08:49:44)                   Narrative:    Reason: Chest Pain    FINDINGS:    Portable chest with comparison chest x-ray April 22, 2023 show enlarged cardiomediastinal silhouette.  Bilateral perihilar interstitial and hazy lung opacities are noted. Pulmonary vasculature is normal. No acute osseous abnormality.    IMPRESSION:    Enlarged cardiomediastinal silhouette with bilateral perihilar interstitial and hazy lung opacities likely reflects CHF with pulmonary edema.    Electronically signed by:  Nikolai Sparks DO  5/30/2023 8:49 AM CDT Workstation: [id number]                                      Prior diet: regular, thin; avoids dry textures and reports her daughter feeds her most times and uses small bites.    Occupation/hobbies/homemaking: none stated.    Subjective     Pt awake in bed finishing OT session. Agreeable to CSE.  Patient goals: "Can I have some water please"     Pain/Comfort:       Respiratory Status: High flow, flow 6 L/min, concentration  " %    Objective:   Pt awake and alert to person and time only. Responses inconsistently appropriate; she also required repetition and models for following directives. Pt reports she lives on the [facility], though when asked if she lived at [place] she recalled that she does in fact live at [place] and not at her home on the [facility]. She reports that her daughter mostly feeds her w/ use of small bites, but she sometimes has to feed herself. She also reports that her family has to remind her to stop talking during meals.    Oral Musculature Evaluation  Oral Musculature: WFL  Dentition: present and adequate, teeth in poor condition  Secretion Management: adequate  Mucosal Quality: adequate  Mandibular Strength and Mobility: WFL  Oral Labial Strength and Mobility: WFL  Lingual Strength and Mobility: other (see comments) (decreased lingual ROM)  Velar Elevation: WFL  Buccal Strength and Mobility: decreased tone  Volitional Cough: elicited; adequate  Volitional Swallow: palpated; adequate larybgeal elevation/excursion  Voice Prior to PO Intake: clear    Bedside Swallow Eval:   Consistencies Assessed:  Thin liquids water via cup edge and straw  Puree tsp bites pudding  Mixed consistencies tsp bites diced peaches in thin juice  Solids small bites of dry cracker      Oral Phase:   Prolonged mastication w/ cracker  Poor oral acceptance w/ tsp bites pudding    Pharyngeal Phase:   no overt clinical signs/symptoms of aspiration  no overt clinical signs/symptoms of pharyngeal dysphagia    Compensatory Strategies  Liquid wash to clear cracker    Treatment: Pt educated re purpose of CSE, role of SLP, results of CSE, diet recs, and swallowing precautions. Increased ed re swallowing and respiratory coordination, aspiration risks, and necessary precautions to reduce aspiration risks. Pt expressed understanding of recs. Recs shared w/ MD and nursing.      Goals:   Multidisciplinary Problems       SLP Goals          Problem: SLP     Goal Priority Disciplines Outcome   SLP Goal     SLP Ongoing, Progressing   Description: 1. Pt will tolerate LRD w/ adequate oral clearance and w/o overt s/s aspiration during >95% of PO intake  2. Pt will utilize swallowing precautions during >95% of PO intake given min cues                       Plan:     Patient to be seen:  3 x/week, 2 x/week   Plan of Care expires:     Plan of Care reviewed with:  patient, other (see comments) (nursing, MD)   SLP Follow-Up:  Yes       Discharge recommendations:      Barriers to Discharge:  None    Time Tracking:     SLP Treatment Date:   06/01/23  Speech Start Time:  1009  Speech Stop Time:  1036     Speech Total Time (min):  27 min    Billable Minutes: Treatment Swallowing Dysfunction 8 min and Eval Swallow and Oral Function 19 min    06/01/2023

## 2023-06-01 NOTE — PT/OT/SLP PROGRESS
"Occupational Therapy   Treatment    Name: Jeannie Hutchins  MRN: [id number]  Admitting Diagnosis:  Sepsis       Recommendations:     Discharge Recommendations: nursing facility, skilled  Discharge Equipment Recommendations:   (TBD)  Barriers to discharge:  None    Assessment:     Jeannie Hutchins is a 88 y.o. female with a medical diagnosis of Sepsis.  Patient still pleasantly confused, but agreeable to participation. Patient still yelling intermittently due to R hip pain when being repositioned higher in bed. Patient continues to require verbal cues to remain on task. Performance deficits affecting function are weakness, impaired endurance, impaired self care skills, impaired functional mobility, gait instability, impaired balance, decreased lower extremity function, decreased coordination, pain, decreased ROM, impaired cardiopulmonary response to activity.     Rehab Prognosis:  Fair; patient would benefit from acute skilled OT services to address these deficits and reach maximum level of function.       Plan:     Patient to be seen 3 x/week to address the above listed problems via self-care/home management, therapeutic activities, therapeutic exercises  Plan of Care Expires: 06/28/23  Plan of Care Reviewed with: patient    Subjective     Chief Complaint: R hip pain  Patient/Family Comments/goals: "I feel so bad for yelling so much."  Pain/Comfort:  Pain Rating 1:  (did not rate)  Location - Side 1: Right  Location - Orientation 1: generalized  Location 1: hip  Pain Addressed 1: Reposition, Distraction  Pain Rating Post-Intervention 1:  (not rated)    Objective:     Communicated with: nurse Mont[place] prior to session.  Patient found HOB elevated with bed alarm, blood pressure cuff, oxygen, pulse ox (continuous), telemetry upon OT entry to room.    General Precautions: Standard, fall    Orthopedic Precautions:N/A  Braces: N/A  Respiratory Status: Nasal cannula, flow 6 L/min     Occupational Performance:     Bed Mobility:  "   Patient completed Scooting/Bridging with total assistance using draw sheet to reposition patient higher in bed    Activities of Daily Living:  Grooming: stand by assistance with oral and facial hygiene with HOB raised.   Toileting: dependence with adult brief donned      AMPA 6 Click ADL:      Treatment & Education:  OT ed pt on use of pursed lip breathing & activity pacing  to prevent SOB & fatigue with activity.      Patient left HOB elevated with all lines intact, call button in reach, bed alarm on, and sitter present    GOALS:   Multidisciplinary Problems       Occupational Therapy Goals          Problem: Occupational Therapy    Goal Priority Disciplines Outcome Interventions   Occupational Therapy Goal     OT, PT/OT Ongoing, Progressing    Description: Goals to be met by: 6/28/2023     Patient will increase functional independence with ADLs by performing:    LE Dressing with Stand-by Assistance and Assistive Devices as needed.  Grooming while seated at sink with Stand-by Assistance.  Toileting from bedside commode with Stand-by Assistance for hygiene and clothing management.   Supine to sit with Stand-by Assistance.  Toilet transfer to bedside commode with Stand-by Assistance.                         Time Tracking:     OT Date of Treatment: 06/01/23  OT Start Time: 0945  OT Stop Time: 1012  OT Total Time (min): 27 min    Billable Minutes:Self Care/Home Management 27    OT/VICKI: OT          6/1/2023

## 2023-06-01 NOTE — ASSESSMENT & PLAN NOTE
UA positive, many yeast, history of UTIs in the past  Urine culture with many yeast   Continue fluconazole

## 2023-06-01 NOTE — ASSESSMENT & PLAN NOTE
Admits to shortness of breath with productive cough   Chest x-ray with pulmonary edema with elevated BNP at 1905  Previous echo with EF of 65%, diastolic dysfunction consistent with AFib  Careful with diuresis  Strict I/O daily weights, oral fluid and sodium restriction

## 2023-06-01 NOTE — PT/OT/SLP PROGRESS
Physical Therapy      Patient Name:  Jeannie Hutchins   MRN:  [id number]    Patient not seen today secondary to  (CT scan). Will follow-up 6/2/2023.

## 2023-06-01 NOTE — PROGRESS NOTES
[facility] Medicine  Progress Note    Patient Name: Jeannie Hutchins  MRN: [id number]  Patient Class: IP- Inpatient   Admission Date: 5/30/2023  Length of Stay: 2 days  Attending Physician: Yosvany Vu MD  Primary Care Provider: Primary Doctor No        Subjective:     Principal Problem:Sepsis        HPI:  Ms. Hutchins is an 88-year-old female who was brought to the ED via EMS from [facility] due to tachycardia.  Collateral is obtained from daughter present at bedside.  Patient was initially discharged to nursing facility about 2 years ago, under hospice, but has subsequently improved and is off same.  This morning reportedly heart rate greater than 200, staff was preparing to administer metoprolol, prior to this noted to be staring off.  As per daughter oxygen level was also low, she is chronically on 2 L supplemental oxygen.  States her mother was incoherent during this time.  Patient states she was feeling unwell during this episode, lightheaded, short of breath, producing phlegm.  Denies any sore throat, fever at the facility, nausea, vomiting, diarrhea, cystitis symptoms.  She had a fall several days ago as per report, was found on floor.  Does have ulcer to the right lateral leg, daughter states she sustained few weeks ago as injury from her wheelchair.  Daughter states with previous UTI she had similar symptoms.  In the ED febrile to 101.2, heart rates in the 120s, AFib, blood pressure stable, on 10 L supplemental oxygen.  Labs with WBC 27, hemoglobin 10.3, BUN/creatinine 18/0.9, BNP 1 905, high sensitivity troponin 20, UA with 63 WBC with 3+ leukocyte with many seen.  EKG confirming atrial fibrillation with RVR.  Chest x-ray with cardiomegaly with bilateral interstitial opacities concerning for pulmonary edema.  She received 1 g acetaminophen, 1 g Rocephin, 100 mg fluconazole, 20 mg IV Lasix, 4 mg IV Zofran.  Case discussed with ED provider who requested admission.   Plan of care discussed with patient as well as daughter present at bedside.  Patient has advanced directive, confirmed DNR.      Overview/Hospital Course:  No notes on file    Interval History:  She is still in a lot of pain today. Will work on getting better control. MRSA x 4 blood cultures. No source as of yet. Her right hip is fractured. She is on 6L of oxygen despite diuresis. Will need to pursue CT chest.     Review of Systems   All other systems reviewed and are negative.  Objective:     Vital Signs (Most Recent):  Temp: 98.8 °F (37.1 °C) (06/01/23 1101)  Pulse: 99 (06/01/23 1600)  Resp: (!) 22 (06/01/23 1600)  BP: 116/60 (06/01/23 1600)  SpO2: (!) 92 % (06/01/23 1600) Vital Signs (24h Range):  Temp:  [97.2 °F (36.2 °C)-98.9 °F (37.2 °C)] 98.8 °F (37.1 °C)  Pulse:  [] 99  Resp:  [11-34] 22  SpO2:  [91 %-98 %] 92 %  BP: (102-159)/(55-90) 116/60     Weight: 44.5 kg (98 lb)  Body mass index is 21.97 kg/m².    Intake/Output Summary (Last 24 hours) at 6/1/2023 1706  Last data filed at 6/1/2023 1500  Gross per 24 hour   Intake 1310.16 ml   Output --   Net 1310.16 ml           Physical Exam  Constitutional:       Appearance: Normal appearance. She is normal weight.   HENT:      Head: Normocephalic and atraumatic.      Nose: Nose normal.      Mouth/Throat:      Mouth: Mucous membranes are moist.   Eyes:      Conjunctiva/sclera: Conjunctivae normal.      Pupils: Pupils are equal, round, and reactive to light.   Cardiovascular:      Rate and Rhythm: Tachycardia present. Rhythm irregular.      Pulses: Normal pulses.      Heart sounds: Normal heart sounds. No murmur heard.    No friction rub. No gallop.   Pulmonary:      Effort: Pulmonary effort is normal.      Breath sounds: Normal breath sounds. No wheezing or rales.      Comments: 6 L nasal cannula.  Coarse breath sounds.  Abdominal:      General: Abdomen is flat. Bowel sounds are normal. There is no distension.      Palpations: Abdomen is soft.      Tenderness:  There is no abdominal tenderness. There is no guarding.   Musculoskeletal:         General: No swelling. Normal range of motion.      Cervical back: Normal range of motion and neck supple.      Right lower leg: No edema.      Left lower leg: No edema.      Comments: She has pain in her right hip, limited mobility of her left shoulder. Pulses in tact distally   Skin:     General: Skin is warm and dry.   Neurological:      General: No focal deficit present.      Mental Status: She is alert.   Psychiatric:         Mood and Affect: Mood normal.         Thought Content: Thought content normal.         Judgment: Judgment normal.           Significant Labs: All pertinent labs within the past 24 hours have been reviewed.    Significant Imaging: I have reviewed all pertinent imaging results/findings within the past 24 hours.      Assessment/Plan:      * MRSA Bacteremia  Febrile to 101.2 with WBC count 27 with positive UA, can not out rule superimposed pneumonia   Check CTA chest  White blood cell count remains high  Repeat blood cultures  Blood culture x 4 with MRSA  Urine culture with candiduria   COVID and influenza negative  Continue vancomycin  Echo ordered  Appreciate Dr Shahid's recs    UTI (urinary tract infection)  UA positive, many yeast, history of UTIs in the past  Urine culture with many yeast   Continue fluconazole      Atrial fibrillation with RVR with known history of a fib  Patient with known history of persistent atrial fibrillation.   RVR likely driven by acute medical condition including infection/sepsis and CHF.  Telemetry monitoring, treat acute medical condition, continue metoprolol and eliquis  Add titratable diltiazem  Added oral diltiazem  Titrate metoprolol as well  HR better controlled  Can consult Cardiology if not improving with treatment of her sepsis and rate control      Acute on chronic respiratory failure with hypoxia  Chronically on 2 L supplemental oxygen   In the ED up to 10 L, likely  due to pulmonary edema/decompensated heart failure, can not out rule underlying pneumonia   Continue supplemental oxygen, adjust as needed  Treating heart failure with diuresis, will treat empirically for pneumonia with antibiotics  Obtain CTA chest    Acute on chronic diastolic heart failure  Admits to shortness of breath with productive cough   Chest x-ray with pulmonary edema with elevated BNP at 1905  Previous echo with EF of 65%, diastolic dysfunction consistent with AFib  Careful with diuresis  Strict I/O daily weights, oral fluid and sodium restriction      Closed right hip fracture after fall  Patient with a recent fall in [facility]  She is having increasing pain in her shoulder and hip, xray shows right hip fracture  CT with subcapital fracture of her right hip  Pain control  Ortho consult  Question whether she would benefit from traction.       Anemia  Chronic anemia, no evidence of bleeding, monitoring      Ulcer of right leg  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, sustained few weeks ago from wheelchair   On examination no obvious signs of superimposed infection   Wound Care consulted      Hypothyroidism  Chronic medical condition continue levothyroxine      Troponin I above reference range  Likely in the setting of AFib RVR, CHF and infection, trend for completeness      Essential hypertension  Hold home amlodipine in the setting of infection and RVR to allow for titration of medication      DNR (do not resuscitate)  Has advanced directive and confirmed with daughter, DNR      Mixed hyperlipidemia  Chronic medical condition      Nursing home resident  Resident at [facility]      Pulmonary hypertension  Last echo with PASP 54        VTE Risk Mitigation (From admission, onward)         Ordered     IP VTE HIGH RISK PATIENT  Once         05/30/23 1220     Place sequential compression device  Until discontinued         05/30/23 1220                Discharge Planning   JULIET: 6/8/2023     Code  Status: DNR   Is the patient medically ready for discharge?:     Reason for patient still in hospital (select all that apply): Treatment  Discharge Plan A: Return to nursing home                  Yosvany Vu MD  Department of Hospital Medicine   [facility]

## 2023-06-01 NOTE — PROGRESS NOTES
Pharmacokinetic Initial Assessment: IV Vancomycin    Assessment/Plan:    Initiate intravenous vancomycin with loading dose of 750 mg once followed by a maintenance dose of vancomycin 750 mg IV every 24 hours  Desired empiric serum trough concentration is 15 to 20 mcg/mL  Draw vancomycin trough level 60 min prior to second dose on 06/02/23 at approximately 0830  Pharmacy will continue to follow and monitor vancomycin.      Please contact pharmacy at extension [phone] with any questions regarding this assessment.     Thank you for the consult,   John Leticia       Patient brief summary:  Jeannie Hutchins is a 88 y.o. female initiated on antimicrobial therapy with IV Vancomycin for treatment of suspected bacteremia    Drug Allergies:   Review of patient's allergies indicates:   Allergen Reactions    Ava Swelling     Tongue swells up and a generalized rash.  Patient reports allergy to all Berries    Hydrochlorothiazide Other (See Comments)     hyponatremia    Lisinopril Other (See Comments)     Cough       Actual Body Weight:   44.5 kg    Renal Function:   Estimated Creatinine Clearance: 39.2 mL/min (based on SCr of 0.7 mg/dL).,     Dialysis Method (if applicable):  N/A    CBC (last 72 hours):  Recent Labs   Lab Result Units 05/30/23  0832 05/31/23  0440   WBC K/uL 27.32* 20.84*   Hemoglobin g/dL 10.3* 9.8*   Hematocrit % 32.8* 30.6*   Platelets K/uL 292 253   Gran % % 91.6* 87.7*   Lymph % % 2.3* 2.4*   Mono % % 5.0 9.3   Eosinophil % % 0.0 0.0   Basophil % % 0.1 0.0   Differential Method  Automated Automated       Metabolic Panel (last 72 hours):  Recent Labs   Lab Result Units 05/30/23  0832 05/30/23  0909 05/31/23  0440 05/31/23  1131   Sodium mmol/L 134*  --  140  --    Potassium mmol/L 4.1  --  3.2* 4.6   Chloride mmol/L 98  --  99  --    CO2 mmol/L 24  --  29  --    Glucose mg/dL 174*  --  113*  --    Glucose, UA   --  Negative  --   --    BUN mg/dL 18  --  17  --    Creatinine mg/dL 0.9  --  0.7  --    Albumin  g/dL 3.4*  --  3.1*  --    Total Bilirubin mg/dL 1.8*  --  1.7*  --    Alkaline Phosphatase U/L 96  --  86  --    AST U/L 24  --  17  --    ALT U/L 39  --  30  --    Magnesium mg/dL 1.8  --  2.0  --        Drug levels (last 3 results):  No results for input(s): VANCOMYCINRA, VANCORANDOM, VANCOMYCINPE, VANCOPEAK, VANCOMYCINTR, VANCOTROUGH in the last 72 hours.    Microbiologic Results:  Microbiology Results (last 7 days)       Procedure Component Value Units Date/Time    Blood culture [[id number]]     Order Status: Sent Specimen: Blood     Blood culture [[id number]]     Order Status: Sent Specimen: Blood     Blood culture [[id number]]  (Abnormal) Collected: 05/30/23 1115    Order Status: Completed Specimen: Blood Updated: 06/01/23 0734     Blood Culture, Routine Gram stain shirley bottle: Gram positive cocci      Positive results previously called 05/31/2023  03:29 KS3      Gram stain aer bottle: Gram positive cocci      Positive results previously called      METHICILLIN RESISTANT STAPHYLOCOCCUS AUREUS  For susceptibility see order #[id number]      Blood culture [[id number]]  (Abnormal) Collected: 05/30/23 1130    Order Status: Completed Specimen: Blood Updated: 06/01/23 0732     Blood Culture, Routine Gram stain shirley bottle: Gram positive cocci      Results called to and read back by: Triston Brown RN on [address]wing      05/31/2023  02:32 KS3      Gram stain aer bottle: Gram positive cocci      Positive results previously called 05/31/2023  04:59 KS3      METHICILLIN RESISTANT STAPHYLOCOCCUS AUREUS  Susceptibility pending      Urine culture [[id number]]  (Abnormal) Collected: 05/30/23 0909    Order Status: Completed Specimen: Urine from Clean Catch Updated: 05/31/23 0858     Urine Culture, Routine YEAST   > 100,000 cfu/ml  identification pending      Rapid Organism ID by PCR (from Blood culture) [[id number]]  (Abnormal) Collected: 05/30/23 1130    Order Status: Completed Updated: 05/31/23 0348     Enterococcus faecalis Not  Detected     Enterococcus faecium Not Detected     Listeria monocytogenes Not Detected     Staphylococcus spp. See species for ID     Staphylococcus aureus Detected     Staphylococcus epidermidis Not Detected     Staphylococcus lugdunensis Not Detected     Streptococcus species Not Detected     Streptococcus agalactiae Not Detected     Streptococcus pneumoniae Not Detected     Streptococcus pyogenes Not Detected     Acinetobacter calcoaceticus/baumannii complex Not Detected     Bacteroides fragilis Not Detected     Enterobacterales Not Detected     Enterobacter cloacae complex Not Detected     Escherichia coli Not Detected     Klebsiella aerogenes Not Detected     Klebsiella oxytoca Not Detected     Klebsiella pneumoniae group Not Detected     Proteus Not Detected     Salmonella sp Not Detected     Serratia marcescens Not Detected     Haemophilus influenzae Not Detected     Neisseria meningtidis Not Detected     Pseudomonas aeruginosa Not Detected     Stenotrophomonas maltophilia Not Detected     Candida albicans Not Detected     Candida auris Not Detected     Candida glabrata Not Detected     Candida krusei Not Detected     Candida parapsilosis Not Detected     Candida tropicalis Not Detected     Cryptococcus neoformans/gattii Not Detected     CTX-M (ESBL ) Test not applicable     IMP (Carbapenem resistant) Test not applicable     KPC resistance gene (Carbapenem resistant) Test not applicable     mcr-1  Test not applicable     mec A/C  Test not applicable     mec A/C and MREJ (MRSA) gene Detected     Comment: Resistance gene critical result(s) called and verbal readback   obtained from Mylene Mullen RN on [address]wing by KS3 05/31/2023 03:47          NDM (Carbapenem resistant) Test not applicable     OXA-48-like (Carbapenem resistant) Test not applicable     van A/B (VRE gene) Test not applicable     VIM (Carbapenem resistant) Test not applicable    Narrative:      Resistance gene critical result(s) called  and verbal readback   obtained from Mylene Mullen RN on [address]wing by KS3 05/31/2023 03:47    MRSA Screen by PCR [[id number]] Collected: 05/30/23 1105    Order Status: Completed Specimen: Nasopharyngeal Swab from Nasal Updated: 05/30/23 1256     MRSA SCREEN BY PCR Negative    Culture, Respiratory with Gram Stain [[id number]]     Order Status: No result Specimen: Respiratory from Sputum, Expectorated     Blood culture [[id number]]     Order Status: Canceled Specimen: Blood     Urine Culture High Risk [[id number]]     Order Status: Completed Specimen: Urine

## 2023-06-01 NOTE — CONSULTS
Consult Note  Infectious Disease    Reason for Consult:  MRSA bacteremia    HPI: Jeannie Hutchins is a 88 y.o. female Nursing home resident With a history of diastolic heart failure, oxygen dependence, atrial fibrillation with recent 6 day stay in late April for worsening oxygen requirement, right lower extremity cellulitis associated with an injury (where she hit her lower leg on the wheelchair).  She was given oxygen support, azithromycin, ceftriaxone and Lasix, required intensive care on admission, followed by steroids and doxycycline for lungs and leg.    She presented to the emergency room on 05/30 with tachycardia, generalized weakness and a syncopal episode.  She had had a fall last week with x-rays done at the nursing facility which were negative.  She denies feeling ill prior to the fall and relates this to not donning her slippers before trying to move around.  Her heart rate was found to be 220, in atrial fibrillation and with a temperature of 101.7°.  Workup included CBC with white blood cells 27,000, BNP 1900, urinalysis with 63 white blood cells and many yeast, chest x-ray with pulmonary edema and on admission was placed on ceftriaxone and fluconazole.  The wound on the right lower leg is largely healed, she does have a bruise about the right hip.  She was noted to have pain in the shoulder and hip and x-rays were ordered with findings of severe glenohumeral and acromioclavicular osteoarthrosis and a suspected right femoral neck fracture with a CT scan showing a subcapital fracture and cortical offset along the inferior edge of the femoral head/neck junction.  Fortunately there is no sign of hemarthrosis or increased joint fluid.  The right iliacus muscle is edematous or enlarged compared to the left. There may be a tiny bit of fluid in her trochanteric.  Blood cultures from 05/30 became positive this morning with GPC identified by [employer] is MRSA. Repeat blood cultures have been drawn.  The urine culture  "is growing only yeast("clean catch"). (MRSA screen on admission was negative).  Ceftriaxone was stopped and vancomycin was ordered.  Fever has resolved. WBC remain elevated. Still on diltiazem drip and requiring 6 liters of oxygen. Urine output is not being measured as she is incontinent.     Review of patient's allergies indicates:   Allergen Reactions    Strawberry Swelling     Tongue swells up and a generalized rash.  Patient reports allergy to all Berries    Hydrochlorothiazide Other (See Comments)     hyponatremia    Lisinopril Other (See Comments)     Cough     Past Medical History:   Diagnosis Date    Anticoagulant long-term use     Eliquis    Arthritis     Atrial fibrillation     Blood transfusion     Carotid stenosis     CHF (congestive heart failure)     Edema     Generalized anxiety disorder 1/23/2018    Dr. Mckeon's eval 1/23/18: She does appear to have a JERRI because of the persistent worries that she has.  These worries predominate her day.  She would probably do well with prevention therapy such as SSRI/SNRI to help control the physical symptoms of the anxiety.  Problem at this point in time is her electrolyte abnormalities.  There is a slight risk of hyponatremia with these medications and    Hearing loss     [place] (hard of hearing)     Hypercholesterolemia     Hypertension     On home oxygen therapy     Psychiatric problem     Thyroid disease      Past Surgical History:   Procedure Laterality Date    CARDIOVERSION  10/19/2020    Procedure: Cardioversion;  Surgeon: Brandon Elias MD;  Location: [facility] CATH LAB;  Service: Cardiology;;    CARPAL TUNNEL RELEASE  2012    right hand    ESOPHAGOGASTRODUODENOSCOPY Left 8/29/2018    Procedure: EGD (ESOPHAGOGASTRODUODENOSCOPY);  Surgeon: Oniel Dorsey MD;  Location: [facility] ENDO;  Service: Endoscopy;  Laterality: Left;    HYSTERECTOMY      left carotid endartectomy  5/2006    TONSILLECTOMY      TREATMENT OF CARDIAC ARRHYTHMIA N/A " 10/19/2020    Procedure: Transesophageal echo (BLADIMIR) intra-procedure log documentation;  Surgeon: Brandon Elias MD;  [facility] CATH LAB;  Service: Cardiology;  Laterality: N/A;     Social History     Socioeconomic History    Marital status:    Tobacco Use    Smoking status: Never    Smokeless tobacco: Never   Substance and Sexual Activity    Alcohol use: No    Drug use: No    Sexual activity: Not Currently     Partners: Male   Social History Narrative     since 1984    He is retired.  He is one year older.  Worked for [employer]    She is retired.  Worked for [employer].     Social Determinants of Health     Financial Resource Strain: Low Risk     Difficulty of Paying Living Expenses: Not very hard   Food Insecurity: No Food Insecurity    Worried About Running Out of Food in the Last Year: Never true    Ran Out of Food in the Last Year: Never true   Transportation Needs: No Transportation Needs    Lack of Transportation (Medical): No    Lack of Transportation (Non-Medical): No   Physical Activity: Inactive    Days of Exercise per Week: 0 days    Minutes of Exercise per Session: 0 min   Stress: No Stress Concern Present    Feeling of Stress : Only a little   Social Connections: Socially Isolated    Frequency of Communication with Friends and Family: Once a week    Frequency of Social Gatherings with Friends and Family: Once a week    Attends Mu-ism Services: More than 4 times per year    Active Member of Clubs or Organizations: No    Attends Club or Organization Meetings: Never    Marital Status:    Housing Stability: Unknown    Unable to Pay for Housing in the Last Year: No    Unstable Housing in the Last Year: No     Family History   Problem Relation Age of Onset    Heart disease Father     Cancer Brother 65        bladder    Cancer Sister 81        Ovarian    Ovarian cancer Sister 81    Breast cancer Daughter 50        Status post  mastectomy    Cancer Sister         Lung.  Smoker    Cancer Sister         Lung.  Smoker    Schizophrenia Son          Review of Systems:  Her ability to give a history is very impaired  No chills, fever, sweats,    No change in vision, loss of vision    No sinus congestion, purulent nasal discharge, post nasal drip or facial pain  No pain in mouth or throat. No problems with teeth, gums.  No chest pain,    minimal cough, minimal sputum production, mild increase in usual shortness of breath, no pleurisy, hemoptysis  No nausea, vomiting, diarrhea, but CT looks like constipation, and no focal abd pain,  No dysphagia, odynophagia and was evaluated by speech therapy  Incontinent   No unusual headaches,    No anxiety, depression,    No diabetes,    No bleeding, lymphadenopathy,   malignancy, unusual bruising  No new rashes . She does endorse prior STaph infections, perhaps >3 years ago.  She did get steroids one month ago  Outdoor activities: NH resident  Travel: n/a  Implants:   Antibiotic History: see HPI    EXAM & DIAGNOSTICS REVIEWED:   Vitals:     Temp:  [97.2 °F (36.2 °C)-98.9 °F (37.2 °C)]   Temp: 98.8 °F (37.1 °C) (06/01/23 1101)  Pulse: 91 (06/01/23 1400)  Resp: (!) 22 (06/01/23 1400)  BP: 126/67 (06/01/23 1400)  SpO2: 96 % (06/01/23 1400)    Intake/Output Summary (Last 24 hours) at 6/1/2023 1420  Last data filed at 6/1/2023 1100  Gross per 24 hour   Intake 1220.16 ml   Output --   Net 1220.16 ml       General:  In NAD. Alert and attentive, cooperative, comfortable until her legs are touched  Eyes:  Anicteric, PERRL, EOMI, no scleral or conjunctival petechiae  ENT:  No ulcers, exudates, thrush, nares patent, dentition is fair. Hard of hearing  Neck:  supple, no masses or adenopathy appreciated  Lungs: Fine crackles throughout  Heart:  iRRR,   Abd:  Soft, NT, ND, normal BS, no masses or organomegaly appreciated.  :  Voids  no flank tenderness  Musc:  Joints without effusion, swelling, erythema, synovitis,  but cries out when feet, knees etc are touched or moved   Skin:  No rashes. No palmar or plantar lesions. No subungual petechiae  Neuro:             Alert, attentive, speech fluent, face symmetric, moves all extremities, but LE are painful to move. Even the left which had no injury  Psych: Calm, cooperative  Lymphatic:     No cervical, supraclavicular,  nodes  Extrem: No edema, erythema, phlebitis, cellulitis, warm and well perfused  VAD:  peripheral     Isolation:  contact  Wound: Right lower lateral leg prior ulcer is healed    Right hip      Lab Results   Component Value Date    PROCAL 1.38 (H) 06/01/2023    PROCAL <0.05 04/22/2023    PROCAL 0.30 04/21/2023         General Labs reviewed:  Recent Labs   Lab 05/30/23  0832 05/31/23  0440 06/01/23  0859   WBC 27.32* 20.84* 20.39*   HGB 10.3* 9.8* 9.7*   HCT 32.8* 30.6* 31.1*    253 236       Recent Labs   Lab 05/30/23  0832 05/31/23  0440 05/31/23  1131 06/01/23  0859   * 140  --  134*   K 4.1 3.2* 4.6 4.0   CL 98 99  --  94*   CO2 24 29  --  30*   BUN 18 17  --  27*   CREATININE 0.9 0.7  --  0.8   CALCIUM 8.5* 8.7  --  8.1*   PROT 6.6 6.0  --   --    BILITOT 1.8* 1.7*  --   --    ALKPHOS 96 86  --   --    ALT 39 30  --   --    AST 24 17  --   --      No results for input(s): CRP in the last 168 hours.      Micro:  Microbiology Results (last 7 days)       Procedure Component Value Units Date/Time    Blood culture [[id number]] Collected: 06/01/23 0858    Order Status: Sent Specimen: Blood Updated: 06/01/23 1021    Blood culture [[id number]] Collected: 06/01/23 0859    Order Status: Sent Specimen: Blood Updated: 06/01/23 0907    Blood culture [[id number]]  (Abnormal) Collected: 05/30/23 1115    Order Status: Completed Specimen: Blood Updated: 06/01/23 0734     Blood Culture, Routine Gram stain shirley bottle: Gram positive cocci      Positive results previously called 05/31/2023  03:29 KS3      Gram stain aer bottle: Gram positive cocci      Positive results  previously called      METHICILLIN RESISTANT STAPHYLOCOCCUS AUREUS  For susceptibility see order #[id number]      Blood culture [[id number]]  (Abnormal) Collected: 05/30/23 1130    Order Status: Completed Specimen: Blood Updated: 06/01/23 0732     Blood Culture, Routine Gram stain shirley bottle: Gram positive cocci      Results called to and read back by: Triston Brown RN on [address]      05/31/2023  02:32 KS3      Gram stain aer bottle: Gram positive cocci      Positive results previously called 05/31/2023  04:59 KS3      METHICILLIN RESISTANT STAPHYLOCOCCUS AUREUS  Susceptibility pending      Urine culture [[id number]]  (Abnormal) Collected: 05/30/23 0909    Order Status: Completed Specimen: Urine from Clean Catch Updated: 05/31/23 0858     Urine Culture, Routine YEAST   > 100,000 cfu/ml  identification pending      Rapid Organism ID by PCR (from Blood culture) [[id number]]  (Abnormal) Collected: 05/30/23 1130    Order Status: Completed Updated: 05/31/23 0348     Enterococcus faecalis Not Detected     Enterococcus faecium Not Detected     Listeria monocytogenes Not Detected     Staphylococcus spp. See species for ID     Staphylococcus aureus Detected     Staphylococcus epidermidis Not Detected     Staphylococcus lugdunensis Not Detected     Streptococcus species Not Detected     Streptococcus agalactiae Not Detected     Streptococcus pneumoniae Not Detected     Streptococcus pyogenes Not Detected     Acinetobacter calcoaceticus/baumannii complex Not Detected     Bacteroides fragilis Not Detected     Enterobacterales Not Detected     Enterobacter cloacae complex Not Detected     Escherichia coli Not Detected     Klebsiella aerogenes Not Detected     Klebsiella oxytoca Not Detected     Klebsiella pneumoniae group Not Detected     Proteus Not Detected     Salmonella sp Not Detected     Serratia marcescens Not Detected     Haemophilus influenzae Not Detected     Neisseria meningtidis Not Detected     Pseudomonas  aeruginosa Not Detected     Stenotrophomonas maltophilia Not Detected     Candida albicans Not Detected     Candida auris Not Detected     Candida glabrata Not Detected     Candida krusei Not Detected     Candida parapsilosis Not Detected     Candida tropicalis Not Detected     Cryptococcus neoformans/gattii Not Detected     CTX-M (ESBL ) Test not applicable     IMP (Carbapenem resistant) Test not applicable     KPC resistance gene (Carbapenem resistant) Test not applicable     mcr-1  Test not applicable     mec A/C  Test not applicable     mec A/C and MREJ (MRSA) gene Detected     Comment: Resistance gene critical result(s) called and verbal readback   obtained from Mylene Mullen RN on [address]wing by KS3 05/31/2023 03:47          NDM (Carbapenem resistant) Test not applicable     OXA-48-like (Carbapenem resistant) Test not applicable     van A/B (VRE gene) Test not applicable     VIM (Carbapenem resistant) Test not applicable    Narrative:      Resistance gene critical result(s) called and verbal readback   obtained from Mylene Mullen RN on [address] by KS3 05/31/2023 03:47    MRSA Screen by PCR [[id number]] Collected: 05/30/23 1105    Order Status: Completed Specimen: Nasopharyngeal Swab from Nasal Updated: 05/30/23 1256     MRSA SCREEN BY PCR Negative    Culture, Respiratory with Gram Stain [[id number]]     Order Status: No result Specimen: Respiratory from Sputum, Expectorated     Blood culture [[id number]]     Order Status: Canceled Specimen: Blood     Urine Culture High Risk [[id number]]     Order Status: Completed Specimen: Urine             Imaging Reviewed:   CXR   CT bilateral hips  1. Pre-existing osteoarthritis right hip with evidence of subcapital fracture and cortical offset along the inferior edge of the femoral head neck junction.  2. No evidence of significant osseous displacement..  3. Chronic degenerative arthrosis about the left hip with prominent osteophytic spurs encircling the base of the  femoral head.    Cardiology:  TTE   (poor imaging of valves)  Atrial fibrillation observed.  The left ventricle is normal in size with concentric remodeling and normal systolic function.  The estimated ejection fraction is 55%.  Moderate tricuspid regurgitation.  No clear evidence of endocarditis. If clinical suspicion persist, consider alternative cardiac imaging like BLADIMIR for further evaluation.    IMPRESSION & PLAN   1. MRSA bacteremia. Source is not readily apparent   She has too much pain from fracture to discern whether she has a septic hip. There is no increased fluid on the CT, there may be a tiny amount of fluid in trochanteric bursa. The right iliacus is larger than left   She could have a LRTI infection present among the pulmonary interstitial edema on CXR    2. Right subcapital hip fracture, soft tissue trauma right shoulder  3. Oxygen dependent CHF,, pulmonary hypertension  4. Candiduria is likely reflective of vaginal contamination of specimen(there is no way she could give a clean catch specimen)  5. Elderly, frail and debilitated      Recommendations:  Continue vancomycin  Follow up repeat blood cultures  CT chest would be reasonable when her pain is controlled  Trend crp, no steroids  If poor progress, would repeat CT hips/pelvis with IV contrast or get MRI of right hip  May need trans esophageal echocardiogram, not emergent  Orthopedics consult    Medical Decision Making during this encounter was  [_] Low Complexity  [_] Moderate Complexity  [xxx  ] High Complexity

## 2023-06-01 NOTE — ASSESSMENT & PLAN NOTE
Patient with known history of persistent atrial fibrillation.   RVR likely driven by acute medical condition including infection/sepsis and CHF.  Telemetry monitoring, treat acute medical condition, continue metoprolol and eliquis  Add titratable diltiazem  Added oral diltiazem  Titrate metoprolol as well  HR better controlled  Can consult Cardiology if not improving with treatment of her sepsis and rate control

## 2023-06-01 NOTE — ASSESSMENT & PLAN NOTE
Chronically on 2 L supplemental oxygen   In the ED up to 10 L, likely due to pulmonary edema/decompensated heart failure, can not out rule underlying pneumonia   Continue supplemental oxygen, adjust as needed  Treating heart failure with diuresis, will treat empirically for pneumonia with antibiotics  Obtain CTA chest

## 2023-06-02 ENCOUNTER — ANESTHESIA EVENT (OUTPATIENT)
Dept: CARDIOLOGY | Facility: HOSPITAL | Age: 88
End: 2023-06-02

## 2023-06-02 LAB
ALBUMIN SERPL BCP-MCNC: 2.6 G/DL (ref 3.5–5.2)
ALLENS TEST: ABNORMAL
ALLENS TEST: ABNORMAL
ALP SERPL-CCNC: 95 U/L (ref 55–135)
ALT SERPL W/O P-5'-P-CCNC: 33 U/L (ref 10–44)
ANION GAP SERPL CALC-SCNC: 10 MMOL/L (ref 8–16)
AST SERPL-CCNC: 23 U/L (ref 10–40)
BACTERIA BLD CULT: ABNORMAL
BASOPHILS # BLD AUTO: 0.01 K/UL (ref 0–0.2)
BASOPHILS NFR BLD: 0.1 % (ref 0–1.9)
BILIRUB SERPL-MCNC: 1.6 MG/DL (ref 0.1–1)
BNP SERPL-MCNC: 999 PG/ML (ref 0–99)
BUN SERPL-MCNC: 38 MG/DL (ref 8–23)
CALCIUM SERPL-MCNC: 8.3 MG/DL (ref 8.7–10.5)
CHLORIDE SERPL-SCNC: 91 MMOL/L (ref 95–110)
CO2 SERPL-SCNC: 30 MMOL/L (ref 23–29)
CREAT SERPL-MCNC: 0.9 MG/DL (ref 0.5–1.4)
CRP SERPL-MCNC: 29.61 MG/DL
DELSYS: ABNORMAL
DELSYS: ABNORMAL
DIFFERENTIAL METHOD: ABNORMAL
EOSINOPHIL # BLD AUTO: 0 K/UL (ref 0–0.5)
EOSINOPHIL NFR BLD: 0.1 % (ref 0–8)
EP: 7
ERYTHROCYTE [DISTWIDTH] IN BLOOD BY AUTOMATED COUNT: 25.5 % (ref 11.5–14.5)
ERYTHROCYTE [SEDIMENTATION RATE] IN BLOOD BY WESTERGREN METHOD: 10 MM/H
EST. GFR  (NO RACE VARIABLE): >60 ML/MIN/1.73 M^2
FIO2: 50
FLOW: 15
GLUCOSE SERPL-MCNC: 106 MG/DL (ref 70–110)
HCO3 UR-SCNC: 32.6 MMOL/L (ref 24–28)
HCO3 UR-SCNC: 35 MMOL/L (ref 24–28)
HCT VFR BLD AUTO: 29.2 % (ref 37–48.5)
HGB BLD-MCNC: 9.1 G/DL (ref 12–16)
IMM GRANULOCYTES # BLD AUTO: 0.13 K/UL (ref 0–0.04)
IMM GRANULOCYTES NFR BLD AUTO: 0.8 % (ref 0–0.5)
IP: 14
LACTATE SERPL-SCNC: 2.2 MMOL/L (ref 0.5–1.9)
LYMPHOCYTES # BLD AUTO: 0.5 K/UL (ref 1–4.8)
LYMPHOCYTES NFR BLD: 3.1 % (ref 18–48)
MAGNESIUM SERPL-MCNC: 2.2 MG/DL (ref 1.6–2.6)
MCH RBC QN AUTO: 26 PG (ref 27–31)
MCHC RBC AUTO-ENTMCNC: 31.2 G/DL (ref 32–36)
MCV RBC AUTO: 83 FL (ref 82–98)
MODE: ABNORMAL
MODE: ABNORMAL
MONOCYTES # BLD AUTO: 1.7 K/UL (ref 0.3–1)
MONOCYTES NFR BLD: 9.7 % (ref 4–15)
NEUTROPHILS # BLD AUTO: 14.9 K/UL (ref 1.8–7.7)
NEUTROPHILS NFR BLD: 86.2 % (ref 38–73)
NRBC BLD-RTO: 0 /100 WBC
PCO2 BLDA: 50.7 MMHG (ref 35–45)
PCO2 BLDA: 51.8 MMHG (ref 35–45)
PH SMN: 7.42 [PH] (ref 7.35–7.45)
PH SMN: 7.44 [PH] (ref 7.35–7.45)
PLATELET # BLD AUTO: 222 K/UL (ref 150–450)
PMV BLD AUTO: 9.4 FL (ref 9.2–12.9)
PO2 BLDA: 158 MMHG (ref 80–100)
PO2 BLDA: 74 MMHG (ref 80–100)
POC BE: 11 MMOL/L
POC BE: 8 MMOL/L
POC SATURATED O2: 95 % (ref 95–100)
POC SATURATED O2: 99 % (ref 95–100)
POC TCO2: 34 MMOL/L (ref 23–27)
POC TCO2: 37 MMOL/L (ref 23–27)
POTASSIUM SERPL-SCNC: 3.9 MMOL/L (ref 3.5–5.1)
PROT SERPL-MCNC: 5.7 G/DL (ref 6–8.4)
RBC # BLD AUTO: 3.5 M/UL (ref 4–5.4)
SAMPLE: ABNORMAL
SAMPLE: ABNORMAL
SITE: ABNORMAL
SITE: ABNORMAL
SODIUM SERPL-SCNC: 131 MMOL/L (ref 136–145)
VANCOMYCIN TROUGH SERPL-MCNC: 8.3 UG/ML
WBC # BLD AUTO: 17.24 K/UL (ref 3.9–12.7)

## 2023-06-02 PROCEDURE — 83605 ASSAY OF LACTIC ACID: CPT | Performed by: STUDENT IN AN ORGANIZED HEALTH CARE EDUCATION/TRAINING PROGRAM

## 2023-06-02 PROCEDURE — 25000003 PHARM REV CODE 250

## 2023-06-02 PROCEDURE — 63600175 PHARM REV CODE 636 W HCPCS: Performed by: STUDENT IN AN ORGANIZED HEALTH CARE EDUCATION/TRAINING PROGRAM

## 2023-06-02 PROCEDURE — 63600175 PHARM REV CODE 636 W HCPCS: Performed by: INTERNAL MEDICINE

## 2023-06-02 PROCEDURE — 36600 WITHDRAWAL OF ARTERIAL BLOOD: CPT

## 2023-06-02 PROCEDURE — 83880 ASSAY OF NATRIURETIC PEPTIDE: CPT | Performed by: STUDENT IN AN ORGANIZED HEALTH CARE EDUCATION/TRAINING PROGRAM

## 2023-06-02 PROCEDURE — 82803 BLOOD GASES ANY COMBINATION: CPT

## 2023-06-02 PROCEDURE — 21000000 HC CCU ICU ROOM CHARGE

## 2023-06-02 PROCEDURE — 94761 N-INVAS EAR/PLS OXIMETRY MLT: CPT

## 2023-06-02 PROCEDURE — 99900035 HC TECH TIME PER 15 MIN (STAT)

## 2023-06-02 PROCEDURE — 25000003 PHARM REV CODE 250: Performed by: STUDENT IN AN ORGANIZED HEALTH CARE EDUCATION/TRAINING PROGRAM

## 2023-06-02 PROCEDURE — 94660 CPAP INITIATION&MGMT: CPT

## 2023-06-02 PROCEDURE — 85025 COMPLETE CBC W/AUTO DIFF WBC: CPT | Performed by: STUDENT IN AN ORGANIZED HEALTH CARE EDUCATION/TRAINING PROGRAM

## 2023-06-02 PROCEDURE — 80202 ASSAY OF VANCOMYCIN: CPT | Performed by: STUDENT IN AN ORGANIZED HEALTH CARE EDUCATION/TRAINING PROGRAM

## 2023-06-02 PROCEDURE — 36415 COLL VENOUS BLD VENIPUNCTURE: CPT | Performed by: STUDENT IN AN ORGANIZED HEALTH CARE EDUCATION/TRAINING PROGRAM

## 2023-06-02 PROCEDURE — 99232 SBSQ HOSP IP/OBS MODERATE 35: CPT | Mod: ,,, | Performed by: INTERNAL MEDICINE

## 2023-06-02 PROCEDURE — 27000221 HC OXYGEN, UP TO 24 HOURS

## 2023-06-02 PROCEDURE — 83735 ASSAY OF MAGNESIUM: CPT | Performed by: STUDENT IN AN ORGANIZED HEALTH CARE EDUCATION/TRAINING PROGRAM

## 2023-06-02 PROCEDURE — 25000003 PHARM REV CODE 250: Performed by: INTERNAL MEDICINE

## 2023-06-02 PROCEDURE — 99232 PR SUBSEQUENT HOSPITAL CARE,LEVL II: ICD-10-PCS | Mod: ,,, | Performed by: INTERNAL MEDICINE

## 2023-06-02 PROCEDURE — 80053 COMPREHEN METABOLIC PANEL: CPT | Performed by: STUDENT IN AN ORGANIZED HEALTH CARE EDUCATION/TRAINING PROGRAM

## 2023-06-02 PROCEDURE — 87040 BLOOD CULTURE FOR BACTERIA: CPT | Performed by: STUDENT IN AN ORGANIZED HEALTH CARE EDUCATION/TRAINING PROGRAM

## 2023-06-02 PROCEDURE — 86140 C-REACTIVE PROTEIN: CPT | Performed by: INTERNAL MEDICINE

## 2023-06-02 RX ORDER — DIGOXIN 125 MCG
0.12 TABLET ORAL DAILY
Status: DISCONTINUED | OUTPATIENT
Start: 2023-06-03 | End: 2023-06-11

## 2023-06-02 RX ORDER — EPINEPHRINE 0.1 MG/ML
INJECTION INTRAVENOUS
Status: DISCONTINUED
Start: 2023-06-02 | End: 2023-06-02 | Stop reason: WASHOUT

## 2023-06-02 RX ORDER — ATROPINE SULFATE 0.1 MG/ML
INJECTION INTRAVENOUS
Status: DISCONTINUED
Start: 2023-06-02 | End: 2023-06-02 | Stop reason: WASHOUT

## 2023-06-02 RX ADMIN — VANCOMYCIN HYDROCHLORIDE 750 MG: 750 INJECTION, POWDER, LYOPHILIZED, FOR SOLUTION INTRAVENOUS at 12:06

## 2023-06-02 RX ADMIN — DAPTOMYCIN 350 MG: 500 INJECTION, POWDER, LYOPHILIZED, FOR SOLUTION INTRAVENOUS at 03:06

## 2023-06-02 RX ADMIN — CHLORHEXIDINE GLUCONATE 15 ML: 1.2 RINSE ORAL at 08:06

## 2023-06-02 RX ADMIN — FLUCONAZOLE IN SODIUM CHLORIDE 100 MG: 2 INJECTION, SOLUTION INTRAVENOUS at 03:06

## 2023-06-02 RX ADMIN — LEVOTHYROXINE SODIUM 75 MCG: 0.03 TABLET ORAL at 05:06

## 2023-06-02 RX ADMIN — METOPROLOL TARTRATE 50 MG: 50 TABLET, FILM COATED ORAL at 08:06

## 2023-06-02 RX ADMIN — ESCITALOPRAM OXALATE 10 MG: 10 TABLET ORAL at 12:06

## 2023-06-02 RX ADMIN — METOPROLOL TARTRATE 50 MG: 50 TABLET, FILM COATED ORAL at 12:06

## 2023-06-02 RX ADMIN — ATORVASTATIN CALCIUM 20 MG: 20 TABLET, FILM COATED ORAL at 08:06

## 2023-06-02 RX ADMIN — SODIUM CHLORIDE 500 ML: 0.9 INJECTION, SOLUTION INTRAVENOUS at 02:06

## 2023-06-02 RX ADMIN — TRAZODONE HYDROCHLORIDE 50 MG: 50 TABLET ORAL at 08:06

## 2023-06-02 RX ADMIN — HYDROCODONE BITARTRATE AND ACETAMINOPHEN 1 TABLET: 7.5; 325 TABLET ORAL at 12:06

## 2023-06-02 RX ADMIN — FUROSEMIDE 20 MG: 10 INJECTION, SOLUTION INTRAMUSCULAR; INTRAVENOUS at 12:06

## 2023-06-02 RX ADMIN — MUPIROCIN 1 G: 20 OINTMENT TOPICAL at 08:06

## 2023-06-02 RX ADMIN — FAMOTIDINE 20 MG: 20 TABLET ORAL at 12:06

## 2023-06-02 RX ADMIN — CHLORHEXIDINE GLUCONATE 15 ML: 1.2 RINSE ORAL at 12:06

## 2023-06-02 RX ADMIN — DILTIAZEM HYDROCHLORIDE 60 MG: 60 TABLET, FILM COATED ORAL at 12:06

## 2023-06-02 RX ADMIN — MUPIROCIN 2 G: 20 OINTMENT TOPICAL at 12:06

## 2023-06-02 NOTE — ASSESSMENT & PLAN NOTE
Ongoing MRSA bacteremia without a clear source at this point    Check CTA chest > no signs of significant focal pneumonia  WBC now improving  Repeat blood cultures this morning  Blood culture x 4 with MRSA x 3 days  Urine culture with candiduria likely contaminated   COVID and influenza negative  Vancomycin and daptomycin  Dr Shahid following  Echo ordered and without focal valve vegetations  Needs BLADIMIR when able to tolerate  Appreciate Dr Shahid's recs  CT hips / pelvis today

## 2023-06-02 NOTE — ANESTHESIA PREPROCEDURE EVALUATION
06/02/2023  Jeannie Hutchins is a 88 y.o., female.      Patient Active Problem List   Diagnosis    S/P carotid endarterectomy    Hyponatremia    Essential hypertension    Longstanding persistent atrial fibrillation    Pulmonary hypertension    Mixed hyperlipidemia    Hydronephrosis of right kidney    Abnormal CT lung screening    Fatty liver    Carotid stenosis, asymptomatic, right    Palliative care encounter    Acute on chronic respiratory failure with hypoxia    Hypothyroid    Debility    Acute on chronic diastolic congestive heart failure    Ground glass opacity present on imaging of lung    Advanced care planning/counseling discussion    Epistaxis    Uncontrolled hypertension    Cellulitis of leg    MRSA Bacteremia    Acute on chronic diastolic heart failure    Acute on chronic respiratory failure with hypoxia    Atrial fibrillation with RVR with known history of a fib    Troponin I above reference range    Nursing home resident    DNR (do not resuscitate)    UTI (urinary tract infection)    Ulcer of right leg    Anemia    Hypothyroidism    Closed right hip fracture after fall    Closed fracture of right hip    Candiduria       Past Surgical History:   Procedure Laterality Date    CARDIOVERSION  10/19/2020    Procedure: Cardioversion;  Surgeon: Brandon Elias MD;  Location: [facility] CATH LAB;  Service: Cardiology;;    CARPAL TUNNEL RELEASE  2012    right hand    ESOPHAGOGASTRODUODENOSCOPY Left 8/29/2018    Procedure: EGD (ESOPHAGOGASTRODUODENOSCOPY);  Surgeon: Oniel Dorsey MD;  Location: [facility] ENDO;  Service: Endoscopy;  Laterality: Left;    HYSTERECTOMY      left carotid endartectomy  5/2006    TONSILLECTOMY      TREATMENT OF CARDIAC ARRHYTHMIA N/A 10/19/2020    Procedure: Transesophageal echo (BLADIMIR) intra-procedure log documentation;  Surgeon: Brandon Elias MD;   Location: [facility] CATH LAB;  Service: Cardiology;  Laterality: N/A;        Tobacco Use:  The patient  reports that she has never smoked. She has never used smokeless tobacco.     Results for orders placed or performed during the hospital encounter of 05/30/23   EKG 12-lead    Collection Time: 05/30/23  8:22 AM    Narrative    Test Reason : R07.9,    Vent. Rate : 111 BPM     Atrial Rate : 120 BPM     P-R Int : 000 ms          QRS Dur : 076 ms      QT Int : 308 ms       P-R-T Axes : 000 090 180 degrees     QTc Int : 418 ms    Atrial fibrillation with rapid ventricular response  Rightward axis  Nonspecific T wave abnormality  Abnormal ECG  When compared with ECG of 20-APR-2023 20:10,  Nonspecific T wave abnormality now evident in Inferior leads  Nonspecific T wave abnormality, worse in Lateral leads    Referred By: [email or web address]   SELF           Confirmed By:         Imaging Results          X-Ray Chest AP Portable (Final result)  Result time 05/30/23 08:49:44    Final result by Nikolai Sparks MD (05/30/23 08:49:44)                 Narrative:    Reason: Chest Pain    FINDINGS:    Portable chest with comparison chest x-ray April 22, 2023 show enlarged cardiomediastinal silhouette.  Bilateral perihilar interstitial and hazy lung opacities are noted. Pulmonary vasculature is normal. No acute osseous abnormality.    IMPRESSION:    Enlarged cardiomediastinal silhouette with bilateral perihilar interstitial and hazy lung opacities likely reflects CHF with pulmonary edema.    Electronically signed by:  Nikolai Sparks DO  5/30/2023 8:49 AM CDT Workstation: [id number]                               Lab Results   Component Value Date    WBC 17.24 (H) 06/02/2023    HGB 9.1 (L) 06/02/2023    HCT 29.2 (L) 06/02/2023    MCV 83 06/02/2023     06/02/2023     BMP  Lab Results   Component Value Date     (L) 06/02/2023    K 3.9 06/02/2023    CL 91 (L) 06/02/2023    CO2 30 (H) 06/02/2023    BUN 38 (H) 06/02/2023    CREATININE 0.9  06/02/2023    CALCIUM 8.3 (L) 06/02/2023    ANIONGAP 10 06/02/2023     06/02/2023     (H) 06/01/2023     (H) 05/31/2023       Results for orders placed during the hospital encounter of 05/30/23    Echo    Interpretation Summary  · Atrial fibrillation observed.  · The left ventricle is normal in size with concentric remodeling and normal systolic function.  · The estimated ejection fraction is 55%.  · Moderate tricuspid regurgitation.  · No clear evidence of endocarditis. If clinical suspicion persist, consider alternative cardiac imaging like BLADIMIR for further evaluation.          Pre-op Assessment    I have reviewed the Patient Summary Reports.     I have reviewed the Nursing Notes. I have reviewed the NPO Status.   I have reviewed the Medications.     Review of Systems  Anesthesia Hx:  No problems with previous Anesthesia  Denies Family Hx of Anesthesia complications.   Denies Personal Hx of Anesthesia complications.   Social:  Non-Smoker    Hematology/Oncology:     Oncology Normal    -- Anemia:   EENT/Dental:EENT/Dental Normal   Cardiovascular:   Hypertension Dysrhythmias atrial fibrillation CHF hyperlipidemia ECG has been reviewed.    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   Renal/:   Chronic Renal Disease    Hepatic/GI:   Liver Disease,    Musculoskeletal:   Arthritis     Neurological:  Neurology Normal    Endocrine:   Hypothyroidism    Psych:   anxiety          Physical Exam  General: Well nourished, Cooperative, Alert and Oriented    Airway:  Mallampati: II   Mouth Opening: Normal  TM Distance: Normal  Tongue: Normal  Neck ROM: Normal ROM    Dental:  Intact    Chest/Lungs:  Clear to auscultation    Heart:  Rate: Tachycardia  Rhythm: Irregularly Irregular        Anesthesia Plan  Type of Anesthesia, risks & benefits discussed:    Anesthesia Type: Gen Natural Airway  Intra-op Monitoring Plan: Standard ASA Monitors  Informed Consent: Informed consent signed with the Patient and all parties  understand the risks and agree with anesthesia plan.  All questions answered.   ASA Score: 2  Anesthesia Plan Notes: POM    Ready For Surgery From Anesthesia Perspective.     .

## 2023-06-02 NOTE — PT/OT/SLP PROGRESS
Physical Therapy      Patient Name:  Jeannie Hutchins   MRN:  [id number]    Patient not seen today secondary to  (awaiting ortho  for R humeral head fracture) Will follow-up .

## 2023-06-02 NOTE — ASSESSMENT & PLAN NOTE
UA positive, many yeast, history of UTIs in the past  Urine culture with many yeast   Continue fluconazole for now

## 2023-06-02 NOTE — CARE UPDATE
06/02/23 0819   Skin Integrity   $ Wound Care Tech Time 15 min   Area Observed Bridge of nose   Skin Appearance without discoloration   PRE-TX-O2   Device (Oxygen Therapy) high flow nasal cannula   $ Is the patient on Low Flow Oxygen? Yes   Flow (L/min) 13   Pulse Oximetry Type Continuous   $ Pulse Oximetry - Multiple Charge Pulse Oximetry - Multiple   Preset CPAP/BiPAP Settings   $ CPAP/BiPAP Daily Charge BiPAP/CPAP Daily   Respiratory Evaluation   $ Care Plan Tech Time 15 min

## 2023-06-02 NOTE — ASSESSMENT & PLAN NOTE
Patient with known history of persistent atrial fibrillation.   RVR likely driven by acute medical condition including infection/sepsis and CHF.  Telemetry monitoring, treat acute medical condition, continue metoprolol and eliquis  Add titratable diltiazem  Added oral diltiazem  Titrate metoprolol as well  Monitor blood pressure  HR better controlled  Can consult Cardiology if not improving with treatment of her sepsis and rate control

## 2023-06-02 NOTE — ASSESSMENT & PLAN NOTE
Admits to shortness of breath with productive cough   Chest x-ray with pulmonary edema with elevated BNP at 1905  Previous echo with EF of 65%, diastolic dysfunction consistent with AFib  Discontinue diuresis and give small fluid bolus  Strict I/O daily weights, oral fluid and sodium restriction

## 2023-06-02 NOTE — PLAN OF CARE
Problem: Bleeding (Sepsis/Septic Shock)  Goal: Absence of Bleeding  Outcome: Ongoing, Progressing     Problem: Nutrition Impaired (Sepsis/Septic Shock)  Goal: Optimal Nutrition Intake  Outcome: Ongoing, Progressing     Problem: Fall Injury Risk  Goal: Absence of Fall and Fall-Related Injury  Outcome: Ongoing, Progressing     Problem: Impaired Wound Healing  Goal: Optimal Wound Healing  Outcome: Ongoing, Progressing     Problem: Adjustment to Illness (Sepsis/Septic Shock)  Goal: Optimal Coping  Outcome: Ongoing, Not Progressing     Problem: Infection Progression (Sepsis/Septic Shock)  Goal: Absence of Infection Signs and Symptoms  Outcome: Ongoing, Not Progressing     Problem: Infection  Goal: Absence of Infection Signs and Symptoms  Outcome: Ongoing, Not Progressing

## 2023-06-02 NOTE — ASSESSMENT & PLAN NOTE
Febrile to 101.2 with WBC count 27 with positive UA, can not out rule superimposed pneumonia   Check CTA chest > no signs of significant focal pneumonia  White blood cell count remains high but improving today  Repeat blood cultures this morning  Blood culture x 4 with MRSA x 2 days  Urine culture with candiduria likely contaminated   COVID and influenza negative  Continue vancomycin  Echo ordered and without focal valve vegetations  Needs BLADIMIR when able to tolerate  Appreciate Dr Shahid's recs

## 2023-06-02 NOTE — SUBJECTIVE & OBJECTIVE
Interval History:  She remains bacteremic with positive cultures from this morning.  Repeated blood cultures today and will need to continue daily.  Hip fracture repair to be done once cleared by Infectious Disease and bacteremia has cleared.  We still do not have a clear source of her bacteremia.  Deferred BLADIMIR today due to oxygen requirement.  WBC is improving slightly.  Will hold off on further diuresis given soft blood pressures.    Review of Systems   All other systems reviewed and are negative.  Objective:     Vital Signs (Most Recent):  Temp: 99.1 °F (37.3 °C) (06/02/23 1101)  Pulse: 107 (06/02/23 1240)  Resp: 20 (06/02/23 1240)  BP: 124/74 (06/02/23 1240)  SpO2: (!) 93 % (06/02/23 1100) Vital Signs (24h Range):  Temp:  [98.1 °F (36.7 °C)-100 °F (37.8 °C)] 99.1 °F (37.3 °C)  Pulse:  [] 107  Resp:  [14-36] 20  SpO2:  [81 %-100 %] 93 %  BP: ()/(50-86) 124/74     Weight: 44.5 kg (98 lb)  Body mass index is 21.97 kg/m².    Intake/Output Summary (Last 24 hours) at 6/2/2023 1434  Last data filed at 6/2/2023 0543  Gross per 24 hour   Intake 286.6 ml   Output 100 ml   Net 186.6 ml           Physical Exam  Constitutional:       Appearance: Normal appearance. She is normal weight.   HENT:      Head: Normocephalic and atraumatic.      Nose: Nose normal.      Mouth/Throat:      Mouth: Mucous membranes are moist.   Eyes:      Conjunctiva/sclera: Conjunctivae normal.      Pupils: Pupils are equal, round, and reactive to light.   Cardiovascular:      Rate and Rhythm: Tachycardia present. Rhythm irregular.      Pulses: Normal pulses.      Heart sounds: Normal heart sounds. No murmur heard.    No friction rub. No gallop.   Pulmonary:      Effort: Pulmonary effort is normal.      Breath sounds: Normal breath sounds. No wheezing or rales.      Comments: 6 L nasal cannula.  Coarse breath sounds.  Abdominal:      General: Abdomen is flat. Bowel sounds are normal. There is no distension.      Palpations: Abdomen is  soft.      Tenderness: There is no abdominal tenderness. There is no guarding.   Musculoskeletal:         General: No swelling. Normal range of motion.      Cervical back: Normal range of motion and neck supple.      Right lower leg: No edema.      Left lower leg: No edema.      Comments: She has pain in her right hip, limited mobility of her left shoulder. Pulses in tact distally   Skin:     General: Skin is warm and dry.   Neurological:      General: No focal deficit present.      Mental Status: She is alert.   Psychiatric:         Mood and Affect: Mood normal.         Thought Content: Thought content normal.         Judgment: Judgment normal.           Significant Labs: All pertinent labs within the past 24 hours have been reviewed.    Significant Imaging: I have reviewed all pertinent imaging results/findings within the past 24 hours.

## 2023-06-02 NOTE — PT/OT/SLP PROGRESS
Speech Language Pathology      Jeannie Hutchins  MRN: [id number]    Patient not seen today secondary to Other (Comment) (NPO for procedure). Will follow-up 6/3.

## 2023-06-02 NOTE — ASSESSMENT & PLAN NOTE
Chronically on 2 L supplemental oxygen   In the ED up to 10 L, likely due to pulmonary edema/decompensated heart failure, can not out rule underlying pneumonia   Continue supplemental oxygen, adjust as needed  Difficult time getting pulse ox.  Her oxygen requirement has been up and down.  Treating heart failure with diuresis, will treat empirically for pneumonia with antibiotics  Obtain CTA chest > no signs of pulmonary embolus  Discontinue Lasix for now

## 2023-06-02 NOTE — PROGRESS NOTES
Consult Note  Infectious Disease    Reason for Consult:  MRSA bacteremia    HPI: Jeannie Hutchins is a 88 y.o. female Nursing home resident With a history of diastolic heart failure, oxygen dependence, atrial fibrillation with recent 6 day stay in late April for worsening oxygen requirement, right lower extremity cellulitis associated with an injury (where she hit her lower leg on the wheelchair).  She was given oxygen support, azithromycin, ceftriaxone and Lasix, required intensive care on admission, followed by steroids and doxycycline for lungs and leg.    She presented to the emergency room on 05/30 with tachycardia, generalized weakness and a syncopal episode.  She had had a fall last week with x-rays done at the nursing facility which were negative.  She denies feeling ill prior to the fall and relates this to not donning her slippers before trying to move around.  Her heart rate was found to be 220, in atrial fibrillation and with a temperature of 101.7°.  Workup included CBC with white blood cells 27,000, BNP 1900, urinalysis with 63 white blood cells and many yeast, chest x-ray with pulmonary edema and on admission was placed on ceftriaxone and fluconazole.  The wound on the right lower leg is largely healed, she does have a bruise about the right hip.  She was noted to have pain in the shoulder and hip and x-rays were ordered with findings of severe glenohumeral and acromioclavicular osteoarthrosis and a suspected right femoral neck fracture with a CT scan showing a subcapital fracture and cortical offset along the inferior edge of the femoral head/neck junction.  Fortunately there is no sign of hemarthrosis or increased joint fluid.  The right iliacus muscle is edematous or enlarged compared to the left. There may be a tiny bit of fluid in her trochanteric.  Blood cultures from 05/30 became positive this morning with GPC identified by abeo is MRSA. Repeat blood cultures have been drawn.  The urine culture  "is growing only yeast("clean catch"). (MRSA screen on admission was negative).  Ceftriaxone was stopped and vancomycin was ordered.  Fever has resolved. WBC remain elevated. Still on diltiazem drip and requiring 6 liters of oxygen. Urine output is not being measured as she is incontinent.     6/2: interim reviewed. Afebrile. Oxygen requirement is much higher. Orthopedics consult pending. yesterday's blood cultures are positive, Vanc ALICIA for the MRSA is 1, trough was low. WBC improved. BUN up to 38. Ct chest reviewed and there is no focal pneumonia to blame her bacteremia on.  I had the chest x-ray repeated and there are no new infiltrates, only mild perihilar fullness.  She is alert, still in pain from her right hip but I was able to perform range-of-motion a little, more than I expected to be able to.  The remainder of the joints of the lower extremities have no sign or symptom of joint infection.  She has no peripheral signs of endocarditis.  Understandably her son is concerned about the stress of a transesophageal echo.  Discussed with him that we would not do it on someone who has this oxygen requirement and I explained that this would help discern the length of therapy.  I also discussed with him that in some cases when the evaluation is more dangerous than the treatment, that we elect to treat people for the worst possible diagnosis and he is in favor of this rather than the BLADIMIR.        EXAM & DIAGNOSTICS REVIEWED:   Vitals:     Temp:  [98.1 °F (36.7 °C)-100 °F (37.8 °C)]   Temp: 98.6 °F (37 °C) (06/02/23 0701)  Pulse: 109 (06/02/23 1100)  Resp: (!) 33 (06/02/23 1100)  BP: 125/61 (06/02/23 1100)  SpO2: (!) 93 % (06/02/23 1100)    Intake/Output Summary (Last 24 hours) at 6/2/2023 1202  Last data filed at 6/2/2023 0543  Gross per 24 hour   Intake 286.6 ml   Output 100 ml   Net 186.6 ml       General:  In NAD. Alert and attentive, cooperative, comfortable until her legs are touched  Eyes:  Anicteric,   EOMI, no " scleral or conjunctival petechiae  ENT:  No ulcers, exudates, thrush, nares patent, dentition is fair. Hard of hearing  Neck:  supple,   Lungs: Fine crackles in bases, actually improved from yesterday  Heart:  iRRR,   Abd:  Soft, NT, ND, normal BS, no masses or organomegaly appreciated.  :  Voids  no flank tenderness  Musc:  Joints without effusion, swelling, erythema, synovitis, the left leg range of motion of the hip knee and ankle are normal as well as the right knee and right ankle.  I was actually able to flex the right hip a bit which understandably cause some pain but it was better than I would expect for a septic arthritis  Skin:  No rashes. No palmar or plantar lesions. No subungual petechiae  Neuro:             Alert, attentive, speech fluent, face symmetric, moves all extremities,     Psych: Calm, cooperative  Lymphatic:     Extrem: No edema, erythema, phlebitis, cellulitis, warm and well perfused  VAD:  peripheral     Isolation:  contact  Wound: Right lower lateral leg prior ulcer is healed    Right hip      Lab Results   Component Value Date    PROCAL 1.38 (H) 06/01/2023    PROCAL <0.05 04/22/2023    PROCAL 0.30 04/21/2023         General Labs reviewed:  Recent Labs   Lab 05/31/23  0440 06/01/23  0859 06/02/23  0518   WBC 20.84* 20.39* 17.24*   HGB 9.8* 9.7* 9.1*   HCT 30.6* 31.1* 29.2*    236 222       Recent Labs   Lab 05/30/23  0832 05/31/23  0440 05/31/23  1131 06/01/23  0859 06/02/23  0518   * 140  --  134* 131*   K 4.1 3.2* 4.6 4.0 3.9   CL 98 99  --  94* 91*   CO2 24 29  --  30* 30*   BUN 18 17  --  27* 38*   CREATININE 0.9 0.7  --  0.8 0.9   CALCIUM 8.5* 8.7  --  8.1* 8.3*   PROT 6.6 6.0  --   --  5.7*   BILITOT 1.8* 1.7*  --   --  1.6*   ALKPHOS 96 86  --   --  95   ALT 39 30  --   --  33   AST 24 17  --   --  23     Recent Labs   Lab 06/01/23  0859 06/02/23  0518   CRP 30.98* 29.61*         Micro:  Microbiology Results (last 7 days)       Procedure Component Value Units  Date/Time    Blood culture [[id number]] Collected: 06/02/23 0839    Order Status: Sent Specimen: Blood Updated: 06/02/23 0853    Blood culture [[id number]]  (Abnormal) Collected: 05/30/23 1115    Order Status: Completed Specimen: Blood Updated: 06/02/23 0734     Blood Culture, Routine Gram stain shirley bottle: Gram positive cocci      Positive results previously called 05/31/2023  03:29 KS3      Gram stain aer bottle: Gram positive cocci      Positive results previously called      METHICILLIN RESISTANT STAPHYLOCOCCUS AUREUS  Known MRSA patient  For susceptibility see order #[id number]      Blood culture [[id number]]  (Abnormal)  (Susceptibility) Collected: 05/30/23 1130    Order Status: Completed Specimen: Blood Updated: 06/02/23 0733     Blood Culture, Routine Gram stain shirley bottle: Gram positive cocci      Results called to and read back by: Triston Brown RN on [address]      05/31/2023  02:32 KS3      Gram stain aer bottle: Gram positive cocci      Positive results previously called 05/31/2023  04:59 KS3      METHICILLIN RESISTANT STAPHYLOCOCCUS AUREUS  Known MRSA patient      Blood culture [[id number]] Collected: 06/01/23 0858    Order Status: Completed Specimen: Blood Updated: 06/02/23 0159     Blood Culture, Routine Gram stain aer bottle: Gram positive cocci      Positive results previously called 06/02/2023  01:59 KS3    Blood culture [[id number]] Collected: 06/01/23 0859    Order Status: Completed Specimen: Blood Updated: 06/02/23 0107     Blood Culture, Routine Gram stain aer bottle: Gram positive cocci      Positive results previously called 06/02/2023  01:07 KS3    Urine culture [[id number]]  (Abnormal) Collected: 05/30/23 0909    Order Status: Completed Specimen: Urine from Clean Catch Updated: 05/31/23 0858     Urine Culture, Routine YEAST   > 100,000 cfu/ml  identification pending      Rapid Organism ID by PCR (from Blood culture) [[id number]]  (Abnormal) Collected: 05/30/23 1130    Order Status: Completed  Updated: 05/31/23 0348     Enterococcus faecalis Not Detected     Enterococcus faecium Not Detected     Listeria monocytogenes Not Detected     Staphylococcus spp. See species for ID     Staphylococcus aureus Detected     Staphylococcus epidermidis Not Detected     Staphylococcus lugdunensis Not Detected     Streptococcus species Not Detected     Streptococcus agalactiae Not Detected     Streptococcus pneumoniae Not Detected     Streptococcus pyogenes Not Detected     Acinetobacter calcoaceticus/baumannii complex Not Detected     Bacteroides fragilis Not Detected     Enterobacterales Not Detected     Enterobacter cloacae complex Not Detected     Escherichia coli Not Detected     Klebsiella aerogenes Not Detected     Klebsiella oxytoca Not Detected     Klebsiella pneumoniae group Not Detected     Proteus Not Detected     Salmonella sp Not Detected     Serratia marcescens Not Detected     Haemophilus influenzae Not Detected     Neisseria meningtidis Not Detected     Pseudomonas aeruginosa Not Detected     Stenotrophomonas maltophilia Not Detected     Candida albicans Not Detected     Candida auris Not Detected     Candida glabrata Not Detected     Candida krusei Not Detected     Candida parapsilosis Not Detected     Candida tropicalis Not Detected     Cryptococcus neoformans/gattii Not Detected     CTX-M (ESBL ) Test not applicable     IMP (Carbapenem resistant) Test not applicable     KPC resistance gene (Carbapenem resistant) Test not applicable     mcr-1  Test not applicable     mec A/C  Test not applicable     mec A/C and MREJ (MRSA) gene Detected     Comment: Resistance gene critical result(s) called and verbal readback   obtained from Mylene Mullen RN on [address] by KS3 05/31/2023 03:47          NDM (Carbapenem resistant) Test not applicable     OXA-48-like (Carbapenem resistant) Test not applicable     van A/B (VRE gene) Test not applicable     VIM (Carbapenem resistant) Test not applicable     Narrative:      Resistance gene critical result(s) called and verbal readback   obtained from Mylene Mullen RN on [address] by KS3 05/31/2023 03:47    MRSA Screen by PCR [[id number]] Collected: 05/30/23 1105    Order Status: Completed Specimen: Nasopharyngeal Swab from Nasal Updated: 05/30/23 1256     MRSA SCREEN BY PCR Negative    Culture, Respiratory with Gram Stain [[id number]]     Order Status: No result Specimen: Respiratory from Sputum, Expectorated     Blood culture [[id number]]     Order Status: Canceled Specimen: Blood     Urine Culture High Risk [[id number]]     Order Status: Completed Specimen: Urine             Imaging Reviewed:   CXR   CT bilateral hips  1. Pre-existing osteoarthritis right hip with evidence of subcapital fracture and cortical offset along the inferior edge of the femoral head neck junction.  2. No evidence of significant osseous displacement..  3. Chronic degenerative arthrosis about the left hip with prominent osteophytic spurs encircling the base of the femoral head.    Cardiology:  TTE   (poor imaging of valves)  Atrial fibrillation observed.  The left ventricle is normal in size with concentric remodeling and normal systolic function.  The estimated ejection fraction is 55%.  Moderate tricuspid regurgitation.  No clear evidence of endocarditis. If clinical suspicion persist, consider alternative cardiac imaging like BLADIMIR for further evaluation.    IMPRESSION & PLAN   1. MRSA bacteremia. Source is not readily apparent   She has too much pain from fracture to discern whether she has a septic hip. There is no increased fluid on the CT, there may be a tiny amount of fluid in trochanteric bursa. The right iliacus is larger than left   She could have a LRTI infection present among the pulmonary interstitial edema on CXR    2. Right subcapital hip fracture, soft tissue trauma right shoulder  3. Oxygen dependent CHF,, pulmonary hypertension   Hypoxemic respiratory failure, CTA  negative    4. Candiduria is likely reflective of vaginal contamination of specimen(there is no way she could give a clean catch specimen)  5. Elderly, frail and debilitated      Recommendations:  Continue vancomycin, adding daptomycin  Serial  blood cultures until clear     Trend crp, no steroids  If poor progress, would repeat CT hips/pelvis with IV contrast or get MRI of right hip     Orthopedics consult pending    Discussed with patient, son, Cardiology, Hospital Medicine    Medical Decision Making during this encounter was  [_] Low Complexity  [_] Moderate Complexity  [xxx  ] High Complexity

## 2023-06-02 NOTE — PROGRESS NOTES
Former[facility], later [facility]  Progress Note    Patient Name: Jeannie Hutchins  MRN: [id number]  Patient Class: IP- Inpatient   Admission Date: 5/30/2023  Length of Stay: 3 days  Attending Physician: Yosvany Vu MD  Primary Care Provider: Primary Doctor No        Subjective:     Principal Problem:Sepsis        HPI:  Ms. Hutchins is an 88-year-old female who was brought to the ED via EMS from [facility] due to tachycardia.  Collateral is obtained from daughter present at bedside.  Patient was initially discharged to nursing facility about 2 years ago, under hospice, but has subsequently improved and is off same.  This morning reportedly heart rate greater than 200, staff was preparing to administer metoprolol, prior to this noted to be staring off.  As per daughter oxygen level was also low, she is chronically on 2 L supplemental oxygen.  States her mother was incoherent during this time.  Patient states she was feeling unwell during this episode, lightheaded, short of breath, producing phlegm.  Denies any sore throat, fever at the facility, nausea, vomiting, diarrhea, cystitis symptoms.  She had a fall several days ago as per report, was found on floor.  Does have ulcer to the right lateral leg, daughter states she sustained few weeks ago as injury from her wheelchair.  Daughter states with previous UTI she had similar symptoms.  In the ED febrile to 101.2, heart rates in the 120s, AFib, blood pressure stable, on 10 L supplemental oxygen.  Labs with WBC 27, hemoglobin 10.3, BUN/creatinine 18/0.9, BNP 1 905, high sensitivity troponin 20, UA with 63 WBC with 3+ leukocyte with many seen.  EKG confirming atrial fibrillation with RVR.  Chest x-ray with cardiomegaly with bilateral interstitial opacities concerning for pulmonary edema.  She received 1 g acetaminophen, 1 g Rocephin, 100 mg fluconazole, 20 mg IV Lasix, 4 mg IV Zofran.  Case discussed with ED provider who requested admission.   Plan of care discussed with patient as well as daughter present at bedside.  Patient has advanced directive, confirmed DNR.      Overview/Hospital Course:  No notes on file    Interval History:  She remains bacteremic with positive cultures from this morning.  Repeated blood cultures today and will need to continue daily.  Hip fracture repair to be done once cleared by Infectious Disease and bacteremia has cleared.  We still do not have a clear source of her bacteremia.  Deferred BLADIMIR today due to oxygen requirement.  WBC is improving slightly.  Will hold off on further diuresis given soft blood pressures.    Review of Systems   All other systems reviewed and are negative.  Objective:     Vital Signs (Most Recent):  Temp: 99.1 °F (37.3 °C) (06/02/23 1101)  Pulse: 107 (06/02/23 1240)  Resp: 20 (06/02/23 1240)  BP: 124/74 (06/02/23 1240)  SpO2: (!) 93 % (06/02/23 1100) Vital Signs (24h Range):  Temp:  [98.1 °F (36.7 °C)-100 °F (37.8 °C)] 99.1 °F (37.3 °C)  Pulse:  [] 107  Resp:  [14-36] 20  SpO2:  [81 %-100 %] 93 %  BP: ()/(50-86) 124/74     Weight: 44.5 kg (98 lb)  Body mass index is 21.97 kg/m².    Intake/Output Summary (Last 24 hours) at 6/2/2023 1434  Last data filed at 6/2/2023 0543  Gross per 24 hour   Intake 286.6 ml   Output 100 ml   Net 186.6 ml           Physical Exam  Constitutional:       Appearance: Normal appearance. She is normal weight.   HENT:      Head: Normocephalic and atraumatic.      Nose: Nose normal.      Mouth/Throat:      Mouth: Mucous membranes are moist.   Eyes:      Conjunctiva/sclera: Conjunctivae normal.      Pupils: Pupils are equal, round, and reactive to light.   Cardiovascular:      Rate and Rhythm: Tachycardia present. Rhythm irregular.      Pulses: Normal pulses.      Heart sounds: Normal heart sounds. No murmur heard.    No friction rub. No gallop.   Pulmonary:      Effort: Pulmonary effort is normal.      Breath sounds: Normal breath sounds. No wheezing or rales.       Comments: 6 L nasal cannula.  Coarse breath sounds.  Abdominal:      General: Abdomen is flat. Bowel sounds are normal. There is no distension.      Palpations: Abdomen is soft.      Tenderness: There is no abdominal tenderness. There is no guarding.   Musculoskeletal:         General: No swelling. Normal range of motion.      Cervical back: Normal range of motion and neck supple.      Right lower leg: No edema.      Left lower leg: No edema.      Comments: She has pain in her right hip, limited mobility of her left shoulder. Pulses in tact distally   Skin:     General: Skin is warm and dry.   Neurological:      General: No focal deficit present.      Mental Status: She is alert.   Psychiatric:         Mood and Affect: Mood normal.         Thought Content: Thought content normal.         Judgment: Judgment normal.           Significant Labs: All pertinent labs within the past 24 hours have been reviewed.    Significant Imaging: I have reviewed all pertinent imaging results/findings within the past 24 hours.      Assessment/Plan:      * MRSA Bacteremia  Febrile to 101.2 with WBC count 27 with positive UA, can not out rule superimposed pneumonia   Check CTA chest > no signs of significant focal pneumonia  White blood cell count remains high but improving today  Repeat blood cultures this morning  Blood culture x 4 with MRSA x 2 days  Urine culture with candiduria likely contaminated   COVID and influenza negative  Vancomycin adjusted to daptomycin per Dr Shahid  Echo ordered and without focal valve vegetations  Needs BLADIMIR when able to tolerate  Appreciate Dr Shahid's recs    UTI (urinary tract infection)  UA positive, many yeast, history of UTIs in the past  Urine culture with many yeast   Continue fluconazole for now      Atrial fibrillation with RVR with known history of a fib  Patient with known history of persistent atrial fibrillation.   RVR likely driven by acute medical condition including infection/sepsis and  CHF.  Telemetry monitoring, treat acute medical condition, continue metoprolol and eliquis  Add titratable diltiazem  Added oral diltiazem  Titrate metoprolol as well  Monitor blood pressure  HR better controlled  Can consult Cardiology if not improving with treatment of her sepsis and rate control      Acute on chronic respiratory failure with hypoxia  Chronically on 2 L supplemental oxygen   In the ED up to 10 L, likely due to pulmonary edema/decompensated heart failure, can not out rule underlying pneumonia   Continue supplemental oxygen, adjust as needed  Difficult time getting pulse ox.  Her oxygen requirement has been up and down.  Treating heart failure with diuresis, will treat empirically for pneumonia with antibiotics  Obtain CTA chest > no signs of pulmonary embolus  Discontinue Lasix for now    Acute on chronic diastolic heart failure  Admits to shortness of breath with productive cough   Chest x-ray with pulmonary edema with elevated BNP at 1905  Previous echo with EF of 65%, diastolic dysfunction consistent with AFib  Discontinue diuresis and give small fluid bolus  Strict I/O daily weights, oral fluid and sodium restriction      Closed right hip fracture after fall  Patient with a recent fall in [employer]  She is having increasing pain in her shoulder and hip, xray shows right hip fracture  CT with subcapital fracture of her right hip  Pain control  Ortho consult  Will need repair of her hip once bacteremia is cleared      Anemia  Chronic anemia, no evidence of bleeding, monitoring      Ulcer of right leg  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, sustained few weeks ago from wheelchair   On examination no obvious signs of superimposed infection   Wound Care consulted      Hypothyroidism  Chronic medical condition continue levothyroxine      Troponin I above reference range  Likely in the setting of AFib RVR, CHF and infection, trend for completeness      Essential hypertension  Hold home amlodipine in  the setting of infection and RVR to allow for titration of medication      DNR (do not resuscitate)  Has advanced directive and confirmed with daughter, DNR      Mixed hyperlipidemia  Chronic medical condition      Nursing home resident  Resident at [facility]      Pulmonary hypertension  Last echo with PASP 54        VTE Risk Mitigation (From admission, onward)           Ordered     IP VTE HIGH RISK PATIENT  Once         05/30/23 1220     Place sequential compression device  Until discontinued         05/30/23 1220                    Discharge Planning   JULIET: 6/9/2023     Code Status: DNR   Is the patient medically ready for discharge?:     Reason for patient still in hospital (select all that apply): Treatment  Discharge Plan A: Return to nursing home                  Yosvany Vu MD  Department of Hospital Medicine   [facility]

## 2023-06-02 NOTE — ASSESSMENT & PLAN NOTE
Patient with a recent fall in [place]  She is having increasing pain in her shoulder and hip, xray shows right hip fracture  CT with subcapital fracture of her right hip  Pain control  Ortho consult  Will need repair of her hip once bacteremia is cleared

## 2023-06-02 NOTE — PROGRESS NOTES
Pharmacokinetic Assessment Follow Up: IV Vancomycin    Vancomycin serum concentration assessment(s):    The trough level was drawn correctly and can be used to guide therapy at this time. The measurement is below the desired definitive target range of 15 to 20 mcg/mL. Patient's CrCl in decline.    Vancomycin Regimen Plan:    Continue regimen to Vancomycin 750 mg IV every 24 hours with next serum trough concentration measured at 08:30 prior to 3rd dose on 06/03/23    Drug levels (last 3 results):  Recent Labs   Lab Result Units 06/02/23  0839   Vancomycin-Trough ug/mL 8.3*       Pharmacy will continue to follow and monitor vancomycin.    Please contact pharmacy at extension [phone] for questions regarding this assessment.    Thank you for the consult,   John Kelly       Patient brief summary:  Jeannie Hutchins is a 88 y.o. female initiated on antimicrobial therapy with IV Vancomycin for treatment of bacteremia    The patient's current regimen is vancomycin 750 mg Q24H    Drug Allergies:   Review of patient's allergies indicates:   Allergen Reactions    Umpire Swelling     Tongue swells up and a generalized rash.  Patient reports allergy to all Berries    Hydrochlorothiazide Other (See Comments)     hyponatremia    Lisinopril Other (See Comments)     Cough       Actual Body Weight:   44.5 kg    Renal Function:   Estimated Creatinine Clearance: 30.5 mL/min (based on SCr of 0.9 mg/dL).,     Dialysis Method (if applicable):  N/A    CBC (last 72 hours):  Recent Labs   Lab Result Units 05/31/23  0440 06/01/23  0859 06/02/23  0518   WBC K/uL 20.84* 20.39* 17.24*   Hemoglobin g/dL 9.8* 9.7* 9.1*   Hematocrit % 30.6* 31.1* 29.2*   Platelets K/uL 253 236 222   Gran % % 87.7*  --  86.2*   Lymph % % 2.4*  --  3.1*   Mono % % 9.3  --  9.7   Eosinophil % % 0.0  --  0.1   Basophil % % 0.0  --  0.1   Differential Method  Automated  --  Automated       Metabolic Panel (last 72 hours):  Recent Labs   Lab Result Units 05/31/23  0440  05/31/23  1131 06/01/23  0859 06/02/23  0518   Sodium mmol/L 140  --  134* 131*   Potassium mmol/L 3.2* 4.6 4.0 3.9   Chloride mmol/L 99  --  94* 91*   CO2 mmol/L 29  --  30* 30*   Glucose mg/dL 113*  --  122* 106   BUN mg/dL 17  --  27* 38*   Creatinine mg/dL 0.7  --  0.8 0.9   Albumin g/dL 3.1*  --   --  2.6*   Total Bilirubin mg/dL 1.7*  --   --  1.6*   Alkaline Phosphatase U/L 86  --   --  95   AST U/L 17  --   --  23   ALT U/L 30  --   --  33   Magnesium mg/dL 2.0  --  1.9 2.2       Vancomycin Administrations:  vancomycin given in the last 96 hours                     vancomycin 750 mg in dextrose 5 % 250 mL IVPB (ready to mix) (mg) 750 mg New Bag 06/01/23 1000                    Microbiologic Results:  Microbiology Results (last 7 days)       Procedure Component Value Units Date/Time    Blood culture [[id number]] Collected: 06/02/23 0839    Order Status: Sent Specimen: Blood Updated: 06/02/23 0853    Blood culture [[id number]]  (Abnormal) Collected: 05/30/23 1115    Order Status: Completed Specimen: Blood Updated: 06/02/23 0734     Blood Culture, Routine Gram stain shirley bottle: Gram positive cocci      Positive results previously called 05/31/2023  03:29 KS3      Gram stain aer bottle: Gram positive cocci      Positive results previously called      METHICILLIN RESISTANT STAPHYLOCOCCUS AUREUS  Known MRSA patient  For susceptibility see order #[id number]      Blood culture [[id number]]  (Abnormal)  (Susceptibility) Collected: 05/30/23 1130    Order Status: Completed Specimen: Blood Updated: 06/02/23 0733     Blood Culture, Routine Gram stain shirley bottle: Gram positive cocci      Results called to and read back by: Triston Brown RN on [address]      05/31/2023  02:32 KS3      Gram stain aer bottle: Gram positive cocci      Positive results previously called 05/31/2023  04:59 KS3      METHICILLIN RESISTANT STAPHYLOCOCCUS AUREUS  Known MRSA patient      Blood culture [[id number]] Collected: 06/01/23 0858    Order  Status: Completed Specimen: Blood Updated: 06/02/23 0159     Blood Culture, Routine Gram stain aer bottle: Gram positive cocci      Positive results previously called 06/02/2023  01:59 KS3    Blood culture [[id number]] Collected: 06/01/23 0859    Order Status: Completed Specimen: Blood Updated: 06/02/23 0107     Blood Culture, Routine Gram stain aer bottle: Gram positive cocci      Positive results previously called 06/02/2023  01:07 KS3    Urine culture [[id number]]  (Abnormal) Collected: 05/30/23 0909    Order Status: Completed Specimen: Urine from Clean Catch Updated: 05/31/23 0858     Urine Culture, Routine YEAST   > 100,000 cfu/ml  identification pending      Rapid Organism ID by PCR (from Blood culture) [[id number]]  (Abnormal) Collected: 05/30/23 1130    Order Status: Completed Updated: 05/31/23 0348     Enterococcus faecalis Not Detected     Enterococcus faecium Not Detected     Listeria monocytogenes Not Detected     Staphylococcus spp. See species for ID     Staphylococcus aureus Detected     Staphylococcus epidermidis Not Detected     Staphylococcus lugdunensis Not Detected     Streptococcus species Not Detected     Streptococcus agalactiae Not Detected     Streptococcus pneumoniae Not Detected     Streptococcus pyogenes Not Detected     Acinetobacter calcoaceticus/baumannii complex Not Detected     Bacteroides fragilis Not Detected     Enterobacterales Not Detected     Enterobacter cloacae complex Not Detected     Escherichia coli Not Detected     Klebsiella aerogenes Not Detected     Klebsiella oxytoca Not Detected     Klebsiella pneumoniae group Not Detected     Proteus Not Detected     Salmonella sp Not Detected     Serratia marcescens Not Detected     Haemophilus influenzae Not Detected     Neisseria meningtidis Not Detected     Pseudomonas aeruginosa Not Detected     Stenotrophomonas maltophilia Not Detected     Candida albicans Not Detected     Candida auris Not Detected     Candida glabrata Not  Detected     Candida krusei Not Detected     Candida parapsilosis Not Detected     Candida tropicalis Not Detected     Cryptococcus neoformans/gattii Not Detected     CTX-M (ESBL ) Test not applicable     IMP (Carbapenem resistant) Test not applicable     KPC resistance gene (Carbapenem resistant) Test not applicable     mcr-1  Test not applicable     mec A/C  Test not applicable     mec A/C and MREJ (MRSA) gene Detected     Comment: Resistance gene critical result(s) called and verbal readback   obtained from Mylene Mullen RN on [address] by KS3 05/31/2023 03:47          NDM (Carbapenem resistant) Test not applicable     OXA-48-like (Carbapenem resistant) Test not applicable     van A/B (VRE gene) Test not applicable     VIM (Carbapenem resistant) Test not applicable    Narrative:      Resistance gene critical result(s) called and verbal readback   obtained from Mylene Mullen RN on [address] by KS3 05/31/2023 03:47    MRSA Screen by PCR [[id number]] Collected: 05/30/23 1105    Order Status: Completed Specimen: Nasopharyngeal Swab from Nasal Updated: 05/30/23 1256     MRSA SCREEN BY PCR Negative    Culture, Respiratory with Gram Stain [[id number]]     Order Status: No result Specimen: Respiratory from Sputum, Expectorated     Blood culture [[id number]]     Order Status: Canceled Specimen: Blood     Urine Culture High Risk [[id number]]     Order Status: Completed Specimen: Urine

## 2023-06-02 NOTE — PT/OT/SLP PROGRESS
Occupational Therapy      Patient Name:  Jeannie Hutchins   MRN:  [id number]    Patient not seen today secondary to  (Awaiting Ortho consult for right closed hip fracture.).    6/2/2023

## 2023-06-03 LAB
ALBUMIN SERPL BCP-MCNC: 2.5 G/DL (ref 3.5–5.2)
ALP SERPL-CCNC: 92 U/L (ref 55–135)
ALT SERPL W/O P-5'-P-CCNC: 29 U/L (ref 10–44)
ANION GAP SERPL CALC-SCNC: 9 MMOL/L (ref 8–16)
AST SERPL-CCNC: 23 U/L (ref 10–40)
BACTERIA BLD CULT: ABNORMAL
BACTERIA UR CULT: ABNORMAL
BASOPHILS # BLD AUTO: 0.02 K/UL (ref 0–0.2)
BASOPHILS NFR BLD: 0.1 % (ref 0–1.9)
BILIRUB SERPL-MCNC: 1.6 MG/DL (ref 0.1–1)
BUN SERPL-MCNC: 46 MG/DL (ref 8–23)
CALCIUM SERPL-MCNC: 8.9 MG/DL (ref 8.7–10.5)
CHLORIDE SERPL-SCNC: 93 MMOL/L (ref 95–110)
CO2 SERPL-SCNC: 29 MMOL/L (ref 23–29)
CREAT SERPL-MCNC: 1.1 MG/DL (ref 0.5–1.4)
CRP SERPL-MCNC: 23.21 MG/DL
DIFFERENTIAL METHOD: ABNORMAL
EOSINOPHIL # BLD AUTO: 0.1 K/UL (ref 0–0.5)
EOSINOPHIL NFR BLD: 0.4 % (ref 0–8)
ERYTHROCYTE [DISTWIDTH] IN BLOOD BY AUTOMATED COUNT: 24.9 % (ref 11.5–14.5)
EST. GFR  (NO RACE VARIABLE): 48.6 ML/MIN/1.73 M^2
GLUCOSE SERPL-MCNC: 86 MG/DL (ref 70–110)
HCT VFR BLD AUTO: 27.4 % (ref 37–48.5)
HGB BLD-MCNC: 8.7 G/DL (ref 12–16)
IMM GRANULOCYTES # BLD AUTO: 0.13 K/UL (ref 0–0.04)
IMM GRANULOCYTES NFR BLD AUTO: 0.8 % (ref 0–0.5)
LACTATE SERPL-SCNC: 1.2 MMOL/L (ref 0.5–1.9)
LYMPHOCYTES # BLD AUTO: 0.4 K/UL (ref 1–4.8)
LYMPHOCYTES NFR BLD: 2.8 % (ref 18–48)
MAGNESIUM SERPL-MCNC: 2.2 MG/DL (ref 1.6–2.6)
MCH RBC QN AUTO: 26 PG (ref 27–31)
MCHC RBC AUTO-ENTMCNC: 31.8 G/DL (ref 32–36)
MCV RBC AUTO: 82 FL (ref 82–98)
MONOCYTES # BLD AUTO: 1.3 K/UL (ref 0.3–1)
MONOCYTES NFR BLD: 8.3 % (ref 4–15)
NEUTROPHILS # BLD AUTO: 14 K/UL (ref 1.8–7.7)
NEUTROPHILS NFR BLD: 87.6 % (ref 38–73)
NRBC BLD-RTO: 0 /100 WBC
PLATELET # BLD AUTO: 246 K/UL (ref 150–450)
PMV BLD AUTO: 8.9 FL (ref 9.2–12.9)
POTASSIUM SERPL-SCNC: 3.8 MMOL/L (ref 3.5–5.1)
PROT SERPL-MCNC: 5.3 G/DL (ref 6–8.4)
RBC # BLD AUTO: 3.34 M/UL (ref 4–5.4)
SODIUM SERPL-SCNC: 131 MMOL/L (ref 136–145)
VANCOMYCIN TROUGH SERPL-MCNC: 13.8 UG/ML
WBC # BLD AUTO: 15.94 K/UL (ref 3.9–12.7)

## 2023-06-03 PROCEDURE — 97530 THERAPEUTIC ACTIVITIES: CPT

## 2023-06-03 PROCEDURE — 99232 PR SUBSEQUENT HOSPITAL CARE,LEVL II: ICD-10-PCS | Mod: ,,, | Performed by: INTERNAL MEDICINE

## 2023-06-03 PROCEDURE — 36415 COLL VENOUS BLD VENIPUNCTURE: CPT | Performed by: STUDENT IN AN ORGANIZED HEALTH CARE EDUCATION/TRAINING PROGRAM

## 2023-06-03 PROCEDURE — 83605 ASSAY OF LACTIC ACID: CPT | Performed by: STUDENT IN AN ORGANIZED HEALTH CARE EDUCATION/TRAINING PROGRAM

## 2023-06-03 PROCEDURE — 80053 COMPREHEN METABOLIC PANEL: CPT | Performed by: STUDENT IN AN ORGANIZED HEALTH CARE EDUCATION/TRAINING PROGRAM

## 2023-06-03 PROCEDURE — 27000221 HC OXYGEN, UP TO 24 HOURS

## 2023-06-03 PROCEDURE — 99232 SBSQ HOSP IP/OBS MODERATE 35: CPT | Mod: ,,, | Performed by: INTERNAL MEDICINE

## 2023-06-03 PROCEDURE — 25000003 PHARM REV CODE 250

## 2023-06-03 PROCEDURE — 94761 N-INVAS EAR/PLS OXIMETRY MLT: CPT

## 2023-06-03 PROCEDURE — 63600175 PHARM REV CODE 636 W HCPCS: Performed by: INTERNAL MEDICINE

## 2023-06-03 PROCEDURE — 83735 ASSAY OF MAGNESIUM: CPT | Performed by: STUDENT IN AN ORGANIZED HEALTH CARE EDUCATION/TRAINING PROGRAM

## 2023-06-03 PROCEDURE — 99900035 HC TECH TIME PER 15 MIN (STAT)

## 2023-06-03 PROCEDURE — 87040 BLOOD CULTURE FOR BACTERIA: CPT | Performed by: INTERNAL MEDICINE

## 2023-06-03 PROCEDURE — 63600175 PHARM REV CODE 636 W HCPCS: Performed by: STUDENT IN AN ORGANIZED HEALTH CARE EDUCATION/TRAINING PROGRAM

## 2023-06-03 PROCEDURE — 21000000 HC CCU ICU ROOM CHARGE

## 2023-06-03 PROCEDURE — 25500020 PHARM REV CODE 255: Performed by: STUDENT IN AN ORGANIZED HEALTH CARE EDUCATION/TRAINING PROGRAM

## 2023-06-03 PROCEDURE — 99223 PR INITIAL HOSPITAL CARE,LEVL III: ICD-10-PCS | Mod: ,,, | Performed by: INTERNAL MEDICINE

## 2023-06-03 PROCEDURE — 80202 ASSAY OF VANCOMYCIN: CPT | Performed by: STUDENT IN AN ORGANIZED HEALTH CARE EDUCATION/TRAINING PROGRAM

## 2023-06-03 PROCEDURE — 25000003 PHARM REV CODE 250: Performed by: INTERNAL MEDICINE

## 2023-06-03 PROCEDURE — 99223 1ST HOSP IP/OBS HIGH 75: CPT | Mod: ,,, | Performed by: INTERNAL MEDICINE

## 2023-06-03 PROCEDURE — 85025 COMPLETE CBC W/AUTO DIFF WBC: CPT | Performed by: STUDENT IN AN ORGANIZED HEALTH CARE EDUCATION/TRAINING PROGRAM

## 2023-06-03 PROCEDURE — 97164 PT RE-EVAL EST PLAN CARE: CPT

## 2023-06-03 PROCEDURE — 86140 C-REACTIVE PROTEIN: CPT | Performed by: INTERNAL MEDICINE

## 2023-06-03 PROCEDURE — 36415 COLL VENOUS BLD VENIPUNCTURE: CPT | Performed by: INTERNAL MEDICINE

## 2023-06-03 PROCEDURE — 92526 ORAL FUNCTION THERAPY: CPT

## 2023-06-03 PROCEDURE — 25000003 PHARM REV CODE 250: Performed by: STUDENT IN AN ORGANIZED HEALTH CARE EDUCATION/TRAINING PROGRAM

## 2023-06-03 RX ORDER — METOPROLOL TARTRATE 25 MG/1
25 TABLET, FILM COATED ORAL 2 TIMES DAILY
Status: DISCONTINUED | OUTPATIENT
Start: 2023-06-03 | End: 2023-06-12

## 2023-06-03 RX ORDER — HYDROCODONE BITARTRATE AND ACETAMINOPHEN 5; 325 MG/1; MG/1
1 TABLET ORAL EVERY 4 HOURS PRN
Status: DISCONTINUED | OUTPATIENT
Start: 2023-06-03 | End: 2023-06-12 | Stop reason: HOSPADM

## 2023-06-03 RX ORDER — TRAZODONE HYDROCHLORIDE 50 MG/1
50 TABLET ORAL NIGHTLY PRN
Status: DISCONTINUED | OUTPATIENT
Start: 2023-06-03 | End: 2023-06-12 | Stop reason: HOSPADM

## 2023-06-03 RX ADMIN — HYDROCODONE BITARTRATE AND ACETAMINOPHEN 1 TABLET: 7.5; 325 TABLET ORAL at 05:06

## 2023-06-03 RX ADMIN — DIGOXIN 0.12 MG: 125 TABLET ORAL at 09:06

## 2023-06-03 RX ADMIN — VANCOMYCIN HYDROCHLORIDE 750 MG: 750 INJECTION, POWDER, LYOPHILIZED, FOR SOLUTION INTRAVENOUS at 10:06

## 2023-06-03 RX ADMIN — METOPROLOL TARTRATE 50 MG: 50 TABLET, FILM COATED ORAL at 09:06

## 2023-06-03 RX ADMIN — LEVOTHYROXINE SODIUM 75 MCG: 0.03 TABLET ORAL at 05:06

## 2023-06-03 RX ADMIN — CHLORHEXIDINE GLUCONATE 15 ML: 1.2 RINSE ORAL at 09:06

## 2023-06-03 RX ADMIN — FAMOTIDINE 20 MG: 20 TABLET ORAL at 09:06

## 2023-06-03 RX ADMIN — HYDROCODONE BITARTRATE AND ACETAMINOPHEN 1 TABLET: 7.5; 325 TABLET ORAL at 12:06

## 2023-06-03 RX ADMIN — DAPTOMYCIN 350 MG: 500 INJECTION, POWDER, LYOPHILIZED, FOR SOLUTION INTRAVENOUS at 12:06

## 2023-06-03 RX ADMIN — MUPIROCIN 1 G: 20 OINTMENT TOPICAL at 09:06

## 2023-06-03 RX ADMIN — ESCITALOPRAM OXALATE 10 MG: 10 TABLET ORAL at 09:06

## 2023-06-03 RX ADMIN — ATORVASTATIN CALCIUM 20 MG: 20 TABLET, FILM COATED ORAL at 09:06

## 2023-06-03 RX ADMIN — IOHEXOL 100 ML: 350 INJECTION, SOLUTION INTRAVENOUS at 04:06

## 2023-06-03 NOTE — PROGRESS NOTES
Pharmacokinetic Assessment Follow Up: IV Vancomycin    Patient brief summary:  Jeannie Hutchins is a 88 y.o. female initiated on antimicrobial therapy with IV Vancomycin for treatment of Bacteremia    The patient's current regimen is 750 mg every 24 hours      Actual Body Weight = 45 kg (99 lb 3.3 oz).    Renal Function:   Estimated Creatinine Clearance: 25 mL/min (based on SCr of 1.1 mg/dL).      Vancomycin serum concentration assessment(s):    The trough level was drawn correctly and can be used to guide therapy at this time. The measurement is below the desired definitive target range of 15 to 20 mcg/mL.    Vancomycin Regimen Plan:    Vancomycin trough prior to dose #3 on 6/4 at 08:30.    Drug levels (last 3 results):  Recent Labs   Lab Result Units 06/02/23  0839 06/03/23  0817   Vancomycin-Trough ug/mL 8.3* 13.8          Dialysis Method (if applicable):  N/A;       Pharmacy will continue to follow and monitor vancomycin.    Please contact pharmacy at extension [phone] for questions regarding this assessment.    Thank you for the consult,   Kari Elkins

## 2023-06-03 NOTE — ASSESSMENT & PLAN NOTE
Patient with known history of persistent atrial fibrillation.   RVR likely driven by acute medical condition including infection/sepsis and CHF.  Telemetry monitoring, treat acute medical condition, continue metoprolol and eliquis  Discontinue diltiazem  Added digoxin  Metoprolol reduced  Monitor blood pressure  HR better controlled  Can consult Cardiology if not improving with treatment of her sepsis and rate control

## 2023-06-03 NOTE — PLAN OF CARE
Problem: SLP  Goal: SLP Goal  Description: 1. Pt will tolerate LRD w/ adequate oral clearance and w/o overt s/s aspiration during >95% of PO intake  2. Pt will utilize swallowing precautions during >95% of PO intake given min cues  Outcome: Ongoing, Progressing

## 2023-06-03 NOTE — PT/OT/SLP RE-EVAL
Physical Therapy Re-evaluation    Patient Name:  Jeannie uHtchins   MRN:  [id number]    Recommendations:     Discharge Recommendations: nursing facility, skilled  Discharge Equipment Recommendations: none   Barriers to discharge: None    Assessment:     Jeannie Hutchins is a 88 y.o. female admitted with a medical diagnosis of Sepsis.  She presents with the following impairments/functional limitations: weakness, impaired endurance, impaired functional mobility, gait instability, impaired balance, decreased lower extremity function, pain, decreased ROM, edema, orthopedic precautions .  New orders received with new weight bearing restrictions to re-evaluation performed.  Patient presented supine in bed and required max max assist to transfer to sitting and then intermittent min assist to maintain sitting x 10 minutes.  Patient then required total assist to stand at bedside but was unable to maintain NWB to was quickly sat back down.  Patient then required total assist to return to supine.     Rehab Prognosis:  fair; patient would benefit from acute skilled PT services to address these deficits and reach maximum level of function.      Recent Surgery: * No surgery found *      Plan:     During this hospitalization, patient to be seen 6 x/week to address the above listed problems via gait training, therapeutic activities, therapeutic exercises  Plan of Care Expires:  07/03/23  Plan of Care Reviewed with: patient    Subjective     Communicated with nurse prior to session.  Patient found supine with bed alarm, telemetry upon PT entry to room, agreeable to evaluation.      Chief Complaint: pain with movement  Patient comments/goals: none given  Pain/Comfort:  Pain Rating 1: 3/10  Location - Side 1: Right  Location - Orientation 1: lower  Location 1: hip  Pain Addressed 1: Reposition, Cessation of Activity  Pain Rating Post-Intervention 1: 10/10 (patient did not grade the pain but sceamed out in pain with all movement of the right  LE)    Patients cultural, spiritual, Lutheran conflicts given the current situation:        Objective:     Patient found with: bed alarm, telemetry     General Precautions: Standard, fall, contact (MRSA)  Orthopedic Precautions: RLE non weight bearing  Braces:    Respiratory Status: Room air    Exams:  RLE ROM: not tested  RLE Strength: Deficits: 2-/5 overall  LLE ROM: WFL  LLE Strength: Deficits: 3-/5 overall    Functional Mobility:  Bed Mobility:     Supine to Sit: maximal assistance  Sit to Supine: total assistance  Transfers:     Sit to Stand:  total assistance with rolling walker  Balance: sitting EOB x 10 minutes with intermittent min assist    AM-PAC 6 CLICK MOBILITY  Total Score:10       Treatment and Education:      Patient left supine with call button in reach, bed alarm on, and nurse notified.    GOALS:   Multidisciplinary Problems       Physical Therapy Goals          Problem: Physical Therapy    Goal Priority Disciplines Outcome Goal Variances Interventions   Physical Therapy Goal     PT, PT/OT Ongoing, Progressing     Description: Goals to be met by: 2023     Patient will increase functional independence with mobility by performin. Supine to sit with MInimal Assistance  2. Sit to stand transfer with Minimal Assistance and NWB right LE  3. Sit to supine with minimal assistance.      6/3/23: new order received so patient reevaluated and goals changes as appropriate.                       History:     Past Medical History:   Diagnosis Date    Anticoagulant long-term use     Eliquis    Arthritis     Atrial fibrillation     Blood transfusion     Carotid stenosis     CHF (congestive heart failure)     Edema     Generalized anxiety disorder 2018    Dr. Mckeon's eval 18: She does appear to have a JERRI because of the persistent worries that she has.  These worries predominate her day.  She would probably do well with prevention therapy such as SSRI/SNRI to help control the physical  symptoms of the anxiety.  Problem at this point in time is her electrolyte abnormalities.  There is a slight risk of hyponatremia with these medications and    Hearing loss     [place] (hard of hearing)     Hypercholesterolemia     Hypertension     On home oxygen therapy     Psychiatric problem     Thyroid disease        Past Surgical History:   Procedure Laterality Date    CARDIOVERSION  10/19/2020    Procedure: Cardioversion;  Surgeon: Brandon Elias MD;  Location: [facility] CATH LAB;  Service: Cardiology;;    CARPAL TUNNEL RELEASE  2012    right hand    ESOPHAGOGASTRODUODENOSCOPY Left 8/29/2018    Procedure: EGD (ESOPHAGOGASTRODUODENOSCOPY);  Surgeon: Oniel Dorsey MD;  Location: [facility] ENDO;  Service: Endoscopy;  Laterality: Left;    HYSTERECTOMY      left carotid endartectomy  5/2006    TONSILLECTOMY      TREATMENT OF CARDIAC ARRHYTHMIA N/A 10/19/2020    Procedure: Transesophageal echo (BLADIMIR) intra-procedure log documentation;  Surgeon: Brandon Elias MD;  Location: [facility] CATH LAB;  Service: Cardiology;  Laterality: N/A;       Time Tracking:     PT Received On: 06/03/23  PT Start Time: 0849     PT Stop Time: 0913  PT Total Time (min): 24 min     Billable Minutes: Evaluation 15 and Therapeutic Activity 9      06/03/2023

## 2023-06-03 NOTE — CONSULTS
06/03/2023      Admit Date: 5/30/2023  Jeannie S Cuong  New Patient Consult    Chief Complaint   Patient presents with    Tachycardia       History of Present Illness:  Pt is an 87 yo female with atrial fib on eliquis, CHF, chronic resp failure on home O2, HTN, HLD, hearing loss, anxiety who presented to ED on 5/30 with tachycardia & syncope, recent hip fx 1 week ago, found to have fever and sepsis with MRSA bacteremia.  ID is following and doing workup, notes reviewed. She is getting vanco + daptomycin. Pulmonary is consulted for worsening O2 requirement.  She is now requiring high flow nc/oxy mask to maintain sats. She is having a lot of pain in her right hip and has difficulty hearing and answering my questions. Her son Dane at bedside provides history. She has CHF, uses 2L O2 at her facility and has had a progressive decline over the past 2 yrs with breathing difficulty. It has been determined she is a poor surgical candidate for hip repair. They do not want further invasive testing or procedures. She continues on antibiotics and has persistent MRSA bacteremia of unclear source. Per nursing her blood pressure gets very low with pain and heart medications.    PFSH:  Past Medical History:   Diagnosis Date    Anticoagulant long-term use     Eliquis    Arthritis     Atrial fibrillation     Blood transfusion     Carotid stenosis     CHF (congestive heart failure)     Edema     Generalized anxiety disorder 1/23/2018    Dr. Mckeon's eval 1/23/18: She does appear to have a JERRI because of the persistent worries that she has.  These worries predominate her day.  She would probably do well with prevention therapy such as SSRI/SNRI to help control the physical symptoms of the anxiety.  Problem at this point in time is her electrolyte abnormalities.  There is a slight risk of hyponatremia with these medications and    Hearing loss     [place] (hard of hearing)     Hypercholesterolemia     Hypertension     On home oxygen  "therapy     Psychiatric problem     Thyroid disease      Past Surgical History:   Procedure Laterality Date    CARDIOVERSION  10/19/2020    Procedure: Cardioversion;  Surgeon: Brandon Elias MD;  Location: [facility] CATH LAB;  Service: Cardiology;;    CARPAL TUNNEL RELEASE  2012    right hand    ESOPHAGOGASTRODUODENOSCOPY Left 8/29/2018    Procedure: EGD (ESOPHAGOGASTRODUODENOSCOPY);  Surgeon: Oniel Dorsey MD;  Location: [facility] ENDO;  Service: Endoscopy;  Laterality: Left;    HYSTERECTOMY      left carotid endartectomy  5/2006    TONSILLECTOMY      TREATMENT OF CARDIAC ARRHYTHMIA N/A 10/19/2020    Procedure: Transesophageal echo (BLADIMIR) intra-procedure log documentation;  Surgeon: Brandon Elias MD;  Location: [facility] CATH LAB;  Service: Cardiology;  Laterality: N/A;     Social History     Tobacco Use    Smoking status: Never    Smokeless tobacco: Never   Substance Use Topics    Alcohol use: No    Drug use: No     Family History   Problem Relation Age of Onset    Heart disease Father     Cancer Brother 65        bladder    Cancer Sister 81        Ovarian    Ovarian cancer Sister 81    Breast cancer Daughter 50        Status post mastectomy    Cancer Sister         Lung.  Smoker    Cancer Sister         Lung.  Smoker    Schizophrenia Son      Review of patient's allergies indicates:   Allergen Reactions    [place] Swelling     Tongue swells up and a generalized rash.  Patient reports allergy to all Berries    Hydrochlorothiazide Other (See Comments)     hyponatremia    Lisinopril Other (See Comments)     Cough       Performance Status:Performance Status:The patient's activity level is housebound activities.    Review of Systems:  due to neurologic status/impairments a Review of Systems could not be obtained       Exam:Comprehensive exam done. /70   Pulse 104   Temp 99 °F (37.2 °C) (Axillary)   Resp (!) 25   Ht 4' 8" (1.422 m)   Wt 45 kg (99 lb 3.3 oz)   SpO2 99%   BMI 22.25 kg/m²   Exam included Vitals as " listed, and patient's appearance and affect and alertness and mood, oral exam for yeast and hygiene and pharynx lesions and Mallapatti (M) score, neck with inspection for jvd and masses and thyroid abnormalities and lymph nodes (supraclavicular and infraclavicular nodes also examined and noted if abn), chest exam included symmetry and effort and fremitus and percussion and auscultation, cardiac exam included rhythm and gallops and murmur and rubs and jvd and edema, abdominal exam for mass and hepatosplenomegaly and tenderness and hernias and bowel sounds, Musculoskeletal exam with muscle tone and posture and mobility/gait and  strenght, and skin for rashes and cyanosis and pallor and turgor, extremity for clubbing.  Findings were normal except as listed below:  Elderly frail petite female  Hard of hearing  Oxy mask in place  HR regular  Breath sounds diminished bilaterally w/ basilar crackles  Abd soft nontender  No edema    Radiographs reviewed: view by direct vision   CXR 6/2/23- bilat hilar congestion, cardiomegaly  CTA chest- no PE; small R pleural effusion, bilat faint ground glass perihilar suggesting pulm edema    TTE 6/1/23-   Atrial fibrillation observed.  The left ventricle is normal in size with concentric remodeling and normal systolic function.  The estimated ejection fraction is 55%.  Moderate tricuspid regurgitation.  No clear evidence of endocarditis. If clinical suspicion persist, consider alternative cardiac imaging like BLADIMIR for further evaluation.  Labs     Recent Labs   Lab 06/03/23  0713   WBC 15.94*   HGB 8.7*   HCT 27.4*        Recent Labs   Lab 06/02/23  1511 06/03/23  0713   NA  --  131*   K  --  3.8   CL  --  93*   CO2  --  29   BUN  --  46*   CREATININE  --  1.1   GLU  --  86   CALCIUM  --  8.9   MG  --  2.2   AST  --  23   ALT  --  29   ALKPHOS  --  92   BILITOT  --  1.6*   PROT  --  5.3*   ALBUMIN  --  2.5*   CRP  --  23.21*   LACTATE 2.2*  --      Recent Labs   Lab  06/02/23  1600   PH 7.415   PCO2 50.7*   PO2 74*   HCO3 32.6*     Microbiology Results (last 7 days)       Procedure Component Value Units Date/Time    Blood culture [[id number]]  (Abnormal) Collected: 06/02/23 0839    Order Status: Completed Specimen: Blood Updated: 06/03/23 0802     Blood Culture, Routine Gram stain aer bottle: Gram positive cocci      Positive results previously called 06/02/2023  20:46 KS3      METHICILLIN RESISTANT STAPHYLOCOCCUS AUREUS  For susceptibility see order #[id number]      Blood culture [[id number]]  (Abnormal) Collected: 06/01/23 0858    Order Status: Completed Specimen: Blood Updated: 06/03/23 0801     Blood Culture, Routine Gram stain aer bottle: Gram positive cocci      Positive results previously called 06/02/2023  01:59 KS3      METHICILLIN RESISTANT STAPHYLOCOCCUS AUREUS  For susceptibility see order #[id number]      Blood culture [[id number]]  (Abnormal) Collected: 06/01/23 0859    Order Status: Completed Specimen: Blood Updated: 06/03/23 0800     Blood Culture, Routine Gram stain aer bottle: Gram positive cocci      Positive results previously called 06/02/2023  01:07 KS3      METHICILLIN RESISTANT STAPHYLOCOCCUS AUREUS  For susceptibility see order #[id number]      Blood culture [[id number]] Collected: 06/03/23 0713    Order Status: Sent Specimen: Blood Updated: 06/03/23 0744    Urine culture [[id number]]  (Abnormal) Collected: 05/30/23 0909    Order Status: Completed Specimen: Urine from Clean Catch Updated: 06/03/23 0635     Urine Culture, Routine DANILO ALBICANS  > 100,000 cfu/ml  Treatment of asymptomatic candiduria is not recommended (except for   specific populations). Candida isolated in the urine typically   represents colonization. If an indwelling urinary catheter is present  it should be removed or replaced.      Blood culture [[id number]]  (Abnormal) Collected: 05/30/23 1115    Order Status: Completed Specimen: Blood Updated: 06/02/23 0734     Blood Culture, Routine  Gram stain shirley bottle: Gram positive cocci      Positive results previously called 05/31/2023  03:29 KS3      Gram stain aer bottle: Gram positive cocci      Positive results previously called      METHICILLIN RESISTANT STAPHYLOCOCCUS AUREUS  Known MRSA patient  For susceptibility see order #[id number]      Blood culture [[id number]]  (Abnormal)  (Susceptibility) Collected: 05/30/23 1130    Order Status: Completed Specimen: Blood Updated: 06/02/23 0733     Blood Culture, Routine Gram stain shirley bottle: Gram positive cocci      Results called to and read back by: Triston Brown RN on [address]      05/31/2023  02:32 KS3      Gram stain aer bottle: Gram positive cocci      Positive results previously called 05/31/2023  04:59 KS3      METHICILLIN RESISTANT STAPHYLOCOCCUS AUREUS  Known MRSA patient      Rapid Organism ID by PCR (from Blood culture) [[id number]]  (Abnormal) Collected: 05/30/23 1130    Order Status: Completed Updated: 05/31/23 0348     Enterococcus faecalis Not Detected     Enterococcus faecium Not Detected     Listeria monocytogenes Not Detected     Staphylococcus spp. See species for ID     Staphylococcus aureus Detected     Staphylococcus epidermidis Not Detected     Staphylococcus lugdunensis Not Detected     Streptococcus species Not Detected     Streptococcus agalactiae Not Detected     Streptococcus pneumoniae Not Detected     Streptococcus pyogenes Not Detected     Acinetobacter calcoaceticus/baumannii complex Not Detected     Bacteroides fragilis Not Detected     Enterobacterales Not Detected     Enterobacter cloacae complex Not Detected     Escherichia coli Not Detected     Klebsiella aerogenes Not Detected     Klebsiella oxytoca Not Detected     Klebsiella pneumoniae group Not Detected     Proteus Not Detected     Salmonella sp Not Detected     Serratia marcescens Not Detected     Haemophilus influenzae Not Detected     Neisseria meningtidis Not Detected     Pseudomonas aeruginosa Not Detected      Stenotrophomonas maltophilia Not Detected     Candida albicans Not Detected     Candida auris Not Detected     Candida glabrata Not Detected     Candida krusei Not Detected     Candida parapsilosis Not Detected     Candida tropicalis Not Detected     Cryptococcus neoformans/gattii Not Detected     CTX-M (ESBL ) Test not applicable     IMP (Carbapenem resistant) Test not applicable     KPC resistance gene (Carbapenem resistant) Test not applicable     mcr-1  Test not applicable     mec A/C  Test not applicable     mec A/C and MREJ (MRSA) gene Detected     Comment: Resistance gene critical result(s) called and verbal readback   obtained from Mylene Mullen RN on [address] by KS3 05/31/2023 03:47          NDM (Carbapenem resistant) Test not applicable     OXA-48-like (Carbapenem resistant) Test not applicable     van A/B (VRE gene) Test not applicable     VIM (Carbapenem resistant) Test not applicable    Narrative:      Resistance gene critical result(s) called and verbal readback   obtained from Mylene Mullen RN on [address] by KS3 05/31/2023 03:47    MRSA Screen by PCR [[id number]] Collected: 05/30/23 1105    Order Status: Completed Specimen: Nasopharyngeal Swab from Nasal Updated: 05/30/23 1256     MRSA SCREEN BY PCR Negative    Culture, Respiratory with Gram Stain [[id number]]     Order Status: No result Specimen: Respiratory from Sputum, Expectorated     Blood culture [[id number]]     Order Status: Canceled Specimen: Blood     Urine Culture High Risk [[id number]]     Order Status: Completed Specimen: Urine             Impression:  Active Hospital Problems    Diagnosis  POA    *MRSA Bacteremia [A41.9]  Yes    Closed fracture of right hip [S72.001A]  Yes    Candiduria [B37.49]  Yes    Closed right hip fracture after fall [S72.001A]  Yes    Acute on chronic diastolic heart failure [I50.9]  Yes    Acute on chronic respiratory failure with hypoxia [J96.20]  Yes    Atrial fibrillation with RVR with known history  of a fib [I48.91]  Yes     Chronic    Troponin I above reference range [R77.8]  Yes    Nursing home resident [Z59.3]  Not Applicable     Chronic    DNR (do not resuscitate) [Z66]  Yes     Chronic    UTI (urinary tract infection) [N39.0]  Yes    Ulcer of right leg [L97.919]  Yes     Chronic    Anemia [D64.9]  Yes     Chronic    Hypothyroidism [E03.9]  Yes     Chronic    Mixed hyperlipidemia [E78.2]  Yes    Pulmonary hypertension [I27.20]  Yes    Essential hypertension [I10]  Yes      Resolved Hospital Problems   No resolved problems to display.               Plan:   Acute hypoxemic respiratory failure- worsening. Her O2 requirement is out of proportion to imaging findings. Concern for fat embolus given hip fracture  Right hip fracture  Acute on chronic CHF- she does not appear overtly fluid overloaded  MRSA bacteremia, unclear source    - continue supplemental oxygen to keep sats greater than 90%  - continue antibiotics  - pain control- decrease Norco to 5 mg  - decrease metoprolol to 25 mg b.i.d. given hypotension  - discussed with son Dane-patient is very high risk for further deterioration and death.  If she has fat embolus I expect this to continue to worsen in the next 24-48 hours.  If she further worsens the family does not wish for any escalation of care.  They do wish to continue conservative treatment measures at this time and may opt for comfort care in the near future  - discussed with bedside RN and Dr. Kleber Ndiaye MD  Pulmonary & Critical Care Medicine

## 2023-06-03 NOTE — ASSESSMENT & PLAN NOTE
Chronically on 2 L supplemental oxygen   Ongoing respiratory failure  CTA without PE  Concerns for possible fat embolism  Appreciate pulmonary recs  Difficult time getting pulse ox.  Her oxygen requirement has been up and down.  High risk for decompensation

## 2023-06-03 NOTE — SUBJECTIVE & OBJECTIVE
Interval History:  She remains bacteremic.  Continuing to search for source.  Will pursue CT of her hips and pelvis today.  Her hypoxemia continues appreciate pulmonary recommendations.  Overall, working to gather more information for family to be able to determine the patient's goals of care and how aggressive they would like to be.    Review of Systems   All other systems reviewed and are negative.  Objective:     Vital Signs (Most Recent):  Temp: 99 °F (37.2 °C) (06/03/23 0500)  Pulse: 74 (06/03/23 1500)  Resp: 17 (06/03/23 1500)  BP: (!) 101/53 (06/03/23 1500)  SpO2: 98 % (06/03/23 1500) Vital Signs (24h Range):  Temp:  [99 °F (37.2 °C)-99.3 °F (37.4 °C)] 99 °F (37.2 °C)  Pulse:  [] 74  Resp:  [10-40] 17  SpO2:  [91 %-100 %] 98 %  BP: ()/(51-78) 101/53     Weight: 45 kg (99 lb 3.3 oz)  Body mass index is 22.25 kg/m².    Intake/Output Summary (Last 24 hours) at 6/3/2023 1601  Last data filed at 6/3/2023 0526  Gross per 24 hour   Intake 920 ml   Output 400 ml   Net 520 ml           Physical Exam  Constitutional:       Appearance: Normal appearance. She is normal weight.   HENT:      Head: Normocephalic and atraumatic.      Nose: Nose normal.      Mouth/Throat:      Mouth: Mucous membranes are moist.   Eyes:      Conjunctiva/sclera: Conjunctivae normal.      Pupils: Pupils are equal, round, and reactive to light.   Cardiovascular:      Rate and Rhythm: Tachycardia present. Rhythm irregular.      Pulses: Normal pulses.      Heart sounds: Normal heart sounds. No murmur heard.    No friction rub. No gallop.   Pulmonary:      Effort: Pulmonary effort is normal.      Breath sounds: Normal breath sounds. No wheezing or rales.      Comments: 13 L nasal cannula.  Coarse breath sounds.  Abdominal:      General: Abdomen is flat. Bowel sounds are normal. There is no distension.      Palpations: Abdomen is soft.      Tenderness: There is no abdominal tenderness. There is no guarding.   Musculoskeletal:          General: No swelling. Normal range of motion.      Cervical back: Normal range of motion and neck supple.      Right lower leg: No edema.      Left lower leg: No edema.      Comments: She has pain in her right hip, limited mobility of her left shoulder. Pulses in tact distally.  Able to move her hip a bit more today   Skin:     General: Skin is warm and dry.   Neurological:      General: No focal deficit present.      Mental Status: She is alert.   Psychiatric:         Mood and Affect: Mood normal.         Thought Content: Thought content normal.         Judgment: Judgment normal.           Significant Labs: All pertinent labs within the past 24 hours have been reviewed.    Significant Imaging: I have reviewed all pertinent imaging results/findings within the past 24 hours.

## 2023-06-03 NOTE — PLAN OF CARE
Problem: Adjustment to Illness (Sepsis/Septic Shock)  Goal: Optimal Coping  Outcome: Ongoing, Progressing     Problem: Bleeding (Sepsis/Septic Shock)  Goal: Absence of Bleeding  Outcome: Ongoing, Progressing     Problem: Glycemic Control Impaired (Sepsis/Septic Shock)  Goal: Blood Glucose Level Within Desired Range  Outcome: Ongoing, Progressing     Problem: Fall Injury Risk  Goal: Absence of Fall and Fall-Related Injury  Outcome: Ongoing, Progressing     Problem: Skin Injury Risk Increased  Goal: Skin Health and Integrity  Outcome: Ongoing, Progressing     Problem: Infection Progression (Sepsis/Septic Shock)  Goal: Absence of Infection Signs and Symptoms  Outcome: Ongoing, Not Progressing     Problem: Nutrition Impaired (Sepsis/Septic Shock)  Goal: Optimal Nutrition Intake  Outcome: Ongoing, Not Progressing     Problem: Infection  Goal: Absence of Infection Signs and Symptoms  Outcome: Ongoing, Not Progressing

## 2023-06-03 NOTE — PROGRESS NOTES
Critical access hospital Medicine  Progress Note    Patient Name: Jeannie Hutchins  MRN: [id number]  Patient Class: IP- Inpatient   Admission Date: 5/30/2023  Length of Stay: 4 days  Attending Physician: Yosvany Vu MD  Primary Care Provider: Primary Doctor No        Subjective:     Principal Problem:Sepsis        HPI:  Ms. Hutchins is an 88-year-old female who was brought to the ED via EMS from Health system due to tachycardia.  Collateral is obtained from daughter present at bedside.  Patient was initially discharged to nursing facility about 2 years ago, under hospice, but has subsequently improved and is off same.  This morning reportedly heart rate greater than 200, staff was preparing to administer metoprolol, prior to this noted to be staring off.  As per daughter oxygen level was also low, she is chronically on 2 L supplemental oxygen.  States her mother was incoherent during this time.  Patient states she was feeling unwell during this episode, lightheaded, short of breath, producing phlegm.  Denies any sore throat, fever at the facility, nausea, vomiting, diarrhea, cystitis symptoms.  She had a fall several days ago as per report, was found on floor.  Does have ulcer to the right lateral leg, daughter states she sustained few weeks ago as injury from her wheelchair.  Daughter states with previous UTI she had similar symptoms.  In the ED febrile to 101.2, heart rates in the 120s, AFib, blood pressure stable, on 10 L supplemental oxygen.  Labs with WBC 27, hemoglobin 10.3, BUN/creatinine 18/0.9, BNP 1 905, high sensitivity troponin 20, UA with 63 WBC with 3+ leukocyte with many seen.  EKG confirming atrial fibrillation with RVR.  Chest x-ray with cardiomegaly with bilateral interstitial opacities concerning for pulmonary edema.  She received 1 g acetaminophen, 1 g Rocephin, 100 mg fluconazole, 20 mg IV Lasix, 4 mg IV Zofran.  Case discussed with ED provider who requested admission.   Plan of care discussed with patient as well as daughter present at bedside.  Patient has advanced directive, confirmed DNR.      Overview/Hospital Course:  No notes on file    Interval History:  She remains bacteremic.  Continuing to search for source.  Will pursue CT of her hips and pelvis today.  Her hypoxemia continues appreciate pulmonary recommendations.  Overall, working to gather more information for family to be able to determine the patient's goals of care and how aggressive they would like to be.    Review of Systems   All other systems reviewed and are negative.  Objective:     Vital Signs (Most Recent):  Temp: 99 °F (37.2 °C) (06/03/23 0500)  Pulse: 74 (06/03/23 1500)  Resp: 17 (06/03/23 1500)  BP: (!) 101/53 (06/03/23 1500)  SpO2: 98 % (06/03/23 1500) Vital Signs (24h Range):  Temp:  [99 °F (37.2 °C)-99.3 °F (37.4 °C)] 99 °F (37.2 °C)  Pulse:  [] 74  Resp:  [10-40] 17  SpO2:  [91 %-100 %] 98 %  BP: ()/(51-78) 101/53     Weight: 45 kg (99 lb 3.3 oz)  Body mass index is 22.25 kg/m².    Intake/Output Summary (Last 24 hours) at 6/3/2023 1601  Last data filed at 6/3/2023 0526  Gross per 24 hour   Intake 920 ml   Output 400 ml   Net 520 ml           Physical Exam  Constitutional:       Appearance: Normal appearance. She is normal weight.   HENT:      Head: Normocephalic and atraumatic.      Nose: Nose normal.      Mouth/Throat:      Mouth: Mucous membranes are moist.   Eyes:      Conjunctiva/sclera: Conjunctivae normal.      Pupils: Pupils are equal, round, and reactive to light.   Cardiovascular:      Rate and Rhythm: Tachycardia present. Rhythm irregular.      Pulses: Normal pulses.      Heart sounds: Normal heart sounds. No murmur heard.    No friction rub. No gallop.   Pulmonary:      Effort: Pulmonary effort is normal.      Breath sounds: Normal breath sounds. No wheezing or rales.      Comments: 13 L nasal cannula.  Coarse breath sounds.  Abdominal:      General: Abdomen is flat. Bowel  sounds are normal. There is no distension.      Palpations: Abdomen is soft.      Tenderness: There is no abdominal tenderness. There is no guarding.   Musculoskeletal:         General: No swelling. Normal range of motion.      Cervical back: Normal range of motion and neck supple.      Right lower leg: No edema.      Left lower leg: No edema.      Comments: She has pain in her right hip, limited mobility of her left shoulder. Pulses in tact distally.  Able to move her hip a bit more today   Skin:     General: Skin is warm and dry.   Neurological:      General: No focal deficit present.      Mental Status: She is alert.   Psychiatric:         Mood and Affect: Mood normal.         Thought Content: Thought content normal.         Judgment: Judgment normal.           Significant Labs: All pertinent labs within the past 24 hours have been reviewed.    Significant Imaging: I have reviewed all pertinent imaging results/findings within the past 24 hours.      Assessment/Plan:      * MRSA Bacteremia  Ongoing MRSA bacteremia without a clear source at this point    Check CTA chest > no signs of significant focal pneumonia  WBC now improving  Repeat blood cultures this morning  Blood culture x 4 with MRSA x 3 days  Urine culture with candiduria likely contaminated   COVID and influenza negative  Vancomycin and daptomycin  Dr Shahid following  Echo ordered and without focal valve vegetations  Needs BLADIMIR when able to tolerate  Appreciate Dr Shahid's recs  CT hips / pelvis today    UTI (urinary tract infection)  UA positive, many yeast, history of UTIs in the past  Urine culture with many yeast   Continue fluconazole for now      Atrial fibrillation with RVR with known history of a fib  Patient with known history of persistent atrial fibrillation.   RVR likely driven by acute medical condition including infection/sepsis and CHF.  Telemetry monitoring, treat acute medical condition, continue metoprolol and eliquis  Discontinue  diltiazem  Added digoxin  Metoprolol reduced  Monitor blood pressure  HR better controlled  Can consult Cardiology if not improving with treatment of her sepsis and rate control      Acute on chronic respiratory failure with hypoxia  Chronically on 2 L supplemental oxygen   Ongoing respiratory failure  CTA without PE  Concerns for possible fat embolism  Appreciate pulmonary recs  Difficult time getting pulse ox.  Her oxygen requirement has been up and down.  High risk for decompensation    Acute on chronic diastolic heart failure  Admits to shortness of breath with productive cough   Chest x-ray with pulmonary edema with elevated BNP at 1905  Previous echo with EF of 65%, diastolic dysfunction consistent with AFib  Discontinue diuresis and give small fluid bolus  Strict I/O daily weights, oral fluid and sodium restriction      Closed right hip fracture after fall  Patient with a recent fall in [place]  She is having increasing pain in her shoulder and hip, xray shows right hip fracture  CT with subcapital fracture of her right hip  Pain control  Ortho consulted  Will need repair of her hip once bacteremia is cleared      Anemia  Chronic anemia, no evidence of bleeding, monitoring      Ulcer of right leg  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, sustained few weeks ago from wheelchair   On examination no obvious signs of superimposed infection   Wound Care consulted      Hypothyroidism  Chronic medical condition continue levothyroxine      Troponin I above reference range  Likely in the setting of AFib RVR, CHF and infection, trend for completeness      Essential hypertension  Hold home amlodipine in the setting of infection and RVR to allow for titration of medication      DNR (do not resuscitate)  Has advanced directive and confirmed with daughter, DNR      Mixed hyperlipidemia  Chronic medical condition      Nursing home resident  Resident at [facility]      Pulmonary hypertension  Last echo with  PASP 54        VTE Risk Mitigation (From admission, onward)         Ordered     IP VTE HIGH RISK PATIENT  Once         05/30/23 1220     Place sequential compression device  Until discontinued         05/30/23 1220                Discharge Planning   JULIET: 6/9/2023     Code Status: DNR   Is the patient medically ready for discharge?:     Reason for patient still in hospital (select all that apply): Treatment  Discharge Plan A: Return to nursing home                  Yosvany Vu MD  Department of Hospital Medicine   [facility]

## 2023-06-03 NOTE — PT/OT/SLP PROGRESS
"Speech Language Pathology Treatment    Patient Name:  Jeannie Hutchins   MRN:  [id number]  Admitting Diagnosis: Sepsis    Recommendations:                 General Recommendations:  Dysphagia therapy  Diet recommendations:  Soft & Bite Sized Diet - IDDSI Level 6, Thin liquids - IDDSI Level 0   Aspiration Precautions: Alternating bites/sips, Assistance with meals, Avoid talking while eating, Frequent oral care, HOB to 90 degrees, Small bites/sips, and Wear oxygen during intake   General Precautions: Standard, aspiration  Communication strategies:  go to room if call light pushed    Assessment:     Jeannie Hutchins is a 88 y.o. female with an SLP diagnosis of mild oral Dysphagia. Upon entry to room, son assisting pt with lunch.  Pt in reclined position, approx 30'.  ST educated pt/son re: importance of sitting upright for meals/oral intake due to aspiration risk, both verbalized understanding.  Nurse assisted ST to re-position pt and elevate HOB to approx 80'.  Pt with dec tolerance of elevating HOB due to pain in hip.  Pt with oxygen mask in place.  Son resumed assisting to feed pt.  Son providing small, appropriately sized bites; mask must be temporarily moved to accommodate each bite of food.  When provided with small bites of turkey with gravy, pt demonstrated prolonged mastication and mild oral residue requiring liquid wash.  ST instructed son re: providing alternating solids/liquids and/or providing additional gravy/sauce to meats to improve tolerance; son verbalized understanding.  Pt consumed multiple straw sips of thin liquids.  No clinical evidence of aspiration or airway threat observed during today's session.  Pt did exhibit inc work of breathing following several bites of food; ST instructed son re: slow pace and small meals/snacks throughout the day vs large meals.  Son verbalized understanding.    Subjective     Pt awake/alert, lying in bed, son at bedside.  Patient goals: "Throw the baby out the window" "     Pain/Comfort:  Pain Rating 1: 0/10 (none reported)    Respiratory Status: Oxymask    Objective:     Has the patient been evaluated by SLP for swallowing?   Yes  Keep patient NPO? No   Current Respiratory Status:        ST instructed pt in direct dysphagia tx as well as education and training to pt and son re: safe swallow precautions in an effort to improve tolerance of LRD.    Goals:   Multidisciplinary Problems       SLP Goals          Problem: SLP    Goal Priority Disciplines Outcome   SLP Goal     SLP Ongoing, Progressing   Description: 1. Pt will tolerate LRD w/ adequate oral clearance and w/o overt s/s aspiration during >95% of PO intake  2. Pt will utilize swallowing precautions during >95% of PO intake given min cues                       Plan:     Patient to be seen:  3 x/week, 2 x/week   Plan of Care expires:     Plan of Care reviewed with:  patient, son   SLP Follow-Up:  Yes       Discharge recommendations:      Barriers to Discharge:   TBD    Time Tracking:     SLP Treatment Date:   06/03/23  Speech Start Time:  1240  Speech Stop Time:  1303     Speech Total Time (min):  23 min    Billable Minutes: Treatment Swallowing Dysfunction 23 minutes    06/03/2023

## 2023-06-03 NOTE — PROGRESS NOTES
Consult Note  Infectious Disease    Reason for Consult:  MRSA bacteremia    HPI: Jeannie Hutchins is a 88 y.o. female Nursing home resident With a history of diastolic heart failure, oxygen dependence, atrial fibrillation with recent 6 day stay in late April for worsening oxygen requirement, right lower extremity cellulitis associated with an injury (where she hit her lower leg on the wheelchair).  She was given oxygen support, azithromycin, ceftriaxone and Lasix, required intensive care on admission, followed by steroids and doxycycline for lungs and leg.    She presented to the emergency room on 05/30 with tachycardia, generalized weakness and a syncopal episode.  She had had a fall last week with x-rays done at the nursing facility which were negative.  She denies feeling ill prior to the fall and relates this to not donning her slippers before trying to move around.  Her heart rate was found to be 220, in atrial fibrillation and with a temperature of 101.7°.  Workup included CBC with white blood cells 27,000, BNP 1900, urinalysis with 63 white blood cells and many yeast, chest x-ray with pulmonary edema and on admission was placed on ceftriaxone and fluconazole.  The wound on the right lower leg is largely healed, she does have a bruise about the right hip.  She was noted to have pain in the shoulder and hip and x-rays were ordered with findings of severe glenohumeral and acromioclavicular osteoarthrosis and a suspected right femoral neck fracture with a CT scan showing a subcapital fracture and cortical offset along the inferior edge of the femoral head/neck junction.  Fortunately there is no sign of hemarthrosis or increased joint fluid.  The right iliacus muscle is edematous or enlarged compared to the left. There may be a tiny bit of fluid in her trochanteric.  Blood cultures from 05/30 became positive this morning with GPC identified by [employer] is MRSA. Repeat blood cultures have been drawn.  The urine culture  "is growing only yeast("clean catch"). (MRSA screen on admission was negative).  Ceftriaxone was stopped and vancomycin was ordered.  Fever has resolved. WBC remain elevated. Still on diltiazem drip and requiring 6 liters of oxygen. Urine output is not being measured as she is incontinent.     6/2: interim reviewed. Afebrile. Oxygen requirement is much higher. Orthopedics consult pending. yesterday's blood cultures are positive, Vanc ALICIA for the MRSA is 1, trough was low. WBC improved. BUN up to 38. Ct chest reviewed and there is no focal pneumonia to blame her bacteremia on.  I had the chest x-ray repeated and there are no new infiltrates, only mild perihilar fullness.  She is alert, still in pain from her right hip but I was able to perform range-of-motion a little, more than I expected to be able to.  The remainder of the joints of the lower extremities have no sign or symptom of joint infection.  She has no peripheral signs of endocarditis.  Understandably her son is concerned about the stress of a transesophageal echo.  Discussed with him that we would not do it on someone who has this oxygen requirement and I explained that this would help discern the length of therapy.  I also discussed with him that in some cases when the evaluation is more dangerous than the treatment, that we elect to treat people for the worst possible diagnosis and he is in favor of this rather than the BLADIMIR.  6/3: Interim reviewed.  6/2 blood cultures are positive for MRSA, 6 3 blood cultures after the initiation of daptomycin have been drawn.  She is requiring roughly the same amount of oxygen.  Discussed with Dr. Ndiaye of Pulmonary.  Her right hip is no worse, but her blood pressure does not tolerate opiates very well.  She is eating a little bit more per her son's report.  Examination of the hip allows me to perform some flexion without severe pain and this is an improvement from 2 days ago.  Talked with her son about whether he would " be in favor of a right hip washout should imaging or persistent bacteremia suggest that the hip was infected and discussed with him the potential for anesthetic complications and/or inability to liberate from the ventilator.  CT scan of the hip has been ordered by Hospital Medicine.        EXAM & DIAGNOSTICS REVIEWED:   Vitals:     Temp:  [98.3 °F (36.8 °C)-99.3 °F (37.4 °C)]   Temp: 99 °F (37.2 °C) (06/03/23 0500)  Pulse: 104 (06/03/23 0927)  Resp: (!) 25 (06/03/23 1229)  BP: 138/70 (06/03/23 0927)  SpO2: 99 % (06/03/23 0700)    Intake/Output Summary (Last 24 hours) at 6/3/2023 1510  Last data filed at 6/3/2023 0526  Gross per 24 hour   Intake 970 ml   Output 400 ml   Net 570 ml       General:  In NAD. Alert and attentive, cooperative, comfortable    Eyes:  Anicteric,   EOMI, no scleral or conjunctival petechiae  ENT:  No ulcers, exudates, thrush, nares patent, dentition is fair. Hard of hearing  Neck:  supple,   Lungs: Fine crackles in bases, the same as yesterday  Heart:  iRRR,   Abd:  Soft, NT, ND, normal BS, no masses or organomegaly appreciated.  :  Voids  no flank tenderness  Musc:  Joints without effusion, swelling, erythema, synovitis, the left leg range of motion of the hip knee and ankle are normal as well as the right knee and right ankle.  I was actually able to flex the right hip a bit which was better tolerated than yesterday and is reassuring in regards to whether there is a septic arthritis   Skin:  No rashes. No palmar or plantar lesions. No subungual petechiae  Neuro:             Alert, attentive, speech fluent, face symmetric, moves all extremities, the content of her speech seems a little confused but she is pleasant and cooperative    Psych: Calm, cooperative  Lymphatic:     Extrem: No edema, erythema, phlebitis, cellulitis, warm and well perfused  VAD:  peripheral     Isolation:  contact  Wound: Right lower lateral leg prior ulcer is healed    Right hip    There is no redness about the right  hip and the bruise is improved.  6/3    Lab Results   Component Value Date    PROCAL 1.38 (H) 06/01/2023    PROCAL <0.05 04/22/2023    PROCAL 0.30 04/21/2023         General Labs reviewed:  Recent Labs   Lab 06/01/23  0859 06/02/23  0518 06/03/23  0713   WBC 20.39* 17.24* 15.94*   HGB 9.7* 9.1* 8.7*   HCT 31.1* 29.2* 27.4*    222 246       Recent Labs   Lab 05/31/23  0440 05/31/23  1131 06/01/23  0859 06/02/23  0518 06/03/23  0713     --  134* 131* 131*   K 3.2*   < > 4.0 3.9 3.8   CL 99  --  94* 91* 93*   CO2 29  --  30* 30* 29   BUN 17  --  27* 38* 46*   CREATININE 0.7  --  0.8 0.9 1.1   CALCIUM 8.7  --  8.1* 8.3* 8.9   PROT 6.0  --   --  5.7* 5.3*   BILITOT 1.7*  --   --  1.6* 1.6*   ALKPHOS 86  --   --  95 92   ALT 30  --   --  33 29   AST 17  --   --  23 23    < > = values in this interval not displayed.     Recent Labs   Lab 06/01/23  0859 06/02/23 0518 06/03/23 0713   CRP 30.98* 29.61* 23.21*         Micro:  Microbiology Results (last 7 days)       Procedure Component Value Units Date/Time    Blood culture [[id number]] Collected: 06/03/23 0713    Order Status: Completed Specimen: Blood Updated: 06/03/23 1358     Blood Culture, Routine No Growth to date    Blood culture [[id number]]  (Abnormal) Collected: 06/02/23 0839    Order Status: Completed Specimen: Blood Updated: 06/03/23 0802     Blood Culture, Routine Gram stain aer bottle: Gram positive cocci      Positive results previously called 06/02/2023  20:46 KS3      METHICILLIN RESISTANT STAPHYLOCOCCUS AUREUS  For susceptibility see order #[id number]      Blood culture [[id number]]  (Abnormal) Collected: 06/01/23 0858    Order Status: Completed Specimen: Blood Updated: 06/03/23 0801     Blood Culture, Routine Gram stain aer bottle: Gram positive cocci      Positive results previously called 06/02/2023  01:59 KS3      METHICILLIN RESISTANT STAPHYLOCOCCUS AUREUS  For susceptibility see order #[id number]      Blood culture [[id number]]  (Abnormal)  Collected: 06/01/23 0859    Order Status: Completed Specimen: Blood Updated: 06/03/23 0800     Blood Culture, Routine Gram stain aer bottle: Gram positive cocci      Positive results previously called 06/02/2023  01:07 KS3      METHICILLIN RESISTANT STAPHYLOCOCCUS AUREUS  For susceptibility see order #[id number]      Urine culture [[id number]]  (Abnormal) Collected: 05/30/23 0909    Order Status: Completed Specimen: Urine from Clean Catch Updated: 06/03/23 0635     Urine Culture, Routine DANILO ALBICANS  > 100,000 cfu/ml  Treatment of asymptomatic candiduria is not recommended (except for   specific populations). Candida isolated in the urine typically   represents colonization. If an indwelling urinary catheter is present  it should be removed or replaced.      Blood culture [[id number]]  (Abnormal) Collected: 05/30/23 1115    Order Status: Completed Specimen: Blood Updated: 06/02/23 0734     Blood Culture, Routine Gram stain shirley bottle: Gram positive cocci      Positive results previously called 05/31/2023  03:29 KS3      Gram stain aer bottle: Gram positive cocci      Positive results previously called      METHICILLIN RESISTANT STAPHYLOCOCCUS AUREUS  Known MRSA patient  For susceptibility see order #[id number]      Blood culture [[id number]]  (Abnormal)  (Susceptibility) Collected: 05/30/23 1130    Order Status: Completed Specimen: Blood Updated: 06/02/23 0733     Blood Culture, Routine Gram stain shirley bottle: Gram positive cocci      Results called to and read back by: Triston Brown RN on [address]      05/31/2023  02:32 KS3      Gram stain aer bottle: Gram positive cocci      Positive results previously called 05/31/2023  04:59 KS3      METHICILLIN RESISTANT STAPHYLOCOCCUS AUREUS  Known MRSA patient      Rapid Organism ID by PCR (from Blood culture) [[id number]]  (Abnormal) Collected: 05/30/23 1130    Order Status: Completed Updated: 05/31/23 0348     Enterococcus faecalis Not Detected     Enterococcus faecium  Not Detected     Listeria monocytogenes Not Detected     Staphylococcus spp. See species for ID     Staphylococcus aureus Detected     Staphylococcus epidermidis Not Detected     Staphylococcus lugdunensis Not Detected     Streptococcus species Not Detected     Streptococcus agalactiae Not Detected     Streptococcus pneumoniae Not Detected     Streptococcus pyogenes Not Detected     Acinetobacter calcoaceticus/baumannii complex Not Detected     Bacteroides fragilis Not Detected     Enterobacterales Not Detected     Enterobacter cloacae complex Not Detected     Escherichia coli Not Detected     Klebsiella aerogenes Not Detected     Klebsiella oxytoca Not Detected     Klebsiella pneumoniae group Not Detected     Proteus Not Detected     Salmonella sp Not Detected     Serratia marcescens Not Detected     Haemophilus influenzae Not Detected     Neisseria meningtidis Not Detected     Pseudomonas aeruginosa Not Detected     Stenotrophomonas maltophilia Not Detected     Candida albicans Not Detected     Candida auris Not Detected     Candida glabrata Not Detected     Candida krusei Not Detected     Candida parapsilosis Not Detected     Candida tropicalis Not Detected     Cryptococcus neoformans/gattii Not Detected     CTX-M (ESBL ) Test not applicable     IMP (Carbapenem resistant) Test not applicable     KPC resistance gene (Carbapenem resistant) Test not applicable     mcr-1  Test not applicable     mec A/C  Test not applicable     mec A/C and MREJ (MRSA) gene Detected     Comment: Resistance gene critical result(s) called and verbal readback   obtained from Mylene Mullen RN on [address] by KS3 05/31/2023 03:47          NDM (Carbapenem resistant) Test not applicable     OXA-48-like (Carbapenem resistant) Test not applicable     van A/B (VRE gene) Test not applicable     VIM (Carbapenem resistant) Test not applicable    Narrative:      Resistance gene critical result(s) called and verbal readback   obtained from  Mylene Mullen RN on [address] by KS3 05/31/2023 03:47    MRSA Screen by PCR [[id number]] Collected: 05/30/23 1105    Order Status: Completed Specimen: Nasopharyngeal Swab from Nasal Updated: 05/30/23 1256     MRSA SCREEN BY PCR Negative    Culture, Respiratory with Gram Stain [[id number]]     Order Status: No result Specimen: Respiratory from Sputum, Expectorated     Blood culture [[id number]]     Order Status: Canceled Specimen: Blood     Urine Culture High Risk [[id number]]     Order Status: Completed Specimen: Urine             Imaging Reviewed:   CXR   CT bilateral hips  1. Pre-existing osteoarthritis right hip with evidence of subcapital fracture and cortical offset along the inferior edge of the femoral head neck junction.  2. No evidence of significant osseous displacement..  3. Chronic degenerative arthrosis about the left hip with prominent osteophytic spurs encircling the base of the femoral head.    Cardiology:  TTE   (poor imaging of valves)  Atrial fibrillation observed.  The left ventricle is normal in size with concentric remodeling and normal systolic function.  The estimated ejection fraction is 55%.  Moderate tricuspid regurgitation.  No clear evidence of endocarditis. If clinical suspicion persist, consider alternative cardiac imaging like BLADIMIR for further evaluation.    IMPRESSION & PLAN   1. MRSA bacteremia. 5/30-6/2   Source is not readily apparent   She has too much pain from fracture to reliably discern whether she has a septic hip. There is no increased fluid on the CT, there may be a tiny amount of fluid in trochanteric bursa. The right iliacus is larger than left   She could have a LRTI infection present among the pulmonary interstitial edema on CXR but this is not evident on CT    2. Right subcapital hip fracture, soft tissue trauma right shoulder  3. Oxygen dependent CHF,, pulmonary hypertension   Hypoxemic respiratory failure, CTA negative    4. Candiduria is likely reflective of vaginal  contamination of specimen(there is no way she could give a clean catch specimen)  5. Elderly, frail and debilitated      Recommendations:  Continue vancomycin, daptomycin  Serial  blood cultures until clear     Trend crp, no steroids  Anticipating repeat CT hips/pelvis with IV contrast       Orthopedics consult     Discussed with patient, son, pulmonary, nursing Hospital Medicine    Medical Decision Making during this encounter was  [_] Low Complexity  [_] Moderate Complexity  [xxx  ] High Complexity

## 2023-06-03 NOTE — PLAN OF CARE
Problem: Physical Therapy  Goal: Physical Therapy Goal  Description: Goals to be met by: 2023     Patient will increase functional independence with mobility by performin. Supine to sit with MInimal Assistance  2. Sit to stand transfer with Minimal Assistance and NWB right LE  3. Sit to supine with minimal assistance.      6/3/23: new order received so patient reevaluated and goals changes as appropriate.  Outcome: Ongoing, Progressing

## 2023-06-03 NOTE — ASSESSMENT & PLAN NOTE
Patient with a recent fall in [employer]  She is having increasing pain in her shoulder and hip, xray shows right hip fracture  CT with subcapital fracture of her right hip  Pain control  Ortho consulted  Will need repair of her hip once bacteremia is cleared

## 2023-06-04 LAB
ALBUMIN SERPL BCP-MCNC: 2.6 G/DL (ref 3.5–5.2)
ALP SERPL-CCNC: 108 U/L (ref 55–135)
ALT SERPL W/O P-5'-P-CCNC: 30 U/L (ref 10–44)
ANION GAP SERPL CALC-SCNC: 12 MMOL/L (ref 8–16)
AST SERPL-CCNC: 23 U/L (ref 10–40)
BASOPHILS # BLD AUTO: 0.02 K/UL (ref 0–0.2)
BASOPHILS NFR BLD: 0.1 % (ref 0–1.9)
BILIRUB SERPL-MCNC: 1.5 MG/DL (ref 0.1–1)
BUN SERPL-MCNC: 57 MG/DL (ref 8–23)
CALCIUM SERPL-MCNC: 9 MG/DL (ref 8.7–10.5)
CHLORIDE SERPL-SCNC: 93 MMOL/L (ref 95–110)
CO2 SERPL-SCNC: 28 MMOL/L (ref 23–29)
CREAT SERPL-MCNC: 1.4 MG/DL (ref 0.5–1.4)
CRP SERPL-MCNC: 24.68 MG/DL
DIFFERENTIAL METHOD: ABNORMAL
EOSINOPHIL # BLD AUTO: 0.1 K/UL (ref 0–0.5)
EOSINOPHIL NFR BLD: 0.5 % (ref 0–8)
ERYTHROCYTE [DISTWIDTH] IN BLOOD BY AUTOMATED COUNT: 25.1 % (ref 11.5–14.5)
EST. GFR  (NO RACE VARIABLE): 36.4 ML/MIN/1.73 M^2
GLUCOSE SERPL-MCNC: 85 MG/DL (ref 70–110)
HCT VFR BLD AUTO: 29.6 % (ref 37–48.5)
HGB BLD-MCNC: 9.4 G/DL (ref 12–16)
IMM GRANULOCYTES # BLD AUTO: 0.29 K/UL (ref 0–0.04)
IMM GRANULOCYTES NFR BLD AUTO: 1.6 % (ref 0–0.5)
LYMPHOCYTES # BLD AUTO: 0.5 K/UL (ref 1–4.8)
LYMPHOCYTES NFR BLD: 2.9 % (ref 18–48)
MAGNESIUM SERPL-MCNC: 2.3 MG/DL (ref 1.6–2.6)
MCH RBC QN AUTO: 26.3 PG (ref 27–31)
MCHC RBC AUTO-ENTMCNC: 31.8 G/DL (ref 32–36)
MCV RBC AUTO: 83 FL (ref 82–98)
MONOCYTES # BLD AUTO: 1.3 K/UL (ref 0.3–1)
MONOCYTES NFR BLD: 7.3 % (ref 4–15)
NEUTROPHILS # BLD AUTO: 15.5 K/UL (ref 1.8–7.7)
NEUTROPHILS NFR BLD: 87.6 % (ref 38–73)
NRBC BLD-RTO: 0 /100 WBC
PLATELET # BLD AUTO: 311 K/UL (ref 150–450)
PMV BLD AUTO: 9 FL (ref 9.2–12.9)
POTASSIUM SERPL-SCNC: 4 MMOL/L (ref 3.5–5.1)
PROT SERPL-MCNC: 5.8 G/DL (ref 6–8.4)
RBC # BLD AUTO: 3.58 M/UL (ref 4–5.4)
SODIUM SERPL-SCNC: 133 MMOL/L (ref 136–145)
VANCOMYCIN TROUGH SERPL-MCNC: 22.5 UG/ML
WBC # BLD AUTO: 17.7 K/UL (ref 3.9–12.7)

## 2023-06-04 PROCEDURE — 83735 ASSAY OF MAGNESIUM: CPT | Performed by: STUDENT IN AN ORGANIZED HEALTH CARE EDUCATION/TRAINING PROGRAM

## 2023-06-04 PROCEDURE — 94761 N-INVAS EAR/PLS OXIMETRY MLT: CPT

## 2023-06-04 PROCEDURE — 36415 COLL VENOUS BLD VENIPUNCTURE: CPT | Performed by: INTERNAL MEDICINE

## 2023-06-04 PROCEDURE — 27000221 HC OXYGEN, UP TO 24 HOURS

## 2023-06-04 PROCEDURE — 99900035 HC TECH TIME PER 15 MIN (STAT)

## 2023-06-04 PROCEDURE — 87040 BLOOD CULTURE FOR BACTERIA: CPT | Performed by: STUDENT IN AN ORGANIZED HEALTH CARE EDUCATION/TRAINING PROGRAM

## 2023-06-04 PROCEDURE — 87040 BLOOD CULTURE FOR BACTERIA: CPT | Mod: 59 | Performed by: INTERNAL MEDICINE

## 2023-06-04 PROCEDURE — 25000003 PHARM REV CODE 250: Performed by: INTERNAL MEDICINE

## 2023-06-04 PROCEDURE — 25000003 PHARM REV CODE 250

## 2023-06-04 PROCEDURE — 86140 C-REACTIVE PROTEIN: CPT | Performed by: INTERNAL MEDICINE

## 2023-06-04 PROCEDURE — 99233 PR SUBSEQUENT HOSPITAL CARE,LEVL III: ICD-10-PCS | Mod: ,,, | Performed by: INTERNAL MEDICINE

## 2023-06-04 PROCEDURE — 99233 SBSQ HOSP IP/OBS HIGH 50: CPT | Mod: ,,, | Performed by: INTERNAL MEDICINE

## 2023-06-04 PROCEDURE — 85025 COMPLETE CBC W/AUTO DIFF WBC: CPT | Performed by: STUDENT IN AN ORGANIZED HEALTH CARE EDUCATION/TRAINING PROGRAM

## 2023-06-04 PROCEDURE — 63600175 PHARM REV CODE 636 W HCPCS: Mod: JZ,JG | Performed by: INTERNAL MEDICINE

## 2023-06-04 PROCEDURE — 80053 COMPREHEN METABOLIC PANEL: CPT | Performed by: STUDENT IN AN ORGANIZED HEALTH CARE EDUCATION/TRAINING PROGRAM

## 2023-06-04 PROCEDURE — 87186 SC STD MICRODIL/AGAR DIL: CPT | Performed by: STUDENT IN AN ORGANIZED HEALTH CARE EDUCATION/TRAINING PROGRAM

## 2023-06-04 PROCEDURE — 21000000 HC CCU ICU ROOM CHARGE

## 2023-06-04 PROCEDURE — 99232 PR SUBSEQUENT HOSPITAL CARE,LEVL II: ICD-10-PCS | Mod: ,,, | Performed by: INTERNAL MEDICINE

## 2023-06-04 PROCEDURE — 87147 CULTURE TYPE IMMUNOLOGIC: CPT | Performed by: STUDENT IN AN ORGANIZED HEALTH CARE EDUCATION/TRAINING PROGRAM

## 2023-06-04 PROCEDURE — 80202 ASSAY OF VANCOMYCIN: CPT | Performed by: STUDENT IN AN ORGANIZED HEALTH CARE EDUCATION/TRAINING PROGRAM

## 2023-06-04 PROCEDURE — 36415 COLL VENOUS BLD VENIPUNCTURE: CPT | Performed by: STUDENT IN AN ORGANIZED HEALTH CARE EDUCATION/TRAINING PROGRAM

## 2023-06-04 PROCEDURE — 99232 SBSQ HOSP IP/OBS MODERATE 35: CPT | Mod: ,,, | Performed by: INTERNAL MEDICINE

## 2023-06-04 PROCEDURE — 63600175 PHARM REV CODE 636 W HCPCS: Performed by: STUDENT IN AN ORGANIZED HEALTH CARE EDUCATION/TRAINING PROGRAM

## 2023-06-04 PROCEDURE — 87077 CULTURE AEROBIC IDENTIFY: CPT | Performed by: STUDENT IN AN ORGANIZED HEALTH CARE EDUCATION/TRAINING PROGRAM

## 2023-06-04 PROCEDURE — 25000003 PHARM REV CODE 250: Performed by: STUDENT IN AN ORGANIZED HEALTH CARE EDUCATION/TRAINING PROGRAM

## 2023-06-04 PROCEDURE — 87154 CUL TYP ID BLD PTHGN 6+ TRGT: CPT | Performed by: INTERNAL MEDICINE

## 2023-06-04 RX ORDER — FAMOTIDINE 20 MG/1
20 TABLET, FILM COATED ORAL EVERY OTHER DAY
Status: DISCONTINUED | OUTPATIENT
Start: 2023-06-06 | End: 2023-06-12

## 2023-06-04 RX ORDER — FUROSEMIDE 10 MG/ML
20 INJECTION INTRAMUSCULAR; INTRAVENOUS DAILY
Status: DISCONTINUED | OUTPATIENT
Start: 2023-06-04 | End: 2023-06-05

## 2023-06-04 RX ADMIN — ATORVASTATIN CALCIUM 20 MG: 20 TABLET, FILM COATED ORAL at 08:06

## 2023-06-04 RX ADMIN — ESCITALOPRAM OXALATE 10 MG: 10 TABLET ORAL at 09:06

## 2023-06-04 RX ADMIN — CEFTAROLINE FOSAMIL 600 MG: 600 POWDER, FOR SOLUTION INTRAVENOUS at 04:06

## 2023-06-04 RX ADMIN — METOPROLOL TARTRATE 25 MG: 25 TABLET, FILM COATED ORAL at 09:06

## 2023-06-04 RX ADMIN — CHLORHEXIDINE GLUCONATE 15 ML: 1.2 RINSE ORAL at 09:06

## 2023-06-04 RX ADMIN — MUPIROCIN 1 G: 20 OINTMENT TOPICAL at 08:06

## 2023-06-04 RX ADMIN — HYDROCODONE BITARTRATE AND ACETAMINOPHEN 1 TABLET: 5; 325 TABLET ORAL at 02:06

## 2023-06-04 RX ADMIN — DAPTOMYCIN 350 MG: 500 INJECTION, POWDER, LYOPHILIZED, FOR SOLUTION INTRAVENOUS at 02:06

## 2023-06-04 RX ADMIN — HYDROCODONE BITARTRATE AND ACETAMINOPHEN 1 TABLET: 5; 325 TABLET ORAL at 05:06

## 2023-06-04 RX ADMIN — LEVOTHYROXINE SODIUM 75 MCG: 0.03 TABLET ORAL at 05:06

## 2023-06-04 RX ADMIN — DIGOXIN 0.12 MG: 125 TABLET ORAL at 09:06

## 2023-06-04 RX ADMIN — FAMOTIDINE 20 MG: 20 TABLET ORAL at 09:06

## 2023-06-04 RX ADMIN — CHLORHEXIDINE GLUCONATE 15 ML: 1.2 RINSE ORAL at 08:06

## 2023-06-04 RX ADMIN — FUROSEMIDE 20 MG: 10 INJECTION, SOLUTION INTRAMUSCULAR; INTRAVENOUS at 09:06

## 2023-06-04 RX ADMIN — MUPIROCIN 1 G: 20 OINTMENT TOPICAL at 09:06

## 2023-06-04 RX ADMIN — METOPROLOL TARTRATE 25 MG: 25 TABLET, FILM COATED ORAL at 08:06

## 2023-06-04 NOTE — RESPIRATORY THERAPY
06/04/23 0833   Patient Assessment/Suction   Level of Consciousness (AVPU) alert   Respiratory Effort Normal;Unlabored   Expansion/Accessory Muscles/Retractions no retractions;no use of accessory muscles   Rhythm/Pattern, Respiratory no shortness of breath reported;depth regular;pattern regular;unlabored   Skin Integrity   $ Wound Care Tech Time 15 min   Area Observed Behind ear;Cheek;Bridge of nose;Upper lip;Lower lip;Nares   Skin Appearance without discoloration   PRE-TX-O2   Device (Oxygen Therapy) (S)  nasal cannula  (changed to NC from Oxymask)   $ Is the patient on Low Flow Oxygen? Yes   Flow (L/min) (S)  5   Oxygen Concentration (%) 40   SpO2 (!) 92 %   Pulse Oximetry Type Continuous   $ Pulse Oximetry - Multiple Charge Pulse Oximetry - Multiple   Pulse (!) 121   Resp (!) 24   Preset CPAP/BiPAP Settings   Mode Of Delivery Standby

## 2023-06-04 NOTE — ASSESSMENT & PLAN NOTE
Ongoing MRSA bacteremia without a clear source at this point and we have not achieved source control. Family declining further workup, and specifically do not want invasive workup I.e. BLADIMIR.     Check CTA chest > no signs of significant focal pneumonia  Inflammatory markers rising  Repeat blood cultures daily  She has had daily positive blood cultures.   Urine culture with candiduria likely contaminated   COVID and influenza negative  Daptomycin and Ceftaroline per Dr Shahid  Initial Echo without vegetations.   Question whether we should consider pursuing PET / CT?   Appreciate Dr Shahid's recs  CT hips / pelvis  > no clear evidence of infective focus.

## 2023-06-04 NOTE — PROGRESS NOTES
[facility] Medicine  Progress Note    Patient Name: Jeannie Hutchins  MRN: [id number]  Patient Class: IP- Inpatient   Admission Date: 5/30/2023  Length of Stay: 5 days  Attending Physician: Yosvany Vu MD  Primary Care Provider: Primary Doctor No        Subjective:     Principal Problem:Sepsis        HPI:  Ms. Hutchins is an 88-year-old female who was brought to the ED via EMS from [facility] due to tachycardia.  Collateral is obtained from daughter present at bedside.  Patient was initially discharged to nursing facility about 2 years ago, under hospice, but has subsequently improved and is off same.  This morning reportedly heart rate greater than 200, staff was preparing to administer metoprolol, prior to this noted to be staring off.  As per daughter oxygen level was also low, she is chronically on 2 L supplemental oxygen.  States her mother was incoherent during this time.  Patient states she was feeling unwell during this episode, lightheaded, short of breath, producing phlegm.  Denies any sore throat, fever at the facility, nausea, vomiting, diarrhea, cystitis symptoms.  She had a fall several days ago as per report, was found on floor.  Does have ulcer to the right lateral leg, daughter states she sustained few weeks ago as injury from her wheelchair.  Daughter states with previous UTI she had similar symptoms.  In the ED febrile to 101.2, heart rates in the 120s, AFib, blood pressure stable, on 10 L supplemental oxygen.  Labs with WBC 27, hemoglobin 10.3, BUN/creatinine 18/0.9, BNP 1 905, high sensitivity troponin 20, UA with 63 WBC with 3+ leukocyte with many seen.  EKG confirming atrial fibrillation with RVR.  Chest x-ray with cardiomegaly with bilateral interstitial opacities concerning for pulmonary edema.  She received 1 g acetaminophen, 1 g Rocephin, 100 mg fluconazole, 20 mg IV Lasix, 4 mg IV Zofran.  Case discussed with ED provider who requested admission.   Plan of care discussed with patient as well as daughter present at bedside.  Patient has advanced directive, confirmed DNR.      Overview/Hospital Course:  No notes on file    Interval History:  Daily cultures remain positive. No source control achieved yet. I have discussed at length with the family once again regarding the need to find a source and they would not like any invasive studies and do not want BLADIMIR as they are concerned this will cause her to decompensate. Continuing with abx. They also spoke with ortho and plan to hold off on any repair. Wanting to cut back on pain medications as well. Overall, they are not wanting escalation of medical intervention and continuation of the current course    Review of Systems   All other systems reviewed and are negative.  Objective:     Vital Signs (Most Recent):  Temp: 98.4 °F (36.9 °C) (06/04/23 1100)  Pulse: 79 (06/04/23 1500)  Resp: (!) 21 (06/04/23 1500)  BP: (!) 103/54 (06/04/23 1500)  SpO2: 99 % (06/04/23 1500) Vital Signs (24h Range):  Temp:  [98.2 °F (36.8 °C)-98.5 °F (36.9 °C)] 98.4 °F (36.9 °C)  Pulse:  [] 79  Resp:  [9-30] 21  SpO2:  [82 %-100 %] 99 %  BP: (103-137)/(52-80) 103/54     Weight: 45 kg (99 lb 3.3 oz)  Body mass index is 22.25 kg/m².    Intake/Output Summary (Last 24 hours) at 6/4/2023 1543  Last data filed at 6/4/2023 1401  Gross per 24 hour   Intake 775 ml   Output 700 ml   Net 75 ml           Physical Exam  Constitutional:       Appearance: Normal appearance. She is normal weight.   HENT:      Head: Normocephalic and atraumatic.      Nose: Nose normal.      Mouth/Throat:      Mouth: Mucous membranes are moist.   Eyes:      Conjunctiva/sclera: Conjunctivae normal.      Pupils: Pupils are equal, round, and reactive to light.   Cardiovascular:      Rate and Rhythm: Tachycardia present. Rhythm irregular.      Pulses: Normal pulses.      Heart sounds: Normal heart sounds. No murmur heard.    No friction rub. No gallop.   Pulmonary:       Effort: Pulmonary effort is normal.      Breath sounds: Normal breath sounds. No wheezing or rales.      Comments: 4 L nasal cannula.  Coarse breath sounds.  Abdominal:      General: Abdomen is flat. Bowel sounds are normal. There is no distension.      Palpations: Abdomen is soft.      Tenderness: There is no abdominal tenderness. There is no guarding.   Musculoskeletal:         General: No swelling. Normal range of motion.      Cervical back: Normal range of motion and neck supple.      Right lower leg: No edema.      Left lower leg: No edema.      Comments: She has pain in her right hip, limited mobility of her left shoulder. Pulses in tact distally.  Able to move her hip a bit more today   Skin:     General: Skin is warm and dry.   Neurological:      General: No focal deficit present.      Mental Status: She is alert.   Psychiatric:         Mood and Affect: Mood normal.         Thought Content: Thought content normal.         Judgment: Judgment normal.           Significant Labs: All pertinent labs within the past 24 hours have been reviewed.    Significant Imaging: I have reviewed all pertinent imaging results/findings within the past 24 hours.      Assessment/Plan:      * MRSA Bacteremia  Ongoing MRSA bacteremia without a clear source at this point and we have not achieved source control. Family declining further workup, and specifically do not want invasive workup I.e. BLADIMIR.     Check CTA chest > no signs of significant focal pneumonia  Inflammatory markers rising  Repeat blood cultures daily  She has had daily positive blood cultures.   Urine culture with candiduria likely contaminated   COVID and influenza negative  Daptomycin and Ceftaroline per Dr Shahid  Initial Echo without vegetations.   Question whether we should consider pursuing PET / CT?   Appreciate Dr Shahid's recs  CT hips / pelvis  > no clear evidence of infective focus.     UTI (urinary tract infection)  UA positive, many yeast, history of  UTIs in the past  Urine culture with many yeast   Fluconazole discontinued      Atrial fibrillation with RVR with known history of a fib  Patient with known history of persistent atrial fibrillation.   RVR likely driven by acute medical condition including infection/sepsis and CHF.  Telemetry monitoring, treat acute medical condition, continue metoprolol and eliquis  Discontinue diltiazem  Added digoxin  Metoprolol reduced  Monitor blood pressure  HR better controlled  Can consult Cardiology if not improving with treatment of her sepsis and rate control      Acute on chronic respiratory failure with hypoxia  Chronically on 2 L supplemental oxygen   Ongoing respiratory failure  CTA without PE  Concerns for possible fat embolism  Appreciate pulmonary recs  Difficult time getting pulse ox.  Her oxygen requirement has been up and down.  High risk for decompensation    Acute on chronic diastolic heart failure  Admits to shortness of breath with productive cough   Chest x-ray with pulmonary edema with elevated BNP at 1905  Previous echo with EF of 65%, diastolic dysfunction consistent with AFib  Low dose lasix  Strict I/O daily weights, oral fluid and sodium restriction      Closed right hip fracture after fall  Patient with a recent fall in [place]  She is having increasing pain in her shoulder and hip, xray shows right hip fracture  CT with subcapital fracture of her right hip  Pain control  Ortho consulted > family discussed with them and plan to hold off on any intervention and would rather let her hip heal without repair      Anemia  Chronic anemia, no evidence of bleeding, monitoring      Ulcer of right leg  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, sustained few weeks ago from wheelchair   On examination no obvious signs of superimposed infection   Wound Care consulted      Hypothyroidism  Chronic medical condition continue levothyroxine      Troponin I above reference range  Likely in the setting of AFib RVR, CHF  and infection, trend for completeness      Essential hypertension  Hold home amlodipine in the setting of infection and RVR to allow for titration of medication      DNR (do not resuscitate)  Has advanced directive and confirmed with daughter, DNR      Mixed hyperlipidemia  Chronic medical condition      Nursing home resident  Resident at [facility]      Pulmonary hypertension  Last echo with PASP 54        VTE Risk Mitigation (From admission, onward)         Ordered     IP VTE HIGH RISK PATIENT  Once         05/30/23 1220     Place sequential compression device  Until discontinued         05/30/23 1220                Discharge Planning   JULIET: 6/9/2023     Code Status: DNR   Is the patient medically ready for discharge?:     Reason for patient still in hospital (select all that apply): Treatment  Discharge Plan A: Return to nursing home                  Yosvany Vu MD  Department of Hospital Medicine   [facility]

## 2023-06-04 NOTE — PLAN OF CARE
Problem: Adult Inpatient Plan of Care  Goal: Plan of Care Review  Outcome: Ongoing, Progressing  Goal: Patient-Specific Goal (Individualized)  Outcome: Ongoing, Progressing  Goal: Absence of Hospital-Acquired Illness or Injury  Outcome: Ongoing, Progressing  Goal: Optimal Comfort and Wellbeing  Outcome: Ongoing, Progressing  Goal: Readiness for Transition of Care  Outcome: Ongoing, Progressing     Problem: Adjustment to Illness (Sepsis/Septic Shock)  Goal: Optimal Coping  Outcome: Ongoing, Progressing     Problem: Bleeding (Sepsis/Septic Shock)  Goal: Absence of Bleeding  Outcome: Ongoing, Progressing     Problem: Glycemic Control Impaired (Sepsis/Septic Shock)  Goal: Blood Glucose Level Within Desired Range  Outcome: Ongoing, Progressing     Problem: Infection Progression (Sepsis/Septic Shock)  Goal: Absence of Infection Signs and Symptoms  Outcome: Ongoing, Progressing     Problem: Nutrition Impaired (Sepsis/Septic Shock)  Goal: Optimal Nutrition Intake  Outcome: Ongoing, Progressing     Problem: Infection  Goal: Absence of Infection Signs and Symptoms  Outcome: Ongoing, Progressing     Problem: Fall Injury Risk  Goal: Absence of Fall and Fall-Related Injury  Outcome: Ongoing, Progressing     Problem: Skin Injury Risk Increased  Goal: Skin Health and Integrity  Outcome: Ongoing, Progressing     Problem: Impaired Wound Healing  Goal: Optimal Wound Healing  Outcome: Ongoing, Progressing     Problem: Oral Intake Inadequate  Goal: Improved Oral Intake  Outcome: Ongoing, Progressing      none

## 2023-06-04 NOTE — ASSESSMENT & PLAN NOTE
Patient with a recent fall in [place]  She is having increasing pain in her shoulder and hip, xray shows right hip fracture  CT with subcapital fracture of her right hip  Pain control  Ortho consulted > family discussed with them and plan to hold off on any intervention and would rather let her hip heal without repair

## 2023-06-04 NOTE — PROGRESS NOTES
Therapy with Vancomycin discontinued by Dr. Shahid on 6/4/23 @ 12:00   Pharmacy will sign off, please re-consult as needed.  Thank you for allowing us to participate in this patient's care.  Kari Elkins 6/4/2023 12:06 PM  Dept of Pharmacy  Ext [phone]

## 2023-06-04 NOTE — ASSESSMENT & PLAN NOTE
Admits to shortness of breath with productive cough   Chest x-ray with pulmonary edema with elevated BNP at 1905  Previous echo with EF of 65%, diastolic dysfunction consistent with AFib  Low dose lasix  Strict I/O daily weights, oral fluid and sodium restriction

## 2023-06-04 NOTE — PROGRESS NOTES
Consult Note  Infectious Disease    Reason for Consult:  MRSA bacteremia    HPI: Jeannie Hutchins is a 88 y.o. female Nursing home resident With a history of diastolic heart failure, oxygen dependence, atrial fibrillation with recent 6 day stay in late April for worsening oxygen requirement, right lower extremity cellulitis associated with an injury (where she hit her lower leg on the wheelchair).  She was given oxygen support, azithromycin, ceftriaxone and Lasix, required intensive care on admission, followed by steroids and doxycycline for lungs and leg.    She presented to the emergency room on 05/30 with tachycardia, generalized weakness and a syncopal episode.  She had had a fall last week with x-rays done at the nursing facility which were negative.  She denies feeling ill prior to the fall and relates this to not donning her slippers before trying to move around.  Her heart rate was found to be 220, in atrial fibrillation and with a temperature of 101.7°.  Workup included CBC with white blood cells 27,000, BNP 1900, urinalysis with 63 white blood cells and many yeast, chest x-ray with pulmonary edema and on admission was placed on ceftriaxone and fluconazole.  The wound on the right lower leg is largely healed, she does have a bruise about the right hip.  She was noted to have pain in the shoulder and hip and x-rays were ordered with findings of severe glenohumeral and acromioclavicular osteoarthrosis and a suspected right femoral neck fracture with a CT scan showing a subcapital fracture and cortical offset along the inferior edge of the femoral head/neck junction.  Fortunately there is no sign of hemarthrosis or increased joint fluid.  The right iliacus muscle is edematous or enlarged compared to the left. There may be a tiny bit of fluid in her trochanteric.  Blood cultures from 05/30 became positive this morning with GPC identified by [employer] is MRSA. Repeat blood cultures have been drawn.  The urine culture  "is growing only yeast("clean catch"). (MRSA screen on admission was negative).  Ceftriaxone was stopped and vancomycin was ordered.  Fever has resolved. WBC remain elevated. Still on diltiazem drip and requiring 6 liters of oxygen. Urine output is not being measured as she is incontinent.     6/2: interim reviewed. Afebrile. Oxygen requirement is much higher. Orthopedics consult pending. yesterday's blood cultures are positive, Vanc ALICIA for the MRSA is 1, trough was low. WBC improved. BUN up to 38. Ct chest reviewed and there is no focal pneumonia to blame her bacteremia on.  I had the chest x-ray repeated and there are no new infiltrates, only mild perihilar fullness.  She is alert, still in pain from her right hip but I was able to perform range-of-motion a little, more than I expected to be able to.  The remainder of the joints of the lower extremities have no sign or symptom of joint infection.  She has no peripheral signs of endocarditis.  Understandably her son is concerned about the stress of a transesophageal echo.  Discussed with him that we would not do it on someone who has this oxygen requirement and I explained that this would help discern the length of therapy.  I also discussed with him that in some cases when the evaluation is more dangerous than the treatment, that we elect to treat people for the worst possible diagnosis and he is in favor of this rather than the BLADIMIR.  6/3: Interim reviewed.  6/2 blood cultures are positive for MRSA, 6 3 blood cultures after the initiation of daptomycin have been drawn.  She is requiring roughly the same amount of oxygen.  Discussed with Dr. Ndiaye of Pulmonary.  Her right hip is no worse, but her blood pressure does not tolerate opiates very well.  She is eating a little bit more per her son's report.  Examination of the hip allows me to perform some flexion without severe pain and this is an improvement from 2 days ago.  Talked with her son about whether he would " be in favor of a right hip washout should imaging or persistent bacteremia suggest that the hip was infected and discussed with him the potential for anesthetic complications and/or inability to liberate from the ventilator.  CT scan of the hip has been ordered by [facility].  6/4: Interim reviewed.  Most recent blood cultures, since the addition of daptomycin are still positive  She is requiring less oxygen.  She is brighter and seems to be in less pain.  She is breathing comfortably and able to express herself well though she is very hard of hearing        EXAM & DIAGNOSTICS REVIEWED:   Vitals:     Temp:  [98.2 °F (36.8 °C)-99 °F (37.2 °C)]   Temp: 98.3 °F (36.8 °C) (06/04/23 0720)  Pulse: 81 (06/04/23 1100)  Resp: (!) 27 (06/04/23 1100)  BP: 113/64 (06/04/23 1100)  SpO2: 96 % (06/04/23 1100)    Intake/Output Summary (Last 24 hours) at 6/4/2023 1155  Last data filed at 6/4/2023 0521  Gross per 24 hour   Intake 525 ml   Output 400 ml   Net 125 ml       General:  In NAD. Alert and attentive, cooperative, comfortable    Eyes:  Anicteric,   EOMI, no scleral or conjunctival petechiae  ENT:  No ulcers, exudates, thrush, nares patent, dentition is fair. Hard of hearing  Neck:  supple,   Lungs: Clearer than yesterday  Heart:  iRRR,   Abd:  Soft, NT, ND, normal BS, no masses or organomegaly appreciated.  :  Voids  no flank tenderness  Musc:  Joints without effusion, swelling, erythema, synovitis, the left leg range of motion of the hip knee and ankle are normal as well as the right knee and right ankle.  She is tolerating movement in bed better than yesterday    Skin:  No rashes   Neuro:             Alert, attentive, speech fluent, face symmetric, moves all extremities, the content of her speech seems a little confused but she is pleasant and cooperative    Psych: Calm, cooperative  Lymphatic:     Extrem: No edema, erythema, phlebitis, cellulitis, warm and well perfused  VAD:  peripheral     Isolation:   contact  Wound: Right lower lateral leg prior ulcer is healed    Right hip    There is no redness about the right hip and the bruise is improved.  6/3    Lab Results   Component Value Date    PROCAL 1.38 (H) 06/01/2023    PROCAL <0.05 04/22/2023    PROCAL 0.30 04/21/2023         General Labs reviewed:  Recent Labs   Lab 06/02/23 0518 06/03/23  0713 06/04/23  0609   WBC 17.24* 15.94* 17.70*   HGB 9.1* 8.7* 9.4*   HCT 29.2* 27.4* 29.6*    246 311       Recent Labs   Lab 06/02/23 0518 06/03/23  0713 06/04/23  0609   * 131* 133*   K 3.9 3.8 4.0   CL 91* 93* 93*   CO2 30* 29 28   BUN 38* 46* 57*   CREATININE 0.9 1.1 1.4   CALCIUM 8.3* 8.9 9.0   PROT 5.7* 5.3* 5.8*   BILITOT 1.6* 1.6* 1.5*   ALKPHOS 95 92 108   ALT 33 29 30   AST 23 23 23     Recent Labs   Lab 06/02/23 0518 06/03/23  0713 06/04/23  0609   CRP 29.61* 23.21* 24.68*         Micro:  Microbiology Results (last 7 days)       Procedure Component Value Units Date/Time    Blood culture [[id number]] Collected: 06/04/23 0609    Order Status: Sent Specimen: Blood Updated: 06/04/23 0730    Blood culture [[id number]] Collected: 06/03/23 0713    Order Status: Completed Specimen: Blood Updated: 06/03/23 2313     Blood Culture, Routine Gram stain aer bottle: Gram positive cocci      Positive results previously called 06/03/2023  23:13 KS3    Blood culture [[id number]]  (Abnormal) Collected: 06/02/23 0839    Order Status: Completed Specimen: Blood Updated: 06/03/23 0802     Blood Culture, Routine Gram stain aer bottle: Gram positive cocci      Positive results previously called 06/02/2023  20:46 KS3      METHICILLIN RESISTANT STAPHYLOCOCCUS AUREUS  For susceptibility see order #[id number]      Blood culture [[id number]]  (Abnormal) Collected: 06/01/23 0858    Order Status: Completed Specimen: Blood Updated: 06/03/23 0801     Blood Culture, Routine Gram stain aer bottle: Gram positive cocci      Positive results previously called 06/02/2023  01:59 KS3       METHICILLIN RESISTANT STAPHYLOCOCCUS AUREUS  For susceptibility see order #[id number]      Blood culture [[id number]]  (Abnormal) Collected: 06/01/23 0859    Order Status: Completed Specimen: Blood Updated: 06/03/23 0800     Blood Culture, Routine Gram stain aer bottle: Gram positive cocci      Positive results previously called 06/02/2023  01:07 KS3      METHICILLIN RESISTANT STAPHYLOCOCCUS AUREUS  For susceptibility see order #[id number]      Urine culture [[id number]]  (Abnormal) Collected: 05/30/23 0909    Order Status: Completed Specimen: Urine from Clean Catch Updated: 06/03/23 0635     Urine Culture, Routine DANILO ALBICANS  > 100,000 cfu/ml  Treatment of asymptomatic candiduria is not recommended (except for   specific populations). Candida isolated in the urine typically   represents colonization. If an indwelling urinary catheter is present  it should be removed or replaced.      Blood culture [[id number]]  (Abnormal) Collected: 05/30/23 1115    Order Status: Completed Specimen: Blood Updated: 06/02/23 0734     Blood Culture, Routine Gram stain shirley bottle: Gram positive cocci      Positive results previously called 05/31/2023  03:29 KS3      Gram stain aer bottle: Gram positive cocci      Positive results previously called      METHICILLIN RESISTANT STAPHYLOCOCCUS AUREUS  Known MRSA patient  For susceptibility see order #[id number]      Blood culture [[id number]]  (Abnormal)  (Susceptibility) Collected: 05/30/23 1130    Order Status: Completed Specimen: Blood Updated: 06/02/23 0733     Blood Culture, Routine Gram stain shirley bottle: Gram positive cocci      Results called to and read back by: Triston Brown RN on [address]      05/31/2023  02:32 KS3      Gram stain aer bottle: Gram positive cocci      Positive results previously called 05/31/2023  04:59 KS3      METHICILLIN RESISTANT STAPHYLOCOCCUS AUREUS  Known MRSA patient      Rapid Organism ID by PCR (from Blood culture) [[id number]]  (Abnormal) Collected:  05/30/23 1130    Order Status: Completed Updated: 05/31/23 0348     Enterococcus faecalis Not Detected     Enterococcus faecium Not Detected     Listeria monocytogenes Not Detected     Staphylococcus spp. See species for ID     Staphylococcus aureus Detected     Staphylococcus epidermidis Not Detected     Staphylococcus lugdunensis Not Detected     Streptococcus species Not Detected     Streptococcus agalactiae Not Detected     Streptococcus pneumoniae Not Detected     Streptococcus pyogenes Not Detected     Acinetobacter calcoaceticus/baumannii complex Not Detected     Bacteroides fragilis Not Detected     Enterobacterales Not Detected     Enterobacter cloacae complex Not Detected     Escherichia coli Not Detected     Klebsiella aerogenes Not Detected     Klebsiella oxytoca Not Detected     Klebsiella pneumoniae group Not Detected     Proteus Not Detected     Salmonella sp Not Detected     Serratia marcescens Not Detected     Haemophilus influenzae Not Detected     Neisseria meningtidis Not Detected     Pseudomonas aeruginosa Not Detected     Stenotrophomonas maltophilia Not Detected     Candida albicans Not Detected     Candida auris Not Detected     Candida glabrata Not Detected     Candida krusei Not Detected     Candida parapsilosis Not Detected     Candida tropicalis Not Detected     Cryptococcus neoformans/gattii Not Detected     CTX-M (ESBL ) Test not applicable     IMP (Carbapenem resistant) Test not applicable     KPC resistance gene (Carbapenem resistant) Test not applicable     mcr-1  Test not applicable     mec A/C  Test not applicable     mec A/C and MREJ (MRSA) gene Detected     Comment: Resistance gene critical result(s) called and verbal readback   obtained from Mylene Mullen RN on [address] by KS3 05/31/2023 03:47          NDM (Carbapenem resistant) Test not applicable     OXA-48-like (Carbapenem resistant) Test not applicable     van A/B (VRE gene) Test not applicable     VIM (Carbapenem  resistant) Test not applicable    Narrative:      Resistance gene critical result(s) called and verbal readback   obtained from Mylene Mullen RN on [address] by KS3 05/31/2023 03:47    MRSA Screen by PCR [[id number]] Collected: 05/30/23 1105    Order Status: Completed Specimen: Nasopharyngeal Swab from Nasal Updated: 05/30/23 1256     MRSA SCREEN BY PCR Negative    Culture, Respiratory with Gram Stain [[id number]]     Order Status: No result Specimen: Respiratory from Sputum, Expectorated     Blood culture [[id number]]     Order Status: Canceled Specimen: Blood     Urine Culture High Risk [[id number]]     Order Status: Completed Specimen: Urine             Imaging Reviewed:   CXR   CT bilateral hips  1. Pre-existing osteoarthritis right hip with evidence of subcapital fracture and cortical offset along the inferior edge of the femoral head neck junction.  2. No evidence of significant osseous displacement..  3. Chronic degenerative arthrosis about the left hip with prominent osteophytic spurs encircling the base of the femoral head.    CT hip right 6/3  Subcapital fracture of the right femoral neck.  Thickening of the musculature around the right hip is probably in relation to trauma. A low-attenuation focus in the musculature anterior to the hip is probably hematoma. No abnormal enhancement to suggest abscess formation can be seen although I cannot absolutely rule out out.    Cardiology:  TTE   (poor imaging of valves)  Atrial fibrillation observed.  The left ventricle is normal in size with concentric remodeling and normal systolic function.  The estimated ejection fraction is 55%.  Moderate tricuspid regurgitation.  No clear evidence of endocarditis. If clinical suspicion persist, consider alternative cardiac imaging like BLADIMIR for further evaluation.    IMPRESSION & PLAN   1. MRSA bacteremia. 5/30-6/3   Source is not readily apparent   She has too much pain from fracture to reliably discern whether she has a septic  hip. There is no increased fluid on the CT, there may be a tiny amount of fluid in trochanteric bursa. The right iliacus is larger than left. Repeat Ct 6/3 shows no evolution of a focus of infection   There is no lower respiratory tract infection present on CT of the chest    2. Right subcapital hip fracture, soft tissue trauma right shoulder  3. Oxygen dependent CHF,, pulmonary hypertension   Hypoxemic respiratory failure, CTA negative    4. Candiduria is likely reflective of vaginal contamination of specimen(there is no way she could give a clean catch specimen)  5. MARSHALL. Diuretics, vanc and contrast    6. Elderly, frail and debilitated      Recommendations:  Continue  daptomycin, stopping vancomycin adding ceftaroline   Serial  blood cultures until clear     Trend crp, no steroids      Orthopedics consult     Discussed with patient, , nursing      Medical Decision Making during this encounter was  [_] Low Complexity  [_] Moderate Complexity  [xxx  ] High Complexity

## 2023-06-04 NOTE — ASSESSMENT & PLAN NOTE
UA positive, many yeast, history of UTIs in the past  Urine culture with many yeast   Fluconazole discontinued

## 2023-06-04 NOTE — SUBJECTIVE & OBJECTIVE
Interval History:  Daily cultures remain positive. No source control achieved yet. I have discussed at length with the family once again regarding the need to find a source and they would not like any invasive studies and do not want BLADIMIR as they are concerned this will cause her to decompensate. Continuing with abx. They also spoke with ortho and plan to hold off on any repair. Wanting to cut back on pain medications as well. Overall, they are not wanting escalation of medical intervention and continuation of the current course    Review of Systems   All other systems reviewed and are negative.  Objective:     Vital Signs (Most Recent):  Temp: 98.4 °F (36.9 °C) (06/04/23 1100)  Pulse: 79 (06/04/23 1500)  Resp: (!) 21 (06/04/23 1500)  BP: (!) 103/54 (06/04/23 1500)  SpO2: 99 % (06/04/23 1500) Vital Signs (24h Range):  Temp:  [98.2 °F (36.8 °C)-98.5 °F (36.9 °C)] 98.4 °F (36.9 °C)  Pulse:  [] 79  Resp:  [9-30] 21  SpO2:  [82 %-100 %] 99 %  BP: (103-137)/(52-80) 103/54     Weight: 45 kg (99 lb 3.3 oz)  Body mass index is 22.25 kg/m².    Intake/Output Summary (Last 24 hours) at 6/4/2023 1543  Last data filed at 6/4/2023 1401  Gross per 24 hour   Intake 775 ml   Output 700 ml   Net 75 ml           Physical Exam  Constitutional:       Appearance: Normal appearance. She is normal weight.   HENT:      Head: Normocephalic and atraumatic.      Nose: Nose normal.      Mouth/Throat:      Mouth: Mucous membranes are moist.   Eyes:      Conjunctiva/sclera: Conjunctivae normal.      Pupils: Pupils are equal, round, and reactive to light.   Cardiovascular:      Rate and Rhythm: Tachycardia present. Rhythm irregular.      Pulses: Normal pulses.      Heart sounds: Normal heart sounds. No murmur heard.    No friction rub. No gallop.   Pulmonary:      Effort: Pulmonary effort is normal.      Breath sounds: Normal breath sounds. No wheezing or rales.      Comments: 4 L nasal cannula.  Coarse breath sounds.  Abdominal:       General: Abdomen is flat. Bowel sounds are normal. There is no distension.      Palpations: Abdomen is soft.      Tenderness: There is no abdominal tenderness. There is no guarding.   Musculoskeletal:         General: No swelling. Normal range of motion.      Cervical back: Normal range of motion and neck supple.      Right lower leg: No edema.      Left lower leg: No edema.      Comments: She has pain in her right hip, limited mobility of her left shoulder. Pulses in tact distally.  Able to move her hip a bit more today   Skin:     General: Skin is warm and dry.   Neurological:      General: No focal deficit present.      Mental Status: She is alert.   Psychiatric:         Mood and Affect: Mood normal.         Thought Content: Thought content normal.         Judgment: Judgment normal.           Significant Labs: All pertinent labs within the past 24 hours have been reviewed.    Significant Imaging: I have reviewed all pertinent imaging results/findings within the past 24 hours.   Stage 8: Additional Anesthesia Type: 1% lidocaine with epinephrine

## 2023-06-04 NOTE — PROGRESS NOTES
Progress Note  PULMONARY    Admit Date: 5/30/2023 06/04/2023  History of Present Illness:  Pt is an 87 yo female with atrial fib on eliquis, CHF, chronic resp failure on home O2, HTN, HLD, hearing loss, anxiety who presented to ED on 5/30 with tachycardia & syncope, recent hip fx 1 week ago, found to have fever and sepsis with MRSA bacteremia.  ID is following and doing workup, notes reviewed. She is getting vanco + daptomycin. Pulmonary is consulted for worsening O2 requirement.  She is now requiring high flow nc/oxy mask to maintain sats. She is having a lot of pain in her right hip and has difficulty hearing and answering my questions. Her son Dane at bedside provides history. She has CHF, uses 2L O2 at her facility and has had a progressive decline over the past 2 yrs with breathing difficulty. It has been determined she is a poor surgical candidate for hip repair. They do not want further invasive testing or procedures. She continues on antibiotics and has persistent MRSA bacteremia of unclear source. Per nursing her blood pressure gets very low with pain and heart medications.    SUBJECTIVE:     6/4- O2 requirement has improved. She has new MARSHALL. Has been afebrile, HR controlled and BP in 100s-110s systolic. She is confused and deliriuous      OBJECTIVE:     Vitals (Most recent):  Vitals:    06/04/23 0530   BP:    Pulse:    Resp: (!) 23   Temp:        Vitals (24 hour range):  Temp:  [98.2 °F (36.8 °C)-99 °F (37.2 °C)]   Pulse:  []   Resp:  [9-38]   BP: ()/(52-72)   SpO2:  [73 %-100 %]       Intake/Output Summary (Last 24 hours) at 6/4/2023 0836  Last data filed at 6/4/2023 0521  Gross per 24 hour   Intake 525 ml   Output 400 ml   Net 125 ml          Physical Exam:  The patient's neuro status (alertness,orientation,cognitive function,motor skills,), pharyngeal exam (oral lesions, hygiene, abn dentition,), Neck (jvd,mass,thyroid,nodes in neck and above/below  clavicle),RESPIRATORY(symmetry,effort,fremitus,percussion,auscultation),  Cor(rhythm,heart tones including gallops,perfusion,edema)ABD(distention,hepatic&splenomegaly,tenderness,masses), Skin(rash,cyanosis),Psyc(affect,judgement,).  Exam negative except for these pertinent findings:    Elderly frail petite female  Hard of hearing, confused  HR regular  Breath sounds diminished bilaterally, faint crackles   Abd soft nontender  No edema    Radiographs reviewed: view by direct vision   CXR 6/4/23- pulmonary edema, slightly worse  CXR 6/2/23- bilat hilar congestion, cardiomegaly  CTA chest- no PE; small R pleural effusion, bilat faint ground glass perihilar suggesting pulm edema     TTE 6/1/23-   Atrial fibrillation observed.  The left ventricle is normal in size with concentric remodeling and normal systolic function.  The estimated ejection fraction is 55%.  Moderate tricuspid regurgitation.  No clear evidence of endocarditis. If clinical suspicion persist, consider alternative cardiac imaging like BLADIMIR for further evaluation.    Labs     Recent Labs   Lab 06/04/23  0609   WBC 17.70*   HGB 9.4*   HCT 29.6*        Recent Labs   Lab 06/03/23  1545 06/04/23 0609   NA  --  133*   K  --  4.0   CL  --  93*   CO2  --  28   BUN  --  57*   CREATININE  --  1.4   GLU  --  85   CALCIUM  --  9.0   MG  --  2.3   AST  --  23   ALT  --  30   ALKPHOS  --  108   BILITOT  --  1.5*   PROT  --  5.8*   ALBUMIN  --  2.6*   CRP  --  24.68*   LACTATE 1.2  --    No results for input(s): PH, PCO2, PO2, HCO3 in the last 24 hours.  Microbiology Results (last 7 days)       Procedure Component Value Units Date/Time    Blood culture [[id number]] Collected: 06/04/23 0609    Order Status: Sent Specimen: Blood Updated: 06/04/23 0730    Blood culture [[id number]] Collected: 06/03/23 0713    Order Status: Completed Specimen: Blood Updated: 06/03/23 2313     Blood Culture, Routine Gram stain aer bottle: Gram positive cocci      Positive results  previously called 06/03/2023  23:13 KS3    Blood culture [[id number]]  (Abnormal) Collected: 06/02/23 0839    Order Status: Completed Specimen: Blood Updated: 06/03/23 0802     Blood Culture, Routine Gram stain aer bottle: Gram positive cocci      Positive results previously called 06/02/2023  20:46 KS3      METHICILLIN RESISTANT STAPHYLOCOCCUS AUREUS  For susceptibility see order #[id number]      Blood culture [[id number]]  (Abnormal) Collected: 06/01/23 0858    Order Status: Completed Specimen: Blood Updated: 06/03/23 0801     Blood Culture, Routine Gram stain aer bottle: Gram positive cocci      Positive results previously called 06/02/2023  01:59 KS3      METHICILLIN RESISTANT STAPHYLOCOCCUS AUREUS  For susceptibility see order #[id number]      Blood culture [[id number]]  (Abnormal) Collected: 06/01/23 0859    Order Status: Completed Specimen: Blood Updated: 06/03/23 0800     Blood Culture, Routine Gram stain aer bottle: Gram positive cocci      Positive results previously called 06/02/2023  01:07 KS3      METHICILLIN RESISTANT STAPHYLOCOCCUS AUREUS  For susceptibility see order #[id number]      Urine culture [[id number]]  (Abnormal) Collected: 05/30/23 0909    Order Status: Completed Specimen: Urine from Clean Catch Updated: 06/03/23 0635     Urine Culture, Routine DANILO ALBICANS  > 100,000 cfu/ml  Treatment of asymptomatic candiduria is not recommended (except for   specific populations). Candida isolated in the urine typically   represents colonization. If an indwelling urinary catheter is present  it should be removed or replaced.      Blood culture [[id number]]  (Abnormal) Collected: 05/30/23 1115    Order Status: Completed Specimen: Blood Updated: 06/02/23 0734     Blood Culture, Routine Gram stain shirley bottle: Gram positive cocci      Positive results previously called 05/31/2023  03:29 KS3      Gram stain aer bottle: Gram positive cocci      Positive results previously called      METHICILLIN RESISTANT  STAPHYLOCOCCUS AUREUS  Known MRSA patient  For susceptibility see order #[id number]      Blood culture [[id number]]  (Abnormal)  (Susceptibility) Collected: 05/30/23 1130    Order Status: Completed Specimen: Blood Updated: 06/02/23 0733     Blood Culture, Routine Gram stain shirley bottle: Gram positive cocci      Results called to and read back by: Triston Brown RN on [address]      05/31/2023  02:32 KS3      Gram stain aer bottle: Gram positive cocci      Positive results previously called 05/31/2023  04:59 KS3      METHICILLIN RESISTANT STAPHYLOCOCCUS AUREUS  Known MRSA patient      Rapid Organism ID by PCR (from Blood culture) [[id number]]  (Abnormal) Collected: 05/30/23 1130    Order Status: Completed Updated: 05/31/23 0348     Enterococcus faecalis Not Detected     Enterococcus faecium Not Detected     Listeria monocytogenes Not Detected     Staphylococcus spp. See species for ID     Staphylococcus aureus Detected     Staphylococcus epidermidis Not Detected     Staphylococcus lugdunensis Not Detected     Streptococcus species Not Detected     Streptococcus agalactiae Not Detected     Streptococcus pneumoniae Not Detected     Streptococcus pyogenes Not Detected     Acinetobacter calcoaceticus/baumannii complex Not Detected     Bacteroides fragilis Not Detected     Enterobacterales Not Detected     Enterobacter cloacae complex Not Detected     Escherichia coli Not Detected     Klebsiella aerogenes Not Detected     Klebsiella oxytoca Not Detected     Klebsiella pneumoniae group Not Detected     Proteus Not Detected     Salmonella sp Not Detected     Serratia marcescens Not Detected     Haemophilus influenzae Not Detected     Neisseria meningtidis Not Detected     Pseudomonas aeruginosa Not Detected     Stenotrophomonas maltophilia Not Detected     Candida albicans Not Detected     Candida auris Not Detected     Candida glabrata Not Detected     Candida krusei Not Detected     Candida parapsilosis Not Detected      Candida tropicalis Not Detected     Cryptococcus neoformans/gattii Not Detected     CTX-M (ESBL ) Test not applicable     IMP (Carbapenem resistant) Test not applicable     KPC resistance gene (Carbapenem resistant) Test not applicable     mcr-1  Test not applicable     mec A/C  Test not applicable     mec A/C and MREJ (MRSA) gene Detected     Comment: Resistance gene critical result(s) called and verbal readback   obtained from Mylene Mullen RN on [address] by KS3 05/31/2023 03:47          NDM (Carbapenem resistant) Test not applicable     OXA-48-like (Carbapenem resistant) Test not applicable     van A/B (VRE gene) Test not applicable     VIM (Carbapenem resistant) Test not applicable    Narrative:      Resistance gene critical result(s) called and verbal readback   obtained from Mylene Mullen RN on [address] by KS3 05/31/2023 03:47    MRSA Screen by PCR [[id number]] Collected: 05/30/23 1105    Order Status: Completed Specimen: Nasopharyngeal Swab from Nasal Updated: 05/30/23 1256     MRSA SCREEN BY PCR Negative    Culture, Respiratory with Gram Stain [[id number]]     Order Status: No result Specimen: Respiratory from Sputum, Expectorated     Blood culture [[id number]]     Order Status: Canceled Specimen: Blood     Urine Culture High Risk [[id number]]     Order Status: Completed Specimen: Urine             Impression:  Active Hospital Problems    Diagnosis  POA    *MRSA Bacteremia [A41.9]  Yes    Closed fracture of right hip [S72.001A]  Yes    Candiduria [B37.49]  Yes    Closed right hip fracture after fall [S72.001A]  Yes    Acute on chronic diastolic heart failure [I50.9]  Yes    Acute on chronic respiratory failure with hypoxia [J96.20]  Yes    Atrial fibrillation with RVR with known history of a fib [I48.91]  Yes     Chronic    Troponin I above reference range [R77.8]  Yes    Nursing home resident [Z59.3]  Not Applicable     Chronic    DNR (do not resuscitate) [Z66]  Yes     Chronic    UTI (urinary tract  infection) [N39.0]  Yes    Ulcer of right leg [L97.919]  Yes     Chronic    Anemia [D64.9]  Yes     Chronic    Hypothyroidism [E03.9]  Yes     Chronic    Mixed hyperlipidemia [E78.2]  Yes    Pulmonary hypertension [I27.20]  Yes    Essential hypertension [I10]  Yes      Resolved Hospital Problems   No resolved problems to display.               Plan:   Acute hypoxemic respiratory failure- improving  Right hip fracture  Acute on chronic CHF  MRSA bacteremia, unclear source  MARSHALL- new  Severe pain due to right hip  delirium       - continue supplemental oxygen to keep sats greater than 90%  - continue antibiotics  - pain control  - metoprolol 25 mg b.i.d.  - watch renal function, UOP        Reva Ndiaye MD  Pulmonary & Critical Care Medicine

## 2023-06-05 PROBLEM — N17.9 AKI (ACUTE KIDNEY INJURY): Status: ACTIVE | Noted: 2023-06-05

## 2023-06-05 LAB
ACINETOBACTER CALCOACETICUS/BAUMANNII COMPLEX: NOT DETECTED
ALBUMIN SERPL BCP-MCNC: 2.5 G/DL (ref 3.5–5.2)
ALP SERPL-CCNC: 112 U/L (ref 55–135)
ALT SERPL W/O P-5'-P-CCNC: 29 U/L (ref 10–44)
ANION GAP SERPL CALC-SCNC: 11 MMOL/L (ref 8–16)
AST SERPL-CCNC: 26 U/L (ref 10–40)
BACTEROIDES FRAGILIS: NOT DETECTED
BASOPHILS # BLD AUTO: 0.02 K/UL (ref 0–0.2)
BASOPHILS NFR BLD: 0.1 % (ref 0–1.9)
BILIRUB SERPL-MCNC: 1.2 MG/DL (ref 0.1–1)
BUN SERPL-MCNC: 72 MG/DL (ref 8–23)
CALCIUM SERPL-MCNC: 9 MG/DL (ref 8.7–10.5)
CANDIDA ALBICANS: NOT DETECTED
CANDIDA AURIS: NOT DETECTED
CANDIDA GLABRATA: NOT DETECTED
CANDIDA KRUSEI: NOT DETECTED
CANDIDA PARAPSILOSIS: NOT DETECTED
CANDIDA TROPICALIS: NOT DETECTED
CHLORIDE SERPL-SCNC: 93 MMOL/L (ref 95–110)
CO2 SERPL-SCNC: 29 MMOL/L (ref 23–29)
CREAT SERPL-MCNC: 2.1 MG/DL (ref 0.5–1.4)
CRP SERPL-MCNC: 24.82 MG/DL
CRYPTOCOCCUS NEOFORMANS/GATTII: NOT DETECTED
CTX-M GENE: ABNORMAL
DIFFERENTIAL METHOD: ABNORMAL
ENTEROBACTER CLOACAE COMPLEX: NOT DETECTED
ENTEROBACTERALES: NOT DETECTED
ENTEROCOCCUS FAECALIS: NOT DETECTED
ENTEROCOCCUS FAECIUM: NOT DETECTED
EOSINOPHIL # BLD AUTO: 0.2 K/UL (ref 0–0.5)
EOSINOPHIL NFR BLD: 1.3 % (ref 0–8)
ERYTHROCYTE [DISTWIDTH] IN BLOOD BY AUTOMATED COUNT: 25 % (ref 11.5–14.5)
ESCHERICHIA COLI: NOT DETECTED
EST. GFR  (NO RACE VARIABLE): 22.4 ML/MIN/1.73 M^2
GLUCOSE SERPL-MCNC: 124 MG/DL (ref 70–110)
HAEMOPHILUS INFLUENZAE: NOT DETECTED
HCT VFR BLD AUTO: 30.2 % (ref 37–48.5)
HGB BLD-MCNC: 9.6 G/DL (ref 12–16)
IMM GRANULOCYTES # BLD AUTO: 0.21 K/UL (ref 0–0.04)
IMM GRANULOCYTES NFR BLD AUTO: 1.2 % (ref 0–0.5)
IMP GENE: ABNORMAL
KLEBSIELLA AEROGENES: NOT DETECTED
KLEBSIELLA OXYTOCA: NOT DETECTED
KLEBSIELLA PNEUMONIAE GROUP: NOT DETECTED
KPC: ABNORMAL
LISTERIA MONOCYTOGENES: NOT DETECTED
LYMPHOCYTES # BLD AUTO: 0.5 K/UL (ref 1–4.8)
LYMPHOCYTES NFR BLD: 2.8 % (ref 18–48)
MAGNESIUM SERPL-MCNC: 2.2 MG/DL (ref 1.6–2.6)
MCH RBC QN AUTO: 25.9 PG (ref 27–31)
MCHC RBC AUTO-ENTMCNC: 31.8 G/DL (ref 32–36)
MCR-1: ABNORMAL
MCV RBC AUTO: 82 FL (ref 82–98)
MEC A/C AND MREJ (MRSA): DETECTED
MEC A/C: ABNORMAL
MONOCYTES # BLD AUTO: 1.1 K/UL (ref 0.3–1)
MONOCYTES NFR BLD: 6.1 % (ref 4–15)
NDM GENE: NOT DETECTED
NEISSERIA MENINGITIDIS: NOT DETECTED
NEUTROPHILS # BLD AUTO: 16.2 K/UL (ref 1.8–7.7)
NEUTROPHILS NFR BLD: 88.5 % (ref 38–73)
NRBC BLD-RTO: 0 /100 WBC
OXA-48-LIKE: NOT DETECTED
PLATELET # BLD AUTO: 368 K/UL (ref 150–450)
PMV BLD AUTO: 9 FL (ref 9.2–12.9)
POTASSIUM SERPL-SCNC: 4 MMOL/L (ref 3.5–5.1)
PROT SERPL-MCNC: 5.5 G/DL (ref 6–8.4)
PROTEUS SPECIES: NOT DETECTED
PSEUDOMONAS AERUGINOSA: NOT DETECTED
RBC # BLD AUTO: 3.7 M/UL (ref 4–5.4)
SALMONELLA SP: NOT DETECTED
SERRATIA MARCESCENS: NOT DETECTED
SODIUM SERPL-SCNC: 133 MMOL/L (ref 136–145)
STAPHYLOCOCCUS AUREUS: DETECTED
STAPHYLOCOCCUS EPIDERMIDIS: NOT DETECTED
STAPHYLOCOCCUS LUGDUNESIS: NOT DETECTED
STAPHYLOCOCCUS SPECIES: ABNORMAL
STENOTROPHOMONAS MALTOPHILIA: NOT DETECTED
STREPTOCOCCUS AGALACTIAE: NOT DETECTED
STREPTOCOCCUS PNEUMONIAE: NOT DETECTED
STREPTOCOCCUS PYOGENES: NOT DETECTED
STREPTOCOCCUS SPECIES: NOT DETECTED
VAN A/B: NOT DETECTED
VIM GENE: NOT DETECTED
WBC # BLD AUTO: 18.25 K/UL (ref 3.9–12.7)

## 2023-06-05 PROCEDURE — 63600175 PHARM REV CODE 636 W HCPCS: Mod: JZ,JG | Performed by: INTERNAL MEDICINE

## 2023-06-05 PROCEDURE — 25000003 PHARM REV CODE 250: Performed by: INTERNAL MEDICINE

## 2023-06-05 PROCEDURE — 86140 C-REACTIVE PROTEIN: CPT | Performed by: INTERNAL MEDICINE

## 2023-06-05 PROCEDURE — 63600175 PHARM REV CODE 636 W HCPCS: Performed by: INTERNAL MEDICINE

## 2023-06-05 PROCEDURE — 25000003 PHARM REV CODE 250: Performed by: STUDENT IN AN ORGANIZED HEALTH CARE EDUCATION/TRAINING PROGRAM

## 2023-06-05 PROCEDURE — 85025 COMPLETE CBC W/AUTO DIFF WBC: CPT | Performed by: STUDENT IN AN ORGANIZED HEALTH CARE EDUCATION/TRAINING PROGRAM

## 2023-06-05 PROCEDURE — 36415 COLL VENOUS BLD VENIPUNCTURE: CPT | Performed by: STUDENT IN AN ORGANIZED HEALTH CARE EDUCATION/TRAINING PROGRAM

## 2023-06-05 PROCEDURE — 27000221 HC OXYGEN, UP TO 24 HOURS

## 2023-06-05 PROCEDURE — 99233 PR SUBSEQUENT HOSPITAL CARE,LEVL III: ICD-10-PCS | Mod: 25,,, | Performed by: INTERNAL MEDICINE

## 2023-06-05 PROCEDURE — 99900031 HC PATIENT EDUCATION (STAT)

## 2023-06-05 PROCEDURE — 97535 SELF CARE MNGMENT TRAINING: CPT

## 2023-06-05 PROCEDURE — 99900035 HC TECH TIME PER 15 MIN (STAT)

## 2023-06-05 PROCEDURE — 99232 SBSQ HOSP IP/OBS MODERATE 35: CPT | Mod: ,,, | Performed by: INTERNAL MEDICINE

## 2023-06-05 PROCEDURE — 21000000 HC CCU ICU ROOM CHARGE

## 2023-06-05 PROCEDURE — 94660 CPAP INITIATION&MGMT: CPT

## 2023-06-05 PROCEDURE — 92526 ORAL FUNCTION THERAPY: CPT

## 2023-06-05 PROCEDURE — 94761 N-INVAS EAR/PLS OXIMETRY MLT: CPT

## 2023-06-05 PROCEDURE — 99232 PR SUBSEQUENT HOSPITAL CARE,LEVL II: ICD-10-PCS | Mod: ,,, | Performed by: INTERNAL MEDICINE

## 2023-06-05 PROCEDURE — 99233 SBSQ HOSP IP/OBS HIGH 50: CPT | Mod: 25,,, | Performed by: INTERNAL MEDICINE

## 2023-06-05 PROCEDURE — 83735 ASSAY OF MAGNESIUM: CPT | Performed by: STUDENT IN AN ORGANIZED HEALTH CARE EDUCATION/TRAINING PROGRAM

## 2023-06-05 PROCEDURE — 80053 COMPREHEN METABOLIC PANEL: CPT | Performed by: STUDENT IN AN ORGANIZED HEALTH CARE EDUCATION/TRAINING PROGRAM

## 2023-06-05 RX ORDER — FUROSEMIDE 10 MG/ML
40 INJECTION INTRAMUSCULAR; INTRAVENOUS ONCE
Status: COMPLETED | OUTPATIENT
Start: 2023-06-05 | End: 2023-06-05

## 2023-06-05 RX ADMIN — HYDROCODONE BITARTRATE AND ACETAMINOPHEN 1 TABLET: 5; 325 TABLET ORAL at 05:06

## 2023-06-05 RX ADMIN — ESCITALOPRAM OXALATE 10 MG: 10 TABLET ORAL at 09:06

## 2023-06-05 RX ADMIN — FUROSEMIDE 40 MG: 10 INJECTION, SOLUTION INTRAVENOUS at 02:06

## 2023-06-05 RX ADMIN — HYDROCODONE BITARTRATE AND ACETAMINOPHEN 1 TABLET: 5; 325 TABLET ORAL at 08:06

## 2023-06-05 RX ADMIN — ATORVASTATIN CALCIUM 20 MG: 20 TABLET, FILM COATED ORAL at 08:06

## 2023-06-05 RX ADMIN — DIGOXIN 0.12 MG: 125 TABLET ORAL at 09:06

## 2023-06-05 RX ADMIN — CEFTAROLINE FOSAMIL 600 MG: 600 POWDER, FOR SOLUTION INTRAVENOUS at 02:06

## 2023-06-05 RX ADMIN — METOPROLOL TARTRATE 25 MG: 25 TABLET, FILM COATED ORAL at 09:06

## 2023-06-05 RX ADMIN — METOPROLOL TARTRATE 25 MG: 25 TABLET, FILM COATED ORAL at 08:06

## 2023-06-05 RX ADMIN — LEVOTHYROXINE SODIUM 75 MCG: 0.03 TABLET ORAL at 05:06

## 2023-06-05 NOTE — PROCEDURES
IVC enlarged to 1.74 cm with minimal collapse. This suggests fluid overload, especially considering the small size of this patient.

## 2023-06-05 NOTE — ASSESSMENT & PLAN NOTE
Ongoing MRSA bacteremia without a clear source at this point and we have not achieved source control.  Family would not like any further invasive workup.    Check CTA chest > no signs of significant focal pneumonia  Inflammatory markers rising  Repeat blood cultures daily  She has had daily positive blood cultures.   Urine culture with candiduria likely contaminated   COVID and influenza negative  Daptomycin and Ceftaroline per Dr Shahid  Initial Echo without vegetations.   Plan to continue antibiotics until cultures clear at this point.  Appreciate Dr Shahid's recs  CT hips / pelvis  > no clear evidence of infective focus.

## 2023-06-05 NOTE — PLAN OF CARE
06/05/23 1130   Patient Assessment/Suction   Level of Consciousness (AVPU) alert   Respiratory Effort Normal;Unlabored   Rhythm/Pattern, Respiratory no shortness of breath reported   Skin Integrity   $ Wound Care Tech Time 15 min   Area Observed Left;Right;Behind ear;Cheek;Bridge of nose   Skin Appearance without discoloration   PRE-TX-O2   Device (Oxygen Therapy) Oxymask   $ Is the patient on Low Flow Oxygen? Yes   Flow (L/min) 4   SpO2 (!) 93 %   Pulse Oximetry Type Continuous   $ Pulse Oximetry - Multiple Charge Pulse Oximetry - Multiple   Pulse 78   Resp (!) 26   Preset CPAP/BiPAP Settings   Mode Of Delivery Standby   $ CPAP/BiPAP Daily Charge BiPAP/CPAP Daily   $ Initial CPAP/BiPAP Setup? No   $ Is patient using? Yes   Equipment Type V60   Ipap 14   EPAP (cm H2O) 7   Pressure Support (cm H2O) 7   Set Rate (Breaths/Min) 10   ITime (sec) 1   Rise Time (sec) 3   Education   $ Education Bronchodilator;15 min   Respiratory Evaluation   $ Care Plan Tech Time 15 min

## 2023-06-05 NOTE — PROGRESS NOTES
Consult Note  Infectious Disease    Reason for Consult:  MRSA bacteremia    HPI: Jeannie Hutchins is a 88 y.o. female Nursing home resident With a history of diastolic heart failure, oxygen dependence, atrial fibrillation with recent 6 day stay in late April for worsening oxygen requirement, right lower extremity cellulitis associated with an injury (where she hit her lower leg on the wheelchair).  She was given oxygen support, azithromycin, ceftriaxone and Lasix, required intensive care on admission, followed by steroids and doxycycline for lungs and leg.    She presented to the emergency room on 05/30 with tachycardia, generalized weakness and a syncopal episode.  She had had a fall last week with x-rays done at the nursing facility which were negative.  She denies feeling ill prior to the fall and relates this to not donning her slippers before trying to move around.  Her heart rate was found to be 220, in atrial fibrillation and with a temperature of 101.7°.  Workup included CBC with white blood cells 27,000, BNP 1900, urinalysis with 63 white blood cells and many yeast, chest x-ray with pulmonary edema and on admission was placed on ceftriaxone and fluconazole.  The wound on the right lower leg is largely healed, she does have a bruise about the right hip.  She was noted to have pain in the shoulder and hip and x-rays were ordered with findings of severe glenohumeral and acromioclavicular osteoarthrosis and a suspected right femoral neck fracture with a CT scan showing a subcapital fracture and cortical offset along the inferior edge of the femoral head/neck junction.  Fortunately there is no sign of hemarthrosis or increased joint fluid.  The right iliacus muscle is edematous or enlarged compared to the left. There may be a tiny bit of fluid in her trochanteric.  Blood cultures from 05/30 became positive this morning with GPC identified by [employer] is MRSA. Repeat blood cultures have been drawn.  The urine culture  "is growing only yeast("clean catch"). (MRSA screen on admission was negative).  Ceftriaxone was stopped and vancomycin was ordered.  Fever has resolved. WBC remain elevated. Still on diltiazem drip and requiring 6 liters of oxygen. Urine output is not being measured as she is incontinent.     6/2: interim reviewed. Afebrile. Oxygen requirement is much higher. Orthopedics consult pending. yesterday's blood cultures are positive, Vanc ALICIA for the MRSA is 1, trough was low. WBC improved. BUN up to 38. Ct chest reviewed and there is no focal pneumonia to blame her bacteremia on.  I had the chest x-ray repeated and there are no new infiltrates, only mild perihilar fullness.  She is alert, still in pain from her right hip but I was able to perform range-of-motion a little, more than I expected to be able to.  The remainder of the joints of the lower extremities have no sign or symptom of joint infection.  She has no peripheral signs of endocarditis.  Understandably her son is concerned about the stress of a transesophageal echo.  Discussed with him that we would not do it on someone who has this oxygen requirement and I explained that this would help discern the length of therapy.  I also discussed with him that in some cases when the evaluation is more dangerous than the treatment, that we elect to treat people for the worst possible diagnosis and he is in favor of this rather than the BLADIMIR.  6/3: Interim reviewed.  6/2 blood cultures are positive for MRSA, 6 3 blood cultures after the initiation of daptomycin have been drawn.  She is requiring roughly the same amount of oxygen.  Discussed with Dr. Ndiaye of Pulmonary.  Her right hip is no worse, but her blood pressure does not tolerate opiates very well.  She is eating a little bit more per her son's report.  Examination of the hip allows me to perform some flexion without severe pain and this is an improvement from 2 days ago.  Talked with her son about whether he would " be in favor of a right hip washout should imaging or persistent bacteremia suggest that the hip was infected and discussed with him the potential for anesthetic complications and/or inability to liberate from the ventilator.  CT scan of the hip has been ordered by Hospital Medicine.  6/4: Interim reviewed.  Most recent blood cultures, since the addition of daptomycin are still positive  She is requiring less oxygen.  She is brighter and seems to be in less pain.  She is breathing comfortably and able to express herself well though she is very hard of hearing  6/5: interim reviewed. Afebrile. Blood cultures remain positive through 6/4 am. Cr is worse today(diuretics, contrast and vanc).  She personally has no new complaints.  Her daughters at the bedside assisting with her lunch.  She remains bit confused, requiring a modest amount of oxygen.  Her pain seems well controlled.  I reviewed her last CT of the pelvis again and there may be a hypodense area in the right iliacus muscle.  If this were truly a site of infection I would expected to be from the bacteremia and not the cause of the bacteremia        EXAM & DIAGNOSTICS REVIEWED:   Vitals:     Temp:  [97.7 °F (36.5 °C)-98.1 °F (36.7 °C)]   Temp: 97.7 °F (36.5 °C) (06/05/23 1100)  Pulse: 71 (06/05/23 1100)  Resp: 20 (06/05/23 1100)  BP: (!) 98/55 (06/05/23 1100)  SpO2: 95 % (06/05/23 1100)    Intake/Output Summary (Last 24 hours) at 6/5/2023 1422  Last data filed at 6/5/2023 0937  Gross per 24 hour   Intake 390 ml   Output 300 ml   Net 90 ml       General:  In NAD. Alert and attentive, cooperative, comfortable    Eyes:  Anicteric,   EOMI, no scleral or conjunctival petechiae  ENT:  No ulcers, exudates, thrush, nares patent, dentition is fair. Hard of hearing  Neck:  supple,   Lungs: Minimal basilar crackles  Heart:  iRRR,   Abd:  Soft, NT, ND, normal BS, no masses or organomegaly appreciated.  :  Voids  no flank tenderness  Musc:  Joints without effusion,  swelling, erythema, synovitis, the left leg range of motion of the hip knee and ankle are normal as well as the right knee and right ankle.  She is tolerating movement in bed  with better pain control   Skin:  No rashes   Neuro:             Alert, attentive, speech fluent, face symmetric, moves all extremities, the content of her speech seems a little confused but she is pleasant and cooperative    Psych: Calm, cooperative  Lymphatic:     Extrem: No edema, erythema, phlebitis, cellulitis, warm and well perfused  VAD:  peripheral     Isolation:  contact  Wound: Right lower lateral leg prior ulcer is healed    Right hip    There is no redness about the right hip and the bruise is improved.  6/3    Lab Results   Component Value Date    PROCAL 1.38 (H) 06/01/2023    PROCAL <0.05 04/22/2023    PROCAL 0.30 04/21/2023         General Labs reviewed:  Recent Labs   Lab 06/03/23  0713 06/04/23  0609 06/05/23  0527   WBC 15.94* 17.70* 18.25*   HGB 8.7* 9.4* 9.6*   HCT 27.4* 29.6* 30.2*    311 368       Recent Labs   Lab 06/03/23  0713 06/04/23  0609 06/05/23  0527   * 133* 133*   K 3.8 4.0 4.0   CL 93* 93* 93*   CO2 29 28 29   BUN 46* 57* 72*   CREATININE 1.1 1.4 2.1*   CALCIUM 8.9 9.0 9.0   PROT 5.3* 5.8* 5.5*   BILITOT 1.6* 1.5* 1.2*   ALKPHOS 92 108 112   ALT 29 30 29   AST 23 23 26     Recent Labs   Lab 06/03/23  0713 06/04/23  0609 06/05/23  0527   CRP 23.21* 24.68* 24.82*         Micro:  Microbiology Results (last 7 days)       Procedure Component Value Units Date/Time    Blood culture [[id number]]  (Abnormal) Collected: 06/03/23 0713    Order Status: Completed Specimen: Blood Updated: 06/05/23 0646     Blood Culture, Routine Gram stain aer bottle: Gram positive cocci      Positive results previously called 06/03/2023  23:13 KS3      METHICILLIN RESISTANT STAPHYLOCOCCUS AUREUS  For susceptibility see order #[id number]      Rapid Organism ID by PCR (from Blood culture) [[id number]]  (Abnormal) Collected:  06/04/23 0609    Order Status: Completed Updated: 06/05/23 0140     Enterococcus faecalis Not Detected     Enterococcus faecium Not Detected     Listeria monocytogenes Not Detected     Staphylococcus spp. See species for ID     Comment: critical result(s) called and verbal readback obtained from ELIZABETH Meyer by CD3 06/05/2023 01:40          Staphylococcus aureus Detected     Comment: critical result(s) called and verbal readback obtained from ELIZABETH Meyer by CD3 06/05/2023 01:40          Staphylococcus epidermidis Not Detected     Staphylococcus lugdunensis Not Detected     Streptococcus species Not Detected     Streptococcus agalactiae Not Detected     Streptococcus pneumoniae Not Detected     Streptococcus pyogenes Not Detected     Acinetobacter calcoaceticus/baumannii complex Not Detected     Bacteroides fragilis Not Detected     Enterobacterales Not Detected     Enterobacter cloacae complex Not Detected     Escherichia coli Not Detected     Klebsiella aerogenes Not Detected     Klebsiella oxytoca Not Detected     Klebsiella pneumoniae group Not Detected     Proteus Not Detected     Salmonella sp Not Detected     Serratia marcescens Not Detected     Haemophilus influenzae Not Detected     Neisseria meningtidis Not Detected     Pseudomonas aeruginosa Not Detected     Stenotrophomonas maltophilia Not Detected     Candida albicans Not Detected     Candida auris Not Detected     Candida glabrata Not Detected     Candida krusei Not Detected     Candida parapsilosis Not Detected     Candida tropicalis Not Detected     Cryptococcus neoformans/gattii Not Detected     CTX-M (ESBL ) Test not applicable     IMP (Carbapenem resistant) Test not applicable     KPC resistance gene (Carbapenem resistant) Test not applicable     mcr-1  Test not applicable     mec A/C  Test not applicable     mec A/C and MREJ (MRSA) gene Detected     Comment: critical result(s) called and verbal readback obtained from  ELIZAEBTH Meyer by CD3 06/05/2023 01:40          NDM (Carbapenem resistant) Not Detected     OXA-48-like (Carbapenem resistant) Not Detected     van A/B (VRE gene) Not Detected     VIM (Carbapenem resistant) Not Detected    Narrative:         critical result(s) called and verbal readback obtained from ELIZABETH Meyer by CD3 06/05/2023 01:40    Blood culture [[id number]]     Order Status: Canceled Specimen: Blood     Blood culture [[id number]] Collected: 06/04/23 0609    Order Status: Completed Specimen: Blood Updated: 06/05/23 0051     Blood Culture, Routine Gram stain aer bottle: Gram positive cocci      Gram stain shirley bottle: Gram positive cocci      Results called to and read back by:ELIZABETH Melendez;  06/05/2023      00:50 CJD    Blood culture [[id number]] Collected: 06/04/23 1559    Order Status: Completed Specimen: Blood Updated: 06/04/23 2317     Blood Culture, Routine No Growth to date    Blood culture [[id number]]  (Abnormal) Collected: 06/02/23 0839    Order Status: Completed Specimen: Blood Updated: 06/03/23 0802     Blood Culture, Routine Gram stain aer bottle: Gram positive cocci      Positive results previously called 06/02/2023  20:46 KS3      METHICILLIN RESISTANT STAPHYLOCOCCUS AUREUS  For susceptibility see order #[id number]      Blood culture [[id number]]  (Abnormal) Collected: 06/01/23 0858    Order Status: Completed Specimen: Blood Updated: 06/03/23 0801     Blood Culture, Routine Gram stain aer bottle: Gram positive cocci      Positive results previously called 06/02/2023  01:59 KS3      METHICILLIN RESISTANT STAPHYLOCOCCUS AUREUS  For susceptibility see order #[id number]      Blood culture [[id number]]  (Abnormal) Collected: 06/01/23 0859    Order Status: Completed Specimen: Blood Updated: 06/03/23 0800     Blood Culture, Routine Gram stain aer bottle: Gram positive cocci      Positive results previously called 06/02/2023  01:07 KS3      METHICILLIN RESISTANT STAPHYLOCOCCUS  AUREUS  For susceptibility see order #[id number]      Urine culture [[id number]]  (Abnormal) Collected: 05/30/23 0909    Order Status: Completed Specimen: Urine from Clean Catch Updated: 06/03/23 0635     Urine Culture, Routine DANILO ALBICANS  > 100,000 cfu/ml  Treatment of asymptomatic candiduria is not recommended (except for   specific populations). Candida isolated in the urine typically   represents colonization. If an indwelling urinary catheter is present  it should be removed or replaced.      Blood culture [[id number]]  (Abnormal) Collected: 05/30/23 1115    Order Status: Completed Specimen: Blood Updated: 06/02/23 0734     Blood Culture, Routine Gram stain shirley bottle: Gram positive cocci      Positive results previously called 05/31/2023  03:29 KS3      Gram stain aer bottle: Gram positive cocci      Positive results previously called      METHICILLIN RESISTANT STAPHYLOCOCCUS AUREUS  Known MRSA patient  For susceptibility see order #[id number]      Blood culture [[id number]]  (Abnormal)  (Susceptibility) Collected: 05/30/23 1130    Order Status: Completed Specimen: Blood Updated: 06/02/23 0733     Blood Culture, Routine Gram stain shirley bottle: Gram positive cocci      Results called to and read back by: Triston Brown RN on [address]      05/31/2023  02:32 KS3      Gram stain aer bottle: Gram positive cocci      Positive results previously called 05/31/2023  04:59 KS3      METHICILLIN RESISTANT STAPHYLOCOCCUS AUREUS  Known MRSA patient      Rapid Organism ID by PCR (from Blood culture) [[id number]]  (Abnormal) Collected: 05/30/23 1130    Order Status: Completed Updated: 05/31/23 0348     Enterococcus faecalis Not Detected     Enterococcus faecium Not Detected     Listeria monocytogenes Not Detected     Staphylococcus spp. See species for ID     Staphylococcus aureus Detected     Staphylococcus epidermidis Not Detected     Staphylococcus lugdunensis Not Detected     Streptococcus species Not Detected      Streptococcus agalactiae Not Detected     Streptococcus pneumoniae Not Detected     Streptococcus pyogenes Not Detected     Acinetobacter calcoaceticus/baumannii complex Not Detected     Bacteroides fragilis Not Detected     Enterobacterales Not Detected     Enterobacter cloacae complex Not Detected     Escherichia coli Not Detected     Klebsiella aerogenes Not Detected     Klebsiella oxytoca Not Detected     Klebsiella pneumoniae group Not Detected     Proteus Not Detected     Salmonella sp Not Detected     Serratia marcescens Not Detected     Haemophilus influenzae Not Detected     Neisseria meningtidis Not Detected     Pseudomonas aeruginosa Not Detected     Stenotrophomonas maltophilia Not Detected     Candida albicans Not Detected     Candida auris Not Detected     Candida glabrata Not Detected     Candida krusei Not Detected     Candida parapsilosis Not Detected     Candida tropicalis Not Detected     Cryptococcus neoformans/gattii Not Detected     CTX-M (ESBL ) Test not applicable     IMP (Carbapenem resistant) Test not applicable     KPC resistance gene (Carbapenem resistant) Test not applicable     mcr-1  Test not applicable     mec A/C  Test not applicable     mec A/C and MREJ (MRSA) gene Detected     Comment: Resistance gene critical result(s) called and verbal readback   [facility] 05/31/2023 03:47          NDM (Carbapenem resistant) Test not applicable     OXA-48-like (Carbapenem resistant) Test not applicable     van A/B (VRE gene) Test not applicable     VIM (Carbapenem resistant) Test not applicable    Narrative:      Resistance gene critical result(s) called and verbal readback   [facility] 05/31/2023 03:47    MRSA Screen by PCR [[id number]] Collected: 05/30/23 1105    Order Status: Completed Specimen: Nasopharyngeal Swab from Nasal Updated: 05/30/23 1256     MRSA SCREEN BY PCR Negative    Culture, Respiratory  with Gram Stain [[id number]]     Order Status: No result Specimen: Respiratory from Sputum, Expectorated     Blood culture [[id number]]     Order Status: Canceled Specimen: Blood     Urine Culture High Risk [[id number]]     Order Status: Completed Specimen: Urine             Imaging Reviewed:   CXR   CT bilateral hips  1. Pre-existing osteoarthritis right hip with evidence of subcapital fracture and cortical offset along the inferior edge of the femoral head neck junction.  2. No evidence of significant osseous displacement..  3. Chronic degenerative arthrosis about the left hip with prominent osteophytic spurs encircling the base of the femoral head.    CT hip right 6/3  Subcapital fracture of the right femoral neck.  Thickening of the musculature around the right hip is probably in relation to trauma. A low-attenuation focus in the musculature anterior to the hip is probably hematoma. No abnormal enhancement to suggest abscess formation can be seen although I cannot absolutely rule out out.    Cardiology:  TTE   (poor imaging of valves)  Atrial fibrillation observed.  The left ventricle is normal in size with concentric remodeling and normal systolic function.  The estimated ejection fraction is 55%.  Moderate tricuspid regurgitation.  No clear evidence of endocarditis. If clinical suspicion persist, consider alternative cardiac imaging like BLADIMIR for further evaluation.    IMPRESSION & PLAN   1. MRSA bacteremia. 5/30-6/4 am   Source is not readily apparent   She has too much pain from fracture to reliably discern whether she has a septic hip. There is no increased fluid on the CT, there may be a tiny amount of fluid in trochanteric bursa. The right iliacus is larger than left. Repeat Ct 6/3 shows no evolution of a focus of infection, ?hypodense area right iliacus   There is no lower respiratory tract infection present on CT of the chest    2. Right subcapital hip fracture, soft tissue trauma right shoulder  3. Oxygen  dependent CHF,, pulmonary hypertension   Hypoxemic respiratory failure, CTA negative    4. Candiduria is likely reflective of vaginal contamination of specimen(there is no way she could give a clean catch specimen)  5. MARSHALL. Diuretics, vanc and contrast    6. Elderly, frail and debilitated      Recommendations:  Continue  daptomycin,   ceftaroline , adjusting doses for acute kidney injury  Serial  blood cultures until clear     Trend crp, no steroids      Orthopedics consult     Discussed in great detail with daughter, , hospital medicine     Medical Decision Making during this encounter was  [_] Low Complexity  [_] Moderate Complexity  [xxx  ] High Complexity

## 2023-06-05 NOTE — ASSESSMENT & PLAN NOTE
Admits to shortness of breath with productive cough   Chest x-ray with pulmonary edema with elevated BNP at 1905  Previous echo with EF of 65%, diastolic dysfunction consistent with AFib  Low dose lasix  IVC ultrasound today shows enlargement per Dr. Amezquita  Additional IV Lasix  Strict I/O daily weights, oral fluid and sodium restriction

## 2023-06-05 NOTE — SUBJECTIVE & OBJECTIVE
Interval History:  Cultures continue to be positive.  No source identified at this point.  Continuing current antibiotic plan.  Appreciate ID recommendations.  Appreciate Pulmonary evaluation.  IVC is enlarged and suggests volume overload.  Additional IV diuresis discussed with Dr. Amezquita.  MARSHALL may be due to cardiorenal but also may have component of SARAH versus vancomycin toxicity.  After a long discussion with her family yesterday, they feel it is most important to pursue minimally invasive strategies as much as possible.  Continuing antibiotics empirically at this point.  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  Objective:     Vital Signs (Most Recent):  Temp: 97.7 °F (36.5 °C) (06/05/23 1100)  Pulse: 71 (06/05/23 1100)  Resp: 20 (06/05/23 1100)  BP: (!) 98/55 (06/05/23 1100)  SpO2: 95 % (06/05/23 1100) Vital Signs (24h Range):  Temp:  [97.7 °F (36.5 °C)-98.1 °F (36.7 °C)] 97.7 °F (36.5 °C)  Pulse:  [71-95] 71  Resp:  [11-30] 20  SpO2:  [89 %-99 %] 95 %  BP: ()/(50-80) 98/55     Weight: 43.7 kg (96 lb 4.8 oz)  Body mass index is 21.6 kg/m².    Intake/Output Summary (Last 24 hours) at 6/5/2023 1404  Last data filed at 6/5/2023 0937  Gross per 24 hour   Intake 390 ml   Output 300 ml   Net 90 ml           Physical Exam  Constitutional:       Appearance: Normal appearance. She is normal weight.   HENT:      Head: Normocephalic and atraumatic.      Nose: Nose normal.      Mouth/Throat:      Mouth: Mucous membranes are moist.   Eyes:      Conjunctiva/sclera: Conjunctivae normal.      Pupils: Pupils are equal, round, and reactive to light.   Cardiovascular:      Rate and Rhythm: Tachycardia present. Rhythm irregular.      Pulses: Normal pulses.      Heart sounds: Normal heart sounds. No murmur heard.    No friction rub. No gallop.   Pulmonary:      Effort: Pulmonary effort is normal.      Breath sounds: Normal breath sounds.  No wheezing or rales.      Comments: 4 L nasal cannula.  Coarse breath sounds.  Abdominal:      General: Abdomen is flat. Bowel sounds are normal. There is no distension.      Palpations: Abdomen is soft.      Tenderness: There is no abdominal tenderness. There is no guarding.   Musculoskeletal:         General: No swelling. Normal range of motion.      Cervical back: Normal range of motion and neck supple.      Right lower leg: No edema.      Left lower leg: No edema.      Comments: She has pain in her right hip, limited mobility of her left shoulder. Pulses in tact distally.  Able to move her hip a bit more today   Skin:     General: Skin is warm and dry.   Neurological:      General: No focal deficit present.      Mental Status: She is alert.   Psychiatric:         Mood and Affect: Mood normal.         Thought Content: Thought content normal.         Judgment: Judgment normal.           Significant Labs: All pertinent labs within the past 24 hours have been reviewed.    Significant Imaging: I have reviewed all pertinent imaging results/findings within the past 24 hours.

## 2023-06-05 NOTE — ASSESSMENT & PLAN NOTE
MARSHALL of unclear etiology but could be multifactorial  IVC is very large per Dr. Hatch and will give additional Lasix, concern for cardiorenal syndrome  Could also potentially be due to vancomycin dosing and contrast.  Trend renal function at this point  With ongoing bacteremia could also be septic microemboli

## 2023-06-05 NOTE — PT/OT/SLP PROGRESS
Occupational Therapy   Treatment    Name: Jeannie Hutchins  MRN: [id number]  Admitting Diagnosis:  Sepsis       Recommendations:     Discharge Recommendations: nursing facility, skilled  Discharge Equipment Recommendations:   (TBD)  Barriers to discharge:   (increased physical assistance with functional mobility and ADLs.)    Assessment:     Jeannie Hutchins is a 88 y.o. female with a medical diagnosis of Sepsis.  She presents with MRSA, confusion and closed right hip fracture. Patient lethargic, only participated in wiping face bed level this session. Performance deficits affecting function are weakness, impaired endurance, impaired self care skills, impaired functional mobility, impaired balance, gait instability, impaired cognition, decreased upper extremity function, decreased lower extremity function, decreased safety awareness.     Rehab Prognosis:  Poor; patient would benefit from acute skilled OT services to address these deficits and reach maximum level of function.       Plan:     Patient to be seen 3 x/week to address the above listed problems via self-care/home management, therapeutic activities, therapeutic exercises  Plan of Care Expires: 06/28/23  Plan of Care Reviewed with: patient    Subjective     Chief Complaint: None  Patient/Family Comments/goals: improved cognition and functional mobility.  Pain/Comfort:  Pain Rating 1: 0/10  Pain Rating Post-Intervention 1: 0/10    Objective:     Communicated with: nurse prior to session.  Patient found HOB elevated with telemetry, peripheral IV upon OT entry to room.    General Precautions: Standard, fall, contact    Orthopedic Precautions:RLE non weight bearing  Braces: N/A  Respiratory Status:  4L O2 via Oximask     Occupational Performance:     BUE/BLE AAROM Exercise:    Performed BUE/BLE AAROM therex x5 reps with maximal assistance bed level.     Activities of Daily Living:  Grooming: minimum assistance to wipe face bed level with HOB elevated.      [facility] 6 Click  ADL:      Treatment & Education:  Patient lethargic, requiring maximal verbal/tactile cues to follow 1 step commands.    Patient left HOB elevated with all lines intact, call button in reach, and bed alarm on    GOALS:   Multidisciplinary Problems       Occupational Therapy Goals          Problem: Occupational Therapy    Goal Priority Disciplines Outcome Interventions   Occupational Therapy Goal     OT, PT/OT Ongoing, Progressing    Description: Goals to be met by: 6/28/2023     Patient will increase functional independence with ADLs by performing:    LE Dressing with Stand-by Assistance and Assistive Devices as needed.  Grooming while seated at sink with Stand-by Assistance.  Toileting from bedside commode with Stand-by Assistance for hygiene and clothing management.   Supine to sit with Stand-by Assistance.  Toilet transfer to bedside commode with Stand-by Assistance.                         Time Tracking:     OT Date of Treatment: 06/05/23  OT Start Time: 1120  OT Stop Time: 1137  OT Total Time (min): 17 min    Billable Minutes:Self Care/Home Management 17    OT/VICKI: OT          6/5/2023

## 2023-06-05 NOTE — PROGRESS NOTES
Progress Note  PULMONARY    Admit Date: 5/30/2023 06/05/2023  History of Present Illness:  Pt is an 89 yo female with atrial fib on eliquis, CHF, chronic resp failure on home O2, HTN, HLD, hearing loss, anxiety who presented to ED on 5/30 with tachycardia & syncope, recent hip fx 1 week ago, found to have fever and sepsis with MRSA bacteremia.  ID is following and doing workup, notes reviewed. She is getting vanco + daptomycin. Pulmonary is consulted for worsening O2 requirement.  She is now requiring high flow nc/oxy mask to maintain sats. She is having a lot of pain in her right hip and has difficulty hearing and answering my questions. Her son Dane at bedside provides history. She has CHF, uses 2L O2 at her facility and has had a progressive decline over the past 2 yrs with breathing difficulty. It has been determined she is a poor surgical candidate for hip repair. They do not want further invasive testing or procedures. She continues on antibiotics and has persistent MRSA bacteremia of unclear source. Per nursing her blood pressure gets very low with pain and heart medications.    SUBJECTIVE:     6/4- O2 requirement has improved. She has new MARSHALL. Has been afebrile, HR controlled and BP in 100s-110s systolic. She is confused and delirious    6/5/23: Stable, requiring 3-4 L NC but sat drops precipitously with movement, lying flat, or brief removal of NC. Alert and oriented x 3.      OBJECTIVE:     Vitals (Most recent):  Vitals:    06/05/23 1100   BP: (!) 98/55   Pulse: 71   Resp: 20   Temp: 97.7 °F (36.5 °C)       Vitals (24 hour range):  Temp:  [97.7 °F (36.5 °C)-98.1 °F (36.7 °C)]   Pulse:  [71-95]   Resp:  [11-30]   BP: ()/(50-80)   SpO2:  [89 %-99 %]       Intake/Output Summary (Last 24 hours) at 6/5/2023 1235  Last data filed at 6/5/2023 0937  Gross per 24 hour   Intake 540 ml   Output 300 ml   Net 240 ml        PHYSICAL EXAM  GENERAL:  NAD.  HEENT: EOMI. PERRLA.  NECK: No cervical adenopathy  palpated. No JVD or HJR.  HEART: Regular rate and rhythm. No murmur or gallop auscultated.  LUNGS: Clear to auscultation. Lung excursion symmetrical.   ABDOMEN: Bowel sounds present. Soft, non-tender, non-distended.  EXTREMITIES: Normal muscle tone and joint movement, no cyanosis or clubbing.   LYMPHATICS: No adenopathy palpated, no edema.  SKIN: Dry, intact, no lesions.   NEURO: No gross motor abnormalities. A&Ox3. Sleepy.  PSYCH: Appropriate affect.      Radiographs reviewed: view by direct vision   CXR 6/4/23- pulmonary edema, slightly worse  CXR 6/2/23- bilat hilar congestion, cardiomegaly  CTA chest- no PE; small R pleural effusion, bilat faint ground glass perihilar suggesting pulm edema     TTE 6/1/23-   Atrial fibrillation observed.  The left ventricle is normal in size with concentric remodeling and normal systolic function.  The estimated ejection fraction is 55%.  Moderate tricuspid regurgitation.  No clear evidence of endocarditis. If clinical suspicion persist, consider alternative cardiac imaging like BLADIMIR for further evaluation.    Labs     Recent Labs   Lab 06/05/23 0527   WBC 18.25*   HGB 9.6*   HCT 30.2*        Recent Labs   Lab 06/05/23 0527   *   K 4.0   CL 93*   CO2 29   BUN 72*   CREATININE 2.1*   *   CALCIUM 9.0   MG 2.2   AST 26   ALT 29   ALKPHOS 112   BILITOT 1.2*   PROT 5.5*   ALBUMIN 2.5*   CRP 24.82*   No results for input(s): PH, PCO2, PO2, HCO3 in the last 24 hours.  Microbiology Results (last 7 days)       Procedure Component Value Units Date/Time    Blood culture [[id number]]  (Abnormal) Collected: 06/03/23 0713    Order Status: Completed Specimen: Blood Updated: 06/05/23 0646     Blood Culture, Routine Gram stain aer bottle: Gram positive cocci      Positive results previously called 06/03/2023  23:13 KS3      METHICILLIN RESISTANT STAPHYLOCOCCUS AUREUS  For susceptibility see order #[id number]      Rapid Organism ID by PCR (from Blood culture) [[id number]]   (Abnormal) Collected: 06/04/23 0609    Order Status: Completed Updated: 06/05/23 0140     Enterococcus faecalis Not Detected     Enterococcus faecium Not Detected     Listeria monocytogenes Not Detected     Staphylococcus spp. See species for ID     Comment: critical result(s) called and verbal readback obtained from ELIZABETH Meyer by CD3 06/05/2023 01:40          Staphylococcus aureus Detected     Comment: critical result(s) called and verbal readback obtained from ELIZABETH Meyer by CD3 06/05/2023 01:40          Staphylococcus epidermidis Not Detected     Staphylococcus lugdunensis Not Detected     Streptococcus species Not Detected     Streptococcus agalactiae Not Detected     Streptococcus pneumoniae Not Detected     Streptococcus pyogenes Not Detected     Acinetobacter calcoaceticus/baumannii complex Not Detected     Bacteroides fragilis Not Detected     Enterobacterales Not Detected     Enterobacter cloacae complex Not Detected     Escherichia coli Not Detected     Klebsiella aerogenes Not Detected     Klebsiella oxytoca Not Detected     Klebsiella pneumoniae group Not Detected     Proteus Not Detected     Salmonella sp Not Detected     Serratia marcescens Not Detected     Haemophilus influenzae Not Detected     Neisseria meningtidis Not Detected     Pseudomonas aeruginosa Not Detected     Stenotrophomonas maltophilia Not Detected     Candida albicans Not Detected     Candida auris Not Detected     Candida glabrata Not Detected     Candida krusei Not Detected     Candida parapsilosis Not Detected     Candida tropicalis Not Detected     Cryptococcus neoformans/gattii Not Detected     CTX-M (ESBL ) Test not applicable     IMP (Carbapenem resistant) Test not applicable     KPC resistance gene (Carbapenem resistant) Test not applicable     mcr-1  Test not applicable     mec A/C  Test not applicable     mec A/C and MREJ (MRSA) gene Detected     Comment: critical result(s) called and verbal  readback obtained from ELIZABETH Meyer by CD3 06/05/2023 01:40          NDM (Carbapenem resistant) Not Detected     OXA-48-like (Carbapenem resistant) Not Detected     van A/B (VRE gene) Not Detected     VIM (Carbapenem resistant) Not Detected    Narrative:         critical result(s) called and verbal readback obtained from ELIZABETH Meyer by CD3 06/05/2023 01:40    Blood culture [[id number]]     Order Status: Sent Specimen: Blood     Blood culture [[id number]] Collected: 06/04/23 0609    Order Status: Completed Specimen: Blood Updated: 06/05/23 0051     Blood Culture, Routine Gram stain aer bottle: Gram positive cocci      Gram stain shirley bottle: Gram positive cocci      Results called to and read back by:ELIZABETH Melendez;  06/05/2023      00:50 CJD    Blood culture [[id number]] Collected: 06/04/23 1559    Order Status: Completed Specimen: Blood Updated: 06/04/23 2317     Blood Culture, Routine No Growth to date    Blood culture [[id number]]  (Abnormal) Collected: 06/02/23 0839    Order Status: Completed Specimen: Blood Updated: 06/03/23 0802     Blood Culture, Routine Gram stain aer bottle: Gram positive cocci      Positive results previously called 06/02/2023  20:46 KS3      METHICILLIN RESISTANT STAPHYLOCOCCUS AUREUS  For susceptibility see order #[id number]      Blood culture [[id number]]  (Abnormal) Collected: 06/01/23 0858    Order Status: Completed Specimen: Blood Updated: 06/03/23 0801     Blood Culture, Routine Gram stain aer bottle: Gram positive cocci      Positive results previously called 06/02/2023  01:59 KS3      METHICILLIN RESISTANT STAPHYLOCOCCUS AUREUS  For susceptibility see order #[id number]      Blood culture [[id number]]  (Abnormal) Collected: 06/01/23 0859    Order Status: Completed Specimen: Blood Updated: 06/03/23 0800     Blood Culture, Routine Gram stain aer bottle: Gram positive cocci      Positive results previously called 06/02/2023  01:07 KS3      METHICILLIN RESISTANT  STAPHYLOCOCCUS AUREUS  For susceptibility see order #[id number]      Urine culture [[id number]]  (Abnormal) Collected: 05/30/23 0909    Order Status: Completed Specimen: Urine from Clean Catch Updated: 06/03/23 0635     Urine Culture, Routine DANILO ALBICANS  > 100,000 cfu/ml  Treatment of asymptomatic candiduria is not recommended (except for   specific populations). Candida isolated in the urine typically   represents colonization. If an indwelling urinary catheter is present  it should be removed or replaced.      Blood culture [[id number]]  (Abnormal) Collected: 05/30/23 1115    Order Status: Completed Specimen: Blood Updated: 06/02/23 0734     Blood Culture, Routine Gram stain shirley bottle: Gram positive cocci      Positive results previously called 05/31/2023  03:29 KS3      Gram stain aer bottle: Gram positive cocci      Positive results previously called      METHICILLIN RESISTANT STAPHYLOCOCCUS AUREUS  Known MRSA patient  For susceptibility see order #[id number]      Blood culture [[id number]]  (Abnormal)  (Susceptibility) Collected: 05/30/23 1130    Order Status: Completed Specimen: Blood Updated: 06/02/23 0733     Blood Culture, Routine Gram stain shirley bottle: Gram positive cocci      Results called to and read back by: Triston Brown RN on [address]      05/31/2023  02:32 KS3      Gram stain aer bottle: Gram positive cocci      Positive results previously called 05/31/2023  04:59 KS3      METHICILLIN RESISTANT STAPHYLOCOCCUS AUREUS  Known MRSA patient      Rapid Organism ID by PCR (from Blood culture) [[id number]]  (Abnormal) Collected: 05/30/23 1130    Order Status: Completed Updated: 05/31/23 0348     Enterococcus faecalis Not Detected     Enterococcus faecium Not Detected     Listeria monocytogenes Not Detected     Staphylococcus spp. See species for ID     Staphylococcus aureus Detected     Staphylococcus epidermidis Not Detected     Staphylococcus lugdunensis Not Detected     Streptococcus species Not  Detected     Streptococcus agalactiae Not Detected     Streptococcus pneumoniae Not Detected     Streptococcus pyogenes Not Detected     Acinetobacter calcoaceticus/baumannii complex Not Detected     Bacteroides fragilis Not Detected     Enterobacterales Not Detected     Enterobacter cloacae complex Not Detected     Escherichia coli Not Detected     Klebsiella aerogenes Not Detected     Klebsiella oxytoca Not Detected     Klebsiella pneumoniae group Not Detected     Proteus Not Detected     Salmonella sp Not Detected     Serratia marcescens Not Detected     Haemophilus influenzae Not Detected     Neisseria meningtidis Not Detected     Pseudomonas aeruginosa Not Detected     Stenotrophomonas maltophilia Not Detected     Candida albicans Not Detected     Candida auris Not Detected     Candida glabrata Not Detected     Candida krusei Not Detected     Candida parapsilosis Not Detected     Candida tropicalis Not Detected     Cryptococcus neoformans/gattii Not Detected     CTX-M (ESBL ) Test not applicable     IMP (Carbapenem resistant) Test not applicable     KPC resistance gene (Carbapenem resistant) Test not applicable     mcr-1  Test not applicable     mec A/C  Test not applicable     mec A/C and MREJ (MRSA) gene Detected     Comment: Resistance gene critical result(s) called and verbal readback   [facility] 05/31/2023 03:47          NDM (Carbapenem resistant) Test not applicable     OXA-48-like (Carbapenem resistant) Test not applicable     van A/B (VRE gene) Test not applicable     VIM (Carbapenem resistant) Test not applicable    Narrative:      Resistance gene critical result(s) called and verbal readback   [facility] 05/31/2023 03:47    MRSA Screen by PCR [[id number]] Collected: 05/30/23 1105    Order Status: Completed Specimen: Nasopharyngeal Swab from Nasal Updated: 05/30/23 1256     MRSA SCREEN BY PCR Negative    Culture,  Respiratory with Gram Stain [[id number]]     Order Status: No result Specimen: Respiratory from Sputum, Expectorated     Blood culture [[id number]]     Order Status: Canceled Specimen: Blood     Urine Culture High Risk [[id number]]     Order Status: Completed Specimen: Urine             Impression:  Active Hospital Problems    Diagnosis  POA    *MRSA Bacteremia [A41.9]  Yes    Closed fracture of right hip [S72.001A]  Yes    Candiduria [B37.49]  Yes    Closed right hip fracture after fall [S72.001A]  Yes    Acute on chronic diastolic heart failure [I50.9]  Yes    Acute on chronic respiratory failure with hypoxia [J96.20]  Yes    Atrial fibrillation with RVR with known history of a fib [I48.91]  Yes     Chronic    Troponin I above reference range [R77.8]  Yes    Nursing home resident [Z59.3]  Not Applicable     Chronic    DNR (do not resuscitate) [Z66]  Yes     Chronic    UTI (urinary tract infection) [N39.0]  Yes    Ulcer of right leg [L97.919]  Yes     Chronic    Anemia [D64.9]  Yes     Chronic    Hypothyroidism [E03.9]  Yes     Chronic    Mixed hyperlipidemia [E78.2]  Yes    Pulmonary hypertension [I27.20]  Yes    Essential hypertension [I10]  Yes      Resolved Hospital Problems   No resolved problems to display.               Plan:   Acute hypoxemic respiratory failure- improving  Right hip fracture  Acute on chronic HFpEF  Afib  MRSA bacteremia, unclear source  MARSHALL- new  Severe pain due to right hip  Delirium       - continue supplemental oxygen to keep sats greater than 90%  - continue antibiotics  - pain control  - metoprolol 25 mg b.i.d.  - watch renal function, UOP  - Lasix 40 mg IV now    Discussed with RN. CXR personally reviewed; shows worsening pulmonary edema. I have ordered strict I&Os, as the patient is at high risk from treatment with Lasix. She is at high risk of deterioration and should remain admitted to telemetry unit.    Abram Amezquita MD  Pulmonary and Critical Care Medicine  [facility]  [facility]  Date of Service: 06/05/2023  12:35 PM

## 2023-06-05 NOTE — PROGRESS NOTES
[facility] Medicine  Progress Note    Patient Name: Jeannie Hutchins  MRN: [id number]  Patient Class: IP- Inpatient   Admission Date: 5/30/2023  Length of Stay: 6 days  Attending Physician: Yosvany Vu MD  Primary Care Provider: Primary Doctor No        Subjective:     Principal Problem:Sepsis        HPI:  Ms. Hutchins is an 88-year-old female who was brought to the ED via EMS from [facility] due to tachycardia.  Collateral is obtained from daughter present at bedside.  Patient was initially discharged to nursing facility about 2 years ago, under hospice, but has subsequently improved and is off same.  This morning reportedly heart rate greater than 200, staff was preparing to administer metoprolol, prior to this noted to be staring off.  As per daughter oxygen level was also low, she is chronically on 2 L supplemental oxygen.  States her mother was incoherent during this time.  Patient states she was feeling unwell during this episode, lightheaded, short of breath, producing phlegm.  Denies any sore throat, fever at the facility, nausea, vomiting, diarrhea, cystitis symptoms.  She had a fall several days ago as per report, was found on floor.  Does have ulcer to the right lateral leg, daughter states she sustained few weeks ago as injury from her wheelchair.  Daughter states with previous UTI she had similar symptoms.  In the ED febrile to 101.2, heart rates in the 120s, AFib, blood pressure stable, on 10 L supplemental oxygen.  Labs with WBC 27, hemoglobin 10.3, BUN/creatinine 18/0.9, BNP 1 905, high sensitivity troponin 20, UA with 63 WBC with 3+ leukocyte with many seen.  EKG confirming atrial fibrillation with RVR.  Chest x-ray with cardiomegaly with bilateral interstitial opacities concerning for pulmonary edema.  She received 1 g acetaminophen, 1 g Rocephin, 100 mg fluconazole, 20 mg IV Lasix, 4 mg IV Zofran.  Case discussed with ED provider who requested admission.   Plan of care discussed with patient as well as daughter present at bedside.  Patient has advanced directive, confirmed DNR.      Overview/Hospital Course:  No notes on file    Interval History:  Cultures continue to be positive.  No source identified at this point.  Continuing current antibiotic plan.  Appreciate ID recommendations.  Appreciate Pulmonary evaluation.  IVC is enlarged and suggests volume overload.  Additional IV diuresis discussed with Dr. Amezquita.  MARSHALL may be due to cardiorenal but also may have component of SARAH versus vancomycin toxicity.  After a long discussion with her family yesterday, they feel it is most important to pursue minimally invasive strategies as much as possible.  Continuing antibiotics empirically at this point.  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  Objective:     Vital Signs (Most Recent):  Temp: 97.7 °F (36.5 °C) (06/05/23 1100)  Pulse: 71 (06/05/23 1100)  Resp: 20 (06/05/23 1100)  BP: (!) 98/55 (06/05/23 1100)  SpO2: 95 % (06/05/23 1100) Vital Signs (24h Range):  Temp:  [97.7 °F (36.5 °C)-98.1 °F (36.7 °C)] 97.7 °F (36.5 °C)  Pulse:  [71-95] 71  Resp:  [11-30] 20  SpO2:  [89 %-99 %] 95 %  BP: ()/(50-80) 98/55     Weight: 43.7 kg (96 lb 4.8 oz)  Body mass index is 21.6 kg/m².    Intake/Output Summary (Last 24 hours) at 6/5/2023 1404  Last data filed at 6/5/2023 0937  Gross per 24 hour   Intake 390 ml   Output 300 ml   Net 90 ml           Physical Exam  Constitutional:       Appearance: Normal appearance. She is normal weight.   HENT:      Head: Normocephalic and atraumatic.      Nose: Nose normal.      Mouth/Throat:      Mouth: Mucous membranes are moist.   Eyes:      Conjunctiva/sclera: Conjunctivae normal.      Pupils: Pupils are equal, round, and reactive to light.   Cardiovascular:      Rate and Rhythm: Tachycardia present. Rhythm irregular.      Pulses: Normal pulses.       Heart sounds: Normal heart sounds. No murmur heard.    No friction rub. No gallop.   Pulmonary:      Effort: Pulmonary effort is normal.      Breath sounds: Normal breath sounds. No wheezing or rales.      Comments: 4 L nasal cannula.  Coarse breath sounds.  Abdominal:      General: Abdomen is flat. Bowel sounds are normal. There is no distension.      Palpations: Abdomen is soft.      Tenderness: There is no abdominal tenderness. There is no guarding.   Musculoskeletal:         General: No swelling. Normal range of motion.      Cervical back: Normal range of motion and neck supple.      Right lower leg: No edema.      Left lower leg: No edema.      Comments: She has pain in her right hip, limited mobility of her left shoulder. Pulses in tact distally.  Able to move her hip a bit more today   Skin:     General: Skin is warm and dry.   Neurological:      General: No focal deficit present.      Mental Status: She is alert.   Psychiatric:         Mood and Affect: Mood normal.         Thought Content: Thought content normal.         Judgment: Judgment normal.           Significant Labs: All pertinent labs within the past 24 hours have been reviewed.    Significant Imaging: I have reviewed all pertinent imaging results/findings within the past 24 hours.      Assessment/Plan:      * MRSA Bacteremia  Ongoing MRSA bacteremia without a clear source at this point and we have not achieved source control.  Family would not like any further invasive workup.    Check CTA chest > no signs of significant focal pneumonia  Inflammatory markers rising  Repeat blood cultures daily  She has had daily positive blood cultures.   Urine culture with candiduria likely contaminated   COVID and influenza negative  Daptomycin and Ceftaroline per Dr Shahid  Initial Echo without vegetations.   Plan to continue antibiotics until cultures clear at this point.  Appreciate Dr Shahid's recs  CT hips / pelvis  > no clear evidence of infective focus.      UTI (urinary tract infection)  UA positive, many yeast, history of UTIs in the past  Urine culture with many yeast   Fluconazole discontinued      Atrial fibrillation with RVR with known history of a fib  Patient with known history of persistent atrial fibrillation.   RVR likely driven by acute medical condition including infection/sepsis and CHF.  Telemetry monitoring, treat acute medical condition, continue metoprolol and eliquis  Discontinue diltiazem  Added digoxin  Metoprolol reduced  Monitor blood pressure  HR better controlled  Can consult Cardiology if not improving with treatment of her sepsis and rate control      Acute on chronic respiratory failure with hypoxia  Chronically on 2 L supplemental oxygen   Ongoing respiratory failure  CTA without PE  Concerns for possible fat embolism  Appreciate pulmonary recs  Difficult time getting pulse ox.  Her oxygen requirement has been up and down.  High risk for decompensation    Acute on chronic diastolic heart failure  Admits to shortness of breath with productive cough   Chest x-ray with pulmonary edema with elevated BNP at 1905  Previous echo with EF of 65%, diastolic dysfunction consistent with AFib  Low dose lasix  IVC ultrasound today shows enlargement per Dr. Amezquita  Additional IV Lasix  Strict I/O daily weights, oral fluid and sodium restriction      Closed right hip fracture after fall  Patient with a recent fall in [place]  She is having increasing pain in her shoulder and hip, xray shows right hip fracture  CT with subcapital fracture of her right hip  Pain control  Ortho consulted > family discussed with them and plan to hold off on any intervention and would rather let her hip heal without repair      Anemia  Chronic anemia, no evidence of bleeding, monitoring      Ulcer of right leg  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, sustained few weeks ago from wheelchair   On examination no obvious signs of superimposed infection   Wound Care  consulted      Hypothyroidism  Chronic medical condition continue levothyroxine      Troponin I above reference range  Likely in the setting of AFib RVR, CHF and infection, trend for completeness      Essential hypertension  Hold home amlodipine in the setting of infection and RVR to allow for titration of medication      DNR (do not resuscitate)  Has advanced directive and confirmed with daughter, DNR      Mixed hyperlipidemia  Chronic medical condition      Nursing home resident  Resident at [facility]      Pulmonary hypertension  Last echo with PASP 54      MARSHALL (acute kidney injury)  MARSHALL of unclear etiology but could be multifactorial  IVC is very large per Dr. Hatch and will give additional Lasix, concern for cardiorenal syndrome  Could also potentially be due to vancomycin dosing and contrast.  Trend renal function at this point  With ongoing bacteremia could also be septic microemboli        VTE Risk Mitigation (From admission, onward)         Ordered     IP VTE HIGH RISK PATIENT  Once         05/30/23 1220     Place sequential compression device  Until discontinued         05/30/23 1220                Discharge Planning   JULIET:      Code Status: DNR   Is the patient medically ready for discharge?:     Reason for patient still in hospital (select all that apply): Treatment  Discharge Plan A: Return to nursing home                  Yosvany Vu MD  Department of Hospital Medicine   [facility]

## 2023-06-05 NOTE — PT/OT/SLP PROGRESS
Physical Therapy      Patient Name:  Jeannie Hutchins   MRN:  [id number]    Patient not seen today secondary to RN hold. Will follow-up 06/06/23.

## 2023-06-05 NOTE — PT/OT/SLP PROGRESS
Speech Language Pathology Treatment    Patient Name:  Jeannie Hutchins   MRN:  [id number]  Admitting Diagnosis: Sepsis    Recommendations:                 General Recommendations:  Dysphagia therapy  Diet recommendations:  Soft & Bite Sized Diet - IDDSI Level 6, Liquid Diet Level: Thin liquids - IDDSI Level 0   Aspiration Precautions:  Alternating bites/sips, Assistance with meals, Avoid talking while eating, Frequent oral care, HOB to 90 degrees, Small bites/sips, and Wear oxygen during intake     General Precautions: Standard, aspiration  Communication strategies:  provide increased time to answer and go to room if call light pushed    Assessment:     Jeannie Hutchins is a 88 y.o. female with an SLP diagnosis of mild oral Dysphagia.  She presents with increased lethargy today impacting participation in tx. Rec assistance w/ meals to reinforce swallowing precautions. ST to continue f/u to address swallowing precautions and diet tolerance.    Subjective     Pt asleep in bed. Awoke easily and was agreeable to ST. RN assisted in repositioning pt in bed and changing O2 to nasal canula from oxymask. Pt remained lethargic throughout session.  Patient goals: none stated     Pain/Comfort:       Respiratory Status: Nasal cannula, flow 4 L/min    Objective:     Has the patient been evaluated by SLP for swallowing?   Yes  Keep patient NPO? No   Current Respiratory Status:        Written swallowing precautions provided to pt; precautions reviewed w/ pt and taped to pt's bedside table and above pt's bed. Pt observed during sip of water w/o overt s/s aspiration. PO trials discontinued shortly after, as pt falling asleep. RN informed of pt's lethargy and outcomes of today's session.    Goals:   Multidisciplinary Problems       SLP Goals          Problem: SLP    Goal Priority Disciplines Outcome   SLP Goal     SLP Ongoing, Progressing   Description: 1. Pt will tolerate LRD w/ adequate oral clearance and w/o overt s/s aspiration during >95%  of PO intake  2. Pt will utilize swallowing precautions during >95% of PO intake given min cues                       Plan:     Patient to be seen:  3 x/week   Plan of Care expires:     Plan of Care reviewed with:  patient, other (see comments) (nursing)   SLP Follow-Up:  Yes       Discharge recommendations:  nursing facility, skilled   Barriers to Discharge:  None    Time Tracking:     SLP Treatment Date:   06/05/23  Speech Start Time:  1154  Speech Stop Time:  1203     Speech Total Time (min):  9 min    Billable Minutes: Treatment Swallowing Dysfunction 9 min    06/05/2023

## 2023-06-06 LAB
ALBUMIN SERPL BCP-MCNC: 2.6 G/DL (ref 3.5–5.2)
ALP SERPL-CCNC: 121 U/L (ref 55–135)
ALT SERPL W/O P-5'-P-CCNC: 26 U/L (ref 10–44)
ANION GAP SERPL CALC-SCNC: 14 MMOL/L (ref 8–16)
AST SERPL-CCNC: 25 U/L (ref 10–40)
BACTERIA BLD CULT: ABNORMAL
BASOPHILS # BLD AUTO: 0.02 K/UL (ref 0–0.2)
BASOPHILS NFR BLD: 0.1 % (ref 0–1.9)
BILIRUB SERPL-MCNC: 1.1 MG/DL (ref 0.1–1)
BUN SERPL-MCNC: 86 MG/DL (ref 8–23)
CALCIUM SERPL-MCNC: 8.8 MG/DL (ref 8.7–10.5)
CHLORIDE SERPL-SCNC: 92 MMOL/L (ref 95–110)
CO2 SERPL-SCNC: 24 MMOL/L (ref 23–29)
CREAT SERPL-MCNC: 2.8 MG/DL (ref 0.5–1.4)
CREAT UR-MCNC: 23 MG/DL (ref 15–325)
CRP SERPL-MCNC: 22.28 MG/DL
DIFFERENTIAL METHOD: ABNORMAL
EOSINOPHIL # BLD AUTO: 0.3 K/UL (ref 0–0.5)
EOSINOPHIL NFR BLD: 1.8 % (ref 0–8)
ERYTHROCYTE [DISTWIDTH] IN BLOOD BY AUTOMATED COUNT: 25 % (ref 11.5–14.5)
EST. GFR  (NO RACE VARIABLE): 15.8 ML/MIN/1.73 M^2
GLUCOSE SERPL-MCNC: 78 MG/DL (ref 70–110)
HCT VFR BLD AUTO: 31.7 % (ref 37–48.5)
HGB BLD-MCNC: 9.9 G/DL (ref 12–16)
IMM GRANULOCYTES # BLD AUTO: 0.27 K/UL (ref 0–0.04)
IMM GRANULOCYTES NFR BLD AUTO: 1.4 % (ref 0–0.5)
LYMPHOCYTES # BLD AUTO: 0.6 K/UL (ref 1–4.8)
LYMPHOCYTES NFR BLD: 3.4 % (ref 18–48)
MAGNESIUM SERPL-MCNC: 2.3 MG/DL (ref 1.6–2.6)
MCH RBC QN AUTO: 25.9 PG (ref 27–31)
MCHC RBC AUTO-ENTMCNC: 31.2 G/DL (ref 32–36)
MCV RBC AUTO: 83 FL (ref 82–98)
MONOCYTES # BLD AUTO: 1 K/UL (ref 0.3–1)
MONOCYTES NFR BLD: 5.4 % (ref 4–15)
NEUTROPHILS # BLD AUTO: 16.5 K/UL (ref 1.8–7.7)
NEUTROPHILS NFR BLD: 87.9 % (ref 38–73)
NRBC BLD-RTO: 0 /100 WBC
PLATELET # BLD AUTO: 335 K/UL (ref 150–450)
PMV BLD AUTO: 9.2 FL (ref 9.2–12.9)
POTASSIUM SERPL-SCNC: 4.3 MMOL/L (ref 3.5–5.1)
PROT SERPL-MCNC: 5.8 G/DL (ref 6–8.4)
RBC # BLD AUTO: 3.82 M/UL (ref 4–5.4)
SODIUM SERPL-SCNC: 130 MMOL/L (ref 136–145)
SODIUM UR-SCNC: 50 MMOL/L (ref 20–250)
UUN UR-MCNC: 344 MG/DL (ref 140–1050)
WBC # BLD AUTO: 18.75 K/UL (ref 3.9–12.7)

## 2023-06-06 PROCEDURE — 84300 ASSAY OF URINE SODIUM: CPT | Performed by: STUDENT IN AN ORGANIZED HEALTH CARE EDUCATION/TRAINING PROGRAM

## 2023-06-06 PROCEDURE — 36415 COLL VENOUS BLD VENIPUNCTURE: CPT | Performed by: INTERNAL MEDICINE

## 2023-06-06 PROCEDURE — 97535 SELF CARE MNGMENT TRAINING: CPT

## 2023-06-06 PROCEDURE — 87040 BLOOD CULTURE FOR BACTERIA: CPT | Performed by: INTERNAL MEDICINE

## 2023-06-06 PROCEDURE — 27000221 HC OXYGEN, UP TO 24 HOURS

## 2023-06-06 PROCEDURE — 99232 PR SUBSEQUENT HOSPITAL CARE,LEVL II: ICD-10-PCS | Mod: ,,, | Performed by: INTERNAL MEDICINE

## 2023-06-06 PROCEDURE — 63600175 PHARM REV CODE 636 W HCPCS: Performed by: INTERNAL MEDICINE

## 2023-06-06 PROCEDURE — 94761 N-INVAS EAR/PLS OXIMETRY MLT: CPT

## 2023-06-06 PROCEDURE — 83735 ASSAY OF MAGNESIUM: CPT | Performed by: STUDENT IN AN ORGANIZED HEALTH CARE EDUCATION/TRAINING PROGRAM

## 2023-06-06 PROCEDURE — 86140 C-REACTIVE PROTEIN: CPT | Performed by: INTERNAL MEDICINE

## 2023-06-06 PROCEDURE — 94660 CPAP INITIATION&MGMT: CPT

## 2023-06-06 PROCEDURE — 25000003 PHARM REV CODE 250: Performed by: STUDENT IN AN ORGANIZED HEALTH CARE EDUCATION/TRAINING PROGRAM

## 2023-06-06 PROCEDURE — 99900031 HC PATIENT EDUCATION (STAT)

## 2023-06-06 PROCEDURE — 25000003 PHARM REV CODE 250: Performed by: INTERNAL MEDICINE

## 2023-06-06 PROCEDURE — 82570 ASSAY OF URINE CREATININE: CPT | Performed by: STUDENT IN AN ORGANIZED HEALTH CARE EDUCATION/TRAINING PROGRAM

## 2023-06-06 PROCEDURE — 80053 COMPREHEN METABOLIC PANEL: CPT | Performed by: STUDENT IN AN ORGANIZED HEALTH CARE EDUCATION/TRAINING PROGRAM

## 2023-06-06 PROCEDURE — 83930 ASSAY OF BLOOD OSMOLALITY: CPT | Performed by: INTERNAL MEDICINE

## 2023-06-06 PROCEDURE — 36415 COLL VENOUS BLD VENIPUNCTURE: CPT | Performed by: STUDENT IN AN ORGANIZED HEALTH CARE EDUCATION/TRAINING PROGRAM

## 2023-06-06 PROCEDURE — 21000000 HC CCU ICU ROOM CHARGE

## 2023-06-06 PROCEDURE — 99232 SBSQ HOSP IP/OBS MODERATE 35: CPT | Mod: ,,, | Performed by: INTERNAL MEDICINE

## 2023-06-06 PROCEDURE — 99900035 HC TECH TIME PER 15 MIN (STAT)

## 2023-06-06 PROCEDURE — 83935 ASSAY OF URINE OSMOLALITY: CPT | Performed by: STUDENT IN AN ORGANIZED HEALTH CARE EDUCATION/TRAINING PROGRAM

## 2023-06-06 PROCEDURE — 92526 ORAL FUNCTION THERAPY: CPT

## 2023-06-06 PROCEDURE — 99233 SBSQ HOSP IP/OBS HIGH 50: CPT | Mod: ,,, | Performed by: INTERNAL MEDICINE

## 2023-06-06 PROCEDURE — 84540 ASSAY OF URINE/UREA-N: CPT | Performed by: STUDENT IN AN ORGANIZED HEALTH CARE EDUCATION/TRAINING PROGRAM

## 2023-06-06 PROCEDURE — 85025 COMPLETE CBC W/AUTO DIFF WBC: CPT | Performed by: STUDENT IN AN ORGANIZED HEALTH CARE EDUCATION/TRAINING PROGRAM

## 2023-06-06 PROCEDURE — 99233 PR SUBSEQUENT HOSPITAL CARE,LEVL III: ICD-10-PCS | Mod: ,,, | Performed by: INTERNAL MEDICINE

## 2023-06-06 RX ORDER — ACETAMINOPHEN 10 MG/ML
1000 INJECTION, SOLUTION INTRAVENOUS EVERY 8 HOURS
Status: COMPLETED | OUTPATIENT
Start: 2023-06-06 | End: 2023-06-07

## 2023-06-06 RX ORDER — LINEZOLID 2 MG/ML
600 INJECTION, SOLUTION INTRAVENOUS
Status: DISCONTINUED | OUTPATIENT
Start: 2023-06-06 | End: 2023-06-06

## 2023-06-06 RX ORDER — FUROSEMIDE 10 MG/ML
80 INJECTION INTRAMUSCULAR; INTRAVENOUS ONCE
Status: DISCONTINUED | OUTPATIENT
Start: 2023-06-06 | End: 2023-06-06

## 2023-06-06 RX ORDER — FUROSEMIDE 10 MG/ML
80 INJECTION INTRAMUSCULAR; INTRAVENOUS ONCE
Status: COMPLETED | OUTPATIENT
Start: 2023-06-06 | End: 2023-06-06

## 2023-06-06 RX ORDER — LINEZOLID 600 MG/1
600 TABLET, FILM COATED ORAL EVERY 12 HOURS
Status: DISCONTINUED | OUTPATIENT
Start: 2023-06-06 | End: 2023-06-09

## 2023-06-06 RX ADMIN — FUROSEMIDE 80 MG: 10 INJECTION, SOLUTION INTRAVENOUS at 12:06

## 2023-06-06 RX ADMIN — DIGOXIN 0.12 MG: 125 TABLET ORAL at 09:06

## 2023-06-06 RX ADMIN — METOPROLOL TARTRATE 25 MG: 25 TABLET, FILM COATED ORAL at 09:06

## 2023-06-06 RX ADMIN — ACETAMINOPHEN 1000 MG: 10 INJECTION INTRAVENOUS at 05:06

## 2023-06-06 RX ADMIN — ACETAMINOPHEN 1000 MG: 10 INJECTION INTRAVENOUS at 09:06

## 2023-06-06 RX ADMIN — HYDROCODONE BITARTRATE AND ACETAMINOPHEN 1 TABLET: 5; 325 TABLET ORAL at 04:06

## 2023-06-06 RX ADMIN — ATORVASTATIN CALCIUM 20 MG: 20 TABLET, FILM COATED ORAL at 09:06

## 2023-06-06 RX ADMIN — DAPTOMYCIN 350 MG: 500 INJECTION, POWDER, LYOPHILIZED, FOR SOLUTION INTRAVENOUS at 02:06

## 2023-06-06 RX ADMIN — CEFTAROLINE FOSAMIL 300 MG: 600 POWDER, FOR SOLUTION INTRAVENOUS at 03:06

## 2023-06-06 RX ADMIN — FAMOTIDINE 20 MG: 20 TABLET ORAL at 09:06

## 2023-06-06 RX ADMIN — LINEZOLID 600 MG: 600 TABLET, FILM COATED ORAL at 09:06

## 2023-06-06 RX ADMIN — ESCITALOPRAM OXALATE 10 MG: 10 TABLET ORAL at 09:06

## 2023-06-06 RX ADMIN — LEVOTHYROXINE SODIUM 75 MCG: 0.03 TABLET ORAL at 04:06

## 2023-06-06 NOTE — PLAN OF CARE
Problem: Adult Inpatient Plan of Care  Goal: Plan of Care Review  Outcome: Ongoing, Progressing  Goal: Patient-Specific Goal (Individualized)  Outcome: Ongoing, Progressing  Goal: Absence of Hospital-Acquired Illness or Injury  Outcome: Ongoing, Progressing  Goal: Optimal Comfort and Wellbeing  Outcome: Ongoing, Progressing  Goal: Readiness for Transition of Care  Outcome: Ongoing, Progressing     Problem: Adjustment to Illness (Sepsis/Septic Shock)  Goal: Optimal Coping  Outcome: Ongoing, Progressing     Problem: Bleeding (Sepsis/Septic Shock)  Goal: Absence of Bleeding  Outcome: Ongoing, Progressing     Problem: Glycemic Control Impaired (Sepsis/Septic Shock)  Goal: Blood Glucose Level Within Desired Range  Outcome: Ongoing, Progressing     Problem: Infection Progression (Sepsis/Septic Shock)  Goal: Absence of Infection Signs and Symptoms  Outcome: Ongoing, Progressing     Problem: Nutrition Impaired (Sepsis/Septic Shock)  Goal: Optimal Nutrition Intake  Outcome: Ongoing, Progressing     Problem: Infection  Goal: Absence of Infection Signs and Symptoms  Outcome: Ongoing, Progressing     Problem: Fall Injury Risk  Goal: Absence of Fall and Fall-Related Injury  Outcome: Ongoing, Progressing     Problem: Skin Injury Risk Increased  Goal: Skin Health and Integrity  Outcome: Ongoing, Progressing     Problem: Impaired Wound Healing  Goal: Optimal Wound Healing  Outcome: Ongoing, Progressing     Problem: Oral Intake Inadequate  Goal: Improved Oral Intake  Outcome: Ongoing, Progressing     Problem: Fluid and Electrolyte Imbalance (Acute Kidney Injury/Impairment)  Goal: Fluid and Electrolyte Balance  Outcome: Ongoing, Progressing     Problem: Oral Intake Inadequate (Acute Kidney Injury/Impairment)  Goal: Optimal Nutrition Intake  Outcome: Ongoing, Progressing     Problem: Renal Function Impairment (Acute Kidney Injury/Impairment)  Goal: Effective Renal Function  Outcome: Ongoing, Progressing

## 2023-06-06 NOTE — PT/OT/SLP PROGRESS
Occupational Therapy   Treatment    Name: Jeannie Hutchins  MRN: [id number]  Admitting Diagnosis:  Sepsis       Recommendations:     Discharge Recommendations: nursing facility, skilled  Discharge Equipment Recommendations:   (TBD)  Barriers to discharge:   pt needs 24/7 care    Assessment:     Jeannie Hutchins is a 88 y.o. female with a medical diagnosis of Sepsis.  Performance deficits affecting function are weakness, impaired endurance, impaired self care skills, impaired functional mobility, gait instability, impaired balance, impaired cognition, decreased safety awareness, decreased lower extremity function, impaired cardiopulmonary response to activity, pain, orthopedic precautions.     Pt was found supine/sleeping; she opened her eyes when spoken to; she reported that she was not in any pain.  OT attempted to engage pt in bed level ADLs, but she was not able to participate appropriately due to confusion/lethargy.      Rehab Prognosis:  Fair; patient would benefit from acute skilled OT services to address these deficits and reach maximum level of function.       Plan:     Patient to be seen 3 x/week to address the above listed problems via self-care/home management, therapeutic activities, therapeutic exercises, wheelchair management/training, cognitive retraining  Plan of Care Expires: 06/28/23  Plan of Care Reviewed with: patient    Subjective     Chief Complaint: none stated  Patient/Family Comments/goals: none stated  Pain/Comfort:  Pain Rating 1: 0/10  Pain Rating Post-Intervention 1: 0/10    Objective:     Communicated with: nurse prior to session.  Patient found HOB elevated with telemetry, oxymask upon OT entry to room.    General Precautions: Standard, fall, contact    Orthopedic Precautions:RLE non weight bearing  Braces: N/A     Occupational Performance:     Activities of Daily Living:  Grooming: maximal assistance hand over hand assistance for oral care via swab; pt was confused and repeatedly bit down on  oral swab    Patient left left sidelying with all lines intact, call button in reach, and bed alarm on    GOALS:   Multidisciplinary Problems       Occupational Therapy Goals          Problem: Occupational Therapy    Goal Priority Disciplines Outcome Interventions   Occupational Therapy Goal     OT, PT/OT Ongoing, Progressing    Description: Goals to be met by: 6/28/2023     Patient will increase functional independence with ADLs by performing:    LE Dressing with Stand-by Assistance and Assistive Devices as needed.  Grooming while seated at sink with Stand-by Assistance.  Toileting from bedside commode with Stand-by Assistance for hygiene and clothing management.   Supine to sit with Stand-by Assistance.  Toilet transfer to bedside commode with Stand-by Assistance.                         Time Tracking:     OT Date of Treatment: 06/06/23  OT Start Time: 1040  OT Stop Time: 1050  OT Total Time (min): 10 min    Billable Minutes:Self Care/Home Management 10    OT/VICKI: OT          6/6/2023

## 2023-06-06 NOTE — PROCEDURES
Procedure Location:room [facility]  Education/need:midline  Prep:chloraprep  Supplies:   Brand:Bard   Gauge/ Length:20ga/8cm   Lot #:[id number]  Extremity:left upper  Vessel:basilic  Attempts:1  Inserted by:Peter Langley RN  Date/time placed:06/06/2023/1435  Tolerated:well  Dressing applied: Biopatch occlussive drsg

## 2023-06-06 NOTE — PROGRESS NOTES
"[facility]  Adult Nutrition   Progress Note (Follow-Up)    SUMMARY     Recommendations  Recommendation/Intervention:   1.) Continue Cardiac Diet with 1500 mL fluid restrict as tolerated   2.) Continue modified texture diet (Minced & Moist Diet - IDDSI Level 5, Liquid Diet Level: Thin liquids - IDDSI Level 0)  per SLP recommendation   3.) D/C Glucerna, add Ensure Clear once daily. Continue Lon BID to aid in wound healing.   4.) Suggest MVI for general health.   5.) Menu  to aid in food preferences.    Goals:   1.) Pt to consume/tolerate >75% of meals and ONS.   2.) Progression of wound healing.   3.) BG levels to normalize.  Nutrition Goal Status: progressing towards goal    Dietitian Rounds Brief  Attempted to speak with pt at bedside, but pt was asleep. Spoke with nurse. Per nurse, pt is averaging 25% intake with encouragement. No c/o N/V/D/C, but pt's last BM was 5/30. She is  heavily medicated with increased somnolence. When nurse attempts to give pt Glucerna, pt complains it is too thick. Pt does tolerate Lon. Pt was recently diagnosed with MARSHALL (6/5) and exhibits worsening kidney function, RD to adjust protein requirements accordingly (eGFR < 30). RD to monitor intake, tolerance, and lab values.         Diet order:   Current Diet Order: cardiac, 1500mL fluid restriction                 Evaluation of Received Nutrient/Fluid Intake        % Intake of Estimated Energy Needs: 0 - 25 %  % Meal Intake: 0 - 25 %      Intake/Output Summary (Last 24 hours) at 6/6/2023 1150  Last data filed at 6/6/2023 0427  Gross per 24 hour   Intake 320 ml   Output 300 ml   Net 20 ml        Anthropometrics  Temp: 98.5 °F (36.9 °C)  Height: 4' 8" (142.2 cm)  Height (inches): 56 in  Weight Method: Bed Scale  Weight: 43.7 kg (96 lb 4.8 oz)  Weight (lb): 96.3 lb  Ideal Body Weight (IBW), Female: 80 lb  % Ideal Body Weight, Female (lb): 122.5 %  BMI (Calculated): 21.6  BMI Grade: 18.5-24.9 - normal  Usual Body " Weight (UBW), k kg (2023)  % Usual Body Weight: 106.06       Estimated/Assessed Needs  Weight Used For Calorie Calculations: 43.7 kg (96 lb 4.8 oz)  Energy Calorie Requirements (kcal): 1093 - 1311  Energy Need Method: Kcal/kg (25-30)  Protein Requirements: 35 - 53 (0.8-1.2 g/kg)  Weight Used For Protein Calculations: 43.7 kg (96 lb 4.8 oz)  Fluid Requirements (mL): 6881-8578     RDA Method (mL): 1311  CHO Requirement: 45-60gm CHO per meal    Reason for Assessment  Reason For Assessment: Follow Up  Diagnosis: infection/sepsis  General Information Comments: Pt was brought to the ED via EMS from [facility] due to tachycardia. Pt resting at time of visit. PTA, fair appetite/intake. No chew/swallowing issues. No GI distress. LBM . Skin per H&P: Does have ulcer to the right lateral leg, daughter states she sustained few weeks ago as injury from her wheelchair. Wt reviewed. UBW 42kg (2023); admit wt 44.5kg reflecting slight wt gain. NFPE not completed at this time d/t pt refusal. No malnutrition identified.    Nutrition/Diet History  Spiritual, Cultural Beliefs, Islam Practices, Values that Affect Care: no  Food Allergies: other (see comments) (strawberries)  Factors Affecting Nutritional Intake: None identified at this time    Nutrition Risk Screen  Nutrition Risk Screen: no indicators present     MST Score: 0  Have you recently lost weight without trying?: No  Weight loss score: 0  Have you been eating poorly because of a decreased appetite?: No  Appetite score: 0       Weight History:  Wt Readings from Last 5 Encounters:   23 43.7 kg (96 lb 4.8 oz)   23 44.5 kg (98 lb)   23 47.7 kg (105 lb 3.6 oz)   23 41.7 kg (92 lb)   23 45.4 kg (100 lb)        Lab/Procedures/Meds: Pertinent Labs/Meds Reviewed    Medications:Pertinent Medications Reviewed  Scheduled Meds:   atorvastatin  20 mg Oral QHS    DAPTOmycin (CUBICIN) IV (PEDS and ADULTS)  8 mg/kg Intravenous Q48H     digoxin  0.125 mg Oral Daily    EScitalopram oxalate  10 mg Oral Daily    famotidine  20 mg Oral Every other day    furosemide (LASIX) injection  80 mg Intravenous Once    levothyroxine  75 mcg Oral Before breakfast    metoprolol tartrate  25 mg Oral BID     Continuous Infusions:   dilTIAZem Stopped (06/01/23 2130)     PRN Meds:.acetaminophen, acetaminophen, HYDROcodone-acetaminophen, magnesium oxide, magnesium oxide, morphine, nitroGLYCERIN, potassium bicarbonate, potassium bicarbonate, potassium bicarbonate, potassium, sodium phosphates, potassium, sodium phosphates, potassium, sodium phosphates, senna-docusate 8.6-50 mg, sodium chloride 0.9%, traZODone    Labs: Pertinent Labs Reviewed  Clinical Chemistry:  Recent Labs   Lab 06/06/23  0432   *   K 4.3   CL 92*   CO2 24   GLU 78   BUN 86*   CREATININE 2.8*   CALCIUM 8.8   PROT 5.8*   ALBUMIN 2.6*   BILITOT 1.1*   ALKPHOS 121   AST 25   ALT 26   ANIONGAP 14   MG 2.3     CBC:   Recent Labs   Lab 06/06/23  0432   WBC 18.75*   RBC 3.82*   HGB 9.9*   HCT 31.7*      MCV 83   MCH 25.9*   MCHC 31.2*     Lipid Panel:  No results for input(s): CHOL, HDL, LDLCALC, TRIG, CHOLHDL in the last 168 hours.  Cardiac Profile:  Recent Labs   Lab 06/02/23  0839   *     Inflammatory Labs:  Recent Labs   Lab 06/04/23  0609 06/05/23  0527 06/06/23  0432   CRP 24.68* 24.82* 22.28*     Diabetes:  No results for input(s): HGBA1C, POCTGLUCOSE in the last 168 hours.  Thyroid & Parathyroid:  No results for input(s): TSH, FREET4, F7YPNGJ, J1PAHST, THYROIDAB in the last 168 hours.    Monitor and Evaluation  Food and Nutrient Intake: energy intake, food and beverage intake  Food and Nutrient Adminstration: diet order  Knowledge/Beliefs/Attitudes: food and nutrition knowledge/skill  Physical Activity and Function: nutrition-related ADLs and IADLs  Anthropometric Measurements: weight, weight change  Biochemical Data, Medical Tests and Procedures: electrolyte and renal panel,  gastrointestinal profile, glucose/endocrine profile  Nutrition-Focused Physical Findings: overall appearance     Nutrition Risk  Level of Risk/Frequency of Follow-up: moderate     Nutrition Follow-Up  RD Follow-up?: Yes    Noel Hoskins, Dietetic Intern       Sommer Candelaria RD, LDN 06/06/2023 11:50 AM

## 2023-06-06 NOTE — PT/OT/SLP PROGRESS
Physical Therapy      Patient Name:  Jeannie Hutchins   MRN:  [id number]    Patient not seen today secondary to Other (Comment) (Pt unable to remain alert enough to participate.). Will follow-up 6/7/23.

## 2023-06-06 NOTE — ASSESSMENT & PLAN NOTE
Admits to shortness of breath with productive cough   Chest x-ray with pulmonary edema with elevated BNP at 1905  Previous echo with EF of 65%, diastolic dysfunction consistent with AFib  IVC ultrasound today shows enlargement per Dr. Amezquita  Additional IV Lasix defer to Dr Amezquita  Strict I/O daily weights, oral fluid and sodium restriction

## 2023-06-06 NOTE — PLAN OF CARE
Problem: Bleeding (Sepsis/Septic Shock)  Goal: Absence of Bleeding  Outcome: Ongoing, Progressing     Problem: Fall Injury Risk  Goal: Absence of Fall and Fall-Related Injury  Outcome: Ongoing, Progressing     Problem: Adjustment to Illness (Sepsis/Septic Shock)  Goal: Optimal Coping  Outcome: Ongoing, Not Progressing     Problem: Nutrition Impaired (Sepsis/Septic Shock)  Goal: Optimal Nutrition Intake  Outcome: Ongoing, Not Progressing     Problem: Infection  Goal: Absence of Infection Signs and Symptoms  Outcome: Ongoing, Not Progressing     Problem: Renal Function Impairment (Acute Kidney Injury/Impairment)  Goal: Effective Renal Function  Outcome: Ongoing, Not Progressing

## 2023-06-06 NOTE — ASSESSMENT & PLAN NOTE
MARSHALL of unclear etiology but could be multifactorial  IVC is very large per Dr. Amezquita and will give additional Lasix, concern for cardiorenal syndrome  Ta placed today, concern for urinary retention  Could also potentially be due to vancomycin dosing and contrast.  Trend renal function at this point

## 2023-06-06 NOTE — SUBJECTIVE & OBJECTIVE
Interval History:  She remains bacteremic. Question of urinary retention contributing to MARSHALL. Will discuss again with Dr Amezquita. Continuing abx for source control.     I had a long discussion with her son today. He is in agreement with the plan. Will continue to pursue medical mgmt as much as possible and will consider palliative / hospice measures if it becomes clear that she is not making progress over the next several days. They want to make sure she is comfortable.     Review of Systems   All other systems reviewed and are negative.  Objective:     Vital Signs (Most Recent):  Temp: 98.5 °F (36.9 °C) (06/06/23 1101)  Pulse: 75 (06/06/23 0931)  Resp: 16 (06/06/23 0701)  BP: (!) 145/67 (06/06/23 0931)  SpO2: 100 % (06/06/23 0701) Vital Signs (24h Range):  Temp:  [97.9 °F (36.6 °C)-98.5 °F (36.9 °C)] 98.5 °F (36.9 °C)  Pulse:  [63-80] 75  Resp:  [16-34] 16  SpO2:  [92 %-100 %] 100 %  BP: ()/(54-75) 145/67     Weight: 43.7 kg (96 lb 4.8 oz)  Body mass index is 21.6 kg/m².    Intake/Output Summary (Last 24 hours) at 6/6/2023 1518  Last data filed at 6/6/2023 0427  Gross per 24 hour   Intake 120 ml   Output 250 ml   Net -130 ml           Physical Exam  Constitutional:       Appearance: Normal appearance. She is normal weight.   HENT:      Head: Normocephalic and atraumatic.      Nose: Nose normal.      Mouth/Throat:      Mouth: Mucous membranes are moist.   Eyes:      Conjunctiva/sclera: Conjunctivae normal.      Pupils: Pupils are equal, round, and reactive to light.   Cardiovascular:      Rate and Rhythm: Tachycardia present. Rhythm irregular.      Pulses: Normal pulses.      Heart sounds: Normal heart sounds. No murmur heard.    No friction rub. No gallop.   Pulmonary:      Effort: Pulmonary effort is normal.      Breath sounds: Normal breath sounds. No wheezing or rales.      Comments: 4 L nasal cannula.  Coarse breath sounds.  Abdominal:      General: Abdomen is flat. Bowel sounds are normal. There is no  distension.      Palpations: Abdomen is soft.      Tenderness: There is no abdominal tenderness. There is no guarding.   Musculoskeletal:         General: No swelling. Normal range of motion.      Cervical back: Normal range of motion and neck supple.      Right lower leg: No edema.      Left lower leg: No edema.      Comments: She has pain in her right hip, limited mobility of her left shoulder. Pulses in tact distally.  Able to move her hip a bit more today   Skin:     General: Skin is warm and dry.   Neurological:      General: No focal deficit present.      Mental Status: She is alert.   Psychiatric:         Mood and Affect: Mood normal.         Thought Content: Thought content normal.         Judgment: Judgment normal.           Significant Labs: All pertinent labs within the past 24 hours have been reviewed.    Significant Imaging: I have reviewed all pertinent imaging results/findings within the past 24 hours.

## 2023-06-06 NOTE — ASSESSMENT & PLAN NOTE
Patient with a recent fall in [place]  She is having increasing pain in her shoulder and hip, xray shows right hip fracture  CT with subcapital fracture of her right hip  Pain control  Ortho consulted > family discussed with them and plan to hold off on any intervention and would rather let her hip heal without repair

## 2023-06-06 NOTE — PROGRESS NOTES
Progress Note  PULMONARY    Admit Date: 5/30/2023 06/06/2023  History of Present Illness:  Pt is an 87 yo female with atrial fib on eliquis, CHF, chronic resp failure on home O2, HTN, HLD, hearing loss, anxiety who presented to ED on 5/30 with tachycardia & syncope, recent hip fx 1 week ago, found to have fever and sepsis with MRSA bacteremia.  ID is following and doing workup, notes reviewed. She is getting vanco + daptomycin. Pulmonary is consulted for worsening O2 requirement.  She is now requiring high flow nc/oxy mask to maintain sats. She is having a lot of pain in her right hip and has difficulty hearing and answering my questions. Her son Dane at bedside provides history. She has CHF, uses 2L O2 at her facility and has had a progressive decline over the past 2 yrs with breathing difficulty. It has been determined she is a poor surgical candidate for hip repair. They do not want further invasive testing or procedures. She continues on antibiotics and has persistent MRSA bacteremia of unclear source. Per nursing her blood pressure gets very low with pain and heart medications.    SUBJECTIVE:     6/4- O2 requirement has improved. She has new MARSHALL. Has been afebrile, HR controlled and BP in 100s-110s systolic. She is confused and delirious    6/5/23: Stable, requiring 3-4 L NC but sat drops precipitously with movement, lying flat, or brief removal of NC. Alert and oriented x 3.    6/6/23: Minimal UOP w/ 40 mg IV lasix x 1 yesterday.      OBJECTIVE:     Vitals (Most recent):  Vitals:    06/06/23 0931   BP: (!) 145/67   Pulse: 75   Resp:    Temp:        Vitals (24 hour range):  Temp:  [97.7 °F (36.5 °C)-98.3 °F (36.8 °C)]   Pulse:  [63-84]   Resp:  [16-34]   BP: ()/(54-75)   SpO2:  [92 %-100 %]       Intake/Output Summary (Last 24 hours) at 6/6/2023 1026  Last data filed at 6/6/2023 0427  Gross per 24 hour   Intake 320 ml   Output 300 ml   Net 20 ml        PHYSICAL EXAM  GENERAL:  NAD.  HEENT: EOMI.  PERRLA.  NECK: No cervical adenopathy palpated. No JVD or HJR.  HEART: Regular rate and rhythm. No murmur or gallop auscultated.  LUNGS: Clear to auscultation. Lung excursion symmetrical.   ABDOMEN: Bowel sounds present. Soft, non-tender, non-distended.  EXTREMITIES: Normal muscle tone and joint movement, no cyanosis or clubbing.   LYMPHATICS: No adenopathy palpated, no edema.  SKIN: Dry, intact, no lesions.   NEURO: No gross motor abnormalities. A&Ox3. Sleepy.  PSYCH: Appropriate affect.      Radiographs reviewed: view by direct vision   CXR 6/4/23- pulmonary edema, slightly worse  CXR 6/2/23- bilat hilar congestion, cardiomegaly  CTA chest- no PE; small R pleural effusion, bilat faint ground glass perihilar suggesting pulm edema     TTE 6/1/23-   Atrial fibrillation observed.  The left ventricle is normal in size with concentric remodeling and normal systolic function.  The estimated ejection fraction is 55%.  Moderate tricuspid regurgitation.  No clear evidence of endocarditis. If clinical suspicion persist, consider alternative cardiac imaging like BLADIMIR for further evaluation.    Labs     Recent Labs   Lab 06/06/23 0432   WBC 18.75*   HGB 9.9*   HCT 31.7*        Recent Labs   Lab 06/06/23 0432   *   K 4.3   CL 92*   CO2 24   BUN 86*   CREATININE 2.8*   GLU 78   CALCIUM 8.8   MG 2.3   AST 25   ALT 26   ALKPHOS 121   BILITOT 1.1*   PROT 5.8*   ALBUMIN 2.6*   CRP 22.28*   No results for input(s): PH, PCO2, PO2, HCO3 in the last 24 hours.  Microbiology Results (last 7 days)       Procedure Component Value Units Date/Time    Blood culture [[id number]] Collected: 06/06/23 0724    Order Status: Sent Specimen: Blood Updated: 06/06/23 0733    Narrative:      Collection has been rescheduled by EL3 at 06/06/2023 05:13 Reason:   Unable to collect  Collection has been rescheduled by EL3 at 06/06/2023 05:13 Reason:   Unable to collect    Blood culture [[id number]]  (Abnormal) Collected: 06/04/23 0609    Order  Status: Completed Specimen: Blood Updated: 06/06/23 0649     Blood Culture, Routine Gram stain aer bottle: Gram positive cocci      Gram stain shirley bottle: Gram positive cocci      Results called to and read back by:ELIZABETH Melendez;  06/05/2023      00:50 CJD      METHICILLIN RESISTANT STAPHYLOCOCCUS AUREUS  For susceptibility see order #[id number]      Blood culture [[id number]]  (Abnormal) Collected: 06/03/23 0713    Order Status: Completed Specimen: Blood Updated: 06/06/23 0648     Blood Culture, Routine Gram stain aer bottle: Gram positive cocci      Positive results previously called 06/03/2023  23:13 KS3      METHICILLIN RESISTANT STAPHYLOCOCCUS AUREUS  For susceptibility see order #[id number]      Blood culture [[id number]] Collected: 06/04/23 1559    Order Status: Completed Specimen: Blood Updated: 06/06/23 0626     Blood Culture, Routine Gram stain aer bottle: Gram positive cocci      Positive results previously called    Rapid Organism ID by PCR (from Blood culture) [[id number]]  (Abnormal) Collected: 06/04/23 0609    Order Status: Completed Updated: 06/05/23 0140     Enterococcus faecalis Not Detected     Enterococcus faecium Not Detected     Listeria monocytogenes Not Detected     Staphylococcus spp. See species for ID     Comment: critical result(s) called and verbal readback obtained from ELIZABETH Meyer by CD3 06/05/2023 01:40          Staphylococcus aureus Detected     Comment: critical result(s) called and verbal readback obtained from ELIZABETH Meyer by CD3 06/05/2023 01:40          Staphylococcus epidermidis Not Detected     Staphylococcus lugdunensis Not Detected     Streptococcus species Not Detected     Streptococcus agalactiae Not Detected     Streptococcus pneumoniae Not Detected     Streptococcus pyogenes Not Detected     Acinetobacter calcoaceticus/baumannii complex Not Detected     Bacteroides fragilis Not Detected     Enterobacterales Not Detected     Enterobacter  cloacae complex Not Detected     Escherichia coli Not Detected     Klebsiella aerogenes Not Detected     Klebsiella oxytoca Not Detected     Klebsiella pneumoniae group Not Detected     Proteus Not Detected     Salmonella sp Not Detected     Serratia marcescens Not Detected     Haemophilus influenzae Not Detected     Neisseria meningtidis Not Detected     Pseudomonas aeruginosa Not Detected     Stenotrophomonas maltophilia Not Detected     Candida albicans Not Detected     Candida auris Not Detected     Candida glabrata Not Detected     Candida krusei Not Detected     Candida parapsilosis Not Detected     Candida tropicalis Not Detected     Cryptococcus neoformans/gattii Not Detected     CTX-M (ESBL ) Test not applicable     IMP (Carbapenem resistant) Test not applicable     KPC resistance gene (Carbapenem resistant) Test not applicable     mcr-1  Test not applicable     mec A/C  Test not applicable     mec A/C and MREJ (MRSA) gene Detected     Comment: critical result(s) called and verbal readback obtained from ELIZABETH Meyer by CD3 06/05/2023 01:40          NDM (Carbapenem resistant) Not Detected     OXA-48-like (Carbapenem resistant) Not Detected     van A/B (VRE gene) Not Detected     VIM (Carbapenem resistant) Not Detected    Narrative:         critical result(s) called and verbal readback obtained from ELIZABETH Meyer by CD3 06/05/2023 01:40    Blood culture [[id number]]     Order Status: Canceled Specimen: Blood     Blood culture [[id number]]  (Abnormal) Collected: 06/02/23 0839    Order Status: Completed Specimen: Blood Updated: 06/03/23 0802     Blood Culture, Routine Gram stain aer bottle: Gram positive cocci      Positive results previously called 06/02/2023  20:46 KS3      METHICILLIN RESISTANT STAPHYLOCOCCUS AUREUS  For susceptibility see order #[id number]      Blood culture [[id number]]  (Abnormal) Collected: 06/01/23 0858    Order Status: Completed Specimen: Blood Updated: 06/03/23  0801     Blood Culture, Routine Gram stain aer bottle: Gram positive cocci      Positive results previously called 06/02/2023  01:59 KS3      METHICILLIN RESISTANT STAPHYLOCOCCUS AUREUS  For susceptibility see order #[id number]      Blood culture [[id number]]  (Abnormal) Collected: 06/01/23 0859    Order Status: Completed Specimen: Blood Updated: 06/03/23 0800     Blood Culture, Routine Gram stain aer bottle: Gram positive cocci      Positive results previously called 06/02/2023  01:07 KS3      METHICILLIN RESISTANT STAPHYLOCOCCUS AUREUS  For susceptibility see order #[id number]      Urine culture [[id number]]  (Abnormal) Collected: 05/30/23 0909    Order Status: Completed Specimen: Urine from Clean Catch Updated: 06/03/23 0635     Urine Culture, Routine DANILO ALBICANS  > 100,000 cfu/ml  Treatment of asymptomatic candiduria is not recommended (except for   specific populations). Candida isolated in the urine typically   represents colonization. If an indwelling urinary catheter is present  it should be removed or replaced.      Blood culture [[id number]]  (Abnormal) Collected: 05/30/23 1115    Order Status: Completed Specimen: Blood Updated: 06/02/23 0734     Blood Culture, Routine Gram stain shirley bottle: Gram positive cocci      Positive results previously called 05/31/2023  03:29 KS3      Gram stain aer bottle: Gram positive cocci      Positive results previously called      METHICILLIN RESISTANT STAPHYLOCOCCUS AUREUS  Known MRSA patient  For susceptibility see order #[id number]      Blood culture [[id number]]  (Abnormal)  (Susceptibility) Collected: 05/30/23 1130    Order Status: Completed Specimen: Blood Updated: 06/02/23 0733     Blood Culture, Routine Gram stain shirley bottle: Gram positive cocci      Results called to and read back by: Triston Brown RN on [address]      05/31/2023  02:32 KS3      Gram stain aer bottle: Gram positive cocci      Positive results previously called 05/31/2023  04:59 KS3       METHICILLIN RESISTANT STAPHYLOCOCCUS AUREUS  Known MRSA patient      Rapid Organism ID by PCR (from Blood culture) [[id number]]  (Abnormal) Collected: 05/30/23 1130    Order Status: Completed Updated: 05/31/23 0348     Enterococcus faecalis Not Detected     Enterococcus faecium Not Detected     Listeria monocytogenes Not Detected     Staphylococcus spp. See species for ID     Staphylococcus aureus Detected     Staphylococcus epidermidis Not Detected     Staphylococcus lugdunensis Not Detected     Streptococcus species Not Detected     Streptococcus agalactiae Not Detected     Streptococcus pneumoniae Not Detected     Streptococcus pyogenes Not Detected     Acinetobacter calcoaceticus/baumannii complex Not Detected     Bacteroides fragilis Not Detected     Enterobacterales Not Detected     Enterobacter cloacae complex Not Detected     Escherichia coli Not Detected     Klebsiella aerogenes Not Detected     Klebsiella oxytoca Not Detected     Klebsiella pneumoniae group Not Detected     Proteus Not Detected     Salmonella sp Not Detected     Serratia marcescens Not Detected     Haemophilus influenzae Not Detected     Neisseria meningtidis Not Detected     Pseudomonas aeruginosa Not Detected     Stenotrophomonas maltophilia Not Detected     Candida albicans Not Detected     Candida auris Not Detected     Candida glabrata Not Detected     Candida krusei Not Detected     Candida parapsilosis Not Detected     Candida tropicalis Not Detected     Cryptococcus neoformans/gattii Not Detected     CTX-M (ESBL ) Test not applicable     IMP (Carbapenem resistant) Test not applicable     KPC resistance gene (Carbapenem resistant) Test not applicable     mcr-1  Test not applicable     mec A/C  Test not applicable     mec A/C and MREJ (MRSA) gene Detected     Comment: Resistance gene critical result(s) called and verbal readback   obtained from Mylene Mullen RN on [address] by KS3 05/31/2023 03:47          NDM (Carbapenem  resistant) Test not applicable     OXA-48-like (Carbapenem resistant) Test not applicable     van A/B (VRE gene) Test not applicable     VIM (Carbapenem resistant) Test not applicable    Narrative:      Resistance gene critical result(s) called and verbal readback   obtained from Mylene Mullen RN on [address] by KS3 05/31/2023 03:47    MRSA Screen by PCR [[id number]] Collected: 05/30/23 1105    Order Status: Completed Specimen: Nasopharyngeal Swab from Nasal Updated: 05/30/23 1256     MRSA SCREEN BY PCR Negative    Culture, Respiratory with Gram Stain [[id number]]     Order Status: No result Specimen: Respiratory from Sputum, Expectorated     Blood culture [[id number]]     Order Status: Canceled Specimen: Blood     Urine Culture High Risk [[id number]]     Order Status: Completed Specimen: Urine             Impression and Plan  Active Hospital Problems    Diagnosis  POA    *MRSA Bacteremia [A41.9]  Yes    MARSHALL (acute kidney injury) [N17.9]  Yes    Closed fracture of right hip [S72.001A]  Yes    Candiduria [B37.49]  Yes    Closed right hip fracture after fall [S72.001A]  Yes    Acute on chronic diastolic heart failure [I50.9]  Yes    Acute on chronic respiratory failure with hypoxia [J96.20]  Yes    Atrial fibrillation with RVR with known history of a fib [I48.91]  Yes     Chronic    Troponin I above reference range [R77.8]  Yes    Nursing home resident [Z59.3]  Not Applicable     Chronic    DNR (do not resuscitate) [Z66]  Yes     Chronic    UTI (urinary tract infection) [N39.0]  Yes    Ulcer of right leg [L97.919]  Yes     Chronic    Anemia [D64.9]  Yes     Chronic    Hypothyroidism [E03.9]  Yes     Chronic    Mixed hyperlipidemia [E78.2]  Yes    Pulmonary hypertension [I27.20]  Yes    Essential hypertension [I10]  Yes      Resolved Hospital Problems   No resolved problems to display.     Acute hypoxemic respiratory failure- improving  Hypervolemic hypernatremia  Right hip fracture  Acute on chronic HFpEF  Afib  MRSA  bacteremia, unclear source  MARSHALL, likely cardiorenal, worsening  Severe pain due to right hip  Delirium    - continue supplemental oxygen to keep sats greater than 90%  - continue antibiotics  - pain control  - metoprolol 25 mg b.i.d.  - watch renal function, UOP  - Lasix 80 mg IV now and again this afternoon    Discussed with RN. CXR personally reviewed; shows worsening pulmonary edema. I have ordered strict I&Os, as the patient is at high risk from treatment with Lasix. She is at high risk of deterioration and should remain admitted to telemetry unit.    Abram Amezquita MD  Pulmonary and Critical Care Medicine  [facility]  Date of Service: 06/06/2023  11:03 PM

## 2023-06-06 NOTE — ASSESSMENT & PLAN NOTE
Patient with known history of persistent atrial fibrillation.   RVR likely driven by acute medical condition including infection/sepsis and CHF.  Telemetry monitoring, treat acute medical condition, continue metoprolol and eliquis  Added digoxin  Metoprolol reduced  Monitor blood pressure  HR better controlled  Can consult Cardiology if not improving with treatment of her sepsis and rate control

## 2023-06-06 NOTE — PT/OT/SLP PROGRESS
"Speech Language Pathology Treatment    Patient Name:  Jeannie Hutchins   MRN:  [id number]  Admitting Diagnosis: Sepsis    Recommendations:                 General Recommendations:  Dysphagia therapy and dietician consult  Diet recommendations:  Minced & Moist Diet - IDDSI Level 5, Liquid Diet Level: Thin liquids - IDDSI Level 0   Aspiration Precautions:  Alternating bites/sips, Assistance with meals, Avoid talking while eating, Frequent oral care, HOB to 90 degrees, Small bites/sips, and Wear oxygen during intake    General Precautions: Standard, aspiration  Communication strategies:  go to room if call light pushed    Assessment:     Jeannie Hutchins is a 88 y.o. female with an SLP diagnosis of oral Dysphagia.  She presents with poor endurance and increased lethargy impacting ability to tolerate complex textures in increased amounts. Rec diet downgrade to IDDSI 5- minced and moist diet until pt's endurance increases. Continue POC.    Subjective     Pt awake in bed upon entering room. RN assisted in repositioning pt in bed prior to PO intake.  Patient goals: "Can I have some water"     Pain/Comfort:       Respiratory Status: Nasal cannula, flow   L/min    Objective:     Has the patient been evaluated by SLP for swallowing?   Yes  Keep patient NPO? No   Current Respiratory Status:        Pt observed during SLP regulated PO trials of regular lunch tray and thin liquids. Initially, pt w/ large consecutive sip of thin liquid via straw w/ immediate cough observed; coughing eliminated w/ small, single sips of liquid via straw. Prolonged mastication w/ turkey; adequate tolerance of small bites of moist rice. Mild oral residue after bites of turkey; cleared w/ liquid wash. Decreased endurance to consume meal; pt requested to stop eating after 4 bites of food.    Goals:   Multidisciplinary Problems       SLP Goals          Problem: SLP    Goal Priority Disciplines Outcome   SLP Goal     SLP Ongoing, Progressing   Description: 1. Pt " will tolerate LRD w/ adequate oral clearance and w/o overt s/s aspiration during >95% of PO intake  2. Pt will utilize swallowing precautions during >95% of PO intake given min cues                       Plan:     Patient to be seen:  3 x/week   Plan of Care expires:     Plan of Care reviewed with:  patient, other (see comments) (nursing)   SLP Follow-Up:  Yes       Discharge recommendations:  nursing facility, skilled   Barriers to Discharge:  None    Time Tracking:     SLP Treatment Date:   06/06/23  Speech Start Time:  1226  Speech Stop Time:  1244     Speech Total Time (min):  18 min    Billable Minutes: Treatment Swallowing Dysfunction 18 min    06/06/2023

## 2023-06-06 NOTE — PROGRESS NOTES
[facility] Medicine  Progress Note    Patient Name: Jeannie Hutchins  MRN: [id number]  Patient Class: IP- Inpatient   Admission Date: 5/30/2023  Length of Stay: 7 days  Attending Physician: Yosvany Vu MD  Primary Care Provider: Primary Doctor No        Subjective:     Principal Problem:Sepsis        HPI:  Ms. Hutchins is an 88-year-old female who was brought to the ED via EMS from [facility] due to tachycardia.  Collateral is obtained from daughter present at bedside.  Patient was initially discharged to nursing facility about 2 years ago, under hospice, but has subsequently improved and is off same.  This morning reportedly heart rate greater than 200, staff was preparing to administer metoprolol, prior to this noted to be staring off.  As per daughter oxygen level was also low, she is chronically on 2 L supplemental oxygen.  States her mother was incoherent during this time.  Patient states she was feeling unwell during this episode, lightheaded, short of breath, producing phlegm.  Denies any sore throat, fever at the facility, nausea, vomiting, diarrhea, cystitis symptoms.  She had a fall several days ago as per report, was found on floor.  Does have ulcer to the right lateral leg, daughter states she sustained few weeks ago as injury from her wheelchair.  Daughter states with previous UTI she had similar symptoms.  In the ED febrile to 101.2, heart rates in the 120s, AFib, blood pressure stable, on 10 L supplemental oxygen.  Labs with WBC 27, hemoglobin 10.3, BUN/creatinine 18/0.9, BNP 1 905, high sensitivity troponin 20, UA with 63 WBC with 3+ leukocyte with many seen.  EKG confirming atrial fibrillation with RVR.  Chest x-ray with cardiomegaly with bilateral interstitial opacities concerning for pulmonary edema.  She received 1 g acetaminophen, 1 g Rocephin, 100 mg fluconazole, 20 mg IV Lasix, 4 mg IV Zofran.  Case discussed with ED provider who requested admission.   Plan of care discussed with patient as well as daughter present at bedside.  Patient has advanced directive, confirmed DNR.      Overview/Hospital Course:  No notes on file    Interval History:  She remains bacteremic. Question of urinary retention contributing to MARSHALL. Will discuss again with Dr Amezquita. Continuing abx for source control.     I had a long discussion with her son today. He is in agreement with the plan. Will continue to pursue medical mgmt as much as possible and will consider palliative / hospice measures if it becomes clear that she is not making progress over the next several days. They want to make sure she is comfortable.     Review of Systems   All other systems reviewed and are negative.  Objective:     Vital Signs (Most Recent):  Temp: 98.5 °F (36.9 °C) (06/06/23 1101)  Pulse: 75 (06/06/23 0931)  Resp: 16 (06/06/23 0701)  BP: (!) 145/67 (06/06/23 0931)  SpO2: 100 % (06/06/23 0701) Vital Signs (24h Range):  Temp:  [97.9 °F (36.6 °C)-98.5 °F (36.9 °C)] 98.5 °F (36.9 °C)  Pulse:  [63-80] 75  Resp:  [16-34] 16  SpO2:  [92 %-100 %] 100 %  BP: ()/(54-75) 145/67     Weight: 43.7 kg (96 lb 4.8 oz)  Body mass index is 21.6 kg/m².    Intake/Output Summary (Last 24 hours) at 6/6/2023 1518  Last data filed at 6/6/2023 0427  Gross per 24 hour   Intake 120 ml   Output 250 ml   Net -130 ml           Physical Exam  Constitutional:       Appearance: Normal appearance. She is normal weight.   HENT:      Head: Normocephalic and atraumatic.      Nose: Nose normal.      Mouth/Throat:      Mouth: Mucous membranes are moist.   Eyes:      Conjunctiva/sclera: Conjunctivae normal.      Pupils: Pupils are equal, round, and reactive to light.   Cardiovascular:      Rate and Rhythm: Tachycardia present. Rhythm irregular.      Pulses: Normal pulses.      Heart sounds: Normal heart sounds. No murmur heard.    No friction rub. No gallop.   Pulmonary:      Effort: Pulmonary effort is normal.      Breath sounds: Normal  breath sounds. No wheezing or rales.      Comments: 4 L nasal cannula.  Coarse breath sounds.  Abdominal:      General: Abdomen is flat. Bowel sounds are normal. There is no distension.      Palpations: Abdomen is soft.      Tenderness: There is no abdominal tenderness. There is no guarding.   Musculoskeletal:         General: No swelling. Normal range of motion.      Cervical back: Normal range of motion and neck supple.      Right lower leg: No edema.      Left lower leg: No edema.      Comments: She has pain in her right hip, limited mobility of her left shoulder. Pulses in tact distally.  Able to move her hip a bit more today   Skin:     General: Skin is warm and dry.   Neurological:      General: No focal deficit present.      Mental Status: She is alert.   Psychiatric:         Mood and Affect: Mood normal.         Thought Content: Thought content normal.         Judgment: Judgment normal.           Significant Labs: All pertinent labs within the past 24 hours have been reviewed.    Significant Imaging: I have reviewed all pertinent imaging results/findings within the past 24 hours.      Assessment/Plan:      * MRSA Bacteremia  Ongoing MRSA bacteremia without a clear source at this point and we have not achieved source control.  Family would not like any further invasive workup.    Check CTA chest > no signs of significant focal pneumonia  Inflammatory markers rising  Repeat blood cultures daily  She has had daily positive blood cultures.   Urine culture with candiduria likely contaminated   COVID and influenza negative  Daptomycin and Ceftaroline per Dr Shahid  Initial Echo without vegetations.   Plan to continue antibiotics until cultures clear at this point.  Appreciate Dr Shahid's recs  CT hips / pelvis  > no clear evidence of infective focus.     UTI (urinary tract infection)  UA positive, many yeast, history of UTIs in the past  Urine culture with many yeast , likely contaminant  Fluconazole  discontinued      Atrial fibrillation with RVR with known history of a fib  Patient with known history of persistent atrial fibrillation.   RVR likely driven by acute medical condition including infection/sepsis and CHF.  Telemetry monitoring, treat acute medical condition, continue metoprolol and eliquis  Added digoxin  Metoprolol reduced  Monitor blood pressure  HR better controlled  Can consult Cardiology if not improving with treatment of her sepsis and rate control      Acute on chronic respiratory failure with hypoxia  Chronically on 2 L supplemental oxygen   Ongoing respiratory failure  CTA without PE  Concerns for possible fat embolism  Appreciate pulmonary recs  Difficult time getting pulse ox.  Her oxygen requirement has been up and down.  High risk for decompensation    Acute on chronic diastolic heart failure  Admits to shortness of breath with productive cough   Chest x-ray with pulmonary edema with elevated BNP at 1905  Previous echo with EF of 65%, diastolic dysfunction consistent with AFib  IVC ultrasound today shows enlargement per Dr. Amezquita  Additional IV Lasix defer to Dr Amezquita  Strict I/O daily weights, oral fluid and sodium restriction      Closed right hip fracture after fall  Patient with a recent fall in [place]  She is having increasing pain in her shoulder and hip, xray shows right hip fracture  CT with subcapital fracture of her right hip  Pain control  Ortho consulted > family discussed with them and plan to hold off on any intervention and would rather let her hip heal without repair      Anemia  Chronic anemia, no evidence of bleeding, monitoring      Ulcer of right leg  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, sustained few weeks ago from wheelchair   On examination no obvious signs of superimposed infection   Wound Care consulted      Hypothyroidism  Chronic medical condition continue levothyroxine      Troponin I above reference range  Likely in the setting of AFib RVR, CHF and  infection, trend for completeness      Essential hypertension  Hold home amlodipine in the setting of infection and RVR to allow for titration of medication      DNR (do not resuscitate)  Has advanced directive and confirmed with daughter, DNR      Mixed hyperlipidemia  Chronic medical condition      Nursing home resident  Resident at [facility]      Pulmonary hypertension  Last echo with PASP 54      MARSHALL (acute kidney injury)  MARSHALL of unclear etiology but could be multifactorial  IVC is very large per Dr. Amezquita and will give additional Lasix, concern for cardiorenal syndrome  Ta placed today, concern for urinary retention  Could also potentially be due to vancomycin dosing and contrast.  Trend renal function at this point        VTE Risk Mitigation (From admission, onward)         Ordered     IP VTE HIGH RISK PATIENT  Once         05/30/23 1220     Place sequential compression device  Until discontinued         05/30/23 1220                Discharge Planning   JULIET:      Code Status: DNR   Is the patient medically ready for discharge?:     Reason for patient still in hospital (select all that apply): Treatment  Discharge Plan A: Return to nursing home                  Yosvany Vu MD  Department of Hospital Medicine   [facility]

## 2023-06-06 NOTE — ASSESSMENT & PLAN NOTE
UA positive, many yeast, history of UTIs in the past  Urine culture with many yeast , likely contaminant  Fluconazole discontinued

## 2023-06-07 LAB
ALBUMIN SERPL BCP-MCNC: 2.2 G/DL (ref 3.5–5.2)
ALP SERPL-CCNC: 106 U/L (ref 55–135)
ALT SERPL W/O P-5'-P-CCNC: 20 U/L (ref 10–44)
ANION GAP SERPL CALC-SCNC: 8 MMOL/L (ref 8–16)
AST SERPL-CCNC: 17 U/L (ref 10–40)
BASOPHILS # BLD AUTO: 0.01 K/UL (ref 0–0.2)
BASOPHILS NFR BLD: 0.1 % (ref 0–1.9)
BILIRUB SERPL-MCNC: 1 MG/DL (ref 0.1–1)
BILIRUB UR QL STRIP: NEGATIVE
BUN SERPL-MCNC: 100 MG/DL (ref 8–23)
CALCIUM SERPL-MCNC: 8.2 MG/DL (ref 8.7–10.5)
CHLORIDE SERPL-SCNC: 93 MMOL/L (ref 95–110)
CLARITY UR: ABNORMAL
CO2 SERPL-SCNC: 29 MMOL/L (ref 23–29)
COLOR UR: YELLOW
CREAT SERPL-MCNC: 3.2 MG/DL (ref 0.5–1.4)
CRP SERPL-MCNC: 19.48 MG/DL
DIFFERENTIAL METHOD: ABNORMAL
DIGOXIN SERPL-MCNC: 1.1 NG/ML (ref 0.8–2)
EOSINOPHIL # BLD AUTO: 0.3 K/UL (ref 0–0.5)
EOSINOPHIL NFR BLD: 1.6 % (ref 0–8)
EOSINOPHIL URNS QL WRIGHT STN: NORMAL
ERYTHROCYTE [DISTWIDTH] IN BLOOD BY AUTOMATED COUNT: 24.6 % (ref 11.5–14.5)
EST. GFR  (NO RACE VARIABLE): 13.5 ML/MIN/1.73 M^2
GLUCOSE SERPL-MCNC: 86 MG/DL (ref 70–110)
GLUCOSE UR QL STRIP: NEGATIVE
HCT VFR BLD AUTO: 26.8 % (ref 37–48.5)
HGB BLD-MCNC: 8.6 G/DL (ref 12–16)
HGB UR QL STRIP: NEGATIVE
IMM GRANULOCYTES # BLD AUTO: 0.21 K/UL (ref 0–0.04)
IMM GRANULOCYTES NFR BLD AUTO: 1.3 % (ref 0–0.5)
KETONES UR QL STRIP: NEGATIVE
LEUKOCYTE ESTERASE UR QL STRIP: NEGATIVE
LYMPHOCYTES # BLD AUTO: 0.6 K/UL (ref 1–4.8)
LYMPHOCYTES NFR BLD: 3.6 % (ref 18–48)
MAGNESIUM SERPL-MCNC: 2.2 MG/DL (ref 1.6–2.6)
MCH RBC QN AUTO: 25.9 PG (ref 27–31)
MCHC RBC AUTO-ENTMCNC: 32.1 G/DL (ref 32–36)
MCV RBC AUTO: 81 FL (ref 82–98)
MONOCYTES # BLD AUTO: 0.8 K/UL (ref 0.3–1)
MONOCYTES NFR BLD: 5.1 % (ref 4–15)
NEUTROPHILS # BLD AUTO: 14 K/UL (ref 1.8–7.7)
NEUTROPHILS NFR BLD: 88.3 % (ref 38–73)
NITRITE UR QL STRIP: NEGATIVE
NRBC BLD-RTO: 0 /100 WBC
OSMOLALITY SERPL: 302 MOSM/KG (ref 275–295)
OSMOLALITY UR: 299 MOSM/KG (ref 50–1200)
PH UR STRIP: 5 [PH] (ref 5–8)
PLATELET # BLD AUTO: 425 K/UL (ref 150–450)
PMV BLD AUTO: 9 FL (ref 9.2–12.9)
POTASSIUM SERPL-SCNC: 4.1 MMOL/L (ref 3.5–5.1)
PROT SERPL-MCNC: 5.3 G/DL (ref 6–8.4)
PROT UR QL STRIP: ABNORMAL
RBC # BLD AUTO: 3.32 M/UL (ref 4–5.4)
SODIUM SERPL-SCNC: 130 MMOL/L (ref 136–145)
SODIUM UR-SCNC: 16 MMOL/L (ref 20–250)
SP GR UR STRIP: 1.01 (ref 1–1.03)
URN SPEC COLLECT METH UR: ABNORMAL
UROBILINOGEN UR STRIP-ACNC: NEGATIVE EU/DL
VANCOMYCIN SERPL-MCNC: 14.4 UG/ML
WBC # BLD AUTO: 15.84 K/UL (ref 3.9–12.7)

## 2023-06-07 PROCEDURE — 36415 COLL VENOUS BLD VENIPUNCTURE: CPT | Performed by: STUDENT IN AN ORGANIZED HEALTH CARE EDUCATION/TRAINING PROGRAM

## 2023-06-07 PROCEDURE — 93010 ELECTROCARDIOGRAM REPORT: CPT | Mod: ,,, | Performed by: INTERNAL MEDICINE

## 2023-06-07 PROCEDURE — 25000003 PHARM REV CODE 250: Performed by: INTERNAL MEDICINE

## 2023-06-07 PROCEDURE — 63600175 PHARM REV CODE 636 W HCPCS: Performed by: INTERNAL MEDICINE

## 2023-06-07 PROCEDURE — 63600175 PHARM REV CODE 636 W HCPCS: Performed by: STUDENT IN AN ORGANIZED HEALTH CARE EDUCATION/TRAINING PROGRAM

## 2023-06-07 PROCEDURE — 36415 COLL VENOUS BLD VENIPUNCTURE: CPT | Performed by: INTERNAL MEDICINE

## 2023-06-07 PROCEDURE — 80053 COMPREHEN METABOLIC PANEL: CPT | Performed by: STUDENT IN AN ORGANIZED HEALTH CARE EDUCATION/TRAINING PROGRAM

## 2023-06-07 PROCEDURE — 99232 PR SUBSEQUENT HOSPITAL CARE,LEVL II: ICD-10-PCS | Mod: ,,, | Performed by: INTERNAL MEDICINE

## 2023-06-07 PROCEDURE — 94660 CPAP INITIATION&MGMT: CPT

## 2023-06-07 PROCEDURE — 94761 N-INVAS EAR/PLS OXIMETRY MLT: CPT

## 2023-06-07 PROCEDURE — 99232 SBSQ HOSP IP/OBS MODERATE 35: CPT | Mod: ,,, | Performed by: INTERNAL MEDICINE

## 2023-06-07 PROCEDURE — 86140 C-REACTIVE PROTEIN: CPT | Performed by: INTERNAL MEDICINE

## 2023-06-07 PROCEDURE — 80202 ASSAY OF VANCOMYCIN: CPT | Performed by: INTERNAL MEDICINE

## 2023-06-07 PROCEDURE — 99233 SBSQ HOSP IP/OBS HIGH 50: CPT | Mod: ,,, | Performed by: INTERNAL MEDICINE

## 2023-06-07 PROCEDURE — 81003 URINALYSIS AUTO W/O SCOPE: CPT | Performed by: INTERNAL MEDICINE

## 2023-06-07 PROCEDURE — 92526 ORAL FUNCTION THERAPY: CPT

## 2023-06-07 PROCEDURE — 83735 ASSAY OF MAGNESIUM: CPT | Performed by: STUDENT IN AN ORGANIZED HEALTH CARE EDUCATION/TRAINING PROGRAM

## 2023-06-07 PROCEDURE — 27000221 HC OXYGEN, UP TO 24 HOURS

## 2023-06-07 PROCEDURE — 84300 ASSAY OF URINE SODIUM: CPT | Performed by: INTERNAL MEDICINE

## 2023-06-07 PROCEDURE — 99233 PR SUBSEQUENT HOSPITAL CARE,LEVL III: ICD-10-PCS | Mod: ,,, | Performed by: INTERNAL MEDICINE

## 2023-06-07 PROCEDURE — 87040 BLOOD CULTURE FOR BACTERIA: CPT | Performed by: INTERNAL MEDICINE

## 2023-06-07 PROCEDURE — 21000000 HC CCU ICU ROOM CHARGE

## 2023-06-07 PROCEDURE — 63600175 PHARM REV CODE 636 W HCPCS: Mod: JZ,JG | Performed by: INTERNAL MEDICINE

## 2023-06-07 PROCEDURE — 87205 SMEAR GRAM STAIN: CPT | Performed by: INTERNAL MEDICINE

## 2023-06-07 PROCEDURE — 25000003 PHARM REV CODE 250: Performed by: STUDENT IN AN ORGANIZED HEALTH CARE EDUCATION/TRAINING PROGRAM

## 2023-06-07 PROCEDURE — 99900035 HC TECH TIME PER 15 MIN (STAT)

## 2023-06-07 PROCEDURE — 97110 THERAPEUTIC EXERCISES: CPT

## 2023-06-07 PROCEDURE — 93005 ELECTROCARDIOGRAM TRACING: CPT | Performed by: INTERNAL MEDICINE

## 2023-06-07 PROCEDURE — 80162 ASSAY OF DIGOXIN TOTAL: CPT | Performed by: STUDENT IN AN ORGANIZED HEALTH CARE EDUCATION/TRAINING PROGRAM

## 2023-06-07 PROCEDURE — 93010 EKG 12-LEAD: ICD-10-PCS | Mod: ,,, | Performed by: INTERNAL MEDICINE

## 2023-06-07 PROCEDURE — 85025 COMPLETE CBC W/AUTO DIFF WBC: CPT | Performed by: STUDENT IN AN ORGANIZED HEALTH CARE EDUCATION/TRAINING PROGRAM

## 2023-06-07 RX ORDER — SODIUM CHLORIDE, SODIUM LACTATE, POTASSIUM CHLORIDE, CALCIUM CHLORIDE 600; 310; 30; 20 MG/100ML; MG/100ML; MG/100ML; MG/100ML
INJECTION, SOLUTION INTRAVENOUS CONTINUOUS
Status: ACTIVE | OUTPATIENT
Start: 2023-06-07 | End: 2023-06-08

## 2023-06-07 RX ORDER — MORPHINE SULFATE 2 MG/ML
2 INJECTION, SOLUTION INTRAMUSCULAR; INTRAVENOUS EVERY 4 HOURS PRN
Status: DISCONTINUED | OUTPATIENT
Start: 2023-06-07 | End: 2023-06-10

## 2023-06-07 RX ADMIN — DIGOXIN 0.12 MG: 125 TABLET ORAL at 10:06

## 2023-06-07 RX ADMIN — CEFTAROLINE FOSAMIL 300 MG: 600 POWDER, FOR SOLUTION INTRAVENOUS at 02:06

## 2023-06-07 RX ADMIN — LINEZOLID 600 MG: 600 TABLET, FILM COATED ORAL at 08:06

## 2023-06-07 RX ADMIN — LINEZOLID 600 MG: 600 TABLET, FILM COATED ORAL at 10:06

## 2023-06-07 RX ADMIN — METOPROLOL TARTRATE 25 MG: 25 TABLET, FILM COATED ORAL at 10:06

## 2023-06-07 RX ADMIN — SODIUM CHLORIDE, SODIUM LACTATE, POTASSIUM CHLORIDE, AND CALCIUM CHLORIDE: .6; .31; .03; .02 INJECTION, SOLUTION INTRAVENOUS at 04:06

## 2023-06-07 RX ADMIN — TRAZODONE HYDROCHLORIDE 50 MG: 50 TABLET ORAL at 08:06

## 2023-06-07 RX ADMIN — METOPROLOL TARTRATE 25 MG: 25 TABLET, FILM COATED ORAL at 08:06

## 2023-06-07 RX ADMIN — ACETAMINOPHEN 1000 MG: 10 INJECTION INTRAVENOUS at 05:06

## 2023-06-07 RX ADMIN — ATORVASTATIN CALCIUM 20 MG: 20 TABLET, FILM COATED ORAL at 08:06

## 2023-06-07 RX ADMIN — LEVOTHYROXINE SODIUM 75 MCG: 0.03 TABLET ORAL at 05:06

## 2023-06-07 NOTE — PLAN OF CARE
Problem: Fall Injury Risk  Goal: Absence of Fall and Fall-Related Injury  Outcome: Ongoing, Progressing     Problem: Adult Inpatient Plan of Care  Goal: Readiness for Transition of Care  Outcome: Ongoing, Not Progressing     Problem: Adjustment to Illness (Sepsis/Septic Shock)  Goal: Optimal Coping  Outcome: Ongoing, Not Progressing     Problem: Infection Progression (Sepsis/Septic Shock)  Goal: Absence of Infection Signs and Symptoms  Outcome: Ongoing, Not Progressing     Problem: Fluid and Electrolyte Imbalance (Acute Kidney Injury/Impairment)  Goal: Fluid and Electrolyte Balance  Outcome: Ongoing, Not Progressing     Problem: Oral Intake Inadequate (Acute Kidney Injury/Impairment)  Goal: Optimal Nutrition Intake  Outcome: Ongoing, Not Progressing

## 2023-06-07 NOTE — ASSESSMENT & PLAN NOTE
MARSHALL of unclear etiology but could be multifactorial  IVC is very large per Dr. Amezquita and will give additional Lasix, concern for cardiorenal syndrome  Ta placed today, concern for urinary retention  Could also potentially be due to vancomycin dosing and contrast.  Trend renal function at this point, seems to be worsening  Consulted Nephrology today  Would hold off on further diuresis until nephrology is able to weigh in

## 2023-06-07 NOTE — ASSESSMENT & PLAN NOTE
Patient with known history of persistent atrial fibrillation.   RVR likely driven by acute medical condition including infection/sepsis and CHF.  Telemetry monitoring, treat acute medical condition, continue metoprolol and eliquis  Added digoxin  Metoprolol reduced  Monitor blood pressure  HR better controlled

## 2023-06-07 NOTE — PROGRESS NOTES
[facility] hospital Medicine  Progress Note    Patient Name: Jeannie Hutchins  MRN: [id number]  Patient Class: IP- Inpatient   Admission Date: 5/30/2023  Length of Stay: 8 days  Attending Physician: Yosvany Vu MD  Primary Care Provider: Primary Doctor No        Subjective:     Principal Problem:Sepsis        HPI:  Ms. Hutchins is an 88-year-old female who was brought to the ED via EMS from [facility] due to tachycardia.  Collateral is obtained from daughter present at bedside.  Patient was initially discharged to nursing facility about 2 years ago, under hospice, but has subsequently improved and is off same.  This morning reportedly heart rate greater than 200, staff was preparing to administer metoprolol, prior to this noted to be staring off.  As per daughter oxygen level was also low, she is chronically on 2 L supplemental oxygen.  States her mother was incoherent during this time.  Patient states she was feeling unwell during this episode, lightheaded, short of breath, producing phlegm.  Denies any sore throat, fever at the facility, nausea, vomiting, diarrhea, cystitis symptoms.  She had a fall several days ago as per report, was found on floor.  Does have ulcer to the right lateral leg, daughter states she sustained few weeks ago as injury from her wheelchair.  Daughter states with previous UTI she had similar symptoms.  In the ED febrile to 101.2, heart rates in the 120s, AFib, blood pressure stable, on 10 L supplemental oxygen.  Labs with WBC 27, hemoglobin 10.3, BUN/creatinine 18/0.9, BNP 1 905, high sensitivity troponin 20, UA with 63 WBC with 3+ leukocyte with many seen.  EKG confirming atrial fibrillation with RVR.  Chest x-ray with cardiomegaly with bilateral interstitial opacities concerning for pulmonary edema.  She received 1 g acetaminophen, 1 g Rocephin, 100 mg fluconazole, 20 mg IV Lasix, 4 mg IV Zofran.  Case discussed with ED provider who requested admission.   Plan of care discussed with patient as well as daughter present at bedside.  Patient has advanced directive, confirmed DNR.      Overview/Hospital Course:  No notes on file    Interval History:  Her culture from yesterday is so far negative.  Hopefully this will remain negative.  Continuing antibiotics.  The patient seems comfortable at this time.  Renal function is worsening today and I have consulted Nephrology further recommendations.    Review of Systems   All other systems reviewed and are negative.  Objective:     Vital Signs (Most Recent):  Temp: 98.1 °F (36.7 °C) (06/07/23 1100)  Pulse: 66 (06/07/23 1100)  Resp: 16 (06/07/23 1100)  BP: (!) 117/58 (06/07/23 1100)  SpO2: 97 % (06/07/23 1100) Vital Signs (24h Range):  Temp:  [97.4 °F (36.3 °C)-98.1 °F (36.7 °C)] 98.1 °F (36.7 °C)  Pulse:  [61-72] 66  Resp:  [9-23] 16  SpO2:  [91 %-100 %] 97 %  BP: (103-134)/(53-80) 117/58     Weight: 43.7 kg (96 lb 4.8 oz)  Body mass index is 21.6 kg/m².    Intake/Output Summary (Last 24 hours) at 6/7/2023 1631  Last data filed at 6/7/2023 1425  Gross per 24 hour   Intake 505 ml   Output 900 ml   Net -395 ml           Physical Exam  Constitutional:       Appearance: Normal appearance. She is normal weight.   HENT:      Head: Normocephalic and atraumatic.      Nose: Nose normal.      Mouth/Throat:      Mouth: Mucous membranes are moist.   Eyes:      Conjunctiva/sclera: Conjunctivae normal.      Pupils: Pupils are equal, round, and reactive to light.   Cardiovascular:      Rate and Rhythm: Tachycardia present. Rhythm irregular.      Pulses: Normal pulses.      Heart sounds: Normal heart sounds. No murmur heard.    No friction rub. No gallop.   Pulmonary:      Effort: Pulmonary effort is normal.      Breath sounds: Normal breath sounds. No wheezing or rales.      Comments: 4 L nasal cannula.  Coarse breath sounds.  Abdominal:      General: Abdomen is flat. Bowel sounds are normal. There is no distension.      Palpations: Abdomen  is soft.      Tenderness: There is no abdominal tenderness. There is no guarding.   Musculoskeletal:         General: No swelling. Normal range of motion.      Cervical back: Normal range of motion and neck supple.      Right lower leg: No edema.      Left lower leg: No edema.      Comments: She has pain in her right hip, limited mobility of her left shoulder. Pulses in tact distally.  Able to move her hip a bit more today   Skin:     General: Skin is warm and dry.   Neurological:      General: No focal deficit present.      Mental Status: She is alert.   Psychiatric:         Mood and Affect: Mood normal.         Thought Content: Thought content normal.         Judgment: Judgment normal.           Significant Labs: All pertinent labs within the past 24 hours have been reviewed.    Significant Imaging: I have reviewed all pertinent imaging results/findings within the past 24 hours.      Assessment/Plan:      * MRSA Bacteremia  Ongoing MRSA bacteremia without a clear source at this point and we have not achieved source control.  Family would not like any further invasive workup.    Check CTA chest > no signs of significant focal pneumonia  Inflammatory markers rising  Repeat blood cultures daily  She has had daily positive blood cultures.   Urine culture with candiduria likely contaminated   COVID and influenza negative  Daptomycin and Ceftaroline plus linezolid per Dr Shahid  Initial Echo without vegetations.   Plan to continue antibiotics until cultures clear at this point.  Appreciate Dr Shahid's recs  CT hips / pelvis  > no clear evidence of infective focus.     Hopefully we have achieved clearance of her bacteremia    UTI (urinary tract infection)  UA positive, many yeast, history of UTIs in the past  Urine culture with many yeast , likely contaminant  Fluconazole discontinued      Atrial fibrillation with RVR with known history of a fib  Patient with known history of persistent atrial fibrillation.   RVR  likely driven by acute medical condition including infection/sepsis and CHF.  Telemetry monitoring, treat acute medical condition, continue metoprolol and eliquis  Added digoxin  Metoprolol reduced  Monitor blood pressure  HR better controlled      Acute on chronic respiratory failure with hypoxia  Chronically on 2 L supplemental oxygen   Ongoing respiratory failure  CTA without PE  Concerns for possible fat embolism  Appreciate pulmonary recs  Difficult time getting pulse ox.  Her oxygen requirement has been up and down.  High risk for decompensation    Acute on chronic diastolic heart failure  Admits to shortness of breath with productive cough   Chest x-ray with pulmonary edema with elevated BNP at 1905  Previous echo with EF of 65%, diastolic dysfunction consistent with AFib  IVC ultrasound today shows enlargement per Dr. Amezquita  Additional IV Lasix defer to Dr Amezquita  Strict I/O daily weights, oral fluid and sodium restriction      Closed right hip fracture after fall  Patient with a recent fall in [employer]  She is having increasing pain in her shoulder and hip, xray shows right hip fracture  CT with subcapital fracture of her right hip  Pain control  Ortho consulted > family discussed with them and plan to hold off on any intervention and would rather let her hip heal without repair      Anemia  Chronic anemia, no evidence of bleeding, monitoring      Ulcer of right leg  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, sustained few weeks ago from wheelchair   On examination no obvious signs of superimposed infection   Wound Care consulted      Hypothyroidism  Chronic medical condition continue levothyroxine      Troponin I above reference range  Likely in the setting of AFib RVR, CHF and infection, trend for completeness      Essential hypertension  Hold home amlodipine in the setting of infection and RVR to allow for titration of medication      DNR (do not resuscitate)  Has advanced directive and confirmed with  daughter, DNR      Mixed hyperlipidemia  Chronic medical condition      Nursing home resident  Resident at [facility]      Pulmonary hypertension  Last echo with PASP 54      MARSHALL (acute kidney injury)  MARSHALL of unclear etiology but could be multifactorial  IVC is very large per Dr. Amezquita and will give additional Lasix, concern for cardiorenal syndrome  Ta placed today, concern for urinary retention  Could also potentially be due to vancomycin dosing and contrast.  Trend renal function at this point, seems to be worsening  Consulted Nephrology today  Would hold off on further diuresis until nephrology is able to weigh in      VTE Risk Mitigation (From admission, onward)         Ordered     IP VTE HIGH RISK PATIENT  Once         05/30/23 1220     Place sequential compression device  Until discontinued         05/30/23 1220                Discharge Planning   JULIET: 6/9/2023     Code Status: DNR   Is the patient medically ready for discharge?:     Reason for patient still in hospital (select all that apply): Treatment  Discharge Plan A: Return to nursing home                  Yosvany Vu MD  Department of Hospital Medicine   [facility]

## 2023-06-07 NOTE — PROGRESS NOTES
Consult Note  Infectious Disease    Reason for Consult:  MRSA bacteremia    HPI: Jeannie Hutchins is a 88 y.o. female Nursing home resident With a history of diastolic heart failure, oxygen dependence, atrial fibrillation with recent 6 day stay in late April for worsening oxygen requirement, right lower extremity cellulitis associated with an injury (where she hit her lower leg on the wheelchair).  She was given oxygen support, azithromycin, ceftriaxone and Lasix, required intensive care on admission, followed by steroids and doxycycline for lungs and leg.    She presented to the emergency room on 05/30 with tachycardia, generalized weakness and a syncopal episode.  She had had a fall last week with x-rays done at the nursing facility which were negative.  She denies feeling ill prior to the fall and relates this to not donning her slippers before trying to move around.  Her heart rate was found to be 220, in atrial fibrillation and with a temperature of 101.7°.  Workup included CBC with white blood cells 27,000, BNP 1900, urinalysis with 63 white blood cells and many yeast, chest x-ray with pulmonary edema and on admission was placed on ceftriaxone and fluconazole.  The wound on the right lower leg is largely healed, she does have a bruise about the right hip.  She was noted to have pain in the shoulder and hip and x-rays were ordered with findings of severe glenohumeral and acromioclavicular osteoarthrosis and a suspected right femoral neck fracture with a CT scan showing a subcapital fracture and cortical offset along the inferior edge of the femoral head/neck junction.  Fortunately there is no sign of hemarthrosis or increased joint fluid.  The right iliacus muscle is edematous or enlarged compared to the left. There may be a tiny bit of fluid in her trochanteric.  Blood cultures from 05/30 became positive this morning with GPC identified by [employer] [facility] is MRSA. Repeat blood cultures have been drawn.  The urine culture  "is growing only yeast("clean catch"). (MRSA screen on admission was negative).  Ceftriaxone was stopped and vancomycin was ordered.  Fever has resolved. WBC remain elevated. Still on diltiazem drip and requiring 6 liters of oxygen. Urine output is not being measured as she is incontinent.     6/2: interim reviewed. Afebrile. Oxygen requirement is much higher. Orthopedics consult pending. yesterday's blood cultures are positive, Vanc ALICIA for the MRSA is 1, trough was low. WBC improved. BUN up to 38. Ct chest reviewed and there is no focal pneumonia to blame her bacteremia on.  I had the chest x-ray repeated and there are no new infiltrates, only mild perihilar fullness.  She is alert, still in pain from her right hip but I was able to perform range-of-motion a little, more than I expected to be able to.  The remainder of the joints of the lower extremities have no sign or symptom of joint infection.  She has no peripheral signs of endocarditis.  Understandably her son is concerned about the stress of a transesophageal echo.  Discussed with him that we would not do it on someone who has this oxygen requirement and I explained that this would help discern the length of therapy.  I also discussed with him that in some cases when the evaluation is more dangerous than the treatment, that we elect to treat people for the worst possible diagnosis and he is in favor of this rather than the BLADIMIR.  6/3: Interim reviewed.  6/2 blood cultures are positive for MRSA, 6 3 blood cultures after the initiation of daptomycin have been drawn.  She is requiring roughly the same amount of oxygen.  Discussed with Dr. Ndiaye of Pulmonary.  Her right hip is no worse, but her blood pressure does not tolerate opiates very well.  She is eating a little bit more per her son's report.  Examination of the hip allows me to perform some flexion without severe pain and this is an improvement from 2 days ago.  Talked with her son about whether he would " be in favor of a right hip washout should imaging or persistent bacteremia suggest that the hip was infected and discussed with him the potential for anesthetic complications and/or inability to liberate from the ventilator.  CT scan of the hip has been ordered by Hospital [facility].  6/4: Interim reviewed.  Most recent blood cultures, since the addition of daptomycin are still positive  She is requiring less oxygen.  She is brighter and seems to be in less pain.  She is breathing comfortably and able to express herself well though she is very hard of hearing  6/5: interim reviewed. Afebrile. Blood cultures remain positive through 6/4 am. Cr is worse today(diuretics, contrast and vanc).  She personally has no new complaints.  Her daughters at the bedside assisting with her lunch.  She remains bit confused, requiring a modest amount of oxygen.  Her pain seems well controlled.  I reviewed her last CT of the pelvis again and there may be a hypodense area in the right iliacus muscle.  If this were truly a site of infection I would expected to be from the bacteremia and not the cause of the bacteremia  6/6: interim reviewed. Blood cultures from 6/4 are positive. Creatinine is worse and urine output is worse, .  But she may be in retention bladder feels distended, is uncomfortable to palpation.  Right hip is no different.  Did not respond to diuretics yesterday.  10 hours because of somnolence.  6/7: interim reviewed. Blood culture from 6/6 is negative so far. BUN and Cr are worse. No obstruction on US of kidneys. Urine output 650. Was not in retention. 7 liters oxygen in progress. Nephrology consult in progress. Urine eos negative. Daughter at bedside finds her sleepier and more confused. Received 3 doses of IV tylenol and no opiates since yesterday. Eating and drinking small amounts. Seems to be more comfortable pain wise.       EXAM & DIAGNOSTICS REVIEWED:   Vitals:     Temp:  [97.4 °F (36.3 °C)-98.1 °F (36.7 °C)]    Temp: 98.1 °F (36.7 °C) (06/07/23 1100)  Pulse: 66 (06/07/23 1100)  Resp: 16 (06/07/23 1100)  BP: (!) 117/58 (06/07/23 1100)  SpO2: 97 % (06/07/23 1100)    Intake/Output Summary (Last 24 hours) at 6/7/2023 1407  Last data filed at 6/7/2023 0555  Gross per 24 hour   Intake 505 ml   Output 650 ml   Net -145 ml       General:  In NAD. Sleepy but arousable comfortable    Eyes:  Anicteric,   EOMI,    ENT:  No ulcers, exudates, thrush, nares patent, dentition is fair. Hard of hearing  Neck:  supple,   Lungs: bibasilar crackles, oxygen at 4-5 liters, no distress  Heart:  iRRR,   Abd:  Soft, NT, ND, normal BS, no masses or organomegaly appreciated.  :  Voids  no flank tenderness  Musc:  Joints without effusion, swelling, erythema, synovitis, no redness, bruising or fluctuance around right hip   Skin:  No rashes   Neuro:              Arousable,  , face symmetric, moves all extremities,    Psych: Calm,    Lymphatic:     Extrem: No edema, erythema, phlebitis, cellulitis, warm and well perfused  VAD:  Peripheral, midline left arm 6/6     Isolation:  contact  Wound: Right lower lateral leg prior ulcer is healed    Right hip    There is no redness about the right hip and the bruise is improved.  6/3    Lab Results   Component Value Date    PROCAL 1.38 (H) 06/01/2023    PROCAL <0.05 04/22/2023    PROCAL 0.30 04/21/2023         General Labs reviewed:  Recent Labs   Lab 06/05/23 0527 06/06/23  0432 06/07/23  0530   WBC 18.25* 18.75* 15.84*   HGB 9.6* 9.9* 8.6*   HCT 30.2* 31.7* 26.8*    335 425       Recent Labs   Lab 06/05/23  0527 06/06/23  0432 06/07/23  0530   * 130* 130*   K 4.0 4.3 4.1   CL 93* 92* 93*   CO2 29 24 29   BUN 72* 86* 100*   CREATININE 2.1* 2.8* 3.2*   CALCIUM 9.0 8.8 8.2*   PROT 5.5* 5.8* 5.3*   BILITOT 1.2* 1.1* 1.0   ALKPHOS 112 121 106   ALT 29 26 20   AST 26 25 17     Recent Labs   Lab 06/05/23  0527 06/06/23  0432 06/07/23  0530   CRP 24.82* 22.28* 19.48*         Micro:  Microbiology  Results (last 7 days)       Procedure Component Value Units Date/Time    Blood culture [[id number]] Collected: 06/06/23 0724    Order Status: Completed Specimen: Blood Updated: 06/07/23 1032     Blood Culture, Routine No Growth to date      No Growth to date    Narrative:      Collection has been rescheduled by EL3 at 06/06/2023 05:13 Reason:   Unable to collect  Collection has been rescheduled by EL3 at 06/06/2023 05:13 Reason:   Unable to collect    Blood culture [[id number]] Collected: 06/07/23 0831    Order Status: Sent Specimen: Blood Updated: 06/07/23 0840    Blood culture [[id number]]  (Abnormal) Collected: 06/04/23 1559    Order Status: Completed Specimen: Blood Updated: 06/07/23 0745     Blood Culture, Routine Gram stain aer bottle: Gram positive cocci      Positive results previously called      STAPHYLOCOCCUS AUREUS  Susceptibility pending      Blood culture [[id number]]  (Abnormal) Collected: 06/04/23 0609    Order Status: Completed Specimen: Blood Updated: 06/06/23 0649     Blood Culture, Routine Gram stain aer bottle: Gram positive cocci      Gram stain shirley bottle: Gram positive cocci      Results called to and read back by:ELIZABETH Melendez;  06/05/2023      00:50 CJD      METHICILLIN RESISTANT STAPHYLOCOCCUS AUREUS  For susceptibility see order #[id number]      Blood culture [[id number]]  (Abnormal) Collected: 06/03/23 0713    Order Status: Completed Specimen: Blood Updated: 06/06/23 0648     Blood Culture, Routine Gram stain aer bottle: Gram positive cocci      Positive results previously called 06/03/2023  23:13 KS3      METHICILLIN RESISTANT STAPHYLOCOCCUS AUREUS  For susceptibility see order #[id number]      Rapid Organism ID by PCR (from Blood culture) [[id number]]  (Abnormal) Collected: 06/04/23 0609    Order Status: Completed Updated: 06/05/23 0140     Enterococcus faecalis Not Detected     Enterococcus faecium Not Detected     Listeria monocytogenes Not Detected     Staphylococcus spp. See  species for ID     Comment: critical result(s) called and verbal readback obtained from ELIZABETH Meyer by CD3 06/05/2023 01:40          Staphylococcus aureus Detected     Comment: critical result(s) called and verbal readback obtained from ELIZABETH Meyer by CD3 06/05/2023 01:40          Staphylococcus epidermidis Not Detected     Staphylococcus lugdunensis Not Detected     Streptococcus species Not Detected     Streptococcus agalactiae Not Detected     Streptococcus pneumoniae Not Detected     Streptococcus pyogenes Not Detected     Acinetobacter calcoaceticus/baumannii complex Not Detected     Bacteroides fragilis Not Detected     Enterobacterales Not Detected     Enterobacter cloacae complex Not Detected     Escherichia coli Not Detected     Klebsiella aerogenes Not Detected     Klebsiella oxytoca Not Detected     Klebsiella pneumoniae group Not Detected     Proteus Not Detected     Salmonella sp Not Detected     Serratia marcescens Not Detected     Haemophilus influenzae Not Detected     Neisseria meningtidis Not Detected     Pseudomonas aeruginosa Not Detected     Stenotrophomonas maltophilia Not Detected     Candida albicans Not Detected     Candida auris Not Detected     Candida glabrata Not Detected     Candida krusei Not Detected     Candida parapsilosis Not Detected     Candida tropicalis Not Detected     Cryptococcus neoformans/gattii Not Detected     CTX-M (ESBL ) Test not applicable     IMP (Carbapenem resistant) Test not applicable     KPC resistance gene (Carbapenem resistant) Test not applicable     mcr-1  Test not applicable     mec A/C  Test not applicable     mec A/C and MREJ (MRSA) gene Detected     Comment: critical result(s) called and verbal readback obtained from ELIZABETH Meyer by CD3 06/05/2023 01:40          NDM (Carbapenem resistant) Not Detected     OXA-48-like (Carbapenem resistant) Not Detected     van A/B (VRE gene) Not Detected     VIM (Carbapenem  resistant) Not Detected    Narrative:         critical result(s) called and verbal readback obtained from ELIZABETH Meyer by CD3 06/05/2023 01:40    Blood culture [[id number]]     Order Status: Canceled Specimen: Blood     Blood culture [[id number]]  (Abnormal) Collected: 06/02/23 0839    Order Status: Completed Specimen: Blood Updated: 06/03/23 0802     Blood Culture, Routine Gram stain aer bottle: Gram positive cocci      Positive results previously called 06/02/2023  20:46 KS3      METHICILLIN RESISTANT STAPHYLOCOCCUS AUREUS  For susceptibility see order #[id number]      Blood culture [[id number]]  (Abnormal) Collected: 06/01/23 0858    Order Status: Completed Specimen: Blood Updated: 06/03/23 0801     Blood Culture, Routine Gram stain aer bottle: Gram positive cocci      Positive results previously called 06/02/2023  01:59 KS3      METHICILLIN RESISTANT STAPHYLOCOCCUS AUREUS  For susceptibility see order #[id number]      Blood culture [[id number]]  (Abnormal) Collected: 06/01/23 0859    Order Status: Completed Specimen: Blood Updated: 06/03/23 0800     Blood Culture, Routine Gram stain aer bottle: Gram positive cocci      Positive results previously called 06/02/2023  01:07 KS3      METHICILLIN RESISTANT STAPHYLOCOCCUS AUREUS  For susceptibility see order #[id number]      Urine culture [[id number]]  (Abnormal) Collected: 05/30/23 0909    Order Status: Completed Specimen: Urine from Clean Catch Updated: 06/03/23 0635     Urine Culture, Routine DANILO ALBICANS  > 100,000 cfu/ml  Treatment of asymptomatic candiduria is not recommended (except for   specific populations). Candida isolated in the urine typically   represents colonization. If an indwelling urinary catheter is present  it should be removed or replaced.      Blood culture [[id number]]  (Abnormal) Collected: 05/30/23 1115    Order Status: Completed Specimen: Blood Updated: 06/02/23 0734     Blood Culture, Routine Gram stain shirley bottle: Gram positive  cocci      Positive results previously called 05/31/2023  03:29 KS3      Gram stain aer bottle: Gram positive cocci      Positive results previously called      METHICILLIN RESISTANT STAPHYLOCOCCUS AUREUS  Known MRSA patient  For susceptibility see order #[id number]      Blood culture [[id number]]  (Abnormal)  (Susceptibility) Collected: 05/30/23 1130    Order Status: Completed Specimen: Blood Updated: 06/02/23 0733     Blood Culture, Routine Gram stain shirley bottle: Gram positive cocci      Results called to and read back by: Triston Brown RN on [address]      05/31/2023  02:32 KS3      Gram stain aer bottle: Gram positive cocci      Positive results previously called 05/31/2023  04:59 KS3      METHICILLIN RESISTANT STAPHYLOCOCCUS AUREUS  Known MRSA patient              Imaging Reviewed:   CXR   CT bilateral hips  1. Pre-existing osteoarthritis right hip with evidence of subcapital fracture and cortical offset along the inferior edge of the femoral head neck junction.  2. No evidence of significant osseous displacement..  3. Chronic degenerative arthrosis about the left hip with prominent osteophytic spurs encircling the base of the femoral head.    CT hip right 6/3  Subcapital fracture of the right femoral neck.  Thickening of the musculature around the right hip is probably in relation to trauma. A low-attenuation focus in the musculature anterior to the hip is probably hematoma. No abnormal enhancement to suggest abscess formation can be seen although I cannot absolutely rule out out.    Cardiology:  TTE   (poor imaging of valves)  Atrial fibrillation observed.  The left ventricle is normal in size with concentric remodeling and normal systolic function.  The estimated ejection fraction is 55%.  Moderate tricuspid regurgitation.  No clear evidence of endocarditis. If clinical suspicion persist, consider alternative cardiac imaging like BLADIMIR for further evaluation.    IMPRESSION & PLAN   1. MRSA bacteremia.  5/30-6/4 am   Source is not readily apparent. 6/6 negative so far   She has too much pain from fracture to reliably discern whether she has a septic hip. There is no increased fluid on the CT, there may be a tiny amount of fluid in trochanteric bursa. The right iliacus is larger than left. Repeat Ct 6/3 shows no evolution of a focus of infection, ?hypodense area right iliacus. If there was, potentially curable with medical therapy. This would represent an area of seeding as opposed to the origin of the bacteremia   There is no lower respiratory tract infection present on CT of the chest    2. Right subcapital hip fracture, soft tissue trauma right shoulder  3. Oxygen dependent CHF,, pulmonary hypertension   Hypoxemic respiratory failure, CTA negative    4. Candiduria is likely reflective of vaginal contamination of specimen(there is no way she could give a clean catch specimen)  5. MARSHALL. Diuretics, vanc and contrast, worse 6/7, ?uremic    6. Elderly, frail and debilitated      Recommendations:  Continue  daptomycin,   ceftaroline , adjusted doses for acute kidney injury   oral Zyvox  Serial  blood cultures until clear   Very poor candidate for dialysis     Trend crp,         Discussed with daughter, nursing     Medical Decision Making during this encounter was  [_] Low Complexity  [_] Moderate Complexity  [xxx  ] High Complexity

## 2023-06-07 NOTE — PT/OT/SLP PROGRESS
Physical Therapy      Patient Name:  Jeannie Hutchins   MRN:  [id number]    Patient not seen today secondary to patient found not tolerating OT session well and refusing further activity due to pain. Will follow-up 6/8/23.

## 2023-06-07 NOTE — CARE UPDATE
06/07/23 0732   Skin Integrity   $ Wound Care Tech Time 15 min   Area Observed Bridge of nose   Skin Appearance without discoloration   PRE-TX-O2   Device (Oxygen Therapy) Oxymask   $ Is the patient on Low Flow Oxygen? Yes   Flow (L/min) 7  (decreased to 4)   Pulse Oximetry Type Continuous   $ Pulse Oximetry - Multiple Charge Pulse Oximetry - Multiple   Resp 15   Preset CPAP/BiPAP Settings   $ CPAP/BiPAP Daily Charge BiPAP/CPAP Daily   Respiratory Evaluation   $ Care Plan Tech Time 15 min

## 2023-06-07 NOTE — PROGRESS NOTES
Progress Note  PULMONARY    Admit Date: 5/30/2023 06/07/2023  History of Present Illness:  Pt is an 87 yo female with atrial fib on eliquis, CHF, chronic resp failure on home O2, HTN, HLD, hearing loss, anxiety who presented to ED on 5/30 with tachycardia & syncope, recent hip fx 1 week ago, found to have fever and sepsis with MRSA bacteremia.  ID is following and doing workup, notes reviewed. She is getting vanco + daptomycin. Pulmonary is consulted for worsening O2 requirement.  She is now requiring high flow nc/oxy mask to maintain sats. She is having a lot of pain in her right hip and has difficulty hearing and answering my questions. Her son Dane at bedside provides history. She has CHF, uses 2L O2 at her facility and has had a progressive decline over the past 2 yrs with breathing difficulty. It has been determined she is a poor surgical candidate for hip repair. They do not want further invasive testing or procedures. She continues on antibiotics and has persistent MRSA bacteremia of unclear source. Per nursing her blood pressure gets very low with pain and heart medications.    SUBJECTIVE:     6/4- O2 requirement has improved. She has new MARSHALL. Has been afebrile, HR controlled and BP in 100s-110s systolic. She is confused and delirious    6/5/23: Stable, requiring 3-4 L NC but sat drops precipitously with movement, lying flat, or brief removal of NC. Alert and oriented x 3.    6/6/23: Minimal UOP w/ 40 mg IV lasix x 1 yesterday.    6/7/23: Net -345 mL in last 24 hours after a single dose of IV Lasix 80 mg yesterday. Now on 4 L NC.      OBJECTIVE:     Vitals (Most recent):  Vitals:    06/07/23 1100   BP: (!) 117/58   Pulse: 66   Resp: 16   Temp: 98.1 °F (36.7 °C)       Vitals (24 hour range):  Temp:  [97.4 °F (36.3 °C)-98.1 °F (36.7 °C)]   Pulse:  [61-72]   Resp:  [9-27]   BP: (103-134)/(53-80)   SpO2:  [91 %-100 %]       Intake/Output Summary (Last 24 hours) at 6/7/2023 1201  Last data filed at 6/7/2023  0555  Gross per 24 hour   Intake 505 ml   Output 850 ml   Net -345 ml        PHYSICAL EXAM  GENERAL:  NAD.  HEENT: EOMI. PERRLA.  NECK: No cervical adenopathy palpated. No JVD or HJR.  HEART: Regular rate and rhythm. No murmur or gallop auscultated.  LUNGS: Clear to auscultation. Lung excursion symmetrical.   ABDOMEN: Bowel sounds present. Soft, non-tender, non-distended.  EXTREMITIES: Normal muscle tone and joint movement, no cyanosis or clubbing.   LYMPHATICS: No adenopathy palpated, no edema.  SKIN: Dry, intact, no lesions.   NEURO: No gross motor abnormalities. A&Ox3. Sleepy.  PSYCH: Appropriate affect.      Radiographs reviewed: view by direct vision   CXR 6/4/23- pulmonary edema, slightly worse  CXR 6/2/23- bilat hilar congestion, cardiomegaly  CTA chest- no PE; small R pleural effusion, bilat faint ground glass perihilar suggesting pulm edema     TTE 6/1/23-   Atrial fibrillation observed.  The left ventricle is normal in size with concentric remodeling and normal systolic function.  The estimated ejection fraction is 55%.  Moderate tricuspid regurgitation.  No clear evidence of endocarditis. If clinical suspicion persist, consider alternative cardiac imaging like BLADIMIR for further evaluation.    Labs     Recent Labs   Lab 06/07/23  0530   WBC 15.84*   HGB 8.6*   HCT 26.8*        Recent Labs   Lab 06/07/23  0530   *   K 4.1   CL 93*   CO2 29   *   CREATININE 3.2*   GLU 86   CALCIUM 8.2*   MG 2.2   AST 17   ALT 20   ALKPHOS 106   BILITOT 1.0   PROT 5.3*   ALBUMIN 2.2*   CRP 19.48*   No results for input(s): PH, PCO2, PO2, HCO3 in the last 24 hours.  Microbiology Results (last 7 days)       Procedure Component Value Units Date/Time    Blood culture [[id number]] Collected: 06/06/23 0724    Order Status: Completed Specimen: Blood Updated: 06/07/23 1032     Blood Culture, Routine No Growth to date      No Growth to date    Narrative:      Collection has been rescheduled by EL3 at 06/06/2023 05:13  Reason:   Unable to collect  Collection has been rescheduled by EL3 at 06/06/2023 05:13 Reason:   Unable to collect    Blood culture [[id number]] Collected: 06/07/23 0831    Order Status: Sent Specimen: Blood Updated: 06/07/23 0840    Blood culture [[id number]]  (Abnormal) Collected: 06/04/23 1559    Order Status: Completed Specimen: Blood Updated: 06/07/23 0745     Blood Culture, Routine Gram stain aer bottle: Gram positive cocci      Positive results previously called      STAPHYLOCOCCUS AUREUS  Susceptibility pending      Blood culture [[id number]]  (Abnormal) Collected: 06/04/23 0609    Order Status: Completed Specimen: Blood Updated: 06/06/23 0649     Blood Culture, Routine Gram stain aer bottle: Gram positive cocci      Gram stain shirley bottle: Gram positive cocci      Results called to and read back by:ELIZABETH Melendez;  06/05/2023      00:50 CJD      METHICILLIN RESISTANT STAPHYLOCOCCUS AUREUS  For susceptibility see order #[id number]      Blood culture [[id number]]  (Abnormal) Collected: 06/03/23 0713    Order Status: Completed Specimen: Blood Updated: 06/06/23 0648     Blood Culture, Routine Gram stain aer bottle: Gram positive cocci      Positive results previously called 06/03/2023  23:13 KS3      METHICILLIN RESISTANT STAPHYLOCOCCUS AUREUS  For susceptibility see order #[id number]      Rapid Organism ID by PCR (from Blood culture) [[id number]]  (Abnormal) Collected: 06/04/23 0609    Order Status: Completed Updated: 06/05/23 0140     Enterococcus faecalis Not Detected     Enterococcus faecium Not Detected     Listeria monocytogenes Not Detected     Staphylococcus spp. See species for ID     Comment: critical result(s) called and verbal readback obtained from ELIZABETH Meyer by CD3 06/05/2023 01:40          Staphylococcus aureus Detected     Comment: critical result(s) called and verbal readback obtained from ELIZABETH Meyer by CD3 06/05/2023 01:40          Staphylococcus epidermidis Not  Detected     Staphylococcus lugdunensis Not Detected     Streptococcus species Not Detected     Streptococcus agalactiae Not Detected     Streptococcus pneumoniae Not Detected     Streptococcus pyogenes Not Detected     Acinetobacter calcoaceticus/baumannii complex Not Detected     Bacteroides fragilis Not Detected     Enterobacterales Not Detected     Enterobacter cloacae complex Not Detected     Escherichia coli Not Detected     Klebsiella aerogenes Not Detected     Klebsiella oxytoca Not Detected     Klebsiella pneumoniae group Not Detected     Proteus Not Detected     Salmonella sp Not Detected     Serratia marcescens Not Detected     Haemophilus influenzae Not Detected     Neisseria meningtidis Not Detected     Pseudomonas aeruginosa Not Detected     Stenotrophomonas maltophilia Not Detected     Candida albicans Not Detected     Candida auris Not Detected     Candida glabrata Not Detected     Candida krusei Not Detected     Candida parapsilosis Not Detected     Candida tropicalis Not Detected     Cryptococcus neoformans/gattii Not Detected     CTX-M (ESBL ) Test not applicable     IMP (Carbapenem resistant) Test not applicable     KPC resistance gene (Carbapenem resistant) Test not applicable     mcr-1  Test not applicable     mec A/C  Test not applicable     mec A/C and MREJ (MRSA) gene Detected     Comment: critical result(s) called and verbal readback obtained from ELIZABETH Meyer by CD3 06/05/2023 01:40          NDM (Carbapenem resistant) Not Detected     OXA-48-like (Carbapenem resistant) Not Detected     van A/B (VRE gene) Not Detected     VIM (Carbapenem resistant) Not Detected    Narrative:         critical result(s) called and verbal readback obtained from ELIZABETH Meyer by CD3 06/05/2023 01:40    Blood culture [[id number]]     Order Status: Canceled Specimen: Blood     Blood culture [[id number]]  (Abnormal) Collected: 06/02/23 0839    Order Status: Completed Specimen: Blood  Updated: 06/03/23 0802     Blood Culture, Routine Gram stain aer bottle: Gram positive cocci      Positive results previously called 06/02/2023  20:46 KS3      METHICILLIN RESISTANT STAPHYLOCOCCUS AUREUS  For susceptibility see order #[id number]      Blood culture [[id number]]  (Abnormal) Collected: 06/01/23 0858    Order Status: Completed Specimen: Blood Updated: 06/03/23 0801     Blood Culture, Routine Gram stain aer bottle: Gram positive cocci      Positive results previously called 06/02/2023  01:59 KS3      METHICILLIN RESISTANT STAPHYLOCOCCUS AUREUS  For susceptibility see order #[id number]      Blood culture [[id number]]  (Abnormal) Collected: 06/01/23 0859    Order Status: Completed Specimen: Blood Updated: 06/03/23 0800     Blood Culture, Routine Gram stain aer bottle: Gram positive cocci      Positive results previously called 06/02/2023  01:07 KS3      METHICILLIN RESISTANT STAPHYLOCOCCUS AUREUS  For susceptibility see order #[id number]      Urine culture [[id number]]  (Abnormal) Collected: 05/30/23 0909    Order Status: Completed Specimen: Urine from Clean Catch Updated: 06/03/23 0635     Urine Culture, Routine DANILO ALBICANS  > 100,000 cfu/ml  Treatment of asymptomatic candiduria is not recommended (except for   specific populations). Candida isolated in the urine typically   represents colonization. If an indwelling urinary catheter is present  it should be removed or replaced.      Blood culture [[id number]]  (Abnormal) Collected: 05/30/23 1115    Order Status: Completed Specimen: Blood Updated: 06/02/23 0734     Blood Culture, Routine Gram stain shirley bottle: Gram positive cocci      Positive results previously called 05/31/2023  03:29 KS3      Gram stain aer bottle: Gram positive cocci      Positive results previously called      METHICILLIN RESISTANT STAPHYLOCOCCUS AUREUS  Known MRSA patient  For susceptibility see order #[id number]      Blood culture [[id number]]  (Abnormal)  (Susceptibility)  Collected: 05/30/23 1130    Order Status: Completed Specimen: Blood Updated: 06/02/23 0733     Blood Culture, Routine Gram stain shirley bottle: Gram positive cocci      Results called to and read back by: Triston Brown RN on [address]      05/31/2023  02:32 KS3      Gram stain aer bottle: Gram positive cocci      Positive results previously called 05/31/2023  04:59 KS3      METHICILLIN RESISTANT STAPHYLOCOCCUS AUREUS  Known MRSA patient              Impression and Plan  Active Hospital Problems    Diagnosis  POA    *MRSA Bacteremia [A41.9]  Yes    MARSHALL (acute kidney injury) [N17.9]  Yes    Closed fracture of right hip [S72.001A]  Yes    Candiduria [B37.49]  Yes    Closed right hip fracture after fall [S72.001A]  Yes    Acute on chronic diastolic heart failure [I50.9]  Yes    Acute on chronic respiratory failure with hypoxia [J96.20]  Yes    Atrial fibrillation with RVR with known history of a fib [I48.91]  Yes     Chronic    Troponin I above reference range [R77.8]  Yes    Nursing home resident [Z59.3]  Not Applicable     Chronic    DNR (do not resuscitate) [Z66]  Yes     Chronic    UTI (urinary tract infection) [N39.0]  Yes    Ulcer of right leg [L97.919]  Yes     Chronic    Anemia [D64.9]  Yes     Chronic    Hypothyroidism [E03.9]  Yes     Chronic    Mixed hyperlipidemia [E78.2]  Yes    Pulmonary hypertension [I27.20]  Yes    Essential hypertension [I10]  Yes      Resolved Hospital Problems   No resolved problems to display.     Acute hypoxemic respiratory failure- improving  Hypervolemic hypernatremia  Right hip fracture  Acute on chronic HFpEF  Afib  MRSA bacteremia, unclear source  MARSHALL, likely cardiorenal, worsening  Severe pain due to right hip  Delirium    - continue supplemental oxygen to keep sats greater than 90%  - continue antibiotics  - pain control  - metoprolol 25 mg b.i.d.  - watch renal function, UOP  - consider another Lasix 80 mg IV now    Discussed with RN. CXR personally reviewed; shows worsening  pulmonary edema. I have ordered strict I&Os, as the patient is at high risk from treatment with Lasix. She is at high risk of deterioration and should remain admitted to telemetry unit.    Abram Amezquita MD  Pulmonary and Critical Care Medicine  [facility] / [facility]  Date of Service: 06/07/2023  11:03 PM

## 2023-06-07 NOTE — SUBJECTIVE & OBJECTIVE
Interval History:  Her culture from yesterday is so far negative.  Hopefully this will remain negative.  Continuing antibiotics.  The patient seems comfortable at this time.  Renal function is worsening today and I have consulted Nephrology further recommendations.    Review of Systems   All other systems reviewed and are negative.  Objective:     Vital Signs (Most Recent):  Temp: 98.1 °F (36.7 °C) (06/07/23 1100)  Pulse: 66 (06/07/23 1100)  Resp: 16 (06/07/23 1100)  BP: (!) 117/58 (06/07/23 1100)  SpO2: 97 % (06/07/23 1100) Vital Signs (24h Range):  Temp:  [97.4 °F (36.3 °C)-98.1 °F (36.7 °C)] 98.1 °F (36.7 °C)  Pulse:  [61-72] 66  Resp:  [9-23] 16  SpO2:  [91 %-100 %] 97 %  BP: (103-134)/(53-80) 117/58     Weight: 43.7 kg (96 lb 4.8 oz)  Body mass index is 21.6 kg/m².    Intake/Output Summary (Last 24 hours) at 6/7/2023 1631  Last data filed at 6/7/2023 1425  Gross per 24 hour   Intake 505 ml   Output 900 ml   Net -395 ml           Physical Exam  Constitutional:       Appearance: Normal appearance. She is normal weight.   HENT:      Head: Normocephalic and atraumatic.      Nose: Nose normal.      Mouth/Throat:      Mouth: Mucous membranes are moist.   Eyes:      Conjunctiva/sclera: Conjunctivae normal.      Pupils: Pupils are equal, round, and reactive to light.   Cardiovascular:      Rate and Rhythm: Tachycardia present. Rhythm irregular.      Pulses: Normal pulses.      Heart sounds: Normal heart sounds. No murmur heard.    No friction rub. No gallop.   Pulmonary:      Effort: Pulmonary effort is normal.      Breath sounds: Normal breath sounds. No wheezing or rales.      Comments: 4 L nasal cannula.  Coarse breath sounds.  Abdominal:      General: Abdomen is flat. Bowel sounds are normal. There is no distension.      Palpations: Abdomen is soft.      Tenderness: There is no abdominal tenderness. There is no guarding.   Musculoskeletal:         General: No swelling. Normal range of motion.      Cervical back:  Normal range of motion and neck supple.      Right lower leg: No edema.      Left lower leg: No edema.      Comments: She has pain in her right hip, limited mobility of her left shoulder. Pulses in tact distally.  Able to move her hip a bit more today   Skin:     General: Skin is warm and dry.   Neurological:      General: No focal deficit present.      Mental Status: She is alert.   Psychiatric:         Mood and Affect: Mood normal.         Thought Content: Thought content normal.         Judgment: Judgment normal.           Significant Labs: All pertinent labs within the past 24 hours have been reviewed.    Significant Imaging: I have reviewed all pertinent imaging results/findings within the past 24 hours.

## 2023-06-07 NOTE — ASSESSMENT & PLAN NOTE
Ongoing MRSA bacteremia without a clear source at this point and we have not achieved source control.  Family would not like any further invasive workup.    Check CTA chest > no signs of significant focal pneumonia  Inflammatory markers rising  Repeat blood cultures daily  She has had daily positive blood cultures.   Urine culture with candiduria likely contaminated   COVID and influenza negative  Daptomycin and Ceftaroline plus linezolid per Dr Shahid  Initial Echo without vegetations.   Plan to continue antibiotics until cultures clear at this point.  Appreciate Dr Shahid's recs  CT hips / pelvis  > no clear evidence of infective focus.     Hopefully we have achieved clearance of her bacteremia

## 2023-06-07 NOTE — PT/OT/SLP PROGRESS
"Speech Language Pathology Treatment    Patient Name:  Jeannie Hutchins   MRN:  [id number]  Admitting Diagnosis: Sepsis    Recommendations:                 General Recommendations:  Dysphagia therapy  Diet recommendations:  Minced & Moist Diet - IDDSI Level 5, Liquid Diet Level: Thin liquids - IDDSI Level 0   Aspiration Precautions:  Alternating bites/sips, Assistance with meals, Avoid talking while eating, Frequent oral care, HOB to 90 degrees, Small bites/sips, and Wear oxygen during intake      General Precautions: Standard, aspiration  Communication strategies:  go to room if call light pushed    Assessment:     Jeannie Hutchins is a 88 y.o. female with an SLP diagnosis of oral Dysphagia.  She presents with improved tolerance of diet since downgrade to minced and moist. Improved use of swallowing precautions when given min-mod cuing. Rec continue on minced/moist diet and ST f/u 4x/week.    Subjective     Pt asleep in bed upon entering. Pt awoke easily and was agreeable to ST w/ breakfast tray. RN in room to adjust positioning and to swap pt to nasal canula during meal.  Patient goals: "What do you want me to do?"     Pain/Comfort:       Respiratory Status: Nasal cannula, flow 7 L/min    Objective:     Has the patient been evaluated by SLP for swallowing?   Yes  Keep patient NPO? No   Current Respiratory Status:        Pt observed during SLP regulated bites of grits and sips of ensure and water. Improved clearance of food today; decreased mastication time from yesterday. Mild lingual residue noted; adequately cleared w/ use of liquid swish/wash, though she required min cues for use of swish. IND use of liquid wash after initial 2 cues. Appropriate timeliness of pharyngeal swallow initiation during food and liquid trials. Inconsistent throat clear during PO intake, less than 10% of time.     Goals:   Multidisciplinary Problems       SLP Goals          Problem: SLP    Goal Priority Disciplines Outcome   SLP Goal     SLP " Ongoing, Progressing   Description: 1. Pt will tolerate LRD w/ adequate oral clearance and w/o overt s/s aspiration during >95% of PO intake  2. Pt will utilize swallowing precautions during >95% of PO intake given min cues                       Plan:     Patient to be seen:  4 x/week   Plan of Care expires:     Plan of Care reviewed with:  patient, other (see comments) (nursing)   SLP Follow-Up:  Yes       Discharge recommendations:  nursing facility, skilled   Barriers to Discharge:  None    Time Tracking:     SLP Treatment Date:   06/07/23  Speech Start Time:  0921  Speech Stop Time:  0936     Speech Total Time (min):  15 min    Billable Minutes: Treatment Swallowing Dysfunction 15 min    06/07/2023

## 2023-06-07 NOTE — PLAN OF CARE
06/06/23 1336   Patient Assessment/Suction   Level of Consciousness (AVPU) alert   Respiratory Effort Normal;Unlabored   All Lung Fields Breath Sounds diminished   Rhythm/Pattern, Respiratory no shortness of breath reported   PRE-TX-O2   Device (Oxygen Therapy) Oxymask   $ Is the patient on Low Flow Oxygen? Yes   Flow (L/min) 6   SpO2 (!) 93 %   Pulse Oximetry Type Continuous   $ Pulse Oximetry - Multiple Charge Pulse Oximetry - Multiple   Pulse 71   Resp (!) 27   Preset CPAP/BiPAP Settings   Mode Of Delivery Standby   $ CPAP/BiPAP Daily Charge BiPAP/CPAP Daily   $ Is patient using? No/refused   Equipment Type V60   Education   $ Education BiPAP;15 min   Respiratory Evaluation   $ Care Plan Tech Time 15 min

## 2023-06-07 NOTE — CARE UPDATE
06/06/23 2054   Patient Assessment/Suction   Level of Consciousness (AVPU) responds to voice   Respiratory Effort Normal   Expansion/Accessory Muscles/Retractions no use of accessory muscles   All Lung Fields Breath Sounds diminished   PRE-TX-O2   Device (Oxygen Therapy) Oxymask   $ Is the patient on Low Flow Oxygen? Yes   Flow (L/min) 6   SpO2 97 %   Pulse Oximetry Type Continuous   $ Pulse Oximetry - Multiple Charge Pulse Oximetry - Multiple   Pulse 64   Resp 13   Preset CPAP/BiPAP Settings   Mode Of Delivery Standby   $ CPAP/BiPAP Daily Charge BiPAP/CPAP Daily   $ Is patient using? No/refused   Reason patient is not wearing? Patient refused   Education   $ Education 15 min   Respiratory Evaluation   $ Care Plan Tech Time 15 min

## 2023-06-07 NOTE — CONSULTS
INPATIENT NEPHROLOGY CONSULT   [facility]    Patient Name: Jeannie Hutchins  Date: 06/07/2023    Reason for consultation: MARSHALL    Chief Complaint:   Chief Complaint   Patient presents with    Tachycardia       History of Present Illness:  Ms. Hutchins is an 88-year-old female who was brought to the ED via EMS from [facility] due to tachycardia. Patient was initially discharged to nursing facility about 2 years ago, under hospice, but has subsequently improved and is off same.  This morning reportedly heart rate greater than 200, staff was preparing to administer metoprolol, prior to this noted to be staring off.  As per daughter oxygen level was also low, she is chronically on 2 L supplemental oxygen.  States her mother was incoherent during this time.  Patient states she was feeling unwell during this episode, lightheaded, short of breath, producing phlegm.  Denies any sore throat, fever at the facility, nausea, vomiting, diarrhea, cystitis symptoms.  She had a fall several days ago as per report, was found on floor.  Does have ulcer to the right lateral leg, daughter states she sustained few weeks ago as injury from her wheelchair.  Daughter states with previous UTI she had similar symptoms.  In the ED febrile to 101.2, heart rates in the 120s, AFib, blood pressure stable, on 10 L supplemental oxygen.  Labs with WBC 27, hemoglobin 10.3, BUN/creatinine 18/0.9, BNP 1 905, high sensitivity troponin 20, UA with 63 WBC with 3+ leukocyte with many seen.  EKG confirming atrial fibrillation with RVR.  Chest x-ray with cardiomegaly with bilateral interstitial opacities concerning for pulmonary edema.  She received 1 g acetaminophen, 1 g Rocephin, 100 mg fluconazole, 20 mg IV Lasix, 4 mg IV Zofran.  Consulted for MARSHALL.    Interval History:  6/7- hypotensive at admission, better now; on 7L oxymask, UOP 850cc    Plan of Care:    Assessment:  MRSA bacteremia  MARSHALL due to above, also s/p IV contrast,  also vanc level 22- multifactorial ATN  Possible urinary retention  AF with RVR  Hypoxia- Acute on Chronic Resp Failure  Hx of D CHF, pulm HTN  Hyponatremia  Anemia  Closed R hip fracture    Plan:    - her prognosis is poor  - suspect multifactorial MARSHALL- not much more to offer beyond ongoing management and ensuring no urinary retention  - she is not a suitable candidate for RRT  - continue efforts towards infx management, rate control, euvolemia  - dose meds for CrCl < 20- no nsaids for pain or further IV contrast  - renal diet 1.5L fluid restriction  - trend H/H  - pain control  - agree with comfort care if no improvement within 25-48 hours    Ordered a few labs: vanc level, UA, urine eos and urine Na to complete w/u; renal u/s 6/6 neg for obstruction/renal pathology    Thank you for allowing us to participate in this patient's care. We will continue to follow.    Vital Signs:  Temp Readings from Last 3 Encounters:   06/07/23 97.9 °F (36.6 °C)   04/27/23 98.5 °F (36.9 °C) (Oral)   01/11/23 98 °F (36.7 °C)       Pulse Readings from Last 3 Encounters:   06/07/23 68   04/27/23 83   01/11/23 92       BP Readings from Last 3 Encounters:   06/07/23 (!) 130/59   04/27/23 136/70   01/11/23 (!) 152/81       Weight:  Wt Readings from Last 3 Encounters:   06/05/23 43.7 kg (96 lb 4.8 oz)   06/01/23 44.5 kg (98 lb)   04/27/23 47.7 kg (105 lb 3.6 oz)       INS/OUTS:  I/O last 3 completed shifts:  In: 625 [P.O.:375; IV Piggyback:250]  Out: 1050 [Urine:1050]  No intake/output data recorded.    Past Medical & Surgical History:  Past Medical History:   Diagnosis Date    Anticoagulant long-term use     Eliquis    Arthritis     Atrial fibrillation     Blood transfusion     Carotid stenosis     CHF (congestive heart failure)     Edema     Generalized anxiety disorder 1/23/2018    Dr. Mckeon's eval 1/23/18: She does appear to have a JERRI because of the persistent worries that she has.  These worries predominate her day.  She would  probably do well with prevention therapy such as SSRI/SNRI to help control the physical symptoms of the anxiety.  Problem at this point in time is her electrolyte abnormalities.  There is a slight risk of hyponatremia with these medications and    Hearing loss     [place] (hard of hearing)     Hypercholesterolemia     Hypertension     On home oxygen therapy     Psychiatric problem     Thyroid disease        Past Surgical History:   Procedure Laterality Date    CARDIOVERSION  10/19/2020    Procedure: Cardioversion;  Surgeon: Brandon Elias MD;  Location: [facility] CATH LAB;  Service: Cardiology;;    CARPAL TUNNEL RELEASE  2012    right hand    ESOPHAGOGASTRODUODENOSCOPY Left 8/29/2018    Procedure: EGD (ESOPHAGOGASTRODUODENOSCOPY);  Surgeon: Oniel Dorsey MD;  Location: [facility] ENDO;  Service: Endoscopy;  Laterality: Left;    HYSTERECTOMY      left carotid endartectomy  5/2006    TONSILLECTOMY      TREATMENT OF CARDIAC ARRHYTHMIA N/A 10/19/2020    Procedure: Transesophageal echo (BLADIMIR) intra-procedure log documentation;  Surgeon: Brandon Elias MD;  Location: [facility] CATH LAB;  Service: Cardiology;  Laterality: N/A;       Past Social History:  Social History     Socioeconomic History    Marital status:    Tobacco Use    Smoking status: Never    Smokeless tobacco: Never   Substance and Sexual Activity    Alcohol use: No    Drug use: No    Sexual activity: Not Currently     Partners: Male   Social History Narrative     since 1984    He is retired.  He is one year older.  Worked for [employer]    She is retired.  Worked for [employer].     Social Determinants of Health     Financial Resource Strain: Low Risk     Difficulty of Paying Living Expenses: Not very hard   Food Insecurity: No Food Insecurity    Worried About Running Out of Food in the Last Year: Never true    Ran Out of Food in the Last Year: Never true   Transportation Needs: No Transportation Needs    Lack of Transportation  (Medical): No    Lack of Transportation (Non-Medical): No   Physical Activity: Inactive    Days of Exercise per Week: 0 days    Minutes of Exercise per Session: 0 min   Stress: No Stress Concern Present    Feeling of Stress : Only a little   Social Connections: Socially Isolated    Frequency of Communication with Friends and Family: Once a week    Frequency of Social Gatherings with Friends and Family: Once a week    Attends Faith Services: More than 4 times per year    Active Member of Clubs or Organizations: No    Attends Club or Organization Meetings: Never    Marital Status:    Housing Stability: Unknown    Unable to Pay for Housing in the Last Year: No    Unstable Housing in the Last Year: No       Medications:  Scheduled Meds:   atorvastatin  20 mg Oral QHS    ceftaroline (TEFLARO) IVPB  300 mg Intravenous Q12H    DAPTOmycin (CUBICIN) IV (PEDS and ADULTS)  8 mg/kg Intravenous Q48H    digoxin  0.125 mg Oral Daily    famotidine  20 mg Oral Every other day    levothyroxine  75 mcg Oral Before breakfast    linezolid  600 mg Oral Q12H    metoprolol tartrate  25 mg Oral BID     Continuous Infusions:   dilTIAZem Stopped (06/01/23 2130)     PRN Meds:.acetaminophen, acetaminophen, HYDROcodone-acetaminophen, magnesium oxide, magnesium oxide, morphine, nitroGLYCERIN, potassium bicarbonate, potassium bicarbonate, potassium bicarbonate, potassium, sodium phosphates, potassium, sodium phosphates, potassium, sodium phosphates, senna-docusate 8.6-50 mg, sodium chloride 0.9%, traZODone  No current facility-administered medications on file prior to encounter.     Current Outpatient Medications on File Prior to Encounter   Medication Sig Dispense Refill    acetaminophen (TYLENOL) 500 MG tablet Take 1 tablet (500 mg total) by mouth every 6 (six) hours as needed for Pain or Temperature greater than (100). (Patient taking differently: Take 325 mg by mouth every 6 (six) hours as needed for Pain or Temperature greater than  (100).)  0    albuterol-ipratropium (DUO-NEB) 2.5 mg-0.5 mg/3 mL nebulizer solution Take 3 mLs by nebulization every 6 (six) hours. Rescue 1 Box 0    aluminum & magnesium hydroxide-simethicone (MYLANTA MAX STRENGTH) 400-400-40 mg/5 mL suspension Take 5 mLs by mouth every 6 (six) hours as needed for Indigestion.      amLODIPine (NORVASC) 5 MG tablet Take 1 tablet (5 mg total) by mouth once daily. 60 tablet 0    apixaban (ELIQUIS) 2.5 mg Tab Take 1 tablet (2.5 mg total) by mouth 2 (two) times daily.      atorvastatin (LIPITOR) 20 MG tablet Take 20 mg by mouth once daily.      carboxymethylcellulose sodium (THERATEARS) 0.25 % Drop Place 1 drop into both eyes 3 (three) times daily.      EScitalopram oxalate (LEXAPRO) 10 MG tablet Take 10 mg by mouth once daily.      famotidine (PEPCID) 20 MG tablet Take 20 mg by mouth once daily.      fluticasone propionate (FLONASE) 50 mcg/actuation nasal spray 1 spray by Each Nostril route once daily.      furosemide (LASIX) 20 MG tablet Take 1 tablet (20 mg total) by mouth once daily. 30 tablet 11    guaiFENesin (HUMIBID E) 400 mg Tab Take 400 mg by mouth every 12 (twelve) hours as needed.      levothyroxine (SYNTHROID) 75 MCG tablet Take 75 mcg by mouth once daily.      loratadine (CLARITIN) 10 mg tablet Take 10 mg by mouth once daily.      metoprolol tartrate (LOPRESSOR) 25 MG tablet Take 25 mg by mouth 2 (two) times daily. 0600  2000      olopatadine (PATANOL) 0.1 % ophthalmic solution Place 1 drop into both eyes 2 (two) times daily.      potassium chloride (K-TAB) 20 mEq Take 20 mEq by mouth once daily.      traZODone (DESYREL) 50 MG tablet Take 1 tablet (50 mg total) by mouth every evening. 30 tablet 0    vit C,H-Us-mizhn-lutein-zeaxan (PRESERVISION AREDS-2) 250-90-40-1 mg Cap Take 1 tablet by mouth once daily.      alendronate (FOSAMAX) 70 MG tablet Take 70 mg by mouth every 7 days. SATURDAYS      white petrolatum-mineral oiL (ARTIFICIAL TEARS, NELSON/MIN,) 83-15 % Oint Place  "into both eyes every evening. (Patient not taking: Reported on 5/30/2023) 1 each 0    [DISCONTINUED] amlodipine-atorvastatin (CADUET) 10-40 mg per tablet TAKE ONE TABLET BY MOUTH EVERY DAY (Patient taking differently: Take by mouth nightly.) 30 tablet 11    [DISCONTINUED] losartan (COZAAR) 100 MG tablet TAKE ONE TABLET BY MOUTH EVERY DAY 90 tablet 3       Allergies:  Strawberry, Hydrochlorothiazide, and Lisinopril    Past Family History:  Reviewed; refer to Hospitalist Admission Note    Review of Systems:  Sleeping    Physical Exam:  BP (!) 130/59   Pulse 68   Temp 97.9 °F (36.6 °C)   Resp 15   Ht 4' 8" (1.422 m)   Wt 43.7 kg (96 lb 4.8 oz)   SpO2 99%   BMI 21.60 kg/m²     General Appearance:    NAD, appears stated age   Head:    Normocephalic, atraumatic   Eyes:    Closed        Mouth:   Moist mucus membranes, no thrush or oral lesions, normal      dentition   Back:     No CVA tenderness   Lungs:     Clear to auscultation bilaterally, no wheeze, crackles, rales      or rhonchi, symmetric air movement, respirations unlabored   Heart:    Regular rate and rhythm, S1 and S2 normal, no murmur, rub   or gallop   Abdomen:     Soft, non-tender, non-distended, bowel sounds active all four   quadrants, no RT or guarding, no masses, no organomegaly   Extremities:   Warm and well perfused, distal pulses intact, no cyanosis or    peripheral edema   MSK:   No joint or muscle swelling, tenderness or deformity   Skin:   Skin color, texture, turgor normal, no rashes or lesions   Neurologic/Psychiatric:   Calm     Results:  Lab Results   Component Value Date     (L) 06/07/2023    K 4.1 06/07/2023    CL 93 (L) 06/07/2023    CO2 29 06/07/2023     (H) 06/07/2023    CREATININE 3.2 (H) 06/07/2023    CALCIUM 8.2 (L) 06/07/2023    ANIONGAP 8 06/07/2023    ESTGFRAFRICA 52.5 (A) 06/17/2022    EGFRNONAA 45.6 (A) 06/17/2022       Lab Results   Component Value Date    CALCIUM 8.2 (L) 06/07/2023    PHOS 3.7 06/17/2022 "       Recent Labs   Lab 06/07/23  0530   WBC 15.84*   RBC 3.32*   HGB 8.6*   HCT 26.8*      MCV 81*   MCH 25.9*   MCHC 32.1       I have personally reviewed pertinent radiological imaging and reports.    I have spent > 70 minutes providing care for this patient for the above diagnoses. These services have included chart/data/imaging review, evaluation, exam, formulation of plan, , note preparation, and discussions with staff involved in this patient's care.    Daniele Lima MD MPH  [facility]  [address] [place] [id number]  [phone] (p)  [phone] (f)

## 2023-06-07 NOTE — PT/OT/SLP PROGRESS
"Occupational Therapy   Treatment    Name: Jeannie Hutchins  MRN: [id number]  Admitting Diagnosis:  Sepsis       Recommendations:     Discharge Recommendations: nursing facility, skilled  Discharge Equipment Recommendations:   (TBD)  Barriers to discharge:  Decreased caregiver support    Assessment:     Jeannie Hutchins is a 88 y.o. female with a medical diagnosis of Sepsis.  Performance deficits affecting function are weakness, impaired endurance, impaired functional mobility, gait instability, impaired balance, impaired cognition, decreased safety awareness, pain, orthopedic precautions, impaired self care skills, impaired cardiopulmonary response to activity.     Pt presented supine with daughter present; pt was agreeable to attempt bed exercises; after UE exercises were initiated pt stated, "Stop stop stop." She reported pain but was not able to specify where; she requested to be left alone to rest.  OT assisted pt to reposition for comfort in bed.     Rehab Prognosis:  Poor; patient would benefit from acute skilled OT services to address these deficits and reach maximum level of function.       Plan:     Patient to be seen 3 x/week to address the above listed problems via self-care/home management, therapeutic activities, therapeutic exercises, wheelchair management/training  Plan of Care Expires: 06/28/23  Plan of Care Reviewed with: patient, daughter    Subjective     Chief Complaint: none  Patient/Family Comments/goals: none stated  Pain/Comfort:  Pain Rating 1:  (not rated)  Pain Rating Post-Intervention 1:  (not rated)    Objective:     Communicated with: nurse prior to session.  Patient found supine with telemetry, peripheral IV, blood pressure cuff, pulse ox (continuous), oxygen upon OT entry to room.    General Precautions: Standard, fall    Orthopedic Precautions:RLE non weight bearing  Braces: N/A  Respiratory Status: Nasal cannula, flow 5 L/min     Occupational Performance:     Bed Mobility:    Patient " completed Rolling/Turning to Left with  total assistance  Patient completed Rolling/Turning to Right with total assistance     Patient left left sidelying with all lines intact, call button in reach, bed alarm on, and daughter present    GOALS:   Multidisciplinary Problems       Occupational Therapy Goals          Problem: Occupational Therapy    Goal Priority Disciplines Outcome Interventions   Occupational Therapy Goal     OT, PT/OT Ongoing, Progressing    Description: Goals to be met by: 6/28/2023     Patient will increase functional independence with ADLs by performing:    LE Dressing with Stand-by Assistance and Assistive Devices as needed.  Grooming while seated at sink with Stand-by Assistance.  Toileting from bedside commode with Stand-by Assistance for hygiene and clothing management.   Supine to sit with Stand-by Assistance.  Toilet transfer to bedside commode with Stand-by Assistance.                         Time Tracking:     OT Date of Treatment: 06/07/23  OT Start Time: 1343  OT Stop Time: 1356  OT Total Time (min): 13 min    Billable Minutes:Therapeutic Exercise 13    OT/VICKI: OT          6/7/2023

## 2023-06-08 LAB
ALBUMIN SERPL BCP-MCNC: 2.7 G/DL (ref 3.5–5.2)
ALP SERPL-CCNC: 119 U/L (ref 55–135)
ALT SERPL W/O P-5'-P-CCNC: 21 U/L (ref 10–44)
ANION GAP SERPL CALC-SCNC: 15 MMOL/L (ref 8–16)
AST SERPL-CCNC: 18 U/L (ref 10–40)
BACTERIA BLD CULT: ABNORMAL
BASOPHILS # BLD AUTO: 0.03 K/UL (ref 0–0.2)
BASOPHILS NFR BLD: 0.2 % (ref 0–1.9)
BILIRUB SERPL-MCNC: 1.2 MG/DL (ref 0.1–1)
BUN SERPL-MCNC: 101 MG/DL (ref 8–23)
CALCIUM SERPL-MCNC: 8.9 MG/DL (ref 8.7–10.5)
CHLORIDE SERPL-SCNC: 93 MMOL/L (ref 95–110)
CO2 SERPL-SCNC: 26 MMOL/L (ref 23–29)
CREAT SERPL-MCNC: 3 MG/DL (ref 0.5–1.4)
CRP SERPL-MCNC: 18.14 MG/DL
DIFFERENTIAL METHOD: ABNORMAL
EOSINOPHIL # BLD AUTO: 0.2 K/UL (ref 0–0.5)
EOSINOPHIL NFR BLD: 1.1 % (ref 0–8)
ERYTHROCYTE [DISTWIDTH] IN BLOOD BY AUTOMATED COUNT: 25.1 % (ref 11.5–14.5)
EST. GFR  (NO RACE VARIABLE): 14.6 ML/MIN/1.73 M^2
GLUCOSE SERPL-MCNC: 105 MG/DL (ref 70–110)
HCT VFR BLD AUTO: 32 % (ref 37–48.5)
HGB BLD-MCNC: 10.1 G/DL (ref 12–16)
IMM GRANULOCYTES # BLD AUTO: 0.41 K/UL (ref 0–0.04)
IMM GRANULOCYTES NFR BLD AUTO: 2.5 % (ref 0–0.5)
LYMPHOCYTES # BLD AUTO: 0.6 K/UL (ref 1–4.8)
LYMPHOCYTES NFR BLD: 3.5 % (ref 18–48)
MAGNESIUM SERPL-MCNC: 2.4 MG/DL (ref 1.6–2.6)
MCH RBC QN AUTO: 26.2 PG (ref 27–31)
MCHC RBC AUTO-ENTMCNC: 31.6 G/DL (ref 32–36)
MCV RBC AUTO: 83 FL (ref 82–98)
MONOCYTES # BLD AUTO: 0.7 K/UL (ref 0.3–1)
MONOCYTES NFR BLD: 4.2 % (ref 4–15)
NEUTROPHILS # BLD AUTO: 14.3 K/UL (ref 1.8–7.7)
NEUTROPHILS NFR BLD: 88.5 % (ref 38–73)
NRBC BLD-RTO: 0 /100 WBC
PLATELET # BLD AUTO: 500 K/UL (ref 150–450)
PMV BLD AUTO: 9.1 FL (ref 9.2–12.9)
POTASSIUM SERPL-SCNC: 4.2 MMOL/L (ref 3.5–5.1)
PROT SERPL-MCNC: 6 G/DL (ref 6–8.4)
RBC # BLD AUTO: 3.86 M/UL (ref 4–5.4)
SODIUM SERPL-SCNC: 134 MMOL/L (ref 136–145)
WBC # BLD AUTO: 16.13 K/UL (ref 3.9–12.7)

## 2023-06-08 PROCEDURE — 99900031 HC PATIENT EDUCATION (STAT)

## 2023-06-08 PROCEDURE — 97530 THERAPEUTIC ACTIVITIES: CPT | Mod: CQ

## 2023-06-08 PROCEDURE — 99233 SBSQ HOSP IP/OBS HIGH 50: CPT | Mod: ,,, | Performed by: INTERNAL MEDICINE

## 2023-06-08 PROCEDURE — 83735 ASSAY OF MAGNESIUM: CPT | Performed by: STUDENT IN AN ORGANIZED HEALTH CARE EDUCATION/TRAINING PROGRAM

## 2023-06-08 PROCEDURE — 94799 UNLISTED PULMONARY SVC/PX: CPT

## 2023-06-08 PROCEDURE — 21000000 HC CCU ICU ROOM CHARGE

## 2023-06-08 PROCEDURE — 99231 SBSQ HOSP IP/OBS SF/LOW 25: CPT | Mod: ,,, | Performed by: INTERNAL MEDICINE

## 2023-06-08 PROCEDURE — 94761 N-INVAS EAR/PLS OXIMETRY MLT: CPT

## 2023-06-08 PROCEDURE — 85025 COMPLETE CBC W/AUTO DIFF WBC: CPT | Performed by: STUDENT IN AN ORGANIZED HEALTH CARE EDUCATION/TRAINING PROGRAM

## 2023-06-08 PROCEDURE — 25000003 PHARM REV CODE 250: Performed by: STUDENT IN AN ORGANIZED HEALTH CARE EDUCATION/TRAINING PROGRAM

## 2023-06-08 PROCEDURE — 99233 PR SUBSEQUENT HOSPITAL CARE,LEVL III: ICD-10-PCS | Mod: ,,, | Performed by: INTERNAL MEDICINE

## 2023-06-08 PROCEDURE — 97530 THERAPEUTIC ACTIVITIES: CPT

## 2023-06-08 PROCEDURE — 36415 COLL VENOUS BLD VENIPUNCTURE: CPT | Performed by: INTERNAL MEDICINE

## 2023-06-08 PROCEDURE — 27000221 HC OXYGEN, UP TO 24 HOURS

## 2023-06-08 PROCEDURE — 94660 CPAP INITIATION&MGMT: CPT

## 2023-06-08 PROCEDURE — 80053 COMPREHEN METABOLIC PANEL: CPT | Performed by: STUDENT IN AN ORGANIZED HEALTH CARE EDUCATION/TRAINING PROGRAM

## 2023-06-08 PROCEDURE — 25000003 PHARM REV CODE 250: Performed by: INTERNAL MEDICINE

## 2023-06-08 PROCEDURE — 36415 COLL VENOUS BLD VENIPUNCTURE: CPT | Performed by: STUDENT IN AN ORGANIZED HEALTH CARE EDUCATION/TRAINING PROGRAM

## 2023-06-08 PROCEDURE — 99231 PR SUBSEQUENT HOSPITAL CARE,LEVL I: ICD-10-PCS | Mod: ,,, | Performed by: INTERNAL MEDICINE

## 2023-06-08 PROCEDURE — 99900035 HC TECH TIME PER 15 MIN (STAT)

## 2023-06-08 PROCEDURE — 86140 C-REACTIVE PROTEIN: CPT | Performed by: INTERNAL MEDICINE

## 2023-06-08 PROCEDURE — 97535 SELF CARE MNGMENT TRAINING: CPT

## 2023-06-08 PROCEDURE — 63600175 PHARM REV CODE 636 W HCPCS: Performed by: INTERNAL MEDICINE

## 2023-06-08 PROCEDURE — 92526 ORAL FUNCTION THERAPY: CPT

## 2023-06-08 PROCEDURE — 87040 BLOOD CULTURE FOR BACTERIA: CPT | Performed by: INTERNAL MEDICINE

## 2023-06-08 RX ADMIN — DIGOXIN 0.12 MG: 125 TABLET ORAL at 09:06

## 2023-06-08 RX ADMIN — FAMOTIDINE 20 MG: 20 TABLET ORAL at 09:06

## 2023-06-08 RX ADMIN — METOPROLOL TARTRATE 25 MG: 25 TABLET, FILM COATED ORAL at 09:06

## 2023-06-08 RX ADMIN — CEFTAROLINE FOSAMIL 300 MG: 600 POWDER, FOR SOLUTION INTRAVENOUS at 03:06

## 2023-06-08 RX ADMIN — LEVOTHYROXINE SODIUM 75 MCG: 0.03 TABLET ORAL at 05:06

## 2023-06-08 RX ADMIN — LINEZOLID 600 MG: 600 TABLET, FILM COATED ORAL at 09:06

## 2023-06-08 RX ADMIN — ATORVASTATIN CALCIUM 20 MG: 20 TABLET, FILM COATED ORAL at 09:06

## 2023-06-08 RX ADMIN — DAPTOMYCIN 350 MG: 500 INJECTION, POWDER, LYOPHILIZED, FOR SOLUTION INTRAVENOUS at 02:06

## 2023-06-08 RX ADMIN — HYDROCODONE BITARTRATE AND ACETAMINOPHEN 1 TABLET: 5; 325 TABLET ORAL at 04:06

## 2023-06-08 NOTE — PROGRESS NOTES
Progress Note  PULMONARY    Admit Date: 5/30/2023 06/08/2023  History of Present Illness:  Pt is an 87 yo female with atrial fib on eliquis, CHF, chronic resp failure on home O2, HTN, HLD, hearing loss, anxiety who presented to ED on 5/30 with tachycardia & syncope, recent hip fx 1 week ago, found to have fever and sepsis with MRSA bacteremia.  ID is following and doing workup, notes reviewed. She is getting vanco + daptomycin. Pulmonary is consulted for worsening O2 requirement.  She is now requiring high flow nc/oxy mask to maintain sats. She is having a lot of pain in her right hip and has difficulty hearing and answering my questions. Her son Dane at bedside provides history. She has CHF, uses 2L O2 at her facility and has had a progressive decline over the past 2 yrs with breathing difficulty. It has been determined she is a poor surgical candidate for hip repair. They do not want further invasive testing or procedures. She continues on antibiotics and has persistent MRSA bacteremia of unclear source. Per nursing her blood pressure gets very low with pain and heart medications.    SUBJECTIVE:     6/4- O2 requirement has improved. She has new MARSHALL. Has been afebrile, HR controlled and BP in 100s-110s systolic. She is confused and delirious    6/5/23: Stable, requiring 3-4 L NC but sat drops precipitously with movement, lying flat, or brief removal of NC. Alert and oriented x 3.    6/6/23: Minimal UOP w/ 40 mg IV lasix x 1 yesterday.    6/7/23: Net -345 mL in last 24 hours after a single dose of IV Lasix 80 mg yesterday. Now on 4 L NC.    6/8/23: No Lasix given yesterday. Nonetheless, net -800 mL in the past day; still net +2 L for the admission. She is satting 99% on 5 L NC at rest.    OBJECTIVE:     Vitals (Most recent):  Vitals:    06/08/23 0935   BP: (!) 140/65   Pulse: 69   Resp:    Temp:        Vitals (24 hour range):  Temp:  [97.1 °F (36.2 °C)-98.1 °F (36.7 °C)]   Pulse:  [59-73]   Resp:  [11-33]   BP:  (103-160)/(53-91)   SpO2:  [92 %-100 %]       Intake/Output Summary (Last 24 hours) at 6/8/2023 1029  Last data filed at 6/8/2023 0907  Gross per 24 hour   Intake 110 ml   Output 850 ml   Net -740 ml        PHYSICAL EXAM  GENERAL:  NAD.  HEENT: EOMI. PERRLA.  NECK: No cervical adenopathy palpated. No JVD or HJR.  HEART: Regular rate and rhythm. No murmur or gallop auscultated.  LUNGS: Clear to auscultation. Lung excursion symmetrical.   ABDOMEN: Bowel sounds present. Soft, non-tender, non-distended.  EXTREMITIES: Normal muscle tone and joint movement, no cyanosis or clubbing.   LYMPHATICS: No adenopathy palpated, no edema.  SKIN: Dry, intact, no lesions.   NEURO: No gross motor abnormalities. A&Ox3. Sleepy.  PSYCH: Appropriate affect.      Radiographs reviewed: view by direct vision   CXR 6/4/23- pulmonary edema, slightly worse  CXR 6/2/23- bilat hilar congestion, cardiomegaly  CTA chest- no PE; small R pleural effusion, bilat faint ground glass perihilar suggesting pulm edema     TTE 6/1/23-   Atrial fibrillation observed.  The left ventricle is normal in size with concentric remodeling and normal systolic function.  The estimated ejection fraction is 55%.  Moderate tricuspid regurgitation.  No clear evidence of endocarditis. If clinical suspicion persist, consider alternative cardiac imaging like BLADIMIR for further evaluation.    Labs     Recent Labs   Lab 06/08/23  0505   WBC 16.13*   HGB 10.1*   HCT 32.0*   *     Recent Labs   Lab 06/08/23  0400   *   K 4.2   CL 93*   CO2 26   *   CREATININE 3.0*      CALCIUM 8.9   MG 2.4   AST 18   ALT 21   ALKPHOS 119   BILITOT 1.2*   PROT 6.0   ALBUMIN 2.7*   CRP 18.14*   No results for input(s): PH, PCO2, PO2, HCO3 in the last 24 hours.  Microbiology Results (last 7 days)       Procedure Component Value Units Date/Time    Blood culture [[id number]] Collected: 06/08/23 0845    Order Status: Sent Specimen: Blood Updated: 06/08/23 0849    Narrative:       Collection has been rescheduled by EL3 at 06/08/2023 05:15 Reason:   Unable to collect  Collection has been rescheduled by EL3 at 06/08/2023 05:15 Reason:   Unable to collect    Blood culture [[id number]]  (Abnormal)  (Susceptibility) Collected: 06/04/23 1559    Order Status: Completed Specimen: Blood Updated: 06/08/23 0700     Blood Culture, Routine Gram stain aer bottle: Gram positive cocci      Positive results previously called      METHICILLIN RESISTANT STAPHYLOCOCCUS AUREUS  Known MRSA patient      Blood culture [[id number]] Collected: 06/06/23 0724    Order Status: Completed Specimen: Blood Updated: 06/07/23 2343     Blood Culture, Routine Gram stain aer bottle: Gram positive cocci      Positive results previously called 06/07/2023  23:43 KS3    Narrative:      Collection has been rescheduled by EL3 at 06/06/2023 05:13 Reason:   Unable to collect  Collection has been rescheduled by EL3 at 06/06/2023 05:13 Reason:   Unable to collect    Blood culture [[id number]] Collected: 06/07/23 0831    Order Status: Completed Specimen: Blood Updated: 06/07/23 1558     Blood Culture, Routine No Growth to date    Blood culture [[id number]]  (Abnormal) Collected: 06/04/23 0609    Order Status: Completed Specimen: Blood Updated: 06/06/23 0649     Blood Culture, Routine Gram stain aer bottle: Gram positive cocci      Gram stain shirley bottle: Gram positive cocci      Results called to and read back by:ELIZABETH Melendez;  06/05/2023      00:50 CJD      METHICILLIN RESISTANT STAPHYLOCOCCUS AUREUS  For susceptibility see order #[id number]      Blood culture [[id number]]  (Abnormal) Collected: 06/03/23 0713    Order Status: Completed Specimen: Blood Updated: 06/06/23 0648     Blood Culture, Routine Gram stain aer bottle: Gram positive cocci      Positive results previously called 06/03/2023  23:13 KS3      METHICILLIN RESISTANT STAPHYLOCOCCUS AUREUS  For susceptibility see order #[id number]      Rapid Organism ID by PCR (from Blood  culture) [[id number]]  (Abnormal) Collected: 06/04/23 0609    Order Status: Completed Updated: 06/05/23 0140     Enterococcus faecalis Not Detected     Enterococcus faecium Not Detected     Listeria monocytogenes Not Detected     Staphylococcus spp. See species for ID     Comment: critical result(s) called and verbal readback obtained from ELIZABETH Meyer by CD3 06/05/2023 01:40          Staphylococcus aureus Detected     Comment: critical result(s) called and verbal readback obtained from ELIZABETH Meyer by CD3 06/05/2023 01:40          Staphylococcus epidermidis Not Detected     Staphylococcus lugdunensis Not Detected     Streptococcus species Not Detected     Streptococcus agalactiae Not Detected     Streptococcus pneumoniae Not Detected     Streptococcus pyogenes Not Detected     Acinetobacter calcoaceticus/baumannii complex Not Detected     Bacteroides fragilis Not Detected     Enterobacterales Not Detected     Enterobacter cloacae complex Not Detected     Escherichia coli Not Detected     Klebsiella aerogenes Not Detected     Klebsiella oxytoca Not Detected     Klebsiella pneumoniae group Not Detected     Proteus Not Detected     Salmonella sp Not Detected     Serratia marcescens Not Detected     Haemophilus influenzae Not Detected     Neisseria meningtidis Not Detected     Pseudomonas aeruginosa Not Detected     Stenotrophomonas maltophilia Not Detected     Candida albicans Not Detected     Candida auris Not Detected     Candida glabrata Not Detected     Candida krusei Not Detected     Candida parapsilosis Not Detected     Candida tropicalis Not Detected     Cryptococcus neoformans/gattii Not Detected     CTX-M (ESBL ) Test not applicable     IMP (Carbapenem resistant) Test not applicable     KPC resistance gene (Carbapenem resistant) Test not applicable     mcr-1  Test not applicable     mec A/C  Test not applicable     mec A/C and MREJ (MRSA) gene Detected     Comment: critical  result(s) called and verbal readback obtained from ELIZABETH Meyer by CD3 06/05/2023 01:40          NDM (Carbapenem resistant) Not Detected     OXA-48-like (Carbapenem resistant) Not Detected     van A/B (VRE gene) Not Detected     VIM (Carbapenem resistant) Not Detected    Narrative:         critical result(s) called and verbal readback obtained from ELIZABETH Meyer by CD3 06/05/2023 01:40    Blood culture [[id number]]     Order Status: Canceled Specimen: Blood     Blood culture [[id number]]  (Abnormal) Collected: 06/02/23 0839    Order Status: Completed Specimen: Blood Updated: 06/03/23 0802     Blood Culture, Routine Gram stain aer bottle: Gram positive cocci      Positive results previously called 06/02/2023  20:46 KS3      METHICILLIN RESISTANT STAPHYLOCOCCUS AUREUS  For susceptibility see order #[id number]      Blood culture [[id number]]  (Abnormal) Collected: 06/01/23 0858    Order Status: Completed Specimen: Blood Updated: 06/03/23 0801     Blood Culture, Routine Gram stain aer bottle: Gram positive cocci      Positive results previously called 06/02/2023  01:59 KS3      METHICILLIN RESISTANT STAPHYLOCOCCUS AUREUS  For susceptibility see order #[id number]      Blood culture [[id number]]  (Abnormal) Collected: 06/01/23 0859    Order Status: Completed Specimen: Blood Updated: 06/03/23 0800     Blood Culture, Routine Gram stain aer bottle: Gram positive cocci      Positive results previously called 06/02/2023  01:07 KS3      METHICILLIN RESISTANT STAPHYLOCOCCUS AUREUS  For susceptibility see order #[id number]      Urine culture [[id number]]  (Abnormal) Collected: 05/30/23 0909    Order Status: Completed Specimen: Urine from Clean Catch Updated: 06/03/23 0635     Urine Culture, Routine DANILO ALBICANS  > 100,000 cfu/ml  Treatment of asymptomatic candiduria is not recommended (except for   specific populations). Candida isolated in the urine typically   represents colonization. If an indwelling urinary  catheter is present  it should be removed or replaced.      Blood culture [[id number]]  (Abnormal) Collected: 05/30/23 1115    Order Status: Completed Specimen: Blood Updated: 06/02/23 0734     Blood Culture, Routine Gram stain shirley bottle: Gram positive cocci      Positive results previously called 05/31/2023  03:29 KS3      Gram stain aer bottle: Gram positive cocci      Positive results previously called      METHICILLIN RESISTANT STAPHYLOCOCCUS AUREUS  Known MRSA patient  For susceptibility see order #[id number]      Blood culture [[id number]]  (Abnormal)  (Susceptibility) Collected: 05/30/23 1130    Order Status: Completed Specimen: Blood Updated: 06/02/23 0733     Blood Culture, Routine Gram stain shirley bottle: Gram positive cocci      Results called to and read back by: Triston Brown RN on [address]      05/31/2023  02:32 KS3      Gram stain aer bottle: Gram positive cocci      Positive results previously called 05/31/2023  04:59 KS3      METHICILLIN RESISTANT STAPHYLOCOCCUS AUREUS  Known MRSA patient              Impression and Plan  Active Hospital Problems    Diagnosis  POA    *MRSA Bacteremia [A41.9]  Yes    MARSHALL (acute kidney injury) [N17.9]  Yes    Closed fracture of right hip [S72.001A]  Yes    Candiduria [B37.49]  Yes    Closed right hip fracture after fall [S72.001A]  Yes    Acute on chronic diastolic heart failure [I50.9]  Yes    Acute on chronic respiratory failure with hypoxia [J96.20]  Yes    Atrial fibrillation with RVR with known history of a fib [I48.91]  Yes     Chronic    Troponin I above reference range [R77.8]  Yes    Nursing home resident [Z59.3]  Not Applicable     Chronic    DNR (do not resuscitate) [Z66]  Yes     Chronic    UTI (urinary tract infection) [N39.0]  Yes    Ulcer of right leg [L97.919]  Yes     Chronic    Anemia [D64.9]  Yes     Chronic    Hypothyroidism [E03.9]  Yes     Chronic    Mixed hyperlipidemia [E78.2]  Yes    Pulmonary hypertension [I27.20]  Yes    Essential  hypertension [I10]  Yes      Resolved Hospital Problems   No resolved problems to display.     Acute hypoxemic respiratory failure- improving  Hypervolemic hypernatremia  Right hip fracture  Acute on chronic HFpEF  Afib  MRSA bacteremia, unclear source  MARSHALL, likely cardiorenal, worsening  Severe pain due to right hip  Delirium    - continue supplemental oxygen to keep sats greater than 90%  - continue antibiotics as per ID  - pain control  - metoprolol 25 mg b.i.d.  - watch renal function, UOP  - hold off on diuresis today    Discussed with RN. CXR personally reviewed; shows worsening pulmonary edema. I have ordered strict I&Os, as the patient is at high risk from treatment with Lasix. She is at high risk of deterioration and should remain admitted to telemetry unit.    Abram Amezquita MD  Pulmonary and Critical Care Medicine  [facility] / [facility]  Date of Service: 06/08/2023  10:32 PM

## 2023-06-08 NOTE — PT/OT/SLP PROGRESS
"Speech Language Pathology Treatment    Patient Name:  Jeannie Hutchins   MRN:  [id number]  Admitting Diagnosis: Sepsis    Recommendations:                 General Recommendations:  Dysphagia therapy and Dietician for decreased PO intake  Diet recommendations:  Minced & Moist Diet - IDDSI Level 5, Liquid Diet Level: Thin liquids - IDDSI Level 0   Aspiration Precautions:  Alternating bites/sips, Assistance with meals, Avoid talking while eating, Frequent oral care, HOB to 90 degrees, Small bites/sips, and Wear oxygen during intake        General Precautions: Standard, aspiration  Communication strategies:  provide increased time to answer and go to room if call light pushed    Assessment:     Jeannie Hutchins is a 88 y.o. female with an SLP diagnosis of oral Dysphagia.  She presents with adequate tolerance of minced/moist diet and thin liquids, per her daughter, though pt continues with limited PO intake. Today's session focused on discussion of diet level, PO intake, and dysphagia ed w/ pt's daughter. ST to f/u re ongoing diet tolerance and family training.    Subjective     Pt awake finishing PT session. Daughter present at bedside.  Patient goals: "I'm trying to push something out but it's hard to do it!" (Pt attempting to make a bowel movement)     Pain/Comfort:       Respiratory Status: Nasal cannula, flow 5 L/min    Objective:     Has the patient been evaluated by SLP for swallowing?   Yes  Keep patient NPO? No   Current Respiratory Status:        Discussion of swallowing precautions, change in diet level, pt's ST goals, and SLP's role discussed w/ pt's daughter. Pt's daughter reporting pt has been tolerating minced and moist diet much better than regular diet; however, pt is not eating or drinking, per pt's daughter. SLP reported to pt's daughter that SLP would f/u w/ the RD to discuss nutrition needs (completed). PT was present during start of ST session to reposition pt appropriately for PO intake. PO trials w/ pt's " lunch tray were attempted; however, pt w/ c/o discomfort in attempt to make bowel movement. Session ended.    Goals:   Multidisciplinary Problems       SLP Goals          Problem: SLP    Goal Priority Disciplines Outcome   SLP Goal     SLP Ongoing, Progressing   Description: 1. Pt will tolerate LRD w/ adequate oral clearance and w/o overt s/s aspiration during >95% of PO intake  2. Pt will utilize swallowing precautions during >95% of PO intake given min cues                       Plan:     Patient to be seen:  4 x/week   Plan of Care expires:     Plan of Care reviewed with:  patient, daughter   SLP Follow-Up:  Yes       Discharge recommendations:  nursing facility, skilled (w/ Speech)   Barriers to Discharge:   PO intake amount/decreased appetite    Time Tracking:     SLP Treatment Date:   06/08/23  Speech Start Time:  1406  Speech Stop Time:  1416     Speech Total Time (min):  10 min    Billable Minutes: Treatment Swallowing Dysfunction 10 min    06/08/2023

## 2023-06-08 NOTE — PT/OT/SLP PROGRESS
Occupational Therapy   Treatment    Name: Jeannie Hutchins  MRN: [id number]  Admitting Diagnosis:  Sepsis     The primary encounter diagnosis was Paroxysmal atrial fibrillation. Diagnoses of Urinary tract infection without hematuria, site unspecified, Congestive heart failure, unspecified HF chronicity, unspecified heart failure type, Sepsis, Chest pain, Bacteremia, Sepsis due to methicillin resistant Staphylococcus aureus (MRSA), unspecified whether acute organ dysfunction present, Atrial fibrillation with RVR, Closed fracture of right hip, sequela, Candiduria, and Tachycardia were also pertinent to this visit.    Recommendations:     Discharge Recommendations: nursing facility, skilled  Discharge Equipment Recommendations:   (TBD)  Barriers to discharge:  Decreased caregiver support    Assessment:     Jeannie Hutchins is a 88 y.o. female with a medical diagnosis of Sepsis.  She presents with Performance deficits affecting function are weakness, impaired endurance, impaired functional mobility, gait instability, impaired balance, impaired cognition, decreased safety awareness, pain, orthopedic precautions, impaired self care skills, impaired cardiopulmonary response to activity. Slow progress. Pt sleepy, constant stimulation to stay awake, bouts of prolonged alertness was noted but did not last. Pt performed bed<>Bsc stand pivot t/f with Total A, NWB on RLE maintained. Toileting Total A x 2 person assist. Small BM(hard, pt constipated) made on commode with some bloody discharge. Nurse notified. Continue with OT POC.     Rehab Prognosis:  Fair; patient would benefit from acute skilled OT services to address these deficits and reach maximum level of function.       Plan:     Patient to be seen 5 x/week to address the above listed problems via self-care/home management, therapeutic activities, therapeutic exercises, wheelchair management/training  Plan of Care Expires: 06/28/23  Plan of Care Reviewed with: patient,  daughter    Subjective     Chief Complaint: R hip pain with movement  Patient/Family Comments/goals: per daughter, pt was trying to have a BM.  Pain/Comfort:  Pain Rating 1:  (not rated)  Location - Side 1: Right  Location - Orientation 1: generalized  Location 1: hip  Pain Addressed 1: Reposition, Distraction, Cessation of Activity  Pain Rating Post-Intervention 1: 0/10    Objective:     Communicated with: nurse prior to session.  Patient found HOB elevated with telemetry, david catheter, bed alarm, blood pressure cuff, pulse ox (continuous), peripheral IV upon OT entry to room.    General Precautions: Standard, fall, contact, aspiration    Orthopedic Precautions:RLE non weight bearing (hip fracture, nonsurgical)  Braces: N/A  Respiratory Status: Room air     Occupational Performance:     Bed Mobility:    Patient completed Rolling/Turning to Left with  maximal assistance, total assistance, and max vc  Patient completed Rolling/Turning to Right with total assistance  Patient completed Scooting/Bridging with dependent and 2 persons  Patient completed Supine to Sit with total assistance  Patient completed Sit to Supine with total assistance     Functional Mobility/Transfers:  Patient completed Sit <> Stand Transfer with total assistance and NWB on RLE  with  hand-held assist   Patient completed Toilet Transfer Stand Pivot technique with maximal assistance and total assistance with  bedside commode and NWB on RLE    Activities of Daily Living:  Toileting: total assistance and of 2 persons seated on BSC, 1 person assist with sit<> stand while second person performed perirectalhygiene. Rock hard small BM made with bloody drainage, pt constipated.      AMPA 6 Click ADL: 7    Treatment & Education:  Purpose of OT and POC  Initiated FT with daughter who was visiting and she participated in care.  All questions/concerns addressed within scope.   Pt sat EOB CGA, head held down, max vc to stay awake and raise head, poor  response to commands. Pt sluggish but agreeable to all therapy.     Patient left left sidelying with all lines intact, call button in reach, bed alarm on, nurse notified, and daughter present    GOALS:   Multidisciplinary Problems       Occupational Therapy Goals          Problem: Occupational Therapy    Goal Priority Disciplines Outcome Interventions   Occupational Therapy Goal     OT, PT/OT Ongoing, Progressing    Description: Goals to be met by: 6/28/2023     Patient will increase functional independence with ADLs by performing:    LE Dressing with Stand-by Assistance and Assistive Devices as needed.  Grooming while seated at sink with Stand-by Assistance.  Toileting from bedside commode with Stand-by Assistance for hygiene and clothing management.   Supine to sit with Stand-by Assistance.  Toilet transfer to bedside commode with Stand-by Assistance.                         Time Tracking:     OT Date of Treatment: 06/08/23  OT Start Time: 1528  OT Stop Time: 1606  OT Total Time (min): 38 min    Billable Minutes:Self Care/Home Management 25  Therapeutic Activity 13  Total Time 38    OT/VICKI: OT          6/8/2023

## 2023-06-08 NOTE — PLAN OF CARE
Patient is not medically cleared for discharge at this time.  From [place] and plan is to return there as [facility] vs return on hospice.  Palliative consult ordered.  CM following.       06/08/23 1410   Discharge Reassessment   Assessment Type Discharge Planning Reassessment   Did the patient's condition or plan change since previous assessment? Yes   Discharge Plan A Return to nursing home   Discharge Plan B Hospice/home

## 2023-06-08 NOTE — PLAN OF CARE
Problem: Occupational Therapy  Goal: Occupational Therapy Goal  Description: Goals to be met by: 6/28/2023     Patient will increase functional independence with ADLs by performing:    LE Dressing with Stand-by Assistance and Assistive Devices as needed.  Grooming while seated at sink with Stand-by Assistance.  Toileting from bedside commode with Stand-by Assistance for hygiene and clothing management.   Supine to sit with Stand-by Assistance.  Toilet transfer to bedside commode with Stand-by Assistance.    Outcome: Ongoing, Progressing   Slow progress. Pt sleepy, constant stimulation to stay awake, bouts of prolonged alertness was noted but did not last. Pt performed bed<>Bsc stand pivot t/f with Total A, NWB on RLE maintained. Toileting Total A x 2 person assist. Small BM(hard, pt constipated) made on commode with some bloody discharge. Nurse notified. Continue with OT POC.

## 2023-06-08 NOTE — CARE UPDATE
06/08/23 0807   Skin Integrity   $ Wound Care Tech Time 15 min   Area Observed Bridge of nose   Skin Appearance without discoloration   PRE-TX-O2   Device (Oxygen Therapy) high flow nasal cannula   $ Is the patient on Low Flow Oxygen? Yes   Flow (L/min) 5   Pulse Oximetry Type Continuous   $ Pulse Oximetry - Multiple Charge Pulse Oximetry - Multiple   Respiratory Evaluation   $ Care Plan Tech Time 15 min

## 2023-06-08 NOTE — PROGRESS NOTES
INPATIENT NEPHROLOGY Progress Note  [facility]    Patient Name: Jeannie Hutchins  Date: 06/08/2023    Reason for consultation: MARSHALL    Chief Complaint:   Chief Complaint   Patient presents with    Tachycardia       History of Present Illness:  Ms. Hutchins is an 88-year-old female who was brought to the ED via EMS from [facility] due to tachycardia. Patient was initially discharged to nursing facility about 2 years ago, under hospice, but has subsequently improved and is off same.  This morning reportedly heart rate greater than 200, staff was preparing to administer metoprolol, prior to this noted to be staring off.  As per daughter oxygen level was also low, she is chronically on 2 L supplemental oxygen.  States her mother was incoherent during this time.  Patient states she was feeling unwell during this episode, lightheaded, short of breath, producing phlegm.  Denies any sore throat, fever at the facility, nausea, vomiting, diarrhea, cystitis symptoms.  She had a fall several days ago as per report, was found on floor.  Does have ulcer to the right lateral leg, daughter states she sustained few weeks ago as injury from her wheelchair.  Daughter states with previous UTI she had similar symptoms.  In the ED febrile to 101.2, heart rates in the 120s, AFib, blood pressure stable, on 10 L supplemental oxygen.  Labs with WBC 27, hemoglobin 10.3, BUN/creatinine 18/0.9, BNP 1 905, high sensitivity troponin 20, UA with 63 WBC with 3+ leukocyte with many seen.  EKG confirming atrial fibrillation with RVR.  Chest x-ray with cardiomegaly with bilateral interstitial opacities concerning for pulmonary edema.  She received 1 g acetaminophen, 1 g Rocephin, 100 mg fluconazole, 20 mg IV Lasix, 4 mg IV Zofran.  Consulted for MARSHALL.    Interval History:  6/7- hypotensive at admission, better now; on 7L oxymask, UOP 850cc  6/8- spoke with hospitalist yest- vanc level 14, UA bland, low urine Na, urine eos  neg- advised gentle fluid challenge, BP bettter, on 5L NC, UOP 850cc, SCr peaked yest and now improving    Plan of Care:    Assessment:  MRSA bacteremia  MARSHALL due to above, also s/p IV contrast, also vanc level 22- multifactorial ATN  Possible urinary retention  AF with RVR  Hypoxia- Acute on Chronic Resp Failure  Hx of D CHF, pulm HTN  Hyponatremia  Anemia  Closed R hip fracture    Plan:    - her prognosis is poor  - suspect multifactorial MARSHALL- not much more to offer beyond ongoing management and ensuring no urinary retention  - she is not a suitable candidate for RRT  - continue efforts towards infx management, rate control, euvolemia- seems to have responded to gentle IVFs and is nonoliguric- use PRN  - dose meds for CrCl < 20- no nsaids for pain or further IV contrast  - renal diet 1.5L fluid restriction  - trend H/H- stable  - pain control  - agree with comfort care if no improvement within 24-48 hours    Thank you for allowing us to participate in this patient's care. We will continue to follow.    Vital Signs:  Temp Readings from Last 3 Encounters:   06/08/23 97.5 °F (36.4 °C)   04/27/23 98.5 °F (36.9 °C) (Oral)   01/11/23 98 °F (36.7 °C)       Pulse Readings from Last 3 Encounters:   06/08/23 69   04/27/23 83   01/11/23 92       BP Readings from Last 3 Encounters:   06/08/23 (!) 140/65   04/27/23 136/70   01/11/23 (!) 152/81       Weight:  Wt Readings from Last 3 Encounters:   06/05/23 43.7 kg (96 lb 4.8 oz)   06/01/23 44.5 kg (98 lb)   04/27/23 47.7 kg (105 lb 3.6 oz)       INS/OUTS:  I/O last 3 completed shifts:  In: 430 [P.O.:230; IV Piggyback:200]  Out: 1200 [Urine:1200]  I/O this shift:  In: 60 [P.O.:60]  Out: -     Medications:  Scheduled Meds:   atorvastatin  20 mg Oral QHS    ceftaroline (TEFLARO) IVPB  300 mg Intravenous Q12H    DAPTOmycin (CUBICIN) IV (PEDS and ADULTS)  8 mg/kg Intravenous Q48H    digoxin  0.125 mg Oral Daily    famotidine  20 mg Oral Every other day    levothyroxine  75 mcg Oral  Before breakfast    linezolid  600 mg Oral Q12H    metoprolol tartrate  25 mg Oral BID     Continuous Infusions:   dilTIAZem Stopped (06/01/23 2130)     PRN Meds:.acetaminophen, acetaminophen, HYDROcodone-acetaminophen, magnesium oxide, magnesium oxide, morphine, nitroGLYCERIN, potassium bicarbonate, potassium bicarbonate, potassium bicarbonate, potassium, sodium phosphates, potassium, sodium phosphates, potassium, sodium phosphates, senna-docusate 8.6-50 mg, sodium chloride 0.9%, traZODone  No current facility-administered medications on file prior to encounter.     Current Outpatient Medications on File Prior to Encounter   Medication Sig Dispense Refill    acetaminophen (TYLENOL) 500 MG tablet Take 1 tablet (500 mg total) by mouth every 6 (six) hours as needed for Pain or Temperature greater than (100). (Patient taking differently: Take 325 mg by mouth every 6 (six) hours as needed for Pain or Temperature greater than (100).)  0    albuterol-ipratropium (DUO-NEB) 2.5 mg-0.5 mg/3 mL nebulizer solution Take 3 mLs by nebulization every 6 (six) hours. Rescue 1 Box 0    aluminum & magnesium hydroxide-simethicone (MYLANTA MAX STRENGTH) 400-400-40 mg/5 mL suspension Take 5 mLs by mouth every 6 (six) hours as needed for Indigestion.      amLODIPine (NORVASC) 5 MG tablet Take 1 tablet (5 mg total) by mouth once daily. 60 tablet 0    apixaban (ELIQUIS) 2.5 mg Tab Take 1 tablet (2.5 mg total) by mouth 2 (two) times daily.      atorvastatin (LIPITOR) 20 MG tablet Take 20 mg by mouth once daily.      carboxymethylcellulose sodium (THERATEARS) 0.25 % Drop Place 1 drop into both eyes 3 (three) times daily.      EScitalopram oxalate (LEXAPRO) 10 MG tablet Take 10 mg by mouth once daily.      famotidine (PEPCID) 20 MG tablet Take 20 mg by mouth once daily.      fluticasone propionate (FLONASE) 50 mcg/actuation nasal spray 1 spray by Each Nostril route once daily.      furosemide (LASIX) 20 MG tablet Take 1 tablet (20 mg total) by  "mouth once daily. 30 tablet 11    guaiFENesin (HUMIBID E) 400 mg Tab Take 400 mg by mouth every 12 (twelve) hours as needed.      levothyroxine (SYNTHROID) 75 MCG tablet Take 75 mcg by mouth once daily.      loratadine (CLARITIN) 10 mg tablet Take 10 mg by mouth once daily.      metoprolol tartrate (LOPRESSOR) 25 MG tablet Take 25 mg by mouth 2 (two) times daily. 0600  2000      olopatadine (PATANOL) 0.1 % ophthalmic solution Place 1 drop into both eyes 2 (two) times daily.      potassium chloride (K-TAB) 20 mEq Take 20 mEq by mouth once daily.      traZODone (DESYREL) 50 MG tablet Take 1 tablet (50 mg total) by mouth every evening. 30 tablet 0    vit C,K-Fg-ivzuh-lutein-zeaxan (PRESERVISION AREDS-2) 250-90-40-1 mg Cap Take 1 tablet by mouth once daily.      alendronate (FOSAMAX) 70 MG tablet Take 70 mg by mouth every 7 days. SATURDAYS      white petrolatum-mineral oiL (ARTIFICIAL TEARS, NELSON/MIN,) 83-15 % Oint Place into both eyes every evening. (Patient not taking: Reported on 5/30/2023) 1 each 0    [DISCONTINUED] amlodipine-atorvastatin (CADUET) 10-40 mg per tablet TAKE ONE TABLET BY MOUTH EVERY DAY (Patient taking differently: Take by mouth nightly.) 30 tablet 11    [DISCONTINUED] losartan (COZAAR) 100 MG tablet TAKE ONE TABLET BY MOUTH EVERY DAY 90 tablet 3       Review of Systems:  Sleeping    Physical Exam:  BP (!) 140/65   Pulse 69   Temp 97.5 °F (36.4 °C)   Resp 11   Ht 4' 8" (1.422 m)   Wt 43.7 kg (96 lb 4.8 oz)   SpO2 (!) 94%   BMI 21.60 kg/m²     General Appearance:    NAD, appears stated age   Head:    Normocephalic, atraumatic   Eyes:    Closed        Mouth:   Moist mucus membranes, no thrush or oral lesions, normal      dentition   Back:     No CVA tenderness   Lungs:     Clear to auscultation bilaterally, no wheeze, crackles, rales      or rhonchi, symmetric air movement, respirations unlabored   Heart:    Regular rate and rhythm, S1 and S2 normal, no murmur, rub   or gallop   Abdomen:     " Soft, non-tender, non-distended, bowel sounds active all four   quadrants, no RT or guarding, no masses, no organomegaly   Extremities:   Warm and well perfused, distal pulses intact, no cyanosis or    peripheral edema   MSK:   No joint or muscle swelling, tenderness or deformity   Skin:   Skin color, texture, turgor normal, no rashes or lesions   Neurologic/Psychiatric:   Calm     Results:  Lab Results   Component Value Date     (L) 06/08/2023    K 4.2 06/08/2023    CL 93 (L) 06/08/2023    CO2 26 06/08/2023     (H) 06/08/2023    CREATININE 3.0 (H) 06/08/2023    CALCIUM 8.9 06/08/2023    ANIONGAP 15 06/08/2023    ESTGFRAFRICA 52.5 (A) 06/17/2022    EGFRNONAA 45.6 (A) 06/17/2022       Lab Results   Component Value Date    CALCIUM 8.9 06/08/2023    PHOS 3.7 06/17/2022       Recent Labs   Lab 06/08/23  0505   WBC 16.13*   RBC 3.86*   HGB 10.1*   HCT 32.0*   *   MCV 83   MCH 26.2*   MCHC 31.6*         I have personally reviewed pertinent radiological imaging and reports.    I have spent > 35 minutes providing care for this patient for the above diagnoses. These services have included chart/data/imaging review, evaluation, exam, formulation of plan, , note preparation, and discussions with staff involved in this patient's care.    Daniele Lima MD MPH  [facility] [address] [address]  [phone] (p)  [phone] (f)

## 2023-06-08 NOTE — PROGRESS NOTES
Consult Note  Infectious Disease    Reason for Consult:  MRSA bacteremia    HPI: Jeannie Hutchins is a 88 y.o. female Nursing home resident With a history of diastolic heart failure, oxygen dependence, atrial fibrillation with recent 6 day stay in late April for worsening oxygen requirement, right lower extremity cellulitis associated with an injury (where she hit her lower leg on the wheelchair).  She was given oxygen support, azithromycin, ceftriaxone and Lasix, required intensive care on admission, followed by steroids and doxycycline for lungs and leg.    She presented to the emergency room on 05/30 with tachycardia, generalized weakness and a syncopal episode.  She had had a fall last week with x-rays done at the nursing facility which were negative.  She denies feeling ill prior to the fall and relates this to not donning her slippers before trying to move around.  Her heart rate was found to be 220, in atrial fibrillation and with a temperature of 101.7°.  Workup included CBC with white blood cells 27,000, BNP 1900, urinalysis with 63 white blood cells and many yeast, chest x-ray with pulmonary edema and on admission was placed on ceftriaxone and fluconazole.  The wound on the right lower leg is largely healed, she does have a bruise about the right hip.  She was noted to have pain in the shoulder and hip and x-rays were ordered with findings of severe glenohumeral and acromioclavicular osteoarthrosis and a suspected right femoral neck fracture with a CT scan showing a subcapital fracture and cortical offset along the inferior edge of the femoral head/neck junction.  Fortunately there is no sign of hemarthrosis or increased joint fluid.  The right iliacus muscle is edematous or enlarged compared to the left. There may be a tiny bit of fluid in her trochanteric.  Blood cultures from 05/30 became positive this morning with GPC identified by [facility] is MRSA. Repeat blood cultures have been drawn.  The urine culture  "is growing only yeast("clean catch"). (MRSA screen on admission was negative).  Ceftriaxone was stopped and vancomycin was ordered.  Fever has resolved. WBC remain elevated. Still on diltiazem drip and requiring 6 liters of oxygen. Urine output is not being measured as she is incontinent.     6/2: interim reviewed. Afebrile. Oxygen requirement is much higher. Orthopedics consult pending. yesterday's blood cultures are positive, Vanc ALICIA for the MRSA is 1, trough was low. WBC improved. BUN up to 38. Ct chest reviewed and there is no focal pneumonia to blame her bacteremia on.  I had the chest x-ray repeated and there are no new infiltrates, only mild perihilar fullness.  She is alert, still in pain from her right hip but I was able to perform range-of-motion a little, more than I expected to be able to.  The remainder of the joints of the lower extremities have no sign or symptom of joint infection.  She has no peripheral signs of endocarditis.  Understandably her son is concerned about the stress of a transesophageal echo.  Discussed with him that we would not do it on someone who has this oxygen requirement and I explained that this would help discern the length of therapy.  I also discussed with him that in some cases when the evaluation is more dangerous than the treatment, that we elect to treat people for the worst possible diagnosis and he is in favor of this rather than the BLADIMIR.  6/3: Interim reviewed.  6/2 blood cultures are positive for MRSA, 6 3 blood cultures after the initiation of daptomycin have been drawn.  She is requiring roughly the same amount of oxygen.  Discussed with Dr. Ndiaye of Pulmonary.  Her right hip is no worse, but her blood pressure does not tolerate opiates very well.  She is eating a little bit more per her son's report.  Examination of the hip allows me to perform some flexion without severe pain and this is an improvement from 2 days ago.  Talked with her son about whether he would " be in favor of a right hip washout should imaging or persistent bacteremia suggest that the hip was infected and discussed with him the potential for anesthetic complications and/or inability to liberate from the ventilator.  CT scan of the hip has been ordered by Hospital Medicine.  6/4: Interim reviewed.  Most recent blood cultures, since the addition of daptomycin are still positive  She is requiring less oxygen.  She is brighter and seems to be in less pain.  She is breathing comfortably and able to express herself well though she is very hard of hearing  6/5: interim reviewed. Afebrile. Blood cultures remain positive through 6/4 am. Cr is worse today(diuretics, contrast and vanc).  She personally has no new complaints.  Her daughters at the bedside assisting with her lunch.  She remains bit confused, requiring a modest amount of oxygen.  Her pain seems well controlled.  I reviewed her last CT of the pelvis again and there may be a hypodense area in the right iliacus muscle.  If this were truly a site of infection I would expected to be from the bacteremia and not the cause of the bacteremia  6/6: interim reviewed. Blood cultures from 6/4 are positive. Creatinine is worse and urine output is worse, .  But she may be in retention bladder feels distended, is uncomfortable to palpation.  Right hip is no different.  Did not respond to diuretics yesterday.  10 hours because of somnolence.  6/7: interim reviewed. Blood culture from 6/6 is negative so far. BUN and Cr are worse. No obstruction on US of kidneys. Urine output 650. Was not in retention. 7 liters oxygen in progress. Nephrology consult in progress. Urine eos negative. Daughter at bedside finds her sleepier and more confused. Received 3 doses of IV tylenol and no opiates since yesterday. Eating and drinking small amounts. Seems to be more comfortable pain wise.   6/8: interim reviewed. Resting quietly. Not eating much, maybe 25% at most. Sounds wetter on exam  Render Post-Care Instructions In Note?: yes but oxygen requirement not higher. Urine output is encouraging, 1 liter/24 h. Blood cultures from 6/6 have GPC but are negative from 6/7 so far. CRP falling      EXAM & DIAGNOSTICS REVIEWED:   Vitals:     Temp:  [97.1 °F (36.2 °C)-97.9 °F (36.6 °C)]   Temp: 97.9 °F (36.6 °C) (06/08/23 1100)  Pulse: 71 (06/08/23 1500)  Resp: (!) 28 (06/08/23 1629)  BP: (!) 158/65 (06/08/23 1500)  SpO2: 95 % (06/08/23 1500)    Intake/Output Summary (Last 24 hours) at 6/8/2023 1827  Last data filed at 6/8/2023 1651  Gross per 24 hour   Intake 60 ml   Output 1075 ml   Net -1015 ml       General:  In NAD. Sleepy but arousable comfortable    Eyes:  Anicteric,   EOMI,    ENT:  No ulcers, exudates, thrush, nares patent, dentition is fair. Hard of hearing  Neck:  supple,   Lungs: bibasilar crackles,increased,  oxygen at 5 liters, no distress  Heart:  iRRR,   Abd:  Soft, NT, ND, normal BS,    :  Ta,  Musc:  Joints without effusion, swelling, erythema, synovitis, no redness, bruising or fluctuance around right hip   Skin:  No rashes   Neuro:              Arousable,  , face symmetric, moves all extremities,    Psych: Calm,    Lymphatic:     Extrem: No edema, erythema, phlebitis, cellulitis, warm and well perfused  VAD:  Peripheral, midline left arm 6/6     Isolation:  contact  Wound: Right lower lateral leg prior ulcer is healed    Right hip    There is no redness about the right hip and the bruise is improved.  6/3    Lab Results   Component Value Date    PROCAL 1.38 (H) 06/01/2023    PROCAL <0.05 04/22/2023    PROCAL 0.30 04/21/2023         General Labs reviewed:  Recent Labs   Lab 06/06/23  0432 06/07/23  0530 06/08/23  0505   WBC 18.75* 15.84* 16.13*   HGB 9.9* 8.6* 10.1*   HCT 31.7* 26.8* 32.0*    425 500*       Recent Labs   Lab 06/06/23  0432 06/07/23  0530 06/08/23  0400   * 130* 134*   K 4.3 4.1 4.2   CL 92* 93* 93*   CO2 24 29 26   BUN 86* 100* 101*   CREATININE 2.8* 3.2* 3.0*   CALCIUM 8.8 8.2* 8.9   PROT 5.8* 5.3*  6.0   BILITOT 1.1* 1.0 1.2*   ALKPHOS 121 106 119   ALT 26 20 21   AST 25 17 18     Recent Labs   Lab 06/06/23  0432 06/07/23  0530 06/08/23  0400   CRP 22.28* 19.48* 18.14*         Micro:  Microbiology Results (last 7 days)       Procedure Component Value Units Date/Time    Blood culture [[id number]] Collected: 06/08/23 0845    Order Status: Completed Specimen: Blood Updated: 06/08/23 1717     Blood Culture, Routine No Growth to date    Narrative:      Collection has been rescheduled by EL3 at 06/08/2023 05:15 Reason:   Unable to collect  Collection has been rescheduled by EL3 at 06/08/2023 05:15 Reason:   Unable to collect    Blood culture [[id number]] Collected: 06/07/23 0831    Order Status: Completed Specimen: Blood Updated: 06/08/23 1032     Blood Culture, Routine No Growth to date      No Growth to date    Blood culture [[id number]]  (Abnormal)  (Susceptibility) Collected: 06/04/23 1559    Order Status: Completed Specimen: Blood Updated: 06/08/23 0700     Blood Culture, Routine Gram stain aer bottle: Gram positive cocci      Positive results previously called      METHICILLIN RESISTANT STAPHYLOCOCCUS AUREUS  Known MRSA patient      Blood culture [[id number]] Collected: 06/06/23 0724    Order Status: Completed Specimen: Blood Updated: 06/07/23 2343     Blood Culture, Routine Gram stain aer bottle: Gram positive cocci      Positive results previously called 06/07/2023  23:43 KS3    Narrative:      Collection has been rescheduled by EL3 at 06/06/2023 05:13 Reason:   Unable to collect  Collection has been rescheduled by EL3 at 06/06/2023 05:13 Reason:   Unable to collect    Blood culture [[id number]]  (Abnormal) Collected: 06/04/23 0609    Order Status: Completed Specimen: Blood Updated: 06/06/23 0649     Blood Culture, Routine Gram stain aer bottle: Gram positive cocci      Gram stain shirley bottle: Gram positive cocci      Results called to and read back by:ELIZABETH Melendez;  06/05/2023      00:50 HEIKE       Duration Of Freeze Thaw-Cycle (Seconds): 10 METHICILLIN RESISTANT STAPHYLOCOCCUS AUREUS  For susceptibility see order #[id number]      Blood culture [[id number]]  (Abnormal) Collected: 06/03/23 0713    Order Status: Completed Specimen: Blood Updated: 06/06/23 0648     Blood Culture, Routine Gram stain aer bottle: Gram positive cocci      Positive results previously called 06/03/2023  23:13 KS3      METHICILLIN RESISTANT STAPHYLOCOCCUS AUREUS  For susceptibility see order #[id number]      Rapid Organism ID by PCR (from Blood culture) [[id number]]  (Abnormal) Collected: 06/04/23 0609    Order Status: Completed Updated: 06/05/23 0140     Enterococcus faecalis Not Detected     Enterococcus faecium Not Detected     Listeria monocytogenes Not Detected     Staphylococcus spp. See species for ID     Comment: critical result(s) called and verbal readback obtained from ELIZABETH Meyer by CD3 06/05/2023 01:40          Staphylococcus aureus Detected     Comment: critical result(s) called and verbal readback obtained from ELIZABETH Meyer by CD3 06/05/2023 01:40          Staphylococcus epidermidis Not Detected     Staphylococcus lugdunensis Not Detected     Streptococcus species Not Detected     Streptococcus agalactiae Not Detected     Streptococcus pneumoniae Not Detected     Streptococcus pyogenes Not Detected     Acinetobacter calcoaceticus/baumannii complex Not Detected     Bacteroides fragilis Not Detected     Enterobacterales Not Detected     Enterobacter cloacae complex Not Detected     Escherichia coli Not Detected     Klebsiella aerogenes Not Detected     Klebsiella oxytoca Not Detected     Klebsiella pneumoniae group Not Detected     Proteus Not Detected     Salmonella sp Not Detected     Serratia marcescens Not Detected     Haemophilus influenzae Not Detected     Neisseria meningtidis Not Detected     Pseudomonas aeruginosa Not Detected     Stenotrophomonas maltophilia Not Detected     Candida albicans Not Detected     Candida auris Not Detected      Post-Care Instructions: I reviewed with the patient in detail post-care instructions. Patient is to wear sunprotection, and avoid picking at any of the treated lesions. Pt may apply Vaseline to crusted or scabbing areas. Candelaria glabrata Not Detected     Candida krusei Not Detected     Candida parapsilosis Not Detected     Candida tropicalis Not Detected     Cryptococcus neoformans/gattii Not Detected     CTX-M (ESBL ) Test not applicable     IMP (Carbapenem resistant) Test not applicable     KPC resistance gene (Carbapenem resistant) Test not applicable     mcr-1  Test not applicable     mec A/C  Test not applicable     mec A/C and MREJ (MRSA) gene Detected     Comment: critical result(s) called and verbal readback obtained from ELIZABETH Meyer by CD3 06/05/2023 01:40          NDM (Carbapenem resistant) Not Detected     OXA-48-like (Carbapenem resistant) Not Detected     van A/B (VRE gene) Not Detected     VIM (Carbapenem resistant) Not Detected    Narrative:         critical result(s) called and verbal readback obtained from ELIZABETH Meyer by CD3 06/05/2023 01:40    Blood culture [[id number]]     Order Status: Canceled Specimen: Blood     Blood culture [[id number]]  (Abnormal) Collected: 06/02/23 0839    Order Status: Completed Specimen: Blood Updated: 06/03/23 0802     Blood Culture, Routine Gram stain aer bottle: Gram positive cocci      Positive results previously called 06/02/2023  20:46 KS3      METHICILLIN RESISTANT STAPHYLOCOCCUS AUREUS  For susceptibility see order #[id number]      Blood culture [[id number]]  (Abnormal) Collected: 06/01/23 0858    Order Status: Completed Specimen: Blood Updated: 06/03/23 0801     Blood Culture, Routine Gram stain aer bottle: Gram positive cocci      Positive results previously called 06/02/2023  01:59 KS3      METHICILLIN RESISTANT STAPHYLOCOCCUS AUREUS  For susceptibility see order #[id number]      Blood culture [[id number]]  (Abnormal) Collected: 06/01/23 0859    Order Status: Completed Specimen: Blood Updated: 06/03/23 0800     Blood Culture, Routine Gram stain aer bottle: Gram positive cocci      Positive results previously called 06/02/2023  01:07 KS3       METHICILLIN RESISTANT STAPHYLOCOCCUS AUREUS  For susceptibility see order #[id number]      Urine culture [[id number]]  (Abnormal) Collected: 05/30/23 0909    Order Status: Completed Specimen: Urine from Clean Catch Updated: 06/03/23 0635     Urine Culture, Routine DANILO ALBICANS  > 100,000 cfu/ml  Treatment of asymptomatic candiduria is not recommended (except for   specific populations). Candida isolated in the urine typically   represents colonization. If an indwelling urinary catheter is present  it should be removed or replaced.      Blood culture [[id number]]  (Abnormal) Collected: 05/30/23 1115    Order Status: Completed Specimen: Blood Updated: 06/02/23 0734     Blood Culture, Routine Gram stain shirley bottle: Gram positive cocci      Positive results previously called 05/31/2023  03:29 KS3      Gram stain aer bottle: Gram positive cocci      Positive results previously called      METHICILLIN RESISTANT STAPHYLOCOCCUS AUREUS  Known MRSA patient  For susceptibility see order #[id number]      Blood culture [[id number]]  (Abnormal)  (Susceptibility) Collected: 05/30/23 1130    Order Status: Completed Specimen: Blood Updated: 06/02/23 0733     Blood Culture, Routine Gram stain shirley bottle: Gram positive cocci      Results called to and read back by: Triston Brown RN on [address]      05/31/2023  02:32 KS3      Gram stain aer bottle: Gram positive cocci      Positive results previously called 05/31/2023  04:59 KS3      METHICILLIN RESISTANT STAPHYLOCOCCUS AUREUS  Known MRSA patient              Imaging Reviewed:   CXR   CT bilateral hips  1. Pre-existing osteoarthritis right hip with evidence of subcapital fracture and cortical offset along the inferior edge of the femoral head neck junction.  2. No evidence of significant osseous displacement..  3. Chronic degenerative arthrosis about the left hip with prominent osteophytic spurs encircling the base of the femoral head.    CT hip right 6/3  Subcapital fracture of  Number Of Freeze-Thaw Cycles: 1 freeze-thaw cycle Detail Level: Detailed the right femoral neck.  Thickening of the musculature around the right hip is probably in relation to trauma. A low-attenuation focus in the musculature anterior to the hip is probably hematoma. No abnormal enhancement to suggest abscess formation can be seen although I cannot absolutely rule out out.    Cardiology:  TTE   (poor imaging of valves)  Atrial fibrillation observed.  The left ventricle is normal in size with concentric remodeling and normal systolic function.  The estimated ejection fraction is 55%.  Moderate tricuspid regurgitation.  No clear evidence of endocarditis. If clinical suspicion persist, consider alternative cardiac imaging like BLADIMIR for further evaluation.    IMPRESSION & PLAN   1. MRSA bacteremia. 5/30-6/6   Source is not readily apparent. 6/6 negative so far   She has too much pain from fracture to reliably discern whether she has a septic hip. There is no increased fluid on the CT, there may be a tiny amount of fluid in trochanteric bursa. The right iliacus is larger than left. Repeat Ct 6/3 shows no evolution of a focus of infection, ?hypodense area right iliacus. If there was, potentially curable with medical therapy. This would represent an area of seeding as opposed to the origin of the bacteremia   There is no lower respiratory tract infection present on CT of the chest    2. Right subcapital hip fracture, soft tissue trauma right shoulder  3. Oxygen dependent CHF,, pulmonary hypertension   Hypoxemic respiratory failure, CTA negative    4. Candiduria is likely reflective of vaginal contamination of specimen(there is no way she could give a clean catch specimen)  5. MARSHALL. Diuretics, sepsis, vanc and contrast, may be plateauing,     6. Elderly, frail and debilitated      Recommendations:  Continue  daptomycin,   ceftaroline , adjusted doses     And  oral Zyvox  Serial  blood cultures until clear   Very poor candidate for dialysis     Trend crp,         Discussed with nursing     Medical  Decision Making during this encounter was  [_] Low Complexity  [_] Moderate Complexity  [xxx  ] High Complexity       Medical Necessity Information: It is in your best interest to select a reason for this procedure from the list below. All of these items fulfill various CMS LCD requirements except the new and changing color options. Number Of Freeze-Thaw Cycles: 2 freeze-thaw cycles Include Z78.9 (Other Specified Conditions Influencing Health Status) As An Associated Diagnosis?: No Consent: The patient's verbal consent was obtained including but not limited to risks of crusting, scabbing, blistering, scarring, darker or lighter pigmentary change, recurrence, incomplete removal and infection. Post-Care Instructions: I reviewed with the patient in detail post-care instructions. Patient is to wear sunprotection, and avoid picking at any of the treated lesions. Pt may apply Vaseline or Aquaphor to crusted or scabbing areas. Sample of Aquaphor provided. Medical Necessity Clause: Traumatized

## 2023-06-08 NOTE — PT/OT/SLP PROGRESS
Physical Therapy Treatment    Patient Name:  Jeannie Hutchins   MRN:  [id number]    Recommendations:     Discharge Recommendations: nursing facility, skilled  Discharge Equipment Recommendations: none  Barriers to discharge:  increased assist with mobility, increased burden of care, orthopedic precautions, pain    Assessment:     Jeannie Hutchins is a 88 y.o. female admitted with a medical diagnosis of Sepsis.  She presents with the following impairments/functional limitations: weakness, impaired endurance, impaired functional mobility, gait instability, impaired balance, impaired cognition, decreased safety awareness, pain, orthopedic precautions, impaired self care skills, impaired cardiopulmonary response to activity.    Pt sleeping and difficult to rouse. Pt required max verbal cuing to remain alert and participate. Pt's daughter entered and assisted with encouraging pt to participate and keeping pt alert. Pt required max to total assist x 1-2 persons for bed mobility. Daughter assisted with mobility as needed. Pt required max assist to min assist for sitting balance due to left posterolateral lean, offloading weight from R hip. Pt able to tolerate sitting EOB about 2 minutes before requesting back to bed due to pain and fatigue. Pt positioned with HOB elevated at 90 degrees as speech therapist was planning on working with pt.    Rehab Prognosis: Fair; patient would benefit from acute skilled PT services to address these deficits and reach maximum level of function.    Recent Surgery: * No surgery found *      Plan:     During this hospitalization, patient to be seen 6 x/week to address the identified rehab impairments via gait training, therapeutic activities, therapeutic exercises and progress toward the following goals:    Plan of Care Expires:  07/03/23    Subjective     Chief Complaint: R hip pain with mobility  Patient/Family Comments/goals: to get better  Pain/Comfort:  Pain Rating 1: other (see comments) (not  rated)  Location - Side 1: Right  Location 1: hip  Pain Addressed 1: Reposition, Distraction, Cessation of Activity  Pain Rating Post-Intervention 1: 0/10 (at rest)      Objective:     Communicated with RN prior to session.  Patient found HOB elevated with telemetry, peripheral IV, blood pressure cuff, pulse ox (continuous), oxygen, david catheter upon PT entry to room.     General Precautions: Standard, fall, contact (MRSA)  Orthopedic Precautions: RLE non weight bearing  Braces:    Respiratory Status: Nasal cannula, flow 5 L/min     Functional Mobility:  Bed Mobility:     Rolling Left:  maximal assistance  Rolling Right: maximal assistance, total assistance, and partial roll  Supine to Sit: maximal assistance and total assistance  Sit to Supine: total assistance  Balance: sitting EOB with max to min assist due to left posterolateral lean      AM-PAC 6 CLICK MOBILITY          Treatment & Education:  Pt educated on importance of time OOB, importance of intermittent mobility, safe techniques for transfers/ambulation, discharge recommendations/options, and use of call light for assistance and fall prevention.      Patient left HOB elevated with all lines intact, call button in reach, bed alarm on, RN notified, and daughter present..    GOALS:   Multidisciplinary Problems       Physical Therapy Goals          Problem: Physical Therapy    Goal Priority Disciplines Outcome Goal Variances Interventions   Physical Therapy Goal     PT, PT/OT Ongoing, Progressing     Description: Goals to be met by: 2023     Patient will increase functional independence with mobility by performin. Supine to sit with MInimal Assistance  2. Sit to stand transfer with Minimal Assistance and NWB right LE  3. Sit to supine with minimal assistance.      6/3/23: new order received so patient reevaluated and goals changes as appropriate.                       Time Tracking:     PT Received On: 23  PT Start Time: 1350     PT Stop  Time: 1415  PT Total Time (min): 25 min     Billable Minutes: Therapeutic Activity 25    Treatment Type: Treatment  PT/PTA: PTA     Number of PTA visits since last PT visit: 1 06/08/2023

## 2023-06-09 PROBLEM — Z71.89 GOALS OF CARE, COUNSELING/DISCUSSION: Status: ACTIVE | Noted: 2023-06-09

## 2023-06-09 LAB
ALBUMIN SERPL BCP-MCNC: 2.4 G/DL (ref 3.5–5.2)
ALP SERPL-CCNC: 122 U/L (ref 55–135)
ALT SERPL W/O P-5'-P-CCNC: 18 U/L (ref 10–44)
ANION GAP SERPL CALC-SCNC: 11 MMOL/L (ref 8–16)
AST SERPL-CCNC: 18 U/L (ref 10–40)
BACTERIA BLD CULT: ABNORMAL
BASOPHILS # BLD AUTO: 0.03 K/UL (ref 0–0.2)
BASOPHILS NFR BLD: 0.2 % (ref 0–1.9)
BILIRUB SERPL-MCNC: 1.2 MG/DL (ref 0.1–1)
BUN SERPL-MCNC: 94 MG/DL (ref 8–23)
CALCIUM SERPL-MCNC: 8.4 MG/DL (ref 8.7–10.5)
CHLORIDE SERPL-SCNC: 95 MMOL/L (ref 95–110)
CK SERPL-CCNC: 21 U/L (ref 20–180)
CO2 SERPL-SCNC: 28 MMOL/L (ref 23–29)
CREAT SERPL-MCNC: 2.6 MG/DL (ref 0.5–1.4)
CRP SERPL-MCNC: 16.37 MG/DL
DIFFERENTIAL METHOD: ABNORMAL
EOSINOPHIL # BLD AUTO: 0.1 K/UL (ref 0–0.5)
EOSINOPHIL NFR BLD: 0.3 % (ref 0–8)
ERYTHROCYTE [DISTWIDTH] IN BLOOD BY AUTOMATED COUNT: 25.1 % (ref 11.5–14.5)
EST. GFR  (NO RACE VARIABLE): 17.3 ML/MIN/1.73 M^2
GLUCOSE SERPL-MCNC: 120 MG/DL (ref 70–110)
HCT VFR BLD AUTO: 29.3 % (ref 37–48.5)
HGB BLD-MCNC: 9.3 G/DL (ref 12–16)
IMM GRANULOCYTES # BLD AUTO: 0.38 K/UL (ref 0–0.04)
IMM GRANULOCYTES NFR BLD AUTO: 2.4 % (ref 0–0.5)
LYMPHOCYTES # BLD AUTO: 0.7 K/UL (ref 1–4.8)
LYMPHOCYTES NFR BLD: 4.4 % (ref 18–48)
MAGNESIUM SERPL-MCNC: 2.3 MG/DL (ref 1.6–2.6)
MCH RBC QN AUTO: 25.9 PG (ref 27–31)
MCHC RBC AUTO-ENTMCNC: 31.7 G/DL (ref 32–36)
MCV RBC AUTO: 82 FL (ref 82–98)
MONOCYTES # BLD AUTO: 0.9 K/UL (ref 0.3–1)
MONOCYTES NFR BLD: 5.8 % (ref 4–15)
NEUTROPHILS # BLD AUTO: 13.7 K/UL (ref 1.8–7.7)
NEUTROPHILS NFR BLD: 86.9 % (ref 38–73)
NRBC BLD-RTO: 0 /100 WBC
PLATELET # BLD AUTO: 557 K/UL (ref 150–450)
PMV BLD AUTO: 9.3 FL (ref 9.2–12.9)
POTASSIUM SERPL-SCNC: 3.9 MMOL/L (ref 3.5–5.1)
PROT SERPL-MCNC: 5.8 G/DL (ref 6–8.4)
RBC # BLD AUTO: 3.59 M/UL (ref 4–5.4)
SODIUM SERPL-SCNC: 134 MMOL/L (ref 136–145)
WBC # BLD AUTO: 15.8 K/UL (ref 3.9–12.7)

## 2023-06-09 PROCEDURE — 97530 THERAPEUTIC ACTIVITIES: CPT | Mod: CQ

## 2023-06-09 PROCEDURE — 94660 CPAP INITIATION&MGMT: CPT

## 2023-06-09 PROCEDURE — 36415 COLL VENOUS BLD VENIPUNCTURE: CPT | Performed by: STUDENT IN AN ORGANIZED HEALTH CARE EDUCATION/TRAINING PROGRAM

## 2023-06-09 PROCEDURE — 99232 SBSQ HOSP IP/OBS MODERATE 35: CPT | Mod: ,,, | Performed by: INTERNAL MEDICINE

## 2023-06-09 PROCEDURE — 99223 1ST HOSP IP/OBS HIGH 75: CPT | Mod: ,,, | Performed by: INTERNAL MEDICINE

## 2023-06-09 PROCEDURE — 27000221 HC OXYGEN, UP TO 24 HOURS

## 2023-06-09 PROCEDURE — 80053 COMPREHEN METABOLIC PANEL: CPT | Performed by: STUDENT IN AN ORGANIZED HEALTH CARE EDUCATION/TRAINING PROGRAM

## 2023-06-09 PROCEDURE — 86140 C-REACTIVE PROTEIN: CPT | Performed by: INTERNAL MEDICINE

## 2023-06-09 PROCEDURE — 85025 COMPLETE CBC W/AUTO DIFF WBC: CPT | Performed by: STUDENT IN AN ORGANIZED HEALTH CARE EDUCATION/TRAINING PROGRAM

## 2023-06-09 PROCEDURE — 99223 PR INITIAL HOSPITAL CARE,LEVL III: ICD-10-PCS | Mod: ,,, | Performed by: INTERNAL MEDICINE

## 2023-06-09 PROCEDURE — 63600175 PHARM REV CODE 636 W HCPCS: Mod: JZ,JG | Performed by: INTERNAL MEDICINE

## 2023-06-09 PROCEDURE — 63600175 PHARM REV CODE 636 W HCPCS: Performed by: STUDENT IN AN ORGANIZED HEALTH CARE EDUCATION/TRAINING PROGRAM

## 2023-06-09 PROCEDURE — 94761 N-INVAS EAR/PLS OXIMETRY MLT: CPT

## 2023-06-09 PROCEDURE — 25000003 PHARM REV CODE 250: Performed by: STUDENT IN AN ORGANIZED HEALTH CARE EDUCATION/TRAINING PROGRAM

## 2023-06-09 PROCEDURE — 25000003 PHARM REV CODE 250: Performed by: INTERNAL MEDICINE

## 2023-06-09 PROCEDURE — 87040 BLOOD CULTURE FOR BACTERIA: CPT | Performed by: INTERNAL MEDICINE

## 2023-06-09 PROCEDURE — 99900031 HC PATIENT EDUCATION (STAT)

## 2023-06-09 PROCEDURE — 99232 PR SUBSEQUENT HOSPITAL CARE,LEVL II: ICD-10-PCS | Mod: ,,, | Performed by: INTERNAL MEDICINE

## 2023-06-09 PROCEDURE — 99900035 HC TECH TIME PER 15 MIN (STAT)

## 2023-06-09 PROCEDURE — 82550 ASSAY OF CK (CPK): CPT | Performed by: STUDENT IN AN ORGANIZED HEALTH CARE EDUCATION/TRAINING PROGRAM

## 2023-06-09 PROCEDURE — 83735 ASSAY OF MAGNESIUM: CPT | Performed by: STUDENT IN AN ORGANIZED HEALTH CARE EDUCATION/TRAINING PROGRAM

## 2023-06-09 PROCEDURE — 21000000 HC CCU ICU ROOM CHARGE

## 2023-06-09 RX ORDER — HYDROMORPHONE HYDROCHLORIDE 1 MG/ML
0.25 INJECTION, SOLUTION INTRAMUSCULAR; INTRAVENOUS; SUBCUTANEOUS EVERY 6 HOURS PRN
Status: DISCONTINUED | OUTPATIENT
Start: 2023-06-09 | End: 2023-06-10

## 2023-06-09 RX ORDER — LINEZOLID 2 MG/ML
600 INJECTION, SOLUTION INTRAVENOUS
Status: DISCONTINUED | OUTPATIENT
Start: 2023-06-09 | End: 2023-06-12

## 2023-06-09 RX ORDER — DEXTROMETHORPHAN POLISTIREX 30 MG/5 ML
1 SUSPENSION, EXTENDED RELEASE 12 HR ORAL ONCE
Status: COMPLETED | OUTPATIENT
Start: 2023-06-09 | End: 2023-06-09

## 2023-06-09 RX ORDER — HYDROMORPHONE HYDROCHLORIDE 1 MG/ML
0.5 INJECTION, SOLUTION INTRAMUSCULAR; INTRAVENOUS; SUBCUTANEOUS EVERY 4 HOURS PRN
Status: DISCONTINUED | OUTPATIENT
Start: 2023-06-09 | End: 2023-06-09

## 2023-06-09 RX ORDER — HYDROMORPHONE HYDROCHLORIDE 1 MG/ML
0.5 INJECTION, SOLUTION INTRAMUSCULAR; INTRAVENOUS; SUBCUTANEOUS ONCE
Status: COMPLETED | OUTPATIENT
Start: 2023-06-09 | End: 2023-06-09

## 2023-06-09 RX ADMIN — HYDROMORPHONE HYDROCHLORIDE 0.25 MG: 0.5 INJECTION, SOLUTION INTRAMUSCULAR; INTRAVENOUS; SUBCUTANEOUS at 10:06

## 2023-06-09 RX ADMIN — MINERAL OIL 1 ENEMA: 100 ENEMA RECTAL at 10:06

## 2023-06-09 RX ADMIN — HYDROMORPHONE HYDROCHLORIDE 0.5 MG: 0.5 INJECTION, SOLUTION INTRAMUSCULAR; INTRAVENOUS; SUBCUTANEOUS at 10:06

## 2023-06-09 RX ADMIN — LINEZOLID 600 MG: 600 INJECTION, SOLUTION INTRAVENOUS at 06:06

## 2023-06-09 RX ADMIN — CEFTAROLINE FOSAMIL 300 MG: 600 POWDER, FOR SOLUTION INTRAVENOUS at 02:06

## 2023-06-09 NOTE — CONSULTS
[facility]  Palliative Medicine  Consult Note    Patient Name: Jeannie Hutchins  MRN: [id number]  Admission Date: 5/30/2023  Hospital Length of Stay: 10 days  Code Status: DNR   Attending Provider: Cristian Baker, *  Consulting Provider: Shamar Rivas MD  Primary Care Physician: Primary Doctor No  Principal Problem:Sepsis    Patient information was obtained from patient, relative(s), past medical records and primary team.      Inpatient consult to Palliative Care  Consult performed by: Shamar Rivas MD  Consult ordered by: Cristian Baker MD        Assessment/Plan:     Palliative Care  Goals of care, counseling/discussion  I reviewed her chart and discussed her case with her team.      I examined Ms. Hutchins at bedside.  She lacks capacity for complex medical decision-making.  I spoke with her son, Dane, at bedside.      I introduced myself and my role as palliative care physician.  They were agreeable to speaking.      We discussed her medical illness, prognosis, and values in detail.      Briefly, they understand that she was quite debilitated due to multiple underlying medical issues including hip fracture, MRSA infection, renal failure that is improving, and respiratory failure that is near baseline.    We discussed prognosis.  I worry her time is best estimate in terms of weeks to months in his setting of an elderly lady with an unrepaired hip fracture with multiple underlying comorbidities.  He was not at all surprised by this news.    He explains that she was a fighter in his willing to go through reasonable measures in order to prolong her life.  She would not want heroic measures or invasive surgeries.  She was willing to go through further hospitalization in order to be medically optimized for discharge.  It is important for her pain to be controlled.  She otherwise does not have any worries or fears and does not fear dying.    We spoke a bit back and forth.  He was  asking excellent questions.    Ultimately, I recommended that we continue supportive measures in order to optimize her for discharge.  When she was ready for discharge I recommended discharging with the services of hospice in place.  If she does better than I would expect and and is able to consider physical therapy in the coming months, then she could revoke hospice and move in that direction.  Or if she was complications along the way, hospice would be in place to help manage her symptoms and prevent her suffering.  He understood and agreed with that recommendation.  In fact, that is what he and his siblings have been considering at discharge prior to me making that recommendation.    I appreciate being involved.  I hope I have been helpful.        Thank you for your consult. I will follow-up with patient. Please contact us if you have any additional questions.    Subjective:     HPI:   88-year-old female with multiple medical problems significant for heart failure with preserved ejection fraction, atrial fibrillation, chronic hypoxic respiratory failure on home oxygen, hypertension, and hypothyroidism.  She initially presented with altered mental status and tachycardia.  She is currently admitted and being treated for acute chronic hypoxic respiratory failure, right hip fracture, acute on chronic exacerbation of heart failure, AFib with RVR, MRSA bacteremia, and acute on chronic renal failure.  At this point given her age and multiple comorbidities her prognosis appears to be increasingly grim.  I have been asked to assist with goals of care.      Hospital Course:  No notes on file    Interval History: See HPI    Past Medical History:   Diagnosis Date    Anticoagulant long-term use     Eliquis    Arthritis     Atrial fibrillation     Blood transfusion     Carotid stenosis     CHF (congestive heart failure)     Edema     Generalized anxiety disorder 1/23/2018    Dr. Mckeon's eval 1/23/18: She does  appear to have a JERRI because of the persistent worries that she has.  These worries predominate her day.  She would probably do well with prevention therapy such as SSRI/SNRI to help control the physical symptoms of the anxiety.  Problem at this point in time is her electrolyte abnormalities.  There is a slight risk of hyponatremia with these medications and    Hearing loss     [place] (hard of hearing)     Hypercholesterolemia     Hypertension     On home oxygen therapy     Psychiatric problem     Thyroid disease        Past Surgical History:   Procedure Laterality Date    CARDIOVERSION  10/19/2020    Procedure: Cardioversion;  Surgeon: Brandon Elias MD;  Location: [facility] CATH LAB;  Service: Cardiology;;    CARPAL TUNNEL RELEASE  2012    right hand    ESOPHAGOGASTRODUODENOSCOPY Left 8/29/2018    Procedure: EGD (ESOPHAGOGASTRODUODENOSCOPY);  Surgeon: Oniel Dorsey MD;  Location: [facility] ENDO;  Service: Endoscopy;  Laterality: Left;    HYSTERECTOMY      left carotid endartectomy  5/2006    TONSILLECTOMY      TREATMENT OF CARDIAC ARRHYTHMIA N/A 10/19/2020    Procedure: Transesophageal echo (BLADIMIR) intra-procedure log documentation;  Surgeon: Brandon Elias MD;  Location: [facility] CATH LAB;  Service: Cardiology;  Laterality: N/A;       Review of patient's allergies indicates:   Allergen Reactions    Strawberry Swelling     Tongue swells up and a generalized rash.  Patient reports allergy to all Berries    Hydrochlorothiazide Other (See Comments)     hyponatremia    Lisinopril Other (See Comments)     Cough       Medications:  Continuous Infusions:  Scheduled Meds:   atorvastatin  20 mg Oral QHS    ceftaroline (TEFLARO) IVPB  300 mg Intravenous Q12H    DAPTOmycin (CUBICIN) IV (PEDS and ADULTS)  8 mg/kg Intravenous Q48H    digoxin  0.125 mg Oral Daily    famotidine  20 mg Oral Every other day    levothyroxine  75 mcg Oral Before breakfast    linezolid  600 mg Intravenous Q12H    metoprolol tartrate  25 mg  Oral BID     PRN Meds:acetaminophen, acetaminophen, HYDROcodone-acetaminophen, HYDROmorphone, magnesium oxide, magnesium oxide, morphine, nitroGLYCERIN, potassium bicarbonate, potassium bicarbonate, potassium bicarbonate, potassium, sodium phosphates, potassium, sodium phosphates, potassium, sodium phosphates, senna-docusate 8.6-50 mg, sodium chloride 0.9%, traZODone    Family History       Problem Relation (Age of Onset)    Breast cancer Daughter (50)    Cancer Brother (65), Sister (81), Sister, Sister    Heart disease Father    Ovarian cancer Sister (81)    Schizophrenia Son          Tobacco Use    Smoking status: Never    Smokeless tobacco: Never   Substance and Sexual Activity    Alcohol use: No    Drug use: No    Sexual activity: Not Currently     Partners: Male       Review of Systems   Unable to perform ROS: Mental status change   Objective:     Vital Signs (Most Recent):  Temp: 98.6 °F (37 °C) (06/09/23 1101)  Pulse: 62 (06/09/23 1400)  Resp: 10 (06/09/23 1400)  BP: (!) 143/67 (06/09/23 1400)  SpO2: 95 % (06/09/23 1400) Vital Signs (24h Range):  Temp:  [97.2 °F (36.2 °C)-98.6 °F (37 °C)] 98.6 °F (37 °C)  Pulse:  [58-78] 62  Resp:  [8-28] 10  SpO2:  [90 %-100 %] 95 %  BP: (122-159)/(58-72) 143/67     Weight: 43.7 kg (96 lb 4.8 oz)  Body mass index is 21.6 kg/m².       Physical Exam  Constitutional:       General: She is not in acute distress.     Appearance: She is ill-appearing.   HENT:      Head: Normocephalic and atraumatic.      Right Ear: External ear normal.      Left Ear: External ear normal.      Nose: Nose normal. No congestion.      Mouth/Throat:      Mouth: Mucous membranes are moist.      Pharynx: No oropharyngeal exudate.   Eyes:      General:         Right eye: No discharge.         Left eye: No discharge.      Extraocular Movements: Extraocular movements intact.   Cardiovascular:      Rate and Rhythm: Normal rate and regular rhythm.   Pulmonary:      Effort: Pulmonary effort is normal. No  respiratory distress.   Abdominal:      General: There is no distension.      Palpations: Abdomen is soft.      Tenderness: There is no abdominal tenderness.   Skin:     General: Skin is warm.   Neurological:      Comments: Minimally responsive          Review of Symptoms      Symptom Assessment (ESAS 0-10 Scale)  Unable to complete assessment due to Mental status change         Pain Assessment in Advanced Demential Scale (PAINAD)   Breathing - Independent of vocalization:  0  Negative vocalization:  0  Facial expression:  0  Body language:  0  Consolability:  0  Total:  0    Performance Status:  30    Living Arrangements:  Lives in nursing home    Psychosocial/Cultural:   See Palliative Psychosocial Note: No  **Primary  to Follow**  Palliative Care  Consult: No      Advance Care Planning   Advance Directives:   Living Will: Yes        Copy on chart: Yes    [place]: Yes    Do Not Resuscitate Status: Yes    Medical Power of : Yes      Decision Making:  Family answered questions and Patient unable to communicate due to disease severity/cognitive impairment  Goals of Care: The family endorses that what is most important right now is to focus on improvement in condition but with limits to invasive therapies and comfort and QOL     Accordingly, we have decided that the best plan to meet the patient's goals includes continuing with treatment and enrolling in hospice when medically optimized.        Significant Labs: BMP:   Recent Labs   Lab 06/09/23  0515   *   *   K 3.9   CL 95   CO2 28   BUN 94*   CREATININE 2.6*   CALCIUM 8.4*   MG 2.3     CBC:   Recent Labs   Lab 06/08/23  0505 06/09/23  0515   WBC 16.13* 15.80*   HGB 10.1* 9.3*   HCT 32.0* 29.3*   * 557*     CBC:   Recent Labs   Lab 06/09/23 0515   WBC 15.80*   HGB 9.3*   HCT 29.3*   MCV 82   *     BMP:  Recent Labs   Lab 06/09/23  0515   *   *   K 3.9   CL 95   CO2 28   BUN 94*   CREATININE  2.6*   CALCIUM 8.4*   MG 2.3     LFT:  Lab Results   Component Value Date    AST 18 06/09/2023    ALKPHOS 122 06/09/2023    BILITOT 1.2 (H) 06/09/2023     Albumin:   Albumin   Date Value Ref Range Status   06/09/2023 2.4 (L) 3.5 - 5.2 g/dL Final     Protein:   Total Protein   Date Value Ref Range Status   06/09/2023 5.8 (L) 6.0 - 8.4 g/dL Final     Lactic acid:   Lab Results   Component Value Date    LACTATE 1.2 06/03/2023    LACTATE 2.2 (HH) 06/02/2023       Significant Imaging: I have reviewed all pertinent imaging results/findings within the past 24 hours.        > 50% of 95 min visit spent in chart review, face to face discussion of goals of care,  symptom assessment, coordination of care and emotional support.    Shamar Rivas MD  Palliative Medicine  [facility]

## 2023-06-09 NOTE — PROGRESS NOTES
Progress Note  PULMONARY    Admit Date: 5/30/2023 06/09/2023  History of Present Illness:  Pt is an 87 yo female with atrial fib on eliquis, CHF, chronic resp failure on home O2, HTN, HLD, hearing loss, anxiety who presented to ED on 5/30 with tachycardia & syncope, recent hip fx 1 week ago, found to have fever and sepsis with MRSA bacteremia.  ID is following and doing workup, notes reviewed. She is getting vanco + daptomycin. Pulmonary is consulted for worsening O2 requirement.  She is now requiring high flow nc/oxy mask to maintain sats. She is having a lot of pain in her right hip and has difficulty hearing and answering my questions. Her son Dane at bedside provides history. She has CHF, uses 2L O2 at her facility and has had a progressive decline over the past 2 yrs with breathing difficulty. It has been determined she is a poor surgical candidate for hip repair. They do not want further invasive testing or procedures. She continues on antibiotics and has persistent MRSA bacteremia of unclear source. Per nursing her blood pressure gets very low with pain and heart medications.    SUBJECTIVE:     6/4- O2 requirement has improved. She has new MARSHALL. Has been afebrile, HR controlled and BP in 100s-110s systolic. She is confused and delirious    6/5/23: Stable, requiring 3-4 L NC but sat drops precipitously with movement, lying flat, or brief removal of NC. Alert and oriented x 3.    6/6/23: Minimal UOP w/ 40 mg IV lasix x 1 yesterday.    6/7/23: Net -345 mL in last 24 hours after a single dose of IV Lasix 80 mg yesterday. Now on 4 L NC.    6/8/23: No Lasix given yesterday. Nonetheless, net -800 mL in the past day; still net +2 L for the admission. She is satting 99% on 5 L NC at rest.    6/9/23: Net -1.2 L in past day despite no diuretics given. Seen by palliative care this morning. Creatinine is improving. Now only on 4 L NC. Somnolent after 0.5 mg Dilaudid.    OBJECTIVE:     Vitals (Most recent):  Vitals:     06/09/23 1400   BP: (!) 143/67   Pulse: 62   Resp: 10   Temp:        Vitals (24 hour range):  Temp:  [97.2 °F (36.2 °C)-98.6 °F (37 °C)]   Pulse:  [58-78]   Resp:  [8-28]   BP: (122-159)/(58-72)   SpO2:  [90 %-100 %]       Intake/Output Summary (Last 24 hours) at 6/9/2023 1442  Last data filed at 6/9/2023 1419  Gross per 24 hour   Intake 50 ml   Output 1575 ml   Net -1525 ml        PHYSICAL EXAM  GENERAL:  NAD.  HEENT: EOMI. PERRLA.  NECK: No cervical adenopathy palpated. No JVD or HJR.  HEART: Regular rate and rhythm. No murmur or gallop auscultated.  LUNGS: Clear to auscultation. Lung excursion symmetrical.   ABDOMEN: Bowel sounds present. Soft, non-tender, non-distended.  EXTREMITIES: Normal muscle tone and joint movement, no cyanosis or clubbing.   LYMPHATICS: No adenopathy palpated, no edema.  SKIN: Dry, intact, no lesions.   NEURO: No gross motor abnormalities. A&Ox3. Sleepy.  PSYCH: Appropriate affect.      Radiographs reviewed: view by direct vision   CXR 6/4/23- pulmonary edema, slightly worse  CXR 6/2/23- bilat hilar congestion, cardiomegaly  CTA chest- no PE; small R pleural effusion, bilat faint ground glass perihilar suggesting pulm edema     TTE 6/1/23-   Atrial fibrillation observed.  The left ventricle is normal in size with concentric remodeling and normal systolic function.  The estimated ejection fraction is 55%.  Moderate tricuspid regurgitation.  No clear evidence of endocarditis. If clinical suspicion persist, consider alternative cardiac imaging like BLADIMIR for further evaluation.    Labs     Recent Labs   Lab 06/09/23  0515   WBC 15.80*   HGB 9.3*   HCT 29.3*   *     Recent Labs   Lab 06/09/23  0515   *   K 3.9   CL 95   CO2 28   BUN 94*   CREATININE 2.6*   *   CALCIUM 8.4*   MG 2.3   AST 18   ALT 18   ALKPHOS 122   BILITOT 1.2*   PROT 5.8*   ALBUMIN 2.4*   CRP 16.37*   CPK 21   No results for input(s): PH, PCO2, PO2, HCO3 in the last 24 hours.  Microbiology Results (last 7  days)       Procedure Component Value Units Date/Time    Blood culture [[id number]] Collected: 06/09/23 0514    Order Status: Completed Specimen: Blood Updated: 06/09/23 1158     Blood Culture, Routine No Growth to date    Blood culture [[id number]] Collected: 06/08/23 0845    Order Status: Completed Specimen: Blood Updated: 06/09/23 1032     Blood Culture, Routine No Growth to date      No Growth to date    Narrative:      Collection has been rescheduled by EL3 at 06/08/2023 05:15 Reason:   Unable to collect  Collection has been rescheduled by EL3 at 06/08/2023 05:15 Reason:   Unable to collect    Blood culture [[id number]] Collected: 06/07/23 0831    Order Status: Completed Specimen: Blood Updated: 06/09/23 1032     Blood Culture, Routine No Growth to date      No Growth to date      No Growth to date    Blood culture [[id number]]  (Abnormal) Collected: 06/06/23 0724    Order Status: Completed Specimen: Blood Updated: 06/09/23 0759     Blood Culture, Routine Gram stain aer bottle: Gram positive cocci      Positive results previously called 06/07/2023  23:43 KS3      METHICILLIN RESISTANT STAPHYLOCOCCUS AUREUS  For susceptibility see order #[id number]  Known MRSA patient      Narrative:      Collection has been rescheduled by EL3 at 06/06/2023 05:13 Reason:   Unable to collect  Collection has been rescheduled by EL3 at 06/06/2023 05:13 Reason:   Unable to collect    Blood culture [[id number]]  (Abnormal)  (Susceptibility) Collected: 06/04/23 1559    Order Status: Completed Specimen: Blood Updated: 06/08/23 0700     Blood Culture, Routine Gram stain aer bottle: Gram positive cocci      Positive results previously called      METHICILLIN RESISTANT STAPHYLOCOCCUS AUREUS  Known MRSA patient      Blood culture [[id number]]  (Abnormal) Collected: 06/04/23 0609    Order Status: Completed Specimen: Blood Updated: 06/06/23 0649     Blood Culture, Routine Gram stain aer bottle: Gram positive cocci      Gram stain shirley bottle: Gram  positive cocci      Results called to and read back by:ELIZABETH Melendez;  06/05/2023      00:50 CJD      METHICILLIN RESISTANT STAPHYLOCOCCUS AUREUS  For susceptibility see order #[id number]      Blood culture [[id number]]  (Abnormal) Collected: 06/03/23 0713    Order Status: Completed Specimen: Blood Updated: 06/06/23 0648     Blood Culture, Routine Gram stain aer bottle: Gram positive cocci      Positive results previously called 06/03/2023  23:13 KS3      METHICILLIN RESISTANT STAPHYLOCOCCUS AUREUS  For susceptibility see order #[id number]      Rapid Organism ID by PCR (from Blood culture) [[id number]]  (Abnormal) Collected: 06/04/23 0609    Order Status: Completed Updated: 06/05/23 0140     Enterococcus faecalis Not Detected     Enterococcus faecium Not Detected     Listeria monocytogenes Not Detected     Staphylococcus spp. See species for ID     Comment: critical result(s) called and verbal readback obtained from ELIZABETH Meyer by CD3 06/05/2023 01:40          Staphylococcus aureus Detected     Comment: critical result(s) called and verbal readback obtained from ELIZABETH Meyer by CD3 06/05/2023 01:40          Staphylococcus epidermidis Not Detected     Staphylococcus lugdunensis Not Detected     Streptococcus species Not Detected     Streptococcus agalactiae Not Detected     Streptococcus pneumoniae Not Detected     Streptococcus pyogenes Not Detected     Acinetobacter calcoaceticus/baumannii complex Not Detected     Bacteroides fragilis Not Detected     Enterobacterales Not Detected     Enterobacter cloacae complex Not Detected     Escherichia coli Not Detected     Klebsiella aerogenes Not Detected     Klebsiella oxytoca Not Detected     Klebsiella pneumoniae group Not Detected     Proteus Not Detected     Salmonella sp Not Detected     Serratia marcescens Not Detected     Haemophilus influenzae Not Detected     Neisseria meningtidis Not Detected     Pseudomonas aeruginosa Not Detected      Stenotrophomonas maltophilia Not Detected     Candida albicans Not Detected     Candida auris Not Detected     Candida glabrata Not Detected     Candida krusei Not Detected     Candida parapsilosis Not Detected     Candida tropicalis Not Detected     Cryptococcus neoformans/gattii Not Detected     CTX-M (ESBL ) Test not applicable     IMP (Carbapenem resistant) Test not applicable     KPC resistance gene (Carbapenem resistant) Test not applicable     mcr-1  Test not applicable     mec A/C  Test not applicable     mec A/C and MREJ (MRSA) gene Detected     Comment: critical result(s) called and verbal readback obtained from ELIZABETH Meyer by CD3 06/05/2023 01:40          NDM (Carbapenem resistant) Not Detected     OXA-48-like (Carbapenem resistant) Not Detected     van A/B (VRE gene) Not Detected     VIM (Carbapenem resistant) Not Detected    Narrative:         critical result(s) called and verbal readback obtained from ELIZABETH Meyer by CD3 06/05/2023 01:40    Blood culture [[id number]]     Order Status: Canceled Specimen: Blood     Blood culture [[id number]]  (Abnormal) Collected: 06/02/23 0839    Order Status: Completed Specimen: Blood Updated: 06/03/23 0802     Blood Culture, Routine Gram stain aer bottle: Gram positive cocci      Positive results previously called 06/02/2023  20:46 KS3      METHICILLIN RESISTANT STAPHYLOCOCCUS AUREUS  For susceptibility see order #[id number]      Blood culture [[id number]]  (Abnormal) Collected: 06/01/23 0858    Order Status: Completed Specimen: Blood Updated: 06/03/23 0801     Blood Culture, Routine Gram stain aer bottle: Gram positive cocci      Positive results previously called 06/02/2023  01:59 KS3      METHICILLIN RESISTANT STAPHYLOCOCCUS AUREUS  For susceptibility see order #[id number]      Blood culture [[id number]]  (Abnormal) Collected: 06/01/23 0859    Order Status: Completed Specimen: Blood Updated: 06/03/23 0800     Blood Culture, Routine Gram  stain aer bottle: Gram positive cocci      Positive results previously called 06/02/2023  01:07 KS3      METHICILLIN RESISTANT STAPHYLOCOCCUS AUREUS  For susceptibility see order #[id number]      Urine culture [[id number]]  (Abnormal) Collected: 05/30/23 0909    Order Status: Completed Specimen: Urine from Clean Catch Updated: 06/03/23 0635     Urine Culture, Routine DANILO ALBICANS  > 100,000 cfu/ml  Treatment of asymptomatic candiduria is not recommended (except for   specific populations). Candida isolated in the urine typically   represents colonization. If an indwelling urinary catheter is present  it should be removed or replaced.              Impression and Plan  Active Hospital Problems    Diagnosis  POA    *MRSA Bacteremia [A41.9]  Yes    MARSHALL (acute kidney injury) [N17.9]  Yes    Closed fracture of right hip [S72.001A]  Yes    Candiduria [B37.49]  Yes    Closed right hip fracture after fall [S72.001A]  Yes    Acute on chronic diastolic heart failure [I50.9]  Yes    Acute on chronic respiratory failure with hypoxia [J96.20]  Yes    Atrial fibrillation with RVR with known history of a fib [I48.91]  Yes     Chronic    Troponin I above reference range [R77.8]  Yes    Nursing home resident [Z59.3]  Not Applicable     Chronic    DNR (do not resuscitate) [Z66]  Yes     Chronic    UTI (urinary tract infection) [N39.0]  Yes    Ulcer of right leg [L97.919]  Yes     Chronic    Anemia [D64.9]  Yes     Chronic    Hypothyroidism [E03.9]  Yes     Chronic    Mixed hyperlipidemia [E78.2]  Yes    Pulmonary hypertension [I27.20]  Yes    Essential hypertension [I10]  Yes      Resolved Hospital Problems   No resolved problems to display.     Acute hypoxemic respiratory failure- improving  Mild hyponatremia  Right hip fracture  Acute on chronic HFpEF  Afib  MRSA bacteremia, unclear source  MARSHALL, likely cardiorenal, worsening  Severe pain due to right hip  Delirium    - continue supplemental oxygen to keep sats greater than 90%  -  continue antibiotics as per ID  - pain control  - metoprolol 25 mg b.i.d.  - watch renal function, UOP  - hold off on diuresis today    Discussed with RN. CXR personally reviewed; shows worsening pulmonary edema. I have ordered strict I&Os, as the patient is at high risk from treatment with Lasix. She is at high risk of deterioration and should remain admitted to telemetry unit.    Pulmonary & Critical Care Medicine will sign off at this time.   Please call with any further questions or concerns.    Abram Amezquita MD  Pulmonary and Critical Care Medicine  Pending sale to [facility]  Date of Service: 06/09/2023  10:32 PM

## 2023-06-09 NOTE — PLAN OF CARE
CM notified by Dr. Rivas of possible plan to discharge with hospice.  CM spoke to the patient's son, Dane who verified this is the plan at this time.  He wants to talk to his siblings first to make sure they are all on the same page.  He did state that he would like for the patient to return to [place] with hospice.  CM spoke Ada at [place] who stated that should not be a problem.  WILL attempted to call [facility] with [facility] and left voicemail.  Referral sent via [employer].      16:22- Penny with [facility] returned phone call.  Will call patient's family to discuss. Notified of information in [employer].        06/09/23 0837   Post-Acute Status   Post-Acute Authorization Hospice   Hospice Status Referrals Sent   Patient choice form signed by patient/caregiver List with quality metrics by geographic area provided   Discharge Plan   Discharge Plan A Hospice/home   Discharge Plan B Return to Nursing Home

## 2023-06-09 NOTE — PROGRESS NOTES
[facility] Medicine  Progress Note    Patient name: Jeannie Hutchins  MRN: [id number]  Admit Date: 5/30/2023   LOS: 10 days     SUBJECTIVE:     Principal problem: Sepsis    Interval History:      6/9- on entering room, she is screaming out in pain.  She will occasionally respond verbally to qfuestions, but often seems to not respond intentionally.  She is asking why she is pain.  Plan for palliative consult today.  She will not swallow meds for nursing.  Will give IV low dose opiate for hip pain.      Scheduled Meds:   atorvastatin  20 mg Oral QHS    ceftaroline (TEFLARO) IVPB  300 mg Intravenous Q12H    DAPTOmycin (CUBICIN) IV (PEDS and ADULTS)  8 mg/kg Intravenous Q48H    digoxin  0.125 mg Oral Daily    famotidine  20 mg Oral Every other day    levothyroxine  75 mcg Oral Before breakfast    linezolid  600 mg Intravenous Q12H    metoprolol tartrate  25 mg Oral BID     Continuous Infusions:  PRN Meds:acetaminophen, acetaminophen, HYDROcodone-acetaminophen, HYDROmorphone, magnesium oxide, magnesium oxide, morphine, nitroGLYCERIN, potassium bicarbonate, potassium bicarbonate, potassium bicarbonate, potassium, sodium phosphates, potassium, sodium phosphates, potassium, sodium phosphates, senna-docusate 8.6-50 mg, sodium chloride 0.9%, traZODone    Review of patient's allergies indicates:   Allergen Reactions    [place] Swelling     Tongue swells up and a generalized rash.  Patient reports allergy to all Berries    Hydrochlorothiazide Other (See Comments)     hyponatremia    Lisinopril Other (See Comments)     Cough       Review of Systems: As per interval history    OBJECTIVE:     Vital Signs (Most Recent)  Temp: 98.4 °F (36.9 °C) (06/09/23 1601)  Pulse: 62 (06/09/23 1400)  Resp: 10 (06/09/23 1400)  BP: (!) 143/67 (06/09/23 1400)  SpO2: 95 % (06/09/23 1400)    Vital Signs Range (Last 24H):  Temp:  [97.2 °F (36.2 °C)-98.6 °F (37 °C)]   Pulse:  [58-78]   Resp:  [8-25]   BP: (122-159)/(58-72)   SpO2:   [90 %-100 %]     I & O (Last 24H):  Intake/Output Summary (Last 24 hours) at 6/9/2023 1652  Last data filed at 6/9/2023 1419  Gross per 24 hour   Intake 50 ml   Output 1000 ml   Net -950 ml       Physical Exam:  General: in pain  Eyes: No conjunctival injection. No scleral icterus.  CVS: RRR. No LE edema BL  Lungs:  No tachypnea or accessory muscle use.  Clear to auscultation bilaterally  Abdomen:  Soft, nontender and nondistended.  No organomegaly  Neuro:  Moves all extremities.      Laboratory:  I have reviewed all pertinent lab results within the past 24 hours.    Diagnostic Results:  Labs: Reviewed  ECG: Reviewed  X-Ray: Reviewed    ASSESSMENT/PLAN:         Active Hospital Problems    Diagnosis  POA    *MRSA Bacteremia [A41.9]  Yes    Goals of care, counseling/discussion [Z71.89]  Not Applicable    MARSHALL (acute kidney injury) [N17.9]  Yes    Closed fracture of right hip [S72.001A]  Yes    Candiduria [B37.49]  Yes    Closed right hip fracture after fall [S72.001A]  Yes    Acute on chronic diastolic heart failure [I50.9]  Yes    Acute on chronic respiratory failure with hypoxia [J96.20]  Yes    Atrial fibrillation with RVR with known history of a fib [I48.91]  Yes     Chronic    Troponin I above reference range [R77.8]  Yes    Nursing home resident [Z59.3]  Not Applicable     Chronic    DNR (do not resuscitate) [Z66]  Yes     Chronic    UTI (urinary tract infection) [N39.0]  Yes    Ulcer of right leg [L97.919]  Yes     Chronic    Anemia [D64.9]  Yes     Chronic    Hypothyroidism [E03.9]  Yes     Chronic    Mixed hyperlipidemia [E78.2]  Yes    Pulmonary hypertension [I27.20]  Yes    Essential hypertension [I10]  Yes      Resolved Hospital Problems   No resolved problems to display.         Plan: MRSA Bacteremia  Ongoing MRSA bacteremia without a clear source at this point and we have not achieved source control.  Family would not like any further invasive workup.     Check CTA chest > no signs of significant focal  pneumonia  Inflammatory markers rising  Repeat blood cultures daily  She has had daily positive blood cultures.   Urine culture with candiduria likely contaminated   COVID and influenza negative  Daptomycin and Ceftaroline plus linezolid per Dr Shahid  Initial Echo without vegetations.   Plan to continue antibiotics until cultures clear at this point.  Appreciate Dr Shahid's recs  CT hips / pelvis  > no clear evidence of infective focus.      Hopefully we have achieved clearance of her bacteremia     UTI (urinary tract infection)  UA positive, many yeast, history of UTIs in the past  Urine culture with many yeast , likely contaminant  Fluconazole discontinued        Atrial fibrillation with RVR with known history of a fib  Patient with known history of persistent atrial fibrillation.   RVR likely driven by acute medical condition including infection/sepsis and CHF.  Telemetry monitoring, treat acute medical condition, continue metoprolol and eliquis  Added digoxin  Metoprolol reduced  Monitor blood pressure  HR better controlled        Acute on chronic respiratory failure with hypoxia  Chronically on 2 L supplemental oxygen   Ongoing respiratory failure  CTA without PE  Concerns for possible fat embolism  Appreciate pulmonary recs  Difficult time getting pulse ox.  Her oxygen requirement has been up and down.  High risk for decompensation     Acute on chronic diastolic heart failure  Admits to shortness of breath with productive cough   Chest x-ray with pulmonary edema with elevated BNP at 1905  Previous echo with EF of 65%, diastolic dysfunction consistent with AFib  IVC ultrasound today shows enlargement per Dr. Amezquita  Additional IV Lasix defer to Dr Amezquita  Strict I/O daily weights, oral fluid and sodium restriction        Closed right hip fracture after fall  Patient with a recent fall in [employer] [place]  She is having increasing pain in her shoulder and hip, xray shows right hip fracture  CT with subcapital  fracture of her right hip  Pain control  Ortho consulted > family discussed with them and plan to hold off on any intervention and would rather let her hip heal without repair        Anemia  Chronic anemia, no evidence of bleeding, monitoring        Ulcer of right leg  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, sustained few weeks ago from wheelchair   On examination no obvious signs of superimposed infection   Wound Care consulted        Hypothyroidism  Chronic medical condition continue levothyroxine        Troponin I above reference range  Likely in the setting of AFib RVR, CHF and infection, trend for completeness        Essential hypertension  Hold home amlodipine in the setting of infection and RVR to allow for titration of medication        DNR (do not resuscitate)  Has advanced directive and confirmed with daughter, DNR        Mixed hyperlipidemia  Chronic medical condition        Nursing home resident  Resident at [facility]        Pulmonary hypertension  Last echo with PASP 54        MARSHALL (acute kidney injury)  MARSHALL of unclear etiology but could be multifactorial  IVC is very large per Dr. Amezquita and will give additional Lasix, concern for cardiorenal syndrome  Ta placed today, concern for urinary retention  Could also potentially be due to vancomycin dosing and contrast.  Trend renal function at this point, seems to be worsening  Consulted Nephrology today  Would hold off on further diuresis until nephrology is able to weigh in            VTE Risk Mitigation (From admission, onward)           Ordered       IP VTE HIGH RISK PATIENT  Once         05/30/23 1220       Place sequential compression device  Until discontinued         05/30/23 1220                            VTE Risk Mitigation (From admission, onward)           Ordered     IP VTE HIGH RISK PATIENT  Once         05/30/23 1220     Place sequential compression device  Until discontinued         05/30/23 1220                         Department Hospital Medicine  [facility]  Cristian Baker MD  Date of service: 06/09/2023

## 2023-06-09 NOTE — SUBJECTIVE & OBJECTIVE
Interval History: See HPI    Past Medical History:   Diagnosis Date    Anticoagulant long-term use     Eliquis    Arthritis     Atrial fibrillation     Blood transfusion     Carotid stenosis     CHF (congestive heart failure)     Edema     Generalized anxiety disorder 1/23/2018    Dr. Mckeon's eval 1/23/18: She does appear to have a JERRI because of the persistent worries that she has.  These worries predominate her day.  She would probably do well with prevention therapy such as SSRI/SNRI to help control the physical symptoms of the anxiety.  Problem at this point in time is her electrolyte abnormalities.  There is a slight risk of hyponatremia with these medications and    Hearing loss     [place] (hard of hearing)     Hypercholesterolemia     Hypertension     On home oxygen therapy     Psychiatric problem     Thyroid disease        Past Surgical History:   Procedure Laterality Date    CARDIOVERSION  10/19/2020    Procedure: Cardioversion;  Surgeon: Brandon Elias MD;  Location: [facility] CATH LAB;  Service: Cardiology;;    CARPAL TUNNEL RELEASE  2012    right hand    ESOPHAGOGASTRODUODENOSCOPY Left 8/29/2018    Procedure: EGD (ESOPHAGOGASTRODUODENOSCOPY);  Surgeon: Oniel Dorsey MD;  Location: [facility] ENDO;  Service: Endoscopy;  Laterality: Left;    HYSTERECTOMY      left carotid endartectomy  5/2006    TONSILLECTOMY      TREATMENT OF CARDIAC ARRHYTHMIA N/A 10/19/2020    Procedure: Transesophageal echo (BLADIMIR) intra-procedure log documentation;  Surgeon: Brandon Elias MD;  Location: [facility] CATH LAB;  Service: Cardiology;  Laterality: N/A;       Review of patient's allergies indicates:   Allergen Reactions    [place] Swelling     Tongue swells up and a generalized rash.  Patient reports allergy to all Berries    Hydrochlorothiazide Other (See Comments)     hyponatremia    Lisinopril Other (See Comments)     Cough       Medications:  Continuous Infusions:  Scheduled Meds:   atorvastatin  20 mg Oral QHS    ceftaroline  (TEFLARO) IVPB  300 mg Intravenous Q12H    DAPTOmycin (CUBICIN) IV (PEDS and ADULTS)  8 mg/kg Intravenous Q48H    digoxin  0.125 mg Oral Daily    famotidine  20 mg Oral Every other day    levothyroxine  75 mcg Oral Before breakfast    linezolid  600 mg Intravenous Q12H    metoprolol tartrate  25 mg Oral BID     PRN Meds:acetaminophen, acetaminophen, HYDROcodone-acetaminophen, HYDROmorphone, magnesium oxide, magnesium oxide, morphine, nitroGLYCERIN, potassium bicarbonate, potassium bicarbonate, potassium bicarbonate, potassium, sodium phosphates, potassium, sodium phosphates, potassium, sodium phosphates, senna-docusate 8.6-50 mg, sodium chloride 0.9%, traZODone    Family History       Problem Relation (Age of Onset)    Breast cancer Daughter (50)    Cancer Brother (65), Sister (81), Sister, Sister    Heart disease Father    Ovarian cancer Sister (81)    Schizophrenia Son          Tobacco Use    Smoking status: Never    Smokeless tobacco: Never   Substance and Sexual Activity    Alcohol use: No    Drug use: No    Sexual activity: Not Currently     Partners: Male       Review of Systems   Unable to perform ROS: Mental status change   Objective:     Vital Signs (Most Recent):  Temp: 98.6 °F (37 °C) (06/09/23 1101)  Pulse: 62 (06/09/23 1400)  Resp: 10 (06/09/23 1400)  BP: (!) 143/67 (06/09/23 1400)  SpO2: 95 % (06/09/23 1400) Vital Signs (24h Range):  Temp:  [97.2 °F (36.2 °C)-98.6 °F (37 °C)] 98.6 °F (37 °C)  Pulse:  [58-78] 62  Resp:  [8-28] 10  SpO2:  [90 %-100 %] 95 %  BP: (122-159)/(58-72) 143/67     Weight: 43.7 kg (96 lb 4.8 oz)  Body mass index is 21.6 kg/m².       Physical Exam  Constitutional:       General: She is not in acute distress.     Appearance: She is ill-appearing.   HENT:      Head: Normocephalic and atraumatic.      Right Ear: External ear normal.      Left Ear: External ear normal.      Nose: Nose normal. No congestion.      Mouth/Throat:      Mouth: Mucous membranes are moist.      Pharynx: No  oropharyngeal exudate.   Eyes:      General:         Right eye: No discharge.         Left eye: No discharge.      Extraocular Movements: Extraocular movements intact.   Cardiovascular:      Rate and Rhythm: Normal rate and regular rhythm.   Pulmonary:      Effort: Pulmonary effort is normal. No respiratory distress.   Abdominal:      General: There is no distension.      Palpations: Abdomen is soft.      Tenderness: There is no abdominal tenderness.   Skin:     General: Skin is warm.   Neurological:      Comments: Minimally responsive          Review of Symptoms      Symptom Assessment (ESAS 0-10 Scale)  Unable to complete assessment due to Mental status change         Pain Assessment in Advanced Demential Scale (PAINAD)   Breathing - Independent of vocalization:  0  Negative vocalization:  0  Facial expression:  0  Body language:  0  Consolability:  0  Total:  0    Performance Status:  30    Living Arrangements:  Lives in nursing home    Psychosocial/Cultural:   See Palliative Psychosocial Note: No  **Primary  to Follow**  Palliative Care  Consult: No      Advance Care Planning   Advance Directives:   Living Will: Yes        Copy on chart: Yes    [place]: Yes    Do Not Resuscitate Status: Yes    Medical Power of : Yes      Decision Making:  Family answered questions and Patient unable to communicate due to disease severity/cognitive impairment  Goals of Care: The family endorses that what is most important right now is to focus on improvement in condition but with limits to invasive therapies and comfort and QOL     Accordingly, we have decided that the best plan to meet the patient's goals includes continuing with treatment and enrolling in hospice when medically optimized.        Significant Labs: BMP:   Recent Labs   Lab 06/09/23  0515   *   *   K 3.9   CL 95   CO2 28   BUN 94*   CREATININE 2.6*   CALCIUM 8.4*   MG 2.3     CBC:   Recent Labs   Lab 06/08/23  0505  06/09/23 0515   WBC 16.13* 15.80*   HGB 10.1* 9.3*   HCT 32.0* 29.3*   * 557*     CBC:   Recent Labs   Lab 06/09/23 0515   WBC 15.80*   HGB 9.3*   HCT 29.3*   MCV 82   *     BMP:  Recent Labs   Lab 06/09/23 0515   *   *   K 3.9   CL 95   CO2 28   BUN 94*   CREATININE 2.6*   CALCIUM 8.4*   MG 2.3     LFT:  Lab Results   Component Value Date    AST 18 06/09/2023    ALKPHOS 122 06/09/2023    BILITOT 1.2 (H) 06/09/2023     Albumin:   Albumin   Date Value Ref Range Status   06/09/2023 2.4 (L) 3.5 - 5.2 g/dL Final     Protein:   Total Protein   Date Value Ref Range Status   06/09/2023 5.8 (L) 6.0 - 8.4 g/dL Final     Lactic acid:   Lab Results   Component Value Date    LACTATE 1.2 06/03/2023    LACTATE 2.2 (HH) 06/02/2023       Significant Imaging: I have reviewed all pertinent imaging results/findings within the past 24 hours.

## 2023-06-09 NOTE — NURSING
"Pt not eating food and will spit food back out. PO medications not given.    0.5 mg dilaudid IV given x 1 for pain. Mineral oil enema given for possible bowel impaction. Patient stating "in pain & needing to have a bowel movement". Appears distressed. MD at bedside for orders  "

## 2023-06-09 NOTE — CARE UPDATE
06/08/23 4993   Patient Assessment/Suction   Level of Consciousness (AVPU) responds to voice   Respiratory Effort Normal;Unlabored   Expansion/Accessory Muscles/Retractions no use of accessory muscles   Rhythm/Pattern, Respiratory unlabored;pattern regular   PRE-TX-O2   Device (Oxygen Therapy) BIPAP   $ Is the patient on Low Flow Oxygen? Yes   Oxygen Concentration (%) 50   SpO2 96 %   Pulse Oximetry Type Continuous   $ Pulse Oximetry - Multiple Charge Pulse Oximetry - Multiple   Pulse 63   Resp 17   Ready to Wean/Extubation Screen   FIO2<=50 (chart decimal) 0.5   Preset CPAP/BiPAP Settings   Mode Of Delivery BiPAP   $ CPAP/BiPAP Daily Charge BiPAP/CPAP Daily   $ Is patient using? Yes   Ipap 14   EPAP (cm H2O) 7   Pressure Support (cm H2O) 7   Set Rate (Breaths/Min) 10   ITime (sec) 1   Rise Time (sec) 3   Patient CPAP/BiPAP Settings   RR Total (Breaths/Min) 20   Tidal Volume (mL) 256   VE Minute Ventilation (L/min) 5 L/min   Peak Inspiratory Pressure (cm H2O) 14   TiTOT (%) 31   Total Leak (L/Min) 8   Patient Trigger - ST Mode Only (%) 100   Education   $ Education BiPAP;15 min   Respiratory Evaluation   $ Care Plan Tech Time 15 min   $ Eval/Re-eval Charges Re-evaluation

## 2023-06-09 NOTE — PT/OT/SLP PROGRESS
"Physical Therapy Treatment    Patient Name:  Jeannie Hutchins   MRN:  [id number]    Recommendations:     Discharge Recommendations: nursing facility, skilled  Discharge Equipment Recommendations: none  Barriers to discharge:  increased assist with mobility, increased burden of care, orthopedic precautions, pain    Assessment:     Jeannie Hutchins is a 88 y.o. female admitted with a medical diagnosis of Sepsis.  She presents with the following impairments/functional limitations: weakness, impaired endurance, impaired functional mobility, gait instability, impaired balance, impaired cognition, decreased safety awareness, pain, orthopedic precautions, impaired self care skills, impaired cardiopulmonary response to activity.    Pt sleeping with HOB elevated and required verbal and tactile cuing to become alert. Pt agreeable to visit and attempted to sit EOB. Pt noted to have B knee and hip flexed and pt attempting to push through BLE. Pt required max assist to extend RLE to prevent pt from pushing through RLE as she is NWB. Pt resisting repositioning of RLE and began complaining of pain from needing to have a BM, "I need to poop but it won't come out" and pushing through BLE to reposition herself. Therapist trying to explain to pt that she cannot push through RLE. MD entered to examine pt. Pt continues c/o pain "why am I in so much pain". Therapist and MD explained to pt that her R hip is broken and that is why she is in pain. MD to order pain medication. Pt required max assist for rolling side to side to adjust linens and for repositioning to reduce pain.    Rehab Prognosis: Fair; patient would benefit from acute skilled PT services to address these deficits and reach maximum level of function.    Recent Surgery: * No surgery found *      Plan:     During this hospitalization, patient to be seen 6 x/week to address the identified rehab impairments via gait training, therapeutic activities, therapeutic exercises and progress " "toward the following goals:    Plan of Care Expires:  23    Subjective     Chief Complaint: pt c/o pain "why am I in so much pain"  Patient/Family Comments/goals: to get pain managed  Pain/Comfort:  Pain Rating 1: other (see comments) (not rated, pt crying out "why am I in so much pain")  Location - Side 1: Right  Location - Orientation 1: generalized  Location 1: hip  Pain Addressed 1: Distraction, Reposition, Cessation of Activity, Nurse notified, Other (see comments) (MD present)  Pain Rating Post-Intervention 1: other (see comments) (not rated)      Objective:     Communicated with RN prior to session.  Patient found HOB elevated with telemetry, david catheter, bed alarm, blood pressure cuff, peripheral IV, pulse ox (continuous) upon PT entry to room.     General Precautions: Standard, fall, contact (MRSA)  Orthopedic Precautions: RLE non weight bearing  Braces:    Respiratory Status: Nasal cannula, flow 3 L/min     Functional Mobility:  Bed Mobility:     Rolling Left:  maximal assistance  Rolling Right: maximal assistance  Scooting: minimum assistance and verbal and tactile cuing to keep pt from pushing through RLE      AM-PAC 6 CLICK MOBILITY          Treatment & Education:  Pt educated on importance of time OOB, importance of intermittent mobility, safe techniques for transfers/ambulation, discharge recommendations/options, and use of call light for assistance and fall prevention.      Patient left left sidelying with all lines intact, call button in reach, bed alarm on, and RN notified..    GOALS:   Multidisciplinary Problems       Physical Therapy Goals          Problem: Physical Therapy    Goal Priority Disciplines Outcome Goal Variances Interventions   Physical Therapy Goal     PT, PT/OT Ongoing, Progressing     Description: Goals to be met by: 2023     Patient will increase functional independence with mobility by performin. Supine to sit with MInimal Assistance  2. Sit to stand " transfer with Minimal Assistance and NWB right LE  3. Sit to supine with minimal assistance.      6/3/23: new order received so patient reevaluated and goals changes as appropriate.                       Time Tracking:     PT Received On: 06/09/23  PT Start Time: 1009     PT Stop Time: 1032  PT Total Time (min): 23 min     Billable Minutes: Therapeutic Activity 23    Treatment Type: Treatment  PT/PTA: PTA     Number of PTA visits since last PT visit: 2     06/09/2023

## 2023-06-09 NOTE — CARE UPDATE
06/09/23 0833   Patient Assessment/Suction   Respiratory Effort Unlabored   Expansion/Accessory Muscles/Retractions no use of accessory muscles   Rhythm/Pattern, Respiratory unlabored   Skin Integrity   $ Wound Care Tech Time 15 min   Area Observed Bridge of nose   Skin Appearance without discoloration   Barrier used? Gel Cushion   PRE-TX-O2   Device (Oxygen Therapy) nasal cannula with humidification   $ Is the patient on Low Flow Oxygen? Yes   Flow (L/min) 3   Pulse Oximetry Type Continuous   $ Pulse Oximetry - Multiple Charge Pulse Oximetry - Multiple

## 2023-06-09 NOTE — PROGRESS NOTES
Consult Note  Infectious Disease    Reason for Consult:  MRSA bacteremia    HPI: Jeannie Hutchins is a 88 y.o. female Nursing home resident With a history of diastolic heart failure, oxygen dependence, atrial fibrillation with recent 6 day stay in late April for worsening oxygen requirement, right lower extremity cellulitis associated with an injury (where she hit her lower leg on the wheelchair).  She was given oxygen support, azithromycin, ceftriaxone and Lasix, required intensive care on admission, followed by steroids and doxycycline for lungs and leg.    She presented to the emergency room on 05/30 with tachycardia, generalized weakness and a syncopal episode.  She had had a fall last week with x-rays done at the nursing facility which were negative.  She denies feeling ill prior to the fall and relates this to not donning her slippers before trying to move around.  Her heart rate was found to be 220, in atrial fibrillation and with a temperature of 101.7°.  Workup included CBC with white blood cells 27,000, BNP 1900, urinalysis with 63 white blood cells and many yeast, chest x-ray with pulmonary edema and on admission was placed on ceftriaxone and fluconazole.  The wound on the right lower leg is largely healed, she does have a bruise about the right hip.  She was noted to have pain in the shoulder and hip and x-rays were ordered with findings of severe glenohumeral and acromioclavicular osteoarthrosis and a suspected right femoral neck fracture with a CT scan showing a subcapital fracture and cortical offset along the inferior edge of the femoral head/neck junction.  Fortunately there is no sign of hemarthrosis or increased joint fluid.  The right iliacus muscle is edematous or enlarged compared to the left. There may be a tiny bit of fluid in her trochanteric.  Blood cultures from 05/30 became positive this morning with GPC identified by [employer] is MRSA. Repeat blood cultures have been drawn.  The urine culture  "is growing only yeast("clean catch"). (MRSA screen on admission was negative).  Ceftriaxone was stopped and vancomycin was ordered.  Fever has resolved. WBC remain elevated. Still on diltiazem drip and requiring 6 liters of oxygen. Urine output is not being measured as she is incontinent.     6/2: interim reviewed. Afebrile. Oxygen requirement is much higher. Orthopedics consult pending. yesterday's blood cultures are positive, Vanc ALICIA for the MRSA is 1, trough was low. WBC improved. BUN up to 38. Ct chest reviewed and there is no focal pneumonia to blame her bacteremia on.  I had the chest x-ray repeated and there are no new infiltrates, only mild perihilar fullness.  She is alert, still in pain from her right hip but I was able to perform range-of-motion a little, more than I expected to be able to.  The remainder of the joints of the lower extremities have no sign or symptom of joint infection.  She has no peripheral signs of endocarditis.  Understandably her son is concerned about the stress of a transesophageal echo.  Discussed with him that we would not do it on someone who has this oxygen requirement and I explained that this would help discern the length of therapy.  I also discussed with him that in some cases when the evaluation is more dangerous than the treatment, that we elect to treat people for the worst possible diagnosis and he is in favor of this rather than the BLADIMIR.  6/3: Interim reviewed.  6/2 blood cultures are positive for MRSA, 6 3 blood cultures after the initiation of daptomycin have been drawn.  She is requiring roughly the same amount of oxygen.  Discussed with Dr. Ndiaye of Pulmonary.  Her right hip is no worse, but her blood pressure does not tolerate opiates very well.  She is eating a little bit more per her son's report.  Examination of the hip allows me to perform some flexion without severe pain and this is an improvement from 2 days ago.  Talked with her son about whether he would " be in favor of a right hip washout should imaging or persistent bacteremia suggest that the hip was infected and discussed with him the potential for anesthetic complications and/or inability to liberate from the ventilator.  CT scan of the hip has been ordered by Hospital Medicine.  6/4: Interim reviewed.  Most recent blood cultures, since the addition of daptomycin are still positive  She is requiring less oxygen.  She is brighter and seems to be in less pain.  She is breathing comfortably and able to express herself well though she is very hard of hearing  6/5: interim reviewed. Afebrile. Blood cultures remain positive through 6/4 am. Cr is worse today(diuretics, contrast and vanc).  She personally has no new complaints.  Her daughters at the bedside assisting with her lunch.  She remains bit confused, requiring a modest amount of oxygen.  Her pain seems well controlled.  I reviewed her last CT of the pelvis again and there may be a hypodense area in the right iliacus muscle.  If this were truly a site of infection I would expected to be from the bacteremia and not the cause of the bacteremia  6/6: interim reviewed. Blood cultures from 6/4 are positive. Creatinine is worse and urine output is worse, .  But she may be in retention bladder feels distended, is uncomfortable to palpation.  Right hip is no different.  Did not respond to diuretics yesterday.  10 hours because of somnolence.  6/7: interim reviewed. Blood culture from 6/6 is negative so far. BUN and Cr are worse. No obstruction on US of kidneys. Urine output 650. Was not in retention. 7 liters oxygen in progress. Nephrology consult in progress. Urine eos negative. Daughter at bedside finds her sleepier and more confused. Received 3 doses of IV tylenol and no opiates since yesterday. Eating and drinking small amounts. Seems to be more comfortable pain wise.   6/8: interim reviewed. Resting quietly. Not eating much, maybe 25% at most. Sounds wetter on exam  but oxygen requirement not higher. Urine output is encouraging, 1 liter/24 h. Blood cultures from 6/6 have GPC but are negative from 6/7 so far. CRP falling  6/9: Interim reviewed.  Afebrile.  Creatinine is improved, urine output is still suboptimal but adequate.  Blood cultures positive 6/6.  Not eating today secondary to sensation of constipation, status post enema.  White blood cells and CRP are improved      EXAM & DIAGNOSTICS REVIEWED:   Vitals:     Temp:  [97.2 °F (36.2 °C)-98.6 °F (37 °C)]   Temp: 98.6 °F (37 °C) (06/09/23 1101)  Pulse: 77 (06/09/23 1000)  Resp: 17 (06/09/23 1048)  BP: (!) 141/68 (06/09/23 1000)  SpO2: (!) 90 % (06/09/23 1000)    Intake/Output Summary (Last 24 hours) at 6/9/2023 1209  Last data filed at 6/9/2023 0720  Gross per 24 hour   Intake 50 ml   Output 1225 ml   Net -1175 ml       General:  In NAD. Sleepy but arousable comfortable    Eyes:  Anicteric,   EOMI,    ENT:  No ulcers, exudates, thrush, nares patent, dentition is fair. Hard of hearing  Neck:  supple,   Lungs: bibasilar crackles, resting comfortably at present, quality of urine is clear, 350 cc roughly oxygen at 3 liters, no distress  Heart:  iRRR,   Abd:  Soft, NT, ND, normal BS,    :  Ta,  Musc:  Joints without effusion, swelling, erythema, synovitis, no redness, bruising or fluctuance around right hip   Skin:  No rashes   Neuro:              Arousable,  , face symmetric, moves all extremities,    Psych: Calm,    Lymphatic:     Extrem: No edema, erythema, phlebitis, cellulitis, warm and well perfused  VAD:  Peripheral, midline left arm 6/6     Isolation:  contact  Wound: Right lower lateral leg prior ulcer is healed    Right hip    There is no redness about the right hip and the bruise is improved.  6/3    Lab Results   Component Value Date    PROCAL 1.38 (H) 06/01/2023    PROCAL <0.05 04/22/2023    PROCAL 0.30 04/21/2023         General Labs reviewed:  Recent Labs   Lab 06/07/23  0530 06/08/23  0505 06/09/23  0515    WBC 15.84* 16.13* 15.80*   HGB 8.6* 10.1* 9.3*   HCT 26.8* 32.0* 29.3*    500* 557*       Recent Labs   Lab 06/07/23  0530 06/08/23  0400 06/09/23  0515   * 134* 134*   K 4.1 4.2 3.9   CL 93* 93* 95   CO2 29 26 28   * 101* 94*   CREATININE 3.2* 3.0* 2.6*   CALCIUM 8.2* 8.9 8.4*   PROT 5.3* 6.0 5.8*   BILITOT 1.0 1.2* 1.2*   ALKPHOS 106 119 122   ALT 20 21 18   AST 17 18 18     Recent Labs   Lab 06/07/23  0530 06/08/23  0400 06/09/23  0515   CRP 19.48* 18.14* 16.37*         Micro:  Microbiology Results (last 7 days)       Procedure Component Value Units Date/Time    Blood culture [[id number]] Collected: 06/09/23 0514    Order Status: Completed Specimen: Blood Updated: 06/09/23 1158     Blood Culture, Routine No Growth to date    Blood culture [[id number]] Collected: 06/08/23 0845    Order Status: Completed Specimen: Blood Updated: 06/09/23 1032     Blood Culture, Routine No Growth to date      No Growth to date    Narrative:      Collection has been rescheduled by EL3 at 06/08/2023 05:15 Reason:   Unable to collect  Collection has been rescheduled by EL3 at 06/08/2023 05:15 Reason:   Unable to collect    Blood culture [[id number]] Collected: 06/07/23 0831    Order Status: Completed Specimen: Blood Updated: 06/09/23 1032     Blood Culture, Routine No Growth to date      No Growth to date      No Growth to date    Blood culture [[id number]]  (Abnormal) Collected: 06/06/23 0724    Order Status: Completed Specimen: Blood Updated: 06/09/23 0759     Blood Culture, Routine Gram stain aer bottle: Gram positive cocci      Positive results previously called 06/07/2023  23:43 KS3      METHICILLIN RESISTANT STAPHYLOCOCCUS AUREUS  For susceptibility see order #[id number]  Known MRSA patient      Narrative:      Collection has been rescheduled by EL3 at 06/06/2023 05:13 Reason:   Unable to collect  Collection has been rescheduled by EL3 at 06/06/2023 05:13 Reason:   Unable to collect    Blood culture [[id number]]   (Abnormal)  (Susceptibility) Collected: 06/04/23 1559    Order Status: Completed Specimen: Blood Updated: 06/08/23 0700     Blood Culture, Routine Gram stain aer bottle: Gram positive cocci      Positive results previously called      METHICILLIN RESISTANT STAPHYLOCOCCUS AUREUS  Known MRSA patient      Blood culture [[id number]]  (Abnormal) Collected: 06/04/23 0609    Order Status: Completed Specimen: Blood Updated: 06/06/23 0649     Blood Culture, Routine Gram stain aer bottle: Gram positive cocci      Gram stain shirley bottle: Gram positive cocci      Results called to and read back by:ELIZABETH Melendez;  06/05/2023      00:50 CJD      METHICILLIN RESISTANT STAPHYLOCOCCUS AUREUS  For susceptibility see order #[id number]      Blood culture [[id number]]  (Abnormal) Collected: 06/03/23 0713    Order Status: Completed Specimen: Blood Updated: 06/06/23 0648     Blood Culture, Routine Gram stain aer bottle: Gram positive cocci      Positive results previously called 06/03/2023  23:13 KS3      METHICILLIN RESISTANT STAPHYLOCOCCUS AUREUS  For susceptibility see order #[id number]      Rapid Organism ID by PCR (from Blood culture) [[id number]]  (Abnormal) Collected: 06/04/23 0609    Order Status: Completed Updated: 06/05/23 0140     Enterococcus faecalis Not Detected     Enterococcus faecium Not Detected     Listeria monocytogenes Not Detected     Staphylococcus spp. See species for ID     Comment: critical result(s) called and verbal readback obtained from ELIZABETH Meyer by CD3 06/05/2023 01:40          Staphylococcus aureus Detected     Comment: critical result(s) called and verbal readback obtained from ELIZABETH Meyer by CD3 06/05/2023 01:40          Staphylococcus epidermidis Not Detected     Staphylococcus lugdunensis Not Detected     Streptococcus species Not Detected     Streptococcus agalactiae Not Detected     Streptococcus pneumoniae Not Detected     Streptococcus pyogenes Not Detected      Acinetobacter calcoaceticus/baumannii complex Not Detected     Bacteroides fragilis Not Detected     Enterobacterales Not Detected     Enterobacter cloacae complex Not Detected     Escherichia coli Not Detected     Klebsiella aerogenes Not Detected     Klebsiella oxytoca Not Detected     Klebsiella pneumoniae group Not Detected     Proteus Not Detected     Salmonella sp Not Detected     Serratia marcescens Not Detected     Haemophilus influenzae Not Detected     Neisseria meningtidis Not Detected     Pseudomonas aeruginosa Not Detected     Stenotrophomonas maltophilia Not Detected     Candida albicans Not Detected     Candida auris Not Detected     Candida glabrata Not Detected     Candida krusei Not Detected     Candida parapsilosis Not Detected     Candida tropicalis Not Detected     Cryptococcus neoformans/gattii Not Detected     CTX-M (ESBL ) Test not applicable     IMP (Carbapenem resistant) Test not applicable     KPC resistance gene (Carbapenem resistant) Test not applicable     mcr-1  Test not applicable     mec A/C  Test not applicable     mec A/C and MREJ (MRSA) gene Detected     Comment: critical result(s) called and verbal readback obtained from ELIZABETH Meyer by CD3 06/05/2023 01:40          NDM (Carbapenem resistant) Not Detected     OXA-48-like (Carbapenem resistant) Not Detected     van A/B (VRE gene) Not Detected     VIM (Carbapenem resistant) Not Detected    Narrative:         critical result(s) called and verbal readback obtained from ELIZABETH Meyer by CD3 06/05/2023 01:40    Blood culture [[id number]]     Order Status: Canceled Specimen: Blood     Blood culture [[id number]]  (Abnormal) Collected: 06/02/23 0839    Order Status: Completed Specimen: Blood Updated: 06/03/23 0802     Blood Culture, Routine Gram stain aer bottle: Gram positive cocci      Positive results previously called 06/02/2023  20:46 KS3      METHICILLIN RESISTANT STAPHYLOCOCCUS AUREUS  For susceptibility  Detail Level: Detailed see order #[id number]      Blood culture [[id number]]  (Abnormal) Collected: 06/01/23 0858    Order Status: Completed Specimen: Blood Updated: 06/03/23 0801     Blood Culture, Routine Gram stain aer bottle: Gram positive cocci      Positive results previously called 06/02/2023  01:59 KS3      METHICILLIN RESISTANT STAPHYLOCOCCUS AUREUS  For susceptibility see order #[id number]      Blood culture [[id number]]  (Abnormal) Collected: 06/01/23 0859    Order Status: Completed Specimen: Blood Updated: 06/03/23 0800     Blood Culture, Routine Gram stain aer bottle: Gram positive cocci      Positive results previously called 06/02/2023  01:07 KS3      METHICILLIN RESISTANT STAPHYLOCOCCUS AUREUS  For susceptibility see order #[id number]      Urine culture [[id number]]  (Abnormal) Collected: 05/30/23 0909    Order Status: Completed Specimen: Urine from Clean Catch Updated: 06/03/23 0635     Urine Culture, Routine DANILO ALBICANS  > 100,000 cfu/ml  Treatment of asymptomatic candiduria is not recommended (except for   specific populations). Candida isolated in the urine typically   represents colonization. If an indwelling urinary catheter is present  it should be removed or replaced.              Imaging Reviewed:   CXR   CT bilateral hips  1. Pre-existing osteoarthritis right hip with evidence of subcapital fracture and cortical offset along the inferior edge of the femoral head neck junction.  2. No evidence of significant osseous displacement..  3. Chronic degenerative arthrosis about the left hip with prominent osteophytic spurs encircling the base of the femoral head.    CT hip right 6/3  Subcapital fracture of the right femoral neck.  Thickening of the musculature around the right hip is probably in relation to trauma. A low-attenuation focus in the musculature anterior to the hip is probably hematoma. No abnormal enhancement to suggest abscess formation can be seen although I cannot absolutely rule out  out.    Cardiology:  TTE   (poor imaging of valves)  Atrial fibrillation observed.  The left ventricle is normal in size with concentric remodeling and normal systolic function.  The estimated ejection fraction is 55%.  Moderate tricuspid regurgitation.  No clear evidence of endocarditis. If clinical suspicion persist, consider alternative cardiac imaging like BLADIMIR for further evaluation.    IMPRESSION & PLAN   1. MRSA bacteremia. 5/30-6/6   Source is not readily apparent.     Switching to IV, negative on 06/07/6 8-6/7 and 6/8 so far   She has too much pain from fracture to reliably discern whether she has a septic hip. There is no increased fluid on the CT, there may be a tiny amount of fluid in trochanteric bursa. The right iliacus is larger than left. Repeat Ct 6/3 shows no evolution of a focus of infection, ?hypodense area right iliacus. If there was, potentially curable with medical therapy. This would represent an area of seeding as opposed to the origin of the bacteremia   There is no lower respiratory tract infection present on CT of the chest    2. Right subcapital hip fracture, soft tissue trauma right shoulder  3. Oxygen dependent CHF,, pulmonary hypertension   Hypoxemic respiratory failure, CTA negative    4. Candiduria on admission is likely reflective of vaginal contamination of specimen(there is no way she could give a clean catch specimen)  5. MARSHALL. Diuretics, sepsis, vanc and contrast, number starting to improve    6. Elderly, frail and debilitated      Recommendations:  Continue  daptomycin,   ceftaroline , doses appropriate for her renal function   And  oral Zyvox  Serial  blood cultures until clear   Very poor candidate for dialysis     Trend crp, Weekly CPK on daptomycin    Dr. Valenzuela will follow-up this weekend  Discussed with    Medical Decision Making during this encounter was  [_] Low Complexity  [_] Moderate Complexity  [xxx  ] High Complexity

## 2023-06-09 NOTE — PROGRESS NOTES
INPATIENT NEPHROLOGY Progress Note  [facility]    Patient Name: Jeannie Hutchins  Date: 06/09/2023    Reason for consultation: MARSHALL    Chief Complaint:   Chief Complaint   Patient presents with    Tachycardia       History of Present Illness:  Ms. Hutchins is an 88-year-old female who was brought to the ED via EMS from [facility] due to tachycardia. Patient was initially discharged to nursing facility about 2 years ago, under hospice, but has subsequently improved and is off same.  This morning reportedly heart rate greater than 200, staff was preparing to administer metoprolol, prior to this noted to be staring off.  As per daughter oxygen level was also low, she is chronically on 2 L supplemental oxygen.  States her mother was incoherent during this time.  Patient states she was feeling unwell during this episode, lightheaded, short of breath, producing phlegm.  Denies any sore throat, fever at the facility, nausea, vomiting, diarrhea, cystitis symptoms.  She had a fall several days ago as per report, was found on floor.  Does have ulcer to the right lateral leg, daughter states she sustained few weeks ago as injury from her wheelchair.  Daughter states with previous UTI she had similar symptoms.  In the ED febrile to 101.2, heart rates in the 120s, AFib, blood pressure stable, on 10 L supplemental oxygen.  Labs with WBC 27, hemoglobin 10.3, BUN/creatinine 18/0.9, BNP 1 905, high sensitivity troponin 20, UA with 63 WBC with 3+ leukocyte with many seen.  EKG confirming atrial fibrillation with RVR.  Chest x-ray with cardiomegaly with bilateral interstitial opacities concerning for pulmonary edema.  She received 1 g acetaminophen, 1 g Rocephin, 100 mg fluconazole, 20 mg IV Lasix, 4 mg IV Zofran.  Consulted for MARSHALL.    Interval History:  6/7- hypotensive at admission, better now; on 7L oxymask, UOP 850cc  6/8- spoke with hospitalist yest- vanc level 14, UA bland, low urine Na, urine eos  neg- advised gentle fluid challenge, BP bettter, on 5L NC, UOP 850cc, SCr peaked yest and now improving  6/9- BP better, on 3L NC, BIPAP intermittently, UOP 1.1L, more awake today    Plan of Care:    Assessment:  MRSA bacteremia  MARSHALL due to above, also s/p IV contrast, also vanc level 22- multifactorial ATN  Possible urinary retention  AF with RVR  Hypoxia- Acute on Chronic Resp Failure  Hx of D CHF, pulm HTN  Hyponatremia  Anemia  Closed R hip fracture  Hypoalbuminemia    Plan:    - her prognosis is poor  - suspect multifactorial MARSHALL- not much more to offer beyond ongoing management and ensuring no urinary retention  - she is not a suitable candidate for RRT  - continue efforts towards infx management, rate control, euvolemia- seems to have responded to gentle IVFs and is nonoliguric with improvement in BUN and SCr today- use IVFs PRN  - dose meds for CrCl < 20- no nsaids for pain or further IV contrast  - renal diet 1.5L fluid restriction  - trend H/H- stable  - pain control- optimize nutrition if able    Thank you for allowing us to participate in this patient's care. We will continue to follow.    Vital Signs:  Temp Readings from Last 3 Encounters:   06/09/23 98.1 °F (36.7 °C) (Axillary)   04/27/23 98.5 °F (36.9 °C) (Oral)   01/11/23 98 °F (36.7 °C)       Pulse Readings from Last 3 Encounters:   06/09/23 (!) 59   04/27/23 83   01/11/23 92       BP Readings from Last 3 Encounters:   06/09/23 (!) 140/65   04/27/23 136/70   01/11/23 (!) 152/81       Weight:  Wt Readings from Last 3 Encounters:   06/05/23 43.7 kg (96 lb 4.8 oz)   06/01/23 44.5 kg (98 lb)   04/27/23 47.7 kg (105 lb 3.6 oz)       INS/OUTS:  I/O last 3 completed shifts:  In: 110 [P.O.:60; IV Piggyback:50]  Out: 1675 [Urine:1675]  I/O this shift:  In: -   Out: 50 [Urine:50]    Medications:  Scheduled Meds:   atorvastatin  20 mg Oral QHS    ceftaroline (TEFLARO) IVPB  300 mg Intravenous Q12H    DAPTOmycin (CUBICIN) IV (PEDS and ADULTS)  8 mg/kg  Intravenous Q48H    digoxin  0.125 mg Oral Daily    famotidine  20 mg Oral Every other day    levothyroxine  75 mcg Oral Before breakfast    linezolid  600 mg Oral Q12H    metoprolol tartrate  25 mg Oral BID     Continuous Infusions:      PRN Meds:.acetaminophen, acetaminophen, HYDROcodone-acetaminophen, magnesium oxide, magnesium oxide, morphine, nitroGLYCERIN, potassium bicarbonate, potassium bicarbonate, potassium bicarbonate, potassium, sodium phosphates, potassium, sodium phosphates, potassium, sodium phosphates, senna-docusate 8.6-50 mg, sodium chloride 0.9%, traZODone  No current facility-administered medications on file prior to encounter.     Current Outpatient Medications on File Prior to Encounter   Medication Sig Dispense Refill    acetaminophen (TYLENOL) 500 MG tablet Take 1 tablet (500 mg total) by mouth every 6 (six) hours as needed for Pain or Temperature greater than (100). (Patient taking differently: Take 325 mg by mouth every 6 (six) hours as needed for Pain or Temperature greater than (100).)  0    albuterol-ipratropium (DUO-NEB) 2.5 mg-0.5 mg/3 mL nebulizer solution Take 3 mLs by nebulization every 6 (six) hours. Rescue 1 Box 0    aluminum & magnesium hydroxide-simethicone (MYLANTA MAX STRENGTH) 400-400-40 mg/5 mL suspension Take 5 mLs by mouth every 6 (six) hours as needed for Indigestion.      amLODIPine (NORVASC) 5 MG tablet Take 1 tablet (5 mg total) by mouth once daily. 60 tablet 0    apixaban (ELIQUIS) 2.5 mg Tab Take 1 tablet (2.5 mg total) by mouth 2 (two) times daily.      atorvastatin (LIPITOR) 20 MG tablet Take 20 mg by mouth once daily.      carboxymethylcellulose sodium (THERATEARS) 0.25 % Drop Place 1 drop into both eyes 3 (three) times daily.      EScitalopram oxalate (LEXAPRO) 10 MG tablet Take 10 mg by mouth once daily.      famotidine (PEPCID) 20 MG tablet Take 20 mg by mouth once daily.      fluticasone propionate (FLONASE) 50 mcg/actuation nasal spray 1 spray by Each Nostril  "route once daily.      furosemide (LASIX) 20 MG tablet Take 1 tablet (20 mg total) by mouth once daily. 30 tablet 11    guaiFENesin (HUMIBID E) 400 mg Tab Take 400 mg by mouth every 12 (twelve) hours as needed.      levothyroxine (SYNTHROID) 75 MCG tablet Take 75 mcg by mouth once daily.      loratadine (CLARITIN) 10 mg tablet Take 10 mg by mouth once daily.      metoprolol tartrate (LOPRESSOR) 25 MG tablet Take 25 mg by mouth 2 (two) times daily. 0600  2000      olopatadine (PATANOL) 0.1 % ophthalmic solution Place 1 drop into both eyes 2 (two) times daily.      potassium chloride (K-TAB) 20 mEq Take 20 mEq by mouth once daily.      traZODone (DESYREL) 50 MG tablet Take 1 tablet (50 mg total) by mouth every evening. 30 tablet 0    vit C,N-Am-zhipa-lutein-zeaxan (PRESERVISION AREDS-2) 250-90-40-1 mg Cap Take 1 tablet by mouth once daily.      alendronate (FOSAMAX) 70 MG tablet Take 70 mg by mouth every 7 days. SATURDAYS      white petrolatum-mineral oiL (ARTIFICIAL TEARS, NELSON/MIN,) 83-15 % Oint Place into both eyes every evening. (Patient not taking: Reported on 5/30/2023) 1 each 0    [DISCONTINUED] amlodipine-atorvastatin (CADUET) 10-40 mg per tablet TAKE ONE TABLET BY MOUTH EVERY DAY (Patient taking differently: Take by mouth nightly.) 30 tablet 11    [DISCONTINUED] losartan (COZAAR) 100 MG tablet TAKE ONE TABLET BY MOUTH EVERY DAY 90 tablet 3       Review of Systems:  Limited    Physical Exam:  BP (!) 140/65 (BP Location: Left arm, Patient Position: Lying)   Pulse (!) 59   Temp 98.1 °F (36.7 °C) (Axillary)   Resp 12   Ht 4' 8" (1.422 m)   Wt 43.7 kg (96 lb 4.8 oz)   SpO2 97%   BMI 21.60 kg/m²     General Appearance:    NAD, appears stated age   Head:    Normocephalic, atraumatic   Eyes:    EOMI      Mouth:   Moist mucus membranes, no thrush or oral lesions, normal      dentition   Back:     No CVA tenderness   Lungs:     Clear to auscultation bilaterally, no wheeze, crackles, rales      or rhonchi, " symmetric air movement, respirations unlabored   Heart:    Regular rate and rhythm, S1 and S2 normal, no murmur, rub   or gallop   Abdomen:     Soft, non-tender, non-distended, bowel sounds active all four   quadrants, no RT or guarding, no masses, no organomegaly   Extremities:   Warm and well perfused, distal pulses intact, no cyanosis or    peripheral edema   MSK:   No joint or muscle swelling, tenderness or deformity   Skin:   Skin color, texture, turgor normal, no rashes or lesions   Neurologic/Psychiatric:   Calm     Results:  Lab Results   Component Value Date     (L) 06/09/2023    K 3.9 06/09/2023    CL 95 06/09/2023    CO2 28 06/09/2023    BUN 94 (H) 06/09/2023    CREATININE 2.6 (H) 06/09/2023    CALCIUM 8.4 (L) 06/09/2023    ANIONGAP 11 06/09/2023    ESTGFRAFRICA 52.5 (A) 06/17/2022    EGFRNONAA 45.6 (A) 06/17/2022       Lab Results   Component Value Date    CALCIUM 8.4 (L) 06/09/2023    PHOS 3.7 06/17/2022       Recent Labs   Lab 06/09/23  0515   WBC 15.80*   RBC 3.59*   HGB 9.3*   HCT 29.3*   *   MCV 82   MCH 25.9*   MCHC 31.7*         I have personally reviewed pertinent radiological imaging and reports.    I have spent > 35 minutes providing care for this patient for the above diagnoses. These services have included chart/data/imaging review, evaluation, exam, formulation of plan, , note preparation, and discussions with staff involved in this patient's care.    Daniele Lima MD MPH  [facility]  [address] [place] [id number]  [phone] (p)  [phone] (f)

## 2023-06-09 NOTE — HPI
88-year-old female with multiple medical problems significant for heart failure with preserved ejection fraction, atrial fibrillation, chronic hypoxic respiratory failure on home oxygen, hypertension, and hypothyroidism.  She initially presented with altered mental status and tachycardia.  She is currently admitted and being treated for acute chronic hypoxic respiratory failure, right hip fracture, acute on chronic exacerbation of heart failure, AFib with RVR, MRSA bacteremia, and acute on chronic renal failure.  At this point given her age and multiple comorbidities her prognosis appears to be increasingly grim.  I have been asked to assist with goals of care.

## 2023-06-09 NOTE — ASSESSMENT & PLAN NOTE
I reviewed her chart and discussed her case with her team.      I examined Ms. Hutchins at bedside.  She lacks capacity for complex medical decision-making.  I spoke with her son, Dane, at bedside.      I introduced myself and my role as palliative care physician.  They were agreeable to speaking.      We discussed her medical illness, prognosis, and values in detail.      Briefly, they understand that she was quite debilitated due to multiple underlying medical issues including hip fracture, MRSA infection, renal failure that is improving, and respiratory failure that is near baseline.    We discussed prognosis.  I worry her time is best estimate in terms of weeks to months in his setting of an elderly lady with an unrepaired hip fracture with multiple underlying comorbidities.  He was not at all surprised by this news.    He explains that she was a fighter in his willing to go through reasonable measures in order to prolong her life.  She would not want heroic measures or invasive surgeries.  She was willing to go through further hospitalization in order to be medically optimized for discharge.  It is important for her pain to be controlled.  She otherwise does not have any worries or fears and does not fear dying.    We spoke a bit back and forth.  He was asking excellent questions.    Ultimately, I recommended that we continue supportive measures in order to optimize her for discharge.  When she was ready for discharge I recommended discharging with the services of hospice in place.  If she does better than I would expect and and is able to consider physical therapy in the coming months, then she could revoke hospice and move in that direction.  Or if she was complications along the way, hospice would be in place to help manage her symptoms and prevent her suffering.  He understood and agreed with that recommendation.  In fact, that is what he and his siblings have been considering at discharge prior to me  making that recommendation.    I appreciate being involved.  I hope I have been helpful.

## 2023-06-10 PROBLEM — R78.81 MRSA BACTEREMIA: Status: ACTIVE | Noted: 2023-06-10

## 2023-06-10 PROBLEM — R78.81 BACTEREMIA: Status: ACTIVE | Noted: 2023-06-10

## 2023-06-10 PROBLEM — B95.62 MRSA BACTEREMIA: Status: ACTIVE | Noted: 2023-06-10

## 2023-06-10 LAB
ALBUMIN SERPL BCP-MCNC: 2.4 G/DL (ref 3.5–5.2)
ALP SERPL-CCNC: 105 U/L (ref 55–135)
ALT SERPL W/O P-5'-P-CCNC: 17 U/L (ref 10–44)
ANION GAP SERPL CALC-SCNC: 10 MMOL/L (ref 8–16)
AST SERPL-CCNC: 17 U/L (ref 10–40)
BASOPHILS # BLD AUTO: 0.01 K/UL (ref 0–0.2)
BASOPHILS NFR BLD: 0.1 % (ref 0–1.9)
BILIRUB SERPL-MCNC: 1.3 MG/DL (ref 0.1–1)
BUN SERPL-MCNC: 68 MG/DL (ref 8–23)
CALCIUM SERPL-MCNC: 8.6 MG/DL (ref 8.7–10.5)
CHLORIDE SERPL-SCNC: 100 MMOL/L (ref 95–110)
CO2 SERPL-SCNC: 30 MMOL/L (ref 23–29)
CREAT SERPL-MCNC: 1.9 MG/DL (ref 0.5–1.4)
CRP SERPL-MCNC: 10.97 MG/DL
DIFFERENTIAL METHOD: ABNORMAL
EOSINOPHIL # BLD AUTO: 0 K/UL (ref 0–0.5)
EOSINOPHIL NFR BLD: 0.1 % (ref 0–8)
ERYTHROCYTE [DISTWIDTH] IN BLOOD BY AUTOMATED COUNT: 24.4 % (ref 11.5–14.5)
EST. GFR  (NO RACE VARIABLE): 25.2 ML/MIN/1.73 M^2
GLUCOSE SERPL-MCNC: 123 MG/DL (ref 70–110)
HCT VFR BLD AUTO: 27.3 % (ref 37–48.5)
HGB BLD-MCNC: 8.6 G/DL (ref 12–16)
IMM GRANULOCYTES # BLD AUTO: 0.32 K/UL (ref 0–0.04)
IMM GRANULOCYTES NFR BLD AUTO: 2.2 % (ref 0–0.5)
LYMPHOCYTES # BLD AUTO: 0.6 K/UL (ref 1–4.8)
LYMPHOCYTES NFR BLD: 3.8 % (ref 18–48)
MAGNESIUM SERPL-MCNC: 2.4 MG/DL (ref 1.6–2.6)
MCH RBC QN AUTO: 25.7 PG (ref 27–31)
MCHC RBC AUTO-ENTMCNC: 31.5 G/DL (ref 32–36)
MCV RBC AUTO: 82 FL (ref 82–98)
MONOCYTES # BLD AUTO: 1 K/UL (ref 0.3–1)
MONOCYTES NFR BLD: 6.8 % (ref 4–15)
NEUTROPHILS # BLD AUTO: 12.5 K/UL (ref 1.8–7.7)
NEUTROPHILS NFR BLD: 87 % (ref 38–73)
NRBC BLD-RTO: 0 /100 WBC
PLATELET # BLD AUTO: 490 K/UL (ref 150–450)
PMV BLD AUTO: 8.8 FL (ref 9.2–12.9)
POTASSIUM SERPL-SCNC: 3.5 MMOL/L (ref 3.5–5.1)
PROT SERPL-MCNC: 5.7 G/DL (ref 6–8.4)
RBC # BLD AUTO: 3.34 M/UL (ref 4–5.4)
SODIUM SERPL-SCNC: 140 MMOL/L (ref 136–145)
WBC # BLD AUTO: 14.33 K/UL (ref 3.9–12.7)

## 2023-06-10 PROCEDURE — 36415 COLL VENOUS BLD VENIPUNCTURE: CPT | Performed by: STUDENT IN AN ORGANIZED HEALTH CARE EDUCATION/TRAINING PROGRAM

## 2023-06-10 PROCEDURE — 83735 ASSAY OF MAGNESIUM: CPT | Performed by: STUDENT IN AN ORGANIZED HEALTH CARE EDUCATION/TRAINING PROGRAM

## 2023-06-10 PROCEDURE — 25000003 PHARM REV CODE 250: Performed by: INTERNAL MEDICINE

## 2023-06-10 PROCEDURE — 87040 BLOOD CULTURE FOR BACTERIA: CPT | Performed by: INTERNAL MEDICINE

## 2023-06-10 PROCEDURE — 99900035 HC TECH TIME PER 15 MIN (STAT)

## 2023-06-10 PROCEDURE — 94761 N-INVAS EAR/PLS OXIMETRY MLT: CPT

## 2023-06-10 PROCEDURE — 86140 C-REACTIVE PROTEIN: CPT | Performed by: INTERNAL MEDICINE

## 2023-06-10 PROCEDURE — 99900031 HC PATIENT EDUCATION (STAT)

## 2023-06-10 PROCEDURE — 63600175 PHARM REV CODE 636 W HCPCS: Performed by: INTERNAL MEDICINE

## 2023-06-10 PROCEDURE — 27000221 HC OXYGEN, UP TO 24 HOURS

## 2023-06-10 PROCEDURE — 80053 COMPREHEN METABOLIC PANEL: CPT | Performed by: STUDENT IN AN ORGANIZED HEALTH CARE EDUCATION/TRAINING PROGRAM

## 2023-06-10 PROCEDURE — 63600175 PHARM REV CODE 636 W HCPCS: Performed by: STUDENT IN AN ORGANIZED HEALTH CARE EDUCATION/TRAINING PROGRAM

## 2023-06-10 PROCEDURE — 94660 CPAP INITIATION&MGMT: CPT

## 2023-06-10 PROCEDURE — 85025 COMPLETE CBC W/AUTO DIFF WBC: CPT | Performed by: STUDENT IN AN ORGANIZED HEALTH CARE EDUCATION/TRAINING PROGRAM

## 2023-06-10 PROCEDURE — 99233 PR SUBSEQUENT HOSPITAL CARE,LEVL III: ICD-10-PCS | Mod: ,,, | Performed by: INTERNAL MEDICINE

## 2023-06-10 PROCEDURE — 99233 SBSQ HOSP IP/OBS HIGH 50: CPT | Mod: ,,, | Performed by: INTERNAL MEDICINE

## 2023-06-10 PROCEDURE — 21000000 HC CCU ICU ROOM CHARGE

## 2023-06-10 PROCEDURE — 92526 ORAL FUNCTION THERAPY: CPT

## 2023-06-10 RX ORDER — HYDROMORPHONE HYDROCHLORIDE 1 MG/ML
0.25 INJECTION, SOLUTION INTRAMUSCULAR; INTRAVENOUS; SUBCUTANEOUS EVERY 4 HOURS PRN
Status: DISCONTINUED | OUTPATIENT
Start: 2023-06-10 | End: 2023-06-11

## 2023-06-10 RX ORDER — HYDRALAZINE HYDROCHLORIDE 20 MG/ML
10 INJECTION INTRAMUSCULAR; INTRAVENOUS EVERY 8 HOURS PRN
Status: DISCONTINUED | OUTPATIENT
Start: 2023-06-10 | End: 2023-06-12 | Stop reason: HOSPADM

## 2023-06-10 RX ADMIN — DAPTOMYCIN 350 MG: 500 INJECTION, POWDER, LYOPHILIZED, FOR SOLUTION INTRAVENOUS at 02:06

## 2023-06-10 RX ADMIN — CEFTAROLINE FOSAMIL 300 MG: 600 POWDER, FOR SOLUTION INTRAVENOUS at 03:06

## 2023-06-10 RX ADMIN — HYDROMORPHONE HYDROCHLORIDE 0.25 MG: 0.5 INJECTION, SOLUTION INTRAMUSCULAR; INTRAVENOUS; SUBCUTANEOUS at 10:06

## 2023-06-10 RX ADMIN — LINEZOLID 600 MG: 600 INJECTION, SOLUTION INTRAVENOUS at 05:06

## 2023-06-10 RX ADMIN — HYDROMORPHONE HYDROCHLORIDE 0.25 MG: 0.5 INJECTION, SOLUTION INTRAMUSCULAR; INTRAVENOUS; SUBCUTANEOUS at 05:06

## 2023-06-10 RX ADMIN — CEFTAROLINE FOSAMIL 300 MG: 600 POWDER, FOR SOLUTION INTRAVENOUS at 02:06

## 2023-06-10 RX ADMIN — LINEZOLID 600 MG: 600 INJECTION, SOLUTION INTRAVENOUS at 06:06

## 2023-06-10 RX ADMIN — METOPROLOL TARTRATE 25 MG: 25 TABLET, FILM COATED ORAL at 08:06

## 2023-06-10 RX ADMIN — ATORVASTATIN CALCIUM 20 MG: 20 TABLET, FILM COATED ORAL at 08:06

## 2023-06-10 RX ADMIN — HYDROMORPHONE HYDROCHLORIDE 0.25 MG: 0.5 INJECTION, SOLUTION INTRAMUSCULAR; INTRAVENOUS; SUBCUTANEOUS at 12:06

## 2023-06-10 NOTE — CARE UPDATE
06/10/23 0800   Patient Assessment/Suction   Respiratory Effort Unlabored   Expansion/Accessory Muscles/Retractions no use of accessory muscles   Skin Integrity   $ Wound Care Tech Time 15 min   Area Observed Bridge of nose   Skin Appearance without discoloration   Barrier used? Gel Cushion   PRE-TX-O2   Device (Oxygen Therapy) BIPAP   $ Is the patient on Low Flow Oxygen? Yes   Oxygen Concentration (%) 40   Pulse Oximetry Type Continuous   $ Pulse Oximetry - Multiple Charge Pulse Oximetry - Multiple   Ready to Wean/Extubation Screen   FIO2<=50 (chart decimal) 0.4   Preset CPAP/BiPAP Settings   Mode Of Delivery BiPAP   Ipap 16   EPAP (cm H2O) 7   Pressure Support (cm H2O) 9   Set Rate (Breaths/Min) 22   ITime (sec) 1   Rise Time (sec) 3   Patient CPAP/BiPAP Settings   CPAP/BIPAP ID 17   Timed Inspiration (Sec) 1   IPAP Rise Time (sec) 3   RR Total (Breaths/Min) 25   Tidal Volume (mL) 315   VE Minute Ventilation (L/min) 8 L/min   Peak Inspiratory Pressure (cm H2O) 16   TiTOT (%) 43   Total Leak (L/Min) 0   Patient Trigger - ST Mode Only (%) 62

## 2023-06-10 NOTE — PROGRESS NOTES
INPATIENT NEPHROLOGY Progress Note  [facility]    Patient Name: Jeannie Hutchins  Date: 06/10/2023    Reason for consultation: MARSHALL    Chief Complaint:   Chief Complaint   Patient presents with    Tachycardia       History of Present Illness:  Ms. Hutchins is an 88-year-old female who was brought to the ED via EMS from [facility] due to tachycardia. Patient was initially discharged to nursing facility about 2 years ago, under hospice, but has subsequently improved and is off same.  This morning reportedly heart rate greater than 200, staff was preparing to administer metoprolol, prior to this noted to be staring off.  As per daughter oxygen level was also low, she is chronically on 2 L supplemental oxygen.  States her mother was incoherent during this time.  Patient states she was feeling unwell during this episode, lightheaded, short of breath, producing phlegm.  Denies any sore throat, fever at the facility, nausea, vomiting, diarrhea, cystitis symptoms.  She had a fall several days ago as per report, was found on floor.  Does have ulcer to the right lateral leg, daughter states she sustained few weeks ago as injury from her wheelchair.  Daughter states with previous UTI she had similar symptoms.  In the ED febrile to 101.2, heart rates in the 120s, AFib, blood pressure stable, on 10 L supplemental oxygen.  Labs with WBC 27, hemoglobin 10.3, BUN/creatinine 18/0.9, BNP 1 905, high sensitivity troponin 20, UA with 63 WBC with 3+ leukocyte with many seen.  EKG confirming atrial fibrillation with RVR.  Chest x-ray with cardiomegaly with bilateral interstitial opacities concerning for pulmonary edema.  She received 1 g acetaminophen, 1 g Rocephin, 100 mg fluconazole, 20 mg IV Lasix, 4 mg IV Zofran.  Consulted for MARSHALL.    Interval History:  6/7- hypotensive at admission, better now; on 7L oxymask, UOP 850cc  6/8- spoke with hospitalist yest- vanc level 14, UA bland, low urine Na, urine eos  neg- advised gentle fluid challenge, BP bettter, on 5L NC, UOP 850cc, SCr peaked yest and now improving  6/9- BP better, on 3L NC, BIPAP intermittently, UOP 1.1L, more awake today  6/10- VSS, on BIPAP, UOP 1.1L    Plan of Care:    Assessment:  MRSA bacteremia  MARSHALL due to above, also s/p IV contrast, also vanc level 22- multifactorial ATN  Possible urinary retention  AF with RVR  Hypoxia- Acute on Chronic Resp Failure  Hx of D CHF, pulm HTN  Hyponatremia  Anemia  Closed R hip fracture  Hypoalbuminemia    Plan:    - her prognosis is poor  - suspect multifactorial MARSHALL- she is not a suitable candidate for RRT  - continue efforts towards infx management, rate control, euvolemia- seems to have responded to gentle IVFs and is nonoliguric with improvement in BUN and SCr today- no acute diuresis needs  - dose meds for CrCl < 20- no nsaids for pain or further IV contrast  - renal diet 1.5L fluid restriction, replete K, Mg PRN  - trend H/H  - pain control- optimize nutrition if able    Thank you for allowing us to participate in this patient's care. We will continue to follow.    Vital Signs:  Temp Readings from Last 3 Encounters:   06/10/23 98.2 °F (36.8 °C) (Axillary)   04/27/23 98.5 °F (36.9 °C) (Oral)   01/11/23 98 °F (36.7 °C)       Pulse Readings from Last 3 Encounters:   06/10/23 66   04/27/23 83   01/11/23 92       BP Readings from Last 3 Encounters:   06/10/23 (!) 159/72   04/27/23 136/70   01/11/23 (!) 152/81       Weight:  Wt Readings from Last 3 Encounters:   06/05/23 43.7 kg (96 lb 4.8 oz)   06/01/23 44.5 kg (98 lb)   04/27/23 47.7 kg (105 lb 3.6 oz)       INS/OUTS:  I/O last 3 completed shifts:  In: 400 [IV Piggyback:400]  Out: 1725 [Urine:1725]  No intake/output data recorded.    Medications:  Scheduled Meds:   atorvastatin  20 mg Oral QHS    ceftaroline (TEFLARO) IVPB  300 mg Intravenous Q12H    DAPTOmycin (CUBICIN) IV (PEDS and ADULTS)  8 mg/kg Intravenous Q48H    digoxin  0.125 mg Oral Daily    famotidine  20  mg Oral Every other day    levothyroxine  75 mcg Oral Before breakfast    linezolid  600 mg Intravenous Q12H    metoprolol tartrate  25 mg Oral BID     Continuous Infusions:      PRN Meds:.acetaminophen, acetaminophen, HYDROcodone-acetaminophen, HYDROmorphone, magnesium oxide, magnesium oxide, morphine, nitroGLYCERIN, potassium bicarbonate, potassium bicarbonate, potassium bicarbonate, potassium, sodium phosphates, potassium, sodium phosphates, potassium, sodium phosphates, senna-docusate 8.6-50 mg, sodium chloride 0.9%, traZODone  No current facility-administered medications on file prior to encounter.     Current Outpatient Medications on File Prior to Encounter   Medication Sig Dispense Refill    acetaminophen (TYLENOL) 500 MG tablet Take 1 tablet (500 mg total) by mouth every 6 (six) hours as needed for Pain or Temperature greater than (100). (Patient taking differently: Take 325 mg by mouth every 6 (six) hours as needed for Pain or Temperature greater than (100).)  0    albuterol-ipratropium (DUO-NEB) 2.5 mg-0.5 mg/3 mL nebulizer solution Take 3 mLs by nebulization every 6 (six) hours. Rescue 1 Box 0    aluminum & magnesium hydroxide-simethicone (MYLANTA MAX STRENGTH) 400-400-40 mg/5 mL suspension Take 5 mLs by mouth every 6 (six) hours as needed for Indigestion.      amLODIPine (NORVASC) 5 MG tablet Take 1 tablet (5 mg total) by mouth once daily. 60 tablet 0    apixaban (ELIQUIS) 2.5 mg Tab Take 1 tablet (2.5 mg total) by mouth 2 (two) times daily.      atorvastatin (LIPITOR) 20 MG tablet Take 20 mg by mouth once daily.      carboxymethylcellulose sodium (THERATEARS) 0.25 % Drop Place 1 drop into both eyes 3 (three) times daily.      EScitalopram oxalate (LEXAPRO) 10 MG tablet Take 10 mg by mouth once daily.      famotidine (PEPCID) 20 MG tablet Take 20 mg by mouth once daily.      fluticasone propionate (FLONASE) 50 mcg/actuation nasal spray 1 spray by Each Nostril route once daily.      furosemide (LASIX) 20  "MG tablet Take 1 tablet (20 mg total) by mouth once daily. 30 tablet 11    guaiFENesin (HUMIBID E) 400 mg Tab Take 400 mg by mouth every 12 (twelve) hours as needed.      levothyroxine (SYNTHROID) 75 MCG tablet Take 75 mcg by mouth once daily.      loratadine (CLARITIN) 10 mg tablet Take 10 mg by mouth once daily.      metoprolol tartrate (LOPRESSOR) 25 MG tablet Take 25 mg by mouth 2 (two) times daily. 0600  2000      olopatadine (PATANOL) 0.1 % ophthalmic solution Place 1 drop into both eyes 2 (two) times daily.      potassium chloride (K-TAB) 20 mEq Take 20 mEq by mouth once daily.      traZODone (DESYREL) 50 MG tablet Take 1 tablet (50 mg total) by mouth every evening. 30 tablet 0    vit C,K-Sl-trlmu-lutein-zeaxan (PRESERVISION AREDS-2) 250-90-40-1 mg Cap Take 1 tablet by mouth once daily.      alendronate (FOSAMAX) 70 MG tablet Take 70 mg by mouth every 7 days. SATURDAYS      white petrolatum-mineral oiL (ARTIFICIAL TEARS, NELSON/MIN,) 83-15 % Oint Place into both eyes every evening. (Patient not taking: Reported on 5/30/2023) 1 each 0    [DISCONTINUED] amlodipine-atorvastatin (CADUET) 10-40 mg per tablet TAKE ONE TABLET BY MOUTH EVERY DAY (Patient taking differently: Take by mouth nightly.) 30 tablet 11    [DISCONTINUED] losartan (COZAAR) 100 MG tablet TAKE ONE TABLET BY MOUTH EVERY DAY 90 tablet 3       Review of Systems:  Limited    Physical Exam:  BP (!) 159/72   Pulse 66   Temp 98.2 °F (36.8 °C) (Axillary)   Resp (!) 26   Ht 4' 8" (1.422 m)   Wt 43.7 kg (96 lb 4.8 oz)   SpO2 97%   BMI 21.60 kg/m²     General Appearance:    NAD, appears stated age   Head:    Normocephalic, atraumatic   Eyes:    EOMI      Mouth:   Moist mucus membranes, no thrush or oral lesions, normal      dentition   Back:     No CVA tenderness   Lungs:     Clear to auscultation bilaterally, no wheeze, crackles, rales      or rhonchi, symmetric air movement, respirations unlabored   Heart:    Regular rate and rhythm, S1 and S2 " normal, no murmur, rub   or gallop   Abdomen:     Soft, non-tender, non-distended, bowel sounds active all four   quadrants, no RT or guarding, no masses, no organomegaly   Extremities:   Warm and well perfused, distal pulses intact, no cyanosis or    peripheral edema   MSK:   No joint or muscle swelling, tenderness or deformity   Skin:   Skin color, texture, turgor normal, no rashes or lesions   Neurologic/Psychiatric:   Calm     Results:  Lab Results   Component Value Date     06/10/2023    K 3.5 06/10/2023     06/10/2023    CO2 30 (H) 06/10/2023    BUN 68 (H) 06/10/2023    CREATININE 1.9 (H) 06/10/2023    CALCIUM 8.6 (L) 06/10/2023    ANIONGAP 10 06/10/2023    ESTGFRAFRICA 52.5 (A) 06/17/2022    EGFRNONAA 45.6 (A) 06/17/2022       Lab Results   Component Value Date    CALCIUM 8.6 (L) 06/10/2023    PHOS 3.7 06/17/2022       Recent Labs   Lab 06/10/23  0424   WBC 14.33*   RBC 3.34*   HGB 8.6*   HCT 27.3*   *   MCV 82   MCH 25.7*   MCHC 31.5*         I have personally reviewed pertinent radiological imaging and reports.    I have spent > 35 minutes providing care for this patient for the above diagnoses. These services have included chart/data/imaging review, evaluation, exam, formulation of plan, , note preparation, and discussions with staff involved in this patient's care.    Daniele Lima MD MPH  [facility]  [address] [place] [id number]  [phone] (p)  [phone] (f)

## 2023-06-10 NOTE — CARE UPDATE
06/09/23 2330   Patient Assessment/Suction   Respiratory Effort Unlabored   Rhythm/Pattern, Respiratory pattern regular   PRE-TX-O2   Device (Oxygen Therapy) BIPAP   Oxygen Concentration (%) 50   SpO2 100 %   Pulse Oximetry Type Continuous   Pulse 65   Resp 17   Ready to Wean/Extubation Screen   FIO2<=50 (chart decimal) 0.5   Preset CPAP/BiPAP Settings   Mode Of Delivery BiPAP   $ Is patient using? Yes   Ipap 15   EPAP (cm H2O) 7   Pressure Support (cm H2O) 8   Set Rate (Breaths/Min) 14   ITime (sec) 1   Rise Time (sec) 3   Patient CPAP/BiPAP Settings   RR Total (Breaths/Min) 18   Tidal Volume (mL) 362   VE Minute Ventilation (L/min) 6 L/min   Peak Inspiratory Pressure (cm H2O) 15   TiTOT (%) 27   Total Leak (L/Min) 15   Patient Trigger - ST Mode Only (%) 67   CPAP/BiPAP Backup Settings   IPAP Backup 15 cmH2O   EPAP Backup 7 cmH2O   Backup Rate 14 breaths per minute (bpm)   FIO2 Backup 0.5 %   ITIME Backup 1 seconds   Rise Time Backup 3 seconds   CPAP/BiPAP Alarms   High Pressure (cm H2O) 40   Low Pressure (cm H2O) 10   Minute Ventilation (L/Min) 3   High RR (breaths/min) 40   Low RR (breaths/min) 10   Apnea (Sec) 20   Education   $ Education 15 min;BiPAP

## 2023-06-10 NOTE — PT/OT/SLP PROGRESS
Physical Therapy      Patient Name:  Jeannie Hutchins   MRN:  [id number]    Patient not seen today secondary to Other (Comment) (Pt on BiPap). Will follow-up 6/11/23.

## 2023-06-10 NOTE — PLAN OF CARE
Problem: Adult Inpatient Plan of Care  Goal: Patient-Specific Goal (Individualized)  Outcome: Ongoing, Progressing  Goal: Absence of Hospital-Acquired Illness or Injury  Outcome: Ongoing, Progressing  Goal: Optimal Comfort and Wellbeing  Outcome: Ongoing, Progressing  Goal: Readiness for Transition of Care  Outcome: Ongoing, Progressing     Problem: Glycemic Control Impaired (Sepsis/Septic Shock)  Goal: Blood Glucose Level Within Desired Range  Outcome: Ongoing, Progressing      Negative

## 2023-06-10 NOTE — NURSING
"Pt woke up screaming "am I having a baby?"  Explained she was having a bowel movement but patient confused.  Dilaudid given, pt was able to have a BM that was soft in consistency.  "

## 2023-06-10 NOTE — PROGRESS NOTES
Consult Note  Infectious Disease    Reason for Consult:  MRSA bacteremia    HPI: Jeannie Hutchins is a 88 y.o. female Nursing home resident With a history of diastolic heart failure, oxygen dependence, atrial fibrillation with recent 6 day stay in late April for worsening oxygen requirement, right lower extremity cellulitis associated with an injury (where she hit her lower leg on the wheelchair).  She was given oxygen support, azithromycin, ceftriaxone and Lasix, required intensive care on admission, followed by steroids and doxycycline for lungs and leg.    She presented to the emergency room on 05/30 with tachycardia, generalized weakness and a syncopal episode.  She had had a fall last week with x-rays done at the nursing facility which were negative.  She denies feeling ill prior to the fall and relates this to not donning her slippers before trying to move around.  Her heart rate was found to be 220, in atrial fibrillation and with a temperature of 101.7°.  Workup included CBC with white blood cells 27,000, BNP 1900, urinalysis with 63 white blood cells and many yeast, chest x-ray with pulmonary edema and on admission was placed on ceftriaxone and fluconazole.  The wound on the right lower leg is largely healed, she does have a bruise about the right hip.  She was noted to have pain in the shoulder and hip and x-rays were ordered with findings of severe glenohumeral and acromioclavicular osteoarthrosis and a suspected right femoral neck fracture with a CT scan showing a subcapital fracture and cortical offset along the inferior edge of the femoral head/neck junction.  Fortunately there is no sign of hemarthrosis or increased joint fluid.  The right iliacus muscle is edematous or enlarged compared to the left. There may be a tiny bit of fluid in her trochanteric.  Blood cultures from 05/30 became positive this morning with GPC identified by [facility] is MRSA. Repeat blood cultures have been drawn.  The urine culture  "is growing only yeast("clean catch"). (MRSA screen on admission was negative).  Ceftriaxone was stopped and vancomycin was ordered.  Fever has resolved. WBC remain elevated. Still on diltiazem drip and requiring 6 liters of oxygen. Urine output is not being measured as she is incontinent.     6/2: interim reviewed. Afebrile. Oxygen requirement is much higher. Orthopedics consult pending. yesterday's blood cultures are positive, Vanc ALICIA for the MRSA is 1, trough was low. WBC improved. BUN up to 38. Ct chest reviewed and there is no focal pneumonia to blame her bacteremia on.  I had the chest x-ray repeated and there are no new infiltrates, only mild perihilar fullness.  She is alert, still in pain from her right hip but I was able to perform range-of-motion a little, more than I expected to be able to.  The remainder of the joints of the lower extremities have no sign or symptom of joint infection.  She has no peripheral signs of endocarditis.  Understandably her son is concerned about the stress of a transesophageal echo.  Discussed with him that we would not do it on someone who has this oxygen requirement and I explained that this would help discern the length of therapy.  I also discussed with him that in some cases when the evaluation is more dangerous than the treatment, that we elect to treat people for the worst possible diagnosis and he is in favor of this rather than the BLADIMIR.  6/3: Interim reviewed.  6/2 blood cultures are positive for MRSA, 6 3 blood cultures after the initiation of daptomycin have been drawn.  She is requiring roughly the same amount of oxygen.  Discussed with Dr. Ndiaye of Pulmonary.  Her right hip is no worse, but her blood pressure does not tolerate opiates very well.  She is eating a little bit more per her son's report.  Examination of the hip allows me to perform some flexion without severe pain and this is an improvement from 2 days ago.  Talked with her son about whether he would " be in favor of a right hip washout should imaging or persistent bacteremia suggest that the hip was infected and discussed with him the potential for anesthetic complications and/or inability to liberate from the ventilator.  CT scan of the hip has been ordered by Hospital Medicine.  6/4: Interim reviewed.  Most recent blood cultures, since the addition of daptomycin are still positive  She is requiring less oxygen.  She is brighter and seems to be in less pain.  She is breathing comfortably and able to express herself well though she is very hard of hearing  6/5: interim reviewed. Afebrile. Blood cultures remain positive through 6/4 am. Cr is worse today(diuretics, contrast and vanc).  She personally has no new complaints.  Her daughters at the bedside assisting with her lunch.  She remains bit confused, requiring a modest amount of oxygen.  Her pain seems well controlled.  I reviewed her last CT of the pelvis again and there may be a hypodense area in the right iliacus muscle.  If this were truly a site of infection I would expected to be from the bacteremia and not the cause of the bacteremia  6/6: interim reviewed. Blood cultures from 6/4 are positive. Creatinine is worse and urine output is worse, .  But she may be in retention bladder feels distended, is uncomfortable to palpation.  Right hip is no different.  Did not respond to diuretics yesterday.  10 hours because of somnolence.  6/7: interim reviewed. Blood culture from 6/6 is negative so far. BUN and Cr are worse. No obstruction on US of kidneys. Urine output 650. Was not in retention. 7 liters oxygen in progress. Nephrology consult in progress. Urine eos negative. Daughter at bedside finds her sleepier and more confused. Received 3 doses of IV tylenol and no opiates since yesterday. Eating and drinking small amounts. Seems to be more comfortable pain wise.   6/8: interim reviewed. Resting quietly. Not eating much, maybe 25% at most. Sounds wetter on exam  "but oxygen requirement not higher. Urine output is encouraging, 1 liter/24 h. Blood cultures from 6/6 have GPC but are negative from 6/7 so far. CRP falling  6/9: Interim reviewed.  Afebrile.  Creatinine is improved, urine output is still suboptimal but adequate.  Blood cultures positive 6/6.  Not eating today secondary to sensation of constipation, status post enema.  White blood cells and CRP are improved    06/10/2023. Dr Aguillon   Afebrile.  Wearing BiPAP 16/7/40%.  Tired, weak, difficult to understand.  WBC 14 improving, CRP is improving..  Blood cultures of 0607, 0608, 0609 are negative to date.      Antibiotics (From admission, onward)      Start     Stop Route Frequency Ordered    06/09/23 1315  linezolid 600 mg/300 mL IVPB 600 mg        Note to Pharmacy: Dosing/Administration  Dose Adjustments  See 'In-Depth Answers' for detailed results.    Renal impairment: No adjustment necessary [2]  Micromedex    -- IV Every 12 hours (non-standard times) 06/09/23 1209    06/06/23 1500  ceftaroline fosamiL (TEFLARO) 300 mg in dextrose 5 % (D5W) 50 mL IVPB         -- IV Every 12 hours (non-standard times) 06/06/23 1349    06/06/23 1400  DAPTOmycin (CUBICIN) 350 mg in sodium chloride 0.9% SolP 50 mL IVPB        Note to Pharmacy: Ht: 4' 8" (1.422 m)  Wt: 43.7 kg (96 lb 4.8 oz)  Estimated Creatinine Clearance: 13 mL/min (A) (based on SCr of 2.1 mg/dL (H)).  Body mass index is 21.6 kg/m².    -- IV Every 48 hours (non-standard times) 06/05/23 1320          Antifungals (From admission, onward)      None          Antivirals (From admission, onward)      None          EXAM & DIAGNOSTICS REVIEWED:   Vitals:     Temp:  [97.6 °F (36.4 °C)-98.4 °F (36.9 °C)]   Temp: 98.2 °F (36.8 °C) (06/10/23 0701)  Pulse: 66 (06/10/23 0600)  Resp: (!) 26 (06/10/23 0600)  BP: (!) 159/72 (06/10/23 0600)  SpO2: 97 % (06/10/23 0600)    Intake/Output Summary (Last 24 hours) at 6/10/2023 1110  Last data filed at 6/10/2023 0546  Gross per 24 hour   Intake " 350 ml   Output 1075 ml   Net -725 ml       General:  In NAD. Sleepy but arousable comfortable    Eyes:  Anicteric,   EOMI,    ENT:  No ulcers, exudates, thrush, nares patent, dentition is fair. Hard of hearing  Neck:  supple,   Lungs: bibasilar crackles, resting comfortably at present, quality of urine is clear, 350 cc roughly oxygen at 3 liters, no distress  Heart:  iRRR,   Abd:  Soft, NT, ND, normal BS,    :  Ta,  Musc:  Joints without effusion, swelling, erythema, synovitis, no redness, bruising or fluctuance around right hip   Skin:  No rashes   Neuro:              Arousable,  , face symmetric, moves all extremities,    Psych: Calm,    Lymphatic:     Extrem: No edema, erythema, phlebitis, cellulitis, warm and well perfused  VAD:  Peripheral, midline left arm 6/6     Isolation:  contact  Wound: Right lower lateral leg prior ulcer is healed    Right hip    There is no redness about the right hip and the bruise is improved.  6/3    General Labs reviewed:  Recent Labs   Lab 06/08/23  0505 06/09/23  0515 06/10/23  0424   WBC 16.13* 15.80* 14.33*   HGB 10.1* 9.3* 8.6*   HCT 32.0* 29.3* 27.3*   * 557* 490*       Recent Labs   Lab 06/08/23  0400 06/09/23  0515 06/10/23  0424   * 134* 140   K 4.2 3.9 3.5   CL 93* 95 100   CO2 26 28 30*   * 94* 68*   CREATININE 3.0* 2.6* 1.9*   CALCIUM 8.9 8.4* 8.6*   PROT 6.0 5.8* 5.7*   BILITOT 1.2* 1.2* 1.3*   ALKPHOS 119 122 105   ALT 21 18 17   AST 18 18 17         Lab Results   Component Value Date    CRP 10.97 (H) 06/10/2023    CRP 16.37 (H) 06/09/2023    CRP 18.14 (H) 06/08/2023    CRP 19.48 (H) 06/07/2023    CRP 22.28 (H) 06/06/2023    CRP 24.82 (H) 06/05/2023    CRP 24.68 (H) 06/04/2023    CRP 23.21 (H) 06/03/2023    CRP 29.61 (H) 06/02/2023    CRP 30.98 (H) 06/01/2023    PROCAL 1.38 (H) 06/01/2023    PROCAL <0.05 04/22/2023    PROCAL 0.30 04/21/2023    PROCAL <0.05 06/16/2022    PROCAL <0.05 06/14/2022    PROCAL 0.07 06/14/2022    PROCAL 0.18  04/19/2021    PROCAL 0.03 01/16/2021      Micro:  Microbiology Results (last 7 days)       Procedure Component Value Units Date/Time    Blood culture [[id number]] Collected: 06/08/23 0845    Order Status: Completed Specimen: Blood Updated: 06/10/23 1032     Blood Culture, Routine No Growth to date      No Growth to date      No Growth to date    Narrative:      Collection has been rescheduled by EL3 at 06/08/2023 05:15 Reason:   Unable to collect  Collection has been rescheduled by EL3 at 06/08/2023 05:15 Reason:   Unable to collect    Blood culture [[id number]] Collected: 06/07/23 0831    Order Status: Completed Specimen: Blood Updated: 06/10/23 1032     Blood Culture, Routine No Growth to date      No Growth to date      No Growth to date      No Growth to date    Blood culture [[id number]] Collected: 06/09/23 0514    Order Status: Completed Specimen: Blood Updated: 06/10/23 0632     Blood Culture, Routine No Growth to date      No Growth to date    Blood culture [[id number]] Collected: 06/10/23 0424    Order Status: Sent Specimen: Blood Updated: 06/10/23 0445    Blood culture [[id number]]  (Abnormal) Collected: 06/06/23 0724    Order Status: Completed Specimen: Blood Updated: 06/09/23 0759     Blood Culture, Routine Gram stain aer bottle: Gram positive cocci      Positive results previously called 06/07/2023  23:43 KS3      METHICILLIN RESISTANT STAPHYLOCOCCUS AUREUS  For susceptibility see order #[id number]  Known MRSA patient      Narrative:      Collection has been rescheduled by EL3 at 06/06/2023 05:13 Reason:   Unable to collect  Collection has been rescheduled by EL3 at 06/06/2023 05:13 Reason:   Unable to collect    Blood culture [[id number]]  (Abnormal)  (Susceptibility) Collected: 06/04/23 1559    Order Status: Completed Specimen: Blood Updated: 06/08/23 0700     Blood Culture, Routine Gram stain aer bottle: Gram positive cocci      Positive results previously called      METHICILLIN RESISTANT STAPHYLOCOCCUS  AUREUS  Known MRSA patient      Blood culture [[id number]]  (Abnormal) Collected: 06/04/23 0609    Order Status: Completed Specimen: Blood Updated: 06/06/23 0649     Blood Culture, Routine Gram stain aer bottle: Gram positive cocci      Gram stain shirley bottle: Gram positive cocci      Results called to and read back by:ELIZABETH Melendez;  06/05/2023      00:50 CJD      METHICILLIN RESISTANT STAPHYLOCOCCUS AUREUS  For susceptibility see order #[id number]      Blood culture [[id number]]  (Abnormal) Collected: 06/03/23 0713    Order Status: Completed Specimen: Blood Updated: 06/06/23 0648     Blood Culture, Routine Gram stain aer bottle: Gram positive cocci      Positive results previously called 06/03/2023  23:13 KS3      METHICILLIN RESISTANT STAPHYLOCOCCUS AUREUS  For susceptibility see order #[id number]      Rapid Organism ID by PCR (from Blood culture) [[id number]]  (Abnormal) Collected: 06/04/23 0609    Order Status: Completed Updated: 06/05/23 0140     Enterococcus faecalis Not Detected     Enterococcus faecium Not Detected     Listeria monocytogenes Not Detected     Staphylococcus spp. See species for ID     Comment: critical result(s) called and verbal readback obtained from ELIZABETH Meyer by CD3 06/05/2023 01:40          Staphylococcus aureus Detected     Comment: critical result(s) called and verbal readback obtained from ELIZABETH Meyer by CD3 06/05/2023 01:40          Staphylococcus epidermidis Not Detected     Staphylococcus lugdunensis Not Detected     Streptococcus species Not Detected     Streptococcus agalactiae Not Detected     Streptococcus pneumoniae Not Detected     Streptococcus pyogenes Not Detected     Acinetobacter calcoaceticus/baumannii complex Not Detected     Bacteroides fragilis Not Detected     Enterobacterales Not Detected     Enterobacter cloacae complex Not Detected     Escherichia coli Not Detected     Klebsiella aerogenes Not Detected     Klebsiella oxytoca Not  Detected     Klebsiella pneumoniae group Not Detected     Proteus Not Detected     Salmonella sp Not Detected     Serratia marcescens Not Detected     Haemophilus influenzae Not Detected     Neisseria meningtidis Not Detected     Pseudomonas aeruginosa Not Detected     Stenotrophomonas maltophilia Not Detected     Candida albicans Not Detected     Candida auris Not Detected     Candida glabrata Not Detected     Candida krusei Not Detected     Candida parapsilosis Not Detected     Candida tropicalis Not Detected     Cryptococcus neoformans/gattii Not Detected     CTX-M (ESBL ) Test not applicable     IMP (Carbapenem resistant) Test not applicable     KPC resistance gene (Carbapenem resistant) Test not applicable     mcr-1  Test not applicable     mec A/C  Test not applicable     mec A/C and MREJ (MRSA) gene Detected     Comment: critical result(s) called and verbal readback obtained from ELIZABETH Meyer by CD3 06/05/2023 01:40          NDM (Carbapenem resistant) Not Detected     OXA-48-like (Carbapenem resistant) Not Detected     van A/B (VRE gene) Not Detected     VIM (Carbapenem resistant) Not Detected    Narrative:         critical result(s) called and verbal readback obtained from ELIZABETH Meyer by CD3 06/05/2023 01:40    Blood culture [[id number]]     Order Status: Canceled Specimen: Blood             Imaging Reviewed:   CXR   CT bilateral hips  1. Pre-existing osteoarthritis right hip with evidence of subcapital fracture and cortical offset along the inferior edge of the femoral head neck junction.  2. No evidence of significant osseous displacement..  3. Chronic degenerative arthrosis about the left hip with prominent osteophytic spurs encircling the base of the femoral head.    CT hip right 6/3  Subcapital fracture of the right femoral neck.  Thickening of the musculature around the right hip is probably in relation to trauma. A low-attenuation focus in the musculature anterior to the  hip is probably hematoma. No abnormal enhancement to suggest abscess formation can be seen although I cannot absolutely rule out out.    Cardiology:  TTE   (poor imaging of valves)  Atrial fibrillation observed.  The left ventricle is normal in size with concentric remodeling and normal systolic function.  The estimated ejection fraction is 55%.  Moderate tricuspid regurgitation.  No clear evidence of endocarditis. If clinical suspicion persist, consider alternative cardiac imaging like BLADIMIR for further evaluation.    IMPRESSION & PLAN   1. MRSA bacteremia. 5/30-6/6   Source is not readily apparent.     Blood cultures are negative on 06/07/-6/8-6/9 so far   She has too much pain from fracture to reliably discern whether she has a septic hip. There is no increased fluid on the CT, there may be a tiny amount of fluid in trochanteric bursa. The right iliacus is larger than left. Repeat Ct 6/3 shows no evolution of a focus of infection, ?hypodense area right iliacus. If there was, potentially curable with medical therapy. This would represent an area of seeding as opposed to the origin of the bacteremia   There is no lower respiratory tract infection present on CT of the chest    2. Right subcapital hip fracture, soft tissue trauma right shoulder    3. Oxygen dependent CHF,pulmonary hypertension   Hypoxemic respiratory failure, CTA negative    4. Candiduria on admission is likely reflective of vaginal contamination of specimen(there is no way she could give a clean catch specimen)    5. MARSHALL. Diuretics, sepsis, vanc and contrast, number starting to improve    Very poor candidate for dialysis    6. Elderly, frail and debilitated      Recommendations:  Continue  IV daptomycin,  IV ceftaroline , doses appropriate for her renal function  And  oral Zyvox  Serial  blood cultures until clear  Trend crp, Weekly CPK on daptomycin  Monitor WBC and platelets    Medical Decision Making during this encounter was  [_] Low  Complexity  [_] Moderate Complexity  [xxx  ] High Complexity

## 2023-06-10 NOTE — PROGRESS NOTES
[facility] Medicine  Progress Note    Patient name: Jeannie Hutchins  MRN: [id number]  Admit Date: 5/30/2023   LOS: 11 days     SUBJECTIVE:     Principal problem: Sepsis    Interval History:      6/10- patient is resting quietly with daughter at bedside, discussed GOC and plan moving forward.  We will try to keep her pain at a tolerable level and not oversedate her if possible.  Stopping morphine, we can use low dose dilaudid as needed.  She is confused, but more interactive than she was yesterday.     6/9- on entering room, she is screaming out in pain.  She will occasionally respond verbally to qfuestions, but often seems to not respond intentionally.  She is asking why she is pain.  Plan for palliative consult today.  She will not swallow meds for nursing.  Will give IV low dose opiate for hip pain.      Scheduled Meds:   atorvastatin  20 mg Oral QHS    ceftaroline (TEFLARO) IVPB  300 mg Intravenous Q12H    DAPTOmycin (CUBICIN) IV (PEDS and ADULTS)  8 mg/kg Intravenous Q48H    digoxin  0.125 mg Oral Daily    famotidine  20 mg Oral Every other day    levothyroxine  75 mcg Oral Before breakfast    linezolid  600 mg Intravenous Q12H    metoprolol tartrate  25 mg Oral BID     Continuous Infusions:  PRN Meds:acetaminophen, acetaminophen, HYDROcodone-acetaminophen, HYDROmorphone, magnesium oxide, magnesium oxide, morphine, nitroGLYCERIN, potassium bicarbonate, potassium bicarbonate, potassium bicarbonate, potassium, sodium phosphates, potassium, sodium phosphates, potassium, sodium phosphates, senna-docusate 8.6-50 mg, sodium chloride 0.9%, traZODone    Review of patient's allergies indicates:   Allergen Reactions    [place] Swelling     Tongue swells up and a generalized rash.  Patient reports allergy to all Berries    Hydrochlorothiazide Other (See Comments)     hyponatremia    Lisinopril Other (See Comments)     Cough       Review of Systems: As per interval history    OBJECTIVE:     Vital  Signs (Most Recent)  Temp: 98.6 °F (37 °C) (06/10/23 1232)  Pulse: 68 (06/10/23 1320)  Resp: 11 (06/10/23 1320)  BP: (!) 159/72 (06/10/23 0600)  SpO2: 98 % (06/10/23 1320)    Vital Signs Range (Last 24H):  Temp:  [97.6 °F (36.4 °C)-98.6 °F (37 °C)]   Pulse:  [62-78]   Resp:  [10-26]   BP: (125-159)/(58-72)   SpO2:  [92 %-100 %]     I & O (Last 24H):  Intake/Output Summary (Last 24 hours) at 6/10/2023 1454  Last data filed at 6/10/2023 1240  Gross per 24 hour   Intake 350 ml   Output 1125 ml   Net -775 ml         Physical Exam:  General: seems more comfortable, answering questions  Eyes: No conjunctival injection. No scleral icterus.  CVS: RRR. No LE edema BL  Lungs:  No accessory muscle use.  Slight tachypnea on bipap  Abdomen:  Soft, nontender and nondistended.  No organomegaly  Neuro:  Moves all extremities.      Laboratory:  I have reviewed all pertinent lab results within the past 24 hours.    Diagnostic Results:  Labs: Reviewed  ECG: Reviewed  X-Ray: Reviewed    ASSESSMENT/PLAN:         Active Hospital Problems    Diagnosis  POA    *MRSA Bacteremia [A41.9]  Yes    Bacteremia [R78.81]  Unknown    MRSA bacteremia [R78.81, B95.62]  Unknown    Goals of care, counseling/discussion [Z71.89]  Not Applicable    MARSHALL (acute kidney injury) [N17.9]  Yes    Closed fracture of right hip [S72.001A]  Yes    Candiduria [B37.49]  Yes    Closed right hip fracture after fall [S72.001A]  Yes    Acute on chronic diastolic heart failure [I50.9]  Yes    Acute on chronic respiratory failure with hypoxia [J96.20]  Yes    Atrial fibrillation with RVR with known history of a fib [I48.91]  Yes     Chronic    Troponin I above reference range [R77.8]  Yes    Nursing home resident [Z59.3]  Not Applicable     Chronic    DNR (do not resuscitate) [Z66]  Yes     Chronic    UTI (urinary tract infection) [N39.0]  Yes    Ulcer of right leg [L97.919]  Yes     Chronic    Anemia [D64.9]  Yes     Chronic    Hypothyroidism [E03.9]  Yes     Chronic     Mixed hyperlipidemia [E78.2]  Yes    Pulmonary hypertension [I27.20]  Yes    Essential hypertension [I10]  Yes      Resolved Hospital Problems   No resolved problems to display.         Plan: MRSA Bacteremia  Ongoing MRSA bacteremia without a clear source at this point and we have not achieved source control.  Family would not like any further invasive workup.     Check CTA chest > no signs of significant focal pneumonia  Inflammatory markers rising  Repeat blood cultures daily  She has had daily positive blood cultures.   Urine culture with candiduria likely contaminated   COVID and influenza negative  Daptomycin and Ceftaroline plus linezolid per Dr Shahid  Initial Echo without vegetations.   Plan to continue antibiotics until cultures clear   Appreciate Dr Shahid's recs  CT hips / pelvis  > no clear evidence of infective focus.      Hopefully we have achieved clearance of her bacteremia     UTI (urinary tract infection)  UA positive, many yeast, history of UTIs in the past  Urine culture with many yeast , likely contaminant  Fluconazole discontinued        Atrial fibrillation with RVR with known history of a fib  Patient with known history of persistent atrial fibrillation.   RVR likely driven by acute medical condition including infection/sepsis and CHF.  Telemetry monitoring, treat acute medical condition, continue metoprolol and eliquis  Added digoxin  Metoprolol reduced  Monitor blood pressure  HR better controlled        Acute on chronic respiratory failure with hypoxia  Chronically on 2 L supplemental oxygen   Ongoing respiratory failure  CTA without PE  Concerns for possible fat embolism  Appreciate pulmonary recs  Difficult time getting pulse ox.  Her oxygen requirement has been up and down.  High risk for decompensation     Acute on chronic diastolic heart failure  Admits to shortness of breath with productive cough   Chest x-ray with pulmonary edema with elevated BNP at 1905  Previous echo with EF of  65%, diastolic dysfunction consistent with AFib  IVC ultrasound today shows enlargement per Dr. Amezquita  Additional IV Lasix defer to Dr Amezquita  Strict I/O daily weights, oral fluid and sodium restriction        Closed right hip fracture after fall  Patient with a recent fall in [place]  She is having increasing pain in her shoulder and hip, xray shows right hip fracture  CT with subcapital fracture of her right hip  Pain control  Ortho consulted > family discussed with them and plan to hold off on any intervention and would rather let her hip heal without repair        Anemia  Chronic anemia, no evidence of bleeding, monitoring        Ulcer of right leg  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, sustained few weeks ago from wheelchair   On examination no obvious signs of superimposed infection   Wound Care consulted        Hypothyroidism  Chronic medical condition continue levothyroxine        Troponin I above reference range  Likely in the setting of AFib RVR, CHF and infection, trend for completeness        Essential hypertension  Hold home amlodipine in the setting of infection and RVR to allow for titration of medication        DNR (do not resuscitate)  Has advanced directive and confirmed with daughter, DNR  Palliative has seen patient  Plan is medical optimization w/o invasive procedures  Considering hospice at discharge  She will go back to [facility]        Mixed hyperlipidemia  Chronic medical condition            Pulmonary hypertension  Last echo with PASP 54        MARSHALL (acute kidney injury)  MARSHALL of unclear etiology but could be multifactorial  IVC is very large per Dr. Amezquita and will give additional Lasix, concern for cardiorenal syndrome  Ta placed today, concern for urinary retention  Could also potentially be due to vancomycin dosing and contrast.  Trend renal function at this point, seems to be worsening  Consult Nephrology               VTE Risk Mitigation (From admission, onward)           Ordered        IP VTE HIGH RISK PATIENT  Once         05/30/23 1220       Place sequential compression device  Until discontinued         05/30/23 1220                            VTE Risk Mitigation (From admission, onward)           Ordered     IP VTE HIGH RISK PATIENT  Once         05/30/23 1220     Place sequential compression device  Until discontinued         05/30/23 1220                        Department Hospital [facility]  [facility]  Cristian Baker MD  Date of service: 06/10/2023

## 2023-06-10 NOTE — PLAN OF CARE
Problem: SLP  Goal: SLP Goal  Description: 1. Pt will tolerate LRD w/ adequate oral clearance and w/o overt s/s aspiration during >95% of PO intake  2. Pt will utilize swallowing precautions during >95% of PO intake given min cues  Outcome: Ongoing, Not Progressing   1. Taken off bipap, alert but not attentive.  Yelled out when offered ice chip, did not take applesauce from lip, did not swallow at any time during session.  Not appropriate for po at this time.

## 2023-06-10 NOTE — PT/OT/SLP PROGRESS
Speech Language Pathology Treatment    Patient Name:  Jeannie Hutchins   MRN:  [id number]  Admitting Diagnosis: Sepsis    Recommendations:                 General Recommendations:  Dysphagia therapy  Diet recommendations:  NPO, Liquid Diet Level: NPO   Aspiration Precautions: Strict aspiration precautions   General Precautions: Standard, aspiration, fall, NPO  Communication strategies:  provide increased time to answer and go to room if call light pushed    Assessment:     Jeannie Hutchins is a 88 y.o. female with an SLP diagnosis of Dysphagia.  She was inattentive altho alert, and did not accept any boluses or swallow at any time during the eval despite mouth care.  Remain NPO. Will follow.    Subjective     [place]. What are you doing?  Patient goals: not stated     Objective:     Has the patient been evaluated by SLP for swallowing?   Yes  Keep patient NPO? Yes   Current Respiratory Status:        Pt sen for tolerance of po diet of any kind.  RN took off bi-pap, stated pt might be able to take po.  See Devi Marin for tx details.    Goals:   Multidisciplinary Problems       SLP Goals          Problem: SLP    Goal Priority Disciplines Outcome   SLP Goal     SLP Ongoing, Not Progressing   Description: 1. Pt will tolerate LRD w/ adequate oral clearance and w/o overt s/s aspiration during >95% of PO intake  2. Pt will utilize swallowing precautions during >95% of PO intake given min cues                       Plan:     Patient to be seen:  4 x/week   Plan of Care expires:     Plan of Care reviewed with:  patient   SLP Follow-Up:  Yes       Discharge recommendations:  nursing facility, skilled (w/ Speech)   Barriers to Discharge:  None    Time Tracking:     SLP Treatment Date:   06/10/23  Speech Start Time:  1602  Speech Stop Time:  1612     Speech Total Time (min):  10 min    Billable Minutes: Treatment Swallowing Dysfunction 10    06/10/2023

## 2023-06-11 LAB
ALBUMIN SERPL BCP-MCNC: 2.7 G/DL (ref 3.5–5.2)
ALP SERPL-CCNC: 107 U/L (ref 55–135)
ALT SERPL W/O P-5'-P-CCNC: 16 U/L (ref 10–44)
ANION GAP SERPL CALC-SCNC: 11 MMOL/L (ref 8–16)
AST SERPL-CCNC: 16 U/L (ref 10–40)
BASOPHILS # BLD AUTO: 0.05 K/UL (ref 0–0.2)
BASOPHILS NFR BLD: 0.2 % (ref 0–1.9)
BILIRUB SERPL-MCNC: 1.3 MG/DL (ref 0.1–1)
BUN SERPL-MCNC: 37 MG/DL (ref 8–23)
CALCIUM SERPL-MCNC: 8.9 MG/DL (ref 8.7–10.5)
CHLORIDE SERPL-SCNC: 100 MMOL/L (ref 95–110)
CO2 SERPL-SCNC: 33 MMOL/L (ref 23–29)
CREAT SERPL-MCNC: 1.1 MG/DL (ref 0.5–1.4)
CRP SERPL-MCNC: 10.72 MG/DL
DIFFERENTIAL METHOD: ABNORMAL
EOSINOPHIL # BLD AUTO: 0 K/UL (ref 0–0.5)
EOSINOPHIL NFR BLD: 0 % (ref 0–8)
ERYTHROCYTE [DISTWIDTH] IN BLOOD BY AUTOMATED COUNT: 24.4 % (ref 11.5–14.5)
EST. GFR  (NO RACE VARIABLE): 48.6 ML/MIN/1.73 M^2
GLUCOSE SERPL-MCNC: 149 MG/DL (ref 70–110)
HCT VFR BLD AUTO: 29.6 % (ref 37–48.5)
HGB BLD-MCNC: 9.2 G/DL (ref 12–16)
IMM GRANULOCYTES # BLD AUTO: 0.48 K/UL (ref 0–0.04)
IMM GRANULOCYTES NFR BLD AUTO: 1.7 % (ref 0–0.5)
LYMPHOCYTES # BLD AUTO: 0.4 K/UL (ref 1–4.8)
LYMPHOCYTES NFR BLD: 1.4 % (ref 18–48)
MAGNESIUM SERPL-MCNC: 2.1 MG/DL (ref 1.6–2.6)
MCH RBC QN AUTO: 25.9 PG (ref 27–31)
MCHC RBC AUTO-ENTMCNC: 31.1 G/DL (ref 32–36)
MCV RBC AUTO: 83 FL (ref 82–98)
MONOCYTES # BLD AUTO: 1.2 K/UL (ref 0.3–1)
MONOCYTES NFR BLD: 4.1 % (ref 4–15)
NEUTROPHILS # BLD AUTO: 25.8 K/UL (ref 1.8–7.7)
NEUTROPHILS NFR BLD: 92.6 % (ref 38–73)
NRBC BLD-RTO: 0 /100 WBC
PLATELET # BLD AUTO: 491 K/UL (ref 150–450)
PMV BLD AUTO: 8.5 FL (ref 9.2–12.9)
POTASSIUM SERPL-SCNC: 3.2 MMOL/L (ref 3.5–5.1)
PROT SERPL-MCNC: 6.1 G/DL (ref 6–8.4)
RBC # BLD AUTO: 3.55 M/UL (ref 4–5.4)
SODIUM SERPL-SCNC: 144 MMOL/L (ref 136–145)
WBC # BLD AUTO: 27.84 K/UL (ref 3.9–12.7)

## 2023-06-11 PROCEDURE — 94761 N-INVAS EAR/PLS OXIMETRY MLT: CPT

## 2023-06-11 PROCEDURE — 27000221 HC OXYGEN, UP TO 24 HOURS

## 2023-06-11 PROCEDURE — 25000003 PHARM REV CODE 250: Performed by: STUDENT IN AN ORGANIZED HEALTH CARE EDUCATION/TRAINING PROGRAM

## 2023-06-11 PROCEDURE — 36415 COLL VENOUS BLD VENIPUNCTURE: CPT | Performed by: STUDENT IN AN ORGANIZED HEALTH CARE EDUCATION/TRAINING PROGRAM

## 2023-06-11 PROCEDURE — 94660 CPAP INITIATION&MGMT: CPT

## 2023-06-11 PROCEDURE — 21000000 HC CCU ICU ROOM CHARGE

## 2023-06-11 PROCEDURE — 97530 THERAPEUTIC ACTIVITIES: CPT

## 2023-06-11 PROCEDURE — 86140 C-REACTIVE PROTEIN: CPT | Performed by: INTERNAL MEDICINE

## 2023-06-11 PROCEDURE — 93005 ELECTROCARDIOGRAM TRACING: CPT | Performed by: GENERAL PRACTICE

## 2023-06-11 PROCEDURE — 99900035 HC TECH TIME PER 15 MIN (STAT)

## 2023-06-11 PROCEDURE — 94640 AIRWAY INHALATION TREATMENT: CPT

## 2023-06-11 PROCEDURE — 85025 COMPLETE CBC W/AUTO DIFF WBC: CPT | Performed by: STUDENT IN AN ORGANIZED HEALTH CARE EDUCATION/TRAINING PROGRAM

## 2023-06-11 PROCEDURE — 63600175 PHARM REV CODE 636 W HCPCS: Performed by: INTERNAL MEDICINE

## 2023-06-11 PROCEDURE — 93010 ELECTROCARDIOGRAM REPORT: CPT | Mod: ,,, | Performed by: GENERAL PRACTICE

## 2023-06-11 PROCEDURE — 63600175 PHARM REV CODE 636 W HCPCS: Mod: JZ,JG | Performed by: INTERNAL MEDICINE

## 2023-06-11 PROCEDURE — 63600175 PHARM REV CODE 636 W HCPCS: Performed by: STUDENT IN AN ORGANIZED HEALTH CARE EDUCATION/TRAINING PROGRAM

## 2023-06-11 PROCEDURE — 99900031 HC PATIENT EDUCATION (STAT)

## 2023-06-11 PROCEDURE — 25000003 PHARM REV CODE 250: Performed by: INTERNAL MEDICINE

## 2023-06-11 PROCEDURE — 83735 ASSAY OF MAGNESIUM: CPT | Performed by: STUDENT IN AN ORGANIZED HEALTH CARE EDUCATION/TRAINING PROGRAM

## 2023-06-11 PROCEDURE — 93010 EKG 12-LEAD: ICD-10-PCS | Mod: ,,, | Performed by: GENERAL PRACTICE

## 2023-06-11 PROCEDURE — 25000242 PHARM REV CODE 250 ALT 637 W/ HCPCS: Performed by: STUDENT IN AN ORGANIZED HEALTH CARE EDUCATION/TRAINING PROGRAM

## 2023-06-11 PROCEDURE — 80053 COMPREHEN METABOLIC PANEL: CPT | Performed by: STUDENT IN AN ORGANIZED HEALTH CARE EDUCATION/TRAINING PROGRAM

## 2023-06-11 RX ORDER — DEXTROSE MONOHYDRATE, SODIUM CHLORIDE, AND POTASSIUM CHLORIDE 50; 2.98; 4.5 G/1000ML; G/1000ML; G/1000ML
INJECTION, SOLUTION INTRAVENOUS CONTINUOUS
Status: DISCONTINUED | OUTPATIENT
Start: 2023-06-11 | End: 2023-06-12

## 2023-06-11 RX ORDER — CALCIUM GLUCONATE 20 MG/ML
3 INJECTION, SOLUTION INTRAVENOUS
Status: DISCONTINUED | OUTPATIENT
Start: 2023-06-11 | End: 2023-06-12 | Stop reason: HOSPADM

## 2023-06-11 RX ORDER — ENOXAPARIN SODIUM 100 MG/ML
40 INJECTION SUBCUTANEOUS DAILY
Status: DISCONTINUED | OUTPATIENT
Start: 2023-06-11 | End: 2023-06-12

## 2023-06-11 RX ORDER — DIGOXIN 0.25 MG/ML
125 INJECTION INTRAMUSCULAR; INTRAVENOUS DAILY
Status: DISCONTINUED | OUTPATIENT
Start: 2023-06-11 | End: 2023-06-12

## 2023-06-11 RX ORDER — POTASSIUM CHLORIDE 7.45 MG/ML
40 INJECTION INTRAVENOUS
Status: DISCONTINUED | OUTPATIENT
Start: 2023-06-11 | End: 2023-06-12 | Stop reason: HOSPADM

## 2023-06-11 RX ORDER — CALCIUM GLUCONATE 20 MG/ML
2 INJECTION, SOLUTION INTRAVENOUS
Status: DISCONTINUED | OUTPATIENT
Start: 2023-06-11 | End: 2023-06-12 | Stop reason: HOSPADM

## 2023-06-11 RX ORDER — HYDROMORPHONE HYDROCHLORIDE 1 MG/ML
0.25 INJECTION, SOLUTION INTRAMUSCULAR; INTRAVENOUS; SUBCUTANEOUS
Status: DISCONTINUED | OUTPATIENT
Start: 2023-06-11 | End: 2023-06-12 | Stop reason: HOSPADM

## 2023-06-11 RX ORDER — CALCIUM GLUCONATE 20 MG/ML
1 INJECTION, SOLUTION INTRAVENOUS
Status: DISCONTINUED | OUTPATIENT
Start: 2023-06-11 | End: 2023-06-12 | Stop reason: HOSPADM

## 2023-06-11 RX ORDER — MAGNESIUM SULFATE HEPTAHYDRATE 40 MG/ML
4 INJECTION, SOLUTION INTRAVENOUS
Status: DISCONTINUED | OUTPATIENT
Start: 2023-06-11 | End: 2023-06-12 | Stop reason: HOSPADM

## 2023-06-11 RX ORDER — MAGNESIUM SULFATE HEPTAHYDRATE 40 MG/ML
2 INJECTION, SOLUTION INTRAVENOUS
Status: DISCONTINUED | OUTPATIENT
Start: 2023-06-11 | End: 2023-06-12 | Stop reason: HOSPADM

## 2023-06-11 RX ORDER — IPRATROPIUM BROMIDE AND ALBUTEROL SULFATE 2.5; .5 MG/3ML; MG/3ML
3 SOLUTION RESPIRATORY (INHALATION)
Status: DISCONTINUED | OUTPATIENT
Start: 2023-06-11 | End: 2023-06-12 | Stop reason: HOSPADM

## 2023-06-11 RX ADMIN — IPRATROPIUM BROMIDE AND ALBUTEROL SULFATE 3 ML: 2.5; .5 SOLUTION RESPIRATORY (INHALATION) at 08:06

## 2023-06-11 RX ADMIN — LEVOTHYROXINE SODIUM 75 MCG: 0.03 TABLET ORAL at 06:06

## 2023-06-11 RX ADMIN — POTASSIUM CHLORIDE 40 MEQ: 7.46 INJECTION, SOLUTION INTRAVENOUS at 06:06

## 2023-06-11 RX ADMIN — CEFTAROLINE FOSAMIL 300 MG: 600 POWDER, FOR SOLUTION INTRAVENOUS at 03:06

## 2023-06-11 RX ADMIN — ENOXAPARIN SODIUM 40 MG: 100 INJECTION SUBCUTANEOUS at 09:06

## 2023-06-11 RX ADMIN — CEFTAROLINE FOSAMIL 300 MG: 600 POWDER, FOR SOLUTION INTRAVENOUS at 02:06

## 2023-06-11 RX ADMIN — LEUCINE, PHENYLALANINE, LYSINE, METHIONINE, ISOLEUCINE, VALINE, HISTIDINE, THREONINE, TRYPTOPHAN, ALANINE, GLYCINE, ARGININE, PROLINE, SERINE, TYROSINE, DEXTROSE 2000 ML: 311; 238; 247; 170; 255; 247; 204; 179; 77; 880; 438; 489; 289; 213; 17; 5 INJECTION INTRAVENOUS at 01:06

## 2023-06-11 RX ADMIN — HYDROMORPHONE HYDROCHLORIDE 0.25 MG: 0.5 INJECTION, SOLUTION INTRAMUSCULAR; INTRAVENOUS; SUBCUTANEOUS at 02:06

## 2023-06-11 RX ADMIN — HYDROMORPHONE HYDROCHLORIDE 0.25 MG: 0.5 INJECTION, SOLUTION INTRAMUSCULAR; INTRAVENOUS; SUBCUTANEOUS at 08:06

## 2023-06-11 RX ADMIN — LINEZOLID 600 MG: 600 INJECTION, SOLUTION INTRAVENOUS at 06:06

## 2023-06-11 RX ADMIN — DEXTROSE, SODIUM CHLORIDE, AND POTASSIUM CHLORIDE: 5; .45; .3 INJECTION INTRAVENOUS at 09:06

## 2023-06-11 RX ADMIN — IPRATROPIUM BROMIDE AND ALBUTEROL SULFATE 3 ML: 2.5; .5 SOLUTION RESPIRATORY (INHALATION) at 12:06

## 2023-06-11 RX ADMIN — LINEZOLID 600 MG: 600 INJECTION, SOLUTION INTRAVENOUS at 08:06

## 2023-06-11 RX ADMIN — POTASSIUM CHLORIDE 40 MEQ: 7.46 INJECTION, SOLUTION INTRAVENOUS at 01:06

## 2023-06-11 RX ADMIN — HYDROMORPHONE HYDROCHLORIDE 0.25 MG: 0.5 INJECTION, SOLUTION INTRAMUSCULAR; INTRAVENOUS; SUBCUTANEOUS at 05:06

## 2023-06-11 RX ADMIN — DIGOXIN 125 MCG: 0.25 INJECTION INTRAMUSCULAR; INTRAVENOUS at 08:06

## 2023-06-11 RX ADMIN — HYDROMORPHONE HYDROCHLORIDE 0.25 MG: 0.5 INJECTION, SOLUTION INTRAMUSCULAR; INTRAVENOUS; SUBCUTANEOUS at 10:06

## 2023-06-11 NOTE — CARE UPDATE
06/11/23 1220   Patient Assessment/Suction   Respiratory Effort Unlabored   Expansion/Accessory Muscles/Retractions no use of accessory muscles   All Lung Fields Breath Sounds diminished   PRE-TX-O2   Device (Oxygen Therapy) nasal cannula with humidification   $ Is the patient on Low Flow Oxygen? Yes   Flow (L/min) 5   SpO2 95 %   Pulse Oximetry Type Continuous   $ Pulse Oximetry - Multiple Charge Pulse Oximetry - Multiple   Pulse 68   Resp 16   Aerosol Therapy   $ Aerosol Therapy Charges Aerosol Treatment   Daily Review of Necessity (SVN) completed   Respiratory Treatment Status (SVN) given   Treatment Route (SVN) mask;oxygen   Patient Position (SVN) HOB elevated   Post Treatment Assessment (SVN) breath sounds unchanged   Signs of Intolerance (SVN) none

## 2023-06-11 NOTE — PROGRESS NOTES
INPATIENT NEPHROLOGY Progress Note  [facility]    Patient Name: Jeannie Hutchins  Date: 06/11/2023    Reason for consultation: MARSHALL    Chief Complaint:   Chief Complaint   Patient presents with    Tachycardia       History of Present Illness:  Ms. Hutchins is an 88-year-old female who was brought to the ED via EMS from [facility] due to tachycardia. Patient was initially discharged to nursing facility about 2 years ago, under hospice, but has subsequently improved and is off same.  This morning reportedly heart rate greater than 200, staff was preparing to administer metoprolol, prior to this noted to be staring off.  As per daughter oxygen level was also low, she is chronically on 2 L supplemental oxygen.  States her mother was incoherent during this time.  Patient states she was feeling unwell during this episode, lightheaded, short of breath, producing phlegm.  Denies any sore throat, fever at the facility, nausea, vomiting, diarrhea, cystitis symptoms.  She had a fall several days ago as per report, was found on floor.  Does have ulcer to the right lateral leg, daughter states she sustained few weeks ago as injury from her wheelchair.  Daughter states with previous UTI she had similar symptoms.  In the ED febrile to 101.2, heart rates in the 120s, AFib, blood pressure stable, on 10 L supplemental oxygen.  Labs with WBC 27, hemoglobin 10.3, BUN/creatinine 18/0.9, BNP 1 905, high sensitivity troponin 20, UA with 63 WBC with 3+ leukocyte with many seen.  EKG confirming atrial fibrillation with RVR.  Chest x-ray with cardiomegaly with bilateral interstitial opacities concerning for pulmonary edema.  She received 1 g acetaminophen, 1 g Rocephin, 100 mg fluconazole, 20 mg IV Lasix, 4 mg IV Zofran.  Consulted for MARSHALL.    Interval History:  6/7- hypotensive at admission, better now; on 7L oxymask, UOP 850cc  6/8- spoke with hospitalist yest- vanc level 14, UA bland, low urine Na, urine eos  neg- advised gentle fluid challenge, BP bettter, on 5L NC, UOP 850cc, SCr peaked yest and now improving  6/9- BP better, on 3L NC, BIPAP intermittently, UOP 1.1L, more awake today  6/10- VSS, on BIPAP, UOP 1.1L  6/11- spikes in BP, on BIPAP, UOP 1.5L, WBC rising, MARSHALL has nearly resolved    Plan of Care:    Assessment:  MRSA bacteremia  MARSHALL due to above, also s/p IV contrast, also vanc level 22- multifactorial ATN  Possible urinary retention  AF with RVR  Hypoxia- Acute on Chronic Resp Failure  Hx of D CHF, pulm HTN  Hyponatremia- resolved  Hypokalemia  Alkalosis  Anemia  Closed R hip fracture  Hypoalbuminemia    Plan:    - her prognosis is poor  - suspect multifactorial MARSHALL- she is not a suitable candidate for RRT but fortunately it looks like MARSHALL has nearly resolved and she is nonoliguric  - continue efforts towards infx management, rate control, euvolemia  - rising BP noted- not able to take PO meds consistently- fine with IV hydral for BP > 150/90- hold losartan and lasix  - dose meds for CrCl 30-60- no nsaids for pain or further IV contrast  - switch to regular diet- encourage PO hydration- replete electrolytes PRN  - H/H stable  - pain control- optimize nutrition if able    Will sign off. Please call with questions.    Thank you for allowing us to participate in this patient's care.     Vital Signs:  Temp Readings from Last 3 Encounters:   06/11/23 98.6 °F (37 °C) (Axillary)   04/27/23 98.5 °F (36.9 °C) (Oral)   01/11/23 98 °F (36.7 °C)       Pulse Readings from Last 3 Encounters:   06/11/23 65   04/27/23 83   01/11/23 92       BP Readings from Last 3 Encounters:   06/11/23 (!) 160/75   04/27/23 136/70   01/11/23 (!) 152/81       Weight:  Wt Readings from Last 3 Encounters:   06/05/23 43.7 kg (96 lb 4.8 oz)   06/01/23 44.5 kg (98 lb)   04/27/23 47.7 kg (105 lb 3.6 oz)       INS/OUTS:  I/O last 3 completed shifts:  In: 500 [I.V.:100; IV Piggyback:400]  Out: 2025 [Urine:2025]  I/O this shift:  In: -   Out: 175  [Urine:175]    Medications:  Scheduled Meds:   albuterol-ipratropium  3 mL Nebulization Q6H WAKE    atorvastatin  20 mg Oral QHS    ceftaroline (TEFLARO) IVPB  300 mg Intravenous Q12H    DAPTOmycin (CUBICIN) IV (PEDS and ADULTS)  8 mg/kg Intravenous Q48H    digoxin  125 mcg Intravenous Daily    enoxparin  40 mg Subcutaneous Daily    famotidine  20 mg Oral Every other day    levothyroxine  75 mcg Oral Before breakfast    linezolid  600 mg Intravenous Q12H    metoprolol tartrate  25 mg Oral BID     Continuous Infusions:   amino acid 4.25 % in D5W      dextrose 5 % and 0.45 % NaCl with KCl 40 mEq         PRN Meds:.acetaminophen, acetaminophen, calcium gluconate IVPB, calcium gluconate IVPB, calcium gluconate IVPB, hydrALAZINE, HYDROcodone-acetaminophen, HYDROmorphone, magnesium sulfate IVPB, magnesium sulfate IVPB, nitroGLYCERIN, potassium chloride **AND** potassium chloride **AND** potassium chloride, senna-docusate 8.6-50 mg, sodium chloride 0.9%, sodium phosphate IVPB, sodium phosphate IVPB, sodium phosphate IVPB, traZODone  No current facility-administered medications on file prior to encounter.     Current Outpatient Medications on File Prior to Encounter   Medication Sig Dispense Refill    acetaminophen (TYLENOL) 500 MG tablet Take 1 tablet (500 mg total) by mouth every 6 (six) hours as needed for Pain or Temperature greater than (100). (Patient taking differently: Take 325 mg by mouth every 6 (six) hours as needed for Pain or Temperature greater than (100).)  0    albuterol-ipratropium (DUO-NEB) 2.5 mg-0.5 mg/3 mL nebulizer solution Take 3 mLs by nebulization every 6 (six) hours. Rescue 1 Box 0    aluminum & magnesium hydroxide-simethicone (MYLANTA MAX STRENGTH) 400-400-40 mg/5 mL suspension Take 5 mLs by mouth every 6 (six) hours as needed for Indigestion.      amLODIPine (NORVASC) 5 MG tablet Take 1 tablet (5 mg total) by mouth once daily. 60 tablet 0    apixaban (ELIQUIS) 2.5 mg Tab Take 1 tablet (2.5 mg  total) by mouth 2 (two) times daily.      atorvastatin (LIPITOR) 20 MG tablet Take 20 mg by mouth once daily.      carboxymethylcellulose sodium (THERATEARS) 0.25 % Drop Place 1 drop into both eyes 3 (three) times daily.      EScitalopram oxalate (LEXAPRO) 10 MG tablet Take 10 mg by mouth once daily.      famotidine (PEPCID) 20 MG tablet Take 20 mg by mouth once daily.      fluticasone propionate (FLONASE) 50 mcg/actuation nasal spray 1 spray by Each Nostril route once daily.      furosemide (LASIX) 20 MG tablet Take 1 tablet (20 mg total) by mouth once daily. 30 tablet 11    guaiFENesin (HUMIBID E) 400 mg Tab Take 400 mg by mouth every 12 (twelve) hours as needed.      levothyroxine (SYNTHROID) 75 MCG tablet Take 75 mcg by mouth once daily.      loratadine (CLARITIN) 10 mg tablet Take 10 mg by mouth once daily.      metoprolol tartrate (LOPRESSOR) 25 MG tablet Take 25 mg by mouth 2 (two) times daily. 0600  2000      olopatadine (PATANOL) 0.1 % ophthalmic solution Place 1 drop into both eyes 2 (two) times daily.      potassium chloride (K-TAB) 20 mEq Take 20 mEq by mouth once daily.      traZODone (DESYREL) 50 MG tablet Take 1 tablet (50 mg total) by mouth every evening. 30 tablet 0    vit C,S-Mt-ucqqi-lutein-zeaxan (PRESERVISION AREDS-2) 250-90-40-1 mg Cap Take 1 tablet by mouth once daily.      alendronate (FOSAMAX) 70 MG tablet Take 70 mg by mouth every 7 days. SATURDAYS      white petrolatum-mineral oiL (ARTIFICIAL TEARS, NELSON/MIN,) 83-15 % Oint Place into both eyes every evening. (Patient not taking: Reported on 5/30/2023) 1 each 0    [DISCONTINUED] amlodipine-atorvastatin (CADUET) 10-40 mg per tablet TAKE ONE TABLET BY MOUTH EVERY DAY (Patient taking differently: Take by mouth nightly.) 30 tablet 11    [DISCONTINUED] losartan (COZAAR) 100 MG tablet TAKE ONE TABLET BY MOUTH EVERY DAY 90 tablet 3       Review of Systems:  Limited    Physical Exam:  BP (!) 160/75   Pulse 65   Temp 98.6 °F (37 °C) (Axillary)    "Resp 16   Ht 4' 8" (1.422 m)   Wt 43.7 kg (96 lb 4.8 oz)   SpO2 95%   BMI 21.60 kg/m²     General Appearance:    NAD, appears stated age   Head:    Normocephalic, atraumatic   Eyes:    EOMI      Mouth:   Moist mucus membranes, no thrush or oral lesions, normal      dentition   Back:     No CVA tenderness   Lungs:     Clear to auscultation bilaterally, no wheeze, crackles, rales      or rhonchi, symmetric air movement, respirations unlabored   Heart:    Regular rate and rhythm, S1 and S2 normal, no murmur, rub   or gallop   Abdomen:     Soft, non-tender, non-distended, bowel sounds active all four   quadrants, no RT or guarding, no masses, no organomegaly   Extremities:   Warm and well perfused, distal pulses intact, no cyanosis or    peripheral edema   MSK:   No joint or muscle swelling, tenderness or deformity   Skin:   Skin color, texture, turgor normal, no rashes or lesions   Neurologic/Psychiatric:   Calm     Results:  Lab Results   Component Value Date     06/11/2023    K 3.2 (L) 06/11/2023     06/11/2023    CO2 33 (H) 06/11/2023    BUN 37 (H) 06/11/2023    CREATININE 1.1 06/11/2023    CALCIUM 8.9 06/11/2023    ANIONGAP 11 06/11/2023    ESTGFRAFRICA 52.5 (A) 06/17/2022    EGFRNONAA 45.6 (A) 06/17/2022       Lab Results   Component Value Date    CALCIUM 8.9 06/11/2023    PHOS 3.7 06/17/2022       Recent Labs   Lab 06/11/23  0518   WBC 27.84*   RBC 3.55*   HGB 9.2*   HCT 29.6*   *   MCV 83   MCH 25.9*   MCHC 31.1*         I have personally reviewed pertinent radiological imaging and reports.    I have spent > 35 minutes providing care for this patient for the above diagnoses. These services have included chart/data/imaging review, evaluation, exam, formulation of plan, , note preparation, and discussions with staff involved in this patient's care.    Daniele Lima MD MPH  [place] [address]  [phone] (p)  [phone] (f)   "

## 2023-06-11 NOTE — PROGRESS NOTES
[facility] Medicine  Progress Note    Patient name: Jeannie Hutchins  MRN: [id number]  Admit Date: 5/30/2023   LOS: 12 days     SUBJECTIVE:     Principal problem: Sepsis    Interval History:      6/11- off bipap, creat improved to 1.1, ST eval complete, will not take po.  Will start clinimax, nutrition consulted.  Pain seems fairly controlled.  K+ is low, being replaced.  Chronic afib, possibly aflutter currently per nursing, will get ekg and start IV digoxin to replace PO and sq lovenox for elaquis.  Further discussion with daughter about plans moving forward.  She did attempt to drink today.  Overall slightly improved today.  Still plan on hospice at [place]    6/10- patient is resting quietly with daughter at bedside, discussed GOC and plan moving forward.  We will try to keep her pain at a tolerable level and not oversedate her if possible.  Stopping morphine, we can use low dose dilaudid as needed.  She is confused, but more interactive than she was yesterday.     6/9- on entering room, she is screaming out in pain.  She will occasionally respond verbally to qfuestions, but often seems to not respond intentionally.  She is asking why she is pain.  Plan for palliative consult today.  She will not swallow meds for nursing.  Will give IV low dose opiate for hip pain.      Scheduled Meds:   albuterol-ipratropium  3 mL Nebulization Q6H WAKE    atorvastatin  20 mg Oral QHS    ceftaroline (TEFLARO) IVPB  300 mg Intravenous Q12H    DAPTOmycin (CUBICIN) IV (PEDS and ADULTS)  8 mg/kg Intravenous Q48H    digoxin  125 mcg Intravenous Daily    digoxin  0.125 mg Oral Daily    enoxparin  40 mg Subcutaneous Q12H (treatment, non-standard time)    famotidine  20 mg Oral Every other day    levothyroxine  75 mcg Oral Before breakfast    linezolid  600 mg Intravenous Q12H    metoprolol tartrate  25 mg Oral BID     Continuous Infusions:   amino acid 4.25 % in D5W      potassium chloride maintenance fluid       PRN  Meds:acetaminophen, acetaminophen, calcium gluconate IVPB, calcium gluconate IVPB, calcium gluconate IVPB, hydrALAZINE, HYDROcodone-acetaminophen, HYDROmorphone, magnesium sulfate IVPB, magnesium sulfate IVPB, nitroGLYCERIN, potassium chloride **AND** potassium chloride **AND** potassium chloride, senna-docusate 8.6-50 mg, sodium chloride 0.9%, sodium phosphate IVPB, sodium phosphate IVPB, sodium phosphate IVPB, traZODone    Review of patient's allergies indicates:   Allergen Reactions    Downey Swelling     Tongue swells up and a generalized rash.  Patient reports allergy to all Berries    Hydrochlorothiazide Other (See Comments)     hyponatremia    Lisinopril Other (See Comments)     Cough       Review of Systems: As per interval history    OBJECTIVE:     Vital Signs (Most Recent)  Temp: 98.6 °F (37 °C) (06/11/23 0701)  Pulse: 62 (06/11/23 0000)  Resp: 11 (06/11/23 0000)  BP: (!) 160/75 (06/11/23 0000)  SpO2: 95 % (06/11/23 0000)    Vital Signs Range (Last 24H):  Temp:  [98.1 °F (36.7 °C)-98.6 °F (37 °C)]   Pulse:  [62-85]   Resp:  [11-30]   BP: (157-163)/(71-85)   SpO2:  [92 %-98 %]     I & O (Last 24H):  Intake/Output Summary (Last 24 hours) at 6/11/2023 0732  Last data filed at 6/11/2023 0720  Gross per 24 hour   Intake 150 ml   Output 1700 ml   Net -1550 ml         Physical Exam:  General: seems more comfortable, answering questions  Eyes: No conjunctival injection. No scleral icterus.  CVS: RRR. No LE edema BL  Lungs:  No accessory muscle use.  Slight tachypnea on bipap  Abdomen:  Soft, nontender and nondistended.  No organomegaly  Neuro:  Moves all extremities.      Laboratory:  I have reviewed all pertinent lab results within the past 24 hours.    Diagnostic Results:  Labs: Reviewed  ECG: Reviewed  X-Ray: Reviewed    ASSESSMENT/PLAN:         Active Hospital Problems    Diagnosis  POA    *MRSA Bacteremia [A41.9]  Yes    Bacteremia [R78.81]  Unknown    MRSA bacteremia [R78.81, B95.62]  Unknown    Goals of  care, counseling/discussion [Z71.89]  Not Applicable    MARSHALL (acute kidney injury) [N17.9]  Yes    Closed fracture of right hip [S72.001A]  Yes    Candiduria [B37.49]  Yes    Closed right hip fracture after fall [S72.001A]  Yes    Acute on chronic diastolic heart failure [I50.9]  Yes    Acute on chronic respiratory failure with hypoxia [J96.20]  Yes    Atrial fibrillation with RVR with known history of a fib [I48.91]  Yes     Chronic    Troponin I above reference range [R77.8]  Yes    Nursing home resident [Z59.3]  Not Applicable     Chronic    DNR (do not resuscitate) [Z66]  Yes     Chronic    UTI (urinary tract infection) [N39.0]  Yes    Ulcer of right leg [L97.919]  Yes     Chronic    Anemia [D64.9]  Yes     Chronic    Hypothyroidism [E03.9]  Yes     Chronic    Mixed hyperlipidemia [E78.2]  Yes    Pulmonary hypertension [I27.20]  Yes    Essential hypertension [I10]  Yes      Resolved Hospital Problems   No resolved problems to display.         Plan: MRSA Bacteremia  Ongoing MRSA bacteremia without a clear source at this point and we have not achieved source control.  Family would not like any further invasive workup.     Check CTA chest > no signs of significant focal pneumonia  Inflammatory markers rising  Repeat blood cultures daily  She has had daily positive blood cultures.   Urine culture with candiduria likely contaminated   COVID and influenza negative  Daptomycin and Ceftaroline plus linezolid per Dr Shahid  Initial Echo without vegetations.   Plan to continue antibiotics until cultures clear   Appreciate Dr Shahid's recs  CT hips / pelvis  > no clear evidence of infective focus.      Hopefully we have achieved clearance of her bacteremia  Her blood cx are negative x 4 days now  Her wbc did increase overnight to 28 from 14  ?aspirating while on bipap  Will repeat cxr     UTI (urinary tract infection)  UA positive, many yeast, history of UTIs in the past  Urine culture with many yeast , likely  contaminant  Fluconazole discontinued        Atrial fibrillation with RVR with known history of a fib  Patient with known history of persistent atrial fibrillation.   RVR likely driven by acute medical condition including infection/sepsis and CHF.  Telemetry monitoring, treat acute medical condition, continue metoprolol and eliquis when she will take po  Added digoxin IV and lovenox for now  Monitor blood pressure  HR controlled        Acute on chronic respiratory failure with hypoxia  Chronically on 2 L supplemental oxygen   Ongoing respiratory failure  CTA without PE  Concerns for possible fat embolism  Appreciate pulmonary recs  Difficult time getting pulse ox.  Her oxygen requirement has been up and down.  High risk for decompensation     Acute on chronic diastolic heart failure  Admits to shortness of breath with productive cough   Chest x-ray with pulmonary edema with elevated BNP at 1905  Previous echo with EF of 65%, diastolic dysfunction consistent with AFib  IVC ultrasound today shows enlargement per Dr. Amezquita  Additional IV Lasix defer to Dr Amezquita  Strict I/O daily weights, oral fluid and sodium restriction        Closed right hip fracture after fall  Patient with a recent fall in [place]  She is having increasing pain in her shoulder and hip, xray shows right hip fracture  CT with subcapital fracture of her right hip  Pain control  Ortho consulted > family discussed with them and plan to hold off on any intervention and would rather let her hip heal without repair        Anemia  Chronic anemia, no evidence of bleeding, monitoring        Ulcer of right leg  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, sustained few weeks ago from wheelchair   On examination no obvious signs of superimposed infection   Wound Care consulted        Hypothyroidism  Chronic medical condition continue levothyroxine        Troponin I above reference range  Likely in the setting of AFib RVR, CHF and infection, trend for completeness         Essential hypertension  Hold home amlodipine in the setting of infection and RVR to allow for titration of medication        DNR (do not resuscitate)  Has advanced directive and confirmed with daughter, DNR  Palliative has seen patient  Plan is medical optimization w/o invasive procedures  Considering hospice at discharge  She will go back to [place]        Mixed hyperlipidemia  Chronic medical condition            Pulmonary hypertension  Last echo with PASP 54        MARSHALL (acute kidney injury)  MARSHALL of unclear etiology but could be multifactorial  IVC is very large per Dr. Amezquita and will give additional Lasix, concern for cardiorenal syndrome  Ta placed today, concern for urinary retention  Could also potentially be due to vancomycin dosing and contrast.  Trend renal function at this point, seems to be improving  Consult Nephrology               VTE Risk Mitigation (From admission, onward)           Ordered       IP VTE HIGH RISK PATIENT  Once         05/30/23 1220       Place sequential compression device  Until discontinued         05/30/23 1220                            VTE Risk Mitigation (From admission, onward)           Ordered     enoxaparin injection 40 mg  Every 12 hours         06/11/23 0721     IP VTE HIGH RISK PATIENT  Once         05/30/23 1220     Place sequential compression device  Until discontinued         05/30/23 1220                        Department Hospital Medicine  [facility]  Cristian Baker MD  Date of service: 06/11/2023

## 2023-06-11 NOTE — CARE UPDATE
06/11/23 0722   Patient Assessment/Suction   Respiratory Effort Unlabored   Expansion/Accessory Muscles/Retractions no use of accessory muscles   All Lung Fields Breath Sounds diminished   Skin Integrity   $ Wound Care Tech Time 15 min   Area Observed Bridge of nose   Skin Appearance maroon/purple  (Mask was changed QHS per night shift)   Barrier used? Gel Cushion   PRE-TX-O2   Device (Oxygen Therapy) nasal cannula   $ Is the patient on Low Flow Oxygen? Yes   Flow (L/min) 5   Pulse Oximetry Type Continuous   $ Pulse Oximetry - Multiple Charge Pulse Oximetry - Multiple   Preset CPAP/BiPAP Settings   Mode Of Delivery BiPAP;Standby   $ CPAP/BiPAP Daily Charge BiPAP/CPAP Daily

## 2023-06-11 NOTE — CARE UPDATE
06/10/23 2107   Patient Assessment/Suction   Level of Consciousness (AVPU) responds to voice   Respiratory Effort Normal   Expansion/Accessory Muscles/Retractions no use of accessory muscles   All Lung Fields Breath Sounds clear;diminished   Rhythm/Pattern, Respiratory assisted mechanically  (bipap)   Skin Integrity   $ Wound Care Tech Time 15 min   Area Observed Bridge of nose   Skin Appearance maroon/purple  (changed to under the nose mask)   Barrier used? Gel Cushion   PRE-TX-O2   Device (Oxygen Therapy) BIPAP   $ Is the patient on Low Flow Oxygen? Yes   Oxygen Concentration (%) 40   Pulse Oximetry Type Continuous   $ Pulse Oximetry - Multiple Charge Pulse Oximetry - Multiple   Ready to Wean/Extubation Screen   FIO2<=50 (chart decimal) 0.4   Preset CPAP/BiPAP Settings   Mode Of Delivery BiPAP   $ Initial CPAP/BiPAP Setup? No   $ Is patient using? Yes   Size of Mask Small   Sized Appropriately? Yes   Equipment Type V60   Airway Device Type small nasal mask   Ipap 17   EPAP (cm H2O) 7   Pressure Support (cm H2O) 10   Set Rate (Breaths/Min) 22   ITime (sec) 1   Rise Time (sec) 3   Patient CPAP/BiPAP Settings   RR Total (Breaths/Min) 27   Tidal Volume (mL) 335   VE Minute Ventilation (L/min) 9.1 L/min   Peak Inspiratory Pressure (cm H2O) 17   TiTOT (%) 41   Total Leak (L/Min) 0   Patient Trigger - ST Mode Only (%) 100   Education   $ Education BiPAP;15 min   Respiratory Evaluation   $ Care Plan Tech Time 15 min

## 2023-06-11 NOTE — PT/OT/SLP PROGRESS
Physical Therapy Treatment    Patient Name:  Jeannie Hutchins   MRN:  [id number]    Recommendations:     Discharge Recommendations: nursing facility, skilled  Discharge Equipment Recommendations: none  Barriers to discharge:  family  and pt to decide D/C destination, Hospice has been discussed with family    Assessment:     Jeannie Hutchins is a 88 y.o. female admitted with a medical diagnosis of Sepsis.  She presents with the following impairments/functional limitations: weakness, impaired endurance, impaired self care skills, impaired functional mobility, gait instability, impaired balance, impaired cognition, decreased coordination, decreased lower extremity function, pain, impaired cardiopulmonary response to activity, orthopedic precautions .    Pt was asleep but easily awakened. She t/f'ed  supine to sit with Max A  with minimal indication of discomfort initially. She did grunt with increased sitting  and had jerking mostly to R LE. Pt required gentle repositioning of B LE for neutral positional as pt held  extremities in hip and knee flexion upon return to supine.    Rehab Prognosis:  guarded ; patient would benefit from acute skilled PT services to address these deficits and reach maximum level of function.    Recent Surgery: * No surgery found *      Plan:     During this hospitalization, patient to be seen 6 x/week to address the identified rehab impairments via gait training, therapeutic activities, therapeutic exercises and progress toward the following goals:    Plan of Care Expires:  07/03/23    Subjective     Chief Complaint: Did not verbalize  Patient/Family Comments/goals: DC destination to be determined by pt and  family  Pain/Comfort:         Objective:     Communicated with nurse  prior to session.  Patient found supine with david catheter, telemetry, bed alarm upon PT entry to room.     General Precautions: Standard, other (see comments), aspiration, fall  Orthopedic Precautions: RLE non weight  bearing  Braces:    Respiratory Status: Nasal cannula, flow 2 L/min     Functional Mobility:  Bed Mobility:     Rolling Left:  maximal assistance  Rolling Right: maximal assistance  Supine to Sit: maximal assistance  Sit to Supine: maximal assistance  Balance: Mod A      AM-PAC 6 CLICK MOBILITY          Treatment & Education:  Pt was educated on use of call light. Bed mobility training, sitting balance training    Patient left supine with all lines intact, call button in reach, and bed alarm on..    GOALS:   Multidisciplinary Problems       Physical Therapy Goals          Problem: Physical Therapy    Goal Priority Disciplines Outcome Goal Variances Interventions   Physical Therapy Goal     PT, PT/OT Ongoing, Progressing     Description: Goals to be met by: 2023     Patient will increase functional independence with mobility by performin. Supine to sit with MInimal Assistance  2. Sit to stand transfer with Minimal Assistance and NWB right LE  3. Sit to supine with minimal assistance.      6/3/23: new order received so patient reevaluated and goals changes as appropriate.                       Time Tracking:     PT Received On: 23  PT Start Time: 1022     PT Stop Time: 1045  PT Total Time (min): 23 min     Billable Minutes: Therapeutic Activity 23 minutes    Treatment Type: Treatment  PT/PTA: PT     Number of PTA visits since last PT visit: 2     2023

## 2023-06-11 NOTE — PROGRESS NOTES
Pharmacist Renal Dose Adjustment Note    Jeannie Hutchins is a 88 y.o. female being treated with the medication Enoxaparin     Patient Data:    Vital Signs (Most Recent):  Temp: 98.6 °F (37 °C) (06/11/23 0701)  Pulse: 62 (06/11/23 0000)  Resp: 11 (06/11/23 0000)  BP: (!) 160/75 (06/11/23 0000)  SpO2: 95 % (06/11/23 0000) Vital Signs (72h Range):  Temp:  [97.2 °F (36.2 °C)-98.6 °F (37 °C)]   Pulse:  [58-85]   Resp:  [8-30]   BP: (122-163)/(58-85)   SpO2:  [90 %-100 %]      Recent Labs   Lab 06/09/23  0515 06/10/23  0424 06/11/23  0518   CREATININE 2.6* 1.9* 1.1     Serum creatinine: 1.1 mg/dL 06/11/23 0518  Estimated creatinine clearance: 24.9 mL/min    Enoxaparin 40 mg SQ every 12 hours will be changed to Enoxaparin 40 mg SQ every 24 hours based on her current renal function (Crcl < 30 mL/min)     Pharmacist's Name: Jimmie Lim  Pharmacist's Extension: [phone]

## 2023-06-12 VITALS
WEIGHT: 96.31 LBS | SYSTOLIC BLOOD PRESSURE: 169 MMHG | HEIGHT: 56 IN | OXYGEN SATURATION: 93 % | RESPIRATION RATE: 21 BRPM | DIASTOLIC BLOOD PRESSURE: 97 MMHG | TEMPERATURE: 98 F | BODY MASS INDEX: 21.67 KG/M2 | HEART RATE: 84 BPM

## 2023-06-12 LAB
ACANTHOCYTES BLD QL SMEAR: PRESENT
ALBUMIN SERPL BCP-MCNC: 2.5 G/DL (ref 3.5–5.2)
ALP SERPL-CCNC: 94 U/L (ref 55–135)
ALT SERPL W/O P-5'-P-CCNC: 12 U/L (ref 10–44)
ANION GAP SERPL CALC-SCNC: 8 MMOL/L (ref 8–16)
ANISOCYTOSIS BLD QL SMEAR: SLIGHT
AST SERPL-CCNC: 15 U/L (ref 10–40)
BACTERIA BLD CULT: NORMAL
BASOPHILS # BLD AUTO: 0.04 K/UL (ref 0–0.2)
BASOPHILS NFR BLD: 0.2 % (ref 0–1.9)
BILIRUB SERPL-MCNC: 0.8 MG/DL (ref 0.1–1)
BUN SERPL-MCNC: 27 MG/DL (ref 8–23)
CALCIUM SERPL-MCNC: 8.6 MG/DL (ref 8.7–10.5)
CHLORIDE SERPL-SCNC: 99 MMOL/L (ref 95–110)
CO2 SERPL-SCNC: 32 MMOL/L (ref 23–29)
CREAT SERPL-MCNC: 0.8 MG/DL (ref 0.5–1.4)
CRP SERPL-MCNC: 14.61 MG/DL
DIFFERENTIAL METHOD: ABNORMAL
EOSINOPHIL # BLD AUTO: 0 K/UL (ref 0–0.5)
EOSINOPHIL NFR BLD: 0 % (ref 0–8)
ERYTHROCYTE [DISTWIDTH] IN BLOOD BY AUTOMATED COUNT: 24.5 % (ref 11.5–14.5)
EST. GFR  (NO RACE VARIABLE): >60 ML/MIN/1.73 M^2
GLUCOSE SERPL-MCNC: 162 MG/DL (ref 70–110)
HCT VFR BLD AUTO: 27.8 % (ref 37–48.5)
HGB BLD-MCNC: 8.6 G/DL (ref 12–16)
HYPOCHROMIA BLD QL SMEAR: ABNORMAL
IMM GRANULOCYTES # BLD AUTO: 0.64 K/UL (ref 0–0.04)
IMM GRANULOCYTES NFR BLD AUTO: 2.7 % (ref 0–0.5)
LYMPHOCYTES # BLD AUTO: 0.7 K/UL (ref 1–4.8)
LYMPHOCYTES NFR BLD: 2.8 % (ref 18–48)
MAGNESIUM SERPL-MCNC: 1.8 MG/DL (ref 1.6–2.6)
MCH RBC QN AUTO: 25.7 PG (ref 27–31)
MCHC RBC AUTO-ENTMCNC: 30.9 G/DL (ref 32–36)
MCV RBC AUTO: 83 FL (ref 82–98)
MONOCYTES # BLD AUTO: 1.3 K/UL (ref 0.3–1)
MONOCYTES NFR BLD: 5.2 % (ref 4–15)
NEUTROPHILS # BLD AUTO: 21.3 K/UL (ref 1.8–7.7)
NEUTROPHILS NFR BLD: 89.1 % (ref 38–73)
NRBC BLD-RTO: 0 /100 WBC
OVALOCYTES BLD QL SMEAR: ABNORMAL
PLATELET # BLD AUTO: 451 K/UL (ref 150–450)
PLATELET BLD QL SMEAR: ABNORMAL
PMV BLD AUTO: 8.4 FL (ref 9.2–12.9)
POTASSIUM SERPL-SCNC: 3.6 MMOL/L (ref 3.5–5.1)
PROT SERPL-MCNC: 5.9 G/DL (ref 6–8.4)
RBC # BLD AUTO: 3.34 M/UL (ref 4–5.4)
SODIUM SERPL-SCNC: 139 MMOL/L (ref 136–145)
WBC # BLD AUTO: 23.93 K/UL (ref 3.9–12.7)

## 2023-06-12 PROCEDURE — 63600175 PHARM REV CODE 636 W HCPCS: Mod: JZ,JG | Performed by: INTERNAL MEDICINE

## 2023-06-12 PROCEDURE — 86140 C-REACTIVE PROTEIN: CPT | Performed by: INTERNAL MEDICINE

## 2023-06-12 PROCEDURE — 80053 COMPREHEN METABOLIC PANEL: CPT | Performed by: STUDENT IN AN ORGANIZED HEALTH CARE EDUCATION/TRAINING PROGRAM

## 2023-06-12 PROCEDURE — 63600175 PHARM REV CODE 636 W HCPCS: Performed by: STUDENT IN AN ORGANIZED HEALTH CARE EDUCATION/TRAINING PROGRAM

## 2023-06-12 PROCEDURE — 99900031 HC PATIENT EDUCATION (STAT)

## 2023-06-12 PROCEDURE — 99900035 HC TECH TIME PER 15 MIN (STAT)

## 2023-06-12 PROCEDURE — 99233 PR SUBSEQUENT HOSPITAL CARE,LEVL III: ICD-10-PCS | Mod: ,,, | Performed by: INTERNAL MEDICINE

## 2023-06-12 PROCEDURE — 85025 COMPLETE CBC W/AUTO DIFF WBC: CPT | Performed by: STUDENT IN AN ORGANIZED HEALTH CARE EDUCATION/TRAINING PROGRAM

## 2023-06-12 PROCEDURE — 36415 COLL VENOUS BLD VENIPUNCTURE: CPT | Performed by: STUDENT IN AN ORGANIZED HEALTH CARE EDUCATION/TRAINING PROGRAM

## 2023-06-12 PROCEDURE — 25000003 PHARM REV CODE 250: Performed by: INTERNAL MEDICINE

## 2023-06-12 PROCEDURE — 83735 ASSAY OF MAGNESIUM: CPT | Performed by: STUDENT IN AN ORGANIZED HEALTH CARE EDUCATION/TRAINING PROGRAM

## 2023-06-12 PROCEDURE — 99233 SBSQ HOSP IP/OBS HIGH 50: CPT | Mod: ,,, | Performed by: INTERNAL MEDICINE

## 2023-06-12 PROCEDURE — 25000242 PHARM REV CODE 250 ALT 637 W/ HCPCS: Performed by: STUDENT IN AN ORGANIZED HEALTH CARE EDUCATION/TRAINING PROGRAM

## 2023-06-12 PROCEDURE — 25000003 PHARM REV CODE 250: Performed by: STUDENT IN AN ORGANIZED HEALTH CARE EDUCATION/TRAINING PROGRAM

## 2023-06-12 PROCEDURE — 94640 AIRWAY INHALATION TREATMENT: CPT

## 2023-06-12 PROCEDURE — 94799 UNLISTED PULMONARY SVC/PX: CPT

## 2023-06-12 PROCEDURE — 27000221 HC OXYGEN, UP TO 24 HOURS

## 2023-06-12 RX ORDER — HYDROCODONE BITARTRATE AND ACETAMINOPHEN 5; 325 MG/1; MG/1
1 TABLET ORAL EVERY 6 HOURS PRN
Qty: 28 TABLET | Refills: 0 | Status: SHIPPED | OUTPATIENT
Start: 2023-06-12 | End: 2023-06-19

## 2023-06-12 RX ORDER — IPRATROPIUM BROMIDE AND ALBUTEROL SULFATE 2.5; .5 MG/3ML; MG/3ML
3 SOLUTION RESPIRATORY (INHALATION) EVERY 6 HOURS PRN
Qty: 1 EACH | Refills: 0
Start: 2023-06-12 | End: 2024-06-11

## 2023-06-12 RX ORDER — AMOXICILLIN 250 MG
2 CAPSULE ORAL EVERY 12 HOURS
Qty: 120 TABLET | Refills: 0 | Status: SHIPPED | OUTPATIENT
Start: 2023-06-12 | End: 2023-07-12

## 2023-06-12 RX ADMIN — HYDROMORPHONE HYDROCHLORIDE 0.25 MG: 0.5 INJECTION, SOLUTION INTRAMUSCULAR; INTRAVENOUS; SUBCUTANEOUS at 09:06

## 2023-06-12 RX ADMIN — HYDROMORPHONE HYDROCHLORIDE 0.25 MG: 0.5 INJECTION, SOLUTION INTRAMUSCULAR; INTRAVENOUS; SUBCUTANEOUS at 04:06

## 2023-06-12 RX ADMIN — CEFTAROLINE FOSAMIL 300 MG: 600 POWDER, FOR SOLUTION INTRAVENOUS at 02:06

## 2023-06-12 RX ADMIN — LINEZOLID 600 MG: 600 INJECTION, SOLUTION INTRAVENOUS at 08:06

## 2023-06-12 RX ADMIN — DEXTROSE, SODIUM CHLORIDE, AND POTASSIUM CHLORIDE: 5; .45; .3 INJECTION INTRAVENOUS at 08:06

## 2023-06-12 RX ADMIN — DIGOXIN 125 MCG: 0.25 INJECTION INTRAMUSCULAR; INTRAVENOUS at 08:06

## 2023-06-12 RX ADMIN — IPRATROPIUM BROMIDE AND ALBUTEROL SULFATE 3 ML: 2.5; .5 SOLUTION RESPIRATORY (INHALATION) at 07:06

## 2023-06-12 RX ADMIN — ENOXAPARIN SODIUM 40 MG: 100 INJECTION SUBCUTANEOUS at 08:06

## 2023-06-12 RX ADMIN — HYDROMORPHONE HYDROCHLORIDE 0.25 MG: 0.5 INJECTION, SOLUTION INTRAMUSCULAR; INTRAVENOUS; SUBCUTANEOUS at 06:06

## 2023-06-12 RX ADMIN — LEUCINE, PHENYLALANINE, LYSINE, METHIONINE, ISOLEUCINE, VALINE, HISTIDINE, THREONINE, TRYPTOPHAN, ALANINE, GLYCINE, ARGININE, PROLINE, SERINE, TYROSINE, DEXTROSE 2000 ML: 311; 238; 247; 170; 255; 247; 204; 179; 77; 880; 438; 489; 289; 213; 17; 5 INJECTION INTRAVENOUS at 08:06

## 2023-06-12 RX ADMIN — HYDROMORPHONE HYDROCHLORIDE 0.25 MG: 0.5 INJECTION, SOLUTION INTRAMUSCULAR; INTRAVENOUS; SUBCUTANEOUS at 12:06

## 2023-06-12 NOTE — CARE UPDATE
06/12/23 0748   Patient Assessment/Suction   Level of Consciousness (AVPU) alert   Respiratory Effort Unlabored;Normal   Expansion/Accessory Muscles/Retractions no retractions;expansion symmetric;no use of accessory muscles   All Lung Fields Breath Sounds diminished   Rhythm/Pattern, Respiratory depth regular;pattern regular;unlabored   Cough Frequency no cough   PRE-TX-O2   Device (Oxygen Therapy) nasal cannula   $ Is the patient on Low Flow Oxygen? Yes   Flow (L/min) 5   SpO2 (!) 93 %   Pulse 84   Resp 19   Aerosol Therapy   $ Aerosol Therapy Charges Aerosol Treatment   Daily Review of Necessity (SVN) completed   Respiratory Treatment Status (SVN) given   Treatment Route (SVN) oxygen;mask   Patient Position (SVN) HOB elevated   Post Treatment Assessment (SVN) breath sounds unchanged   Signs of Intolerance (SVN) none   Breath Sounds Post-Respiratory Treatment   Throughout All Fields Post-Treatment All Fields   Throughout All Fields Post-Treatment no change   Post-treatment Heart Rate (beats/min) 85   Preset CPAP/BiPAP Settings   Mode Of Delivery Standby   Education   $ Education Bronchodilator;15 min   Respiratory Evaluation   $ Care Plan Tech Time 15 min   $ Eval/Re-eval Charges Re-evaluation

## 2023-06-12 NOTE — PROGRESS NOTES
Consult Note  Infectious Disease    Reason for Consult:  MRSA bacteremia    HPI: Jeannie Hutchins is a 88 y.o. female Nursing home resident With a history of diastolic heart failure, oxygen dependence, atrial fibrillation with recent 6 day stay in late April for worsening oxygen requirement, right lower extremity cellulitis associated with an injury (where she hit her lower leg on the wheelchair).  She was given oxygen support, azithromycin, ceftriaxone and Lasix, required intensive care on admission, followed by steroids and doxycycline for lungs and leg.    She presented to the emergency room on 05/30 with tachycardia, generalized weakness and a syncopal episode.  She had had a fall last week with x-rays done at the nursing facility which were negative.  She denies feeling ill prior to the fall and relates this to not donning her slippers before trying to move around.  Her heart rate was found to be 220, in atrial fibrillation and with a temperature of 101.7°.  Workup included CBC with white blood cells 27,000, BNP 1900, urinalysis with 63 white blood cells and many yeast, chest x-ray with pulmonary edema and on admission was placed on ceftriaxone and fluconazole.  The wound on the right lower leg is largely healed, she does have a bruise about the right hip.  She was noted to have pain in the shoulder and hip and x-rays were ordered with findings of severe glenohumeral and acromioclavicular osteoarthrosis and a suspected right femoral neck fracture with a CT scan showing a subcapital fracture and cortical offset along the inferior edge of the femoral head/neck junction.  Fortunately there is no sign of hemarthrosis or increased joint fluid.  The right iliacus muscle is edematous or enlarged compared to the left. There may be a tiny bit of fluid in her trochanteric.  Blood cultures from 05/30 became positive this morning with GPC identified by [employer] is MRSA. Repeat blood cultures have been drawn.  The urine culture  "is growing only yeast("clean catch"). (MRSA screen on admission was negative).  Ceftriaxone was stopped and vancomycin was ordered.  Fever has resolved. WBC remain elevated. Still on diltiazem drip and requiring 6 liters of oxygen. Urine output is not being measured as she is incontinent.     6/2: interim reviewed. Afebrile. Oxygen requirement is much higher. Orthopedics consult pending. yesterday's blood cultures are positive, Vanc ALICIA for the MRSA is 1, trough was low. WBC improved. BUN up to 38. Ct chest reviewed and there is no focal pneumonia to blame her bacteremia on.  I had the chest x-ray repeated and there are no new infiltrates, only mild perihilar fullness.  She is alert, still in pain from her right hip but I was able to perform range-of-motion a little, more than I expected to be able to.  The remainder of the joints of the lower extremities have no sign or symptom of joint infection.  She has no peripheral signs of endocarditis.  Understandably her son is concerned about the stress of a transesophageal echo.  Discussed with him that we would not do it on someone who has this oxygen requirement and I explained that this would help discern the length of therapy.  I also discussed with him that in some cases when the evaluation is more dangerous than the treatment, that we elect to treat people for the worst possible diagnosis and he is in favor of this rather than the BLADIMIR.  6/3: Interim reviewed.  6/2 blood cultures are positive for MRSA, 6 3 blood cultures after the initiation of daptomycin have been drawn.  She is requiring roughly the same amount of oxygen.  Discussed with Dr. Ndiaye of Pulmonary.  Her right hip is no worse, but her blood pressure does not tolerate opiates very well.  She is eating a little bit more per her son's report.  Examination of the hip allows me to perform some flexion without severe pain and this is an improvement from 2 days ago.  Talked with her son about whether he would " be in favor of a right hip washout should imaging or persistent bacteremia suggest that the hip was infected and discussed with him the potential for anesthetic complications and/or inability to liberate from the ventilator.  CT scan of the hip has been ordered by Hospital Medicine.  6/4: Interim reviewed.  Most recent blood cultures, since the addition of daptomycin are still positive  She is requiring less oxygen.  She is brighter and seems to be in less pain.  She is breathing comfortably and able to express herself well though she is very hard of hearing  6/5: interim reviewed. Afebrile. Blood cultures remain positive through 6/4 am. Cr is worse today(diuretics, contrast and vanc).  She personally has no new complaints.  Her daughters at the bedside assisting with her lunch.  She remains bit confused, requiring a modest amount of oxygen.  Her pain seems well controlled.  I reviewed her last CT of the pelvis again and there may be a hypodense area in the right iliacus muscle.  If this were truly a site of infection I would expected to be from the bacteremia and not the cause of the bacteremia  6/6: interim reviewed. Blood cultures from 6/4 are positive. Creatinine is worse and urine output is worse, .  But she may be in retention bladder feels distended, is uncomfortable to palpation.  Right hip is no different.  Did not respond to diuretics yesterday.  10 hours because of somnolence.  6/7: interim reviewed. Blood culture from 6/6 is negative so far. BUN and Cr are worse. No obstruction on US of kidneys. Urine output 650. Was not in retention. 7 liters oxygen in progress. Nephrology consult in progress. Urine eos negative. Daughter at bedside finds her sleepier and more confused. Received 3 doses of IV tylenol and no opiates since yesterday. Eating and drinking small amounts. Seems to be more comfortable pain wise.   6/8: interim reviewed. Resting quietly. Not eating much, maybe 25% at most. Sounds wetter on exam  "but oxygen requirement not higher. Urine output is encouraging, 1 liter/24 h. Blood cultures from 6/6 have GPC but are negative from 6/7 so far. CRP falling  6/9: Interim reviewed.  Afebrile.  Creatinine is improved, urine output is still suboptimal but adequate.  Blood cultures positive 6/6.  Not eating today secondary to sensation of constipation, status post enema.  White blood cells and CRP are improved    06/10/2023. Dr Aguillon   Afebrile.  Wearing BiPAP 16/7/40%.  Tired, weak, difficult to understand.  WBC 14 improving, CRP is improving..  Blood cultures of 0607, 0608, 0609 are negative to date.    06/11/2023. Dr Aguillon  Patient is sleeping , she was tired and restless earlier. Dw nurse Monica  WBC is going up27 ?, CRP 10,  bacteremia has cleared chest X-ray with RLung infiltrates    Antibiotics (From admission, onward)      Start     Stop Route Frequency Ordered    06/09/23 1315  linezolid 600 mg/300 mL IVPB 600 mg        Note to Pharmacy: Dosing/Administration  Dose Adjustments  See 'In-Depth Answers' for detailed results.    Renal impairment: No adjustment necessary [2]  Micromedex    -- IV Every 12 hours (non-standard times) 06/09/23 1209    06/06/23 1500  ceftaroline fosamiL (TEFLARO) 300 mg in dextrose 5 % (D5W) 50 mL IVPB         -- IV Every 12 hours (non-standard times) 06/06/23 1349    06/06/23 1400  DAPTOmycin (CUBICIN) 350 mg in sodium chloride 0.9% SolP 50 mL IVPB        Note to Pharmacy: Ht: 4' 8" (1.422 m)  Wt: 43.7 kg (96 lb 4.8 oz)  Estimated Creatinine Clearance: 13 mL/min (A) (based on SCr of 2.1 mg/dL (H)).  Body mass index is 21.6 kg/m².    -- IV Every 48 hours (non-standard times) 06/05/23 1320          Antifungals (From admission, onward)      None          Antivirals (From admission, onward)      None          EXAM & DIAGNOSTICS REVIEWED:   Vitals:     Temp:  [97.6 °F (36.4 °C)-98.5 °F (36.9 °C)]   Temp: 98.5 °F (36.9 °C) (06/11/23 1927)  Pulse: 77 (06/11/23 2019)  Resp: 14 (06/11/23 " 2019)  BP: (!) 168/74 (06/11/23 1927)  SpO2: 95 % (06/11/23 2019)    Intake/Output Summary (Last 24 hours) at 6/11/2023 2131  Last data filed at 6/11/2023 1624  Gross per 24 hour   Intake 150 ml   Output 1625 ml   Net -1475 ml     Oxygen Concentration (%):  [28-40] 40    General:  In NAD. Sleepy, received opiates  Eyes:  Anicteric,   EOMI,    ENT:  Moist lips, Hard of hearing  Neck:  supple,   Lungs: bibasilar crackles,  Heart:  iRRR,   Abd:  Soft, NT, ND, normal BS,    :  Ta,  Musc:  Joints without effusion, swelling, erythema, synovitis, no redness, bruising or fluctuance around right hip   Skin:  No rashes   Neuro:          moves all extremities when disturbed,    Psych:  Lymphatic:     Extrem: No edema, erythema, phlebitis, cellulitis, warm and well perfused  VAD:  Peripheral, midline left arm 6/6     Isolation:  contact  Wound: Right lower lateral leg prior ulcer is healed    Right hip    There is no redness about the right hip and the bruise is improved.  6/3    General Labs reviewed:  Recent Labs   Lab 06/09/23 0515 06/10/23  0424 06/11/23 0518   WBC 15.80* 14.33* 27.84*   HGB 9.3* 8.6* 9.2*   HCT 29.3* 27.3* 29.6*   * 490* 491*       Recent Labs   Lab 06/09/23  0515 06/10/23  0424 06/11/23  0518   * 140 144   K 3.9 3.5 3.2*   CL 95 100 100   CO2 28 30* 33*   BUN 94* 68* 37*   CREATININE 2.6* 1.9* 1.1   CALCIUM 8.4* 8.6* 8.9   PROT 5.8* 5.7* 6.1   BILITOT 1.2* 1.3* 1.3*   ALKPHOS 122 105 107   ALT 18 17 16   AST 18 17 16         Lab Results   Component Value Date    CRP 10.72 (H) 06/11/2023    CRP 10.97 (H) 06/10/2023    CRP 16.37 (H) 06/09/2023    CRP 18.14 (H) 06/08/2023    CRP 19.48 (H) 06/07/2023    CRP 22.28 (H) 06/06/2023    CRP 24.82 (H) 06/05/2023    CRP 24.68 (H) 06/04/2023    CRP 23.21 (H) 06/03/2023    CRP 29.61 (H) 06/02/2023    PROCAL 1.38 (H) 06/01/2023    PROCAL <0.05 04/22/2023    PROCAL 0.30 04/21/2023    PROCAL <0.05 06/16/2022    PROCAL <0.05 06/14/2022    PROCAL 0.07  06/14/2022    PROCAL 0.18 04/19/2021    PROCAL 0.03 01/16/2021      Micro:  Microbiology Results (last 7 days)       Procedure Component Value Units Date/Time    Blood culture [[id number]] Collected: 06/08/23 0845    Order Status: Completed Specimen: Blood Updated: 06/11/23 1032     Blood Culture, Routine No Growth to date      No Growth to date      No Growth to date      No Growth to date    Narrative:      Collection has been rescheduled by EL3 at 06/08/2023 05:15 Reason:   Unable to collect  Collection has been rescheduled by EL3 at 06/08/2023 05:15 Reason:   Unable to collect    Blood culture [[id number]] Collected: 06/07/23 0831    Order Status: Completed Specimen: Blood Updated: 06/11/23 1032     Blood Culture, Routine No Growth to date      No Growth to date      No Growth to date      No Growth to date      No Growth to date    Blood culture [[id number]] Collected: 06/09/23 0514    Order Status: Completed Specimen: Blood Updated: 06/11/23 0632     Blood Culture, Routine No Growth to date      No Growth to date      No Growth to date    Blood culture [[id number]] Collected: 06/10/23 0424    Order Status: Completed Specimen: Blood Updated: 06/11/23 0632     Blood Culture, Routine No Growth to date      No Growth to date    Blood culture [[id number]]  (Abnormal) Collected: 06/06/23 0724    Order Status: Completed Specimen: Blood Updated: 06/09/23 0759     Blood Culture, Routine Gram stain aer bottle: Gram positive cocci      Positive results previously called 06/07/2023  23:43 KS3      METHICILLIN RESISTANT STAPHYLOCOCCUS AUREUS  For susceptibility see order #[id number]  Known MRSA patient      Narrative:      Collection has been rescheduled by EL3 at 06/06/2023 05:13 Reason:   Unable to collect  Collection has been rescheduled by EL3 at 06/06/2023 05:13 Reason:   Unable to collect    Blood culture [[id number]]  (Abnormal)  (Susceptibility) Collected: 06/04/23 6719    Order Status: Completed Specimen: Blood Updated:  06/08/23 0700     Blood Culture, Routine Gram stain aer bottle: Gram positive cocci      Positive results previously called      METHICILLIN RESISTANT STAPHYLOCOCCUS AUREUS  Known MRSA patient      Blood culture [[id number]]  (Abnormal) Collected: 06/04/23 0609    Order Status: Completed Specimen: Blood Updated: 06/06/23 0649     Blood Culture, Routine Gram stain aer bottle: Gram positive cocci      Gram stain shirley bottle: Gram positive cocci      Results called to and read back by:ELIZABETH Melendez;  06/05/2023      00:50 CJD      METHICILLIN RESISTANT STAPHYLOCOCCUS AUREUS  For susceptibility see order #[id number]      Blood culture [[id number]]  (Abnormal) Collected: 06/03/23 0713    Order Status: Completed Specimen: Blood Updated: 06/06/23 0648     Blood Culture, Routine Gram stain aer bottle: Gram positive cocci      Positive results previously called 06/03/2023  23:13 KS3      METHICILLIN RESISTANT STAPHYLOCOCCUS AUREUS  For susceptibility see order #[id number]      Rapid Organism ID by PCR (from Blood culture) [[id number]]  (Abnormal) Collected: 06/04/23 0609    Order Status: Completed Updated: 06/05/23 0140     Enterococcus faecalis Not Detected     Enterococcus faecium Not Detected     Listeria monocytogenes Not Detected     Staphylococcus spp. See species for ID     Comment: critical result(s) called and verbal readback obtained from ELIZABETH Meyer by CD3 06/05/2023 01:40          Staphylococcus aureus Detected     Comment: critical result(s) called and verbal readback obtained from ELIZABETH Meyer by CD3 06/05/2023 01:40          Staphylococcus epidermidis Not Detected     Staphylococcus lugdunensis Not Detected     Streptococcus species Not Detected     Streptococcus agalactiae Not Detected     Streptococcus pneumoniae Not Detected     Streptococcus pyogenes Not Detected     Acinetobacter calcoaceticus/baumannii complex Not Detected     Bacteroides fragilis Not Detected      Enterobacterales Not Detected     Enterobacter cloacae complex Not Detected     Escherichia coli Not Detected     Klebsiella aerogenes Not Detected     Klebsiella oxytoca Not Detected     Klebsiella pneumoniae group Not Detected     Proteus Not Detected     Salmonella sp Not Detected     Serratia marcescens Not Detected     Haemophilus influenzae Not Detected     Neisseria meningtidis Not Detected     Pseudomonas aeruginosa Not Detected     Stenotrophomonas maltophilia Not Detected     Candida albicans Not Detected     Candida auris Not Detected     Candida glabrata Not Detected     Candida krusei Not Detected     Candida parapsilosis Not Detected     Candida tropicalis Not Detected     Cryptococcus neoformans/gattii Not Detected     CTX-M (ESBL ) Test not applicable     IMP (Carbapenem resistant) Test not applicable     KPC resistance gene (Carbapenem resistant) Test not applicable     mcr-1  Test not applicable     mec A/C  Test not applicable     mec A/C and MREJ (MRSA) gene Detected     Comment: critical result(s) called and verbal readback obtained from ELIZABETH Meyer by CD3 06/05/2023 01:40          NDM (Carbapenem resistant) Not Detected     OXA-48-like (Carbapenem resistant) Not Detected     van A/B (VRE gene) Not Detected     VIM (Carbapenem resistant) Not Detected    Narrative:         critical result(s) called and verbal readback obtained from ELIZABETH Meyer by CD3 06/05/2023 01:40    Blood culture [[id number]]     Order Status: Canceled Specimen: Blood             Imaging Reviewed:   CXR06/11/23  Findings compatible with mild interstitial edema with no interval change upon comparison.   2. Confluent alveolar density focus the right and the lungs is suggestive of an evolving infiltrate prompting the need for close follow-up..    CT bilateral hips  1. Pre-existing osteoarthritis right hip with evidence of subcapital fracture and cortical offset along the inferior edge of the femoral  head neck junction.  2. No evidence of significant osseous displacement..  3. Chronic degenerative arthrosis about the left hip with prominent osteophytic spurs encircling the base of the femoral head.    CT hip right 6/3  Subcapital fracture of the right femoral neck.  Thickening of the musculature around the right hip is probably in relation to trauma. A low-attenuation focus in the musculature anterior to the hip is probably hematoma. No abnormal enhancement to suggest abscess formation can be seen although I cannot absolutely rule out out.    Cardiology:  TTE   (poor imaging of valves)  Atrial fibrillation observed.  The left ventricle is normal in size with concentric remodeling and normal systolic function.  The estimated ejection fraction is 55%.  Moderate tricuspid regurgitation.  No clear evidence of endocarditis. If clinical suspicion persist, consider alternative cardiac imaging like BLADIMIR for further evaluation.    IMPRESSION & PLAN   1. MRSA bacteremia. 5/30-6/6   Source is not readily apparent.     Blood cultures are negative on 06/07/-6/8-6/9 are negative so far   She has too much pain from fracture to reliably discern whether she has a septic hip. There is no increased fluid on the CT, there may be a tiny amount of fluid in trochanteric bursa. The right iliacus is larger than left. Repeat Ct 6/3 shows no evolution of a focus of infection, ?hypodense area right iliacus. If there was, potentially curable with medical therapy. This would represent an area of seeding as opposed to the origin of the bacteremia   There was no lower respiratory tract infection present on CT of the chest    2. Right subcapital hip fracture, soft tissue trauma right shoulder    3. Oxygen dependent CHF, pulmonary hypertension   Hypoxemic respiratory failure, CTA negative  CXR of 06/11-- RLung infiltrate    4. Candiduria on admission is likely reflective of vaginal contamination of specimen(there is no way she could give a clean  catch specimen)    5. MARSHALL. Diuretics, sepsis, vanc and contrast, Resolved    6. Elderly, frail and debilitated      Recommendations:  Continue  IV daptomycin,  IV ceftaroline , doses appropriate for her renal function  Serial  blood cultures seems to have cleared.  Will discontinue Zyvox  Trend crp, Weekly CPK on daptomycin  Clear secretions  Monitor CXr/ repeat in a few days      Medical Decision Making during this encounter was  [_] Low Complexity  [_] Moderate Complexity  [xxx  ] High Complexity

## 2023-06-12 NOTE — PLAN OF CARE
DC orders and chart reviewed. No discharge needs noted.  Patient cleared for discharge from .  Patient is discharging back to [place] where she was a [facility] resident before coming to the hospital.  She is discharging with [facility].  All discharge information has been sent to both [facility] and [facility].  Waiting to be notified of who the nurse can call report to and transportation time.     06/12/23 1143   Final Note   Assessment Type Final Discharge Note   Anticipated Discharge Disposition HospiceHome   What phone number can be called within the next 1-3 days to see how you are doing after discharge? [phone]   Post-Acute Status   Post-Acute Authorization Hospice   Hospice Status Set-up Complete/Auth obtained   Patient choice form signed by patient/caregiver List with quality metrics by geographic area provided   Discharge Delays (!) Ambulance Transport/Facility Transport

## 2023-06-12 NOTE — PT/OT/SLP DISCHARGE
Physical Therapy Discharge Summary    Name: Jeannie Hutchins  MRN: [id number]   Principal Problem: Sepsis     Patient Discharged from acute Physical Therapy on 23.  Please refer to prior PT noted date on 23 for functional status.     Assessment:     Dc to hospice    Objective:     GOALS:   Multidisciplinary Problems       Physical Therapy Goals          Problem: Physical Therapy    Goal Priority Disciplines Outcome Goal Variances Interventions   Physical Therapy Goal     PT, PT/OT Ongoing, Progressing     Description: Goals to be met by: 2023     Patient will increase functional independence with mobility by performin. Supine to sit with MInimal Assistance  2. Sit to stand transfer with Minimal Assistance and NWB right LE  3. Sit to supine with minimal assistance.      6/3/23: new order received so patient reevaluated and goals changes as appropriate.                       Reasons for Discontinuation of Therapy Services  Therapist determines that the patient will no longer benefit from therapy services.      Plan:     Patient Discharged to: Palliative Care/Hospice at NH.      2023

## 2023-06-12 NOTE — PLAN OF CARE
WILL received call from [employer] with [facility] this AM to verify that [facility] can return to [facility] today with their hospice services. Ada at [facility] notified by CM and verified.  CM to send discharge information.     1053- CM received call from [employer] stating nothing went through on careport.  CM resent everything via fax to [phone].  WILL also sent discharge information to [facility], Ada notified.        06/12/23 0829   Post-Acute Status   Post-Acute Authorization Hospice   Hospice Status Set-up Complete/Auth obtained   Discharge Plan   Discharge Plan A Hospice/home   Discharge Plan B Hospice/home

## 2023-06-12 NOTE — PT/OT/SLP DISCHARGE
Occupational Therapy Discharge Summary    Jeannie Hutchins  MRN: [id number]   Principal Problem: Sepsis      Patient Discharged from acute Occupational Therapy on 6/12/23.  Please refer to prior OT note dated 6/8/23 for functional status.    Assessment:      Patient is no longer making progress.    Objective:     GOALS:   Multidisciplinary Problems       Occupational Therapy Goals          Problem: Occupational Therapy    Goal Priority Disciplines Outcome Interventions   Occupational Therapy Goal     OT, PT/OT Ongoing, Progressing    Description: Goals to be met by: 6/28/2023     Patient will increase functional independence with ADLs by performing:    LE Dressing with Stand-by Assistance and Assistive Devices as needed.  Grooming while seated at sink with Stand-by Assistance.  Toileting from bedside commode with Stand-by Assistance for hygiene and clothing management.   Supine to sit with Stand-by Assistance.  Toilet transfer to bedside commode with Stand-by Assistance.                         Reasons for Discontinuation of Therapy Services  Transfer to alternate level of care.      Plan:     Patient Discharged to: Palliative Care/Hospice    6/12/2023

## 2023-06-12 NOTE — CARE UPDATE
06/11/23 2304   Patient Assessment/Suction   Level of Consciousness (AVPU) responds to pain   Respiratory Effort Normal;Unlabored   Expansion/Accessory Muscles/Retractions no use of accessory muscles   All Lung Fields Breath Sounds diminished   Rhythm/Pattern, Respiratory unlabored;pattern regular   PRE-TX-O2   Device (Oxygen Therapy) BIPAP   $ Is the patient on Low Flow Oxygen? Yes   Oxygen Concentration (%) 40   SpO2 95 %   Pulse Oximetry Type Continuous   $ Pulse Oximetry - Multiple Charge Pulse Oximetry - Multiple   Pulse 70   Resp 16   /63   Ready to Wean/Extubation Screen   FIO2<=50 (chart decimal) 0.4   Preset CPAP/BiPAP Settings   Mode Of Delivery BiPAP   $ CPAP/BiPAP Daily Charge BiPAP/CPAP Daily   $ Is patient using? Yes   Size of Mask Small   Sized Appropriately? Yes   Ipap 17   EPAP (cm H2O) 7   Pressure Support (cm H2O) 10   Set Rate (Breaths/Min) 22   ITime (sec) 1   Rise Time (sec) 3   Patient CPAP/BiPAP Settings   RR Total (Breaths/Min) 23   Tidal Volume (mL) 251   VE Minute Ventilation (L/min) 5.8 L/min   Peak Inspiratory Pressure (cm H2O) 17   TiTOT (%) 38   Total Leak (L/Min) 0   Patient Trigger - ST Mode Only (%) 48   Education   $ Education BiPAP;15 min   Respiratory Evaluation   $ Care Plan Tech Time 15 min

## 2023-06-12 NOTE — PLAN OF CARE
DC orders and chart reviewed. No discharge needs noted.  Patient cleared for discharge from .  Patient is discharging back to [facility] with [facility].  Nurse notified to call report to B9 and facility to set up ambulance transport after calling report.     [id number] placed for ambulance transport to [facility].     06/12/23 1522   Final Note   Assessment Type Final Discharge Note   Anticipated Discharge Disposition HospiceHome   What phone number can be called within the next 1-3 days to see how you are doing after discharge? [phone]   Post-Acute Status   Post-Acute Authorization Hospice   Hospice Status Set-up Complete/Auth obtained   Patient choice form signed by patient/caregiver List with quality metrics by geographic area provided   Discharge Delays (!) Ambulance Transport/Facility Transport

## 2023-06-12 NOTE — PT/OT/SLP DISCHARGE
Speech Language Pathology Discharge Summary    Jeannie Hutchins  MRN: [id number]   Sepsis     Date of Last Treatment Session: 6/10/23    Past Medical History:   Diagnosis Date    Anticoagulant long-term use     Eliquis    Arthritis     Atrial fibrillation     Blood transfusion     Carotid stenosis     CHF (congestive heart failure)     Edema     Generalized anxiety disorder 1/23/2018    Dr. Mckeon's eval 1/23/18: She does appear to have a JERRI because of the persistent worries that she has.  These worries predominate her day.  She would probably do well with prevention therapy such as SSRI/SNRI to help control the physical symptoms of the anxiety.  Problem at this point in time is her electrolyte abnormalities.  There is a slight risk of hyponatremia with these medications and    Hearing loss     [place] (hard of hearing)     Hypercholesterolemia     Hypertension     On home oxygen therapy     Psychiatric problem     Thyroid disease          Treatment Area(s):  Swallow    Goals:   Multidisciplinary Problems       SLP Goals          Problem: SLP    Goal Priority Disciplines Outcome   SLP Goal     SLP Ongoing, Not Progressing   Description: 1. Pt will tolerate LRD w/ adequate oral clearance and w/o overt s/s aspiration during >95% of PO intake  2. Pt will utilize swallowing precautions during >95% of PO intake given min cues                       Participation in Treatment (at discharge):  Decreased participation, decreased appetite, previously tolerating IDDSI 6 diet and thin liquids     Reasons for Discontinuation of Therapy Services  Transitioning to Hospice care    Patient is discharged to  [facility]/ Hospice care

## 2023-06-12 NOTE — PT/OT/SLP PROGRESS
Physical Therapy      Patient Name:  Jeannie Hutchins   MRN:  [id number]    Patient not seen today secondary to  (pt discharging back to NH on Hopsice). RENEE PT

## 2023-06-13 LAB — BACTERIA BLD CULT: NORMAL

## 2023-06-13 NOTE — NURSING
Pt discharged to [facility] at 1838 pt Lt. Upper arm midline was removed prior to discharge transferred to facility on 6L and david cath in.

## 2023-06-14 LAB — BACTERIA BLD CULT: NORMAL

## 2023-06-14 NOTE — DISCHARGE SUMMARY
[facility]  Discharge Summary  Patient Name: Jeannie Hutchins MRN: [id number]   Patient Class: IP- Inpatient  Length of Stay: 13   Admission Date: 5/30/2023  8:12 AM Attending Physician: Cristian Baker MD   Primary Care Provider: Primary Doctor No Face-to-Face encounter date: 6/12/23   Chief Complaint: Tachycardia    Date of Discharge: 6/12/23  Discharge Disposition:Hospice/Home   Condition: Stable       Reason for Hospitalization     Active Hospital Problems    Diagnosis    *MRSA Bacteremia    Bacteremia    MRSA bacteremia    Goals of care, counseling/discussion    MARSHALL (acute kidney injury)    Closed fracture of right hip    Candiduria    Closed right hip fracture after fall    Acute on chronic diastolic heart failure    Acute on chronic respiratory failure with hypoxia    Atrial fibrillation with RVR with known history of a fib    Troponin I above reference range    Nursing home resident    DNR (do not resuscitate)    UTI (urinary tract infection)    Ulcer of right leg    Anemia    Hypothyroidism    Mixed hyperlipidemia    Pulmonary hypertension    Essential hypertension         Brief History of Present Illness    Jeannie Hutchins is a 88 y.o.  female who  has a past medical history of Anticoagulant long-term use, Arthritis, Atrial fibrillation, Blood transfusion, Carotid stenosis, CHF (congestive heart failure), Edema, Generalized anxiety disorder (1/23/2018), Hearing loss, [place] (hard of hearing), Hypercholesterolemia, Hypertension, On home oxygen therapy, Psychiatric problem, and Thyroid disease.. The patient presented to [facility] on 5/30/2023 with a primary complaint of tachycardia from nursing home    For the full HPI please refer to the History & Physical from this admission.    Hospital Course By Problem with Pertinent Findings       Plan: MRSA Bacteremia  Ongoing MRSA bacteremia without a clear source at this point and we have not achieved source control.  Family would not like  any further invasive workup.     Check CTA chest > no signs of significant focal pneumonia  Inflammatory markers rising  Repeat blood cultures daily  She has had daily positive blood cultures.   Urine culture with candiduria likely contaminated   COVID and influenza negative  Daptomycin and Ceftaroline plus linezolid per Dr Shahid  Initial Echo without vegetations.   Plan to continue antibiotics until cultures clear   Appreciate Dr Shahid's recs  CT hips / pelvis  > no clear evidence of infective focus.      Hopefully we have achieved clearance of her bacteremia  Her blood cx are negative x 4 days now  Her wbc did increase overnight to 28 from 14  ?aspirating while on bipap  Back to NH with hospice        UTI (urinary tract infection)  UA positive, many yeast, history of UTIs in the past  Urine culture with many yeast , likely contaminant  Fluconazole discontinued        Atrial fibrillation with RVR with known history of a fib  Patient with known history of persistent atrial fibrillation.   RVR likely driven by acute medical condition including infection/sepsis and CHF.  Telemetry monitoring, treat acute medical condition, continue metoprolol and eliquis when she will take po  Added digoxin IV and lovenox for now  Monitor blood pressure  HR controlled        Acute on chronic respiratory failure with hypoxia  Chronically on 2 L supplemental oxygen   Ongoing respiratory failure  CTA without PE  Concerns for possible fat embolism  Appreciate pulmonary recs  Difficult time getting pulse ox.  Her oxygen requirement has been up and down.  High risk for decompensation     Acute on chronic diastolic heart failure  Admits to shortness of breath with productive cough   Chest x-ray with pulmonary edema with elevated BNP at 1905  Previous echo with EF of 65%, diastolic dysfunction consistent with AFib  IVC ultrasound today shows enlargement per Dr. Amezquita  Additional IV Lasix defer to Dr Amezquita  Strict I/O daily weights, oral  "fluid and sodium restriction        Closed right hip fracture after fall  Patient with a recent fall in [place]  She is having increasing pain in her shoulder and hip, xray shows right hip fracture  CT with subcapital fracture of her right hip  Pain control  Ortho consulted > family discussed with them and plan to hold off on any intervention and would rather let her hip heal without repair        Anemia  Chronic anemia, no evidence of bleeding, monitoring        Ulcer of right leg  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, sustained few weeks ago from wheelchair   On examination no obvious signs of superimposed infection   Wound Care consulted        Hypothyroidism  Chronic medical condition continue levothyroxine        Troponin I above reference range  Likely in the setting of AFib RVR, CHF and infection, trend for completeness        Essential hypertension  Hold home amlodipine in the setting of infection and RVR to allow for titration of medication        DNR (do not resuscitate)  Has advanced directive and confirmed with daughter, DNR  Palliative has seen patient  Plan is medical optimization w/o invasive procedures  Considering hospice at discharge  She will go back to [place]        Mixed hyperlipidemia  Chronic medical condition            Pulmonary hypertension  Last echo with PASP 54        MARSHALL (acute kidney injury)  MARSHALL of unclear etiology but could be multifactorial  IVC is very large per Dr. Amezquita and will give additional Lasix, concern for cardiorenal syndrome  Ta placed today, concern for urinary retention  Could also potentially be due to vancomycin dosing and contrast.  Trend renal function at this point, seems to be improving  Consult Nephrology        Patient was seen and examined on the date of discharge and determined to be suitable for discharge.    Physical Exam  BP (!) 169/97   Pulse 84   Temp 98.2 °F (36.8 °C)   Resp (!) 21   Ht 4' 8" (1.422 m)   Wt 43.7 kg (96 lb 4.8 oz)   SpO2 " (!) 93%   BMI 21.60 kg/m²   Vitals reviewed.  General: seems more comfortable, answering questions  Eyes: No conjunctival injection. No scleral icterus.  CVS: RRR. No LE edema BL  Lungs:  No accessory muscle use.  Slight tachypnea on bipap  Abdomen:  Soft, nontender and nondistended.  No organomegaly  Neuro:  Moves all extremities.    Following labs were Reviewed   No results for input(s): WBC, HGB, HCT, PLT, GLUCOSE, CALCIUM, ALBUMIN, PROT, NA, K, CO2, CL, BUN, CREATININE, ALKPHOS, ALT, AST, BILITOT in the last 24 hours.  No results found for: POCTGLUCOSE     All labs within the past 24 hours have been reviewed    Microbiology Results (last 7 days)       Procedure Component Value Units Date/Time    Blood culture [[id number]] Collected: 06/07/23 0831    Order Status: Completed Specimen: Blood Updated: 06/12/23 1032     Blood Culture, Routine No growth after 5 days.    Blood culture [[id number]]  (Abnormal) Collected: 06/06/23 0724    Order Status: Completed Specimen: Blood Updated: 06/09/23 0759     Blood Culture, Routine Gram stain aer bottle: Gram positive cocci      Positive results previously called 06/07/2023  23:43 KS3      METHICILLIN RESISTANT STAPHYLOCOCCUS AUREUS  For susceptibility see order #[id number]  Known MRSA patient      Narrative:      Collection has been rescheduled by EL3 at 06/06/2023 05:13 Reason:   Unable to collect  Collection has been rescheduled by EL3 at 06/06/2023 05:13 Reason:   Unable to collect    Blood culture [[id number]]  (Abnormal)  (Susceptibility) Collected: 06/04/23 1559    Order Status: Completed Specimen: Blood Updated: 06/08/23 0700     Blood Culture, Routine Gram stain aer bottle: Gram positive cocci      Positive results previously called      METHICILLIN RESISTANT STAPHYLOCOCCUS AUREUS  Known MRSA patient      Blood culture [[id number]]  (Abnormal) Collected: 06/04/23 0609    Order Status: Completed Specimen: Blood Updated: 06/06/23 0649     Blood Culture, Routine Gram stain  aer bottle: Gram positive cocci      Gram stain shirley bottle: Gram positive cocci      Results called to and read back by:ELIZABETH Melendez;  06/05/2023      00:50 CJD      METHICILLIN RESISTANT STAPHYLOCOCCUS AUREUS  For susceptibility see order #[id number]      Blood culture [[id number]]  (Abnormal) Collected: 06/03/23 0713    Order Status: Completed Specimen: Blood Updated: 06/06/23 0648     Blood Culture, Routine Gram stain aer bottle: Gram positive cocci      Positive results previously called 06/03/2023  23:13 KS3      METHICILLIN RESISTANT STAPHYLOCOCCUS AUREUS  For susceptibility see order #[id number]            X-Ray Chest AP Portable   Final Result      US Retroperitoneal Complete   Final Result      X-Ray Chest AP Portable   Final Result      CT Hip With Contrast Bilateral   Final Result      X-Ray Chest AP Portable   Final Result      CTA Chest Non-Coronary (PE Studies)   Final Result      CT Hip Without Contrast Bilateral   Final Result      X-Ray Shoulder Trauma 3 view Left   Final Result      X-Ray Hips Bilateral 2 View Inc AP Pelvis   Final Result      X-Ray Chest AP Portable   Final Result          No results found for this or any previous visit.      Consultants and Procedures   Consultants:  Consults (From admission, onward)          Status Ordering Provider     Inpatient consult to Registered Dietitian/Nutritionist  Once        Provider:  (Not yet assigned)    Completed JING EDDY     Inpatient consult to Palliative Care  Once        Provider:  Shamar Rivas MD    Completed JING EDDY     Inpatient consult to Nephrology  Once        Provider:  Daniele Lima MD    Completed SUKHI SWAIN     Inpatient consult to Pulmonology  Once        Provider:  Reva Ndiaye MD    Completed SUKHI SWAIN     Inpatient consult to Infectious Diseases  Once        Provider:  Marcia Shahid MD    Completed SUKHI SWAIN     Inpatient consult to  Registered Dietitian/Nutritionist  Once        Provider:  (Not yet assigned)    Completed SAMANTHA INMAN     IP consult to case management  Once        Provider:  (Not yet assigned)    Completed SAMANTAH INMAN              Discharge Information:   Diet:  Resume regular soft diet for pleasure feeds    Discharging with hospice to nursing home          Discharge Medications:     Medication List        START taking these medications      HYDROcodone-acetaminophen 5-325 mg per tablet  Commonly known as: NORCO  Take 1 tablet by mouth every 6 (six) hours as needed for Pain.     senna-docusate 8.6-50 mg 8.6-50 mg per tablet  Commonly known as: PERICOLACE  Take 2 tablets by mouth every 12 (twelve) hours.            CHANGE how you take these medications      albuterol-ipratropium 2.5 mg-0.5 mg/3 mL nebulizer solution  Commonly known as: DUO-NEB  Take 3 mLs by nebulization every 6 (six) hours as needed for Wheezing. Rescue  What changed:   when to take this  reasons to take this            CONTINUE taking these medications      acetaminophen 500 MG tablet  Commonly known as: TYLENOL  Take 1 tablet (500 mg total) by mouth every 6 (six) hours as needed for Pain or Temperature greater than (100).     aluminum & magnesium hydroxide-simethicone 400-400-40 mg/5 mL suspension  Commonly known as: MYLANTA MAX STRENGTH     amLODIPine 5 MG tablet  Commonly known as: NORVASC  Take 1 tablet (5 mg total) by mouth once daily.     EScitalopram oxalate 10 MG tablet  Commonly known as: LEXAPRO     famotidine 20 MG tablet  Commonly known as: PEPCID     fluticasone propionate 50 mcg/actuation nasal spray  Commonly known as: FLONASE     furosemide 20 MG tablet  Commonly known as: LASIX  Take 1 tablet (20 mg total) by mouth once daily.     guaiFENesin 400 mg Tab  Commonly known as: HUMIBID E     levothyroxine 75 MCG tablet  Commonly known as: SYNTHROID     loratadine 10 mg tablet  Commonly known as: CLARITIN     metoprolol tartrate 25 MG  tablet  Commonly known as: LOPRESSOR     olopatadine 0.1 % ophthalmic solution  Commonly known as: PATANOL     THERATEARS 0.25 % Drop  Generic drug: carboxymethylcellulose sodium     traZODone 50 MG tablet  Commonly known as: DESYREL  Take 1 tablet (50 mg total) by mouth every evening.            STOP taking these medications      alendronate 70 MG tablet  Commonly known as: FOSAMAX     apixaban 2.5 mg Tab  Commonly known as: ELIQUIS     atorvastatin 20 MG tablet  Commonly known as: LIPITOR     potassium chloride 20 mEq  Commonly known as: K-TAB     PRESERVISION AREDS-2 250-90-40-1 mg Cap  Generic drug: vit C,A-Tu-dsjds-lutein-zeaxan            ASK your doctor about these medications      ARTIFICIAL TEARS (NELSON/MIN) 83-15 % Oint  Generic drug: white petrolatum-mineral oiL  Place into both eyes every evening.               Where to Get Your Medications        These medications were sent to [facility] [address] , [place] [address]      Hours: Mon-Fri, 8a-5:30p Phone: [phone]   senna-docusate 8.6-50 mg 8.6-50 mg per tablet       Information about where to get these medications is not yet available    Ask your nurse or doctor about these medications  albuterol-ipratropium 2.5 mg-0.5 mg/3 mL nebulizer solution  HYDROcodone-acetaminophen 5-325 mg per tablet           I spent 37 minutes preparing the discharge for this patient including reviewing records from previous encounters, preparation of discharge summary, assessing and final examination of the patient, discharge medicine reconciliation, discussing plan of care, follow up and education and prescriptions.       Cristian Baker  [facility] Hospitalist

## 2023-06-14 NOTE — PROGRESS NOTES
[facility] Medicine  Progress Note    Patient name: Jeannie Hutchins  MRN: [id number]  Admit Date: 5/30/2023   LOS: 13 days     SUBJECTIVE:     Principal problem: Sepsis    Interval History:      6/8- vitals stable, minimally verbal.  Careful diuresis, she is making urine per documentation, still net +    Scheduled Meds:      Continuous Infusions:  PRN Meds:    Review of patient's allergies indicates:   Allergen Reactions    [place] Swelling     Tongue swells up and a generalized rash.  Patient reports allergy to all Berries    Hydrochlorothiazide Other (See Comments)     hyponatremia    Lisinopril Other (See Comments)     Cough       Review of Systems: As per interval history    OBJECTIVE:       Physical Exam:  General: resting, seems to hear but doesn't open eyes to verbal cues  Eyes: No conjunctival injection. No scleral icterus.  CVS: RRR. No LE edema BL  Lungs:  No tachypnea or accessory muscle use.  Clear to auscultation bilaterally  Abdomen:  Soft, nontender and nondistended.  No organomegaly  Neuro:  Moves all extremities.      Laboratory:  I have reviewed all pertinent lab results within the past 24 hours.    Diagnostic Results:  Labs: Reviewed  ECG: Reviewed  X-Ray: Reviewed    ASSESSMENT/PLAN:         Active Hospital Problems    Diagnosis  POA    *MRSA Bacteremia [A41.9]  Yes    Bacteremia [R78.81]  Yes    MRSA bacteremia [R78.81, B95.62]  Yes    Goals of care, counseling/discussion [Z71.89]  Not Applicable    MARSHALL (acute kidney injury) [N17.9]  Yes    Closed fracture of right hip [S72.001A]  Yes    Candiduria [B37.49]  Yes    Closed right hip fracture after fall [S72.001A]  Yes    Acute on chronic diastolic heart failure [I50.9]  Yes    Acute on chronic respiratory failure with hypoxia [J96.20]  Yes    Atrial fibrillation with RVR with known history of a fib [I48.91]  Yes     Chronic    Troponin I above reference range [R77.8]  Yes    Nursing home resident [Z59.3]  Not Applicable      Chronic    DNR (do not resuscitate) [Z66]  Yes     Chronic    UTI (urinary tract infection) [N39.0]  Yes    Ulcer of right leg [L97.919]  Yes     Chronic    Anemia [D64.9]  Yes     Chronic    Hypothyroidism [E03.9]  Yes     Chronic    Mixed hyperlipidemia [E78.2]  Yes    Pulmonary hypertension [I27.20]  Yes    Essential hypertension [I10]  Yes      Resolved Hospital Problems   No resolved problems to display.         Plan: MRSA Bacteremia  Ongoing MRSA bacteremia without a clear source at this point and we have not achieved source control.  Family would not like any further invasive workup.     Check CTA chest > no signs of significant focal pneumonia  Inflammatory markers rising  Repeat blood cultures daily  She has had daily positive blood cultures.   Urine culture with candiduria likely contaminated   COVID and influenza negative  Daptomycin and Ceftaroline plus linezolid per Dr Shahid  Initial Echo without vegetations.   Plan to continue antibiotics until cultures clear at this point.  Appreciate Dr Shahid's recs  CT hips / pelvis  > no clear evidence of infective focus.      Hopefully we have achieved clearance of her bacteremia     UTI (urinary tract infection)  UA positive, many yeast, history of UTIs in the past  Urine culture with many yeast , likely contaminant  Fluconazole discontinued        Atrial fibrillation with RVR with known history of a fib  Patient with known history of persistent atrial fibrillation.   RVR likely driven by acute medical condition including infection/sepsis and CHF.  Telemetry monitoring, treat acute medical condition, continue metoprolol and eliquis  Added digoxin  Metoprolol reduced  Monitor blood pressure  HR better controlled        Acute on chronic respiratory failure with hypoxia  Chronically on 2 L supplemental oxygen   Ongoing respiratory failure  CTA without PE  Concerns for possible fat embolism  Appreciate pulmonary recs  Difficult time getting pulse ox.  Her oxygen  requirement has been up and down.  High risk for decompensation     Acute on chronic diastolic heart failure  Admits to shortness of breath with productive cough   Chest x-ray with pulmonary edema with elevated BNP at 1905  Previous echo with EF of 65%, diastolic dysfunction consistent with AFib  IVC ultrasound today shows enlargement per Dr. Amezquita  Additional IV Lasix defer to Dr Amezquita  Strict I/O daily weights, oral fluid and sodium restriction        Closed right hip fracture after fall  Patient with a recent fall in [facility]  She is having increasing pain in her shoulder and hip, xray shows right hip fracture  CT with subcapital fracture of her right hip  Pain control  Ortho consulted > family discussed with them and plan to hold off on any intervention and would rather let her hip heal without repair        Anemia  Chronic anemia, no evidence of bleeding, monitoring        Ulcer of right leg  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, sustained few weeks ago from wheelchair   On examination no obvious signs of superimposed infection   Wound Care consulted        Hypothyroidism  Chronic medical condition continue levothyroxine        Troponin I above reference range  Likely in the setting of AFib RVR, CHF and infection, trend for completeness        Essential hypertension  Hold home amlodipine in the setting of infection and RVR to allow for titration of medication        DNR (do not resuscitate)  Has advanced directive and confirmed with daughter, DNR   Needs further GOC discussion with family     Mixed hyperlipidemia  Chronic medical condition        Nursing home resident  Resident at [facility]        Pulmonary hypertension  Last echo with PASP 54        MARSHALL (acute kidney injury)  MARSHALL of unclear etiology but could be multifactorial  IVC is very large per Dr. Amezquita and will give additional Lasix, concern for cardiorenal syndrome  Ta placed today, concern for urinary retention  Could also  potentially be due to vancomycin dosing and contrast.  Trend renal function at this point, seems to be worsening  Nephrology following              VTE Risk Mitigation (From admission, onward)           Ordered       IP VTE HIGH RISK PATIENT  Once         05/30/23 1220       Place sequential compression device  Until discontinued         05/30/23 1220                            VTE Risk Mitigation (From admission, onward)           Ordered     IP VTE HIGH RISK PATIENT  Once         05/30/23 1220                        Department Hospital Medicine  [facility]  Cristian Baker MD  Date of service: 6/8/23

## 2023-06-15 LAB — BACTERIA BLD CULT: NORMAL

## 2023-07-24 PROBLEM — J96.21 ACUTE ON CHRONIC RESPIRATORY FAILURE WITH HYPOXIA: Status: RESOLVED | Noted: 2021-01-16 | Resolved: 2023-07-24

## 2023-09-04 PROBLEM — N39.0 UTI (URINARY TRACT INFECTION): Status: RESOLVED | Noted: 2023-05-30 | Resolved: 2023-09-04

## 2023-09-04 PROBLEM — A41.9 SEPSIS: Status: RESOLVED | Noted: 2023-05-30 | Resolved: 2023-09-04

## 2023-09-04 PROBLEM — J96.20 ACUTE ON CHRONIC RESPIRATORY FAILURE: Status: RESOLVED | Noted: 2023-05-30 | Resolved: 2023-09-04

## 2023-09-04 PROBLEM — N17.9 AKI (ACUTE KIDNEY INJURY): Status: RESOLVED | Noted: 2023-06-05 | Resolved: 2023-09-04

## 2023-09-04 PROBLEM — B37.49 CANDIDURIA: Status: RESOLVED | Noted: 2023-06-01 | Resolved: 2023-09-04

## 2024-01-01 NOTE — PLAN OF CARE
Problem: Adult Inpatient Plan of Care  Goal: Plan of Care Review  Outcome: Ongoing, Progressing  Goal: Patient-Specific Goal (Individualized)  Outcome: Ongoing, Progressing  Goal: Absence of Hospital-Acquired Illness or Injury  Outcome: Ongoing, Progressing  Goal: Optimal Comfort and Wellbeing  Outcome: Ongoing, Progressing  Goal: Readiness for Transition of Care  Outcome: Ongoing, Progressing     Problem: Fall Injury Risk  Goal: Absence of Fall and Fall-Related Injury  Outcome: Ongoing, Progressing     Problem: Skin Injury Risk Increased  Goal: Skin Health and Integrity  Outcome: Ongoing, Progressing      [id number]

## 2024-10-22 NOTE — PROGRESS NOTES
Jeannie Hutchins is a 82 y.o. female patient.    Active Hospital Problems    Diagnosis  POA    *Hyponatremia with decreased serum osmolality [E87.1]  Yes    Adjustment disorder with mixed anxiety and depressed mood [F43.23]  Yes     Dr. Mckeon's eval 1/23/18:  Patient does not meet criteria for major depressive disorder.  Her symptoms are contingent upon her situations, which is an adjustment disorder.  This is best treated with intense individual therapy.  She is correct that she would do best with a psychologist.  I am not familiar with any here on the [facility] but she should check with her insurance to see if there are any providers working in the community.  Medications will not help an adjustment disorder.        Benign hypertension [I10]  Yes    Hypokalemia [E87.6]  Yes    Generalized anxiety disorder [F41.1]  Yes     Dr. Mckeon's eval 1/23/18:  She does appear to have a JERRI because of the persistent worries that she has.  These worries predominate her day.  She would probably do well with prevention therapy such as SSRI/SNRI to help control the physical symptoms of the anxiety.  Problem at this point in time is her electrolyte abnormalities.  There is a slight risk of hyponatremia with these medications and given her current state, is not recommended to start these.   After a few weeks of stabilized labs, would recommend to start low dose SSRI treatment.  She may do well with paroxetine.  Start at 10mg daily and increase slowly over time to 20-40mg, whatever dose meets efficacy.  Again, therapy will help her with her anxiety.  Agree with patient that she should seek individual counseling with a psychologist.      Non-intractable vomiting with nausea [R11.2]  Yes      Resolved Hospital Problems    Diagnosis Date Resolved POA   No resolved problems to display.     Temp: 97.6 °F (36.4 °C) (01/27/18 0752)  Pulse: (!) 125 (01/27/18 0752)  Resp: 17 (01/27/18 0752)  BP: (!) 158/81 (01/27/18 0752)  SpO2:  "(!) 93 % (01/27/18 0752)  Weight: 48 kg (105 lb 12.8 oz) (01/26/18 2044)  Height: 4' 8" (142.2 cm) (01/26/18 2044)    Subjective:  Symptoms:  Worsening.  (Pt with diarrhea  Not eating much).      Objective:  General Appearance:  Comfortable, ill-appearing, in no acute distress and not in pain (agitated).    Vital signs: (most recent): Blood pressure (!) 158/81, pulse (!) 125, temperature 97.6 °F (36.4 °C), temperature source Oral, resp. rate 17, height 4' 8" (1.422 m), weight 48 kg (105 lb 12.8 oz), SpO2 (!) 93 %, not currently breastfeeding.    HEENT: Normal HEENT exam.    Lungs:  Breath sounds clear to auscultation.    Heart: S1 normal and S2 normal.    Abdomen: Abdomen is soft.  Bowel sounds are normal.   There is no abdominal tenderness.     Extremities: There is dependent edema.    Skin:  Warm and dry.        Intake/Output - Last 3 Shifts       01/25 0700 - 01/26 0659 01/26 0700 - 01/27 0659 01/27 0700 - 01/28 0659    P.O. 600 840     I.V. (mL/kg)       Total Intake(mL/kg) 600 (12.5) 840 (17.5)     Urine (mL/kg/hr) 1500 (1.3)      Stool 0 (0)      Total Output 1500        Net -900 +840             Urine Occurrence 7 x 5 x 3 x    Stool Occurrence 0 x 0 x 3 x        Lab Results   Component Value Date    WBC 15.08 (H) 01/27/2018    HGB 11.5 (L) 01/27/2018    HCT 32.4 (L) 01/27/2018    MCV 86 01/27/2018     01/27/2018     BMP  Lab Results   Component Value Date     (L) 01/27/2018    K 3.4 (L) 01/27/2018    CL 89 (L) 01/27/2018    CO2 25 01/27/2018    BUN 5 (L) 01/27/2018    CREATININE 0.5 01/27/2018    CALCIUM 8.6 (L) 01/27/2018    ANIONGAP 9 01/27/2018    ESTGFRAFRICA >60 01/27/2018    EGFRNONAA >60 01/27/2018     Current Facility-Administered Medications   Medication    acetaminophen tablet 650 mg    amLODIPine tablet 10 mg    And    atorvastatin tablet 10 mg    famotidine (PF) injection 20 mg    guaiFENesin 12 hr tablet 1,200 mg    levothyroxine tablet 75 mcg    magnesium sulfate 2g in water " 50mL IVPB (premix)    ondansetron injection 4 mg    ondansetron injection 4 mg    oxyCODONE immediate release tablet 5 mg    paroxetine 10 mg/5 mL suspension 10 mg    pneumoc 13-vanessa conj-dip cr(PF) 0.5 mL    potassium chloride 10 mEq in 100 mL IVPB    potassium chloride 10% solution 40 mEq    potassium chloride SA CR tablet 20 mEq    ramelteon tablet 8 mg    sodium chloride 0.9% flush 3 mL    sodium chloride tablet 1 g       Assessment & Plan  1.Hyponatremia. Siadh vs poor po intake. Got better with ivfs. Now dropping. Pt not eating much and paroxetine.  Recommend drug change. Added salt tablets. Magdiel sodium.  2.Hypothyroidism. Cont tx.  3.HTN. No hctz or lasix. Add labetalol.  4.Tachy. ? Nervous.  Doesn't sound that high. Notify primary. admitted now with pna.  5.hypokalemia. Ck mag. Repleting.    Jennifer Venegas MD  1/27/2018     Patient

## (undated) DEVICE — ELECTRODE RESUSCITATION 1 STEP